# Patient Record
Sex: FEMALE | Race: WHITE | NOT HISPANIC OR LATINO | Employment: OTHER | URBAN - METROPOLITAN AREA
[De-identification: names, ages, dates, MRNs, and addresses within clinical notes are randomized per-mention and may not be internally consistent; named-entity substitution may affect disease eponyms.]

---

## 2017-02-02 ENCOUNTER — TELEPHONE (OUTPATIENT)
Dept: INTERNAL MEDICINE | Facility: CLINIC | Age: 67
End: 2017-02-02

## 2017-02-02 NOTE — TELEPHONE ENCOUNTER
----- Message from Kassi Serrano sent at 2017  2:50 PM CST -----  Contact: self  Karin Maguire  MRN: [id number]  : 1950  PCP: Gabby Finney  Home Phone      [phone]  Work Phone      Not on file.  Mobile          [phone]      MESSAGE:   Pt has cough, headache, chills, not sure if she has fever or not. Is requesting to get an appt tomorrow or to have something called in.    Phone: [phone]

## 2017-02-02 NOTE — TELEPHONE ENCOUNTER
Patient is having chest congestion, cough, chills, body aches, cant check temp. No appts available, can you send something in?

## 2017-02-02 NOTE — TELEPHONE ENCOUNTER
Patient is coughing up brown, she is a smoker. Patient also has the chills. She states she thinks she may have it.

## 2017-02-02 NOTE — TELEPHONE ENCOUNTER
i have no issue calling in something, but i'm afraid she may have the flu. Any colorful d/c or just clear. If chills with clear d/c, sounds like flu.

## 2017-02-03 ENCOUNTER — OFFICE VISIT (OUTPATIENT)
Dept: FAMILY MEDICINE | Facility: CLINIC | Age: 67
End: 2017-02-03
Payer: MEDICARE

## 2017-02-03 VITALS
SYSTOLIC BLOOD PRESSURE: 116 MMHG | HEART RATE: 68 BPM | DIASTOLIC BLOOD PRESSURE: 72 MMHG | HEIGHT: 68 IN | BODY MASS INDEX: 28.89 KG/M2 | WEIGHT: 190.63 LBS | TEMPERATURE: 100 F

## 2017-02-03 DIAGNOSIS — J11.1 INFLUENZA: Primary | ICD-10-CM

## 2017-02-03 PROCEDURE — 99212 OFFICE O/P EST SF 10 MIN: CPT | Mod: PBBFAC | Performed by: FAMILY MEDICINE

## 2017-02-03 PROCEDURE — 99999 PR PBB SHADOW E&M-EST. PATIENT-LVL II: CPT | Mod: PBBFAC,,, | Performed by: FAMILY MEDICINE

## 2017-02-03 PROCEDURE — 99213 OFFICE O/P EST LOW 20 MIN: CPT | Mod: S$PBB,,, | Performed by: FAMILY MEDICINE

## 2017-02-03 RX ORDER — OSELTAMIVIR PHOSPHATE 75 MG/1
75 CAPSULE ORAL 2 TIMES DAILY
Qty: 10 CAPSULE | Refills: 0 | Status: SHIPPED | OUTPATIENT
Start: 2017-02-03 | End: 2017-02-13

## 2017-02-03 NOTE — PATIENT INSTRUCTIONS
Influenza (Adult)    Influenza is also called the flu. It is a viral illness that affects the air passages of your lungs. It is different from the common cold. The flu can easily be passed from one to person to another. It may be spread through the air by coughing and sneezing. Or it can be spread by touching the sick person and then touching your own eyes, nose, or mouth.  The flu starts 1 to 3 days after you are exposed to the flu virus. It may last for 1 to 2 weeks. You usually dont need to take antibiotics unless you have a complication. This might be an ear or sinus infection or pneumonia.  Symptoms of the flu may be mild or severe. They can include extreme tiredness (wanting to stay in bed all day), chills, fevers, muscle aches, soreness with eye movement, headache, and a dry, hacking cough.  Home care  Follow these guidelines when caring for yourself at home:  · Avoid being around cigarette smoke, whether yours or other peoples.  · Acetaminophen or ibuprofen will help ease your fever, muscle aches, and headache. Dont give aspirin to anyone younger than 18 who has the flu. Aspirin can harm the liver.  · Nausea and loss of appetite are common with the flu. Eat light meals. Drink 6 to 8 glasses of liquids every day. Good choices are water, sport drinks, soft drinks without caffeine, juices, tea, and soup. Extra fluids will also help loosen secretions in your nose and lungs.  · Over-the-counter cold medicines will not make the flu go away faster. But the medicines may help with coughing, sore throat, and congestion in your nose and sinuses. Dont use a decongestant if you have high blood pressure.  · Stay home until your fever has been gone for at least 24 hours without using medicine to reduce fever.  Follow-up care  Follow up with your healthcare provider, or as advised, if you are not getting better over the next week.  If you are 65 or older, talk with your provider about getting a pneumococcal vaccine  every 5 years. You should also get this vaccine if you have chronic asthma or COPD. All adults should get a flu vaccine every fall. Ask your provider about this.  When to seek medical advice  Call your healthcare provider right away if any of these occur:  · Cough with lots of colored sputum (mucus) or blood in your sputum  · Chest pain, shortness of breath, wheezing, or difficulty breathing  · Severe headache, or face, neck, or ear pain  · New rash with fever  · Fever of 100.4°F (38°C) or higher, or as directed by your healthcare provider  · Confusion, behavior change, or seizure  · Severe weakness or dizziness  · You get a fever or cough after getting better for a few days  Date Last Reviewed: 12/23/2014  © 1194-6203 [employer]. [address] [place] [address], [place] [address]. All rights reserved. This information is not intended as a substitute for professional medical care. Always follow your healthcare professional's instructions.

## 2017-02-03 NOTE — MR AVS SNAPSHOT
[facility]  [address] [address]  Phone: [phone]  Fax: [phone]                  Karin Maguire   2/3/2017 8:45 AM   Office Visit    Description:  Female : 1950   Provider:  Khanh Mendez MD   Department:  [facility]           Reason for Visit     Cough     Nasal Congestion     Chills           Diagnoses this Visit        Comments    Influenza    -  Primary            To Do List           Future Appointments        Provider Department Dept Phone    2017 8:00 AM NURSEST. CARBALLO [facility] [phone]    2017 8:00 AM Gabby Finney NP [facility] [phone]      Goals (5 Years of Data)     None       These Medications        Disp Refills Start End    oseltamivir (TAMIFLU) 75 MG capsule 10 capsule 0 2/3/2017 2017    Take 1 capsule (75 mg total) by mouth 2 (two) times daily. - Oral    Pharmacy: [facility]/pharmacy #[id number] - [place], [place] - [address] Ph #: [phone]         [facility]     [facility] On Call Nurse Care Line -  Assistance  Registered nurses in the [facility] provide clinical advisement, health education, appointment booking, and other advisory services.  Call for this free service at [phone].             Medications           Message regarding Medications     Verify the changes and/or additions to your medication regime listed below are the same as discussed with your clinician today.  If any of these changes or additions are incorrect, please notify your healthcare provider.        START taking these NEW medications        Refills    oseltamivir (TAMIFLU) 75 MG capsule 0    Sig: Take 1 capsule (75 mg total) by mouth 2 (two) times daily.    Class: Normal    Route: Oral           Verify that the below list of medications is an accurate representation of the medications you are currently taking.  If none reported, the list may be blank. If  "incorrect, please contact your healthcare provider. Carry this list with you in case of emergency.           Current Medications     duloxetine (CYMBALTA) 60 MG capsule Take 60 mg by mouth once daily.     lamotrigine (LAMICTAL) 150 MG Tab 1.5 tablets every evening.     oxcarbazepine (TRILEPTAL) 300 MG Tab 1.5 tablets 2 (two) times daily.     SEROQUEL  mg Tb24 Take 1 tablet by mouth once daily.    oseltamivir (TAMIFLU) 75 MG capsule Take 1 capsule (75 mg total) by mouth 2 (two) times daily.           Clinical Reference Information           Your Vitals Were     BP Pulse Temp Height Weight BMI    116/72 (BP Location: Left arm, Patient Position: Sitting, BP Method: Manual) 68 100.1 °F (37.8 °C) 5' 8" (1.727 m) 86.5 kg (190 lb 9.6 oz) 28.98 kg/m2      Blood Pressure          Most Recent Value    BP  116/72      Allergies as of 2/3/2017     No Known Allergies      Immunizations Administered on Date of Encounter - 2/3/2017     None      Instructions      Influenza (Adult)    Influenza is also called the flu. It is a viral illness that affects the air passages of your lungs. It is different from the common cold. The flu can easily be passed from one to person to another. It may be spread through the air by coughing and sneezing. Or it can be spread by touching the sick person and then touching your own eyes, nose, or mouth.  The flu starts 1 to 3 days after you are exposed to the flu virus. It may last for 1 to 2 weeks. You usually dont need to take antibiotics unless you have a complication. This might be an ear or sinus infection or pneumonia.  Symptoms of the flu may be mild or severe. They can include extreme tiredness (wanting to stay in bed all day), chills, fevers, muscle aches, soreness with eye movement, headache, and a dry, hacking cough.  Home care  Follow these guidelines when caring for yourself at home:  · Avoid being around cigarette smoke, whether yours or other peoples.  · Acetaminophen or " ibuprofen will help ease your fever, muscle aches, and headache. Dont give aspirin to anyone younger than 18 who has the flu. Aspirin can harm the liver.  · Nausea and loss of appetite are common with the flu. Eat light meals. Drink 6 to 8 glasses of liquids every day. Good choices are water, sport drinks, soft drinks without caffeine, juices, tea, and soup. Extra fluids will also help loosen secretions in your nose and lungs.  · Over-the-counter cold medicines will not make the flu go away faster. But the medicines may help with coughing, sore throat, and congestion in your nose and sinuses. Dont use a decongestant if you have high blood pressure.  · Stay home until your fever has been gone for at least 24 hours without using medicine to reduce fever.  Follow-up care  Follow up with your healthcare provider, or as advised, if you are not getting better over the next week.  If you are 65 or older, talk with your provider about getting a pneumococcal vaccine every 5 years. You should also get this vaccine if you have chronic asthma or COPD. All adults should get a flu vaccine every fall. Ask your provider about this.  When to seek medical advice  Call your healthcare provider right away if any of these occur:  · Cough with lots of colored sputum (mucus) or blood in your sputum  · Chest pain, shortness of breath, wheezing, or difficulty breathing  · Severe headache, or face, neck, or ear pain  · New rash with fever  · Fever of 100.4°F (38°C) or higher, or as directed by your healthcare provider  · Confusion, behavior change, or seizure  · Severe weakness or dizziness  · You get a fever or cough after getting better for a few days  Date Last Reviewed: 12/23/2014  © 9325-0645 [employer]. [address] [place] [address], [place] [address]. All rights reserved. This information is not intended as a substitute for professional medical care. Always follow your healthcare professional's instructions.              Smoking Cessation     If you would like to quit smoking:   You may be eligible for free services if you are a [place] resident and started smoking cigarettes before September 1, 1988.  Call the Smoking Cessation Trust (SCT) toll free at (794) 340-6075 or (163) 284-5220.   Call 1-800-QUIT-NOW if you do not meet the above criteria.            Language Assistance Services     ATTENTION: Language assistance services are available, free of charge. Please call [phone].      ATENCIÓN: Si habla ronny, tiene a eng disposición servicios gratuitos de asistencia lingüística. Llame al [phone].     CHÚ Ý: N?u b?n nói Ti?ng Vi?t, có các d?ch v? h? tr? ngôn ng? mi?n phí dành cho b?n. G?i s? [phone].         [facility] complies with applicable Federal civil rights laws and does not discriminate on the basis of race, color, national origin, age, disability, or sex.

## 2017-02-15 ENCOUNTER — HOSPITAL ENCOUNTER (OUTPATIENT)
Dept: RADIOLOGY | Facility: HOSPITAL | Age: 67
Discharge: HOME | End: 2017-02-15
Attending: NURSE PRACTITIONER
Payer: MEDICARE

## 2017-02-15 ENCOUNTER — OFFICE VISIT (OUTPATIENT)
Dept: INTERNAL MEDICINE | Facility: CLINIC | Age: 67
End: 2017-02-15
Payer: MEDICARE

## 2017-02-15 VITALS
BODY MASS INDEX: 28.57 KG/M2 | DIASTOLIC BLOOD PRESSURE: 80 MMHG | HEART RATE: 84 BPM | SYSTOLIC BLOOD PRESSURE: 122 MMHG | WEIGHT: 188.5 LBS | RESPIRATION RATE: 18 BRPM | HEIGHT: 68 IN

## 2017-02-15 DIAGNOSIS — M70.61 TROCHANTERIC BURSITIS OF RIGHT HIP: ICD-10-CM

## 2017-02-15 DIAGNOSIS — M25.551 RIGHT HIP PAIN: Primary | ICD-10-CM

## 2017-02-15 DIAGNOSIS — M25.551 RIGHT HIP PAIN: ICD-10-CM

## 2017-02-15 PROCEDURE — 73502 X-RAY EXAM HIP UNI 2-3 VIEWS: CPT | Mod: 26,RT,, | Performed by: RADIOLOGY

## 2017-02-15 PROCEDURE — 99213 OFFICE O/P EST LOW 20 MIN: CPT | Mod: S$PBB,,, | Performed by: NURSE PRACTITIONER

## 2017-02-15 PROCEDURE — 73502 X-RAY EXAM HIP UNI 2-3 VIEWS: CPT | Mod: TC,RT

## 2017-02-15 PROCEDURE — 99999 PR PBB SHADOW E&M-EST. PATIENT-LVL III: CPT | Mod: PBBFAC,,, | Performed by: NURSE PRACTITIONER

## 2017-02-15 NOTE — MR AVS SNAPSHOT
[facility]  [address] [address]  Phone: [phone]  Fax: [phone]                  Karin Maguire   2/15/2017 12:45 PM   Office Visit    Description:  Female : 1950   Provider:  Gabby Finney NP   Department:  [facility]           Reason for Visit     Hip Pain           Diagnoses this Visit        Comments    Right hip pain    -  Primary     Trochanteric bursitis of right hip                To Do List           Future Appointments        Provider Department Dept Phone    2017 8:00 AM NURSE, [facility] [phone]    2017 8:00 AM Gabby Finney NP [facility] [phone]      Goals (5 Years of Data)     None      Follow-Up and Disposition     Return if symptoms worsen or fail to improve.      [facility] On Call     [facility] On Call Nurse [facility] Line -  Assistance  Registered nurses in the [facility] provide clinical advisement, health education, appointment booking, and other advisory services.  Call for this free service at [phone].             Medications           Message regarding Medications     Verify the changes and/or additions to your medication regime listed below are the same as discussed with your clinician today.  If any of these changes or additions are incorrect, please notify your healthcare provider.             Verify that the below list of medications is an accurate representation of the medications you are currently taking.  If none reported, the list may be blank. If incorrect, please contact your healthcare provider. Carry this list with you in case of emergency.           Current Medications     duloxetine (CYMBALTA) 60 MG capsule Take 60 mg by mouth once daily.     lamotrigine (LAMICTAL) 150 MG Tab 1.5 tablets every evening.     oxcarbazepine (TRILEPTAL) 300 MG Tab 2 (two) times daily.     SEROQUEL  mg Tb24 Take 200 mg by mouth  "once daily.            Clinical Reference Information           Your Vitals Were     BP Pulse Resp Height Weight BMI    122/80 84 18 5' 8" (1.727 m) 85.5 kg (188 lb 7.9 oz) 28.66 kg/m2      Blood Pressure          Most Recent Value    BP  122/80      Allergies as of 2/15/2017     No Known Allergies      Immunizations Administered on Date of Encounter - 2/15/2017     None      Orders Placed During Today's Visit      Normal Orders This Visit    Ambulatory referral to Orthopedics     Future Labs/Procedures Expected by Expires    X-Ray Hip 2 View Right  2/15/2017 2/15/2018      Smoking Cessation     If you would like to quit smoking:   You may be eligible for free services if you are a [place] resident and started smoking cigarettes before September 1, 1988.  Call the Smoking Cessation Trust (SCT) toll free at (613) 931-0731 or (452) 828-1255.   Call -015-QUIT-NOW if you do not meet the above criteria.            Language Assistance Services     ATTENTION: Language assistance services are available, free of charge. Please call [phone].      ATENCIÓN: Si habla español, tiene a eng disposición servicios gratuitos de asistencia lingüística. Llame al [phone].     WILLY Ý: N?u b?n nói Ti?ng Vi?t, có các d?ch v? h? tr? ngôn ng? mi?n phí dành cho b?n. G?i s? [phone].         [facility] complies with applicable Federal civil rights laws and does not discriminate on the basis of race, color, national origin, age, disability, or sex.        "

## 2017-02-15 NOTE — PROGRESS NOTES
Subjective:       Patient ID: Karin Maguire is a 66 y.o. female.    Chief Complaint: Hip Pain    HPI: pt known to me presents with c/o right hip pain. Reports she has been dealing with this pain for years, but it is getting worse lately. Reports she has taken mobic or naproxen in the past, but no real relief. Now cannot sleep on it or go up stairs.     Review of Systems   Constitutional: Negative for chills, diaphoresis, fatigue and fever.   Respiratory: Positive for cough. Negative for shortness of breath and wheezing.         Reports just recently getting over the flu, but doing well except for occ cough   Cardiovascular: Negative for chest pain.   Gastrointestinal: Negative for abdominal distention, abdominal pain, constipation, diarrhea, nausea and vomiting.   Musculoskeletal: Positive for arthralgias. Negative for back pain and myalgias.   Neurological: Negative for headaches.   Psychiatric/Behavioral: Negative for sleep disturbance.       Objective:      Physical Exam   Constitutional: She is oriented to person, place, and time. She appears well-developed and well-nourished.   HENT:   Head: Normocephalic and atraumatic.   Cardiovascular: Normal rate, regular rhythm and normal heart sounds.    Pulmonary/Chest: Effort normal and breath sounds normal.   A little upper airway rattle   Abdominal: Soft. Bowel sounds are normal. There is no tenderness.   Musculoskeletal: She exhibits no edema.   Neurological: She is alert and oriented to person, place, and time.   Skin: Skin is warm and dry.   Psychiatric: She has a normal mood and affect. Her behavior is normal. Judgment and thought content normal.   Nursing note and vitals reviewed.      Assessment:       1. Right hip pain    2. Trochanteric bursitis of right hip        Plan:   1. Right hip pain    - X-Ray Hip 2 View Right; Future  - Ambulatory referral to Orthopedics    2. Trochanteric bursitis of right hip    - X-Ray Hip 2 View Right; Future  - Ambulatory  referral to Orthopedics

## 2017-03-01 RX ORDER — MELOXICAM 15 MG/1
TABLET ORAL
Qty: 30 TABLET | Refills: 2 | Status: SHIPPED | OUTPATIENT
Start: 2017-03-01 | End: 2017-06-30

## 2017-03-02 ENCOUNTER — TELEPHONE (OUTPATIENT)
Dept: PAIN MEDICINE | Facility: CLINIC | Age: 67
End: 2017-03-02

## 2017-03-02 ENCOUNTER — OFFICE VISIT (OUTPATIENT)
Dept: INTERNAL MEDICINE | Facility: CLINIC | Age: 67
End: 2017-03-02
Payer: MEDICARE

## 2017-03-02 VITALS
DIASTOLIC BLOOD PRESSURE: 86 MMHG | RESPIRATION RATE: 18 BRPM | HEART RATE: 84 BPM | HEIGHT: 68 IN | WEIGHT: 188.5 LBS | SYSTOLIC BLOOD PRESSURE: 138 MMHG | BODY MASS INDEX: 28.57 KG/M2

## 2017-03-02 DIAGNOSIS — M54.41 LOW BACK PAIN WITH RADIATION, RIGHT: Primary | ICD-10-CM

## 2017-03-02 DIAGNOSIS — M47.27 LUMBOSACRAL SPONDYLOSIS WITH RADICULOPATHY: ICD-10-CM

## 2017-03-02 PROCEDURE — 99214 OFFICE O/P EST MOD 30 MIN: CPT | Mod: PBBFAC | Performed by: NURSE PRACTITIONER

## 2017-03-02 PROCEDURE — 99999 PR PBB SHADOW E&M-EST. PATIENT-LVL IV: CPT | Mod: PBBFAC,,, | Performed by: NURSE PRACTITIONER

## 2017-03-02 PROCEDURE — 99213 OFFICE O/P EST LOW 20 MIN: CPT | Mod: S$PBB | Performed by: NURSE PRACTITIONER

## 2017-03-02 PROCEDURE — 1160F RVW MEDS BY RX/DR IN RCRD: CPT | Performed by: NURSE PRACTITIONER

## 2017-03-02 RX ORDER — HYDROCODONE BITARTRATE AND ACETAMINOPHEN 5; 325 MG/1; MG/1
1 TABLET ORAL EVERY 6 HOURS PRN
Qty: 45 TABLET | Refills: 0 | Status: SHIPPED | OUTPATIENT
Start: 2017-03-02 | End: 2018-01-17

## 2017-03-02 NOTE — PROGRESS NOTES
Subjective:       Patient ID: Karin Maguire is a 66 y.o. female.    Chief Complaint: Hip Pain    HPI: pt known to me presents with c/o right hip pain. I saw this pt 2 weeks ago for this and sent her to ortho. She was given an injection in the hip. Reports relief x 2 days and then flared back up. She has been seeing PT and they feel that it is more her SI joint causing issues rather than her hip. They rec injection into the SI joint.     Reports h/o spondylosis noted on MRI years ago. Was told she needed back surgery, but had declined at the time.     Review of Systems   Constitutional: Negative for chills, diaphoresis, fatigue and fever.   Eyes: Negative for visual disturbance.   Respiratory: Negative for cough, shortness of breath and wheezing.    Cardiovascular: Negative for chest pain.   Gastrointestinal: Negative for abdominal distention, abdominal pain, constipation, diarrhea, nausea and vomiting.   Musculoskeletal: Positive for back pain and gait problem. Negative for arthralgias and myalgias.        Right low back pain with majority of pain to the right hip   Neurological: Negative for headaches.   Psychiatric/Behavioral: Negative for sleep disturbance.       Objective:      Physical Exam   Constitutional: She is oriented to person, place, and time. She appears well-developed and well-nourished.   HENT:   Head: Normocephalic and atraumatic.   Cardiovascular: Normal rate, regular rhythm and normal heart sounds.    Pulmonary/Chest: Effort normal and breath sounds normal.   Abdominal: Soft. Bowel sounds are normal. There is no tenderness.   Musculoskeletal: She exhibits tenderness. She exhibits no edema.        Arms:  Neurological: She is alert and oriented to person, place, and time.   Skin: Skin is warm and dry.   Psychiatric: She has a normal mood and affect. Her behavior is normal. Judgment and thought content normal.   Nursing note and vitals reviewed.      Assessment:       1. Low back pain with  radiation, right    2. Lumbosacral spondylosis with radiculopathy        Plan:     1. Low back pain with radiation, right  i think she will need further intervention. No relief with hip shot and not satisfied with [facility].   - Ambulatory Referral to Pain Clinic  - X-Ray Lumbar Spine Complete 5 View; Future  - MRI Lumbar Spine Without Contrast; Future  - hydrocodone-acetaminophen 5-325mg (NORCO) 5-325 mg per tablet; Take 1 tablet by mouth every 6 (six) hours as needed for Pain.  Dispense: 45 tablet; Refill: 0    2. Lumbosacral spondylosis with radiculopathy    - Ambulatory Referral to Pain Clinic  - X-Ray Lumbar Spine Complete 5 View; Future  - MRI Lumbar Spine Without Contrast; Future  - hydrocodone-acetaminophen 5-325mg (NORCO) 5-325 mg per tablet; Take 1 tablet by mouth every 6 (six) hours as needed for Pain.  Dispense: 45 tablet; Refill: 0

## 2017-03-02 NOTE — MR AVS SNAPSHOT
[facility]  [address]   [place] [address]  Phone: [phone]  Fax: [phone]                  Karin Maguire   3/2/2017 1:30 PM   Office Visit    Description:  Female : 1950   Provider:  Gabby Finney NP   Department:  [facility]           Reason for Visit     Hip Pain           Diagnoses this Visit        Comments    Low back pain with radiation, right    -  Primary     Lumbosacral spondylosis with radiculopathy                To Do List           Future Appointments        Provider Department Dept Phone    3/3/2017 10:45 AM AUBREE Nieves [place] - Pain Management [phone]    3/8/2017 10:00 AM [facility] [phone]    2017 8:00 AM NURSE, [facility] [phone]    2017 8:00 AM Gabby Finney NP [place] [phone]      Goals (5 Years of Data)     None      Follow-Up and Disposition     Return if symptoms worsen or fail to improve.    Follow-up and Disposition History       These Medications        Disp Refills Start End    hydrocodone-acetaminophen 5-325mg (NORCO) 5-325 mg per tablet 45 tablet 0 3/2/2017     Take 1 tablet by mouth every 6 (six) hours as needed for Pain. - Oral    Pharmacy: [facility]/pharmacy #[id number] - [place] - [address]  #: [phone]         [facility] Call     [facility] Nurse Care Line -  Assistance  Registered nurses in the [facility] provide clinical advisement, health education, appointment booking, and other advisory services.  Call for this free service at [phone].             Medications           Message regarding Medications     Verify the changes and/or additions to your medication regime listed below are the same as discussed with your clinician today.  If any of these changes or additions are incorrect, please notify your healthcare provider.        START taking these NEW  medications        Refills    hydrocodone-acetaminophen 5-325mg (NORCO) 5-325 mg per tablet 0    Sig: Take 1 tablet by mouth every 6 (six) hours as needed for Pain.    Class: Print    Route: Oral           Verify that the below list of medications is an accurate representation of the medications you are currently taking.  If none reported, the list may be blank. If incorrect, please contact your healthcare provider. Carry this list with you in case of emergency.           Current Medications     duloxetine (CYMBALTA) 60 MG capsule Take 60 mg by mouth once daily.     lamotrigine (LAMICTAL) 150 MG Tab 1.5 tablets every evening.     meloxicam (MOBIC) 15 MG tablet TAKE 1 TABLET (15 MG TOTAL) BY MOUTH ONCE DAILY.    oxcarbazepine (TRILEPTAL) 300 MG Tab 2 (two) times daily.     SEROQUEL  mg Tb24 Take 200 mg by mouth once daily.     hydrocodone-acetaminophen 5-325mg (NORCO) 5-325 mg per tablet Take 1 tablet by mouth every 6 (six) hours as needed for Pain.           Clinical Reference Information           Your Vitals Were     BP                   138/86           Blood Pressure          Most Recent Value    BP  138/86      Allergies as of 3/2/2017     No Known Allergies      Immunizations Administered on Date of Encounter - 3/2/2017     None      Orders Placed During Today's Visit      Normal Orders This Visit    Ambulatory Referral to Pain Clinic     Future Labs/Procedures Expected by Expires    MRI Lumbar Spine Without Contrast  3/2/2017 3/2/2018    X-Ray Lumbar Spine Complete 5 View  3/2/2017 3/2/2018      Smoking Cessation     If you would like to quit smoking:   You may be eligible for free services if you are a [place] resident and started smoking cigarettes before September 1, 1988.  Call the Smoking Cessation Trust (SCT) toll free at (804) 959-3370 or (698) 421-8240.   Call 9-757-QUIT-NOW if you do not meet the above criteria.            Language Assistance Services     ATTENTION: Language assistance  services are available, free of charge. Please call [phone].      ATENCIÓN: Si habla ronny, tiene a eng disposición servicios gratuitos de asistencia lingüística. Llame al [phone].     CHÚ Ý: N?u b?n nói Ti?ng Vi?t, có các d?ch v? h? tr? ngôn ng? mi?n phí dành cho b?n. G?i s? [phone].         [facility] complies with applicable Federal civil rights laws and does not discriminate on the basis of race, color, national origin, age, disability, or sex.

## 2017-03-02 NOTE — TELEPHONE ENCOUNTER
Spoke with pt to reschedule appt for 03/03/17. Pt was put on the schedule by Shelbie at [facility]. Pt doesn't have any imaging. Tommy does not see new pt  Pt is schedule with Dr. Barron on 03/24/17  At 2:30pm. Appt letter mailed.  Pt voice understanding.

## 2017-03-08 ENCOUNTER — HOSPITAL ENCOUNTER (OUTPATIENT)
Dept: RADIOLOGY | Facility: HOSPITAL | Age: 67
Discharge: HOME | End: 2017-03-08
Attending: NURSE PRACTITIONER
Payer: MEDICARE

## 2017-03-08 DIAGNOSIS — M54.41 LOW BACK PAIN WITH RADIATION, RIGHT: ICD-10-CM

## 2017-03-08 DIAGNOSIS — M47.27 LUMBOSACRAL SPONDYLOSIS WITH RADICULOPATHY: ICD-10-CM

## 2017-03-08 PROCEDURE — 72148 MRI LUMBAR SPINE W/O DYE: CPT | Mod: TC

## 2017-03-08 PROCEDURE — 72148 MRI LUMBAR SPINE W/O DYE: CPT | Mod: 26,,, | Performed by: RADIOLOGY

## 2017-03-21 ENCOUNTER — HOSPITAL ENCOUNTER (OUTPATIENT)
Dept: RADIOLOGY | Facility: HOSPITAL | Age: 67
Discharge: HOME | End: 2017-03-21
Attending: NURSE PRACTITIONER
Payer: MEDICARE

## 2017-03-21 DIAGNOSIS — M47.27 LUMBOSACRAL SPONDYLOSIS WITH RADICULOPATHY: ICD-10-CM

## 2017-03-21 DIAGNOSIS — M54.41 LOW BACK PAIN WITH RADIATION, RIGHT: ICD-10-CM

## 2017-03-21 PROCEDURE — 72110 X-RAY EXAM L-2 SPINE 4/>VWS: CPT | Mod: TC

## 2017-03-21 PROCEDURE — 72110 X-RAY EXAM L-2 SPINE 4/>VWS: CPT | Mod: 26,,, | Performed by: RADIOLOGY

## 2017-03-24 ENCOUNTER — OFFICE VISIT (OUTPATIENT)
Dept: PAIN MEDICINE | Facility: CLINIC | Age: 67
End: 2017-03-24
Payer: MEDICARE

## 2017-03-24 VITALS
RESPIRATION RATE: 17 BRPM | HEART RATE: 80 BPM | HEIGHT: 68 IN | WEIGHT: 195.13 LBS | SYSTOLIC BLOOD PRESSURE: 146 MMHG | BODY MASS INDEX: 29.57 KG/M2 | DIASTOLIC BLOOD PRESSURE: 84 MMHG

## 2017-03-24 DIAGNOSIS — M46.1 SACROILIITIS: Primary | ICD-10-CM

## 2017-03-24 DIAGNOSIS — M70.61 TROCHANTERIC BURSITIS OF RIGHT HIP: ICD-10-CM

## 2017-03-24 PROCEDURE — 99999 PR PBB SHADOW E&M-EST. PATIENT-LVL III: CPT | Mod: PBBFAC,,, | Performed by: ANESTHESIOLOGY

## 2017-03-24 PROCEDURE — 99204 OFFICE O/P NEW MOD 45 MIN: CPT | Mod: S$PBB | Performed by: ANESTHESIOLOGY

## 2017-03-24 PROCEDURE — 99213 OFFICE O/P EST LOW 20 MIN: CPT | Mod: PBBFAC | Performed by: ANESTHESIOLOGY

## 2017-03-24 PROCEDURE — 1160F RVW MEDS BY RX/DR IN RCRD: CPT | Performed by: ANESTHESIOLOGY

## 2017-03-24 RX ORDER — NAPROXEN SODIUM 220 MG
440 TABLET ORAL 2 TIMES DAILY WITH MEALS
COMMUNITY
End: 2018-12-01 | Stop reason: ALTCHOICE

## 2017-03-24 RX ORDER — ACETAMINOPHEN 500 MG
1000 TABLET ORAL
COMMUNITY
End: 2017-06-30

## 2017-03-24 RX ORDER — QUETIAPINE 200 MG/1
300 TABLET, FILM COATED, EXTENDED RELEASE ORAL DAILY
COMMUNITY
Start: 2017-03-02 | End: 2018-01-17 | Stop reason: DRUGHIGH

## 2017-03-24 NOTE — LETTER
March 24, 2017      Gabby Finney, NP  [address] [address]           [place] [facility]  [address] [facility] [address]  Phone: [phone]  Fax: [phone]          Patient: Karin Maguire   MR Number: [id number]   YOB: 1950   Date of Visit: 3/24/2017       Dear Gabby Finney:    Thank you for referring Karin Maguire to me for evaluation. Attached you will find relevant portions of my assessment and plan of care.    If you have questions, please do not hesitate to call me. I look forward to following Karin Maguire along with you.    Sincerely,    Lissette Barron MD    Enclosure  CC:  No Recipients    If you would like to receive this communication electronically, please contact [email or web address] or [phone] to request more information on [facility] [employer] access.    For providers and/or their staff who would like to refer a patient to [facility], please contact us through our one-stop-shop provider referral line, [facility], at [phone].    If you feel you have received this communication in error or would no longer like to receive these types of communications, please e-mail [email or web address]

## 2017-03-24 NOTE — PROGRESS NOTES
Chronic Pain - New Consult    Referring Physician: Gabby Finney NP    Chief Complaint:   Chief Complaint   Patient presents with    Hip Pain     right hip        SUBJECTIVE:    Karin Maguire presents to the clinic for the evaluation of right hip pain. The pain started 10-15 years ago following lifting and symptoms have been worsening.The pain is located in the right hip area and radiates to the right leg.  The pain is described as sharp and stabbing and is rated as 3/10. The pain is rated with a score of  1/10 on the BEST day and a score of 9/10 on the WORST day.  Symptoms interfere with daily activity. The pain is exacerbated by Sitting, Walking and Lifting.  The pain is mitigated by laying down and medications. She reports spending 8 hours per day reclining. The patient reports 4 hours of uninterrupted sleep per night.    Patient denies night fever/night sweats, urinary incontinence, bowel incontinence, significant weight loss, significant motor weakness and loss of sensations.    Physical Therapy/Home Exercise: yes      Pain Disability Index Review:  Last 3 PDI Scores 3/24/2017   Pain Disability Index (PDI) 17       Pain Medications:    - Opioids: Norco  - Adjuvant Medications: Cymbalta ( Duloxetine), Mobic (Meloxicam) and seroquel  - Anti-Coagulants: none  - Others: see medication card     report:  Reviewed and consistent with medication use as prescribed.    Pain Procedures: none    Imaging:  3/21/17 X-Ray Lumbar Spine Complete 5 View  Lumbar spine, 5 views: There is a levoscoliotic curvature of the lumbar spine.  Straightening of the normal lumbar lordosis.  Vertebral body heights are maintained.  There are multilevel degenerative changes of the lumbar spine consisting of disc space narrowing, endplate sclerosis, marginal osteophytosis and facet arthropathy.  No fracture or subluxation is identified.   Impression    As above.              3/8/17 MRI Lumbar Spine Without Contrast  Technique:  Sagittal T1, sagittal T2, sagittal STIR, axial T1 and axial T2 images of the lumbar spine were obtained without contrast.    Comparison: None.    Results: Vertebral body alignment and heights are maintained.  There is disc desiccation with disc space narrowing throughout the lumbar spine, most prominent at the L3-4 and L4-5 levels.  Endplate degenerative changes are noted at L4 and L5.  The marrow signal is otherwise normal without evidence for a marrow replacement process, infection or tumor.  The conus terminates at T12.    Incidentally visualized soft tissues structures of the abdomen are within normal limits.    At T12-L1, there is bilateral facet arthropathy without central canal stenosis or neural foraminal narrowing.    At L1-2, there is a broad-based posterior disc bulge with left-sided facet arthropathy without central canal stenosis or neural foraminal narrowing.    At L2-3, there is a broad-based posterior disc bulge and facet arthropathy without central canal stenosis or neural foraminal narrowing.    At L3-4, there is a broad-based posterior disc bulge, ligamentum flavum hypertrophy and facet arthropathy contributing to mild central canal stenosis with moderate bilateral neural foraminal narrowing.    At L4-5, there is a broad-based posterior disc bulge, ligamentum flavum hypertrophy and facet arthropathy contributing to severe central canal stenosis with severe bilateral neural foraminal narrowing.    At L5-S1, there is bilateral facet arthropathy without central canal stenosis or neural foraminal narrowing.   Impression       Multilevel degenerative changes of the lumbar spine as detailed above, most significant at the L4-5 level where there is severe central canal stenosis and severe bilateral neural foraminal narrowing.  There is also mild central canal stenosis with moderate bilateral neural foraminal narrowing at L3-4.           2/15/17 X-Ray Hip 2 View Right  Right hip, 2 views: There is  bilateral acetabular roof sclerosis.  Joint spaces are well maintained.  No fracture or dislocation is identified.   Impression    As above.           Past Medical History:   Diagnosis Date    Anxiety     Bipolar 1 disorder      Past Surgical History:   Procedure Laterality Date    ANKLE FRACTURE SURGERY      BLADDER SUSPENSION      CHOLECYSTECTOMY      HYSTERECTOMY  1986    vaginal prolapse    RECTAL PROLAPSE REPAIR      TONSILLECTOMY       Social History     Social History    Marital status:      Spouse name: N/A    Number of children: N/A    Years of education: N/A     Occupational History    Not on file.     Social History Main Topics    Smoking status: Current Every Day Smoker     Packs/day: 1.00     Years: 5.00    Smokeless tobacco: Never Used    Alcohol use Yes      Comment: Socially    Drug use: No    Sexual activity: Yes     Partners: Male     Birth control/ protection: Surgical      Comment:      Other Topics Concern    Not on file     Social History Narrative     Family History   Problem Relation Age of Onset    Colon cancer Maternal Uncle     Colon cancer Maternal Uncle     Colon cancer Maternal Uncle        Review of patient's allergies indicates:  No Known Allergies    Current Outpatient Prescriptions   Medication Sig    acetaminophen (TYLENOL) 500 MG tablet Take 500 mg by mouth as needed for Pain.    duloxetine (CYMBALTA) 60 MG capsule Take 60 mg by mouth once daily.     hydrocodone-acetaminophen 5-325mg (NORCO) 5-325 mg per tablet Take 1 tablet by mouth every 6 (six) hours as needed for Pain.    lamotrigine (LAMICTAL) 150 MG Tab 1.5 tablets every evening.     meloxicam (MOBIC) 15 MG tablet TAKE 1 TABLET (15 MG TOTAL) BY MOUTH ONCE DAILY.    naproxen sodium (ANAPROX) 220 MG tablet Take 220 mg by mouth as needed.    oxcarbazepine (TRILEPTAL) 300 MG Tab 2 (two) times daily.     quetiapine 200 mg oral tb24 (SEROQUEL XR) 200 MG Tb24      No current  "facility-administered medications for this visit.        REVIEW OF SYSTEMS:    GENERAL:  No weight loss, malaise or fevers.  HEENT:  + headaches.  NECK: + neck pain  RESPIRATORY:  +smoker Negative for cough, wheezing or shortness of breath.  CARDIOVASCULAR:  Negative for chest pain, leg swelling or palpitations.  GI:  Negative for abdominal discomfort, blood in stools or black stools or change in bowel habits.  MUSCULOSKELETAL:  See HPI.  SKIN:  Negative for lesions, rash, and itching.  PSYCH:  Negative for sleep disturbance,+ bipolar disorder   HEMATOLOGY/LYMPHOLOGY:  Negative for prolonged bleeding, bruising easily or swollen nodes.  NEURO:   No history of syncope, paralysis, seizures or tremors.  All other reviewed and negative other than HPI.    OBJECTIVE:    BP (!) 146/84 (BP Location: Left arm, Patient Position: Sitting, BP Method: Manual)  Pulse 80  Resp 17  Ht 5' 8" (1.727 m)  Wt 88.5 kg (195 lb 1.7 oz)  BMI 29.67 kg/m2    PHYSICAL EXAMINATION:    General appearance: Well appearing, in no acute distress, alert and oriented x3.  Psych:  Mood and affect appropriate.  Skin: Skin color, texture, turgor normal, no rashes or lesions, in both upper and lower body.  Head/face:  Normocephalic, atraumatic. No palpable lymph nodes.  Neck: No pain to palpation over the cervical paraspinous muscles. Spurling Negative. No pain with neck flexion, extension, or lateral flexion.   Cor: RRR  Pulm: CTA  Back: Straight leg raising in the sitting and supine positions is negative to radicular pain. + TTP over the right PSIS. Normal range of motion without pain reproduction.  Extremities:+ TTP over the right GTB  Peripheral joint ROM is full and pain free without obvious instability or laxity in all four extremities. No deformities, edema, or skin discoloration. Good capillary refill.  Musculoskeletal:. Bilateral upper and lower extremity strength is normal and symmetric.  No atrophy or tone abnormalities are noted.  Neuro: " Bilateral upper and lower extremity coordination and muscle stretch reflexes are physiologic and symmetric.  Plantar response are downgoing. No loss of sensation is noted.  Gait: normal.    ASSESSMENT: 66 y.o. year old female with right buttock and right hip  pain, consistent with sacroiliitis and GT bursitis, while lumbar radiculopathy less likely        1. Sacroiliitis    2. Trochanteric bursitis of right hip            PLAN:     - I have stressed the importance of physical activity and a home exercise plan to help with pain and improve health.  - Continue  Physical therapy   -Sf for right SIJ and right GT bursa steroid injection  - RTC 3 weeks after injection  - Counseled patient regarding the importance of activity modification, smoking cessation and physical therapy.    The above plan and management options were discussed at length with patient. Patient is in agreement with the above and verbalized understanding. It will be communicated with the referring physician via electronic record, fax, or mail.    Lissette Barron  03/24/2017

## 2017-03-27 ENCOUNTER — TELEPHONE (OUTPATIENT)
Dept: PAIN MEDICINE | Facility: CLINIC | Age: 67
End: 2017-03-27

## 2017-03-27 DIAGNOSIS — M46.1 SACROILIITIS: Primary | ICD-10-CM

## 2017-03-30 ENCOUNTER — TELEPHONE (OUTPATIENT)
Dept: PAIN MEDICINE | Facility: CLINIC | Age: 67
End: 2017-03-30

## 2017-03-30 NOTE — TELEPHONE ENCOUNTER
----- Message from Nighat Roman sent at 3/30/2017 12:14 PM CDT -----  Contact: PATIENT  Karin Maguire  MRN: [id number]  : 1950  PCP: Gabby Finney  Home Phone      [phone]  Work Phone      Not on file.  Mobile          [phone]      MESSAGE: Patient has questions about her injection tomorrow.      Phone: [phone]

## 2017-03-30 NOTE — TELEPHONE ENCOUNTER
Pt was calling to verified which injection she is schedule for tomorrow. Pt verbalize understanding.

## 2017-03-30 NOTE — TELEPHONE ENCOUNTER
----- Message from Nighat Roman sent at 3/30/2017 12:14 PM CDT -----  Contact: PATIENT  Karin Maguire  MRN: [id number]  : 1950  PCP: Gabby Finney  Home Phone      [phone]  Work Phone      Not on file.  Mobile          [phone]      MESSAGE: Patient has questions about her injection tomorrow.      Phone: [phone]

## 2017-03-31 ENCOUNTER — SURGERY (OUTPATIENT)
Age: 67
End: 2017-03-31

## 2017-03-31 ENCOUNTER — HOSPITAL ENCOUNTER (OUTPATIENT)
Facility: HOSPITAL | Age: 67
Discharge: HOME | End: 2017-03-31
Attending: ANESTHESIOLOGY | Admitting: ANESTHESIOLOGY
Payer: MEDICARE

## 2017-03-31 ENCOUNTER — HOSPITAL ENCOUNTER (OUTPATIENT)
Dept: RADIOLOGY | Facility: HOSPITAL | Age: 67
Discharge: HOME | End: 2017-03-31
Attending: ANESTHESIOLOGY | Admitting: ANESTHESIOLOGY
Payer: MEDICARE

## 2017-03-31 DIAGNOSIS — M46.1 SACROILIITIS: ICD-10-CM

## 2017-03-31 PROCEDURE — 20610 DRAIN/INJ JOINT/BURSA W/O US: CPT | Mod: RT | Performed by: ANESTHESIOLOGY

## 2017-03-31 PROCEDURE — 99152 MOD SED SAME PHYS/QHP 5/>YRS: CPT | Mod: ,,, | Performed by: ANESTHESIOLOGY

## 2017-03-31 PROCEDURE — 25500020 PHARM REV CODE 255: Performed by: ANESTHESIOLOGY

## 2017-03-31 PROCEDURE — 20610 DRAIN/INJ JOINT/BURSA W/O US: CPT | Mod: 59,RT,, | Performed by: ANESTHESIOLOGY

## 2017-03-31 PROCEDURE — 27200120 HC KIT IV START (RUSH ONLY): Performed by: ANESTHESIOLOGY

## 2017-03-31 PROCEDURE — 25000003 PHARM REV CODE 250: Performed by: ANESTHESIOLOGY

## 2017-03-31 PROCEDURE — 27096 INJECT SACROILIAC JOINT: CPT | Mod: RT,,, | Performed by: ANESTHESIOLOGY

## 2017-03-31 PROCEDURE — 27096 INJECT SACROILIAC JOINT: CPT | Mod: RT | Performed by: ANESTHESIOLOGY

## 2017-03-31 PROCEDURE — 63600175 PHARM REV CODE 636 W HCPCS: Performed by: ANESTHESIOLOGY

## 2017-03-31 RX ORDER — SODIUM CHLORIDE, SODIUM LACTATE, POTASSIUM CHLORIDE, CALCIUM CHLORIDE 600; 310; 30; 20 MG/100ML; MG/100ML; MG/100ML; MG/100ML
INJECTION, SOLUTION INTRAVENOUS CONTINUOUS
Status: DISCONTINUED | OUTPATIENT
Start: 2017-03-31 | End: 2017-03-31 | Stop reason: HOSPADM

## 2017-03-31 RX ORDER — FENTANYL CITRATE 50 UG/ML
INJECTION, SOLUTION INTRAMUSCULAR; INTRAVENOUS
Status: DISCONTINUED | OUTPATIENT
Start: 2017-03-31 | End: 2017-03-31 | Stop reason: HOSPADM

## 2017-03-31 RX ORDER — BUPIVACAINE HYDROCHLORIDE 2.5 MG/ML
INJECTION, SOLUTION EPIDURAL; INFILTRATION; INTRACAUDAL
Status: DISCONTINUED | OUTPATIENT
Start: 2017-03-31 | End: 2017-03-31 | Stop reason: HOSPADM

## 2017-03-31 RX ORDER — TRIAMCINOLONE ACETONIDE 40 MG/ML
INJECTION, SUSPENSION INTRA-ARTICULAR; INTRAMUSCULAR
Status: DISCONTINUED | OUTPATIENT
Start: 2017-03-31 | End: 2017-03-31 | Stop reason: HOSPADM

## 2017-03-31 RX ORDER — LIDOCAINE HYDROCHLORIDE 10 MG/ML
INJECTION, SOLUTION EPIDURAL; INFILTRATION; INTRACAUDAL; PERINEURAL
Status: DISCONTINUED | OUTPATIENT
Start: 2017-03-31 | End: 2017-03-31 | Stop reason: HOSPADM

## 2017-03-31 RX ORDER — MIDAZOLAM HYDROCHLORIDE 1 MG/ML
INJECTION INTRAMUSCULAR; INTRAVENOUS
Status: DISCONTINUED | OUTPATIENT
Start: 2017-03-31 | End: 2017-03-31 | Stop reason: HOSPADM

## 2017-03-31 RX ADMIN — TRIAMCINOLONE ACETONIDE 40 MG: 40 INJECTION, SUSPENSION INTRA-ARTICULAR; INTRAMUSCULAR at 11:03

## 2017-03-31 RX ADMIN — MIDAZOLAM HYDROCHLORIDE 2 MG: 1 INJECTION, SOLUTION INTRAMUSCULAR; INTRAVENOUS at 11:03

## 2017-03-31 RX ADMIN — SODIUM CHLORIDE, SODIUM LACTATE, POTASSIUM CHLORIDE, AND CALCIUM CHLORIDE: .6; .31; .03; .02 INJECTION, SOLUTION INTRAVENOUS at 10:03

## 2017-03-31 RX ADMIN — BUPIVACAINE HYDROCHLORIDE 5 ML: 2.5 INJECTION, SOLUTION EPIDURAL; INFILTRATION; INTRACAUDAL; PERINEURAL at 11:03

## 2017-03-31 RX ADMIN — FENTANYL CITRATE 100 MCG: 50 INJECTION, SOLUTION INTRAMUSCULAR; INTRAVENOUS at 11:03

## 2017-03-31 RX ADMIN — IOHEXOL 5 ML: 300 INJECTION, SOLUTION INTRAVENOUS at 11:03

## 2017-03-31 RX ADMIN — LIDOCAINE HYDROCHLORIDE 5 ML: 10 INJECTION, SOLUTION EPIDURAL; INFILTRATION; INTRACAUDAL; PERINEURAL at 11:03

## 2017-03-31 NOTE — IP AVS SNAPSHOT
[address] [address]  Phone: [phone]           Patient Discharge Instructions   Our goal is to set you up for success. This packet includes information on your condition, medications, and your home care.  It will help you care for yourself to prevent having to return to the hospital.     Please ask your nurse if you have any questions.      There are many details to remember when preparing to leave the hospital. Here is what you will need to do:    1. Take your medicine. If you are prescribed medications, review your Medication List on the following pages. You may have new medications to  at the pharmacy and others that you'll need to stop taking. Review the instructions for how and when to take your medications. Talk with your doctor or nurses if you are unsure of what to do.     2. Go to your follow-up appointments. Specific follow-up information is listed in the following pages. Your may be contacted by a nurse or clinical provider about future appointments. Be sure we have all of the phone numbers to reach you. Please contact your provider's office if you are unable to make an appointment.     3. Watch for warning signs. Your doctor or nurse will give you detailed warning signs to watch for and when to call for assistance. These instructions may also include educational information about your condition. If you experience any of warning signs to your health, call your doctor.           [facility]  Unless otherwise directed by your provider, please   contact [facility], our nurse care line   that is available for 24/7 assistance.     [phone] (toll-free)     Registered nurses in the [facility]   provide: appointment scheduling, clinical advisement, health education, and other advisory services.                  ** Verify the list of medication(s) below is accurate and up to date. Carry this with you in case of emergency. If  your medications have changed, please notify your healthcare provider.             Medication List      ASK your doctor about these medications        Additional Info                      acetaminophen 500 MG tablet   Commonly known as:  TYLENOL   Refills:  0   Dose:  1000 mg    Instructions:  Take 1,000 mg by mouth as needed for Pain.     Begin Date    AM    Noon    PM    Bedtime       duloxetine 60 MG capsule   Commonly known as:  CYMBALTA   Refills:  0   Dose:  60 mg    Instructions:  Take 60 mg by mouth once daily.     Begin Date    AM    Noon    PM    Bedtime       hydrocodone-acetaminophen 5-325mg 5-325 mg per tablet   Commonly known as:  NORCO   Quantity:  45 tablet   Refills:  0   Dose:  1 tablet    Instructions:  Take 1 tablet by mouth every 6 (six) hours as needed for Pain.     Begin Date    AM    Noon    PM    Bedtime       lamotrigine 150 MG Tab   Commonly known as:  LAMICTAL   Refills:  0   Dose:  1.5 tablet    Instructions:  Take 1.5 tablets by mouth every evening.     Begin Date    AM    Noon    PM    Bedtime       meloxicam 15 MG tablet   Commonly known as:  MOBIC   Quantity:  30 tablet   Refills:  2    Instructions:  TAKE 1 TABLET (15 MG TOTAL) BY MOUTH ONCE DAILY.     Begin Date    AM    Noon    PM    Bedtime       naproxen sodium 220 MG tablet   Commonly known as:  ANAPROX   Refills:  0   Dose:  440 mg    Instructions:  Take 440 mg by mouth 3 (three) times daily with meals.     Begin Date    AM    Noon    PM    Bedtime       oxcarbazepine 300 MG Tab   Commonly known as:  TRILEPTAL   Refills:  0   Indications:  1.5 tablets twice a day    Instructions:  2 (two) times daily.     Begin Date    AM    Noon    PM    Bedtime       quetiapine 200 mg oral tb24 200 MG Tb24   Commonly known as:  SEROQUEL XR   Refills:  0   Dose:  300 mg    Instructions:  Take 300 mg by mouth once daily.     Begin Date    AM    Noon    PM    Bedtime                  Please bring to all follow up appointments:    1. A copy of  your discharge instructions.  2. All medicines you are currently taking in their original bottles.  3. Identification and insurance card.    Please arrive 15 minutes ahead of scheduled appointment time.    Please call 24 hours in advance if you must reschedule your appointment and/or time.        Your Scheduled Appointments     Apr 28, 2017  9:30 AM CDT   Established Patient Visit with AUBREE Nieves   [facility] ([facility])    [address] [facility] [address]   [phone]            May 16, 2017  8:00 AM CDT   Nurse Visit with NURSE, [facility] ([facility])    [address] [facility] [id number] [phone]            May 19, 2017  8:00 AM CDT   Established Patient Visit with Gabby Finney NP   [place] [facility] ([facility])    [address] [facility] [id number] [phone]              Your Future Surgeries/Procedures     Mar 31, 2017   Surgery with Lissette Barron MD   [facility] ([facility])    [address] [facility] [id number] [phone]                  Discharge Instructions       DIET: You may resume your normal diet today.    BATHING: You may resume your normal bathing.          You may shower, no hot water directly on site for 24 hours.    DRESSING: You may remove your bandage today.    ACTIVITY LEVEL: You may resume your normal activities 24 hours after your  procedure.    If you have received sedation or an anesthetic, you may feel sleepy                                                                          for several hours. Rest until you are more awake. Gradually resume your normal activities tomorrow.    If you have received sedation or an anesthetic, do not drive or operate heavy machinery for at least 24 hours.    MEDICATION: You may resume your normal medications today.    You will receive instructions for any pain prescriptions. Pain  "medications should be taken only as directed.    SPECIAL INSTRUCTIONS: No heat to the injection site for 24 hours including: bath or shower, heating pad, moist heat, hot tubs.    Use ice pack to injection site for any pain or discomfort. Apply ice pack to 20 minutes then remove for 20 minutes before re-applying to site.    WHEN TO CALL DOCTOR: Redness or swelling around injection site    Fever of 101F    Drainage (pus) from the injection site    For any continuous bleeding (some dried blood over the incision is normal).    FOLLOW UP: Follow up phone call will be made by office.    FOR EMERGENCIES: If any unusual problems or difficulties occur during clinic hours, call [phone] or [phone].      Admission Information     Date & Time Provider Department CSN    3/31/2017  9:57 AM Lissette Barron MD [facility] [id number]      Care Providers     Provider Role Specialty Primary office phone    Lissette Barron MD Attending Provider Pain Medicine [phone]    Lissette Barron MD Surgeon  Pain Medicine [phone]      Your Vitals Were     BP Pulse Temp Resp Height Weight    133/71 70 98 °F (36.7 °C) 16 5' 8" (1.727 m) 83.9 kg (185 lb)    SpO2 BMI             95% 28.13 kg/m2         Recent Lab Values        9/8/2009                           9:06 AM           A1C 5.5                       Allergies as of 3/31/2017     No Known Allergies      Advance Directives     An advance directive is a document which, in the event you are no longer able to make decisions for yourself, tells your healthcare team what kind of treatment you do or do not want to receive, or who you would like to make those decisions for you.  If you do not currently have an advance directive, [facility] encourages you to create one.  For more information call:  [phone]), [phone] ([phone]),  or log on to [email or web address].        Smoking Cessation     If you would like to quit smoking:   You may be eligible " for free services if you are a [place] resident and started smoking cigarettes before September 1, 1988.  Call the Smoking Cessation Trust (SCT) toll free at (258) 994-5362 or (882) 957-5842.   Call 1-800-QUIT-NOW if you do not meet the above criteria.   Contact us via email: [email or web address]   View our website for more information: [email or web address]        Language Assistance Services     ATTENTION: Language assistance services are available, free of charge. Please call [phone].      ATENCIÓN: Si habla español, tiene a eng disposición servicios gratuitos de asistencia lingüística. Llame al [phone].     CHÚ Ý: N?u b?n nói Ti?ng Vi?t, có các d?ch v? h? tr? ngôn ng? mi?n phí dành cho b?n. G?i s? [phone].         [facility] complies with applicable Federal civil rights laws and does not discriminate on the basis of race, color, national origin, age, disability, or sex.

## 2017-03-31 NOTE — OP NOTE
Patient Name: Karin Maguire  MRN: [id number]    INFORMED CONSENT: The procedure, risks, benefits and options were discussed with patient. There are no contraindications to the procedure. The patient expressed understanding and agreed to proceed. The personnel performing the procedure was discussed. I verify that I personally obtained Karin's consent prior to the start of the procedure and the signed consent can be found on the patient's chart.      Procedure Date: 3/31/2017  Anesthesia:   systemic  Pre Procedure diagnosis:   Sacroiliitis, trochanteric bursitis   Post-Procedure diagnosis:   Same    Sedation: Yes - Fentanyl 100 mcg and Midazolam 2 mg    PROCEDURE: right SACROILIAC JOINT INJECTION  DESCRIPTION OF PROCEDURE: The patient was brought to the fluoroscopy suite.  IV access was obtained prior to the procedure. The patient was positioned prone on the fluoroscopy table. Continuous hemodynamic monitoring was initiated including blood pressure, EKG, and pulse oximetry.  The lumbosacral area was prepped with chlorhexidine three times and draped into a sterile field. The skin overlying the right  sacroiliac joint was anesthetized using 5 cc of lidocaine 1%. The inferior pole of the sacroiliac joint was identified by cephalo-oblique fluoroscopy. A 22 gauge, 3 1/2 inch spinal needle was slowly advanced through the sacroiliac joint capsule under fluoroscopic guidance. The needle position was confirmed using oblique, AP and lateral fluoroscopic imaging.  Negative aspiration was confirmed. 5 cc of Omnipaque 300 was injected confirming intra-articular contrast spread. A combination of 5 cc of Bupivacaine 0.25% and 20 mg kenalog was easily injected. The needle was removed and bleeding was nil.  A sterile dressing was applied. PATIENT was taken to the Post-block Recovery Area for further observation.        PROCEDURE: right GREATER TROCHANTERIC BURSA INJECTION        DESCRIPTION OF PROCEDURE: The patient was brought  "to the procedure room.  IV access was obtained prior to the procedure. The patient was positioned prone on the fluoroscopy table. Continuous hemodynamic monitoring was initiated including blood pressure, EKG, and pulse oximetry. The skin overlying the greater trochanter was prepped with chlrhexidine three times and draped in a sterile fashion. The skin and subcutaneous tissue was anesthetized using 5 cc of lidocaine 1%. A  22 gauge 3.5" spinal needle was slowly advanced through under fluoroscopic guidance into the greater trochanteric bursa. The needle position was confirmed using oblique, AP and lateral fluoroscopic imaging. Negative aspiration was confirmed. 5 cc of Omnipaque 300 was injected confirming appropriate contrast spread. A combination of 5 cc of Bupivacaine 0.25% and 20 mg kenalog was easily injected. The needle was removed and bleeding was nil. A sterile dressing was applied. Karin was taken back to the recovery room for further observation.       Blood Loss: Nill  Specimen: None    Pre Procedure Pain Level: 7/10  Post-Procedure Pain Level: 0/10        Discharge Diagnosis: Same as Pre and Post Procedure  Condition on Discharge: Stable.  Diet on Discharge: Same as before.  Activity: as per instruction sheet.  Discharge to: Home with a responsible adult.  Follow up: as per Discharge instructions            "

## 2017-03-31 NOTE — DISCHARGE INSTRUCTIONS
DIET: You may resume your normal diet today.    BATHING: You may resume your normal bathing.          You may shower, no hot water directly on site for 24 hours.    DRESSING: You may remove your bandage today.    ACTIVITY LEVEL: You may resume your normal activities 24 hours after your  procedure.    If you have received sedation or an anesthetic, you may feel sleepy                                                                          for several hours. Rest until you are more awake. Gradually resume your normal activities tomorrow.    If you have received sedation or an anesthetic, do not drive or operate heavy machinery for at least 24 hours.    MEDICATION: You may resume your normal medications today.    You will receive instructions for any pain prescriptions. Pain medications should be taken only as directed.    SPECIAL INSTRUCTIONS: No heat to the injection site for 24 hours including: bath or shower, heating pad, moist heat, hot tubs.    Use ice pack to injection site for any pain or discomfort. Apply ice pack to 20 minutes then remove for 20 minutes before re-applying to site.    WHEN TO CALL DOCTOR: Redness or swelling around injection site    Fever of 101F    Drainage (pus) from the injection site    For any continuous bleeding (some dried blood over the incision is normal).    FOLLOW UP: Follow up phone call will be made by office.    FOR EMERGENCIES: If any unusual problems or difficulties occur during clinic hours, call [phone] or [phone].

## 2017-04-05 VITALS
HEART RATE: 72 BPM | DIASTOLIC BLOOD PRESSURE: 72 MMHG | TEMPERATURE: 98 F | BODY MASS INDEX: 28.04 KG/M2 | HEIGHT: 68 IN | WEIGHT: 185 LBS | SYSTOLIC BLOOD PRESSURE: 135 MMHG | RESPIRATION RATE: 16 BRPM | OXYGEN SATURATION: 96 %

## 2017-04-28 ENCOUNTER — OFFICE VISIT (OUTPATIENT)
Dept: PAIN MEDICINE | Facility: CLINIC | Age: 67
End: 2017-04-28
Payer: MEDICARE

## 2017-04-28 VITALS
WEIGHT: 193.81 LBS | SYSTOLIC BLOOD PRESSURE: 132 MMHG | BODY MASS INDEX: 29.46 KG/M2 | HEART RATE: 77 BPM | DIASTOLIC BLOOD PRESSURE: 70 MMHG

## 2017-04-28 DIAGNOSIS — M46.1 SACROILIITIS: Primary | ICD-10-CM

## 2017-04-28 PROCEDURE — 99213 OFFICE O/P EST LOW 20 MIN: CPT | Mod: S$PBB,,, | Performed by: NURSE PRACTITIONER

## 2017-04-28 PROCEDURE — 99213 OFFICE O/P EST LOW 20 MIN: CPT | Mod: PBBFAC | Performed by: NURSE PRACTITIONER

## 2017-04-28 PROCEDURE — 99999 PR PBB SHADOW E&M-EST. PATIENT-LVL III: CPT | Mod: PBBFAC,,, | Performed by: NURSE PRACTITIONER

## 2017-04-28 NOTE — PROGRESS NOTES
Chronic patient Established Note (Follow up visit)      SUBJECTIVE:    Karin Maguire presents to the clinic for a 4 wk follow-up appointment for right hip pain. She is S/P right SACROILIAC JOINT INJECTION and right GREATER TROCHANTERIC BURSA INJECTION on 3/31/17 with 98% relief. Discussed that we will repeat PRN.  Since the last visit, Karin Maguire states the pain has been improving. Current pain intensity is 1/10.    Pain Disability Index Review:  Last 3 PDI Scores 4/28/2017 3/24/2017   Pain Disability Index (PDI) 4 17       Pain Medications:     - Opioids: Norco  - Adjuvant Medications: Cymbalta ( Duloxetine), Mobic (Meloxicam) and seroquel  - Anti-Coagulants: none  - Others: see medication card    Opioid Contract: no     report:  Reviewed and consistent with medication use as prescribed.    Pain Procedures: 3/31/17 right SACROILIAC JOINT INJECTION and right GREATER TROCHANTERIC BURSA INJECTION    Physical Therapy/Home Exercise: yes    Imaging: 3/21/17 X-Ray Lumbar Spine Complete 5 View  Lumbar spine, 5 views: There is a levoscoliotic curvature of the lumbar spine.  Straightening of the normal lumbar lordosis.  Vertebral body heights are maintained.  There are multilevel degenerative changes of the lumbar spine consisting of disc space narrowing, endplate sclerosis, marginal osteophytosis and facet arthropathy.  No fracture or subluxation is identified.   Impression    As above.                3/8/17 MRI Lumbar Spine Without Contrast  Technique: Sagittal T1, sagittal T2, sagittal STIR, axial T1 and axial T2 images of the lumbar spine were obtained without contrast.    Comparison: None.    Results: Vertebral body alignment and heights are maintained.  There is disc desiccation with disc space narrowing throughout the lumbar spine, most prominent at the L3-4 and L4-5 levels.  Endplate degenerative changes are noted at L4 and L5.  The marrow signal is otherwise normal without evidence for a marrow  replacement process, infection or tumor.  The conus terminates at T12.    Incidentally visualized soft tissues structures of the abdomen are within normal limits.    At T12-L1, there is bilateral facet arthropathy without central canal stenosis or neural foraminal narrowing.    At L1-2, there is a broad-based posterior disc bulge with left-sided facet arthropathy without central canal stenosis or neural foraminal narrowing.    At L2-3, there is a broad-based posterior disc bulge and facet arthropathy without central canal stenosis or neural foraminal narrowing.    At L3-4, there is a broad-based posterior disc bulge, ligamentum flavum hypertrophy and facet arthropathy contributing to mild central canal stenosis with moderate bilateral neural foraminal narrowing.    At L4-5, there is a broad-based posterior disc bulge, ligamentum flavum hypertrophy and facet arthropathy contributing to severe central canal stenosis with severe bilateral neural foraminal narrowing.    At L5-S1, there is bilateral facet arthropathy without central canal stenosis or neural foraminal narrowing.   Impression       Multilevel degenerative changes of the lumbar spine as detailed above, most significant at the L4-5 level where there is severe central canal stenosis and severe bilateral neural foraminal narrowing.  There is also mild central canal stenosis with moderate bilateral neural foraminal narrowing at L3-4.             2/15/17 X-Ray Hip 2 View Right  Right hip, 2 views: There is bilateral acetabular roof sclerosis.  Joint spaces are well maintained.  No fracture or dislocation is identified.   Impression    As above.           Allergies: Review of patient's allergies indicates:  No Known Allergies    Current Medications:   Current Outpatient Prescriptions   Medication Sig Dispense Refill    acetaminophen (TYLENOL) 500 MG tablet Take 1,000 mg by mouth as needed for Pain.       duloxetine (CYMBALTA) 60 MG capsule Take 60 mg by mouth  once daily.       hydrocodone-acetaminophen 5-325mg (NORCO) 5-325 mg per tablet Take 1 tablet by mouth every 6 (six) hours as needed for Pain. 45 tablet 0    lamotrigine (LAMICTAL) 150 MG Tab Take 1.5 tablets by mouth every evening.       naproxen sodium (ANAPROX) 220 MG tablet Take 440 mg by mouth 3 (three) times daily with meals.       oxcarbazepine (TRILEPTAL) 300 MG Tab 2 (two) times daily.       quetiapine 200 mg oral tb24 (SEROQUEL XR) 200 MG Tb24 Take 300 mg by mouth once daily.       meloxicam (MOBIC) 15 MG tablet TAKE 1 TABLET (15 MG TOTAL) BY MOUTH ONCE DAILY. 30 tablet 2     No current facility-administered medications for this visit.        REVIEW OF SYSTEMS:    GENERAL: No weight loss, malaise or fevers.  HEENT:  + headaches.  NECK: + neck pain  RESPIRATORY: +smoker Negative for cough, wheezing or shortness of breath.  CARDIOVASCULAR: Negative for chest pain, leg swelling or palpitations.  GI: Negative for abdominal discomfort, blood in stools or black stools or change in bowel habits.  MUSCULOSKELETAL:  See HPI.  SKIN: Negative for lesions, rash, and itching.  PSYCH: Negative for sleep disturbance,+ bipolar disorder   HEMATOLOGY/LYMPHOLOGY: Negative for prolonged bleeding, bruising easily or swollen nodes.  NEURO: No history of syncope, paralysis, seizures or tremors.  All other reviewed and negative other than HPI.      Past Medical History:  Past Medical History:   Diagnosis Date    Anxiety     Arthritis     Bipolar 1 disorder     Bursitis of right hip     Sacroiliitis     right side       Past Surgical History:  Past Surgical History:   Procedure Laterality Date    ANKLE FRACTURE SURGERY Left 2001    BLADDER SUSPENSION      CARPAL TUNNEL RELEASE Bilateral     CHOLECYSTECTOMY      HYSTERECTOMY  1986    vaginal prolapse    NASAL SEPTUM SURGERY      RECTAL PROLAPSE REPAIR      TONSILLECTOMY         Family History:  Family History   Problem Relation Age of Onset    Colon cancer  Maternal Uncle     Colon cancer Maternal Uncle     Colon cancer Maternal Uncle        Social History:  Social History     Social History    Marital status:      Spouse name: N/A    Number of children: N/A    Years of education: N/A     Social History Main Topics    Smoking status: Current Every Day Smoker     Packs/day: 0.50     Years: 35.00     Types: Cigarettes     Start date: 3/30/1982    Smokeless tobacco: Never Used      Comment: smoking cessation clinic pamphlet for clinic put on chart to give to pt.     Alcohol use Yes      Comment: Socially-2 or 3 times a year-6 or 7 drinks    Drug use: No    Sexual activity: Yes     Partners: Male     Birth control/ protection: Surgical      Comment:      Other Topics Concern    None     Social History Narrative       OBJECTIVE:    /70  Pulse 77  Wt 87.9 kg (193 lb 12.6 oz)  BMI 29.46 kg/m2    PHYSICAL EXAMINATION:    General appearance: Well appearing, in no acute distress, alert and oriented x3.  Psych: Mood and affect appropriate.  Skin: Skin color, texture, turgor normal, no rashes or lesions, in both upper and lower body.  Head/face: Normocephalic, atraumatic. No palpable lymph nodes.  Neck: No pain to palpation over the cervical paraspinous muscles. Spurling Negative. No pain with neck flexion, extension, or lateral flexion.   Cor: RRR  Pulm: CTA  Back: Straight leg raising in the sitting and supine positions is negative to radicular pain.  minimal to no pain over the right PSIS. Normal range of motion without pain reproduction.  Extremities: Peripheral joint ROM is full and pain free without obvious instability or laxity in all four extremities. No deformities, edema, or skin discoloration. Good capillary refill.  Musculoskeletal:. Bilateral upper and lower extremity strength is normal and symmetric. No atrophy or tone abnormalities are noted.  Neuro: Bilateral upper and lower extremity coordination and muscle stretch reflexes are  physiologic and symmetric. Plantar response are downgoing. No loss of sensation is noted.  Gait: normal.  ASSESSMENT: 66 y.o. year old female with low back  pain, consistent with     Diagnosis:    1. Sacroiliitis           PLAN:     - I have stressed the importance of physical activity and a home exercise plan to help with pain and improve health.  - Patient can continue with medications for now since they are providing benefits, using them appropriately, and without side effects.  - Counseled patient regarding the importance of activity modification, constant sleeping habits and physical therapy.  -RTC as needed for returning or new pain  -The above plan and management options were discussed at length with patient. Patient is in agreement with the above and verbalized understanding. The physician  was consulted on this patient  and agrees with this plan.      SAMIRA Marquez    04/28/2017

## 2017-06-30 ENCOUNTER — HOSPITAL ENCOUNTER (EMERGENCY)
Facility: HOSPITAL | Age: 67
Discharge: HOME | End: 2017-06-30
Attending: SURGERY
Payer: MEDICARE

## 2017-06-30 VITALS
OXYGEN SATURATION: 93 % | WEIGHT: 189 LBS | HEIGHT: 68 IN | SYSTOLIC BLOOD PRESSURE: 193 MMHG | BODY MASS INDEX: 28.64 KG/M2 | HEART RATE: 87 BPM | RESPIRATION RATE: 18 BRPM | DIASTOLIC BLOOD PRESSURE: 90 MMHG | TEMPERATURE: 98 F

## 2017-06-30 DIAGNOSIS — J40 BRONCHITIS: Primary | ICD-10-CM

## 2017-06-30 PROCEDURE — 99284 EMERGENCY DEPT VISIT MOD MDM: CPT | Mod: 25

## 2017-06-30 PROCEDURE — 96372 THER/PROPH/DIAG INJ SC/IM: CPT

## 2017-06-30 PROCEDURE — 63600175 PHARM REV CODE 636 W HCPCS: Performed by: SURGERY

## 2017-06-30 PROCEDURE — 25000242 PHARM REV CODE 250 ALT 637 W/ HCPCS: Performed by: SURGERY

## 2017-06-30 PROCEDURE — 94640 AIRWAY INHALATION TREATMENT: CPT

## 2017-06-30 RX ORDER — ALBUTEROL SULFATE 90 UG/1
1-2 AEROSOL, METERED RESPIRATORY (INHALATION) EVERY 6 HOURS PRN
Qty: 1 INHALER | Refills: 0 | Status: SHIPPED | OUTPATIENT
Start: 2017-06-30 | End: 2018-08-22

## 2017-06-30 RX ORDER — METHYLPREDNISOLONE SOD SUCC 125 MG
125 VIAL (EA) INJECTION
Status: COMPLETED | OUTPATIENT
Start: 2017-06-30 | End: 2017-06-30

## 2017-06-30 RX ORDER — DOXYCYCLINE 100 MG/1
100 CAPSULE ORAL 2 TIMES DAILY
Qty: 20 CAPSULE | Refills: 0 | Status: SHIPPED | OUTPATIENT
Start: 2017-06-30 | End: 2017-07-10

## 2017-06-30 RX ORDER — IPRATROPIUM BROMIDE AND ALBUTEROL SULFATE 2.5; .5 MG/3ML; MG/3ML
3 SOLUTION RESPIRATORY (INHALATION)
Status: COMPLETED | OUTPATIENT
Start: 2017-06-30 | End: 2017-06-30

## 2017-06-30 RX ORDER — BENZONATATE 100 MG/1
200 CAPSULE ORAL 3 TIMES DAILY PRN
Qty: 20 CAPSULE | Refills: 0 | Status: SHIPPED | OUTPATIENT
Start: 2017-06-30 | End: 2017-07-10

## 2017-06-30 RX ORDER — METHYLPREDNISOLONE 4 MG/1
TABLET ORAL
Qty: 1 PACKAGE | Refills: 0 | Status: SHIPPED | OUTPATIENT
Start: 2017-06-30 | End: 2018-01-17

## 2017-06-30 RX ADMIN — IPRATROPIUM BROMIDE AND ALBUTEROL SULFATE 3 ML: .5; 3 SOLUTION RESPIRATORY (INHALATION) at 09:06

## 2017-06-30 RX ADMIN — METHYLPREDNISOLONE SODIUM SUCCINATE 125 MG: 125 INJECTION, POWDER, FOR SOLUTION INTRAMUSCULAR; INTRAVENOUS at 10:06

## 2017-07-01 NOTE — ED TRIAGE NOTES
"Patient comes in tonight stating that she thinks she has bronchitis.  States she has had a cough for about a week that has gotten worse in the last 2 days.  States she has a "rattle" in her chest.  "

## 2017-07-01 NOTE — ED PROVIDER NOTES
[facility]                                        June 30, 2017                   Chief Complaint  67 y.o. female with Cough (cough and rattle in chest worse last 2 days)    History of Present Illness  Karin Maguire presents to the emergency room with cough and congestion today  Patient states she has annual cough and cold symptoms with cold/warm weather changes  Patient on exam has nonspecific rhonchi bilaterally no active wheezing on evaluation now   The patient is a pack-a-day smoker for over 40 years, denies any COPD or emphysema  Patient has no active wheezing and denies any shortness of breath, afebrile and VSS now    The history is provided by the patient  Medical HX: Anxiety, arthritis, bursitis, bipolar, sacroiliitis    Past Surgical History   -- ANKLE FRACTURE SURGERY     -- BLADDER SUSPENSION     -- CARPAL TUNNEL RELEASE     -- CHOLECYSTECTOMY     -- HYSTERECTOMY     -- NASAL SEPTUM SURGERY     -- RECTAL PROLAPSE REPAIR     -- TONSILLECTOMY        Social/Family/Allergy History  -- The patient is   -- The patient is retired  -- The patient has several children  -- The patient does not drink alcohol  -- The patient smokes a pack a day for several years  -- The patient does not use illegal drugs   -- Patient has NO KNOWN DRUG ALLERGIES  -- Patient has a family history of colon cancer    Review of Systems and Physical Exam     Review of Systems  -- Constitution - no fever, denies fatigue, no weakness, no chills  -- Eyes - no tearing or redness, no visual disturbance  -- Ear, Nose - no tinnitus or earache, no nasal congestion or discharge  -- Mouth,Throat - no sore throat, no toothache, normal voice, normal swallowing  -- Respiratory - cough and congestion, no shortness of breath, no HORAN  -- Cardiovascular - denies chest pain, no palpitations, denies claudication  -- Gastrointestinal - denies abdominal pain, nausea, vomiting, or diarrhea  -- Musculoskeletal - denies back pain,  negative for myalgias and arthralgias   -- Neurological - no headache, denies weakness or seizure; no LOC  -- Skin - denies pallor, rash, or changes in skin. no hives or welts noted    Vital Signs  -- Her tympanic temperature is 98.3 °F (36.8 °C).   -- Her blood pressure is 193/90 (abnormal) and her pulse is 87.   -- Her respiration is 18 and oxygen saturation is 93% (abnormal).      Physical Exam  -- Nursing note and vitals reviewed  -- Constitutional: Appears well-developed and well-nourished  -- Head: Atraumatic. Normocephalic. No obvious abnormality  -- Eyes: Pupils are equal and reactive to light. Normal conjunctiva and lids  -- Nose: Nose normal in appearance, nares grossly normal. No discharge  -- Throat: Mucous membranes moist, pharynx normal, normal tonsils. No lesions   -- Ears: External ears and TM normal bilaterally. Normal hearing and no drainage  -- Neck: Normal range of motion. Neck supple. No masses, trachea midline  -- Cardiac: Normal rate, regular rhythm and normal heart sounds  -- Pulmonary: faint rhonchi at the bilateral bases with no active wheezing   -- Abdominal: Soft, no tenderness. Normal bowel sounds. Normal liver edge  -- Musculoskeletal: Normal range of motion, no effusions. Joints stable   -- Neurological: No focal deficits. Showed good interaction with staff  -- Vascular: Posterior tibial, dorsalis pedis and radial pulses 2+ bilaterally      Emergency Room Course     Treatment and Evaluation  -- Chest x-ray showed no infiltrate and showed no acute pathology   --  mg Solumedrol given today in the ER  -- Duoneb breathing treatment given today in the ER    Diagnosis  -- The encounter diagnosis was Bronchitis.    Disposition and Plan  -- Disposition: home  -- Condition: stable  -- Follow-up: Patient to follow up with Gabby Finney NP in 1-2 days.  -- I advised the patient that we have found no life threatening condition today  -- At this time, I believe the patient is clinically  stable for discharge.   -- The patient acknowledges that close follow up with a MD is required   -- Patient agrees to comply with all instruction and direction given in the ER    This note is dictated on Dragon Natural Speaking word recognition program.  There are word recognition mistakes that are occasionally missed on review.           Aiden Clay MD  07/01/17 0022

## 2018-01-17 ENCOUNTER — OFFICE VISIT (OUTPATIENT)
Dept: INTERNAL MEDICINE | Facility: CLINIC | Age: 68
End: 2018-01-17
Payer: MEDICARE

## 2018-01-17 VITALS
WEIGHT: 182.13 LBS | SYSTOLIC BLOOD PRESSURE: 132 MMHG | BODY MASS INDEX: 27.6 KG/M2 | HEIGHT: 68 IN | RESPIRATION RATE: 18 BRPM | TEMPERATURE: 99 F | DIASTOLIC BLOOD PRESSURE: 64 MMHG | HEART RATE: 80 BPM

## 2018-01-17 DIAGNOSIS — J44.1 COPD EXACERBATION: Primary | ICD-10-CM

## 2018-01-17 PROCEDURE — 99999 PR STA SHADOW: CPT | Mod: PBBFAC,,, | Performed by: NURSE PRACTITIONER

## 2018-01-17 PROCEDURE — 99213 OFFICE O/P EST LOW 20 MIN: CPT | Mod: PBBFAC | Performed by: NURSE PRACTITIONER

## 2018-01-17 PROCEDURE — 99999 PR PBB SHADOW E&M-EST. PATIENT-LVL III: CPT | Mod: PBBFAC,,, | Performed by: NURSE PRACTITIONER

## 2018-01-17 PROCEDURE — 99213 OFFICE O/P EST LOW 20 MIN: CPT | Mod: S$PBB | Performed by: NURSE PRACTITIONER

## 2018-01-17 RX ORDER — DOXYCYCLINE 100 MG/1
100 CAPSULE ORAL EVERY 12 HOURS
Qty: 20 CAPSULE | Refills: 0 | Status: SHIPPED | OUTPATIENT
Start: 2018-01-17 | End: 2018-04-25

## 2018-01-17 RX ORDER — QUETIAPINE 300 MG/1
300 TABLET, FILM COATED, EXTENDED RELEASE ORAL NIGHTLY
COMMUNITY
Start: 2017-11-13 | End: 2018-04-25

## 2018-01-17 NOTE — PROGRESS NOTES
Subjective:       Patient ID: Karin Maguire is a 67 y.o. female.    Chief Complaint: Chest Congestion; Cough; and Sinusitis    HPI: Pt presents to clinic today new to me but known to Gabby. She reports that she has been having a cold x 1 week. She is coughing up light green. She fever. She has been taking naproxen for headache and mucinex for cough. + nasal congestion and sore throat. No pressure in ears. + nausea and vomited night before last.     She does smoke. She has not been using her albuterol    Dr Gomez for psychiatry  Review of Systems   Constitutional: Positive for fatigue. Negative for appetite change, chills and fever.   HENT: Negative for congestion, ear discharge, ear pain, rhinorrhea, sinus pressure and sore throat.    Respiratory: Positive for cough. Negative for choking, chest tightness, shortness of breath and wheezing.         With coughing and increased activity   Cardiovascular: Negative for chest pain and palpitations.   Gastrointestinal: Negative for constipation, diarrhea, nausea and vomiting.   Musculoskeletal: Negative for joint swelling and myalgias.   Skin: Negative for rash and wound.   Neurological: Negative for dizziness, syncope, weakness, light-headedness and numbness.       Objective:      Physical Exam   Constitutional: She is oriented to person, place, and time. She appears well-developed and well-nourished.   HENT:   Head: Normocephalic and atraumatic.   Right Ear: External ear normal.   Left Ear: External ear normal.   Nose: Nose normal.   Mouth/Throat: Oropharynx is clear and moist. No oropharyngeal exudate.   Eyes: Conjunctivae and EOM are normal. Pupils are equal, round, and reactive to light.   Neck: Normal range of motion. Neck supple. No thyromegaly present.   Cardiovascular: Normal rate, regular rhythm, normal heart sounds and intact distal pulses.    Pulmonary/Chest: Effort normal. She has no wheezes. She has no rales.   Loose rhonchi throughout, no crackles.     Abdominal: Soft. Bowel sounds are normal. She exhibits no distension and no mass. There is no tenderness. There is no guarding.   Musculoskeletal: Normal range of motion.   Neurological: She is alert and oriented to person, place, and time. She has normal reflexes.   Skin: Skin is warm and dry. Capillary refill takes less than 2 seconds.   Psychiatric: She has a normal mood and affect. Her behavior is normal. Judgment and thought content normal.   Nursing note and vitals reviewed.      Assessment:       1. COPD exacerbation        Plan:   Karin was seen today for chest congestion, cough and sinusitis.    Diagnoses and all orders for this visit:    COPD exacerbation  -     doxycycline (VIBRAMYCIN) 100 MG Cap; Take 1 capsule (100 mg total) by mouth every 12 (twelve) hours.    Cont mucinex

## 2018-02-09 ENCOUNTER — OFFICE VISIT (OUTPATIENT)
Dept: URGENT CARE | Facility: CLINIC | Age: 68
End: 2018-02-09
Payer: MEDICARE

## 2018-02-09 VITALS
OXYGEN SATURATION: 99 % | DIASTOLIC BLOOD PRESSURE: 77 MMHG | TEMPERATURE: 98 F | SYSTOLIC BLOOD PRESSURE: 118 MMHG | HEART RATE: 86 BPM | HEIGHT: 68 IN | BODY MASS INDEX: 27.58 KG/M2 | WEIGHT: 182 LBS | RESPIRATION RATE: 16 BRPM

## 2018-02-09 DIAGNOSIS — R52 PAIN: Primary | ICD-10-CM

## 2018-02-09 DIAGNOSIS — S60.012A CONTUSION OF LEFT THUMB WITHOUT DAMAGE TO NAIL, INITIAL ENCOUNTER: ICD-10-CM

## 2018-02-09 PROCEDURE — 1159F MED LIST DOCD IN RCRD: CPT | Mod: S$GLB,,, | Performed by: INTERNAL MEDICINE

## 2018-02-09 PROCEDURE — 99203 OFFICE O/P NEW LOW 30 MIN: CPT | Mod: S$GLB,,, | Performed by: INTERNAL MEDICINE

## 2018-02-09 RX ORDER — NAPROXEN 500 MG/1
500 TABLET ORAL 2 TIMES DAILY WITH MEALS
Qty: 25 TABLET | Refills: 0 | Status: SHIPPED | OUTPATIENT
Start: 2018-02-09 | End: 2018-02-15

## 2018-02-09 RX ORDER — PREDNISONE 5 MG/1
5 TABLET ORAL DAILY
Qty: 5 TABLET | Refills: 0 | Status: SHIPPED | OUTPATIENT
Start: 2018-02-09 | End: 2018-02-14

## 2018-02-10 NOTE — PATIENT INSTRUCTIONS
Bruises (Contusions)    A contusion is a bruise. A bruise happens when a blow to your body doesn't break the skin but does break blood vessels beneath the skin. Blood leaking from the broken vessels causes redness and swelling. As it heals, your bruise is likely to turn colors like purple, green, and yellow. This is normal. The bruise should fade in 2 or 3 weeks.  Factors that make you more likely to bruise  Almost everyone bruises now and then. Certain people do bruise more easily than others. You're more prone to bruising as you get older. That's because blood vessels become more fragile with age. You're also more likely to bruise if you have a clotting disorder such as hemophilia or take medications that reduce clotting, including aspirin, coumadin, newer agents.  When to go to the emergency room (ER)  Bruises almost always heal on their own without special treatment. But for some people, a bad bruise can be serious. Seek medical care if you:  · Have a clotting disorder such as hemophilia.  · Have cirrhosis or other serious liver disease.  · Take blood-thinning medications such as warfarin (Coumadin).  What to expect in the ER  A doctor will examine your bruise and ask about any health conditions you have. In some cases, you may have a test to check how well your blood clots. Other treatment will depend on your needs.  Follow-up care  Sometimes a bruise gets worse instead of better. It may become larger and more swollen. This can occur when your body walls off a small pool of blood under the skin (hematoma). In very rare cases, your doctor may need to drain excess blood from the area.  Tip:  Apply an ice pack or bag of frozen peas to a bruise (keep a thin cloth between the cold source and your skin). This can help reduce redness and swelling.   Date Last Reviewed: 12/1/2016  © [id number] [employer]. [address] [place] [address], [place] [address]. All rights reserved. This information is not intended as  a substitute for professional medical care. Always follow your healthcare professional's instructions.  Please return here or go to the Emergency Department for any concerns or worsening of condition.  If you were prescribed antibiotics, please take them to completion.  If you were prescribed a narcotic medication, do not drive or operate heavy equipment or machinery while taking these medications.  Please follow up with your primary care doctor or specialist as needed.    If you  smoke, please stop smoking.  Apply a compressive ACE bandage. Rest and elevate the affected painful area.  Apply cold compresses intermittently as needed.  As pain recedes, begin normal activities slowly as tolerated.  Call if symptoms persist.

## 2018-02-10 NOTE — PROGRESS NOTES
"Subjective:       Patient ID: Karin Maguire is a 67 y.o. female.    Vitals:  height is 5' 8" (1.727 m) and weight is 82.6 kg (182 lb). Her oral temperature is 98 °F (36.7 °C). Her blood pressure is 118/77 and her pulse is 86. Her respiration is 16 and oxygen saturation is 99%.     Chief Complaint: Finger Injury (left thumb) and Hand Pain    Hand Pain    The incident occurred 5 to 7 days ago. The incident occurred at home. The injury mechanism was a direct blow. The pain is present in the left fingers. The quality of the pain is described as aching. The pain does not radiate. The pain is at a severity of 7/10. The pain is mild. Pertinent negatives include no chest pain or numbness. Nothing aggravates the symptoms. She has tried nothing for the symptoms. The treatment provided no relief.     Review of Systems   Constitution: Negative for weakness and malaise/fatigue.   HENT: Negative for nosebleeds.    Cardiovascular: Negative for chest pain and syncope.   Respiratory: Negative for shortness of breath.    Musculoskeletal: Positive for joint pain, joint swelling and stiffness. Negative for back pain and neck pain.   Gastrointestinal: Negative for abdominal pain.   Genitourinary: Negative for hematuria.   Neurological: Negative for dizziness and numbness.       Objective:      Physical Exam   Constitutional: She is oriented to person, place, and time. She appears well-developed and well-nourished. She is cooperative.  Non-toxic appearance. She does not appear ill. No distress.   HENT:   Head: Normocephalic and atraumatic.   Right Ear: Hearing, tympanic membrane, external ear and ear canal normal.   Left Ear: Hearing, tympanic membrane, external ear and ear canal normal.   Nose: Nose normal. No mucosal edema, rhinorrhea or nasal deformity. No epistaxis. Right sinus exhibits no maxillary sinus tenderness and no frontal sinus tenderness. Left sinus exhibits no maxillary sinus tenderness and no frontal sinus " tenderness.   Mouth/Throat: Uvula is midline, oropharynx is clear and moist and mucous membranes are normal. No trismus in the jaw. Normal dentition. No uvula swelling. No posterior oropharyngeal erythema.   Eyes: Conjunctivae and lids are normal. Right eye exhibits no discharge. Left eye exhibits no discharge. No scleral icterus.   Sclera clear bilat   Neck: Trachea normal, normal range of motion, full passive range of motion without pain and phonation normal. Neck supple.   Cardiovascular: Normal rate, regular rhythm, normal heart sounds, intact distal pulses and normal pulses.    Pulmonary/Chest: Effort normal and breath sounds normal. No respiratory distress.   Abdominal: Soft. Normal appearance and bowel sounds are normal. She exhibits no distension, no pulsatile midline mass and no mass. There is no tenderness.   Musculoskeletal: She exhibits no edema or deformity.        Left hand: She exhibits decreased range of motion, tenderness and bony tenderness.        Hands:  Neurological: She is alert and oriented to person, place, and time. She exhibits normal muscle tone. Coordination normal.   Skin: Skin is warm, dry and intact. She is not diaphoretic. No pallor.   Psychiatric: She has a normal mood and affect. Her speech is normal and behavior is normal. Judgment and thought content normal. Cognition and memory are normal.   Nursing note and vitals reviewed.      Assessment:       1. Pain    2. Contusion of left thumb without damage to nail, initial encounter        Plan:         Pain  -     X-Ray Hand Complete Left; Future; Expected date: 02/09/2018  -     predniSONE (DELTASONE) 5 MG tablet; Take 1 tablet (5 mg total) by mouth once daily.  Dispense: 5 tablet; Refill: 0  -     naproxen (NAPROSYN) 500 MG tablet; Take 1 tablet (500 mg total) by mouth 2 (two) times daily with meals.  Dispense: 25 tablet; Refill: 0    Contusion of left thumb without damage to nail, initial encounter  -     predniSONE (DELTASONE) 5 MG  tablet; Take 1 tablet (5 mg total) by mouth once daily.  Dispense: 5 tablet; Refill: 0  -     naproxen (NAPROSYN) 500 MG tablet; Take 1 tablet (500 mg total) by mouth 2 (two) times daily with meals.  Dispense: 25 tablet; Refill: 0      Take meds

## 2018-02-12 ENCOUNTER — TELEPHONE (OUTPATIENT)
Dept: URGENT CARE | Facility: CLINIC | Age: 68
End: 2018-02-12

## 2018-04-25 ENCOUNTER — OFFICE VISIT (OUTPATIENT)
Dept: INTERNAL MEDICINE | Facility: CLINIC | Age: 68
End: 2018-04-25
Payer: MEDICARE

## 2018-04-25 VITALS
SYSTOLIC BLOOD PRESSURE: 138 MMHG | WEIGHT: 182.75 LBS | BODY MASS INDEX: 27.7 KG/M2 | DIASTOLIC BLOOD PRESSURE: 82 MMHG | HEART RATE: 86 BPM | HEIGHT: 68 IN

## 2018-04-25 DIAGNOSIS — E03.8 SUBCLINICAL HYPOTHYROIDISM: ICD-10-CM

## 2018-04-25 DIAGNOSIS — Z00.00 HEALTHCARE MAINTENANCE: ICD-10-CM

## 2018-04-25 DIAGNOSIS — Z00.00 WELLNESS EXAMINATION: Primary | ICD-10-CM

## 2018-04-25 DIAGNOSIS — F41.9 ANXIETY DISORDER: ICD-10-CM

## 2018-04-25 DIAGNOSIS — Z13.220 SCREENING FOR HYPERLIPIDEMIA: ICD-10-CM

## 2018-04-25 DIAGNOSIS — Z13.29 SCREENING FOR HYPOTHYROIDISM: ICD-10-CM

## 2018-04-25 DIAGNOSIS — E66.3 OVERWEIGHT: ICD-10-CM

## 2018-04-25 DIAGNOSIS — E53.8 DEFICIENCY OF OTHER SPECIFIED B GROUP VITAMINS: ICD-10-CM

## 2018-04-25 DIAGNOSIS — E55.9 VITAMIN D DEFICIENCY: ICD-10-CM

## 2018-04-25 DIAGNOSIS — Z12.31 ENCOUNTER FOR SCREENING MAMMOGRAM FOR BREAST CANCER: ICD-10-CM

## 2018-04-25 DIAGNOSIS — Z12.11 SCREENING FOR COLON CANCER: ICD-10-CM

## 2018-04-25 PROCEDURE — 99213 OFFICE O/P EST LOW 20 MIN: CPT | Mod: PBBFAC | Performed by: NURSE PRACTITIONER

## 2018-04-25 PROCEDURE — 99999 PNEUMOCOCCAL POLYSACCHARIDE VACCINE 23-VALENT =>2YO SQ IM: CPT | Mod: PBBFAC,,,

## 2018-04-25 PROCEDURE — 99397 PER PM REEVAL EST PAT 65+ YR: CPT | Mod: S$PBB | Performed by: NURSE PRACTITIONER

## 2018-04-25 PROCEDURE — 99999 PR PBB SHADOW E&M-EST. PATIENT-LVL III: CPT | Mod: PBBFAC,,, | Performed by: NURSE PRACTITIONER

## 2018-04-25 PROCEDURE — G0009 ADMIN PNEUMOCOCCAL VACCINE: HCPCS | Mod: PBBFAC

## 2018-04-25 PROCEDURE — 99999 PR STA SHADOW: CPT | Mod: PBBFAC,,, | Performed by: NURSE PRACTITIONER

## 2018-04-25 RX ORDER — TRAZODONE HYDROCHLORIDE 50 MG/1
TABLET ORAL
COMMUNITY
Start: 2018-02-10 | End: 2018-08-22

## 2018-04-25 RX ORDER — QUETIAPINE FUMARATE 300 MG/1
450 TABLET, FILM COATED ORAL NIGHTLY
COMMUNITY
End: 2018-08-08 | Stop reason: SDUPTHER

## 2018-04-25 NOTE — PROGRESS NOTES
Subjective:       Patient ID: Karin Maguire is a 67 y.o. female.    Chief Complaint: Annual Exam (Wellness)    HPI: Pt presents to clinic today here today for wellness. She reports that she is doing well. She does still smoke. She has been off and on for >30years. She reports that she did some labs in [place] unsure how long ago and it was for her psychiatrist. She reports that it was after a medication that was causing low NA. She is no longer on that medication.     She has her labs on rx pad for her psychiatrist that she would like to have drawn at same time.   Review of Systems   Constitutional: Negative for chills, fatigue, fever and unexpected weight change.   HENT: Negative for congestion, ear pain, sore throat and trouble swallowing.    Eyes: Negative for pain and visual disturbance.   Respiratory: Negative for cough, chest tightness and shortness of breath.    Cardiovascular: Negative for chest pain, palpitations and leg swelling.   Gastrointestinal: Negative for abdominal distention, abdominal pain, constipation, diarrhea and vomiting.   Genitourinary: Negative for difficulty urinating, dysuria, flank pain, frequency and hematuria.   Musculoskeletal: Negative for back pain, gait problem, joint swelling, neck pain and neck stiffness.   Skin: Negative for rash and wound.   Neurological: Negative for dizziness, seizures, speech difficulty, light-headedness and headaches.       Objective:      Physical Exam   Constitutional: She is oriented to person, place, and time. She appears well-developed and well-nourished.   HENT:   Head: Normocephalic and atraumatic.   Right Ear: External ear normal.   Left Ear: External ear normal.   Nose: Nose normal.   Mouth/Throat: Oropharynx is clear and moist. No oropharyngeal exudate.   Eyes: Conjunctivae and EOM are normal. Pupils are equal, round, and reactive to light.   Neck: Normal range of motion. Neck supple. No thyromegaly present.   Cardiovascular: Normal rate,  regular rhythm, normal heart sounds and intact distal pulses.    No murmur heard.  Pulmonary/Chest: Effort normal and breath sounds normal. No respiratory distress. She has no wheezes. She has no rales.   Abdominal: Soft. Bowel sounds are normal. She exhibits no distension and no mass. There is no tenderness. There is no guarding.   Musculoskeletal: Normal range of motion. She exhibits no edema.   Lymphadenopathy:     She has no cervical adenopathy.   Neurological: She is alert and oriented to person, place, and time.   Skin: Skin is warm and dry. Capillary refill takes less than 2 seconds. No erythema.   Psychiatric: She has a normal mood and affect. Her behavior is normal. Judgment and thought content normal.   Nursing note and vitals reviewed.      Assessment:       1. Wellness examination    2. Healthcare maintenance    3. Screening for hypothyroidism    4. Screening for hyperlipidemia    5. Body mass index (BMI) of 27.0-27.9 in adult     6. Overweight     7. Anxiety disorder     8. Vitamin D deficiency     9. Subclinical hypothyroidism    10. Deficiency of other specified B group vitamins     11. Encounter for screening mammogram for breast cancer    12. Screening for colon cancer        Plan:   Karin was seen today for annual exam.    Diagnoses and all orders for this visit:    Wellness examination    Healthcare maintenance  -     CBC auto differential; Future  -     Comprehensive metabolic panel; Future    Screening for hypothyroidism  -     TSH; Future    Screening for hyperlipidemia  -     Lipid panel; Future    Body mass index (BMI) of 27.0-27.9 in adult   -     CBC auto differential; Future    Overweight   -     Lipid panel; Future    Anxiety disorder   -     TSH; Future  -     Vitamin D; Future  -     Vitamin B12; Future  -     Folate; Future    Vitamin D deficiency   -     Vitamin D; Future    Subclinical hypothyroidism  -     T3, free; Future  -     T3, REVERSE; Future  -     T4, FREE;  Future    Deficiency of other specified B group vitamins   -     Vitamin B12; Future  -     Folate; Future    Encounter for screening mammogram for breast cancer  -     Mammo Digital Screening Bilateral With CAD; Future    Screening for colon cancer  -     Ambulatory Referral to Gastroenterology    Due for pneumococcal inj today.   UTD with flu and pcv 13 and TDAP  Denies having chicken pox as a child, no shingles inj rec  Discussed smoking cessation. - not interested at this time

## 2018-04-26 ENCOUNTER — HOSPITAL ENCOUNTER (OUTPATIENT)
Dept: RADIOLOGY | Facility: HOSPITAL | Age: 68
Discharge: HOME | End: 2018-04-26
Attending: NURSE PRACTITIONER
Payer: MEDICARE

## 2018-04-26 DIAGNOSIS — Z12.31 ENCOUNTER FOR SCREENING MAMMOGRAM FOR BREAST CANCER: ICD-10-CM

## 2018-04-26 PROCEDURE — 77067 SCR MAMMO BI INCL CAD: CPT | Mod: 26,,, | Performed by: RADIOLOGY

## 2018-04-26 PROCEDURE — 77067 SCR MAMMO BI INCL CAD: CPT | Mod: TC

## 2018-04-26 PROCEDURE — 77063 BREAST TOMOSYNTHESIS BI: CPT | Mod: 26,,, | Performed by: RADIOLOGY

## 2018-07-09 LAB — CRC RECOMMENDATION EXT: NORMAL

## 2018-08-07 DIAGNOSIS — F32.A DEPRESSION, UNSPECIFIED DEPRESSION TYPE: Primary | ICD-10-CM

## 2018-08-07 RX ORDER — QUETIAPINE FUMARATE 300 MG/1
450 TABLET, FILM COATED ORAL NIGHTLY
OUTPATIENT
Start: 2018-08-07

## 2018-08-07 NOTE — TELEPHONE ENCOUNTER
----- Message from Nehal Wood sent at 2018 11:54 AM CDT -----  Contact: Self  Karin Maguire  MRN: [id number]  : 1950  PCP: Lottie Hough  Home Phone      [phone]  Work Phone      Not on file.  [place]          [phone]    MESSAGE:   Needs RX  QUEtiapine (SEROQUEL) 300 MG Tab    Pharmacy: [facility]    Phone: [phone]

## 2018-08-08 RX ORDER — QUETIAPINE FUMARATE 300 MG/1
TABLET, FILM COATED ORAL
Qty: 45 TABLET | Refills: 1 | Status: SHIPPED | OUTPATIENT
Start: 2018-08-08 | End: 2018-08-22 | Stop reason: SDUPTHER

## 2018-08-08 NOTE — TELEPHONE ENCOUNTER
Called and spoke with pt. Informed pt that refill request for seroquel  was denied, and that pcp reported that she needs to see psychiatry for this medication. Pt verbalized understanding, and instructed staff to ask pcp if she can fill the medication until she see psychiatry here at [facility] for the first time in October. Pt reports the psychiatrist she was seeing before closed her practice. Informed pt that pcp will be notified and staff will call back with any new orders. Please advise. Thanks

## 2018-08-08 NOTE — TELEPHONE ENCOUNTER
Attempted to call pt to inform that pcp refilled seroquel, and gave 1 refill until appt in Oct. With psychiatry.  Unable to leave message. Will try calling again.

## 2018-08-10 DIAGNOSIS — Z11.59 NEED FOR HEPATITIS C SCREENING TEST: ICD-10-CM

## 2018-08-22 ENCOUNTER — OFFICE VISIT (OUTPATIENT)
Dept: INTERNAL MEDICINE | Facility: CLINIC | Age: 68
End: 2018-08-22
Payer: MEDICARE

## 2018-08-22 VITALS
HEART RATE: 80 BPM | BODY MASS INDEX: 28.46 KG/M2 | WEIGHT: 187.81 LBS | SYSTOLIC BLOOD PRESSURE: 136 MMHG | HEIGHT: 68 IN | RESPIRATION RATE: 16 BRPM | DIASTOLIC BLOOD PRESSURE: 76 MMHG

## 2018-08-22 DIAGNOSIS — G47.00 INSOMNIA, UNSPECIFIED TYPE: Primary | ICD-10-CM

## 2018-08-22 DIAGNOSIS — F31.9 BIPOLAR 1 DISORDER: ICD-10-CM

## 2018-08-22 DIAGNOSIS — F32.A DEPRESSION, UNSPECIFIED DEPRESSION TYPE: ICD-10-CM

## 2018-08-22 DIAGNOSIS — F41.9 ANXIETY: ICD-10-CM

## 2018-08-22 PROCEDURE — 99999 PR PBB SHADOW E&M-EST. PATIENT-LVL III: CPT | Mod: PBBFAC,,, | Performed by: NURSE PRACTITIONER

## 2018-08-22 PROCEDURE — 99999 PR STA SHADOW: CPT | Mod: PBBFAC,,, | Performed by: NURSE PRACTITIONER

## 2018-08-22 PROCEDURE — 99213 OFFICE O/P EST LOW 20 MIN: CPT | Mod: S$PBB | Performed by: NURSE PRACTITIONER

## 2018-08-22 PROCEDURE — 99213 OFFICE O/P EST LOW 20 MIN: CPT | Mod: PBBFAC | Performed by: NURSE PRACTITIONER

## 2018-08-22 RX ORDER — QUETIAPINE FUMARATE 300 MG/1
TABLET, FILM COATED ORAL
Qty: 45 TABLET | Refills: 1 | Status: SHIPPED | OUTPATIENT
Start: 2018-08-22 | End: 2019-07-05 | Stop reason: SDUPTHER

## 2018-08-22 RX ORDER — TRAZODONE HYDROCHLORIDE 50 MG/1
TABLET ORAL
Qty: 60 TABLET | Refills: 1 | Status: SHIPPED | OUTPATIENT
Start: 2018-08-22 | End: 2020-09-15

## 2018-08-22 NOTE — PROGRESS NOTES
Subjective:       Patient ID: Karin Maguire is a 68 y.o. female.    Chief Complaint: Insomnia and Medication Refill    HPI: Pt presents to clinic today known to me with c/o needing her meds refilled. She reports that she was seeing Trupti Escobedo MD. She has abruptly stopped her practice. She is on her last day of meds and her f/u with [facility] 9/26. She reotr stha she had recently had a visit and had her cymblata and lamictal filled. She was suppose to f/u for seroquel but was canceled before then. She also had issues with sleep. She was rx trazadone but never filled it because holland;atonin was working. She report shtt anow it is not. She has tried ambien in past without relief and with side effects,. She would like to try the trazadone.   Review of Systems   Constitutional: Negative for chills, fatigue, fever and unexpected weight change.   HENT: Negative for congestion, ear pain, sore throat and trouble swallowing.    Eyes: Negative for pain and visual disturbance.   Respiratory: Negative for cough, chest tightness and shortness of breath.    Cardiovascular: Negative for chest pain, palpitations and leg swelling.   Gastrointestinal: Negative for abdominal distention, abdominal pain, constipation, diarrhea and vomiting.   Genitourinary: Negative for difficulty urinating, dysuria, flank pain, frequency and hematuria.   Musculoskeletal: Negative for back pain, gait problem, joint swelling, neck pain and neck stiffness.   Skin: Negative for rash and wound.   Neurological: Negative for dizziness, seizures, speech difficulty, light-headedness and headaches.   Psychiatric/Behavioral: Positive for confusion, self-injury and sleep disturbance. Negative for agitation and suicidal ideas. The patient is nervous/anxious.        Objective:      Physical Exam   Constitutional: She is oriented to person, place, and time. She appears well-developed and well-nourished.   HENT:   Head: Normocephalic and atraumatic.   Eyes:  Conjunctivae and EOM are normal. Pupils are equal, round, and reactive to light.   Neck: Normal range of motion. Neck supple.   Cardiovascular: Normal rate, regular rhythm, normal heart sounds and intact distal pulses.   No murmur heard.  Pulmonary/Chest: Effort normal and breath sounds normal. No stridor. No respiratory distress. She has no wheezes.   Abdominal: Soft. Bowel sounds are normal. She exhibits no distension and no mass. There is no tenderness. There is no guarding.   Musculoskeletal: Normal range of motion. She exhibits no edema.   Neurological: She is alert and oriented to person, place, and time.   Skin: Skin is warm and dry.   Psychiatric: She has a normal mood and affect. Her behavior is normal. Judgment and thought content normal.   Nursing note and vitals reviewed.      Assessment:       1. Insomnia, unspecified type    2. Depression, unspecified depression type    3. Bipolar 1 disorder    4. Anxiety        Plan:     Problem List Items Addressed This Visit     Bipolar 1 disorder    Anxiety      Other Visit Diagnoses     Insomnia, unspecified type    -  Primary    Relevant Medications    traZODone (DESYREL) 50 MG tablet    Depression, unspecified depression type        Relevant Medications    QUEtiapine (SEROQUEL) 300 MG Tab        Cont cymbalta, cont lamictal. Refilled seroquel today due to no appt till Sept. I discussed with her I do not feel comfortable filling routinely or managing. She is trying to establish with another psychiatrist. I will rx her meds to bridge her till that appt. Also refilled her trazadone.     If rx expensive at [facility] will call in 10 day supply and send 3 months to long term pharm for cost issues.

## 2018-11-21 ENCOUNTER — OFFICE VISIT (OUTPATIENT)
Dept: INTERNAL MEDICINE | Facility: CLINIC | Age: 68
End: 2018-11-21
Payer: MEDICARE

## 2018-11-21 VITALS
HEART RATE: 78 BPM | SYSTOLIC BLOOD PRESSURE: 118 MMHG | RESPIRATION RATE: 18 BRPM | WEIGHT: 189.81 LBS | HEIGHT: 68 IN | DIASTOLIC BLOOD PRESSURE: 82 MMHG | BODY MASS INDEX: 28.77 KG/M2

## 2018-11-21 DIAGNOSIS — M25.512 ACUTE PAIN OF LEFT SHOULDER: Primary | ICD-10-CM

## 2018-11-21 PROCEDURE — 90662 IIV NO PRSV INCREASED AG IM: CPT | Mod: PBBFAC

## 2018-11-21 PROCEDURE — 96372 THER/PROPH/DIAG INJ SC/IM: CPT | Mod: PBBFAC,59

## 2018-11-21 PROCEDURE — 99213 OFFICE O/P EST LOW 20 MIN: CPT | Mod: PBBFAC | Performed by: NURSE PRACTITIONER

## 2018-11-21 PROCEDURE — 99999 PR STA SHADOW: CPT | Mod: PBBFAC,,,

## 2018-11-21 PROCEDURE — 99999 FLU VACCINE - HIGH DOSE (65+) PRESERVATIVE FREE IM: CPT | Mod: PBBFAC,,,

## 2018-11-21 PROCEDURE — 99999 PR PBB SHADOW E&M-EST. PATIENT-LVL III: CPT | Mod: PBBFAC,,, | Performed by: NURSE PRACTITIONER

## 2018-11-21 PROCEDURE — 99213 OFFICE O/P EST LOW 20 MIN: CPT | Mod: S$PBB | Performed by: NURSE PRACTITIONER

## 2018-11-21 PROCEDURE — 99999 PR STA SHADOW: CPT | Mod: PBBFAC,,, | Performed by: NURSE PRACTITIONER

## 2018-11-21 RX ORDER — METHYLPREDNISOLONE ACETATE 80 MG/ML
80 INJECTION, SUSPENSION INTRA-ARTICULAR; INTRALESIONAL; INTRAMUSCULAR; SOFT TISSUE ONCE
Status: COMPLETED | OUTPATIENT
Start: 2018-11-21 | End: 2018-11-21

## 2018-11-21 RX ORDER — KETOROLAC TROMETHAMINE 30 MG/ML
60 INJECTION, SOLUTION INTRAMUSCULAR; INTRAVENOUS
Status: COMPLETED | OUTPATIENT
Start: 2018-11-21 | End: 2018-11-21

## 2018-11-21 RX ADMIN — KETOROLAC TROMETHAMINE 60 MG: 60 INJECTION, SOLUTION INTRAMUSCULAR at 09:11

## 2018-11-21 RX ADMIN — METHYLPREDNISOLONE ACETATE 80 MG: 80 INJECTION, SUSPENSION INTRA-ARTICULAR; INTRALESIONAL; INTRAMUSCULAR; SOFT TISSUE at 09:11

## 2018-11-21 NOTE — PROGRESS NOTES
Subjective:       Patient ID: Karin Maguire is a 68 y.o. female.    Chief Complaint: Arm Pain    HPI: Pt presents to clinic today known to me with c/o shoulder pain. She reports that she has had it since before halloween but unsure if exact date. Can not specifically remeber when it started or if anything precipitated it. She reports that it has been getting worse. She c/o left bicep pain. She has restricted ROM left arm. She is unsure iof she had fall, reports that she has not fallen lately but has had falls in the past. She has been using tylenol and aleve without relief. Never had this pain before.   Review of Systems   Constitutional: Negative for chills, fatigue, fever and unexpected weight change.   HENT: Negative for congestion, ear pain, sore throat and trouble swallowing.    Eyes: Negative for pain and visual disturbance.   Respiratory: Negative for cough, chest tightness and shortness of breath.    Cardiovascular: Negative for chest pain, palpitations and leg swelling.   Gastrointestinal: Negative for abdominal distention, abdominal pain, constipation, diarrhea and vomiting.   Genitourinary: Negative for difficulty urinating, dysuria, flank pain, frequency and hematuria.   Musculoskeletal: Negative for back pain, gait problem, joint swelling, neck pain and neck stiffness.        Left arm pain   Skin: Negative for rash and wound.   Neurological: Negative for dizziness, seizures, speech difficulty, light-headedness and headaches.       Objective:      Physical Exam   Constitutional: She is oriented to person, place, and time. She appears well-developed and well-nourished.   HENT:   Head: Normocephalic and atraumatic.   Eyes: Conjunctivae and EOM are normal. Pupils are equal, round, and reactive to light.   Neck: Normal range of motion. Neck supple.   Cardiovascular: Normal rate, regular rhythm, normal heart sounds and intact distal pulses.   No murmur heard.  Pulmonary/Chest: Effort normal and breath  sounds normal. No respiratory distress. She has no wheezes. She has no rales.   Abdominal: Soft. Bowel sounds are normal.   Musculoskeletal: Normal range of motion. She exhibits tenderness. She exhibits no edema or deformity.   She has restricted ROM with left arm movement   Can not lift arm higher than 90 degrees forward or to side. She has pain with empty can test as well as bra strap test. She has no visual deformity or skin eruptions. She has minimal pain with palp. She has no neck pain or ROM issues   Neurological: She is alert and oriented to person, place, and time.   Skin: Skin is warm and dry.   Psychiatric: She has a normal mood and affect. Her behavior is normal. Judgment and thought content normal.   Nursing note and vitals reviewed.      Assessment:       1. Acute pain of left shoulder        Plan:     Problem List Items Addressed This Visit     None      Visit Diagnoses     Acute pain of left shoulder    -  Primary    Relevant Medications    methylPREDNISolone acetate injection 80 mg (Start on 11/21/2018 10:15 AM)    ketorolac injection 60 mg (Start on 11/21/2018  9:15 AM)        Cont aleve BID starting tonight and x 5 days with rest to shoulder (no lifting/pulling/etc). If no relief will image next week

## 2018-12-01 ENCOUNTER — OFFICE VISIT (OUTPATIENT)
Dept: URGENT CARE | Facility: CLINIC | Age: 68
End: 2018-12-01
Payer: MEDICARE

## 2018-12-01 VITALS
HEIGHT: 68 IN | WEIGHT: 189 LBS | DIASTOLIC BLOOD PRESSURE: 78 MMHG | SYSTOLIC BLOOD PRESSURE: 150 MMHG | BODY MASS INDEX: 28.64 KG/M2 | TEMPERATURE: 100 F | HEART RATE: 78 BPM | OXYGEN SATURATION: 98 %

## 2018-12-01 DIAGNOSIS — M25.519 SHOULDER PAIN, UNSPECIFIED CHRONICITY, UNSPECIFIED LATERALITY: Primary | ICD-10-CM

## 2018-12-01 PROCEDURE — 99214 OFFICE O/P EST MOD 30 MIN: CPT | Mod: S$GLB,,, | Performed by: NURSE PRACTITIONER

## 2018-12-01 PROCEDURE — 73030 X-RAY EXAM OF SHOULDER: CPT | Mod: FY,LT,S$GLB, | Performed by: RADIOLOGY

## 2018-12-01 RX ORDER — NAPROXEN 500 MG/1
500 TABLET ORAL 2 TIMES DAILY WITH MEALS
Qty: 20 TABLET | Refills: 0 | Status: SHIPPED | OUTPATIENT
Start: 2018-12-01 | End: 2018-12-11

## 2018-12-01 RX ORDER — CYCLOBENZAPRINE HCL 5 MG
5 TABLET ORAL NIGHTLY
Qty: 10 TABLET | Refills: 0 | Status: SHIPPED | OUTPATIENT
Start: 2018-12-01 | End: 2018-12-11

## 2018-12-01 NOTE — PROGRESS NOTES
"Subjective:       Patient ID: Karin Maguire is a 68 y.o. female.    Vitals:  height is 5' 8" (1.727 m) and weight is 85.7 kg (189 lb). Her tympanic temperature is 99.7 °F (37.6 °C). Her blood pressure is 150/78 (abnormal) and her pulse is 78. Her oxygen saturation is 98%.     Chief Complaint: Shoulder Pain (left shoulder)    C/o left shoulder pain      Shoulder Pain    The pain is present in the left shoulder. This is a chronic problem. The current episode started in the past 7 days. There has been no history of extremity trauma. The problem occurs constantly. The problem has been unchanged. The quality of the pain is described as aching. The pain is at a severity of 8/10. The pain is moderate. Associated symptoms include an inability to bear weight and a limited range of motion. Pertinent negatives include no fever, headaches, joint locking, joint swelling or tingling. The symptoms are aggravated by activity. Treatments tried: aleve. The treatment provided no relief. Family history does not include arthritis.       Constitution: Negative for chills, fatigue and fever.   HENT: Negative for congestion and sore throat.    Neck: Negative for painful lymph nodes.   Cardiovascular: Negative for chest pain and leg swelling.   Eyes: Negative for double vision and blurred vision.   Respiratory: Negative for cough and shortness of breath.    Gastrointestinal: Negative for nausea, vomiting and diarrhea.   Genitourinary: Negative for dysuria, frequency, urgency and history of kidney stones.   Musculoskeletal: Positive for abnormal ROM of joint. Negative for joint pain, joint swelling, muscle cramps and muscle ache.   Skin: Negative for color change, pale, rash and bruising.   Allergic/Immunologic: Negative for seasonal allergies.   Neurological: Negative for dizziness, history of vertigo, light-headedness, passing out and headaches.   Hematologic/Lymphatic: Negative for swollen lymph nodes.   Psychiatric/Behavioral: " Negative for nervous/anxious, sleep disturbance and depression. The patient is not nervous/anxious.        Objective:      Physical Exam   Constitutional: She is oriented to person, place, and time. She appears well-developed and well-nourished. She is cooperative.  Non-toxic appearance. She does not appear ill. No distress.   HENT:   Head: Normocephalic and atraumatic.   Right Ear: Hearing, tympanic membrane, external ear and ear canal normal.   Left Ear: Hearing, tympanic membrane, external ear and ear canal normal.   Nose: Nose normal. No mucosal edema, rhinorrhea or nasal deformity. No epistaxis. Right sinus exhibits no maxillary sinus tenderness and no frontal sinus tenderness. Left sinus exhibits no maxillary sinus tenderness and no frontal sinus tenderness.   Mouth/Throat: Uvula is midline, oropharynx is clear and moist and mucous membranes are normal. No trismus in the jaw. Normal dentition. No uvula swelling. No posterior oropharyngeal erythema.   Eyes: Conjunctivae and lids are normal. Right eye exhibits no discharge. Left eye exhibits no discharge. No scleral icterus.   Sclera clear bilat   Neck: Trachea normal, normal range of motion, full passive range of motion without pain and phonation normal. Neck supple.   Cardiovascular: Normal rate, regular rhythm, normal heart sounds, intact distal pulses and normal pulses.   Pulmonary/Chest: Effort normal and breath sounds normal. No respiratory distress.   Abdominal: Soft. Normal appearance and bowel sounds are normal. She exhibits no distension, no pulsatile midline mass and no mass. There is no tenderness.   Musculoskeletal: She exhibits no edema or deformity.        Left shoulder: She exhibits decreased range of motion, tenderness, pain and decreased strength. She exhibits no swelling, no deformity and normal pulse.        Arms:  Neurological: She is alert and oriented to person, place, and time. She exhibits normal muscle tone. Coordination normal.   Skin:  Skin is warm, dry and intact. She is not diaphoretic. No pallor.   Psychiatric: She has a normal mood and affect. Her speech is normal and behavior is normal. Judgment and thought content normal. Cognition and memory are normal.   Nursing note and vitals reviewed.      Assessment:       1. Shoulder pain, unspecified chronicity, unspecified laterality        Plan:         Shoulder pain, unspecified chronicity, unspecified laterality  -     X-Ray Shoulder 2 or More Views Left; Future; Expected date: 12/01/2018  -     naproxen (NAPROSYN) 500 MG tablet; Take 1 tablet (500 mg total) by mouth 2 (two) times daily with meals. for 20 doses  Dispense: 20 tablet; Refill: 0  -     cyclobenzaprine (FLEXERIL) 5 MG tablet; Take 1 tablet (5 mg total) by mouth nightly. for 10 days  Dispense: 10 tablet; Refill: 0  -     Ambulatory referral to Orthopedics

## 2018-12-01 NOTE — PATIENT INSTRUCTIONS
Understanding Rotator Cuff Tendonitis    Tendons are tough tissues that connect muscles to bone. A group of 4 muscles and their tendons form a cuff around the head of the upper arm bone. This is called the rotator cuff. It connects the upper arm to the shoulder blade. It gives the shoulder joint stability and strength.  If tendons are injured or strained, they may become irritated and swollen (inflamed). This is called tendonitis. Rotator cuff tendonitis may cause shoulder pain and loss of function.  What causes rotator cuff tendonitis?  Tendonitis results when the rotator cuff tendons are injured or overworked. The most common cause of injury is repetitive overhead activities. These can be work-related activities such as reaching, pushing, or lifting. Or they can be sports-related activities such as throwing, swimming, or lifting weights.  Symptoms of rotator cuff tendonitis  Pain on the side of the upper arm is the most common symptom. Pain may get worse with overhead movements or when you raise the arm above shoulder level. It may also hurt to lie on the shoulder at night.  Treatment for rotator cuff tendonitis  Treatment may include the following:  · Active rest. This lets the rotator cuff heal. Active rest means using your arm and shoulder, but avoiding activities that cause pain, such as reaching overhead or sleeping on the shoulder.  · Cold packs. Putting ice packs on the shoulder helps reduce swelling and relieve pain.  · Pain medicines. Prescription or over-the-counter pain medicines can help relieve pain and swelling.  · Arm and shoulder exercises. These help keep the shoulder joint mobile as it heals. They also help improve the strength of muscles around the joint.  Possible complications  It might be tempting to stop using your shoulder completely to avoid pain. But doing so may lead to a condition called frozen shoulder. To help prevent this, following instructions you are given for active rest  and for doing exercises to help your shoulder heal.  When to call your healthcare provider  Call your healthcare provider right away if you have any of these:  · Fever of 100.4°F (38°C) or higher, or as directed  · Symptoms that dont get better, or get worse  · New symptoms   Date Last Reviewed: 3/10/2016  © 3599-0645 [employer]. [address] [place] [address]. All rights reserved. This information is not intended as a substitute for professional medical care. Always follow your healthcare professional's instructions.        Shoulder Pain with Uncertain Cause  Shoulder pain can have many causes. Pain often comes from the structures that surround the shoulder joint. These are the joint capsule, ligaments, tendons, muscles, and bursa. Pain can also come from cartilage in the joint. Cartilage can become worn out or injured. Its important to know whats causing your pain so the healthcare provider can use the correct treatment. But sometimes its difficult to find the exact cause of shoulder pain. You may need to see a specialist (orthopedist). You may also need special tests such as a CT scan or MRI. The provider may need to use special tools to look inside the joint (arthroscopy).  Shoulder pain can be treated with a sling or a device that keeps your shoulder from moving. You can take an anti-inflammatory medicine such as ibuprofen to ease pain. You may need to do special shoulder exercises. Follow up with a specialist if the pain is severe or doesnt go away after a few weeks.  Home care  Follow these tips when caring for yourself at home:  · If a sling was given to you, leave it in place for the time advised by your healthcare provider. If you arent sure how long to wear it, ask for advice. If the sling becomes loose, adjust it so that your forearm is level with the ground. Your shoulder should feel well supported.  · Put an ice pack on the injured area for 20 minutes every 1 to 2 hours  the first day. You can make your own ice pack by putting ice cubes in a plastic bag. Wrap the bag in a thin towel. Continue with ice packs 3 to 4 times a day for the next 2 days. Then use the pack as needed to ease pain and swelling.  · You may use acetaminophen or ibuprofen to control pain, unless another pain medicine was prescribed. If you have chronic liver or kidney disease, talk with your healthcare provider before using these medicines. Also talk with your provider if youve ever had a stomach ulcer or GI bleeding.  · Shoulder pain may seem worse at night, when there is less to distract you from the pain. If you sleep on your side, try to keep weight off your painful shoulder. Propping pillows behind you may stop you from rolling over onto that shoulder during sleep.   · Shoulder and elbow joints can become stiff if left in a sling for too long. You should start range of motion exercises about 7 to 10 days after the injury. Talk with your provider to find out what type of exercises to do and how soon to start.  · You can take the sling off to shower or bathe.  Follow-up care  Follow up with your healthcare provider if you dont start to get better in the next 5 days.  When to seek medical advice  Call your healthcare provider right away if any of these occur:  · Pain or swelling gets worse or continues for more than a few days  · Your hand or fingers become cold, blue, numb, or tingly  · Large amount of bruising on your shoulder or upper arm  · Difficulty moving your hand or fingers  · Weakness in your hand or fingers  · Your shoulder becomes stiff  · It feels like your shoulder is popping out  · You are less able to do your daily activities  Date Last Reviewed: 10/1/2016  © 4287-3508 [employer]. [address] [place] [address], [place] [address]. All rights reserved. This information is not intended as a substitute for professional medical care. Always follow your healthcare professional's  instructions.    Please follow up with your primary care or Orthopedic.       If your condition worsens at any time, you should report immediately to your nearest Emergency Department for further evaluation. **You must understand that you have received Urgent Care treatment only and that you may be released before all of your medical problems are known or treated. You, the patient, are responsible to arrange for follow-up care as instructed.

## 2019-04-05 ENCOUNTER — OFFICE VISIT (OUTPATIENT)
Dept: INTERNAL MEDICINE | Facility: CLINIC | Age: 69
End: 2019-04-05
Payer: MEDICARE

## 2019-04-05 VITALS
HEIGHT: 68 IN | DIASTOLIC BLOOD PRESSURE: 80 MMHG | BODY MASS INDEX: 29.44 KG/M2 | WEIGHT: 194.25 LBS | RESPIRATION RATE: 14 BRPM | HEART RATE: 91 BPM | OXYGEN SATURATION: 96 % | SYSTOLIC BLOOD PRESSURE: 116 MMHG | TEMPERATURE: 98 F

## 2019-04-05 DIAGNOSIS — J20.9 ACUTE BRONCHITIS, UNSPECIFIED ORGANISM: Primary | ICD-10-CM

## 2019-04-05 DIAGNOSIS — J01.00 ACUTE NON-RECURRENT MAXILLARY SINUSITIS: ICD-10-CM

## 2019-04-05 DIAGNOSIS — J44.1 COPD EXACERBATION: ICD-10-CM

## 2019-04-05 DIAGNOSIS — F17.200 SMOKER: ICD-10-CM

## 2019-04-05 PROCEDURE — 99999 PR STA SHADOW: CPT | Mod: PBBFAC,,, | Performed by: NURSE PRACTITIONER

## 2019-04-05 PROCEDURE — 99999 PR PBB SHADOW E&M-EST. PATIENT-LVL III: CPT | Mod: PBBFAC,,, | Performed by: NURSE PRACTITIONER

## 2019-04-05 PROCEDURE — 99213 OFFICE O/P EST LOW 20 MIN: CPT | Mod: S$PBB | Performed by: NURSE PRACTITIONER

## 2019-04-05 PROCEDURE — 99213 OFFICE O/P EST LOW 20 MIN: CPT | Mod: PBBFAC,25 | Performed by: NURSE PRACTITIONER

## 2019-04-05 PROCEDURE — 99999 PR STA SHADOW: ICD-10-PCS | Mod: PBBFAC,,, | Performed by: NURSE PRACTITIONER

## 2019-04-05 PROCEDURE — 96372 THER/PROPH/DIAG INJ SC/IM: CPT | Mod: PBBFAC

## 2019-04-05 RX ORDER — DOXYCYCLINE 100 MG/1
100 CAPSULE ORAL 2 TIMES DAILY
Qty: 20 CAPSULE | Refills: 0 | Status: SHIPPED | OUTPATIENT
Start: 2019-04-05 | End: 2019-10-29 | Stop reason: SDUPTHER

## 2019-04-05 RX ORDER — METHYLPREDNISOLONE ACETATE 40 MG/ML
60 INJECTION, SUSPENSION INTRA-ARTICULAR; INTRALESIONAL; INTRAMUSCULAR; SOFT TISSUE
Status: COMPLETED | OUTPATIENT
Start: 2019-04-05 | End: 2019-04-05

## 2019-04-05 RX ORDER — ALBUTEROL SULFATE 90 UG/1
2 AEROSOL, METERED RESPIRATORY (INHALATION) EVERY 6 HOURS PRN
Qty: 18 G | Refills: 0 | Status: SHIPPED | OUTPATIENT
Start: 2019-04-05 | End: 2019-05-02 | Stop reason: SDUPTHER

## 2019-04-05 RX ADMIN — METHYLPREDNISOLONE ACETATE 60 MG: 40 INJECTION, SUSPENSION INTRA-ARTICULAR; INTRALESIONAL; INTRAMUSCULAR; SOFT TISSUE at 02:04

## 2019-04-05 NOTE — PROGRESS NOTES
Subjective:           Patient ID: Karin Maguire is a 68 y.o. female.    Chief Complaint: Nasal Congestion; Cough; and Headache    69 YO YO WF here with c/o congestion, allergies, and productive cough x 2 weeks.+ Post nasal drip. No fever.  + Smoker; wants to quit at times; states she smokes 1/2ppd most days but when having ^stress then 1ppd  Smells strongly if cigarette; discussed risks of smoking and benefits of quitting 5-8m; tips for quitting    Review of Systems   Constitutional: Negative for chills, fatigue and fever.   HENT: Positive for congestion, postnasal drip and sinus pressure. Negative for ear pain, sneezing and sore throat.    Eyes: Positive for itching. Negative for discharge.   Respiratory: Positive for cough and wheezing. Negative for chest tightness and shortness of breath.    Cardiovascular: Negative.    Gastrointestinal: Negative for abdominal pain, constipation, diarrhea, nausea and vomiting.   Genitourinary: Negative for difficulty urinating, flank pain and hematuria.   Musculoskeletal: Negative.  Negative for arthralgias and joint swelling.   Skin: Negative.    Neurological: Negative for dizziness and headaches.   Psychiatric/Behavioral: Negative for behavioral problems and confusion.       Objective:      Physical Exam   Constitutional: She is oriented to person, place, and time. She appears well-developed and well-nourished.   HENT:   Head: Normocephalic and atraumatic.   Right Ear: External ear normal.   Left Ear: External ear normal.   Nose: Mucosal edema present. Right sinus exhibits maxillary sinus tenderness. Left sinus exhibits maxillary sinus tenderness.   Mouth/Throat: Oropharynx is clear and moist. No oropharyngeal exudate.   Eyes: Pupils are equal, round, and reactive to light. Conjunctivae and EOM are normal.   Neck: Normal range of motion. Neck supple. No thyromegaly present.   Cardiovascular: Normal rate, regular rhythm, normal heart sounds and intact distal pulses.    Pulmonary/Chest: Effort normal. She has no wheezes. She has no rales.   Loose rhonchi R>L base , no crackles.    Abdominal: Soft. Bowel sounds are normal. She exhibits no distension and no mass. There is no tenderness. There is no guarding.   Musculoskeletal: Normal range of motion.   Neurological: She is alert and oriented to person, place, and time. She has normal reflexes.   Skin: Skin is warm and dry. Capillary refill takes less than 2 seconds.   Psychiatric: She has a normal mood and affect. Judgment and thought content normal.   Very fidgety, constant movement   Nursing note and vitals reviewed.      Assessment:       1. Acute bronchitis, unspecified organism    2. Acute non-recurrent maxillary sinusitis    3. Smoker    4. COPD exacerbation        Plan:   Karin was seen today for nasal congestion, cough and headache.    Diagnoses and all orders for this visit:    Acute bronchitis, unspecified organism  -     methylPREDNISolone acetate injection 60 mg  -     doxycycline (MONODOX) 100 MG capsule; Take 1 capsule (100 mg total) by mouth 2 (two) times daily.  -     albuterol (PROVENTIL/VENTOLIN HFA) 90 mcg/actuation inhaler; Inhale 2 puffs into the lungs every 6 (six) hours as needed for Wheezing (cough and wheezing). Rescue    Acute non-recurrent maxillary sinusitis  -     methylPREDNISolone acetate injection 60 mg  -     doxycycline (MONODOX) 100 MG capsule; Take 1 capsule (100 mg total) by mouth 2 (two) times daily.    Smoker  -     doxycycline (MONODOX) 100 MG capsule; Take 1 capsule (100 mg total) by mouth 2 (two) times daily.  -     albuterol (PROVENTIL/VENTOLIN HFA) 90 mcg/actuation inhaler; Inhale 2 puffs into the lungs every 6 (six) hours as needed for Wheezing (cough and wheezing). Rescue    COPD exacerbation  -     methylPREDNISolone acetate injection 60 mg  -     doxycycline (MONODOX) 100 MG capsule; Take 1 capsule (100 mg total) by mouth 2 (two) times daily.  -     albuterol (PROVENTIL/VENTOLIN  HFA) 90 mcg/actuation inhaler; Inhale 2 puffs into the lungs every 6 (six) hours as needed for Wheezing (cough and wheezing). Rescue      Problem List Items Addressed This Visit     None      Visit Diagnoses     Acute bronchitis, unspecified organism    -  Primary    Relevant Medications    methylPREDNISolone acetate injection 60 mg (Start on 4/5/2019  2:00 PM)    doxycycline (MONODOX) 100 MG capsule    albuterol (PROVENTIL/VENTOLIN HFA) 90 mcg/actuation inhaler    Acute non-recurrent maxillary sinusitis        Relevant Medications    methylPREDNISolone acetate injection 60 mg (Start on 4/5/2019  2:00 PM)    doxycycline (MONODOX) 100 MG capsule    Smoker        Relevant Medications    doxycycline (MONODOX) 100 MG capsule    albuterol (PROVENTIL/VENTOLIN HFA) 90 mcg/actuation inhaler    COPD exacerbation        Relevant Medications    methylPREDNISolone acetate injection 60 mg (Start on 4/5/2019  2:00 PM)    doxycycline (MONODOX) 100 MG capsule    albuterol (PROVENTIL/VENTOLIN HFA) 90 mcg/actuation inhaler

## 2019-05-02 DIAGNOSIS — J20.9 ACUTE BRONCHITIS, UNSPECIFIED ORGANISM: ICD-10-CM

## 2019-05-02 DIAGNOSIS — F17.200 SMOKER: ICD-10-CM

## 2019-05-02 DIAGNOSIS — J44.1 COPD EXACERBATION: ICD-10-CM

## 2019-05-02 RX ORDER — ALBUTEROL SULFATE 90 UG/1
AEROSOL, METERED RESPIRATORY (INHALATION)
Qty: 18 INHALER | Refills: 0 | Status: SHIPPED | OUTPATIENT
Start: 2019-05-02 | End: 2020-03-16 | Stop reason: SDUPTHER

## 2019-07-05 ENCOUNTER — OFFICE VISIT (OUTPATIENT)
Dept: INTERNAL MEDICINE | Facility: CLINIC | Age: 69
End: 2019-07-05
Payer: MEDICARE

## 2019-07-05 VITALS
HEART RATE: 78 BPM | OXYGEN SATURATION: 93 % | HEIGHT: 68 IN | RESPIRATION RATE: 16 BRPM | BODY MASS INDEX: 28.53 KG/M2 | WEIGHT: 188.25 LBS | SYSTOLIC BLOOD PRESSURE: 130 MMHG | DIASTOLIC BLOOD PRESSURE: 80 MMHG

## 2019-07-05 DIAGNOSIS — F31.9 BIPOLAR 1 DISORDER: Primary | ICD-10-CM

## 2019-07-05 PROCEDURE — 99999 PR PBB SHADOW E&M-EST. PATIENT-LVL III: ICD-10-PCS | Mod: PBBFAC,,, | Performed by: INTERNAL MEDICINE

## 2019-07-05 PROCEDURE — 99213 OFFICE O/P EST LOW 20 MIN: CPT | Mod: S$PBB | Performed by: INTERNAL MEDICINE

## 2019-07-05 PROCEDURE — 99999 PR PBB SHADOW E&M-EST. PATIENT-LVL III: CPT | Mod: PBBFAC,,, | Performed by: INTERNAL MEDICINE

## 2019-07-05 PROCEDURE — 99999 PR STA SHADOW: CPT | Mod: PBBFAC,,, | Performed by: INTERNAL MEDICINE

## 2019-07-05 PROCEDURE — 99213 OFFICE O/P EST LOW 20 MIN: CPT | Mod: PBBFAC | Performed by: INTERNAL MEDICINE

## 2019-07-05 RX ORDER — QUETIAPINE FUMARATE 300 MG/1
TABLET, FILM COATED ORAL
Qty: 45 TABLET | Refills: 1 | Status: SHIPPED | OUTPATIENT
Start: 2019-07-05 | End: 2019-07-27 | Stop reason: SDUPTHER

## 2019-07-05 NOTE — PROGRESS NOTES
Subjective:       Patient ID: Karin Maguire is a 69 y.o. female.    Chief Complaint: Medication Refill      HPI:  Patient is new to me but known to clinic and presents for med refills. She goes to [facility] but missed an appt. And ran out of Seroquel. She has follow up scheudled for August 1st but [facility] refuses to refill her medication until she was seen again in clinic. She took her last pill Monday. Has not slept well since. She has enough of her other medications to make it to her appt. She is otherwise feeling well and mood remains stable. Her  agrees. No bradley, etc.      Past Medical History:   Diagnosis Date    Anxiety     Arthritis     Bipolar 1 disorder     Bursitis of right hip     Sacroiliitis     right side       Family History   Problem Relation Age of Onset    Colon cancer Maternal Uncle     Colon cancer Maternal Uncle     Colon cancer Maternal Uncle        Social History     Socioeconomic History    Marital status:      Spouse name: Not on file    Number of children: Not on file    Years of education: Not on file    Highest education level: Not on file   Occupational History    Not on file   Social Needs    Financial resource strain: Not on file    Food insecurity:     Worry: Not on file     Inability: Not on file    Transportation needs:     Medical: Not on file     Non-medical: Not on file   Tobacco Use    Smoking status: Current Every Day Smoker     Packs/day: 0.50     Years: 35.00     Pack years: 17.50     Types: Cigarettes     Start date: 3/30/1982    Smokeless tobacco: Never Used    Tobacco comment: smoking cessation clinic pamphlet for clinic put on chart to give to pt.    Substance and Sexual Activity    Alcohol use: Yes     Comment: Socially-2 or 3 times a year-6 or 7 drinks    Drug use: No    Sexual activity: Yes     Partners: Male     Birth control/protection: Surgical     Comment:    Lifestyle    Physical activity:     Days per week: Not on file      Minutes per session: Not on file    Stress: Not on file   Relationships    Social connections:     Talks on phone: Not on file     Gets together: Not on file     Attends Worship service: Not on file     Active member of club or organization: Not on file     Attends meetings of clubs or organizations: Not on file     Relationship status: Not on file   Other Topics Concern    Not on file   Social History Narrative    Not on file       Review of Systems   Constitutional: Negative for activity change, fatigue, fever and unexpected weight change.   HENT: Negative for congestion, ear pain, hearing loss, rhinorrhea and sore throat.    Eyes: Negative for redness and visual disturbance.   Respiratory: Negative for cough, shortness of breath and wheezing.    Cardiovascular: Negative for chest pain, palpitations and leg swelling.   Gastrointestinal: Negative for abdominal pain, constipation, diarrhea, nausea and vomiting.   Genitourinary: Negative for dysuria, frequency and urgency.   Musculoskeletal: Negative for back pain, joint swelling and neck pain.   Skin: Negative for color change, rash and wound.   Neurological: Negative for dizziness, tremors, weakness, light-headedness and headaches.   Psychiatric/Behavioral: Positive for sleep disturbance.         Objective:      Physical Exam   Constitutional: She is oriented to person, place, and time. She appears well-developed and well-nourished. No distress.   HENT:   Head: Normocephalic and atraumatic.   Right Ear: External ear normal.   Left Ear: External ear normal.   Eyes: Pupils are equal, round, and reactive to light. Conjunctivae and EOM are normal. Right eye exhibits no discharge. Left eye exhibits no discharge.   Neck: Neck supple. No tracheal deviation present.   Cardiovascular: Normal rate and regular rhythm.   No murmur heard.  Pulmonary/Chest: Effort normal and breath sounds normal. No respiratory distress.   Abdominal: Soft. Bowel sounds are normal. She  exhibits no distension. There is no tenderness.   Neurological: She is alert and oriented to person, place, and time. No cranial nerve deficit.   Skin: Skin is warm and dry.   Psychiatric: She has a normal mood and affect. Her behavior is normal.   Vitals reviewed.      Assessment:       1. Bipolar 1 disorder        Plan:       Karin was seen today for medication refill.    Diagnoses and all orders for this visit:    Bipolar 1 disorder  -     QUEtiapine (SEROQUEL) 300 MG Tab; Take 1 1/2 tablets nightly    med refilled until see at [facility]  Has appt august 1  Otherwise doing well and collateral info from  confirms

## 2019-07-27 DIAGNOSIS — F31.9 BIPOLAR 1 DISORDER: ICD-10-CM

## 2019-07-29 RX ORDER — QUETIAPINE FUMARATE 300 MG/1
TABLET, FILM COATED ORAL
Qty: 45 TABLET | Refills: 1 | Status: SHIPPED | OUTPATIENT
Start: 2019-07-29 | End: 2019-08-25 | Stop reason: SDUPTHER

## 2019-08-25 DIAGNOSIS — F31.9 BIPOLAR 1 DISORDER: ICD-10-CM

## 2019-08-26 RX ORDER — QUETIAPINE FUMARATE 300 MG/1
TABLET, FILM COATED ORAL
Qty: 45 TABLET | Refills: 1 | Status: SHIPPED | OUTPATIENT
Start: 2019-08-26 | End: 2019-09-26 | Stop reason: SDUPTHER

## 2019-09-26 DIAGNOSIS — F31.9 BIPOLAR 1 DISORDER: ICD-10-CM

## 2019-09-27 RX ORDER — QUETIAPINE FUMARATE 300 MG/1
TABLET, FILM COATED ORAL
Qty: 45 TABLET | Refills: 1 | Status: SHIPPED | OUTPATIENT
Start: 2019-09-27 | End: 2020-09-15 | Stop reason: SDUPTHER

## 2019-10-29 ENCOUNTER — OFFICE VISIT (OUTPATIENT)
Dept: INTERNAL MEDICINE | Facility: CLINIC | Age: 69
End: 2019-10-29
Payer: MEDICARE

## 2019-10-29 ENCOUNTER — TELEPHONE (OUTPATIENT)
Dept: INTERNAL MEDICINE | Facility: CLINIC | Age: 69
End: 2019-10-29

## 2019-10-29 VITALS
HEART RATE: 86 BPM | TEMPERATURE: 100 F | BODY MASS INDEX: 24.52 KG/M2 | OXYGEN SATURATION: 95 % | DIASTOLIC BLOOD PRESSURE: 68 MMHG | WEIGHT: 161.81 LBS | SYSTOLIC BLOOD PRESSURE: 116 MMHG | HEIGHT: 68 IN | RESPIRATION RATE: 16 BRPM

## 2019-10-29 DIAGNOSIS — J01.00 ACUTE NON-RECURRENT MAXILLARY SINUSITIS: ICD-10-CM

## 2019-10-29 DIAGNOSIS — F17.200 SMOKER: ICD-10-CM

## 2019-10-29 DIAGNOSIS — Z12.31 ENCOUNTER FOR SCREENING MAMMOGRAM FOR MALIGNANT NEOPLASM OF BREAST: ICD-10-CM

## 2019-10-29 DIAGNOSIS — J20.9 ACUTE BRONCHITIS, UNSPECIFIED ORGANISM: Primary | ICD-10-CM

## 2019-10-29 DIAGNOSIS — Z12.39 BREAST CANCER SCREENING: ICD-10-CM

## 2019-10-29 DIAGNOSIS — Z78.0 POSTMENOPAUSAL: ICD-10-CM

## 2019-10-29 DIAGNOSIS — J44.1 COPD EXACERBATION: ICD-10-CM

## 2019-10-29 DIAGNOSIS — Z12.39 SCREENING FOR BREAST CANCER: ICD-10-CM

## 2019-10-29 DIAGNOSIS — R05.9 COUGH: ICD-10-CM

## 2019-10-29 DIAGNOSIS — R06.2 WHEEZING: ICD-10-CM

## 2019-10-29 PROCEDURE — 99214 OFFICE O/P EST MOD 30 MIN: CPT | Mod: S$PBB | Performed by: NURSE PRACTITIONER

## 2019-10-29 PROCEDURE — 99999 PR PBB SHADOW E&M-EST. PATIENT-LVL IV: CPT | Mod: PBBFAC,,, | Performed by: NURSE PRACTITIONER

## 2019-10-29 PROCEDURE — 99999 PR STA SHADOW: CPT | Mod: PBBFAC,,,

## 2019-10-29 PROCEDURE — 99214 OFFICE O/P EST MOD 30 MIN: CPT | Mod: PBBFAC,25 | Performed by: NURSE PRACTITIONER

## 2019-10-29 PROCEDURE — 99999 PR STA SHADOW: CPT | Mod: PBBFAC,,, | Performed by: NURSE PRACTITIONER

## 2019-10-29 PROCEDURE — 96372 THER/PROPH/DIAG INJ SC/IM: CPT | Mod: PBBFAC

## 2019-10-29 PROCEDURE — 99999 PR STA SHADOW: ICD-10-PCS | Mod: PBBFAC,,,

## 2019-10-29 PROCEDURE — 99999 PR STA SHADOW: ICD-10-PCS | Mod: PBBFAC,,, | Performed by: NURSE PRACTITIONER

## 2019-10-29 RX ORDER — QUETIAPINE 200 MG/1
TABLET, FILM COATED, EXTENDED RELEASE ORAL
Refills: 2 | COMMUNITY
Start: 2019-09-20 | End: 2020-09-15

## 2019-10-29 RX ORDER — DOXYCYCLINE 100 MG/1
100 CAPSULE ORAL 2 TIMES DAILY
Qty: 14 CAPSULE | Refills: 0 | Status: SHIPPED | OUTPATIENT
Start: 2019-10-29 | End: 2019-11-05

## 2019-10-29 RX ORDER — METHYLPREDNISOLONE ACETATE 80 MG/ML
80 INJECTION, SUSPENSION INTRA-ARTICULAR; INTRALESIONAL; INTRAMUSCULAR; SOFT TISSUE
Status: COMPLETED | OUTPATIENT
Start: 2019-10-29 | End: 2019-10-29

## 2019-10-29 RX ORDER — BENZONATATE 200 MG/1
200 CAPSULE ORAL 3 TIMES DAILY PRN
Qty: 30 CAPSULE | Refills: 0 | Status: SHIPPED | OUTPATIENT
Start: 2019-10-29 | End: 2019-11-08

## 2019-10-29 RX ADMIN — METHYLPREDNISOLONE ACETATE 80 MG: 80 INJECTION, SUSPENSION INTRA-ARTICULAR; INTRALESIONAL; INTRAMUSCULAR; SOFT TISSUE at 03:10

## 2019-10-29 NOTE — PROGRESS NOTES
Subjective:           Patient ID: Karin Maguire is a 69 y.o. female.    Chief Complaint: Cough; Chest Congestion; and Nasal Congestion (runny nose, post nasal drip)    HPI  Review of Systems   Constitutional: Negative for chills, fatigue and fever.   HENT: Positive for congestion, postnasal drip and sinus pressure. Negative for ear pain, sneezing and sore throat.    Eyes: Positive for itching. Negative for discharge.   Respiratory: Positive for cough and wheezing. Negative for chest tightness and shortness of breath.    Cardiovascular: Negative.    Gastrointestinal: Negative for abdominal pain, constipation, diarrhea, nausea and vomiting.   Genitourinary: Negative for difficulty urinating, flank pain and hematuria.   Musculoskeletal: Negative.  Negative for arthralgias and joint swelling.   Skin: Negative.    Neurological: Negative for dizziness and headaches.   Psychiatric/Behavioral: Negative for behavioral problems and confusion.       Objective:      Physical Exam   Constitutional: She is oriented to person, place, and time. She appears well-developed and well-nourished.   HENT:   Head: Normocephalic and atraumatic.   Right Ear: External ear normal.   Left Ear: External ear normal.   Nose: Mucosal edema present. Right sinus exhibits maxillary sinus tenderness. Left sinus exhibits maxillary sinus tenderness.   Mouth/Throat: Oropharynx is clear and moist. No oropharyngeal exudate.   TMs dull, mild fluid   Eyes: Pupils are equal, round, and reactive to light. Conjunctivae and EOM are normal.   Neck: Normal range of motion. Neck supple. No thyromegaly present.   Cardiovascular: Normal rate, regular rhythm, normal heart sounds and intact distal pulses.   Pulmonary/Chest: Effort normal. She has wheezes. She has no rales.   + rhonchi and exp wheezing to bases    Abdominal: Soft. Bowel sounds are normal. She exhibits no distension and no mass. There is no tenderness. There is no guarding.   Musculoskeletal: Normal  range of motion.   Neurological: She is alert and oriented to person, place, and time. She has normal reflexes.   Skin: Skin is warm and dry. Capillary refill takes less than 2 seconds.   Psychiatric: She has a normal mood and affect. Judgment and thought content normal.   Very fidgety, constant movement   Nursing note and vitals reviewed.      Assessment:       1. Acute bronchitis, unspecified organism    2. Acute non-recurrent maxillary sinusitis    3. Breast cancer screening    4. Postmenopausal    5. Smoker    6. COPD exacerbation    7. Wheezing    8. Cough    9. Screening for breast cancer    10. Encounter for screening mammogram for malignant neoplasm of breast         Plan:   Karin was seen today for cough, chest congestion and nasal congestion.    Diagnoses and all orders for this visit:    Acute bronchitis, unspecified organism  -     doxycycline (MONODOX) 100 MG capsule; Take 1 capsule (100 mg total) by mouth 2 (two) times daily. for 7 days    Acute non-recurrent maxillary sinusitis  -     doxycycline (MONODOX) 100 MG capsule; Take 1 capsule (100 mg total) by mouth 2 (two) times daily. for 7 days    Breast cancer screening  -     Mammo Digital Screening Bilat w/ Volodymyr; Future    Postmenopausal  -     DXA Bone Density Spine And Hip; Future    Smoker  -     doxycycline (MONODOX) 100 MG capsule; Take 1 capsule (100 mg total) by mouth 2 (two) times daily. for 7 days    COPD exacerbation  -     doxycycline (MONODOX) 100 MG capsule; Take 1 capsule (100 mg total) by mouth 2 (two) times daily. for 7 days  -     methylPREDNISolone acetate injection 80 mg    Wheezing  -     methylPREDNISolone acetate injection 80 mg    Cough  -     benzonatate (TESSALON) 200 MG capsule; Take 1 capsule (200 mg total) by mouth 3 (three) times daily as needed for Cough.    Screening for breast cancer  -     Mammo Digital Screening Bilat w/ Volodymyr; Future    Encounter for screening mammogram for malignant neoplasm of breast   -      Mammo Digital Screening Bilat w/ Volodymyr; Future      Problem List Items Addressed This Visit     None      Visit Diagnoses     Acute bronchitis, unspecified organism    -  Primary    Relevant Medications    doxycycline (MONODOX) 100 MG capsule    Acute non-recurrent maxillary sinusitis        Relevant Medications    doxycycline (MONODOX) 100 MG capsule    Breast cancer screening        Relevant Orders    Mammo Digital Screening Bilat w/ Volodymyr    Postmenopausal        Relevant Orders    DXA Bone Density Spine And Hip    Smoker        Relevant Medications    doxycycline (MONODOX) 100 MG capsule    COPD exacerbation        Relevant Medications    doxycycline (MONODOX) 100 MG capsule    methylPREDNISolone acetate injection 80 mg (Completed)    Wheezing        Relevant Medications    methylPREDNISolone acetate injection 80 mg (Completed)    Cough        Relevant Medications    benzonatate (TESSALON) 200 MG capsule    Screening for breast cancer        Relevant Orders    Mammo Digital Screening Bilat w/ Volodymyr    Encounter for screening mammogram for malignant neoplasm of breast         Relevant Orders    Mammo Digital Screening Bilat w/ Volodymyr

## 2019-10-29 NOTE — TELEPHONE ENCOUNTER
----- Message from Ifrah Sage sent at 10/29/2019  3:29 PM CDT -----  Contact: Self   Karin Maguire  MRN: [id number]  : 1950  PCP: Lottie Hough  Home Phone      [phone]  Work Phone      Not on file.  Mobile          [phone]    MESSAGE:   Patient was seen for an appointment today. The pharmacy instructed no Rx received.     Phone # [phone]    [id number]

## 2019-11-15 ENCOUNTER — HOSPITAL ENCOUNTER (OUTPATIENT)
Dept: RADIOLOGY | Facility: HOSPITAL | Age: 69
Discharge: HOME | End: 2019-11-15
Attending: NURSE PRACTITIONER
Payer: MEDICARE

## 2019-11-15 VITALS — BODY MASS INDEX: 24.4 KG/M2 | HEIGHT: 68 IN | WEIGHT: 161 LBS

## 2019-11-15 DIAGNOSIS — Z12.39 SCREENING FOR BREAST CANCER: ICD-10-CM

## 2019-11-15 DIAGNOSIS — Z12.31 ENCOUNTER FOR SCREENING MAMMOGRAM FOR MALIGNANT NEOPLASM OF BREAST: ICD-10-CM

## 2019-11-15 DIAGNOSIS — Z12.39 BREAST CANCER SCREENING: ICD-10-CM

## 2019-11-15 DIAGNOSIS — Z78.0 POSTMENOPAUSAL: ICD-10-CM

## 2019-11-15 PROCEDURE — 77080 DXA BONE DENSITY AXIAL: CPT | Mod: TC

## 2019-11-15 PROCEDURE — 77067 SCR MAMMO BI INCL CAD: CPT | Mod: TC

## 2020-03-16 DIAGNOSIS — J44.1 COPD EXACERBATION: ICD-10-CM

## 2020-03-16 DIAGNOSIS — J20.9 ACUTE BRONCHITIS, UNSPECIFIED ORGANISM: ICD-10-CM

## 2020-03-16 DIAGNOSIS — F17.200 SMOKER: ICD-10-CM

## 2020-03-16 RX ORDER — ALBUTEROL SULFATE 90 UG/1
AEROSOL, METERED RESPIRATORY (INHALATION)
Qty: 18 G | Refills: 0 | Status: SHIPPED | OUTPATIENT
Start: 2020-03-16 | End: 2021-08-25 | Stop reason: SDUPTHER

## 2020-03-16 NOTE — TELEPHONE ENCOUNTER
----- Message from Ifrah Sage sent at 3/16/2020  1:23 PM CDT -----  Contact: self   Karin Maguire  MRN: [id number]  : 1950  PCP: Lottie Hough  Home Phone      [phone]  Work Phone      Not on file.  Mobile          [phone]    MESSAGE:   Rx refill - VENTOLIN HFA 90 mcg/actuation inhaler.  Patient c/o allergies.     Phone # [phone]    Pharmacy - [id number] [address] [address] [facility] [address]

## 2020-03-23 ENCOUNTER — OFFICE VISIT (OUTPATIENT)
Dept: INTERNAL MEDICINE | Facility: CLINIC | Age: 70
End: 2020-03-23
Payer: MEDICARE

## 2020-03-23 VITALS
WEIGHT: 158.06 LBS | DIASTOLIC BLOOD PRESSURE: 74 MMHG | RESPIRATION RATE: 16 BRPM | HEIGHT: 68 IN | OXYGEN SATURATION: 97 % | HEART RATE: 76 BPM | SYSTOLIC BLOOD PRESSURE: 128 MMHG | BODY MASS INDEX: 23.95 KG/M2

## 2020-03-23 DIAGNOSIS — J01.80 ACUTE NON-RECURRENT SINUSITIS OF OTHER SINUS: Primary | ICD-10-CM

## 2020-03-23 PROCEDURE — 99999 PR STA SHADOW: ICD-10-PCS | Mod: PBBFAC,,,

## 2020-03-23 PROCEDURE — 99999 PR STA SHADOW: CPT | Mod: PBBFAC,,,

## 2020-03-23 PROCEDURE — 99213 OFFICE O/P EST LOW 20 MIN: CPT | Mod: PBBFAC | Performed by: NURSE PRACTITIONER

## 2020-03-23 PROCEDURE — 99999 PR PBB SHADOW E&M-EST. PATIENT-LVL III: CPT | Mod: PBBFAC,,, | Performed by: NURSE PRACTITIONER

## 2020-03-23 PROCEDURE — 99999 PR STA SHADOW: CPT | Mod: PBBFAC,,, | Performed by: NURSE PRACTITIONER

## 2020-03-23 PROCEDURE — 96372 THER/PROPH/DIAG INJ SC/IM: CPT | Mod: PBBFAC

## 2020-03-23 PROCEDURE — 99213 OFFICE O/P EST LOW 20 MIN: CPT | Mod: S$PBB | Performed by: NURSE PRACTITIONER

## 2020-03-23 PROCEDURE — 99999 PR STA SHADOW: ICD-10-PCS | Mod: PBBFAC,,, | Performed by: NURSE PRACTITIONER

## 2020-03-23 RX ORDER — METHYLPREDNISOLONE ACETATE 80 MG/ML
80 INJECTION, SUSPENSION INTRA-ARTICULAR; INTRALESIONAL; INTRAMUSCULAR; SOFT TISSUE
Status: COMPLETED | OUTPATIENT
Start: 2020-03-23 | End: 2020-03-23

## 2020-03-23 RX ORDER — AZITHROMYCIN 250 MG/1
TABLET, FILM COATED ORAL
Qty: 6 TABLET | Refills: 0 | Status: SHIPPED | OUTPATIENT
Start: 2020-03-23 | End: 2020-10-16

## 2020-03-23 RX ADMIN — METHYLPREDNISOLONE ACETATE 80 MG: 80 INJECTION, SUSPENSION INTRA-ARTICULAR; INTRALESIONAL; INTRAMUSCULAR; SOFT TISSUE at 03:03

## 2020-03-23 NOTE — PROGRESS NOTES
Subjective:       Patient ID: Karin Maguire is a 69 y.o. female.    Chief Complaint: Nasal Congestion (sinus pressure); Otalgia (right); Allergies (patient has been taking zyrtec but its not helping much.); and Cough (due to sinus drip)    HPI: Pt presents to clinic today known to me with c/o sinus. She reports that she started with congestion and ear ache last week. Now with cough and blowing out green mucus. No fever. Taking zyrtec but c/o sinus pain and pressure. No SOB or chest pain  Review of Systems   Constitutional: Positive for fatigue. Negative for appetite change, chills and fever.   HENT: Positive for congestion, postnasal drip, rhinorrhea, sinus pressure and sore throat. Negative for ear pain.    Eyes: Negative for pain, discharge, itching and visual disturbance.   Respiratory: Positive for cough. Negative for choking, chest tightness and shortness of breath.    Cardiovascular: Negative for chest pain, palpitations and leg swelling.   Gastrointestinal: Negative for abdominal pain, diarrhea, nausea and vomiting.   Musculoskeletal: Negative for arthralgias, myalgias and neck stiffness.   Skin: Negative for rash and wound.   Neurological: Negative for weakness, light-headedness and headaches.       Objective:      Physical Exam   Constitutional: She is oriented to person, place, and time. She appears well-developed and well-nourished.   HENT:   Head: Normocephalic and atraumatic.   Right Ear: External ear normal.   Left Ear: External ear normal.   Mouth/Throat: Posterior oropharyngeal erythema present. No oropharyngeal exudate.   Bilateral with fluid  Maxillary and frontal sinus pressure with palp   Eyes: Pupils are equal, round, and reactive to light.   Neck: Normal range of motion. Neck supple. No thyromegaly present.   Cardiovascular: Normal rate, regular rhythm, normal heart sounds and intact distal pulses.   No murmur heard.  Pulmonary/Chest: Effort normal and breath sounds normal. No stridor. No  respiratory distress. She has no wheezes.   Abdominal: Soft. Bowel sounds are normal. She exhibits no distension and no mass. There is no tenderness. There is no guarding.   Musculoskeletal: Normal range of motion.   Lymphadenopathy:     She has no cervical adenopathy.   Neurological: She is alert and oriented to person, place, and time. She has normal reflexes. No cranial nerve deficit.   Skin: Skin is warm and dry. Capillary refill takes less than 2 seconds. No erythema.   Psychiatric: She has a normal mood and affect.   Nursing note and vitals reviewed.      Assessment:       1. Acute non-recurrent sinusitis of other sinus        Plan:     Problem List Items Addressed This Visit     None      Visit Diagnoses     Acute non-recurrent sinusitis of other sinus    -  Primary    Relevant Medications    methylPREDNISolone acetate injection 80 mg (Start on 3/23/2020  3:45 PM)    azithromycin (ZITHROMAX Z-BOSTON) 250 MG tablet

## 2020-04-10 ENCOUNTER — OFFICE VISIT (OUTPATIENT)
Dept: URGENT CARE | Facility: CLINIC | Age: 70
End: 2020-04-10
Payer: MEDICARE

## 2020-04-10 VITALS
RESPIRATION RATE: 16 BRPM | WEIGHT: 158 LBS | HEART RATE: 82 BPM | DIASTOLIC BLOOD PRESSURE: 80 MMHG | OXYGEN SATURATION: 99 % | HEIGHT: 66 IN | TEMPERATURE: 98 F | BODY MASS INDEX: 25.39 KG/M2 | SYSTOLIC BLOOD PRESSURE: 132 MMHG

## 2020-04-10 DIAGNOSIS — H10.31 ACUTE BACTERIAL CONJUNCTIVITIS OF RIGHT EYE: Primary | ICD-10-CM

## 2020-04-10 PROCEDURE — 99203 OFFICE O/P NEW LOW 30 MIN: CPT | Mod: S$GLB,,, | Performed by: INTERNAL MEDICINE

## 2020-04-10 PROCEDURE — 99203 PR OFFICE/OUTPT VISIT, NEW, LEVL III, 30-44 MIN: ICD-10-PCS | Mod: S$GLB,,, | Performed by: INTERNAL MEDICINE

## 2020-04-10 RX ORDER — POLYMYXIN B SULFATE AND TRIMETHOPRIM 1; 10000 MG/ML; [USP'U]/ML
1 SOLUTION OPHTHALMIC 4 TIMES DAILY
Qty: 10 BOTTLE | Refills: 0 | Status: SHIPPED | OUTPATIENT
Start: 2020-04-10 | End: 2020-04-15

## 2020-04-10 RX ORDER — AMOXICILLIN AND CLAVULANATE POTASSIUM 875; 125 MG/1; MG/1
1 TABLET, FILM COATED ORAL 2 TIMES DAILY
Qty: 10 TABLET | Refills: 0 | Status: SHIPPED | OUTPATIENT
Start: 2020-04-10 | End: 2020-04-15

## 2020-04-11 NOTE — PATIENT INSTRUCTIONS
Conjunctivitis Caused by Infection     Wash hands often to help prevent spreading infection.     Infections are caused by viruses or germs (bacteria). Treatment includes keeping your eyes and hands clean. Your healthcare provider may prescribe eye drops, and tell you to stay home from work or school if youre contagious. Untreated infections can be serious. It's important to see your provider for a diagnosis.  Viral infections  A cold, flu, or other virus can spread to your eyes. This causes a watery discharge. Your eyes may burn or itch and get red. Your eyelids may also be puffy and sore.  Treatment  Most viral infections go away on their own. Artificial tears and warm compresses can relieve symptoms. Your provider may also prescribe eye drops. A viral infection can be very contagious and spreads quickly. To prevent this, wash your hands often. Use a separate tissue to wipe each eye. Dont touch your eyes or share bedding or towels.   Bacterial infections  Bacterial infections often occur in one eye. There may be a watery or a thick discharge from the eye. These infections can cause serious damage to your eye if not treated promptly.  Treatment  Your provider may prescribe eye drops or ointment to kill the bacteria. Warm compresses can help keep the eyelids clean. To keep the bacteria from spreading, wash your hands often. Use a separate tissue to wipe each eye. Dont touch your eyes or share bedding or towels.  Date Last Reviewed: 6/11/2015  © 2193-7190 [employer]. [address] [place] [address], [place] [id number]. All rights reserved. This information is not intended as a substitute for professional medical care. Always follow your healthcare professional's instructions.    Please return here or go to the Emergency Department for any concerns or worsening of condition.  If you were prescribed antibiotics, please take them to completion.  If you were prescribed a narcotic medication, do not drive or  operate heavy equipment or machinery while taking these medications.  Please follow up with your primary care doctor or specialist as needed.    If you  smoke, please stop smoking.

## 2020-04-11 NOTE — PROGRESS NOTES
"Subjective:       Patient ID: Karin Maguire is a 69 y.o. female.    Vitals:  height is 5' 6" (1.676 m) and weight is 71.7 kg (158 lb). Her tympanic temperature is 97.7 °F (36.5 °C). Her blood pressure is 132/80 and her pulse is 82. Her respiration is 16 and oxygen saturation is 99%.     Chief Complaint: Eye Problem (Right )    Eye Problem    The right eye is affected. This is a new (Left eye pain with redness. x2 wks. ) problem. The current episode started 1 to 4 weeks ago. The pain is at a severity of 2/10. The pain is mild. There is no known exposure to pink eye. Associated symptoms include an eye discharge, eye redness and itching. Pertinent negatives include no blurred vision, double vision, fever, nausea, photophobia or vomiting. The treatment provided no relief.       Constitution: Negative for chills and fever.   HENT: Negative for congestion and sinus pain.    Eyes: Positive for eye discharge, eye itching and eye redness. Negative for eye trauma, foreign body in eye, eye pain, photophobia, vision loss, double vision, blurred vision and eyelid swelling.   Gastrointestinal: Negative for nausea and vomiting.   Genitourinary: Negative for history of kidney stones.   Skin: Negative for rash.   Allergic/Immunologic: Positive for seasonal allergies. Negative for itching.   Neurological: Negative for headaches.       Objective:      Physical Exam   Constitutional: She is oriented to person, place, and time. She appears well-developed and well-nourished. She is cooperative.  Non-toxic appearance. She does not have a sickly appearance. She does not appear ill. No distress.   HENT:   Head: Normocephalic and atraumatic.   Right Ear: Hearing, tympanic membrane, external ear and ear canal normal.   Left Ear: Hearing, tympanic membrane, external ear and ear canal normal.   Nose: Nose normal. No mucosal edema, rhinorrhea or nasal deformity. No epistaxis. Right sinus exhibits no maxillary sinus tenderness and no " frontal sinus tenderness. Left sinus exhibits no maxillary sinus tenderness and no frontal sinus tenderness.   Mouth/Throat: Uvula is midline, oropharynx is clear and moist and mucous membranes are normal. No trismus in the jaw. Normal dentition. No uvula swelling. No oropharyngeal exudate, posterior oropharyngeal edema or posterior oropharyngeal erythema.   Eyes: EOM and lids are normal. Right eye exhibits discharge and exudate. Right eye exhibits no chemosis and no hordeolum. No foreign body present in the right eye. Left eye exhibits no chemosis, no discharge, no exudate and no hordeolum. No foreign body present in the left eye. Right conjunctiva is injected. Right conjunctiva has no hemorrhage. Left conjunctiva is not injected. Left conjunctiva has no hemorrhage. No scleral icterus.       Neck: Trachea normal, full passive range of motion without pain and phonation normal. Neck supple. No neck rigidity. No edema and no erythema present.   Cardiovascular: Normal rate, regular rhythm, S1 normal, S2 normal, normal heart sounds, intact distal pulses and normal pulses.   No murmur heard.  Pulmonary/Chest: Effort normal and breath sounds normal. No respiratory distress. She has no decreased breath sounds. She has no wheezes. She has no rhonchi. She has no rales.   Abdominal: Normal appearance.   Musculoskeletal: Normal range of motion. She exhibits no edema or deformity.   Neurological: She is alert and oriented to person, place, and time. She exhibits normal muscle tone. Coordination normal.   Skin: Skin is warm, dry, intact, not diaphoretic and not pale.   Psychiatric: She has a normal mood and affect. Her speech is normal and behavior is normal. Judgment and thought content normal. Cognition and memory are normal.   Nursing note and vitals reviewed.        Assessment:       1. Acute bacterial conjunctivitis of right eye        Plan:         Acute bacterial conjunctivitis of right eye  -     Ambulatory  referral/consult to Smoking Cessation Program  -     amoxicillin-clavulanate 875-125mg (AUGMENTIN) 875-125 mg per tablet; Take 1 tablet by mouth 2 (two) times daily. for 5 days  Dispense: 10 tablet; Refill: 0  -     polymyxin B sulf-trimethoprim (POLYTRIM) 10,000 unit- 1 mg/mL Drop; Place 1 drop into both eyes 4 (four) times daily. for 5 days  Dispense: 10 Bottle; Refill: 0        take meds

## 2020-05-31 ENCOUNTER — OFFICE VISIT (OUTPATIENT)
Dept: URGENT CARE | Facility: CLINIC | Age: 70
End: 2020-05-31
Payer: MEDICARE

## 2020-05-31 VITALS
SYSTOLIC BLOOD PRESSURE: 142 MMHG | DIASTOLIC BLOOD PRESSURE: 86 MMHG | WEIGHT: 158 LBS | HEART RATE: 72 BPM | HEIGHT: 66 IN | OXYGEN SATURATION: 100 % | BODY MASS INDEX: 25.39 KG/M2 | TEMPERATURE: 98 F

## 2020-05-31 DIAGNOSIS — M54.41 ACUTE RIGHT-SIDED LOW BACK PAIN WITH RIGHT-SIDED SCIATICA: Primary | ICD-10-CM

## 2020-05-31 PROCEDURE — 99214 PR OFFICE/OUTPT VISIT, EST, LEVL IV, 30-39 MIN: ICD-10-PCS | Mod: 25,S$GLB,, | Performed by: PHYSICIAN ASSISTANT

## 2020-05-31 PROCEDURE — 96372 THER/PROPH/DIAG INJ SC/IM: CPT | Mod: S$GLB,,, | Performed by: PHYSICIAN ASSISTANT

## 2020-05-31 PROCEDURE — 96372 PR INJECTION,THERAP/PROPH/DIAG2ST, IM OR SUBCUT: ICD-10-PCS | Mod: S$GLB,,, | Performed by: PHYSICIAN ASSISTANT

## 2020-05-31 PROCEDURE — 99214 OFFICE O/P EST MOD 30 MIN: CPT | Mod: 25,S$GLB,, | Performed by: PHYSICIAN ASSISTANT

## 2020-05-31 RX ORDER — METHOCARBAMOL 500 MG/1
1000 TABLET, FILM COATED ORAL 3 TIMES DAILY
Qty: 18 TABLET | Refills: 0 | Status: SHIPPED | OUTPATIENT
Start: 2020-05-31 | End: 2020-06-03

## 2020-05-31 RX ORDER — KETOROLAC TROMETHAMINE 30 MG/ML
30 INJECTION, SOLUTION INTRAMUSCULAR; INTRAVENOUS
Status: COMPLETED | OUTPATIENT
Start: 2020-05-31 | End: 2020-05-31

## 2020-05-31 RX ORDER — DEXAMETHASONE SODIUM PHOSPHATE 100 MG/10ML
10 INJECTION INTRAMUSCULAR; INTRAVENOUS
Status: COMPLETED | OUTPATIENT
Start: 2020-05-31 | End: 2020-05-31

## 2020-05-31 RX ORDER — DICLOFENAC SODIUM 50 MG/1
50 TABLET, DELAYED RELEASE ORAL 3 TIMES DAILY PRN
Qty: 30 TABLET | Refills: 0 | Status: SHIPPED | OUTPATIENT
Start: 2020-05-31 | End: 2021-01-25

## 2020-05-31 RX ADMIN — DEXAMETHASONE SODIUM PHOSPHATE 10 MG: 100 INJECTION INTRAMUSCULAR; INTRAVENOUS at 10:05

## 2020-05-31 RX ADMIN — KETOROLAC TROMETHAMINE 30 MG: 30 INJECTION, SOLUTION INTRAMUSCULAR; INTRAVENOUS at 10:05

## 2020-05-31 NOTE — PATIENT INSTRUCTIONS
Self-Care for Low Back Pain    Most people have low back pain now and then. In many cases, it isnt serious and self-care can help. Sometimes low back pain can be a sign of a bigger problem. Call your healthcare provider if your pain returns often or gets worse over time. For the long-term care of your back, get regular exercise, lose any excess weight and learn good posture.  Take a short rest  Lying down during the day may be beneficial for short periods of time if severe pain increases with sitting or standing. Long-term bed rest could be detrimental.  Reduce pain and swelling  Cold reduces swelling. Both cold and heat can reduce pain. Protect your skin by placing a towel between your body and the ice or heat source.  · For the first few days, apply an ice pack for 15 to 20 minutes .  · After the first few days, try heat for 15 minutes at a time to ease pain. Never sleep on a heating pad.  · Over-the-counter medicine can help control pain and swelling. Try aspirin or ibuprofen.  Exercise  Exercise can help your back heal. It also helps your back get stronger and more flexible, preventing any reinjury. Ask your healthcare provider about specific exercises for your back.  Use good posture to avoid reinjury  · When moving, bend at the hips and knees. Dont bend at the waist or twist around.  · When lifting, keep the object close to your body. Dont try to lift more than you can handle.  · When sitting, keep your lower back supported. Use a rolled-up towel as needed.  Seek immediate medical care if:  · Youre unable to stand or walk.  · You have a temperature over 100.4°F (38.0°C)  · You have frequent, painful, or bloody urination.  · You have severe abdominal pain.  · You have a sharp, stabbing pain.  · Your pain is constant.  · You have pain or numbness in your leg.  · You feel pain in a new area of your back.  · You notice that the pain isnt decreasing after more than a week.   Date Last Reviewed: 9/29/2015  ©  3077-0550 [employer]. [address] [place] [address]. All rights reserved. This information is not intended as a substitute for professional medical care. Always follow your healthcare professional's instructions.        Relieving Back Pain  Back pain is a common problem. You can strain back muscles by lifting too much weight or just by moving the wrong way. Back strain can be uncomfortable, even painful. And it can take weeks or months to improve. To help yourself feel better and prevent future back strains, try these tips.  Important Note: Do not give aspirin to children or teens without first discussing it with your healthcare provider.      ? Ice    Ice reduces muscle pain and swelling. It helps most during the first 24 to 48 hours after an injury.  · Wrap an ice pack or a bag of frozen peas in a thin towel. (Never place ice directly on your skin.)  · Place the ice where your back hurts the most.  · Dont ice for more than 20 minutes at a time.  · You can use ice several times a day.  ? Medicines  Over-the-counter pain relievers can include acetaminophen and anti-inflammatory medicines, which includes aspirin or ibuprofen. They can help ease discomfort. Some also reduce swelling.  · Tell your healthcare provider about any medicines you are already taking.  · Take medicines only as directed.  ? Heat  After the first 48 hours, heat can relax sore muscles and improve blood flow.  · Try a warm bath or shower. Or use a heating pad set on low. To prevent a burn, keep a cloth between you and the heating pad.  · Dont use a heating pad for more than 15 minutes at a time. Never sleep on a heating pad.  Date Last Reviewed: 9/1/2015  © 6973-1153 [employer]. [address] [place] [id number]. All rights reserved. This information is not intended as a substitute for professional medical care. Always follow your healthcare professional's  instructions.        Sciatica    Sciatica is a condition that causes pain in the lower back that spreads down into the buttock, hip, and leg. Sometimes the leg pain can happen without any back pain. Sciatica happens when a spinal nerve is irritated or has pressure put on it as comes out of the spinal canal in the lower back. This most often happens when a bulge or rupture of a nearby spinal disk presses on the nerve. Sciatica can also be caused by a narrowing of the spinal canal (spinal stenosis) or spasm of the muscle in the buttocks that the sciatic nerve passes through (pyriform muscle). Sciatica is also called lumbar radiculopathy.  Sciatica may begin after a sudden twisting or bending force, such as in a car accident. Or it can happen after a simple awkward movement. In either case, muscle spasm often also happens. Muscle spasm makes the pain worse.  A healthcare provider makes a diagnosis of sciatica from your symptoms and a physical exam. Unless you had an injury from a car accident or fall, you usually wont have X-rays taken at this time. This is because the nerves and disks in your back cant be seen on an X-ray. If the provider sees signs of a compressed nerve, you will need to schedule an MRI scan as an outpatient. Signs of a compressed nerve include loss of strength in a leg.  Most sciatica gets better with medicine, exercise, and physical therapy. If your symptoms continue after at least 3 months of medical treatment, you may need surgery or injections to your lower back.  Home care  Follow these tips when caring for yourself at home:  · You may need to stay in bed the first few days. But as soon as possible, begin sitting up or walking. This will help you avoid problems that come from staying in bed for long periods.  · When in bed, try to find a position that is comfortable. A firm mattress is best. Try lying flat on your back with pillows under your knees. You can also try lying on your side with  your knees bent up toward your chest and a pillow between your knees.  · Avoid sitting for long periods. This puts more stress on your lower back than standing or walking.  · Use heat from a hot shower, hot bath, or heating pad to help ease pain. Massage can also help. You can also try using an ice pack. You can make your own ice pack by putting ice cubes in a plastic bag. Wrap the bag in a thin towel. Try both heat and cold to see which works best. Use the method that feels best for 20 minutes several times a day.  · You may use acetaminophen or ibuprofen to ease pain, unless another pain medicine was prescribed. Note: If you have chronic liver or kidney disease, talk with your healthcare provider before taking these medicines. Also talk with your provider if youve had a stomach ulcer or gastrointestinal bleeding.  · Use safe lifting methods. Dont lift anything heavier than 15 pounds until all of the pain is gone.  Follow-up care  Follow up with your healthcare provider, or as advised. You may need physical therapy or additional tests.  If X-rays were taken, a radiologist will look at them. You will be told of any new findings that may affect your care.  When to seek medical advice  Call your healthcare provider right away if any of these occur:  · Pain gets worse even after taking prescribed medicine  · Weakness or numbness in 1 or both legs or hips  · Numbness in your groin or genital area  · You cant control your bowel or bladder  · Fever  · Redness or swelling over your back or spine   Date Last Reviewed: 8/1/2016  © 7375-0323 [employer]. [address] [place] [address], [place] [address]. All rights reserved. This information is not intended as a substitute for professional medical care. Always follow your healthcare professional's instructions.

## 2020-05-31 NOTE — PROGRESS NOTES
"Subjective:       Patient ID: Karin Maguire is a 70 y.o. female.    Vitals:  height is 5' 6" (1.676 m) and weight is 71.7 kg (158 lb). Her oral temperature is 98 °F (36.7 °C). Her blood pressure is 142/86 (abnormal) and her pulse is 72. Her oxygen saturation is 100%.     Chief Complaint: Back Pain    70-year-old female with documented history of trochanteric bursitis of the right hip as well as sacroiliitis presents to clinic with complaints of 3 weeks of pain to the right lower back and hip.  She reports that she is currently being treated for sciatica by chiropractor.  She reports manipulation, massage and treating with Tylenol and naproxen without relief.  She reports she has also been applying heat.  She admits that she has an appointment tomorrow for dry needling.  She reports that this is a recurrent problem and has had similar symptoms in the past.  She denies new trauma or injury, loss of bowel bladder function, saddle paresthesias, numbness or weakness.    Back Pain   This is a recurrent problem. Episode onset: 3 weeks ago. The problem occurs constantly. The problem has been gradually worsening since onset. The pain is present in the sacro-iliac and lumbar spine. The quality of the pain is described as stabbing, burning and shooting. Radiates to: right leg. The pain is at a severity of 10/10. The pain is severe. The symptoms are aggravated by sitting. Associated symptoms include leg pain (right leg). Pertinent negatives include no abdominal pain, bladder incontinence, bowel incontinence, dysuria, fever, headaches, numbness or tingling. Treatments tried: aleve, tylenol. The treatment provided no relief.       Constitution: Negative for fatigue and fever.   Gastrointestinal: Negative for abdominal pain and bowel incontinence.   Genitourinary: Negative for dysuria, urgency, bladder incontinence and hematuria.   Musculoskeletal: Positive for pain and back pain. Negative for trauma, muscle cramps and " history of spine disorder.   Skin: Negative for rash.   Neurological: Negative for coordination disturbances, headaches, numbness and tingling.       Objective:      Physical Exam   Constitutional: She is oriented to person, place, and time. Vital signs are normal. She appears well-developed and well-nourished. She is active and cooperative. No distress.   HENT:   Head: Normocephalic and atraumatic.   Nose: Nose normal.   Mouth/Throat: Oropharynx is clear and moist and mucous membranes are normal.   Eyes: Conjunctivae and lids are normal.   Neck: Trachea normal, normal range of motion, full passive range of motion without pain and phonation normal. Neck supple.   Cardiovascular: Normal rate, regular rhythm, normal heart sounds, intact distal pulses and normal pulses.   Pulmonary/Chest: Effort normal and breath sounds normal. No stridor. No respiratory distress. She has no wheezes. She has no rales.   Abdominal: Soft. Normal appearance and bowel sounds are normal. She exhibits no distension, no abdominal bruit, no pulsatile midline mass and no mass. There is no tenderness. There is no rebound and no guarding.   Musculoskeletal: She exhibits no edema or deformity.   No midline TTP or step offs to cervical, thoracic or lumbar spine. No paraspinal muscle TTP. FROM of spine without discomfort or pain. No signs of trauma or injury.   Full range of motion bilateral upper and lower extremities. Strength 5/5.  Intact distal pulses with no sensory deficits.  Capillary refill less than 3 sec.  No signs of trauma or injury. No ecchymosis, edema, erythema, abrasions or lacerations. No bony TTP.  +right sided SLR.     Neurological: She is alert and oriented to person, place, and time. She has normal strength and normal reflexes. No sensory deficit.   Skin: Skin is warm, dry, intact and not diaphoretic.   Psychiatric: She has a normal mood and affect. Her speech is normal and behavior is normal. Judgment and thought content  normal. Cognition and memory are normal.   Nursing note and vitals reviewed.        Assessment:       1. Acute right-sided low back pain with right-sided sciatica        Plan:         Acute right-sided low back pain with right-sided sciatica  -     ketorolac injection 30 mg  -     dexamethasone injection 10 mg  -     methocarbamoL (ROBAXIN) 500 MG Tab; Take 2 tablets (1,000 mg total) by mouth 3 (three) times daily. for 3 days  Dispense: 18 tablet; Refill: 0  -     diclofenac (VOLTAREN) 50 MG EC tablet; Take 1 tablet (50 mg total) by mouth 3 (three) times daily as needed (pain).  Dispense: 30 tablet; Refill: 0      Patient Instructions       Self-Care for Low Back Pain    Most people have low back pain now and then. In many cases, it isnt serious and self-care can help. Sometimes low back pain can be a sign of a bigger problem. Call your healthcare provider if your pain returns often or gets worse over time. For the long-term care of your back, get regular exercise, lose any excess weight and learn good posture.  Take a short rest  Lying down during the day may be beneficial for short periods of time if severe pain increases with sitting or standing. Long-term bed rest could be detrimental.  Reduce pain and swelling  Cold reduces swelling. Both cold and heat can reduce pain. Protect your skin by placing a towel between your body and the ice or heat source.  · For the first few days, apply an ice pack for 15 to 20 minutes .  · After the first few days, try heat for 15 minutes at a time to ease pain. Never sleep on a heating pad.  · Over-the-counter medicine can help control pain and swelling. Try aspirin or ibuprofen.  Exercise  Exercise can help your back heal. It also helps your back get stronger and more flexible, preventing any reinjury. Ask your healthcare provider about specific exercises for your back.  Use good posture to avoid reinjury  · When moving, bend at the hips and knees. Dont bend at the waist or  twist around.  · When lifting, keep the object close to your body. Dont try to lift more than you can handle.  · When sitting, keep your lower back supported. Use a rolled-up towel as needed.  Seek immediate medical care if:  · Youre unable to stand or walk.  · You have a temperature over 100.4°F (38.0°C)  · You have frequent, painful, or bloody urination.  · You have severe abdominal pain.  · You have a sharp, stabbing pain.  · Your pain is constant.  · You have pain or numbness in your leg.  · You feel pain in a new area of your back.  · You notice that the pain isnt decreasing after more than a week.   Date Last Reviewed: 9/29/2015 © 2000-2017 [employer]. [address] [place] [address], [place] [address]. All rights reserved. This information is not intended as a substitute for professional medical care. Always follow your healthcare professional's instructions.        Relieving Back Pain  Back pain is a common problem. You can strain back muscles by lifting too much weight or just by moving the wrong way. Back strain can be uncomfortable, even painful. And it can take weeks or months to improve. To help yourself feel better and prevent future back strains, try these tips.  Important Note: Do not give aspirin to children or teens without first discussing it with your healthcare provider.      ? Ice    Ice reduces muscle pain and swelling. It helps most during the first 24 to 48 hours after an injury.  · Wrap an ice pack or a bag of frozen peas in a thin towel. (Never place ice directly on your skin.)  · Place the ice where your back hurts the most.  · Dont ice for more than 20 minutes at a time.  · You can use ice several times a day.  ? Medicines  Over-the-counter pain relievers can include acetaminophen and anti-inflammatory medicines, which includes aspirin or ibuprofen. They can help ease discomfort. Some also reduce swelling.  · Tell your healthcare provider about any medicines you are already  taking.  · Take medicines only as directed.  ? Heat  After the first 48 hours, heat can relax sore muscles and improve blood flow.  · Try a warm bath or shower. Or use a heating pad set on low. To prevent a burn, keep a cloth between you and the heating pad.  · Dont use a heating pad for more than 15 minutes at a time. Never sleep on a heating pad.  Date Last Reviewed: 9/1/2015  © 0669-9158 [employer]. [address] [place] [address]. All rights reserved. This information is not intended as a substitute for professional medical care. Always follow your healthcare professional's instructions.        Sciatica    Sciatica is a condition that causes pain in the lower back that spreads down into the buttock, hip, and leg. Sometimes the leg pain can happen without any back pain. Sciatica happens when a spinal nerve is irritated or has pressure put on it as comes out of the spinal canal in the lower back. This most often happens when a bulge or rupture of a nearby spinal disk presses on the nerve. Sciatica can also be caused by a narrowing of the spinal canal (spinal stenosis) or spasm of the muscle in the buttocks that the sciatic nerve passes through (pyriform muscle). Sciatica is also called lumbar radiculopathy.  Sciatica may begin after a sudden twisting or bending force, such as in a car accident. Or it can happen after a simple awkward movement. In either case, muscle spasm often also happens. Muscle spasm makes the pain worse.  A healthcare provider makes a diagnosis of sciatica from your symptoms and a physical exam. Unless you had an injury from a car accident or fall, you usually wont have X-rays taken at this time. This is because the nerves and disks in your back cant be seen on an X-ray. If the provider sees signs of a compressed nerve, you will need to schedule an MRI scan as an outpatient. Signs of a compressed nerve include loss of strength in a leg.  Most sciatica gets better  with medicine, exercise, and physical therapy. If your symptoms continue after at least 3 months of medical treatment, you may need surgery or injections to your lower back.  Home care  Follow these tips when caring for yourself at home:  · You may need to stay in bed the first few days. But as soon as possible, begin sitting up or walking. This will help you avoid problems that come from staying in bed for long periods.  · When in bed, try to find a position that is comfortable. A firm mattress is best. Try lying flat on your back with pillows under your knees. You can also try lying on your side with your knees bent up toward your chest and a pillow between your knees.  · Avoid sitting for long periods. This puts more stress on your lower back than standing or walking.  · Use heat from a hot shower, hot bath, or heating pad to help ease pain. Massage can also help. You can also try using an ice pack. You can make your own ice pack by putting ice cubes in a plastic bag. Wrap the bag in a thin towel. Try both heat and cold to see which works best. Use the method that feels best for 20 minutes several times a day.  · You may use acetaminophen or ibuprofen to ease pain, unless another pain medicine was prescribed. Note: If you have chronic liver or kidney disease, talk with your healthcare provider before taking these medicines. Also talk with your provider if youve had a stomach ulcer or gastrointestinal bleeding.  · Use safe lifting methods. Dont lift anything heavier than 15 pounds until all of the pain is gone.  Follow-up care  Follow up with your healthcare provider, or as advised. You may need physical therapy or additional tests.  If X-rays were taken, a radiologist will look at them. You will be told of any new findings that may affect your care.  When to seek medical advice  Call your healthcare provider right away if any of these occur:  · Pain gets worse even after taking prescribed medicine  · Weakness  or numbness in 1 or both legs or hips  · Numbness in your groin or genital area  · You cant control your bowel or bladder  · Fever  · Redness or swelling over your back or spine   Date Last Reviewed: 8/1/2016 © 2000-2017 [employer]. [address] [place] [id number]. All rights reserved. This information is not intended as a substitute for professional medical care. Always follow your healthcare professional's instructions.

## 2020-09-15 ENCOUNTER — OFFICE VISIT (OUTPATIENT)
Dept: PSYCHIATRY | Facility: CLINIC | Age: 70
End: 2020-09-15
Payer: MEDICARE

## 2020-09-15 VITALS
SYSTOLIC BLOOD PRESSURE: 122 MMHG | WEIGHT: 167.44 LBS | TEMPERATURE: 97 F | RESPIRATION RATE: 17 BRPM | BODY MASS INDEX: 26.91 KG/M2 | HEART RATE: 71 BPM | HEIGHT: 66 IN | DIASTOLIC BLOOD PRESSURE: 69 MMHG

## 2020-09-15 DIAGNOSIS — F31.9 BIPOLAR 1 DISORDER: Primary | ICD-10-CM

## 2020-09-15 DIAGNOSIS — F41.1 GAD (GENERALIZED ANXIETY DISORDER): ICD-10-CM

## 2020-09-15 PROCEDURE — 99213 OFFICE O/P EST LOW 20 MIN: CPT | Mod: PBBFAC | Performed by: STUDENT IN AN ORGANIZED HEALTH CARE EDUCATION/TRAINING PROGRAM

## 2020-09-15 PROCEDURE — 99999 PR PBB SHADOW E&M-EST. PATIENT-LVL III: CPT | Mod: PBBFAC,,, | Performed by: STUDENT IN AN ORGANIZED HEALTH CARE EDUCATION/TRAINING PROGRAM

## 2020-09-15 PROCEDURE — 90792 PSYCH DIAG EVAL W/MED SRVCS: CPT | Performed by: STUDENT IN AN ORGANIZED HEALTH CARE EDUCATION/TRAINING PROGRAM

## 2020-09-15 PROCEDURE — 99999 PR STA SHADOW: CPT | Mod: PBBFAC,,, | Performed by: STUDENT IN AN ORGANIZED HEALTH CARE EDUCATION/TRAINING PROGRAM

## 2020-09-15 PROCEDURE — 99999 PR STA SHADOW: ICD-10-PCS | Mod: PBBFAC,,, | Performed by: STUDENT IN AN ORGANIZED HEALTH CARE EDUCATION/TRAINING PROGRAM

## 2020-09-15 RX ORDER — QUETIAPINE FUMARATE 300 MG/1
TABLET, FILM COATED ORAL
Qty: 45 TABLET | Refills: 2 | Status: SHIPPED | OUTPATIENT
Start: 2020-09-15 | End: 2020-10-14 | Stop reason: SDUPTHER

## 2020-09-15 RX ORDER — DULOXETIN HYDROCHLORIDE 60 MG/1
60 CAPSULE, DELAYED RELEASE ORAL DAILY
Qty: 30 CAPSULE | Refills: 2 | Status: SHIPPED | OUTPATIENT
Start: 2020-09-15 | End: 2020-10-14 | Stop reason: SDUPTHER

## 2020-09-15 RX ORDER — LAMOTRIGINE 150 MG/1
300 TABLET ORAL NIGHTLY
Qty: 30 TABLET | Refills: 2 | Status: SHIPPED | OUTPATIENT
Start: 2020-09-15 | End: 2020-10-14 | Stop reason: SDUPTHER
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
## 2020-10-14 ENCOUNTER — OFFICE VISIT (OUTPATIENT)
Dept: PSYCHIATRY | Facility: CLINIC | Age: 70
End: 2020-10-14
Payer: MEDICARE

## 2020-10-14 VITALS
BODY MASS INDEX: 27.58 KG/M2 | WEIGHT: 171.63 LBS | DIASTOLIC BLOOD PRESSURE: 78 MMHG | HEIGHT: 66 IN | RESPIRATION RATE: 19 BRPM | TEMPERATURE: 98 F | HEART RATE: 80 BPM | SYSTOLIC BLOOD PRESSURE: 125 MMHG

## 2020-10-14 DIAGNOSIS — Z72.6 GAMBLING PROBLEM: ICD-10-CM

## 2020-10-14 DIAGNOSIS — R25.9 ABNORMAL MOVEMENTS: ICD-10-CM

## 2020-10-14 DIAGNOSIS — F41.1 GAD (GENERALIZED ANXIETY DISORDER): ICD-10-CM

## 2020-10-14 DIAGNOSIS — F31.9 BIPOLAR 1 DISORDER: Primary | ICD-10-CM

## 2020-10-14 DIAGNOSIS — F41.9 ANXIETY: ICD-10-CM

## 2020-10-14 DIAGNOSIS — E66.3 OVERWEIGHT: ICD-10-CM

## 2020-10-14 DIAGNOSIS — F51.01 PRIMARY INSOMNIA: ICD-10-CM

## 2020-10-14 DIAGNOSIS — G47.00 INSOMNIA, UNSPECIFIED TYPE: ICD-10-CM

## 2020-10-14 PROCEDURE — 99999 PR STA SHADOW: CPT | Mod: PBBFAC,,, | Performed by: STUDENT IN AN ORGANIZED HEALTH CARE EDUCATION/TRAINING PROGRAM

## 2020-10-14 PROCEDURE — 99999 PR PBB SHADOW E&M-EST. PATIENT-LVL III: CPT | Mod: PBBFAC,,, | Performed by: STUDENT IN AN ORGANIZED HEALTH CARE EDUCATION/TRAINING PROGRAM

## 2020-10-14 PROCEDURE — 99214 OFFICE O/P EST MOD 30 MIN: CPT | Mod: S$PBB | Performed by: STUDENT IN AN ORGANIZED HEALTH CARE EDUCATION/TRAINING PROGRAM

## 2020-10-14 PROCEDURE — 99213 OFFICE O/P EST LOW 20 MIN: CPT | Mod: PBBFAC | Performed by: STUDENT IN AN ORGANIZED HEALTH CARE EDUCATION/TRAINING PROGRAM

## 2020-10-14 PROCEDURE — 99999 PR PBB SHADOW E&M-EST. PATIENT-LVL III: ICD-10-PCS | Mod: PBBFAC,,, | Performed by: STUDENT IN AN ORGANIZED HEALTH CARE EDUCATION/TRAINING PROGRAM

## 2020-10-14 RX ORDER — QUETIAPINE FUMARATE 300 MG/1
TABLET, FILM COATED ORAL
Qty: 45 TABLET | Refills: 2 | Status: SHIPPED | OUTPATIENT
Start: 2020-10-14 | End: 2020-12-10 | Stop reason: SDUPTHER

## 2020-10-14 RX ORDER — DULOXETIN HYDROCHLORIDE 60 MG/1
60 CAPSULE, DELAYED RELEASE ORAL DAILY
Qty: 30 CAPSULE | Refills: 2 | Status: SHIPPED | OUTPATIENT
Start: 2020-10-14 | End: 2020-12-10

## 2020-10-14 RX ORDER — LAMOTRIGINE 150 MG/1
300 TABLET ORAL NIGHTLY
Qty: 60 TABLET | Refills: 2 | Status: SHIPPED | OUTPATIENT
Start: 2020-10-14 | End: 2020-12-02

## 2020-10-14 NOTE — PROGRESS NOTES
"  10/14/2020  2:42 PM  Karin Maguire  [id number]    Outpatient Psychiatry Follow-Up Visit (MD/NP)    10/14/2020    Clinical Status of Patient:  Outpatient (Ambulatory)    Chief Complaint:  Karin Maguire is a 70 y.o. female who presents today for follow-up of depression.  Met with patient.      Interval History and Content of Current Session:  Interim Events/Subjective Report/Content of Current Session:     Reports trip with  went "really good, just sat around, did nothing," which she states was nice. She states depression is "not as bad, trying to stay busy... my hobbies, raising birds keeps me busy." No longer having SI, "I've been a more even keel since the last time I've seen you."    Reports "I don't want to change anything, I'm doing okay, it's very manageable."     Reports she has cut back on gambling, "by about half."    Guns have been removed from the home.    Psychiatric Review Of Systems - Is patient experiencing or having changes in:  sleep: no  appetite: no  weight: yes  energy/anergy: yes  interest/pleasure/anhedonia: no  anxiety/panic: yes  guilty/hopelessness: no  concentration: no  S.I.B.s/risky behavior: no  Irritability: no  Substance abuse: no  Racing thoughts: no  Impulsive behaviors: no  Paranoia: no  AVH: no          Review of Systems   Review of Systems   Constitutional: Negative for chills and fever.   HENT: Negative for hearing loss.    Eyes: Negative for blurred vision and double vision.   Respiratory: Negative for shortness of breath.    Cardiovascular: Negative for chest pain and palpitations.   Gastrointestinal: Negative for constipation, diarrhea, nausea and vomiting.   Genitourinary: Negative for dysuria.   Musculoskeletal: Negative for back pain and neck pain.   Skin: Negative for rash.   Neurological: Negative for dizziness and headaches.   Endo/Heme/Allergies: Negative for environmental allergies.       Past Medical, Family and Social History: The patient's past " medical, family and social history have been reviewed and updated as appropriate within the electronic medical record - see encounter notes.    Social History     Socioeconomic History    Marital status:      Spouse name: Not on file    Number of children: Not on file    Years of education: Not on file    Highest education level: Not on file   Occupational History    Not on file   Social Needs    Financial resource strain: Not on file    Food insecurity     Worry: Not on file     Inability: Not on file    Transportation needs     Medical: Not on file     Non-medical: Not on file   Tobacco Use    Smoking status: Current Every Day Smoker     Packs/day: 0.50     Years: 35.00     Pack years: 17.50     Types: Cigarettes     Start date: 3/30/1982    Smokeless tobacco: Never Used    Tobacco comment: smoking cessation clinic pamphlet for clinic put on chart to give to pt.    Substance and Sexual Activity    Alcohol use: Yes     Comment: Socially-2 or 3 times a year-6 or 7 drinks    Drug use: No    Sexual activity: Yes     Partners: Male     Birth control/protection: Surgical     Comment:    Lifestyle    Physical activity     Days per week: Not on file     Minutes per session: Not on file    Stress: Not on file   Relationships    Social connections     Talks on phone: Not on file     Gets together: Not on file     Attends Bahai service: Not on file     Active member of club or organization: Not on file     Attends meetings of clubs or organizations: Not on file     Relationship status: Not on file   Other Topics Concern    Not on file   Social History Narrative    Not on file         Compliance: yes    Side effects: None    Risk Parameters:  Patient reports no suicidal ideation  Patient reports no homicidal ideation  Patient reports no self-injurious behavior  Patient reports no violent behavior      Exam (detailed: at least 9 elements; comprehensive: all 15 elements)     Vitals:   "  Vitals:    10/14/20 1424   BP: 125/78   Pulse: 80   Resp: 19   Temp: 98.1 °F (36.7 °C)   Weight: 77.9 kg (171 lb 10.1 oz)   Height: 5' 6" (1.676 m)          Wt Readings from Last 4 Encounters:   10/14/20 77.9 kg (171 lb 10.1 oz)   09/15/20 76 kg (167 lb 7 oz)   05/31/20 71.7 kg (158 lb)   04/10/20 71.7 kg (158 lb)         CONSTITUTIONAL  General Appearance: unremarkable, age appropriate    MUSCULOSKELETAL  Muscle Strength and Tone:no tremor, no tic  Abnormal Involuntary Movements: Yes - non stereotyped movements noted, see AIMS exam below  Gait and Station: non-ataxic    PSYCHIATRIC   Level of Consciousness: awake and alert   Orientation: person, place and situation  Grooming: Casually dressed and Well groomed  Psychomotor Behavior: normal, cooperative  Speech: normal tone, normal rate, normal pitch, normal volume  Language: grossly intact  Mood: fine  Affect: Consistent with mood  Thought Process: linear, logical  Associations: intact   Thought Content: DENIES suicidal ideation and DENIES homicidal ideation  Perceptions: denies hallucinations  Memory: Able to recall past events, Remote intact and Recent intact  Attention:Attends to interview without distraction  Fund of Knowledge: Aware of current events and Vocabulary appropriate   Estimate if Intelligence:  Average based on work/education history, vocabulary and mental status exam  Insight: has awareness of illness  Judgment: behavior is adequate to circumstances        Assessment and Diagnosis   Status/Progress: Based on the examination today, the patient's problem(s) is/are adequately but not ideally controlled.  New problems have not been presented today.   Co-morbidities are complicating management of the primary condition.      MDD with mixed features vs. Unspecified bipolar disorder (pt poor historian)  LUANNE  Insomnia  Gambling disorder  Obesity  Psychosocial stressors     Abnormal movements      Plan:    MDD with mixed features vs. Unspecified bipolar " "disorder (pt poor historian)  - continue lamictal 300 mg PO qd  - continue cymbalta 60 mg PO qd   - continue Seroquel 450 mg PO qhs as prescribed by previous provider (legacy patient)  - guns removed from home     Insomnia  - will order sleep study, PSG     Gambling disorder  - pt counseled  - consider meetings/therapy; patient declines  - pt currently taking action and trying to "cut back"     LUANNE  - see MDD above  - consider psychotherapy, patient declines     Psychosocial stressors  - pt counseled  - strongly recommended psychotherapy, couples counseling to patient, patient declines     Obesity  - avoid psychotropics with potential for weight gain as possible; on seroquel from previous provider, will continue per patient's request, monitor     Abnormal movements  - frequent non-stereotyped movements of hands and limbs, neck, trunk; patient states she has always been "fidgety" since childhood, reports she has seen neurology about movements before in past, does not want a referral at this time; extensively discussed risk/concern for tardive dyskinesia with AP, "it doesn't bother me," patient wishes to continue seroquel despite risk of TD; see IC discussion below  - AIMS: AIMS Evaluation:  Abnormal Involuntary Movement Scale  0-4   Muscles of Facial Expression  0   Lips and Perioral Area  0   Jaw  0   Tongue  0   Upper (arms, wrists, hands, fingers)  2    Lower (legs, knees, ankles, toes)  2   Neck, shoulders, hips  2   Severity of abnormal movements (highest score from questions above)  2    Incapacitation due to abnormal movements  0    Patient's awareness of abnormal movements (rate only patient's report)  1   Current problems with teeth and/or dentures?  No    Does patient usually wear dentures?  No      Score 9/36    - Instructed patient to keep all scheduled appointments, take medications as prescribed and abstain from substance abuse. Instructed to call 911 or present to ER for emergency including SI or " HI.    - Discussed diagnosis, risks and benefits of proposed treatment above vs alternative treatments vs no treatment, and potential side effects of these treatments. The patient expresses understanding of the above and displays the capacity to agree with this treatment given said understanding. Patient also agrees that, currently, the benefits outweigh the risks and would like to pursue treatment at this time.         Estevan Zaman III, MD    Return to Clinic: 2 months

## 2020-10-16 ENCOUNTER — OFFICE VISIT (OUTPATIENT)
Dept: INTERNAL MEDICINE | Facility: CLINIC | Age: 70
End: 2020-10-16
Payer: MEDICARE

## 2020-10-16 VITALS
WEIGHT: 172.38 LBS | HEART RATE: 84 BPM | HEIGHT: 66 IN | TEMPERATURE: 99 F | DIASTOLIC BLOOD PRESSURE: 60 MMHG | RESPIRATION RATE: 20 BRPM | SYSTOLIC BLOOD PRESSURE: 114 MMHG | BODY MASS INDEX: 27.7 KG/M2 | OXYGEN SATURATION: 98 %

## 2020-10-16 DIAGNOSIS — Z12.39 ENCOUNTER FOR SCREENING FOR MALIGNANT NEOPLASM OF BREAST, UNSPECIFIED SCREENING MODALITY: ICD-10-CM

## 2020-10-16 DIAGNOSIS — G89.29 CHRONIC RIGHT-SIDED LOW BACK PAIN WITHOUT SCIATICA: Primary | ICD-10-CM

## 2020-10-16 DIAGNOSIS — M54.50 CHRONIC RIGHT-SIDED LOW BACK PAIN WITHOUT SCIATICA: Primary | ICD-10-CM

## 2020-10-16 DIAGNOSIS — Z23 NEED FOR VACCINATION: ICD-10-CM

## 2020-10-16 PROCEDURE — 99999 FLU VACCINE - QUADRIVALENT - ADJUVANTED: ICD-10-PCS | Mod: PBBFAC,,,

## 2020-10-16 PROCEDURE — 99999 FLU VACCINE - QUADRIVALENT - ADJUVANTED: CPT | Mod: PBBFAC,,,

## 2020-10-16 PROCEDURE — 99214 OFFICE O/P EST MOD 30 MIN: CPT | Mod: S$PBB | Performed by: INTERNAL MEDICINE

## 2020-10-16 PROCEDURE — 99999 PR STA SHADOW: ICD-10-PCS | Mod: PBBFAC,,, | Performed by: INTERNAL MEDICINE

## 2020-10-16 PROCEDURE — G0008 ADMIN INFLUENZA VIRUS VAC: HCPCS | Mod: PBBFAC

## 2020-10-16 PROCEDURE — 99999 PR STA SHADOW: CPT | Mod: PBBFAC,,, | Performed by: INTERNAL MEDICINE

## 2020-10-16 PROCEDURE — 99999 PR PBB SHADOW E&M-EST. PATIENT-LVL V: CPT | Mod: PBBFAC,,, | Performed by: INTERNAL MEDICINE

## 2020-10-16 PROCEDURE — 90694 VACC AIIV4 NO PRSRV 0.5ML IM: CPT | Mod: PBBFAC

## 2020-10-16 PROCEDURE — 99215 OFFICE O/P EST HI 40 MIN: CPT | Mod: PBBFAC,25 | Performed by: INTERNAL MEDICINE

## 2020-10-16 NOTE — PROGRESS NOTES
"Subjective:       Patient ID: Karin Maguire is a 70 y.o. female.    Chief Complaint: Back Pain      HPI:  Patient is known to me from an acute visit and present with back pain. She has chronic low back pain. Reports she has done NSAIDs, Physical therapy, chiropractic care (TENS, dry needling) without relief of sx. Failed voltargen gel, pills and lidocaine patches. Last MRI lumbar spine 3/2017 "Multilevel degenerative changes of the lumbar spine as detailed above, most significant at the L4-5 level where there is severe central canal stenosis and severe bilateral neural foraminal narrowing.  There is also mild central canal stenosis with moderate bilateral neural foraminal narrowing at L3-4.". Was referred to neurosurgery, patient wasn't interested in surgery at that time. Saw Dr. Anderson sanders - for right SIJ and right GT bursa steroid injection.    Past Medical History:   Diagnosis Date    Anxiety     Arthritis     Bipolar 1 disorder     Bursitis of right hip     Sacroiliitis     right side       Family History   Problem Relation Age of Onset    Colon cancer Maternal Uncle     Colon cancer Maternal Uncle     Colon cancer Maternal Uncle        Social History     Socioeconomic History    Marital status:      Spouse name: Not on file    Number of children: Not on file    Years of education: Not on file    Highest education level: Not on file   Occupational History    Not on file   Social Needs    Financial resource strain: Not on file    Food insecurity     Worry: Not on file     Inability: Not on file    Transportation needs     Medical: Not on file     Non-medical: Not on file   Tobacco Use    Smoking status: Current Every Day Smoker     Packs/day: 0.50     Years: 35.00     Pack years: 17.50     Types: Cigarettes     Start date: 3/30/1982    Smokeless tobacco: Never Used    Tobacco comment: smoking cessation clinic pamphlet for clinic put on chart to give to pt.    Substance and Sexual " Activity    Alcohol use: Yes     Comment: Socially-2 or 3 times a year-6 or 7 drinks    Drug use: No    Sexual activity: Yes     Partners: Male     Birth control/protection: Surgical     Comment:    Lifestyle    Physical activity     Days per week: Not on file     Minutes per session: Not on file    Stress: Not on file   Relationships    Social connections     Talks on phone: Not on file     Gets together: Not on file     Attends Catholic service: Not on file     Active member of club or organization: Not on file     Attends meetings of clubs or organizations: Not on file     Relationship status: Not on file   Other Topics Concern    Not on file   Social History Narrative    Not on file       Review of Systems   Constitutional: Negative for activity change, fatigue, fever and unexpected weight change.   HENT: Negative for congestion, ear pain, hearing loss, rhinorrhea and sore throat.    Eyes: Negative for pain, redness and visual disturbance.   Respiratory: Negative for cough, shortness of breath and wheezing.    Cardiovascular: Negative for chest pain, palpitations and leg swelling.   Gastrointestinal: Negative for abdominal pain, constipation, diarrhea, nausea and vomiting.   Genitourinary: Negative for dysuria, frequency and urgency.   Musculoskeletal: Positive for back pain. Negative for joint swelling and neck pain.   Skin: Negative for color change, rash and wound.   Neurological: Negative for dizziness, tremors, weakness, light-headedness and headaches.         Objective:      Physical Exam  Vitals signs reviewed.   Constitutional:       General: She is not in acute distress.     Appearance: She is well-developed.   HENT:      Head: Normocephalic and atraumatic.      Right Ear: External ear normal.      Left Ear: External ear normal.      Nose: Nose normal.      Mouth/Throat:      Mouth: Mucous membranes are moist.      Pharynx: Oropharynx is clear.   Eyes:      General:         Right eye: No  discharge.         Left eye: No discharge.      Extraocular Movements: Extraocular movements intact.      Conjunctiva/sclera: Conjunctivae normal.      Pupils: Pupils are equal, round, and reactive to light.   Neck:      Musculoskeletal: Neck supple.      Thyroid: No thyromegaly.   Cardiovascular:      Rate and Rhythm: Normal rate and regular rhythm.      Heart sounds: No murmur. No friction rub. No gallop.    Pulmonary:      Effort: Pulmonary effort is normal. No respiratory distress.      Breath sounds: Normal breath sounds. No wheezing or rales.   Abdominal:      General: Bowel sounds are normal. There is no distension.      Palpations: Abdomen is soft.      Tenderness: There is no abdominal tenderness.   Musculoskeletal:         General: Tenderness present. Injury: right lumbar.   Lymphadenopathy:      Cervical: No cervical adenopathy.   Skin:     General: Skin is warm and dry.   Neurological:      Mental Status: She is alert and oriented to person, place, and time.      Cranial Nerves: No cranial nerve deficit.   Psychiatric:         Behavior: Behavior normal.         Assessment:       1. Chronic right-sided low back pain without sciatica    2. Encounter for screening for malignant neoplasm of breast, unspecified screening modality    3. Need for vaccination        Plan:       Karin was seen today for back pain.    Diagnoses and all orders for this visit:    Chronic right-sided low back pain without sciatica  -     Ambulatory referral/consult to Pain Clinic; Future  MRI 2017 with canal stenosis; declines surgical intervention at that time  Last injection with Dr. collins offered relief; pain worsening again  Failed NSAIDs, PT, chiropractor, topicals  Considered gabapentin but on a few sedating medications so held off today until she sees Dr. Roman. May be better managed with injection?????    Encounter for screening for malignant neoplasm of breast, unspecified screening modality  -     [facility]  Screening Bilat w/ Volodymyr; Future    Need for vaccination  Flu vaccine today    RTC as scheudled with PCP and PRN

## 2020-10-19 DIAGNOSIS — R05.9 COUGH: ICD-10-CM

## 2020-10-19 DIAGNOSIS — J44.89 OBSTRUCTIVE CHRONIC BRONCHITIS WITHOUT EXACERBATION: Primary | ICD-10-CM

## 2020-10-21 ENCOUNTER — HOSPITAL ENCOUNTER (OUTPATIENT)
Dept: SLEEP MEDICINE | Facility: HOSPITAL | Age: 70
Discharge: HOME | End: 2020-10-21
Attending: INTERNAL MEDICINE
Payer: MEDICARE

## 2020-10-21 DIAGNOSIS — G47.33 OBSTRUCTIVE SLEEP APNEA (ADULT) (PEDIATRIC): ICD-10-CM

## 2020-10-21 PROCEDURE — 95806 SLEEP STUDY UNATT&RESP EFFT: CPT

## 2020-11-06 ENCOUNTER — OFFICE VISIT (OUTPATIENT)
Dept: PAIN MEDICINE | Facility: CLINIC | Age: 70
End: 2020-11-06
Payer: MEDICARE

## 2020-11-06 VITALS
OXYGEN SATURATION: 98 % | BODY MASS INDEX: 27.46 KG/M2 | HEIGHT: 66 IN | TEMPERATURE: 97 F | HEART RATE: 76 BPM | RESPIRATION RATE: 16 BRPM | WEIGHT: 170.88 LBS | DIASTOLIC BLOOD PRESSURE: 84 MMHG | SYSTOLIC BLOOD PRESSURE: 138 MMHG

## 2020-11-06 DIAGNOSIS — M48.061 SPINAL STENOSIS OF LUMBAR REGION, UNSPECIFIED WHETHER NEUROGENIC CLAUDICATION PRESENT: ICD-10-CM

## 2020-11-06 DIAGNOSIS — M46.1 SACROILIITIS: Primary | ICD-10-CM

## 2020-11-06 DIAGNOSIS — M47.816 LUMBAR SPONDYLOSIS: ICD-10-CM

## 2020-11-06 DIAGNOSIS — F41.1 GAD (GENERALIZED ANXIETY DISORDER): ICD-10-CM

## 2020-11-06 DIAGNOSIS — F31.9 BIPOLAR 1 DISORDER: ICD-10-CM

## 2020-11-06 DIAGNOSIS — G89.29 CHRONIC RIGHT-SIDED LOW BACK PAIN WITHOUT SCIATICA: ICD-10-CM

## 2020-11-06 DIAGNOSIS — G47.00 INSOMNIA, UNSPECIFIED TYPE: ICD-10-CM

## 2020-11-06 DIAGNOSIS — M54.50 CHRONIC RIGHT-SIDED LOW BACK PAIN WITHOUT SCIATICA: ICD-10-CM

## 2020-11-06 DIAGNOSIS — M51.36 DDD (DEGENERATIVE DISC DISEASE), LUMBAR: ICD-10-CM

## 2020-11-06 DIAGNOSIS — M54.16 LUMBAR RADICULOPATHY: ICD-10-CM

## 2020-11-06 DIAGNOSIS — M70.61 GREATER TROCHANTERIC BURSITIS OF RIGHT HIP: ICD-10-CM

## 2020-11-06 DIAGNOSIS — M53.3 SACROILIAC JOINT DYSFUNCTION OF RIGHT SIDE: ICD-10-CM

## 2020-11-06 PROCEDURE — 99214 OFFICE O/P EST MOD 30 MIN: CPT | Mod: PBBFAC | Performed by: PHYSICAL MEDICINE & REHABILITATION

## 2020-11-06 PROCEDURE — 99214 OFFICE O/P EST MOD 30 MIN: CPT | Mod: S$PBB | Performed by: PHYSICAL MEDICINE & REHABILITATION

## 2020-11-06 PROCEDURE — 99999 PR STA SHADOW: CPT | Mod: PBBFAC,,, | Performed by: PHYSICAL MEDICINE & REHABILITATION

## 2020-11-06 PROCEDURE — 99999 PR PBB SHADOW E&M-EST. PATIENT-LVL IV: CPT | Mod: PBBFAC,,, | Performed by: PHYSICAL MEDICINE & REHABILITATION

## 2020-11-06 PROCEDURE — 99999 PR PBB SHADOW E&M-EST. PATIENT-LVL IV: ICD-10-PCS | Mod: PBBFAC,,, | Performed by: PHYSICAL MEDICINE & REHABILITATION

## 2020-11-06 RX ORDER — IBUPROFEN 200 MG
400 TABLET ORAL EVERY 4 HOURS PRN
COMMUNITY
End: 2021-04-19

## 2020-11-06 RX ORDER — GABAPENTIN 300 MG/1
300 CAPSULE ORAL NIGHTLY
Qty: 30 CAPSULE | Refills: 2 | Status: SHIPPED | OUTPATIENT
Start: 2020-11-06 | End: 2021-02-18

## 2020-11-06 RX ORDER — ACETAMINOPHEN 500 MG
1000 TABLET ORAL EVERY 4 HOURS PRN
Status: ON HOLD | COMMUNITY
End: 2021-07-30 | Stop reason: HOSPADM

## 2020-11-06 NOTE — LETTER
November 6, 2020      Maryam Ramirez MD  [address] [address]           [facility]  [address] [facility] [address]  Phone: [phone]  Fax: [phone]          Patient: Karin Maguire   MR Number: [id number]   YOB: 1950   Date of Visit: 11/6/2020       Dear Dr. Maryam Ramirez:    Thank you for referring Karin Maguire to me for evaluation. Attached you will find relevant portions of my assessment and plan of care.    If you have questions, please do not hesitate to call me. I look forward to following Karin Maguire along with you.    Sincerely,    Mamie Roman MD    Enclosure  CC:  No Recipients    If you would like to receive this communication electronically, please contact [email or web address] or [phone] to request more information on [employer] Link access.    For providers and/or their staff who would like to refer a patient to [facility], please contact us through our one-stop-shop provider referral line, [facility], at [phone].    If you feel you have received this communication in error or would no longer like to receive these types of communications, please e-mail [email or web address]

## 2020-11-06 NOTE — PROGRESS NOTES
[facility]  New Patient H&P    Referring Provider: Maryam Ramirez Md  [address]  [place] [address]    Chief Complaint:   Chief Complaint   Patient presents with    Back Pain       History of Present Illness: Karin Maguire is a 70 y.o. female referred by Dr. Maryam Ramirez for LBP.      Onset: several years, but had an injection with Dr. Barron 2-3 years ago that provided great relief. Pain returned about a year ago.    Location: Lower right side of back  Radiation: travels to the front and down right leg, which is very similar to prior to the last injection.    Timing: constant  Quality: Dull, Burning, Tight, Tingling, Deep, Numb, Sharp and Cold  Exacerbating Factors: extension, flexion, lifting, lying down, sitting, standing for more than 30 minutes, walking for more than 1 minutes and daily activity   Alleviating Factors: medications  Associated Symptoms: She feels weakness in the right leg and numbness in that leg. in the right thigh. denies night fever/night sweats, urinary incontinence, bowel incontinence and significant weight loss    Severity: Currently: 6/10   Typical Range: 6-8/10     Exacerbation: 10/10     Pain is limiting her ability to do household chores, sleep.     Pain Disability Index  Family/Home Responsibilities:: 8  Recreation:: 6  Social Activity:: 6  Occupation:: 8  Sexual Behavior:: 6  Self Care:: 6  Life-Support Activities:: 7  Pain Disability Index (PDI): 47    Previous Interventions:  - 3/31/17: Right SIJ and GTB injection (Tolba) excellent relief for 2 years    Previous Therapies:  PT/OT: yes, has been doing HEP   CHiropractor: Tried it but it doesn't help  Relevant Surgery: no   Previous Medications:   - NSAIDS: Tylenol, Ibuprofen, Diclofenac  - Muscle Relaxants:    - TCAs:   - SNRIs:   - Topicals: Voltaren cream doesn't help  - Anticonvulsants:    - Opioids:     Current Pain Medications:  1. Cymbalta 60 mg daily  2. Ibuprofen   3. Tylenol    Blood Thinners:  None    Full Medication List:    Current Outpatient Medications:     acetaminophen (TYLENOL) 500 MG tablet, Take 1,000 mg by mouth every 4 (four) hours as needed for Pain., Disp: , Rfl:     albuterol (VENTOLIN HFA) 90 mcg/actuation inhaler, INHALE 2 PUFFS INTO THE LUNGS EVERY 6 (SIX) HOURS AS NEEDED FOR WHEEZING (COUGH AND WHEEZING).RESCUE, Disp: 18 g, Rfl: 0    DULoxetine (CYMBALTA) 60 MG capsule, Take 1 capsule (60 mg total) by mouth once daily., Disp: 30 capsule, Rfl: 2    ibuprofen (ADVIL,MOTRIN) 200 MG tablet, Take 400 mg by mouth every 4 (four) hours as needed for Pain., Disp: , Rfl:     lamoTRIgine (LAMICTAL) 150 MG Tab, Take 2 tablets (300 mg total) by mouth every evening., Disp: 60 tablet, Rfl: 2    QUEtiapine (SEROQUEL) 300 MG Tab, Take one and a half tablets by mouth nightly., Disp: 45 tablet, Rfl: 2    diclofenac (VOLTAREN) 50 MG EC tablet, Take 1 tablet (50 mg total) by mouth 3 (three) times daily as needed (pain). (Patient not taking: Reported on 11/6/2020), Disp: 30 tablet, Rfl: 0    gabapentin (NEURONTIN) 300 MG capsule, Take 1 capsule (300 mg total) by mouth every evening., Disp: 30 capsule, Rfl: 2     Review of Systems:  Review of Systems   Constitutional: Negative for fever and weight loss.   HENT: Negative for ear pain and tinnitus.    Eyes: Negative for pain and redness.   Respiratory: Negative for cough and shortness of breath.    Cardiovascular: Negative for chest pain and palpitations.   Gastrointestinal: Positive for heartburn. Negative for constipation.   Genitourinary: Positive for frequency.        Denies urinary incontinence. Denies urine retention.    Musculoskeletal: Positive for back pain, joint pain (hands and feet), myalgias and neck pain.   Skin: Negative for itching and rash.   Neurological: Negative for tingling, focal weakness and seizures.   Endo/Heme/Allergies: Negative for environmental allergies. Does not bruise/bleed easily.   Psychiatric/Behavioral: Positive for  "depression. The patient is nervous/anxious and has insomnia.        Allergies:  Patient has no known allergies.     Medical History:   has a past medical history of Anxiety, Arthritis, Bipolar 1 disorder, Bursitis of right hip, and Sacroiliitis.    Surgical History:   has a past surgical history that includes Hysterectomy (1986); Bladder suspension; Rectal prolapse repair; Tonsillectomy; Cholecystectomy; Ankle fracture surgery (Left, 2001); Carpal tunnel release (Bilateral); and Nasal septum surgery.    Family History:  family history includes Colon cancer in her maternal uncle, maternal uncle, and maternal uncle.    Social History:   reports that she has been smoking cigarettes. She started smoking about 38 years ago. She has a 17.50 pack-year smoking history. She has never used smokeless tobacco. She reports current alcohol use. She reports that she does not use drugs.    Physical Exam:  /84 (BP Location: Left arm, Patient Position: Sitting, BP Method: Medium (Manual))   Pulse 76   Temp 96.9 °F (36.1 °C)   Resp 16   Ht 5' 6" (1.676 m)   Wt 77.5 kg (170 lb 13.7 oz)   SpO2 98%   BMI 27.58 kg/m²   GEN: No acute distress. Calm, comfortable  HENT: Normocephalic, atraumatic, moist mucous membranes  EYE: Anicteric sclera, non-injected.   CV: Non-diaphoretic. Regular Rate. Radial Pulses 2+.  RESP: Breathing comfortably. Chest expansion symmetric.  EXT: No clubbing, cyanosis.   SKIN: Warm, & dry to palpation. No visible rashes or lesions of exposed skin.   PSYCH: Pleasant mood and appropriate affect. Recent and remote memory intact.   GAIT: Independent, antalgic ambulation.   Lumbar Spine Exam:       Inspection: No erythema, bruising. Sits leaning towards the left       Palpation: (+) TTP of SIJ on the right      ROM:  Limited in flexion, extension, lateral bending.       (+) Facet loading bilaterally, worse on the right      (+) Straight Leg Raise bilaterally, producing pain on the right leg      (+) JANETT on " the right  Hip Exam:      Inspection: No gross deformity or apparent leg length discrepancy      Palpation: (+) TTP to right greater trochanteric bursa.       ROM:  No limitation or pain in internal rotation, external rotation b/l  Neurologic Exam:     Alert. Speech is fluent and appropriate.     Strength: 4/5 in right hip flexion and knee extension, but pain noted on testing. Otherwise, 5/5 throughout bilateral lower extremities     Sensation:  Grossly intact to light touch in bilateral lower extremities     Reflexes: 2+ in left and 1+ in right patella, and difficult to elicit b/l achilles     Tone: No abnormality appreciated in bilateral lower extremities     No Clonus     Downgoing toes on plantar stimulation                Imaging:  - MRI Lumbar Spine 2017:  Results: Vertebral body alignment and heights are maintained.  There is disc desiccation with disc space narrowing throughout the lumbar spine, most prominent at the L3-4 and L4-5 levels.  Endplate degenerative changes are noted at L4 and L5.  The marrow signal is otherwise normal without evidence for a marrow replacement process, infection or tumor.  The conus terminates at T12.  Incidentally visualized soft tissues structures of the abdomen are within normal limits.  At T12-L1, there is bilateral facet arthropathy without central canal stenosis or neural foraminal narrowing.  At L1-2, there is a broad-based posterior disc bulge with left-sided facet arthropathy without central canal stenosis or neural foraminal narrowing.  At L2-3, there is a broad-based posterior disc bulge and facet arthropathy without central canal stenosis or neural foraminal narrowing.  At L3-4, there is a broad-based posterior disc bulge, ligamentum flavum hypertrophy and facet arthropathy contributing to mild central canal stenosis with moderate bilateral neural foraminal narrowing.  At L4-5, there is a broad-based posterior disc bulge, ligamentum flavum hypertrophy and facet  arthropathy contributing to severe central canal stenosis with severe bilateral neural foraminal narrowing.  At L5-S1, there is bilateral facet arthropathy without central canal stenosis or neural foraminal narrowing.    Labs:  BMP  Lab Results   Component Value Date     04/26/2018    K 4.0 04/26/2018     04/26/2018    CO2 25 04/26/2018    BUN 13 04/26/2018    CREATININE 0.8 04/26/2018    CALCIUM 9.2 04/26/2018    ANIONGAP 9 04/26/2018    ESTGFRAFRICA >60 04/26/2018    EGFRNONAA >60 04/26/2018     Lab Results   Component Value Date    ALT 11 04/26/2018    AST 14 04/26/2018    ALKPHOS 77 04/26/2018    BILITOT 0.4 04/26/2018     Lab Results   Component Value Date     04/26/2018       Assessment:  Karin Maguire is a 70 y.o. female with the following diagnoses based on history, exam, and imaging:    Problem List Items Addressed This Visit        Psychiatric    Bipolar 1 disorder    LUANNE (generalized anxiety disorder)       Other    Insomnia      Other Visit Diagnoses     Sacroiliac joint dysfunction of right side    -  Primary    Relevant Medications    gabapentin (NEURONTIN) 300 MG capsule    Chronic right-sided low back pain without sciatica        Relevant Medications    gabapentin (NEURONTIN) 300 MG capsule    Greater trochanteric bursitis of right hip        Relevant Medications    gabapentin (NEURONTIN) 300 MG capsule    Lumbar radiculopathy        Relevant Medications    gabapentin (NEURONTIN) 300 MG capsule    Spinal stenosis of lumbar region, unspecified whether neurogenic claudication present        Relevant Medications    gabapentin (NEURONTIN) 300 MG capsule    DDD (degenerative disc disease), lumbar        Lumbar spondylosis              This is a pleasant 70 y.o. lady presenting with: back pain     - Chronic right sided low back pain with right sided sciatica: She previously had a right SIJ and GTB injection that resolved her pain for more than a year, so we will plan on repeating  that initially. However, she does have radicular pain into the leg, decreased patellar reflex, weakness in right knee extension which may be due to her severe stenosis at L4-5 foramina on the right causing a right L4 radiculopathy. If she doesn't improve with the SIJ, plan for MRI and then addressing that radiculopathy with KYLEE.   - Comorbidities: Bipolar disorder, Anxiety, Insomnia    Treatment Plan:   - PT/OT/HEP: Cont HEP. Discussed benefits of exercise for pain.   - Procedures: Schedule right SIJ and GTB injection.   - If no relief, plan for MRI then KYLEE  - Medications: Start gabapentin 300 mg qHS for sleep and pain.  - Imaging: Reviewed. Plan to update lumbar MRI if no relief with SIJ and GTB injection  - Labs: Reviewed.  Medications are appropriately dosed for current hepatorenal function.    Follow Up: RTC after procedure    Mamie Roman M.D.  Interventional Pain Medicine / Physical Medicine & Rehabilitation    Disclaimer: This note was partly generated using dictation software which may occasionally result in transcription errors.

## 2020-11-06 NOTE — H&P (VIEW-ONLY)
[facility]  New Patient H&P    Referring Provider: Maryam Ramirez Md  [address]  [place] [address]    Chief Complaint:   Chief Complaint   Patient presents with    Back Pain       History of Present Illness: Karin Maguire is a 70 y.o. female referred by Dr. Maryam Ramirez for LBP.      Onset: several years, but had an injection with Dr. Barron 2-3 years ago that provided great relief. Pain returned about a year ago.    Location: Lower right side of back  Radiation: travels to the front and down right leg, which is very similar to prior to the last injection.    Timing: constant  Quality: Dull, Burning, Tight, Tingling, Deep, Numb, Sharp and Cold  Exacerbating Factors: extension, flexion, lifting, lying down, sitting, standing for more than 30 minutes, walking for more than 1 minutes and daily activity   Alleviating Factors: medications  Associated Symptoms: She feels weakness in the right leg and numbness in that leg. in the right thigh. denies night fever/night sweats, urinary incontinence, bowel incontinence and significant weight loss    Severity: Currently: 6/10   Typical Range: 6-8/10     Exacerbation: 10/10     Pain is limiting her ability to do household chores, sleep.     Pain Disability Index  Family/Home Responsibilities:: 8  Recreation:: 6  Social Activity:: 6  Occupation:: 8  Sexual Behavior:: 6  Self Care:: 6  Life-Support Activities:: 7  Pain Disability Index (PDI): 47    Previous Interventions:  - 3/31/17: Right SIJ and GTB injection (Tolba) excellent relief for 2 years    Previous Therapies:  PT/OT: yes, has been doing HEP   CHiropractor: Tried it but it doesn't help  Relevant Surgery: no   Previous Medications:   - NSAIDS: Tylenol, Ibuprofen, Diclofenac  - Muscle Relaxants:    - TCAs:   - SNRIs:   - Topicals: Voltaren cream doesn't help  - Anticonvulsants:    - Opioids:     Current Pain Medications:  1. Cymbalta 60 mg daily  2. Ibuprofen   3. Tylenol    Blood Thinners:  None    Full Medication List:    Current Outpatient Medications:     acetaminophen (TYLENOL) 500 MG tablet, Take 1,000 mg by mouth every 4 (four) hours as needed for Pain., Disp: , Rfl:     albuterol (VENTOLIN HFA) 90 mcg/actuation inhaler, INHALE 2 PUFFS INTO THE LUNGS EVERY 6 (SIX) HOURS AS NEEDED FOR WHEEZING (COUGH AND WHEEZING).RESCUE, Disp: 18 g, Rfl: 0    DULoxetine (CYMBALTA) 60 MG capsule, Take 1 capsule (60 mg total) by mouth once daily., Disp: 30 capsule, Rfl: 2    ibuprofen (ADVIL,MOTRIN) 200 MG tablet, Take 400 mg by mouth every 4 (four) hours as needed for Pain., Disp: , Rfl:     lamoTRIgine (LAMICTAL) 150 MG Tab, Take 2 tablets (300 mg total) by mouth every evening., Disp: 60 tablet, Rfl: 2    QUEtiapine (SEROQUEL) 300 MG Tab, Take one and a half tablets by mouth nightly., Disp: 45 tablet, Rfl: 2    diclofenac (VOLTAREN) 50 MG EC tablet, Take 1 tablet (50 mg total) by mouth 3 (three) times daily as needed (pain). (Patient not taking: Reported on 11/6/2020), Disp: 30 tablet, Rfl: 0    gabapentin (NEURONTIN) 300 MG capsule, Take 1 capsule (300 mg total) by mouth every evening., Disp: 30 capsule, Rfl: 2     Review of Systems:  Review of Systems   Constitutional: Negative for fever and weight loss.   HENT: Negative for ear pain and tinnitus.    Eyes: Negative for pain and redness.   Respiratory: Negative for cough and shortness of breath.    Cardiovascular: Negative for chest pain and palpitations.   Gastrointestinal: Positive for heartburn. Negative for constipation.   Genitourinary: Positive for frequency.        Denies urinary incontinence. Denies urine retention.    Musculoskeletal: Positive for back pain, joint pain (hands and feet), myalgias and neck pain.   Skin: Negative for itching and rash.   Neurological: Negative for tingling, focal weakness and seizures.   Endo/Heme/Allergies: Negative for environmental allergies. Does not bruise/bleed easily.   Psychiatric/Behavioral: Positive for  "depression. The patient is nervous/anxious and has insomnia.        Allergies:  Patient has no known allergies.     Medical History:   has a past medical history of Anxiety, Arthritis, Bipolar 1 disorder, Bursitis of right hip, and Sacroiliitis.    Surgical History:   has a past surgical history that includes Hysterectomy (1986); Bladder suspension; Rectal prolapse repair; Tonsillectomy; Cholecystectomy; Ankle fracture surgery (Left, 2001); Carpal tunnel release (Bilateral); and Nasal septum surgery.    Family History:  family history includes Colon cancer in her maternal uncle, maternal uncle, and maternal uncle.    Social History:   reports that she has been smoking cigarettes. She started smoking about 38 years ago. She has a 17.50 pack-year smoking history. She has never used smokeless tobacco. She reports current alcohol use. She reports that she does not use drugs.    Physical Exam:  /84 (BP Location: Left arm, Patient Position: Sitting, BP Method: Medium (Manual))   Pulse 76   Temp 96.9 °F (36.1 °C)   Resp 16   Ht 5' 6" (1.676 m)   Wt 77.5 kg (170 lb 13.7 oz)   SpO2 98%   BMI 27.58 kg/m²   GEN: No acute distress. Calm, comfortable  HENT: Normocephalic, atraumatic, moist mucous membranes  EYE: Anicteric sclera, non-injected.   CV: Non-diaphoretic. Regular Rate. Radial Pulses 2+.  RESP: Breathing comfortably. Chest expansion symmetric.  EXT: No clubbing, cyanosis.   SKIN: Warm, & dry to palpation. No visible rashes or lesions of exposed skin.   PSYCH: Pleasant mood and appropriate affect. Recent and remote memory intact.   GAIT: Independent, antalgic ambulation.   Lumbar Spine Exam:       Inspection: No erythema, bruising. Sits leaning towards the left       Palpation: (+) TTP of SIJ on the right      ROM:  Limited in flexion, extension, lateral bending.       (+) Facet loading bilaterally, worse on the right      (+) Straight Leg Raise bilaterally, producing pain on the right leg      (+) JANETT on " the right  Hip Exam:      Inspection: No gross deformity or apparent leg length discrepancy      Palpation: (+) TTP to right greater trochanteric bursa.       ROM:  No limitation or pain in internal rotation, external rotation b/l  Neurologic Exam:     Alert. Speech is fluent and appropriate.     Strength: 4/5 in right hip flexion and knee extension, but pain noted on testing. Otherwise, 5/5 throughout bilateral lower extremities     Sensation:  Grossly intact to light touch in bilateral lower extremities     Reflexes: 2+ in left and 1+ in right patella, and difficult to elicit b/l achilles     Tone: No abnormality appreciated in bilateral lower extremities     No Clonus     Downgoing toes on plantar stimulation                Imaging:  - MRI Lumbar Spine 2017:  Results: Vertebral body alignment and heights are maintained.  There is disc desiccation with disc space narrowing throughout the lumbar spine, most prominent at the L3-4 and L4-5 levels.  Endplate degenerative changes are noted at L4 and L5.  The marrow signal is otherwise normal without evidence for a marrow replacement process, infection or tumor.  The conus terminates at T12.  Incidentally visualized soft tissues structures of the abdomen are within normal limits.  At T12-L1, there is bilateral facet arthropathy without central canal stenosis or neural foraminal narrowing.  At L1-2, there is a broad-based posterior disc bulge with left-sided facet arthropathy without central canal stenosis or neural foraminal narrowing.  At L2-3, there is a broad-based posterior disc bulge and facet arthropathy without central canal stenosis or neural foraminal narrowing.  At L3-4, there is a broad-based posterior disc bulge, ligamentum flavum hypertrophy and facet arthropathy contributing to mild central canal stenosis with moderate bilateral neural foraminal narrowing.  At L4-5, there is a broad-based posterior disc bulge, ligamentum flavum hypertrophy and facet  arthropathy contributing to severe central canal stenosis with severe bilateral neural foraminal narrowing.  At L5-S1, there is bilateral facet arthropathy without central canal stenosis or neural foraminal narrowing.    Labs:  BMP  Lab Results   Component Value Date     04/26/2018    K 4.0 04/26/2018     04/26/2018    CO2 25 04/26/2018    BUN 13 04/26/2018    CREATININE 0.8 04/26/2018    CALCIUM 9.2 04/26/2018    ANIONGAP 9 04/26/2018    ESTGFRAFRICA >60 04/26/2018    EGFRNONAA >60 04/26/2018     Lab Results   Component Value Date    ALT 11 04/26/2018    AST 14 04/26/2018    ALKPHOS 77 04/26/2018    BILITOT 0.4 04/26/2018     Lab Results   Component Value Date     04/26/2018       Assessment:  Karin Maguire is a 70 y.o. female with the following diagnoses based on history, exam, and imaging:    Problem List Items Addressed This Visit        Psychiatric    Bipolar 1 disorder    LUANNE (generalized anxiety disorder)       Other    Insomnia      Other Visit Diagnoses     Sacroiliac joint dysfunction of right side    -  Primary    Relevant Medications    gabapentin (NEURONTIN) 300 MG capsule    Chronic right-sided low back pain without sciatica        Relevant Medications    gabapentin (NEURONTIN) 300 MG capsule    Greater trochanteric bursitis of right hip        Relevant Medications    gabapentin (NEURONTIN) 300 MG capsule    Lumbar radiculopathy        Relevant Medications    gabapentin (NEURONTIN) 300 MG capsule    Spinal stenosis of lumbar region, unspecified whether neurogenic claudication present        Relevant Medications    gabapentin (NEURONTIN) 300 MG capsule    DDD (degenerative disc disease), lumbar        Lumbar spondylosis              This is a pleasant 70 y.o. lady presenting with: back pain     - Chronic right sided low back pain with right sided sciatica: She previously had a right SIJ and GTB injection that resolved her pain for more than a year, so we will plan on repeating  that initially. However, she does have radicular pain into the leg, decreased patellar reflex, weakness in right knee extension which may be due to her severe stenosis at L4-5 foramina on the right causing a right L4 radiculopathy. If she doesn't improve with the SIJ, plan for MRI and then addressing that radiculopathy with KYLEE.   - Comorbidities: Bipolar disorder, Anxiety, Insomnia    Treatment Plan:   - PT/OT/HEP: Cont HEP. Discussed benefits of exercise for pain.   - Procedures: Schedule right SIJ and GTB injection.   - If no relief, plan for MRI then KYLEE  - Medications: Start gabapentin 300 mg qHS for sleep and pain.  - Imaging: Reviewed. Plan to update lumbar MRI if no relief with SIJ and GTB injection  - Labs: Reviewed.  Medications are appropriately dosed for current hepatorenal function.    Follow Up: RTC after procedure    Mamie Roman M.D.  Interventional Pain Medicine / Physical Medicine & Rehabilitation    Disclaimer: This note was partly generated using dictation software which may occasionally result in transcription errors.

## 2020-11-30 ENCOUNTER — TELEPHONE (OUTPATIENT)
Dept: ADMINISTRATIVE | Facility: HOSPITAL | Age: 70
End: 2020-11-30

## 2020-12-02 ENCOUNTER — HOSPITAL ENCOUNTER (OUTPATIENT)
Dept: RADIOLOGY | Facility: HOSPITAL | Age: 70
Discharge: HOME | End: 2020-12-02
Attending: NURSE PRACTITIONER
Payer: MEDICARE

## 2020-12-02 ENCOUNTER — HOSPITAL ENCOUNTER (OUTPATIENT)
Dept: PREADMISSION TESTING | Facility: HOSPITAL | Age: 70
Discharge: HOME | End: 2020-12-02
Attending: PHYSICAL MEDICINE & REHABILITATION
Payer: MEDICARE

## 2020-12-02 VITALS — HEIGHT: 66 IN | BODY MASS INDEX: 27.32 KG/M2 | WEIGHT: 170 LBS

## 2020-12-02 DIAGNOSIS — Z12.39 ENCOUNTER FOR SCREENING FOR MALIGNANT NEOPLASM OF BREAST, UNSPECIFIED SCREENING MODALITY: ICD-10-CM

## 2020-12-02 DIAGNOSIS — Z01.818 PRE-OP TESTING: ICD-10-CM

## 2020-12-02 PROCEDURE — U0003 INFECTIOUS AGENT DETECTION BY NUCLEIC ACID (DNA OR RNA); SEVERE ACUTE RESPIRATORY SYNDROME CORONAVIRUS 2 (SARS-COV-2) (CORONAVIRUS DISEASE [COVID-19]), AMPLIFIED PROBE TECHNIQUE, MAKING USE OF HIGH THROUGHPUT TECHNOLOGIES AS DESCRIBED BY CMS-2020-01-R: HCPCS

## 2020-12-02 PROCEDURE — 77063 BREAST TOMOSYNTHESIS BI: CPT | Mod: 26,,, | Performed by: RADIOLOGY

## 2020-12-02 PROCEDURE — 77063 MAMMO DIGITAL SCREENING BILAT WITH TOMO: ICD-10-PCS | Mod: 26,,, | Performed by: RADIOLOGY

## 2020-12-02 PROCEDURE — 77067 SCR MAMMO BI INCL CAD: CPT | Mod: TC

## 2020-12-02 PROCEDURE — 77067 MAMMO DIGITAL SCREENING BILAT WITH TOMO: ICD-10-PCS | Mod: 26,,, | Performed by: RADIOLOGY

## 2020-12-02 PROCEDURE — 77067 SCR MAMMO BI INCL CAD: CPT | Mod: 26,,, | Performed by: RADIOLOGY

## 2020-12-03 LAB — SARS-COV-2 RNA RESP QL NAA+PROBE: NOT DETECTED

## 2020-12-04 ENCOUNTER — HOSPITAL ENCOUNTER (OUTPATIENT)
Facility: HOSPITAL | Age: 70
Discharge: HOME | End: 2020-12-04
Attending: PHYSICAL MEDICINE & REHABILITATION | Admitting: PHYSICAL MEDICINE & REHABILITATION
Payer: MEDICARE

## 2020-12-04 ENCOUNTER — HOSPITAL ENCOUNTER (OUTPATIENT)
Dept: RADIOLOGY | Facility: HOSPITAL | Age: 70
Discharge: HOME | End: 2020-12-04
Attending: PHYSICAL MEDICINE & REHABILITATION
Payer: MEDICARE

## 2020-12-04 VITALS
HEART RATE: 64 BPM | DIASTOLIC BLOOD PRESSURE: 69 MMHG | TEMPERATURE: 97 F | OXYGEN SATURATION: 98 % | SYSTOLIC BLOOD PRESSURE: 113 MMHG | RESPIRATION RATE: 17 BRPM

## 2020-12-04 DIAGNOSIS — G89.29 CHRONIC PAIN: ICD-10-CM

## 2020-12-04 DIAGNOSIS — M53.3 SACROILIAC JOINT PAIN: ICD-10-CM

## 2020-12-04 DIAGNOSIS — M53.3 CHRONIC RIGHT SACROILIAC JOINT PAIN: Primary | ICD-10-CM

## 2020-12-04 DIAGNOSIS — G89.29 CHRONIC RIGHT SACROILIAC JOINT PAIN: Primary | ICD-10-CM

## 2020-12-04 DIAGNOSIS — M46.1 SACROILIITIS: ICD-10-CM

## 2020-12-04 DIAGNOSIS — M70.61 TROCHANTERIC BURSITIS OF RIGHT HIP: ICD-10-CM

## 2020-12-04 PROCEDURE — 63600175 PHARM REV CODE 636 W HCPCS: Performed by: PHYSICAL MEDICINE & REHABILITATION

## 2020-12-04 PROCEDURE — 25500020 PHARM REV CODE 255: Performed by: PHYSICAL MEDICINE & REHABILITATION

## 2020-12-04 PROCEDURE — 25000003 PHARM REV CODE 250: Performed by: PHYSICAL MEDICINE & REHABILITATION

## 2020-12-04 PROCEDURE — 20610 PR DRAIN/INJECT LARGE JOINT/BURSA: ICD-10-PCS | Mod: 59,RT,, | Performed by: PHYSICAL MEDICINE & REHABILITATION

## 2020-12-04 PROCEDURE — 27096 INJECT SACROILIAC JOINT: CPT | Mod: RT,,, | Performed by: PHYSICAL MEDICINE & REHABILITATION

## 2020-12-04 PROCEDURE — 27096 INJECT SACROILIAC JOINT: CPT | Mod: RT | Performed by: PHYSICAL MEDICINE & REHABILITATION

## 2020-12-04 PROCEDURE — 20610 DRAIN/INJ JOINT/BURSA W/O US: CPT | Mod: 59,RT,, | Performed by: PHYSICAL MEDICINE & REHABILITATION

## 2020-12-04 PROCEDURE — 27096 PR INJECTION,SACROILIAC JOINT: ICD-10-PCS | Mod: RT,,, | Performed by: PHYSICAL MEDICINE & REHABILITATION

## 2020-12-04 PROCEDURE — 20610 DRAIN/INJ JOINT/BURSA W/O US: CPT | Mod: RT | Performed by: PHYSICAL MEDICINE & REHABILITATION

## 2020-12-04 RX ORDER — BUPIVACAINE HYDROCHLORIDE 2.5 MG/ML
INJECTION, SOLUTION EPIDURAL; INFILTRATION; INTRACAUDAL
Status: DISCONTINUED
Start: 2020-12-04 | End: 2020-12-04 | Stop reason: HOSPADM

## 2020-12-04 RX ORDER — METHYLPREDNISOLONE ACETATE 40 MG/ML
INJECTION, SUSPENSION INTRA-ARTICULAR; INTRALESIONAL; INTRAMUSCULAR; SOFT TISSUE
Status: DISCONTINUED | OUTPATIENT
Start: 2020-12-04 | End: 2020-12-04 | Stop reason: HOSPADM

## 2020-12-04 RX ORDER — LIDOCAINE HYDROCHLORIDE 10 MG/ML
INJECTION INFILTRATION; PERINEURAL
Status: DISCONTINUED | OUTPATIENT
Start: 2020-12-04 | End: 2020-12-04 | Stop reason: HOSPADM

## 2020-12-04 RX ORDER — BUPIVACAINE HYDROCHLORIDE 2.5 MG/ML
INJECTION, SOLUTION EPIDURAL; INFILTRATION; INTRACAUDAL
Status: DISCONTINUED | OUTPATIENT
Start: 2020-12-04 | End: 2020-12-04 | Stop reason: HOSPADM

## 2020-12-04 RX ORDER — LIDOCAINE HYDROCHLORIDE 10 MG/ML
INJECTION INFILTRATION; PERINEURAL
Status: DISCONTINUED
Start: 2020-12-04 | End: 2020-12-04 | Stop reason: HOSPADM

## 2020-12-04 RX ORDER — FENTANYL CITRATE 50 UG/ML
INJECTION, SOLUTION INTRAMUSCULAR; INTRAVENOUS
Status: DISCONTINUED
Start: 2020-12-04 | End: 2020-12-04 | Stop reason: WASHOUT

## 2020-12-04 RX ORDER — MIDAZOLAM HYDROCHLORIDE 1 MG/ML
INJECTION INTRAMUSCULAR; INTRAVENOUS
Status: DISCONTINUED
Start: 2020-12-04 | End: 2020-12-04 | Stop reason: WASHOUT

## 2020-12-04 RX ORDER — METHYLPREDNISOLONE ACETATE 40 MG/ML
INJECTION, SUSPENSION INTRA-ARTICULAR; INTRALESIONAL; INTRAMUSCULAR; SOFT TISSUE
Status: DISCONTINUED
Start: 2020-12-04 | End: 2020-12-04 | Stop reason: HOSPADM

## 2020-12-04 NOTE — OP NOTE
Sacroiliac Joint and Greater trochanteric bursa Injection under Fluoroscopic Guidance:  I have reviewed the patient's medications, allergies and relevant histories prior to the procedure and no contraindications have been identified. The risks, benefits and alternatives to the procedure were discussed with the patient, and all questions regarding the procedure were answered to the patient's satisfaction. I personally obtained Karin's consent prior to the start of the procedure and the signed consent can be found in the patient's chart.                                                         Time-out was taken to identify patient, procedure, laterality, and allergies prior to starting the procedure.       Date of Service: 12/04/2020  Procedure:   1)  Right sacroiliac joint injection under fluoroscopic guidance   2)  Right greater trochanteric bursa injection under fluoroscopic guidance    Pre-Operative Diagnosis:   1) Right Sacroiliac joint pain   2) Right Greater Trochanteric Bursitis  Post-Operative Diagnosis:   1) Right Sacroiliac joint pain   2) Right Greater Trochanteric Bursitis    Physician: Mamie Roman M.D.  Assistants: None    Medications Injected: Depo-Medrol 80 mg/mL and 9 mL of Bupivacaine 0.25%. 2 mL injected at sacroiliac joint(s) and 4 mL injected into greater trochanteric bursa(s)  Local Anesthetic: Xylocaine 1% 10 mL   Sedation Medications: None    Procedural Technique for Sacroiliac Joint: Laying in a prone position, the patient was prepped and draped in the usual sterile fashion using ChloraPrep and fenestrated drape.  The area was determined under fluoroscopic guidance.  Local anesthetic was utilized to anesthetize the skin and subcutaneous tissues in the area using a 25-gauge needle.  The 3.5 inch 22-gauge spinal needle was introduce into the right sacroiliac joint(s).  Negative pressure applied to confirm no intravascular placement.  Omnipaque was injected to confirm placement and to  confirm that there was no vascular runoff.  The medication was then injected slowly.  The needle was removed and bandage applied to the area.    Procedural Technique for Trochanteric Bursa:  Laying in a prone position, the patient was prepped and draped in the usual sterile fashion using ChloraPrep and fenestrated drape.  The area was determined under fluoroscopic guidance.  Local anesthetic was utilized to anesthetize the skin and subcutaneous tissues in the area using a 25-gauge needle.  The 3.5 inch 22-gauge spinal needle was introduce into the right greater trochanteric bursa. Negative pressure applied to confirm no intravascular placement. Omnipaque was injected to confirm placement and to confirm that there was no vascular runoff. The medication was then injected slowly. The needle was removed and bandage applied to the area.     Estimated Blood Loss:  None.  Complications:  None.     Disposition: The patient tolerated the procedure well, and there were no apparent complications. Vital signs remained stable throughout the procedure. The patient was taken to the recovery area and monitored. The patient was supplied with written discharge instructions for the procedure. If helpful, we can repeat as needed. The patient was discharged in a stable condition.    Follow-Up: We will see the patient back in 1-2 weeks or the patient may call to inform of status.

## 2020-12-04 NOTE — DISCHARGE SUMMARY
[facility]  Discharge Note  Short Stay     Admit Date: 12/4/2020    Discharge Date: 12/4/2020     Attending Physician: Mamie Roman M.D.    Diagnoses:  Active Hospital Problems    Diagnosis  POA    Chronic right sacroiliac joint pain [M53.3, G89.29]  Unknown    Trochanteric bursitis of right hip [M70.61]  Yes      Resolved Hospital Problems   No resolved problems to display.     Discharged Condition: Good     Hospital Course: Patient was admitted for an outpatient interventional pain management procedure and tolerated the procedure well with no complications.     Final Diagnoses: Same as principal problem.     Disposition: Home or Self Care     Follow up/Patient Instructions:   Follow-up in 1-2 weeks unless otherwise instructed. May return sooner as needed.       Reconciled Medications:     Medication List      CONTINUE taking these medications    acetaminophen 500 MG tablet  Commonly known as: TYLENOL  Take 1,000 mg by mouth every 4 (four) hours as needed for Pain.     albuterol 90 mcg/actuation inhaler  Commonly known as: VENTOLIN HFA  INHALE 2 PUFFS INTO THE LUNGS EVERY 6 (SIX) HOURS AS NEEDED FOR WHEEZING (COUGH AND WHEEZING).RESCUE     diclofenac 50 MG EC tablet  Commonly known as: VOLTAREN  Take 1 tablet (50 mg total) by mouth 3 (three) times daily as needed (pain).     DULoxetine 60 MG capsule  Commonly known as: CYMBALTA  Take 1 capsule (60 mg total) by mouth once daily.     gabapentin 300 MG capsule  Commonly known as: NEURONTIN  Take 1 capsule (300 mg total) by mouth every evening.     ibuprofen 200 MG tablet  Commonly known as: ADVIL,MOTRIN  Take 400 mg by mouth every 4 (four) hours as needed for Pain.     lamoTRIgine 150 MG Tab  Commonly known as: LAMICTAL  TAKE 2 TABLETS (300 MG TOTAL) BY MOUTH EVERY EVENING.     QUEtiapine 300 MG Tab  Commonly known as: SEROQUEL  Take one and a half tablets by mouth nightly.           Discharge Procedure Orders (must include Diet, Follow-up, Activity)    Ice to affected area   Order Comments: 20 minutes of ice or until area numb to the touch if area is sore 2-3 times per day as needed     No driving until:   Order Comments: Until following day     Notify your health care provider if you experience any of the following:  temperature >100.4     Notify your health care provider if you experience any of the following:  persistent nausea and vomiting or diarrhea     Notify your health care provider if you experience any of the following:  severe uncontrolled pain     Notify your health care provider if you experience any of the following:  redness, tenderness, or signs of infection (pain, swelling, redness, odor or green/yellow discharge around incision site)     Notify your health care provider if you experience any of the following:  difficulty breathing or increased cough     Notify your health care provider if you experience any of the following:  severe persistent headache     Notify your health care provider if you experience any of the following:  worsening rash     Notify your health care provider if you experience any of the following:  persistent dizziness, light-headedness, or visual disturbances     Notify your health care provider if you experience any of the following:  increased confusion or weakness     No dressing needed     Shower on day dressing removed (No bath)       Mamie Roman M.D.  Interventional Pain Medicine / Physical Medicine & Rehabilitation

## 2020-12-10 ENCOUNTER — OFFICE VISIT (OUTPATIENT)
Dept: PSYCHIATRY | Facility: CLINIC | Age: 70
End: 2020-12-10
Payer: MEDICARE

## 2020-12-10 VITALS
WEIGHT: 172.19 LBS | HEIGHT: 66 IN | TEMPERATURE: 98 F | DIASTOLIC BLOOD PRESSURE: 72 MMHG | SYSTOLIC BLOOD PRESSURE: 126 MMHG | RESPIRATION RATE: 17 BRPM | HEART RATE: 72 BPM | BODY MASS INDEX: 27.67 KG/M2

## 2020-12-10 DIAGNOSIS — F41.1 GAD (GENERALIZED ANXIETY DISORDER): ICD-10-CM

## 2020-12-10 DIAGNOSIS — F31.9 BIPOLAR 1 DISORDER: Primary | ICD-10-CM

## 2020-12-10 DIAGNOSIS — Z65.8 PSYCHOSOCIAL STRESSORS: ICD-10-CM

## 2020-12-10 DIAGNOSIS — Z72.6 GAMBLING PROBLEM: ICD-10-CM

## 2020-12-10 DIAGNOSIS — G47.00 INSOMNIA, UNSPECIFIED TYPE: ICD-10-CM

## 2020-12-10 PROCEDURE — 99999 PR STA SHADOW: CPT | Mod: PBBFAC,,, | Performed by: STUDENT IN AN ORGANIZED HEALTH CARE EDUCATION/TRAINING PROGRAM

## 2020-12-10 PROCEDURE — 99213 OFFICE O/P EST LOW 20 MIN: CPT | Mod: PBBFAC | Performed by: STUDENT IN AN ORGANIZED HEALTH CARE EDUCATION/TRAINING PROGRAM

## 2020-12-10 PROCEDURE — 99999 PR PBB SHADOW E&M-EST. PATIENT-LVL III: ICD-10-PCS | Mod: PBBFAC,,, | Performed by: STUDENT IN AN ORGANIZED HEALTH CARE EDUCATION/TRAINING PROGRAM

## 2020-12-10 PROCEDURE — 99214 OFFICE O/P EST MOD 30 MIN: CPT | Mod: S$PBB | Performed by: STUDENT IN AN ORGANIZED HEALTH CARE EDUCATION/TRAINING PROGRAM

## 2020-12-10 PROCEDURE — 99999 PR PBB SHADOW E&M-EST. PATIENT-LVL III: CPT | Mod: PBBFAC,,, | Performed by: STUDENT IN AN ORGANIZED HEALTH CARE EDUCATION/TRAINING PROGRAM

## 2020-12-10 RX ORDER — DULOXETIN HYDROCHLORIDE 30 MG/1
90 CAPSULE, DELAYED RELEASE ORAL DAILY
Qty: 90 CAPSULE | Refills: 3 | Status: SHIPPED | OUTPATIENT
Start: 2020-12-10 | End: 2021-01-08 | Stop reason: SDUPTHER

## 2020-12-10 RX ORDER — QUETIAPINE FUMARATE 300 MG/1
TABLET, FILM COATED ORAL
Qty: 45 TABLET | Refills: 2 | Status: SHIPPED | OUTPATIENT
Start: 2020-12-10 | End: 2021-01-08 | Stop reason: SDUPTHER

## 2020-12-10 NOTE — PROGRESS NOTES
"  12/10/2020  12:49 PM  Karin Maguire  [id number]    Outpatient Psychiatry Follow-Up Visit (MD/NP)    12/10/2020    Clinical Status of Patient:  Outpatient (Ambulatory)    Chief Complaint:  Karin Maguire is a 70 y.o. female who presents today for follow-up of depression and anxiety.  Met with patient.      Interval History and Content of Current Session:  Interim Events/Subjective Report/Content of Current Session:     Reports mood "alright," felt depressed "a couple times, felt hopeless," lasted about 3-4 days, "but I usually come out of it." Reports depression is due to "different personalities my  and I have, I find he's becoming more rigid.. he looks down on people, shows ugliness towards me, for no reason, he feels superior." Reports  said "let's talk about what's bothering us, but I don't trust him to not cause a full blown fight.... he blames me for everything, if he has to move a dish, he's upset, throwing the dish around... he refuses to admit it, everyone else is wrong." Continues to have SI, "I sat in the bathroom and cried and it's hopeless, I feel like the only way out is if I would die," occurred x1 week ago, "it was just fleeting thought," denies plan or intent, "that's not the legacy I would want to leave for my kids."  not willing to go to marriage counseling.  Gambling "moderately... sometimes not for a week, sometimes 2 or 3 nights a week," denies financial consequences.        Psychiatric Review Of Systems - Is patient experiencing or having changes in:  sleep: "I sleep but I'm always tired"  appetite: no  weight: no  energy/anergy: yes  interest/pleasure/anhedonia: no  anxiety/panic: "not really bad"  guilty/hopelessness: yes  concentration: yes  S.I.B.s/risky behavior: yes  Irritability: no  Substance abuse: no  Racing thoughts: no  Impulsive behaviors: no  Paranoia: no  AVH: no      Psychotherapy:  · Target symptoms: depression  · Why chosen therapy is appropriate " versus another modality: relevant to diagnosis, evidence based practice  · Outcome monitoring methods: self-report, observation  · Therapeutic intervention type: supportive psychotherapy  · Topics discussed/themes: relationships difficulties  · The patient's response to the intervention is accepting. The patient's progress toward treatment goals is excellent.   · Duration of intervention: 19 minutes.          Review of Systems   Review of Systems   Constitutional: Negative for chills and fever.   HENT: Negative for hearing loss.    Eyes: Negative for blurred vision and double vision.   Respiratory: Negative for shortness of breath.    Cardiovascular: Negative for chest pain and palpitations.   Gastrointestinal: Negative for constipation, diarrhea, nausea and vomiting.   Genitourinary: Negative for dysuria.   Musculoskeletal: Positive for back pain and joint pain. Negative for neck pain.   Skin: Negative for rash.   Neurological: Negative for dizziness and headaches.   Endo/Heme/Allergies: Negative for environmental allergies.       Past Medical, Family and Social History: The patient's past medical, family and social history have been reviewed and updated as appropriate within the electronic medical record - see encounter notes.    Social History     Socioeconomic History    Marital status:      Spouse name: Not on file    Number of children: Not on file    Years of education: Not on file    Highest education level: Not on file   Occupational History    Not on file   Social Needs    Financial resource strain: Not on file    Food insecurity     Worry: Not on file     Inability: Not on file    Transportation needs     Medical: Not on file     Non-medical: Not on file   Tobacco Use    Smoking status: Current Every Day Smoker     Packs/day: 0.50     Years: 35.00     Pack years: 17.50     Types: Cigarettes     Start date: 3/30/1982    Smokeless tobacco: Never Used    Tobacco comment: smoking cessation  "clinic pamphlet for clinic put on chart to give to pt.    Substance and Sexual Activity    Alcohol use: Yes     Comment: Socially-2 or 3 times a year-6 or 7 drinks    Drug use: No    Sexual activity: Yes     Partners: Male     Birth control/protection: Surgical     Comment:    Lifestyle    Physical activity     Days per week: Not on file     Minutes per session: Not on file    Stress: Not on file   Relationships    Social connections     Talks on phone: Not on file     Gets together: Not on file     Attends Congregational service: Not on file     Active member of club or organization: Not on file     Attends meetings of clubs or organizations: Not on file     Relationship status: Not on file   Other Topics Concern    Not on file   Social History Narrative    Not on file         Compliance: yes    Side effects: None    Risk Parameters:  Patient reports no suicidal ideation  Patient reports no homicidal ideation  Patient reports no self-injurious behavior  Patient reports no violent behavior        Exam (detailed: at least 9 elements; comprehensive: all 15 elements)     Vitals:    Vitals:    12/10/20 1219   BP: 126/72   Pulse: 72   Resp: 17   Temp: 97.6 °F (36.4 °C)   Weight: 78.1 kg (172 lb 2.9 oz)   Height: 5' 6" (1.676 m)          Wt Readings from Last 4 Encounters:   12/10/20 78.1 kg (172 lb 2.9 oz)   12/02/20 77.1 kg (170 lb)   11/06/20 77.5 kg (170 lb 13.7 oz)   10/16/20 78.2 kg (172 lb 6.4 oz)         CONSTITUTIONAL  General Appearance: unremarkable, age appropriate    MUSCULOSKELETAL  Muscle Strength and Tone:no tremor, no tic  Abnormal Involuntary Movements: No  Gait and Station: non-ataxic    PSYCHIATRIC   Level of Consciousness: awake and alert   Orientation: person, place and situation  Grooming: Casually dressed and Well groomed  Psychomotor Behavior: normal, cooperative  Speech: normal tone, normal rate, normal pitch, normal volume  Language: grossly intact  Mood: anxious and depressed  Affect: " "Consistent with mood  Thought Process: linear, logical  Associations: intact   Thought Content: DENIES suicidal ideation and DENIES homicidal ideation  Perceptions: denies hallucinations  Memory: Able to recall past events, Remote intact and Recent intact  Attention:Attends to interview without distraction  Fund of Knowledge: Aware of current events and Vocabulary appropriate   Estimate if Intelligence:  Average based on work/education history, vocabulary and mental status exam  Insight: has awareness of illness  Judgment: behavior is adequate to circumstances        Assessment and Diagnosis   Status/Progress: Based on the examination today, the patient's problem(s) is/are worsening.  New problems have not been presented today.   Co-morbidities are complicating management of the primary condition.      General Impression:    MDD with mixed features vs. Unspecified bipolar disorder (pt poor historian)  LUANNE  Insomnia  Gambling disorder  Obesity  Psychosocial stressors     Abnormal movements    Suicide Risk Assessment:    Risk factors  Current ideations: no   Prior attempts: yes (aborted, grabbed knife, did not injury self)  Anxiety: yes  Hopelessness: yes  Impulsivity: no  Psychiatric hospitalizations: no  Substance abuse:no  Psychosis: no  Access to guns: no  Motivation for suicide: stressor of relationship with   Family history of suicide: no    Protective factors  Reason for living: "not the legacy I want to leave me kids"  Future oriented:yes  Help seeking:yes  Temple: no  Strong social support:yes, her children    Risk reduction:  - ED/911 precautions given  - [phone] number given for crisis counseling/suicide prevention  - medications as below  - provided psychotherapeutic support  - encouraged engagement of social supports  - patient given a safety plan        Plan:     MDD with mixed features vs. Unspecified bipolar disorder (pt poor historian)  - continue lamictal 300 mg PO qd  - increase " "cymbalta 60 mg PO qd to 90 mg PO qd  - continue Seroquel 450 mg PO qhs as prescribed by previous provider (legacy patient)  - guns removed from home  - strongly recommended psychotherapy, provided with resources     Insomnia  - sleep study completed, per report in chart consistent with [facility]ANDAR  - pt counseled     Gambling disorder  - pt counseled  - consider meetings/therapy; patient declines     LUANNE  - see MDD above  - consider psychotherapy, patient declines     Psychosocial stressors  - pt counseled  - strongly recommended psychotherapy, couples counseling to patient, patient refusing     Obesity  - avoid psychotropics with potential for weight gain as possible; on seroquel from previous provider, will continue per patient's request, monitor     Abnormal movements  - frequent non-stereotyped movements of hands and limbs, neck, trunk; patient states she has always been "fidgety" since childhood, reports she has seen neurology about movements before in past, does not want a referral at this time; extensively discussed risk/concern for tardive dyskinesia with AP, "it doesn't bother me," patient wishes to continue seroquel despite risk of TD; see IC discussion below    - Instructed patient to keep all scheduled appointments, take medications as prescribed and abstain from substance abuse. Instructed to call 911 or present to ER for emergency including SI or HI.    - Discussed diagnosis, risks and benefits of proposed treatment above vs alternative treatments vs no treatment, and potential side effects of these treatments. The patient expresses understanding of the above and displays the capacity to agree with this treatment given said understanding. Patient also agrees that, currently, the benefits outweigh the risks and would like to pursue treatment at this time.         Estevan Zaman III, MD    Return to Clinic: 1 month        "

## 2021-01-08 ENCOUNTER — OFFICE VISIT (OUTPATIENT)
Dept: PSYCHIATRY | Facility: CLINIC | Age: 71
End: 2021-01-08
Payer: MEDICARE

## 2021-01-08 VITALS
DIASTOLIC BLOOD PRESSURE: 68 MMHG | HEIGHT: 66 IN | SYSTOLIC BLOOD PRESSURE: 121 MMHG | BODY MASS INDEX: 28.06 KG/M2 | WEIGHT: 174.63 LBS | RESPIRATION RATE: 17 BRPM | TEMPERATURE: 98 F | HEART RATE: 70 BPM

## 2021-01-08 DIAGNOSIS — Z65.8 PSYCHOSOCIAL STRESSORS: ICD-10-CM

## 2021-01-08 DIAGNOSIS — G47.33 OBSTRUCTIVE SLEEP APNEA (ADULT) (PEDIATRIC): ICD-10-CM

## 2021-01-08 DIAGNOSIS — Z72.6 GAMBLING PROBLEM: ICD-10-CM

## 2021-01-08 DIAGNOSIS — R25.9 ABNORMAL MOVEMENTS: ICD-10-CM

## 2021-01-08 DIAGNOSIS — F31.9 BIPOLAR 1 DISORDER: Primary | ICD-10-CM

## 2021-01-08 DIAGNOSIS — F41.1 GAD (GENERALIZED ANXIETY DISORDER): ICD-10-CM

## 2021-01-08 PROCEDURE — 99999 PR STA SHADOW: ICD-10-PCS | Mod: PBBFAC,,, | Performed by: STUDENT IN AN ORGANIZED HEALTH CARE EDUCATION/TRAINING PROGRAM

## 2021-01-08 PROCEDURE — 99999 PR PBB SHADOW E&M-EST. PATIENT-LVL III: CPT | Mod: PBBFAC,,, | Performed by: STUDENT IN AN ORGANIZED HEALTH CARE EDUCATION/TRAINING PROGRAM

## 2021-01-08 PROCEDURE — 99214 OFFICE O/P EST MOD 30 MIN: CPT | Mod: S$PBB | Performed by: STUDENT IN AN ORGANIZED HEALTH CARE EDUCATION/TRAINING PROGRAM

## 2021-01-08 PROCEDURE — 99213 OFFICE O/P EST LOW 20 MIN: CPT | Mod: PBBFAC | Performed by: STUDENT IN AN ORGANIZED HEALTH CARE EDUCATION/TRAINING PROGRAM

## 2021-01-08 PROCEDURE — 99999 PR STA SHADOW: CPT | Mod: PBBFAC,,, | Performed by: STUDENT IN AN ORGANIZED HEALTH CARE EDUCATION/TRAINING PROGRAM

## 2021-01-08 RX ORDER — LAMOTRIGINE 150 MG/1
300 TABLET ORAL NIGHTLY
Qty: 180 TABLET | Refills: 3 | Status: SHIPPED | OUTPATIENT
Start: 2021-01-08 | End: 2021-11-11 | Stop reason: SDUPTHER

## 2021-01-08 RX ORDER — DULOXETIN HYDROCHLORIDE 60 MG/1
60 CAPSULE, DELAYED RELEASE ORAL DAILY
COMMUNITY
Start: 2021-01-03 | End: 2021-01-08 | Stop reason: DRUGHIGH

## 2021-01-08 RX ORDER — QUETIAPINE FUMARATE 300 MG/1
TABLET, FILM COATED ORAL
Qty: 45 TABLET | Refills: 3 | Status: SHIPPED | OUTPATIENT
Start: 2021-01-08 | End: 2021-04-15 | Stop reason: SDUPTHER

## 2021-01-08 RX ORDER — DULOXETIN HYDROCHLORIDE 30 MG/1
90 CAPSULE, DELAYED RELEASE ORAL DAILY
Qty: 90 CAPSULE | Refills: 3 | Status: SHIPPED | OUTPATIENT
Start: 2021-01-08 | End: 2021-04-15

## 2021-01-25 ENCOUNTER — OFFICE VISIT (OUTPATIENT)
Dept: PAIN MEDICINE | Facility: CLINIC | Age: 71
End: 2021-01-25
Payer: MEDICARE

## 2021-01-25 VITALS
SYSTOLIC BLOOD PRESSURE: 110 MMHG | TEMPERATURE: 97 F | BODY MASS INDEX: 27.74 KG/M2 | HEIGHT: 66 IN | HEART RATE: 80 BPM | RESPIRATION RATE: 14 BRPM | DIASTOLIC BLOOD PRESSURE: 68 MMHG | OXYGEN SATURATION: 98 % | WEIGHT: 172.63 LBS

## 2021-01-25 DIAGNOSIS — G89.29 CHRONIC RIGHT-SIDED LOW BACK PAIN WITHOUT SCIATICA: ICD-10-CM

## 2021-01-25 DIAGNOSIS — M70.61 GREATER TROCHANTERIC BURSITIS OF RIGHT HIP: ICD-10-CM

## 2021-01-25 DIAGNOSIS — M54.50 CHRONIC RIGHT-SIDED LOW BACK PAIN WITHOUT SCIATICA: ICD-10-CM

## 2021-01-25 DIAGNOSIS — M53.3 SACROILIAC JOINT DYSFUNCTION OF RIGHT SIDE: ICD-10-CM

## 2021-01-25 DIAGNOSIS — M47.816 LUMBAR SPONDYLOSIS: Primary | ICD-10-CM

## 2021-01-25 DIAGNOSIS — M54.9 DORSALGIA, UNSPECIFIED: ICD-10-CM

## 2021-01-25 DIAGNOSIS — M51.36 DDD (DEGENERATIVE DISC DISEASE), LUMBAR: ICD-10-CM

## 2021-01-25 DIAGNOSIS — M48.061 SPINAL STENOSIS OF LUMBAR REGION, UNSPECIFIED WHETHER NEUROGENIC CLAUDICATION PRESENT: ICD-10-CM

## 2021-01-25 PROCEDURE — 99214 OFFICE O/P EST MOD 30 MIN: CPT | Mod: PBBFAC | Performed by: PHYSICAL MEDICINE & REHABILITATION

## 2021-01-25 PROCEDURE — 99999 PR PBB SHADOW E&M-EST. PATIENT-LVL IV: CPT | Mod: PBBFAC,,, | Performed by: PHYSICAL MEDICINE & REHABILITATION

## 2021-01-25 PROCEDURE — 99999 PR STA SHADOW: CPT | Mod: PBBFAC,,, | Performed by: PHYSICAL MEDICINE & REHABILITATION

## 2021-01-25 PROCEDURE — 99214 OFFICE O/P EST MOD 30 MIN: CPT | Mod: S$PBB | Performed by: PHYSICAL MEDICINE & REHABILITATION

## 2021-01-25 PROCEDURE — 99999 PR STA SHADOW: ICD-10-PCS | Mod: PBBFAC,,, | Performed by: PHYSICAL MEDICINE & REHABILITATION

## 2021-01-26 ENCOUNTER — HOSPITAL ENCOUNTER (OUTPATIENT)
Dept: RADIOLOGY | Facility: HOSPITAL | Age: 71
Discharge: HOME | End: 2021-01-26
Attending: PHYSICAL MEDICINE & REHABILITATION
Payer: MEDICARE

## 2021-01-26 DIAGNOSIS — G89.29 CHRONIC RIGHT-SIDED LOW BACK PAIN WITHOUT SCIATICA: ICD-10-CM

## 2021-01-26 DIAGNOSIS — M47.816 LUMBAR SPONDYLOSIS: ICD-10-CM

## 2021-01-26 DIAGNOSIS — M54.50 CHRONIC RIGHT-SIDED LOW BACK PAIN WITHOUT SCIATICA: ICD-10-CM

## 2021-01-26 DIAGNOSIS — M54.9 DORSALGIA, UNSPECIFIED: ICD-10-CM

## 2021-01-26 PROCEDURE — 72148 MRI LUMBAR SPINE W/O DYE: CPT | Mod: TC

## 2021-01-26 PROCEDURE — 72148 MRI LUMBAR SPINE WITHOUT CONTRAST: ICD-10-PCS | Mod: 26,,, | Performed by: RADIOLOGY

## 2021-01-26 PROCEDURE — 72148 MRI LUMBAR SPINE W/O DYE: CPT | Mod: 26,,, | Performed by: RADIOLOGY

## 2021-02-03 ENCOUNTER — HOSPITAL ENCOUNTER (OUTPATIENT)
Dept: PREADMISSION TESTING | Facility: HOSPITAL | Age: 71
Discharge: HOME | End: 2021-02-03
Attending: PHYSICAL MEDICINE & REHABILITATION
Payer: MEDICARE

## 2021-02-03 DIAGNOSIS — Z01.818 PRE-OP TESTING: ICD-10-CM

## 2021-02-03 PROCEDURE — U0003 INFECTIOUS AGENT DETECTION BY NUCLEIC ACID (DNA OR RNA); SEVERE ACUTE RESPIRATORY SYNDROME CORONAVIRUS 2 (SARS-COV-2) (CORONAVIRUS DISEASE [COVID-19]), AMPLIFIED PROBE TECHNIQUE, MAKING USE OF HIGH THROUGHPUT TECHNOLOGIES AS DESCRIBED BY CMS-2020-01-R: HCPCS

## 2021-02-04 LAB — SARS-COV-2 RNA RESP QL NAA+PROBE: NOT DETECTED

## 2021-02-05 ENCOUNTER — HOSPITAL ENCOUNTER (OUTPATIENT)
Dept: RADIOLOGY | Facility: HOSPITAL | Age: 71
Discharge: HOME | End: 2021-02-05
Attending: PHYSICAL MEDICINE & REHABILITATION
Payer: MEDICARE

## 2021-02-05 ENCOUNTER — HOSPITAL ENCOUNTER (OUTPATIENT)
Facility: HOSPITAL | Age: 71
Discharge: HOME | End: 2021-02-05
Attending: PHYSICAL MEDICINE & REHABILITATION | Admitting: PHYSICAL MEDICINE & REHABILITATION
Payer: MEDICARE

## 2021-02-05 VITALS
SYSTOLIC BLOOD PRESSURE: 124 MMHG | OXYGEN SATURATION: 97 % | DIASTOLIC BLOOD PRESSURE: 74 MMHG | HEART RATE: 70 BPM | TEMPERATURE: 97 F | RESPIRATION RATE: 17 BRPM

## 2021-02-05 DIAGNOSIS — R52 PAIN: ICD-10-CM

## 2021-02-05 DIAGNOSIS — M47.816 LUMBAR SPONDYLOSIS: Primary | ICD-10-CM

## 2021-02-05 DIAGNOSIS — M47.816 LUMBAR SPONDYLOSIS: ICD-10-CM

## 2021-02-05 PROCEDURE — 64495 PR INJ DX/THER AGNT PARAVERT FACET JOINT,IMG GUIDE,LUMBAR/SAC, ADD LEVEL: ICD-10-PCS | Mod: RT,,, | Performed by: PHYSICAL MEDICINE & REHABILITATION

## 2021-02-05 PROCEDURE — 64493 PR INJ DX/THER AGNT PARAVERT FACET JOINT,IMG GUIDE,LUMBAR/SAC,1ST LVL: ICD-10-PCS | Mod: RT,,, | Performed by: PHYSICAL MEDICINE & REHABILITATION

## 2021-02-05 PROCEDURE — 64493 INJ PARAVERT F JNT L/S 1 LEV: CPT | Mod: RT | Performed by: PHYSICAL MEDICINE & REHABILITATION

## 2021-02-05 PROCEDURE — 64493 INJ PARAVERT F JNT L/S 1 LEV: CPT | Mod: RT,,, | Performed by: PHYSICAL MEDICINE & REHABILITATION

## 2021-02-05 PROCEDURE — 64495 INJ PARAVERT F JNT L/S 3 LEV: CPT | Mod: RT,,, | Performed by: PHYSICAL MEDICINE & REHABILITATION

## 2021-02-05 PROCEDURE — 25500020 PHARM REV CODE 255: Performed by: PHYSICAL MEDICINE & REHABILITATION

## 2021-02-05 PROCEDURE — 64494 PR INJ DX/THER AGNT PARAVERT FACET JOINT,IMG GUIDE,LUMBAR/SAC, 2ND LEVEL: ICD-10-PCS | Mod: RT,,, | Performed by: PHYSICAL MEDICINE & REHABILITATION

## 2021-02-05 PROCEDURE — 63600175 PHARM REV CODE 636 W HCPCS: Performed by: PHYSICAL MEDICINE & REHABILITATION

## 2021-02-05 PROCEDURE — 25000003 PHARM REV CODE 250: Performed by: PHYSICAL MEDICINE & REHABILITATION

## 2021-02-05 PROCEDURE — 64495 INJ PARAVERT F JNT L/S 3 LEV: CPT | Mod: RT | Performed by: PHYSICAL MEDICINE & REHABILITATION

## 2021-02-05 PROCEDURE — 64494 INJ PARAVERT F JNT L/S 2 LEV: CPT | Mod: RT | Performed by: PHYSICAL MEDICINE & REHABILITATION

## 2021-02-05 PROCEDURE — 64494 INJ PARAVERT F JNT L/S 2 LEV: CPT | Mod: RT,,, | Performed by: PHYSICAL MEDICINE & REHABILITATION

## 2021-02-05 RX ORDER — FENTANYL CITRATE 50 UG/ML
INJECTION, SOLUTION INTRAMUSCULAR; INTRAVENOUS
Status: DISCONTINUED
Start: 2021-02-05 | End: 2021-02-05 | Stop reason: HOSPADM

## 2021-02-05 RX ORDER — BUPIVACAINE HYDROCHLORIDE 2.5 MG/ML
INJECTION, SOLUTION EPIDURAL; INFILTRATION; INTRACAUDAL
Status: DISCONTINUED
Start: 2021-02-05 | End: 2021-02-05 | Stop reason: HOSPADM

## 2021-02-05 RX ORDER — MIDAZOLAM HYDROCHLORIDE 1 MG/ML
INJECTION, SOLUTION INTRAMUSCULAR; INTRAVENOUS
Status: DISCONTINUED | OUTPATIENT
Start: 2021-02-05 | End: 2021-02-05 | Stop reason: HOSPADM

## 2021-02-05 RX ORDER — MIDAZOLAM HYDROCHLORIDE 1 MG/ML
INJECTION INTRAMUSCULAR; INTRAVENOUS
Status: DISCONTINUED
Start: 2021-02-05 | End: 2021-02-05 | Stop reason: HOSPADM

## 2021-02-05 RX ORDER — SODIUM CHLORIDE 9 MG/ML
INJECTION, SOLUTION INTRAVENOUS CONTINUOUS
Status: DISCONTINUED | OUTPATIENT
Start: 2021-02-05 | End: 2021-02-05 | Stop reason: HOSPADM

## 2021-02-05 RX ORDER — BUPIVACAINE HYDROCHLORIDE 2.5 MG/ML
INJECTION, SOLUTION EPIDURAL; INFILTRATION; INTRACAUDAL
Status: DISCONTINUED | OUTPATIENT
Start: 2021-02-05 | End: 2021-02-05 | Stop reason: HOSPADM

## 2021-02-05 RX ORDER — LIDOCAINE HYDROCHLORIDE 10 MG/ML
INJECTION INFILTRATION; PERINEURAL
Status: DISCONTINUED
Start: 2021-02-05 | End: 2021-02-05 | Stop reason: HOSPADM

## 2021-02-05 RX ORDER — FENTANYL CITRATE 50 UG/ML
INJECTION, SOLUTION INTRAMUSCULAR; INTRAVENOUS
Status: DISCONTINUED | OUTPATIENT
Start: 2021-02-05 | End: 2021-02-05 | Stop reason: HOSPADM

## 2021-02-05 RX ORDER — LIDOCAINE HYDROCHLORIDE 10 MG/ML
INJECTION INFILTRATION; PERINEURAL
Status: DISCONTINUED | OUTPATIENT
Start: 2021-02-05 | End: 2021-02-05 | Stop reason: HOSPADM

## 2021-03-02 ENCOUNTER — IMMUNIZATION (OUTPATIENT)
Dept: PHARMACY | Facility: CLINIC | Age: 71
End: 2021-03-02
Payer: MEDICARE

## 2021-03-02 DIAGNOSIS — Z23 NEED FOR VACCINATION: Primary | ICD-10-CM

## 2021-03-25 ENCOUNTER — IMMUNIZATION (OUTPATIENT)
Dept: PHARMACY | Facility: CLINIC | Age: 71
End: 2021-03-25
Payer: MEDICARE

## 2021-03-25 DIAGNOSIS — Z23 NEED FOR VACCINATION: Primary | ICD-10-CM

## 2021-03-28 NOTE — PROGRESS NOTES
Patient was messing around with his Dad shoes- tripped and hit door. Small lac above right eyebrow. No LOC, or vomiting. SUBJECTIVE:   Karin Maguire is a 66 y.o. female who present complaining of flu-like symptoms: fevers, chills, myalgias, congestion, sore throat and cough for 2 days. Denies dyspnea or wheezing.    OBJECTIVE:  Appears moderately ill but not toxic; temperature as noted in vitals. Ears normal. Throat and pharynx normal.  Neck supple. No adenopathy in the neck. Sinuses non tender. The chest is clear.    ASSESSMENT:  Influenza    PLAN:  Symptomatic therapy suggested: rest, increase fluids, gargle prn for sore throat, use mist of vaporizer prn and call prn if symptoms persist or worsen. Call or return to clinic prn if these symptoms worsen or fail to improve as anticipated.  Influenza  -     oseltamivir (TAMIFLU) 75 MG capsule; Take 1 capsule (75 mg total) by mouth 2 (two) times daily.  Dispense: 10 capsule; Refill: 0

## 2021-04-15 ENCOUNTER — OFFICE VISIT (OUTPATIENT)
Dept: PSYCHIATRY | Facility: CLINIC | Age: 71
End: 2021-04-15
Payer: MEDICARE

## 2021-04-15 VITALS
WEIGHT: 174.69 LBS | RESPIRATION RATE: 18 BRPM | SYSTOLIC BLOOD PRESSURE: 121 MMHG | DIASTOLIC BLOOD PRESSURE: 75 MMHG | BODY MASS INDEX: 28.08 KG/M2 | HEART RATE: 70 BPM | HEIGHT: 66 IN

## 2021-04-15 DIAGNOSIS — F41.1 GAD (GENERALIZED ANXIETY DISORDER): Primary | ICD-10-CM

## 2021-04-15 DIAGNOSIS — R25.9 ABNORMAL MOVEMENTS: ICD-10-CM

## 2021-04-15 DIAGNOSIS — Z72.6 GAMBLING PROBLEM: ICD-10-CM

## 2021-04-15 DIAGNOSIS — Z65.8 PSYCHOSOCIAL STRESSORS: ICD-10-CM

## 2021-04-15 DIAGNOSIS — F31.9 BIPOLAR 1 DISORDER: ICD-10-CM

## 2021-04-15 DIAGNOSIS — G89.29 OTHER CHRONIC PAIN: ICD-10-CM

## 2021-04-15 PROCEDURE — 90833 PSYTX W PT W E/M 30 MIN: CPT | Performed by: STUDENT IN AN ORGANIZED HEALTH CARE EDUCATION/TRAINING PROGRAM

## 2021-04-15 PROCEDURE — 99999 PR PBB SHADOW E&M-EST. PATIENT-LVL III: CPT | Mod: PBBFAC,,, | Performed by: STUDENT IN AN ORGANIZED HEALTH CARE EDUCATION/TRAINING PROGRAM

## 2021-04-15 PROCEDURE — 99999 PR PBB SHADOW E&M-EST. PATIENT-LVL III: ICD-10-PCS | Mod: PBBFAC,,, | Performed by: STUDENT IN AN ORGANIZED HEALTH CARE EDUCATION/TRAINING PROGRAM

## 2021-04-15 PROCEDURE — 99213 OFFICE O/P EST LOW 20 MIN: CPT | Mod: PBBFAC | Performed by: STUDENT IN AN ORGANIZED HEALTH CARE EDUCATION/TRAINING PROGRAM

## 2021-04-15 PROCEDURE — 99999 PR STA SHADOW: CPT | Mod: PBBFAC,,, | Performed by: STUDENT IN AN ORGANIZED HEALTH CARE EDUCATION/TRAINING PROGRAM

## 2021-04-15 PROCEDURE — 99214 OFFICE O/P EST MOD 30 MIN: CPT | Mod: S$PBB | Performed by: STUDENT IN AN ORGANIZED HEALTH CARE EDUCATION/TRAINING PROGRAM

## 2021-04-15 RX ORDER — DULOXETIN HYDROCHLORIDE 60 MG/1
120 CAPSULE, DELAYED RELEASE ORAL DAILY
Qty: 60 CAPSULE | Refills: 5 | Status: SHIPPED | OUTPATIENT
Start: 2021-04-15 | End: 2021-05-18 | Stop reason: SDUPTHER

## 2021-04-15 RX ORDER — QUETIAPINE FUMARATE 300 MG/1
TABLET, FILM COATED ORAL
Qty: 45 TABLET | Refills: 5 | Status: SHIPPED | OUTPATIENT
Start: 2021-04-15 | End: 2021-11-11 | Stop reason: SDUPTHER

## 2021-04-19 ENCOUNTER — OFFICE VISIT (OUTPATIENT)
Dept: PAIN MEDICINE | Facility: CLINIC | Age: 71
End: 2021-04-19
Payer: MEDICARE

## 2021-04-19 ENCOUNTER — TELEPHONE (OUTPATIENT)
Dept: PAIN MEDICINE | Facility: CLINIC | Age: 71
End: 2021-04-19

## 2021-04-19 VITALS
DIASTOLIC BLOOD PRESSURE: 60 MMHG | HEIGHT: 66 IN | RESPIRATION RATE: 14 BRPM | OXYGEN SATURATION: 100 % | WEIGHT: 177.13 LBS | HEART RATE: 78 BPM | BODY MASS INDEX: 28.47 KG/M2 | SYSTOLIC BLOOD PRESSURE: 138 MMHG

## 2021-04-19 DIAGNOSIS — M70.61 GREATER TROCHANTERIC BURSITIS OF RIGHT HIP: ICD-10-CM

## 2021-04-19 DIAGNOSIS — M47.816 LUMBAR SPONDYLOSIS: ICD-10-CM

## 2021-04-19 DIAGNOSIS — M65.341 ACQUIRED TRIGGER FINGER OF RIGHT RING FINGER: Primary | ICD-10-CM

## 2021-04-19 DIAGNOSIS — G89.29 CHRONIC RIGHT-SIDED LOW BACK PAIN WITHOUT SCIATICA: ICD-10-CM

## 2021-04-19 DIAGNOSIS — M53.3 SACROILIAC JOINT DYSFUNCTION OF RIGHT SIDE: ICD-10-CM

## 2021-04-19 DIAGNOSIS — M54.50 CHRONIC RIGHT-SIDED LOW BACK PAIN WITHOUT SCIATICA: ICD-10-CM

## 2021-04-19 DIAGNOSIS — M47.816 LUMBAR SPONDYLOSIS: Primary | ICD-10-CM

## 2021-04-19 PROCEDURE — 99999 TENDON SHEATH: CPT | Mod: S$PBB,PBBFAC,, | Performed by: PHYSICAL MEDICINE & REHABILITATION

## 2021-04-19 PROCEDURE — 99999 PR PBB SHADOW E&M-EST. PATIENT-LVL III: CPT | Mod: PBBFAC,,, | Performed by: PHYSICAL MEDICINE & REHABILITATION

## 2021-04-19 PROCEDURE — 99213 OFFICE O/P EST LOW 20 MIN: CPT | Mod: PBBFAC,25 | Performed by: PHYSICAL MEDICINE & REHABILITATION

## 2021-04-19 PROCEDURE — 99999 LARGE JOINT ASPIRATION/INJECTION: R GREATER TROCHANTERIC BURSA: ICD-10-PCS | Mod: PBBFAC,,, | Performed by: PHYSICAL MEDICINE & REHABILITATION

## 2021-04-19 PROCEDURE — 20550 NJX 1 TENDON SHEATH/LIGAMENT: CPT | Mod: PBBFAC,RT,59 | Performed by: PHYSICAL MEDICINE & REHABILITATION

## 2021-04-19 PROCEDURE — 99214 OFFICE O/P EST MOD 30 MIN: CPT | Mod: S$PBB | Performed by: PHYSICAL MEDICINE & REHABILITATION

## 2021-04-19 PROCEDURE — 99999 PR STA SHADOW: CPT | Mod: PBBFAC,,, | Performed by: PHYSICAL MEDICINE & REHABILITATION

## 2021-04-19 PROCEDURE — 99999 LARGE JOINT ASPIRATION/INJECTION: R GREATER TROCHANTERIC BURSA: CPT | Mod: PBBFAC,,, | Performed by: PHYSICAL MEDICINE & REHABILITATION

## 2021-04-19 PROCEDURE — 20610 DRAIN/INJ JOINT/BURSA W/O US: CPT | Mod: S$PBB | Performed by: PHYSICAL MEDICINE & REHABILITATION

## 2021-04-19 RX ORDER — DICLOFENAC SODIUM 10 MG/G
2 GEL TOPICAL 4 TIMES DAILY PRN
Qty: 5 TUBE | Refills: 0 | Status: SHIPPED | OUTPATIENT
Start: 2021-04-19 | End: 2021-06-14

## 2021-04-19 RX ORDER — METHYLPREDNISOLONE ACETATE 40 MG/ML
40 INJECTION, SUSPENSION INTRA-ARTICULAR; INTRALESIONAL; INTRAMUSCULAR; SOFT TISSUE
Status: DISCONTINUED | OUTPATIENT
Start: 2021-04-19 | End: 2021-04-19 | Stop reason: HOSPADM

## 2021-04-19 RX ADMIN — METHYLPREDNISOLONE ACETATE 40 MG: 40 INJECTION, SUSPENSION INTRA-ARTICULAR; INTRALESIONAL; INTRAMUSCULAR; SOFT TISSUE at 01:04

## 2021-04-23 DIAGNOSIS — Z01.818 PRE-OP TESTING: ICD-10-CM

## 2021-04-28 ENCOUNTER — HOSPITAL ENCOUNTER (OUTPATIENT)
Dept: PREADMISSION TESTING | Facility: HOSPITAL | Age: 71
Discharge: HOME | End: 2021-04-28
Attending: PHYSICAL MEDICINE & REHABILITATION
Payer: MEDICARE

## 2021-04-28 DIAGNOSIS — Z01.818 PRE-OP TESTING: ICD-10-CM

## 2021-04-28 PROCEDURE — U0005 INFEC AGEN DETEC AMPLI PROBE: HCPCS | Performed by: PHYSICAL MEDICINE & REHABILITATION

## 2021-04-28 PROCEDURE — U0003 INFECTIOUS AGENT DETECTION BY NUCLEIC ACID (DNA OR RNA); SEVERE ACUTE RESPIRATORY SYNDROME CORONAVIRUS 2 (SARS-COV-2) (CORONAVIRUS DISEASE [COVID-19]), AMPLIFIED PROBE TECHNIQUE, MAKING USE OF HIGH THROUGHPUT TECHNOLOGIES AS DESCRIBED BY CMS-2020-01-R: HCPCS | Performed by: PHYSICAL MEDICINE & REHABILITATION

## 2021-04-29 LAB — SARS-COV-2 RNA RESP QL NAA+PROBE: NOT DETECTED

## 2021-04-30 ENCOUNTER — HOSPITAL ENCOUNTER (OUTPATIENT)
Dept: RADIOLOGY | Facility: HOSPITAL | Age: 71
Discharge: HOME | End: 2021-04-30
Attending: PHYSICAL MEDICINE & REHABILITATION
Payer: MEDICARE

## 2021-04-30 ENCOUNTER — HOSPITAL ENCOUNTER (OUTPATIENT)
Facility: HOSPITAL | Age: 71
Discharge: HOME | End: 2021-04-30
Attending: PHYSICAL MEDICINE & REHABILITATION | Admitting: PHYSICAL MEDICINE & REHABILITATION
Payer: MEDICARE

## 2021-04-30 VITALS
RESPIRATION RATE: 16 BRPM | SYSTOLIC BLOOD PRESSURE: 134 MMHG | HEART RATE: 65 BPM | OXYGEN SATURATION: 98 % | TEMPERATURE: 97 F | DIASTOLIC BLOOD PRESSURE: 73 MMHG

## 2021-04-30 DIAGNOSIS — M47.816 LUMBAR SPONDYLOSIS: ICD-10-CM

## 2021-04-30 DIAGNOSIS — R52 PAIN: ICD-10-CM

## 2021-04-30 DIAGNOSIS — M47.816 LUMBAR SPONDYLOSIS: Primary | ICD-10-CM

## 2021-04-30 PROCEDURE — 25500020 PHARM REV CODE 255: Performed by: PHYSICAL MEDICINE & REHABILITATION

## 2021-04-30 PROCEDURE — 64494 PR INJ DX/THER AGNT PARAVERT FACET JOINT,IMG GUIDE,LUMBAR/SAC, 2ND LEVEL: ICD-10-PCS | Mod: RT,,, | Performed by: PHYSICAL MEDICINE & REHABILITATION

## 2021-04-30 PROCEDURE — 63600175 PHARM REV CODE 636 W HCPCS: Performed by: PHYSICAL MEDICINE & REHABILITATION

## 2021-04-30 PROCEDURE — 64495 INJ PARAVERT F JNT L/S 3 LEV: CPT | Mod: RT | Performed by: PHYSICAL MEDICINE & REHABILITATION

## 2021-04-30 PROCEDURE — 64494 INJ PARAVERT F JNT L/S 2 LEV: CPT | Mod: RT,,, | Performed by: PHYSICAL MEDICINE & REHABILITATION

## 2021-04-30 PROCEDURE — 25000003 PHARM REV CODE 250: Performed by: PHYSICAL MEDICINE & REHABILITATION

## 2021-04-30 PROCEDURE — 64495 PR INJ DX/THER AGNT PARAVERT FACET JOINT,IMG GUIDE,LUMBAR/SAC, ADD LEVEL: ICD-10-PCS | Mod: RT,,, | Performed by: PHYSICAL MEDICINE & REHABILITATION

## 2021-04-30 PROCEDURE — 64493 INJ PARAVERT F JNT L/S 1 LEV: CPT | Mod: RT,,, | Performed by: PHYSICAL MEDICINE & REHABILITATION

## 2021-04-30 PROCEDURE — 64493 INJ PARAVERT F JNT L/S 1 LEV: CPT | Mod: RT | Performed by: PHYSICAL MEDICINE & REHABILITATION

## 2021-04-30 PROCEDURE — 64494 INJ PARAVERT F JNT L/S 2 LEV: CPT | Mod: RT | Performed by: PHYSICAL MEDICINE & REHABILITATION

## 2021-04-30 PROCEDURE — 64493 PR INJ DX/THER AGNT PARAVERT FACET JOINT,IMG GUIDE,LUMBAR/SAC,1ST LVL: ICD-10-PCS | Mod: RT,,, | Performed by: PHYSICAL MEDICINE & REHABILITATION

## 2021-04-30 PROCEDURE — 64495 INJ PARAVERT F JNT L/S 3 LEV: CPT | Mod: RT,,, | Performed by: PHYSICAL MEDICINE & REHABILITATION

## 2021-04-30 RX ORDER — LIDOCAINE HYDROCHLORIDE 10 MG/ML
INJECTION INFILTRATION; PERINEURAL
Status: DISCONTINUED | OUTPATIENT
Start: 2021-04-30 | End: 2021-04-30 | Stop reason: HOSPADM

## 2021-04-30 RX ORDER — FENTANYL CITRATE 50 UG/ML
INJECTION, SOLUTION INTRAMUSCULAR; INTRAVENOUS
Status: DISCONTINUED
Start: 2021-04-30 | End: 2021-04-30 | Stop reason: HOSPADM

## 2021-04-30 RX ORDER — FENTANYL CITRATE 50 UG/ML
INJECTION, SOLUTION INTRAMUSCULAR; INTRAVENOUS
Status: DISCONTINUED | OUTPATIENT
Start: 2021-04-30 | End: 2021-04-30 | Stop reason: HOSPADM

## 2021-04-30 RX ORDER — SODIUM CHLORIDE 9 MG/ML
INJECTION, SOLUTION INTRAVENOUS CONTINUOUS
Status: DISCONTINUED | OUTPATIENT
Start: 2021-04-30 | End: 2021-04-30 | Stop reason: HOSPADM

## 2021-04-30 RX ORDER — BUPIVACAINE HYDROCHLORIDE 2.5 MG/ML
INJECTION, SOLUTION EPIDURAL; INFILTRATION; INTRACAUDAL
Status: DISCONTINUED | OUTPATIENT
Start: 2021-04-30 | End: 2021-04-30 | Stop reason: HOSPADM

## 2021-04-30 RX ORDER — MIDAZOLAM HYDROCHLORIDE 1 MG/ML
INJECTION INTRAMUSCULAR; INTRAVENOUS
Status: DISCONTINUED
Start: 2021-04-30 | End: 2021-04-30 | Stop reason: HOSPADM

## 2021-04-30 RX ORDER — MIDAZOLAM HYDROCHLORIDE 1 MG/ML
INJECTION, SOLUTION INTRAMUSCULAR; INTRAVENOUS
Status: DISCONTINUED | OUTPATIENT
Start: 2021-04-30 | End: 2021-04-30 | Stop reason: HOSPADM

## 2021-04-30 RX ORDER — LIDOCAINE HYDROCHLORIDE 10 MG/ML
INJECTION INFILTRATION; PERINEURAL
Status: DISCONTINUED
Start: 2021-04-30 | End: 2021-04-30 | Stop reason: HOSPADM

## 2021-04-30 RX ORDER — BUPIVACAINE HYDROCHLORIDE 2.5 MG/ML
INJECTION, SOLUTION EPIDURAL; INFILTRATION; INTRACAUDAL
Status: DISCONTINUED
Start: 2021-04-30 | End: 2021-04-30 | Stop reason: HOSPADM

## 2021-05-18 RX ORDER — DULOXETIN HYDROCHLORIDE 60 MG/1
120 CAPSULE, DELAYED RELEASE ORAL DAILY
Qty: 60 CAPSULE | Refills: 5 | Status: SHIPPED | OUTPATIENT
Start: 2021-05-18 | End: 2021-11-11 | Stop reason: SDUPTHER

## 2021-07-15 ENCOUNTER — HOSPITAL ENCOUNTER (EMERGENCY)
Facility: HOSPITAL | Age: 71
Discharge: HOME | End: 2021-07-15
Attending: FAMILY MEDICINE
Payer: MEDICARE

## 2021-07-15 ENCOUNTER — PATIENT OUTREACH (OUTPATIENT)
Dept: ADMINISTRATIVE | Facility: OTHER | Age: 71
End: 2021-07-15

## 2021-07-15 VITALS
HEART RATE: 70 BPM | WEIGHT: 179.25 LBS | OXYGEN SATURATION: 98 % | DIASTOLIC BLOOD PRESSURE: 80 MMHG | RESPIRATION RATE: 16 BRPM | TEMPERATURE: 99 F | SYSTOLIC BLOOD PRESSURE: 150 MMHG | BODY MASS INDEX: 28.93 KG/M2

## 2021-07-15 DIAGNOSIS — M79.603 ARM PAIN: ICD-10-CM

## 2021-07-15 DIAGNOSIS — S60.511A ABRASION OF RIGHT HAND, INITIAL ENCOUNTER: ICD-10-CM

## 2021-07-15 DIAGNOSIS — M79.601 RIGHT ARM PAIN: ICD-10-CM

## 2021-07-15 DIAGNOSIS — S60.221A CONTUSION OF RIGHT HAND, INITIAL ENCOUNTER: Primary | ICD-10-CM

## 2021-07-15 DIAGNOSIS — M79.643 HAND PAIN: ICD-10-CM

## 2021-07-15 PROCEDURE — 63600175 PHARM REV CODE 636 W HCPCS: Performed by: NURSE PRACTITIONER

## 2021-07-15 PROCEDURE — 96372 THER/PROPH/DIAG INJ SC/IM: CPT

## 2021-07-15 PROCEDURE — 25000003 PHARM REV CODE 250: Performed by: FAMILY MEDICINE

## 2021-07-15 PROCEDURE — 63600175 PHARM REV CODE 636 W HCPCS: Performed by: FAMILY MEDICINE

## 2021-07-15 PROCEDURE — 99284 EMERGENCY DEPT VISIT MOD MDM: CPT | Mod: 25

## 2021-07-15 PROCEDURE — 90715 TDAP VACCINE 7 YRS/> IM: CPT | Performed by: NURSE PRACTITIONER

## 2021-07-15 PROCEDURE — 90471 IMMUNIZATION ADMIN: CPT | Performed by: NURSE PRACTITIONER

## 2021-07-15 PROCEDURE — 25000003 PHARM REV CODE 250: Performed by: NURSE PRACTITIONER

## 2021-07-15 RX ORDER — MUPIROCIN 20 MG/G
1 OINTMENT TOPICAL
Status: COMPLETED | OUTPATIENT
Start: 2021-07-15 | End: 2021-07-15

## 2021-07-15 RX ORDER — MORPHINE SULFATE 4 MG/ML
4 INJECTION, SOLUTION INTRAMUSCULAR; INTRAVENOUS
Status: COMPLETED | OUTPATIENT
Start: 2021-07-15 | End: 2021-07-15

## 2021-07-15 RX ORDER — SULFAMETHOXAZOLE AND TRIMETHOPRIM 800; 160 MG/1; MG/1
1 TABLET ORAL
Status: COMPLETED | OUTPATIENT
Start: 2021-07-15 | End: 2021-07-15

## 2021-07-15 RX ORDER — HYDROCODONE BITARTRATE AND ACETAMINOPHEN 5; 325 MG/1; MG/1
1 TABLET ORAL EVERY 4 HOURS PRN
Qty: 18 TABLET | Refills: 0 | Status: SHIPPED | OUTPATIENT
Start: 2021-07-15 | End: 2021-07-19

## 2021-07-15 RX ORDER — ONDANSETRON 4 MG/1
4 TABLET, ORALLY DISINTEGRATING ORAL
Status: COMPLETED | OUTPATIENT
Start: 2021-07-15 | End: 2021-07-15

## 2021-07-15 RX ORDER — SULFAMETHOXAZOLE AND TRIMETHOPRIM 800; 160 MG/1; MG/1
1 TABLET ORAL 2 TIMES DAILY
Qty: 14 TABLET | Refills: 0 | Status: SHIPPED | OUTPATIENT
Start: 2021-07-15 | End: 2021-07-22

## 2021-07-15 RX ADMIN — MORPHINE SULFATE 4 MG: 4 INJECTION INTRAVENOUS at 10:07

## 2021-07-15 RX ADMIN — SULFAMETHOXAZOLE AND TRIMETHOPRIM 1 TABLET: 800; 160 TABLET ORAL at 10:07

## 2021-07-15 RX ADMIN — CLOSTRIDIUM TETANI TOXOID ANTIGEN (FORMALDEHYDE INACTIVATED), CORYNEBACTERIUM DIPHTHERIAE TOXOID ANTIGEN (FORMALDEHYDE INACTIVATED), BORDETELLA PERTUSSIS TOXOID ANTIGEN (GLUTARALDEHYDE INACTIVATED), BORDETELLA PERTUSSIS FILAMENTOUS HEMAGGLUTININ ANTIGEN (FORMALDEHYDE INACTIVATED), BORDETELLA PERTUSSIS PERTACTIN ANTIGEN, AND BORDETELLA PERTUSSIS FIMBRIAE 2/3 ANTIGEN 0.5 ML: 5; 2; 2.5; 5; 3; 5 INJECTION, SUSPENSION INTRAMUSCULAR at 10:07

## 2021-07-15 RX ADMIN — MUPIROCIN 22 G: 20 OINTMENT TOPICAL at 10:07

## 2021-07-15 RX ADMIN — ONDANSETRON 4 MG: 4 TABLET, ORALLY DISINTEGRATING ORAL at 10:07

## 2021-07-19 ENCOUNTER — HOSPITAL ENCOUNTER (OUTPATIENT)
Dept: RADIOLOGY | Facility: HOSPITAL | Age: 71
Discharge: HOME | End: 2021-07-19
Attending: PHYSICAL MEDICINE & REHABILITATION
Payer: MEDICARE

## 2021-07-19 ENCOUNTER — OFFICE VISIT (OUTPATIENT)
Dept: PAIN MEDICINE | Facility: CLINIC | Age: 71
End: 2021-07-19
Payer: MEDICARE

## 2021-07-19 VITALS
WEIGHT: 178.81 LBS | SYSTOLIC BLOOD PRESSURE: 102 MMHG | OXYGEN SATURATION: 96 % | DIASTOLIC BLOOD PRESSURE: 60 MMHG | HEIGHT: 66 IN | RESPIRATION RATE: 16 BRPM | HEART RATE: 82 BPM | BODY MASS INDEX: 28.74 KG/M2

## 2021-07-19 DIAGNOSIS — M54.2 NECK PAIN: Primary | ICD-10-CM

## 2021-07-19 DIAGNOSIS — M54.2 NECK PAIN: ICD-10-CM

## 2021-07-19 DIAGNOSIS — G89.29 CHRONIC BILATERAL LOW BACK PAIN WITHOUT SCIATICA: ICD-10-CM

## 2021-07-19 DIAGNOSIS — M47.816 LUMBAR SPONDYLOSIS: ICD-10-CM

## 2021-07-19 DIAGNOSIS — M65.341 ACQUIRED TRIGGER FINGER OF RIGHT RING FINGER: ICD-10-CM

## 2021-07-19 DIAGNOSIS — M54.50 CHRONIC BILATERAL LOW BACK PAIN WITHOUT SCIATICA: ICD-10-CM

## 2021-07-19 PROCEDURE — 99999 PR STA SHADOW: ICD-10-PCS | Mod: PBBFAC,,, | Performed by: PHYSICAL MEDICINE & REHABILITATION

## 2021-07-19 PROCEDURE — 99214 OFFICE O/P EST MOD 30 MIN: CPT | Mod: PBBFAC | Performed by: PHYSICAL MEDICINE & REHABILITATION

## 2021-07-19 PROCEDURE — 72052 XR CERVICAL SPINE 5 VIEW WITH FLEX AND EXT: ICD-10-PCS | Mod: 26,,, | Performed by: RADIOLOGY

## 2021-07-19 PROCEDURE — 99999 PR PBB SHADOW E&M-EST. PATIENT-LVL IV: CPT | Mod: PBBFAC,,, | Performed by: PHYSICAL MEDICINE & REHABILITATION

## 2021-07-19 PROCEDURE — 99214 OFFICE O/P EST MOD 30 MIN: CPT | Mod: S$PBB | Performed by: PHYSICAL MEDICINE & REHABILITATION

## 2021-07-19 PROCEDURE — 72052 X-RAY EXAM NECK SPINE 6/>VWS: CPT | Mod: TC

## 2021-07-19 PROCEDURE — 72052 X-RAY EXAM NECK SPINE 6/>VWS: CPT | Mod: 26,,, | Performed by: RADIOLOGY

## 2021-07-19 PROCEDURE — 99999 PR STA SHADOW: CPT | Mod: PBBFAC,,, | Performed by: PHYSICAL MEDICINE & REHABILITATION

## 2021-07-19 RX ORDER — GABAPENTIN 300 MG/1
300 CAPSULE ORAL NIGHTLY
Qty: 30 CAPSULE | Refills: 11 | Status: ON HOLD | OUTPATIENT
Start: 2021-07-19 | End: 2021-07-30 | Stop reason: SDUPTHER

## 2021-07-19 RX ORDER — IBUPROFEN 600 MG/1
600 TABLET ORAL EVERY 8 HOURS PRN
Qty: 42 TABLET | Refills: 0 | Status: ON HOLD | OUTPATIENT
Start: 2021-07-19 | End: 2021-07-30

## 2021-07-27 RX ORDER — DULOXETIN HYDROCHLORIDE 60 MG/1
120 CAPSULE, DELAYED RELEASE ORAL DAILY
Qty: 60 CAPSULE | Refills: 5 | OUTPATIENT
Start: 2021-07-27

## 2021-07-28 ENCOUNTER — HOSPITAL ENCOUNTER (EMERGENCY)
Facility: HOSPITAL | Age: 71
Discharge: ANOTHER HOSPITAL | End: 2021-07-28
Attending: STUDENT IN AN ORGANIZED HEALTH CARE EDUCATION/TRAINING PROGRAM
Payer: MEDICARE

## 2021-07-28 ENCOUNTER — HOSPITAL ENCOUNTER (INPATIENT)
Facility: OTHER | Age: 71
LOS: 2 days | Discharge: HOME | DRG: 368 | End: 2021-07-30
Attending: INTERNAL MEDICINE | Admitting: INTERNAL MEDICINE
Payer: MEDICARE

## 2021-07-28 VITALS
SYSTOLIC BLOOD PRESSURE: 127 MMHG | BODY MASS INDEX: 29.62 KG/M2 | OXYGEN SATURATION: 100 % | DIASTOLIC BLOOD PRESSURE: 62 MMHG | TEMPERATURE: 98 F | RESPIRATION RATE: 20 BRPM | HEIGHT: 66 IN | WEIGHT: 184.31 LBS | HEART RATE: 90 BPM

## 2021-07-28 DIAGNOSIS — D62 ACUTE BLOOD LOSS ANEMIA: Primary | ICD-10-CM

## 2021-07-28 DIAGNOSIS — K92.2 GI BLEED: ICD-10-CM

## 2021-07-28 DIAGNOSIS — M54.50 CHRONIC BILATERAL LOW BACK PAIN WITHOUT SCIATICA: ICD-10-CM

## 2021-07-28 DIAGNOSIS — R07.9 CHEST PAIN: ICD-10-CM

## 2021-07-28 DIAGNOSIS — M54.2 NECK PAIN: ICD-10-CM

## 2021-07-28 DIAGNOSIS — G89.29 CHRONIC BILATERAL LOW BACK PAIN WITHOUT SCIATICA: ICD-10-CM

## 2021-07-28 DIAGNOSIS — R56.9 SEIZURE: ICD-10-CM

## 2021-07-28 DIAGNOSIS — M47.816 LUMBAR SPONDYLOSIS: ICD-10-CM

## 2021-07-28 DIAGNOSIS — K92.2 GASTROINTESTINAL HEMORRHAGE, UNSPECIFIED GASTROINTESTINAL HEMORRHAGE TYPE: Primary | ICD-10-CM

## 2021-07-28 LAB
ABO + RH BLD: NORMAL
ABO + RH BLD: NORMAL
ALBUMIN SERPL BCP-MCNC: 3 G/DL (ref 3.5–5.2)
ALP SERPL-CCNC: 59 U/L (ref 55–135)
ALT SERPL W/O P-5'-P-CCNC: 15 U/L (ref 10–44)
ANION GAP SERPL CALC-SCNC: 13 MMOL/L (ref 8–16)
APTT BLDCRRT: <21 SEC (ref 21–32)
AST SERPL-CCNC: 13 U/L (ref 10–40)
BASOPHILS # BLD AUTO: 0.03 K/UL (ref 0–0.2)
BASOPHILS # BLD AUTO: 0.04 K/UL (ref 0–0.2)
BASOPHILS NFR BLD: 0.3 % (ref 0–1.9)
BASOPHILS NFR BLD: 0.5 % (ref 0–1.9)
BILIRUB SERPL-MCNC: 0.2 MG/DL (ref 0.1–1)
BLD GP AB SCN CELLS X3 SERPL QL: NORMAL
BLD GP AB SCN CELLS X3 SERPL QL: NORMAL
BNP SERPL-MCNC: <10 PG/ML (ref 0–99)
BUN SERPL-MCNC: 64 MG/DL (ref 8–23)
CALCIUM SERPL-MCNC: 9 MG/DL (ref 8.7–10.5)
CHLORIDE SERPL-SCNC: 107 MMOL/L (ref 95–110)
CO2 SERPL-SCNC: 20 MMOL/L (ref 23–29)
CREAT SERPL-MCNC: 0.8 MG/DL (ref 0.5–1.4)
DIFFERENTIAL METHOD: ABNORMAL
DIFFERENTIAL METHOD: ABNORMAL
EOSINOPHIL # BLD AUTO: 0 K/UL (ref 0–0.5)
EOSINOPHIL # BLD AUTO: 0 K/UL (ref 0–0.5)
EOSINOPHIL NFR BLD: 0.3 % (ref 0–8)
EOSINOPHIL NFR BLD: 0.4 % (ref 0–8)
ERYTHROCYTE [DISTWIDTH] IN BLOOD BY AUTOMATED COUNT: 13.8 % (ref 11.5–14.5)
ERYTHROCYTE [DISTWIDTH] IN BLOOD BY AUTOMATED COUNT: 13.9 % (ref 11.5–14.5)
ERYTHROCYTE [DISTWIDTH] IN BLOOD BY AUTOMATED COUNT: 14 % (ref 11.5–14.5)
EST. GFR  (AFRICAN AMERICAN): >60 ML/MIN/1.73 M^2
EST. GFR  (NON AFRICAN AMERICAN): >60 ML/MIN/1.73 M^2
GLUCOSE SERPL-MCNC: 189 MG/DL (ref 70–110)
HCT VFR BLD AUTO: 24.5 % (ref 37–48.5)
HCT VFR BLD AUTO: 27.5 % (ref 37–48.5)
HCT VFR BLD AUTO: 28 % (ref 37–48.5)
HGB BLD-MCNC: 8.2 G/DL (ref 12–16)
HGB BLD-MCNC: 8.7 G/DL (ref 12–16)
HGB BLD-MCNC: 9.2 G/DL (ref 12–16)
IMM GRANULOCYTES # BLD AUTO: 0.05 K/UL (ref 0–0.04)
IMM GRANULOCYTES # BLD AUTO: 0.07 K/UL (ref 0–0.04)
IMM GRANULOCYTES NFR BLD AUTO: 0.5 % (ref 0–0.5)
IMM GRANULOCYTES NFR BLD AUTO: 0.9 % (ref 0–0.5)
INR PPP: 1 (ref 0.8–1.2)
LIPASE SERPL-CCNC: 18 U/L (ref 4–60)
LYMPHOCYTES # BLD AUTO: 1.9 K/UL (ref 1–4.8)
LYMPHOCYTES # BLD AUTO: 2.2 K/UL (ref 1–4.8)
LYMPHOCYTES NFR BLD: 20.5 % (ref 18–48)
LYMPHOCYTES NFR BLD: 23.5 % (ref 18–48)
MCH RBC QN AUTO: 29.4 PG (ref 27–31)
MCH RBC QN AUTO: 29.4 PG (ref 27–31)
MCH RBC QN AUTO: 30 PG (ref 27–31)
MCHC RBC AUTO-ENTMCNC: 31.6 G/DL (ref 32–36)
MCHC RBC AUTO-ENTMCNC: 32.9 G/DL (ref 32–36)
MCHC RBC AUTO-ENTMCNC: 33.5 G/DL (ref 32–36)
MCV RBC AUTO: 90 FL (ref 82–98)
MCV RBC AUTO: 90 FL (ref 82–98)
MCV RBC AUTO: 93 FL (ref 82–98)
MONOCYTES # BLD AUTO: 0.5 K/UL (ref 0.3–1)
MONOCYTES # BLD AUTO: 0.7 K/UL (ref 0.3–1)
MONOCYTES NFR BLD: 5.8 % (ref 4–15)
MONOCYTES NFR BLD: 6.5 % (ref 4–15)
NEUTROPHILS # BLD AUTO: 5.5 K/UL (ref 1.8–7.7)
NEUTROPHILS # BLD AUTO: 7.7 K/UL (ref 1.8–7.7)
NEUTROPHILS NFR BLD: 68.9 % (ref 38–73)
NEUTROPHILS NFR BLD: 71.9 % (ref 38–73)
NRBC BLD-RTO: 0 /100 WBC
NRBC BLD-RTO: 0 /100 WBC
OB PNL STL: POSITIVE
PLATELET # BLD AUTO: 214 K/UL (ref 150–450)
PLATELET # BLD AUTO: 252 K/UL (ref 150–450)
PLATELET # BLD AUTO: 319 K/UL (ref 150–450)
PMV BLD AUTO: 10.2 FL (ref 9.2–12.9)
PMV BLD AUTO: 10.3 FL (ref 9.2–12.9)
PMV BLD AUTO: 10.4 FL (ref 9.2–12.9)
POTASSIUM SERPL-SCNC: 4 MMOL/L (ref 3.5–5.1)
PROT SERPL-MCNC: 5.4 G/DL (ref 6–8.4)
PROTHROMBIN TIME: 11 SEC (ref 9–12.5)
RBC # BLD AUTO: 2.73 M/UL (ref 4–5.4)
RBC # BLD AUTO: 2.96 M/UL (ref 4–5.4)
RBC # BLD AUTO: 3.13 M/UL (ref 4–5.4)
SARS-COV-2 RDRP RESP QL NAA+PROBE: NEGATIVE
SODIUM SERPL-SCNC: 140 MMOL/L (ref 136–145)
TROPONIN I SERPL DL<=0.01 NG/ML-MCNC: 0.08 NG/ML (ref 0–0.03)
WBC # BLD AUTO: 10.69 K/UL (ref 3.9–12.7)
WBC # BLD AUTO: 18.55 K/UL (ref 3.9–12.7)
WBC # BLD AUTO: 7.93 K/UL (ref 3.9–12.7)

## 2021-07-28 PROCEDURE — 85730 THROMBOPLASTIN TIME PARTIAL: CPT | Performed by: STUDENT IN AN ORGANIZED HEALTH CARE EDUCATION/TRAINING PROGRAM

## 2021-07-28 PROCEDURE — P9021 RED BLOOD CELLS UNIT: HCPCS | Performed by: STUDENT IN AN ORGANIZED HEALTH CARE EDUCATION/TRAINING PROGRAM

## 2021-07-28 PROCEDURE — 36415 COLL VENOUS BLD VENIPUNCTURE: CPT | Performed by: INTERNAL MEDICINE

## 2021-07-28 PROCEDURE — 80053 COMPREHEN METABOLIC PANEL: CPT | Performed by: STUDENT IN AN ORGANIZED HEALTH CARE EDUCATION/TRAINING PROGRAM

## 2021-07-28 PROCEDURE — 99223 PR INITIAL HOSPITAL CARE,LEVL III: ICD-10-PCS | Mod: ,,, | Performed by: NURSE PRACTITIONER

## 2021-07-28 PROCEDURE — 63600175 PHARM REV CODE 636 W HCPCS: Performed by: STUDENT IN AN ORGANIZED HEALTH CARE EDUCATION/TRAINING PROGRAM

## 2021-07-28 PROCEDURE — 25000003 PHARM REV CODE 250: Performed by: STUDENT IN AN ORGANIZED HEALTH CARE EDUCATION/TRAINING PROGRAM

## 2021-07-28 PROCEDURE — 25500020 PHARM REV CODE 255: Performed by: STUDENT IN AN ORGANIZED HEALTH CARE EDUCATION/TRAINING PROGRAM

## 2021-07-28 PROCEDURE — 36430 TRANSFUSION BLD/BLD COMPNT: CPT

## 2021-07-28 PROCEDURE — 86900 BLOOD TYPING SEROLOGIC ABO: CPT | Performed by: STUDENT IN AN ORGANIZED HEALTH CARE EDUCATION/TRAINING PROGRAM

## 2021-07-28 PROCEDURE — 85025 COMPLETE CBC W/AUTO DIFF WBC: CPT | Mod: 91 | Performed by: NURSE PRACTITIONER

## 2021-07-28 PROCEDURE — 93010 ELECTROCARDIOGRAM REPORT: CPT | Mod: ,,, | Performed by: INTERNAL MEDICINE

## 2021-07-28 PROCEDURE — 36415 COLL VENOUS BLD VENIPUNCTURE: CPT | Performed by: NURSE PRACTITIONER

## 2021-07-28 PROCEDURE — 85027 COMPLETE CBC AUTOMATED: CPT | Performed by: INTERNAL MEDICINE

## 2021-07-28 PROCEDURE — 93005 ELECTROCARDIOGRAM TRACING: CPT

## 2021-07-28 PROCEDURE — 20000000 HC ICU ROOM

## 2021-07-28 PROCEDURE — 85025 COMPLETE CBC W/AUTO DIFF WBC: CPT | Performed by: STUDENT IN AN ORGANIZED HEALTH CARE EDUCATION/TRAINING PROGRAM

## 2021-07-28 PROCEDURE — 63600175 PHARM REV CODE 636 W HCPCS: Performed by: NURSE PRACTITIONER

## 2021-07-28 PROCEDURE — C9113 INJ PANTOPRAZOLE SODIUM, VIA: HCPCS | Performed by: STUDENT IN AN ORGANIZED HEALTH CARE EDUCATION/TRAINING PROGRAM

## 2021-07-28 PROCEDURE — 83880 ASSAY OF NATRIURETIC PEPTIDE: CPT | Performed by: STUDENT IN AN ORGANIZED HEALTH CARE EDUCATION/TRAINING PROGRAM

## 2021-07-28 PROCEDURE — 85610 PROTHROMBIN TIME: CPT | Performed by: STUDENT IN AN ORGANIZED HEALTH CARE EDUCATION/TRAINING PROGRAM

## 2021-07-28 PROCEDURE — 96366 THER/PROPH/DIAG IV INF ADDON: CPT

## 2021-07-28 PROCEDURE — 86900 BLOOD TYPING SEROLOGIC ABO: CPT | Mod: 91 | Performed by: NURSE PRACTITIONER

## 2021-07-28 PROCEDURE — 82272 OCCULT BLD FECES 1-3 TESTS: CPT | Performed by: STUDENT IN AN ORGANIZED HEALTH CARE EDUCATION/TRAINING PROGRAM

## 2021-07-28 PROCEDURE — 96368 THER/DIAG CONCURRENT INF: CPT | Mod: 59

## 2021-07-28 PROCEDURE — 86920 COMPATIBILITY TEST SPIN: CPT | Performed by: STUDENT IN AN ORGANIZED HEALTH CARE EDUCATION/TRAINING PROGRAM

## 2021-07-28 PROCEDURE — 86920 COMPATIBILITY TEST SPIN: CPT | Performed by: NURSE PRACTITIONER

## 2021-07-28 PROCEDURE — 25000003 PHARM REV CODE 250: Performed by: NURSE PRACTITIONER

## 2021-07-28 PROCEDURE — 96360 HYDRATION IV INFUSION INIT: CPT

## 2021-07-28 PROCEDURE — 84484 ASSAY OF TROPONIN QUANT: CPT | Performed by: STUDENT IN AN ORGANIZED HEALTH CARE EDUCATION/TRAINING PROGRAM

## 2021-07-28 PROCEDURE — 93010 EKG 12-LEAD: ICD-10-PCS | Mod: ,,, | Performed by: INTERNAL MEDICINE

## 2021-07-28 PROCEDURE — 36415 COLL VENOUS BLD VENIPUNCTURE: CPT | Performed by: STUDENT IN AN ORGANIZED HEALTH CARE EDUCATION/TRAINING PROGRAM

## 2021-07-28 PROCEDURE — 99285 EMERGENCY DEPT VISIT HI MDM: CPT | Mod: 25

## 2021-07-28 PROCEDURE — 83690 ASSAY OF LIPASE: CPT | Performed by: STUDENT IN AN ORGANIZED HEALTH CARE EDUCATION/TRAINING PROGRAM

## 2021-07-28 PROCEDURE — C9113 INJ PANTOPRAZOLE SODIUM, VIA: HCPCS | Performed by: NURSE PRACTITIONER

## 2021-07-28 PROCEDURE — U0002 COVID-19 LAB TEST NON-CDC: HCPCS | Performed by: STUDENT IN AN ORGANIZED HEALTH CARE EDUCATION/TRAINING PROGRAM

## 2021-07-28 PROCEDURE — 99223 1ST HOSP IP/OBS HIGH 75: CPT | Mod: ,,, | Performed by: NURSE PRACTITIONER

## 2021-07-28 PROCEDURE — 96365 THER/PROPH/DIAG IV INF INIT: CPT | Mod: 59

## 2021-07-28 RX ORDER — PANTOPRAZOLE SODIUM 40 MG/10ML
80 INJECTION, POWDER, LYOPHILIZED, FOR SOLUTION INTRAVENOUS
Status: COMPLETED | OUTPATIENT
Start: 2021-07-28 | End: 2021-07-28

## 2021-07-28 RX ORDER — OCTREOTIDE ACETATE 100 UG/ML
50 INJECTION, SOLUTION INTRAVENOUS; SUBCUTANEOUS
Status: COMPLETED | OUTPATIENT
Start: 2021-07-28 | End: 2021-07-28

## 2021-07-28 RX ORDER — GABAPENTIN 300 MG/1
300 CAPSULE ORAL NIGHTLY
Status: DISCONTINUED | OUTPATIENT
Start: 2021-07-28 | End: 2021-07-30 | Stop reason: HOSPADM

## 2021-07-28 RX ORDER — SODIUM CHLORIDE 0.9 % (FLUSH) 0.9 %
10 SYRINGE (ML) INJECTION
Status: DISCONTINUED | OUTPATIENT
Start: 2021-07-28 | End: 2021-07-28 | Stop reason: HOSPADM

## 2021-07-28 RX ORDER — HYDROCODONE BITARTRATE AND ACETAMINOPHEN 500; 5 MG/1; MG/1
TABLET ORAL
Status: DISCONTINUED | OUTPATIENT
Start: 2021-07-28 | End: 2021-07-28 | Stop reason: HOSPADM

## 2021-07-28 RX ORDER — LAMOTRIGINE 100 MG/1
300 TABLET ORAL NIGHTLY
Status: DISCONTINUED | OUTPATIENT
Start: 2021-07-28 | End: 2021-07-30 | Stop reason: HOSPADM

## 2021-07-28 RX ORDER — DULOXETIN HYDROCHLORIDE 30 MG/1
120 CAPSULE, DELAYED RELEASE ORAL DAILY
Status: DISCONTINUED | OUTPATIENT
Start: 2021-07-29 | End: 2021-07-30 | Stop reason: HOSPADM

## 2021-07-28 RX ORDER — SODIUM CHLORIDE 9 MG/ML
INJECTION, SOLUTION INTRAVENOUS CONTINUOUS
Status: DISCONTINUED | OUTPATIENT
Start: 2021-07-28 | End: 2021-07-29

## 2021-07-28 RX ADMIN — SODIUM CHLORIDE 1000 ML: 0.9 SOLUTION INTRAVENOUS at 10:07

## 2021-07-28 RX ADMIN — PANTOPRAZOLE SODIUM 8 MG/HR: 40 INJECTION, POWDER, FOR SOLUTION INTRAVENOUS at 05:07

## 2021-07-28 RX ADMIN — QUETIAPINE FUMARATE 450 MG: 200 TABLET ORAL at 08:07

## 2021-07-28 RX ADMIN — PANTOPRAZOLE SODIUM 8 MG/HR: 40 INJECTION, POWDER, FOR SOLUTION INTRAVENOUS at 10:07

## 2021-07-28 RX ADMIN — OCTREOTIDE ACETATE 50 MCG/HR: 500 INJECTION, SOLUTION INTRAVENOUS; SUBCUTANEOUS at 12:07

## 2021-07-28 RX ADMIN — PANTOPRAZOLE SODIUM 8 MG/HR: 40 INJECTION, POWDER, FOR SOLUTION INTRAVENOUS at 12:07

## 2021-07-28 RX ADMIN — IOHEXOL 75 ML: 350 INJECTION, SOLUTION INTRAVENOUS at 10:07

## 2021-07-28 RX ADMIN — LAMOTRIGINE 300 MG: 100 TABLET ORAL at 08:07

## 2021-07-28 RX ADMIN — SODIUM CHLORIDE: 0.9 INJECTION, SOLUTION INTRAVENOUS at 05:07

## 2021-07-28 RX ADMIN — OCTREOTIDE ACETATE 50 MCG: 100 INJECTION, SOLUTION INTRAVENOUS; SUBCUTANEOUS at 12:07

## 2021-07-28 RX ADMIN — GABAPENTIN 300 MG: 300 CAPSULE ORAL at 08:07

## 2021-07-28 RX ADMIN — PANTOPRAZOLE SODIUM 80 MG: 40 INJECTION, POWDER, FOR SOLUTION INTRAVENOUS at 11:07

## 2021-07-29 ENCOUNTER — ANESTHESIA (OUTPATIENT)
Dept: ENDOSCOPY | Facility: OTHER | Age: 71
DRG: 368 | End: 2021-07-29
Payer: MEDICARE

## 2021-07-29 ENCOUNTER — ANESTHESIA EVENT (OUTPATIENT)
Dept: ENDOSCOPY | Facility: OTHER | Age: 71
DRG: 368 | End: 2021-07-29
Payer: MEDICARE

## 2021-07-29 PROBLEM — D62 ACUTE BLOOD LOSS ANEMIA: Status: ACTIVE | Noted: 2021-07-29

## 2021-07-29 LAB
ALBUMIN SERPL BCP-MCNC: 2.7 G/DL (ref 3.5–5.2)
ALP SERPL-CCNC: 46 U/L (ref 55–135)
ALT SERPL W/O P-5'-P-CCNC: 11 U/L (ref 10–44)
ANION GAP SERPL CALC-SCNC: 4 MMOL/L (ref 8–16)
AST SERPL-CCNC: 13 U/L (ref 10–40)
BASOPHILS # BLD AUTO: 0.04 K/UL (ref 0–0.2)
BASOPHILS NFR BLD: 0.6 % (ref 0–1.9)
BILIRUB SERPL-MCNC: 0.3 MG/DL (ref 0.1–1)
BLD PROD TYP BPU: NORMAL
BLOOD UNIT EXPIRATION DATE: NORMAL
BLOOD UNIT TYPE CODE: 5100
BLOOD UNIT TYPE: NORMAL
BUN SERPL-MCNC: 29 MG/DL (ref 8–23)
CALCIUM SERPL-MCNC: 8 MG/DL (ref 8.7–10.5)
CHLORIDE SERPL-SCNC: 112 MMOL/L (ref 95–110)
CO2 SERPL-SCNC: 24 MMOL/L (ref 23–29)
CODING SYSTEM: NORMAL
CREAT SERPL-MCNC: 0.7 MG/DL (ref 0.5–1.4)
DIFFERENTIAL METHOD: ABNORMAL
DISPENSE STATUS: NORMAL
EOSINOPHIL # BLD AUTO: 0.1 K/UL (ref 0–0.5)
EOSINOPHIL NFR BLD: 1.3 % (ref 0–8)
ERYTHROCYTE [DISTWIDTH] IN BLOOD BY AUTOMATED COUNT: 14.1 % (ref 11.5–14.5)
EST. GFR  (AFRICAN AMERICAN): >60 ML/MIN/1.73 M^2
EST. GFR  (NON AFRICAN AMERICAN): >60 ML/MIN/1.73 M^2
GLUCOSE SERPL-MCNC: 112 MG/DL (ref 70–110)
HCT VFR BLD AUTO: 22.6 % (ref 37–48.5)
HGB BLD-MCNC: 7.4 G/DL (ref 12–16)
IMM GRANULOCYTES # BLD AUTO: 0.04 K/UL (ref 0–0.04)
IMM GRANULOCYTES NFR BLD AUTO: 0.6 % (ref 0–0.5)
LYMPHOCYTES # BLD AUTO: 2.3 K/UL (ref 1–4.8)
LYMPHOCYTES NFR BLD: 32.6 % (ref 18–48)
MAGNESIUM SERPL-MCNC: 2.1 MG/DL (ref 1.6–2.6)
MCH RBC QN AUTO: 29.5 PG (ref 27–31)
MCHC RBC AUTO-ENTMCNC: 32.7 G/DL (ref 32–36)
MCV RBC AUTO: 90 FL (ref 82–98)
MONOCYTES # BLD AUTO: 0.6 K/UL (ref 0.3–1)
MONOCYTES NFR BLD: 8 % (ref 4–15)
NEUTROPHILS # BLD AUTO: 4 K/UL (ref 1.8–7.7)
NEUTROPHILS NFR BLD: 56.9 % (ref 38–73)
NRBC BLD-RTO: 0 /100 WBC
NUM UNITS TRANS PACKED RBC: NORMAL
PHOSPHATE SERPL-MCNC: 2.6 MG/DL (ref 2.7–4.5)
PLATELET # BLD AUTO: 202 K/UL (ref 150–450)
PMV BLD AUTO: 10.1 FL (ref 9.2–12.9)
POTASSIUM SERPL-SCNC: 3.9 MMOL/L (ref 3.5–5.1)
PROT SERPL-MCNC: 4.8 G/DL (ref 6–8.4)
RBC # BLD AUTO: 2.51 M/UL (ref 4–5.4)
SODIUM SERPL-SCNC: 140 MMOL/L (ref 136–145)
TRANS ERYTHROCYTES VOL PATIENT: NORMAL ML
WBC # BLD AUTO: 7 K/UL (ref 3.9–12.7)

## 2021-07-29 PROCEDURE — 37000008 HC ANESTHESIA 1ST 15 MINUTES: Performed by: INTERNAL MEDICINE

## 2021-07-29 PROCEDURE — 63600175 PHARM REV CODE 636 W HCPCS: Performed by: NURSE ANESTHETIST, CERTIFIED REGISTERED

## 2021-07-29 PROCEDURE — 80053 COMPREHEN METABOLIC PANEL: CPT | Performed by: NURSE PRACTITIONER

## 2021-07-29 PROCEDURE — 25000003 PHARM REV CODE 250: Performed by: HOSPITALIST

## 2021-07-29 PROCEDURE — 20000000 HC ICU ROOM

## 2021-07-29 PROCEDURE — 43235 EGD DIAGNOSTIC BRUSH WASH: CPT | Performed by: INTERNAL MEDICINE

## 2021-07-29 PROCEDURE — 84100 ASSAY OF PHOSPHORUS: CPT | Performed by: NURSE PRACTITIONER

## 2021-07-29 PROCEDURE — 25000003 PHARM REV CODE 250: Performed by: NURSE PRACTITIONER

## 2021-07-29 PROCEDURE — 99233 SBSQ HOSP IP/OBS HIGH 50: CPT | Mod: ,,, | Performed by: HOSPITALIST

## 2021-07-29 PROCEDURE — 36415 COLL VENOUS BLD VENIPUNCTURE: CPT | Performed by: NURSE PRACTITIONER

## 2021-07-29 PROCEDURE — 88305 TISSUE EXAM BY PATHOLOGIST: CPT | Mod: 26,,, | Performed by: PATHOLOGY

## 2021-07-29 PROCEDURE — 83735 ASSAY OF MAGNESIUM: CPT | Performed by: NURSE PRACTITIONER

## 2021-07-29 PROCEDURE — 63600175 PHARM REV CODE 636 W HCPCS: Performed by: NURSE PRACTITIONER

## 2021-07-29 PROCEDURE — 85025 COMPLETE CBC W/AUTO DIFF WBC: CPT | Performed by: NURSE PRACTITIONER

## 2021-07-29 PROCEDURE — C9113 INJ PANTOPRAZOLE SODIUM, VIA: HCPCS | Performed by: NURSE PRACTITIONER

## 2021-07-29 PROCEDURE — 37000009 HC ANESTHESIA EA ADD 15 MINS: Performed by: INTERNAL MEDICINE

## 2021-07-29 PROCEDURE — 43239 EGD BIOPSY SINGLE/MULTIPLE: CPT | Performed by: INTERNAL MEDICINE

## 2021-07-29 PROCEDURE — 88305 TISSUE EXAM BY PATHOLOGIST: CPT | Performed by: PATHOLOGY

## 2021-07-29 PROCEDURE — 88305 TISSUE EXAM BY PATHOLOGIST: ICD-10-PCS | Mod: 26,,, | Performed by: PATHOLOGY

## 2021-07-29 PROCEDURE — 99233 PR SUBSEQUENT HOSPITAL CARE,LEVL III: ICD-10-PCS | Mod: ,,, | Performed by: HOSPITALIST

## 2021-07-29 RX ORDER — PANTOPRAZOLE SODIUM 40 MG/1
40 TABLET, DELAYED RELEASE ORAL 2 TIMES DAILY
Status: DISCONTINUED | OUTPATIENT
Start: 2021-07-29 | End: 2021-07-30 | Stop reason: HOSPADM

## 2021-07-29 RX ORDER — ONDANSETRON 2 MG/ML
4 INJECTION INTRAMUSCULAR; INTRAVENOUS EVERY 6 HOURS PRN
Status: DISCONTINUED | OUTPATIENT
Start: 2021-07-29 | End: 2021-07-30 | Stop reason: HOSPADM

## 2021-07-29 RX ORDER — OXYCODONE HYDROCHLORIDE 5 MG/1
5 TABLET ORAL ONCE
Status: COMPLETED | OUTPATIENT
Start: 2021-07-29 | End: 2021-07-29

## 2021-07-29 RX ORDER — ACETAMINOPHEN 500 MG
1000 TABLET ORAL EVERY 8 HOURS PRN
Status: DISCONTINUED | OUTPATIENT
Start: 2021-07-29 | End: 2021-07-30 | Stop reason: HOSPADM

## 2021-07-29 RX ORDER — PROPOFOL 10 MG/ML
VIAL (ML) INTRAVENOUS
Status: DISCONTINUED | OUTPATIENT
Start: 2021-07-29 | End: 2021-07-29

## 2021-07-29 RX ORDER — OXYCODONE HYDROCHLORIDE 5 MG/1
5 TABLET ORAL EVERY 6 HOURS PRN
Status: DISCONTINUED | OUTPATIENT
Start: 2021-07-29 | End: 2021-07-30 | Stop reason: HOSPADM

## 2021-07-29 RX ORDER — MORPHINE SULFATE 2 MG/ML
2 INJECTION, SOLUTION INTRAMUSCULAR; INTRAVENOUS ONCE
Status: DISCONTINUED | OUTPATIENT
Start: 2021-07-29 | End: 2021-07-29

## 2021-07-29 RX ADMIN — OXYCODONE 5 MG: 5 TABLET ORAL at 09:07

## 2021-07-29 RX ADMIN — PROPOFOL 40 MG: 10 INJECTION, EMULSION INTRAVENOUS at 08:07

## 2021-07-29 RX ADMIN — PANTOPRAZOLE SODIUM 40 MG: 40 TABLET, DELAYED RELEASE ORAL at 11:07

## 2021-07-29 RX ADMIN — GABAPENTIN 300 MG: 300 CAPSULE ORAL at 09:07

## 2021-07-29 RX ADMIN — ACETAMINOPHEN 1000 MG: 500 TABLET, FILM COATED ORAL at 07:07

## 2021-07-29 RX ADMIN — SODIUM CHLORIDE: 0.9 INJECTION, SOLUTION INTRAVENOUS at 02:07

## 2021-07-29 RX ADMIN — DULOXETINE 120 MG: 30 CAPSULE, DELAYED RELEASE ORAL at 08:07

## 2021-07-29 RX ADMIN — PANTOPRAZOLE SODIUM 8 MG/HR: 40 INJECTION, POWDER, FOR SOLUTION INTRAVENOUS at 08:07

## 2021-07-29 RX ADMIN — PANTOPRAZOLE SODIUM 8 MG/HR: 40 INJECTION, POWDER, FOR SOLUTION INTRAVENOUS at 02:07

## 2021-07-29 RX ADMIN — PANTOPRAZOLE SODIUM 40 MG: 40 TABLET, DELAYED RELEASE ORAL at 09:07

## 2021-07-29 RX ADMIN — PROPOFOL 50 MG: 10 INJECTION, EMULSION INTRAVENOUS at 07:07

## 2021-07-29 RX ADMIN — QUETIAPINE FUMARATE 450 MG: 200 TABLET ORAL at 09:07

## 2021-07-29 RX ADMIN — OXYCODONE 5 MG: 5 TABLET ORAL at 11:07

## 2021-07-29 RX ADMIN — LAMOTRIGINE 300 MG: 100 TABLET ORAL at 09:07

## 2021-07-30 ENCOUNTER — TELEPHONE (OUTPATIENT)
Dept: INTERNAL MEDICINE | Facility: CLINIC | Age: 71
End: 2021-07-30

## 2021-07-30 VITALS
HEIGHT: 66 IN | OXYGEN SATURATION: 99 % | RESPIRATION RATE: 18 BRPM | BODY MASS INDEX: 28.56 KG/M2 | HEART RATE: 74 BPM | SYSTOLIC BLOOD PRESSURE: 116 MMHG | DIASTOLIC BLOOD PRESSURE: 63 MMHG | TEMPERATURE: 98 F | WEIGHT: 177.69 LBS

## 2021-07-30 LAB
ALBUMIN SERPL BCP-MCNC: 2.6 G/DL (ref 3.5–5.2)
ALP SERPL-CCNC: 48 U/L (ref 55–135)
ALT SERPL W/O P-5'-P-CCNC: 11 U/L (ref 10–44)
ANION GAP SERPL CALC-SCNC: 4 MMOL/L (ref 8–16)
AST SERPL-CCNC: 14 U/L (ref 10–40)
BASOPHILS # BLD AUTO: 0.03 K/UL (ref 0–0.2)
BASOPHILS # BLD AUTO: 0.04 K/UL (ref 0–0.2)
BASOPHILS NFR BLD: 0.4 % (ref 0–1.9)
BASOPHILS NFR BLD: 0.5 % (ref 0–1.9)
BILIRUB SERPL-MCNC: 0.1 MG/DL (ref 0.1–1)
BLD PROD TYP BPU: NORMAL
BLOOD UNIT EXPIRATION DATE: NORMAL
BLOOD UNIT TYPE CODE: 5100
BLOOD UNIT TYPE: NORMAL
BUN SERPL-MCNC: 14 MG/DL (ref 8–23)
CALCIUM SERPL-MCNC: 8.1 MG/DL (ref 8.7–10.5)
CHLORIDE SERPL-SCNC: 110 MMOL/L (ref 95–110)
CO2 SERPL-SCNC: 26 MMOL/L (ref 23–29)
CODING SYSTEM: NORMAL
CREAT SERPL-MCNC: 0.7 MG/DL (ref 0.5–1.4)
DIFFERENTIAL METHOD: ABNORMAL
DIFFERENTIAL METHOD: ABNORMAL
DISPENSE STATUS: NORMAL
EOSINOPHIL # BLD AUTO: 0.1 K/UL (ref 0–0.5)
EOSINOPHIL # BLD AUTO: 0.2 K/UL (ref 0–0.5)
EOSINOPHIL NFR BLD: 1 % (ref 0–8)
EOSINOPHIL NFR BLD: 2.6 % (ref 0–8)
ERYTHROCYTE [DISTWIDTH] IN BLOOD BY AUTOMATED COUNT: 13.7 % (ref 11.5–14.5)
ERYTHROCYTE [DISTWIDTH] IN BLOOD BY AUTOMATED COUNT: 14.2 % (ref 11.5–14.5)
EST. GFR  (AFRICAN AMERICAN): >60 ML/MIN/1.73 M^2
EST. GFR  (NON AFRICAN AMERICAN): >60 ML/MIN/1.73 M^2
GLUCOSE SERPL-MCNC: 97 MG/DL (ref 70–110)
HCT VFR BLD AUTO: 20 % (ref 37–48.5)
HCT VFR BLD AUTO: 24.7 % (ref 37–48.5)
HGB BLD-MCNC: 6.4 G/DL (ref 12–16)
HGB BLD-MCNC: 8.1 G/DL (ref 12–16)
IMM GRANULOCYTES # BLD AUTO: 0.05 K/UL (ref 0–0.04)
IMM GRANULOCYTES # BLD AUTO: 0.12 K/UL (ref 0–0.04)
IMM GRANULOCYTES NFR BLD AUTO: 0.9 % (ref 0–0.5)
IMM GRANULOCYTES NFR BLD AUTO: 1.1 % (ref 0–0.5)
LYMPHOCYTES # BLD AUTO: 1.9 K/UL (ref 1–4.8)
LYMPHOCYTES # BLD AUTO: 2.7 K/UL (ref 1–4.8)
LYMPHOCYTES NFR BLD: 17.9 % (ref 18–48)
LYMPHOCYTES NFR BLD: 47.6 % (ref 18–48)
MAGNESIUM SERPL-MCNC: 2 MG/DL (ref 1.6–2.6)
MCH RBC QN AUTO: 29.6 PG (ref 27–31)
MCH RBC QN AUTO: 29.8 PG (ref 27–31)
MCHC RBC AUTO-ENTMCNC: 32 G/DL (ref 32–36)
MCHC RBC AUTO-ENTMCNC: 32.8 G/DL (ref 32–36)
MCV RBC AUTO: 91 FL (ref 82–98)
MCV RBC AUTO: 93 FL (ref 82–98)
MONOCYTES # BLD AUTO: 0.4 K/UL (ref 0.3–1)
MONOCYTES # BLD AUTO: 0.7 K/UL (ref 0.3–1)
MONOCYTES NFR BLD: 6.4 % (ref 4–15)
MONOCYTES NFR BLD: 7.7 % (ref 4–15)
NEUTROPHILS # BLD AUTO: 2.3 K/UL (ref 1.8–7.7)
NEUTROPHILS # BLD AUTO: 7.8 K/UL (ref 1.8–7.7)
NEUTROPHILS NFR BLD: 40.7 % (ref 38–73)
NEUTROPHILS NFR BLD: 73.2 % (ref 38–73)
NRBC BLD-RTO: 0 /100 WBC
NRBC BLD-RTO: 0 /100 WBC
NUM UNITS TRANS PACKED RBC: NORMAL
PHOSPHATE SERPL-MCNC: 3.3 MG/DL (ref 2.7–4.5)
PLATELET # BLD AUTO: 174 K/UL (ref 150–450)
PLATELET # BLD AUTO: 185 K/UL (ref 150–450)
PMV BLD AUTO: 10.2 FL (ref 9.2–12.9)
PMV BLD AUTO: 10.3 FL (ref 9.2–12.9)
POTASSIUM SERPL-SCNC: 3.8 MMOL/L (ref 3.5–5.1)
PROT SERPL-MCNC: 4.7 G/DL (ref 6–8.4)
RBC # BLD AUTO: 2.16 M/UL (ref 4–5.4)
RBC # BLD AUTO: 2.72 M/UL (ref 4–5.4)
SODIUM SERPL-SCNC: 140 MMOL/L (ref 136–145)
WBC # BLD AUTO: 10.69 K/UL (ref 3.9–12.7)
WBC # BLD AUTO: 5.73 K/UL (ref 3.9–12.7)

## 2021-07-30 PROCEDURE — 99239 PR HOSPITAL DISCHARGE DAY,>30 MIN: ICD-10-PCS | Mod: ,,, | Performed by: HOSPITALIST

## 2021-07-30 PROCEDURE — 25000003 PHARM REV CODE 250: Performed by: HOSPITALIST

## 2021-07-30 PROCEDURE — 36430 TRANSFUSION BLD/BLD COMPNT: CPT

## 2021-07-30 PROCEDURE — 85025 COMPLETE CBC W/AUTO DIFF WBC: CPT | Performed by: NURSE PRACTITIONER

## 2021-07-30 PROCEDURE — 83735 ASSAY OF MAGNESIUM: CPT | Performed by: NURSE PRACTITIONER

## 2021-07-30 PROCEDURE — 99239 HOSP IP/OBS DSCHRG MGMT >30: CPT | Mod: ,,, | Performed by: HOSPITALIST

## 2021-07-30 PROCEDURE — 80053 COMPREHEN METABOLIC PANEL: CPT | Performed by: NURSE PRACTITIONER

## 2021-07-30 PROCEDURE — 84100 ASSAY OF PHOSPHORUS: CPT | Performed by: NURSE PRACTITIONER

## 2021-07-30 PROCEDURE — 36415 COLL VENOUS BLD VENIPUNCTURE: CPT | Performed by: NURSE PRACTITIONER

## 2021-07-30 PROCEDURE — P9038 RBC IRRADIATED: HCPCS | Performed by: NURSE PRACTITIONER

## 2021-07-30 PROCEDURE — 25000003 PHARM REV CODE 250: Performed by: NURSE PRACTITIONER

## 2021-07-30 RX ORDER — GABAPENTIN 300 MG/1
300 CAPSULE ORAL 3 TIMES DAILY
Qty: 90 CAPSULE | Refills: 0 | Status: SHIPPED | OUTPATIENT
Start: 2021-07-30 | End: 2021-08-25 | Stop reason: SDUPTHER

## 2021-07-30 RX ORDER — ACETAMINOPHEN 500 MG
1000 TABLET ORAL EVERY 8 HOURS PRN
Qty: 30 TABLET | Refills: 0 | Status: ON HOLD | COMMUNITY
Start: 2021-07-30 | End: 2023-08-11 | Stop reason: HOSPADM

## 2021-07-30 RX ORDER — OXYCODONE HYDROCHLORIDE 5 MG/1
5 TABLET ORAL EVERY 8 HOURS PRN
Qty: 21 TABLET | Refills: 0 | Status: SHIPPED | OUTPATIENT
Start: 2021-07-30 | End: 2021-08-06

## 2021-07-30 RX ORDER — HYDROCODONE BITARTRATE AND ACETAMINOPHEN 500; 5 MG/1; MG/1
TABLET ORAL
Status: DISCONTINUED | OUTPATIENT
Start: 2021-07-30 | End: 2021-07-30 | Stop reason: HOSPADM

## 2021-07-30 RX ORDER — PANTOPRAZOLE SODIUM 40 MG/1
40 TABLET, DELAYED RELEASE ORAL 2 TIMES DAILY
Qty: 60 TABLET | Refills: 0 | Status: SHIPPED | OUTPATIENT
Start: 2021-07-30 | End: 2021-08-25 | Stop reason: SDUPTHER

## 2021-07-30 RX ORDER — MUPIROCIN 20 MG/G
OINTMENT TOPICAL 2 TIMES DAILY
Status: DISCONTINUED | OUTPATIENT
Start: 2021-07-30 | End: 2021-07-30 | Stop reason: HOSPADM

## 2021-07-30 RX ADMIN — DULOXETINE 120 MG: 30 CAPSULE, DELAYED RELEASE ORAL at 08:07

## 2021-07-30 RX ADMIN — MUPIROCIN: 20 OINTMENT TOPICAL at 08:07

## 2021-07-30 RX ADMIN — PANTOPRAZOLE SODIUM 40 MG: 40 TABLET, DELAYED RELEASE ORAL at 08:07

## 2021-08-02 ENCOUNTER — TELEPHONE (OUTPATIENT)
Dept: INTERNAL MEDICINE | Facility: CLINIC | Age: 71
End: 2021-08-02

## 2021-08-02 ENCOUNTER — LAB VISIT (OUTPATIENT)
Dept: LAB | Facility: HOSPITAL | Age: 71
End: 2021-08-02
Attending: NURSE PRACTITIONER
Payer: MEDICARE

## 2021-08-02 ENCOUNTER — PATIENT OUTREACH (OUTPATIENT)
Dept: ADMINISTRATIVE | Facility: CLINIC | Age: 71
End: 2021-08-02

## 2021-08-02 DIAGNOSIS — D62 ACUTE BLOOD LOSS ANEMIA: Primary | ICD-10-CM

## 2021-08-02 DIAGNOSIS — D62 ACUTE BLOOD LOSS ANEMIA: ICD-10-CM

## 2021-08-02 LAB
BASOPHILS # BLD AUTO: 0.04 K/UL (ref 0–0.2)
BASOPHILS NFR BLD: 0.7 % (ref 0–1.9)
DIFFERENTIAL METHOD: ABNORMAL
EOSINOPHIL # BLD AUTO: 0.1 K/UL (ref 0–0.5)
EOSINOPHIL NFR BLD: 2 % (ref 0–8)
ERYTHROCYTE [DISTWIDTH] IN BLOOD BY AUTOMATED COUNT: 15 % (ref 11.5–14.5)
FINAL PATHOLOGIC DIAGNOSIS: NORMAL
GROSS: NORMAL
HCT VFR BLD AUTO: 27.1 % (ref 37–48.5)
HGB BLD-MCNC: 8.7 G/DL (ref 12–16)
IMM GRANULOCYTES # BLD AUTO: 0.09 K/UL (ref 0–0.04)
IMM GRANULOCYTES NFR BLD AUTO: 1.6 % (ref 0–0.5)
LYMPHOCYTES # BLD AUTO: 1.5 K/UL (ref 1–4.8)
LYMPHOCYTES NFR BLD: 27.7 % (ref 18–48)
Lab: NORMAL
MCH RBC QN AUTO: 30.2 PG (ref 27–31)
MCHC RBC AUTO-ENTMCNC: 32.1 G/DL (ref 32–36)
MCV RBC AUTO: 94 FL (ref 82–98)
MONOCYTES # BLD AUTO: 0.5 K/UL (ref 0.3–1)
MONOCYTES NFR BLD: 8.9 % (ref 4–15)
NEUTROPHILS # BLD AUTO: 3.2 K/UL (ref 1.8–7.7)
NEUTROPHILS NFR BLD: 59.1 % (ref 38–73)
NRBC BLD-RTO: 0 /100 WBC
PLATELET # BLD AUTO: 281 K/UL (ref 150–450)
PMV BLD AUTO: 10.4 FL (ref 9.2–12.9)
RBC # BLD AUTO: 2.88 M/UL (ref 4–5.4)
WBC # BLD AUTO: 5.48 K/UL (ref 3.9–12.7)

## 2021-08-02 PROCEDURE — 85025 COMPLETE CBC W/AUTO DIFF WBC: CPT | Performed by: NURSE PRACTITIONER

## 2021-08-02 PROCEDURE — 36415 COLL VENOUS BLD VENIPUNCTURE: CPT | Performed by: NURSE PRACTITIONER

## 2021-08-03 ENCOUNTER — PATIENT MESSAGE (OUTPATIENT)
Dept: INTERNAL MEDICINE | Facility: CLINIC | Age: 71
End: 2021-08-03

## 2021-08-04 ENCOUNTER — OFFICE VISIT (OUTPATIENT)
Dept: INTERNAL MEDICINE | Facility: CLINIC | Age: 71
End: 2021-08-04
Payer: MEDICARE

## 2021-08-04 VITALS
HEART RATE: 76 BPM | HEIGHT: 66 IN | RESPIRATION RATE: 16 BRPM | WEIGHT: 184.75 LBS | SYSTOLIC BLOOD PRESSURE: 110 MMHG | DIASTOLIC BLOOD PRESSURE: 56 MMHG | BODY MASS INDEX: 29.69 KG/M2

## 2021-08-04 DIAGNOSIS — D62 ACUTE BLOOD LOSS ANEMIA: Primary | ICD-10-CM

## 2021-08-04 DIAGNOSIS — K92.2 ACUTE GI BLEEDING: ICD-10-CM

## 2021-08-04 DIAGNOSIS — F17.200 SMOKER: ICD-10-CM

## 2021-08-04 DIAGNOSIS — F41.1 GAD (GENERALIZED ANXIETY DISORDER): ICD-10-CM

## 2021-08-04 PROCEDURE — 99999 PR PBB SHADOW E&M-EST. PATIENT-LVL IV: CPT | Mod: PBBFAC,,, | Performed by: NURSE PRACTITIONER

## 2021-08-04 PROCEDURE — 99999 PR PBB SHADOW E&M-EST. PATIENT-LVL IV: ICD-10-PCS | Mod: PBBFAC,,, | Performed by: NURSE PRACTITIONER

## 2021-08-04 PROCEDURE — 99999 PR STA SHADOW: CPT | Mod: PBBFAC,,, | Performed by: NURSE PRACTITIONER

## 2021-08-04 PROCEDURE — 99214 OFFICE O/P EST MOD 30 MIN: CPT | Mod: PBBFAC | Performed by: NURSE PRACTITIONER

## 2021-08-04 PROCEDURE — 99214 OFFICE O/P EST MOD 30 MIN: CPT | Mod: S$PBB | Performed by: NURSE PRACTITIONER

## 2021-08-04 RX ORDER — IRON POLYSACCHARIDE COMPLEX 150 MG
150 CAPSULE ORAL DAILY
Qty: 30 CAPSULE | Refills: 0 | Status: SHIPPED | OUTPATIENT
Start: 2021-08-04 | End: 2021-08-25

## 2021-08-18 ENCOUNTER — LAB VISIT (OUTPATIENT)
Dept: LAB | Facility: HOSPITAL | Age: 71
End: 2021-08-18
Attending: NURSE PRACTITIONER
Payer: MEDICARE

## 2021-08-18 DIAGNOSIS — D62 ACUTE BLOOD LOSS ANEMIA: ICD-10-CM

## 2021-08-18 LAB
BASOPHILS # BLD AUTO: 0.04 K/UL (ref 0–0.2)
BASOPHILS NFR BLD: 1.1 % (ref 0–1.9)
DIFFERENTIAL METHOD: ABNORMAL
EOSINOPHIL # BLD AUTO: 0.2 K/UL (ref 0–0.5)
EOSINOPHIL NFR BLD: 4.3 % (ref 0–8)
ERYTHROCYTE [DISTWIDTH] IN BLOOD BY AUTOMATED COUNT: 15.4 % (ref 11.5–14.5)
HCT VFR BLD AUTO: 35.5 % (ref 37–48.5)
HGB BLD-MCNC: 11.1 G/DL (ref 12–16)
IMM GRANULOCYTES # BLD AUTO: 0.01 K/UL (ref 0–0.04)
IMM GRANULOCYTES NFR BLD AUTO: 0.3 % (ref 0–0.5)
LYMPHOCYTES # BLD AUTO: 1.3 K/UL (ref 1–4.8)
LYMPHOCYTES NFR BLD: 34.1 % (ref 18–48)
MCH RBC QN AUTO: 30.2 PG (ref 27–31)
MCHC RBC AUTO-ENTMCNC: 31.3 G/DL (ref 32–36)
MCV RBC AUTO: 97 FL (ref 82–98)
MONOCYTES # BLD AUTO: 0.4 K/UL (ref 0.3–1)
MONOCYTES NFR BLD: 11.4 % (ref 4–15)
NEUTROPHILS # BLD AUTO: 1.8 K/UL (ref 1.8–7.7)
NEUTROPHILS NFR BLD: 48.8 % (ref 38–73)
NRBC BLD-RTO: 0 /100 WBC
PLATELET # BLD AUTO: 281 K/UL (ref 150–450)
PMV BLD AUTO: 9.9 FL (ref 9.2–12.9)
RBC # BLD AUTO: 3.68 M/UL (ref 4–5.4)
WBC # BLD AUTO: 3.7 K/UL (ref 3.9–12.7)

## 2021-08-18 PROCEDURE — 36415 COLL VENOUS BLD VENIPUNCTURE: CPT | Performed by: NURSE PRACTITIONER

## 2021-08-18 PROCEDURE — 85025 COMPLETE CBC W/AUTO DIFF WBC: CPT | Performed by: NURSE PRACTITIONER

## 2021-08-19 ENCOUNTER — PATIENT MESSAGE (OUTPATIENT)
Dept: INTERNAL MEDICINE | Facility: CLINIC | Age: 71
End: 2021-08-19

## 2021-08-25 ENCOUNTER — OFFICE VISIT (OUTPATIENT)
Dept: INTERNAL MEDICINE | Facility: CLINIC | Age: 71
End: 2021-08-25
Payer: MEDICARE

## 2021-08-25 VITALS
HEIGHT: 66 IN | BODY MASS INDEX: 28.16 KG/M2 | RESPIRATION RATE: 18 BRPM | SYSTOLIC BLOOD PRESSURE: 128 MMHG | OXYGEN SATURATION: 96 % | WEIGHT: 175.25 LBS | HEART RATE: 74 BPM | DIASTOLIC BLOOD PRESSURE: 54 MMHG

## 2021-08-25 DIAGNOSIS — D62 ACUTE BLOOD LOSS ANEMIA: ICD-10-CM

## 2021-08-25 DIAGNOSIS — F17.200 SMOKER: ICD-10-CM

## 2021-08-25 DIAGNOSIS — M54.50 CHRONIC BILATERAL LOW BACK PAIN WITHOUT SCIATICA: ICD-10-CM

## 2021-08-25 DIAGNOSIS — J20.9 ACUTE BRONCHITIS, UNSPECIFIED ORGANISM: ICD-10-CM

## 2021-08-25 DIAGNOSIS — F41.1 GENERALIZED ANXIETY DISORDER: ICD-10-CM

## 2021-08-25 DIAGNOSIS — J44.1 COPD EXACERBATION: ICD-10-CM

## 2021-08-25 DIAGNOSIS — R79.9 ABNORMAL FINDING OF BLOOD CHEMISTRY, UNSPECIFIED: ICD-10-CM

## 2021-08-25 DIAGNOSIS — M47.816 LUMBAR SPONDYLOSIS: ICD-10-CM

## 2021-08-25 DIAGNOSIS — G89.29 CHRONIC BILATERAL LOW BACK PAIN WITHOUT SCIATICA: ICD-10-CM

## 2021-08-25 DIAGNOSIS — M54.2 NECK PAIN: ICD-10-CM

## 2021-08-25 DIAGNOSIS — K92.2 ACUTE GI BLEEDING: Primary | ICD-10-CM

## 2021-08-25 PROCEDURE — 99999 PR PBB SHADOW E&M-EST. PATIENT-LVL III: ICD-10-PCS | Mod: PBBFAC,,, | Performed by: NURSE PRACTITIONER

## 2021-08-25 PROCEDURE — 99999 PR STA SHADOW: CPT | Mod: PBBFAC,,, | Performed by: NURSE PRACTITIONER

## 2021-08-25 PROCEDURE — 99214 OFFICE O/P EST MOD 30 MIN: CPT | Mod: S$PBB | Performed by: NURSE PRACTITIONER

## 2021-08-25 PROCEDURE — 99213 OFFICE O/P EST LOW 20 MIN: CPT | Mod: PBBFAC | Performed by: NURSE PRACTITIONER

## 2021-08-25 PROCEDURE — 99999 PR PBB SHADOW E&M-EST. PATIENT-LVL III: CPT | Mod: PBBFAC,,, | Performed by: NURSE PRACTITIONER

## 2021-08-25 RX ORDER — IRON POLYSACCHARIDE COMPLEX 150 MG
150 CAPSULE ORAL EVERY OTHER DAY
Qty: 30 CAPSULE | Refills: 0 | Status: SHIPPED | OUTPATIENT
Start: 2021-08-25 | End: 2021-09-20

## 2021-08-25 RX ORDER — ALBUTEROL SULFATE 90 UG/1
AEROSOL, METERED RESPIRATORY (INHALATION)
Qty: 18 G | Refills: 0 | Status: SHIPPED | OUTPATIENT
Start: 2021-08-25 | End: 2021-12-20

## 2021-08-25 RX ORDER — GABAPENTIN 300 MG/1
300 CAPSULE ORAL 3 TIMES DAILY
Qty: 90 CAPSULE | Refills: 0 | Status: SHIPPED | OUTPATIENT
Start: 2021-08-25 | End: 2021-11-02 | Stop reason: SDUPTHER

## 2021-08-25 RX ORDER — PANTOPRAZOLE SODIUM 40 MG/1
40 TABLET, DELAYED RELEASE ORAL DAILY
Qty: 30 TABLET | Refills: 5 | Status: SHIPPED | OUTPATIENT
Start: 2021-08-25 | End: 2021-12-20

## 2021-08-26 ENCOUNTER — TELEPHONE (OUTPATIENT)
Dept: INTERNAL MEDICINE | Facility: CLINIC | Age: 71
End: 2021-08-26

## 2021-10-25 ENCOUNTER — OFFICE VISIT (OUTPATIENT)
Dept: PAIN MEDICINE | Facility: CLINIC | Age: 71
End: 2021-10-25
Payer: MEDICARE

## 2021-10-25 VITALS
SYSTOLIC BLOOD PRESSURE: 116 MMHG | WEIGHT: 181.13 LBS | HEIGHT: 66 IN | BODY MASS INDEX: 29.11 KG/M2 | RESPIRATION RATE: 19 BRPM | DIASTOLIC BLOOD PRESSURE: 64 MMHG

## 2021-10-25 DIAGNOSIS — M65.4 RADIAL STYLOID TENOSYNOVITIS (DE QUERVAIN): Primary | ICD-10-CM

## 2021-10-25 DIAGNOSIS — M54.2 NECK PAIN: ICD-10-CM

## 2021-10-25 DIAGNOSIS — M54.12 CERVICAL RADICULOPATHY: ICD-10-CM

## 2021-10-25 DIAGNOSIS — M47.812 CERVICAL SPONDYLOSIS: ICD-10-CM

## 2021-10-25 DIAGNOSIS — M25.532 LEFT WRIST PAIN: ICD-10-CM

## 2021-10-25 PROCEDURE — 99999 TENDON SHEATH: CPT | Mod: S$PBB,PBBFAC,, | Performed by: PHYSICAL MEDICINE & REHABILITATION

## 2021-10-25 PROCEDURE — 99999 PR STA SHADOW: CPT | Mod: PBBFAC,,, | Performed by: PHYSICAL MEDICINE & REHABILITATION

## 2021-10-25 PROCEDURE — 99214 OFFICE O/P EST MOD 30 MIN: CPT | Mod: PBBFAC | Performed by: PHYSICAL MEDICINE & REHABILITATION

## 2021-10-25 PROCEDURE — 20550 NJX 1 TENDON SHEATH/LIGAMENT: CPT | Mod: S$PBB,LT | Performed by: PHYSICAL MEDICINE & REHABILITATION

## 2021-10-25 PROCEDURE — 99214 OFFICE O/P EST MOD 30 MIN: CPT | Mod: S$PBB | Performed by: PHYSICAL MEDICINE & REHABILITATION

## 2021-10-25 PROCEDURE — 99999 PR PBB SHADOW E&M-EST. PATIENT-LVL IV: CPT | Mod: PBBFAC,,, | Performed by: PHYSICAL MEDICINE & REHABILITATION

## 2021-10-25 PROCEDURE — 99999 PR STA SHADOW: ICD-10-PCS | Mod: PBBFAC,,, | Performed by: PHYSICAL MEDICINE & REHABILITATION

## 2021-10-25 RX ORDER — METHYLPREDNISOLONE ACETATE 40 MG/ML
40 INJECTION, SUSPENSION INTRA-ARTICULAR; INTRALESIONAL; INTRAMUSCULAR; SOFT TISSUE
Status: DISCONTINUED | OUTPATIENT
Start: 2021-10-25 | End: 2021-10-25 | Stop reason: HOSPADM

## 2021-10-25 RX ADMIN — METHYLPREDNISOLONE ACETATE 40 MG: 40 INJECTION, SUSPENSION INTRA-ARTICULAR; INTRALESIONAL; INTRAMUSCULAR; SOFT TISSUE at 02:10

## 2021-10-26 ENCOUNTER — HOSPITAL ENCOUNTER (OUTPATIENT)
Facility: HOSPITAL | Age: 71
Discharge: HOME | End: 2021-10-27
Attending: STUDENT IN AN ORGANIZED HEALTH CARE EDUCATION/TRAINING PROGRAM | Admitting: INTERNAL MEDICINE
Payer: MEDICARE

## 2021-10-26 DIAGNOSIS — G93.40 ENCEPHALOPATHY: ICD-10-CM

## 2021-10-26 DIAGNOSIS — I63.9 STROKE: ICD-10-CM

## 2021-10-26 DIAGNOSIS — G45.9 TIA (TRANSIENT ISCHEMIC ATTACK): Primary | ICD-10-CM

## 2021-10-26 LAB
BASOPHILS # BLD AUTO: 0.06 K/UL (ref 0–0.2)
BASOPHILS NFR BLD: 0.8 % (ref 0–1.9)
DIFFERENTIAL METHOD: NORMAL
EOSINOPHIL # BLD AUTO: 0.2 K/UL (ref 0–0.5)
EOSINOPHIL NFR BLD: 2.4 % (ref 0–8)
ERYTHROCYTE [DISTWIDTH] IN BLOOD BY AUTOMATED COUNT: 13.6 % (ref 11.5–14.5)
HCT VFR BLD AUTO: 40.1 % (ref 37–48.5)
HGB BLD-MCNC: 13 G/DL (ref 12–16)
IMM GRANULOCYTES # BLD AUTO: 0.03 K/UL (ref 0–0.04)
IMM GRANULOCYTES NFR BLD AUTO: 0.4 % (ref 0–0.5)
LYMPHOCYTES # BLD AUTO: 2.2 K/UL (ref 1–4.8)
LYMPHOCYTES NFR BLD: 27.2 % (ref 18–48)
MCH RBC QN AUTO: 29.3 PG (ref 27–31)
MCHC RBC AUTO-ENTMCNC: 32.4 G/DL (ref 32–36)
MCV RBC AUTO: 91 FL (ref 82–98)
MONOCYTES # BLD AUTO: 0.7 K/UL (ref 0.3–1)
MONOCYTES NFR BLD: 9.1 % (ref 4–15)
NEUTROPHILS # BLD AUTO: 4.8 K/UL (ref 1.8–7.7)
NEUTROPHILS NFR BLD: 60.1 % (ref 38–73)
NRBC BLD-RTO: 0 /100 WBC
PLATELET # BLD AUTO: 236 K/UL (ref 150–450)
PMV BLD AUTO: 10.1 FL (ref 9.2–12.9)
POCT GLUCOSE: 153 MG/DL (ref 70–110)
RBC # BLD AUTO: 4.43 M/UL (ref 4–5.4)
WBC # BLD AUTO: 7.98 K/UL (ref 3.9–12.7)

## 2021-10-26 PROCEDURE — 93010 EKG 12-LEAD: ICD-10-PCS | Mod: ,,, | Performed by: INTERNAL MEDICINE

## 2021-10-26 PROCEDURE — 80175 DRUG SCREEN QUAN LAMOTRIGINE: CPT | Performed by: NURSE PRACTITIONER

## 2021-10-26 PROCEDURE — G0508 PR CRITICAL CARE TELEHLTH INITIAL CONSULT 60MIN: ICD-10-PCS | Mod: 95,,, | Performed by: PSYCHIATRY & NEUROLOGY

## 2021-10-26 PROCEDURE — 99285 EMERGENCY DEPT VISIT HI MDM: CPT | Mod: 25

## 2021-10-26 PROCEDURE — 85610 PROTHROMBIN TIME: CPT | Performed by: STUDENT IN AN ORGANIZED HEALTH CARE EDUCATION/TRAINING PROGRAM

## 2021-10-26 PROCEDURE — 84484 ASSAY OF TROPONIN QUANT: CPT | Performed by: STUDENT IN AN ORGANIZED HEALTH CARE EDUCATION/TRAINING PROGRAM

## 2021-10-26 PROCEDURE — 84443 ASSAY THYROID STIM HORMONE: CPT | Performed by: STUDENT IN AN ORGANIZED HEALTH CARE EDUCATION/TRAINING PROGRAM

## 2021-10-26 PROCEDURE — G0508 CRIT CARE TELEHEA CONSULT 60: HCPCS | Mod: 95,,, | Performed by: PSYCHIATRY & NEUROLOGY

## 2021-10-26 PROCEDURE — 82962 GLUCOSE BLOOD TEST: CPT

## 2021-10-26 PROCEDURE — 93010 ELECTROCARDIOGRAM REPORT: CPT | Mod: ,,, | Performed by: INTERNAL MEDICINE

## 2021-10-26 PROCEDURE — 80061 LIPID PANEL: CPT | Performed by: STUDENT IN AN ORGANIZED HEALTH CARE EDUCATION/TRAINING PROGRAM

## 2021-10-26 PROCEDURE — 80053 COMPREHEN METABOLIC PANEL: CPT | Performed by: STUDENT IN AN ORGANIZED HEALTH CARE EDUCATION/TRAINING PROGRAM

## 2021-10-26 PROCEDURE — 36415 COLL VENOUS BLD VENIPUNCTURE: CPT | Performed by: STUDENT IN AN ORGANIZED HEALTH CARE EDUCATION/TRAINING PROGRAM

## 2021-10-26 PROCEDURE — 93005 ELECTROCARDIOGRAM TRACING: CPT

## 2021-10-26 PROCEDURE — 85025 COMPLETE CBC W/AUTO DIFF WBC: CPT | Performed by: STUDENT IN AN ORGANIZED HEALTH CARE EDUCATION/TRAINING PROGRAM

## 2021-10-26 PROCEDURE — 85730 THROMBOPLASTIN TIME PARTIAL: CPT | Performed by: STUDENT IN AN ORGANIZED HEALTH CARE EDUCATION/TRAINING PROGRAM

## 2021-10-27 VITALS
BODY MASS INDEX: 28.42 KG/M2 | HEART RATE: 73 BPM | HEIGHT: 66 IN | SYSTOLIC BLOOD PRESSURE: 118 MMHG | OXYGEN SATURATION: 95 % | RESPIRATION RATE: 17 BRPM | WEIGHT: 176.81 LBS | DIASTOLIC BLOOD PRESSURE: 68 MMHG | TEMPERATURE: 99 F

## 2021-10-27 LAB
ALBUMIN SERPL BCP-MCNC: 3.7 G/DL (ref 3.5–5.2)
ALP SERPL-CCNC: 96 U/L (ref 55–135)
ALT SERPL W/O P-5'-P-CCNC: 12 U/L (ref 10–44)
AMMONIA PLAS-SCNC: 19 UMOL/L (ref 10–50)
AMPHET+METHAMPHET UR QL: NEGATIVE
ANION GAP SERPL CALC-SCNC: 11 MMOL/L (ref 8–16)
APTT BLDCRRT: 25.9 SEC (ref 21–32)
AST SERPL-CCNC: 12 U/L (ref 10–40)
BACTERIA #/AREA URNS HPF: 3 /HPF
BARBITURATES UR QL SCN>200 NG/ML: NEGATIVE
BENZODIAZ UR QL SCN>200 NG/ML: NEGATIVE
BILIRUB SERPL-MCNC: 0.2 MG/DL (ref 0.1–1)
BILIRUB UR QL STRIP: NEGATIVE
BUN SERPL-MCNC: 9 MG/DL (ref 8–23)
BZE UR QL SCN: NEGATIVE
CALCIUM SERPL-MCNC: 10 MG/DL (ref 8.7–10.5)
CANNABINOIDS UR QL SCN: NEGATIVE
CHLORIDE SERPL-SCNC: 101 MMOL/L (ref 95–110)
CHOLEST SERPL-MCNC: 281 MG/DL (ref 120–199)
CHOLEST/HDLC SERPL: 3.5 {RATIO} (ref 2–5)
CLARITY UR: CLEAR
CO2 SERPL-SCNC: 26 MMOL/L (ref 23–29)
COLOR UR: YELLOW
CREAT SERPL-MCNC: 0.9 MG/DL (ref 0.5–1.4)
CREAT UR-MCNC: 10.6 MG/DL (ref 15–325)
EST. GFR  (AFRICAN AMERICAN): >60 ML/MIN/1.73 M^2
EST. GFR  (NON AFRICAN AMERICAN): >60 ML/MIN/1.73 M^2
GLUCOSE SERPL-MCNC: 159 MG/DL (ref 70–110)
GLUCOSE UR QL STRIP: NEGATIVE
HDLC SERPL-MCNC: 80 MG/DL (ref 40–75)
HDLC SERPL: 28.5 % (ref 20–50)
HGB UR QL STRIP: NEGATIVE
INR PPP: 0.9 (ref 0.8–1.2)
KETONES UR QL STRIP: NEGATIVE
LDLC SERPL CALC-MCNC: 186.8 MG/DL (ref 63–159)
LEUKOCYTE ESTERASE UR QL STRIP: ABNORMAL
METHADONE UR QL SCN>300 NG/ML: NEGATIVE
MICROSCOPIC COMMENT: NORMAL
NITRITE UR QL STRIP: NEGATIVE
NONHDLC SERPL-MCNC: 201 MG/DL
OPIATES UR QL SCN: NEGATIVE
PCP UR QL SCN>25 NG/ML: NEGATIVE
PH UR STRIP: 6 [PH] (ref 5–8)
POTASSIUM SERPL-SCNC: 3.7 MMOL/L (ref 3.5–5.1)
PROT SERPL-MCNC: 7 G/DL (ref 6–8.4)
PROT UR QL STRIP: NEGATIVE
PROTHROMBIN TIME: 9.9 SEC (ref 9–12.5)
RBC #/AREA URNS HPF: 2 /HPF (ref 0–4)
SARS-COV-2 RDRP RESP QL NAA+PROBE: NEGATIVE
SODIUM SERPL-SCNC: 138 MMOL/L (ref 136–145)
SP GR UR STRIP: <=1.005 (ref 1–1.03)
TOXICOLOGY INFORMATION: ABNORMAL
TRIGL SERPL-MCNC: 71 MG/DL (ref 30–150)
TROPONIN I SERPL DL<=0.01 NG/ML-MCNC: <0.006 NG/ML (ref 0–0.03)
TSH SERPL DL<=0.005 MIU/L-ACNC: 2.85 UIU/ML (ref 0.4–4)
URN SPEC COLLECT METH UR: ABNORMAL
UROBILINOGEN UR STRIP-ACNC: NEGATIVE EU/DL
WBC #/AREA URNS HPF: 5 /HPF (ref 0–5)

## 2021-10-27 PROCEDURE — G0378 HOSPITAL OBSERVATION PER HR: HCPCS

## 2021-10-27 PROCEDURE — 80307 DRUG TEST PRSMV CHEM ANLYZR: CPT | Performed by: STUDENT IN AN ORGANIZED HEALTH CARE EDUCATION/TRAINING PROGRAM

## 2021-10-27 PROCEDURE — A9585 GADOBUTROL INJECTION: HCPCS | Performed by: INTERNAL MEDICINE

## 2021-10-27 PROCEDURE — 99219 PR INITIAL OBSERVATION CARE,LEVL II: CPT | Mod: ,,, | Performed by: PSYCHIATRY & NEUROLOGY

## 2021-10-27 PROCEDURE — 36415 COLL VENOUS BLD VENIPUNCTURE: CPT | Performed by: NURSE PRACTITIONER

## 2021-10-27 PROCEDURE — 90833 PR PSYCHOTHERAPY W/PATIENT W/E&M, 30 MIN (ADD ON): ICD-10-PCS | Mod: ,,, | Performed by: PSYCHIATRY & NEUROLOGY

## 2021-10-27 PROCEDURE — 81000 URINALYSIS NONAUTO W/SCOPE: CPT | Mod: 59 | Performed by: STUDENT IN AN ORGANIZED HEALTH CARE EDUCATION/TRAINING PROGRAM

## 2021-10-27 PROCEDURE — 99220 PR INITIAL OBSERVATION CARE,LEVL III: ICD-10-PCS | Mod: ,,, | Performed by: FAMILY MEDICINE

## 2021-10-27 PROCEDURE — 82140 ASSAY OF AMMONIA: CPT | Performed by: STUDENT IN AN ORGANIZED HEALTH CARE EDUCATION/TRAINING PROGRAM

## 2021-10-27 PROCEDURE — 99219 PR INITIAL OBSERVATION CARE,LEVL II: ICD-10-PCS | Mod: ,,, | Performed by: PSYCHIATRY & NEUROLOGY

## 2021-10-27 PROCEDURE — 97161 PT EVAL LOW COMPLEX 20 MIN: CPT

## 2021-10-27 PROCEDURE — 25500020 PHARM REV CODE 255: Performed by: STUDENT IN AN ORGANIZED HEALTH CARE EDUCATION/TRAINING PROGRAM

## 2021-10-27 PROCEDURE — 25500020 PHARM REV CODE 255: Performed by: INTERNAL MEDICINE

## 2021-10-27 PROCEDURE — 99220 PR INITIAL OBSERVATION CARE,LEVL III: CPT | Mod: ,,, | Performed by: FAMILY MEDICINE

## 2021-10-27 PROCEDURE — 90833 PSYTX W PT W E/M 30 MIN: CPT | Mod: ,,, | Performed by: PSYCHIATRY & NEUROLOGY

## 2021-10-27 PROCEDURE — U0002 COVID-19 LAB TEST NON-CDC: HCPCS | Performed by: STUDENT IN AN ORGANIZED HEALTH CARE EDUCATION/TRAINING PROGRAM

## 2021-10-27 PROCEDURE — 36415 COLL VENOUS BLD VENIPUNCTURE: CPT | Performed by: STUDENT IN AN ORGANIZED HEALTH CARE EDUCATION/TRAINING PROGRAM

## 2021-10-27 RX ORDER — GADOBUTROL 604.72 MG/ML
8 INJECTION INTRAVENOUS
Status: COMPLETED | OUTPATIENT
Start: 2021-10-27 | End: 2021-10-27

## 2021-10-27 RX ORDER — TALC
6 POWDER (GRAM) TOPICAL NIGHTLY PRN
Status: DISCONTINUED | OUTPATIENT
Start: 2021-10-27 | End: 2021-10-27 | Stop reason: HOSPADM

## 2021-10-27 RX ORDER — SODIUM CHLORIDE 0.9 % (FLUSH) 0.9 %
10 SYRINGE (ML) INJECTION
Status: DISCONTINUED | OUTPATIENT
Start: 2021-10-27 | End: 2021-10-27 | Stop reason: HOSPADM

## 2021-10-27 RX ORDER — NAPROXEN SODIUM 220 MG/1
81 TABLET, FILM COATED ORAL DAILY
Qty: 30 TABLET | Refills: 5 | Status: SHIPPED | OUTPATIENT
Start: 2021-10-27 | End: 2024-01-29

## 2021-10-27 RX ADMIN — GADOBUTROL 10 ML: 604.72 INJECTION INTRAVENOUS at 12:10

## 2021-10-27 RX ADMIN — IOHEXOL 100 ML: 350 INJECTION, SOLUTION INTRAVENOUS at 12:10

## 2021-10-30 LAB — LAMOTRIGINE SERPL-MCNC: 10.5 UG/ML (ref 2–15)

## 2021-11-01 ENCOUNTER — HOSPITAL ENCOUNTER (OUTPATIENT)
Dept: RADIOLOGY | Facility: HOSPITAL | Age: 71
Discharge: HOME | End: 2021-11-01
Attending: PHYSICAL MEDICINE & REHABILITATION
Payer: MEDICARE

## 2021-11-01 DIAGNOSIS — M54.12 CERVICAL RADICULOPATHY: ICD-10-CM

## 2021-11-01 PROCEDURE — 72141 MRI CERVICAL SPINE WITHOUT CONTRAST: ICD-10-PCS | Mod: 26,,, | Performed by: RADIOLOGY

## 2021-11-01 PROCEDURE — 72141 MRI NECK SPINE W/O DYE: CPT | Mod: TC

## 2021-11-01 PROCEDURE — 72141 MRI NECK SPINE W/O DYE: CPT | Mod: 26,,, | Performed by: RADIOLOGY

## 2021-11-02 ENCOUNTER — OFFICE VISIT (OUTPATIENT)
Dept: INTERNAL MEDICINE | Facility: CLINIC | Age: 71
End: 2021-11-02
Payer: MEDICARE

## 2021-11-02 ENCOUNTER — PATIENT OUTREACH (OUTPATIENT)
Dept: ADMINISTRATIVE | Facility: OTHER | Age: 71
End: 2021-11-02
Payer: MEDICARE

## 2021-11-02 VITALS
SYSTOLIC BLOOD PRESSURE: 116 MMHG | HEIGHT: 66 IN | HEART RATE: 84 BPM | OXYGEN SATURATION: 97 % | DIASTOLIC BLOOD PRESSURE: 68 MMHG | BODY MASS INDEX: 29.51 KG/M2 | RESPIRATION RATE: 16 BRPM | WEIGHT: 183.63 LBS

## 2021-11-02 DIAGNOSIS — G89.29 CHRONIC BILATERAL LOW BACK PAIN WITHOUT SCIATICA: ICD-10-CM

## 2021-11-02 DIAGNOSIS — M54.50 CHRONIC BILATERAL LOW BACK PAIN WITHOUT SCIATICA: ICD-10-CM

## 2021-11-02 DIAGNOSIS — R73.9 ELEVATED BLOOD SUGAR: ICD-10-CM

## 2021-11-02 DIAGNOSIS — E78.00 ELEVATED LDL CHOLESTEROL LEVEL: ICD-10-CM

## 2021-11-02 DIAGNOSIS — M47.816 LUMBAR SPONDYLOSIS: ICD-10-CM

## 2021-11-02 DIAGNOSIS — E04.1 THYROID NODULE: Primary | ICD-10-CM

## 2021-11-02 DIAGNOSIS — M54.2 NECK PAIN: ICD-10-CM

## 2021-11-02 PROCEDURE — 99214 OFFICE O/P EST MOD 30 MIN: CPT | Mod: S$PBB | Performed by: NURSE PRACTITIONER

## 2021-11-02 PROCEDURE — 99999 PR STA SHADOW: ICD-10-PCS | Mod: PBBFAC,,, | Performed by: NURSE PRACTITIONER

## 2021-11-02 PROCEDURE — 99999 PR STA SHADOW: CPT | Mod: PBBFAC,,, | Performed by: NURSE PRACTITIONER

## 2021-11-02 PROCEDURE — 99999 PR PBB SHADOW E&M-EST. PATIENT-LVL III: CPT | Mod: PBBFAC,,, | Performed by: NURSE PRACTITIONER

## 2021-11-02 PROCEDURE — 99213 OFFICE O/P EST LOW 20 MIN: CPT | Mod: PBBFAC | Performed by: NURSE PRACTITIONER

## 2021-11-02 RX ORDER — GABAPENTIN 300 MG/1
300 CAPSULE ORAL 3 TIMES DAILY
Qty: 90 CAPSULE | Refills: 1 | Status: SHIPPED | OUTPATIENT
Start: 2021-11-02 | End: 2021-11-04

## 2021-11-04 ENCOUNTER — HOSPITAL ENCOUNTER (OUTPATIENT)
Dept: RADIOLOGY | Facility: HOSPITAL | Age: 71
Discharge: HOME | End: 2021-11-04
Attending: NURSE PRACTITIONER
Payer: MEDICARE

## 2021-11-04 ENCOUNTER — OFFICE VISIT (OUTPATIENT)
Dept: PAIN MEDICINE | Facility: CLINIC | Age: 71
End: 2021-11-04
Payer: MEDICARE

## 2021-11-04 ENCOUNTER — OFFICE VISIT (OUTPATIENT)
Dept: NEUROLOGY | Facility: CLINIC | Age: 71
End: 2021-11-04
Payer: MEDICARE

## 2021-11-04 VITALS
DIASTOLIC BLOOD PRESSURE: 76 MMHG | SYSTOLIC BLOOD PRESSURE: 134 MMHG | HEIGHT: 66 IN | RESPIRATION RATE: 16 BRPM | BODY MASS INDEX: 28.86 KG/M2 | HEART RATE: 72 BPM | WEIGHT: 179.56 LBS

## 2021-11-04 DIAGNOSIS — Z87.19 HISTORY OF GI BLEED: ICD-10-CM

## 2021-11-04 DIAGNOSIS — R25.9 ABNORMAL MOVEMENTS: ICD-10-CM

## 2021-11-04 DIAGNOSIS — M65.4 RADIAL STYLOID TENOSYNOVITIS (DE QUERVAIN): ICD-10-CM

## 2021-11-04 DIAGNOSIS — M54.2 NECK PAIN: ICD-10-CM

## 2021-11-04 DIAGNOSIS — M47.816 LUMBAR SPONDYLOSIS: ICD-10-CM

## 2021-11-04 DIAGNOSIS — E78.5 HYPERLIPIDEMIA, UNSPECIFIED HYPERLIPIDEMIA TYPE: Primary | ICD-10-CM

## 2021-11-04 DIAGNOSIS — G47.33 OBSTRUCTIVE SLEEP APNEA (ADULT) (PEDIATRIC): ICD-10-CM

## 2021-11-04 DIAGNOSIS — Z86.59 HISTORY OF GAMBLING: ICD-10-CM

## 2021-11-04 DIAGNOSIS — G89.29 CHRONIC RIGHT SACROILIAC JOINT PAIN: ICD-10-CM

## 2021-11-04 DIAGNOSIS — F31.9 BIPOLAR 1 DISORDER: ICD-10-CM

## 2021-11-04 DIAGNOSIS — M47.812 CERVICAL SPONDYLOSIS: ICD-10-CM

## 2021-11-04 DIAGNOSIS — M54.50 CHRONIC BILATERAL LOW BACK PAIN WITHOUT SCIATICA: ICD-10-CM

## 2021-11-04 DIAGNOSIS — M54.12 CERVICAL RADICULOPATHY: Primary | ICD-10-CM

## 2021-11-04 DIAGNOSIS — F41.1 GAD (GENERALIZED ANXIETY DISORDER): ICD-10-CM

## 2021-11-04 DIAGNOSIS — G89.29 CHRONIC BILATERAL LOW BACK PAIN WITHOUT SCIATICA: ICD-10-CM

## 2021-11-04 DIAGNOSIS — E04.1 THYROID NODULE: ICD-10-CM

## 2021-11-04 DIAGNOSIS — G45.9 TIA (TRANSIENT ISCHEMIC ATTACK): Primary | ICD-10-CM

## 2021-11-04 DIAGNOSIS — M53.3 CHRONIC RIGHT SACROILIAC JOINT PAIN: ICD-10-CM

## 2021-11-04 PROBLEM — R52 PAIN: Status: RESOLVED | Noted: 2021-02-05 | Resolved: 2021-11-04

## 2021-11-04 PROBLEM — K92.2 ACUTE GI BLEEDING: Status: RESOLVED | Noted: 2021-07-28 | Resolved: 2021-11-04

## 2021-11-04 PROBLEM — D62 ACUTE BLOOD LOSS ANEMIA: Status: RESOLVED | Noted: 2021-07-29 | Resolved: 2021-11-04

## 2021-11-04 PROCEDURE — 99214 OFFICE O/P EST MOD 30 MIN: CPT | Mod: 95,,, | Performed by: PHYSICAL MEDICINE & REHABILITATION

## 2021-11-04 PROCEDURE — 99214 OFFICE O/P EST MOD 30 MIN: CPT | Mod: PBBFAC | Performed by: NURSE PRACTITIONER

## 2021-11-04 PROCEDURE — 99204 OFFICE O/P NEW MOD 45 MIN: CPT | Mod: S$PBB | Performed by: NURSE PRACTITIONER

## 2021-11-04 PROCEDURE — 99999 PR PBB SHADOW E&M-EST. PATIENT-LVL IV: CPT | Mod: PBBFAC,,, | Performed by: NURSE PRACTITIONER

## 2021-11-04 PROCEDURE — 99999 PR STA SHADOW: CPT | Mod: PBBFAC,,, | Performed by: NURSE PRACTITIONER

## 2021-11-04 PROCEDURE — 99214 PR OFFICE/OUTPT VISIT, EST, LEVL IV, 30-39 MIN: ICD-10-PCS | Mod: 95,,, | Performed by: PHYSICAL MEDICINE & REHABILITATION

## 2021-11-04 PROCEDURE — 76536 US EXAM OF HEAD AND NECK: CPT | Mod: 26,,, | Performed by: RADIOLOGY

## 2021-11-04 PROCEDURE — 76536 US EXAM OF HEAD AND NECK: CPT | Mod: TC

## 2021-11-04 PROCEDURE — 76536 US SOFT TISSUE HEAD NECK THYROID: ICD-10-PCS | Mod: 26,,, | Performed by: RADIOLOGY

## 2021-11-04 PROCEDURE — 99999 PR PBB SHADOW E&M-EST. PATIENT-LVL IV: ICD-10-PCS | Mod: PBBFAC,,, | Performed by: NURSE PRACTITIONER

## 2021-11-04 RX ORDER — GABAPENTIN 300 MG/1
600 CAPSULE ORAL 3 TIMES DAILY
Qty: 180 CAPSULE | Refills: 11 | Status: SHIPPED | OUTPATIENT
Start: 2021-11-04 | End: 2021-12-20

## 2021-11-04 RX ORDER — ROSUVASTATIN CALCIUM 10 MG/1
10 TABLET, COATED ORAL DAILY
Qty: 90 TABLET | Refills: 3 | Status: SHIPPED | OUTPATIENT
Start: 2021-11-04 | End: 2022-08-08

## 2021-11-05 ENCOUNTER — HOSPITAL ENCOUNTER (OUTPATIENT)
Dept: PULMONOLOGY | Facility: HOSPITAL | Age: 71
Discharge: HOME | End: 2021-11-05
Attending: NURSE PRACTITIONER
Payer: MEDICARE

## 2021-11-05 VITALS — BODY MASS INDEX: 28.77 KG/M2 | WEIGHT: 179 LBS | HEIGHT: 66 IN

## 2021-11-05 DIAGNOSIS — G45.9 TIA (TRANSIENT ISCHEMIC ATTACK): ICD-10-CM

## 2021-11-05 LAB
AORTIC ROOT ANNULUS: 2.99 CM
AV INDEX (PROSTH): 0.9
AV MEAN GRADIENT: 4 MMHG
AV PEAK GRADIENT: 7 MMHG
AV VALVE AREA: 2.89 CM2
AV VELOCITY RATIO: 0.9
BSA FOR ECHO PROCEDURE: 1.94 M2
CV ECHO LV RWT: 0.38 CM
DOP CALC AO PEAK VEL: 1.32 M/S
DOP CALC AO VTI: 27 CM
DOP CALC LVOT AREA: 3.2 CM2
DOP CALC LVOT DIAMETER: 2.02 CM
DOP CALC LVOT PEAK VEL: 1.19 M/S
DOP CALC LVOT STROKE VOLUME: 77.93 CM3
DOP CALCLVOT PEAK VEL VTI: 24.33 CM
E WAVE DECELERATION TIME: 170.86 MSEC
E/A RATIO: 0.86
ECHO LV POSTERIOR WALL: 0.86 CM (ref 0.6–1.1)
EJECTION FRACTION: 65 %
FRACTIONAL SHORTENING: 30 % (ref 28–44)
INTERVENTRICULAR SEPTUM: 0.86 CM (ref 0.6–1.1)
IVRT: 73.82 MSEC
LA MAJOR: 4.12 CM
LA WIDTH: 3.01 CM
LEFT ATRIUM SIZE: 2.73 CM
LEFT ATRIUM VOLUME INDEX MOD: 8.6 ML/M2
LEFT ATRIUM VOLUME MOD: 16.45 CM3
LEFT INTERNAL DIMENSION IN SYSTOLE: 3.21 CM (ref 2.1–4)
LEFT VENTRICLE DIASTOLIC VOLUME INDEX: 49.95 ML/M2
LEFT VENTRICLE DIASTOLIC VOLUME: 95.4 ML
LEFT VENTRICLE MASS INDEX: 67 G/M2
LEFT VENTRICLE SYSTOLIC VOLUME INDEX: 21.6 ML/M2
LEFT VENTRICLE SYSTOLIC VOLUME: 41.3 ML
LEFT VENTRICULAR INTERNAL DIMENSION IN DIASTOLE: 4.56 CM (ref 3.5–6)
LEFT VENTRICULAR MASS: 127.77 G
MV PEAK A VEL: 1.11 M/S
MV PEAK E VEL: 0.95 M/S
MV STENOSIS PRESSURE HALF TIME: 49.55 MS
MV VALVE AREA P 1/2 METHOD: 4.44 CM2
PISA MRMAX VEL: 0.05 M/S
PISA TR MAX VEL: 1.93 M/S
PULM VEIN S/D RATIO: 1.93
PV PEAK D VEL: 0.4 M/S
PV PEAK S VEL: 0.77 M/S
PV PEAK VELOCITY: 0.89 CM/S
RA MAJOR: 4.15 CM
RA PRESSURE: 3 MMHG
RIGHT VENTRICULAR END-DIASTOLIC DIMENSION: 3.65 CM
STJ: 2.77 CM
TR MAX PG: 15 MMHG
TRICUSPID ANNULAR PLANE SYSTOLIC EXCURSION: 2.4 CM
TV REST PULMONARY ARTERY PRESSURE: 18 MMHG

## 2021-11-05 PROCEDURE — 93226 XTRNL ECG REC<48 HR SCAN A/R: CPT

## 2021-11-05 PROCEDURE — C8929 TTE W OR WO FOL WCON,DOPPLER: HCPCS

## 2021-11-05 PROCEDURE — 93306 ECHO (CUPID ONLY): ICD-10-PCS | Mod: 26,,, | Performed by: INTERNAL MEDICINE

## 2021-11-05 PROCEDURE — 93227 XTRNL ECG REC<48 HR R&I: CPT | Mod: ,,, | Performed by: INTERNAL MEDICINE

## 2021-11-05 PROCEDURE — 93227 HOLTER MONITOR - 48 HOUR (CUPID ONLY): ICD-10-PCS | Mod: ,,, | Performed by: INTERNAL MEDICINE

## 2021-11-05 PROCEDURE — 93306 TTE W/DOPPLER COMPLETE: CPT | Mod: 26,,, | Performed by: INTERNAL MEDICINE

## 2021-11-08 DIAGNOSIS — E04.1 THYROID NODULE: Primary | ICD-10-CM

## 2021-11-09 LAB
OHS CV EVENT MONITOR DAY: 0
OHS CV HOLTER LENGTH DECIMAL HOURS: 48
OHS CV HOLTER LENGTH HOURS: 48
OHS CV HOLTER LENGTH MINUTES: 0
OHS CV HOLTER SINUS AVERAGE HR: 85
OHS CV HOLTER SINUS MAX HR: 124
OHS CV HOLTER SINUS MIN HR: 61

## 2021-11-11 ENCOUNTER — OFFICE VISIT (OUTPATIENT)
Dept: PSYCHIATRY | Facility: CLINIC | Age: 71
End: 2021-11-11
Payer: MEDICARE

## 2021-11-11 VITALS
HEART RATE: 92 BPM | BODY MASS INDEX: 29.55 KG/M2 | OXYGEN SATURATION: 96 % | SYSTOLIC BLOOD PRESSURE: 119 MMHG | WEIGHT: 183.88 LBS | DIASTOLIC BLOOD PRESSURE: 69 MMHG | RESPIRATION RATE: 17 BRPM | HEIGHT: 66 IN

## 2021-11-11 DIAGNOSIS — Z65.8 PSYCHOSOCIAL STRESSORS: ICD-10-CM

## 2021-11-11 DIAGNOSIS — F41.1 GAD (GENERALIZED ANXIETY DISORDER): ICD-10-CM

## 2021-11-11 DIAGNOSIS — F31.9 BIPOLAR 1 DISORDER: Primary | ICD-10-CM

## 2021-11-11 DIAGNOSIS — G45.9 TIA (TRANSIENT ISCHEMIC ATTACK): Primary | ICD-10-CM

## 2021-11-11 DIAGNOSIS — Z86.59 HISTORY OF GAMBLING: ICD-10-CM

## 2021-11-11 PROCEDURE — 99999 PR STA SHADOW: ICD-10-PCS | Mod: PBBFAC,,, | Performed by: STUDENT IN AN ORGANIZED HEALTH CARE EDUCATION/TRAINING PROGRAM

## 2021-11-11 PROCEDURE — 99214 OFFICE O/P EST MOD 30 MIN: CPT | Mod: S$PBB | Performed by: STUDENT IN AN ORGANIZED HEALTH CARE EDUCATION/TRAINING PROGRAM

## 2021-11-11 PROCEDURE — 99999 PR PBB SHADOW E&M-EST. PATIENT-LVL III: CPT | Mod: PBBFAC,,, | Performed by: STUDENT IN AN ORGANIZED HEALTH CARE EDUCATION/TRAINING PROGRAM

## 2021-11-11 PROCEDURE — 99999 PR STA SHADOW: CPT | Mod: PBBFAC,,, | Performed by: STUDENT IN AN ORGANIZED HEALTH CARE EDUCATION/TRAINING PROGRAM

## 2021-11-11 PROCEDURE — 99213 OFFICE O/P EST LOW 20 MIN: CPT | Mod: PBBFAC | Performed by: STUDENT IN AN ORGANIZED HEALTH CARE EDUCATION/TRAINING PROGRAM

## 2021-11-11 RX ORDER — QUETIAPINE FUMARATE 300 MG/1
TABLET, FILM COATED ORAL
Qty: 45 TABLET | Refills: 5 | Status: SHIPPED | OUTPATIENT
Start: 2021-11-11 | End: 2022-04-04 | Stop reason: SDUPTHER

## 2021-11-11 RX ORDER — LAMOTRIGINE 150 MG/1
300 TABLET ORAL NIGHTLY
Qty: 180 TABLET | Refills: 5 | Status: SHIPPED | OUTPATIENT
Start: 2021-11-11 | End: 2022-04-22 | Stop reason: SDUPTHER

## 2021-11-11 RX ORDER — DULOXETIN HYDROCHLORIDE 60 MG/1
120 CAPSULE, DELAYED RELEASE ORAL DAILY
Qty: 60 CAPSULE | Refills: 5 | Status: SHIPPED | OUTPATIENT
Start: 2021-11-11 | End: 2022-04-22 | Stop reason: SDUPTHER

## 2021-11-12 ENCOUNTER — TELEPHONE (OUTPATIENT)
Dept: PAIN MEDICINE | Facility: CLINIC | Age: 71
End: 2021-11-12
Payer: MEDICARE

## 2021-11-15 ENCOUNTER — OFFICE VISIT (OUTPATIENT)
Dept: ENDOCRINOLOGY | Facility: CLINIC | Age: 71
End: 2021-11-15
Payer: MEDICARE

## 2021-11-15 VITALS
DIASTOLIC BLOOD PRESSURE: 74 MMHG | SYSTOLIC BLOOD PRESSURE: 120 MMHG | HEART RATE: 83 BPM | WEIGHT: 183 LBS | BODY MASS INDEX: 29.41 KG/M2 | HEIGHT: 66 IN | OXYGEN SATURATION: 98 %

## 2021-11-15 DIAGNOSIS — F41.1 GAD (GENERALIZED ANXIETY DISORDER): ICD-10-CM

## 2021-11-15 DIAGNOSIS — E04.2 MULTINODULAR GOITER: Primary | ICD-10-CM

## 2021-11-15 DIAGNOSIS — E78.00 PURE HYPERCHOLESTEROLEMIA: ICD-10-CM

## 2021-11-15 PROBLEM — E78.5 HYPERLIPIDEMIA: Status: ACTIVE | Noted: 2021-11-15

## 2021-11-15 PROCEDURE — 99213 OFFICE O/P EST LOW 20 MIN: CPT | Mod: PBBFAC | Performed by: STUDENT IN AN ORGANIZED HEALTH CARE EDUCATION/TRAINING PROGRAM

## 2021-11-15 PROCEDURE — 99204 OFFICE O/P NEW MOD 45 MIN: CPT | Mod: S$PBB | Performed by: STUDENT IN AN ORGANIZED HEALTH CARE EDUCATION/TRAINING PROGRAM

## 2021-11-15 PROCEDURE — 99999 PR PBB SHADOW E&M-EST. PATIENT-LVL III: CPT | Mod: PBBFAC,,, | Performed by: STUDENT IN AN ORGANIZED HEALTH CARE EDUCATION/TRAINING PROGRAM

## 2021-11-15 PROCEDURE — 99999 PR STA SHADOW: CPT | Mod: PBBFAC,,, | Performed by: STUDENT IN AN ORGANIZED HEALTH CARE EDUCATION/TRAINING PROGRAM

## 2021-11-15 PROCEDURE — 99999 PR STA SHADOW: ICD-10-PCS | Mod: PBBFAC,,, | Performed by: STUDENT IN AN ORGANIZED HEALTH CARE EDUCATION/TRAINING PROGRAM

## 2021-11-16 ENCOUNTER — TELEPHONE (OUTPATIENT)
Dept: NEUROLOGY | Facility: CLINIC | Age: 71
End: 2021-11-16
Payer: MEDICARE

## 2021-11-17 ENCOUNTER — TELEPHONE (OUTPATIENT)
Dept: ENDOCRINOLOGY | Facility: CLINIC | Age: 71
End: 2021-11-17
Payer: MEDICARE

## 2021-11-17 ENCOUNTER — CLINICAL SUPPORT (OUTPATIENT)
Dept: CARDIOLOGY | Facility: HOSPITAL | Age: 71
End: 2021-11-17
Attending: NURSE PRACTITIONER
Payer: MEDICARE

## 2021-11-17 DIAGNOSIS — G45.9 TIA (TRANSIENT ISCHEMIC ATTACK): ICD-10-CM

## 2021-11-17 PROCEDURE — 93272 CARDIAC EVENT MONITOR (CUPID ONLY): ICD-10-PCS | Mod: ,,, | Performed by: INTERNAL MEDICINE

## 2021-11-17 PROCEDURE — 93270 REMOTE 30 DAY ECG REV/REPORT: CPT

## 2021-11-17 PROCEDURE — 93272 ECG/REVIEW INTERPRET ONLY: CPT | Mod: ,,, | Performed by: INTERNAL MEDICINE

## 2021-12-29 ENCOUNTER — IMMUNIZATION (OUTPATIENT)
Dept: FAMILY MEDICINE | Facility: CLINIC | Age: 71
End: 2021-12-29
Payer: MEDICARE

## 2021-12-29 DIAGNOSIS — Z23 NEED FOR VACCINATION: Primary | ICD-10-CM

## 2021-12-29 PROCEDURE — 99999 COVID-19, MRNA, LNP-S, PF, 100 MCG/0.25 ML DOSE VACCINE (MODERNA BOOSTER): CPT | Mod: PBBFAC,,,

## 2021-12-29 PROCEDURE — 0064A COVID-19, MRNA, LNP-S, PF, 100 MCG/0.25 ML DOSE VACCINE (MODERNA BOOSTER): CPT | Mod: PBBFAC

## 2021-12-29 PROCEDURE — 99999 COVID-19, MRNA, LNP-S, PF, 100 MCG/0.25 ML DOSE VACCINE (MODERNA BOOSTER): ICD-10-PCS | Mod: PBBFAC,,,

## 2022-01-05 RX ORDER — DULOXETIN HYDROCHLORIDE 60 MG/1
120 CAPSULE, DELAYED RELEASE ORAL DAILY
Qty: 60 CAPSULE | Refills: 5 | Status: CANCELLED | OUTPATIENT
Start: 2022-01-05

## 2022-01-05 NOTE — TELEPHONE ENCOUNTER
Cymbalta was requested.   Called [facility] in [place] and they still have 3 refills on file.   Patient will get refills with Pharmacy.

## 2022-03-18 ENCOUNTER — PATIENT OUTREACH (OUTPATIENT)
Dept: ADMINISTRATIVE | Facility: OTHER | Age: 72
End: 2022-03-18
Payer: MEDICARE

## 2022-03-18 ENCOUNTER — OFFICE VISIT (OUTPATIENT)
Dept: PAIN MEDICINE | Facility: CLINIC | Age: 72
End: 2022-03-18
Payer: MEDICARE

## 2022-03-18 ENCOUNTER — HOSPITAL ENCOUNTER (OUTPATIENT)
Dept: RADIOLOGY | Facility: HOSPITAL | Age: 72
Discharge: HOME | End: 2022-03-18
Attending: PHYSICAL MEDICINE & REHABILITATION
Payer: MEDICARE

## 2022-03-18 VITALS
HEIGHT: 66 IN | HEART RATE: 100 BPM | SYSTOLIC BLOOD PRESSURE: 132 MMHG | DIASTOLIC BLOOD PRESSURE: 68 MMHG | BODY MASS INDEX: 30.36 KG/M2 | WEIGHT: 188.94 LBS | RESPIRATION RATE: 14 BRPM

## 2022-03-18 DIAGNOSIS — M25.532 LEFT WRIST PAIN: ICD-10-CM

## 2022-03-18 DIAGNOSIS — M54.2 NECK PAIN: ICD-10-CM

## 2022-03-18 DIAGNOSIS — M65.4 RADIAL STYLOID TENOSYNOVITIS (DE QUERVAIN): ICD-10-CM

## 2022-03-18 DIAGNOSIS — M54.16 LUMBAR RADICULOPATHY: ICD-10-CM

## 2022-03-18 DIAGNOSIS — Z12.31 ENCOUNTER FOR SCREENING MAMMOGRAM FOR MALIGNANT NEOPLASM OF BREAST: Primary | ICD-10-CM

## 2022-03-18 DIAGNOSIS — M54.12 CERVICAL RADICULOPATHY: ICD-10-CM

## 2022-03-18 DIAGNOSIS — M65.341 ACQUIRED TRIGGER FINGER OF RIGHT RING FINGER: Primary | ICD-10-CM

## 2022-03-18 PROCEDURE — 20550 NJX 1 TENDON SHEATH/LIGAMENT: CPT | Mod: PBBFAC,RT | Performed by: PHYSICAL MEDICINE & REHABILITATION

## 2022-03-18 PROCEDURE — 73110 XR WRIST COMPLETE 3 VIEWS LEFT: ICD-10-PCS | Mod: 26,LT,, | Performed by: RADIOLOGY

## 2022-03-18 PROCEDURE — 99999 PR STA SHADOW: ICD-10-PCS | Mod: PBBFAC,,, | Performed by: PHYSICAL MEDICINE & REHABILITATION

## 2022-03-18 PROCEDURE — 73110 X-RAY EXAM OF WRIST: CPT | Mod: 26,LT,, | Performed by: RADIOLOGY

## 2022-03-18 PROCEDURE — 99999 PR PBB SHADOW E&M-EST. PATIENT-LVL III: CPT | Mod: PBBFAC,,, | Performed by: PHYSICAL MEDICINE & REHABILITATION

## 2022-03-18 PROCEDURE — 99214 OFFICE O/P EST MOD 30 MIN: CPT | Mod: S$PBB | Performed by: PHYSICAL MEDICINE & REHABILITATION

## 2022-03-18 PROCEDURE — 73110 X-RAY EXAM OF WRIST: CPT | Mod: TC,LT

## 2022-03-18 PROCEDURE — 99999 PR STA SHADOW: CPT | Mod: PBBFAC,,, | Performed by: PHYSICAL MEDICINE & REHABILITATION

## 2022-03-18 PROCEDURE — 99213 OFFICE O/P EST LOW 20 MIN: CPT | Mod: PBBFAC | Performed by: PHYSICAL MEDICINE & REHABILITATION

## 2022-03-18 PROCEDURE — 99999 TENDON SHEATH: CPT | Mod: S$PBB,PBBFAC,, | Performed by: PHYSICAL MEDICINE & REHABILITATION

## 2022-03-18 RX ORDER — PREGABALIN 150 MG/1
150 CAPSULE ORAL 2 TIMES DAILY
Qty: 60 CAPSULE | Refills: 5 | Status: SHIPPED | OUTPATIENT
Start: 2022-03-18 | End: 2022-09-15

## 2022-03-18 RX ORDER — METHYLPREDNISOLONE ACETATE 40 MG/ML
40 INJECTION, SUSPENSION INTRA-ARTICULAR; INTRALESIONAL; INTRAMUSCULAR; SOFT TISSUE
Status: DISCONTINUED | OUTPATIENT
Start: 2022-03-18 | End: 2022-03-18 | Stop reason: HOSPADM

## 2022-03-18 RX ADMIN — METHYLPREDNISOLONE ACETATE 40 MG: 40 INJECTION, SUSPENSION INTRA-ARTICULAR; INTRALESIONAL; INTRAMUSCULAR; INTRASYNOVIAL; SOFT TISSUE at 02:03

## 2022-03-18 NOTE — PROCEDURES
Tendon Sheath    Date/Time: 3/18/2022 2:00 PM  Performed by: Mamie Roman MD  Authorized by: Mamie Roman MD     Consent Done?:  Yes (Written)  Indications:  Pain  Site marked: the procedure site was marked    Timeout: prior to procedure the correct patient, procedure, and site was verified    Prep: patient was prepped and draped in usual sterile fashion      Local anesthesia used?: Yes    Anesthesia:  Local infiltration  Local anesthetic:  Lidocaine 1% without epinephrine  Anesthetic total (ml):  1    Location:  Ring finger  Site:  R ring flexor tendon sheath  Ultrasonic guidance for needle placement?: No    Needle gauge: 30 g.  Approach:  Volar  Medications:  40 mg methylPREDNISolone acetate 40 mg/mL  Patient tolerance:  Patient tolerated the procedure well with no immediate complications

## 2022-03-18 NOTE — PATIENT INSTRUCTIONS
We are going to taper your gabapentin due to ineffectiveness.     Day 1: Take 1 pill in the morning, 2 pills at mid-day and 2 pills at night  Day 2: Take 1 pill in the morning, 1 pill at mid-day and 2 pills at night  Day 3: Take 1 pill in the morning, 1 pill at mid-day and 1 pill at night  Day 4: Take 1 pill in the morning and 1 pill at night  Day 5: Take 1 pill at night  Day 6: Discontinue completely.    If the pain increases as we decrease the dose of gabapentin, please return to the previous days dose and continue at that dose.  If you have any issues, please contact our clinic.

## 2022-03-18 NOTE — PROGRESS NOTES
Pain Medicine        Chief Complaint:   Chief Complaint   Patient presents with    Pain     Tendinitis in right ring finger.  Pain in left thumb up wrist.  Recurring numbness in left arm.       History of Present Illness: Karin Maguire is a 71 y.o. female referred by Dr. Ramirez for LBP.      Onset: several years, but had an injection with Dr. Barron 2-3 years ago that provided great relief. Pain returned about a year ago.    Location: Lower right side of back  Radiation: travels to the front and down right leg, which is very similar to prior to the last injection.    Timing: constant  Quality: Dull, Burning, Tight, Tingling, Deep, Numb, Sharp and Cold  Exacerbating Factors: extension, flexion, lifting, lying down, sitting, standing for more than 30 minutes, walking for more than 1 minutes and daily activity   Alleviating Factors: medications  Associated Symptoms: She feels weakness in the right leg and numbness in that leg. in the right thigh. denies night fever/night sweats, urinary incontinence, bowel incontinence and significant weight loss    Severity: Currently: 6/10   Typical Range: 6-8/10     Exacerbation: 10/10     Pain is limiting her ability to do household chores, sleep.       Neck pain (07/19/2021)has been present off and on for a few years, but worse for about a week. She previously saw chiropractor for 6 weeks which didn't help and was told to see a medical doctor. No specific injury, just woke with the pain. Pain is localized to the bilateral cervical paraspinals (mostly on the right) with radiation into the back of head. Pain is described as sharp. The pain is aggravated by activity, turning her head. The pain is alleviated by nothing. She feels weakness and numbness in the left hand (for the past year, prior to neck pain, since RTC injury). Denies changes in bowel or bladder function. Denies saddle anesthesia. Denies recent fevers or infections. Denies significant weight loss    Current Pain  Scales:  Current: 0/10              Typical Range: 0-3/10    Left wrist pain:  Left wrist pain has been present for several years.  She localizes the pain over the radial aspect of the wrist around the APL and EPB tendons and this radiates up to the radial forearm at times.  Pain is worse with any activities with the hands.  She had good but short-term relief from previous injection in the area.    Right ring trigger finger (4/19/21): She is also noting right ring finger triggering that has been going on for a month. She deneis numbness or tingling. She is noting pain in the ring finger, but also some around the small finger. She feels weaker  in the right hand. She has had trigger finger in the left thumb in the past which improved with a steroid injection.       Interval History (03/18/2022):  Karin Maguire returns today for follow up.  At the last clinic visit, we increased her gabapentin to 600 mg 3 times daily.    She denies any significant relief from the gabapentin at this point.    She states her neck and back pain are doing well currently.    Currently, the right ring trigger finger pain is her worst pain.  She is also having pain along the left radial aspect of the wrist the radiates up to the forearm.  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  She is trying to avoid NSAIDs considering her prior GI bleed.    She requests repeat right ring finger trigger finger injection as of provided significant relief in the past.    Current Pain Scales:  Current: 8/10              Typical Range: 4-8/10                 Pain Disability Index  Family/Home Responsibilities:: 3  Recreation:: 3  Social Activity:: 0  Occupation:: 4  Sexual Behavior:: 1  Self Care:: 2  Life-Support Activities:: 0  Pain Disability Index (PDI): 13    Previous Interventions:  - 10/25/21: Left de quervains CSI w/ relief for 3-4 days  - 4/30/21: Right L2-5 MBBs w/ 99% relief    -  4/19/21: Right GTB injection w/ 100% relief  - 4/19/21: Right trigger finger of ring finger w/ 100% relief  - 2/5/21: Right L2-5 MBBs w/ 100% relief for 6-8 hours  - 3/31/17: Right SIJ and GTB injection ([place]) excellent relief for 2 years    Previous Therapies:  PT/OT: yes, has been doing HEP   CHiropractor: Tried it but it doesn't help  Relevant Surgery: yes   - B/L CT release  Previous Medications:   - NSAIDS: Tylenol, Ibuprofen, Diclofenac  - Muscle Relaxants:    - TCAs:   - SNRIs: Cymbalta  - Topicals: Voltaren cream doesn't help  - Anticonvulsants:  Gabapentin  - Opioids:     Current Pain Medications:  1. Cymbalta 90 mg daily  2. Gabapentin 300 mg TID  3. Tylenol  4. Voltaren Gel    Blood Thinners: None    Full Medication List:    Current Outpatient Medications:     acetaminophen (TYLENOL) 500 MG tablet, Take 2 tablets (1,000 mg total) by mouth every 8 (eight) hours as needed for Pain., Disp: 30 tablet, Rfl: 0    albuterol (PROVENTIL/VENTOLIN HFA) 90 mcg/actuation inhaler, INHALE 2 PUFFS INTO THE LUNGS EVERY 6 (SIX) HOURS AS NEEDED FOR WHEEZING (COUGH AND WHEEZING).RESCUE, Disp: 18 g, Rfl: 1    aspirin 81 MG Chew, Take 1 tablet (81 mg total) by mouth once daily., Disp: 30 tablet, Rfl: 5    DULoxetine (CYMBALTA) 60 MG capsule, Take 2 capsules (120 mg total) by mouth once daily., Disp: 60 capsule, Rfl: 5    lamoTRIgine (LAMICTAL) 150 MG Tab, Take 2 tablets (300 mg total) by mouth every evening., Disp: 180 tablet, Rfl: 5    pantoprazole (PROTONIX) 40 MG tablet, TAKE 1 TABLET BY MOUTH EVERY DAY, Disp: 90 tablet, Rfl: 1    QUEtiapine (SEROQUEL) 300 MG Tab, Take one and a half tablets by mouth nightly., Disp: 45 tablet, Rfl: 5    rosuvastatin (CRESTOR) 10 MG tablet, Take 1 tablet (10 mg total) by mouth once daily., Disp: 90 tablet, Rfl: 3    pregabalin (LYRICA) 150 MG capsule, Take 1 capsule (150 mg total) by mouth 2 (two) times daily., Disp: 60 capsule, Rfl: 5     Review of Systems:  Review of Systems    Constitutional: Negative for fever and weight loss.   HENT: Negative for ear pain and tinnitus.    Eyes: Negative for pain and redness.   Respiratory: Negative for cough and shortness of breath.    Cardiovascular: Negative for chest pain and palpitations.   Gastrointestinal: Positive for heartburn. Negative for constipation.   Genitourinary: Positive for frequency.        Denies urinary incontinence. Denies urine retention.    Musculoskeletal: Positive for back pain, joint pain (hands and feet), myalgias and neck pain.   Skin: Negative for itching and rash.   Neurological: Negative for tingling, focal weakness and seizures.   Endo/Heme/Allergies: Negative for environmental allergies. Does not bruise/bleed easily.   Psychiatric/Behavioral: Positive for depression. The patient is nervous/anxious and has insomnia.        Allergies:  Nsaids (non-steroidal anti-inflammatory drug)     Medical History:   has a past medical history of Anxiety, Arthritis, Bipolar 1 disorder, Bursitis of right hip, GI bleed due to NSAIDs, Multinodular goiter (11/15/2021), Sacroiliitis, and Sleep apnea.    Surgical History:   has a past surgical history that includes Hysterectomy (1986); Bladder suspension; Rectal prolapse repair; Tonsillectomy; Cholecystectomy; Ankle fracture surgery (Left, 2001); Carpal tunnel release (Bilateral); Nasal septum surgery; Injection of anesthetic agent into sacroiliac joint (Right, 12/4/2020); Injection of joint (Right, 12/4/2020); Injection of anesthetic agent around medial branch nerves innervating lumbar facet joint (Right, 2/5/2021); Injection of anesthetic agent around medial branch nerves innervating lumbar facet joint (Right, 4/30/2021); and Esophagogastroduodenoscopy (N/A, 7/29/2021).    Family History:  family history includes Colon cancer in her maternal uncle, maternal uncle, and maternal uncle.    Social History:   reports that she has been smoking cigarettes. She started smoking about 39 years  "ago. She has a 17.50 pack-year smoking history. She has never used smokeless tobacco. She reports current alcohol use. She reports that she does not use drugs.    Physical Exam:  /68 (BP Location: Right arm, Patient Position: Sitting, BP Method: Medium (Manual))   Pulse 100   Resp 14   Ht 5' 6" (1.676 m)   Wt 85.7 kg (188 lb 15 oz)   BMI 30.49 kg/m²   GEN: No acute distress. Calm, comfortable  HENT: Normocephalic, atraumatic, moist mucous membranes  EYE: Anicteric sclera, non-injected.   CV: Non-diaphoretic.   RESP: Breathing comfortably. Chest expansion symmetric.  EXT: No clubbing, cyanosis.   SKIN: No visible rashes or lesions of exposed skin.   PSYCH: Pleasant mood and appropriate affect. Recent and remote memory intact.   Hand/Wrist Exam:       Inspection: No erythema, bruising.       Palpation: (+) TTP over APL and EPB tendon sheath on the left, radial aspect of the wrist on the left, TTP of left anatomic snuff box. (+) TTP over right ring finger flexor tendon just proximal to A1 pulley with palpable nodule in the area.       (+) Finkelstein on the left         Imaging:  - MRI Cervical spine 11/1/21:  The incidentally visualized soft tissue structures of the neck show a left-sided thyroid nodule.  Craniocervical junction is intact there is no evidence for a Chiari malformation.  Spinal cord is normal in signal without cord edema or myelomalacia.  Mild reversal of the normal cervical lordosis.  Vertebral body heights are maintained.  There is disc desiccation with disc space narrowing throughout the cervical spine.  The marrow signal is normal without evidence for a marrow replacement process, infection or tumor.  Chronic sclerosis of the dens, unchanged from 07/28/2021.  At C2-3, no disc herniation, central canal stenosis or neural foraminal narrowing.  At C3-4, bilateral uncovertebral spurring with facet arthropathy contributing to moderate right neural foraminal narrowing.  At C4-5, posterior disc " osteophyte complex with uncovertebral spurring and facet arthropathy contributing to mild central canal stenosis with mild left and moderate right neural foraminal narrowing.  At C5-6, posterior disc osteophyte complex with uncovertebral spurring and facet arthropathy contributing to mild moderate central canal stenosis with moderate severe bilateral neural foraminal narrowing.  At C6-7, posterior disc osteophyte complex with uncovertebral spurring and facet arthropathy contributing to mild central canal stenosis with mild right and moderate left neural foraminal narrowing.  At C7-T1, posterior disc osteophyte complex with facet arthropathy contributing to mild bilateral neural foraminal narrowing    - CT Cervical spine 7/28/21:  Again noted is evidence of multilevel cervical spondylosis greatest at the C4 through C6 levels.  There is no evidence of an acute fracture.  Alignment is maintained with no evidence of prevertebral soft tissue swelling.  No congenital spinal stenosis.  Multilevel disc-osteophytes greatest at C4-C5, C5-C6 and C6-C7.  Minimal retrolisthesis of C4 on C5.  Mild to moderate spinal canal and mild right greater than left foraminal encroachment.  Right uncinate process spur and mild foraminal encroachment at C3-C4.  Incidental note of hypodense nodules of the thyroid lobes.    - X-ray cervical spine 7/19/21:  The odontoid appears intact and there is not abnormal prevertebral soft tissue swelling.  There are degenerative changes severe particularly C4-C5.  Diffuse disc space narrowing moderately severe C4-C5, C5-C6, C6-C7.  Mild reversal of the usual cervical lordosis C2-C6 on the neutral view and to a slightly lesser degree also on the extension view.  Mild flexion on the flexion view there is no abnormal translational motion.  Vertebral body heights appear maintained.  Small bilateral neural foraminal narrowing    - MRI Lumbar Spine 01/25/21:  Extensive multilevel degenerative changes of the  lumbar spine.  Scoliosis.  Multilevel desiccation of the disc.  Signal alteration at the L2 and L3 level suggestive of marrow edema, however, no evidence of abnormal increased signal intensity at the disc space level.  No congenital spinal stenosis.  The spinal cord terminates at approximately the T12-L1 level.  At the T12-L1 level there is a left paracentral disc bulge with bilateral facet degenerative changes.  No significant spinal canal or foraminal encroachment.  At the L1-L2 level there is a minimal posterior disc bulge with facet degenerative changes.  No significant spinal canal or foraminal encroachment.  At the L2-L3 level there is a posterior disc bulge greater on the right than the left with moderate facet degenerative changes and prominence of the ligamentum flavum.  There is moderate spinal canal and right sided foraminal encroachment which was not present on the previous study.  At the L3-L4 level there is a posterior disc bulge with moderate facet degenerative changes resulting in mild to moderate spinal canal encroachment and moderate bilateral foraminal encroachment.  At the L4-L5 level there is a posterior disc bulge with moderate to advanced facet degenerative changes.  Prominence of the ligamentum flavum.  Again noted is severe acquired spinal stenosis and bilateral foraminal encroachment.  At the L5-S1 level there is no significant focal disc abnormality.  There are mild bilateral facet degenerative changes.  No significant spinal canal or foraminal encroachment.    - X-ray hand right 7/15/21:  No acute fracture is identified.  The area of pain is indicated at the level of the 5th metacarpal.  Minimal probable chronic subluxation of the proximal and distal interphalangeal joints of the 5th digit which may related to remote trauma.  Recommend clinical correlation.  Alignment is satisfactory elsewhere.  No mass or foreign body.  Mild degenerative changes of the interphalangeal joint and 1st  carpometacarpal joint    - X-ray hand left 2/9/18:  Mild degenerative changes are noted of the DIP joints.  There is degenerative change of the first carpal metacarpal joint.  There is irregularity about the distal aspect of the second metacarpal.  Degenerative changes are noted in the region.  No radiopaque foreign body.    Labs:  BMP  Lab Results   Component Value Date     11/04/2021    K 4.2 11/04/2021     11/04/2021    CO2 24 11/04/2021    BUN 11 11/04/2021    CREATININE 0.8 11/04/2021    CALCIUM 9.9 11/04/2021    ANIONGAP 12 11/04/2021    ESTGFRAFRICA >60 11/04/2021    EGFRNONAA >60 11/04/2021     Lab Results   Component Value Date    ALT 12 11/04/2021    AST 14 11/04/2021    ALKPHOS 77 11/04/2021    BILITOT 0.2 11/04/2021     Lab Results   Component Value Date     10/26/2021       Assessment:  Karin Maguire is a 71 y.o. female with the following diagnoses based on history, exam, and imaging:    Problem List Items Addressed This Visit    None     Visit Diagnoses     Acquired trigger finger of right ring finger    -  Primary    Relevant Orders    Ambulatory referral/consult to Physical/Occupational Therapy    Tendon Sheath    Cervical radiculopathy        Relevant Medications    pregabalin (LYRICA) 150 MG capsule    Lumbar radiculopathy        Relevant Medications    pregabalin (LYRICA) 150 MG capsule    Radial styloid tenosynovitis (de quervain)        Relevant Orders    X-Ray Wrist 2 View Left    Ambulatory referral/consult to Physical/Occupational Therapy    Neck pain        Relevant Medications    pregabalin (LYRICA) 150 MG capsule    Left wrist pain        Relevant Orders    X-Ray Wrist 2 View Left    Ambulatory referral/consult to Physical/Occupational Therapy          This is a pleasant 71 y.o. lady presenting with: back pain     - Left wrist pain with suspected de quervain's tenosynovitis: Only temporary improvement with CSI and bracing.   - Right ring finger flexor stenosing  tenosynovitis: Improved after CSI  - Neck pain: Pain appears myofascial and facetogenic. Failed 6 weeks of chiropractic care and HEP. Pain radiates into left arm.   - Chronic right sided low back pain with right sided sciatica at times, but not currently: Right SIJ provided 50% relief and right GTB injection provided 100% relief. Has reported radicular pain into the right leg, has decreased patellar reflex, weakness in right knee extension which may be due to her severe stenosis at L4-5 foramina on the right causing a right L4 radiculopathy. Back pain currently improved with HEP and had (+) relief with MBBs x 2.  - Right trochanteric bursitis   - Comorbidities: Bipolar disorder, Anxiety, Insomnia. TIA. Prior GI bleed 2/2 NSAIDs    Treatment Plan:   - PT/OT/HEP: Cont HEP. Refer to OT for her hands  - Procedures: Performed right ring finger flexor tendon sheath injection. Advised no heavy lifting with right hand for at least 2 weeks after this injection.     - Plan for C7-T1 IL KYLEE if neck/radicular pains worsen   - Can repeat right L3-4, L4-5, L5-S1 MB RFA if back pain returns   - Consider bilateral C3-4, C4-5 MBBs for neck pain if no relief with MESERET  - Medications:    - DC gabapentin 600 mg TID. Taper provided.    - Start lyrica 150 mg BID.  reviewed  - Imaging: Reviewed. Order x-ray of the left wrist.   - Labs: Reviewed.      Follow Up: RTC 1-2 months, may consider repeat injection to the left APL/EPB    Mamie Roman M.D.  Interventional Pain Medicine / Physical Medicine & Rehabilitation    Disclaimer: This note was partly generated using dictation software which may occasionally result in transcription errors.

## 2022-03-18 NOTE — PROGRESS NOTES
Health Maintenance Due   Topic Date Due    Hepatitis C Screening  Never done    Shingles Vaccine (1 of 2) Never done    Influenza Vaccine (1) 09/01/2021    DEXA Scan  11/15/2021    Mammogram  12/02/2021     Updates were requested from care everywhere.  Chart was reviewed for overdue Proactive [facility] Encounters ([facility]) topics (CRS, Breast Cancer Screening, Eye exam)  Health Maintenance has been updated.  [place] immunization registry triggered.  Immunizations were reconciled.

## 2022-04-04 DIAGNOSIS — F31.9 BIPOLAR 1 DISORDER: ICD-10-CM

## 2022-04-04 RX ORDER — QUETIAPINE FUMARATE 300 MG/1
TABLET, FILM COATED ORAL
Qty: 45 TABLET | Refills: 5 | Status: SHIPPED | OUTPATIENT
Start: 2022-04-04 | End: 2022-04-22 | Stop reason: SDUPTHER

## 2022-04-05 ENCOUNTER — CLINICAL SUPPORT (OUTPATIENT)
Dept: REHABILITATION | Facility: HOSPITAL | Age: 72
End: 2022-04-05
Payer: MEDICARE

## 2022-04-05 DIAGNOSIS — S66.912A OVERUSE SYNDROME OF LEFT HAND, INITIAL ENCOUNTER: ICD-10-CM

## 2022-04-05 DIAGNOSIS — M65.4 RADIAL STYLOID TENOSYNOVITIS (DE QUERVAIN): ICD-10-CM

## 2022-04-05 DIAGNOSIS — R29.898 DECREASED STRENGTH OF UPPER EXTREMITY: ICD-10-CM

## 2022-04-05 DIAGNOSIS — M25.532 LEFT WRIST PAIN: ICD-10-CM

## 2022-04-05 DIAGNOSIS — M65.341 ACQUIRED TRIGGER FINGER OF RIGHT RING FINGER: ICD-10-CM

## 2022-04-05 PROCEDURE — 97110 THERAPEUTIC EXERCISES: CPT | Mod: PN

## 2022-04-05 PROCEDURE — 97161 PT EVAL LOW COMPLEX 20 MIN: CPT | Mod: PN

## 2022-04-05 NOTE — PLAN OF CARE
"[facility]   Physical Therapy Initial Evaluation     Date: 4/5/2022   Name: Karin Maguire  Clinic Number: [id number]    Therapy Diagnosis:   Encounter Diagnoses   Name Primary?    Decreased strength of upper extremity     Overuse syndrome of left hand, initial encounter      Physician: Mamie Roman MD    Physician Orders: PT Eval and Treat  Medical Diagnosis from Referral:  M65.4 (ICD-10-CM) - Radial styloid tenosynovitis (de quervain)   M65.341 (ICD-10-CM) - Acquired trigger finger of right ring finger   M25.532 (ICD-10-CM) - Left wrist pain   Evaluation Date: 4/5/2022  Authorization Period Expiration: TBA  Plan of Care Expiration: 6/10/2022  Progress Note Due: 5/5/2022  Visit # / Visits authorized: 1/1   FOTO: 0/1    Precautions: Standard     Time In: 1:00 PM  Time Out: 1:40 PM  Total Appointment Time (timed & untimed codes): 40 minutes      SUBJECTIVE     Date of onset: chronic hx of L lower UE/wrist pain, unable to report specific time frame.      History of current condition - Karin reports: she has a hx of chronic pain in the lower LUE, with intermittent numbness and burning present on the lateral side, along with her L thumb. She reports recently she has had trouble with holding a book for too long and performing ADLs that require overhead ROM. She states she recently built a chicken coop with her  and is currently raising chickens.     Falls: last fall occurred last week; patient reports tripping on object. Karin states she experiences 6-7 falls per year.    Imaging: X-Ray Wrist Complete 3 Views Left. Completed on 3/18/2022. Findings listed below:     "No fracture or subluxation are identified.  There is advanced osteoarthritis of the 1st carpometacarpal joint with joint space narrowing, periarticular sclerosis, and periarticular osteophyte formation.  No erosive changes are present.  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  Visualized soft tissues are unremarkable."    Prior Therapy: reports previous hx of PT for shoulder, 3-4 years ago. Reports a negative experience due to no return to prior level of function.   Social History: currently lives in a single story home with , no steps to enter.  Occupation: Retired  Prior Level of Function: Independent with ADLs  Current Level of Function: Independent with ADLs, however requires increased time of completion.    Pain:  Current 0/10, worst 9/10, best 0/10   Location: Lateral and distal LUE  Description: Burning, numbness  Aggravating Factors: Holding things for too long, overhead activies, gripping  Easing Factors: Changing positions, rest    Patients goals: Return to prior level of function and to be able to complete ADLs independently with no rest breaks.      Medical History:   Past Medical History:   Diagnosis Date    Anxiety     Arthritis     Bipolar 1 disorder     Bursitis of right hip     GI bleed due to NSAIDs     Multinodular goiter 11/15/2021    Sacroiliitis     right side    Sleep apnea        Surgical History:   Karin Maguire  has a past surgical history that includes Hysterectomy (1986); Bladder suspension; Rectal prolapse repair; Tonsillectomy; Cholecystectomy; Ankle fracture surgery (Left, 2001); Carpal tunnel release (Bilateral); Nasal septum surgery; Injection of anesthetic agent into sacroiliac joint (Right, 12/4/2020); Injection of joint (Right, 12/4/2020); Injection of anesthetic agent around medial branch nerves innervating lumbar facet joint (Right, 2/5/2021); Injection of anesthetic agent around medial branch nerves innervating lumbar facet joint (Right, 4/30/2021); and Esophagogastroduodenoscopy (N/A, 7/29/2021).    Medications:   Karin has a current medication list which includes the following prescription(s): acetaminophen, albuterol, aspirin, duloxetine, " "lamotrigine, pantoprazole, pregabalin, quetiapine, and rosuvastatin.    Allergies:   Review of patient's allergies indicates:   Allergen Reactions    Nsaids (non-steroidal anti-inflammatory drug) Other (See Comments)     Gastrointestinal bleeding requiring blood transfusion          OBJECTIVE     Observation: mild kyphosis of thoracic spine in standing with rounded shoulder posture.     Range of Motion (extension/neutral/flexion):      Right (AROM) Left  (AROM) Comment   Wrist Extension: 50 70    Wrist Flexion: 80 80 *Increased pain reported while performing on L   Radial Deviation 15 15 *Increased pain reported while performing on L   Ulnar Deviation 30 20      Strength:    Right Left Comment   Wrist Extension: 3+/5 3+/5    Wrist Flexion: 3+/5 3+/5     3+/5 3+/5       Right Left Comment   Elbow Extension: 4-/5 4-/5    Elbow Flexion: 4-/5 4-/5    Forearm supination 3/5 3/5    Forearm pronation  3/5 3/5      Special Tests:   - Finkelstein's test: (+) on L  - Froment's sign: (-)     Palpation: increased L extensor tendon guarding, increased L abductor pollicis longus and opponens pollicis guarding    Limitation/Restriction for FOTO Survey    Therapist reviewed FOTO scores for Karin Maguire on 4/5/2022.   FOTO documents entered into [employer] - see Media section.    Limitation Score: TBA%         TREATMENT     Total Treatment time (time-based codes) separate from Evaluation: 8 minutes      Karin received the treatments listed below:      therapeutic exercises to develop strength, endurance, ROM and flexibility for 8 minutes including:    - Wrist flexion stretch, 3x30"  - Wrist extension stretch, 3x30"  - Tendon glides, 2x10  - Thumb opposition towards each finger, 2x10    manual therapy techniques: Joint mobilizations, Myofacial release and Soft tissue Mobilization were applied to the: B) hands for 0 minutes, including:    - N/A    therapeutic activities to improve functional performance for 0  minutes, " including:    - N/A    PATIENT EDUCATION AND HOME EXERCISES     Education provided:   - HEP and importance of compliance, Role of PT, goals of therapeutic intervention and POC, current status, general biomechanics of hands and wrist.     Written Home Exercises Provided: yes. Exercises were reviewed and Karin was able to demonstrate them prior to the end of the session.  Karin demonstrated good  understanding of the education provided. See EMR under Patient Instructions for exercises provided during therapy sessions.    ASSESSMENT     Karin is a 71 y.o. female referred to outpatient Physical Therapy with a medical diagnosis of radial styloid tenosynovitis (de quervain), acquired trigger finger of right ring finger, and left wrist pain. Patient presents with inability to complete ADLs independently with no rest breaks, participate with leisure activities such as reading, and complete yard work due to difficulty performing activities that require overhead ROM, gripping, and maintaining static positioning of the BUEs secondary to decreased ROM, strength, flexibility, endurance, posture, and increased pain levels in the presence of signs and symptoms consistent with overuse syndrome of the L hand and overall decreased strength of LUE.     Patient prognosis is Good.   Patient will benefit from skilled outpatient Physical Therapy to address the deficits stated above and in the chart below, provide patient /family education, and to maximize patientt's level of independence.     Plan of care discussed with patient: Yes  Patient's spiritual, cultural and educational needs considered and patient is agreeable to the plan of care and goals as stated below:     Anticipated Barriers for therapy: chronicity of symptoms, negative experience with previous PT intervention    Medical Necessity is demonstrated by the following  History  Co-morbidities and personal factors that may impact the plan of care Co-morbidities:    See medical hx    Personal Factors:   age     low   Examination  Body Structures and Functions, activity limitations and participation restrictions that may impact the plan of care Body Regions:   upper extremities    Body Systems:    gross symmetry  ROM  strength  gross coordinated movement  transfers  transitions    Participation Restrictions:   Recreational activities and yard work    Activity limitations:   Learning and applying knowledge  no deficits    General Tasks and Commands  no deficits    Communication  no deficits    Mobility  lifting and carrying objects  fine hand use (grasping/picking up)    Self care  no deficits    Domestic Life  cooking  doing house work (cleaning house, washing dishes, laundry)  assisting others    Interactions/Relationships  no deficits    Life Areas  no deficits    Community and Social Life  recreation and leisure         high   Clinical Presentation stable and uncomplicated low   Decision Making/ Complexity Score: low       GOALS:  Short Term Goals: 4 weeks  1. Increase L wrist ROM by 5-10 degrees in order to perform ADLs with decreased difficulty.  2. Increase strength in B wrists by 1/3 MMT grade to increase tolerance for ADL and work activities.  3. Pt to tolerate HEP to improve ROM and independence with ADL's    Long Term Goals: 8 weeks  1. Patient will complete lifting 10 lbs off of ground with proper lifting and gripping technique to demonstrate increased ability to participate in yard work and demonstrate increased BUE strength for ADLs.   2.Increased strength in B wrists to >/= 4/5 MMT grade to increase tolerance for ADL and work activities.  3.  Pt will report at < / = level TBA% on FOTO to demonstrate overall  Improvement and increased ability to perform ADLs independently with BUE and minimal rest breaks.      PLAN   Plan of care Certification: 4/5/2022 to 6/10/2022.    Outpatient Physical Therapy 1-2 times weekly for 8 weeks to include the following  interventions: Aquatic Therapy, Cervical/Lumbar Traction, Electrical Stimulation  , Fluidotherapy, Gait Training, Manual Therapy, Moist Heat/ Ice, Neuromuscular Re-ed, Paraffin, Patient Education, Self Care, Therapeutic Activities, Therapeutic Exercise, Ultrasound and Therapeutic Modalities. Patient may be seen by PTA as part of her plan of care.    Lisbeth Tripp, PT      I CERTIFY THE NEED FOR THESE SERVICES FURNISHED UNDER THIS PLAN OF TREATMENT AND WHILE UNDER MY CARE   Physician's comments:     Physician's Signature: ___________________________________________________

## 2022-04-06 PROBLEM — R29.898 DECREASED STRENGTH OF UPPER EXTREMITY: Status: ACTIVE | Noted: 2022-04-06

## 2022-04-06 PROBLEM — S66.912A: Status: ACTIVE | Noted: 2022-04-06

## 2022-04-11 ENCOUNTER — CLINICAL SUPPORT (OUTPATIENT)
Dept: REHABILITATION | Facility: HOSPITAL | Age: 72
End: 2022-04-11
Payer: MEDICARE

## 2022-04-11 DIAGNOSIS — R53.1 WEAKNESS: ICD-10-CM

## 2022-04-11 DIAGNOSIS — M79.642 PAIN IN LEFT HAND: ICD-10-CM

## 2022-04-11 PROCEDURE — 97165 OT EVAL LOW COMPLEX 30 MIN: CPT | Mod: PN

## 2022-04-11 NOTE — PLAN OF CARE
Occupational Therapy  Initial Evaluation     Date: 4/11/2022  Name: Karin Maguire  Clinic Number: [id number]    Therapy Diagnosis:   Encounter Diagnoses   Name Primary?    Pain in left hand     Weakness      Physician: Mamie Roman MD    Physician Orders: evaluate and treat  Medical Diagnosis:   M65.4 (ICD-10-CM) - Radial styloid tenosynovitis (de quervain)   M65.341 (ICD-10-CM) - Acquired trigger finger of right ring finger   M25.532 (ICD-10-CM) - Left wrist pain     Date of Injury/Onset: specific date unknown, patient reporting history of left hand pain, however more recently began with trouble holding a book for too long and using hand without pain.   Evaluation Date: 04/11/2022  Insurance Authorization Period Expiration: 4/11/2022 to 4/11/2023  Plan of Care Certification Period: 4/11/2022 to 5/23/2022  Date of Return to MD: unknown    Visit # / Visits authorized: 1 / 20    FOTO: initial eval: to be completed       Time In:10:15  Time Out: 11:00  Total treatment time: 45 minutes  Total Timed minutes: 15 minutes    Precautions:  Standard    Subjective     Involved Side: Left  Date of Onset: see above  Imaging: Per chart: No fracture or subluxation are identified. There is advanced osteoarthritis of the 1st carpometacarpal joint with joint space narrowing, periarticular sclerosis, and periarticular osteophyte formation. No erosive changes are present. 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 Visualized soft tissues are unremarkable.     Past Medical History/Physical Systems Review:   Karin Maguire  has a past medical history of Anxiety, Arthritis, Bipolar 1 disorder, Bursitis of right hip, GI bleed due to NSAIDs, Multinodular goiter, Sacroiliitis, and Sleep apnea.    Karin Maguire  has a past surgical history that includes Hysterectomy (1986); Bladder suspension; Rectal prolapse  "repair; Tonsillectomy; Cholecystectomy; Ankle fracture surgery (Left, 2001); Carpal tunnel release (Bilateral); Nasal septum surgery; Injection of anesthetic agent into sacroiliac joint (Right, 12/4/2020); Injection of joint (Right, 12/4/2020); Injection of anesthetic agent around medial branch nerves innervating lumbar facet joint (Right, 2/5/2021); Injection of anesthetic agent around medial branch nerves innervating lumbar facet joint (Right, 4/30/2021); and Esophagogastroduodenoscopy (N/A, 7/29/2021).    Karin has a current medication list which includes the following prescription(s): acetaminophen, albuterol, aspirin, duloxetine, lamotrigine, pantoprazole, pregabalin, quetiapine, and rosuvastatin.    Review of patient's allergies indicates:   Allergen Reactions    Nsaids (non-steroidal anti-inflammatory drug) Other (See Comments)     Gastrointestinal bleeding requiring blood transfusion        Patient's Goals for Therapy: Return to prior level of functiona and to be able to complete activities of daily living independently with no pain or rest breaks needed.    Pain:  Functional Pain Scale Rating 0-10:   Patient reported "no pain at rest today" reported "only a soreness" during exercise that subsided quickly. Unable to numerically rate this pain.   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     Current FunctionalStatus   Home/Living environment : lives with their spouse      Limitation of Functional Status as follows:   - Patient reports independent with activities of daily living, however requires increased time for completion with pain for most activities " "      Objective     Observation/Appearance: Mild rounded shoulder posture    Edema. Measured in centimeters.  - None noted     Elbow and Wrist ROM. Measured in degrees.   4/11/2022 4/11/2022    Left Right   Elbow Ext/Flex Within functional limits  Within functional limits    Supination/Pronation Within functional limits  Within functional limits    Wrist Ext/Flex Within functional limits Within functional limits    Wrist RD/UD Within functional limits  Within functional limits      Hand ROM. Measured in degrees.  Patient with within functional limits of gross grasp today. Patient reporting no pain with movement of ring finger today, reporting "only pain with movement of left thumb today." per patient      Strength (Dynamometer) and Pinch Strength (Pinch Gauge)  -  and pinch strengthening to be completed possibly next session if pain continues to decrease      Special Tests  Finkelstein's Test positive     Treatment     Treatment Time In: 10:30  Treatment Time Out: 10:45  Total Treatment time separate from Evaluation time:15    Karin received therapeutic exercises for 15 minutes including:   - Left ulnar deviation passive stretch with end range hold to tolerance only   - Left thumb flexion passive stretch with end range hold to tolerance only   - Left wrist extension isometric contraction strengthening x 10 reps with good tolerance   - Left wrist flexion isometric contraction strengthening x 10 reps with good toelrance    Education:  Discussed with patient joint protection techniques during activities of daily living to continue decreased pain for the next week as well as home modalities such as warm and cool water soaks.     Patient/Family Education: role of occupational therapy , goals for occupational therapy, scheduling/cancellations - patient verbalized understanding.     Assessment     Karin Maguire is a 71 y.o. female referred to outpatient occupational therapy and presents with a medical " diagnosis of   M65.4 (ICD-10-CM) - Radial styloid tenosynovitis (de quervain)   M65.341 (ICD-10-CM) - Acquired trigger finger of right ring finger   M25.532 (ICD-10-CM) - Left wrist pain   resulting in pain and weakness left hand during most home daily activities and demonstrates limitations as described in the chart below. Following medical record review it is determined that pt will benefit from occupational therapy services in order to maximize pain free and/ functional use of left hand. The following goals were discussed with the patient and patient is in agreement with them as to be addressed in the treatment plan. The patient's rehab potential is Good.     Anticipated barriers to occupational therapy: none at this time  Patient has no cultural, educational or language barriers to learning provided.    Profile and History Assessment of Occupational Performance Level of Clinical Decision Making Complexity Score   Occupational Profile:   Karin Maguire is a 71 y.o. female who lives with their spouse and is currently retired. Karin Maguire has difficulty with  feeding, bathing, grooming and dressing  housework/household chores  affecting her daily functional abilities. Her main goal for therapy isReturn to prior level of functiona and to be able to complete activities of daily living independently with no pain or rest breaks needed.       Comorbidities:   Anxiety, Arthritis, Bipolar 1 disorder, Bursitis of right hip, GI bleed due to NSAIDs, Multinodular goiter, Sacroiliitis, and Sleep apnea.    Medical and Therapy History Review:   Brief               Performance Deficits    Physical:  Joint Mobility  Joint Stability  Muscle Power/Strength  Muscle Endurance   Strength  Pinch Strength  Fine Motor Coordination  Postural Control  Pain    Cognitive:  No Deficits    Psychosocial:    No Deficits     Clinical Decision Making:  low    Assessment Process:  Problem-Focused Assessments    Modification/Need for  Assistance:  Minimal-Moderate Modifications/Assistance    Intervention Selection:  Several Treatment Options       low  Based on PMHX, co morbidities , data from assessments and functional level of assistance required with task and clinical presentation directly impacting function.         Goals:   The following goals were discussed with the patient and patient is in agreement with them as to be addressed in the treatment plan.     Short Term Goals to be completed within ~ 3 to 4 weeks by 5/9/2022:  1. Patient to report a decrease of left hand and wrist pain to 0 to 1/10 during all basic activities of daily living.   2. Patient to be independent with home exercise program.   3. Assess  and strength as appropriate with consistent decreased pain to only intermittent 1 to 2 / 10 during basic activities.     Long Term Goals to be completed by discharge:  1. Patient to report a decrease in pain to only an intermittent 1/10 during all daily home and leisure activities.   2. Patient to demonstrate left hand and pinch strength to within functional limits in order to complete all daily home and leisure activities with little to no difficulty nor pain as reported by patient.     Plan   Certification Period/Plan of care expiration: 4/11/2022 to 5/23/2022.    Outpatient Occupational Therapy 1 to 2 times weekly for 6 weeks to include the following interventions: Modalities as needed for pain, inflammation, and stiffness possibly including hot and cold packs, paraffin, fluidotherapy, ultra sound, and electrical stim as well as manual therapy, therapeutic exercise, therapeutic activity, orthotic fitting and training as needed, and self-care tasks as appropriate.        YEIMY Adams, BLAST

## 2022-04-22 ENCOUNTER — OFFICE VISIT (OUTPATIENT)
Dept: PSYCHIATRY | Facility: CLINIC | Age: 72
End: 2022-04-22
Payer: MEDICARE

## 2022-04-22 ENCOUNTER — CLINICAL SUPPORT (OUTPATIENT)
Dept: REHABILITATION | Facility: HOSPITAL | Age: 72
End: 2022-04-22
Payer: MEDICARE

## 2022-04-22 DIAGNOSIS — M79.642 PAIN IN LEFT HAND: Primary | ICD-10-CM

## 2022-04-22 DIAGNOSIS — F31.9 BIPOLAR 1 DISORDER: ICD-10-CM

## 2022-04-22 DIAGNOSIS — F41.1 GAD (GENERALIZED ANXIETY DISORDER): ICD-10-CM

## 2022-04-22 DIAGNOSIS — Z65.8 PSYCHOSOCIAL STRESSORS: Primary | ICD-10-CM

## 2022-04-22 DIAGNOSIS — R53.1 WEAKNESS: ICD-10-CM

## 2022-04-22 PROCEDURE — 99999 PR STA SHADOW: ICD-10-PCS | Mod: PBBFAC,95,, | Performed by: STUDENT IN AN ORGANIZED HEALTH CARE EDUCATION/TRAINING PROGRAM

## 2022-04-22 PROCEDURE — 99999 PR STA SHADOW: CPT | Mod: PBBFAC,95,, | Performed by: STUDENT IN AN ORGANIZED HEALTH CARE EDUCATION/TRAINING PROGRAM

## 2022-04-22 PROCEDURE — 97110 THERAPEUTIC EXERCISES: CPT | Mod: PN

## 2022-04-22 PROCEDURE — 97035 APP MDLTY 1+ULTRASOUND EA 15: CPT | Mod: PN

## 2022-04-22 PROCEDURE — 99214 OFFICE O/P EST MOD 30 MIN: CPT | Performed by: STUDENT IN AN ORGANIZED HEALTH CARE EDUCATION/TRAINING PROGRAM

## 2022-04-22 RX ORDER — DULOXETIN HYDROCHLORIDE 60 MG/1
120 CAPSULE, DELAYED RELEASE ORAL DAILY
Qty: 60 CAPSULE | Refills: 5 | Status: SHIPPED | OUTPATIENT
Start: 2022-04-22 | End: 2022-09-07 | Stop reason: SDUPTHER

## 2022-04-22 RX ORDER — LAMOTRIGINE 150 MG/1
300 TABLET ORAL NIGHTLY
Qty: 180 TABLET | Refills: 5 | Status: SHIPPED | OUTPATIENT
Start: 2022-04-22 | End: 2022-09-07 | Stop reason: SDUPTHER

## 2022-04-22 RX ORDER — QUETIAPINE FUMARATE 300 MG/1
TABLET, FILM COATED ORAL
Qty: 45 TABLET | Refills: 5 | Status: SHIPPED | OUTPATIENT
Start: 2022-04-22 | End: 2022-09-07 | Stop reason: ALTCHOICE

## 2022-04-22 NOTE — PROGRESS NOTES
Occupational Therapy Daily Treatment Note     Date: 4/22/2022  Name: Karin Maguire  Clinic Number: [id number]    Therapy Diagnosis:   Encounter Diagnoses   Name Primary?    Pain in left hand Yes    Weakness      Physician: Mamie Roman MD    Physician Orders: evaluate and treat  Medical Diagnosis:   M65.4 (ICD-10-CM) - Radial styloid tenosynovitis (de quervain)   M65.341 (ICD-10-CM) - Acquired trigger finger of right ring finger   M25.532 (ICD-10-CM) - Left wrist pain     Evaluation Date: 4/11/2022  Insurance Authorization Period Expiration: 4/11/2022 to 4/11/2023  Plan of Care Certification Period:4/11/2022 to 5/23/2022    Date of Return to MD: 5/2/2022    Visit # / Visits authorized: 2 / 20  Time In:8:45  Time Out: 9:30  Total Billable Time: 45 minutes    Precautions:  Standard      Subjective     Pt reports: No pain at rest, now minimal pain with minimal use of left hand  she was compliant with home exercise program given last session.   Response to previous treatment: good with no adverse reactions  Functional change: decreasing pain with functional use    Pain: 2/10 during minimal left hand use only  Location: left thumb      Objective   Patient being seen by Occupational Therapy for modalities for pain and stiffness as well as manual therapy and therapeutic exercises/activities for improved range of motion, coordination, pain, and strength for overall improved functional use of left hand during activities of daily living and daily home activities.       Karin received the following direct contact modalities after being cleared for contraindications for 8 minutes to improve comfort of motion of left thumb for functional use of left hand during activities of daily living such as griping her purse or holding a spoon:   - Patient received ultrasound to 1st and 2nd dorsal compartment left thumb area to increase blood flow, circulation, tissue elasticity, and pain management for 8 minutes @ 3.3 Mhz,  Intensity 0.5 w/cm2 at 20% duty cycle. Good tolerance noted with intact skin and good color prior to and after treatment.     Karin received therapeutic exercises for 37 minutes to improve postural correction and alignment, stretching and soft tissue mobility, range of motion, and strengthening in order to improve left hand function during activities of daily living including grasping activities such as holding a spoon and picking up her purse including the following skilled interventions:   - Left ulnar deviation passive stretch with end range hold to tolerance only              - Left thumb flexion passive stretch with end range hold to tolerance only              - Left wrist extension isometric contraction strengthening x 10 reps with good tolerance              - Left wrist flexion isometric contraction strengthening x 10 reps with good tolerance    - Left wrist active range of motion into ulnar deviation slowly for a full count of 4 for eccentric contraction of thumb 1st dorsal compartment x 10 reps.    - Left gross grasp isometric strengthening with grasp of small / medium splint material dowel while flattening a ball of soft resistance (yellow) putty. Completed one full set with one rest break in the middle of set required due to pain and muscle fatigue. Decreased pain by end of rest break.        Home Exercises and Education Provided     Education provided:   - Progress towards goals     Written Home Exercises Provided: yes.  Exercises were reviewed and Karin was able to demonstrate them prior to the end of the session.  Karin demonstrated good  understanding home instructions provided.      See EMR under Patient Instructions for exercises provided 4/22/2022.        Assessment   Patient would continue to benefit from skilled occupational therapy services in order to continue to progress skilled therapeutic exercise and other modalities to improve range of motion, strength, coordination, and pain  for improved ability for patient to complete basic and home daily activities with less difficulty and pain for improved quality of life.      Karin is progressing well towards her goals and there are no updates to goals at this time. Patient prognosis is Good.     Patient will continue to benefit from skilled outpatient occupational therapy to address the deficits listed in the problem list on initial evaluation provide patient/family education and to maximize patient's level of independence in the home and community environment.     Anticipated barriers to occupational therapy: none at this time    Patient's spiritual, cultural and educational needs considered and pt agreeable to plan of care and goals.    Goals:  Short Term Goals to be completed within ~ 3 to 4 weeks by 5/9/2022:  1. Patient to report a decrease of left hand and wrist pain to 0 to 1/10 during all basic activities of daily living.   2. Patient to be independent with home exercise program.   3. Assess  and strength as appropriate with consistent decreased pain to only intermittent 1 to 2 / 10 during basic activities.      Long Term Goals to be completed by discharge:  1. Patient to report a decrease in pain to only an intermittent 1/10 during all daily home and leisure activities.   2. Patient to demonstrate left hand and pinch strength to within functional limits in order to complete all daily home and leisure activities with little to no difficulty nor pain as reported by patient.      Plan   Continue occupational therapy plan of care 1 to 2 x week for 6 weeks during the certification period of 4/11/2022 to 5/23/2022 in pursuit of occupational therapy goals.   Updates/Grading for next session: continue functional graded strengthening with reports of continued decreased pain      YEIMY Adams, BLAST

## 2022-04-22 NOTE — PROGRESS NOTES
"    04/22/2022  1:21 PM  Karin Maguire  [id number]    Outpatient Psychiatry Follow-Up Visit (MD/NP)    4/22/2022    Clinical Status of Patient:  Outpatient (Ambulatory)    Chief Complaint:  Karin Maguire is a 71 y.o. female who presents today for follow-up of depression and anxiety.  Met with patient.        Interval History and Content of Current Session:  Interim Events/Subjective Report/Content of Current Session:     She states her mood has been "good... I have no complaints."    No recent depression.     She states her anxiety levels are "good... I have a new hobby... doing a lot with the grand kids... keeping me busy."    She has very much been enjoying her hobbies.    Patient has been raising chickens.    Going to [employer] baseball games and soccer games.     She is continuing to joyner, typically gambles 100 dollars per occasion, no debt from gambling "I have an allowance of 1000 dollars a month... I don't even come close to the allowance."        Stressors: relationship with  -built her chicken coops         Psychiatric Review Of Systems - Is patient experiencing or having changes in:  sleep: "good"  appetite: "good"  weight: no  energy/anergy: "good"  Interest/pleasure/anhedonia: denies  Anxiety: less  panic: no  Guilty/hopelessness/worthlessness: no  concentration: denies  S.I.B.s/risky behavior: no  Irritability: less  Substance abuse: no  Racing thoughts: no  Impulsive behaviors: no  Paranoia: no  AVH: no  Gambling: yes, see above  Michelle/hypomania: no        Review of Systems   Review of Systems   Constitutional: Negative for chills and fever.   HENT: Negative for hearing loss.    Eyes: Negative for blurred vision and double vision.   Respiratory: Negative for shortness of breath.    Cardiovascular: Negative for chest pain.   Gastrointestinal: Negative for constipation, diarrhea, nausea and vomiting.   Genitourinary: Negative for dysuria.   Musculoskeletal: Positive for back pain and " joint pain.   Skin: Negative for rash.   Neurological: Negative for dizziness and headaches.   Endo/Heme/Allergies: Negative for environmental allergies.       Past Medical, Family and Social History: The patient's past medical, family and social history have been reviewed and updated as appropriate within the electronic medical record - see encounter notes.    Social History     Socioeconomic History    Marital status:    Tobacco Use    Smoking status: Current Every Day Smoker     Packs/day: 0.50     Years: 35.00     Pack years: 17.50     Types: Cigarettes     Start date: 3/30/1982    Smokeless tobacco: Never Used    Tobacco comment: smoking cessation clinic pamphlet for clinic put on chart to give to pt.    Substance and Sexual Activity    Alcohol use: Yes     Comment: Socially-2 or 3 times a year-6 or 7 drinks    Drug use: No    Sexual activity: Yes     Partners: Male     Birth control/protection: Surgical     Comment:          Compliance: yes    Side effects: None    Risk Parameters:  Patient reports no suicidal ideation  Patient reports no homicidal ideation  Patient reports no self-injurious behavior  Patient reports no violent behavior        Exam (detailed: at least 9 elements; comprehensive: all 15 elements)     **vitals could not be obtained 2/2 virtual visit**        Wt Readings from Last 5 Encounters:   03/18/22 85.7 kg (188 lb 15 oz)   11/15/21 83 kg (182 lb 15.7 oz)   11/11/21 83.4 kg (183 lb 13.8 oz)   11/05/21 81.2 kg (179 lb)   11/04/21 81.4 kg (179 lb 9 oz)         CONSTITUTIONAL  General Appearance: unremarkable, age appropriate    MUSCULOSKELETAL  Muscle Strength and Tone:no tremor, no tic  Abnormal Involuntary Movements: yes  Gait and Station: non-ataxic    PSYCHIATRIC   Level of Consciousness: awake and alert   Orientation: person, place and situation  Grooming: Casually dressed and Well groomed  Psychomotor Behavior: normal, cooperative  Speech: normal tone, normal rate,  "normal pitch, normal volume  Language: grossly intact  Mood: "good"  Affect: Consistent with mood  Thought Process: linear, logical  Associations: intact   Thought Content: DENIES suicidal ideation and DENIES homicidal ideation  Perceptions: denies hallucinations  Memory: Able to recall past events, Remote intact and Recent intact  Attention:Attends to interview without distraction  Fund of Knowledge: Aware of current events and Vocabulary appropriate   Estimate if Intelligence:  Average based on work/education history, vocabulary and mental status exam  Insight: has awareness of illness  Judgment: behavior is adequate to circumstances        Assessment and Diagnosis   Status/Progress: Based on the examination today, the patient's problem(s) is/are well controlled.  New problems have not been presented today.   Co-morbidities are complicating management of the primary condition.        Impresssion/Assessment:  MDD with mixed features vs. Unspecified bipolar disorder (pt poor historian)  LUANNE  Insomnia  Gambling disorder  Obesity  Psychosocial stressors     Abnormal movements           Plan:    Patient declines changes to her medication regime at this time 04/22/2022    MDD with mixed features vs. Unspecified bipolar disorder (pt poor historian)  - continue lamictal 300 mg PO qd  - continue Cymbalta 120 mg PO qd  - continue Seroquel 450 mg PO qhs as prescribed by previous provider (legacy patient)  - strongly recommended psychotherapy, provided with resources     Insomnia  - using CPAP  - pt counseled     Gambling disorder  - pt counseled  - consider meetings/therapy; patient declines     LUANNE  - see MDD above  - consider psychotherapy, patient declines     Psychosocial stressors  - pt counseled  - strongly recommended psychotherapy, couples counseling to patient, patient refusing     Obesity  - avoid psychotropics with potential for weight gain as possible; on seroquel from previous provider, will continue per " "patient's request, monitor     Abnormal movements  - frequent non-stereotyped movements of hands and limbs, neck, trunk; patient states she has always been "fidgety" since childhood, reports she has seen neurology about movements before in past, does not want a referral at this time; extensively discussed risk/concern for tardive dyskinesia with AP, patient wishes to continue seroquel despite risk of TD; see IC discussion below          - Instructed patient to keep all scheduled appointments, take medications as prescribed and abstain from substance abuse. Instructed to call 911 or present to ER for emergency including SI or HI.    - Discussed diagnosis, risks and benefits of proposed treatment above vs alternative treatments vs no treatment, and potential side effects of these treatments. The patient expresses understanding of the above and displays the capacity to agree with this treatment given said understanding. Patient also agrees that, currently, the benefits outweigh the risks and would like to pursue treatment at this time.           Estevan Zaman III, MD    Return to Clinic: 6 months          "

## 2022-04-25 ENCOUNTER — OFFICE VISIT (OUTPATIENT)
Dept: OPHTHALMOLOGY | Facility: CLINIC | Age: 72
End: 2022-04-25
Payer: MEDICARE

## 2022-04-25 DIAGNOSIS — H02.003 ENTROPION AND TRICHIASIS OF EYELID, RIGHT: Primary | ICD-10-CM

## 2022-04-25 DIAGNOSIS — H02.831 DERMATOCHALASIS OF BOTH UPPER EYELIDS: ICD-10-CM

## 2022-04-25 DIAGNOSIS — H04.123 DRY EYES, BILATERAL: ICD-10-CM

## 2022-04-25 DIAGNOSIS — H02.834 DERMATOCHALASIS OF BOTH UPPER EYELIDS: ICD-10-CM

## 2022-04-25 PROCEDURE — 99999 PR PBB SHADOW E&M-EST. PATIENT-LVL II: CPT | Mod: PBBFAC,,, | Performed by: STUDENT IN AN ORGANIZED HEALTH CARE EDUCATION/TRAINING PROGRAM

## 2022-04-25 PROCEDURE — 99999 PR PBB SHADOW E&M-EST. PATIENT-LVL II: ICD-10-PCS | Mod: PBBFAC,,, | Performed by: STUDENT IN AN ORGANIZED HEALTH CARE EDUCATION/TRAINING PROGRAM

## 2022-04-25 PROCEDURE — 99212 OFFICE O/P EST SF 10 MIN: CPT | Mod: PBBFAC,PO | Performed by: STUDENT IN AN ORGANIZED HEALTH CARE EDUCATION/TRAINING PROGRAM

## 2022-04-25 PROCEDURE — 99204 PR OFFICE/OUTPT VISIT, NEW, LEVL IV, 45-59 MIN: ICD-10-PCS | Mod: S$PBB,,, | Performed by: STUDENT IN AN ORGANIZED HEALTH CARE EDUCATION/TRAINING PROGRAM

## 2022-04-25 PROCEDURE — 99204 OFFICE O/P NEW MOD 45 MIN: CPT | Mod: S$PBB,,, | Performed by: STUDENT IN AN ORGANIZED HEALTH CARE EDUCATION/TRAINING PROGRAM

## 2022-04-25 RX ORDER — ERYTHROMYCIN 5 MG/G
OINTMENT OPHTHALMIC 2 TIMES DAILY PRN
Qty: 1 EACH | Refills: 0 | Status: SHIPPED | OUTPATIENT
Start: 2022-04-25 | End: 2022-07-18

## 2022-04-25 NOTE — PROGRESS NOTES
HPI     Pt in today with complaints of having something in her right eye since   Friday. Pt states she is having a stabbing pain in her eye. Pt states the   pain does move around. Pt states her right eye was crusted when she woke   up. Pt states she did use a warm towel this morning to clean her eye.      Last edited by Queta Hernandez on 4/25/2022  8:49 AM. (History)            Assessment /Plan     For exam results, see Encounter Report.    Entropion and trichiasis of eyelid, right- Will refer patient to lid specialist for eval, use ATs QID for comfort  Start Emycin Oint. PRN for comfort    Dermatochalasis of both upper eyelids- see above    Dry eyes, bilateral- - ATs QID and lid hygiene w/ baby shampoo  - Omega 3 Fish Oils        Return to clinic 3M DFE

## 2022-04-29 ENCOUNTER — PATIENT OUTREACH (OUTPATIENT)
Dept: ADMINISTRATIVE | Facility: OTHER | Age: 72
End: 2022-04-29
Payer: MEDICARE

## 2022-04-29 ENCOUNTER — CLINICAL SUPPORT (OUTPATIENT)
Dept: REHABILITATION | Facility: HOSPITAL | Age: 72
End: 2022-04-29
Payer: MEDICARE

## 2022-04-29 DIAGNOSIS — M79.642 PAIN IN LEFT HAND: Primary | ICD-10-CM

## 2022-04-29 DIAGNOSIS — R53.1 WEAKNESS: ICD-10-CM

## 2022-04-29 PROCEDURE — 97110 THERAPEUTIC EXERCISES: CPT | Mod: PN

## 2022-04-29 PROCEDURE — 97018 PARAFFIN BATH THERAPY: CPT | Mod: PN

## 2022-04-29 NOTE — PROGRESS NOTES
Health Maintenance Due   Topic Date Due    Hepatitis C Screening  Never done    Shingles Vaccine (1 of 2) Never done    DEXA Scan  11/15/2021    COVID-19 Vaccine (4 - Booster) 04/29/2022     Updates were requested from care everywhere.  Chart was reviewed for overdue Proactive [facility] Encounters ([facility]) topics (CRS, Breast Cancer Screening, Eye exam)  Health Maintenance has been updated.  LINKS immunization registry triggered.  Immunizations were reconciled.

## 2022-04-29 NOTE — PROGRESS NOTES
Occupational Therapy Daily Treatment Note     Date: 4/29/2022  Name: Karin Maguire  Clinic Number: [id number]    Therapy Diagnosis:   Encounter Diagnoses   Name Primary?    Pain in left hand Yes    Weakness      Physician: Mamie Roman MD    Physician Orders: evaluate and treat  Medical Diagnosis:   M65.4 (ICD-10-CM) - Radial styloid tenosynovitis (de quervain)   M65.341 (ICD-10-CM) - Acquired trigger finger of right ring finger   M25.532 (ICD-10-CM) - Left wrist pain     Evaluation Date: 4/11/2022  Insurance Authorization Period Expiration: 4/11/2022 to 4/11/2023  Plan of Care Certification Period:4/11/2022 to 5/23/2022    Date of Return to MD: 5/2/2022    Visit # / Visits authorized: 3 / 20  Time In:8:45  Time Out: 9:30  Total Billable Time: 45 minutes    Precautions:  Standard      Subjective     Pt reports: No pain at rest, now with occasional pain during functional use but not always with functional use  she was compliant with home exercise program given last session, however non compliant with cool and warm water soaks   Response to previous treatment: good with no adverse reactions  Functional change: continuing decreasing pain with functional use    Pain: 2/10 during occasional functional use of left hand about 50% of the time   Location: left thumb      Objective   Patient being seen by Occupational Therapy for modalities for pain and stiffness as well as manual therapy and therapeutic exercises/activities for improved range of motion, coordination, pain, and strength for overall improved functional use of left hand during activities of daily living and daily home activities.     Karin received the following supervised modalities after being cleared for contradictions for 5 minutes:    - Paraffin to left hand for improved circulation, decreased pain, and increased tissue flexibility prior to therapeutic exercises.    Karin received therapeutic exercises for 40 minutes to improve postural  correction and alignment, stretching and soft tissue mobility, range of motion, and strengthening in order to improve left hand function during activities of daily living including grasping activities such as holding a spoon and picking up her purse including the following skilled interventions:   - Left ulnar deviation passive stretch with end range hold to tolerance only              - Left thumb flexion passive stretch with end range hold to tolerance only              - Left wrist extension isometric contraction strengthening x 10 reps with good tolerance              - Left wrist flexion isometric contraction strengthening x 10 reps with good tolerance    - Left wrist active range of motion into ulnar deviation slowly for a full count of 4 for eccentric contraction of thumb 1st dorsal compartment x 10 reps.    - Left gross grasp isometric strengthening with grasp of small / medium splint material dowel while flattening a ball of soft resistance (yellow) putty. Completed one full set with one rest break in the middle of set required due to pain and muscle fatigue. Decreased pain by end of rest break.        Home Exercises and Education Provided     Education provided:   - Progress towards goals     Written Home Exercises Provided: yes.  Exercises were reviewed and Karin was able to demonstrate them prior to the end of the session.  Karin demonstrated good  understanding home instructions provided.      See EMR under Patient Instructions for exercises provided 4/22/2022.        Assessment   Pain continuing to decrease with functional use. Patient reporting last session decreased pain with rest but continued with pain during functional use, today patient reporting only occasional pain during functional use at home ~ 50% of the time. Patient would continue to benefit from skilled occupational therapy services in order to continue to progress skilled therapeutic exercise and other modalities to improve range  of motion, strength, coordination, and pain for improved ability for patient to complete basic and home daily activities with less difficulty and pain for improved quality of life.      Karin is progressing well towards her goals and there are no updates to goals at this time. Patient prognosis is Good.     Patient will continue to benefit from skilled outpatient occupational therapy to address the deficits listed in the problem list on initial evaluation provide patient/family education and to maximize patient's level of independence in the home and community environment.     Anticipated barriers to occupational therapy: none at this time    Patient's spiritual, cultural and educational needs considered and pt agreeable to plan of care and goals.    Goals:  Short Term Goals to be completed within ~ 3 to 4 weeks by 5/9/2022:  1. Patient to report a decrease of left hand and wrist pain to 0 to 1/10 during all basic activities of daily living.   2. Patient to be independent with home exercise program.   3. Assess  and strength as appropriate with consistent decreased pain to only intermittent 1 to 2 / 10 during basic activities.      Long Term Goals to be completed by discharge:  1. Patient to report a decrease in pain to only an intermittent 1/10 during all daily home and leisure activities.   2. Patient to demonstrate left hand and pinch strength to within functional limits in order to complete all daily home and leisure activities with little to no difficulty nor pain as reported by patient.      Plan   Continue occupational therapy plan of care 1 to 2 x week for 6 weeks during the certification period of 4/11/2022 to 5/23/2022 in pursuit of occupational therapy goals.   Updates/Grading for next session: continue functional graded strengthening with reports of continued decreased pain      YEIMY Adams, BLAST

## 2022-05-02 ENCOUNTER — PATIENT MESSAGE (OUTPATIENT)
Dept: SMOKING CESSATION | Facility: CLINIC | Age: 72
End: 2022-05-02
Payer: MEDICARE

## 2022-05-02 ENCOUNTER — OFFICE VISIT (OUTPATIENT)
Dept: PAIN MEDICINE | Facility: CLINIC | Age: 72
End: 2022-05-02
Payer: MEDICARE

## 2022-05-02 ENCOUNTER — TELEPHONE (OUTPATIENT)
Dept: PAIN MEDICINE | Facility: CLINIC | Age: 72
End: 2022-05-02
Payer: MEDICARE

## 2022-05-02 VITALS
HEART RATE: 76 BPM | BODY MASS INDEX: 29.85 KG/M2 | DIASTOLIC BLOOD PRESSURE: 68 MMHG | WEIGHT: 185.75 LBS | RESPIRATION RATE: 18 BRPM | HEIGHT: 66 IN | SYSTOLIC BLOOD PRESSURE: 110 MMHG

## 2022-05-02 DIAGNOSIS — G44.86 CERVICOGENIC HEADACHE: ICD-10-CM

## 2022-05-02 DIAGNOSIS — M47.812 CERVICAL SPONDYLOSIS: ICD-10-CM

## 2022-05-02 DIAGNOSIS — M54.2 NECK PAIN: Primary | ICD-10-CM

## 2022-05-02 DIAGNOSIS — M47.812 CERVICAL SPONDYLOSIS: Primary | ICD-10-CM

## 2022-05-02 PROCEDURE — 99999 PR STA SHADOW: CPT | Mod: PBBFAC,,, | Performed by: PHYSICAL MEDICINE & REHABILITATION

## 2022-05-02 PROCEDURE — 99214 OFFICE O/P EST MOD 30 MIN: CPT | Mod: PBBFAC | Performed by: PHYSICAL MEDICINE & REHABILITATION

## 2022-05-02 PROCEDURE — 99999 PR STA SHADOW: ICD-10-PCS | Mod: PBBFAC,,, | Performed by: PHYSICAL MEDICINE & REHABILITATION

## 2022-05-02 PROCEDURE — 99999 PR PBB SHADOW E&M-EST. PATIENT-LVL IV: CPT | Mod: PBBFAC,,, | Performed by: PHYSICAL MEDICINE & REHABILITATION

## 2022-05-02 PROCEDURE — 99214 OFFICE O/P EST MOD 30 MIN: CPT | Mod: S$PBB | Performed by: PHYSICAL MEDICINE & REHABILITATION

## 2022-05-02 NOTE — TELEPHONE ENCOUNTER
C3-4, C4-5 bilateral MBB scheduled 05/27/2022.  Patient has had Covid vaccinations. Advised that pre admit would contact patient with time of procedure. Advised patient to contact office if she starts any type of antibiotics or new blood thinners. Voiced understanding.

## 2022-05-02 NOTE — H&P (VIEW-ONLY)
Pain Medicine        Chief Complaint:   Chief Complaint   Patient presents with    Neck Pain    Hand Pain       History of Present Illness: Karin Maguire is a 71 y.o. female referred by Dr. Ramirez for LBP.      Onset: several years, but had an injection with Dr. Barron 2-3 years ago that provided great relief. Pain returned about a year ago.    Location: Lower right side of back  Radiation: travels to the front and down right leg, which is very similar to prior to the last injection.    Timing: constant  Quality: Dull, Burning, Tight, Tingling, Deep, Numb, Sharp and Cold  Exacerbating Factors: extension, flexion, lifting, lying down, sitting, standing for more than 30 minutes, walking for more than 1 minutes and daily activity   Alleviating Factors: medications  Associated Symptoms: She feels weakness in the right leg and numbness in that leg. in the right thigh. denies night fever/night sweats, urinary incontinence, bowel incontinence and significant weight loss    Severity: Currently: 6/10   Typical Range: 6-8/10     Exacerbation: 10/10     Pain is limiting her ability to do household chores, sleep.       Neck pain (07/19/2021)has been present off and on for a few years, but worse for about a week. She previously saw chiropractor for 6 weeks which didn't help and was told to see a medical doctor. No specific injury, just woke with the pain. Pain is localized to the bilateral cervical paraspinals (mostly on the right) with radiation into the back of head. Pain is described as sharp. The pain is aggravated by activity, turning her head. The pain is alleviated by nothing. She feels weakness and numbness in the left hand (for the past year, prior to neck pain, since RTC injury). Denies changes in bowel or bladder function. Denies saddle anesthesia. Denies recent fevers or infections. Denies significant weight loss    Current Pain Scales:  Current: 0/10              Typical Range: 0-3/10    Left wrist pain:   Left wrist pain has been present for several years.  She localizes the pain over the radial aspect of the wrist around the APL and EPB tendons and this radiates up to the radial forearm at times.  Pain is worse with any activities with the hands.  She had good but short-term relief from previous injection in the area.    Right ring trigger finger (4/19/21): She is also noting right ring finger triggering that has been going on for a month. She deneis numbness or tingling. She is noting pain in the ring finger, but also some around the small finger. She feels weaker  in the right hand. She has had trigger finger in the left thumb in the past which improved with a steroid injection.       Interval History (05/02/2022):  Karin Maguire returns today for follow up.  At the last clinic visit, performed right ring finger flexor tendon sheath injection, referred to OT, DC-ed gabapentin an dstarted lyrica and ordered x-ray of the left wrist.     Right ring finger flexor tendon sheath injection provided 100% relief. Lyrica 150 mg BID seems to be helping with the hands, but not so much the neck pain. OT is helping her hands as well.     Currently, the hand pain is improved.  However, neck pain is stable. She localizes the pain in the upper cervical paraspinals bilaterally and feels like she is getting more headaches. She denies change in bowel or bladder function, & no new weakness or numbness is reported.       Current Pain Scales:  Current: 1/10              Typical Range: 0-8/10                   Pain Disability Index  Family/Home Responsibilities:: 0  Recreation:: 4  Social Activity:: 0  Occupation:: 1  Sexual Behavior:: 0  Self Care:: 0  Life-Support Activities:: 0  Pain Disability Index (PDI): 5    Previous Interventions:  - 3/18/22: Right ring finger flexor tendon sheath injection provided 100% relief  - 10/25/21: Left de quervains CSI w/ relief for 3-4 days  - 4/30/21: Right L2-5 MBBs w/ 99% relief    -  4/19/21: Right GTB injection w/ 100% relief  - 4/19/21: Right trigger finger of ring finger w/ 100% relief  - 2/5/21: Right L2-5 MBBs w/ 100% relief for 6-8 hours  - 3/31/17: Right SIJ and GTB injection ([place]) excellent relief for 2 years    Previous Therapies:  PT/OT: yes, has been doing HEP   CHiropractor: Tried it but it doesn't help  Relevant Surgery: yes   - B/L CT release  Previous Medications:   - NSAIDS: Tylenol, Ibuprofen, Diclofenac  - Muscle Relaxants:    - TCAs:   - SNRIs: Cymbalta  - Topicals: Voltaren cream doesn't help  - Anticonvulsants:  Gabapentin  - Opioids:     Current Pain Medications:  1. Cymbalta 90 mg daily  2. Lyrica 150 mg BID  3. Tylenol  4. Voltaren Gel    Blood Thinners: ASA 81 mg     Full Medication List:    Current Outpatient Medications:     acetaminophen (TYLENOL) 500 MG tablet, Take 2 tablets (1,000 mg total) by mouth every 8 (eight) hours as needed for Pain., Disp: 30 tablet, Rfl: 0    albuterol (PROVENTIL/VENTOLIN HFA) 90 mcg/actuation inhaler, INHALE 2 PUFFS INTO THE LUNGS EVERY 6 (SIX) HOURS AS NEEDED FOR WHEEZING (COUGH AND WHEEZING).RESCUE, Disp: 18 g, Rfl: 1    DULoxetine (CYMBALTA) 60 MG capsule, Take 2 capsules (120 mg total) by mouth once daily., Disp: 60 capsule, Rfl: 5    erythromycin (ROMYCIN) ophthalmic ointment, Place into both eyes 2 (two) times daily as needed (pain). Apply to lid margins, Disp: 1 each, Rfl: 0    lamoTRIgine (LAMICTAL) 150 MG Tab, Take 2 tablets (300 mg total) by mouth every evening., Disp: 180 tablet, Rfl: 5    pantoprazole (PROTONIX) 40 MG tablet, TAKE 1 TABLET BY MOUTH EVERY DAY, Disp: 90 tablet, Rfl: 1    pregabalin (LYRICA) 150 MG capsule, Take 1 capsule (150 mg total) by mouth 2 (two) times daily., Disp: 60 capsule, Rfl: 5    QUEtiapine (SEROQUEL) 300 MG Tab, Take one and a half tablets by mouth nightly., Disp: 45 tablet, Rfl: 5    rosuvastatin (CRESTOR) 10 MG tablet, Take 1 tablet (10 mg total) by mouth once daily., Disp: 90 tablet,  Rfl: 3    aspirin 81 MG Chew, Take 1 tablet (81 mg total) by mouth once daily. (Patient not taking: Reported on 5/2/2022), Disp: 30 tablet, Rfl: 5     Review of Systems:  Review of Systems   Constitutional: Negative for fever and weight loss.   HENT: Negative for ear pain and tinnitus.    Eyes: Negative for pain and redness.   Respiratory: Negative for cough and shortness of breath.    Cardiovascular: Negative for chest pain and palpitations.   Gastrointestinal: Positive for heartburn. Negative for constipation.   Genitourinary: Positive for frequency.        Denies urinary incontinence. Denies urine retention.    Musculoskeletal: Positive for back pain, joint pain (hands and feet), myalgias and neck pain.   Skin: Negative for itching and rash.   Neurological: Negative for tingling, focal weakness and seizures.   Endo/Heme/Allergies: Negative for environmental allergies. Does not bruise/bleed easily.   Psychiatric/Behavioral: Positive for depression. The patient is nervous/anxious and has insomnia.        Allergies:  Nsaids (non-steroidal anti-inflammatory drug)     Medical History:   has a past medical history of Anxiety, Arthritis, Bipolar 1 disorder, Bursitis of right hip, GI bleed due to NSAIDs, Multinodular goiter (11/15/2021), Sacroiliitis, and Sleep apnea.    Surgical History:   has a past surgical history that includes Hysterectomy (1986); Bladder suspension; Rectal prolapse repair; Tonsillectomy; Cholecystectomy; Ankle fracture surgery (Left, 2001); Carpal tunnel release (Bilateral); Nasal septum surgery; Injection of anesthetic agent into sacroiliac joint (Right, 12/4/2020); Injection of joint (Right, 12/4/2020); Injection of anesthetic agent around medial branch nerves innervating lumbar facet joint (Right, 2/5/2021); Injection of anesthetic agent around medial branch nerves innervating lumbar facet joint (Right, 4/30/2021); and Esophagogastroduodenoscopy (N/A, 7/29/2021).    Family History:  family  "history includes Colon cancer in her maternal uncle, maternal uncle, and maternal uncle.    Social History:   reports that she has been smoking cigarettes. She started smoking about 40 years ago. She has a 35.00 pack-year smoking history. She has never used smokeless tobacco. She reports current alcohol use. She reports that she does not use drugs.    Physical Exam:  /68 (BP Location: Left arm, Patient Position: Sitting, BP Method: Medium (Manual))   Pulse 76   Resp 18   Ht 5' 6" (1.676 m)   Wt 84.2 kg (185 lb 11.8 oz)   BMI 29.98 kg/m²   GEN: No acute distress. Calm, comfortable  HENT: Normocephalic, atraumatic, moist mucous membranes  EYE: Anicteric sclera, non-injected.   CV: Non-diaphoretic.   RESP: Breathing comfortably. Chest expansion symmetric.  EXT: No clubbing, cyanosis.   SKIN: No visible rashes or lesions of exposed skin.   PSYCH: Pleasant mood and appropriate affect. Recent and remote memory intact.   Neck Exam:       Inspection: No erythema, bruising.       Palpation: (+) TTP of bilateral upper paraspinals and right trapezius      ROM: Slight Limitation in lateral bending & rotation to the right       Provocative Maneuvers:  (-) Spurling's bilaterally  Neurologic Exam:     Alert. Speech is fluent and appropriate.     Strength:  4/5 diffusely throughout bilateral upper extremities     Sensation:  Grossly intact to light touch in bilateral upper extremities     Reflexes: Difficult to elicit in b/l biceps, brachioradialis, triceps, but trouble relaxing during testing     Tone: No abnormality appreciated in bilateral upper or lower extremities     (-) Hernandez bilaterally    Imaging:  - X-ray wrist left 3/18/22:  No fracture or subluxation are identified.  There is advanced osteoarthritis of the 1st carpometacarpal joint with joint space narrowing, periarticular sclerosis, and periarticular osteophyte formation.  No erosive changes are present.  There is also advanced degenerative arthrosis of " the triscaphe joint with severe joint space narrowing, bone-on-bone contact, and periarticular sclerosis.  Visualized soft tissues are unremarkable    - MRI Cervical spine 11/1/21:  The incidentally visualized soft tissue structures of the neck show a left-sided thyroid nodule.  Craniocervical junction is intact there is no evidence for a Chiari malformation.  Spinal cord is normal in signal without cord edema or myelomalacia.  Mild reversal of the normal cervical lordosis.  Vertebral body heights are maintained.  There is disc desiccation with disc space narrowing throughout the cervical spine.  The marrow signal is normal without evidence for a marrow replacement process, infection or tumor.  Chronic sclerosis of the dens, unchanged from 07/28/2021.  At C2-3, no disc herniation, central canal stenosis or neural foraminal narrowing.  At C3-4, bilateral uncovertebral spurring with facet arthropathy contributing to moderate right neural foraminal narrowing.  At C4-5, posterior disc osteophyte complex with uncovertebral spurring and facet arthropathy contributing to mild central canal stenosis with mild left and moderate right neural foraminal narrowing.  At C5-6, posterior disc osteophyte complex with uncovertebral spurring and facet arthropathy contributing to mild moderate central canal stenosis with moderate severe bilateral neural foraminal narrowing.  At C6-7, posterior disc osteophyte complex with uncovertebral spurring and facet arthropathy contributing to mild central canal stenosis with mild right and moderate left neural foraminal narrowing.  At C7-T1, posterior disc osteophyte complex with facet arthropathy contributing to mild bilateral neural foraminal narrowing    - CT Cervical spine 7/28/21:  Again noted is evidence of multilevel cervical spondylosis greatest at the C4 through C6 levels.  There is no evidence of an acute fracture.  Alignment is maintained with no evidence of prevertebral soft tissue  swelling.  No congenital spinal stenosis.  Multilevel disc-osteophytes greatest at C4-C5, C5-C6 and C6-C7.  Minimal retrolisthesis of C4 on C5.  Mild to moderate spinal canal and mild right greater than left foraminal encroachment.  Right uncinate process spur and mild foraminal encroachment at C3-C4.  Incidental note of hypodense nodules of the thyroid lobes.    - X-ray cervical spine 7/19/21:  The odontoid appears intact and there is not abnormal prevertebral soft tissue swelling.  There are degenerative changes severe particularly C4-C5.  Diffuse disc space narrowing moderately severe C4-C5, C5-C6, C6-C7.  Mild reversal of the usual cervical lordosis C2-C6 on the neutral view and to a slightly lesser degree also on the extension view.  Mild flexion on the flexion view there is no abnormal translational motion.  Vertebral body heights appear maintained.  Small bilateral neural foraminal narrowing    - MRI Lumbar Spine 01/25/21:  Extensive multilevel degenerative changes of the lumbar spine.  Scoliosis.  Multilevel desiccation of the disc.  Signal alteration at the L2 and L3 level suggestive of marrow edema, however, no evidence of abnormal increased signal intensity at the disc space level.  No congenital spinal stenosis.  The spinal cord terminates at approximately the T12-L1 level.  At the T12-L1 level there is a left paracentral disc bulge with bilateral facet degenerative changes.  No significant spinal canal or foraminal encroachment.  At the L1-L2 level there is a minimal posterior disc bulge with facet degenerative changes.  No significant spinal canal or foraminal encroachment.  At the L2-L3 level there is a posterior disc bulge greater on the right than the left with moderate facet degenerative changes and prominence of the ligamentum flavum.  There is moderate spinal canal and right sided foraminal encroachment which was not present on the previous study.  At the L3-L4 level there is a posterior disc  bulge with moderate facet degenerative changes resulting in mild to moderate spinal canal encroachment and moderate bilateral foraminal encroachment.  At the L4-L5 level there is a posterior disc bulge with moderate to advanced facet degenerative changes.  Prominence of the ligamentum flavum.  Again noted is severe acquired spinal stenosis and bilateral foraminal encroachment.  At the L5-S1 level there is no significant focal disc abnormality.  There are mild bilateral facet degenerative changes.  No significant spinal canal or foraminal encroachment.    - X-ray hand right 7/15/21:  No acute fracture is identified.  The area of pain is indicated at the level of the 5th metacarpal.  Minimal probable chronic subluxation of the proximal and distal interphalangeal joints of the 5th digit which may related to remote trauma.  Recommend clinical correlation.  Alignment is satisfactory elsewhere.  No mass or foreign body.  Mild degenerative changes of the interphalangeal joint and 1st carpometacarpal joint    - X-ray hand left 2/9/18:  Mild degenerative changes are noted of the DIP joints.  There is degenerative change of the first carpal metacarpal joint.  There is irregularity about the distal aspect of the second metacarpal.  Degenerative changes are noted in the region.  No radiopaque foreign body.    Labs:  BMP  Lab Results   Component Value Date     11/04/2021    K 4.2 11/04/2021     11/04/2021    CO2 24 11/04/2021    BUN 11 11/04/2021    CREATININE 0.8 11/04/2021    CALCIUM 9.9 11/04/2021    ANIONGAP 12 11/04/2021    ESTGFRAFRICA >60 11/04/2021    EGFRNONAA >60 11/04/2021     Lab Results   Component Value Date    ALT 12 11/04/2021    AST 14 11/04/2021    ALKPHOS 77 11/04/2021    BILITOT 0.2 11/04/2021     Lab Results   Component Value Date     10/26/2021       Assessment:  Karin Maguire is a 71 y.o. female with the following diagnoses based on history, exam, and imaging:    Problem List Items  Addressed This Visit    None     Visit Diagnoses     Neck pain    -  Primary    Cervical spondylosis        Cervicogenic headache              This is a pleasant 71 y.o. lady presenting with: back pain     - Neck pain: Pain appears myofascial and facetogenic. Failed 6 weeks of chiropractic care and HEP.   - Left wrist pain with advanced OA of the 1st CMC joint and triscaphe joint.   - Right ring finger flexor stenosing tenosynovitis: Improved after CSI  - Chronic right sided low back pain with right sided sciatica at times, but not currently: Right SIJ provided 50% relief and right GTB injection provided 100% relief. Has reported radicular pain into the right leg, has decreased patellar reflex, weakness in right knee extension which may be due to her severe stenosis at L4-5 foramina on the right causing a right L4 radiculopathy. Back pain currently improved with HEP and had (+) relief with MBBs x 2.  - Right trochanteric bursitis   - Comorbidities: Bipolar disorder, Anxiety, Insomnia. TIA. Prior GI bleed 2/2 NSAIDs    Treatment Plan:   - PT/OT/HEP: Cont HEP.   - Procedures: Schedule bilateral C3-4, C4-5 diagnostic MBBs    - If limited relief with MBBs, plan for C7-T1 IL KYLEE  - Medications:    - Cont lyrica 150 mg BID.  reviewed  - Imaging: Reviewed.   - Labs: Reviewed.      Follow Up: RTC - call with results of stanford Roman M.D.  Interventional Pain Medicine / Physical Medicine & Rehabilitation    Disclaimer: This note was partly generated using dictation software which may occasionally result in transcription errors.

## 2022-05-06 ENCOUNTER — CLINICAL SUPPORT (OUTPATIENT)
Dept: REHABILITATION | Facility: HOSPITAL | Age: 72
End: 2022-05-06
Payer: MEDICARE

## 2022-05-06 DIAGNOSIS — R53.1 WEAKNESS: ICD-10-CM

## 2022-05-06 DIAGNOSIS — M79.642 PAIN IN LEFT HAND: Primary | ICD-10-CM

## 2022-05-06 PROCEDURE — 97110 THERAPEUTIC EXERCISES: CPT | Mod: PN

## 2022-05-06 PROCEDURE — 97018 PARAFFIN BATH THERAPY: CPT | Mod: PN

## 2022-05-06 NOTE — PROGRESS NOTES
"  Occupational Therapy Daily Treatment Note     Date: 5/6/2022  Name: Karin Maguire  Clinic Number: [id number]    Therapy Diagnosis:   Encounter Diagnoses   Name Primary?    Pain in left hand Yes    Weakness      Physician: Mamie Roman MD    Physician Orders: evaluate and treat  Medical Diagnosis:   M65.4 (ICD-10-CM) - Radial styloid tenosynovitis (de quervain)   M65.341 (ICD-10-CM) - Acquired trigger finger of right ring finger   M25.532 (ICD-10-CM) - Left wrist pain     Evaluation Date: 4/11/2022  Insurance Authorization Period Expiration: 4/11/2022 to 4/11/2023  Plan of Care Certification Period:4/11/2022 to 5/23/2022    Date of Return to MD: 5/2/2022    Visit # / Visits authorized: 4 / 20  Time In:8:45  Time Out: 9:30  Total Billable Time: 45 minutes    Precautions:  Standard      Subjective     Patient reports: "I transplanted some plants last Monday and didn't have any pain until after Wednesday when I shoveled out one entire chicken pen."  Patient reported the pain from shoveling out the chicken pen lasted about a day and a half and is still having a little more pain today at rest about a 2 versus last session 0 at rest. Patient also reported she didn't think to widen the handle on the shovel before shoveling out the chicken pen  she was compliant with home exercise program given last session, however non compliant with cool and warm water soaks   Response to previous treatment: good with no adverse reactions  Functional change: continuing decreasing pain with functional use    Pain: 2/10    Location: left thumb      Objective   Patient being seen by Occupational Therapy for modalities for pain and stiffness as well as manual therapy and therapeutic exercises/activities for improved range of motion, coordination, pain, and strength for overall improved functional use of left hand during activities of daily living and daily home activities.     Karin received the following supervised modalities " after being cleared for contradictions for 5 minutes:    - Paraffin to left hand for improved circulation, decreased pain, and increased tissue flexibility prior to therapeutic exercises.    Karin received therapeutic exercises for 40 minutes to improve postural correction and alignment, stretching and soft tissue mobility, range of motion, and strengthening in order to improve left hand function during activities of daily living including grasping activities such as holding a spoon and picking up her purse including the following skilled interventions:   - Left ulnar deviation passive stretch with end range hold to tolerance only              - Left thumb flexion passive stretch with end range hold to tolerance only              - Left wrist extension isometric contraction strengthening x 10 reps with good tolerance              - Left wrist flexion isometric contraction strengthening x 10 reps with good tolerance    - Left wrist active range of motion into ulnar deviation slowly for a full count of 4 for eccentric contraction of thumb 1st dorsal compartment x 10 reps.    - Left gross grasp isometric strengthening with grasp of small / medium splint material dowel while flattening a ball of soft resistance (yellow) putty. Completed one full set with one rest break in the middle of set required due to pain and muscle fatigue. Decreased pain by end of rest break.  (not completed today 5/6/2022 due to increased pain today - see subjective section for details)       Home Exercises and Education Provided     Education provided:   - Progress towards goals     Written Home Exercises Provided: yes.  Exercises were reviewed and Karin was able to demonstrate them prior to the end of the session.  Karin demonstrated good  understanding home instructions provided.      See EMR under Patient Instructions for exercises provided 4/22/2022.        Assessment   Patient with increased functional use of hand with  completing yard work, however also with increased pain with yard work but not until much yard work was completed. Recommended to patient to continue functional use of hand at home however give herself more rest breaks and widen handles on shovels.   Patient would continue to benefit from skilled occupational therapy services in order to continue to progress skilled therapeutic exercise and other modalities to improve range of motion, strength, coordination, and pain for improved ability for patient to complete basic and home daily activities with less difficulty and pain for improved quality of life.      Karin is progressing well towards her goals and there are no updates to goals at this time. Patient prognosis is Good.     Patient will continue to benefit from skilled outpatient occupational therapy to address the deficits listed in the problem list on initial evaluation provide patient/family education and to maximize patient's level of independence in the home and community environment.     Anticipated barriers to occupational therapy: none at this time    Patient's spiritual, cultural and educational needs considered and pt agreeable to plan of care and goals.    Goals:  Short Term Goals to be completed within ~ 3 to 4 weeks by 5/9/2022:  1. Patient to report a decrease of left hand and wrist pain to 0 to 1/10 during all basic activities of daily living.   2. Patient to be independent with home exercise program.   3. Assess  and strength as appropriate with consistent decreased pain to only intermittent 1 to 2 / 10 during basic activities.      Long Term Goals to be completed by discharge:  1. Patient to report a decrease in pain to only an intermittent 1/10 during all daily home and leisure activities.   2. Patient to demonstrate left hand and pinch strength to within functional limits in order to complete all daily home and leisure activities with little to no difficulty nor pain as reported by  patient.      Plan   Continue occupational therapy plan of care 1 to 2 x week for 6 weeks during the certification period of 4/11/2022 to 5/23/2022 in pursuit of occupational therapy goals.   Updates/Grading for next session: continue functional graded strengthening with reports of continued decreased pain      YEIMY Adams, BLAST

## 2022-05-16 ENCOUNTER — HOSPITAL ENCOUNTER (OUTPATIENT)
Dept: RADIOLOGY | Facility: HOSPITAL | Age: 72
Discharge: HOME | End: 2022-05-16
Attending: STUDENT IN AN ORGANIZED HEALTH CARE EDUCATION/TRAINING PROGRAM
Payer: MEDICARE

## 2022-05-16 DIAGNOSIS — E04.2 MULTINODULAR GOITER: ICD-10-CM

## 2022-05-16 PROCEDURE — 76536 US EXAM OF HEAD AND NECK: CPT | Mod: 26,,, | Performed by: RADIOLOGY

## 2022-05-16 PROCEDURE — 76536 US THYROID: ICD-10-PCS | Mod: 26,,, | Performed by: RADIOLOGY

## 2022-05-16 PROCEDURE — 76536 US EXAM OF HEAD AND NECK: CPT | Mod: TC

## 2022-05-17 ENCOUNTER — PATIENT MESSAGE (OUTPATIENT)
Dept: ENDOCRINOLOGY | Facility: CLINIC | Age: 72
End: 2022-05-17
Payer: MEDICARE

## 2022-05-17 DIAGNOSIS — E04.2 MULTINODULAR GOITER: Primary | ICD-10-CM

## 2022-05-20 ENCOUNTER — CLINICAL SUPPORT (OUTPATIENT)
Dept: REHABILITATION | Facility: HOSPITAL | Age: 72
End: 2022-05-20
Payer: MEDICARE

## 2022-05-20 DIAGNOSIS — R53.1 WEAKNESS: ICD-10-CM

## 2022-05-20 DIAGNOSIS — M79.642 PAIN IN LEFT HAND: Primary | ICD-10-CM

## 2022-05-20 PROCEDURE — 97018 PARAFFIN BATH THERAPY: CPT | Mod: PN

## 2022-05-20 PROCEDURE — 97110 THERAPEUTIC EXERCISES: CPT | Mod: PN

## 2022-05-20 NOTE — PLAN OF CARE
"[facility]  Occupational Therapy Plan of Care Note    Name: Karin Maguire  Clinic Number: [id number]    Therapy Diagnosis:   Encounter Diagnoses   Name Primary?    Pain in left hand Yes    Weakness      Physician: Mamie Roman MD    Visit Date: 5/20/2022    Physician Orders: evaluate and treat   Medical Diagnosis from Referral:   M65.4 (ICD-10-CM) - Radial styloid tenosynovitis (de quervain)   M65.341 (ICD-10-CM) - Acquired trigger finger of right ring finger   M25.532 (ICD-10-CM) - Left wrist pain     Evaluation Date: 5/20/2022  Authorization Period Expiration: 4/11/2022 to 4/11/2023    Plan of Care Expiration: 4/11/2022 to 5/23/2022    Visit # / Visits authorized: 5/ 20    Precautions: Standard    SUBJECTIVE   Patient reports: "I had an easy week last week because I had my eye surgery and I couldn't do much so I had no pain, this week I started doing more planting in my yard so I am having a little pain." per patient   she was compliant with home exercise program given last session  Response to previous treatment: good with no adverse reactions  Functional change: continuing decreasing pain with functional use     Pain: 2/10    Location: left thumb      ASSESSMENT   Patient with increased functional use of hand with completing yard work, however also with increased pain with yard work but not until much yard work was completed. Recommended to patient to continue functional use of hand at home however give herself more rest breaks and widen handles on shovels. Also recommended patient purchase a steering wheel cover to widen steering wheel as patient has pain with driving. Recommended patient complete stretches after yard work as well as cool and warm water soaks for decreased inflammation and pain after increased functional use.   Patient would continue to benefit from skilled occupational therapy services in order to continue to progress skilled therapeutic exercise and other " modalities to improve range of motion, strength, coordination, and pain for improved ability for patient to complete basic and home daily activities with less difficulty and pain for improved quality of life.       Karin is progressing well towards her goals and there are no updates to goals at this time. Patient prognosis is Good.      Patient will continue to benefit from skilled outpatient occupational therapy to address the deficits listed in the problem list on initial evaluation provide patient/family education and to maximize patient's level of independence in the home and community environment.      Anticipated barriers to occupational therapy: none at this time     Patient's spiritual, cultural and educational needs considered and pt agreeable to plan of care and goals.     Goals:  Short Term Goals to be completed within ~ 3 to 4 weeks by 5/9/2022:  1. Patient to report a decrease of left hand and wrist pain to 0 to 1/10 during all basic activities of daily living. (goal met)   2. Patient to be independent with home exercise program. (goal met)   3. Assess  and strength as appropriate with consistent decreased pain to only intermittent 1 to 2 / 10 during basic activities. (goal met)     Long Term Goals to be completed by discharge:  1. Patient to report a decrease in pain to only an intermittent 1/10 during all daily home and leisure activities. (ongoing - progressing)  2. Patient to demonstrate left hand and pinch strength to within functional limits in order to complete all daily home and leisure activities with little to no difficulty nor pain as reported by patient. (ongoing - progressing)     PLAN     Updated Certification Period: 5/20/2022 to 7/1/2022   Recommended Treatment Plan: 1 to 2 times per week for 4 to 6 weeks with sooner discharge if meet goals: to continue current occupational therapy plan of care.     YEIMY Adams, BLAST

## 2022-05-20 NOTE — PROGRESS NOTES
"  Occupational Therapy Daily Treatment Note     Date: 5/20/2022  Name: Karin Maguire  Clinic Number: [id number]    Therapy Diagnosis:   Encounter Diagnoses   Name Primary?    Pain in left hand Yes    Weakness      Physician: Mamie Roman MD    Physician Orders: evaluate and treat  Medical Diagnosis:   M65.4 (ICD-10-CM) - Radial styloid tenosynovitis (de quervain)   M65.341 (ICD-10-CM) - Acquired trigger finger of right ring finger   M25.532 (ICD-10-CM) - Left wrist pain     Evaluation Date: 4/11/2022  Insurance Authorization Period Expiration: 4/11/2022 to 4/11/2023  Plan of Care Certification Period:4/11/2022 to 5/23/2022    Date of Return to MD: 5/2/2022    Visit # / Visits authorized: 5 / 20  Time In:8:45  Time Out: 9:30  Total Billable Time: 45 minutes    Precautions:  Standard      Subjective     Patient reports: "I had an easy week last week because I had my eye surgery and I couldn't do much so I had no pain, this week I started doing more planting in my yard so I am having a little pain." per patient   she was compliant with home exercise program given last session  Response to previous treatment: good with no adverse reactions  Functional change: continuing decreasing pain with functional use    Pain: 2/10    Location: left thumb      Objective   Patient being seen by Occupational Therapy for modalities for pain and stiffness as well as manual therapy and therapeutic exercises/activities for improved range of motion, coordination, pain, and strength for overall improved functional use of left hand during activities of daily living and daily home activities.     Karin received the following supervised modalities after being cleared for contradictions for 5 minutes:    - Paraffin to left hand for improved circulation, decreased pain, and increased tissue flexibility prior to therapeutic exercises.    Karni received therapeutic exercises for 40 minutes to improve postural correction and " alignment, stretching and soft tissue mobility, range of motion, and strengthening in order to improve left hand function during activities of daily living including grasping activities such as holding a spoon and picking up her purse including the following skilled interventions:   - Left ulnar deviation passive stretch with end range hold to tolerance only              - Left thumb flexion passive stretch with end range hold to tolerance only              - Left wrist extension isometric contraction strengthening x 10 reps with good tolerance              - Left wrist flexion isometric contraction strengthening x 10 reps with good tolerance    - Left wrist active range of motion into ulnar deviation slowly for a full count of 4 for eccentric contraction of thumb 1st dorsal compartment x 10 reps.    - Left gross grasp isometric strengthening with grasp of small / medium splint material dowel while flattening a ball of soft resistance (yellow) putty. Completed one full set with one rest break in the middle of set required due to pain and muscle fatigue. Decreased pain by end of rest break.         Home Exercises and Education Provided     Education provided:   - Progress towards goals     Written Home Exercises Provided: yes.  Exercises were reviewed and Karin was able to demonstrate them prior to the end of the session.  Karin demonstrated good  understanding home instructions provided.      See EMR under Patient Instructions for exercises provided previous sessions.        Assessment   Patient with increased functional use of hand with completing yard work, however also with increased pain with yard work but not until much yard work was completed. Recommended to patient to continue functional use of hand at home however give herself more rest breaks and widen handles on shovels. Also recommended patient purchase a steering wheel cover to widen steering wheel as patient has pain with driving. Recommended  patient complete stretches after yard work as well as cool and warm water soaks for decreased inflammation and pain after increased functional use.   Patient would continue to benefit from skilled occupational therapy services in order to continue to progress skilled therapeutic exercise and other modalities to improve range of motion, strength, coordination, and pain for improved ability for patient to complete basic and home daily activities with less difficulty and pain for improved quality of life.      Karin is progressing well towards her goals and there are no updates to goals at this time. Patient prognosis is Good.     Patient will continue to benefit from skilled outpatient occupational therapy to address the deficits listed in the problem list on initial evaluation provide patient/family education and to maximize patient's level of independence in the home and community environment.     Anticipated barriers to occupational therapy: none at this time    Patient's spiritual, cultural and educational needs considered and pt agreeable to plan of care and goals.    Goals:  Short Term Goals to be completed within ~ 3 to 4 weeks by 5/9/2022:  1. Patient to report a decrease of left hand and wrist pain to 0 to 1/10 during all basic activities of daily living. (goal met)   2. Patient to be independent with home exercise program. (goal met)   3. Assess  and strength as appropriate with consistent decreased pain to only intermittent 1 to 2 / 10 during basic activities. (goal met)     Long Term Goals to be completed by discharge:  1. Patient to report a decrease in pain to only an intermittent 1/10 during all daily home and leisure activities. (ongoing - progressing)  2. Patient to demonstrate left hand and pinch strength to within functional limits in order to complete all daily home and leisure activities with little to no difficulty nor pain as reported by patient. (ongoing - progressing)      Plan    Continue occupational therapy plan of care 1 to 2 x week for 6 weeks during the certification period of 4/11/2022 to 5/23/2022 in pursuit of occupational therapy goals.   Updates/Grading for next session: continue functional graded strengthening with reports of continued decreased pain      YEIMY Adams, BLAST

## 2022-05-26 NOTE — PRE-PROCEDURE INSTRUCTIONS
Patient scheduled for pain procedure tomorrow, 5/27/22 with Dr. JAK Roman MD.  Patient denies any known infection or antibiotic use in past 2 weeks.  Instructed to be here at 9:00.  Light meal OK, instructed to take all morning medications.  Must have transportation home.  Verbalizes understanding.

## 2022-05-27 ENCOUNTER — HOSPITAL ENCOUNTER (OUTPATIENT)
Dept: RADIOLOGY | Facility: HOSPITAL | Age: 72
Discharge: HOME | End: 2022-05-27
Attending: NURSE PRACTITIONER
Payer: MEDICARE

## 2022-05-27 ENCOUNTER — HOSPITAL ENCOUNTER (OUTPATIENT)
Dept: RADIOLOGY | Facility: HOSPITAL | Age: 72
Discharge: HOME | End: 2022-05-27
Attending: PHYSICAL MEDICINE & REHABILITATION
Payer: MEDICARE

## 2022-05-27 ENCOUNTER — HOSPITAL ENCOUNTER (OUTPATIENT)
Facility: HOSPITAL | Age: 72
Discharge: HOME | End: 2022-05-27
Attending: PHYSICAL MEDICINE & REHABILITATION | Admitting: PHYSICAL MEDICINE & REHABILITATION
Payer: MEDICARE

## 2022-05-27 VITALS — WEIGHT: 185 LBS | HEIGHT: 66 IN | BODY MASS INDEX: 29.73 KG/M2

## 2022-05-27 VITALS
HEART RATE: 63 BPM | SYSTOLIC BLOOD PRESSURE: 140 MMHG | OXYGEN SATURATION: 96 % | DIASTOLIC BLOOD PRESSURE: 72 MMHG | RESPIRATION RATE: 16 BRPM

## 2022-05-27 DIAGNOSIS — M47.812 CERVICAL SPONDYLOSIS: Primary | ICD-10-CM

## 2022-05-27 DIAGNOSIS — M47.812 CERVICAL SPONDYLOSIS: ICD-10-CM

## 2022-05-27 DIAGNOSIS — Z12.31 ENCOUNTER FOR SCREENING MAMMOGRAM FOR MALIGNANT NEOPLASM OF BREAST: ICD-10-CM

## 2022-05-27 DIAGNOSIS — G89.29 CHRONIC PAIN: ICD-10-CM

## 2022-05-27 PROCEDURE — 64490 INJ PARAVERT F JNT C/T 1 LEV: CPT | Mod: 50,KX,, | Performed by: PHYSICAL MEDICINE & REHABILITATION

## 2022-05-27 PROCEDURE — 25500020 PHARM REV CODE 255: Performed by: PHYSICAL MEDICINE & REHABILITATION

## 2022-05-27 PROCEDURE — 77067 SCR MAMMO BI INCL CAD: CPT | Mod: TC

## 2022-05-27 PROCEDURE — 77067 SCR MAMMO BI INCL CAD: CPT | Mod: 26,,, | Performed by: RADIOLOGY

## 2022-05-27 PROCEDURE — 64491 INJ PARAVERT F JNT C/T 2 LEV: CPT | Mod: 50,KX,, | Performed by: PHYSICAL MEDICINE & REHABILITATION

## 2022-05-27 PROCEDURE — 25000003 PHARM REV CODE 250: Performed by: PHYSICAL MEDICINE & REHABILITATION

## 2022-05-27 PROCEDURE — 77063 MAMMO DIGITAL SCREENING BILAT WITH TOMO: ICD-10-PCS | Mod: 26,,, | Performed by: RADIOLOGY

## 2022-05-27 PROCEDURE — 77067 MAMMO DIGITAL SCREENING BILAT WITH TOMO: ICD-10-PCS | Mod: 26,,, | Performed by: RADIOLOGY

## 2022-05-27 PROCEDURE — 64490 PR INJ DX/THER AGNT PARAVERT FACET JOINT,IMG GUIDE,CERV/THORAC, 1ST LEVEL: ICD-10-PCS | Mod: 50,KX,, | Performed by: PHYSICAL MEDICINE & REHABILITATION

## 2022-05-27 PROCEDURE — 64490 INJ PARAVERT F JNT C/T 1 LEV: CPT | Mod: 50 | Performed by: PHYSICAL MEDICINE & REHABILITATION

## 2022-05-27 PROCEDURE — 64491 INJ PARAVERT F JNT C/T 2 LEV: CPT | Mod: 50 | Performed by: PHYSICAL MEDICINE & REHABILITATION

## 2022-05-27 PROCEDURE — 77063 BREAST TOMOSYNTHESIS BI: CPT | Mod: 26,,, | Performed by: RADIOLOGY

## 2022-05-27 PROCEDURE — 64491 PR INJ DX/THER AGNT PARAVERT FACET JOINT,IMG GUIDE,CERV/THORAC, 2ND LEVEL: ICD-10-PCS | Mod: 50,KX,, | Performed by: PHYSICAL MEDICINE & REHABILITATION

## 2022-05-27 RX ORDER — LIDOCAINE HYDROCHLORIDE 20 MG/ML
INJECTION, SOLUTION EPIDURAL; INFILTRATION; INTRACAUDAL; PERINEURAL
Status: DISCONTINUED
Start: 2022-05-27 | End: 2022-05-27 | Stop reason: HOSPADM

## 2022-05-27 RX ORDER — LIDOCAINE HYDROCHLORIDE 20 MG/ML
INJECTION, SOLUTION EPIDURAL; INFILTRATION; INTRACAUDAL; PERINEURAL
Status: DISCONTINUED | OUTPATIENT
Start: 2022-05-27 | End: 2022-05-27 | Stop reason: HOSPADM

## 2022-05-27 RX ORDER — LIDOCAINE HYDROCHLORIDE 10 MG/ML
INJECTION INFILTRATION; PERINEURAL
Status: DISCONTINUED | OUTPATIENT
Start: 2022-05-27 | End: 2022-05-27 | Stop reason: HOSPADM

## 2022-05-27 NOTE — DISCHARGE SUMMARY
[facility]  Discharge Note  Short Stay     Admit Date: 5/27/2022    Discharge Date: 5/27/2022     Attending Physician: Mamie Roman M.D.    Diagnoses:  Active Hospital Problems    Diagnosis  POA    Cervical spondylosis [M47.812]  Yes      Resolved Hospital Problems   No resolved problems to display.     Discharged Condition: Good     Hospital Course: Patient was admitted for an outpatient interventional pain management procedure and tolerated the procedure well with no complications.     Final Diagnoses: Same as principal problem.     Disposition: Home or Self Care     Follow up/Patient Instructions:   Follow-up in 1-2 weeks unless otherwise instructed. May return sooner as needed.       Reconciled Medications:     Medication List      CONTINUE taking these medications    acetaminophen 500 MG tablet  Commonly known as: TYLENOL  Take 2 tablets (1,000 mg total) by mouth every 8 (eight) hours as needed for Pain.     albuterol 90 mcg/actuation inhaler  Commonly known as: PROVENTIL/VENTOLIN HFA  INHALE 2 PUFFS INTO THE LUNGS EVERY 6 (SIX) HOURS AS NEEDED FOR WHEEZING (COUGH AND WHEEZING).RESCUE     aspirin 81 MG Chew  Take 1 tablet (81 mg total) by mouth once daily.     DULoxetine 60 MG capsule  Commonly known as: CYMBALTA  Take 2 capsules (120 mg total) by mouth once daily.     erythromycin ophthalmic ointment  Commonly known as: ROMYCIN  Place into both eyes 2 (two) times daily as needed (pain). Apply to lid margins     lamoTRIgine 150 MG Tab  Commonly known as: LAMICTAL  Take 2 tablets (300 mg total) by mouth every evening.     pantoprazole 40 MG tablet  Commonly known as: PROTONIX  TAKE 1 TABLET BY MOUTH EVERY DAY     pregabalin 150 MG capsule  Commonly known as: LYRICA  Take 1 capsule (150 mg total) by mouth 2 (two) times daily.     QUEtiapine 300 MG Tab  Commonly known as: SEROQUEL  Take one and a half tablets by mouth nightly.     rosuvastatin 10 MG tablet  Commonly known as: CRESTOR  Take 1 tablet (10  mg total) by mouth once daily.           Discharge Procedure Orders (must include Diet, Follow-up, Activity)   Ice to affected area   Order Comments: 20 minutes of ice or until area numb to the touch if area is sore 2-3 times per day as needed     No driving until:   Order Comments: Until following day     No dressing needed     Notify your health care provider if you experience any of the following:  temperature >100.4     Notify your health care provider if you experience any of the following:  persistent nausea and vomiting or diarrhea     Notify your health care provider if you experience any of the following:  severe uncontrolled pain     Notify your health care provider if you experience any of the following:  redness, tenderness, or signs of infection (pain, swelling, redness, odor or green/yellow discharge around incision site)     Notify your health care provider if you experience any of the following:  difficulty breathing or increased cough     Notify your health care provider if you experience any of the following:  severe persistent headache     Notify your health care provider if you experience any of the following:  worsening rash     Notify your health care provider if you experience any of the following:  persistent dizziness, light-headedness, or visual disturbances     Notify your health care provider if you experience any of the following:  increased confusion or weakness     Shower on day dressing removed (No bath)       Mamie Roman M.D.  Interventional Pain Medicine / Physical Medicine & Rehabilitation

## 2022-05-27 NOTE — DISCHARGE INSTRUCTIONS
DIET: You may resume your normal diet today.    BATHING: You may resume your normal bathing.          You may shower, no hot water directly on site for 24 hours.    DRESSING: You may remove your bandage today.    ACTIVITY LEVEL: You may resume your normal activities 24 hours after your  procedure.    If you have received sedation or an anesthetic, you may feel sleepy for several hours. Rest until you are more awake. Gradually resume your normal activities tomorrow.    If you have received sedation or an anesthetic, do not drive or operate heavy machinery for at least 24 hours.    MEDICATION: You may resume your normal medications today.    You will receive instructions for any pain prescriptions. Pain medications should be taken only as directed.    SPECIAL INSTRUCTIONS: No heat to the injection site for 24 hours including: bath or shower, heating pad, moist heat, hot tubs.    Use ice pack to injection site for any pain or discomfort. Apply ice pack to 20 minutes then remove for 20 minutes before re-applying to site.    WHEN TO CALL DOCTOR: Redness or swelling around injection site    Fever of 101F    Drainage (pus) from the injection site    For any continuous bleeding (some dried blood over the incision is normal).    FOLLOW UP: Follow up phone call will be made by office.    FOR EMERGENCIES: If any unusual problems or difficulties occur during clinic hours, call [phone] or 274.

## 2022-05-27 NOTE — OP NOTE
Cervical Medial Branch Block Under Fluoroscopic Guidance:  I have reviewed the patient's medications, allergies and relevant histories prior to the procedure and no contraindications have been identified. The risks, benefits and alternatives to the procedure were discussed with the patient, and all questions regarding the procedure were answered to the patient's satisfaction. I personally obtained Karin's consent prior to the start of the procedure and the signed consent can be found in the patient's chart.                                                         Time-out was taken to identify patient, procedure, laterality, and allergies prior to starting the procedure.       Date of Service: 05/27/2022  Procedure: Bilateral C3-4 and C4-5 medial branch diagnostic blocks under fluoroscopic guidance  Pre-Operative Diagnosis: Cervical Spondylosis  Post-Operative Diagnosis: Cervical Spondylosis    Physician: Mamie Roman M.D.  Assistants: None    Medications Injected: Preservative free Lidocaine 2% 1 mL at each level.  Local Anesthetic: Xylocaine 1% 10 mL.   Sedation Medications: None.    Procedural Technique:   Laying in a prone position, the patient was prepped and draped in the usual sterile fashion using ChloraPrep and fenestrated drape.  The area was determined under fluoroscopic guidance.  Local anesthetic was utilized to anesthetize the skin and subcutaneous tissues in the area using a 25-gauge 1.5 inch needle. A 22-gauge 3.5 inch needle was introduced to the anatomic local of the Bilateral C3, C4 and C5 medial branches over the above stated cervical facet joints at the lateral mass utilizing live fluoroscopy. After negative aspiration for blood, 0.2 ml of Omnipaque 300 contrast agent was slowly injected to confirm no vascular runoff at each location.  Medication was then injected slowly. The needle was removed and bandage applied to the area.    Estimated Blood Loss:  None.  Complications:  None.      Disposition: The patient was taken to the recovery area and monitored. The patient was supplied with written discharge instructions for the procedure. If durable relief provided, we can repeat as needed. If good relief, but short term, we may schedule patient for radiofrequency ablation. The patient was discharged in a stable condition.    Follow-Up: We will see the patient back in 1-2 weeks or the patient may follow-up with referring provider if direct referral.

## 2022-05-31 ENCOUNTER — TELEPHONE (OUTPATIENT)
Dept: SURGERY | Facility: HOSPITAL | Age: 72
End: 2022-05-31
Payer: MEDICARE

## 2022-06-01 NOTE — TELEPHONE ENCOUNTER
Spoke with patient. Notified of recommendations. Voiced understanding. States she will hold off on scheduling at this time. States she will contact office when ready to schedule.

## 2022-06-03 ENCOUNTER — CLINICAL SUPPORT (OUTPATIENT)
Dept: REHABILITATION | Facility: HOSPITAL | Age: 72
End: 2022-06-03
Payer: MEDICARE

## 2022-06-03 DIAGNOSIS — R53.1 WEAKNESS: ICD-10-CM

## 2022-06-03 DIAGNOSIS — M79.642 PAIN IN LEFT HAND: Primary | ICD-10-CM

## 2022-06-03 NOTE — PROGRESS NOTES
"  Occupational Therapy Discharge Summary     Date: 6/3/2022  Name: Karin Maguire  Clinic Number: [id number]    Therapy Diagnosis:   Encounter Diagnoses   Name Primary?    Pain in left hand Yes    Weakness      Physician: Mamie Roman MD    Physician Orders: evaluate and treat  Medical Diagnosis:   M65.4 (ICD-10-CM) - Radial styloid tenosynovitis (de quervain)   M65.341 (ICD-10-CM) - Acquired trigger finger of right ring finger   M25.532 (ICD-10-CM) - Left wrist pain     Evaluation Date: 4/11/2022  Insurance Authorization Period Expiration: 4/11/2022 to 4/11/2023  Plan of Care Certification Period:4/11/2022 to 4/11/2023      Visit #: 6   Time In:8:45  Time Out: 9:00  Total Time: 15 minutes for discharge    Precautions:  Standard      Subjective     Patient reports: "A couple times over last 2 weeks I had some pain, but I can pinpoint that it was what I was doing. I was doing a lot of work in my yard and my chicken coup. However, last week I also did a lot of manual work in my yard and with the chickens and I had no pain all week. I think it's ready that I can stop therapy." per patient   :she was compliant with home exercise program given previous sessions  Response to previous treatment: good with no adverse reactions  Functional change: Complete functional use with no pain    Pain: 0/10    Location: left thumb      Objective   Patient being seen by Occupational Therapy for modalities for pain and stiffness as well as manual therapy and therapeutic exercises/activities for improved range of motion, coordination, pain, and strength for overall improved functional use of left hand during activities of daily living and daily home activities.     Completed formal testing of hand  strength and discussing functional status:   Strength:  Bilateral hands: 35#    Home Exercises and Education Provided     Education provided:        Written Home Exercises Provided: yes.  Exercises were reviewed and Karin" was able to demonstrate them prior to the end of the session.  Karin demonstrated good  understanding home instructions provided and to call clinic with any concerns or questions that arise after discharge.     See EMR under Patient Instructions for exercises provided previous sessions.        Assessment   Patient now with full range of motion and functional strength with 0/10 pain during all of her daily functional activities including her leisure activity of working in her yard and taking care of their chickens. Patient reported she was able to complete all of these activities all last week without any pain. Patient has met all goals and is discharged from occupational therapy.     Goals:  Short Term Goals to be completed within ~ 3 to 4 weeks by 5/9/2022:  1. Patient to report a decrease of left hand and wrist pain to 0 to 1/10 during all basic activities of daily living. (goal met)   2. Patient to be independent with home exercise program. (goal met)   3. Assess  and strength as appropriate with consistent decreased pain to only intermittent 1 to 2 / 10 during basic activities. (goal met)     Long Term Goals to be completed by discharge:  1. Patient to report a decrease in pain to only an intermittent 1/10 during all daily home and leisure activities. (goal met 6/3/2022)  2. Patient to demonstrate left hand and pinch strength to within functional limits in order to complete all daily home and leisure activities with little to no difficulty nor pain as reported by patient. (goal met 6/3/2022)      Plan   Discharge occupational therapy     YEIMY Adams, BLAST

## 2022-06-08 ENCOUNTER — OFFICE VISIT (OUTPATIENT)
Dept: UROLOGY | Facility: CLINIC | Age: 72
End: 2022-06-08
Payer: MEDICARE

## 2022-06-08 VITALS — WEIGHT: 186.19 LBS | HEIGHT: 66 IN | BODY MASS INDEX: 29.92 KG/M2

## 2022-06-08 DIAGNOSIS — R35.1 NOCTURIA: ICD-10-CM

## 2022-06-08 DIAGNOSIS — N39.41 URINARY INCONTINENCE, URGE: Primary | ICD-10-CM

## 2022-06-08 LAB
BILIRUB SERPL-MCNC: NEGATIVE MG/DL
BLOOD URINE, POC: NEGATIVE
COLOR, POC UA: YELLOW
GLUCOSE UR QL STRIP: NORMAL
KETONES UR QL STRIP: NEGATIVE
LEUKOCYTE ESTERASE URINE, POC: NEGATIVE
NITRITE, POC UA: NEGATIVE
PH, POC UA: 7
POC RESIDUAL URINE VOLUME: 22 ML (ref 0–100)
PROTEIN, POC: NORMAL
SPECIFIC GRAVITY, POC UA: 1005
UROBILINOGEN, POC UA: NORMAL

## 2022-06-08 PROCEDURE — 87086 URINE CULTURE/COLONY COUNT: CPT | Performed by: NURSE PRACTITIONER

## 2022-06-08 PROCEDURE — 99214 PR OFFICE/OUTPT VISIT, EST, LEVL IV, 30-39 MIN: ICD-10-PCS | Mod: S$PBB,ICN,, | Performed by: NURSE PRACTITIONER

## 2022-06-08 PROCEDURE — 99213 OFFICE O/P EST LOW 20 MIN: CPT | Mod: PBBFAC | Performed by: NURSE PRACTITIONER

## 2022-06-08 PROCEDURE — 99999 PR PBB SHADOW E&M-EST. PATIENT-LVL III: CPT | Mod: PBBFAC,,, | Performed by: NURSE PRACTITIONER

## 2022-06-08 PROCEDURE — 51798 US URINE CAPACITY MEASURE: CPT | Mod: PBBFAC | Performed by: NURSE PRACTITIONER

## 2022-06-08 PROCEDURE — 99999 PR PBB SHADOW E&M-EST. PATIENT-LVL III: ICD-10-PCS | Mod: PBBFAC,,, | Performed by: NURSE PRACTITIONER

## 2022-06-08 PROCEDURE — 81001 URINALYSIS AUTO W/SCOPE: CPT | Mod: PBBFAC | Performed by: NURSE PRACTITIONER

## 2022-06-08 PROCEDURE — 99214 OFFICE O/P EST MOD 30 MIN: CPT | Mod: S$PBB,ICN,, | Performed by: NURSE PRACTITIONER

## 2022-06-08 NOTE — PROGRESS NOTES
"Subjective:       Patient ID: Karin Maguire is a 72 y.o. female who is a new patient was referred by Self, Aaareferral    Chief Complaint:   Chief Complaint   Patient presents with    Urinary Frequency     Pt has trouble controlling bladder       Urinary Incontinence  Patient complains of urinary incontinence. Reports symptoms are worse at night. +BANDAR. She tells me that she wears her CPAP "when she can remember". Symptoms have been present for 2 months. She leaks urine with standing, walking, with urge, during the night. Patient describes the symptoms as frequent urination (a fewx per day), nocturia 3 times per night, sensation of incomplete emptying of bladder, urge to urinate with little or no warning and urine leaking unpredictably. Factors associated with symptoms include none known. Evaluation to date includes none. Treatment to date includes none.    She does not wear pads. She reports having to change clothes due to urinary incontinence on most days. Reports history of partial hysterectomy for benign disease in 1986. Also had "bladder lift" done at that time. She denies vaginal bulge currently. + constipation. She does not have a bowel regimen. Reports daily intake of soda (3-4 cups on some days) and coffee (3-4 cups daily). She endorses drinking "some" water. Denies dysuria, gross hematuria or flank pain    PVR (bladder scan) today - 22 ml      ACTIVE MEDICAL ISSUES:  Patient Active Problem List   Diagnosis    Bipolar 1 disorder    Hyponatremia    History of GI bleed    History of encephalitis    Sacroiliitis    Trochanteric bursitis of right hip    History of gambling    LUANNE (generalized anxiety disorder)    Insomnia    Abnormal movements    Obstructive sleep apnea (adult) (pediatric)    Chronic right sacroiliac joint pain    Psychosocial stressors    Lumbar spondylosis    Multinodular goiter    Pure hypercholesterolemia    Decreased strength of upper extremity    Overuse syndrome " of left hand    Pain in left hand    Weakness    Cervical spondylosis       PAST MEDICAL HISTORY  Past Medical History:   Diagnosis Date    Anxiety     Arthritis     Bipolar 1 disorder     Bursitis of right hip     GI bleed due to NSAIDs     Multinodular goiter 11/15/2021    Sacroiliitis     right side    Sleep apnea        PAST SURGICAL HISTORY:  Past Surgical History:   Procedure Laterality Date    ANKLE FRACTURE SURGERY Left 2001    BLADDER SUSPENSION      CARPAL TUNNEL RELEASE Bilateral     CHOLECYSTECTOMY      ESOPHAGOGASTRODUODENOSCOPY N/A 7/29/2021    Procedure: EGD (ESOPHAGOGASTRODUODENOSCOPY);  Surgeon: Susan Mcclain MD;  Location: [facility];  Service: Endoscopy;  Laterality: N/A;    HYSTERECTOMY  1986    vaginal prolapse    INJECTION OF ANESTHETIC AGENT AROUND MEDIAL BRANCH NERVES INNERVATING CERVICAL FACET JOINT Bilateral 5/27/2022    Procedure: CERVICAL MEDIAL BRANCH NERVE BLOCK (C3-4,C4-5);  Surgeon: Mamie Roman MD;  Location: [facility];  Service: Pain Management;  Laterality: Bilateral;    INJECTION OF ANESTHETIC AGENT AROUND MEDIAL BRANCH NERVES INNERVATING LUMBAR FACET JOINT Right 2/5/2021    Procedure: LUMBAR FACET JOINT BLOCK (L3-4,L4-5,L5-S1);  Surgeon: Mamie Roman MD;  Location: [facility];  Service: Pain Management;  Laterality: Right;    INJECTION OF ANESTHETIC AGENT AROUND MEDIAL BRANCH NERVES INNERVATING LUMBAR FACET JOINT Right 4/30/2021    Procedure: LUMBAR FACET JOINT BLOCK (L3-4,L4-5,L5-S1);  Surgeon: Mamie Roman MD;  Location: [facility];  Service: Pain Management;  Laterality: Right;    INJECTION OF ANESTHETIC AGENT INTO SACROILIAC JOINT Right 12/4/2020    Procedure: SACROILIAC JOINT INJECTION;  Surgeon: Mamie Roman MD;  Location: [facility];  Service: Pain Management;  Laterality: Right;    INJECTION OF JOINT Right 12/4/2020    Procedure: GREATER TROCHANTERIC BURSA INJECTION;  Surgeon: Mamie Roman MD;  Location: [facility];  Service: Pain Management;   Laterality: Right;    NASAL SEPTUM SURGERY      RECTAL PROLAPSE REPAIR      TONSILLECTOMY         SOCIAL HISTORY:  Social History     Tobacco Use    Smoking status: Current Every Day Smoker     Packs/day: 1.00     Years: 35.00     Pack years: 35.00     Types: Cigarettes     Start date: 3/30/1982    Smokeless tobacco: Never Used    Tobacco comment: smoking cessation clinic pamphlet for clinic put on chart to give to pt.    Substance Use Topics    Alcohol use: Yes     Comment: Socially-2 or 3 times a year-6 or 7 drinks    Drug use: No       FAMILY HISTORY:  Family History   Problem Relation Age of Onset    Colon cancer Maternal Uncle     Colon cancer Maternal Uncle     Colon cancer Maternal Uncle        ALLERGIES AND MEDICATIONS: updated and reviewed.  Review of patient's allergies indicates:   Allergen Reactions    Nsaids (non-steroidal anti-inflammatory drug) Other (See Comments)     Gastrointestinal bleeding requiring blood transfusion     Current Outpatient Medications   Medication Sig    acetaminophen (TYLENOL) 500 MG tablet Take 2 tablets (1,000 mg total) by mouth every 8 (eight) hours as needed for Pain.    albuterol (PROVENTIL/VENTOLIN HFA) 90 mcg/actuation inhaler INHALE 2 PUFFS INTO THE LUNGS EVERY 6 (SIX) HOURS AS NEEDED FOR WHEEZING (COUGH AND WHEEZING).RESCUE    aspirin 81 MG Chew Take 1 tablet (81 mg total) by mouth once daily. (Patient not taking: Reported on 5/2/2022)    DULoxetine (CYMBALTA) 60 MG capsule Take 2 capsules (120 mg total) by mouth once daily.    erythromycin (ROMYCIN) ophthalmic ointment Place into both eyes 2 (two) times daily as needed (pain). Apply to lid margins    lamoTRIgine (LAMICTAL) 150 MG Tab Take 2 tablets (300 mg total) by mouth every evening.    mirabegron (MYRBETRIQ) 50 mg Tb24 Take 1 tablet (50 mg total) by mouth once daily at 6am.    pantoprazole (PROTONIX) 40 MG tablet TAKE 1 TABLET BY MOUTH EVERY DAY    pregabalin (LYRICA) 150 MG capsule Take 1  "capsule (150 mg total) by mouth 2 (two) times daily.    QUEtiapine (SEROQUEL) 300 MG Tab Take one and a half tablets by mouth nightly.    rosuvastatin (CRESTOR) 10 MG tablet Take 1 tablet (10 mg total) by mouth once daily.     No current facility-administered medications for this visit.       Review of Systems   Constitutional: Negative for activity change, chills, fatigue, fever and unexpected weight change.   Eyes: Negative for discharge, redness and visual disturbance.   Respiratory: Negative for cough, shortness of breath and wheezing.    Cardiovascular: Negative for chest pain and leg swelling.   Gastrointestinal: Negative for abdominal distention, abdominal pain, constipation, diarrhea, nausea and vomiting.   Genitourinary: Positive for frequency and urgency. Negative for decreased urine volume, difficulty urinating, dysuria, flank pain, hematuria, pelvic pain, vaginal bleeding, vaginal discharge and vaginal pain.   Musculoskeletal: Negative for arthralgias, joint swelling and myalgias.   Skin: Negative for color change and rash.   Neurological: Negative for dizziness and light-headedness.   Psychiatric/Behavioral: Negative for behavioral problems and confusion. The patient is not nervous/anxious.        Objective:      Vitals:    06/08/22 0855   Weight: 84.5 kg (186 lb 2.9 oz)   Height: 5' 6" (1.676 m)     Physical Exam  Constitutional:       Appearance: She is well-developed.   HENT:      Head: Normocephalic and atraumatic.      Nose: Nose normal.   Eyes:      General:         Right eye: No discharge.         Left eye: No discharge.      Conjunctiva/sclera: Conjunctivae normal.   Neck:      Thyroid: No thyromegaly.      Trachea: No tracheal deviation.   Cardiovascular:      Rate and Rhythm: Normal rate and regular rhythm.   Pulmonary:      Effort: Pulmonary effort is normal. No respiratory distress.      Breath sounds: No wheezing.   Abdominal:      General: There is no distension.      Palpations: Abdomen " is soft.      Tenderness: There is no abdominal tenderness.      Hernia: No hernia is present.   Genitourinary:     Comments:  exam deferred  Musculoskeletal:         General: Normal range of motion.      Cervical back: Normal range of motion and neck supple.   Skin:     General: Skin is warm and dry.      Findings: No erythema or rash.   Neurological:      Mental Status: She is alert and oriented to person, place, and time.   Psychiatric:         Behavior: Behavior normal.         Judgment: Judgment normal.         Urine dipstick shows trace protein.     Assessment:       1. Urinary incontinence, urge    2. Nocturia          Plan:       1. Urinary incontinence, urge   - Discussed difference of UUI and LEE components. Reviewed etiology and workup of each.   - UUI: Behavioral changes, PFPT, anticholinergics, mirabegron. Botox/InterStim for refractory UUI.   -Avoid/treat constipation. Discussed bowel regimen  -Discussed avoiding/limiting bladder irritants (caffeine)  -Trial of Myrbetriq. Discussed MOA and SE of medication  - POCT Bladder Scan  - mirabegron (MYRBETRIQ) 50 mg Tb24; Take 1 tablet (50 mg total) by mouth once daily at 6am.  Dispense: 30 tablet; Refill: 11  - Urine culture    2. Nocturia  -Discussed correlation of BANDAR and urinary frequency/nocturia. Encouraged daily use of CPAP  -Limit PM fluids  - POCT urinalysis, dipstick or tablet reag  - mirabegron (MYRBETRIQ) 50 mg Tb24; Take 1 tablet (50 mg total) by mouth once daily at 6am.  Dispense: 30 tablet; Refill: 11            Follow up in about 6 weeks (around 7/20/2022) for Follow up PVR.

## 2022-06-10 LAB — BACTERIA UR CULT: NORMAL

## 2022-07-11 ENCOUNTER — OFFICE VISIT (OUTPATIENT)
Dept: OPHTHALMOLOGY | Facility: CLINIC | Age: 72
End: 2022-07-11
Payer: MEDICARE

## 2022-07-11 DIAGNOSIS — H25.11 NUCLEAR SCLEROSIS OF RIGHT EYE: ICD-10-CM

## 2022-07-11 DIAGNOSIS — H02.831 DERMATOCHALASIS OF BOTH UPPER EYELIDS: ICD-10-CM

## 2022-07-11 DIAGNOSIS — H02.834 DERMATOCHALASIS OF BOTH UPPER EYELIDS: ICD-10-CM

## 2022-07-11 DIAGNOSIS — H25.12 NUCLEAR SCLEROSIS OF LEFT EYE: ICD-10-CM

## 2022-07-11 DIAGNOSIS — H02.036: Primary | ICD-10-CM

## 2022-07-11 PROCEDURE — 99999 PR PBB SHADOW E&M-EST. PATIENT-LVL II: ICD-10-PCS | Mod: PBBFAC,,, | Performed by: STUDENT IN AN ORGANIZED HEALTH CARE EDUCATION/TRAINING PROGRAM

## 2022-07-11 PROCEDURE — 99214 PR OFFICE/OUTPT VISIT, EST, LEVL IV, 30-39 MIN: ICD-10-PCS | Mod: S$PBB,,, | Performed by: STUDENT IN AN ORGANIZED HEALTH CARE EDUCATION/TRAINING PROGRAM

## 2022-07-11 PROCEDURE — 99999 PR PBB SHADOW E&M-EST. PATIENT-LVL II: CPT | Mod: PBBFAC,,, | Performed by: STUDENT IN AN ORGANIZED HEALTH CARE EDUCATION/TRAINING PROGRAM

## 2022-07-11 PROCEDURE — 99214 OFFICE O/P EST MOD 30 MIN: CPT | Mod: S$PBB,,, | Performed by: STUDENT IN AN ORGANIZED HEALTH CARE EDUCATION/TRAINING PROGRAM

## 2022-07-11 PROCEDURE — 99212 OFFICE O/P EST SF 10 MIN: CPT | Mod: PBBFAC,PO | Performed by: STUDENT IN AN ORGANIZED HEALTH CARE EDUCATION/TRAINING PROGRAM

## 2022-07-11 NOTE — PROGRESS NOTES
HPI     Pt in today for a 3 month follow-up. Pt states her vision has gotten   better since her last visit. Pt denies any ocular pain or discomfort. Pt   states she uses AT's when she feels her eyes are dry.    Last edited by Queta Hernandez on 7/11/2022 12:49 PM. (History)            Assessment /Plan     For exam results, see Encounter Report.    Entropion of right eyelid- Sx done by     Entropion due to laxity of eyelid, left- Follow  - ATs QID     Dermatochalasis of both upper eyelids- Follow    Nuclear sclerosis of right eye- - NVS, monitor    Nuclear sclerosis of left eye- see above    *Disc at risk, discussed smoking     Return to clinic in 1 year or PRN

## 2022-07-18 ENCOUNTER — OFFICE VISIT (OUTPATIENT)
Dept: INTERNAL MEDICINE | Facility: CLINIC | Age: 72
End: 2022-07-18
Payer: MEDICARE

## 2022-07-18 VITALS
HEIGHT: 66 IN | WEIGHT: 186.5 LBS | HEART RATE: 78 BPM | SYSTOLIC BLOOD PRESSURE: 120 MMHG | DIASTOLIC BLOOD PRESSURE: 66 MMHG | OXYGEN SATURATION: 97 % | RESPIRATION RATE: 16 BRPM | BODY MASS INDEX: 29.97 KG/M2

## 2022-07-18 DIAGNOSIS — K21.9 GASTROESOPHAGEAL REFLUX DISEASE, UNSPECIFIED WHETHER ESOPHAGITIS PRESENT: ICD-10-CM

## 2022-07-18 DIAGNOSIS — F31.9 BIPOLAR 1 DISORDER: ICD-10-CM

## 2022-07-18 DIAGNOSIS — K92.2 ACUTE GI BLEEDING: ICD-10-CM

## 2022-07-18 DIAGNOSIS — M46.1 SACROILIITIS, NOT ELSEWHERE CLASSIFIED: ICD-10-CM

## 2022-07-18 DIAGNOSIS — F41.1 GAD (GENERALIZED ANXIETY DISORDER): ICD-10-CM

## 2022-07-18 DIAGNOSIS — G47.33 OBSTRUCTIVE SLEEP APNEA (ADULT) (PEDIATRIC): ICD-10-CM

## 2022-07-18 DIAGNOSIS — E78.00 PURE HYPERCHOLESTEROLEMIA: Primary | ICD-10-CM

## 2022-07-18 DIAGNOSIS — Z23 NEED FOR SHINGLES VACCINE: ICD-10-CM

## 2022-07-18 DIAGNOSIS — R25.9 ABNORMAL MOVEMENTS: ICD-10-CM

## 2022-07-18 PROCEDURE — 99214 OFFICE O/P EST MOD 30 MIN: CPT | Mod: S$PBB | Performed by: NURSE PRACTITIONER

## 2022-07-18 PROCEDURE — 99999 PR STA SHADOW: CPT | Mod: PBBFAC,,, | Performed by: NURSE PRACTITIONER

## 2022-07-18 PROCEDURE — 99999 PR STA SHADOW: ICD-10-PCS | Mod: PBBFAC,,, | Performed by: NURSE PRACTITIONER

## 2022-07-18 PROCEDURE — 99999 PR PBB SHADOW E&M-EST. PATIENT-LVL III: CPT | Mod: PBBFAC,,, | Performed by: NURSE PRACTITIONER

## 2022-07-18 PROCEDURE — 99213 OFFICE O/P EST LOW 20 MIN: CPT | Mod: PBBFAC | Performed by: NURSE PRACTITIONER

## 2022-07-18 RX ORDER — PANTOPRAZOLE SODIUM 40 MG/1
40 TABLET, DELAYED RELEASE ORAL DAILY
Qty: 90 TABLET | Refills: 3 | Status: SHIPPED | OUTPATIENT
Start: 2022-07-18 | End: 2023-08-31

## 2022-07-18 NOTE — PROGRESS NOTES
Subjective:       Patient ID: Karin Maguire is a 72 y.o. female.    Chief Complaint: Follow-up    HPI: Pt presents to clinic today known to me with c/o needing routine visit. She reports that she gets a right upper abd pain and feels like it goes all the way up to her ear. She has heart burn so bad sometimes she has to go vomit. She has been having regurgitation of food reflux into throat. She was put on protonix last year after scope for GI bleed. Still taking that and trying to sit with head elevated. Also trying to avoid certain foods. .    Still sees psych. On cymbalta, seroquel and lamictal. Dr Zaman   Review of Systems   Constitutional: Negative for chills, fatigue, fever and unexpected weight change.   HENT: Negative for congestion, ear pain, sore throat and trouble swallowing.    Eyes: Negative for pain and visual disturbance.   Respiratory: Negative for cough, chest tightness and shortness of breath.    Cardiovascular: Negative for chest pain, palpitations and leg swelling.   Gastrointestinal: Positive for nausea. Negative for abdominal distention, abdominal pain, constipation, diarrhea and vomiting.        Reflux   Genitourinary: Negative for difficulty urinating, dysuria, flank pain, frequency and hematuria.   Musculoskeletal: Positive for arthralgias and myalgias. Negative for back pain, gait problem, joint swelling, neck pain and neck stiffness.   Skin: Negative for rash and wound.   Neurological: Negative for dizziness, seizures, speech difficulty, light-headedness and headaches.       Objective:      Physical Exam  Vitals and nursing note reviewed.   Constitutional:       General: She is not in acute distress.     Appearance: Normal appearance. She is well-developed.   HENT:      Head: Normocephalic and atraumatic.      Right Ear: External ear normal.      Left Ear: External ear normal.      Nose: Nose normal.   Eyes:      Conjunctiva/sclera: Conjunctivae normal.      Pupils: Pupils are equal,  "round, and reactive to light.   Cardiovascular:      Rate and Rhythm: Normal rate and regular rhythm.      Heart sounds: Normal heart sounds.   Pulmonary:      Effort: Pulmonary effort is normal. No respiratory distress.      Breath sounds: Normal breath sounds. No wheezing or rales.   Abdominal:      General: Bowel sounds are normal.      Palpations: Abdomen is soft.   Musculoskeletal:         General: Normal range of motion.      Cervical back: Normal range of motion and neck supple.   Skin:     General: Skin is warm and dry.   Neurological:      Mental Status: She is alert and oriented to person, place, and time.   Psychiatric:         Behavior: Behavior normal.         Thought Content: Thought content normal.         Judgment: Judgment normal.      Comments: Constantly moving in office chair during visit         Assessment:       1. Pure hypercholesterolemia    2. Sacroiliitis, not elsewhere classified    3. Bipolar 1 disorder    4. LUANNE (generalized anxiety disorder)    5. Abnormal movements    6. Obstructive sleep apnea (adult) (pediatric)    7. Gastroesophageal reflux disease, unspecified whether esophagitis present        Plan:     Problem List Items Addressed This Visit     Bipolar 1 disorder- cont f/u with psych- we;; controlled on current meds     LUANNE (generalized anxiety disorder)    Relevant Orders    TSH    Abnormal movements    Obstructive sleep apnea (adult) (pediatric)- uses maching "sometimes"    Pure hypercholesterolemia - Primary    Relevant Orders    Lipid Panel    Comprehensive Metabolic Panel    CBC Auto Differential  Consider increasing xrestor. ON 10 but cholesterol still pretty high. NO side effects noted from current med      Other Visit Diagnoses     Sacroiliitis, not elsewhere classified       Sees Dr guy- last visit was inj to cervical spine     Cont protonix (has been on nexium in past when she had the GI bleed)  Also sees Dr Ty for multi nodule goiter. Has upcoming u/s has " opted not for bx and surveillance u/s only  Still smokes and is not interested in quitting   rec shingrix will check varicella titer because she can not recall having chicken pox though she was exposed.

## 2022-07-28 ENCOUNTER — LAB VISIT (OUTPATIENT)
Dept: LAB | Facility: HOSPITAL | Age: 72
End: 2022-07-28
Attending: NURSE PRACTITIONER
Payer: MEDICARE

## 2022-07-28 DIAGNOSIS — E78.00 PURE HYPERCHOLESTEROLEMIA: ICD-10-CM

## 2022-07-28 DIAGNOSIS — Z23 NEED FOR SHINGLES VACCINE: ICD-10-CM

## 2022-07-28 DIAGNOSIS — F41.1 GAD (GENERALIZED ANXIETY DISORDER): ICD-10-CM

## 2022-07-28 LAB
ALBUMIN SERPL BCP-MCNC: 3.5 G/DL (ref 3.5–5.2)
ALP SERPL-CCNC: 81 U/L (ref 55–135)
ALT SERPL W/O P-5'-P-CCNC: 13 U/L (ref 10–44)
ANION GAP SERPL CALC-SCNC: 9 MMOL/L (ref 8–16)
AST SERPL-CCNC: 15 U/L (ref 10–40)
BASOPHILS # BLD AUTO: 0.06 K/UL (ref 0–0.2)
BASOPHILS NFR BLD: 1.1 % (ref 0–1.9)
BILIRUB SERPL-MCNC: 0.3 MG/DL (ref 0.1–1)
BUN SERPL-MCNC: 13 MG/DL (ref 8–23)
CALCIUM SERPL-MCNC: 9.7 MG/DL (ref 8.7–10.5)
CHLORIDE SERPL-SCNC: 105 MMOL/L (ref 95–110)
CHOLEST SERPL-MCNC: 186 MG/DL (ref 120–199)
CHOLEST/HDLC SERPL: 2.3 {RATIO} (ref 2–5)
CO2 SERPL-SCNC: 26 MMOL/L (ref 23–29)
CREAT SERPL-MCNC: 0.8 MG/DL (ref 0.5–1.4)
DIFFERENTIAL METHOD: NORMAL
EOSINOPHIL # BLD AUTO: 0.2 K/UL (ref 0–0.5)
EOSINOPHIL NFR BLD: 3 % (ref 0–8)
ERYTHROCYTE [DISTWIDTH] IN BLOOD BY AUTOMATED COUNT: 14.3 % (ref 11.5–14.5)
EST. GFR  (AFRICAN AMERICAN): >60 ML/MIN/1.73 M^2
EST. GFR  (NON AFRICAN AMERICAN): >60 ML/MIN/1.73 M^2
GLUCOSE SERPL-MCNC: 97 MG/DL (ref 70–110)
HCT VFR BLD AUTO: 39.4 % (ref 37–48.5)
HDLC SERPL-MCNC: 81 MG/DL (ref 40–75)
HDLC SERPL: 43.5 % (ref 20–50)
HGB BLD-MCNC: 13 G/DL (ref 12–16)
IMM GRANULOCYTES # BLD AUTO: 0.01 K/UL (ref 0–0.04)
IMM GRANULOCYTES NFR BLD AUTO: 0.2 % (ref 0–0.5)
LDLC SERPL CALC-MCNC: 88.4 MG/DL (ref 63–159)
LYMPHOCYTES # BLD AUTO: 1.7 K/UL (ref 1–4.8)
LYMPHOCYTES NFR BLD: 32.8 % (ref 18–48)
MCH RBC QN AUTO: 29.9 PG (ref 27–31)
MCHC RBC AUTO-ENTMCNC: 33 G/DL (ref 32–36)
MCV RBC AUTO: 91 FL (ref 82–98)
MONOCYTES # BLD AUTO: 0.6 K/UL (ref 0.3–1)
MONOCYTES NFR BLD: 10.5 % (ref 4–15)
NEUTROPHILS # BLD AUTO: 2.8 K/UL (ref 1.8–7.7)
NEUTROPHILS NFR BLD: 52.4 % (ref 38–73)
NONHDLC SERPL-MCNC: 105 MG/DL
NRBC BLD-RTO: 0 /100 WBC
PLATELET # BLD AUTO: 243 K/UL (ref 150–450)
PMV BLD AUTO: 10.1 FL (ref 9.2–12.9)
POTASSIUM SERPL-SCNC: 4.4 MMOL/L (ref 3.5–5.1)
PROT SERPL-MCNC: 6.7 G/DL (ref 6–8.4)
RBC # BLD AUTO: 4.35 M/UL (ref 4–5.4)
SODIUM SERPL-SCNC: 140 MMOL/L (ref 136–145)
TRIGL SERPL-MCNC: 83 MG/DL (ref 30–150)
TSH SERPL DL<=0.005 MIU/L-ACNC: 1.18 UIU/ML (ref 0.4–4)
WBC # BLD AUTO: 5.25 K/UL (ref 3.9–12.7)

## 2022-07-28 PROCEDURE — 80053 COMPREHEN METABOLIC PANEL: CPT | Performed by: NURSE PRACTITIONER

## 2022-07-28 PROCEDURE — 80061 LIPID PANEL: CPT | Performed by: NURSE PRACTITIONER

## 2022-07-28 PROCEDURE — 36415 COLL VENOUS BLD VENIPUNCTURE: CPT | Performed by: NURSE PRACTITIONER

## 2022-07-28 PROCEDURE — 86787 VARICELLA-ZOSTER ANTIBODY: CPT | Performed by: NURSE PRACTITIONER

## 2022-07-28 PROCEDURE — 84443 ASSAY THYROID STIM HORMONE: CPT | Performed by: NURSE PRACTITIONER

## 2022-07-28 PROCEDURE — 85025 COMPLETE CBC W/AUTO DIFF WBC: CPT | Performed by: NURSE PRACTITIONER

## 2022-07-30 LAB
VARICELLA INTERPRETATION: POSITIVE
VARICELLA ZOSTER IGG: 1.64 ISR (ref 0–0.9)

## 2022-08-15 ENCOUNTER — OFFICE VISIT (OUTPATIENT)
Dept: UROLOGY | Facility: CLINIC | Age: 72
End: 2022-08-15
Payer: MEDICARE

## 2022-08-15 VITALS — BODY MASS INDEX: 30.92 KG/M2 | WEIGHT: 191.56 LBS

## 2022-08-15 DIAGNOSIS — R35.1 NOCTURIA: ICD-10-CM

## 2022-08-15 DIAGNOSIS — N39.41 URGE INCONTINENCE: Primary | ICD-10-CM

## 2022-08-15 PROCEDURE — 99999 PR PBB SHADOW E&M-EST. PATIENT-LVL III: ICD-10-PCS | Mod: PBBFAC,,, | Performed by: UROLOGY

## 2022-08-15 PROCEDURE — 99214 OFFICE O/P EST MOD 30 MIN: CPT | Mod: S$PBB,,, | Performed by: UROLOGY

## 2022-08-15 PROCEDURE — 99213 OFFICE O/P EST LOW 20 MIN: CPT | Mod: PBBFAC | Performed by: UROLOGY

## 2022-08-15 PROCEDURE — 99214 PR OFFICE/OUTPT VISIT, EST, LEVL IV, 30-39 MIN: ICD-10-PCS | Mod: S$PBB,,, | Performed by: UROLOGY

## 2022-08-15 PROCEDURE — 99999 PR PBB SHADOW E&M-EST. PATIENT-LVL III: CPT | Mod: PBBFAC,,, | Performed by: UROLOGY

## 2022-08-15 RX ORDER — SOLIFENACIN SUCCINATE 5 MG/1
5 TABLET, FILM COATED ORAL DAILY
Qty: 30 TABLET | Refills: 11 | Status: SHIPPED | OUTPATIENT
Start: 2022-08-15 | End: 2023-03-06

## 2022-08-15 NOTE — PROGRESS NOTES
"  Subjective:       Karin Maguire is a 72 y.o. female who is an established patient with Giuliano LARRY, though new to me was seen  for evaluation of UI.      Last saw Giuliano LARRY in 6/22. Reported UI, worse at night. +BANDAR, wears CPAP inconsistently. UI with urge and with stress. Nocturia x 3. Does not wear pads though changes clothes due to UI most days. S/p partial hysterectomy with "bladder lift" in 1980s. +constipation. Drinks sodas, coffee mainly, "some" water. Denies dysuria, gross hematuria or flank pain.     PVR (bladder scan) initial visit - 22 ml    Given trial of Myrbetriq - notes some improvement but is expensive. Notes just two UUI at night since starting medication. She notes some positional voiding at end of stream as well likely some vaginal voiding.  PVR (bladder scan) today - 17cc       The following portions of the patient's history were reviewed and updated as appropriate: allergies, current medications, past family history, past medical history, past social history, past surgical history and problem list.    Review of Systems  12 point review of systems completed. Pertinent positive and negatives listed in HPI        Objective:    Vitals: Wt 86.9 kg (191 lb 9.3 oz)   BMI 30.92 kg/m²     Physical Exam   General: well developed, well nourished in no acute distress  Head: normocephalic, atraumatic  Neck: supple, trachea midline, no obvious enlargement of thyroid  HEENT: EOMI, mucus membranes moist, sclera anicteric, no hearing impairment  Lungs: symmetric expansion, non-labored breathing  Skin: no rashes or lesions  Neuro: alert and oriented x 3, no gross deficits  Psych: normal judgment and insight, normal mood/affect and non-anxious  Genitourinary:   deferred      Lab Review   Urine analysis today in clinic shows negative for all components     Lab Results   Component Value Date    WBC 5.25 07/28/2022    HGB 13.0 07/28/2022    HCT 39.4 07/28/2022    MCV 91 07/28/2022     07/28/2022 "     Lab Results   Component Value Date    CREATININE 0.8 07/28/2022    BUN 13 07/28/2022       Imaging  NA         Assessment/Plan:      1. Urge incontinence    - UUI: Behavioral changes, PFPT, anticholinergics, mirabegron. Botox/InterStim for refractory UUI.   - Myrbetriq mildly helpful, expensive    - Trial Vesicare 5mg. May need goodrx coupon.   - Discussed possible SE.    - Call if higher dose needed    - Discussed vaginal voiding   - Avoid bladder irritants     2. Nocturia    - Wear CPAP   - Reduce PM fluids   - Vesicare         Follow up in 5 months

## 2022-08-24 DIAGNOSIS — Z78.0 MENOPAUSE: ICD-10-CM

## 2022-09-07 ENCOUNTER — OFFICE VISIT (OUTPATIENT)
Dept: PSYCHIATRY | Facility: CLINIC | Age: 72
End: 2022-09-07
Payer: MEDICARE

## 2022-09-07 VITALS
BODY MASS INDEX: 30.44 KG/M2 | OXYGEN SATURATION: 95 % | HEIGHT: 66 IN | DIASTOLIC BLOOD PRESSURE: 60 MMHG | WEIGHT: 189.38 LBS | SYSTOLIC BLOOD PRESSURE: 115 MMHG | HEART RATE: 75 BPM | RESPIRATION RATE: 18 BRPM

## 2022-09-07 DIAGNOSIS — F41.1 GAD (GENERALIZED ANXIETY DISORDER): ICD-10-CM

## 2022-09-07 DIAGNOSIS — Z86.59 HISTORY OF GAMBLING: ICD-10-CM

## 2022-09-07 DIAGNOSIS — F31.9 BIPOLAR 1 DISORDER: ICD-10-CM

## 2022-09-07 DIAGNOSIS — Z65.8 PSYCHOSOCIAL STRESSORS: Primary | ICD-10-CM

## 2022-09-07 PROCEDURE — 99999 PR STA SHADOW: ICD-10-PCS | Mod: PBBFAC,,, | Performed by: STUDENT IN AN ORGANIZED HEALTH CARE EDUCATION/TRAINING PROGRAM

## 2022-09-07 PROCEDURE — 99213 OFFICE O/P EST LOW 20 MIN: CPT | Mod: PBBFAC | Performed by: STUDENT IN AN ORGANIZED HEALTH CARE EDUCATION/TRAINING PROGRAM

## 2022-09-07 PROCEDURE — 99214 OFFICE O/P EST MOD 30 MIN: CPT | Mod: S$PBB | Performed by: STUDENT IN AN ORGANIZED HEALTH CARE EDUCATION/TRAINING PROGRAM

## 2022-09-07 PROCEDURE — 99999 PR PBB SHADOW E&M-EST. PATIENT-LVL III: CPT | Mod: PBBFAC,,, | Performed by: STUDENT IN AN ORGANIZED HEALTH CARE EDUCATION/TRAINING PROGRAM

## 2022-09-07 PROCEDURE — 99999 PR STA SHADOW: CPT | Mod: PBBFAC,,, | Performed by: STUDENT IN AN ORGANIZED HEALTH CARE EDUCATION/TRAINING PROGRAM

## 2022-09-07 RX ORDER — QUETIAPINE FUMARATE 200 MG/1
TABLET, FILM COATED ORAL
Qty: 7 TABLET | Refills: 0 | Status: SHIPPED | OUTPATIENT
Start: 2022-09-07 | End: 2022-10-04

## 2022-09-07 RX ORDER — LAMOTRIGINE 150 MG/1
300 TABLET ORAL NIGHTLY
Qty: 180 TABLET | Refills: 5 | Status: SHIPPED | OUTPATIENT
Start: 2022-09-07 | End: 2022-12-13 | Stop reason: SDUPTHER

## 2022-09-07 RX ORDER — QUETIAPINE FUMARATE 100 MG/1
TABLET, FILM COATED ORAL
Qty: 30 TABLET | Refills: 3 | Status: SHIPPED | OUTPATIENT
Start: 2022-09-07 | End: 2022-10-14 | Stop reason: ALTCHOICE

## 2022-09-07 RX ORDER — TRAZODONE HYDROCHLORIDE 50 MG/1
TABLET ORAL
Qty: 60 TABLET | Refills: 2 | Status: SHIPPED | OUTPATIENT
Start: 2022-09-07 | End: 2022-11-21 | Stop reason: SDUPTHER

## 2022-09-07 RX ORDER — DULOXETIN HYDROCHLORIDE 60 MG/1
120 CAPSULE, DELAYED RELEASE ORAL DAILY
Qty: 60 CAPSULE | Refills: 5 | Status: SHIPPED | OUTPATIENT
Start: 2022-09-07 | End: 2022-12-13 | Stop reason: SDUPTHER

## 2022-09-09 ENCOUNTER — NURSE TRIAGE (OUTPATIENT)
Dept: ADMINISTRATIVE | Facility: CLINIC | Age: 72
End: 2022-09-09
Payer: MEDICARE

## 2022-09-10 NOTE — TELEPHONE ENCOUNTER
Spoke with spouse he states patient accidentally took her night time medication this morning ant 7:30 am.  Medication includes Lamictal 300 mg, Cymbalta 120 mg, lyrica 150 mg, Seroquel 450 mg, and Crestor 10 mg.  Spouse states patient was feeling dizzy throughout the day.  Spoke with patient directly she states the dizziness passed around 5 pm and she is not feeling that way at this time.   Patient took her morning medication at 2:00 pm today which included aspirin 81 mg, Protonix 40 mg, lyrica 150 mg, and vesicare 5 mg.   and she took her morning medication at 2:00 pm today.    states Dr. Zaman ordered trazodone 50 mg for patient to start tomorrow.  She is to be on a reduction dose of Seroquel when medication is started.      Spoke with on call provider Dr. Zaman who states patient should not take another dose of Lamictal, Cymbalta, or Seroquel tonight.  He states patient should resume regular dose of Lamictal and Cymbalta tomorrow night.  Dr. Zaman also states patient should start trazodone 50 mg and Seroquel 200 mg tomorrow night.  Dr. Zaman cannot given advise on the other medications.   Ray was given information and he verbalized understanding.      Spoke with Dr. Mendez in regards to Crestor 10 mg, aspirin 81 mg, and Protonix 40 mg.  Dr. Mendez states patient should skip tomorrow's dose for all three medications and resume normal schedule on Sunday.     Spoke with on call provider Dr. Flower in regards to vesicare.  Dr. Flower states patient can resume normal dose in the morning.     There is no call provider for pain management.  Called [facility] spoke with Dr. Heller he states that provider Mamie Roman is mainly at [facility].  Called [facility] spoke with Farhad () she states there is not on call provider listed for pain management.  Paged On call provider for [facility]. Spoke with Dr. Clem Elias who initially stated he did not take call for Dr. Roman.  Dr. Elias  "called back and stated he would take the call.  Spoke with him in regards to Lyrica 150 mg.  Dr. Elias states patient can resume her normal dose tomorrow.    Called to speak with Jairo () all information was given and reviewed.  Jairo verbalized understanding.   Encounter was lengthy due to calling multiple providers to review medications.       Reason for Disposition   Taking a medicine that could cause dizziness (e.g., blood pressure medications, diuretics)    Additional Information   Negative: Severe difficulty breathing (e.g., struggling for each breath, speaks in single words)   Negative: [1] Difficulty breathing or swallowing AND [2] started suddenly after medicine, an allergic food or bee sting   Negative: Shock suspected (e.g., cold/pale/clammy skin, too weak to stand)   Negative: Difficult to awaken or acting confused  (e.g., disoriented, slurred speech)   Negative: [1] Weakness (i.e., paralysis, loss of muscle strength) of the face, arm or leg on one side of the body AND [2] sudden onset AND [3] present now   Negative: [1] Numbness (i.e., loss of sensation) of the face, arm or leg on one side of the body AND [2] sudden onset AND [3] present now   Negative: [1] Loss of speech or garbled speech AND [2] sudden onset AND [3] present now   Negative: Overdose (accidental or intentional) of medications   Negative: [1] Fainted > 15 minutes ago AND [2] still feels too weak or dizzy to stand   Negative: Heart beating < 50 beats per minute OR > 140 beats per minute   Negative: Sounds like a life-threatening emergency to the triager   Negative: Difficulty breathing   Negative: SEVERE dizziness (e.g., unable to stand, requires support to walk, feels like passing out now)   Negative: Extra heart beats OR irregular heart beating  (i.e., "palpitations")   Negative: [1] Drinking very little AND [2] dehydration suspected (e.g., no urine > 12 hours, very dry mouth, very lightheaded)   Negative: Patient sounds very sick " or weak to the triager   Negative: [1] Dizziness caused by heat exposure, sudden standing, or poor fluid intake AND [2] no improvement after 2 hours of rest and fluids   Negative: [1] Fever > 103 F (39.4 C) AND [2] not able to get the fever down using Fever Care Advice   Negative: [1] Fever > 101 F (38.3 C) AND [2] age > 60   Negative: [1] Fever > 101 F (38.3 C) AND [2] bedridden (e.g.,  nursing home patient, CVA, chronic illness, recovering from surgery)   Negative: [1] Fever > 100.5 F (38.1 C) AND [2] diabetes mellitus or immunocompromised (e.g., HIV positive, cancer chemo, splenectomy, organ transplant, chronic steroids)   Negative: [1] MODERATE dizziness (e.g., interferes with normal activities) AND [2] has NOT been evaluated by physician for this  (EXCEPTION: dizziness caused by heat exposure, sudden standing, or poor fluid intake)   Negative: Fever present > 3 days (72 hours)    Protocols used: Dizziness-A-AH

## 2022-09-23 ENCOUNTER — TELEPHONE (OUTPATIENT)
Dept: PSYCHIATRY | Facility: CLINIC | Age: 72
End: 2022-09-23
Payer: MEDICARE

## 2022-09-23 NOTE — TELEPHONE ENCOUNTER
Contacted Mr Gill, patient's , and informed him that Dr Zaman's recommendations are to continue Seroquel 100 mg at bedtime until patient's next follow up appt on 10/14. They both voiced understanding.

## 2022-09-23 NOTE — TELEPHONE ENCOUNTER
Tomorrow will make the end of the 2 week decreasing process of Seroquel. Patient's  is just wanting to make sure they discontinue Seroquel completely and only take the Trazodone at night?   Please advise.

## 2022-09-23 NOTE — TELEPHONE ENCOUNTER
----- Message from [facility] sent at 9/23/2022 10:45 AM CDT -----  .Type:  Needs Medical Advice    Who Called: Pt    Would the patient rather a call back or a response via [facility]? Callback   Best Call Back Number: [phone]  Additional Information: pt  requesting callback from office to discuss issues with medications

## 2022-09-30 ENCOUNTER — HOSPITAL ENCOUNTER (EMERGENCY)
Facility: HOSPITAL | Age: 72
Discharge: HOME | End: 2022-09-30
Attending: STUDENT IN AN ORGANIZED HEALTH CARE EDUCATION/TRAINING PROGRAM
Payer: MEDICARE

## 2022-09-30 VITALS
HEART RATE: 72 BPM | WEIGHT: 187.38 LBS | BODY MASS INDEX: 30.25 KG/M2 | RESPIRATION RATE: 18 BRPM | SYSTOLIC BLOOD PRESSURE: 158 MMHG | DIASTOLIC BLOOD PRESSURE: 74 MMHG | TEMPERATURE: 99 F | OXYGEN SATURATION: 96 %

## 2022-09-30 DIAGNOSIS — M79.605 LEFT LEG PAIN: ICD-10-CM

## 2022-09-30 PROCEDURE — 25000003 PHARM REV CODE 250: Performed by: STUDENT IN AN ORGANIZED HEALTH CARE EDUCATION/TRAINING PROGRAM

## 2022-09-30 PROCEDURE — 99283 EMERGENCY DEPT VISIT LOW MDM: CPT

## 2022-09-30 RX ORDER — ACETAMINOPHEN 325 MG/1
650 TABLET ORAL
Status: COMPLETED | OUTPATIENT
Start: 2022-09-30 | End: 2022-09-30

## 2022-09-30 RX ADMIN — ACETAMINOPHEN 650 MG: 325 TABLET, COATED ORAL at 09:09

## 2022-10-01 NOTE — ED PROVIDER NOTES
Encounter Date: 9/30/2022       History     Chief Complaint   Patient presents with    Leg Pain     Patient to ER CC of pain to her left lower leg from a fall yesterday states she tripped from standing height      72-year-old female with history of bipolar, GI bleeds, presenting with left lower leg pain.  Patient reports that she had a mechanical fall yesterday, and fell onto her left knee.  Denies any other trauma.  Patient has been ambulatory, but reports tenderness distal to her knee.  No other complaints.    Review of patient's allergies indicates:   Allergen Reactions    Nsaids (non-steroidal anti-inflammatory drug) Other (See Comments)     Gastrointestinal bleeding requiring blood transfusion     Past Medical History:   Diagnosis Date    Anxiety     Arthritis     Bipolar 1 disorder     Bursitis of right hip     GI bleed due to NSAIDs     Multinodular goiter 11/15/2021    Sacroiliitis     right side    Sleep apnea      Past Surgical History:   Procedure Laterality Date    ANKLE FRACTURE SURGERY Left 2001    BLADDER SUSPENSION      CARPAL TUNNEL RELEASE Bilateral     CHOLECYSTECTOMY      ESOPHAGOGASTRODUODENOSCOPY N/A 7/29/2021    Procedure: EGD (ESOPHAGOGASTRODUODENOSCOPY);  Surgeon: Susan Mcclain MD;  Location: [facility];  Service: Endoscopy;  Laterality: N/A;    EYE SURGERY      HYSTERECTOMY  1986    vaginal prolapse    INJECTION OF ANESTHETIC AGENT AROUND MEDIAL BRANCH NERVES INNERVATING CERVICAL FACET JOINT Bilateral 5/27/2022    Procedure: CERVICAL MEDIAL BRANCH NERVE BLOCK (C3-4,C4-5);  Surgeon: Mamie Roman MD;  Location: [facility];  Service: Pain Management;  Laterality: Bilateral;    INJECTION OF ANESTHETIC AGENT AROUND MEDIAL BRANCH NERVES INNERVATING LUMBAR FACET JOINT Right 2/5/2021    Procedure: LUMBAR FACET JOINT BLOCK (L3-4,L4-5,L5-S1);  Surgeon: Mamie Roman MD;  Location: [facility];  Service: Pain Management;  Laterality: Right;    INJECTION OF ANESTHETIC AGENT AROUND MEDIAL BRANCH NERVES  INNERVATING LUMBAR FACET JOINT Right 4/30/2021    Procedure: LUMBAR FACET JOINT BLOCK (L3-4,L4-5,L5-S1);  Surgeon: Mamie Roman MD;  Location: [facility];  Service: Pain Management;  Laterality: Right;    INJECTION OF ANESTHETIC AGENT INTO SACROILIAC JOINT Right 12/4/2020    Procedure: SACROILIAC JOINT INJECTION;  Surgeon: Mamie Roman MD;  Location: [facility];  Service: Pain Management;  Laterality: Right;    INJECTION OF JOINT Right 12/4/2020    Procedure: GREATER TROCHANTERIC BURSA INJECTION;  Surgeon: Mamie Roman MD;  Location: [facility];  Service: Pain Management;  Laterality: Right;    NASAL SEPTUM SURGERY      RECTAL PROLAPSE REPAIR      TONSILLECTOMY       Family History   Problem Relation Age of Onset    Colon cancer Maternal Uncle     Colon cancer Maternal Uncle     Colon cancer Maternal Uncle      Social History     Tobacco Use    Smoking status: Every Day     Packs/day: 1.00     Years: 35.00     Pack years: 35.00     Types: Cigarettes     Start date: 3/30/1982    Smokeless tobacco: Never    Tobacco comments:     smoking cessation clinic pamphlet for clinic put on chart to give to pt.    Substance Use Topics    Alcohol use: Yes     Comment: Socially-2 or 3 times a year-6 or 7 drinks    Drug use: No     Review of Systems   Constitutional:  Negative for fever.   HENT:  Negative for sore throat.    Respiratory:  Negative for shortness of breath.    Cardiovascular:  Negative for chest pain.   Gastrointestinal:  Negative for nausea.   Genitourinary:  Negative for dysuria.   Musculoskeletal:  Negative for back pain.        Left lower leg pain   Skin:  Negative for rash.   Neurological:  Negative for weakness.   Hematological:  Does not bruise/bleed easily.     Physical Exam     Initial Vitals [09/30/22 2051]   BP Pulse Resp Temp SpO2   (!) 158/74 72 18 99.1 °F (37.3 °C) 96 %      MAP       --         Physical Exam    Nursing note and vitals reviewed.  Constitutional: She appears well-developed.   HENT:    Head: Normocephalic.   Eyes: Pupils are equal, round, and reactive to light.   Neck:   Normal range of motion.  Cardiovascular:            No murmur heard.  Pulmonary/Chest: No respiratory distress.   Abdominal: Abdomen is soft.   Musculoskeletal:         General: No edema.      Cervical back: Normal range of motion.      Comments: Patient has full range of motion in left knee.  No swelling or erythema.  No tenderness to patella or patellar tendon.  Patient has no tenderness to the proximal tib-fib.  She has mild tenderness to the anterior portion of the tibia about 1 in inferior to the knee.  No skin changes.  Range of motion in left ankle.  DP plus 2+ with good cap refill.     Neurological: She is alert.   Skin: Skin is warm.   Psychiatric: She has a normal mood and affect.       ED Course   Procedures  Labs Reviewed - No data to display       Imaging Results              X-Ray Tibia Fibula 2 View Left (Final result)  Result time 09/30/22 21:37:19      Final result by Rubén Stinson MD (09/30/22 21:37:19)                   Impression:      No acute findings evident within the left tibia or fibula.    Orthopedic hardware screw fracture.      Electronically signed by: Rubén Stinson  Date:    09/30/2022  Time:    21:37               Narrative:    EXAMINATION:  XR TIBIA FIBULA 2 VIEW LEFT    CLINICAL HISTORY:  Pain in left leg    TECHNIQUE:  AP and lateral views of the left tibia and fibula were performed.    COMPARISON:  None.    FINDINGS:  Orthopedic hardware in the fibula and tibia are noted with fracture of the lower tib fib screw.  Bones appear intact with no acute fracture or malalignment.  Mild degenerative changes are noted within talar navicular joint.                                       Medications   acetaminophen tablet 650 mg (650 mg Oral Given 9/30/22 2107)     Medical Decision Making:   Differential Diagnosis:   DDX: R/o fracture vs. sprain/contusion.  DX: XR.  TX: Analgesia PRN.  Treatment/consult as indicated by studies.  Dispo: Pending studies. If studies WNL or pathology stabilized for discharge, discharge to f/u with PMD vs. orthopedics with precautions for RTED and supportive care recommendations.                 ED Course as of 10/01/22 0127   Fri Sep 30, 2022   2112 Fractured distal screw is old. [NB]      ED Course User Index  [NB] Jose Bishop MD                 Clinical Impression:   Final diagnoses:  [M79.605] Left leg pain        ED Disposition Condition    Discharge Stable          ED Prescriptions    None       Follow-up Information       Follow up With Specialties Details Why Contact Info    Lottie Hough NP Family Medicine Schedule an appointment as soon as possible for a visit in 2 days  [address] [facility] [address]  [phone]      [facility] - Emergency Dept Emergency Medicine  If symptoms worsen [address] [place] [id number]  [phone]             Jose Bishop MD  09/30/22 2058       Jose Bishop MD  10/01/22 0127

## 2022-10-01 NOTE — DISCHARGE INSTRUCTIONS
Please follow up with your primary care physician within 2 days. Ensure that you review all lab work results and/or imaging results. If you have any questions about your discharge paperwork please call the Emergency Department.     Return to the ED for any numbness, tingling, weakness in the extremity, color changes, increasing pain, uncontrolled pain, (all signs of possible compartment syndrome) or any new or worsening symptoms.     Thank you for visiting [facility], Department of Emergency Medicine. Please see the entirety of the educational materials provided. Please note that a visit to the emergency department does not substitute ongoing care from a primary medical provider or specialist. Please ensure to follow up as recommended. However, please return to the emergency department immediately if symptoms do not improve as discussed, symptoms worsen, new symptoms develop, difficulty in following up or for any of your concerns or issues. Please note on discharge you are acknowledging understanding and agreement on medical evaluation, management recommendations and follow up recommendations.

## 2022-10-04 ENCOUNTER — OFFICE VISIT (OUTPATIENT)
Dept: INTERNAL MEDICINE | Facility: CLINIC | Age: 72
End: 2022-10-04
Payer: MEDICARE

## 2022-10-04 VITALS
HEIGHT: 66 IN | RESPIRATION RATE: 19 BRPM | DIASTOLIC BLOOD PRESSURE: 60 MMHG | BODY MASS INDEX: 30.4 KG/M2 | WEIGHT: 189.13 LBS | SYSTOLIC BLOOD PRESSURE: 116 MMHG | OXYGEN SATURATION: 96 % | HEART RATE: 85 BPM

## 2022-10-04 DIAGNOSIS — M79.605 LEFT LEG PAIN: Primary | ICD-10-CM

## 2022-10-04 PROCEDURE — 99999 PR PBB SHADOW E&M-EST. PATIENT-LVL IV: CPT | Mod: PBBFAC,,, | Performed by: INTERNAL MEDICINE

## 2022-10-04 PROCEDURE — 99999 PR STA SHADOW: ICD-10-PCS | Mod: PBBFAC,,, | Performed by: INTERNAL MEDICINE

## 2022-10-04 PROCEDURE — 99214 OFFICE O/P EST MOD 30 MIN: CPT | Mod: PBBFAC | Performed by: INTERNAL MEDICINE

## 2022-10-04 PROCEDURE — 99999 PR STA SHADOW: CPT | Mod: PBBFAC,,, | Performed by: INTERNAL MEDICINE

## 2022-10-04 PROCEDURE — 99213 OFFICE O/P EST LOW 20 MIN: CPT | Mod: S$PBB | Performed by: INTERNAL MEDICINE

## 2022-10-04 NOTE — PROGRESS NOTES
"Subjective:       Patient ID: Karin Maguire is a 72 y.o. female.    Chief Complaint: ER f/u, Fall, Leg Pain (Left leg), and Leg Swelling (Left Leg)      HPI:  Patient is known to me from acute visits and presents for ER follow up. She was seen 9/30/22 for left leg pain. She had a trip and fall at home prior to presentation.     Xray showed: "Orthopedic hardware in the fibula and tibia are noted with fracture of the lower tib fib screw.  Bones appear intact with no acute fracture or malalignment.  Mild degenerative changes are noted within talar navicular joint."    Today she reports she has continued left leg swelling. She reports it is about the same as when she went to ER. She is still having pain on the tibia, just below the knee. She is taking Tylenol OTC at least oncea day taking anti-inflammatory.   Past Medical History:   Diagnosis Date    Anxiety     Arthritis     Bipolar 1 disorder     Bursitis of right hip     GI bleed due to NSAIDs     Multinodular goiter 11/15/2021    Sacroiliitis     right side    Sleep apnea        Family History   Problem Relation Age of Onset    Colon cancer Maternal Uncle     Colon cancer Maternal Uncle     Colon cancer Maternal Uncle        Social History     Socioeconomic History    Marital status:    Tobacco Use    Smoking status: Every Day     Packs/day: 1.00     Years: 35.00     Pack years: 35.00     Types: Cigarettes     Start date: 3/30/1982    Smokeless tobacco: Never    Tobacco comments:     smoking cessation clinic pamphlet for clinic put on chart to give to pt.    Substance and Sexual Activity    Alcohol use: Yes     Comment: Socially-2 or 3 times a year-6 or 7 drinks    Drug use: No    Sexual activity: Yes     Partners: Male     Birth control/protection: Surgical     Comment:        Review of Systems   Constitutional:  Negative for activity change, fatigue, fever and unexpected weight change.   HENT:  Negative for congestion, ear pain, hearing loss, " rhinorrhea and sore throat.    Eyes:  Negative for pain, redness and visual disturbance.   Respiratory:  Negative for cough, shortness of breath and wheezing.    Cardiovascular:  Positive for leg swelling (left). Negative for chest pain and palpitations.   Gastrointestinal:  Negative for abdominal pain, constipation, diarrhea, nausea and vomiting.   Genitourinary:  Negative for dysuria, frequency, pelvic pain and urgency.   Musculoskeletal:  Negative for back pain, joint swelling and neck pain.   Skin:  Negative for color change, rash and wound.   Neurological:  Negative for dizziness, tremors, weakness, light-headedness and headaches.       Objective:      Physical Exam  Vitals reviewed.   Constitutional:       General: She is not in acute distress.     Appearance: She is well-developed.   HENT:      Head: Normocephalic and atraumatic.      Right Ear: External ear normal.      Left Ear: External ear normal.      Nose: Nose normal.   Eyes:      General:         Right eye: No discharge.         Left eye: No discharge.      Extraocular Movements: Extraocular movements intact.      Conjunctiva/sclera: Conjunctivae normal.      Pupils: Pupils are equal, round, and reactive to light.   Neck:      Thyroid: No thyromegaly.   Cardiovascular:      Rate and Rhythm: Normal rate and regular rhythm.   Pulmonary:      Effort: Pulmonary effort is normal. No respiratory distress.      Breath sounds: Normal breath sounds. No wheezing.   Abdominal:      General: Bowel sounds are normal. There is no distension.      Palpations: Abdomen is soft.      Tenderness: There is no abdominal tenderness.   Musculoskeletal:         General: Swelling and tenderness present.        Legs:    Skin:     General: Skin is warm and dry.   Neurological:      Mental Status: She is alert and oriented to person, place, and time.      Cranial Nerves: No cranial nerve deficit.   Psychiatric:         Behavior: Behavior normal.         Thought Content: Thought  content normal.       Assessment:       1. Left leg pain        Plan:       Karin was seen today for er f/u, fall, leg pain and leg swelling.    Diagnoses and all orders for this visit:    Left leg pain  She does have continued tenderness over tibia and swelling  Xray imaging and report personally reviewed and discussed with patient from 9/30/22. No fracture  Suspect bruising and can take a few weeks to heal completely  No NSAIDs due to h/o GI bleed  Tyelnol BID to TID--up to 3g in 24 hour period is ok for inflammation/swelling/pain control  Will monitor, if pain and swelling persists can repeat imaging to ensure did not miss fx but I think less likely  She is ambulatory

## 2022-10-14 ENCOUNTER — OFFICE VISIT (OUTPATIENT)
Dept: PSYCHIATRY | Facility: CLINIC | Age: 72
End: 2022-10-14
Payer: MEDICARE

## 2022-10-14 VITALS
SYSTOLIC BLOOD PRESSURE: 131 MMHG | WEIGHT: 189.5 LBS | RESPIRATION RATE: 17 BRPM | HEART RATE: 65 BPM | BODY MASS INDEX: 30.46 KG/M2 | HEIGHT: 66 IN | DIASTOLIC BLOOD PRESSURE: 70 MMHG | OXYGEN SATURATION: 98 %

## 2022-10-14 DIAGNOSIS — F41.1 GAD (GENERALIZED ANXIETY DISORDER): ICD-10-CM

## 2022-10-14 DIAGNOSIS — Z65.8 PSYCHOSOCIAL STRESSORS: ICD-10-CM

## 2022-10-14 DIAGNOSIS — G47.00 INSOMNIA, UNSPECIFIED TYPE: ICD-10-CM

## 2022-10-14 DIAGNOSIS — Z86.59 HISTORY OF GAMBLING: ICD-10-CM

## 2022-10-14 DIAGNOSIS — F31.9 BIPOLAR 1 DISORDER: Primary | ICD-10-CM

## 2022-10-14 PROCEDURE — 99214 OFFICE O/P EST MOD 30 MIN: CPT | Mod: S$PBB | Performed by: STUDENT IN AN ORGANIZED HEALTH CARE EDUCATION/TRAINING PROGRAM

## 2022-10-14 PROCEDURE — 99999 PR STA SHADOW: ICD-10-PCS | Mod: PBBFAC,,, | Performed by: STUDENT IN AN ORGANIZED HEALTH CARE EDUCATION/TRAINING PROGRAM

## 2022-10-14 PROCEDURE — 90833 PSYTX W PT W E/M 30 MIN: CPT | Performed by: STUDENT IN AN ORGANIZED HEALTH CARE EDUCATION/TRAINING PROGRAM

## 2022-10-14 PROCEDURE — 99999 PR STA SHADOW: CPT | Mod: PBBFAC,,, | Performed by: STUDENT IN AN ORGANIZED HEALTH CARE EDUCATION/TRAINING PROGRAM

## 2022-10-14 PROCEDURE — 99999 PR PBB SHADOW E&M-EST. PATIENT-LVL III: CPT | Mod: PBBFAC,,, | Performed by: STUDENT IN AN ORGANIZED HEALTH CARE EDUCATION/TRAINING PROGRAM

## 2022-10-14 PROCEDURE — 99213 OFFICE O/P EST LOW 20 MIN: CPT | Mod: PBBFAC | Performed by: STUDENT IN AN ORGANIZED HEALTH CARE EDUCATION/TRAINING PROGRAM

## 2022-10-14 NOTE — PROGRESS NOTES
"        10/14/2022  1:21 PM  Karin Maguire  [id number]    Outpatient Psychiatry Follow-Up Visit (MD/NP)    10/14/2022    Clinical Status of Patient:  Outpatient (Ambulatory)    Chief Complaint:  Karin Maguire is a 72 y.o. female who presents today for follow-up of depression and anxiety.  Met with patient.        Interval History and Content of Current Session:  Interim Events/Subjective Report/Content of Current Session:   MDD with mixed features vs. Unspecified bipolar disorder (pt poor historian)  LUANNE  Insomnia  Gambling disorder  Obesity  Psychosocial stressors     Abnormal movements            Today she reports she is having more stress at home with her  now that he's retired. She states he is controlling, "he said he's my , it's my money, I don't understand why he has to know... sometimes I argue with him, but it's easier to give in." Dicussed marriage counseling, however, "he'll lie, he lies."     She states she does not feel depressed. She continues to joyner but sticks to her allowance, "we used to go every night, we cut back, my  said we were losing too much money, when he has money he keeps gambling." She feels she is not gambling too much but feels her  is gambling too much.         Stressors: relationship with  -increased stress 2/2 gambling         Psychiatric Review Of Systems - Is patient experiencing or having changes in:  sleep: "good", falls asleep quickly, sleeps 8 hours  appetite: "good"  weight: no  energy/anergy: "some days high, some days none"  Interest/pleasure/anhedonia: less, enjoys coloring, caring for her chickens, gardening, does word searches   Anxiety: "some"  panic: no  Guilty/hopelessness/worthlessness: no  concentration: yes  S.I.B.s/risky behavior: no  Irritability: denies  Substance abuse: no  Racing thoughts: no  Impulsive behaviors: no  Paranoia: no  AVH: no  Gambling: yes, see above  Michelle/hypomania: no      Psychotherapy:  Target " symptoms: mood disorder  Why chosen therapy is appropriate versus another modality: relevant to diagnosis  Outcome monitoring methods: self-report  Therapeutic intervention type: supportive psychotherapy  Topics discussed/themes:  gambling, building skills sets for symptom management, symptom recognition  The patient's response to the intervention is accepting. The patient's progress toward treatment goals is fair.   Duration of intervention: 16 minutes.        Review of Systems   Review of Systems   Constitutional:  Negative for chills and fever.   HENT:  Negative for hearing loss.    Eyes:  Negative for blurred vision and double vision.   Respiratory:  Negative for shortness of breath.    Cardiovascular:  Negative for chest pain.   Gastrointestinal:  Negative for constipation, diarrhea, nausea and vomiting.   Genitourinary:  Negative for dysuria.   Musculoskeletal:  Negative for back pain and joint pain.   Skin:  Negative for rash.   Neurological:  Negative for dizziness and headaches.   Endo/Heme/Allergies:  Negative for environmental allergies.     Past Medical, Family and Social History: The patient's past medical, family and social history have been reviewed and updated as appropriate within the electronic medical record - see encounter notes.    Social History     Socioeconomic History    Marital status:    Tobacco Use    Smoking status: Every Day     Packs/day: 1.00     Years: 35.00     Pack years: 35.00     Types: Cigarettes     Start date: 3/30/1982    Smokeless tobacco: Never    Tobacco comments:     smoking cessation clinic pamphlet for clinic put on chart to give to pt.    Substance and Sexual Activity    Alcohol use: Yes     Comment: Socially-2 or 3 times a year-6 or 7 drinks    Drug use: No    Sexual activity: Yes     Partners: Male     Birth control/protection: Surgical     Comment:          Compliance: yes    Side effects: None    Risk Parameters:  Patient reports no suicidal  "ideation  Patient reports no homicidal ideation  Patient reports no self-injurious behavior  Patient reports no violent behavior        Exam (detailed: at least 9 elements; comprehensive: all 15 elements)     Vitals:    10/14/22 1201   BP: 131/70   Pulse: 65   Resp: 17           Wt Readings from Last 5 Encounters:   10/14/22 86 kg (189 lb 7.8 oz)   10/04/22 85.8 kg (189 lb 2.5 oz)   09/30/22 85 kg (187 lb 6.3 oz)   09/07/22 85.9 kg (189 lb 6 oz)   08/15/22 86.9 kg (191 lb 9.3 oz)           CONSTITUTIONAL  General Appearance: unremarkable, age appropriate    MUSCULOSKELETAL  Muscle Strength and Tone:no tremor, no tic  Abnormal Involuntary Movements: yes  Gait and Station: non-ataxic    PSYCHIATRIC   Level of Consciousness: awake and alert   Orientation: person, place and situation  Grooming: Casually dressed and Well groomed  Psychomotor Behavior: normal, cooperative  Speech: normal tone, normal rate, normal pitch, normal volume  Language: grossly intact  Mood: "okay"  Affect: Consistent with mood  Thought Process: linear, logical  Associations: intact   Thought Content: DENIES suicidal ideation and DENIES homicidal ideation  Perceptions: denies hallucinations  Memory: Able to recall past events, Remote intact and Recent intact  Attention:Attends to interview without distraction  Fund of Knowledge: Aware of current events and Vocabulary appropriate   Estimate if Intelligence:  Average based on work/education history, vocabulary and mental status exam  Insight: has awareness of illness  Judgment: behavior is adequate to circumstances          Assessment and Diagnosis   Status/Progress: Based on the examination today, the patient's problem(s) is/are well controlled.  New problems have not been presented today.   Co-morbidities are complicating management of the primary condition.        Impresssion/Assessment:  MDD with mixed features vs. Unspecified bipolar disorder (pt poor historian)  LUANNE  Insomnia  Gambling " "disorder  Obesity  Psychosocial stressors     Abnormal movements           Plan:          MDD with mixed features vs. Unspecified bipolar disorder (pt poor historian)  - continue lamictal 300 mg PO qd  - continue Cymbalta 120 mg PO qd  - decrease seroquel to 50 mg PO qd x 2 weeks then stop  - continue trazodone 50 mg PO (1-2 tablets qhs PRN )  - strongly recommended psychotherapy, provided with resources       Insomnia  - using CPAP  - pt counseled       Gambling disorder  - pt counseled  - consider meetings/therapy; patient declines       LUANNE  - see MDD above  - consider psychotherapy, patient declines       Psychosocial stressors  - pt counseled  - strongly recommended psychotherapy, couples counseling to patient, patient refusing       Obesity  -  tapering off seroquel    Abnormal movements  - frequent non-stereotyped movements of hands and limbs, neck, trunk; patient states she has always been "fidgety" since childhood, reports she has seen neurology about movements before in past, does not want a referral at this time; extensively discussed risk/concern for tardive dyskinesia with AP          - Instructed patient to keep all scheduled appointments, take medications as prescribed and abstain from substance abuse. Instructed to call 911 or present to ER for emergency including SI or HI.    - Discussed diagnosis, risks and benefits of proposed treatment above vs alternative treatments vs no treatment, and potential side effects of these treatments. The patient expresses understanding of the above and displays the capacity to agree with this treatment given said understanding. Patient also agrees that, currently, the benefits outweigh the risks and would like to pursue treatment at this time.           Estevan Zaman III, MD    Return to Clinic: 6 months          "

## 2022-11-09 ENCOUNTER — OFFICE VISIT (OUTPATIENT)
Dept: INTERNAL MEDICINE | Facility: CLINIC | Age: 72
End: 2022-11-09
Payer: MEDICARE

## 2022-11-09 VITALS
HEIGHT: 66 IN | WEIGHT: 184.06 LBS | RESPIRATION RATE: 16 BRPM | SYSTOLIC BLOOD PRESSURE: 136 MMHG | BODY MASS INDEX: 29.58 KG/M2 | OXYGEN SATURATION: 95 % | HEART RATE: 70 BPM | DIASTOLIC BLOOD PRESSURE: 62 MMHG

## 2022-11-09 DIAGNOSIS — J20.9 ACUTE BRONCHITIS, UNSPECIFIED ORGANISM: ICD-10-CM

## 2022-11-09 DIAGNOSIS — R50.9 FEVER, UNSPECIFIED FEVER CAUSE: ICD-10-CM

## 2022-11-09 DIAGNOSIS — R05.9 COUGH, UNSPECIFIED TYPE: Primary | ICD-10-CM

## 2022-11-09 LAB
CTP QC/QA: YES
POC MOLECULAR INFLUENZA A AGN: NEGATIVE
POC MOLECULAR INFLUENZA B AGN: NEGATIVE

## 2022-11-09 PROCEDURE — 99999 POCT INFLUENZA A/B MOLECULAR: CPT | Mod: PBBFAC,,, | Performed by: NURSE PRACTITIONER

## 2022-11-09 PROCEDURE — 99213 OFFICE O/P EST LOW 20 MIN: CPT | Mod: S$PBB | Performed by: NURSE PRACTITIONER

## 2022-11-09 PROCEDURE — 99999 PR STA SHADOW: CPT | Mod: PBBFAC,,, | Performed by: NURSE PRACTITIONER

## 2022-11-09 PROCEDURE — 99213 OFFICE O/P EST LOW 20 MIN: CPT | Mod: PBBFAC | Performed by: NURSE PRACTITIONER

## 2022-11-09 PROCEDURE — 87502 INFLUENZA DNA AMP PROBE: CPT | Mod: PBBFAC | Performed by: NURSE PRACTITIONER

## 2022-11-09 PROCEDURE — 96372 THER/PROPH/DIAG INJ SC/IM: CPT | Mod: PBBFAC

## 2022-11-09 PROCEDURE — 99999 PR STA SHADOW: CPT | Mod: PBBFAC,,,

## 2022-11-09 PROCEDURE — 99999 PR STA SHADOW: ICD-10-PCS | Mod: PBBFAC,,, | Performed by: NURSE PRACTITIONER

## 2022-11-09 PROCEDURE — 99999 PR STA SHADOW: ICD-10-PCS | Mod: PBBFAC,,,

## 2022-11-09 PROCEDURE — 99999 PR PBB SHADOW E&M-EST. PATIENT-LVL III: CPT | Mod: PBBFAC,,, | Performed by: NURSE PRACTITIONER

## 2022-11-09 RX ORDER — METHYLPREDNISOLONE ACETATE 80 MG/ML
80 INJECTION, SUSPENSION INTRA-ARTICULAR; INTRALESIONAL; INTRAMUSCULAR; SOFT TISSUE
Status: COMPLETED | OUTPATIENT
Start: 2022-11-09 | End: 2022-11-09

## 2022-11-09 RX ORDER — AZITHROMYCIN 250 MG/1
TABLET, FILM COATED ORAL
Qty: 6 TABLET | Refills: 0 | Status: SHIPPED | OUTPATIENT
Start: 2022-11-09 | End: 2022-11-18

## 2022-11-09 RX ADMIN — METHYLPREDNISOLONE ACETATE 80 MG: 80 INJECTION, SUSPENSION INTRALESIONAL; INTRAMUSCULAR; INTRASYNOVIAL; SOFT TISSUE at 02:11

## 2022-11-09 NOTE — PROGRESS NOTES
Subjective:       Patient ID: Karin Maguire is a 72 y.o. female.    Chief Complaint: Cough, Fever ( x 2 days), and Dizziness    HPI: Pt presents to clinic today known to me with c/o needing eval for cough. She reports that she started with congestion and cough last Thursday. Fever over weekend 100-101. Today 99. Grand daughter had flu this week. She is a smoker. She has inhaler used it a couple times since Wee\kend. No SOB. NO chest pain. Still with nasal congestion and junky cough   Review of Systems   Constitutional:  Positive for fatigue and fever. Negative for appetite change and chills.   HENT:  Positive for congestion, postnasal drip, rhinorrhea, sinus pressure and sore throat. Negative for ear pain.    Eyes:  Negative for pain, discharge, itching and visual disturbance.   Respiratory:  Positive for cough and wheezing. Negative for choking, chest tightness and shortness of breath.    Cardiovascular:  Negative for chest pain, palpitations and leg swelling.   Gastrointestinal:  Negative for abdominal pain, diarrhea, nausea and vomiting.   Musculoskeletal:  Negative for arthralgias, myalgias and neck stiffness.   Skin:  Negative for rash and wound.   Neurological:  Negative for weakness, light-headedness and headaches.     Objective:      Physical Exam  Vitals and nursing note reviewed.   Constitutional:       Appearance: Normal appearance. She is well-developed.   HENT:      Head: Normocephalic and atraumatic.      Right Ear: External ear normal.      Left Ear: External ear normal.      Mouth/Throat:      Pharynx: Posterior oropharyngeal erythema present.   Eyes:      Pupils: Pupils are equal, round, and reactive to light.   Cardiovascular:      Rate and Rhythm: Normal rate and regular rhythm.      Heart sounds: Normal heart sounds.   Pulmonary:      Effort: Pulmonary effort is normal. No respiratory distress.      Breath sounds: Wheezing present.      Comments: loose rhonchi   Abdominal:       General: Bowel sounds are normal.      Palpations: Abdomen is soft.   Musculoskeletal:         General: Normal range of motion.      Cervical back: Normal range of motion and neck supple.   Skin:     General: Skin is warm and dry.   Neurological:      Mental Status: She is alert and oriented to person, place, and time.      Deep Tendon Reflexes: Reflexes are normal and symmetric.       Assessment:       1. Cough, unspecified type    2. Fever, unspecified fever cause    3. Acute bronchitis, unspecified organism          Plan:     Problem List Items Addressed This Visit    None  Visit Diagnoses       Cough, unspecified type    -  Primary    Fever, unspecified fever cause        Acute bronchitis, unspecified organism        Relevant Medications    methylPREDNISolone acetate injection 80 mg (Start on 11/9/2022  2:15 PM)    azithromycin (ZITHROMAX Z-BOSTON) 250 MG tablet            Albuterol as needed  Mucinex OTC  Flu negative

## 2022-11-16 ENCOUNTER — HOSPITAL ENCOUNTER (OUTPATIENT)
Dept: RADIOLOGY | Facility: HOSPITAL | Age: 72
Discharge: HOME | End: 2022-11-16
Attending: STUDENT IN AN ORGANIZED HEALTH CARE EDUCATION/TRAINING PROGRAM
Payer: MEDICARE

## 2022-11-16 ENCOUNTER — HOSPITAL ENCOUNTER (OUTPATIENT)
Dept: RADIOLOGY | Facility: HOSPITAL | Age: 72
Discharge: HOME | End: 2022-11-16
Attending: NURSE PRACTITIONER
Payer: MEDICARE

## 2022-11-16 DIAGNOSIS — E04.2 MULTINODULAR GOITER: ICD-10-CM

## 2022-11-16 DIAGNOSIS — Z78.0 MENOPAUSE: ICD-10-CM

## 2022-11-16 PROCEDURE — 76536 US EXAM OF HEAD AND NECK: CPT | Mod: 26,,, | Performed by: RADIOLOGY

## 2022-11-16 PROCEDURE — 76536 US EXAM OF HEAD AND NECK: CPT | Mod: TC

## 2022-11-16 PROCEDURE — 76536 US THYROID: ICD-10-PCS | Mod: 26,,, | Performed by: RADIOLOGY

## 2022-11-16 PROCEDURE — 77080 DXA BONE DENSITY AXIAL: CPT | Mod: 26,,, | Performed by: RADIOLOGY

## 2022-11-16 PROCEDURE — 77080 DXA BONE DENSITY AXIAL: CPT | Mod: TC

## 2022-11-16 PROCEDURE — 77080 DEXA BONE DENSITY SPINE HIP: ICD-10-PCS | Mod: 26,,, | Performed by: RADIOLOGY

## 2022-11-17 ENCOUNTER — OFFICE VISIT (OUTPATIENT)
Dept: PODIATRY | Facility: CLINIC | Age: 72
End: 2022-11-17
Payer: MEDICARE

## 2022-11-17 ENCOUNTER — HOSPITAL ENCOUNTER (OUTPATIENT)
Dept: RADIOLOGY | Facility: HOSPITAL | Age: 72
Discharge: HOME | End: 2022-11-17
Attending: STUDENT IN AN ORGANIZED HEALTH CARE EDUCATION/TRAINING PROGRAM
Payer: MEDICARE

## 2022-11-17 VITALS — HEART RATE: 73 BPM | DIASTOLIC BLOOD PRESSURE: 68 MMHG | SYSTOLIC BLOOD PRESSURE: 125 MMHG

## 2022-11-17 DIAGNOSIS — M19.079 ARTHRITIS OF MIDFOOT: ICD-10-CM

## 2022-11-17 DIAGNOSIS — M19.079 ARTHRITIS OF MIDFOOT: Primary | ICD-10-CM

## 2022-11-17 PROCEDURE — 99999 PR PBB SHADOW E&M-EST. PATIENT-LVL III: ICD-10-PCS | Mod: PBBFAC,,, | Performed by: STUDENT IN AN ORGANIZED HEALTH CARE EDUCATION/TRAINING PROGRAM

## 2022-11-17 PROCEDURE — 73630 X-RAY EXAM OF FOOT: CPT | Mod: 26,50,, | Performed by: RADIOLOGY

## 2022-11-17 PROCEDURE — 99203 PR OFFICE/OUTPT VISIT, NEW, LEVL III, 30-44 MIN: ICD-10-PCS | Mod: S$PBB,,, | Performed by: STUDENT IN AN ORGANIZED HEALTH CARE EDUCATION/TRAINING PROGRAM

## 2022-11-17 PROCEDURE — 73630 X-RAY EXAM OF FOOT: CPT | Mod: TC,50,FY

## 2022-11-17 PROCEDURE — 99999 PR PBB SHADOW E&M-EST. PATIENT-LVL III: CPT | Mod: PBBFAC,,, | Performed by: STUDENT IN AN ORGANIZED HEALTH CARE EDUCATION/TRAINING PROGRAM

## 2022-11-17 PROCEDURE — 99213 OFFICE O/P EST LOW 20 MIN: CPT | Mod: PBBFAC,PN | Performed by: STUDENT IN AN ORGANIZED HEALTH CARE EDUCATION/TRAINING PROGRAM

## 2022-11-17 PROCEDURE — 73630 XR FOOT COMPLETE 3 VIEW BILATERAL: ICD-10-PCS | Mod: 26,50,, | Performed by: RADIOLOGY

## 2022-11-17 PROCEDURE — 99203 OFFICE O/P NEW LOW 30 MIN: CPT | Mod: S$PBB,,, | Performed by: STUDENT IN AN ORGANIZED HEALTH CARE EDUCATION/TRAINING PROGRAM

## 2022-11-17 NOTE — PROGRESS NOTES
Subjective:      Patient ID: Karin Maguire is a 72 y.o. female.    Chief Complaint: Foot Problem (Bilateral, right ft. Is more worse on the top of her ft., did xray at an outside facility and its saying she has arthritis, was a long time ago) and Foot Pain    Karin is a 72 y.o. female who presents to the podiatry clinic  with complaint of  right foot pain. Onset of the symptoms was several years ago. Precipitating event: none known. Current symptoms include: worsening symptoms after a period of activity. Aggravating factors: any weight bearing. Symptoms have gradually worsened. Patient has had prior foot problems. Evaluation to date: plain films: several years ago . Treatment to date: none. Patients rates pain  moderate  on pain scale.    Review of Systems   Constitutional: Negative for chills, decreased appetite, diaphoresis and fever.   HENT:  Negative for congestion and hearing loss.    Cardiovascular:  Negative for chest pain, claudication, leg swelling and syncope.   Respiratory:  Negative for cough and shortness of breath.    Skin:  Negative for color change, dry skin, flushing, itching, nail changes, poor wound healing and rash.   Musculoskeletal:  Positive for arthritis and joint pain.   Gastrointestinal:  Negative for nausea and vomiting.   Neurological:  Negative for focal weakness, numbness, paresthesias and weakness.   Psychiatric/Behavioral:  Negative for altered mental status. The patient is not nervous/anxious.          Objective:      Physical Exam  Constitutional:       General: She is not in acute distress.     Appearance: She is well-developed. She is not diaphoretic.   Cardiovascular:      Comments: Dorsalis pedis and posterior tibial pulses are within normal limits. Skin temperature is within normal limits. Toes are cool to touch and feet are warm proximally. Hair growth is within normal limits. Skin is normotrophic and without hyperpigmentation. No edema noted. No spider veins or  varicosities noted, bilaterally.   Musculoskeletal:      Comments: Adequate joint range of motion without pain, limitation, nor crepitation to bilateral feet and ankle joints. Muscle strength is 5/5 in all groups bilaterally.    Dorsal bony exostosis over medial TMTJ of right foot with mild tenderness on palpation and with ROM       Lymphadenopathy:      Comments: Negative lymphangitic streaking    Skin:     General: Skin is warm and dry.      Findings: No lesion.      Comments: Skin is warm and dry, no acute signs of infection noted. No open wounds, macerations or hyperkeratotic lesions, bilaterally.     Toenails are well trimmed and of normal morphology, bilaterally.    Neurological:      Mental Status: She is alert and oriented to person, place, and time.      Sensory: No sensory deficit.      Motor: No abnormal muscle tone.      Comments: Light touch within normal limits.    Psychiatric:         Behavior: Behavior normal.         Thought Content: Thought content normal.         Judgment: Judgment normal.             Assessment:       Encounter Diagnosis   Name Primary?    Arthritis of midfoot Yes         Plan:       Karin was seen today for foot problem and foot pain.    Diagnoses and all orders for this visit:    Arthritis of midfoot  -     X-Ray Foot Complete 3 view Bilateral; Future    I counseled the patient on her conditions, their implications and medical management.  Previous xray with arthritic changes. Discussed surgical vs conservative treatment for osteoarthritis. Patient relates she is interested in surgery, referral to Dr. Alejandra for further surgical discussion.   Baseline Xray ordered, consider MRI if needed  RICE, OTC pain medication PRN pain  Recommend supportive tennis shoes at all times while ambulating, may consider custom arch support if no improvement  Declines PT referral   Return to clinic PRN or sooner if symptoms persist or fail to improve

## 2022-11-18 ENCOUNTER — OFFICE VISIT (OUTPATIENT)
Dept: ENDOCRINOLOGY | Facility: CLINIC | Age: 72
End: 2022-11-18
Payer: MEDICARE

## 2022-11-18 ENCOUNTER — TELEPHONE (OUTPATIENT)
Dept: ENDOCRINOLOGY | Facility: CLINIC | Age: 72
End: 2022-11-18

## 2022-11-18 VITALS
DIASTOLIC BLOOD PRESSURE: 58 MMHG | WEIGHT: 185.19 LBS | SYSTOLIC BLOOD PRESSURE: 131 MMHG | BODY MASS INDEX: 29.76 KG/M2 | HEART RATE: 71 BPM | HEIGHT: 66 IN

## 2022-11-18 DIAGNOSIS — E04.2 MULTINODULAR GOITER: ICD-10-CM

## 2022-11-18 DIAGNOSIS — F41.1 GAD (GENERALIZED ANXIETY DISORDER): ICD-10-CM

## 2022-11-18 DIAGNOSIS — E78.00 PURE HYPERCHOLESTEROLEMIA: ICD-10-CM

## 2022-11-18 PROCEDURE — 99999 PR STA SHADOW: CPT | Mod: PBBFAC,,, | Performed by: STUDENT IN AN ORGANIZED HEALTH CARE EDUCATION/TRAINING PROGRAM

## 2022-11-18 PROCEDURE — 99214 OFFICE O/P EST MOD 30 MIN: CPT | Mod: S$PBB | Performed by: STUDENT IN AN ORGANIZED HEALTH CARE EDUCATION/TRAINING PROGRAM

## 2022-11-18 PROCEDURE — 99999 PR STA SHADOW: ICD-10-PCS | Mod: PBBFAC,,, | Performed by: STUDENT IN AN ORGANIZED HEALTH CARE EDUCATION/TRAINING PROGRAM

## 2022-11-18 PROCEDURE — 99999 PR PBB SHADOW E&M-EST. PATIENT-LVL III: CPT | Mod: PBBFAC,,, | Performed by: STUDENT IN AN ORGANIZED HEALTH CARE EDUCATION/TRAINING PROGRAM

## 2022-11-18 PROCEDURE — 99213 OFFICE O/P EST LOW 20 MIN: CPT | Mod: PBBFAC | Performed by: STUDENT IN AN ORGANIZED HEALTH CARE EDUCATION/TRAINING PROGRAM

## 2022-11-18 NOTE — ASSESSMENT & PLAN NOTE
I have reviewed management options including observation, FNA or surgery.  In Nov 2021 we opted to do US surveillance  Since then, nodules have grown slightly and given the ones on L side are solid/hypoechoic and > 1 cm, I strongly suggested undergoing FNA - she is agreeable    Will refer to Endocrine Surgery

## 2022-11-18 NOTE — PROGRESS NOTES
Subjective:      Patient ID: Karin Maguire is a 72 y.o. female.    Chief Complaint:  thyroid      History of Present Illness  This is a 72 y.o. female. with a past medical history of anxiety, MDD, HLD here for thyroid nodules.    Thyroid nodules  Found incidentally on CTA neck in Oct 2021  Confirmed with US in Nov 2021    Lab Results   Component Value Date    TSH 1.182 07/28/2022         She reports she was told she had thyroid nodules more than 10 years ago by outside provider and was following at that time with no significant change.    Father had thyroidectomy - benign per patient  Mother had thyroid issues but unsure of condition    No radiation therapy in childhood    No neck swelling, dysphagia, dysphonia.        Thyroid US (Nov 16, 2022)  The thyroid is normal in size.  Right lobe of the thyroid measures 4.7 x 1.1 x 2.5 cm.  Left lobe of the thyroid measures 4.2 x 1.2 x 1.7 cm.  Normal thyroid parenchyma.  Solid nodule with cystic component in the mid right thyroid lobe measuring 1.1 x 0.7 x 1.2 cm, stable.  Solid hypoechoic nodule in the superior aspect of the left thyroid lobe measuring 1.4 x 1.1 x 1.0 cm.  This has slightly increased in size.  Solid isoechoic nodule in the mid aspect of the left thyroid lobe measuring 1.6 x 0.9 x 1.1 cm.  This is slightly increased in size.  Cystic nodule in the mid aspect of the left thyroid lobe measures 0.8 cm.  Solid nodule at the lower aspect of the left thyroid lobe measures 0.4 cm.  Cervical lymph nodes demonstrate normal morphology and size.  Impression:  Imaging findings in keeping with a multinodular thyroid goiter.  The 2 larger nodules in the upper and mid aspect of the left thyroid lobe having increased in size as compared to the previous examination.  The solid hypoechoic nodule measuring 1.4 cm in the superior aspect of the left thyroid lobe is characterized as a TR 5 nodule and further evaluation with FNA is recommended.  The solid isoechoic nodule in  the mid aspect of the left thyroid lobe is characterized as a TR 3 nodule and follow-up at 1 3 and 5 years is recommended based upon size.      Thyroid US (May 26, 2022)  The thyroid is normal in size.  Right lobe of the thyroid measures 4.6 x 1.5 x 1.9 cm.  Left lobe of the thyroid measures 4.5 x 1.4 x 1.8 cm.  Normal thyroid parenchyma.  Complex solid and cystic nodule in the mid aspect of the right thyroid lobe measures 1.3 x 1.2 x 0.8 cm, containing peripheral vascularity.  Solid hypoechoic nodule in the upper pole of the left thyroid lobe measuring 1.0 x 1.1 x 1.1 cm, with ill-defined margins.  Solid isoechoic nodule in the midpole of the left thyroid lobe measuring 1.3 x 1.2 x 1.5 cm with ill-defined margins.  Adjacent cystic nodule in the midpole of the left thyroid lobe measuring 0.7 cm.  Isoechoic solid nodule in the inferior aspect of the left thyroid lobe measuring 0.5 cm.  Cervical lymph nodes demonstrate normal morphology and size.  Impression:  Imaging findings compatible with a multinodular thyroid goiter.  The solid 1.1 cm hypoechoic nodule in the upper pole of the left thyroid lobe is characterized as a TR 5 nodule and further evaluation with FNA is recommended.  The solid isoechoic nodule measuring 1.5 cm is characterized as a TR 3 nodule and follow-up at 1, 3 and 5 years is recommended.       Thyroid US Nov 2021  The right hemithyroid measures 4.4 x 1.4 x 2.0 cm and the left 4.6 x 1.3 x 1.7 cm.  The thyroid isthmus measures 3 mm.  The thyroid parenchyma is homogeneous without hypervascularity.  Multiple nodules are present bilaterally.  On the right a mixed cystic and solid nodule is present measuring 1.2 cm.  On the left, there is a solid hypoechoic nodule within the upper pole measuring 1.2 cm.  There is a solid isoechoic nodule within the midpole measuring 1.6 cm.  No evidence for cervical lymphadenopathy.  Impression:  Multinodular thyroid gland.  The 2 left lita thyroid nodules meet criteria  "for follow-up.           Review of Systems  As above    Social and family history reviewed  Current medications and allergies reviewed    Objective:   BP (!) 131/58 (BP Location: Right arm, Patient Position: Sitting, BP Method: Medium (Manual))   Pulse 71   Ht 5' 6" (1.676 m)   Wt 84 kg (185 lb 3 oz)   BMI 29.89 kg/m²   Physical Exam   Thyroid normal in size and consistency    BP Readings from Last 1 Encounters:   11/18/22 (!) 131/58      Wt Readings from Last 1 Encounters:   11/18/22 1304 84 kg (185 lb 3 oz)     Body mass index is 29.89 kg/m².    Lab Review:   Lab Results   Component Value Date    HGBA1C 5.7 (H) 11/04/2021     Lab Results   Component Value Date    CHOL 186 07/28/2022    HDL 81 (H) 07/28/2022    LDLCALC 88.4 07/28/2022    TRIG 83 07/28/2022    CHOLHDL 43.5 07/28/2022     Lab Results   Component Value Date     07/28/2022    K 4.4 07/28/2022     07/28/2022    CO2 26 07/28/2022    GLU 97 07/28/2022    BUN 13 07/28/2022    CREATININE 0.8 07/28/2022    CALCIUM 9.7 07/28/2022    PROT 6.7 07/28/2022    ALBUMIN 3.5 07/28/2022    BILITOT 0.3 07/28/2022    ALKPHOS 81 07/28/2022    AST 15 07/28/2022    ALT 13 07/28/2022    ANIONGAP 9 07/28/2022    ESTGFRAFRICA >60 07/28/2022    EGFRNONAA >60 07/28/2022    TSH 1.182 07/28/2022       All pertinent labs reviewed    Assessment and Plan     Multinodular goiter  I have reviewed management options including observation, FNA or surgery.  In Nov 2021 we opted to do US surveillance  Since then, nodules have grown slightly and given the ones on L side are solid/hypoechoic and > 1 cm, I strongly suggested undergoing FNA - she is agreeable    Will refer to Endocrine Surgery    LUANNE (generalized anxiety disorder)  Normal TSH    Pure hypercholesterolemia  Continue statin    F/u 1 year     Andrade Ty MD  Endocrinology          "

## 2022-11-18 NOTE — ASSESSMENT & PLAN NOTE
Normal TSH   Cheek To Nose Interpolation Flap Division And Inset Text: Division and inset of the cheek to nose interpolation flap was performed to achieve optimal aesthetic result, restore normal anatomic appearance and avoid distortion of normal anatomy, expedite and facilitate wound healing, achieve optimal functional result and because linear closure either not possible or would produce suboptimal result. The patient was prepped and draped in the usual manner. The pedicle was infiltrated with local anesthesia. The pedicle was sectioned with a #15 blade. The pedicle was de-bulked and trimmed to match the shape of the defect. Hemostasis was achieved. The flap donor site and free margin of the flap were secured with deep buried sutures and the wound edges were re-approximated.

## 2022-11-19 ENCOUNTER — NURSE TRIAGE (OUTPATIENT)
Dept: ADMINISTRATIVE | Facility: CLINIC | Age: 72
End: 2022-11-19
Payer: MEDICARE

## 2022-11-19 NOTE — TELEPHONE ENCOUNTER
Reason for Disposition   Caller requesting a CONTROLLED substance prescription refill (e.g., narcotics, ADHD medicines)    Protocols used: Medication Refill and Renewal Call-[facility] Frazier's , Jairo, called about her medication.  He said her Lyrica often runs out a week ahead of time, and did so again.  This medication is prescribed by Pain Management, Dr Clem Elias.  He said [facility] will not refill as it is being requested a week early.  Explained that controlled substances prescribed by Pain Management must be requested and refilled by Pain Management, during clinic hours.  He states he did not call and request this last week when clinic was open. Assured them that I will message Dr Elias, with request to call Ray / fill request, but again explained this can come only from Dr Elias's office in Pain Management, and it might not be seen or filled until message received Monday by provider when clinic opens. He states understanding. Encouraged ED for any urgent need. Message to Dr Elias.  Please contact caller directly with any additional care advice.

## 2022-11-21 ENCOUNTER — TELEPHONE (OUTPATIENT)
Dept: PAIN MEDICINE | Facility: CLINIC | Age: 72
End: 2022-11-21
Payer: MEDICARE

## 2022-11-21 ENCOUNTER — TELEPHONE (OUTPATIENT)
Dept: INTERNAL MEDICINE | Facility: CLINIC | Age: 72
End: 2022-11-21
Payer: MEDICARE

## 2022-11-21 RX ORDER — TRAZODONE HYDROCHLORIDE 50 MG/1
TABLET ORAL
Qty: 180 TABLET | OUTPATIENT
Start: 2022-11-21

## 2022-11-21 RX ORDER — QUETIAPINE FUMARATE 100 MG/1
TABLET, FILM COATED ORAL
Qty: 90 TABLET | Refills: 1 | OUTPATIENT
Start: 2022-11-21

## 2022-11-21 RX ORDER — TRAZODONE HYDROCHLORIDE 50 MG/1
TABLET ORAL
Qty: 60 TABLET | Refills: 2 | Status: SHIPPED | OUTPATIENT
Start: 2022-11-21 | End: 2022-12-13 | Stop reason: SDUPTHER

## 2022-11-21 NOTE — TELEPHONE ENCOUNTER
Spoke with patient spouse, Jairo. States that patient has been out of Lyrica since Friday. States that pharmacy stated that it could not be filled again until 11/25/2022. States that this has happened a few times. States patient is taking as prescribed, 1 capsule twice daily. Advised that patient should not be running out early. Advised that they should have rx recounted in front of them to confirm quantity prior to leaving pharmacy. Voiced understanding.     Contacted pharmacy, states that rx for Lyrica has been filled since 11/19/2022. Patient notified. Voiced understanding.

## 2022-11-21 NOTE — TELEPHONE ENCOUNTER
Spoke with pt's . States that she ran out of her medication Friday, but the pharmacy is unable to fill until 11/25. Informed him that if she is taking as prescribed that she will not run out a week early. He mentioned how some months have 31 days and that this has happened before where she runs out a week before it's due. Again, said that if she is taking as prescribed, 1 tab BID, she will not be out a week early. They do not know what to do because Dr. Roman is no longer going to be her doctor. Informed him they can call specialty clinic, there will be new pain ciarra. Drs coming, but that they probably will not be able to do anything about this currently, again, because it is a controlled substance.

## 2022-11-21 NOTE — TELEPHONE ENCOUNTER
----- Message from Ifrah Sage sent at 2022  8:35 AM CST -----  Regarding: Rx Refill  Contact: Ray (spouse)  Karin Maguire  MRN: [id number]  : 1950  PCP: Lottie Hough  Home Phone      [phone]  Work Phone      Not on file.  Mobile          [phone]      MESSAGE:   Rx Refill - pregabalin (LYRICA) 150 MG capsule  Patient has been out of medication for 2 days.   The pharmacy instructed patient prescription due date is 22     Phone # [phone]    Pharmacy - [facility]/pharmacy #[id number] - [facility]- [address]

## 2022-11-21 NOTE — TELEPHONE ENCOUNTER
----- Message from Emelyn Alcantara sent at 2022  9:44 AM CST -----  Contact: Ray/spouse  Karin Maguire  MRN: [id number]  : 1950  PCP: Lottie Hough  Home Phone      [phone]  Work Phone      Not on file.  Mobile          [phone]      MESSAGE: Refill on (LYRICA) stating it can not be filled until  Have not taken it in two days.asking if  can call the pharmacy for a early refill    Pharmacy [facility]        Phone [phone]

## 2022-12-13 ENCOUNTER — OFFICE VISIT (OUTPATIENT)
Dept: PSYCHIATRY | Facility: CLINIC | Age: 72
End: 2022-12-13
Payer: MEDICARE

## 2022-12-13 VITALS
RESPIRATION RATE: 17 BRPM | BODY MASS INDEX: 29.07 KG/M2 | DIASTOLIC BLOOD PRESSURE: 58 MMHG | SYSTOLIC BLOOD PRESSURE: 121 MMHG | WEIGHT: 180.88 LBS | HEART RATE: 85 BPM | OXYGEN SATURATION: 96 % | HEIGHT: 66 IN

## 2022-12-13 DIAGNOSIS — M54.16 LUMBAR RADICULOPATHY: ICD-10-CM

## 2022-12-13 DIAGNOSIS — M54.2 NECK PAIN: ICD-10-CM

## 2022-12-13 DIAGNOSIS — Z65.8 PSYCHOSOCIAL STRESSORS: ICD-10-CM

## 2022-12-13 DIAGNOSIS — F41.1 GAD (GENERALIZED ANXIETY DISORDER): Primary | ICD-10-CM

## 2022-12-13 DIAGNOSIS — F31.9 BIPOLAR 1 DISORDER: ICD-10-CM

## 2022-12-13 DIAGNOSIS — Z86.59 HISTORY OF GAMBLING: ICD-10-CM

## 2022-12-13 DIAGNOSIS — M54.12 CERVICAL RADICULOPATHY: ICD-10-CM

## 2022-12-13 PROCEDURE — 99214 OFFICE O/P EST MOD 30 MIN: CPT | Mod: S$PBB | Performed by: STUDENT IN AN ORGANIZED HEALTH CARE EDUCATION/TRAINING PROGRAM

## 2022-12-13 PROCEDURE — 99999 PR STA SHADOW: CPT | Mod: PBBFAC,,, | Performed by: STUDENT IN AN ORGANIZED HEALTH CARE EDUCATION/TRAINING PROGRAM

## 2022-12-13 PROCEDURE — 99999 PR PBB SHADOW E&M-EST. PATIENT-LVL III: ICD-10-PCS | Mod: PBBFAC,,, | Performed by: STUDENT IN AN ORGANIZED HEALTH CARE EDUCATION/TRAINING PROGRAM

## 2022-12-13 PROCEDURE — 99213 OFFICE O/P EST LOW 20 MIN: CPT | Mod: PBBFAC | Performed by: STUDENT IN AN ORGANIZED HEALTH CARE EDUCATION/TRAINING PROGRAM

## 2022-12-13 PROCEDURE — 99999 PR PBB SHADOW E&M-EST. PATIENT-LVL III: CPT | Mod: PBBFAC,,, | Performed by: STUDENT IN AN ORGANIZED HEALTH CARE EDUCATION/TRAINING PROGRAM

## 2022-12-13 RX ORDER — PREGABALIN 75 MG/1
75 CAPSULE ORAL DAILY
Qty: 14 CAPSULE | Refills: 0 | Status: SHIPPED | OUTPATIENT
Start: 2022-12-13 | End: 2023-02-06

## 2022-12-13 RX ORDER — DULOXETIN HYDROCHLORIDE 60 MG/1
120 CAPSULE, DELAYED RELEASE ORAL DAILY
Qty: 60 CAPSULE | Refills: 5 | Status: SHIPPED | OUTPATIENT
Start: 2022-12-13 | End: 2023-07-03 | Stop reason: ALTCHOICE

## 2022-12-13 RX ORDER — LAMOTRIGINE 150 MG/1
300 TABLET ORAL NIGHTLY
Qty: 180 TABLET | Refills: 5 | Status: SHIPPED | OUTPATIENT
Start: 2022-12-13 | End: 2023-07-03 | Stop reason: SDUPTHER

## 2022-12-13 RX ORDER — TRAZODONE HYDROCHLORIDE 50 MG/1
TABLET ORAL
Qty: 60 TABLET | Refills: 2 | Status: SHIPPED | OUTPATIENT
Start: 2022-12-13 | End: 2023-05-22

## 2022-12-13 NOTE — PROGRESS NOTES
"        12/13/2022  1:21 PM  Karin Maguire  [id number]    Outpatient Psychiatry Follow-Up Visit (MD/NP)    12/13/2022    Clinical Status of Patient:  Outpatient (Ambulatory)    Chief Complaint:  Karin Maguire is a 72 y.o. female who presents today for follow-up of depression and anxiety.  Met with patient.        Interval History and Content of Current Session:  Interim Events/Subjective Report/Content of Current Session:   MDD with mixed features vs. Unspecified bipolar disorder (pt poor historian)  LUANNE  Insomnia  Gambling disorder  Obesity  Psychosocial stressors     Abnormal movements          Reports "I have a couple things going on... I have a nodule on my thyroid, I have to have a biopsy." She has been under stress from her medical issues, "my  has been arguing with me, I told him he's not coming with me to the appointment, I told him I would embarrass him and tell the nurse I don't want him in, he just contradicts everything." Also having foot issues, considering surgery. She is mostly worried about her 's responses to her medical condition.    She states her mood is "a little bit depressed," for the last week. LUANNE symptoms are "not that bad... I don't worry about a lot besides the stress of my ."    She continues to joyner, "more when we are on vacation."    She continues to enjoy working in garden and chicken coups.       Stressors: relationship with         Psychiatric Review Of Systems - Is patient experiencing or having changes in:  sleep: "good", falls asleep quickly, sleeps 7 hours  appetite: "good"  weight: no  energy/anergy: variable  Interest/pleasure/anhedonia: less  Anxiety: improving  panic: no  Guilty/hopelessness/worthlessness: no  concentration: yes  S.I.B.s/risky behavior: no  Irritability: variable   Substance abuse: no  Racing thoughts: no  Impulsive behaviors: no  Paranoia: no  AVH: no  Gambling: yes, see above  Michelle/hypomania: " no        Psychotherapy:  Target symptoms: mood disorder  Why chosen therapy is appropriate versus another modality: relevant to diagnosis  Outcome monitoring methods: self-report  Therapeutic intervention type: supportive psychotherapy  Topics discussed/themes:  gambling, building skills sets for symptom management, symptom recognition  The patient's response to the intervention is accepting. The patient's progress toward treatment goals is fair.   Duration of intervention: 16 minutes.        Review of Systems   Review of Systems   Constitutional:  Negative for chills and fever.   HENT:  Negative for hearing loss.    Eyes:  Negative for blurred vision and double vision.   Respiratory:  Negative for shortness of breath.    Cardiovascular:  Negative for chest pain.   Gastrointestinal:  Negative for constipation, diarrhea, nausea and vomiting.   Genitourinary:  Negative for dysuria.   Musculoskeletal:  Negative for back pain and joint pain.   Skin:  Negative for rash.   Neurological:  Negative for dizziness and headaches.   Endo/Heme/Allergies:  Negative for environmental allergies.     Past Medical, Family and Social History: The patient's past medical, family and social history have been reviewed and updated as appropriate within the electronic medical record - see encounter notes.    Social History     Socioeconomic History    Marital status:    Tobacco Use    Smoking status: Every Day     Packs/day: 1.00     Years: 35.00     Pack years: 35.00     Types: Cigarettes     Start date: 3/30/1982    Smokeless tobacco: Never    Tobacco comments:     smoking cessation clinic pamphlet for clinic put on chart to give to pt.    Substance and Sexual Activity    Alcohol use: Yes     Comment: Socially-2 or 3 times a year-6 or 7 drinks    Drug use: No    Sexual activity: Yes     Partners: Male     Birth control/protection: Surgical     Comment:          Compliance: yes    Side effects: None    Risk  "Parameters:  Patient reports no suicidal ideation  Patient reports no homicidal ideation  Patient reports no self-injurious behavior  Patient reports no violent behavior        Exam (detailed: at least 9 elements; comprehensive: all 15 elements)     Vitals:    12/13/22 1237   BP: (!) 121/58   Pulse: 85   Resp: 17           Wt Readings from Last 5 Encounters:   12/13/22 82.1 kg (180 lb 14.2 oz)   11/18/22 84 kg (185 lb 3 oz)   11/09/22 83.5 kg (184 lb 1.4 oz)   10/14/22 86 kg (189 lb 7.8 oz)   10/04/22 85.8 kg (189 lb 2.5 oz)           CONSTITUTIONAL  General Appearance: unremarkable, age appropriate    MUSCULOSKELETAL  Muscle Strength and Tone:no tremor, no tic  Abnormal Involuntary Movements: yes  Gait and Station: non-ataxic    PSYCHIATRIC   Level of Consciousness: awake and alert   Orientation: person, place and situation  Grooming: Casually dressed and Well groomed  Psychomotor Behavior: normal, cooperative  Speech: normal tone, normal rate, normal pitch, normal volume  Language: grossly intact  Mood: "okay"  Affect: Consistent with mood  Thought Process: linear, logical  Associations: intact   Thought Content: DENIES suicidal ideation and DENIES homicidal ideation  Perceptions: denies hallucinations  Memory: Able to recall past events, Remote intact and Recent intact  Attention:Attends to interview without distraction  Fund of Knowledge: Aware of current events and Vocabulary appropriate   Estimate if Intelligence:  Average based on work/education history, vocabulary and mental status exam  Insight: has awareness of illness  Judgment: behavior is adequate to circumstances          Assessment and Diagnosis   Status/Progress: Based on the examination today, the patient's problem(s) is/are well controlled.  New problems have not been presented today.   Co-morbidities are complicating management of the primary condition.        Impresssion/Assessment:  MDD with mixed features vs. Unspecified bipolar disorder (pt " "poor historian)  LUANNE  Insomnia  Gambling disorder  Obesity  Psychosocial stressors     Abnormal movements    Chronic pain       Plan:          MDD with mixed features vs. Unspecified bipolar disorder (pt poor historian)  - continue lamictal 300 mg PO qd  - continue Cymbalta 120 mg PO qd  - continue trazodone 50 mg PO (1-2 tablets qhs PRN )  - strongly recommended psychotherapy, provided with resources       Insomnia  - using CPAP  - pt counseled       Gambling disorder  - pt counseled  - consider meetings/therapy; patient declines       LUANNE  - see MDD above  - consider psychotherapy, patient declines       Psychosocial stressors  - pt counseled  - strongly recommended psychotherapy, couples counseling to patient, patient refusing       Obesity  -  tapered off seroquel      Abnormal movements  - frequent non-stereotyped movements of hands and limbs, neck, trunk; patient states she has always been "fidgety" since childhood, reports she has seen neurology about movements before in past, does not want a referral at this time; extensively discussed risk/concern for tardive dyskinesia with AP        Chronic pain  - taking lyrica for 150 mg PO BID per Pain Management MD, however, that MD is no longer practicing in this area. Patient reports she does not feel this medication helps and would like to come off of it, will assist pt with this goal, can reduce to 150 mg PO qd x 2 weeks then decrease to 75 mg PO qd x 2 weeks then stop if tolerating.  - pt counseled          - Instructed patient to keep all scheduled appointments, take medications as prescribed and abstain from substance abuse. Instructed to call 911 or present to ER for emergency including SI or HI.    - Discussed diagnosis, risks and benefits of proposed treatment above vs alternative treatments vs no treatment, and potential side effects of these treatments. The patient expresses understanding of the above and displays the capacity to agree with this treatment " given said understanding. Patient also agrees that, currently, the benefits outweigh the risks and would like to pursue treatment at this time.           Estevan Zaman III, MD    Return to Clinic:  2-3 months

## 2023-01-03 ENCOUNTER — TELEPHONE (OUTPATIENT)
Dept: PODIATRY | Facility: CLINIC | Age: 73
End: 2023-01-03

## 2023-01-03 NOTE — TELEPHONE ENCOUNTER
Called pt in regards to message in appointment request. Pt didn't answer the phone. I left a VM stating to give our office a call back to schedule a consultation with him. I provided the office number on  for the pt.

## 2023-01-10 ENCOUNTER — TELEPHONE (OUTPATIENT)
Dept: INTERNAL MEDICINE | Facility: CLINIC | Age: 73
End: 2023-01-10

## 2023-01-10 NOTE — TELEPHONE ENCOUNTER
----- Message from Jenny Pang MA sent at 1/10/2023  1:45 PM CST -----  Karin Maguire  MRN: [id number]  : 1950  PCP: Lottie Hough  Home Phone      [phone]  Work Phone      Not on file.  Mobile          [phone]      MESSAGE:     Patient thinks she may have broke her toe last week.  She wants appt for tomorrow    Please Advise:  [phone]

## 2023-01-11 ENCOUNTER — OFFICE VISIT (OUTPATIENT)
Dept: INTERNAL MEDICINE | Facility: CLINIC | Age: 73
End: 2023-01-11
Payer: MEDICARE

## 2023-01-11 ENCOUNTER — HOSPITAL ENCOUNTER (OUTPATIENT)
Dept: RADIOLOGY | Facility: HOSPITAL | Age: 73
Discharge: HOME | End: 2023-01-11
Attending: NURSE PRACTITIONER
Payer: MEDICARE

## 2023-01-11 VITALS
BODY MASS INDEX: 28.03 KG/M2 | SYSTOLIC BLOOD PRESSURE: 136 MMHG | DIASTOLIC BLOOD PRESSURE: 70 MMHG | RESPIRATION RATE: 20 BRPM | HEIGHT: 66 IN | HEART RATE: 76 BPM | WEIGHT: 174.38 LBS

## 2023-01-11 DIAGNOSIS — M79.671 RIGHT FOOT PAIN: ICD-10-CM

## 2023-01-11 DIAGNOSIS — M79.671 RIGHT FOOT PAIN: Primary | ICD-10-CM

## 2023-01-11 DIAGNOSIS — S99.921A INJURY OF RIGHT FOOT, INITIAL ENCOUNTER: ICD-10-CM

## 2023-01-11 PROCEDURE — 1125F AMNT PAIN NOTED PAIN PRSNT: CPT | Mod: CPTII,S$GLB,, | Performed by: NURSE PRACTITIONER

## 2023-01-11 PROCEDURE — 1101F PR PT FALLS ASSESS DOC 0-1 FALLS W/OUT INJ PAST YR: ICD-10-PCS | Mod: CPTII,S$GLB,, | Performed by: NURSE PRACTITIONER

## 2023-01-11 PROCEDURE — G0008 FLU VACCINE - QUADRIVALENT - ADJUVANTED: ICD-10-PCS | Mod: S$GLB,,, | Performed by: NURSE PRACTITIONER

## 2023-01-11 PROCEDURE — G0008 ADMIN INFLUENZA VIRUS VAC: HCPCS | Mod: S$GLB,,, | Performed by: NURSE PRACTITIONER

## 2023-01-11 PROCEDURE — 73630 X-RAY EXAM OF FOOT: CPT | Mod: TC,RT

## 2023-01-11 PROCEDURE — 90694 VACC AIIV4 NO PRSRV 0.5ML IM: CPT | Mod: S$GLB,,, | Performed by: NURSE PRACTITIONER

## 2023-01-11 PROCEDURE — 90694 FLU VACCINE - QUADRIVALENT - ADJUVANTED: ICD-10-PCS | Mod: S$GLB,,, | Performed by: NURSE PRACTITIONER

## 2023-01-11 PROCEDURE — 1159F MED LIST DOCD IN RCRD: CPT | Mod: CPTII,S$GLB,, | Performed by: NURSE PRACTITIONER

## 2023-01-11 PROCEDURE — 1159F PR MEDICATION LIST DOCUMENTED IN MEDICAL RECORD: ICD-10-PCS | Mod: CPTII,S$GLB,, | Performed by: NURSE PRACTITIONER

## 2023-01-11 PROCEDURE — 3008F BODY MASS INDEX DOCD: CPT | Mod: CPTII,S$GLB,, | Performed by: NURSE PRACTITIONER

## 2023-01-11 PROCEDURE — 3288F FALL RISK ASSESSMENT DOCD: CPT | Mod: CPTII,S$GLB,, | Performed by: NURSE PRACTITIONER

## 2023-01-11 PROCEDURE — 3078F PR MOST RECENT DIASTOLIC BLOOD PRESSURE < 80 MM HG: ICD-10-PCS | Mod: CPTII,S$GLB,, | Performed by: NURSE PRACTITIONER

## 2023-01-11 PROCEDURE — 73630 X-RAY EXAM OF FOOT: CPT | Mod: 26,RT,, | Performed by: RADIOLOGY

## 2023-01-11 PROCEDURE — 3078F DIAST BP <80 MM HG: CPT | Mod: CPTII,S$GLB,, | Performed by: NURSE PRACTITIONER

## 2023-01-11 PROCEDURE — 3075F PR MOST RECENT SYSTOLIC BLOOD PRESS GE 130-139MM HG: ICD-10-PCS | Mod: CPTII,S$GLB,, | Performed by: NURSE PRACTITIONER

## 2023-01-11 PROCEDURE — 99213 PR OFFICE/OUTPT VISIT, EST, LEVL III, 20-29 MIN: ICD-10-PCS | Mod: 25,S$GLB,, | Performed by: NURSE PRACTITIONER

## 2023-01-11 PROCEDURE — 99999 PR PBB SHADOW E&M-EST. PATIENT-LVL III: CPT | Mod: PBBFAC,,, | Performed by: NURSE PRACTITIONER

## 2023-01-11 PROCEDURE — 73630 XR FOOT COMPLETE 3 VIEW RIGHT: ICD-10-PCS | Mod: 26,RT,, | Performed by: RADIOLOGY

## 2023-01-11 PROCEDURE — 3075F SYST BP GE 130 - 139MM HG: CPT | Mod: CPTII,S$GLB,, | Performed by: NURSE PRACTITIONER

## 2023-01-11 PROCEDURE — 3008F PR BODY MASS INDEX (BMI) DOCUMENTED: ICD-10-PCS | Mod: CPTII,S$GLB,, | Performed by: NURSE PRACTITIONER

## 2023-01-11 PROCEDURE — 1101F PT FALLS ASSESS-DOCD LE1/YR: CPT | Mod: CPTII,S$GLB,, | Performed by: NURSE PRACTITIONER

## 2023-01-11 PROCEDURE — 1125F PR PAIN SEVERITY QUANTIFIED, PAIN PRESENT: ICD-10-PCS | Mod: CPTII,S$GLB,, | Performed by: NURSE PRACTITIONER

## 2023-01-11 PROCEDURE — 99213 OFFICE O/P EST LOW 20 MIN: CPT | Mod: 25,S$GLB,, | Performed by: NURSE PRACTITIONER

## 2023-01-11 PROCEDURE — 3288F PR FALLS RISK ASSESSMENT DOCUMENTED: ICD-10-PCS | Mod: CPTII,S$GLB,, | Performed by: NURSE PRACTITIONER

## 2023-01-11 PROCEDURE — 99999 PR PBB SHADOW E&M-EST. PATIENT-LVL III: ICD-10-PCS | Mod: PBBFAC,,, | Performed by: NURSE PRACTITIONER

## 2023-01-11 NOTE — PROGRESS NOTES
Subjective:       Patient ID: Karin Maguire is a 72 y.o. female.    Chief Complaint: Toe Injury    HPI: Pt presents to clinic today known to me with c/o needing right foot eval./ She hit her foot on door frame 1 week ago. She had bruising and swelling. Here today because still can not pt pressure on that foot and still swollen.       Has surgery for arthritis scheduled 2/6 for consult with podiatrist PEGGY   Review of Systems   Constitutional:  Negative for chills, fatigue, fever and unexpected weight change.   HENT:  Negative for congestion, ear pain, sore throat and trouble swallowing.    Eyes:  Negative for pain and visual disturbance.   Respiratory:  Negative for cough, chest tightness and shortness of breath.    Cardiovascular:  Negative for chest pain, palpitations and leg swelling.   Gastrointestinal:  Negative for abdominal distention, abdominal pain, constipation, diarrhea and vomiting.   Genitourinary:  Negative for difficulty urinating, dysuria, flank pain, frequency and hematuria.   Musculoskeletal:  Positive for arthralgias and gait problem. Negative for back pain, joint swelling, neck pain and neck stiffness.        Right foot pain   Skin:  Negative for rash and wound.   Neurological:  Negative for dizziness, seizures, speech difficulty, light-headedness and headaches.     Objective:      Physical Exam  Vitals and nursing note reviewed.   Constitutional:       Appearance: She is well-developed.   HENT:      Head: Normocephalic and atraumatic.      Right Ear: External ear normal.      Left Ear: External ear normal.      Nose: Nose normal.   Eyes:      Conjunctiva/sclera: Conjunctivae normal.      Pupils: Pupils are equal, round, and reactive to light.   Cardiovascular:      Rate and Rhythm: Normal rate and regular rhythm.      Heart sounds: Normal heart sounds. No murmur heard.  Pulmonary:      Effort: Pulmonary effort is normal. No respiratory distress.      Breath sounds: Normal breath sounds.  No wheezing.   Abdominal:      General: Bowel sounds are normal.      Palpations: Abdomen is soft.   Musculoskeletal:         General: Swelling and tenderness present. Normal range of motion.      Cervical back: Normal range of motion and neck supple.      Comments: Right foot    Skin:     General: Skin is warm and dry.   Neurological:      Mental Status: She is alert and oriented to person, place, and time.   Psychiatric:         Behavior: Behavior normal.         Thought Content: Thought content normal.         Judgment: Judgment normal.       Assessment:       1. Right foot pain    2. Injury of right foot, initial encounter          Plan:     Problem List Items Addressed This Visit    None  Visit Diagnoses       Right foot pain    -  Primary    Relevant Orders    X-Ray Foot Complete Right    Injury of right foot, initial encounter        Relevant Orders    X-Ray Foot Complete Right

## 2023-02-06 ENCOUNTER — OFFICE VISIT (OUTPATIENT)
Dept: PODIATRY | Facility: CLINIC | Age: 73
End: 2023-02-06
Payer: MEDICARE

## 2023-02-06 ENCOUNTER — LAB VISIT (OUTPATIENT)
Dept: LAB | Facility: HOSPITAL | Age: 73
End: 2023-02-06
Attending: PODIATRIST
Payer: MEDICARE

## 2023-02-06 VITALS
DIASTOLIC BLOOD PRESSURE: 82 MMHG | BODY MASS INDEX: 27.97 KG/M2 | HEART RATE: 77 BPM | SYSTOLIC BLOOD PRESSURE: 160 MMHG | HEIGHT: 66 IN | WEIGHT: 174 LBS

## 2023-02-06 DIAGNOSIS — M79.671 RIGHT FOOT PAIN: Primary | ICD-10-CM

## 2023-02-06 DIAGNOSIS — M19.071 OSTEOARTHRITIS OF RIGHT FOOT, UNSPECIFIED OSTEOARTHRITIS TYPE: ICD-10-CM

## 2023-02-06 DIAGNOSIS — S99.921S RIGHT FOOT INJURY, SEQUELA: ICD-10-CM

## 2023-02-06 DIAGNOSIS — M79.671 RIGHT FOOT PAIN: ICD-10-CM

## 2023-02-06 LAB
ALBUMIN SERPL BCP-MCNC: 3.7 G/DL (ref 3.5–5.2)
ALP SERPL-CCNC: 72 U/L (ref 55–135)
ALT SERPL W/O P-5'-P-CCNC: 18 U/L (ref 10–44)
ANION GAP SERPL CALC-SCNC: 8 MMOL/L (ref 8–16)
AST SERPL-CCNC: 23 U/L (ref 10–40)
BASOPHILS # BLD AUTO: 0.05 K/UL (ref 0–0.2)
BASOPHILS NFR BLD: 0.8 % (ref 0–1.9)
BILIRUB SERPL-MCNC: 0.3 MG/DL (ref 0.1–1)
BUN SERPL-MCNC: 10 MG/DL (ref 8–23)
CALCIUM SERPL-MCNC: 9.3 MG/DL (ref 8.7–10.5)
CHLORIDE SERPL-SCNC: 104 MMOL/L (ref 95–110)
CO2 SERPL-SCNC: 26 MMOL/L (ref 23–29)
CREAT SERPL-MCNC: 0.8 MG/DL (ref 0.5–1.4)
CRP SERPL-MCNC: 0.4 MG/L (ref 0–8.2)
DIFFERENTIAL METHOD: NORMAL
EOSINOPHIL # BLD AUTO: 0.1 K/UL (ref 0–0.5)
EOSINOPHIL NFR BLD: 1.9 % (ref 0–8)
ERYTHROCYTE [DISTWIDTH] IN BLOOD BY AUTOMATED COUNT: 14.5 % (ref 11.5–14.5)
ERYTHROCYTE [SEDIMENTATION RATE] IN BLOOD BY PHOTOMETRIC METHOD: <2 MM/HR (ref 0–36)
EST. GFR  (NO RACE VARIABLE): >60 ML/MIN/1.73 M^2
GLUCOSE SERPL-MCNC: 96 MG/DL (ref 70–110)
HCT VFR BLD AUTO: 38.1 % (ref 37–48.5)
HGB BLD-MCNC: 12.6 G/DL (ref 12–16)
IMM GRANULOCYTES # BLD AUTO: 0.02 K/UL (ref 0–0.04)
IMM GRANULOCYTES NFR BLD AUTO: 0.3 % (ref 0–0.5)
LYMPHOCYTES # BLD AUTO: 2.3 K/UL (ref 1–4.8)
LYMPHOCYTES NFR BLD: 36.3 % (ref 18–48)
MCH RBC QN AUTO: 29.4 PG (ref 27–31)
MCHC RBC AUTO-ENTMCNC: 33.1 G/DL (ref 32–36)
MCV RBC AUTO: 89 FL (ref 82–98)
MONOCYTES # BLD AUTO: 0.6 K/UL (ref 0.3–1)
MONOCYTES NFR BLD: 9 % (ref 4–15)
NEUTROPHILS # BLD AUTO: 3.3 K/UL (ref 1.8–7.7)
NEUTROPHILS NFR BLD: 51.7 % (ref 38–73)
NRBC BLD-RTO: 0 /100 WBC
PLATELET # BLD AUTO: 260 K/UL (ref 150–450)
PMV BLD AUTO: 10.4 FL (ref 9.2–12.9)
POTASSIUM SERPL-SCNC: 4.4 MMOL/L (ref 3.5–5.1)
PROT SERPL-MCNC: 6.5 G/DL (ref 6–8.4)
RBC # BLD AUTO: 4.28 M/UL (ref 4–5.4)
SODIUM SERPL-SCNC: 138 MMOL/L (ref 136–145)
URATE SERPL-MCNC: 3.3 MG/DL (ref 2.4–5.7)
WBC # BLD AUTO: 6.33 K/UL (ref 3.9–12.7)

## 2023-02-06 PROCEDURE — 3077F SYST BP >= 140 MM HG: CPT | Mod: CPTII,S$GLB,, | Performed by: PODIATRIST

## 2023-02-06 PROCEDURE — 1101F PR PT FALLS ASSESS DOC 0-1 FALLS W/OUT INJ PAST YR: ICD-10-PCS | Mod: CPTII,S$GLB,, | Performed by: PODIATRIST

## 2023-02-06 PROCEDURE — 3008F PR BODY MASS INDEX (BMI) DOCUMENTED: ICD-10-PCS | Mod: CPTII,S$GLB,, | Performed by: PODIATRIST

## 2023-02-06 PROCEDURE — 99999 PR PBB SHADOW E&M-EST. PATIENT-LVL IV: CPT | Mod: PBBFAC,,, | Performed by: PODIATRIST

## 2023-02-06 PROCEDURE — 1125F PR PAIN SEVERITY QUANTIFIED, PAIN PRESENT: ICD-10-PCS | Mod: CPTII,S$GLB,, | Performed by: PODIATRIST

## 2023-02-06 PROCEDURE — 36415 COLL VENOUS BLD VENIPUNCTURE: CPT | Performed by: PODIATRIST

## 2023-02-06 PROCEDURE — 1159F PR MEDICATION LIST DOCUMENTED IN MEDICAL RECORD: ICD-10-PCS | Mod: CPTII,S$GLB,, | Performed by: PODIATRIST

## 2023-02-06 PROCEDURE — 84550 ASSAY OF BLOOD/URIC ACID: CPT | Performed by: PODIATRIST

## 2023-02-06 PROCEDURE — 1159F MED LIST DOCD IN RCRD: CPT | Mod: CPTII,S$GLB,, | Performed by: PODIATRIST

## 2023-02-06 PROCEDURE — 3079F DIAST BP 80-89 MM HG: CPT | Mod: CPTII,S$GLB,, | Performed by: PODIATRIST

## 2023-02-06 PROCEDURE — 86140 C-REACTIVE PROTEIN: CPT | Performed by: PODIATRIST

## 2023-02-06 PROCEDURE — 99999 PR PBB SHADOW E&M-EST. PATIENT-LVL IV: ICD-10-PCS | Mod: PBBFAC,,, | Performed by: PODIATRIST

## 2023-02-06 PROCEDURE — 99214 OFFICE O/P EST MOD 30 MIN: CPT | Mod: S$GLB,,, | Performed by: PODIATRIST

## 2023-02-06 PROCEDURE — 1125F AMNT PAIN NOTED PAIN PRSNT: CPT | Mod: CPTII,S$GLB,, | Performed by: PODIATRIST

## 2023-02-06 PROCEDURE — 1160F RVW MEDS BY RX/DR IN RCRD: CPT | Mod: CPTII,S$GLB,, | Performed by: PODIATRIST

## 2023-02-06 PROCEDURE — 3288F PR FALLS RISK ASSESSMENT DOCUMENTED: ICD-10-PCS | Mod: CPTII,S$GLB,, | Performed by: PODIATRIST

## 2023-02-06 PROCEDURE — 1101F PT FALLS ASSESS-DOCD LE1/YR: CPT | Mod: CPTII,S$GLB,, | Performed by: PODIATRIST

## 2023-02-06 PROCEDURE — 85025 COMPLETE CBC W/AUTO DIFF WBC: CPT | Performed by: PODIATRIST

## 2023-02-06 PROCEDURE — 3077F PR MOST RECENT SYSTOLIC BLOOD PRESSURE >= 140 MM HG: ICD-10-PCS | Mod: CPTII,S$GLB,, | Performed by: PODIATRIST

## 2023-02-06 PROCEDURE — 3288F FALL RISK ASSESSMENT DOCD: CPT | Mod: CPTII,S$GLB,, | Performed by: PODIATRIST

## 2023-02-06 PROCEDURE — 80053 COMPREHEN METABOLIC PANEL: CPT | Performed by: PODIATRIST

## 2023-02-06 PROCEDURE — 99214 PR OFFICE/OUTPT VISIT, EST, LEVL IV, 30-39 MIN: ICD-10-PCS | Mod: S$GLB,,, | Performed by: PODIATRIST

## 2023-02-06 PROCEDURE — 85652 RBC SED RATE AUTOMATED: CPT | Performed by: PODIATRIST

## 2023-02-06 PROCEDURE — 1160F PR REVIEW ALL MEDS BY PRESCRIBER/CLIN PHARMACIST DOCUMENTED: ICD-10-PCS | Mod: CPTII,S$GLB,, | Performed by: PODIATRIST

## 2023-02-06 PROCEDURE — 3079F PR MOST RECENT DIASTOLIC BLOOD PRESSURE 80-89 MM HG: ICD-10-PCS | Mod: CPTII,S$GLB,, | Performed by: PODIATRIST

## 2023-02-06 PROCEDURE — 3008F BODY MASS INDEX DOCD: CPT | Mod: CPTII,S$GLB,, | Performed by: PODIATRIST

## 2023-02-06 NOTE — PROGRESS NOTES
"Subjective:      Patient ID: aKrin Maguire is a 72 y.o. female.    Chief Complaint: Foot Pain (Surgical consult ref: Dr Sy with right foot pain)    Referral from  for evaluation of chronic right foot pain secondary to osteoarthritis.  She also reports stubbing her right 4th toe 5 weeks ago with persistent pain and swelling to the area.  She is unable to take NSAID therapy secondary to previous GI bleed.  She is been taking Tylenol which helps somewhat.  States that she gets moderate pain to the foot with extended periods of standing and walking at the mall which forces her to sit down.  Ambulating with slip-on tennis shoes.    Vitals:    02/06/23 1459   BP: (!) 160/82   Pulse: 77   Weight: 78.9 kg (174 lb)   Height: 5' 6" (1.676 m)   PainSc:   8   PainLoc: Foot      Past Medical History:   Diagnosis Date    Anxiety     Arthritis     Bipolar 1 disorder     Bursitis of right hip     GI bleed due to NSAIDs     Multinodular goiter 11/15/2021    Sacroiliitis     right side    Sleep apnea        Past Surgical History:   Procedure Laterality Date    ANKLE FRACTURE SURGERY Left 2001    BLADDER SUSPENSION      CARPAL TUNNEL RELEASE Bilateral     CHOLECYSTECTOMY      ESOPHAGOGASTRODUODENOSCOPY N/A 7/29/2021    Procedure: EGD (ESOPHAGOGASTRODUODENOSCOPY);  Surgeon: Susan Mcclain MD;  Location: [facility];  Service: Endoscopy;  Laterality: N/A;    EYE SURGERY      HYSTERECTOMY  1986    vaginal prolapse    INJECTION OF ANESTHETIC AGENT AROUND MEDIAL BRANCH NERVES INNERVATING CERVICAL FACET JOINT Bilateral 5/27/2022    Procedure: CERVICAL MEDIAL BRANCH NERVE BLOCK (C3-4,C4-5);  Surgeon: Mamie Roman MD;  Location: [facility];  Service: Pain Management;  Laterality: Bilateral;    INJECTION OF ANESTHETIC AGENT AROUND MEDIAL BRANCH NERVES INNERVATING LUMBAR FACET JOINT Right 2/5/2021    Procedure: LUMBAR FACET JOINT BLOCK (L3-4,L4-5,L5-S1);  Surgeon: Mamie Roman MD;  Location: [facility];  Service: Pain " Management;  Laterality: Right;    INJECTION OF ANESTHETIC AGENT AROUND MEDIAL BRANCH NERVES INNERVATING LUMBAR FACET JOINT Right 4/30/2021    Procedure: LUMBAR FACET JOINT BLOCK (L3-4,L4-5,L5-S1);  Surgeon: Mamie Roman MD;  Location: [facility];  Service: Pain Management;  Laterality: Right;    INJECTION OF ANESTHETIC AGENT INTO SACROILIAC JOINT Right 12/4/2020    Procedure: SACROILIAC JOINT INJECTION;  Surgeon: Mamie Roman MD;  Location: [facility];  Service: Pain Management;  Laterality: Right;    INJECTION OF JOINT Right 12/4/2020    Procedure: GREATER TROCHANTERIC BURSA INJECTION;  Surgeon: Mamie Roman MD;  Location: [facility];  Service: Pain Management;  Laterality: Right;    NASAL SEPTUM SURGERY      RECTAL PROLAPSE REPAIR      TONSILLECTOMY         Family History   Problem Relation Age of Onset    Colon cancer Maternal Uncle     Colon cancer Maternal Uncle     Colon cancer Maternal Uncle        Social History     Socioeconomic History    Marital status:    Tobacco Use    Smoking status: Every Day     Packs/day: 1.00     Years: 35.00     Pack years: 35.00     Types: Cigarettes     Start date: 3/30/1982    Smokeless tobacco: Never    Tobacco comments:     smoking cessation clinic pamphlet for clinic put on chart to give to pt.    Substance and Sexual Activity    Alcohol use: Yes     Comment: Socially-2 or 3 times a year-6 or 7 drinks    Drug use: No    Sexual activity: Yes     Partners: Male     Birth control/protection: Surgical     Comment:        Current Outpatient Medications   Medication Sig Dispense Refill    acetaminophen (TYLENOL) 500 MG tablet Take 2 tablets (1,000 mg total) by mouth every 8 (eight) hours as needed for Pain. 30 tablet 0    albuterol (PROVENTIL/VENTOLIN HFA) 90 mcg/actuation inhaler INHALE 2 PUFFS INTO THE LUNGS EVERY 6 (SIX) HOURS AS NEEDED FOR WHEEZING (COUGH AND WHEEZING).RESCUE 18 g 1    aspirin 81 MG Chew Take 1 tablet (81 mg total) by mouth once daily. 30 tablet  5    DULoxetine (CYMBALTA) 60 MG capsule Take 2 capsules (120 mg total) by mouth once daily. 60 capsule 5    lamoTRIgine (LAMICTAL) 150 MG Tab Take 2 tablets (300 mg total) by mouth every evening. 180 tablet 5    pantoprazole (PROTONIX) 40 MG tablet Take 1 tablet (40 mg total) by mouth once daily. 90 tablet 3    rosuvastatin (CRESTOR) 10 MG tablet TAKE 1 TABLET BY MOUTH EVERY DAY 90 tablet 3    solifenacin (VESICARE) 5 MG tablet Take 1 tablet (5 mg total) by mouth once daily. 30 tablet 11    traZODone (DESYREL) 50 MG tablet Take 1 tablet at bedtime for 1 week then increase to 1-2 tablets nightly as needed 60 tablet 2     No current facility-administered medications for this visit.       Review of patient's allergies indicates:   Allergen Reactions    Nsaids (non-steroidal anti-inflammatory drug) Other (See Comments)     Gastrointestinal bleeding requiring blood transfusion    Demerol [meperidine] Rash         Review of Systems   Constitutional: Negative for chills and fever.   HENT:  Negative for congestion and hearing loss.    Cardiovascular:  Negative for chest pain and claudication.   Respiratory:  Negative for cough and shortness of breath.    Skin:  Negative for color change and itching.   Musculoskeletal:  Positive for arthritis.   Gastrointestinal:  Negative for nausea and vomiting.   Neurological:  Positive for numbness. Negative for paresthesias.   Psychiatric/Behavioral:  Negative for altered mental status.          Objective:      Physical Exam  Constitutional:       General: She is not in acute distress.     Appearance: Normal appearance. She is not ill-appearing.   Cardiovascular:      Pulses:           Dorsalis pedis pulses are 2+ on the right side and 2+ on the left side.        Posterior tibial pulses are 2+ on the right side and 2+ on the left side.      Comments: Mild nonpitting edema localized to the right foot.  Skin temp is warm to foot bilateral.  No rubor on dependency bilateral foot.  No hair  growth follow extremity.  Musculoskeletal:      Comments: Semi-rigid cavus foot structure bilateral.    Palpable bony prominence dorsal lateral right midfoot with mild localized pain on palpation extending along the tarsometatarsal joints 2, 3.  No pain with midtarsal joint range of motion right foot.    Moderate pain on palpation with localized edema overlying the 4th metatarsophalangeal joint extending along the dorsal base of the proximal phalanx 4th toe and the 4th metatarsal neck region.  Moderate pain with pain on palpation at the plantar 4th MTP right foot.  There is mild increase in warmth that is localized to the 4th MTP with slight erythema.  No palpable fluctuance or crepitance.  Negative Lachman test 4th toe right foot.    Reducible flexion adductovarus contractures of toes 4 and 5 right foot.   Skin:     General: Skin is warm.      Capillary Refill: Capillary refill takes less than 2 seconds.      Findings: Erythema present. No ecchymosis.      Nails: There is no clubbing.      Comments: No open wound or maceration to the foot bilateral.   Neurological:      Mental Status: She is alert and oriented to person, place, and time.      Sensory: Sensation is intact.      Motor: Motor function is intact.             Assessment:       Encounter Diagnoses   Name Primary?    Right foot pain Yes    Osteoarthritis of right foot, unspecified osteoarthritis type     Right foot injury, sequela          Plan:       Karin was seen today for foot pain.    Diagnoses and all orders for this visit:    Right foot pain  -     Uric acid; Future  -     Sedimentation rate; Future  -     C-reactive protein; Future  -     Comprehensive Metabolic Panel; Future  -     CBC auto differential; Future  -     X-Ray Foot Complete Right; Future    Osteoarthritis of right foot, unspecified osteoarthritis type    Right foot injury, sequela      I counseled the patient on her conditions, their implications and medical  management.    Previous right foot x-ray reviewed noting no fracture dislocation.  There is irregular moderate joint space narrowing and osteophytic lipping of the 2nd and 3rd metatarsal cuneiform joints right midfoot.     Obtain follow-up right foot x-ray to assess for possible stress fracture of the 4th metatarsal.      From a clinical standpoint patient may have acute onset gout arthropathy caused by the right 4th toe injury approximately 5 weeks ago.  Follow-up labs ordered to assess for possible kidney dysfunction, elevated uric acid and elevated inflammatory markers.    We will address midfoot pain once the right 4th MTP pain is address which is most likely gout arthropathy per above.  Will notify patient of x-ray and lab results and come up with a definitive plan once information is available.      RTC p.r.n. as discussed.     Assisted per Ron Nicholson DPM PGY 2    A portion of this note was generated by voice recognition software and may contain spelling and grammar errors.      .

## 2023-02-07 ENCOUNTER — HOSPITAL ENCOUNTER (OUTPATIENT)
Dept: RADIOLOGY | Facility: HOSPITAL | Age: 73
Discharge: HOME | End: 2023-02-07
Attending: PODIATRIST
Payer: MEDICARE

## 2023-02-07 ENCOUNTER — PATIENT MESSAGE (OUTPATIENT)
Dept: PODIATRY | Facility: CLINIC | Age: 73
End: 2023-02-07
Payer: MEDICARE

## 2023-02-07 ENCOUNTER — TELEPHONE (OUTPATIENT)
Dept: PODIATRY | Facility: CLINIC | Age: 73
End: 2023-02-07
Payer: MEDICARE

## 2023-02-07 DIAGNOSIS — M79.671 RIGHT FOOT PAIN: ICD-10-CM

## 2023-02-07 PROCEDURE — 73630 X-RAY EXAM OF FOOT: CPT | Mod: 26,RT,, | Performed by: RADIOLOGY

## 2023-02-07 PROCEDURE — 73630 XR FOOT COMPLETE 3 VIEW RIGHT: ICD-10-PCS | Mod: 26,RT,, | Performed by: RADIOLOGY

## 2023-02-07 PROCEDURE — 73630 X-RAY EXAM OF FOOT: CPT | Mod: TC,RT

## 2023-02-07 NOTE — TELEPHONE ENCOUNTER
Called patient and made her aware of the message from Dr. Alejandra regarding her fractured 4th toe.  He stated that he would like her to come to the office to get a short cam walker.  She will come by the office this week to  the short cam walker.  She will go and  the Medrol Dose Nacho today from the pharmacy.     Enedelia

## 2023-02-07 NOTE — TELEPHONE ENCOUNTER
Please notify patient that her x-ray confirms a non-displaced break in the proximal phalanx of the right fourth toe which is causing her persistent pain to the area. Her lab work is normal and I do not suspect gout but it is not entirely ruled out. I have sent a prescription for a medrol dose lani to help with the pain and inflammation. I recommend a stiff soled shoe and avoiding pushing off directly on the ball of the foot. If her pain does not improve then please dispense a short CAM boot to aggressively offload the area.

## 2023-02-08 ENCOUNTER — PES CALL (OUTPATIENT)
Dept: ADMINISTRATIVE | Facility: CLINIC | Age: 73
End: 2023-02-08
Payer: MEDICARE

## 2023-02-08 ENCOUNTER — TELEPHONE (OUTPATIENT)
Dept: PODIATRY | Facility: HOSPITAL | Age: 73
End: 2023-02-08
Payer: MEDICARE

## 2023-02-08 DIAGNOSIS — G89.29 CHRONIC PAIN IN RIGHT FOOT: ICD-10-CM

## 2023-02-08 DIAGNOSIS — M79.671 CHRONIC PAIN IN RIGHT FOOT: ICD-10-CM

## 2023-02-08 DIAGNOSIS — S92.514A CLOSED NONDISPLACED FRACTURE OF PROXIMAL PHALANX OF LESSER TOE OF RIGHT FOOT, INITIAL ENCOUNTER: Primary | ICD-10-CM

## 2023-02-08 RX ORDER — METHYLPREDNISOLONE 4 MG/1
TABLET ORAL
Qty: 21 EACH | Refills: 0 | Status: SHIPPED | OUTPATIENT
Start: 2023-02-08 | End: 2023-03-01

## 2023-02-08 NOTE — TELEPHONE ENCOUNTER
----- Message from Domo Martines MA sent at 2/7/2023  3:53 PM CST -----  Regarding: FW: Pt Advice  Contact: [phone]  Can you send the  medrol dose lani  in to the pharmacy       Thank You   ----- Message -----  From: Rossana Andrade  Sent: 2/7/2023   3:26 PM CST  To: Justyn Carreon Staff  Subject: Pt Advice                                        Pt is calling stating a rx was called into today for her foot.  The pharmacy is stating they never received it.  Pt doesn't recall the name of the rx.  Please call.

## 2023-02-10 ENCOUNTER — OFFICE VISIT (OUTPATIENT)
Dept: PSYCHIATRY | Facility: CLINIC | Age: 73
End: 2023-02-10
Payer: MEDICARE

## 2023-02-10 VITALS
BODY MASS INDEX: 27.35 KG/M2 | OXYGEN SATURATION: 98 % | RESPIRATION RATE: 17 BRPM | SYSTOLIC BLOOD PRESSURE: 146 MMHG | DIASTOLIC BLOOD PRESSURE: 81 MMHG | HEART RATE: 80 BPM | HEIGHT: 66 IN | WEIGHT: 170.19 LBS

## 2023-02-10 DIAGNOSIS — Z86.59 HISTORY OF GAMBLING: ICD-10-CM

## 2023-02-10 DIAGNOSIS — F31.9 BIPOLAR 1 DISORDER: Primary | ICD-10-CM

## 2023-02-10 DIAGNOSIS — F41.1 GAD (GENERALIZED ANXIETY DISORDER): ICD-10-CM

## 2023-02-10 DIAGNOSIS — Z65.8 PSYCHOSOCIAL STRESSORS: ICD-10-CM

## 2023-02-10 PROCEDURE — 99214 PR OFFICE/OUTPT VISIT, EST, LEVL IV, 30-39 MIN: ICD-10-PCS | Mod: S$GLB,,, | Performed by: STUDENT IN AN ORGANIZED HEALTH CARE EDUCATION/TRAINING PROGRAM

## 2023-02-10 PROCEDURE — 3079F DIAST BP 80-89 MM HG: CPT | Mod: CPTII,S$GLB,, | Performed by: STUDENT IN AN ORGANIZED HEALTH CARE EDUCATION/TRAINING PROGRAM

## 2023-02-10 PROCEDURE — 99999 PR PBB SHADOW E&M-EST. PATIENT-LVL III: ICD-10-PCS | Mod: PBBFAC,,, | Performed by: STUDENT IN AN ORGANIZED HEALTH CARE EDUCATION/TRAINING PROGRAM

## 2023-02-10 PROCEDURE — 99214 OFFICE O/P EST MOD 30 MIN: CPT | Mod: S$GLB,,, | Performed by: STUDENT IN AN ORGANIZED HEALTH CARE EDUCATION/TRAINING PROGRAM

## 2023-02-10 PROCEDURE — 3008F PR BODY MASS INDEX (BMI) DOCUMENTED: ICD-10-PCS | Mod: CPTII,S$GLB,, | Performed by: STUDENT IN AN ORGANIZED HEALTH CARE EDUCATION/TRAINING PROGRAM

## 2023-02-10 PROCEDURE — 90833 PR PSYCHOTHERAPY W/PATIENT W/E&M, 30 MIN (ADD ON): ICD-10-PCS | Mod: S$GLB,,, | Performed by: STUDENT IN AN ORGANIZED HEALTH CARE EDUCATION/TRAINING PROGRAM

## 2023-02-10 PROCEDURE — 99999 PR PBB SHADOW E&M-EST. PATIENT-LVL III: CPT | Mod: PBBFAC,,, | Performed by: STUDENT IN AN ORGANIZED HEALTH CARE EDUCATION/TRAINING PROGRAM

## 2023-02-10 PROCEDURE — 1160F PR REVIEW ALL MEDS BY PRESCRIBER/CLIN PHARMACIST DOCUMENTED: ICD-10-PCS | Mod: CPTII,S$GLB,, | Performed by: STUDENT IN AN ORGANIZED HEALTH CARE EDUCATION/TRAINING PROGRAM

## 2023-02-10 PROCEDURE — 3077F SYST BP >= 140 MM HG: CPT | Mod: CPTII,S$GLB,, | Performed by: STUDENT IN AN ORGANIZED HEALTH CARE EDUCATION/TRAINING PROGRAM

## 2023-02-10 PROCEDURE — 1160F RVW MEDS BY RX/DR IN RCRD: CPT | Mod: CPTII,S$GLB,, | Performed by: STUDENT IN AN ORGANIZED HEALTH CARE EDUCATION/TRAINING PROGRAM

## 2023-02-10 PROCEDURE — 1125F PR PAIN SEVERITY QUANTIFIED, PAIN PRESENT: ICD-10-PCS | Mod: CPTII,S$GLB,, | Performed by: STUDENT IN AN ORGANIZED HEALTH CARE EDUCATION/TRAINING PROGRAM

## 2023-02-10 PROCEDURE — 1125F AMNT PAIN NOTED PAIN PRSNT: CPT | Mod: CPTII,S$GLB,, | Performed by: STUDENT IN AN ORGANIZED HEALTH CARE EDUCATION/TRAINING PROGRAM

## 2023-02-10 PROCEDURE — 3008F BODY MASS INDEX DOCD: CPT | Mod: CPTII,S$GLB,, | Performed by: STUDENT IN AN ORGANIZED HEALTH CARE EDUCATION/TRAINING PROGRAM

## 2023-02-10 PROCEDURE — 1159F MED LIST DOCD IN RCRD: CPT | Mod: CPTII,S$GLB,, | Performed by: STUDENT IN AN ORGANIZED HEALTH CARE EDUCATION/TRAINING PROGRAM

## 2023-02-10 PROCEDURE — 90833 PSYTX W PT W E/M 30 MIN: CPT | Mod: S$GLB,,, | Performed by: STUDENT IN AN ORGANIZED HEALTH CARE EDUCATION/TRAINING PROGRAM

## 2023-02-10 PROCEDURE — 3079F PR MOST RECENT DIASTOLIC BLOOD PRESSURE 80-89 MM HG: ICD-10-PCS | Mod: CPTII,S$GLB,, | Performed by: STUDENT IN AN ORGANIZED HEALTH CARE EDUCATION/TRAINING PROGRAM

## 2023-02-10 PROCEDURE — 1159F PR MEDICATION LIST DOCUMENTED IN MEDICAL RECORD: ICD-10-PCS | Mod: CPTII,S$GLB,, | Performed by: STUDENT IN AN ORGANIZED HEALTH CARE EDUCATION/TRAINING PROGRAM

## 2023-02-10 PROCEDURE — 3077F PR MOST RECENT SYSTOLIC BLOOD PRESSURE >= 140 MM HG: ICD-10-PCS | Mod: CPTII,S$GLB,, | Performed by: STUDENT IN AN ORGANIZED HEALTH CARE EDUCATION/TRAINING PROGRAM

## 2023-02-10 RX ORDER — HYDROXYZINE PAMOATE 25 MG/1
CAPSULE ORAL
Qty: 120 CAPSULE | Refills: 2 | Status: ON HOLD | OUTPATIENT
Start: 2023-02-10 | End: 2023-08-11 | Stop reason: HOSPADM

## 2023-02-10 NOTE — PROGRESS NOTES
"        02/10/2023  1:21 PM  Karin Maguire  [id number]    Outpatient Psychiatry Follow-Up Visit (MD/NP)    2/10/2023    Clinical Status of Patient:  Outpatient (Ambulatory)    Chief Complaint:  Karin Maguire is a 72 y.o. female who presents today for follow-up of depression and anxiety.  Met with patient.        Interval History and Content of Current Session:  Interim Events/Subjective Report/Content of Current Session:   MDD with mixed features vs. Unspecified bipolar disorder (pt poor historian)  LUANNE  Insomnia  Gambling disorder  Obesity  Psychosocial stressors     Abnormal movements            She states increased anxiety, daily 2/2 recent foot injury and upcoming foot surgery, her mobility is limited now, "I don't know what to do with myself." Reports no recent depression. She states gambling much less, "my  goes and I just pass."          Stressors: relationship with         Psychiatric Review Of Systems - Is patient experiencing or having changes in:  sleep: "trouble staying asleep" up from 2-4 am  appetite: "good"  weight: no  energy/anergy: low  Interest/pleasure/anhedonia: less  Anxiety: increased  panic: no  Guilty/hopelessness/worthlessness: hopelessness, "I just don't my marriage getting any better, so I'm not happy with that, I'm thinking about getting a , the way he talks to me, I can't make suggestions, he yells at me, seems like I can never say the right thing... he's very possessive."  concentration: yes  S.I.B.s/risky behavior: no  Irritability: variable   Substance abuse: no  Racing thoughts: no  Impulsive behaviors: no  Paranoia: no  AVH: no  Gambling: yes, see above  Michelle/hypomania: no        Psychotherapy:  Target symptoms: mood disorder  Why chosen therapy is appropriate versus another modality: relevant to diagnosis  Outcome monitoring methods: self-report  Therapeutic intervention type: supportive psychotherapy  Topics discussed/themes:  gambling, building " skills sets for symptom management, symptom recognition  The patient's response to the intervention is accepting. The patient's progress toward treatment goals is fair.   Duration of intervention: 16 minutes.        Review of Systems   Review of Systems   Constitutional:  Negative for chills and fever.   HENT:  Negative for hearing loss.    Eyes:  Negative for blurred vision and double vision.   Respiratory:  Negative for shortness of breath.    Cardiovascular:  Negative for chest pain.   Gastrointestinal:  Negative for constipation, diarrhea, nausea and vomiting.   Genitourinary:  Negative for dysuria.   Musculoskeletal:  Negative for back pain and joint pain.   Skin:  Negative for rash.   Neurological:  Negative for dizziness and headaches.   Endo/Heme/Allergies:  Negative for environmental allergies.     Past Medical, Family and Social History: The patient's past medical, family and social history have been reviewed and updated as appropriate within the electronic medical record - see encounter notes.    Social History     Socioeconomic History    Marital status:    Tobacco Use    Smoking status: Every Day     Packs/day: 1.00     Years: 35.00     Pack years: 35.00     Types: Cigarettes     Start date: 3/30/1982    Smokeless tobacco: Never    Tobacco comments:     smoking cessation clinic pamphlet for clinic put on chart to give to pt.    Substance and Sexual Activity    Alcohol use: Yes     Comment: Socially-2 or 3 times a year-6 or 7 drinks    Drug use: No    Sexual activity: Yes     Partners: Male     Birth control/protection: Surgical     Comment:          Compliance: yes    Side effects: None    Risk Parameters:  Patient reports no suicidal ideation  Patient reports no homicidal ideation  Patient reports no self-injurious behavior  Patient reports no violent behavior        Exam (detailed: at least 9 elements; comprehensive: all 15 elements)     Vitals:    02/10/23 1332   BP: (!) 146/81   Pulse:  80   Resp: 17           Wt Readings from Last 5 Encounters:   02/10/23 77.2 kg (170 lb 3.1 oz)   02/06/23 78.9 kg (174 lb)   01/11/23 79.1 kg (174 lb 6.1 oz)   12/13/22 82.1 kg (180 lb 14.2 oz)   11/18/22 84 kg (185 lb 3 oz)           CONSTITUTIONAL  General Appearance: unremarkable, age appropriate    MUSCULOSKELETAL  Muscle Strength and Tone:no tremor, no tic  Abnormal Involuntary Movements: yes  Gait and Station: non-ataxic    PSYCHIATRIC   Level of Consciousness: awake and alert   Orientation: person, place and situation  Grooming: Casually dressed and Well groomed  Psychomotor Behavior: normal, cooperative  Speech: normal tone, normal rate, normal pitch, normal volume  Language: grossly intact  Mood: anxious  Affect: Consistent with mood  Thought Process: linear, logical  Associations: intact   Thought Content: DENIES suicidal ideation and DENIES homicidal ideation  Perceptions: denies hallucinations  Memory: Able to recall past events, Remote intact and Recent intact  Attention:Attends to interview without distraction  Fund of Knowledge: Aware of current events and Vocabulary appropriate   Estimate if Intelligence:  Average based on work/education history, vocabulary and mental status exam  Insight: has awareness of illness  Judgment: behavior is adequate to circumstances          Assessment and Diagnosis   Status/Progress: Based on the examination today, the patient's problem(s) is/are well controlled.  New problems have not been presented today.   Co-morbidities are complicating management of the primary condition.        Impresssion/Assessment:  MDD with mixed features vs. Unspecified bipolar disorder (pt poor historian)  LUANNE  Insomnia  Gambling disorder  Obesity  Psychosocial stressors     Abnormal movements    Chronic pain       Plan:          MDD with mixed features vs. Unspecified bipolar disorder (pt poor historian)  - continue lamictal 300 mg PO qd  - continue Cymbalta 120 mg PO qd  - continue  "trazodone 50 mg PO (1-2 tablets qhs PRN )  - strongly recommended psychotherapy, provided with resources       Insomnia  - using CPAP  - pt counseled       Gambling disorder  - pt counseled  - consider meetings/therapy; patient declines       LUANNE  - start hydroxyzine  mg PO q 6 hours PRN  - see MDD above  - consider psychotherapy, patient declines       Psychosocial stressors  - pt counseled  - strongly recommended psychotherapy, couples counseling to patient, patient refusing       Obesity  -  tapered off seroquel      Abnormal movements  - frequent non-stereotyped movements of hands and limbs, neck, trunk; patient states she has always been "fidgety" since childhood, reports she has seen neurology about movements before in past, does not want a referral at this time; extensively discussed risk/concern for tardive dyskinesia with AP        Chronic pain  - taking lyrica for 150 mg PO BID per Pain Management MD, however, that MD is no longer practicing in this area. Patient reports she does not feel this medication helps and would like to come off of it, will assist pt with this goal, can reduce to 150 mg PO qd x 2 weeks then decrease to 75 mg PO qd x 2 weeks then stop if tolerating.  - pt counseled          - Instructed patient to keep all scheduled appointments, take medications as prescribed and abstain from substance abuse. Instructed to call 911 or present to ER for emergency including SI or HI.    - Discussed diagnosis, risks and benefits of proposed treatment above vs alternative treatments vs no treatment, and potential side effects of these treatments. The patient expresses understanding of the above and displays the capacity to agree with this treatment given said understanding. Patient also agrees that, currently, the benefits outweigh the risks and would like to pursue treatment at this time.           Estevan Zaman III, MD    Return to Clinic:  2-3 months              "

## 2023-03-05 ENCOUNTER — NURSE TRIAGE (OUTPATIENT)
Dept: ADMINISTRATIVE | Facility: CLINIC | Age: 73
End: 2023-03-05
Payer: MEDICARE

## 2023-03-06 ENCOUNTER — LAB VISIT (OUTPATIENT)
Dept: LAB | Facility: HOSPITAL | Age: 73
End: 2023-03-06
Attending: NURSE PRACTITIONER
Payer: MEDICARE

## 2023-03-06 ENCOUNTER — OFFICE VISIT (OUTPATIENT)
Dept: INTERNAL MEDICINE | Facility: CLINIC | Age: 73
End: 2023-03-06
Payer: MEDICARE

## 2023-03-06 VITALS
SYSTOLIC BLOOD PRESSURE: 124 MMHG | DIASTOLIC BLOOD PRESSURE: 60 MMHG | HEART RATE: 66 BPM | HEIGHT: 66 IN | WEIGHT: 171.5 LBS | RESPIRATION RATE: 18 BRPM | BODY MASS INDEX: 27.56 KG/M2 | OXYGEN SATURATION: 98 %

## 2023-03-06 DIAGNOSIS — I99.8 FLUCTUATING BLOOD PRESSURE: ICD-10-CM

## 2023-03-06 DIAGNOSIS — I99.8 FLUCTUATING BLOOD PRESSURE: Primary | ICD-10-CM

## 2023-03-06 LAB
ALBUMIN SERPL BCP-MCNC: 3.6 G/DL (ref 3.5–5.2)
ALP SERPL-CCNC: 77 U/L (ref 55–135)
ALT SERPL W/O P-5'-P-CCNC: 11 U/L (ref 10–44)
ANION GAP SERPL CALC-SCNC: 7 MMOL/L (ref 8–16)
AST SERPL-CCNC: 13 U/L (ref 10–40)
BASOPHILS # BLD AUTO: 0.05 K/UL (ref 0–0.2)
BASOPHILS NFR BLD: 0.9 % (ref 0–1.9)
BILIRUB SERPL-MCNC: 0.2 MG/DL (ref 0.1–1)
BILIRUB UR QL STRIP: NEGATIVE
BUN SERPL-MCNC: 12 MG/DL (ref 8–23)
CALCIUM SERPL-MCNC: 9.2 MG/DL (ref 8.7–10.5)
CHLORIDE SERPL-SCNC: 105 MMOL/L (ref 95–110)
CLARITY UR: CLEAR
CO2 SERPL-SCNC: 26 MMOL/L (ref 23–29)
COLOR UR: YELLOW
CREAT SERPL-MCNC: 0.9 MG/DL (ref 0.5–1.4)
DIFFERENTIAL METHOD: NORMAL
EOSINOPHIL # BLD AUTO: 0.1 K/UL (ref 0–0.5)
EOSINOPHIL NFR BLD: 1.8 % (ref 0–8)
ERYTHROCYTE [DISTWIDTH] IN BLOOD BY AUTOMATED COUNT: 14 % (ref 11.5–14.5)
EST. GFR  (NO RACE VARIABLE): >60 ML/MIN/1.73 M^2
GLUCOSE SERPL-MCNC: 97 MG/DL (ref 70–110)
GLUCOSE UR QL STRIP: NEGATIVE
HCT VFR BLD AUTO: 37.2 % (ref 37–48.5)
HGB BLD-MCNC: 12 G/DL (ref 12–16)
HGB UR QL STRIP: NEGATIVE
IMM GRANULOCYTES # BLD AUTO: 0.02 K/UL (ref 0–0.04)
IMM GRANULOCYTES NFR BLD AUTO: 0.4 % (ref 0–0.5)
KETONES UR QL STRIP: NEGATIVE
LEUKOCYTE ESTERASE UR QL STRIP: NEGATIVE
LYMPHOCYTES # BLD AUTO: 2.6 K/UL (ref 1–4.8)
LYMPHOCYTES NFR BLD: 47.3 % (ref 18–48)
MCH RBC QN AUTO: 29.1 PG (ref 27–31)
MCHC RBC AUTO-ENTMCNC: 32.3 G/DL (ref 32–36)
MCV RBC AUTO: 90 FL (ref 82–98)
MONOCYTES # BLD AUTO: 0.5 K/UL (ref 0.3–1)
MONOCYTES NFR BLD: 9 % (ref 4–15)
NEUTROPHILS # BLD AUTO: 2.2 K/UL (ref 1.8–7.7)
NEUTROPHILS NFR BLD: 40.6 % (ref 38–73)
NITRITE UR QL STRIP: NEGATIVE
NRBC BLD-RTO: 0 /100 WBC
PH UR STRIP: 7 [PH] (ref 5–8)
PLATELET # BLD AUTO: 270 K/UL (ref 150–450)
PMV BLD AUTO: 9.9 FL (ref 9.2–12.9)
POTASSIUM SERPL-SCNC: 4.1 MMOL/L (ref 3.5–5.1)
PROT SERPL-MCNC: 6.5 G/DL (ref 6–8.4)
PROT UR QL STRIP: NEGATIVE
RBC # BLD AUTO: 4.12 M/UL (ref 4–5.4)
SODIUM SERPL-SCNC: 138 MMOL/L (ref 136–145)
SP GR UR STRIP: 1.01 (ref 1–1.03)
TSH SERPL DL<=0.005 MIU/L-ACNC: 1.63 UIU/ML (ref 0.4–4)
URN SPEC COLLECT METH UR: NORMAL
UROBILINOGEN UR STRIP-ACNC: NEGATIVE EU/DL
WBC # BLD AUTO: 5.46 K/UL (ref 3.9–12.7)

## 2023-03-06 PROCEDURE — 1160F PR REVIEW ALL MEDS BY PRESCRIBER/CLIN PHARMACIST DOCUMENTED: ICD-10-PCS | Mod: CPTII,S$GLB,, | Performed by: NURSE PRACTITIONER

## 2023-03-06 PROCEDURE — 3008F PR BODY MASS INDEX (BMI) DOCUMENTED: ICD-10-PCS | Mod: CPTII,S$GLB,, | Performed by: NURSE PRACTITIONER

## 2023-03-06 PROCEDURE — 1101F PR PT FALLS ASSESS DOC 0-1 FALLS W/OUT INJ PAST YR: ICD-10-PCS | Mod: CPTII,S$GLB,, | Performed by: NURSE PRACTITIONER

## 2023-03-06 PROCEDURE — 3078F PR MOST RECENT DIASTOLIC BLOOD PRESSURE < 80 MM HG: ICD-10-PCS | Mod: CPTII,S$GLB,, | Performed by: NURSE PRACTITIONER

## 2023-03-06 PROCEDURE — 84443 ASSAY THYROID STIM HORMONE: CPT | Performed by: NURSE PRACTITIONER

## 2023-03-06 PROCEDURE — 1101F PT FALLS ASSESS-DOCD LE1/YR: CPT | Mod: CPTII,S$GLB,, | Performed by: NURSE PRACTITIONER

## 2023-03-06 PROCEDURE — 36415 COLL VENOUS BLD VENIPUNCTURE: CPT | Performed by: NURSE PRACTITIONER

## 2023-03-06 PROCEDURE — 3074F SYST BP LT 130 MM HG: CPT | Mod: CPTII,S$GLB,, | Performed by: NURSE PRACTITIONER

## 2023-03-06 PROCEDURE — 3074F PR MOST RECENT SYSTOLIC BLOOD PRESSURE < 130 MM HG: ICD-10-PCS | Mod: CPTII,S$GLB,, | Performed by: NURSE PRACTITIONER

## 2023-03-06 PROCEDURE — 99214 OFFICE O/P EST MOD 30 MIN: CPT | Mod: S$GLB,,, | Performed by: NURSE PRACTITIONER

## 2023-03-06 PROCEDURE — 3288F PR FALLS RISK ASSESSMENT DOCUMENTED: ICD-10-PCS | Mod: CPTII,S$GLB,, | Performed by: NURSE PRACTITIONER

## 2023-03-06 PROCEDURE — 1125F PR PAIN SEVERITY QUANTIFIED, PAIN PRESENT: ICD-10-PCS | Mod: CPTII,S$GLB,, | Performed by: NURSE PRACTITIONER

## 2023-03-06 PROCEDURE — 3288F FALL RISK ASSESSMENT DOCD: CPT | Mod: CPTII,S$GLB,, | Performed by: NURSE PRACTITIONER

## 2023-03-06 PROCEDURE — 1125F AMNT PAIN NOTED PAIN PRSNT: CPT | Mod: CPTII,S$GLB,, | Performed by: NURSE PRACTITIONER

## 2023-03-06 PROCEDURE — 3078F DIAST BP <80 MM HG: CPT | Mod: CPTII,S$GLB,, | Performed by: NURSE PRACTITIONER

## 2023-03-06 PROCEDURE — 80175 DRUG SCREEN QUAN LAMOTRIGINE: CPT | Performed by: NURSE PRACTITIONER

## 2023-03-06 PROCEDURE — 3008F BODY MASS INDEX DOCD: CPT | Mod: CPTII,S$GLB,, | Performed by: NURSE PRACTITIONER

## 2023-03-06 PROCEDURE — 81003 URINALYSIS AUTO W/O SCOPE: CPT | Performed by: NURSE PRACTITIONER

## 2023-03-06 PROCEDURE — 99999 PR PBB SHADOW E&M-EST. PATIENT-LVL III: ICD-10-PCS | Mod: PBBFAC,,, | Performed by: NURSE PRACTITIONER

## 2023-03-06 PROCEDURE — 85025 COMPLETE CBC W/AUTO DIFF WBC: CPT | Performed by: NURSE PRACTITIONER

## 2023-03-06 PROCEDURE — 1159F PR MEDICATION LIST DOCUMENTED IN MEDICAL RECORD: ICD-10-PCS | Mod: CPTII,S$GLB,, | Performed by: NURSE PRACTITIONER

## 2023-03-06 PROCEDURE — 1159F MED LIST DOCD IN RCRD: CPT | Mod: CPTII,S$GLB,, | Performed by: NURSE PRACTITIONER

## 2023-03-06 PROCEDURE — 1160F RVW MEDS BY RX/DR IN RCRD: CPT | Mod: CPTII,S$GLB,, | Performed by: NURSE PRACTITIONER

## 2023-03-06 PROCEDURE — 99214 PR OFFICE/OUTPT VISIT, EST, LEVL IV, 30-39 MIN: ICD-10-PCS | Mod: S$GLB,,, | Performed by: NURSE PRACTITIONER

## 2023-03-06 PROCEDURE — 80053 COMPREHEN METABOLIC PANEL: CPT | Performed by: NURSE PRACTITIONER

## 2023-03-06 PROCEDURE — 99999 PR PBB SHADOW E&M-EST. PATIENT-LVL III: CPT | Mod: PBBFAC,,, | Performed by: NURSE PRACTITIONER

## 2023-03-06 NOTE — LETTER
AUTHORIZATION FOR RELEASE OF   CONFIDENTIAL INFORMATION    Dear Dr. Estevez,    We are seeing Karin Maguire, date of birth 1950, in the clinic at [facility]. Lottie Hough NP is the patient's PCP. Karin Maguire has an outstanding lab/procedure at the time we reviewed her chart. In order to help keep her health information updated, she has authorized us to request the following medical record(s):        ( X )  Pathology report from biopsy         Please fax records to Lottie Hough NP, [phone]     If you have any questions, please contact Niyah at [phone].           Patient Name: Karin Maguire  : 1950  Patient Phone #: [phone]

## 2023-03-06 NOTE — PROGRESS NOTES
Subjective:       Patient ID: Karin Maguire is a 72 y.o. female.    Chief Complaint: Hypertension    HPI: Pt presents to clinic today known to me with c.o needing to eval for HTN/Hypotension. Has 3 elevated Bp at last 3 MD visits so  bought a bp cuff. Now running low. -130 and the diastolic has been 30-40. Last night had a low bp 101/30. Caused dizziness     She reports that she has started vistaril 4 every 6 hours as needed. Jay snot take daily. Follows with Dr carranza  Also sees endo for multiple nodules on thyroid. Has bx 12/2022 negative fr malignancy- last tsh 7/2022  Review of Systems   Constitutional:  Negative for chills, fatigue, fever and unexpected weight change.   HENT:  Negative for congestion, ear pain, sore throat and trouble swallowing.    Eyes:  Negative for pain and visual disturbance.   Respiratory:  Negative for cough, chest tightness and shortness of breath.    Cardiovascular:  Negative for chest pain, palpitations and leg swelling.   Gastrointestinal:  Negative for abdominal distention, abdominal pain, constipation, diarrhea and vomiting.   Genitourinary:  Negative for difficulty urinating, dysuria, flank pain, frequency and hematuria.   Musculoskeletal:  Negative for back pain, gait problem, joint swelling, neck pain and neck stiffness.   Skin:  Negative for rash and wound.   Neurological:  Negative for dizziness, seizures, speech difficulty, light-headedness and headaches.     Objective:      Physical Exam  Vitals and nursing note reviewed.   Constitutional:       Appearance: She is well-developed. She is obese.   HENT:      Head: Normocephalic and atraumatic.      Right Ear: External ear normal.      Left Ear: External ear normal.      Nose: Nose normal.   Eyes:      Conjunctiva/sclera: Conjunctivae normal.      Pupils: Pupils are equal, round, and reactive to light.   Cardiovascular:      Rate and Rhythm: Normal rate and regular rhythm.      Heart sounds: Normal heart sounds. No  murmur heard.  Pulmonary:      Effort: Pulmonary effort is normal. No respiratory distress.      Breath sounds: Normal breath sounds. No wheezing.   Abdominal:      General: Bowel sounds are normal.      Palpations: Abdomen is soft.   Musculoskeletal:         General: No swelling. Normal range of motion.      Cervical back: Normal range of motion and neck supple.   Skin:     General: Skin is warm and dry.      Capillary Refill: Capillary refill takes less than 2 seconds.   Neurological:      Mental Status: She is alert and oriented to person, place, and time.   Psychiatric:         Behavior: Behavior normal.         Thought Content: Thought content normal.         Judgment: Judgment normal.       Assessment:       1. Fluctuating blood pressure        Plan:     Problem List Items Addressed This Visit    None  Visit Diagnoses       Fluctuating blood pressure    -  Primary    Relevant Orders    TSH    Comprehensive Metabolic Panel    CBC Auto Differential    Urinalysis, Reflex to Urine Culture Urine, Clean Catch    LAMOTRIGINE LEVEL          Reviewed labs, has hx of hyponatremia with psych meds

## 2023-03-06 NOTE — TELEPHONE ENCOUNTER
"Patient's  states patient's current blood pressure reading of 130/55. Patient's  states patient's prior blood pressure reading of 130/47. Patient states she is asymptomatic at this time but experienced "chest pounding and right side headache on previous occassions.    Care advice given per Low Blood Pressure (Adult) Guideline. Patient advised to See PCP when office is Open (Within 3 Days). Patient states understanding of care advice.     Reason for Disposition   Diastolic BP < 50 mm Hg    Additional Information   Negative: Started suddenly after an allergic medicine, an allergic food, or bee sting   Negative: Shock suspected (e.g., cold/pale/clammy skin, too weak to stand, low BP, rapid pulse)   Negative: Difficult to awaken or acting confused (e.g., disoriented, slurred speech)   Negative: Fainted   Negative: [1] Systolic BP < 90 AND [2] dizzy, lightheaded, or weak   Negative: Chest pain   Negative: Bleeding (e.g., vomiting blood, rectal bleeding or tarry stools, severe vaginal bleeding)(Exception: fainted from sight of small amount of blood; small cut or abrasion)   Negative: Extra heart beats or heart is beating fast (i.e., "palpitations")   Negative: Sounds like a life-threatening emergency to the triager   Negative: [1] Systolic BP < 80 AND [2] NOT dizzy, lightheaded or weak   Negative: Abdominal pain   Negative: Fever > 100.4 F (38.0 C)   Negative: Major surgery in the past month   Negative: [1] Drinking very little AND [2] dehydration suspected (e.g., no urine > 12 hours, very dry mouth, very lightheaded)   Negative: [1] Fall in systolic BP > 20 mm Hg from normal AND [2] dizzy, lightheaded, or weak   Negative: Patient sounds very sick or weak to the triager   Negative: [1] Systolic BP < 90 AND [2] NOT dizzy, lightheaded or weak   Negative: [1] Systolic BP  AND [2] taking blood pressure medications AND [3] dizzy, lightheaded or weak   Negative: [1] Systolic BP  AND [2] taking blood " pressure medications AND [3] NOT dizzy, lightheaded or weak   Negative: [1] Fall in systolic BP > 20 mm Hg from normal AND [2] NOT dizzy, lightheaded, or weak   Negative: Fall in systolic BP > 20 mmHg after standing up   Negative: Fall in systolic BP > 20 mmHg after eating a meal    Protocols used: Blood Pressure - Low-A-

## 2023-03-09 LAB — LAMOTRIGINE SERPL-MCNC: 2.8 UG/ML (ref 2–15)

## 2023-03-10 ENCOUNTER — HOSPITAL ENCOUNTER (OUTPATIENT)
Dept: RADIOLOGY | Facility: HOSPITAL | Age: 73
Discharge: HOME | End: 2023-03-10
Attending: PODIATRIST
Payer: MEDICARE

## 2023-03-10 ENCOUNTER — TELEPHONE (OUTPATIENT)
Dept: PODIATRY | Facility: CLINIC | Age: 73
End: 2023-03-10

## 2023-03-10 ENCOUNTER — OFFICE VISIT (OUTPATIENT)
Dept: PODIATRY | Facility: CLINIC | Age: 73
End: 2023-03-10
Payer: MEDICARE

## 2023-03-10 VITALS
BODY MASS INDEX: 27.68 KG/M2 | HEIGHT: 66 IN | HEART RATE: 67 BPM | DIASTOLIC BLOOD PRESSURE: 72 MMHG | SYSTOLIC BLOOD PRESSURE: 131 MMHG

## 2023-03-10 DIAGNOSIS — S92.514D CLOSED NONDISPLACED FRACTURE OF PROXIMAL PHALANX OF LESSER TOE OF RIGHT FOOT WITH ROUTINE HEALING, SUBSEQUENT ENCOUNTER: ICD-10-CM

## 2023-03-10 DIAGNOSIS — G89.29 CHRONIC FOOT PAIN, RIGHT: ICD-10-CM

## 2023-03-10 DIAGNOSIS — S92.514D CLOSED NONDISPLACED FRACTURE OF PROXIMAL PHALANX OF LESSER TOE OF RIGHT FOOT WITH ROUTINE HEALING, SUBSEQUENT ENCOUNTER: Primary | ICD-10-CM

## 2023-03-10 DIAGNOSIS — M79.671 CHRONIC FOOT PAIN, RIGHT: ICD-10-CM

## 2023-03-10 DIAGNOSIS — Z01.818 PREOP TESTING: ICD-10-CM

## 2023-03-10 DIAGNOSIS — M19.071 ARTHROSIS OF RIGHT FOOT: ICD-10-CM

## 2023-03-10 PROCEDURE — 3008F BODY MASS INDEX DOCD: CPT | Mod: CPTII,S$GLB,, | Performed by: PODIATRIST

## 2023-03-10 PROCEDURE — 3075F SYST BP GE 130 - 139MM HG: CPT | Mod: CPTII,S$GLB,, | Performed by: PODIATRIST

## 2023-03-10 PROCEDURE — 99999 PR PBB SHADOW E&M-EST. PATIENT-LVL V: CPT | Mod: PBBFAC,,, | Performed by: PODIATRIST

## 2023-03-10 PROCEDURE — 99999 PR PBB SHADOW E&M-EST. PATIENT-LVL V: ICD-10-PCS | Mod: PBBFAC,,, | Performed by: PODIATRIST

## 2023-03-10 PROCEDURE — 1159F MED LIST DOCD IN RCRD: CPT | Mod: CPTII,S$GLB,, | Performed by: PODIATRIST

## 2023-03-10 PROCEDURE — 3008F PR BODY MASS INDEX (BMI) DOCUMENTED: ICD-10-PCS | Mod: CPTII,S$GLB,, | Performed by: PODIATRIST

## 2023-03-10 PROCEDURE — 99215 OFFICE O/P EST HI 40 MIN: CPT | Mod: 57,S$GLB,, | Performed by: PODIATRIST

## 2023-03-10 PROCEDURE — 1160F RVW MEDS BY RX/DR IN RCRD: CPT | Mod: CPTII,S$GLB,, | Performed by: PODIATRIST

## 2023-03-10 PROCEDURE — 73630 X-RAY EXAM OF FOOT: CPT | Mod: 26,RT,, | Performed by: RADIOLOGY

## 2023-03-10 PROCEDURE — 1125F PR PAIN SEVERITY QUANTIFIED, PAIN PRESENT: ICD-10-PCS | Mod: CPTII,S$GLB,, | Performed by: PODIATRIST

## 2023-03-10 PROCEDURE — 99215 PR OFFICE/OUTPT VISIT, EST, LEVL V, 40-54 MIN: ICD-10-PCS | Mod: 57,S$GLB,, | Performed by: PODIATRIST

## 2023-03-10 PROCEDURE — 73630 XR FOOT COMPLETE 3 VIEW RIGHT: ICD-10-PCS | Mod: 26,RT,, | Performed by: RADIOLOGY

## 2023-03-10 PROCEDURE — 1125F AMNT PAIN NOTED PAIN PRSNT: CPT | Mod: CPTII,S$GLB,, | Performed by: PODIATRIST

## 2023-03-10 PROCEDURE — 1159F PR MEDICATION LIST DOCUMENTED IN MEDICAL RECORD: ICD-10-PCS | Mod: CPTII,S$GLB,, | Performed by: PODIATRIST

## 2023-03-10 PROCEDURE — 3078F PR MOST RECENT DIASTOLIC BLOOD PRESSURE < 80 MM HG: ICD-10-PCS | Mod: CPTII,S$GLB,, | Performed by: PODIATRIST

## 2023-03-10 PROCEDURE — 3075F PR MOST RECENT SYSTOLIC BLOOD PRESS GE 130-139MM HG: ICD-10-PCS | Mod: CPTII,S$GLB,, | Performed by: PODIATRIST

## 2023-03-10 PROCEDURE — 3078F DIAST BP <80 MM HG: CPT | Mod: CPTII,S$GLB,, | Performed by: PODIATRIST

## 2023-03-10 PROCEDURE — 1160F PR REVIEW ALL MEDS BY PRESCRIBER/CLIN PHARMACIST DOCUMENTED: ICD-10-PCS | Mod: CPTII,S$GLB,, | Performed by: PODIATRIST

## 2023-03-10 PROCEDURE — 73630 X-RAY EXAM OF FOOT: CPT | Mod: TC,PN,RT

## 2023-03-10 RX ORDER — SODIUM CHLORIDE 0.9 % (FLUSH) 0.9 %
10 SYRINGE (ML) INJECTION
Status: DISCONTINUED | OUTPATIENT
Start: 2023-03-10 | End: 2023-08-11 | Stop reason: HOSPADM

## 2023-03-10 RX ORDER — CEFAZOLIN SODIUM 2 G/50ML
2 SOLUTION INTRAVENOUS
Status: CANCELLED | OUTPATIENT
Start: 2023-03-10

## 2023-03-10 NOTE — PROGRESS NOTES
"Subjective:      Patient ID: Karin Maguire is a 72 y.o. female.    Chief Complaint: Foot Problem (Right ft., ) and Foot Pain (Mostly on top of her foot)    Referral from  for evaluation of chronic right foot pain secondary to osteoarthritis.  She also reports stubbing her right 4th toe 5 weeks ago with persistent pain and swelling to the area.  She is unable to take NSAID therapy secondary to previous GI bleed.  She is been taking Tylenol which helps somewhat.  States that she gets moderate pain to the foot with extended periods of standing and walking at the mall which forces her to sit down.  Ambulating with slip-on tennis shoes.    03/10/2023:  Relates right forefoot pain has resolved and she sought wearing the boot last week.  She previously was diagnosed with a right 4th toe proximal phalanx nondisplaced fracture.  Relates now the pain is mostly in the midfoot with some mild localized swelling.  States the pain has been present for years however the swelling started before the pain.    Vitals:    03/10/23 0811   BP: 131/72   Pulse: 67   Height: 5' 6" (1.676 m)   PainSc:   6   PainLoc: Foot      Past Medical History:   Diagnosis Date    Anxiety     Arthritis     Bipolar 1 disorder     Bursitis of right hip     GI bleed due to NSAIDs     Multinodular goiter 11/15/2021    Sacroiliitis     right side    Sleep apnea        Past Surgical History:   Procedure Laterality Date    ANKLE FRACTURE SURGERY Left 2001    BLADDER SUSPENSION      CARPAL TUNNEL RELEASE Bilateral     CHOLECYSTECTOMY      ESOPHAGOGASTRODUODENOSCOPY N/A 7/29/2021    Procedure: EGD (ESOPHAGOGASTRODUODENOSCOPY);  Surgeon: Susan Mcclain MD;  Location: [facility];  Service: Endoscopy;  Laterality: N/A;    EYE SURGERY      HYSTERECTOMY  1986    vaginal prolapse    INJECTION OF ANESTHETIC AGENT AROUND MEDIAL BRANCH NERVES INNERVATING CERVICAL FACET JOINT Bilateral 5/27/2022    Procedure: CERVICAL MEDIAL BRANCH NERVE BLOCK (C3-4,C4-5);  " Surgeon: Mamie Roman MD;  Location: [facility];  Service: Pain Management;  Laterality: Bilateral;    INJECTION OF ANESTHETIC AGENT AROUND MEDIAL BRANCH NERVES INNERVATING LUMBAR FACET JOINT Right 2021    Procedure: LUMBAR FACET JOINT BLOCK (L3-4,L4-5,L5-S1);  Surgeon: Mamie Roman MD;  Location: [facility];  Service: Pain Management;  Laterality: Right;    INJECTION OF ANESTHETIC AGENT AROUND MEDIAL BRANCH NERVES INNERVATING LUMBAR FACET JOINT Right 2021    Procedure: LUMBAR FACET JOINT BLOCK (L3-4,L4-5,L5-S1);  Surgeon: Mamie Roman MD;  Location: [facility];  Service: Pain Management;  Laterality: Right;    INJECTION OF ANESTHETIC AGENT INTO SACROILIAC JOINT Right 2020    Procedure: SACROILIAC JOINT INJECTION;  Surgeon: Mamie Roman MD;  Location: [facility];  Service: Pain Management;  Laterality: Right;    INJECTION OF JOINT Right 2020    Procedure: GREATER TROCHANTERIC BURSA INJECTION;  Surgeon: Mamie Roman MD;  Location: [facility];  Service: Pain Management;  Laterality: Right;    NASAL SEPTUM SURGERY      RECTAL PROLAPSE REPAIR      TONSILLECTOMY         Family History   Problem Relation Age of Onset    Colon cancer Maternal Uncle     Colon cancer Maternal Uncle     Colon cancer Maternal Uncle        Social History     Socioeconomic History    Marital status:    Tobacco Use    Smoking status: Former     Packs/day: 1.00     Years: 35.00     Pack years: 35.00     Types: Cigarettes     Start date: 3/30/1982     Quit date: 2023     Years since quittin.0    Smokeless tobacco: Never    Tobacco comments:     smoking cessation clinic pamphlet for clinic put on chart to give to pt.    Substance and Sexual Activity    Alcohol use: Yes     Comment: Socially-2 or 3 times a year-6 or 7 drinks    Drug use: No    Sexual activity: Yes     Partners: Male     Birth control/protection: Surgical     Comment:        Current Outpatient Medications   Medication Sig Dispense Refill     acetaminophen (TYLENOL) 500 MG tablet Take 2 tablets (1,000 mg total) by mouth every 8 (eight) hours as needed for Pain. 30 tablet 0    albuterol (PROVENTIL/VENTOLIN HFA) 90 mcg/actuation inhaler INHALE 2 PUFFS INTO THE LUNGS EVERY 6 (SIX) HOURS AS NEEDED FOR WHEEZING (COUGH AND WHEEZING).RESCUE 18 g 1    aspirin 81 MG Chew Take 1 tablet (81 mg total) by mouth once daily. 30 tablet 5    DULoxetine (CYMBALTA) 60 MG capsule Take 2 capsules (120 mg total) by mouth once daily. 60 capsule 5    hydrOXYzine pamoate (VISTARIL) 25 MG Cap Take 1-4 capsules every 6 hours as needed for anxiety 120 capsule 2    lamoTRIgine (LAMICTAL) 150 MG Tab Take 2 tablets (300 mg total) by mouth every evening. 180 tablet 5    pantoprazole (PROTONIX) 40 MG tablet Take 1 tablet (40 mg total) by mouth once daily. 90 tablet 3    rosuvastatin (CRESTOR) 10 MG tablet TAKE 1 TABLET BY MOUTH EVERY DAY 90 tablet 3    traZODone (DESYREL) 50 MG tablet Take 1 tablet at bedtime for 1 week then increase to 1-2 tablets nightly as needed 60 tablet 2     Current Facility-Administered Medications   Medication Dose Route Frequency Provider Last Rate Last Admin    sodium chloride 0.9% flush 10 mL  10 mL Intravenous PRN Lauri Alejandra DPM           Review of patient's allergies indicates:   Allergen Reactions    Nsaids (non-steroidal anti-inflammatory drug) Other (See Comments)     Gastrointestinal bleeding requiring blood transfusion    Demerol [meperidine] Rash         Review of Systems   Constitutional: Negative for chills and fever.   HENT:  Negative for congestion and hearing loss.    Cardiovascular:  Negative for chest pain and claudication.   Respiratory:  Negative for cough and shortness of breath.    Skin:  Negative for color change and itching.   Musculoskeletal:  Positive for arthritis.   Gastrointestinal:  Negative for nausea and vomiting.   Neurological:  Positive for numbness. Negative for paresthesias.   Psychiatric/Behavioral:  Negative for  altered mental status.          Objective:      Physical Exam  Constitutional:       General: She is not in acute distress.     Appearance: Normal appearance. She is not ill-appearing.   Cardiovascular:      Pulses:           Dorsalis pedis pulses are 2+ on the right side and 2+ on the left side.        Posterior tibial pulses are 2+ on the right side and 2+ on the left side.      Comments: Mild nonpitting edema localized to the right foot.  Skin temp is warm to foot bilateral.  No rubor on dependency bilateral foot.  No hair growth follow extremity.  Musculoskeletal:      Comments: Semi-rigid cavus foot structure bilateral.    Palpable bony prominence dorsal lateral right midfoot with localized pain on palpation extending along the tarsometatarsal joints 2, 3.  No pain with midtarsal joint range of motion right foot.    No significant pain on palpation to the right 4th toe proximal phalanx or overlying the metatarsophalangeal joint.  No pain with range motion of the right 4th toe.    Reducible flexion adductovarus contractures of toes 4 and 5 right foot.   Skin:     General: Skin is warm.      Capillary Refill: Capillary refill takes less than 2 seconds.      Findings: Erythema present. No ecchymosis.      Nails: There is no clubbing.      Comments: No open wound or maceration to the foot bilateral.   Neurological:      Mental Status: She is alert and oriented to person, place, and time.      Sensory: Sensation is intact.      Motor: Motor function is intact.             Assessment:       Encounter Diagnoses   Name Primary?    Closed nondisplaced fracture of proximal phalanx of lesser toe of right foot with routine healing, subsequent encounter Yes    Arthrosis of right foot     Chronic foot pain, right     Preop testing          Plan:       Karin was seen today for foot problem and foot pain.    Diagnoses and all orders for this visit:    Closed nondisplaced fracture of proximal phalanx of lesser toe of right  foot with routine healing, subsequent encounter  -     X-Ray Foot Complete Right; Future    Arthrosis of right foot  -     MRI Foot (Midfoot) Right W W/O Contrast; Future  -     Ambulatory referral/consult to Family Practice; Future  -     WALKER FOR HOME USE  -     Ambulatory referral/consult to Physical/Occupational Therapy; Future  -     Case Request Operating Room: FUSION, FOOT  -     Full code; Standing  -     Place in Outpatient; Standing  -     Insert peripheral IV; Standing  -     Verify surgical site; Standing  -     Verify informed consent; Standing  -     Full code  -     Insert peripheral IV    Chronic foot pain, right  -     MRI Foot (Midfoot) Right W W/O Contrast; Future  -     Ambulatory referral/consult to Family Practice; Future  -     WALKER FOR HOME USE  -     Ambulatory referral/consult to Physical/Occupational Therapy; Future    Preop testing  -     Ambulatory referral/consult to Family Practice; Future  -     WALKER FOR HOME USE  -     Ambulatory referral/consult to Physical/Occupational Therapy; Future    Other orders  -     sodium chloride 0.9% flush 10 mL  -     cefazolin (ANCEF) 2 gram in dextrose 5% 50 mL IVPB (premix)    I counseled the patient on her conditions, their implications and medical management.    Reviewed previous right foot x-ray with a nondisplaced oblique proximal phalanx fracture that was non intra-articular.  There was moderate irregular joint space widening of the 2nd metatarsal cuneiform joint with osteophytic lipping and subchondral bone cysts involving the intermediate cuneiform.  MRI ordered to further confirm the joint involvement and for preoperative planning.    We discussed continued conservative care versus surgical intervention consisting of 2nd metatarsocuneiform joint arthrodesis with use of autologous bone graft from the calcaneus.  Risks, benefits anticipate postop course discussed in detail.  No guarantees were given or implied.  We did discuss that the  3rd metatarsocuneiform joint may have to be fused however this will be an intraop decision and also involve MRI findings.  The patient elected to proceed surgical intervention outlined above.  No guarantees were given or implied.    This procedure has been fully reviewed with the patient and written informed consent has been obtained.    Referred to PCP for medical optimization and risk stratification    Patient referred to physical therapy for conditioning in order to remain nonweightbearing to the right lower extremity.  Most likely this will only be required for 3 weeks and then she will be partial weight-bearing.    RTC 1 week postop or p.r.n. as discussed.    A portion of this note was generated by voice recognition software and may contain spelling and grammar errors.      .

## 2023-03-14 ENCOUNTER — CLINICAL SUPPORT (OUTPATIENT)
Dept: REHABILITATION | Facility: HOSPITAL | Age: 73
End: 2023-03-14
Attending: PODIATRIST
Payer: MEDICARE

## 2023-03-14 DIAGNOSIS — M79.671 CHRONIC FOOT PAIN, RIGHT: ICD-10-CM

## 2023-03-14 DIAGNOSIS — Z01.818 PREOP TESTING: ICD-10-CM

## 2023-03-14 DIAGNOSIS — M19.071 ARTHROSIS OF RIGHT FOOT: ICD-10-CM

## 2023-03-14 DIAGNOSIS — G89.29 CHRONIC FOOT PAIN, RIGHT: ICD-10-CM

## 2023-03-14 PROCEDURE — 97110 THERAPEUTIC EXERCISES: CPT | Mod: PN

## 2023-03-14 PROCEDURE — 97530 THERAPEUTIC ACTIVITIES: CPT | Mod: PN

## 2023-03-14 PROCEDURE — 97161 PT EVAL LOW COMPLEX 20 MIN: CPT | Mod: PN

## 2023-03-14 NOTE — PLAN OF CARE
[facility]   Physical Therapy Initial Evaluation     Date: 3/14/2023   Name: Karin Maguire  Clinic Number: [id number]    Therapy Diagnosis:   Encounter Diagnoses   Name Primary?    Arthrosis of right foot     Chronic foot pain, right     Preop testing      Physician: Lauri Alejandra DPM    Physician Orders: PT Eval and Treat - Needs conditioning prior to being non-weightbearing right foot for surgery 05/03/23   Medical Diagnosis from Referral: M19.071 (ICD-10-CM) - Arthrosis of right foot M79.671,G89.29 (ICD-10-CM) - Chronic foot pain, right Z01.818 (ICD-10-CM) - Preop testing   Evaluation Date: 3/14/2023  Authorization Period Expiration: 12/31/2023  Plan of Care Expiration: 4/28/2023  Progress Note Due: 4/4/2023  Visit # / Visits authorized: 1/ 6   FOTO: 1/3    Precautions: Standard     Time In: 0900  Time Out: 0945  Total Appointment Time (timed & untimed codes): 45 minutes      SUBJECTIVE     Date of onset: 8 - 9 years    History of current condition - Karin reports: over the last year, she has had significant pain in the front of her foot that has been progressively getting worse. She has burning throughout the foot. She is planning to have surgery involving a bone graft and plating where she will be non-weight bearing for a significant amount of time. Surgery is scheduled on May 3rd. With more activity, she has significant pain at night.     Falls: none    Imaging, bone scan films: FINDINGS:  Subacute healing nondisplaced fracture involving the proximal diametaphyseal region of the proximal phalanx of the 4th toe.  No acute fractures.  Stable pes planus and scattered midfoot degenerative arthropathic changes.  Elsewhere, preserved joint spaces.  Normal bone density.  Calcaneal spurring at Achilles tendon and plantar aponeurosis attachments.  Soft tissue swelling dorsally at the level of the midfoot, metatarsal, less so MTP articulations.     Impression:     As  above    Prior Therapy: not for her foot, for her wrist   Social History:  lives with their spouse  Occupation: retired   Prior Level of Function: independent   Current Level of Function: severe antalgic gait, unable to perform more than 10 minutes of activity on standing    Pain:  Current 2/10, worst 10/10, best 1/10   Location: right lower legs right foot/feet   Description: Aching, Burning, and Throbbing  Aggravating Factors: Night Time  Easing Factors: rest    Patients goals: learn how to walk without putting much pressure on her foot.      Medical History:   Past Medical History:   Diagnosis Date    Anxiety     Arthritis     Bipolar 1 disorder     Bursitis of right hip     GI bleed due to NSAIDs     Multinodular goiter 11/15/2021    Sacroiliitis     right side    Sleep apnea        Surgical History:   Karin Maguire  has a past surgical history that includes Hysterectomy (1986); Bladder suspension; Rectal prolapse repair; Tonsillectomy; Cholecystectomy; Ankle fracture surgery (Left, 2001); Carpal tunnel release (Bilateral); Nasal septum surgery; Injection of anesthetic agent into sacroiliac joint (Right, 12/4/2020); Injection of joint (Right, 12/4/2020); Injection of anesthetic agent around medial branch nerves innervating lumbar facet joint (Right, 2/5/2021); Injection of anesthetic agent around medial branch nerves innervating lumbar facet joint (Right, 4/30/2021); Esophagogastroduodenoscopy (N/A, 7/29/2021); Injection of anesthetic agent around medial branch nerves innervating cervical facet joint (Bilateral, 5/27/2022); and Eye surgery.    Medications:   Karin has a current medication list which includes the following prescription(s): acetaminophen, albuterol, aspirin, duloxetine, hydroxyzine pamoate, lamotrigine, pantoprazole, rosuvastatin, and trazodone, and the following Facility-Administered Medications: sodium chloride 0.9%.    Allergies:   Review of patient's allergies indicates:   Allergen  Reactions    Nsaids (non-steroidal anti-inflammatory drug) Other (See Comments)     Gastrointestinal bleeding requiring blood transfusion    Demerol [meperidine] Rash          OBJECTIVE     Posture: weight shifted away from Right Lower Extremity   Palpation: electricity with palpation of of midfoot  Sensation: decrease sensation along great toe and forefoot     Range of Motion/Strength:     Hip Right Left Pain/Dysfunction with Movement   AROM/PROM      flexion  Within Normal Limits throughtou Within Normal Limits throughout     extension  -/-  -/-    abduction  -/-  -/-    adduction  -/-  -/-    Internal rotation  -/-  -/-    External rotation  -/-  -/-      Knee Right Left Pain/Dysfunction with Movement   AROM/PROM      flexion  Within Normal Limits throughout Within Normal Limits throughout    extension  -/- -      Ankle Right Left Pain/Dysfunction with Movement   AROM/PROM      dorsiflexion  10/10  Within Normal Limits throughotu    plantarflexion  50/50 - -   inversion  20/20 -    eversion  30/30 -      L/E MMT Right Left Pain/Dysfunction with Movement   Hip Flexion 4+/5 4+/5    Hip Extension 5/5 5/5    Hip Abduction 5/5 5/5    Hip Adduction 5/5 5/5    Knee Flexion 4/5 5/5    Knee Extension 5/5 5/5    Ankle DF 4+/5 5/5    Ankle PF 4-/5 5/5    Ankle Inversion 4/5 4/5    Ankle Eversion 4/5 5/5        Flexibility: hard end feel at all ranges of right ankle    Gait Analysis:Without AD  Deviations: antalgic gait with wide base of support     TUG:  10.44 seconds no walker, 45 sec with rolling walker and non-weight bearing on Left lower extremity     30 second sit-to-stand test (without U/E support): 9 (no w/ UE support)    Balance: single leg : Left lower extremity 2 sec; Right Lower Extremity 1 sec             Limitation/Restriction for FOTO Ankle  Survey    Therapist reviewed FOTO scores for Karin CHRISTOPHER Maguire on 3/14/2023.   FOTO documents entered into [employer] - see Media section.    Limitation Score: 57%    "      TREATMENT     Total Treatment time (time-based codes) separate from Evaluation: 20 minutes      Karin received the treatments listed below:      therapeutic exercises to develop strength and endurance for 10 minutes including:  Ankle circles with red theraband  x 10   Plantar flexion with red theraband x 10   Heel cord stretch 30" hold x 3       therapeutic activities to improve functional performance for 10  minutes, including:  Sit to stand with single leg 2 x 5   Single leg heel raises to assist with hopping with RW x 10       PATIENT EDUCATION AND HOME EXERCISES     Education provided:   - using knee scooter in house and setting up home with ample room  - practicing non-weight bearing activities   - home exercise program and physical therapy plan of care    Written Home Exercises Provided: yes. Exercises were reviewed and Karin was able to demonstrate them prior to the end of the session.  Karin demonstrated good  understanding of the education provided. See EMR under Patient Instructions for exercises provided during therapy sessions.    ASSESSMENT     Karin is a 72 y.o. female referred to outpatient Physical Therapy with a medical diagnosis of M19.071 (ICD-10-CM) - Arthrosis of right foot M79.671,G89.29 (ICD-10-CM) - Chronic foot pain, right Z01.818 (ICD-10-CM) - Preop testing . Patient presents with decrease lower extremity strength, decrease functional  endurance, and difficulty with single leg activities.     Patient prognosis is Good.   Patient will benefit from skilled outpatient Physical Therapy to address the deficits stated above and in the chart below, provide patient /family education, and to maximize patientt's level of independence.     Plan of care discussed with patient: Yes  Patient's spiritual, cultural and educational needs considered and patient is agreeable to the plan of care and goals as stated below:     Anticipated Barriers for therapy: age, coping skills "     Medical Necessity is demonstrated by the following  History  Co-morbidities and personal factors that may impact the plan of care Co-morbidities:   advanced age and coping style/mechanism    Personal Factors:   age  lifestyle     low   Examination  Body Structures and Functions, activity limitations and participation restrictions that may impact the plan of care Body Regions:   lower extremities    Body Systems:    ROM  strength  gross coordinated movement  balance  gait  transitions    Participation Restrictions:   - ability to care for her chickens     Activity limitations:   Learning and applying knowledge  no deficits    General Tasks and Commands  no deficits    Communication  no deficits    Mobility  lifting and carrying objects  walking    Self care  looking after one's health    Domestic Life  cooking  doing house work (cleaning house, washing dishes, laundry)  assisting others    Interactions/Relationships  no deficits    Life Areas  no deficits    Community and Social Life  community life  recreation and leisure         low   Clinical Presentation stable and uncomplicated low   Decision Making/ Complexity Score: low     Goals:  Short Term Goals (3 Weeks):  1. Patient will be compliant with home exercise program to supplement therapy in restoring pain free function.  2. Patient will improve TUG score with rolling walker and  non-weight bearing by 5 seconds     Long Term Goals (6 Weeks):  1. Patient will improve FOTO score to </= 64% limited to decrease perceived limitation with mobility.   2. Patient will improve 30 second sit to stand to <10  3. Patient will improve TUG score with rolling walker and non-weight bearing to < 30 sec.   4. Patient will demonstrate understanding of home needs to accommodate wheelchair and knee scooter use.     PLAN   Plan of care Certification: 3/14/2023 to 4/28/2023.    Outpatient Physical Therapy 1 times weekly for 6 weeks to include the following interventions: Gait  Training, Moist Heat/ Ice, Neuromuscular Re-ed, Orthotic Management and Training, Patient Education, Self Care, Therapeutic Activities, and Therapeutic Exercise.     Mary Pena, PT      I CERTIFY THE NEED FOR THESE SERVICES FURNISHED UNDER THIS PLAN OF TREATMENT AND WHILE UNDER MY CARE   Physician's comments:     Physician's Signature: ___________________________________________________

## 2023-03-16 PROBLEM — Z01.818 PREOP TESTING: Status: ACTIVE | Noted: 2023-03-16

## 2023-03-16 PROBLEM — M19.071: Status: ACTIVE | Noted: 2023-03-16

## 2023-03-17 ENCOUNTER — CLINICAL SUPPORT (OUTPATIENT)
Dept: REHABILITATION | Facility: HOSPITAL | Age: 73
End: 2023-03-17
Attending: NURSE PRACTITIONER
Payer: MEDICARE

## 2023-03-17 DIAGNOSIS — G89.29 CHRONIC FOOT PAIN, RIGHT: Primary | ICD-10-CM

## 2023-03-17 DIAGNOSIS — M79.671 CHRONIC FOOT PAIN, RIGHT: Primary | ICD-10-CM

## 2023-03-17 DIAGNOSIS — M19.071 ARTHROSIS OF RIGHT FOOT: ICD-10-CM

## 2023-03-17 PROCEDURE — 97110 THERAPEUTIC EXERCISES: CPT | Mod: PN,CQ

## 2023-03-17 NOTE — PROGRESS NOTES
"[facility]  Physical Therapy Treatment Note     Name: Karin Maguire  Clinic Number: [id number]    Therapy Diagnosis:   Encounter Diagnoses   Name Primary?    Arthrosis of right foot     Chronic foot pain, right Yes     Physician: Lauri Alejandra DPM    Visit Date: 3/17/2023    Physician Orders: PT Eval and Treat - Needs conditioning prior to being non-weightbearing right foot for surgery 05/03/23   Medical Diagnosis from Referral: M19.071 (ICD-10-CM) - Arthrosis of right foot M79.671,G89.29 (ICD-10-CM) - Chronic foot pain, right Z01.818 (ICD-10-CM) - Preop testing   Evaluation Date: 3/14/2023  Authorization Period Expiration: 12/31/2023  Plan of Care Expiration: 4/28/2023  Progress Note Due: 4/4/2023  Visit # / Visits authorized: 2/ 6   FOTO: 1/3     Precautions: Standard     Visit #: 2 of 6  PTA Visit #: 1  Time In: 1345  Time Out: 1415  Total Billable Time: 30 minutes    Subjective     Pt reports: she did her exercises that was prescribed from the previous therapist with no problem. Her right foot still hurts,     She was compliant with home exercise program.  Response to previous treatment: first treatment day  Functional change: right foot pain that limits her ability to perform activities of daily living     Pain: 4/10  Location: right foot     Objective     Karin received therapeutic exercises to develop strength, endurance, ROM, and flexibility for 30 minutes including:    Ankle pumps, 3x10  Long sitting calf stretch using a strap, 3x30" holds  SLR, 2x10  Ankle 4 ways Red thera-band (DF/PF/EV/IV), 2x10   Fitter first board in DF/PF/EV/IV, x10 each way  Seated toe raises, x10  Standing toe raises, x10  Standing marches, x10       Karin received the following manual therapy techniques: Joint mobilizations, Myofacial release, and Soft tissue Mobilization were applied to the: right foot for 0 minutes, including:    None today     Karin participated in neuromuscular " re-education activities to improve: Balance, Coordination, and Proprioception for 0 minutes. The following activities were included:    None today     Karin participated in dynamic functional therapeutic activities to improve functional performance for 0  minutes, including:    None today     Karin participated in gait training to improve functional mobility and safety for 0  minutes, including:    None today     Home Exercises Provided and Patient Education Provided     Written Home Exercises Provided: yes.  Exercises were reviewed and Karin was able to demonstrate them prior to the end of the session.  Karin demonstrated good  understanding of the education provided.     Education provided:   - home exercise program     Assessment   Patient with right foot pain who will be getting surgery in May. Focused on foot/ankle range of motion and strengthening. Patient tolerated most exercises well except for toes towel scrunches due to patient reports of slight discomforts and pain with this exercise. Patient states she has arthritis to right foot so the towel scrunches makes it hurt a bit. Will modified exercises as appropriate on next visit. Printed and review home exercise program with patient. Patient voiced understanding of her exercises.     Karin Is progressing slowly towards her goals.   Pt prognosis is Fair.     Pt will continue to benefit from skilled outpatient physical therapy to address the deficits listed in the problem list box on initial evaluation, provide pt/family education and to maximize pt's level of independence in the home and community environment.     Pt's spiritual, cultural and educational needs considered and pt agreeable to plan of care and goals.     Anticipated barriers to physical therapy: age, coping skills      Goals:   Short Term Goals (3 Weeks):  1. Patient will be compliant with home exercise program to supplement therapy in restoring pain free function.  2.  Patient will improve TUG score with rolling walker and non-weight bearing by 5 seconds      Long Term Goals (6 Weeks):  1. Patient will improve FOTO score to </= 64% limited to decrease perceived limitation with mobility.   2. Patient will improve 30 second sit to stand to <10  3. Patient will improve TUG score with rolling walker and non-weight bearing to < 30 sec.   4. Patient will demonstrate understanding of home needs to accommodate wheelchair and knee scooter use.   Plan   Plan of care Certification: 3/14/2023 to 4/28/2023.     Outpatient Physical Therapy 1 times weekly for 6 weeks to include the following interventions: Gait Training, Moist Heat/ Ice, Neuromuscular Re-ed, Orthotic Management and Training, Patient Education, Self Care, Therapeutic Activities, and Therapeutic Exercise. Pt may be seen by PTA as part of the rehabilitation team.     Continue with plan of care     Deyanira Bertrand PTA   3/17/2023

## 2023-03-24 ENCOUNTER — HOSPITAL ENCOUNTER (OUTPATIENT)
Dept: RADIOLOGY | Facility: HOSPITAL | Age: 73
Discharge: HOME | End: 2023-03-24
Attending: PODIATRIST
Payer: MEDICARE

## 2023-03-24 DIAGNOSIS — G89.29 CHRONIC FOOT PAIN, RIGHT: ICD-10-CM

## 2023-03-24 DIAGNOSIS — M19.071 ARTHROSIS OF RIGHT FOOT: ICD-10-CM

## 2023-03-24 DIAGNOSIS — M79.671 CHRONIC FOOT PAIN, RIGHT: ICD-10-CM

## 2023-03-24 PROCEDURE — 73720 MRI LWR EXTREMITY W/O&W/DYE: CPT | Mod: TC,RT

## 2023-03-24 PROCEDURE — 73720 MRI FOOT (MIDFOOT) RIGHT W W/O CONTRAST: ICD-10-PCS | Mod: 26,RT,, | Performed by: RADIOLOGY

## 2023-03-24 PROCEDURE — 25500020 PHARM REV CODE 255: Performed by: PODIATRIST

## 2023-03-24 PROCEDURE — 73720 MRI LWR EXTREMITY W/O&W/DYE: CPT | Mod: 26,RT,, | Performed by: RADIOLOGY

## 2023-03-24 PROCEDURE — A9585 GADOBUTROL INJECTION: HCPCS | Performed by: PODIATRIST

## 2023-03-24 RX ORDER — GADOBUTROL 604.72 MG/ML
7 INJECTION INTRAVENOUS
Status: COMPLETED | OUTPATIENT
Start: 2023-03-24 | End: 2023-03-24

## 2023-03-24 RX ADMIN — GADOBUTROL 10 ML: 604.72 INJECTION INTRAVENOUS at 08:03

## 2023-03-29 ENCOUNTER — DOCUMENTATION ONLY (OUTPATIENT)
Dept: REHABILITATION | Facility: HOSPITAL | Age: 73
End: 2023-03-29

## 2023-04-04 ENCOUNTER — CLINICAL SUPPORT (OUTPATIENT)
Dept: REHABILITATION | Facility: HOSPITAL | Age: 73
End: 2023-04-04
Attending: PODIATRIST
Payer: MEDICARE

## 2023-04-04 DIAGNOSIS — Z01.818 PREOP TESTING: ICD-10-CM

## 2023-04-04 DIAGNOSIS — M79.671 CHRONIC FOOT PAIN, RIGHT: ICD-10-CM

## 2023-04-04 DIAGNOSIS — M19.071 ARTHROSIS OF RIGHT FOOT: Primary | ICD-10-CM

## 2023-04-04 DIAGNOSIS — G89.29 CHRONIC FOOT PAIN, RIGHT: ICD-10-CM

## 2023-04-04 PROCEDURE — 97530 THERAPEUTIC ACTIVITIES: CPT | Mod: PN,CQ

## 2023-04-04 PROCEDURE — 97110 THERAPEUTIC EXERCISES: CPT | Mod: PN,CQ

## 2023-04-04 PROCEDURE — 97112 NEUROMUSCULAR REEDUCATION: CPT | Mod: PN,CQ

## 2023-04-04 NOTE — PROGRESS NOTES
"[facility]  Physical Therapy Treatment Note     Name: Karin Maguire  Clinic Number: [id number]    Therapy Diagnosis:   Encounter Diagnoses   Name Primary?    Arthrosis of right foot Yes    Chronic foot pain, right     Preop testing      Physician: Lauri Alejandra DPM    Visit Date: 4/4/2023    Physician Orders: PT Eval and Treat - Needs conditioning prior to being non-weightbearing right foot for surgery 05/03/23   Medical Diagnosis from Referral: M19.071 (ICD-10-CM) - Arthrosis of right foot M79.671,G89.29 (ICD-10-CM) - Chronic foot pain, right Z01.818 (ICD-10-CM) - Preop testing   Evaluation Date: 3/14/2023  Authorization Period Expiration: 12/31/2023  Plan of Care Expiration: 4/28/2023  Progress Note Due: 4/14/2023   Visit # / Visits authorized: 3/ 6   FOTO: 1/3     Precautions: Standard     Visit #: 3 of 6  PTA Visit #: 2  Time In: 11:20am  Time Out: 12:00pm  Total Billable Time: 40 minutes    Subjective     Pt reports: no pain right now to right foot. She did had a fall at home and hurt her Right hip and right shoulder.     She was compliant with home exercise program.  Response to previous treatment: no concerns   Functional change: right foot pain that limits her ability to perform activities of daily living     Pain: 0/10  Location: right foot     Objective     Karin received therapeutic exercises to develop strength, endurance, ROM, and flexibility for 20 minutes including:    Ankle pumps, 3x10  Long sitting calf stretch using a strap, 3x30" holds  SLR, 3x10  Sidelying hip abduction, 3x10   Ankle 4 ways Red thera-band (DF/PF/EV/IV), 3x10   Standing Fitter first board in DF/PF, x20  Standing Fitter first board in circles, x10 (slow and control movements)     Karin received the following manual therapy techniques: Joint mobilizations, Myofacial release, and Soft tissue Mobilization were applied to the: right foot for 0 minutes, including:    None today     Karin " participated in neuromuscular re-education activities to improve: Balance, Coordination, and Proprioception for 10 minutes. The following activities were included:    Sit to stand from chair holding on one 4lb, 3x10  Alternate step taps on 4 inch with no UE supports, x20  Cone taps while balance on Right leg, x20     Karin participated in dynamic functional therapeutic activities to improve functional performance for 10 minutes, including:    Sit to stand from chair holding onto 4lb, 3x10  Recumbent bike for 5 minutes at level 3    Karin participated in gait training to improve functional mobility and safety for 0  minutes, including:    None today     Home Exercises Provided and Patient Education Provided     Written Home Exercises Provided: yes.  Exercises were reviewed and Karin was able to demonstrate them prior to the end of the session.  Karin demonstrated good  understanding of the education provided.     Education provided:   - home exercise program     Assessment   Introduced balance and ankle stability exercises in standing today. Patient had difficulty with standing fitter board exercise specifically in circles movements. Cues for slow control in rotating the board for ankle range of motions. After multiple attempts, patient was able to complete a few improve ankle control movements. Patient also requires intermittent hands support during cone tapping exercises for support. Would like to continue to work on ankle strengthening and balance next visit.     Karin Is progressing well towards her goals.   Pt prognosis is Fair.     Pt will continue to benefit from skilled outpatient physical therapy to address the deficits listed in the problem list box on initial evaluation, provide pt/family education and to maximize pt's level of independence in the home and community environment.     Pt's spiritual, cultural and educational needs considered and pt agreeable to plan of care and goals.      Anticipated barriers to physical therapy: age, coping skills      Goals:   Short Term Goals (3 Weeks):  1. Patient will be compliant with home exercise program to supplement therapy in restoring pain free function.  2. Patient will improve TUG score with rolling walker and non-weight bearing by 5 seconds      Long Term Goals (6 Weeks):  1. Patient will improve FOTO score to </= 64% limited to decrease perceived limitation with mobility.   2. Patient will improve 30 second sit to stand to <10  3. Patient will improve TUG score with rolling walker and non-weight bearing to < 30 sec.   4. Patient will demonstrate understanding of home needs to accommodate wheelchair and knee scooter use.   Plan   Plan of care Certification: 3/14/2023 to 4/28/2023.     Outpatient Physical Therapy 1 times weekly for 6 weeks to include the following interventions: Gait Training, Moist Heat/ Ice, Neuromuscular Re-ed, Orthotic Management and Training, Patient Education, Self Care, Therapeutic Activities, and Therapeutic Exercise. Pt may be seen by PTA as part of the rehabilitation team.     Continue with plan of care     Deyanira Bertrand PTA   4/4/2023

## 2023-04-13 ENCOUNTER — OFFICE VISIT (OUTPATIENT)
Dept: INTERNAL MEDICINE | Facility: CLINIC | Age: 73
End: 2023-04-13
Payer: MEDICARE

## 2023-04-13 VITALS
RESPIRATION RATE: 18 BRPM | SYSTOLIC BLOOD PRESSURE: 122 MMHG | HEART RATE: 70 BPM | BODY MASS INDEX: 27.35 KG/M2 | WEIGHT: 170.19 LBS | OXYGEN SATURATION: 98 % | HEIGHT: 66 IN | DIASTOLIC BLOOD PRESSURE: 64 MMHG

## 2023-04-13 DIAGNOSIS — F41.1 GAD (GENERALIZED ANXIETY DISORDER): ICD-10-CM

## 2023-04-13 DIAGNOSIS — M19.071 ARTHROSIS OF RIGHT FOOT: ICD-10-CM

## 2023-04-13 DIAGNOSIS — F31.9 BIPOLAR 1 DISORDER: ICD-10-CM

## 2023-04-13 DIAGNOSIS — I70.0 AORTIC ATHEROSCLEROSIS: Primary | ICD-10-CM

## 2023-04-13 DIAGNOSIS — Z87.19 HISTORY OF GI BLEED: ICD-10-CM

## 2023-04-13 DIAGNOSIS — M46.1 SACROILIITIS, NOT ELSEWHERE CLASSIFIED: ICD-10-CM

## 2023-04-13 DIAGNOSIS — Z01.818 PREOP TESTING: ICD-10-CM

## 2023-04-13 PROCEDURE — 3078F DIAST BP <80 MM HG: CPT | Mod: CPTII,S$GLB,, | Performed by: NURSE PRACTITIONER

## 2023-04-13 PROCEDURE — 1100F PR PT FALLS ASSESS DOC 2+ FALLS/FALL W/INJURY/YR: ICD-10-PCS | Mod: CPTII,S$GLB,, | Performed by: NURSE PRACTITIONER

## 2023-04-13 PROCEDURE — 3288F FALL RISK ASSESSMENT DOCD: CPT | Mod: CPTII,S$GLB,, | Performed by: NURSE PRACTITIONER

## 2023-04-13 PROCEDURE — 3008F PR BODY MASS INDEX (BMI) DOCUMENTED: ICD-10-PCS | Mod: CPTII,S$GLB,, | Performed by: NURSE PRACTITIONER

## 2023-04-13 PROCEDURE — 99214 OFFICE O/P EST MOD 30 MIN: CPT | Mod: S$GLB,,, | Performed by: NURSE PRACTITIONER

## 2023-04-13 PROCEDURE — 1125F PR PAIN SEVERITY QUANTIFIED, PAIN PRESENT: ICD-10-PCS | Mod: CPTII,S$GLB,, | Performed by: NURSE PRACTITIONER

## 2023-04-13 PROCEDURE — 3074F PR MOST RECENT SYSTOLIC BLOOD PRESSURE < 130 MM HG: ICD-10-PCS | Mod: CPTII,S$GLB,, | Performed by: NURSE PRACTITIONER

## 2023-04-13 PROCEDURE — 3074F SYST BP LT 130 MM HG: CPT | Mod: CPTII,S$GLB,, | Performed by: NURSE PRACTITIONER

## 2023-04-13 PROCEDURE — 1160F PR REVIEW ALL MEDS BY PRESCRIBER/CLIN PHARMACIST DOCUMENTED: ICD-10-PCS | Mod: CPTII,S$GLB,, | Performed by: NURSE PRACTITIONER

## 2023-04-13 PROCEDURE — 3008F BODY MASS INDEX DOCD: CPT | Mod: CPTII,S$GLB,, | Performed by: NURSE PRACTITIONER

## 2023-04-13 PROCEDURE — 1160F RVW MEDS BY RX/DR IN RCRD: CPT | Mod: CPTII,S$GLB,, | Performed by: NURSE PRACTITIONER

## 2023-04-13 PROCEDURE — 3288F PR FALLS RISK ASSESSMENT DOCUMENTED: ICD-10-PCS | Mod: CPTII,S$GLB,, | Performed by: NURSE PRACTITIONER

## 2023-04-13 PROCEDURE — 99999 PR PBB SHADOW E&M-EST. PATIENT-LVL III: ICD-10-PCS | Mod: PBBFAC,,, | Performed by: NURSE PRACTITIONER

## 2023-04-13 PROCEDURE — 99214 PR OFFICE/OUTPT VISIT, EST, LEVL IV, 30-39 MIN: ICD-10-PCS | Mod: S$GLB,,, | Performed by: NURSE PRACTITIONER

## 2023-04-13 PROCEDURE — 3078F PR MOST RECENT DIASTOLIC BLOOD PRESSURE < 80 MM HG: ICD-10-PCS | Mod: CPTII,S$GLB,, | Performed by: NURSE PRACTITIONER

## 2023-04-13 PROCEDURE — 99999 PR PBB SHADOW E&M-EST. PATIENT-LVL III: CPT | Mod: PBBFAC,,, | Performed by: NURSE PRACTITIONER

## 2023-04-13 PROCEDURE — 1159F MED LIST DOCD IN RCRD: CPT | Mod: CPTII,S$GLB,, | Performed by: NURSE PRACTITIONER

## 2023-04-13 PROCEDURE — 1159F PR MEDICATION LIST DOCUMENTED IN MEDICAL RECORD: ICD-10-PCS | Mod: CPTII,S$GLB,, | Performed by: NURSE PRACTITIONER

## 2023-04-13 PROCEDURE — 1100F PTFALLS ASSESS-DOCD GE2>/YR: CPT | Mod: CPTII,S$GLB,, | Performed by: NURSE PRACTITIONER

## 2023-04-13 PROCEDURE — 1125F AMNT PAIN NOTED PAIN PRSNT: CPT | Mod: CPTII,S$GLB,, | Performed by: NURSE PRACTITIONER

## 2023-04-13 RX ORDER — TRAMADOL HYDROCHLORIDE 50 MG/1
50 TABLET ORAL EVERY 12 HOURS PRN
Qty: 14 EACH | Refills: 0 | Status: ON HOLD | OUTPATIENT
Start: 2023-04-13 | End: 2023-05-03 | Stop reason: HOSPADM

## 2023-04-13 NOTE — PROGRESS NOTES
Subjective:       Patient ID: Karin Maguire is a 72 y.o. female.    Chief Complaint: Pre-op Exam    HPI: Pt presents to clinic today known to me with c.o needing surgical clearance. She report srthat she is now in PT to strengthen her arms and left leg because she will be off right foot x 3 months. Needs clearance. She is planned to have a block. She is planed for may 3   Review of Systems   Constitutional:  Negative for chills, fatigue, fever and unexpected weight change.   HENT:  Negative for congestion, ear pain, sore throat and trouble swallowing.    Eyes:  Negative for pain and visual disturbance.   Respiratory:  Negative for cough, chest tightness and shortness of breath.    Cardiovascular:  Negative for chest pain, palpitations and leg swelling.   Gastrointestinal:  Negative for abdominal distention, abdominal pain, constipation, diarrhea and vomiting.   Genitourinary:  Negative for difficulty urinating, dysuria, flank pain, frequency and hematuria.   Musculoskeletal:  Negative for back pain, gait problem, joint swelling, neck pain and neck stiffness.   Skin:  Negative for rash and wound.   Neurological:  Positive for headaches. Negative for dizziness, seizures, speech difficulty and light-headedness.   Psychiatric/Behavioral:  Positive for sleep disturbance (trouble staying asleep).      Objective:      Physical Exam  Constitutional:       Appearance: She is well-developed.   HENT:      Head: Normocephalic and atraumatic.      Right Ear: External ear normal.      Left Ear: External ear normal.      Nose: Nose normal.   Eyes:      Conjunctiva/sclera: Conjunctivae normal.      Pupils: Pupils are equal, round, and reactive to light.   Cardiovascular:      Rate and Rhythm: Normal rate and regular rhythm.      Heart sounds: Normal heart sounds.   Pulmonary:      Effort: Pulmonary effort is normal.      Breath sounds: Normal breath sounds.   Abdominal:      General: Bowel sounds are normal.      Palpations:  Abdomen is soft.   Musculoskeletal:         General: Normal range of motion.      Cervical back: Normal range of motion and neck supple.   Skin:     General: Skin is warm and dry.   Neurological:      Mental Status: She is alert and oriented to person, place, and time.   Psychiatric:         Behavior: Behavior normal.         Thought Content: Thought content normal.         Judgment: Judgment normal.       Assessment:       1. Aortic atherosclerosis    2. Preop testing    3. Arthrosis of right foot    4. Sacroiliitis, not elsewhere classified    5. Bipolar 1 disorder    6. History of GI bleed    7. LUANNE (generalized anxiety disorder)        Plan:     Problem List Items Addressed This Visit       Bipolar 1 disorder> cont with psych- seems well controlled just stressed and anxious about upcoming surgery and recover off left foot x 3 months     History of GI bleed    LUANNE (generalized anxiety disorder)cont with psych- seems well controlled just stressed and anxious about upcoming surgery and recover off left foot x 3 months     Arthrosis of right foot> surgery planned     Preop testing cxr and ekg Monday     Aortic atherosclerosis - Primary- stop asa 5 days prior to surgery      Other Visit Diagnoses       Sacroiliitis, not elsewhere classified    can not take NSAIDS due to GI bleed. She reort that st times she is really tempted to take ibuprofen.               BMP  Lab Results   Component Value Date     03/06/2023    K 4.1 03/06/2023     03/06/2023    CO2 26 03/06/2023    BUN 12 03/06/2023    CREATININE 0.9 03/06/2023    CALCIUM 9.2 03/06/2023    ANIONGAP 7 (L) 03/06/2023    EGFRNORACEVR >60 03/06/2023     Lab Results   Component Value Date    ALT 11 03/06/2023    AST 13 03/06/2023    ALKPHOS 77 03/06/2023    BILITOT 0.2 03/06/2023   Glucose 97  Lab Results   Component Value Date    WBC 5.46 03/06/2023    HGB 12.0 03/06/2023    HCT 37.2 03/06/2023    MCV 90 03/06/2023     03/06/2023       CXR >The  lungs are clear, with normal appearance of pulmonary vasculature.No pleural effusion.No pneumothorax.     The cardiac silhouette is normal in size. The hilar and mediastinal contours are unremarkable.     Bones are intact.    EKG >  Normal sinus rhythm   Normal ECG     Pt has been medically optimized for surgery. Please send clearance to Dr Alejandra

## 2023-04-14 ENCOUNTER — CLINICAL SUPPORT (OUTPATIENT)
Dept: REHABILITATION | Facility: HOSPITAL | Age: 73
End: 2023-04-14
Attending: PODIATRIST
Payer: MEDICARE

## 2023-04-14 DIAGNOSIS — Z01.818 PREOP TESTING: ICD-10-CM

## 2023-04-14 DIAGNOSIS — G89.29 CHRONIC FOOT PAIN, RIGHT: Primary | ICD-10-CM

## 2023-04-14 DIAGNOSIS — M19.071 ARTHROSIS OF RIGHT FOOT: ICD-10-CM

## 2023-04-14 DIAGNOSIS — M79.671 CHRONIC FOOT PAIN, RIGHT: Primary | ICD-10-CM

## 2023-04-14 PROCEDURE — 97110 THERAPEUTIC EXERCISES: CPT | Mod: PN,CQ

## 2023-04-14 PROCEDURE — 97530 THERAPEUTIC ACTIVITIES: CPT | Mod: PN,CQ

## 2023-04-14 NOTE — PROGRESS NOTES
"[facility]  Physical Therapy Treatment Note     Name: Karin Maguire  Clinic Number: [id number]    Therapy Diagnosis:   Encounter Diagnoses   Name Primary?    Arthrosis of right foot     Preop testing     Chronic foot pain, right Yes     Physician: Lauri Alejandra DPM    Visit Date: 4/14/2023    Physician Orders: PT Eval and Treat - Needs conditioning prior to being non-weightbearing right foot for surgery 05/03/23   Medical Diagnosis from Referral: M19.071 (ICD-10-CM) - Arthrosis of right foot M79.671,G89.29 (ICD-10-CM) - Chronic foot pain, right Z01.818 (ICD-10-CM) - Preop testing   Evaluation Date: 3/14/2023  Authorization Period Expiration: 12/31/2023  Plan of Care Expiration: 4/28/2023  Progress Note Due: 4/14/2023   Visit # / Visits authorized: 4/ 20  FOTO: 1/3     Precautions: Standard     Visit #: 4 of 20  PTA Visit #: 3  Time In: 1515  Time Out: 1550  Total Billable Time: 35 minutes    Subjective     Pt reports: she feels stress lately for the upcoming right foot surgery. No complaints of pain today.     She was compliant with home exercise program.  Response to previous treatment: no concerns   Functional change: right foot pain that limits her ability to perform activities of daily living     Pain: 0/10  Location: right foot     Objective     Karin received therapeutic exercises to develop strength, endurance, ROM, and flexibility for 20 minutes including:    Ankle pumps, 3x10  Long sitting calf stretch using a strap, 3x30" holds  SLR, 3x10  Sidelying hip abduction, 3x10   Ankle 4 ways Red thera-band (DF/PF/EV/IV), 3x10 on each   Standing Fitter first board in DF/PF, x20    Karin received the following manual therapy techniques: Joint mobilizations, Myofacial release, and Soft tissue Mobilization were applied to the: right foot for 0 minutes, including:    None today     Karin participated in neuromuscular re-education activities to improve: Balance, " Coordination, and Proprioception for 5 minutes. The following activities were included:    Alternate step taps on 4 inch with no UE supports, x20  Cone taps while balance on Right leg, x20     Karin participated in dynamic functional therapeutic activities to improve functional performance for 10 minutes, including:    Sit to stand from chair holding onto 4lb, 3x10  Recumbent bike for 5 minutes at level 3    Karin participated in gait training to improve functional mobility and safety for 0  minutes, including:    None today     Home Exercises Provided and Patient Education Provided     Written Home Exercises Provided: yes.  Exercises were reviewed and Karin was able to demonstrate them prior to the end of the session.  Karin demonstrated good  understanding of the education provided.     Education provided:   - home exercise program     Assessment   Patient with improved right foot range of motion and strength. No pain since the care of therapy as of late. Review all exercises with patient today to keep with at home. Provided patient a red theraband for her exercises. At this time, patient may be appropriate for discharged next week prior to her foot surgery on May 3rd.     Karin Is progressing well towards her goals.   Pt prognosis is Fair.     Pt will continue to benefit from skilled outpatient physical therapy to address the deficits listed in the problem list box on initial evaluation, provide pt/family education and to maximize pt's level of independence in the home and community environment.     Pt's spiritual, cultural and educational needs considered and pt agreeable to plan of care and goals.     Anticipated barriers to physical therapy: age, coping skills      Goals:   Short Term Goals (3 Weeks):  1. Patient will be compliant with home exercise program to supplement therapy in restoring pain free function.  2. Patient will improve TUG score with rolling walker and non-weight bearing by  5 seconds      Long Term Goals (6 Weeks):  1. Patient will improve FOTO score to </= 64% limited to decrease perceived limitation with mobility.   2. Patient will improve 30 second sit to stand to <10  3. Patient will improve TUG score with rolling walker and non-weight bearing to < 30 sec.   4. Patient will demonstrate understanding of home needs to accommodate wheelchair and knee scooter use.   Plan   Plan of care Certification: 3/14/2023 to 4/28/2023.     Outpatient Physical Therapy 1 times weekly for 6 weeks to include the following interventions: Gait Training, Moist Heat/ Ice, Neuromuscular Re-ed, Orthotic Management and Training, Patient Education, Self Care, Therapeutic Activities, and Therapeutic Exercise. Pt may be seen by PTA as part of the rehabilitation team.     D/C next visit so that patient can focus on her preparing for her surgery on May 3rd.     Deyanira Bertrand, PTA   4/14/2023

## 2023-04-17 ENCOUNTER — HOSPITAL ENCOUNTER (OUTPATIENT)
Dept: PULMONOLOGY | Facility: HOSPITAL | Age: 73
Discharge: HOME | End: 2023-04-17
Attending: NURSE PRACTITIONER
Payer: MEDICARE

## 2023-04-17 ENCOUNTER — HOSPITAL ENCOUNTER (OUTPATIENT)
Dept: RADIOLOGY | Facility: HOSPITAL | Age: 73
Discharge: HOME | End: 2023-04-17
Attending: NURSE PRACTITIONER
Payer: MEDICARE

## 2023-04-17 DIAGNOSIS — Z01.818 PREOP TESTING: ICD-10-CM

## 2023-04-17 PROCEDURE — 93010 ELECTROCARDIOGRAM REPORT: CPT | Mod: ,,, | Performed by: INTERNAL MEDICINE

## 2023-04-17 PROCEDURE — 71046 X-RAY EXAM CHEST 2 VIEWS: CPT | Mod: TC

## 2023-04-17 PROCEDURE — 71046 X-RAY EXAM CHEST 2 VIEWS: CPT | Mod: 26,,, | Performed by: RADIOLOGY

## 2023-04-17 PROCEDURE — 93010 EKG 12-LEAD: ICD-10-PCS | Mod: ,,, | Performed by: INTERNAL MEDICINE

## 2023-04-17 PROCEDURE — 71046 XR CHEST PA AND LATERAL: ICD-10-PCS | Mod: 26,,, | Performed by: RADIOLOGY

## 2023-04-17 PROCEDURE — 93005 ELECTROCARDIOGRAM TRACING: CPT

## 2023-04-18 ENCOUNTER — TELEPHONE (OUTPATIENT)
Dept: PODIATRY | Facility: CLINIC | Age: 73
End: 2023-04-18
Payer: MEDICARE

## 2023-04-18 DIAGNOSIS — M19.071 ARTHROSIS OF RIGHT FOOT: Primary | ICD-10-CM

## 2023-04-18 DIAGNOSIS — M89.9 DISORDER OF BONE: ICD-10-CM

## 2023-04-18 RX ORDER — CALCIUM CARBONATE 500(1250)
1 TABLET ORAL DAILY
Qty: 90 TABLET | Refills: 3 | Status: ON HOLD | OUTPATIENT
Start: 2023-04-18 | End: 2023-08-11 | Stop reason: HOSPADM

## 2023-04-18 RX ORDER — ASPIRIN 325 MG
50000 TABLET, DELAYED RELEASE (ENTERIC COATED) ORAL WEEKLY
Qty: 12 CAPSULE | Refills: 3 | Status: SHIPPED | OUTPATIENT
Start: 2023-04-18 | End: 2023-07-07

## 2023-04-18 NOTE — TELEPHONE ENCOUNTER
Reviewed chart noting no prior testing for vitamin D deficiency. Please assist patient with scheduling lab. Also notify patient that prescription for once weekly dose of vitamin D and daily calcium sent to pharmacy.

## 2023-04-18 NOTE — TELEPHONE ENCOUNTER
Called pt in regards to Dr. Alejandra message. Pt didn't answer. I called pt . He answered and stated that Ms. Maguire is grocery shopping and he will have her contact our office back. I stated my understanding and call ended.-----Reviewed chart noting no prior testing for vitamin D deficiency. Please assist patient with scheduling lab. Also notify patient that prescription for once weekly dose of vitamin D and daily calcium sent to pharmacy.

## 2023-04-19 ENCOUNTER — LAB VISIT (OUTPATIENT)
Dept: LAB | Facility: HOSPITAL | Age: 73
End: 2023-04-19
Attending: PODIATRIST
Payer: MEDICARE

## 2023-04-19 DIAGNOSIS — M89.9 DISORDER OF BONE: ICD-10-CM

## 2023-04-19 DIAGNOSIS — M19.071 ARTHROSIS OF RIGHT FOOT: ICD-10-CM

## 2023-04-19 LAB — 25(OH)D3+25(OH)D2 SERPL-MCNC: 49 NG/ML (ref 30–96)

## 2023-04-19 PROCEDURE — 36415 COLL VENOUS BLD VENIPUNCTURE: CPT | Performed by: PODIATRIST

## 2023-04-19 PROCEDURE — 82306 VITAMIN D 25 HYDROXY: CPT | Performed by: PODIATRIST

## 2023-04-20 ENCOUNTER — PATIENT MESSAGE (OUTPATIENT)
Dept: PODIATRY | Facility: HOSPITAL | Age: 73
End: 2023-04-20
Payer: MEDICARE

## 2023-04-21 ENCOUNTER — CLINICAL SUPPORT (OUTPATIENT)
Dept: REHABILITATION | Facility: HOSPITAL | Age: 73
End: 2023-04-21
Attending: PODIATRIST
Payer: MEDICARE

## 2023-04-21 DIAGNOSIS — Z01.818 PREOP TESTING: ICD-10-CM

## 2023-04-21 DIAGNOSIS — M19.071 ARTHROSIS OF RIGHT FOOT: Primary | ICD-10-CM

## 2023-04-21 PROCEDURE — 97110 THERAPEUTIC EXERCISES: CPT | Mod: PN

## 2023-04-21 NOTE — PROGRESS NOTES
[facility]  Physical Therapy Discharge Note    Name: Karin Maguire  Clinic Number: [id number]    Therapy Diagnosis:   Encounter Diagnoses   Name Primary?    Arthrosis of right foot Yes    Preop testing      Physician: Lauri Alejandra DPM    Physician Orders: PT Eval and Treat - Needs conditioning prior to being non-weightbearing right foot for surgery 05/03/23   Medical Diagnosis from Referral: M19.071 (ICD-10-CM) - Arthrosis of right foot M79.671,G89.29 (ICD-10-CM) - Chronic foot pain, right Z01.818 (ICD-10-CM) - Preop testing   Evaluation Date: 3/14/2023      Date of Last visit: 4/21/2023  Total Visits Received: 5    ASSESSMENT      Posture: weight shifted away from Right Lower Extremity   Palpation: electricity with palpation of of midfoot  Sensation: decrease sensation along great toe and forefoot      Range of Motion/Strength:      Hip Right Left Pain/Dysfunction with Movement   AROM/PROM         flexion  Within Normal Limits throughtou Within Normal Limits throughout      extension  -/-  -/-     abduction  -/-  -/-     adduction  -/-  -/-     Internal rotation  -/-  -/-     External rotation  -/-  -/-        Knee Right Left Pain/Dysfunction with Movement   AROM/PROM         flexion  Within Normal Limits throughout Within Normal Limits throughout     extension  -/- -        Ankle Right Left Pain/Dysfunction with Movement   AROM/PROM         dorsiflexion  15/15  Within Normal Limits throughotu     plantarflexion  50/50 - -   inversion  20/20 -     eversion  30/30 -        L/E MMT Right Left Pain/Dysfunction with Movement   Hip Flexion 5/5 5/5     Hip Extension 5/5 5/5     Hip Abduction 5/5 5/5     Hip Adduction 5/5 5/5     Knee Flexion 4/5 5/5     Knee Extension 5/5 5/5     Ankle DF 4+/5 5/5     Ankle PF 4+/5 5/5     Ankle Inversion 4+/5 4+/5     Ankle Eversion 5/5 5/5           Flexibility: hard end feel at all ranges of right ankle     Gait Analysis:Without AD  Deviations:  antalgic gait with wide base of support      TUG:  10.44 seconds no walker, 45 sec with rolling walker and non-weight bearing on Left lower extremity - D/C 8.1sec with rolling walker and NWBing 27 sec      30 second sit-to-stand test (without U/E support): 11(no w/ UE support)     Balance: single leg : Left lower extremity 2 sec; Right Lower Extremity 1 sec  - NO CHANGE                  Limitation/Restriction for FOTO Ankle  Survey     Therapist reviewed FOTO scores for Karin Maguire on 3/14/2023.   FOTO documents entered into [employer] - see Media section.     Limitation Score: 57%           Discharge reason: Patient has met all of his/her goals and Patient has reached the maximum rehab potential for the present time. Although FOTO goal not met, she is still awaiting surgery which is why she continues to be limited in ability to perform tasks. She has been practicing NWBing activities at home and believes she is set up for success.     Discharge FOTO Score: 45    Goals: Short Term Goals (3 Weeks):  1. Patient will be compliant with home exercise program to supplement therapy in restoring pain free function. GOAL MET   2. Patient will improve TUG score with rolling walker and  non-weight bearing by 5 seconds GOAL MET     Long Term Goals (6 Weeks):  1. Patient will improve FOTO score to </= 64% limited to decrease perceived limitation with mobility. NOT MET - Pending surgery still   2. Patient will improve 30 second sit to stand to <10 GOAL MET   3. Patient will improve TUG score with rolling walker and non-weight bearing to < 30 sec. GOAL MET   4. Patient will demonstrate understanding of home needs to accommodate wheelchair and knee scooter use.  GOAL MET     PLAN   This patient is discharged from Physical Therapy      Mary Pena, PT, NCS

## 2023-04-26 ENCOUNTER — ANESTHESIA EVENT (OUTPATIENT)
Dept: SURGERY | Facility: HOSPITAL | Age: 73
End: 2023-04-26
Payer: MEDICARE

## 2023-04-26 ENCOUNTER — HOSPITAL ENCOUNTER (OUTPATIENT)
Dept: PREADMISSION TESTING | Facility: HOSPITAL | Age: 73
Discharge: HOME | End: 2023-04-26
Attending: PODIATRIST
Payer: MEDICARE

## 2023-04-26 VITALS
BODY MASS INDEX: 27.25 KG/M2 | TEMPERATURE: 98 F | HEART RATE: 79 BPM | HEIGHT: 66 IN | OXYGEN SATURATION: 97 % | WEIGHT: 169.56 LBS | DIASTOLIC BLOOD PRESSURE: 64 MMHG | SYSTOLIC BLOOD PRESSURE: 139 MMHG

## 2023-04-26 RX ORDER — SCOLOPAMINE TRANSDERMAL SYSTEM 1 MG/1
1 PATCH, EXTENDED RELEASE TRANSDERMAL
Status: CANCELLED | OUTPATIENT
Start: 2023-04-26

## 2023-04-26 RX ORDER — LIDOCAINE HYDROCHLORIDE 10 MG/ML
1 INJECTION, SOLUTION EPIDURAL; INFILTRATION; INTRACAUDAL; PERINEURAL ONCE
Status: CANCELLED | OUTPATIENT
Start: 2023-04-26 | End: 2023-04-26

## 2023-04-26 RX ORDER — SODIUM CHLORIDE, SODIUM LACTATE, POTASSIUM CHLORIDE, CALCIUM CHLORIDE 600; 310; 30; 20 MG/100ML; MG/100ML; MG/100ML; MG/100ML
INJECTION, SOLUTION INTRAVENOUS CONTINUOUS
Status: CANCELLED | OUTPATIENT
Start: 2023-04-26

## 2023-04-26 NOTE — ANESTHESIA PREPROCEDURE EVALUATION
"                                                                                                             04/26/2023  Karin Maguire is a 72 y.o., female scheduled for right foot fusion on 5/3/23.    Per internal medicine Dr. Hough, "Pt has been medically optimized for surgery".    Past Medical History:   Diagnosis Date    Anxiety     Arthritis     Bipolar 1 disorder     Bursitis of right hip     GI bleed due to NSAIDs     Multinodular goiter 11/15/2021    Sacroiliitis     right side    Sleep apnea      Past Surgical History:   Procedure Laterality Date    ANKLE FRACTURE SURGERY Left 2001    BLADDER SUSPENSION      CARPAL TUNNEL RELEASE Bilateral     CHOLECYSTECTOMY      ESOPHAGOGASTRODUODENOSCOPY N/A 7/29/2021    Procedure: EGD (ESOPHAGOGASTRODUODENOSCOPY);  Surgeon: Susan Mcclain MD;  Location: [facility];  Service: Endoscopy;  Laterality: N/A;    EYE SURGERY      HYSTERECTOMY  1986    vaginal prolapse    INJECTION OF ANESTHETIC AGENT AROUND MEDIAL BRANCH NERVES INNERVATING CERVICAL FACET JOINT Bilateral 5/27/2022    Procedure: CERVICAL MEDIAL BRANCH NERVE BLOCK (C3-4,C4-5);  Surgeon: Mamie Roman MD;  Location: [facility];  Service: Pain Management;  Laterality: Bilateral;    INJECTION OF ANESTHETIC AGENT AROUND MEDIAL BRANCH NERVES INNERVATING LUMBAR FACET JOINT Right 2/5/2021    Procedure: LUMBAR FACET JOINT BLOCK (L3-4,L4-5,L5-S1);  Surgeon: Mamie Roman MD;  Location: [facility];  Service: Pain Management;  Laterality: Right;    INJECTION OF ANESTHETIC AGENT AROUND MEDIAL BRANCH NERVES INNERVATING LUMBAR FACET JOINT Right 4/30/2021    Procedure: LUMBAR FACET JOINT BLOCK (L3-4,L4-5,L5-S1);  Surgeon: Mamie Roman MD;  Location: [facility];  Service: Pain Management;  Laterality: Right;    INJECTION OF ANESTHETIC AGENT INTO SACROILIAC JOINT Right 12/4/2020    Procedure: SACROILIAC JOINT INJECTION;  Surgeon: Mamie Roman MD;  Location: [facility];  Service: Pain Management;  Laterality: " Right;    INJECTION OF JOINT Right 12/4/2020    Procedure: GREATER TROCHANTERIC BURSA INJECTION;  Surgeon: Mamie Roman MD;  Location: [facility];  Service: Pain Management;  Laterality: Right;    NASAL SEPTUM SURGERY      RECTAL PROLAPSE REPAIR      TONSILLECTOMY       Current Outpatient Medications   Medication Instructions    acetaminophen (TYLENOL) 1,000 mg, Oral, Every 8 hours PRN    albuterol (PROVENTIL/VENTOLIN HFA) 90 mcg/actuation inhaler INHALE 2 PUFFS INTO THE LUNGS EVERY 6 (SIX) HOURS AS NEEDED FOR WHEEZING (COUGH AND WHEEZING).RESCUE    aspirin 81 mg, Oral, Daily    cholecalciferol (vitamin D3) 50,000 Units, Oral, Weekly    DULoxetine (CYMBALTA) 120 mg, Oral, Daily    hydrOXYzine pamoate (VISTARIL) 25 MG Cap Take 1-4 capsules every 6 hours as needed for anxiety    lamoTRIgine (LAMICTAL) 300 mg, Oral, Nightly    OYSTER SHELL CALCIUM 500 500 mg, Oral, Daily    pantoprazole (PROTONIX) 40 mg, Oral, Daily    rosuvastatin (CRESTOR) 10 MG tablet TAKE 1 TABLET BY MOUTH EVERY DAY    traMADoL (ULTRAM) 50 mg, Oral, Every 12 hours PRN    traZODone (DESYREL) 50 MG tablet Take 1 tablet at bedtime for 1 week then increase to 1-2 tablets nightly as needed       Pre-op Assessment    I have reviewed the Patient Summary Reports.     I have reviewed the Nursing Notes.    I have reviewed the Medications.     Review of Systems  Anesthesia Hx:  No problems with previous Anesthesia  Personal Hx of Anesthesia complications, Post-Operative Nausea/Vomiting, in the past, but not with recent anesthetics / prophylaxis   Social:  Smoker, Social Alcohol Use    Cardiovascular:   Exercise tolerance: good Denies Pacemaker.   Denies Angina. hyperlipidemia    Pulmonary:   Sleep Apnea    Education provided regarding risk of obstructive sleep apnea     Renal/:  Renal/ Normal     Hepatic/GI:   GERD, well controlled    Musculoskeletal:   Arthritis   Spine Disorders: cervical and lumbar    Neurological:  Neurology Normal  Denies TIA.  Denies CVA. Denies Seizures.    Endocrine:  Endocrine Normal    Psych:   Psychiatric History anxiety          Physical Exam  General: Oriented    Airway:  Mallampati: IV / II  Mouth Opening: Normal  TM Distance: Normal  Tongue: Normal  Neck ROM: Normal ROM    Dental:  Intact    Chest/Lungs:  Clear to auscultation, Normal Respiratory Rate    Heart:  Rate: Normal  Rhythm: Regular Rhythm  Sounds: Normal      Lab Results   Component Value Date    WBC 5.46 03/06/2023    HGB 12.0 03/06/2023    HCT 37.2 03/06/2023     03/06/2023    CHOL 186 07/28/2022    TRIG 83 07/28/2022    HDL 81 (H) 07/28/2022    ALT 11 03/06/2023    AST 13 03/06/2023     03/06/2023    K 4.1 03/06/2023     03/06/2023    CREATININE 0.9 03/06/2023    BUN 12 03/06/2023    CO2 26 03/06/2023    TSH 1.627 03/06/2023    INR 0.9 10/26/2021    HGBA1C 5.7 (H) 11/04/2021     Results for orders placed or performed during the hospital encounter of 04/17/23   SCHEDULED EKG 12-LEAD (to Muse)    Collection Time: 04/17/23  8:10 AM    Narrative    Test Reason : Z01.818    Vent. Rate : 071 BPM     Atrial Rate : 071 BPM     P-R Int : 154 ms          QRS Dur : 090 ms      QT Int : 400 ms       P-R-T Axes : 040 015 048 degrees     QTc Int : 434 ms    Normal sinus rhythm  Normal ECG  When compared with ECG of 26-OCT-2021 23:21,  Sinus rhythm has replaced Ectopic atrial rhythm  Nonspecific T wave abnormality no longer evident in Inferior leads  Nonspecific T wave abnormality no longer evident in Lateral leads  Confirmed by Babak Moore MD ([id number]) on 4/17/2023 12:11:51 PM    Referred By: MARTHA WAY           Confirmed By:Babak Moore MD     CXR 4/17/23  The lungs are clear, with normal appearance of pulmonary vasculature.No pleural effusion.No pneumothorax.  The cardiac silhouette is normal in size. The hilar and mediastinal contours are unremarkable.  Bones are intact.     Impression:  No acute abnormality.      Anesthesia Plan  Type of  Anesthesia, risks & benefits discussed:    Anesthesia Type: Regional  Intra-op Monitoring Plan: Standard ASA Monitors  Post Op Pain Control Plan: multimodal analgesia  Induction:  IV  ASA Score: 3  Day of Surgery Review of History & Physical: H&P completed by Anesthesiologist.  Anesthesia Plan Notes:       Ready For Surgery From Anesthesia Perspective.     .

## 2023-04-26 NOTE — DISCHARGE INSTRUCTIONS
Your surgery is scheduled for 5/3/23.    Please report to [facility] on the 1st Floor at 0930 a.m.    THIS TIME IS SUBJECT TO CHANGE.  YOU WILL RECEIVE A PHONE CALL THE DAY BEFORE SURGERY BY 3:30 PM TO CONFIRM YOUR TIME OF ARRIVAL.  IF YOU HAVE NOT RECEIVED A PHONE CALL BY 3:30 PM THE DAY BEFORE YOUR SURGERY PLEASE CALL [phone]     INSTRUCTIONS IMPORTANT!!!  ¨ Do not eat or drink after 12 midnight-including water, candy, gum, & mints. OK to brush teeth.      ____  Proceed to [facility] on *** for additional testing.        ____  Do not wear makeup, including mascara.  ____  No powder, lotions or creams to surgical area.  ____  Please remove all jewelry, including piercings and leave at home.  ____  No money or valuables needed. Please leave at home.  ____  Please bring any documents given by your doctor.  ____  If going home the same day, arrange for a ride home. You will not be able to             drive if Anesthesia was used.  ____  Children under 18 years require a parent / guardian present the entire time             they are in surgery / recovery.  ____  Wear loose fitting clothing. Allow for dressings, bandages.  ____  Stop Aspirin, Ibuprofen, Motrin, Aleve, Goody's/BC powders, Excedrine and Naproxen (NSAIDS) at least 3-5 days before surgery, unless otherwise instructed by your doctor, or the nurse.   You MAY use Tylenol/acetaminophen until day of surgery.  ____  Wash the surgical area with Hibiclens the night before surgery, and again the             morning of surgery.  Be sure to rinse hibiclens off completely (if instructed by   nurse).  ____  If you take diabetic medication, do not take am of surgery unless instructed by Doctor.  ____  Call MD for temperature above 101 degrees or any other signs of infection such as Urinary (bladder) infection, Upper respiratory infection, skin boils, etc.  ____ Stop taking any Fish Oil supplement or any Vitamins that contain Vitamin E at  least 5 days prior to surgery.  ____ Do Not wear your contact lenses the day of your procedure.  You may wear your glasses.      ____Do not shave surgical site for 3 days prior to surgery.  ____ Practice Good hand washing before, during, and after procedure.      I have read or had read and explained to me, and understand the above information.  Additional comments or instructions:  For additional questions call [phone]      ANESTHESIA SIDE EFFECTS  -For the first 24 hours after surgery:  Do not drive, use heavy equipment, make important decisions, or drink alcohol  -It is normal to feel sleepy for several hours.  Rest until you are more awake.  -Have someone stay with you, if needed.  They can watch for problems and help keep you safe.  -Some possible post anesthesia side effects include: nausea and vomiting, sore throat and hoarseness, sleepiness, and dizziness.        Pre-Op Bathing Instructions    Before surgery, you can play an important role in your own health.    Because skin is not sterile, we need to be sure that your skin is as free of germs as possible. By following the instructions below, you can reduce the number of germs on your skin before surgery.    IMPORTANT: You will need to shower with a special soap called Hibiclens*, available at any pharmacy.  If you are allergic to Chlorhexidine (the antiseptic in Hibiclens), use an antibacterial soap such as Dial Soap for your preoperative shower.  You will shower with Hibiclens both the night before your surgery and the morning of your surgery.  Do not use Hibiclens on the head, face or genitals to avoid injury to those areas.    STEP #1: THE NIGHT BEFORE YOUR SURGERY     Do not shave the area of your body where your surgery will be performed.  Shower and wash your hair and body as usual with your normal soap and shampoo.  Rinse your hair and body thoroughly after you shower to remove all soap residue.  With your hand, apply one packet of Hibiclens soap to  the surgical site.   Wash the site gently for five (5) minutes. Do not scrub your skin too hard.   Do not wash with your regular soap after Hibiclens is used.  Rinse your body thoroughly.  Pat yourself dry with a clean, soft towel.  Do not use lotion, cream, or powder.  Wear clean clothes.    STEP #2: THE MORNING OF YOUR SURGERY     Repeat Step #1.    * Not to be used by people allergic to Chlorhexidine.

## 2023-05-03 ENCOUNTER — ANESTHESIA (OUTPATIENT)
Dept: SURGERY | Facility: HOSPITAL | Age: 73
End: 2023-05-03
Payer: MEDICARE

## 2023-05-03 ENCOUNTER — HOSPITAL ENCOUNTER (OUTPATIENT)
Facility: HOSPITAL | Age: 73
Discharge: HOME | End: 2023-05-03
Attending: PODIATRIST | Admitting: PODIATRIST
Payer: MEDICARE

## 2023-05-03 VITALS
DIASTOLIC BLOOD PRESSURE: 61 MMHG | TEMPERATURE: 97 F | WEIGHT: 169.75 LBS | SYSTOLIC BLOOD PRESSURE: 117 MMHG | RESPIRATION RATE: 16 BRPM | BODY MASS INDEX: 27.28 KG/M2 | OXYGEN SATURATION: 98 % | HEART RATE: 61 BPM | HEIGHT: 66 IN

## 2023-05-03 DIAGNOSIS — G57.91 ENTRAPMENT NEUROPATHY OF PERIPHERAL NERVE OF RIGHT LOWER EXTREMITY: ICD-10-CM

## 2023-05-03 DIAGNOSIS — M19.071 ARTHROSIS OF RIGHT FOOT: ICD-10-CM

## 2023-05-03 DIAGNOSIS — S92.514D CLOSED NONDISPLACED FRACTURE OF PROXIMAL PHALANX OF LESSER TOE OF RIGHT FOOT WITH ROUTINE HEALING, SUBSEQUENT ENCOUNTER: ICD-10-CM

## 2023-05-03 DIAGNOSIS — Z98.890 POSTOPERATIVE STATE: Primary | ICD-10-CM

## 2023-05-03 PROCEDURE — 27201423 OPTIME MED/SURG SUP & DEVICES STERILE SUPPLY: Performed by: PODIATRIST

## 2023-05-03 PROCEDURE — 25000003 PHARM REV CODE 250: Performed by: PODIATRIST

## 2023-05-03 PROCEDURE — 28740 PR FUSION FOOT BONE,MIDTARSAL,1 JT: ICD-10-PCS | Mod: RT,,, | Performed by: PODIATRIST

## 2023-05-03 PROCEDURE — 25000003 PHARM REV CODE 250: Performed by: NURSE PRACTITIONER

## 2023-05-03 PROCEDURE — D9220A PRA ANESTHESIA: ICD-10-PCS | Mod: ANES,,, | Performed by: ANESTHESIOLOGY

## 2023-05-03 PROCEDURE — D9220A PRA ANESTHESIA: Mod: CRNA,,, | Performed by: NURSE ANESTHETIST, CERTIFIED REGISTERED

## 2023-05-03 PROCEDURE — 36000708 HC OR TIME LEV III 1ST 15 MIN: Performed by: PODIATRIST

## 2023-05-03 PROCEDURE — 37000009 HC ANESTHESIA EA ADD 15 MINS: Performed by: PODIATRIST

## 2023-05-03 PROCEDURE — 64447 PERIPHERAL BLOCK: ICD-10-PCS | Mod: 59,RT,, | Performed by: ANESTHESIOLOGY

## 2023-05-03 PROCEDURE — C1769 GUIDE WIRE: HCPCS | Performed by: PODIATRIST

## 2023-05-03 PROCEDURE — 63600175 PHARM REV CODE 636 W HCPCS: Performed by: NURSE ANESTHETIST, CERTIFIED REGISTERED

## 2023-05-03 PROCEDURE — 27800903 OPTIME MED/SURG SUP & DEVICES OTHER IMPLANTS: Performed by: PODIATRIST

## 2023-05-03 PROCEDURE — 64450 NJX AA&/STRD OTHER PN/BRANCH: CPT | Mod: 59,RT,, | Performed by: ANESTHESIOLOGY

## 2023-05-03 PROCEDURE — C1713 ANCHOR/SCREW BN/BN,TIS/BN: HCPCS | Performed by: PODIATRIST

## 2023-05-03 PROCEDURE — D9220A PRA ANESTHESIA: Mod: ANES,,, | Performed by: ANESTHESIOLOGY

## 2023-05-03 PROCEDURE — 64450 PERIPHERAL BLOCK: ICD-10-PCS | Mod: 59,RT,, | Performed by: ANESTHESIOLOGY

## 2023-05-03 PROCEDURE — 36000709 HC OR TIME LEV III EA ADD 15 MIN: Performed by: PODIATRIST

## 2023-05-03 PROCEDURE — 37000008 HC ANESTHESIA 1ST 15 MINUTES: Performed by: PODIATRIST

## 2023-05-03 PROCEDURE — 71000015 HC POSTOP RECOV 1ST HR: Performed by: PODIATRIST

## 2023-05-03 PROCEDURE — 25000003 PHARM REV CODE 250: Performed by: ANESTHESIOLOGY

## 2023-05-03 PROCEDURE — 28740 FUSION OF FOOT BONES: CPT | Mod: RT,,, | Performed by: PODIATRIST

## 2023-05-03 PROCEDURE — 64447 NJX AA&/STRD FEMORAL NRV IMG: CPT | Mod: 59,RT,, | Performed by: ANESTHESIOLOGY

## 2023-05-03 PROCEDURE — 25000003 PHARM REV CODE 250: Performed by: NURSE ANESTHETIST, CERTIFIED REGISTERED

## 2023-05-03 PROCEDURE — 64445 NJX AA&/STRD SCIATIC NRV IMG: CPT | Performed by: ANESTHESIOLOGY

## 2023-05-03 PROCEDURE — D9220A PRA ANESTHESIA: ICD-10-PCS | Mod: CRNA,,, | Performed by: NURSE ANESTHETIST, CERTIFIED REGISTERED

## 2023-05-03 PROCEDURE — 63600175 PHARM REV CODE 636 W HCPCS: Performed by: PODIATRIST

## 2023-05-03 DEVICE — MATRIX BONE STIMUBLAST 1ML GEL: Type: IMPLANTABLE DEVICE | Site: FOOT | Status: FUNCTIONAL

## 2023-05-03 DEVICE — IMPLANTABLE DEVICE: Type: IMPLANTABLE DEVICE | Site: FOOT | Status: FUNCTIONAL

## 2023-05-03 RX ORDER — CEPHALEXIN 500 MG/1
500 CAPSULE ORAL 4 TIMES DAILY
Qty: 28 CAPSULE | Refills: 0 | Status: SHIPPED | OUTPATIENT
Start: 2023-05-03 | End: 2023-06-23

## 2023-05-03 RX ORDER — ONDANSETRON 2 MG/ML
4 INJECTION INTRAMUSCULAR; INTRAVENOUS DAILY PRN
Status: DISCONTINUED | OUTPATIENT
Start: 2023-05-03 | End: 2023-05-03 | Stop reason: HOSPADM

## 2023-05-03 RX ORDER — LIDOCAINE HYDROCHLORIDE 10 MG/ML
INJECTION INFILTRATION; PERINEURAL
Status: DISCONTINUED | OUTPATIENT
Start: 2023-05-03 | End: 2023-05-03 | Stop reason: HOSPADM

## 2023-05-03 RX ORDER — BUPIVACAINE HYDROCHLORIDE 2.5 MG/ML
INJECTION, SOLUTION EPIDURAL; INFILTRATION; INTRACAUDAL
Status: COMPLETED | OUTPATIENT
Start: 2023-05-03 | End: 2023-05-03

## 2023-05-03 RX ORDER — CEFAZOLIN SODIUM 2 G/50ML
2 SOLUTION INTRAVENOUS
Status: COMPLETED | OUTPATIENT
Start: 2023-05-03 | End: 2023-05-03

## 2023-05-03 RX ORDER — HYDROCODONE BITARTRATE AND ACETAMINOPHEN 5; 325 MG/1; MG/1
1 TABLET ORAL EVERY 4 HOURS PRN
Status: DISCONTINUED | OUTPATIENT
Start: 2023-05-03 | End: 2023-05-03 | Stop reason: HOSPADM

## 2023-05-03 RX ORDER — LIDOCAINE HYDROCHLORIDE 20 MG/ML
INJECTION INTRAVENOUS
Status: DISCONTINUED | OUTPATIENT
Start: 2023-05-03 | End: 2023-05-03

## 2023-05-03 RX ORDER — ONDANSETRON 2 MG/ML
INJECTION INTRAMUSCULAR; INTRAVENOUS
Status: DISCONTINUED | OUTPATIENT
Start: 2023-05-03 | End: 2023-05-03

## 2023-05-03 RX ORDER — BUPIVACAINE HYDROCHLORIDE 2.5 MG/ML
INJECTION, SOLUTION EPIDURAL; INFILTRATION; INTRACAUDAL
Status: DISCONTINUED | OUTPATIENT
Start: 2023-05-03 | End: 2023-05-03 | Stop reason: HOSPADM

## 2023-05-03 RX ORDER — BUPIVACAINE HYDROCHLORIDE 5 MG/ML
INJECTION, SOLUTION EPIDURAL; INTRACAUDAL
Status: COMPLETED | OUTPATIENT
Start: 2023-05-03 | End: 2023-05-03

## 2023-05-03 RX ORDER — SODIUM CHLORIDE 0.9 % (FLUSH) 0.9 %
10 SYRINGE (ML) INJECTION
Status: DISCONTINUED | OUTPATIENT
Start: 2023-05-03 | End: 2023-05-03 | Stop reason: HOSPADM

## 2023-05-03 RX ORDER — PROPOFOL 10 MG/ML
VIAL (ML) INTRAVENOUS CONTINUOUS PRN
Status: DISCONTINUED | OUTPATIENT
Start: 2023-05-03 | End: 2023-05-03

## 2023-05-03 RX ORDER — MIDAZOLAM HYDROCHLORIDE 1 MG/ML
INJECTION, SOLUTION INTRAMUSCULAR; INTRAVENOUS
Status: DISCONTINUED | OUTPATIENT
Start: 2023-05-03 | End: 2023-05-03

## 2023-05-03 RX ORDER — SODIUM CHLORIDE, SODIUM LACTATE, POTASSIUM CHLORIDE, CALCIUM CHLORIDE 600; 310; 30; 20 MG/100ML; MG/100ML; MG/100ML; MG/100ML
INJECTION, SOLUTION INTRAVENOUS CONTINUOUS
Status: DISCONTINUED | OUTPATIENT
Start: 2023-05-03 | End: 2023-05-03 | Stop reason: HOSPADM

## 2023-05-03 RX ORDER — LIDOCAINE HYDROCHLORIDE 10 MG/ML
1 INJECTION, SOLUTION EPIDURAL; INFILTRATION; INTRACAUDAL; PERINEURAL ONCE
Status: DISCONTINUED | OUTPATIENT
Start: 2023-05-03 | End: 2023-05-03 | Stop reason: HOSPADM

## 2023-05-03 RX ORDER — HYDROCODONE BITARTRATE AND ACETAMINOPHEN 10; 325 MG/1; MG/1
1 TABLET ORAL EVERY 4 HOURS PRN
Qty: 30 TABLET | Refills: 0 | Status: SHIPPED | OUTPATIENT
Start: 2023-05-03 | End: 2023-06-23

## 2023-05-03 RX ORDER — SCOLOPAMINE TRANSDERMAL SYSTEM 1 MG/1
1 PATCH, EXTENDED RELEASE TRANSDERMAL
Status: DISCONTINUED | OUTPATIENT
Start: 2023-05-03 | End: 2023-05-03 | Stop reason: HOSPADM

## 2023-05-03 RX ORDER — FENTANYL CITRATE 50 UG/ML
INJECTION, SOLUTION INTRAMUSCULAR; INTRAVENOUS
Status: DISCONTINUED | OUTPATIENT
Start: 2023-05-03 | End: 2023-05-03

## 2023-05-03 RX ORDER — PROPOFOL 10 MG/ML
VIAL (ML) INTRAVENOUS
Status: DISCONTINUED | OUTPATIENT
Start: 2023-05-03 | End: 2023-05-03

## 2023-05-03 RX ADMIN — PROPOFOL 70 MG: 10 INJECTION, EMULSION INTRAVENOUS at 12:05

## 2023-05-03 RX ADMIN — SODIUM CHLORIDE, SODIUM LACTATE, POTASSIUM CHLORIDE, AND CALCIUM CHLORIDE: .6; .31; .03; .02 INJECTION, SOLUTION INTRAVENOUS at 12:05

## 2023-05-03 RX ADMIN — CEFAZOLIN SODIUM 2 G: 2 SOLUTION INTRAVENOUS at 12:05

## 2023-05-03 RX ADMIN — FENTANYL CITRATE 25 MCG: 50 INJECTION, SOLUTION INTRAMUSCULAR; INTRAVENOUS at 12:05

## 2023-05-03 RX ADMIN — BUPIVACAINE HYDROCHLORIDE 20 ML: 2.5 INJECTION, SOLUTION EPIDURAL; INFILTRATION; INTRACAUDAL; PERINEURAL at 11:05

## 2023-05-03 RX ADMIN — LIDOCAINE HYDROCHLORIDE 50 MG: 20 INJECTION, SOLUTION INTRAVENOUS at 12:05

## 2023-05-03 RX ADMIN — PROPOFOL 75 MCG/KG/MIN: 10 INJECTION, EMULSION INTRAVENOUS at 12:05

## 2023-05-03 RX ADMIN — BUPIVACAINE HYDROCHLORIDE 20 ML: 5 INJECTION, SOLUTION EPIDURAL; INTRACAUDAL; PERINEURAL at 11:05

## 2023-05-03 RX ADMIN — ONDANSETRON 4 MG: 2 INJECTION, SOLUTION INTRAMUSCULAR; INTRAVENOUS at 01:05

## 2023-05-03 RX ADMIN — MIDAZOLAM 1 MG: 1 INJECTION INTRAMUSCULAR; INTRAVENOUS at 12:05

## 2023-05-03 RX ADMIN — SCOPALAMINE 1 PATCH: 1 PATCH, EXTENDED RELEASE TRANSDERMAL at 10:05

## 2023-05-03 NOTE — ANESTHESIA POSTPROCEDURE EVALUATION
Anesthesia Post Evaluation    Patient: Karin Maguire    Procedure(s) Performed: Procedure(s) (LRB):  FUSION, FOOT (Right)    Final Anesthesia Type: MAC      Patient location during evaluation: PACU  Patient participation: Yes- Able to Participate  Level of consciousness: awake and alert  Post-procedure vital signs: reviewed and stable  Pain management: adequate  Airway patency: patent    PONV status at discharge: No PONV  Anesthetic complications: no      Cardiovascular status: blood pressure returned to baseline  Respiratory status: unassisted  Hydration status: euvolemic  Follow-up not needed.          Vitals Value Taken Time   /61 05/03/23 1426   Temp 36.9 05/03/23 1536   Pulse 61 05/03/23 1426   Resp 16 05/03/23 1426   SpO2 98 % 05/03/23 1426         No case tracking events are documented in the log.      Pain/Mercedes Score: No data recorded

## 2023-05-03 NOTE — ANESTHESIA PROCEDURE NOTES
Peripheral Block    Patient location during procedure: pre-op   Block not for primary anesthetic.  Reason for block: at surgeon's request and post-op pain management   Post-op Pain Location: Right LE   Start time: 5/3/2023 11:14 AM  Timeout: 5/3/2023 11:13 AM   End time: 5/3/2023 11:19 AM    Staffing  Authorizing Provider: Paco Doll II, MD  Performing Provider: Paco Doll II, MD    Preanesthetic Checklist  Completed: patient identified, IV checked, site marked, risks and benefits discussed, surgical consent, monitors and equipment checked, pre-op evaluation and timeout performed  Peripheral Block  Patient position: supine  Prep: ChloraPrep  Patient monitoring: heart rate, cardiac monitor, continuous pulse ox and frequent blood pressure checks  Block type: popliteal  Laterality: right  Injection technique: single shot  Needle  Needle type: Stimuplex   Needle gauge: 21 G  Needle length: 4 in  Needle localization: anatomical landmarks and ultrasound guidance   -ultrasound image captured on disc.  Assessment  Injection assessment: negative aspiration, negative parasthesia and local visualized surrounding nerve  Paresthesia pain: none  Heart rate change: no    Medications:    Medications: bupivacaine (pf) (MARCAINE) injection 0.5% - Perineural   20 mL - 5/3/2023 11:14:00 AM

## 2023-05-03 NOTE — H&P
"Subjective:      Patient ID: Karin Maguire is a 72 y.o. female.    Chief Complaint: Foot Problem (Right ft., ) and Foot Pain (Mostly on top of her foot)    Referral from  for evaluation of chronic right foot pain secondary to osteoarthritis.  She also reports stubbing her right 4th toe 5 weeks ago with persistent pain and swelling to the area.  She is unable to take NSAID therapy secondary to previous GI bleed.  She is been taking Tylenol which helps somewhat.  States that she gets moderate pain to the foot with extended periods of standing and walking at the mall which forces her to sit down.  Ambulating with slip-on tennis shoes.    03/10/2023:  Relates right forefoot pain has resolved and she sought wearing the boot last week.  She previously was diagnosed with a right 4th toe proximal phalanx nondisplaced fracture.  Relates now the pain is mostly in the midfoot with some mild localized swelling.  States the pain has been present for years however the swelling started before the pain.    05/03/23: Presents for surgical intervention right foot. No new complaints.     Vitals:    03/10/23 0811   BP: 131/72   Pulse: 67   Height: 5' 6" (1.676 m)   PainSc:   6   PainLoc: Foot      Past Medical History:   Diagnosis Date    Anxiety     Arthritis     Bipolar 1 disorder     Bursitis of right hip     GI bleed due to NSAIDs     Multinodular goiter 11/15/2021    Sacroiliitis     right side    Sleep apnea        Past Surgical History:   Procedure Laterality Date    ANKLE FRACTURE SURGERY Left 2001    BLADDER SUSPENSION      CARPAL TUNNEL RELEASE Bilateral     CHOLECYSTECTOMY      ESOPHAGOGASTRODUODENOSCOPY N/A 7/29/2021    Procedure: EGD (ESOPHAGOGASTRODUODENOSCOPY);  Surgeon: Susan Mcclain MD;  Location: [facility];  Service: Endoscopy;  Laterality: N/A;    EYE SURGERY      HYSTERECTOMY  1986    vaginal prolapse    INJECTION OF ANESTHETIC AGENT AROUND MEDIAL BRANCH NERVES INNERVATING CERVICAL FACET JOINT " Bilateral 2022    Procedure: CERVICAL MEDIAL BRANCH NERVE BLOCK (C3-4,C4-5);  Surgeon: Mamie Roman MD;  Location: [facility];  Service: Pain Management;  Laterality: Bilateral;    INJECTION OF ANESTHETIC AGENT AROUND MEDIAL BRANCH NERVES INNERVATING LUMBAR FACET JOINT Right 2021    Procedure: LUMBAR FACET JOINT BLOCK (L3-4,L4-5,L5-S1);  Surgeon: Mamie Romna MD;  Location: [facility];  Service: Pain Management;  Laterality: Right;    INJECTION OF ANESTHETIC AGENT AROUND MEDIAL BRANCH NERVES INNERVATING LUMBAR FACET JOINT Right 2021    Procedure: LUMBAR FACET JOINT BLOCK (L3-4,L4-5,L5-S1);  Surgeon: Mamie Roman MD;  Location: [facility];  Service: Pain Management;  Laterality: Right;    INJECTION OF ANESTHETIC AGENT INTO SACROILIAC JOINT Right 2020    Procedure: SACROILIAC JOINT INJECTION;  Surgeon: Mamie Roman MD;  Location: [facility];  Service: Pain Management;  Laterality: Right;    INJECTION OF JOINT Right 2020    Procedure: GREATER TROCHANTERIC BURSA INJECTION;  Surgeon: Mamie Roman MD;  Location: [facility];  Service: Pain Management;  Laterality: Right;    NASAL SEPTUM SURGERY      RECTAL PROLAPSE REPAIR      TONSILLECTOMY         Family History   Problem Relation Age of Onset    Colon cancer Maternal Uncle     Colon cancer Maternal Uncle     Colon cancer Maternal Uncle        Social History     Socioeconomic History    Marital status:    Tobacco Use    Smoking status: Former     Packs/day: 1.00     Years: 35.00     Pack years: 35.00     Types: Cigarettes     Start date: 3/30/1982     Quit date: 2023     Years since quittin.0    Smokeless tobacco: Never    Tobacco comments:     smoking cessation clinic pamphlet for clinic put on chart to give to pt.    Substance and Sexual Activity    Alcohol use: Yes     Comment: Socially-2 or 3 times a year-6 or 7 drinks    Drug use: No    Sexual activity: Yes     Partners: Male     Birth control/protection: Surgical     Comment:         Current Outpatient Medications   Medication Sig Dispense Refill    acetaminophen (TYLENOL) 500 MG tablet Take 2 tablets (1,000 mg total) by mouth every 8 (eight) hours as needed for Pain. 30 tablet 0    albuterol (PROVENTIL/VENTOLIN HFA) 90 mcg/actuation inhaler INHALE 2 PUFFS INTO THE LUNGS EVERY 6 (SIX) HOURS AS NEEDED FOR WHEEZING (COUGH AND WHEEZING).RESCUE 18 g 1    aspirin 81 MG Chew Take 1 tablet (81 mg total) by mouth once daily. 30 tablet 5    DULoxetine (CYMBALTA) 60 MG capsule Take 2 capsules (120 mg total) by mouth once daily. 60 capsule 5    hydrOXYzine pamoate (VISTARIL) 25 MG Cap Take 1-4 capsules every 6 hours as needed for anxiety 120 capsule 2    lamoTRIgine (LAMICTAL) 150 MG Tab Take 2 tablets (300 mg total) by mouth every evening. 180 tablet 5    pantoprazole (PROTONIX) 40 MG tablet Take 1 tablet (40 mg total) by mouth once daily. 90 tablet 3    rosuvastatin (CRESTOR) 10 MG tablet TAKE 1 TABLET BY MOUTH EVERY DAY 90 tablet 3    traZODone (DESYREL) 50 MG tablet Take 1 tablet at bedtime for 1 week then increase to 1-2 tablets nightly as needed 60 tablet 2     Current Facility-Administered Medications   Medication Dose Route Frequency Provider Last Rate Last Admin    sodium chloride 0.9% flush 10 mL  10 mL Intravenous PRN Lauri Alejandra DPM           Review of patient's allergies indicates:   Allergen Reactions    Nsaids (non-steroidal anti-inflammatory drug) Other (See Comments)     Gastrointestinal bleeding requiring blood transfusion    Demerol [meperidine] Rash         Review of Systems   Constitutional: Negative for chills and fever.   HENT:  Negative for congestion and hearing loss.    Cardiovascular:  Negative for chest pain and claudication.   Respiratory:  Negative for cough and shortness of breath.    Skin:  Negative for color change and itching.   Musculoskeletal:  Positive for arthritis.   Gastrointestinal:  Negative for nausea and vomiting.   Neurological:  Positive  for numbness. Negative for paresthesias.   Psychiatric/Behavioral:  Negative for altered mental status.          Objective:      Physical Exam  Constitutional:       General: She is not in acute distress.     Appearance: Normal appearance. She is not ill-appearing.   Cardiovascular:      Pulses:           Dorsalis pedis pulses are 2+ on the right side and 2+ on the left side.        Posterior tibial pulses are 2+ on the right side and 2+ on the left side.      Comments: Mild nonpitting edema localized to the right foot.  Skin temp is warm to foot bilateral.  No rubor on dependency bilateral foot.  No hair growth follow extremity.  Musculoskeletal:      Comments: Semi-rigid cavus foot structure bilateral.    Palpable bony prominence dorsal lateral right midfoot with localized pain on palpation extending along the tarsometatarsal joints 2, 3.  No pain with midtarsal joint range of motion right foot.    No significant pain on palpation to the right 4th toe proximal phalanx or overlying the metatarsophalangeal joint.  No pain with range motion of the right 4th toe.    Reducible flexion adductovarus contractures of toes 4 and 5 right foot.   Skin:     General: Skin is warm.      Capillary Refill: Capillary refill takes less than 2 seconds.      Findings: Erythema present. No ecchymosis.      Nails: There is no clubbing.      Comments: No open wound or maceration to the foot bilateral.   Neurological:      Mental Status: She is alert and oriented to person, place, and time.      Sensory: Sensation is intact.      Motor: Motor function is intact.             Assessment:       Encounter Diagnoses   Name Primary?    Closed nondisplaced fracture of proximal phalanx of lesser toe of right foot with routine healing, subsequent encounter Yes    Arthrosis of right foot     Chronic foot pain, right     Preop testing          Plan:       Karin was seen today for foot problem and foot pain.    Diagnoses and all orders for this  visit:    Closed nondisplaced fracture of proximal phalanx of lesser toe of right foot with routine healing, subsequent encounter  -     X-Ray Foot Complete Right; Future    Arthrosis of right foot  -     MRI Foot (Midfoot) Right W W/O Contrast; Future  -     Ambulatory referral/consult to Family Practice; Future  -     WALKER FOR HOME USE  -     Ambulatory referral/consult to Physical/Occupational Therapy; Future  -     Case Request Operating Room: FUSION, FOOT  -     Full code; Standing  -     Place in Outpatient; Standing  -     Insert peripheral IV; Standing  -     Verify surgical site; Standing  -     Verify informed consent; Standing  -     Full code  -     Insert peripheral IV    Chronic foot pain, right  -     MRI Foot (Midfoot) Right W W/O Contrast; Future  -     Ambulatory referral/consult to Family Practice; Future  -     WALKER FOR HOME USE  -     Ambulatory referral/consult to Physical/Occupational Therapy; Future    Preop testing  -     Ambulatory referral/consult to Family Practice; Future  -     WALKER FOR HOME USE  -     Ambulatory referral/consult to Physical/Occupational Therapy; Future    Other orders  -     sodium chloride 0.9% flush 10 mL  -     cefazolin (ANCEF) 2 gram in dextrose 5% 50 mL IVPB (premix)    I counseled the patient on her conditions, their implications and medical management.    Reviewed previous right foot x-ray with a nondisplaced oblique proximal phalanx fracture that was non intra-articular.  There was moderate irregular joint space widening of the 2nd metatarsal cuneiform joint with osteophytic lipping and subchondral bone cysts involving the intermediate cuneiform.  MRI ordered to further confirm the joint involvement and for preoperative planning.    We discussed continued conservative care versus surgical intervention consisting of 2nd metatarsocuneiform joint arthrodesis with use of autologous bone graft from the calcaneus.  Risks, benefits anticipate postop course  discussed in detail.  No guarantees were given or implied.  We did discuss that the 3rd metatarsocuneiform joint may have to be fused however this will be an intraop decision and also involve MRI findings.  The patient elected to proceed surgical intervention outlined above.  No guarantees were given or implied.    This procedure has been fully reviewed with the patient and written informed consent has been obtained.    Referred to PCP for medical optimization and risk stratification    Patient referred to physical therapy for conditioning in order to remain nonweightbearing to the right lower extremity.  Most likely this will only be required for 3 weeks and then she will be partial weight-bearing.    RTC 1 week postop or p.r.n. as discussed.    A portion of this note was generated by voice recognition software and may contain spelling and grammar errors.    H&P completed on 03/10/23 has been reviewed, the patient has been examined and:  I concur with the findings and no changes have occurred since H&P was written.    There are no hospital problems to display for this patient.      .

## 2023-05-03 NOTE — OP NOTE
[facility] - [facility] (Hospital)  Operative Note      Date of Procedure: 5/3/2023     Procedure: Procedure(s) (LRB):  FUSION second metatarsal cuneiform joint FOOT (Right)   External neurolysis of deep peroneal nerve right foot  Extensor hallucis brevis tenotomy right foot    Surgeon(s) and Role:     * Lauri Alejandra, DPM - Primary       Thomas Mehta DPM - Assist    Pre-Operative Diagnosis: Arthrosis of right foot [M19.071]    Post-Operative Diagnosis: Post-Op Diagnosis Codes:     * Arthrosis of right foot [M19.071]    Anesthesia: Regional    Operative Findings (including complications, if any): Deep peroneal nerve entrapped by tight overlying extensor retinaculum and EHB tendon. Noted nerve pinked up once the tendon was released. Moderate fibrosis and cartilage loss of the second metatarsal cuneiform joint.     Description of Technical Procedures: The patient was brought to the operating room on a stretcher and was transferred to the OR table in supine position. Anesthesia was induced.  Regional block performed by anesthesiology. A tourniquet was applied to the right calf. The right lower limb was prepped and draped in a sterile manner. Tourniquet(s) inflated to 250 mmHg.     Under fluoroscopic guidance the 2nd TMTJ was localized. Using a 15 blade a longitudinal incision was made dorsal to the 2nd TMTJ. Sharp and blunt dissection carried down to deep fascia, medial dorsal cutaneous nerve was retracted and protected. Deep fascia and extensor retinaculum were released, 1.5 cm of EHB tendon was resected where it crossed over the deep peroneal nerve, after fascia/retinaculum release and tendon release the deep peroneal nerve pinked up. Residual fascia surrounding the deep peroneal nerve was released. Sharp and blunt dissection continued to level of periosteum. Periosteum was incised and elevated medially and laterally. Extensive osteophytic formation was noted at dorsal 2nd TMTJ, osteophytes were excised using a  rongeur. The 2nd TMTJ was released with scalpel, freer elevator, and osteotome. Moderate fibrosis and cartilage loss was noted within the joint, in addition cysts were noted within the 2nd metatarsal base and 2nd cuneiform on fluoroscopy. Using rongeur, sagittal saw, and curettes the joint was prepped. Site irrigated with saline. Joint fenestrated using a 2.0 drill bit. 1 mL of demineralized bone matrix was injected into the TMTJ to fill underlying cysts and residual space within the fusion site. 2nd TMTJ was manually compressed and then temporarily fixated using a K wire. A 4 hole plate was placed dorsally over the fusion site and then fixated using 3 locking screws and 1 dynamic compression screw. K wire removed. Hardware positioning checked using fluoroscopy.     Surgical site irrigated with saline via bulb syringe. Layered primary closure performed using 3-0 vicryl, 4-0 vicryl, 4-0 nylon suture. 10 mL of 0.25% bupivacaine plain was locally infiltrated. Dressed with xeroform, 4x4 gauze, cast padding, ACE wrap. Tourniquet(s) deflated.       The patient was transferred to the recovery room with vital signs stable. Following a period of post op monitoring, the patient will be discharged home with the following postop instructions:   1. Keep dressing clean, dry, and intact until next seen by podiatry.   2. Weightbearing status: nonweightbearing.   3. All necessary prescriptions were ordered and medical management will continue.   4. Contact podiatry with any postop questions or concerns.       Estimated Blood Loss (EBL): 1 mL           Implants:   Implant Name Type Inv. Item Serial No.  Lot No. LRB No. Used Action   [id number]  MATRIX BONE STIMUBLAST 1ML GEL [id number] [employer]  Right 1 Implanted   4 Hole Plate     [employer]  Right 1 Implanted   Threaded BB-Tale     [employer]  Right 2 Implanted and Explanted   3.0 x 22 Kreulock Screw     [employer]  Right 1 Implanted   3.0 x 20 Kreulock Screw     [employer]  Right 2  Implanted   3.0 x 22 Cortical Screw     [employer]  Right 1 Implanted   1.6 K-Wire     [employer]  Right 2 Implanted and Explanted       Specimens:   Specimen (24h ago, onward)      None                    Condition: Good    Disposition: PACU - hemodynamically stable.    Attestation: I was present and scrubbed for the entire procedure.    Discharge Note    OUTCOME: Patient tolerated treatment/procedure well without complication and is now ready for discharge.    DISPOSITION: Home or Self Care    FINAL DIAGNOSIS:  Arthrosis of right foot    FOLLOWUP: In clinic    DISCHARGE INSTRUCTIONS:    Discharge Procedure Orders   Diet general     Keep surgical extremity elevated   Order Comments: Above heart level     Call MD for:  temperature >100.4     Call MD for:  persistent nausea and vomiting     Call MD for:  severe uncontrolled pain     Call MD for:  difficulty breathing, headache or visual disturbances     Leave dressing on - Keep it clean, dry, and intact until clinic visit     Weight bearing restrictions (specify)   Order Comments: Non-weightbearing right foot     I directly supervised the above resident and was scrubbed for the entire surgical case.  Lauri Alejandra DPM, FACFAS

## 2023-05-03 NOTE — ANESTHESIA PROCEDURE NOTES
Peripheral Block    Patient location during procedure: pre-op   Block not for primary anesthetic.  Reason for block: at surgeon's request and post-op pain management   Post-op Pain Location: Right LE   Start time: 5/3/2023 11:13 AM  Timeout: 5/3/2023 11:13 AM   End time: 5/3/2023 11:19 AM    Staffing  Authorizing Provider: Paco Doll II, MD  Performing Provider: Paco Doll II, MD    Preanesthetic Checklist  Completed: patient identified, IV checked, site marked, risks and benefits discussed, surgical consent, monitors and equipment checked, pre-op evaluation and timeout performed  Peripheral Block  Patient position: supine  Prep: ChloraPrep  Patient monitoring: heart rate, cardiac monitor, continuous pulse ox and frequent blood pressure checks  Block type: adductor canal  Laterality: right  Injection technique: single shot  Needle  Needle type: Stimuplex   Needle gauge: 21 G  Needle length: 4 in  Needle localization: anatomical landmarks and ultrasound guidance   -ultrasound image captured on disc.  Assessment  Injection assessment: negative aspiration, negative parasthesia and local visualized surrounding nerve  Paresthesia pain: none  Heart rate change: no  Slow fractionated injection: yes    Medications:    Medications: bupivacaine (pf) (MARCAINE) injection 0.25% - Perineural   20 mL - 5/3/2023 11:19:00 AM

## 2023-05-03 NOTE — BRIEF OP NOTE
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 Noted nerve pinked up once the tendon was released. Moderate fibrosis and cartilage loss of the second metatarsal cuneiform joint.     Estimated Blood Loss: 1 mL         Specimens:   Specimen (24h ago, onward)      None              Discharge Note    OUTCOME: Patient tolerated treatment/procedure well without complication and is now ready for discharge.    DISPOSITION: Home or Self Care    FINAL DIAGNOSIS:  Arthrosis of right foot    FOLLOWUP: In clinic    DISCHARGE INSTRUCTIONS:    Discharge Procedure Orders   Diet general     Keep surgical extremity elevated   Order Comments: Above heart level     Call MD for:  temperature >100.4     Call MD for:  persistent nausea and vomiting     Call MD for:  severe uncontrolled pain     Call MD for:  difficulty breathing, headache or visual disturbances     Leave dressing on - Keep it clean, dry, and intact until clinic visit     Weight bearing restrictions (specify)   Order Comments: Non-weightbearing right foot

## 2023-05-03 NOTE — TRANSFER OF CARE
"Anesthesia Transfer of Care Note    Patient: Karin Maguire    Procedure(s) Performed: Procedure(s) (LRB):  FUSION, FOOT (Right)    Patient location: OPS    Anesthesia Type: general    Transport from OR: Transported from OR on room air with adequate spontaneous ventilation    Post pain: adequate analgesia    Post assessment: no apparent anesthetic complications    Post vital signs: stable    Level of consciousness: awake, alert and oriented    Nausea/Vomiting: no nausea/vomiting    Complications: none    Transfer of care protocol was followedComments: BP  117/61  HR 60  O2SAT 96%RA  RR 20      Last vitals:   Visit Vitals  /75 (BP Location: Left arm, Patient Position: Lying)   Pulse 67   Temp 36.3 °C (97.4 °F) (Temporal)   Resp 18   Ht 5' 6" (1.676 m)   Wt 77 kg (169 lb 12.1 oz)   SpO2 96%   Breastfeeding No   BMI 27.40 kg/m²     "

## 2023-05-04 ENCOUNTER — TELEPHONE (OUTPATIENT)
Dept: PODIATRY | Facility: CLINIC | Age: 73
End: 2023-05-04
Payer: MEDICARE

## 2023-05-04 NOTE — TELEPHONE ENCOUNTER
Spoke with pt and pt's , Jairo,  who reports pt is having issues with her pain being uncontrolled. Reinforced elevation and taking meds q4 hrs. Per Jairo she had gone overnight without taking meds for a while. Let him know that I did notify Dr Alejandra of her pain and informed him that per Dr Alejandra it was advised to take a one time dose of the hydrocodone now and then resume taking pain medication as directed every 4 hours. Pt had taken med at 2 pm will take additional pain med now.Verbalized understanding of one time dose of pain meds per Dr Alejandra and to resume meds as directed. No other needs voiced. Encouraged call back as needed.

## 2023-05-04 NOTE — TELEPHONE ENCOUNTER
----- Message from Sabra Dunham sent at 5/4/2023  1:39 PM CDT -----  Type:  Needs Medical Advice    Who Called: pt    Symptoms (please be specific): pt has a question about a medication that she is taking for pain        Would the patient rather a call back or a response via [facility]? call  Best Call Back Number: [phone]  Additional Information:

## 2023-05-12 ENCOUNTER — OFFICE VISIT (OUTPATIENT)
Dept: PODIATRY | Facility: CLINIC | Age: 73
End: 2023-05-12
Payer: MEDICARE

## 2023-05-12 VITALS
DIASTOLIC BLOOD PRESSURE: 78 MMHG | HEART RATE: 73 BPM | BODY MASS INDEX: 27.16 KG/M2 | HEIGHT: 66 IN | WEIGHT: 169 LBS | SYSTOLIC BLOOD PRESSURE: 121 MMHG

## 2023-05-12 DIAGNOSIS — Z98.890 POSTOPERATIVE STATE: Primary | ICD-10-CM

## 2023-05-12 PROCEDURE — 99999 PR PBB SHADOW E&M-EST. PATIENT-LVL IV: ICD-10-PCS | Mod: PBBFAC,,, | Performed by: PODIATRIST

## 2023-05-12 PROCEDURE — 99999 PR PBB SHADOW E&M-EST. PATIENT-LVL IV: CPT | Mod: PBBFAC,,, | Performed by: PODIATRIST

## 2023-05-12 PROCEDURE — 3078F PR MOST RECENT DIASTOLIC BLOOD PRESSURE < 80 MM HG: ICD-10-PCS | Mod: CPTII,S$GLB,, | Performed by: PODIATRIST

## 2023-05-12 PROCEDURE — 3008F PR BODY MASS INDEX (BMI) DOCUMENTED: ICD-10-PCS | Mod: CPTII,S$GLB,, | Performed by: PODIATRIST

## 2023-05-12 PROCEDURE — 1125F PR PAIN SEVERITY QUANTIFIED, PAIN PRESENT: ICD-10-PCS | Mod: CPTII,S$GLB,, | Performed by: PODIATRIST

## 2023-05-12 PROCEDURE — 3288F PR FALLS RISK ASSESSMENT DOCUMENTED: ICD-10-PCS | Mod: CPTII,S$GLB,, | Performed by: PODIATRIST

## 2023-05-12 PROCEDURE — 99024 PR POST-OP FOLLOW-UP VISIT: ICD-10-PCS | Mod: S$GLB,,, | Performed by: PODIATRIST

## 2023-05-12 PROCEDURE — 1160F PR REVIEW ALL MEDS BY PRESCRIBER/CLIN PHARMACIST DOCUMENTED: ICD-10-PCS | Mod: CPTII,S$GLB,, | Performed by: PODIATRIST

## 2023-05-12 PROCEDURE — 3074F PR MOST RECENT SYSTOLIC BLOOD PRESSURE < 130 MM HG: ICD-10-PCS | Mod: CPTII,S$GLB,, | Performed by: PODIATRIST

## 2023-05-12 PROCEDURE — 1125F AMNT PAIN NOTED PAIN PRSNT: CPT | Mod: CPTII,S$GLB,, | Performed by: PODIATRIST

## 2023-05-12 PROCEDURE — 99024 POSTOP FOLLOW-UP VISIT: CPT | Mod: S$GLB,,, | Performed by: PODIATRIST

## 2023-05-12 PROCEDURE — 3008F BODY MASS INDEX DOCD: CPT | Mod: CPTII,S$GLB,, | Performed by: PODIATRIST

## 2023-05-12 PROCEDURE — 1101F PT FALLS ASSESS-DOCD LE1/YR: CPT | Mod: CPTII,S$GLB,, | Performed by: PODIATRIST

## 2023-05-12 PROCEDURE — 1159F MED LIST DOCD IN RCRD: CPT | Mod: CPTII,S$GLB,, | Performed by: PODIATRIST

## 2023-05-12 PROCEDURE — 1101F PR PT FALLS ASSESS DOC 0-1 FALLS W/OUT INJ PAST YR: ICD-10-PCS | Mod: CPTII,S$GLB,, | Performed by: PODIATRIST

## 2023-05-12 PROCEDURE — 3288F FALL RISK ASSESSMENT DOCD: CPT | Mod: CPTII,S$GLB,, | Performed by: PODIATRIST

## 2023-05-12 PROCEDURE — 3074F SYST BP LT 130 MM HG: CPT | Mod: CPTII,S$GLB,, | Performed by: PODIATRIST

## 2023-05-12 PROCEDURE — 1159F PR MEDICATION LIST DOCUMENTED IN MEDICAL RECORD: ICD-10-PCS | Mod: CPTII,S$GLB,, | Performed by: PODIATRIST

## 2023-05-12 PROCEDURE — 3078F DIAST BP <80 MM HG: CPT | Mod: CPTII,S$GLB,, | Performed by: PODIATRIST

## 2023-05-12 PROCEDURE — 1160F RVW MEDS BY RX/DR IN RCRD: CPT | Mod: CPTII,S$GLB,, | Performed by: PODIATRIST

## 2023-05-12 NOTE — PROGRESS NOTES
"Subjective:      Patient ID: Karin Maguire is a 72 y.o. female.    Chief Complaint: Post-op Evaluation (1 week and 2 days post op)    Referral from  for evaluation of chronic right foot pain secondary to osteoarthritis.  She also reports stubbing her right 4th toe 5 weeks ago with persistent pain and swelling to the area.  She is unable to take NSAID therapy secondary to previous GI bleed.  She is been taking Tylenol which helps somewhat.  States that she gets moderate pain to the foot with extended periods of standing and walking [place] the mall which forces her to sit down.  Ambulating with slip-on tennis shoes.    03/10/2023:  Relates right forefoot pain has resolved and she sought wearing the boot last week.  She previously was diagnosed with a right 4th toe proximal phalanx nondisplaced fracture.  Relates now the pain is mostly in the midfoot with some mild localized swelling.  States the pain has been present for years however the swelling started before the pain.    05/12/2023:  Post 2nd metatarsal cuneiform joint arthrodesis with external neurolysis of the deep peroneal nerve and extensor hallucis brevis tenotomy right foot on 05/03/2023.  Mild intermittent pain reported to the right foot.  She has remain nonweightbearing.  Dressing has remained intact.  Denies any slips, trips or falls.    Vitals:    05/12/23 0839   BP: 121/78   Pulse: 73   Weight: 76.7 kg (169 lb)   Height: 5' 6" (1.676 m)   PainSc:   1      Past Medical History:   Diagnosis Date    Anxiety     Arthritis     Bipolar 1 disorder     Bursitis of right hip     GI bleed due to NSAIDs     Multinodular goiter 11/15/2021    Sacroiliitis     right side    Sleep apnea        Past Surgical History:   Procedure Laterality Date    ANKLE FRACTURE SURGERY Left 2001    BLADDER SUSPENSION      CARPAL TUNNEL RELEASE Bilateral     CHOLECYSTECTOMY      ESOPHAGOGASTRODUODENOSCOPY N/A 7/29/2021    Procedure: EGD (ESOPHAGOGASTRODUODENOSCOPY);  " Surgeon: Susan Mcclain MD;  Location: [facility];  Service: Endoscopy;  Laterality: N/A;    EYE SURGERY      FOOT ARTHRODESIS Right 5/3/2023    Procedure: FUSION, FOOT;  Surgeon: Lauri Alejandra DPM;  Location: [facility] OR;  Service: Podiatry;  Laterality: Right;  mini c-arm, Arthrex dowel bone graft harvester, locking plate and screws festus notified cc    HYSTERECTOMY  1986    vaginal prolapse    INJECTION OF ANESTHETIC AGENT AROUND MEDIAL BRANCH NERVES INNERVATING CERVICAL FACET JOINT Bilateral 5/27/2022    Procedure: CERVICAL MEDIAL BRANCH NERVE BLOCK (C3-4,C4-5);  Surgeon: Mamie Roman MD;  Location: [facility];  Service: Pain Management;  Laterality: Bilateral;    INJECTION OF ANESTHETIC AGENT AROUND MEDIAL BRANCH NERVES INNERVATING LUMBAR FACET JOINT Right 2/5/2021    Procedure: LUMBAR FACET JOINT BLOCK (L3-4,L4-5,L5-S1);  Surgeon: Mamie Roman MD;  Location: [facility];  Service: Pain Management;  Laterality: Right;    INJECTION OF ANESTHETIC AGENT AROUND MEDIAL BRANCH NERVES INNERVATING LUMBAR FACET JOINT Right 4/30/2021    Procedure: LUMBAR FACET JOINT BLOCK (L3-4,L4-5,L5-S1);  Surgeon: Mamie Roman MD;  Location: [facility];  Service: Pain Management;  Laterality: Right;    INJECTION OF ANESTHETIC AGENT INTO SACROILIAC JOINT Right 12/4/2020    Procedure: SACROILIAC JOINT INJECTION;  Surgeon: Mamie Roman MD;  Location: [facility];  Service: Pain Management;  Laterality: Right;    INJECTION OF JOINT Right 12/4/2020    Procedure: GREATER TROCHANTERIC BURSA INJECTION;  Surgeon: Mamie Roman MD;  Location: [facility];  Service: Pain Management;  Laterality: Right;    NASAL SEPTUM SURGERY      RECTAL PROLAPSE REPAIR      TONSILLECTOMY         Family History   Problem Relation Age of Onset    Colon cancer Maternal Uncle     Colon cancer Maternal Uncle     Colon cancer Maternal Uncle        Social History     Socioeconomic History    Marital status:    Tobacco Use    Smoking status: Former     Packs/day:  1.00     Years: 35.00     Pack years: 35.00     Types: Cigarettes     Start date: 3/30/1982     Quit date: 2023     Years since quittin.2    Smokeless tobacco: Never    Tobacco comments:     smoking cessation clinic pamphlet for clinic put on chart to give to pt.    Substance and Sexual Activity    Alcohol use: Yes     Comment: Socially-2 or 3 times a year-6 or 7 drinks    Drug use: No    Sexual activity: Yes     Partners: Male     Birth control/protection: Surgical     Comment:        Current Outpatient Medications   Medication Sig Dispense Refill    acetaminophen (TYLENOL) 500 MG tablet Take 2 tablets (1,000 mg total) by mouth every 8 (eight) hours as needed for Pain. 30 tablet 0    albuterol (PROVENTIL/VENTOLIN HFA) 90 mcg/actuation inhaler INHALE 2 PUFFS INTO THE LUNGS EVERY 6 (SIX) HOURS AS NEEDED FOR WHEEZING (COUGH AND WHEEZING).RESCUE 18 g 1    aspirin 81 MG Chew Take 1 tablet (81 mg total) by mouth once daily. 30 tablet 5    calcium carbonate (OS-ELADIA) 500 mg calcium (1,250 mg) tablet Take 1 tablet (500 mg total) by mouth once daily. 90 tablet 3    cholecalciferol, vitamin D3, 1,250 mcg (50,000 unit) capsule Take 1 capsule (50,000 Units total) by mouth once a week. 12 capsule 3    DULoxetine (CYMBALTA) 60 MG capsule Take 2 capsules (120 mg total) by mouth once daily. 60 capsule 5    HYDROcodone-acetaminophen (NORCO)  mg per tablet Take 1 tablet by mouth every 4 (four) hours as needed for Pain. 30 tablet 0    hydrOXYzine pamoate (VISTARIL) 25 MG Cap Take 1-4 capsules every 6 hours as needed for anxiety 120 capsule 2    lamoTRIgine (LAMICTAL) 150 MG Tab Take 2 tablets (300 mg total) by mouth every evening. 180 tablet 5    pantoprazole (PROTONIX) 40 MG tablet Take 1 tablet (40 mg total) by mouth once daily. 90 tablet 3    rosuvastatin (CRESTOR) 10 MG tablet TAKE 1 TABLET BY MOUTH EVERY DAY 90 tablet 3    traZODone (DESYREL) 50 MG tablet Take 1 tablet at bedtime for 1 week then increase to  1-2 tablets nightly as needed 60 tablet 2    cephALEXin (KEFLEX) 500 MG capsule Take 1 capsule (500 mg total) by mouth 4 (four) times daily. (Patient not taking: Reported on 5/12/2023) 28 capsule 0     Current Facility-Administered Medications   Medication Dose Route Frequency Provider Last Rate Last Admin    sodium chloride 0.9% flush 10 mL  10 mL Intravenous PRN Lauri Alejandra DPM           Review of patient's allergies indicates:   Allergen Reactions    Nsaids (non-steroidal anti-inflammatory drug) Other (See Comments)     Gastrointestinal bleeding requiring blood transfusion    Demerol [meperidine] Rash         Review of Systems   Constitutional: Negative for chills and fever.   HENT:  Negative for congestion and hearing loss.    Cardiovascular:  Negative for chest pain and claudication.   Respiratory:  Negative for cough and shortness of breath.    Skin:  Negative for color change and itching.   Musculoskeletal:  Positive for arthritis.   Gastrointestinal:  Negative for nausea and vomiting.   Neurological:  Positive for numbness. Negative for paresthesias.   Psychiatric/Behavioral:  Negative for altered mental status.          Objective:      Physical Exam  Constitutional:       General: She is not in acute distress.     Appearance: Normal appearance. She is not ill-appearing.   Cardiovascular:      Pulses:           Dorsalis pedis pulses are 2+ on the right side and 2+ on the left side.        Posterior tibial pulses are 2+ on the right side and 2+ on the left side.      Comments: Mild nonpitting edema localized to the right foot.  Skin temp is warm to foot bilateral.  No rubor on dependency bilateral foot.  No hair growth follow extremity.  Musculoskeletal:      Comments: Semi-rigid cavus foot structure bilateral.    Mild localized pain on palpation to surgical site dorsal right midfoot.  No palpable fluctuance or crepitance.  No pain with active range motion right foot.   Skin:     General: Skin is  warm.      Capillary Refill: Capillary refill takes less than 2 seconds.      Findings: No ecchymosis or erythema.      Nails: There is no clubbing.      Comments: Incision site dorsal right midfoot well-approximated sutures.  No gapping or dehiscence noted.  Minimal periwound erythema noted.   Neurological:      Mental Status: She is alert and oriented to person, place, and time.      Sensory: Sensation is intact.      Motor: Motor function is intact.             Assessment:       Encounter Diagnosis   Name Primary?    Postoperative state Yes         Plan:       Karin was seen today for post-op evaluation.    Diagnoses and all orders for this visit:    Postoperative state  -     X-Ray Foot Complete Right; Future      I counseled the patient on her conditions, their implications and medical management.    Dressing removed and right foot cleansed with Hibiclens scrub per nursing staff.  Applied Aquacel border Tubigrip F.  Home care instructions reviewed in detail.  Patient is permitted to shower however avoid submerging the foot directly into water.      Permitted heel weight-bearing for transfers only with orthopedic boot.  Continue elevation at rest.  May remove the orthopedic boot at rest.    RTC within 2 weeks for suture removal or p.r.n. as discussed    A portion of this note was generated by voice recognition software and may contain spelling and grammar errors.      .

## 2023-05-22 RX ORDER — TRAZODONE HYDROCHLORIDE 50 MG/1
TABLET ORAL
Qty: 180 TABLET | Refills: 0 | Status: SHIPPED | OUTPATIENT
Start: 2023-05-22 | End: 2023-07-03 | Stop reason: SDUPTHER

## 2023-05-23 ENCOUNTER — PATIENT MESSAGE (OUTPATIENT)
Dept: RESEARCH | Facility: HOSPITAL | Age: 73
End: 2023-05-23
Payer: MEDICARE

## 2023-05-25 ENCOUNTER — OFFICE VISIT (OUTPATIENT)
Dept: PODIATRY | Facility: CLINIC | Age: 73
End: 2023-05-25
Payer: MEDICARE

## 2023-05-25 VITALS
WEIGHT: 169 LBS | BODY MASS INDEX: 27.16 KG/M2 | DIASTOLIC BLOOD PRESSURE: 65 MMHG | SYSTOLIC BLOOD PRESSURE: 129 MMHG | HEART RATE: 67 BPM | HEIGHT: 66 IN

## 2023-05-25 DIAGNOSIS — Z98.890 POSTOPERATIVE STATE: Primary | ICD-10-CM

## 2023-05-25 PROCEDURE — 3078F PR MOST RECENT DIASTOLIC BLOOD PRESSURE < 80 MM HG: ICD-10-PCS | Mod: CPTII,S$GLB,, | Performed by: PODIATRIST

## 2023-05-25 PROCEDURE — 1160F RVW MEDS BY RX/DR IN RCRD: CPT | Mod: CPTII,S$GLB,, | Performed by: PODIATRIST

## 2023-05-25 PROCEDURE — 1126F PR PAIN SEVERITY QUANTIFIED, NO PAIN PRESENT: ICD-10-PCS | Mod: CPTII,S$GLB,, | Performed by: PODIATRIST

## 2023-05-25 PROCEDURE — 3008F BODY MASS INDEX DOCD: CPT | Mod: CPTII,S$GLB,, | Performed by: PODIATRIST

## 2023-05-25 PROCEDURE — 3074F PR MOST RECENT SYSTOLIC BLOOD PRESSURE < 130 MM HG: ICD-10-PCS | Mod: CPTII,S$GLB,, | Performed by: PODIATRIST

## 2023-05-25 PROCEDURE — 1101F PR PT FALLS ASSESS DOC 0-1 FALLS W/OUT INJ PAST YR: ICD-10-PCS | Mod: CPTII,S$GLB,, | Performed by: PODIATRIST

## 2023-05-25 PROCEDURE — 3288F PR FALLS RISK ASSESSMENT DOCUMENTED: ICD-10-PCS | Mod: CPTII,S$GLB,, | Performed by: PODIATRIST

## 2023-05-25 PROCEDURE — 3078F DIAST BP <80 MM HG: CPT | Mod: CPTII,S$GLB,, | Performed by: PODIATRIST

## 2023-05-25 PROCEDURE — 99024 POSTOP FOLLOW-UP VISIT: CPT | Mod: S$GLB,,, | Performed by: PODIATRIST

## 2023-05-25 PROCEDURE — 1159F PR MEDICATION LIST DOCUMENTED IN MEDICAL RECORD: ICD-10-PCS | Mod: CPTII,S$GLB,, | Performed by: PODIATRIST

## 2023-05-25 PROCEDURE — 1126F AMNT PAIN NOTED NONE PRSNT: CPT | Mod: CPTII,S$GLB,, | Performed by: PODIATRIST

## 2023-05-25 PROCEDURE — 99024 PR POST-OP FOLLOW-UP VISIT: ICD-10-PCS | Mod: S$GLB,,, | Performed by: PODIATRIST

## 2023-05-25 PROCEDURE — 3288F FALL RISK ASSESSMENT DOCD: CPT | Mod: CPTII,S$GLB,, | Performed by: PODIATRIST

## 2023-05-25 PROCEDURE — 99999 PR PBB SHADOW E&M-EST. PATIENT-LVL III: ICD-10-PCS | Mod: PBBFAC,,, | Performed by: PODIATRIST

## 2023-05-25 PROCEDURE — 1159F MED LIST DOCD IN RCRD: CPT | Mod: CPTII,S$GLB,, | Performed by: PODIATRIST

## 2023-05-25 PROCEDURE — 3074F SYST BP LT 130 MM HG: CPT | Mod: CPTII,S$GLB,, | Performed by: PODIATRIST

## 2023-05-25 PROCEDURE — 3008F PR BODY MASS INDEX (BMI) DOCUMENTED: ICD-10-PCS | Mod: CPTII,S$GLB,, | Performed by: PODIATRIST

## 2023-05-25 PROCEDURE — 1101F PT FALLS ASSESS-DOCD LE1/YR: CPT | Mod: CPTII,S$GLB,, | Performed by: PODIATRIST

## 2023-05-25 PROCEDURE — 1160F PR REVIEW ALL MEDS BY PRESCRIBER/CLIN PHARMACIST DOCUMENTED: ICD-10-PCS | Mod: CPTII,S$GLB,, | Performed by: PODIATRIST

## 2023-05-25 PROCEDURE — 99999 PR PBB SHADOW E&M-EST. PATIENT-LVL III: CPT | Mod: PBBFAC,,, | Performed by: PODIATRIST

## 2023-05-25 NOTE — PROGRESS NOTES
"Subjective:      Patient ID: Karin Maguire is a 73 y.o. female.    Chief Complaint: Post-op Evaluation (3 weeks post op)    Referral from  for evaluation of chronic right foot pain secondary to osteoarthritis.  She also reports stubbing her right 4th toe 5 weeks ago with persistent pain and swelling to the area.  She is unable to take NSAID therapy secondary to previous GI bleed.  She is been taking Tylenol which helps somewhat.  States that she gets moderate pain to the foot with extended periods of standing and walking [place] the mall which forces her to sit down.  Ambulating with slip-on tennis shoes.    03/10/2023:  Relates right forefoot pain has resolved and she sought wearing the boot last week.  She previously was diagnosed with a right 4th toe proximal phalanx nondisplaced fracture.  Relates now the pain is mostly in the midfoot with some mild localized swelling.  States the pain has been present for years however the swelling started before the pain.    05/12/2023:  Post 2nd metatarsal cuneiform joint arthrodesis with external neurolysis of the deep peroneal nerve and extensor hallucis brevis tenotomy right foot on 05/03/2023.  Mild intermittent pain reported to the right foot.  She has remain nonweightbearing.  Dressing has remained intact.  Denies any slips, trips or falls.    05/25/2023:  3 weeks postop.  Relates no significant pain to the right foot.  She has remain nonweightbearing as advised to the right foot.    Vitals:    05/25/23 0831   BP: 129/65   Pulse: 67   Weight: 76.7 kg (169 lb)   Height: 5' 6" (1.676 m)   PainSc: 0-No pain      Past Medical History:   Diagnosis Date    Anxiety     Arthritis     Bipolar 1 disorder     Bursitis of right hip     GI bleed due to NSAIDs     Multinodular goiter 11/15/2021    Sacroiliitis     right side    Sleep apnea        Past Surgical History:   Procedure Laterality Date    ANKLE FRACTURE SURGERY Left 2001    BLADDER SUSPENSION      CARPAL TUNNEL " RELEASE Bilateral     CHOLECYSTECTOMY      ESOPHAGOGASTRODUODENOSCOPY N/A 7/29/2021    Procedure: EGD (ESOPHAGOGASTRODUODENOSCOPY);  Surgeon: Susan Mcclain MD;  Location: [facility];  Service: Endoscopy;  Laterality: N/A;    EYE SURGERY      FOOT ARTHRODESIS Right 5/3/2023    Procedure: FUSION, FOOT;  Surgeon: ODALIS Campuzano;  Location: [facility] OR;  Service: Podiatry;  Laterality: Right;  mini c-arm, Arthrex dowel bone graft harvester, locking plate and screws festus notified cc    HYSTERECTOMY  1986    vaginal prolapse    INJECTION OF ANESTHETIC AGENT AROUND MEDIAL BRANCH NERVES INNERVATING CERVICAL FACET JOINT Bilateral 5/27/2022    Procedure: CERVICAL MEDIAL BRANCH NERVE BLOCK (C3-4,C4-5);  Surgeon: Mamie Roman MD;  Location: [facility];  Service: Pain Management;  Laterality: Bilateral;    INJECTION OF ANESTHETIC AGENT AROUND MEDIAL BRANCH NERVES INNERVATING LUMBAR FACET JOINT Right 2/5/2021    Procedure: LUMBAR FACET JOINT BLOCK (L3-4,L4-5,L5-S1);  Surgeon: Mamie Roman MD;  Location: [facility];  Service: Pain Management;  Laterality: Right;    INJECTION OF ANESTHETIC AGENT AROUND MEDIAL BRANCH NERVES INNERVATING LUMBAR FACET JOINT Right 4/30/2021    Procedure: LUMBAR FACET JOINT BLOCK (L3-4,L4-5,L5-S1);  Surgeon: Mamie Roman MD;  Location: [facility];  Service: Pain Management;  Laterality: Right;    INJECTION OF ANESTHETIC AGENT INTO SACROILIAC JOINT Right 12/4/2020    Procedure: SACROILIAC JOINT INJECTION;  Surgeon: Mamie Roman MD;  Location: [facility];  Service: Pain Management;  Laterality: Right;    INJECTION OF JOINT Right 12/4/2020    Procedure: GREATER TROCHANTERIC BURSA INJECTION;  Surgeon: Mamie Roman MD;  Location: [facility];  Service: Pain Management;  Laterality: Right;    NASAL SEPTUM SURGERY      RECTAL PROLAPSE REPAIR      TONSILLECTOMY         Family History   Problem Relation Age of Onset    Colon cancer Maternal Uncle     Colon cancer Maternal Uncle     Colon cancer Maternal  Uncle        Social History     Socioeconomic History    Marital status:    Tobacco Use    Smoking status: Former     Packs/day: 1.00     Years: 35.00     Pack years: 35.00     Types: Cigarettes     Start date: 3/30/1982     Quit date: 2023     Years since quittin.2    Smokeless tobacco: Never    Tobacco comments:     smoking cessation clinic pamphlet for clinic put on chart to give to pt.    Substance and Sexual Activity    Alcohol use: Yes     Comment: Socially-2 or 3 times a year-6 or 7 drinks    Drug use: No    Sexual activity: Yes     Partners: Male     Birth control/protection: Surgical     Comment:        Current Outpatient Medications   Medication Sig Dispense Refill    acetaminophen (TYLENOL) 500 MG tablet Take 2 tablets (1,000 mg total) by mouth every 8 (eight) hours as needed for Pain. 30 tablet 0    albuterol (PROVENTIL/VENTOLIN HFA) 90 mcg/actuation inhaler INHALE 2 PUFFS INTO THE LUNGS EVERY 6 (SIX) HOURS AS NEEDED FOR WHEEZING (COUGH AND WHEEZING).RESCUE 18 g 1    aspirin 81 MG Chew Take 1 tablet (81 mg total) by mouth once daily. 30 tablet 5    calcium carbonate (OS-ELADIA) 500 mg calcium (1,250 mg) tablet Take 1 tablet (500 mg total) by mouth once daily. 90 tablet 3    cephALEXin (KEFLEX) 500 MG capsule Take 1 capsule (500 mg total) by mouth 4 (four) times daily. 28 capsule 0    cholecalciferol, vitamin D3, 1,250 mcg (50,000 unit) capsule Take 1 capsule (50,000 Units total) by mouth once a week. 12 capsule 3    DULoxetine (CYMBALTA) 60 MG capsule Take 2 capsules (120 mg total) by mouth once daily. 60 capsule 5    HYDROcodone-acetaminophen (NORCO)  mg per tablet Take 1 tablet by mouth every 4 (four) hours as needed for Pain. 30 tablet 0    hydrOXYzine pamoate (VISTARIL) 25 MG Cap Take 1-4 capsules every 6 hours as needed for anxiety 120 capsule 2    lamoTRIgine (LAMICTAL) 150 MG Tab Take 2 tablets (300 mg total) by mouth every evening. 180 tablet 5    pantoprazole (PROTONIX)  40 MG tablet Take 1 tablet (40 mg total) by mouth once daily. 90 tablet 3    rosuvastatin (CRESTOR) 10 MG tablet TAKE 1 TABLET BY MOUTH EVERY DAY 90 tablet 3    traZODone (DESYREL) 50 MG tablet TAKE 1 TABLET AT BEDTIME FOR 1 WEEK THEN INCREASE TO 1-2 TABLETS NIGHTLY AS NEEDED 180 tablet 0     Current Facility-Administered Medications   Medication Dose Route Frequency Provider Last Rate Last Admin    sodium chloride 0.9% flush 10 mL  10 mL Intravenous PRN Lauri Alejandra DPM           Review of patient's allergies indicates:   Allergen Reactions    Nsaids (non-steroidal anti-inflammatory drug) Other (See Comments)     Gastrointestinal bleeding requiring blood transfusion    Demerol [meperidine] Rash         Review of Systems   Constitutional: Negative for chills and fever.   HENT:  Negative for congestion and hearing loss.    Cardiovascular:  Negative for chest pain and claudication.   Respiratory:  Negative for cough and shortness of breath.    Skin:  Negative for color change and itching.   Musculoskeletal:  Positive for arthritis.   Gastrointestinal:  Negative for nausea and vomiting.   Neurological:  Positive for numbness. Negative for paresthesias.   Psychiatric/Behavioral:  Negative for altered mental status.          Objective:      Physical Exam  Constitutional:       General: She is not in acute distress.     Appearance: Normal appearance. She is not ill-appearing.   Cardiovascular:      Pulses:           Dorsalis pedis pulses are 2+ on the right side and 2+ on the left side.        Posterior tibial pulses are 2+ on the right side and 2+ on the left side.      Comments: Mild nonpitting edema localized to the right foot.  Skin temp is warm to foot bilateral.  No rubor on dependency bilateral foot.  No hair growth follow extremity.  Musculoskeletal:      Comments: Semi-rigid cavus foot structure bilateral.    Mild localized pain on palpation to surgical site dorsal right midfoot.  No palpable fluctuance or  crepitance.  No pain with active range motion right foot.   Skin:     General: Skin is warm.      Capillary Refill: Capillary refill takes less than 2 seconds.      Findings: No ecchymosis or erythema.      Nails: There is no clubbing.      Comments: Incision site dorsal right midfoot well-approximated sutures.  No gapping or dehiscence noted.     Neurological:      Mental Status: She is alert and oriented to person, place, and time.      Sensory: Sensation is intact.      Motor: Motor function is intact.             Assessment:       Encounter Diagnosis   Name Primary?    Postoperative state Yes         Plan:       Karin was seen today for post-op evaluation.    Diagnoses and all orders for this visit:    Postoperative state      I counseled the patient on her conditions, their implications and medical management.    Sutures removed.  No dehiscence noted.  Home care instructions reviewed in detail.      Patient may transition to partial weight-bearing right foot with Cam boot and walker as tolerated for short distances x1 week and then gradually transition to full weight-bearing as tolerated with Cam boot.      Continue elevation at rest.      RTC within 1 month for follow-up weight-bearing right foot x-ray or p.r.n. as discussed.  At that time we will require referral to physical therapy for gradual strengthening and gait training.    A portion of this note was generated by voice recognition software and may contain spelling and grammar errors.      .

## 2023-06-23 ENCOUNTER — OFFICE VISIT (OUTPATIENT)
Dept: PODIATRY | Facility: CLINIC | Age: 73
End: 2023-06-23
Payer: MEDICARE

## 2023-06-23 ENCOUNTER — HOSPITAL ENCOUNTER (OUTPATIENT)
Dept: RADIOLOGY | Facility: HOSPITAL | Age: 73
Discharge: HOME | End: 2023-06-23
Attending: PODIATRIST
Payer: MEDICARE

## 2023-06-23 VITALS
SYSTOLIC BLOOD PRESSURE: 150 MMHG | BODY MASS INDEX: 27.16 KG/M2 | DIASTOLIC BLOOD PRESSURE: 74 MMHG | WEIGHT: 169 LBS | HEART RATE: 90 BPM | HEIGHT: 66 IN

## 2023-06-23 DIAGNOSIS — Z98.890 POSTOPERATIVE STATE: Primary | ICD-10-CM

## 2023-06-23 DIAGNOSIS — Z98.890 POSTOPERATIVE STATE: ICD-10-CM

## 2023-06-23 PROCEDURE — 1126F AMNT PAIN NOTED NONE PRSNT: CPT | Mod: CPTII,S$GLB,, | Performed by: PODIATRIST

## 2023-06-23 PROCEDURE — 1160F PR REVIEW ALL MEDS BY PRESCRIBER/CLIN PHARMACIST DOCUMENTED: ICD-10-PCS | Mod: CPTII,S$GLB,, | Performed by: PODIATRIST

## 2023-06-23 PROCEDURE — 73630 X-RAY EXAM OF FOOT: CPT | Mod: 26,RT,, | Performed by: RADIOLOGY

## 2023-06-23 PROCEDURE — 3288F PR FALLS RISK ASSESSMENT DOCUMENTED: ICD-10-PCS | Mod: CPTII,S$GLB,, | Performed by: PODIATRIST

## 2023-06-23 PROCEDURE — 3077F SYST BP >= 140 MM HG: CPT | Mod: CPTII,S$GLB,, | Performed by: PODIATRIST

## 2023-06-23 PROCEDURE — 3077F PR MOST RECENT SYSTOLIC BLOOD PRESSURE >= 140 MM HG: ICD-10-PCS | Mod: CPTII,S$GLB,, | Performed by: PODIATRIST

## 2023-06-23 PROCEDURE — 3008F PR BODY MASS INDEX (BMI) DOCUMENTED: ICD-10-PCS | Mod: CPTII,S$GLB,, | Performed by: PODIATRIST

## 2023-06-23 PROCEDURE — 1101F PR PT FALLS ASSESS DOC 0-1 FALLS W/OUT INJ PAST YR: ICD-10-PCS | Mod: CPTII,S$GLB,, | Performed by: PODIATRIST

## 2023-06-23 PROCEDURE — 3078F PR MOST RECENT DIASTOLIC BLOOD PRESSURE < 80 MM HG: ICD-10-PCS | Mod: CPTII,S$GLB,, | Performed by: PODIATRIST

## 2023-06-23 PROCEDURE — 1101F PT FALLS ASSESS-DOCD LE1/YR: CPT | Mod: CPTII,S$GLB,, | Performed by: PODIATRIST

## 2023-06-23 PROCEDURE — 3288F FALL RISK ASSESSMENT DOCD: CPT | Mod: CPTII,S$GLB,, | Performed by: PODIATRIST

## 2023-06-23 PROCEDURE — 99024 PR POST-OP FOLLOW-UP VISIT: ICD-10-PCS | Mod: S$GLB,,, | Performed by: PODIATRIST

## 2023-06-23 PROCEDURE — 99999 PR PBB SHADOW E&M-EST. PATIENT-LVL IV: CPT | Mod: PBBFAC,,, | Performed by: PODIATRIST

## 2023-06-23 PROCEDURE — 3078F DIAST BP <80 MM HG: CPT | Mod: CPTII,S$GLB,, | Performed by: PODIATRIST

## 2023-06-23 PROCEDURE — 99999 PR PBB SHADOW E&M-EST. PATIENT-LVL IV: ICD-10-PCS | Mod: PBBFAC,,, | Performed by: PODIATRIST

## 2023-06-23 PROCEDURE — 1159F PR MEDICATION LIST DOCUMENTED IN MEDICAL RECORD: ICD-10-PCS | Mod: CPTII,S$GLB,, | Performed by: PODIATRIST

## 2023-06-23 PROCEDURE — 73630 XR FOOT COMPLETE 3 VIEW RIGHT: ICD-10-PCS | Mod: 26,RT,, | Performed by: RADIOLOGY

## 2023-06-23 PROCEDURE — 1160F RVW MEDS BY RX/DR IN RCRD: CPT | Mod: CPTII,S$GLB,, | Performed by: PODIATRIST

## 2023-06-23 PROCEDURE — 73630 X-RAY EXAM OF FOOT: CPT | Mod: TC,PN,RT

## 2023-06-23 PROCEDURE — 1126F PR PAIN SEVERITY QUANTIFIED, NO PAIN PRESENT: ICD-10-PCS | Mod: CPTII,S$GLB,, | Performed by: PODIATRIST

## 2023-06-23 PROCEDURE — 99024 POSTOP FOLLOW-UP VISIT: CPT | Mod: S$GLB,,, | Performed by: PODIATRIST

## 2023-06-23 PROCEDURE — 1159F MED LIST DOCD IN RCRD: CPT | Mod: CPTII,S$GLB,, | Performed by: PODIATRIST

## 2023-06-23 PROCEDURE — 3008F BODY MASS INDEX DOCD: CPT | Mod: CPTII,S$GLB,, | Performed by: PODIATRIST

## 2023-06-23 NOTE — PATIENT INSTRUCTIONS
May begin gradual 30 minute daily increments to transition to a tennis shoe.    May participate in low impact exercise such as stationary bike, elliptical and swimming.     Avoid high impact activities such as running, jumping and squatting.

## 2023-06-23 NOTE — PROGRESS NOTES
"Subjective:      Patient ID: Karin Maguire is a 73 y.o. female.    Chief Complaint: Post-op Evaluation (8 week )    Referral from  for evaluation of chronic right foot pain secondary to osteoarthritis.  She also reports stubbing her right 4th toe 5 weeks ago with persistent pain and swelling to the area.  She is unable to take NSAID therapy secondary to previous GI bleed.  She is been taking Tylenol which helps somewhat.  States that she gets moderate pain to the foot with extended periods of standing and walking at the mall which forces her to sit down.  Ambulating with slip-on tennis shoes.    03/10/2023:  Relates right forefoot pain has resolved and she sought wearing the boot last week.  She previously was diagnosed with a right 4th toe proximal phalanx nondisplaced fracture.  Relates now the pain is mostly in the midfoot with some mild localized swelling.  States the pain has been present for years however the swelling started before the pain.    05/12/2023:  Post 2nd metatarsal cuneiform joint arthrodesis with external neurolysis of the deep peroneal nerve and extensor hallucis brevis tenotomy right foot on 05/03/2023.  Mild intermittent pain reported to the right foot.  She has remain nonweightbearing.  Dressing has remained intact.  Denies any slips, trips or falls.    05/25/2023:  3 weeks postop.  Relates no significant pain to the right foot.  She has remain nonweightbearing as advised to the right foot.    06/23/2023: Approximately 7 weeks postop.  Reports no pain.  She is been applying weight closer towards the right heel and ambulating with a Cam boot.  She is been performing active range motion exercises and light resistance while at home and states the discomfort and stiffness to the right foot and ankle has resolved.  No longer has any pain.    Vitals:    06/23/23 1327   BP: (!) 150/74   Pulse: 90   Weight: 76.7 kg (169 lb)   Height: 5' 6" (1.676 m)   PainSc: 0-No pain      Past " Medical History:   Diagnosis Date    Anxiety     Arthritis     Bipolar 1 disorder     Bursitis of right hip     GI bleed due to NSAIDs     Multinodular goiter 11/15/2021    Sacroiliitis     right side    Sleep apnea        Past Surgical History:   Procedure Laterality Date    ANKLE FRACTURE SURGERY Left 2001    BLADDER SUSPENSION      CARPAL TUNNEL RELEASE Bilateral     CHOLECYSTECTOMY      ESOPHAGOGASTRODUODENOSCOPY N/A 7/29/2021    Procedure: EGD (ESOPHAGOGASTRODUODENOSCOPY);  Surgeon: Susan Mcclain MD;  Location: [facility];  Service: Endoscopy;  Laterality: N/A;    EYE SURGERY      FOOT ARTHRODESIS Right 5/3/2023    Procedure: FUSION, FOOT;  Surgeon: ODALIS Campuzano;  Location: [facility];  Service: Podiatry;  Laterality: Right;  mini c-arm, Arthrex dowel bone graft harvester, locking plate and screws festus notified cc    HYSTERECTOMY  1986    vaginal prolapse    INJECTION OF ANESTHETIC AGENT AROUND MEDIAL BRANCH NERVES INNERVATING CERVICAL FACET JOINT Bilateral 5/27/2022    Procedure: CERVICAL MEDIAL BRANCH NERVE BLOCK (C3-4,C4-5);  Surgeon: Mamie Roman MD;  Location: [facility];  Service: Pain Management;  Laterality: Bilateral;    INJECTION OF ANESTHETIC AGENT AROUND MEDIAL BRANCH NERVES INNERVATING LUMBAR FACET JOINT Right 2/5/2021    Procedure: LUMBAR FACET JOINT BLOCK (L3-4,L4-5,L5-S1);  Surgeon: Mamie Roman MD;  Location: [facility];  Service: Pain Management;  Laterality: Right;    INJECTION OF ANESTHETIC AGENT AROUND MEDIAL BRANCH NERVES INNERVATING LUMBAR FACET JOINT Right 4/30/2021    Procedure: LUMBAR FACET JOINT BLOCK (L3-4,L4-5,L5-S1);  Surgeon: Mamie Roman MD;  Location: [facility];  Service: Pain Management;  Laterality: Right;    INJECTION OF ANESTHETIC AGENT INTO SACROILIAC JOINT Right 12/4/2020    Procedure: SACROILIAC JOINT INJECTION;  Surgeon: Mamie Roman MD;  Location: [facility];  Service: Pain Management;  Laterality: Right;    INJECTION OF JOINT Right 12/4/2020    Procedure:  GREATER TROCHANTERIC BURSA INJECTION;  Surgeon: Mamie Roman MD;  Location: [facility];  Service: Pain Management;  Laterality: Right;    NASAL SEPTUM SURGERY      RECTAL PROLAPSE REPAIR      TONSILLECTOMY         Family History   Problem Relation Age of Onset    Colon cancer Maternal Uncle     Colon cancer Maternal Uncle     Colon cancer Maternal Uncle        Social History     Socioeconomic History    Marital status:    Tobacco Use    Smoking status: Former     Packs/day: 1.00     Years: 35.00     Pack years: 35.00     Types: Cigarettes     Start date: 3/30/1982     Quit date: 2023     Years since quittin.3    Smokeless tobacco: Never    Tobacco comments:     smoking cessation clinic pamphlet for clinic put on chart to give to pt.    Substance and Sexual Activity    Alcohol use: Yes     Comment: Socially-2 or 3 times a year-6 or 7 drinks    Drug use: No    Sexual activity: Yes     Partners: Male     Birth control/protection: Surgical     Comment:        Current Outpatient Medications   Medication Sig Dispense Refill    acetaminophen (TYLENOL) 500 MG tablet Take 2 tablets (1,000 mg total) by mouth every 8 (eight) hours as needed for Pain. 30 tablet 0    albuterol (PROVENTIL/VENTOLIN HFA) 90 mcg/actuation inhaler INHALE 2 PUFFS INTO THE LUNGS EVERY 6 (SIX) HOURS AS NEEDED FOR WHEEZING (COUGH AND WHEEZING).RESCUE 18 g 1    aspirin 81 MG Chew Take 1 tablet (81 mg total) by mouth once daily. 30 tablet 5    calcium carbonate (OS-ELADIA) 500 mg calcium (1,250 mg) tablet Take 1 tablet (500 mg total) by mouth once daily. 90 tablet 3    cholecalciferol, vitamin D3, 1,250 mcg (50,000 unit) capsule Take 1 capsule (50,000 Units total) by mouth once a week. 12 capsule 3    DULoxetine (CYMBALTA) 60 MG capsule Take 2 capsules (120 mg total) by mouth once daily. 60 capsule 5    hydrOXYzine pamoate (VISTARIL) 25 MG Cap Take 1-4 capsules every 6 hours as needed for anxiety 120 capsule 2    lamoTRIgine (LAMICTAL)  150 MG Tab Take 2 tablets (300 mg total) by mouth every evening. 180 tablet 5    pantoprazole (PROTONIX) 40 MG tablet Take 1 tablet (40 mg total) by mouth once daily. 90 tablet 3    rosuvastatin (CRESTOR) 10 MG tablet TAKE 1 TABLET BY MOUTH EVERY DAY 90 tablet 3    traZODone (DESYREL) 50 MG tablet TAKE 1 TABLET AT BEDTIME FOR 1 WEEK THEN INCREASE TO 1-2 TABLETS NIGHTLY AS NEEDED 180 tablet 0    cephALEXin (KEFLEX) 500 MG capsule Take 1 capsule (500 mg total) by mouth 4 (four) times daily. 28 capsule 0    HYDROcodone-acetaminophen (NORCO)  mg per tablet Take 1 tablet by mouth every 4 (four) hours as needed for Pain. 30 tablet 0     Current Facility-Administered Medications   Medication Dose Route Frequency Provider Last Rate Last Admin    sodium chloride 0.9% flush 10 mL  10 mL Intravenous PRN Lauri Alejandra, DPM           Review of patient's allergies indicates:   Allergen Reactions    Nsaids (non-steroidal anti-inflammatory drug) Other (See Comments)     Gastrointestinal bleeding requiring blood transfusion    Demerol [meperidine] Rash         Review of Systems   Constitutional: Negative for chills and fever.   HENT:  Negative for congestion and hearing loss.    Cardiovascular:  Negative for chest pain and claudication.   Respiratory:  Negative for cough and shortness of breath.    Skin:  Negative for color change and itching.   Musculoskeletal:  Positive for arthritis.   Gastrointestinal:  Negative for nausea and vomiting.   Neurological:  Positive for numbness. Negative for paresthesias.   Psychiatric/Behavioral:  Negative for altered mental status.          Objective:      Physical Exam  Constitutional:       General: She is not in acute distress.     Appearance: Normal appearance. She is not ill-appearing.   Cardiovascular:      Pulses:           Dorsalis pedis pulses are 2+ on the right side and 2+ on the left side.        Posterior tibial pulses are 2+ on the right side and 2+ on the left side.       Comments: Mild nonpitting edema localized to the right foot.  Skin temp is warm to foot bilateral.  No rubor on dependency bilateral foot.  No hair growth follow extremity.  Musculoskeletal:      Comments: Semi-rigid cavus foot structure bilateral.    No pain on palpation, range motion or manual muscle strength testing right foot and ankle.   Skin:     General: Skin is warm.      Capillary Refill: Capillary refill takes less than 2 seconds.      Findings: No ecchymosis or erythema.      Nails: There is no clubbing.      Comments: Normal appearing scar dorsal right midfoot.   Neurological:      Mental Status: She is alert and oriented to person, place, and time.      Sensory: Sensation is intact.      Motor: Motor function is intact.             Assessment:       Encounter Diagnosis   Name Primary?    Postoperative state Yes         Plan:       Karin was seen today for post-op evaluation.    Diagnoses and all orders for this visit:    Postoperative state  -     X-Ray Foot Complete Right; Future      I counseled the patient on her conditions, their implications and medical management.    Reviewed right foot x-ray noting that the 2nd metatarsocuneiform joint has no significant gapping with moderate bony trabeculation noted.  There is no loosening or shifting or change in alignment of the hardware.    Patient is cleared to begin transitioning out of the orthopedic boot into a tennis shoe with 30 minute daily increments.  She is to avoid any high impact activity.  Activity restrictions reviewed in detail.      We did discuss attending physical therapy however patient declined stating that she would like to continue her home exercises for now.      RTC within 6 weeks for follow-up weight-bearing x-ray or p.r.n. as discussed.    A portion of this note was generated by voice recognition software and may contain spelling and grammar errors.      .

## 2023-07-03 ENCOUNTER — OFFICE VISIT (OUTPATIENT)
Dept: PSYCHIATRY | Facility: CLINIC | Age: 73
End: 2023-07-03
Payer: MEDICARE

## 2023-07-03 VITALS
WEIGHT: 161.38 LBS | SYSTOLIC BLOOD PRESSURE: 128 MMHG | OXYGEN SATURATION: 98 % | DIASTOLIC BLOOD PRESSURE: 82 MMHG | BODY MASS INDEX: 25.94 KG/M2 | HEIGHT: 66 IN | HEART RATE: 72 BPM | RESPIRATION RATE: 17 BRPM

## 2023-07-03 DIAGNOSIS — Z86.59 HISTORY OF GAMBLING: ICD-10-CM

## 2023-07-03 DIAGNOSIS — Z65.8 PSYCHOSOCIAL STRESSORS: ICD-10-CM

## 2023-07-03 DIAGNOSIS — F31.9 BIPOLAR 1 DISORDER: ICD-10-CM

## 2023-07-03 DIAGNOSIS — F41.1 GAD (GENERALIZED ANXIETY DISORDER): Primary | ICD-10-CM

## 2023-07-03 PROCEDURE — 3079F DIAST BP 80-89 MM HG: CPT | Mod: CPTII,S$GLB,, | Performed by: STUDENT IN AN ORGANIZED HEALTH CARE EDUCATION/TRAINING PROGRAM

## 2023-07-03 PROCEDURE — 99214 OFFICE O/P EST MOD 30 MIN: CPT | Mod: S$GLB,,, | Performed by: STUDENT IN AN ORGANIZED HEALTH CARE EDUCATION/TRAINING PROGRAM

## 2023-07-03 PROCEDURE — 1159F PR MEDICATION LIST DOCUMENTED IN MEDICAL RECORD: ICD-10-PCS | Mod: CPTII,S$GLB,, | Performed by: STUDENT IN AN ORGANIZED HEALTH CARE EDUCATION/TRAINING PROGRAM

## 2023-07-03 PROCEDURE — 99999 PR PBB SHADOW E&M-EST. PATIENT-LVL III: ICD-10-PCS | Mod: PBBFAC,,, | Performed by: STUDENT IN AN ORGANIZED HEALTH CARE EDUCATION/TRAINING PROGRAM

## 2023-07-03 PROCEDURE — 3008F BODY MASS INDEX DOCD: CPT | Mod: CPTII,S$GLB,, | Performed by: STUDENT IN AN ORGANIZED HEALTH CARE EDUCATION/TRAINING PROGRAM

## 2023-07-03 PROCEDURE — 99999 PR PBB SHADOW E&M-EST. PATIENT-LVL III: CPT | Mod: PBBFAC,,, | Performed by: STUDENT IN AN ORGANIZED HEALTH CARE EDUCATION/TRAINING PROGRAM

## 2023-07-03 PROCEDURE — 99214 PR OFFICE/OUTPT VISIT, EST, LEVL IV, 30-39 MIN: ICD-10-PCS | Mod: S$GLB,,, | Performed by: STUDENT IN AN ORGANIZED HEALTH CARE EDUCATION/TRAINING PROGRAM

## 2023-07-03 PROCEDURE — 3074F PR MOST RECENT SYSTOLIC BLOOD PRESSURE < 130 MM HG: ICD-10-PCS | Mod: CPTII,S$GLB,, | Performed by: STUDENT IN AN ORGANIZED HEALTH CARE EDUCATION/TRAINING PROGRAM

## 2023-07-03 PROCEDURE — 90833 PSYTX W PT W E/M 30 MIN: CPT | Mod: S$GLB,,, | Performed by: STUDENT IN AN ORGANIZED HEALTH CARE EDUCATION/TRAINING PROGRAM

## 2023-07-03 PROCEDURE — 90833 PR PSYCHOTHERAPY W/PATIENT W/E&M, 30 MIN (ADD ON): ICD-10-PCS | Mod: S$GLB,,, | Performed by: STUDENT IN AN ORGANIZED HEALTH CARE EDUCATION/TRAINING PROGRAM

## 2023-07-03 PROCEDURE — 1160F RVW MEDS BY RX/DR IN RCRD: CPT | Mod: CPTII,S$GLB,, | Performed by: STUDENT IN AN ORGANIZED HEALTH CARE EDUCATION/TRAINING PROGRAM

## 2023-07-03 PROCEDURE — 1160F PR REVIEW ALL MEDS BY PRESCRIBER/CLIN PHARMACIST DOCUMENTED: ICD-10-PCS | Mod: CPTII,S$GLB,, | Performed by: STUDENT IN AN ORGANIZED HEALTH CARE EDUCATION/TRAINING PROGRAM

## 2023-07-03 PROCEDURE — 1159F MED LIST DOCD IN RCRD: CPT | Mod: CPTII,S$GLB,, | Performed by: STUDENT IN AN ORGANIZED HEALTH CARE EDUCATION/TRAINING PROGRAM

## 2023-07-03 PROCEDURE — 3074F SYST BP LT 130 MM HG: CPT | Mod: CPTII,S$GLB,, | Performed by: STUDENT IN AN ORGANIZED HEALTH CARE EDUCATION/TRAINING PROGRAM

## 2023-07-03 PROCEDURE — 3008F PR BODY MASS INDEX (BMI) DOCUMENTED: ICD-10-PCS | Mod: CPTII,S$GLB,, | Performed by: STUDENT IN AN ORGANIZED HEALTH CARE EDUCATION/TRAINING PROGRAM

## 2023-07-03 PROCEDURE — 3079F PR MOST RECENT DIASTOLIC BLOOD PRESSURE 80-89 MM HG: ICD-10-PCS | Mod: CPTII,S$GLB,, | Performed by: STUDENT IN AN ORGANIZED HEALTH CARE EDUCATION/TRAINING PROGRAM

## 2023-07-03 RX ORDER — LAMOTRIGINE 150 MG/1
300 TABLET ORAL NIGHTLY
Qty: 180 TABLET | Refills: 5 | Status: SHIPPED | OUTPATIENT
Start: 2023-07-03 | End: 2023-08-29 | Stop reason: SDUPTHER

## 2023-07-03 RX ORDER — ESCITALOPRAM OXALATE 10 MG/1
10 TABLET ORAL DAILY
Qty: 30 TABLET | Refills: 2 | Status: ON HOLD | OUTPATIENT
Start: 2023-07-03 | End: 2023-08-11 | Stop reason: HOSPADM

## 2023-07-03 RX ORDER — TRAZODONE HYDROCHLORIDE 50 MG/1
TABLET ORAL
Qty: 180 TABLET | Refills: 1 | Status: ON HOLD | OUTPATIENT
Start: 2023-07-03 | End: 2023-08-11 | Stop reason: HOSPADM

## 2023-07-03 NOTE — PROGRESS NOTES
"          07/03/2023  1:21 PM  Karin Maguire  [id number]    Outpatient Psychiatry Follow-Up Visit (MD/NP)    7/3/2023    Clinical Status of Patient:  Outpatient (Ambulatory)    Chief Complaint:  Karin Maguire is a 73 y.o. female who presents today for follow-up of depression and anxiety.  Met with patient.        Interval History and Content of Current Session:  Interim Events/Subjective Report/Content of Current Session:   MDD with mixed features vs. Unspecified bipolar disorder (pt poor historian)  LUANNE  Insomnia  Gambling disorder  Obesity  Psychosocial stressors     Abnormal movements          "I had surgery on my foot in May... it showed me where my family prioritizes me in their life, I had to sit with my foot up for 3 weeks, they were next door, they never came to see me or call me, that opened my eyes to where I stand with them."    Reports her mood has been "really down for the last week, I cry and just go to bed."    She states due to surgery, "I feel guilty that my  has to do everything, I have some many limitations for the next 5 weeks."    She states her anxiety levels are high daily for the last week, "I feel like I can pull my hair out... shakiness in my stomach."    She states she is gambling "a lot, often," gambling multiple times per week.          Stressors: health        Psychiatric Review Of Systems - Is patient experiencing or having changes in:  sleep: "pretty well"  appetite: "good"  weight: no  energy/anergy: "not much, I wish I could do more but I can't do a lot with my foot"  Interest/pleasure/anhedonia: denies  Anxiety: increased  panic: no  Guilty/hopelessness/worthlessness: yes  concentration: yes  S.I.B.s/risky behavior: no  Irritability: denies   Substance abuse: no  Racing thoughts: no  Impulsive behaviors: no  Paranoia: no  AVH: no  Gambling: yes, see above  Michelle/hypomania: no        Psychotherapy:  Target symptoms: mood disorder  Why chosen therapy is appropriate " versus another modality: relevant to diagnosis  Outcome monitoring methods: self-report  Therapeutic intervention type: supportive psychotherapy  Topics discussed/themes: relationships difficulties, stress related to medical comorbidities, building skills sets for symptom management, symptom recognition  The patient's response to the intervention is accepting. The patient's progress toward treatment goals is fair.   Duration of intervention: 16 minutes.        Review of Systems   Review of Systems   Constitutional:  Negative for chills and fever.   HENT:  Negative for hearing loss.    Eyes:  Negative for blurred vision and double vision.   Respiratory:  Negative for shortness of breath.    Cardiovascular:  Negative for chest pain.   Gastrointestinal:  Negative for constipation, diarrhea, nausea and vomiting.   Genitourinary:  Negative for dysuria.   Musculoskeletal:  Negative for back pain and joint pain.   Skin:  Negative for rash.   Neurological:  Negative for dizziness and headaches.   Endo/Heme/Allergies:  Negative for environmental allergies.     Past Medical, Family and Social History: The patient's past medical, family and social history have been reviewed and updated as appropriate within the electronic medical record - see encounter notes.    Social History     Socioeconomic History    Marital status:    Tobacco Use    Smoking status: Former     Packs/day: 1.00     Years: 35.00     Pack years: 35.00     Types: Cigarettes     Start date: 3/30/1982     Quit date: 2023     Years since quittin.3    Smokeless tobacco: Never    Tobacco comments:     smoking cessation clinic pamphlet for clinic put on chart to give to pt.    Substance and Sexual Activity    Alcohol use: Yes     Comment: Socially-2 or 3 times a year-6 or 7 drinks    Drug use: No    Sexual activity: Yes     Partners: Male     Birth control/protection: Surgical     Comment:          Compliance: yes    Side effects: None    Risk  Parameters:  Patient reports no suicidal ideation  Patient reports no homicidal ideation  Patient reports no self-injurious behavior  Patient reports no violent behavior        Exam (detailed: at least 9 elements; comprehensive: all 15 elements)     Vitals:    07/03/23 0925   BP: 128/82   Pulse: 72   Resp: 17           Wt Readings from Last 5 Encounters:   07/03/23 73.2 kg (161 lb 6.4 oz)   06/23/23 76.7 kg (169 lb)   05/25/23 76.7 kg (169 lb)   05/12/23 76.7 kg (169 lb)   05/03/23 77 kg (169 lb 12.1 oz)           CONSTITUTIONAL  General Appearance: unremarkable, age appropriate    MUSCULOSKELETAL  Muscle Strength and Tone:no tremor, no tic  Abnormal Involuntary Movements: yes  Gait and Station: non-ataxic    PSYCHIATRIC   Level of Consciousness: awake and alert   Orientation: person, place and situation  Grooming: Casually dressed and Well groomed  Psychomotor Behavior: normal, cooperative  Speech: normal tone, normal rate, normal pitch, normal volume  Language: grossly intact  Mood: depressed  Affect: Consistent with mood  Thought Process: linear, logical  Associations: intact   Thought Content: DENIES suicidal ideation and DENIES homicidal ideation  Perceptions: denies hallucinations  Memory: Able to recall past events, Remote intact and Recent intact  Attention:Attends to interview without distraction  Fund of Knowledge: Aware of current events and Vocabulary appropriate   Estimate if Intelligence:  Average based on work/education history, vocabulary and mental status exam  Insight: has awareness of illness  Judgment: behavior is adequate to circumstances          Assessment and Diagnosis   Status/Progress: Based on the examination today, the patient's problem(s) is/are inadequately controlled.  New problems have not been presented today.   Co-morbidities are complicating management of the primary condition.        Impresssion/Assessment:  MDD with mixed features vs. Unspecified bipolar disorder (pt poor  "historian)  LUANNE  Insomnia  Gambling disorder  Obesity  Psychosocial stressors     Abnormal movements    Chronic pain       Plan:          MDD with mixed features vs. Unspecified bipolar disorder (pt poor historian)  - continue lamictal 300 mg PO qd  - stop cymbalta  - start lexapro 10 mg PO qd  - continue trazodone 50 mg PO (1-2 tablets qhs PRN )  - strongly recommended psychotherapy, provided with resources       Insomnia  - using CPAP  - pt counseled       Gambling disorder  - pt counseled  - consider meetings/therapy; patient declines       LUANNE  - continue hydroxyzine  mg PO q 6 hours PRN  - see MDD above  - consider psychotherapy, patient declines       Psychosocial stressors  - pt counseled  - strongly recommended psychotherapy, couples counseling to patient, patient refusing       Obesity  -  tapered off seroquel      Abnormal movements  - frequent non-stereotyped movements of hands and limbs, neck, trunk; patient states she has always been "fidgety" since childhood, reports she has seen neurology about movements before in past, pt continues to refuse referral, pt states "I've been this way all my life"        Chronic pain  - pt counseled          - Instructed patient to keep all scheduled appointments, take medications as prescribed and abstain from substance abuse. Instructed to call 911 or present to ER for emergency including SI or HI.    - Discussed diagnosis, risks and benefits of proposed treatment above vs alternative treatments vs no treatment, and potential side effects of these treatments. The patient expresses understanding of the above and displays the capacity to agree with this treatment given said understanding. Patient also agrees that, currently, the benefits outweigh the risks and would like to pursue treatment at this time.           Estevan Zaman III, MD    Return to Clinic: 1 m               "

## 2023-07-07 ENCOUNTER — OFFICE VISIT (OUTPATIENT)
Dept: HOME HEALTH SERVICES | Facility: CLINIC | Age: 73
End: 2023-07-07
Payer: MEDICARE

## 2023-07-07 VITALS
WEIGHT: 158 LBS | OXYGEN SATURATION: 97 % | SYSTOLIC BLOOD PRESSURE: 132 MMHG | RESPIRATION RATE: 18 BRPM | TEMPERATURE: 98 F | HEART RATE: 75 BPM | BODY MASS INDEX: 28 KG/M2 | DIASTOLIC BLOOD PRESSURE: 58 MMHG | HEIGHT: 63 IN

## 2023-07-07 DIAGNOSIS — E78.5 HYPERLIPIDEMIA, UNSPECIFIED HYPERLIPIDEMIA TYPE: ICD-10-CM

## 2023-07-07 DIAGNOSIS — F17.200 TOBACCO USE DISORDER: ICD-10-CM

## 2023-07-07 DIAGNOSIS — I70.0 AORTIC ATHEROSCLEROSIS: ICD-10-CM

## 2023-07-07 DIAGNOSIS — G47.33 OBSTRUCTIVE SLEEP APNEA (ADULT) (PEDIATRIC): ICD-10-CM

## 2023-07-07 DIAGNOSIS — F31.9 BIPOLAR 1 DISORDER: ICD-10-CM

## 2023-07-07 DIAGNOSIS — Z00.00 ENCOUNTER FOR PREVENTIVE HEALTH EXAMINATION: Primary | ICD-10-CM

## 2023-07-07 PROCEDURE — 3008F PR BODY MASS INDEX (BMI) DOCUMENTED: ICD-10-PCS | Mod: CPTII,S$GLB,, | Performed by: NURSE PRACTITIONER

## 2023-07-07 PROCEDURE — 3008F BODY MASS INDEX DOCD: CPT | Mod: CPTII,S$GLB,, | Performed by: NURSE PRACTITIONER

## 2023-07-07 PROCEDURE — 3075F SYST BP GE 130 - 139MM HG: CPT | Mod: CPTII,S$GLB,, | Performed by: NURSE PRACTITIONER

## 2023-07-07 PROCEDURE — G0439 PPPS, SUBSEQ VISIT: HCPCS | Mod: S$GLB,,, | Performed by: NURSE PRACTITIONER

## 2023-07-07 PROCEDURE — 3288F PR FALLS RISK ASSESSMENT DOCUMENTED: ICD-10-PCS | Mod: CPTII,S$GLB,, | Performed by: NURSE PRACTITIONER

## 2023-07-07 PROCEDURE — 1101F PT FALLS ASSESS-DOCD LE1/YR: CPT | Mod: CPTII,S$GLB,, | Performed by: NURSE PRACTITIONER

## 2023-07-07 PROCEDURE — 3288F FALL RISK ASSESSMENT DOCD: CPT | Mod: CPTII,S$GLB,, | Performed by: NURSE PRACTITIONER

## 2023-07-07 PROCEDURE — 1101F PR PT FALLS ASSESS DOC 0-1 FALLS W/OUT INJ PAST YR: ICD-10-PCS | Mod: CPTII,S$GLB,, | Performed by: NURSE PRACTITIONER

## 2023-07-07 PROCEDURE — 3078F DIAST BP <80 MM HG: CPT | Mod: CPTII,S$GLB,, | Performed by: NURSE PRACTITIONER

## 2023-07-07 PROCEDURE — 3078F PR MOST RECENT DIASTOLIC BLOOD PRESSURE < 80 MM HG: ICD-10-PCS | Mod: CPTII,S$GLB,, | Performed by: NURSE PRACTITIONER

## 2023-07-07 PROCEDURE — G0439 PR MEDICARE ANNUAL WELLNESS SUBSEQUENT VISIT: ICD-10-PCS | Mod: S$GLB,,, | Performed by: NURSE PRACTITIONER

## 2023-07-07 PROCEDURE — 3075F PR MOST RECENT SYSTOLIC BLOOD PRESS GE 130-139MM HG: ICD-10-PCS | Mod: CPTII,S$GLB,, | Performed by: NURSE PRACTITIONER

## 2023-07-07 NOTE — PROGRESS NOTES
"  Karin Maguire presented for a  Medicare AWV and comprehensive Health Risk Assessment today. The following components were reviewed and updated:    Medical history  Family History  Social history  Allergies and Current Medications  Health Risk Assessment  Health Maintenance  Care Team         ** See Completed Assessments for Annual Wellness Visit within the encounter summary.**         The following assessments were completed:  Living Situation  CAGE  Depression Screening  Timed Get Up and Go  Whisper Test  Cognitive Function Screening      Nutrition Screening  ADL Screening  PAQ Screening        Vitals:    07/07/23 0936   BP: (!) 132/58   Pulse: 75   Resp: 18   Temp: 98 °F (36.7 °C)   SpO2: 97%   Weight: 71.7 kg (158 lb)   Height: 5' 3" (1.6 m)     Body mass index is 27.99 kg/m².  Physical Exam  Vitals and nursing note reviewed.   Constitutional:       General: She is not in acute distress.     Appearance: She is not ill-appearing.   HENT:      Head: Normocephalic and atraumatic.      Nose: Nose normal.      Mouth/Throat:      Mouth: Mucous membranes are moist.   Eyes:      Pupils: Pupils are equal, round, and reactive to light.   Cardiovascular:      Rate and Rhythm: Normal rate and regular rhythm.      Pulses: Normal pulses.      Heart sounds: Normal heart sounds.   Pulmonary:      Effort: Pulmonary effort is normal.      Breath sounds: Normal breath sounds.   Abdominal:      General: Bowel sounds are normal.      Palpations: Abdomen is soft.   Musculoskeletal:         General: No swelling. Normal range of motion.      Cervical back: Normal range of motion.   Skin:     General: Skin is warm and dry.   Neurological:      Mental Status: She is alert and oriented to person, place, and time.      Comments: Excessive movements, uncontrolled   Psychiatric:         Mood and Affect: Mood normal.         Behavior: Behavior normal.         Thought Content: Thought content normal.               Diagnoses and health " risks identified today and associated recommendations/orders:    1. Encounter for preventive health examination  Assessments completed.  HM recommendations reviewed.  F/u with PCP as instructed.     Patient not on chronic opioids.   Risk factors reviewed for any potential opioid use disorder   Pain evaluated during visit.  Current treatment plan documented.  Will refer to specialist, as appropriate.        2. Aortic atherosclerosis  Chronic, stable on current regimen. Followed by PCP    3. Bipolar 1 disorder  Chronic, stable on current regimen. Followed by psychiatry    4. Tobacco use disorder  Chronic, stable on current regimen. Followed by PCP    5. Hyperlipidemia, unspecified hyperlipidemia type  Chronic, stable on current regimen. Followed by PCP    6. Obstructive sleep apnea (adult) (pediatric)  Chronic, stable on current regimen. Followed by PCP      Provided Karin with a 5-10 year written screening schedule and personal prevention plan. Recommendations were developed using the USPSTF age appropriate recommendations. Education, counseling, and referrals were provided as needed. After Visit Summary printed and given to patient which includes a list of additional screenings\tests needed.    Follow up in about 1 year (around 7/7/2024) for Medicare AWV.    Stephy Maurer NP  I offered to discuss advanced care planning, including how to pick a person who would make decisions for you if you were unable to make them for yourself, called a health care power of , and what kind of decisions you might make such as use of life sustaining treatments such as ventilators and tube feeding when faced with a life limiting illness recorded on a living will that they will need to know. (How you want to be cared for as you near the end of your natural life)     X  Patient is unwilling to engage in a discussion regarding advance directives at this time.

## 2023-07-10 ENCOUNTER — HOSPITAL ENCOUNTER (OUTPATIENT)
Dept: RADIOLOGY | Facility: HOSPITAL | Age: 73
Discharge: HOME | End: 2023-07-10
Attending: PODIATRIST
Payer: MEDICARE

## 2023-07-10 ENCOUNTER — OFFICE VISIT (OUTPATIENT)
Dept: PODIATRY | Facility: CLINIC | Age: 73
End: 2023-07-10
Payer: MEDICARE

## 2023-07-10 ENCOUNTER — PATIENT MESSAGE (OUTPATIENT)
Dept: PODIATRY | Facility: CLINIC | Age: 73
End: 2023-07-10

## 2023-07-10 ENCOUNTER — TELEPHONE (OUTPATIENT)
Dept: PODIATRY | Facility: CLINIC | Age: 73
End: 2023-07-10
Payer: MEDICARE

## 2023-07-10 ENCOUNTER — PATIENT MESSAGE (OUTPATIENT)
Dept: ADMINISTRATIVE | Facility: HOSPITAL | Age: 73
End: 2023-07-10
Payer: MEDICARE

## 2023-07-10 VITALS
WEIGHT: 158 LBS | HEIGHT: 63 IN | HEART RATE: 68 BPM | SYSTOLIC BLOOD PRESSURE: 131 MMHG | DIASTOLIC BLOOD PRESSURE: 77 MMHG | BODY MASS INDEX: 28 KG/M2

## 2023-07-10 DIAGNOSIS — Z98.890 POSTOPERATIVE STATE: Primary | ICD-10-CM

## 2023-07-10 DIAGNOSIS — Z98.890 POSTOPERATIVE STATE: ICD-10-CM

## 2023-07-10 PROCEDURE — 3008F BODY MASS INDEX DOCD: CPT | Mod: CPTII,S$GLB,, | Performed by: PODIATRIST

## 2023-07-10 PROCEDURE — 73630 XR FOOT COMPLETE 3 VIEW RIGHT: ICD-10-PCS | Mod: 26,RT,, | Performed by: RADIOLOGY

## 2023-07-10 PROCEDURE — 99999 PR PBB SHADOW E&M-EST. PATIENT-LVL III: CPT | Mod: PBBFAC,,, | Performed by: PODIATRIST

## 2023-07-10 PROCEDURE — 1126F AMNT PAIN NOTED NONE PRSNT: CPT | Mod: CPTII,S$GLB,, | Performed by: PODIATRIST

## 2023-07-10 PROCEDURE — 1101F PT FALLS ASSESS-DOCD LE1/YR: CPT | Mod: CPTII,S$GLB,, | Performed by: PODIATRIST

## 2023-07-10 PROCEDURE — 73630 X-RAY EXAM OF FOOT: CPT | Mod: 26,RT,, | Performed by: RADIOLOGY

## 2023-07-10 PROCEDURE — 1101F PR PT FALLS ASSESS DOC 0-1 FALLS W/OUT INJ PAST YR: ICD-10-PCS | Mod: CPTII,S$GLB,, | Performed by: PODIATRIST

## 2023-07-10 PROCEDURE — 3288F PR FALLS RISK ASSESSMENT DOCUMENTED: ICD-10-PCS | Mod: CPTII,S$GLB,, | Performed by: PODIATRIST

## 2023-07-10 PROCEDURE — 3075F PR MOST RECENT SYSTOLIC BLOOD PRESS GE 130-139MM HG: ICD-10-PCS | Mod: CPTII,S$GLB,, | Performed by: PODIATRIST

## 2023-07-10 PROCEDURE — 99999 PR PBB SHADOW E&M-EST. PATIENT-LVL III: ICD-10-PCS | Mod: PBBFAC,,, | Performed by: PODIATRIST

## 2023-07-10 PROCEDURE — 1159F PR MEDICATION LIST DOCUMENTED IN MEDICAL RECORD: ICD-10-PCS | Mod: CPTII,S$GLB,, | Performed by: PODIATRIST

## 2023-07-10 PROCEDURE — 3288F FALL RISK ASSESSMENT DOCD: CPT | Mod: CPTII,S$GLB,, | Performed by: PODIATRIST

## 2023-07-10 PROCEDURE — 73630 X-RAY EXAM OF FOOT: CPT | Mod: TC,PN,RT

## 2023-07-10 PROCEDURE — 99024 POSTOP FOLLOW-UP VISIT: CPT | Mod: S$GLB,,, | Performed by: PODIATRIST

## 2023-07-10 PROCEDURE — 3078F PR MOST RECENT DIASTOLIC BLOOD PRESSURE < 80 MM HG: ICD-10-PCS | Mod: CPTII,S$GLB,, | Performed by: PODIATRIST

## 2023-07-10 PROCEDURE — 3078F DIAST BP <80 MM HG: CPT | Mod: CPTII,S$GLB,, | Performed by: PODIATRIST

## 2023-07-10 PROCEDURE — 3075F SYST BP GE 130 - 139MM HG: CPT | Mod: CPTII,S$GLB,, | Performed by: PODIATRIST

## 2023-07-10 PROCEDURE — 3008F PR BODY MASS INDEX (BMI) DOCUMENTED: ICD-10-PCS | Mod: CPTII,S$GLB,, | Performed by: PODIATRIST

## 2023-07-10 PROCEDURE — 1160F RVW MEDS BY RX/DR IN RCRD: CPT | Mod: CPTII,S$GLB,, | Performed by: PODIATRIST

## 2023-07-10 PROCEDURE — 1159F MED LIST DOCD IN RCRD: CPT | Mod: CPTII,S$GLB,, | Performed by: PODIATRIST

## 2023-07-10 PROCEDURE — 1160F PR REVIEW ALL MEDS BY PRESCRIBER/CLIN PHARMACIST DOCUMENTED: ICD-10-PCS | Mod: CPTII,S$GLB,, | Performed by: PODIATRIST

## 2023-07-10 PROCEDURE — 1126F PR PAIN SEVERITY QUANTIFIED, NO PAIN PRESENT: ICD-10-PCS | Mod: CPTII,S$GLB,, | Performed by: PODIATRIST

## 2023-07-10 PROCEDURE — 99024 PR POST-OP FOLLOW-UP VISIT: ICD-10-PCS | Mod: S$GLB,,, | Performed by: PODIATRIST

## 2023-07-10 NOTE — PROGRESS NOTES
Subjective:      Patient ID: Karin Maguire is a 73 y.o. female.    Chief Complaint: Foot Problem (She dropped a med size Pyrex plate on her right foot this morning around 9:00.   She states no pain. )    Referral from  for evaluation of chronic right foot pain secondary to osteoarthritis.  She also reports stubbing her right 4th toe 5 weeks ago with persistent pain and swelling to the area.  She is unable to take NSAID therapy secondary to previous GI bleed.  She is been taking Tylenol which helps somewhat.  States that she gets moderate pain to the foot with extended periods of standing and walking at the mall which forces her to sit down.  Ambulating with slip-on tennis shoes.    03/10/2023:  Relates right forefoot pain has resolved and she sought wearing the boot last week.  She previously was diagnosed with a right 4th toe proximal phalanx nondisplaced fracture.  Relates now the pain is mostly in the midfoot with some mild localized swelling.  States the pain has been present for years however the swelling started before the pain.    05/12/2023:  Post 2nd metatarsal cuneiform joint arthrodesis with external neurolysis of the deep peroneal nerve and extensor hallucis brevis tenotomy right foot on 05/03/2023.  Mild intermittent pain reported to the right foot.  She has remain nonweightbearing.  Dressing has remained intact.  Denies any slips, trips or falls.    05/25/2023:  3 weeks postop.  Relates no significant pain to the right foot.  She has remain nonweightbearing as advised to the right foot.    06/23/2023: Approximately 7 weeks postop.  Reports no pain.  She is been applying weight closer towards the right heel and ambulating with a Cam boot.  She is been performing active range motion exercises and light resistance while at home and states the discomfort and stiffness to the right foot and ankle has resolved.  No longer has any pain.    07/10/2023:  Greater than 2 months postop.  She  "presents today out of concern that she dropped some quick wear on her right foot.  She is not experiencing any pain however is worried because she potentially injured the foot.  Ambulating with tennis shoes.    Vitals:    07/10/23 1128   BP: 131/77   Pulse: 68   Weight: 71.7 kg (158 lb)   Height: 5' 3" (1.6 m)   PainSc: 0-No pain      Past Medical History:   Diagnosis Date    Anxiety     Arthritis     Bipolar 1 disorder     Bursitis of right hip     GI bleed due to NSAIDs     Multinodular goiter 11/15/2021    Sacroiliitis     right side    Sleep apnea        Past Surgical History:   Procedure Laterality Date    ANKLE FRACTURE SURGERY Left 2001    BLADDER SUSPENSION      CARPAL TUNNEL RELEASE Bilateral     CHOLECYSTECTOMY      ESOPHAGOGASTRODUODENOSCOPY N/A 7/29/2021    Procedure: EGD (ESOPHAGOGASTRODUODENOSCOPY);  Surgeon: Susna Mcclain MD;  Location: [facility];  Service: Endoscopy;  Laterality: N/A;    EYE SURGERY      FOOT ARTHRODESIS Right 5/3/2023    Procedure: FUSION, FOOT;  Surgeon: ODALIS Campuzano;  Location: [facility];  Service: Podiatry;  Laterality: Right;  mini c-arm, Arthrex dowel bone graft harvester, locking plate and screws festus notified cc    HYSTERECTOMY  1986    vaginal prolapse    INJECTION OF ANESTHETIC AGENT AROUND MEDIAL BRANCH NERVES INNERVATING CERVICAL FACET JOINT Bilateral 5/27/2022    Procedure: CERVICAL MEDIAL BRANCH NERVE BLOCK (C3-4,C4-5);  Surgeon: Mamie Roman MD;  Location: [facility];  Service: Pain Management;  Laterality: Bilateral;    INJECTION OF ANESTHETIC AGENT AROUND MEDIAL BRANCH NERVES INNERVATING LUMBAR FACET JOINT Right 2/5/2021    Procedure: LUMBAR FACET JOINT BLOCK (L3-4,L4-5,L5-S1);  Surgeon: Mamie Roman MD;  Location: [facility];  Service: Pain Management;  Laterality: Right;    INJECTION OF ANESTHETIC AGENT AROUND MEDIAL BRANCH NERVES INNERVATING LUMBAR FACET JOINT Right 4/30/2021    Procedure: LUMBAR FACET JOINT BLOCK (L3-4,L4-5,L5-S1);  Surgeon: Mamie CLEVELAND" MD Jessie;  Location: [facility];  Service: Pain Management;  Laterality: Right;    INJECTION OF ANESTHETIC AGENT INTO SACROILIAC JOINT Right 12/4/2020    Procedure: SACROILIAC JOINT INJECTION;  Surgeon: Mamie Roman MD;  Location: [facility];  Service: Pain Management;  Laterality: Right;    INJECTION OF JOINT Right 12/4/2020    Procedure: GREATER TROCHANTERIC BURSA INJECTION;  Surgeon: Mamie Roman MD;  Location: [facility];  Service: Pain Management;  Laterality: Right;    NASAL SEPTUM SURGERY      RECTAL PROLAPSE REPAIR      TONSILLECTOMY         Family History   Problem Relation Age of Onset    Colon cancer Maternal Uncle     Colon cancer Maternal Uncle     Colon cancer Maternal Uncle        Social History     Socioeconomic History    Marital status:    Tobacco Use    Smoking status: Every Day     Packs/day: 1.00     Years: 30.00     Pack years: 30.00     Types: Cigarettes     Start date: 3/30/1982    Smokeless tobacco: Never   Substance and Sexual Activity    Alcohol use: Yes     Comment: Socially-2 or 3 times a year-6 or 7 drinks    Drug use: No    Sexual activity: Yes     Partners: Male     Birth control/protection: Surgical     Comment:      Social Determinants of Health     Financial Resource Strain: Low Risk     Difficulty of Paying Living Expenses: Not hard at all   Food Insecurity: No Food Insecurity    Worried About Running Out of Food in the Last Year: Never true    Ran Out of Food in the Last Year: Never true   Transportation Needs: No Transportation Needs    Lack of Transportation (Medical): No    Lack of Transportation (Non-Medical): No   Physical Activity: Inactive    Days of Exercise per Week: 0 days    Minutes of Exercise per Session: 0 min   Stress: Stress Concern Present    Feeling of Stress : Rather much   Social Connections: Moderately Isolated    Frequency of Communication with Friends and Family: More than three times a week    Frequency of Social Gatherings with Friends and  Family: Once a week    Attends Tenriism Services: Never    Active Member of Clubs or Organizations: No    Attends Club or Organization Meetings: Never    Marital Status:    Housing Stability: Low Risk     Unable to Pay for Housing in the Last Year: No    Number of Places Lived in the Last Year: 1    Unstable Housing in the Last Year: No       Current Outpatient Medications   Medication Sig Dispense Refill    acetaminophen (TYLENOL) 500 MG tablet Take 2 tablets (1,000 mg total) by mouth every 8 (eight) hours as needed for Pain. 30 tablet 0    albuterol (PROVENTIL/VENTOLIN HFA) 90 mcg/actuation inhaler INHALE 2 PUFFS INTO THE LUNGS EVERY 6 (SIX) HOURS AS NEEDED FOR WHEEZING (COUGH AND WHEEZING).RESCUE 18 g 1    aspirin 81 MG Chew Take 1 tablet (81 mg total) by mouth once daily. 30 tablet 5    calcium carbonate (OS-ELADIA) 500 mg calcium (1,250 mg) tablet Take 1 tablet (500 mg total) by mouth once daily. 90 tablet 3    EScitalopram oxalate (LEXAPRO) 10 MG tablet Take 1 tablet (10 mg total) by mouth once daily. 30 tablet 2    hydrOXYzine pamoate (VISTARIL) 25 MG Cap Take 1-4 capsules every 6 hours as needed for anxiety 120 capsule 2    lamoTRIgine (LAMICTAL) 150 MG Tab Take 2 tablets (300 mg total) by mouth every evening. 180 tablet 5    pantoprazole (PROTONIX) 40 MG tablet Take 1 tablet (40 mg total) by mouth once daily. 90 tablet 3    rosuvastatin (CRESTOR) 10 MG tablet TAKE 1 TABLET BY MOUTH EVERY DAY 90 tablet 3    traZODone (DESYREL) 50 MG tablet Take 1-2 tablets at bedtime as needed for sleep 180 tablet 1     Current Facility-Administered Medications   Medication Dose Route Frequency Provider Last Rate Last Admin    sodium chloride 0.9% flush 10 mL  10 mL Intravenous PRN Lauri Alejandra DPM           Review of patient's allergies indicates:   Allergen Reactions    Nsaids (non-steroidal anti-inflammatory drug) Other (See Comments)     Gastrointestinal bleeding requiring blood transfusion    Demerol  [meperidine] Rash         Review of Systems   Constitutional: Negative for chills and fever.   HENT:  Negative for congestion and hearing loss.    Cardiovascular:  Negative for chest pain and claudication.   Respiratory:  Negative for cough and shortness of breath.    Skin:  Negative for color change and itching.   Musculoskeletal:  Positive for arthritis.   Gastrointestinal:  Negative for nausea and vomiting.   Neurological:  Positive for numbness. Negative for paresthesias.   Psychiatric/Behavioral:  Negative for altered mental status.          Objective:      Physical Exam  Constitutional:       General: She is not in acute distress.     Appearance: Normal appearance. She is not ill-appearing.   Cardiovascular:      Pulses:           Dorsalis pedis pulses are 2+ on the right side and 2+ on the left side.        Posterior tibial pulses are 2+ on the right side and 2+ on the left side.      Comments: Mild nonpitting edema localized to the right foot.  Skin temp is warm to foot bilateral.  No rubor on dependency bilateral foot.  No hair growth follow extremity.  Musculoskeletal:      Comments: Semi-rigid cavus foot structure bilateral.    No pain on palpation, range motion or manual muscle strength testing right foot and ankle.  No palpable fluctuance or crepitance.  No significant localized edema, warmth for discoloration to the right foot.   Skin:     General: Skin is warm.      Capillary Refill: Capillary refill takes less than 2 seconds.      Findings: No ecchymosis or erythema.      Nails: There is no clubbing.      Comments: Normal appearing scar dorsal right midfoot.   Neurological:      Mental Status: She is alert and oriented to person, place, and time.      Sensory: Sensation is intact.      Motor: Motor function is intact.             Assessment:       Encounter Diagnosis   Name Primary?    Postoperative state Yes         Plan:       Karin was seen today for foot problem.    Diagnoses and all orders  for this visit:    Postoperative state  -     X-Ray Foot Complete Right; Future      I counseled the patient on her conditions, their implications and medical management.    We will order right foot follow-up weight-bearing x-ray to be completed today out of precaution to ensure that there was no signs of broken hardware however she is asymptomatic on today's exam.    She may continue activity previously discussed as tolerated.    RTC as scheduled for follow-up weight-bearing x-ray next month.    A portion of this note was generated by voice recognition software and may contain spelling and grammar errors.      .

## 2023-07-10 NOTE — TELEPHONE ENCOUNTER
Spoke with pt in regards to message in there portal. Pt stated she dropped a Porex bowl on her foot she had surgery on. Pt wants to see Justyn to make sure her foot is fine. I scheduled ptto see Dr. Alejandra. No further concerns was expressed. Call ended. ----- Message from Sveta Stewart sent at 7/10/2023  9:24 AM CDT -----  Type:  Needs Medical Advice    Who Called: pt  Symptoms (please be specific): pt dropped Porex bowl on her right foot new the spot she had surgery in May needs a call back to make sure everything is all right   Would the patient rather a call back or a response via [facility]? call  Best Call Back Number: [phone]  Additional Information:

## 2023-07-11 ENCOUNTER — PATIENT MESSAGE (OUTPATIENT)
Dept: OPHTHALMOLOGY | Facility: CLINIC | Age: 73
End: 2023-07-11

## 2023-07-11 ENCOUNTER — OFFICE VISIT (OUTPATIENT)
Dept: OPHTHALMOLOGY | Facility: CLINIC | Age: 73
End: 2023-07-11
Payer: MEDICARE

## 2023-07-11 DIAGNOSIS — H25.12 NUCLEAR SCLEROSIS OF LEFT EYE: ICD-10-CM

## 2023-07-11 DIAGNOSIS — H02.036: Primary | ICD-10-CM

## 2023-07-11 DIAGNOSIS — H02.831 DERMATOCHALASIS OF BOTH UPPER EYELIDS: ICD-10-CM

## 2023-07-11 DIAGNOSIS — H52.7 REFRACTIVE DISORDER: ICD-10-CM

## 2023-07-11 DIAGNOSIS — H02.834 DERMATOCHALASIS OF BOTH UPPER EYELIDS: ICD-10-CM

## 2023-07-11 DIAGNOSIS — H04.123 DRY EYES, BILATERAL: ICD-10-CM

## 2023-07-11 DIAGNOSIS — H25.11 NUCLEAR SCLEROSIS OF RIGHT EYE: ICD-10-CM

## 2023-07-11 PROCEDURE — 99214 OFFICE O/P EST MOD 30 MIN: CPT | Mod: S$GLB,,, | Performed by: STUDENT IN AN ORGANIZED HEALTH CARE EDUCATION/TRAINING PROGRAM

## 2023-07-11 PROCEDURE — 1160F PR REVIEW ALL MEDS BY PRESCRIBER/CLIN PHARMACIST DOCUMENTED: ICD-10-PCS | Mod: CPTII,S$GLB,, | Performed by: STUDENT IN AN ORGANIZED HEALTH CARE EDUCATION/TRAINING PROGRAM

## 2023-07-11 PROCEDURE — 1160F RVW MEDS BY RX/DR IN RCRD: CPT | Mod: CPTII,S$GLB,, | Performed by: STUDENT IN AN ORGANIZED HEALTH CARE EDUCATION/TRAINING PROGRAM

## 2023-07-11 PROCEDURE — 92015 PR REFRACTION: ICD-10-PCS | Mod: S$GLB,,, | Performed by: STUDENT IN AN ORGANIZED HEALTH CARE EDUCATION/TRAINING PROGRAM

## 2023-07-11 PROCEDURE — 99999 PR PBB SHADOW E&M-EST. PATIENT-LVL II: CPT | Mod: PBBFAC,,, | Performed by: STUDENT IN AN ORGANIZED HEALTH CARE EDUCATION/TRAINING PROGRAM

## 2023-07-11 PROCEDURE — 92015 DETERMINE REFRACTIVE STATE: CPT | Mod: S$GLB,,, | Performed by: STUDENT IN AN ORGANIZED HEALTH CARE EDUCATION/TRAINING PROGRAM

## 2023-07-11 PROCEDURE — 1159F MED LIST DOCD IN RCRD: CPT | Mod: CPTII,S$GLB,, | Performed by: STUDENT IN AN ORGANIZED HEALTH CARE EDUCATION/TRAINING PROGRAM

## 2023-07-11 PROCEDURE — 1159F PR MEDICATION LIST DOCUMENTED IN MEDICAL RECORD: ICD-10-PCS | Mod: CPTII,S$GLB,, | Performed by: STUDENT IN AN ORGANIZED HEALTH CARE EDUCATION/TRAINING PROGRAM

## 2023-07-11 PROCEDURE — 99214 PR OFFICE/OUTPT VISIT, EST, LEVL IV, 30-39 MIN: ICD-10-PCS | Mod: S$GLB,,, | Performed by: STUDENT IN AN ORGANIZED HEALTH CARE EDUCATION/TRAINING PROGRAM

## 2023-07-11 PROCEDURE — 99999 PR PBB SHADOW E&M-EST. PATIENT-LVL II: ICD-10-PCS | Mod: PBBFAC,,, | Performed by: STUDENT IN AN ORGANIZED HEALTH CARE EDUCATION/TRAINING PROGRAM

## 2023-07-11 NOTE — PROGRESS NOTES
HPI     Annual Exam     Additional comments: Pt states she has noticed that her va is becoming   more blurry. Pt states she has difficulty focusing when trying to read up   close. Pt denies any pain or irritation.           Comments    1. Dry eyes OU          Last edited by Jenny Ramires on 7/11/2023  9:50 AM.            Assessment /Plan     For exam results, see Encounter Report.    Entropion due to laxity of eyelid, left- Follow    Dermatochalasis of both upper eyelids- Follow  ATs QID    Nuclear sclerosis of right eye  Nuclear sclerosis of left eye- - NVS, monitor    Dry eyes, bilateral- - ATs QID and lid hygiene w/ baby shampoo    Refractive Error- MRx dispensed    *Disc at risk, discussed smoking     Return to clinic in 1 year or PRN

## 2023-07-26 DIAGNOSIS — E78.5 HYPERLIPIDEMIA, UNSPECIFIED HYPERLIPIDEMIA TYPE: ICD-10-CM

## 2023-07-26 NOTE — TELEPHONE ENCOUNTER
90 day supply called in during your absence.    LOV 4/13/2023    Requested Prescriptions     Pending Prescriptions Disp Refills    rosuvastatin (CRESTOR) 10 MG tablet 90 tablet 3     Sig: Take 1 tablet (10 mg total) by mouth once daily.

## 2023-07-30 RX ORDER — ROSUVASTATIN CALCIUM 10 MG/1
10 TABLET, COATED ORAL DAILY
Qty: 90 TABLET | Refills: 3 | Status: SHIPPED | OUTPATIENT
Start: 2023-07-30

## 2023-08-04 ENCOUNTER — OFFICE VISIT (OUTPATIENT)
Dept: PODIATRY | Facility: CLINIC | Age: 73
End: 2023-08-04
Payer: MEDICARE

## 2023-08-04 ENCOUNTER — HOSPITAL ENCOUNTER (OUTPATIENT)
Dept: RADIOLOGY | Facility: HOSPITAL | Age: 73
Discharge: HOME | End: 2023-08-04
Attending: PODIATRIST
Payer: MEDICARE

## 2023-08-04 VITALS
SYSTOLIC BLOOD PRESSURE: 131 MMHG | HEIGHT: 66 IN | HEART RATE: 68 BPM | DIASTOLIC BLOOD PRESSURE: 71 MMHG | BODY MASS INDEX: 25.5 KG/M2

## 2023-08-04 DIAGNOSIS — M19.071 ARTHROPATHY OF RIGHT FOOT: ICD-10-CM

## 2023-08-04 DIAGNOSIS — M96.89 DELAYED UNION AFTER OSTEOTOMY: Primary | ICD-10-CM

## 2023-08-04 DIAGNOSIS — Z98.890 POSTOPERATIVE STATE: ICD-10-CM

## 2023-08-04 PROCEDURE — 1159F MED LIST DOCD IN RCRD: CPT | Mod: CPTII,S$GLB,, | Performed by: PODIATRIST

## 2023-08-04 PROCEDURE — 99213 OFFICE O/P EST LOW 20 MIN: CPT | Mod: S$GLB,,, | Performed by: PODIATRIST

## 2023-08-04 PROCEDURE — 3008F BODY MASS INDEX DOCD: CPT | Mod: CPTII,S$GLB,, | Performed by: PODIATRIST

## 2023-08-04 PROCEDURE — 3075F SYST BP GE 130 - 139MM HG: CPT | Mod: CPTII,S$GLB,, | Performed by: PODIATRIST

## 2023-08-04 PROCEDURE — 99999 PR PBB SHADOW E&M-EST. PATIENT-LVL III: CPT | Mod: PBBFAC,,, | Performed by: PODIATRIST

## 2023-08-04 PROCEDURE — 3078F PR MOST RECENT DIASTOLIC BLOOD PRESSURE < 80 MM HG: ICD-10-PCS | Mod: CPTII,S$GLB,, | Performed by: PODIATRIST

## 2023-08-04 PROCEDURE — 99213 PR OFFICE/OUTPT VISIT, EST, LEVL III, 20-29 MIN: ICD-10-PCS | Mod: S$GLB,,, | Performed by: PODIATRIST

## 2023-08-04 PROCEDURE — 3008F PR BODY MASS INDEX (BMI) DOCUMENTED: ICD-10-PCS | Mod: CPTII,S$GLB,, | Performed by: PODIATRIST

## 2023-08-04 PROCEDURE — 73630 XR FOOT COMPLETE 3 VIEW RIGHT: ICD-10-PCS | Mod: 26,RT,, | Performed by: RADIOLOGY

## 2023-08-04 PROCEDURE — 3078F DIAST BP <80 MM HG: CPT | Mod: CPTII,S$GLB,, | Performed by: PODIATRIST

## 2023-08-04 PROCEDURE — 73630 X-RAY EXAM OF FOOT: CPT | Mod: 26,RT,, | Performed by: RADIOLOGY

## 2023-08-04 PROCEDURE — 1125F PR PAIN SEVERITY QUANTIFIED, PAIN PRESENT: ICD-10-PCS | Mod: CPTII,S$GLB,, | Performed by: PODIATRIST

## 2023-08-04 PROCEDURE — 3075F PR MOST RECENT SYSTOLIC BLOOD PRESS GE 130-139MM HG: ICD-10-PCS | Mod: CPTII,S$GLB,, | Performed by: PODIATRIST

## 2023-08-04 PROCEDURE — 99999 PR PBB SHADOW E&M-EST. PATIENT-LVL III: ICD-10-PCS | Mod: PBBFAC,,, | Performed by: PODIATRIST

## 2023-08-04 PROCEDURE — 1159F PR MEDICATION LIST DOCUMENTED IN MEDICAL RECORD: ICD-10-PCS | Mod: CPTII,S$GLB,, | Performed by: PODIATRIST

## 2023-08-04 PROCEDURE — 1160F RVW MEDS BY RX/DR IN RCRD: CPT | Mod: CPTII,S$GLB,, | Performed by: PODIATRIST

## 2023-08-04 PROCEDURE — 1125F AMNT PAIN NOTED PAIN PRSNT: CPT | Mod: CPTII,S$GLB,, | Performed by: PODIATRIST

## 2023-08-04 PROCEDURE — 1160F PR REVIEW ALL MEDS BY PRESCRIBER/CLIN PHARMACIST DOCUMENTED: ICD-10-PCS | Mod: CPTII,S$GLB,, | Performed by: PODIATRIST

## 2023-08-04 PROCEDURE — 73630 X-RAY EXAM OF FOOT: CPT | Mod: TC,PN,RT

## 2023-08-04 NOTE — PROGRESS NOTES
Subjective:      Patient ID: Karin Maguire is a 73 y.o. female.    Chief Complaint: No chief complaint on file.    Referral from  for evaluation of chronic right foot pain secondary to osteoarthritis.  She also reports stubbing her right 4th toe 5 weeks ago with persistent pain and swelling to the area.  She is unable to take NSAID therapy secondary to previous GI bleed.  She is been taking Tylenol which helps somewhat.  States that she gets moderate pain to the foot with extended periods of standing and walking at the mall which forces her to sit down.  Ambulating with slip-on tennis shoes.    03/10/2023:  Relates right forefoot pain has resolved and she sought wearing the boot last week.  She previously was diagnosed with a right 4th toe proximal phalanx nondisplaced fracture.  Relates now the pain is mostly in the midfoot with some mild localized swelling.  States the pain has been present for years however the swelling started before the pain.    05/12/2023:  Post 2nd metatarsal cuneiform joint arthrodesis with external neurolysis of the deep peroneal nerve and extensor hallucis brevis tenotomy right foot on 05/03/2023.  Mild intermittent pain reported to the right foot.  She has remain nonweightbearing.  Dressing has remained intact.  Denies any slips, trips or falls.    05/25/2023:  3 weeks postop.  Relates no significant pain to the right foot.  She has remain nonweightbearing as advised to the right foot.    06/23/2023: Approximately 7 weeks postop.  Reports no pain.  She is been applying weight closer towards the right heel and ambulating with a Cam boot.  She is been performing active range motion exercises and light resistance while at home and states the discomfort and stiffness to the right foot and ankle has resolved.  No longer has any pain.    07/10/2023:  Greater than 2 months postop.  She presents today out of concern that she dropped some quick wear on her right foot.  She is not  "experiencing any pain however is worried because she potentially injured the foot.  Ambulating with tennis shoes.    08/04/2023:  3 months postop.  She reports mild discomfort to the right foot but points to the medial right hindfoot overlying the navicular tuberosity.  States that she has pain described as burning when she standing and walking on the foot and point tenderness to the area.  She has minimal discomfort to the right midfoot.  She is ambulating with tennis shoes.    Vitals:    08/04/23 1511   BP: 131/71   Pulse: 68   Height: 5' 6" (1.676 m)   PainSc:   3   PainLoc: Foot      Past Medical History:   Diagnosis Date    Anxiety     Arthritis     Bipolar 1 disorder     Bursitis of right hip     GI bleed due to NSAIDs     Multinodular goiter 11/15/2021    Sacroiliitis     right side    Sleep apnea        Past Surgical History:   Procedure Laterality Date    ANKLE FRACTURE SURGERY Left 2001    BLADDER SUSPENSION      CARPAL TUNNEL RELEASE Bilateral     CHOLECYSTECTOMY      ESOPHAGOGASTRODUODENOSCOPY N/A 7/29/2021    Procedure: EGD (ESOPHAGOGASTRODUODENOSCOPY);  Surgeon: Susan Mcclain MD;  Location: [facility];  Service: Endoscopy;  Laterality: N/A;    EYE SURGERY      FOOT ARTHRODESIS Right 5/3/2023    Procedure: FUSION, FOOT;  Surgeon: ODALIS Campuzano;  Location: [facility];  Service: Podiatry;  Laterality: Right;  mini c-arm, Arthrex dowel bone graft harvester, locking plate and screws festus notified cc    HYSTERECTOMY  1986    vaginal prolapse    INJECTION OF ANESTHETIC AGENT AROUND MEDIAL BRANCH NERVES INNERVATING CERVICAL FACET JOINT Bilateral 5/27/2022    Procedure: CERVICAL MEDIAL BRANCH NERVE BLOCK (C3-4,C4-5);  Surgeon: Mamie Roman MD;  Location: [facility];  Service: Pain Management;  Laterality: Bilateral;    INJECTION OF ANESTHETIC AGENT AROUND MEDIAL BRANCH NERVES INNERVATING LUMBAR FACET JOINT Right 2/5/2021    Procedure: LUMBAR FACET JOINT BLOCK (L3-4,L4-5,L5-S1);  Surgeon: Mamie CLEVELAND" MD Jessie;  Location: [facility];  Service: Pain Management;  Laterality: Right;    INJECTION OF ANESTHETIC AGENT AROUND MEDIAL BRANCH NERVES INNERVATING LUMBAR FACET JOINT Right 4/30/2021    Procedure: LUMBAR FACET JOINT BLOCK (L3-4,L4-5,L5-S1);  Surgeon: Mamie Roman MD;  Location: [facility];  Service: Pain Management;  Laterality: Right;    INJECTION OF ANESTHETIC AGENT INTO SACROILIAC JOINT Right 12/4/2020    Procedure: SACROILIAC JOINT INJECTION;  Surgeon: Mamie Roman MD;  Location: [facility];  Service: Pain Management;  Laterality: Right;    INJECTION OF JOINT Right 12/4/2020    Procedure: GREATER TROCHANTERIC BURSA INJECTION;  Surgeon: Mamie Roman MD;  Location: [facility];  Service: Pain Management;  Laterality: Right;    NASAL SEPTUM SURGERY      RECTAL PROLAPSE REPAIR      TONSILLECTOMY         Family History   Problem Relation Age of Onset    Colon cancer Maternal Uncle     Colon cancer Maternal Uncle     Colon cancer Maternal Uncle        Social History     Socioeconomic History    Marital status:    Tobacco Use    Smoking status: Every Day     Current packs/day: 1.00     Average packs/day: 1 pack/day for 41.3 years (41.3 ttl pk-yrs)     Types: Cigarettes     Start date: 3/30/1982    Smokeless tobacco: Never   Substance and Sexual Activity    Alcohol use: Yes     Comment: Socially-2 or 3 times a year-6 or 7 drinks    Drug use: No    Sexual activity: Yes     Partners: Male     Birth control/protection: Surgical     Comment:      Social Determinants of Health     Financial Resource Strain: Low Risk  (7/7/2023)    Overall Financial Resource Strain (CARDIA)     Difficulty of Paying Living Expenses: Not hard at all   Food Insecurity: No Food Insecurity (7/7/2023)    Hunger Vital Sign     Worried About Running Out of Food in the Last Year: Never true     Ran Out of Food in the Last Year: Never true   Transportation Needs: No Transportation Needs (7/7/2023)    PRAPARE - Transportation     Lack of  Transportation (Medical): No     Lack of Transportation (Non-Medical): No   Physical Activity: Inactive (7/7/2023)    Exercise Vital Sign     Days of Exercise per Week: 0 days     Minutes of Exercise per Session: 0 min   Stress: Stress Concern Present (7/7/2023)    [employer] [place] [employer] - Occupational Stress Questionnaire     Feeling of Stress : Rather much   Social Connections: Moderately Isolated (7/7/2023)    Social Connection and Isolation Panel [NHANES]     Frequency of Communication with Friends and Family: More than three times a week     Frequency of Social Gatherings with Friends and Family: Once a week     Attends Voodoo Services: Never     Active Member of Clubs or Organizations: No     Attends Club or Organization Meetings: Never     Marital Status:    Housing Stability: Low Risk  (7/7/2023)    Housing Stability Vital Sign     Unable to Pay for Housing in the Last Year: No     Number of Places Lived in the Last Year: 1     Unstable Housing in the Last Year: No       Current Outpatient Medications   Medication Sig Dispense Refill    acetaminophen (TYLENOL) 500 MG tablet Take 2 tablets (1,000 mg total) by mouth every 8 (eight) hours as needed for Pain. 30 tablet 0    albuterol (PROVENTIL/VENTOLIN HFA) 90 mcg/actuation inhaler INHALE 2 PUFFS INTO THE LUNGS EVERY 6 (SIX) HOURS AS NEEDED FOR WHEEZING (COUGH AND WHEEZING).RESCUE 18 g 1    aspirin 81 MG Chew Take 1 tablet (81 mg total) by mouth once daily. 30 tablet 5    calcium carbonate (OS-ELADIA) 500 mg calcium (1,250 mg) tablet Take 1 tablet (500 mg total) by mouth once daily. 90 tablet 3    EScitalopram oxalate (LEXAPRO) 10 MG tablet Take 1 tablet (10 mg total) by mouth once daily. 30 tablet 2    hydrOXYzine pamoate (VISTARIL) 25 MG Cap Take 1-4 capsules every 6 hours as needed for anxiety 120 capsule 2    lamoTRIgine (LAMICTAL) 150 MG Tab Take 2 tablets (300 mg total) by mouth every evening. 180 tablet 5    pantoprazole  (PROTONIX) 40 MG tablet Take 1 tablet (40 mg total) by mouth once daily. 90 tablet 3    rosuvastatin (CRESTOR) 10 MG tablet Take 1 tablet (10 mg total) by mouth once daily. 90 tablet 3    rosuvastatin (CRESTOR) 10 MG tablet Take 1 tablet (10 mg total) by mouth once daily. 90 tablet 0    traZODone (DESYREL) 50 MG tablet Take 1-2 tablets at bedtime as needed for sleep 180 tablet 1     Current Facility-Administered Medications   Medication Dose Route Frequency Provider Last Rate Last Admin    sodium chloride 0.9% flush 10 mL  10 mL Intravenous PRN Lauri Alejandra DPM           Review of patient's allergies indicates:   Allergen Reactions    Nsaids (non-steroidal anti-inflammatory drug) Other (See Comments)     Gastrointestinal bleeding requiring blood transfusion    Demerol [meperidine] Rash         Review of Systems   Constitutional: Negative for chills and fever.   HENT:  Negative for congestion and hearing loss.    Cardiovascular:  Negative for chest pain and claudication.   Respiratory:  Negative for cough and shortness of breath.    Skin:  Negative for color change and itching.   Musculoskeletal:  Positive for arthritis.   Gastrointestinal:  Negative for nausea and vomiting.   Neurological:  Positive for numbness. Negative for paresthesias.   Psychiatric/Behavioral:  Negative for altered mental status.            Objective:      Physical Exam  Constitutional:       General: She is not in acute distress.     Appearance: Normal appearance. She is not ill-appearing.   Cardiovascular:      Pulses:           Dorsalis pedis pulses are 2+ on the right side and 2+ on the left side.        Posterior tibial pulses are 2+ on the right side and 2+ on the left side.      Comments: Mild nonpitting edema localized to the right foot.  Skin temp is warm to foot bilateral.  No rubor on dependency bilateral foot.  No hair growth follow extremity.  Musculoskeletal:      Comments: Semi-rigid cavus foot structure  bilateral.  Slight to mild localized pain on palpation dorsal right midfoot overlying the previous surgical scar and no pain with midtarsal joint range motion right foot.  No significant edema to the right midfoot.      Localized pain on palpation directly over the navicular tuberosity.  No pain with active resisted inversion of the right foot however there is slight pain with active resisted plantar flexion of the foot overlying the navicular tuberosity.    Rectus appearing foot type bilateral.   Skin:     General: Skin is warm.      Capillary Refill: Capillary refill takes less than 2 seconds.      Findings: No ecchymosis or erythema.      Nails: There is no clubbing.      Comments: Normal appearing scar dorsal right midfoot.   Neurological:      Mental Status: She is alert and oriented to person, place, and time.      Sensory: Sensation is intact.      Motor: Motor function is intact.               Assessment:       Encounter Diagnoses   Name Primary?    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    Reviewed right foot x-ray noting maintained alignment of the 2nd metatarsocuneiform joint arthrodesis site with intact fixation no signs of hardware failure.  There is gapping at the arthrodesis site along the medial half of the fusion site and new bone growth along the lateral side with no gapping in that region.  The navicular tuberosity appears to have partial erosion to it consistent with an arthropathy.    We discussed continued activity restrictions however she is permitted to stand and walk as tolerated.  She is to avoid any significant heavy lifting,  stooping or squatting as discussed.  I do recommend that she wear her over-the-counter ankle brace to help support the midfoot and reduce the pressure to the posterior tibial tendon attachment right foot.  In addition to wear a slightly thicker sock to prevent any direct irritation and rubbing along the navicular tuberosity right foot.  Rest, ice and elevation p.r.n..      RTC within 2 months for follow-up weight-bearing x-ray or p.r.n. as discussed.  If no sufficient healing at that time we will attempt to get a bone stimulator however per her insurance a may not cover a device until 9 months.    A portion of this note was generated by voice recognition software and may contain spelling and grammar errors.      .

## 2023-08-08 ENCOUNTER — OFFICE VISIT (OUTPATIENT)
Dept: PSYCHIATRY | Facility: CLINIC | Age: 73
End: 2023-08-08
Payer: MEDICARE

## 2023-08-08 ENCOUNTER — HOSPITAL ENCOUNTER (EMERGENCY)
Facility: HOSPITAL | Age: 73
Discharge: PSYCHIATRIC OR SUBSTANCE USE FACILITY | End: 2023-08-08
Attending: STUDENT IN AN ORGANIZED HEALTH CARE EDUCATION/TRAINING PROGRAM
Payer: MEDICARE

## 2023-08-08 ENCOUNTER — HOSPITAL ENCOUNTER (INPATIENT)
Facility: HOSPITAL | Age: 73
LOS: 4 days | Discharge: HOME | DRG: 885 | End: 2023-08-12
Attending: STUDENT IN AN ORGANIZED HEALTH CARE EDUCATION/TRAINING PROGRAM | Admitting: STUDENT IN AN ORGANIZED HEALTH CARE EDUCATION/TRAINING PROGRAM
Payer: MEDICARE

## 2023-08-08 VITALS
SYSTOLIC BLOOD PRESSURE: 126 MMHG | BODY MASS INDEX: 25.58 KG/M2 | RESPIRATION RATE: 17 BRPM | OXYGEN SATURATION: 99 % | DIASTOLIC BLOOD PRESSURE: 72 MMHG | HEART RATE: 78 BPM | WEIGHT: 159.19 LBS | HEIGHT: 66 IN

## 2023-08-08 VITALS
OXYGEN SATURATION: 98 % | SYSTOLIC BLOOD PRESSURE: 111 MMHG | WEIGHT: 158.31 LBS | HEART RATE: 79 BPM | DIASTOLIC BLOOD PRESSURE: 52 MMHG | BODY MASS INDEX: 25.55 KG/M2 | RESPIRATION RATE: 20 BRPM | TEMPERATURE: 97 F

## 2023-08-08 DIAGNOSIS — Z86.59 HISTORY OF GAMBLING: Primary | ICD-10-CM

## 2023-08-08 DIAGNOSIS — F31.9 BIPOLAR 1 DISORDER: ICD-10-CM

## 2023-08-08 DIAGNOSIS — R45.851 SUICIDAL IDEATIONS: ICD-10-CM

## 2023-08-08 DIAGNOSIS — Z00.8 MEDICAL CLEARANCE FOR PSYCHIATRIC ADMISSION: Primary | ICD-10-CM

## 2023-08-08 DIAGNOSIS — Z65.8 PSYCHOSOCIAL STRESSORS: ICD-10-CM

## 2023-08-08 DIAGNOSIS — F31.9 BIPOLAR DEPRESSION: Primary | ICD-10-CM

## 2023-08-08 DIAGNOSIS — F17.200 TOBACCO USE DISORDER: ICD-10-CM

## 2023-08-08 DIAGNOSIS — F41.1 GAD (GENERALIZED ANXIETY DISORDER): ICD-10-CM

## 2023-08-08 DIAGNOSIS — R45.851 SUICIDAL IDEATION: ICD-10-CM

## 2023-08-08 LAB
ALBUMIN SERPL BCP-MCNC: 4.2 G/DL (ref 3.5–5.2)
ALP SERPL-CCNC: 79 U/L (ref 55–135)
ALT SERPL W/O P-5'-P-CCNC: 9 U/L (ref 10–44)
AMPHET+METHAMPHET UR QL: NEGATIVE
ANION GAP SERPL CALC-SCNC: 11 MMOL/L (ref 8–16)
APAP SERPL-MCNC: <3 UG/ML (ref 10–20)
AST SERPL-CCNC: 15 U/L (ref 10–40)
BARBITURATES UR QL SCN>200 NG/ML: NEGATIVE
BASOPHILS # BLD AUTO: 0.05 K/UL (ref 0–0.2)
BASOPHILS NFR BLD: 0.8 % (ref 0–1.9)
BENZODIAZ UR QL SCN>200 NG/ML: NEGATIVE
BILIRUB SERPL-MCNC: 0.2 MG/DL (ref 0.1–1)
BILIRUB UR QL STRIP: NEGATIVE
BUN SERPL-MCNC: 10 MG/DL (ref 8–23)
BZE UR QL SCN: NEGATIVE
CALCIUM SERPL-MCNC: 10.3 MG/DL (ref 8.7–10.5)
CANNABINOIDS UR QL SCN: NEGATIVE
CHLORIDE SERPL-SCNC: 105 MMOL/L (ref 95–110)
CLARITY UR: CLEAR
CO2 SERPL-SCNC: 24 MMOL/L (ref 23–29)
COLOR UR: YELLOW
CREAT SERPL-MCNC: 0.8 MG/DL (ref 0.5–1.4)
CREAT UR-MCNC: 10.2 MG/DL (ref 15–325)
DIFFERENTIAL METHOD: NORMAL
EOSINOPHIL # BLD AUTO: 0.1 K/UL (ref 0–0.5)
EOSINOPHIL NFR BLD: 1.7 % (ref 0–8)
ERYTHROCYTE [DISTWIDTH] IN BLOOD BY AUTOMATED COUNT: 14 % (ref 11.5–14.5)
EST. GFR  (NO RACE VARIABLE): >60 ML/MIN/1.73 M^2
ETHANOL SERPL-MCNC: <10 MG/DL
GLUCOSE SERPL-MCNC: 100 MG/DL (ref 70–110)
GLUCOSE UR QL STRIP: NEGATIVE
HCT VFR BLD AUTO: 39.8 % (ref 37–48.5)
HGB BLD-MCNC: 13.1 G/DL (ref 12–16)
HGB UR QL STRIP: NEGATIVE
IMM GRANULOCYTES # BLD AUTO: 0.02 K/UL (ref 0–0.04)
IMM GRANULOCYTES NFR BLD AUTO: 0.3 % (ref 0–0.5)
KETONES UR QL STRIP: NEGATIVE
LEUKOCYTE ESTERASE UR QL STRIP: NEGATIVE
LYMPHOCYTES # BLD AUTO: 2.1 K/UL (ref 1–4.8)
LYMPHOCYTES NFR BLD: 32.9 % (ref 18–48)
MCH RBC QN AUTO: 29.3 PG (ref 27–31)
MCHC RBC AUTO-ENTMCNC: 32.9 G/DL (ref 32–36)
MCV RBC AUTO: 89 FL (ref 82–98)
METHADONE UR QL SCN>300 NG/ML: NEGATIVE
MONOCYTES # BLD AUTO: 0.5 K/UL (ref 0.3–1)
MONOCYTES NFR BLD: 8.1 % (ref 4–15)
NEUTROPHILS # BLD AUTO: 3.6 K/UL (ref 1.8–7.7)
NEUTROPHILS NFR BLD: 56.2 % (ref 38–73)
NITRITE UR QL STRIP: NEGATIVE
NRBC BLD-RTO: 0 /100 WBC
OPIATES UR QL SCN: NEGATIVE
PCP UR QL SCN>25 NG/ML: NEGATIVE
PH UR STRIP: 6 [PH] (ref 5–8)
PLATELET # BLD AUTO: 283 K/UL (ref 150–450)
PMV BLD AUTO: 9.9 FL (ref 9.2–12.9)
POTASSIUM SERPL-SCNC: 4.4 MMOL/L (ref 3.5–5.1)
PROT SERPL-MCNC: 7.5 G/DL (ref 6–8.4)
PROT UR QL STRIP: NEGATIVE
RBC # BLD AUTO: 4.47 M/UL (ref 4–5.4)
SALICYLATES SERPL-MCNC: <5 MG/DL (ref 15–30)
SODIUM SERPL-SCNC: 140 MMOL/L (ref 136–145)
SP GR UR STRIP: <=1.005 (ref 1–1.03)
TOXICOLOGY INFORMATION: ABNORMAL
TSH SERPL DL<=0.005 MIU/L-ACNC: 0.76 UIU/ML (ref 0.4–4)
URN SPEC COLLECT METH UR: ABNORMAL
UROBILINOGEN UR STRIP-ACNC: NEGATIVE EU/DL
WBC # BLD AUTO: 6.45 K/UL (ref 3.9–12.7)

## 2023-08-08 PROCEDURE — 99285 EMERGENCY DEPT VISIT HI MDM: CPT

## 2023-08-08 PROCEDURE — 90833 PR PSYCHOTHERAPY W/PATIENT W/E&M, 30 MIN (ADD ON): ICD-10-PCS | Mod: S$GLB,,, | Performed by: STUDENT IN AN ORGANIZED HEALTH CARE EDUCATION/TRAINING PROGRAM

## 2023-08-08 PROCEDURE — 3078F DIAST BP <80 MM HG: CPT | Mod: CPTII,S$GLB,, | Performed by: STUDENT IN AN ORGANIZED HEALTH CARE EDUCATION/TRAINING PROGRAM

## 2023-08-08 PROCEDURE — 3008F PR BODY MASS INDEX (BMI) DOCUMENTED: ICD-10-PCS | Mod: CPTII,S$GLB,, | Performed by: STUDENT IN AN ORGANIZED HEALTH CARE EDUCATION/TRAINING PROGRAM

## 2023-08-08 PROCEDURE — 1159F MED LIST DOCD IN RCRD: CPT | Mod: CPTII,S$GLB,, | Performed by: STUDENT IN AN ORGANIZED HEALTH CARE EDUCATION/TRAINING PROGRAM

## 2023-08-08 PROCEDURE — 3008F BODY MASS INDEX DOCD: CPT | Mod: CPTII,S$GLB,, | Performed by: STUDENT IN AN ORGANIZED HEALTH CARE EDUCATION/TRAINING PROGRAM

## 2023-08-08 PROCEDURE — 1160F PR REVIEW ALL MEDS BY PRESCRIBER/CLIN PHARMACIST DOCUMENTED: ICD-10-PCS | Mod: CPTII,S$GLB,, | Performed by: STUDENT IN AN ORGANIZED HEALTH CARE EDUCATION/TRAINING PROGRAM

## 2023-08-08 PROCEDURE — 36415 COLL VENOUS BLD VENIPUNCTURE: CPT | Performed by: STUDENT IN AN ORGANIZED HEALTH CARE EDUCATION/TRAINING PROGRAM

## 2023-08-08 PROCEDURE — 99214 PR OFFICE/OUTPT VISIT, EST, LEVL IV, 30-39 MIN: ICD-10-PCS | Mod: S$GLB,,, | Performed by: STUDENT IN AN ORGANIZED HEALTH CARE EDUCATION/TRAINING PROGRAM

## 2023-08-08 PROCEDURE — 25000003 PHARM REV CODE 250: Performed by: STUDENT IN AN ORGANIZED HEALTH CARE EDUCATION/TRAINING PROGRAM

## 2023-08-08 PROCEDURE — 80053 COMPREHEN METABOLIC PANEL: CPT | Performed by: STUDENT IN AN ORGANIZED HEALTH CARE EDUCATION/TRAINING PROGRAM

## 2023-08-08 PROCEDURE — 80143 DRUG ASSAY ACETAMINOPHEN: CPT | Performed by: STUDENT IN AN ORGANIZED HEALTH CARE EDUCATION/TRAINING PROGRAM

## 2023-08-08 PROCEDURE — 84443 ASSAY THYROID STIM HORMONE: CPT | Performed by: STUDENT IN AN ORGANIZED HEALTH CARE EDUCATION/TRAINING PROGRAM

## 2023-08-08 PROCEDURE — 1159F PR MEDICATION LIST DOCUMENTED IN MEDICAL RECORD: ICD-10-PCS | Mod: CPTII,S$GLB,, | Performed by: STUDENT IN AN ORGANIZED HEALTH CARE EDUCATION/TRAINING PROGRAM

## 2023-08-08 PROCEDURE — 3074F SYST BP LT 130 MM HG: CPT | Mod: CPTII,S$GLB,, | Performed by: STUDENT IN AN ORGANIZED HEALTH CARE EDUCATION/TRAINING PROGRAM

## 2023-08-08 PROCEDURE — 82077 ASSAY SPEC XCP UR&BREATH IA: CPT | Performed by: STUDENT IN AN ORGANIZED HEALTH CARE EDUCATION/TRAINING PROGRAM

## 2023-08-08 PROCEDURE — 85025 COMPLETE CBC W/AUTO DIFF WBC: CPT | Performed by: STUDENT IN AN ORGANIZED HEALTH CARE EDUCATION/TRAINING PROGRAM

## 2023-08-08 PROCEDURE — 80307 DRUG TEST PRSMV CHEM ANLYZR: CPT | Performed by: STUDENT IN AN ORGANIZED HEALTH CARE EDUCATION/TRAINING PROGRAM

## 2023-08-08 PROCEDURE — 99999 PR PBB SHADOW E&M-EST. PATIENT-LVL III: ICD-10-PCS | Mod: PBBFAC,,, | Performed by: STUDENT IN AN ORGANIZED HEALTH CARE EDUCATION/TRAINING PROGRAM

## 2023-08-08 PROCEDURE — 90833 PSYTX W PT W E/M 30 MIN: CPT | Mod: S$GLB,,, | Performed by: STUDENT IN AN ORGANIZED HEALTH CARE EDUCATION/TRAINING PROGRAM

## 2023-08-08 PROCEDURE — 11400000 HC PSYCH PRIVATE ROOM

## 2023-08-08 PROCEDURE — 99214 OFFICE O/P EST MOD 30 MIN: CPT | Mod: S$GLB,,, | Performed by: STUDENT IN AN ORGANIZED HEALTH CARE EDUCATION/TRAINING PROGRAM

## 2023-08-08 PROCEDURE — 99999 PR PBB SHADOW E&M-EST. PATIENT-LVL III: CPT | Mod: PBBFAC,,, | Performed by: STUDENT IN AN ORGANIZED HEALTH CARE EDUCATION/TRAINING PROGRAM

## 2023-08-08 PROCEDURE — 1160F RVW MEDS BY RX/DR IN RCRD: CPT | Mod: CPTII,S$GLB,, | Performed by: STUDENT IN AN ORGANIZED HEALTH CARE EDUCATION/TRAINING PROGRAM

## 2023-08-08 PROCEDURE — 81003 URINALYSIS AUTO W/O SCOPE: CPT | Mod: 59 | Performed by: STUDENT IN AN ORGANIZED HEALTH CARE EDUCATION/TRAINING PROGRAM

## 2023-08-08 PROCEDURE — 80179 DRUG ASSAY SALICYLATE: CPT | Performed by: STUDENT IN AN ORGANIZED HEALTH CARE EDUCATION/TRAINING PROGRAM

## 2023-08-08 PROCEDURE — 3074F PR MOST RECENT SYSTOLIC BLOOD PRESSURE < 130 MM HG: ICD-10-PCS | Mod: CPTII,S$GLB,, | Performed by: STUDENT IN AN ORGANIZED HEALTH CARE EDUCATION/TRAINING PROGRAM

## 2023-08-08 PROCEDURE — 3078F PR MOST RECENT DIASTOLIC BLOOD PRESSURE < 80 MM HG: ICD-10-PCS | Mod: CPTII,S$GLB,, | Performed by: STUDENT IN AN ORGANIZED HEALTH CARE EDUCATION/TRAINING PROGRAM

## 2023-08-08 RX ORDER — ONDANSETRON 4 MG/1
4 TABLET, ORALLY DISINTEGRATING ORAL EVERY 8 HOURS PRN
Status: DISCONTINUED | OUTPATIENT
Start: 2023-08-08 | End: 2023-08-12 | Stop reason: HOSPADM

## 2023-08-08 RX ORDER — IBUPROFEN 200 MG
1 TABLET ORAL DAILY PRN
Status: DISCONTINUED | OUTPATIENT
Start: 2023-08-08 | End: 2023-08-12 | Stop reason: HOSPADM

## 2023-08-08 RX ORDER — CALCIUM CARBONATE 200(500)MG
1000 TABLET,CHEWABLE ORAL EVERY 8 HOURS PRN
Status: DISCONTINUED | OUTPATIENT
Start: 2023-08-08 | End: 2023-08-12 | Stop reason: HOSPADM

## 2023-08-08 RX ORDER — HYDROXYZINE HYDROCHLORIDE 25 MG/1
50 TABLET, FILM COATED ORAL 3 TIMES DAILY PRN
Status: DISCONTINUED | OUTPATIENT
Start: 2023-08-08 | End: 2023-08-12 | Stop reason: HOSPADM

## 2023-08-08 RX ORDER — ALPRAZOLAM 0.5 MG/1
0.5 TABLET ORAL 3 TIMES DAILY PRN
Status: DISCONTINUED | OUTPATIENT
Start: 2023-08-08 | End: 2023-08-09

## 2023-08-08 RX ORDER — ACETAMINOPHEN 325 MG/1
650 TABLET ORAL EVERY 6 HOURS PRN
Status: DISCONTINUED | OUTPATIENT
Start: 2023-08-08 | End: 2023-08-12 | Stop reason: HOSPADM

## 2023-08-08 RX ORDER — TALC
6 POWDER (GRAM) TOPICAL NIGHTLY PRN
Status: DISCONTINUED | OUTPATIENT
Start: 2023-08-08 | End: 2023-08-12 | Stop reason: HOSPADM

## 2023-08-08 RX ORDER — TRAZODONE HYDROCHLORIDE 50 MG/1
50 TABLET ORAL NIGHTLY PRN
Status: DISCONTINUED | OUTPATIENT
Start: 2023-08-08 | End: 2023-08-11

## 2023-08-08 RX ORDER — ALPRAZOLAM 0.5 MG/1
1 TABLET ORAL
Status: COMPLETED | OUTPATIENT
Start: 2023-08-08 | End: 2023-08-08

## 2023-08-08 RX ADMIN — ALPRAZOLAM 1 MG: 0.5 TABLET ORAL at 10:08

## 2023-08-08 NOTE — PROGRESS NOTES
"  08/08/2023  1:21 PM  Karin Maguire  [id number]    Outpatient Psychiatry Follow-Up Visit (MD/NP)    8/8/2023    Clinical Status of Patient:  Outpatient (Ambulatory)    Chief Complaint:  Karin Maguire is a 73 y.o. female who presents today for follow-up of depression and anxiety.  Met with patient.        Interval History and Content of Current Session:  Interim Events/Subjective Report/Content of Current Session:   MDD with mixed features vs. Unspecified bipolar disorder (pt poor historian)  LUANNE  Insomnia  Gambling disorder  Obesity  Psychosocial stressors     Abnormal movements          "I am gambling a lot and I am crying a lot, I am very anxious and depressed, I don't know what to do anymore." She is gambling "every other day at least, sometimes twice a day," spent 800 dollars this month gambling, "I am disappointed in myself... I don't know... I feel like nobody hears what I say... I can't think straight... I'm always there when someone needs me, but no is there for me."    She states mood depressed daily for the last 2 weeks. LUANNE symptoms "every day, I am anxious about money, I can't shop, I can't figure out what I can do that doesn't involve money."    Reports passive SI, "I don't want to hurt myself I just want something to end it, I'm tired of my life."      Stressors: gambling, family        Psychiatric Review Of Systems - Is patient experiencing or having changes in:  sleep: no  appetite: no  weight: no  energy/anergy: variable, "comes in spurts"  Interest/pleasure/anhedonia: variable, "I want to do things but I don't have the motivation, I never finish, I go from one thing to the other and not accomplish anything"  Anxiety: increased  panic: no  Guilty/hopelessness/worthlessness: yes  concentration: yes  S.I.B.s/risky behavior: no  Irritability: moderate  Substance abuse: no  Racing thoughts: no  Impulsive behaviors: no  Paranoia: no  AVH: no  Gambling: yes, see above  Michelle/hypomania: " no        Psychotherapy:  Target symptoms: mood disorder  Why chosen therapy is appropriate versus another modality: relevant to diagnosis  Outcome monitoring methods: self-report  Therapeutic intervention type: supportive psychotherapy  Topics discussed/themes: relationships difficulties, stress related to medical comorbidities, building skills sets for symptom management, symptom recognition  The patient's response to the intervention is accepting. The patient's progress toward treatment goals is fair.   Duration of intervention: 16 minutes.        Review of Systems   Review of Systems   Constitutional:  Negative for chills and fever.   HENT:  Negative for hearing loss.    Eyes:  Negative for blurred vision and double vision.   Respiratory:  Negative for shortness of breath.    Cardiovascular:  Negative for chest pain.   Gastrointestinal:  Negative for constipation, diarrhea, nausea and vomiting.   Genitourinary:  Negative for dysuria.   Musculoskeletal:  Negative for back pain and joint pain.   Skin:  Negative for rash.   Neurological:  Negative for dizziness and headaches.   Endo/Heme/Allergies:  Negative for environmental allergies.       Past Medical, Family and Social History: The patient's past medical, family and social history have been reviewed and updated as appropriate within the electronic medical record - see encounter notes.    Social History     Socioeconomic History    Marital status:    Tobacco Use    Smoking status: Every Day     Current packs/day: 1.00     Average packs/day: 1 pack/day for 41.4 years (41.4 ttl pk-yrs)     Types: Cigarettes     Start date: 3/30/1982    Smokeless tobacco: Never   Substance and Sexual Activity    Alcohol use: Yes     Comment: Socially-2 or 3 times a year-6 or 7 drinks    Drug use: No    Sexual activity: Yes     Partners: Male     Birth control/protection: Surgical     Comment:      Social Determinants of Health     Financial Resource Strain: Low Risk   (7/7/2023)    Overall Financial Resource Strain (CARDIA)     Difficulty of Paying Living Expenses: Not hard at all   Food Insecurity: No Food Insecurity (7/7/2023)    Hunger Vital Sign     Worried About Running Out of Food in the Last Year: Never true     Ran Out of Food in the Last Year: Never true   Transportation Needs: No Transportation Needs (7/7/2023)    PRAPARE - Transportation     Lack of Transportation (Medical): No     Lack of Transportation (Non-Medical): No   Physical Activity: Inactive (7/7/2023)    Exercise Vital Sign     Days of Exercise per Week: 0 days     Minutes of Exercise per Session: 0 min   Stress: Stress Concern Present (7/7/2023)    [employer] [place] [employer] - Occupational Stress Questionnaire     Feeling of Stress : Rather much   Social Connections: Moderately Isolated (7/7/2023)    Social Connection and Isolation Panel [NHANES]     Frequency of Communication with Friends and Family: More than three times a week     Frequency of Social Gatherings with Friends and Family: Once a week     Attends Uatsdin Services: Never     Active Member of Clubs or Organizations: No     Attends Club or Organization Meetings: Never     Marital Status:    Housing Stability: Low Risk  (7/7/2023)    Housing Stability Vital Sign     Unable to Pay for Housing in the Last Year: No     Number of Places Lived in the Last Year: 1     Unstable Housing in the Last Year: No         Compliance: yes    Side effects: None    Risk Parameters:  Patient reports suicidal ideation: passive, no plan or intent  Patient reports no homicidal ideation  Patient reports no self-injurious behavior  Patient reports no violent behavior        Exam (detailed: at least 9 elements; comprehensive: all 15 elements)     Vitals:    08/08/23 0928   BP: 126/72   Pulse: 78   Resp: 17       Wt Readings from Last 5 Encounters:   08/08/23 72.2 kg (159 lb 3.2 oz)   07/10/23 71.7 kg (158 lb)   07/07/23 71.7 kg (158 lb)   07/03/23  73.2 kg (161 lb 6.4 oz)   06/23/23 76.7 kg (169 lb)         CONSTITUTIONAL  General Appearance: unremarkable, age appropriate    MUSCULOSKELETAL  Muscle Strength and Tone:no tremor, no tic  Abnormal Involuntary Movements: yes  Gait and Station: non-ataxic    PSYCHIATRIC   Level of Consciousness: awake and alert   Orientation: person, place and situation  Grooming: Casually dressed and Well groomed  Psychomotor Behavior: normal, cooperative  Speech: normal tone, normal rate, normal pitch, normal volume  Language: grossly intact  Mood: depressed  Affect: Consistent with mood  Thought Process: linear, logical  Associations: intact   Thought Content: suicidal ideation and +SI  Perceptions: denies hallucinations  Memory: Able to recall past events, Remote intact and Recent intact  Attention:Attends to interview without distraction  Fund of Knowledge: Aware of current events and Vocabulary appropriate   Estimate if Intelligence:  Average based on work/education history, vocabulary and mental status exam  Insight: has awareness of illness  Judgment: behavior is adequate to circumstances          Assessment and Diagnosis   Status/Progress: Based on the examination today, the patient's problem(s) is/are inadequately controlled.  New problems have not been presented today.   Co-morbidities are complicating management of the primary condition.          Impresssion/Assessment:  MDD with mixed features vs. Unspecified bipolar disorder (pt poor historian)  LUANNE  Insomnia  Gambling disorder  Obesity  Psychosocial stressors     Abnormal movements    Chronic pain         Plan:        MDD with mixed features vs. Unspecified bipolar disorder (pt poor historian)  - admit to inpatient, pt to ED for medical clearance now  - continue lamictal 300 mg PO qd  - increase lexapro 10 mg PO qd to 20 mg PO qd  - continue trazodone 50 mg PO (1-2 tablets qhs PRN )  - strongly recommended psychotherapy, provided with resources       Insomnia  - using  "CPAP  - pt counseled       Gambling disorder  - pt counseled  - consider meetings/therapy; patient declines       LUANNE  - continue hydroxyzine  mg PO q 6 hours PRN  - see MDD above  - consider psychotherapy, patient declines       Psychosocial stressors  - pt counseled  - strongly recommended psychotherapy, couples counseling to patient, patient refusing       Obesity  -  tapered off seroquel      Abnormal movements  - frequent non-stereotyped movements of hands and limbs, neck, trunk; patient states she has always been "fidgety" since childhood, reports she has seen neurology about movements before in past, pt continues to refuse referral, pt states "I've been this way all my life"        Chronic pain  - pt counseled          - Instructed patient to keep all scheduled appointments, take medications as prescribed and abstain from substance abuse. Instructed to call 911 or present to ER for emergency including SI or HI.    - Discussed diagnosis, risks and benefits of proposed treatment above vs alternative treatments vs no treatment, and potential side effects of these treatments. The patient expresses understanding of the above and displays the capacity to agree with this treatment given said understanding. Patient also agrees that, currently, the benefits outweigh the risks and would like to pursue treatment at this time.           Estevan Zaman III, MD    Return to Clinic: 1 m             "

## 2023-08-08 NOTE — ED PROVIDER NOTES
Encounter Date: 8/8/2023       History     Chief Complaint   Patient presents with    Psychiatric Evaluation     Patient to ER CC of SI, denies HI, patient is coming from out patient behavior health      73-year-old female with a past medical history of anxiety, bipolar presents to the emergency department after being sent by psychiatry for psych admission.  Please see note from earlier today.      Patient states for the past month, she has been very stressed, anxious, depressed.  She states that she has a gambling problem and gambles nearly daily, sometimes twice a day.  She states that she cannot think straight, that no one needs here, that she is better off dead.  She is having thoughts of wanting to die but does not want to actually hurt herself.  She states that things will be better if she was dead.  She denies any hallucinations or homicidal ideations.    She states her  will yell at her for typical stuff but states she isn't being abused physically or mentally.        Review of patient's allergies indicates:   Allergen Reactions    Nsaids (non-steroidal anti-inflammatory drug) Other (See Comments)     Gastrointestinal bleeding requiring blood transfusion    Demerol [meperidine] Rash     Past Medical History:   Diagnosis Date    Anxiety     Arthritis     Bipolar 1 disorder     Bursitis of right hip     GI bleed due to NSAIDs     Multinodular goiter 11/15/2021    Sacroiliitis     right side    Sleep apnea      Past Surgical History:   Procedure Laterality Date    ANKLE FRACTURE SURGERY Left 2001    BLADDER SUSPENSION      CARPAL TUNNEL RELEASE Bilateral     CHOLECYSTECTOMY      ESOPHAGOGASTRODUODENOSCOPY N/A 7/29/2021    Procedure: EGD (ESOPHAGOGASTRODUODENOSCOPY);  Surgeon: Susan Mcclain MD;  Location: [facility];  Service: Endoscopy;  Laterality: N/A;    EYE SURGERY      FOOT ARTHRODESIS Right 5/3/2023    Procedure: FUSION, FOOT;  Surgeon: Lauri Alejandra DPM;  Location: [facility] OR;  Service:  Podiatry;  Laterality: Right;  mini c-arm, [employer] dowel bone graft harvester, locking plate and screws festus notified cc    HYSTERECTOMY  1986    vaginal prolapse    INJECTION OF ANESTHETIC AGENT AROUND MEDIAL BRANCH NERVES INNERVATING CERVICAL FACET JOINT Bilateral 5/27/2022    Procedure: CERVICAL MEDIAL BRANCH NERVE BLOCK (C3-4,C4-5);  Surgeon: Mamie Roman MD;  Location: [facility] OR;  Service: Pain Management;  Laterality: Bilateral;    INJECTION OF ANESTHETIC AGENT AROUND MEDIAL BRANCH NERVES INNERVATING LUMBAR FACET JOINT Right 2/5/2021    Procedure: LUMBAR FACET JOINT BLOCK (L3-4,L4-5,L5-S1);  Surgeon: Mamie Roman MD;  Location: [facility] OR;  Service: Pain Management;  Laterality: Right;    INJECTION OF ANESTHETIC AGENT AROUND MEDIAL BRANCH NERVES INNERVATING LUMBAR FACET JOINT Right 4/30/2021    Procedure: LUMBAR FACET JOINT BLOCK (L3-4,L4-5,L5-S1);  Surgeon: Mamie Roman MD;  Location: [facility] OR;  Service: Pain Management;  Laterality: Right;    INJECTION OF ANESTHETIC AGENT INTO SACROILIAC JOINT Right 12/4/2020    Procedure: SACROILIAC JOINT INJECTION;  Surgeon: Mamie Roman MD;  Location: [facility] OR;  Service: Pain Management;  Laterality: Right;    INJECTION OF JOINT Right 12/4/2020    Procedure: GREATER TROCHANTERIC BURSA INJECTION;  Surgeon: Mamie Roman MD;  Location: [facility] OR;  Service: Pain Management;  Laterality: Right;    NASAL SEPTUM SURGERY      RECTAL PROLAPSE REPAIR      TONSILLECTOMY       Family History   Problem Relation Age of Onset    Colon cancer Maternal Uncle     Colon cancer Maternal Uncle     Colon cancer Maternal Uncle      Social History     Tobacco Use    Smoking status: Every Day     Current packs/day: 1.00     Average packs/day: 1 pack/day for 41.4 years (41.4 ttl pk-yrs)     Types: Cigarettes     Start date: 3/30/1982    Smokeless tobacco: Never   Substance Use Topics    Alcohol use: Yes     Comment: Socially-2 or 3 times a year-6 or 7 drinks    Drug use: No     Review of  Systems   Constitutional:  Negative for chills and fever.   HENT:  Negative for congestion, rhinorrhea and sneezing.    Eyes:  Negative for discharge and redness.   Respiratory:  Negative for cough and shortness of breath.    Cardiovascular:  Negative for chest pain and palpitations.   Gastrointestinal:  Negative for abdominal pain, diarrhea, nausea and vomiting.   Genitourinary:  Negative for dysuria, frequency, vaginal bleeding and vaginal discharge.   Musculoskeletal:  Negative for back pain and neck pain.   Skin:  Negative for rash and wound.   Neurological:  Negative for weakness, numbness and headaches.   Psychiatric/Behavioral:  Positive for suicidal ideas. Negative for hallucinations. The patient is nervous/anxious and is hyperactive.        Physical Exam     Initial Vitals [08/08/23 1020]   BP Pulse Resp Temp SpO2   (!) 201/101 88 18 97.3 °F (36.3 °C) 99 %      MAP       --         Physical Exam    Nursing note and vitals reviewed.  Constitutional: She appears well-developed. She is not diaphoretic. No distress.   HENT:   Head: Normocephalic and atraumatic.   Right Ear: External ear normal.   Left Ear: External ear normal.   Eyes: Right eye exhibits no discharge. Left eye exhibits no discharge. No scleral icterus.   Neck: Neck supple.   Cardiovascular:  Normal rate and regular rhythm.           Pulmonary/Chest: Breath sounds normal. No stridor. No respiratory distress. She has no wheezes. She has no rhonchi. She has no rales.   Abdominal: Abdomen is soft. There is no abdominal tenderness. There is no guarding.   Musculoskeletal:         General: No edema.      Cervical back: Neck supple.     Neurological: She is alert and oriented to person, place, and time.   Skin: Skin is warm and dry. Capillary refill takes less than 2 seconds.   Psychiatric: Her mood appears anxious. Her speech is rapid and/or pressured and tangential. She is hyperactive. She expresses suicidal ideation. She expresses no homicidal  ideation. She expresses no suicidal plans and no homicidal plans.         ED Course   Procedures  Labs Reviewed   URINALYSIS, REFLEX TO URINE CULTURE - Abnormal; Notable for the following components:       Result Value    Specific Gravity, UA <=1.005 (*)     All other components within normal limits    Narrative:     Specimen Source->Urine   ACETAMINOPHEN LEVEL - Abnormal; Notable for the following components:    Acetaminophen (Tylenol), Serum <3.0 (*)     All other components within normal limits   COMPREHENSIVE METABOLIC PANEL - Abnormal; Notable for the following components:    ALT 9 (*)     All other components within normal limits   DRUG SCREEN PANEL, URINE EMERGENCY - Abnormal; Notable for the following components:    Creatinine, Urine 10.2 (*)     All other components within normal limits    Narrative:     Specimen Source->Urine   SALICYLATE LEVEL - Abnormal; Notable for the following components:    Salicylate Lvl <5.0 (*)     All other components within normal limits   ALCOHOL,MEDICAL (ETHANOL)   CBC W/ AUTO DIFFERENTIAL   TSH          Imaging Results    None          Medications   ALPRAZolam tablet 1 mg (1 mg Oral Given 8/8/23 1041)     Medical Decision Making:   Differential Diagnosis:   Ddx: SI, HI, grave disability  ED Management:  Based on the patient's evaluation and after reviewing psychiatry's note from earlier today, patient will be [facility]'ed for SI. The  and the patient both understand. She is cooperative. Given PO xanax for anxiety which may contribute to her elevated BP. May need ED antihypertensives if still elevated.    Medically cleared for psych.                Medically cleared for psychiatry placement: 8/8/2023 11:18 AM         Clinical Impression:   Final diagnoses:  [Z00.8] Medical clearance for psychiatric admission (Primary)  [R45.851] Suicidal ideation        ED Disposition Condition    Transfer to Psych Facility Stable          ED Prescriptions    None       Follow-up Information     None          Chris Alegre DO  08/08/23 112

## 2023-08-08 NOTE — ED NOTES
Pt's spouse here to bring additional clothes and supplies.  Inventoried and locked up by security per policy.  Spouse states that since pt changed her antidepressant about 6 weeks ago from Cymbalta to Lexapro she has seemed for agitated and anxious.  States he believes the medication was changed because she was having unwanted side effects.

## 2023-08-08 NOTE — ED NOTES
Pt now appears more calm.  Resting quietly, lying in left reese.  Denies needs.  Will cont to monitor.

## 2023-08-08 NOTE — PLAN OF CARE
"Admit Note:  Pt received from [facility], pec'd.  Sent over from Dr Boggs's clinic.  Escorted to [facility] [place] by security and tech via wheelchair.  Pt received PO xanax in ER.  Sedated upon arrival.  Pt allowed to use wheelchair until sedation has improved.  Pt sedated but able to complete admission assessment.  Pt denies any S/I or H/I at this time.  States her anxiety has been unmanageable lately and she is "unable to go on like this any longer".  Pt denies wanting to harm self at this time, however.  Reports recent med change.  Pt cooperative and signed all paperwork.  Oriented to unit and escorted to her room so she could lie down d/t sedation.  Will continue to monitor.  "

## 2023-08-09 PROBLEM — R45.851 SUICIDAL IDEATION: Status: ACTIVE | Noted: 2023-08-09

## 2023-08-09 PROBLEM — J42 CHRONIC BRONCHITIS: Status: ACTIVE | Noted: 2023-08-09

## 2023-08-09 PROBLEM — E78.5 HYPERLIPIDEMIA: Status: ACTIVE | Noted: 2023-08-09

## 2023-08-09 PROBLEM — F31.9 BIPOLAR DEPRESSION: Status: ACTIVE | Noted: 2023-08-09

## 2023-08-09 PROBLEM — F17.200 TOBACCO USE DISORDER: Status: ACTIVE | Noted: 2023-08-09

## 2023-08-09 PROCEDURE — 11400000 HC PSYCH PRIVATE ROOM

## 2023-08-09 PROCEDURE — 99223 PR INITIAL HOSPITAL CARE,LEVL III: ICD-10-PCS | Mod: ,,, | Performed by: STUDENT IN AN ORGANIZED HEALTH CARE EDUCATION/TRAINING PROGRAM

## 2023-08-09 PROCEDURE — 90833 PSYTX W PT W E/M 30 MIN: CPT | Mod: ,,, | Performed by: STUDENT IN AN ORGANIZED HEALTH CARE EDUCATION/TRAINING PROGRAM

## 2023-08-09 PROCEDURE — 99231 PR SUBSEQUENT HOSPITAL CARE,LEVL I: ICD-10-PCS | Mod: ,,, | Performed by: PHYSICIAN ASSISTANT

## 2023-08-09 PROCEDURE — 25000003 PHARM REV CODE 250: Performed by: STUDENT IN AN ORGANIZED HEALTH CARE EDUCATION/TRAINING PROGRAM

## 2023-08-09 PROCEDURE — 99231 SBSQ HOSP IP/OBS SF/LOW 25: CPT | Mod: ,,, | Performed by: PHYSICIAN ASSISTANT

## 2023-08-09 PROCEDURE — 90833 PR PSYCHOTHERAPY W/PATIENT W/E&M, 30 MIN (ADD ON): ICD-10-PCS | Mod: ,,, | Performed by: STUDENT IN AN ORGANIZED HEALTH CARE EDUCATION/TRAINING PROGRAM

## 2023-08-09 PROCEDURE — 99223 1ST HOSP IP/OBS HIGH 75: CPT | Mod: ,,, | Performed by: STUDENT IN AN ORGANIZED HEALTH CARE EDUCATION/TRAINING PROGRAM

## 2023-08-09 RX ORDER — QUETIAPINE FUMARATE 25 MG/1
50 TABLET, FILM COATED ORAL NIGHTLY
Status: DISCONTINUED | OUTPATIENT
Start: 2023-08-09 | End: 2023-08-11

## 2023-08-09 RX ORDER — ATORVASTATIN CALCIUM 20 MG/1
40 TABLET, FILM COATED ORAL DAILY
Status: DISCONTINUED | OUTPATIENT
Start: 2023-08-10 | End: 2023-08-12 | Stop reason: HOSPADM

## 2023-08-09 RX ORDER — PANTOPRAZOLE SODIUM 40 MG/1
40 TABLET, DELAYED RELEASE ORAL DAILY
Status: DISCONTINUED | OUTPATIENT
Start: 2023-08-10 | End: 2023-08-12 | Stop reason: HOSPADM

## 2023-08-09 RX ORDER — ESCITALOPRAM OXALATE 20 MG/1
20 TABLET ORAL DAILY
Status: DISCONTINUED | OUTPATIENT
Start: 2023-08-09 | End: 2023-08-12 | Stop reason: HOSPADM

## 2023-08-09 RX ORDER — TRAZODONE HYDROCHLORIDE 50 MG/1
50 TABLET ORAL NIGHTLY
Status: DISCONTINUED | OUTPATIENT
Start: 2023-08-09 | End: 2023-08-09

## 2023-08-09 RX ORDER — ALPRAZOLAM 0.25 MG/1
0.25 TABLET ORAL 3 TIMES DAILY PRN
Status: DISCONTINUED | OUTPATIENT
Start: 2023-08-09 | End: 2023-08-12 | Stop reason: HOSPADM

## 2023-08-09 RX ORDER — ALBUTEROL SULFATE 90 UG/1
1 AEROSOL, METERED RESPIRATORY (INHALATION) 2 TIMES DAILY PRN
Status: DISCONTINUED | OUTPATIENT
Start: 2023-08-09 | End: 2023-08-12 | Stop reason: HOSPADM

## 2023-08-09 RX ADMIN — ESCITALOPRAM OXALATE 20 MG: 20 TABLET ORAL at 01:08

## 2023-08-09 RX ADMIN — ALPRAZOLAM 0.5 MG: 0.5 TABLET ORAL at 08:08

## 2023-08-09 RX ADMIN — LAMOTRIGINE 150 MG: 100 TABLET ORAL at 08:08

## 2023-08-09 RX ADMIN — QUETIAPINE FUMARATE 50 MG: 25 TABLET ORAL at 08:08

## 2023-08-09 RX ADMIN — TRAZODONE HYDROCHLORIDE 50 MG: 50 TABLET ORAL at 08:08

## 2023-08-09 RX ADMIN — LAMOTRIGINE 150 MG: 100 TABLET ORAL at 01:08

## 2023-08-09 NOTE — HPI
Patient is a 73 year old female with medical history of BANDAR, tobacco use disorder, HLD, COPD, anxiety, bipolar disorder and gambling who was admitted to [facility] for [facility].  Hospital medicine consulted for medical management.

## 2023-08-09 NOTE — MEDICAL/APP STUDENT
"PSYCHIATRY INPATIENT ADMISSION NOTE - H & P      8/9/2023 8:33 AM   Karin Maguire   1950   [id number]         DATE OF ADMISSION: 8/8/2023  1:37 PM    SITE: [facility]    CURRENT LEGAL STATUS: PEC and/or CEC      HISTORY    CHIEF COMPLAINT   Karin Maguire is a 73 y.o. female with a past psychiatric history of Bipolar, Depressed and Generalized Anxiety Disorder, gambling disorder who has been seeing Dr. Zaman since September 2020 currently admitted to the inpatient unit with the following chief complaint: increased symptoms of general anxiety disorder and gambling disorder.    HPI   The patient was seen and examined. The chart was reviewed.    The patient presented to the ER on 8/8/2023 .    Spoke with the patient today who says she has not noticed any improvement from Cymbalta to Lexapro. States her gambling has worsened since changing medications. Reports that she feels like a burden to her partner after recovering from foot surgery. States they have been  for 40+ years but cannot rely on him for anything.     Reports increase in anxiety and decreased sleep.  "There are so many questions running through my head." Reluctant to talk about them.    States she "wants to bash her head against the wall" and that she cannot "stand being here." Elicits passive SI. Denies HI.  Denies hallucinations.    Interview was stopped by patient request. Attempted to de-escalate the situation. Patient asked to be left alone. Nurse advised.      Symptoms of Depression: diminished mood - Yes, loss of interest/anhedonia - No;  recurrent - No, >14 days - Yes, diminished energy - Yes, change in sleep - Yes, change in appetite - No, diminished concentration or cognition or indecisiveness - Yes, PMA/R -  Yes, excessive guilt or hopelessness or worthlessness - Yes, suicidal ideations - No    Changes in Sleep: trouble with initiation- No, maintenance, - Yes early morning awakening with inability to return to sleep - " Yes, hypersomnolence - No    Suicidal- active/passive ideations - Passive, organized plans, future intentions - No.    Homicidal ideations: active/passive ideations - Yes, organized plans, future intentions - No    Symptoms of psychosis: hallucinations - No, delusions - No, disorganized speech - No, disorganized behavior or abnormal motor behavior - Yes, or negative symptoms (diminshed emotional expression, avolition, anhedonia, alogia, asociality) - No, since onset of illness decreased level of functioning present - Yes    Symptoms of bradley or hypomania: elevated, expansive, or irritable mood with increased energy or activity - No; > 4 days - No,  >7 days - No; with inflated self-esteem or grandiosity - No, decreased need for sleep - No, increased rate of speech - No, FOI or racing thoughts - Yes, distractibility - No, increased goal directed activity or PMA - No, risky/disinhibited behavior - No    Symptoms of LUANNE: excessive anxiety/worry/fear, more days than not, about numerous issues - Yes, ongoing for >6 months - N/A, difficult to control - Yes, with restlessness - Yes, fatigue - No, poor concentration - Yes, irritability - Yes, muscle tension - N/A, sleep disturbance - Yes; causes functionally impairing distress - Yes    Symptoms of Panic Disorder: deferred      Symptoms of PTSD: deferred    Symptoms of OCD: deferred     Symptoms of Anorexia: restriction of caloric intake leading to significantly low body weight - No, intense fear of gaining weight or persistent behavior that interferes with weight gain even thought at a significantly low weight - No, disturbance in the way in which one's body weight or shape is experienced, undue influence of body weight or shape on self evaluation, or persistent lack of recognition of the seriousness of the current low body weight - No      Substance/s: deferred      PAST PSYCHIATRIC HISTORY  Previous Psychiatric Hospitalizations: No  Previous SI/HI: No,  Previous Suicide  Attempts: No,   Previous Medication Trials: Yes,  Psychiatric Care (current & past): Patient of Dr. Zaman since 9/20. Seen by Caitlin Velazquez NP prior.  History of Psychotherapy: N/A  History of Violence: No,  History of sexual/physical abuse: No,    PAST MEDICAL & SURGICAL HISTORY   Past Medical History:   Diagnosis Date    Anxiety     Arthritis     Bipolar 1 disorder     Bursitis of right hip     GI bleed due to NSAIDs     Multinodular goiter 11/15/2021    Sacroiliitis     right side    Sleep apnea      Past Surgical History:   Procedure Laterality Date    ANKLE FRACTURE SURGERY Left 2001    BLADDER SUSPENSION      CARPAL TUNNEL RELEASE Bilateral     CHOLECYSTECTOMY      ESOPHAGOGASTRODUODENOSCOPY N/A 7/29/2021    Procedure: EGD (ESOPHAGOGASTRODUODENOSCOPY);  Surgeon: Susan Mcclain MD;  Location: [facility];  Service: Endoscopy;  Laterality: N/A;    EYE SURGERY      FOOT ARTHRODESIS Right 5/3/2023    Procedure: FUSION, FOOT;  Surgeon: ODALIS Campuzano;  Location: [facility];  Service: Podiatry;  Laterality: Right;  mini c-arm, Arthrex dowel bone graft harvester, locking plate and screws festus notified cc    HYSTERECTOMY  1986    vaginal prolapse    INJECTION OF ANESTHETIC AGENT AROUND MEDIAL BRANCH NERVES INNERVATING CERVICAL FACET JOINT Bilateral 5/27/2022    Procedure: CERVICAL MEDIAL BRANCH NERVE BLOCK (C3-4,C4-5);  Surgeon: Mamie Roman MD;  Location: [facility];  Service: Pain Management;  Laterality: Bilateral;    INJECTION OF ANESTHETIC AGENT AROUND MEDIAL BRANCH NERVES INNERVATING LUMBAR FACET JOINT Right 2/5/2021    Procedure: LUMBAR FACET JOINT BLOCK (L3-4,L4-5,L5-S1);  Surgeon: Mamie Roman MD;  Location: [facility];  Service: Pain Management;  Laterality: Right;    INJECTION OF ANESTHETIC AGENT AROUND MEDIAL BRANCH NERVES INNERVATING LUMBAR FACET JOINT Right 4/30/2021    Procedure: LUMBAR FACET JOINT BLOCK (L3-4,L4-5,L5-S1);  Surgeon: Mamie Roman MD;  Location: [facility];  Service: Pain  Management;  Laterality: Right;    INJECTION OF ANESTHETIC AGENT INTO SACROILIAC JOINT Right 12/4/2020    Procedure: SACROILIAC JOINT INJECTION;  Surgeon: Mamie Roman MD;  Location: [facility];  Service: Pain Management;  Laterality: Right;    INJECTION OF JOINT Right 12/4/2020    Procedure: GREATER TROCHANTERIC BURSA INJECTION;  Surgeon: Mamie Roman MD;  Location: [facility];  Service: Pain Management;  Laterality: Right;    NASAL SEPTUM SURGERY      RECTAL PROLAPSE REPAIR      TONSILLECTOMY           CURRENT PSYCH MEDICATION REGIMEN   -Lamictal 300 mg PO qd  -Lexapro 10mg PO qd  -Trazodone 50 mg PO    Current Medication side effects:  deferred  Current Medication compliance:  Yes    Previous psych meds trials  -Cymbalta 120 mg PO qd    Home Meds:   Prior to Admission medications    Medication Sig Start Date End Date Taking? Authorizing Provider   acetaminophen (TYLENOL) 500 MG tablet Take 2 tablets (1,000 mg total) by mouth every 8 (eight) hours as needed for Pain. 7/30/21   Shahid Garza MD   albuterol (PROVENTIL/VENTOLIN HFA) 90 mcg/actuation inhaler INHALE 2 PUFFS INTO THE LUNGS EVERY 6 (SIX) HOURS AS NEEDED FOR WHEEZING (COUGH AND WHEEZING).RESCUE  Patient not taking: Reported on 8/8/2023 1/9/23   Lottie Hough NP   aspirin 81 MG Chew Take 1 tablet (81 mg total) by mouth once daily. 10/27/21 11/9/23  Khanh Mendez MD   calcium carbonate (OS-ELADIA) 500 mg calcium (1,250 mg) tablet Take 1 tablet (500 mg total) by mouth once daily. 4/18/23 4/17/24  Lauri Alejandra DPM   EScitalopram oxalate (LEXAPRO) 10 MG tablet Take 1 tablet (10 mg total) by mouth once daily. 7/3/23 7/2/24  Estevan Zaman III, MD   hydrOXYzine pamoate (VISTARIL) 25 MG Cap Take 1-4 capsules every 6 hours as needed for anxiety  Patient not taking: Reported on 8/8/2023 2/10/23   Estevan Zaman III, MD   lamoTRIgine (LAMICTAL) 150 MG Tab Take 2 tablets (300 mg total) by mouth every evening. 7/3/23   Estevan Zaman III, MD    pantoprazole (PROTONIX) 40 MG tablet Take 1 tablet (40 mg total) by mouth once daily. 7/18/22   Lottie Hough, NP   rosuvastatin (CRESTOR) 10 MG tablet Take 1 tablet (10 mg total) by mouth once daily. 7/30/23   Lottie Hough NP   rosuvastatin (CRESTOR) 10 MG tablet Take 1 tablet (10 mg total) by mouth once daily.  Patient not taking: Reported on 8/8/2023 7/26/23   Lottie Hough NP   traZODone (DESYREL) 50 MG tablet Take 1-2 tablets at bedtime as needed for sleep  Patient not taking: Reported on 8/8/2023 7/3/23   Estevan Zaman III, MD       OTC Meds:   Tylenol  Acetaminophen  Melatonin    Scheduled Meds:    PRN Meds: acetaminophen, ALPRAZolam, calcium carbonate, hydrOXYzine HCL, melatonin, nicotine, ondansetron, traZODone   Psychotherapeutics (From admission, onward)      Start     Stop Route Frequency Ordered    08/08/23 1412  traZODone tablet 50 mg         -- Oral Nightly PRN 08/08/23 1356    08/08/23 1411  ALPRAZolam tablet 0.5 mg         -- Oral 3 times daily PRN 08/08/23 1356            ALLERGIES   Review of patient's allergies indicates:   Allergen Reactions    Nsaids (non-steroidal anti-inflammatory drug) Other (See Comments)     Gastrointestinal bleeding requiring blood transfusion    Demerol [meperidine] Rash       NEUROLOGIC HISTORY  Seizures: No  Head trauma: No    SOCIAL HISTORY:  Developmental/Childhood: Deferred  Education: college  Employment Status/Finances:Retired   Relationship Status/Sexual Orientation:   Children: 3  Housing Status: Home    history:  NO   Access to Firearms: YES:     ;  Locked up? NO  Hinduism:Non-spiritual  Recreational activities:Time with family    SUBSTANCE ABUSE HISTORY   Recreational Drugs:  denies    Use of Alcohol:  n/a  Rehab History:no   Tobacco Use:yes    LEGAL HISTORY:   Past charges/incarcerations: NO  Pending charges:NO    FAMILY PSYCHIATRIC HISTORY   Family History   Problem Relation Age of Onset    Colon cancer Maternal Uncle      Colon cancer Maternal Uncle     Colon cancer Maternal Uncle           ROS  Review of Systems   Constitutional: Negative.    HENT: Negative.     Eyes: Negative.    Respiratory: Negative.     Musculoskeletal:         Pain from preivous foot sugery   Neurological:         Repetitive wringing of hands   Endo/Heme/Allergies: Negative.    Psychiatric/Behavioral:  Positive for depression. Negative for hallucinations and substance abuse. The patient is nervous/anxious and has insomnia.         Unremitting movement of hands         EXAMINATION    PHYSICAL EXAM  Reviewed note/exam by Dr. Zaman from 7/3/2023.    VITALS   Vitals:    08/09/23 0800   BP: 139/89   Pulse: (!) 58   Resp: 16   Temp: 97.2 °F (36.2 °C)        Body mass index is 24.98 kg/m².        PAIN  0/10  Subjective report of pain matches objective signs and symptoms: Yes    LABORATORY DATA   Recent Results (from the past 72 hour(s))   Urinalysis, Reflex to Urine Culture Urine, Clean Catch    Collection Time: 08/08/23 10:30 AM    Specimen: Urine   Result Value Ref Range    Specimen UA Urine, Clean Catch     Color, UA Yellow Yellow, Straw, Cristal    Appearance, UA Clear Clear    pH, UA 6.0 5.0 - 8.0    Specific Gravity, UA <=1.005 (A) 1.005 - 1.030    Protein, UA Negative Negative    Glucose, UA Negative Negative    Ketones, UA Negative Negative    Bilirubin (UA) Negative Negative    Occult Blood UA Negative Negative    Nitrite, UA Negative Negative    Urobilinogen, UA Negative <2.0 EU/dL    Leukocytes, UA Negative Negative   Drug screen panel, emergency    Collection Time: 08/08/23 10:30 AM   Result Value Ref Range    Benzodiazepines Negative Negative    Methadone metabolites Negative Negative    Cocaine (Metab.) Negative Negative    Opiate Scrn, Ur Negative Negative    Barbiturate Screen, Ur Negative Negative    Amphetamine Screen, Ur Negative Negative    THC Negative Negative    Phencyclidine Negative Negative    Creatinine, Urine 10.2 (L) 15.0 - 325.0 mg/dL     Toxicology Information SEE COMMENT    Ethanol    Collection Time: 08/08/23 10:32 AM   Result Value Ref Range    Alcohol, Serum <10 <10 mg/dL   Acetaminophen level    Collection Time: 08/08/23 10:32 AM   Result Value Ref Range    Acetaminophen (Tylenol), Serum <3.0 (L) 10.0 - 20.0 ug/mL   Comprehensive metabolic panel    Collection Time: 08/08/23 10:32 AM   Result Value Ref Range    Sodium 140 136 - 145 mmol/L    Potassium 4.4 3.5 - 5.1 mmol/L    Chloride 105 95 - 110 mmol/L    CO2 24 23 - 29 mmol/L    Glucose 100 70 - 110 mg/dL    BUN 10 8 - 23 mg/dL    Creatinine 0.8 0.5 - 1.4 mg/dL    Calcium 10.3 8.7 - 10.5 mg/dL    Total Protein 7.5 6.0 - 8.4 g/dL    Albumin 4.2 3.5 - 5.2 g/dL    Total Bilirubin 0.2 0.1 - 1.0 mg/dL    Alkaline Phosphatase 79 55 - 135 U/L    AST 15 10 - 40 U/L    ALT 9 (L) 10 - 44 U/L    eGFR >60 >60 mL/min/1.73 m^2    Anion Gap 11 8 - 16 mmol/L   TSH    Collection Time: 08/08/23 10:32 AM   Result Value Ref Range    TSH 0.758 0.400 - 4.000 uIU/mL   CBC auto differential    Collection Time: 08/08/23 10:32 AM   Result Value Ref Range    WBC 6.45 3.90 - 12.70 K/uL    RBC 4.47 4.00 - 5.40 M/uL    Hemoglobin 13.1 12.0 - 16.0 g/dL    Hematocrit 39.8 37.0 - 48.5 %    MCV 89 82 - 98 fL    MCH 29.3 27.0 - 31.0 pg    MCHC 32.9 32.0 - 36.0 g/dL    RDW 14.0 11.5 - 14.5 %    Platelets 283 150 - 450 K/uL    MPV 9.9 9.2 - 12.9 fL    Immature Granulocytes 0.3 0.0 - 0.5 %    Gran # (ANC) 3.6 1.8 - 7.7 K/uL    Immature Grans (Abs) 0.02 0.00 - 0.04 K/uL    Lymph # 2.1 1.0 - 4.8 K/uL    Mono # 0.5 0.3 - 1.0 K/uL    Eos # 0.1 0.0 - 0.5 K/uL    Baso # 0.05 0.00 - 0.20 K/uL    nRBC 0 0 /100 WBC    Gran % 56.2 38.0 - 73.0 %    Lymph % 32.9 18.0 - 48.0 %    Mono % 8.1 4.0 - 15.0 %    Eosinophil % 1.7 0.0 - 8.0 %    Basophil % 0.8 0.0 - 1.9 %    Differential Method Automated    Salicylate level    Collection Time: 08/08/23 10:32 AM   Result Value Ref Range    Salicylate Lvl <5.0 (L) 15.0 - 30.0 mg/dL      No results found  "for: "PHENYTOIN", "PHENOBARB", "VALPROATE", "CBMZ"        CONSTITUTIONAL  General Appearance: unremarkable, age appropriate, disheveled    MUSCULOSKELETAL  Muscle Strength and Tone:tremor noted  , choreoathetosis noted    Abnormal Involuntary Movements: No  Gait and Station: non-ataxic    PSYCHIATRIC   Level of Consciousness: awake and alert   Orientation: person, place, and situation  Grooming: Disheveled and Hospital garb  Psychomotor Behavior: normal, reluctant to participate, restless and fidgety   Speech: normal tone, normal rate, normal pitch, normal volume  Language: grossly intact  Mood: anxious, depressed, sad, and upset  Affect: Appropriate and Consistent with mood  Thought Process: linear, logical  Associations: intact   Thought Content: less SI, denies HI, and no delusions  Perceptions: denies AH, denies  VH, no TH, and not RIS  Memory: Able to recall past events, Remote intact, and Recent intact  Attention:Attends to interview without distraction  Fund of Knowledge: Aware of current events and Vocabulary appropriate   Estimate if Intelligence:  Average based on work/education history, vocabulary and mental status exam  Insight: has awareness of illness  Judgment: impaired due to thoughts of 's perception; increase in gambling      PSYCHOSOCIAL    PSYCHOSOCIAL STRESSORS   marital and gambling    FUNCTIONING RELATIONSHIPS   strained with spouse or significant others    STRENGTHS AND LIABILITIES   Liability: Patient is unstable., Liability: Patient lacks coping skills.    Is the patient aware of the biomedical complications associated with substance abuse and mental illness? yes    Does the patient have an Advance Directive for Mental Health treatment? no  (If yes, inform patient to bring copy.)        MEDICAL DECISION MAKING        ASSESSMENT   MDD with mixed features vs. Unspecified bipolar disorder  LUANNE  Insomnia  Gambling disorder  Psychosocial Stressors    Abnormal movement      PROBLEM LIST " AND MANAGEMENT PLANS  MDD with mixed features vs. Unspecified bipolar disorder  -Continue nurse check q15 minutes  -Reassurance of reason for admission  -Encourage medication adherence  -continue Lamictal 300 mg PO qd  -continue lexapro 20 mg PO qd  -continue trazodone 50 mg PO  -alprazolam PRN    Insomnia  -f/u CPAP  -melatonin PRN  -pt counseled    Gambling disorder  -consider meetings/therapy    LUANNE  -continue hydroxyzine  mg PO q 6 hours PRN  -see MDD above    Psychosocial stressors  -consider couples counseling  -continue therapy        PRESCRIPTION DRUG MANAGEMENT  Compliance: yes  Side Effects: unknown  Regimen Adjustments: see above    Discussed diagnosis, risks and benefits of proposed treatment vs alternative treatments vs no treatment, potential side effects of these treatments and the inherent unpredictability of treatment. The patient expresses understanding of the above and displays the capacity to agree with this treatment given said understanding. Patient also agrees that, currently, the benefits outweigh the risks and would like to pursue/continue treatment at this time.    Any medications being used off-label were discussed with the patient inclusive of the evidence base for the use of the medications and consent was obtained for the off-label use of the medication.         DIAGNOSTIC TESTING  Labs reviewed with patient; follow up pending labs    Disposition:  -Will attempt to obtain outside psychiatric records if available  -SW to assist with aftercare planning and collateral  -Once stable discharge home with outpatient follow up care and/or rehab  -Continue inpatient treatment under a PEC and/or CEC for danger to self/ danger to others/grave disability as evident by danger to self        Rachel Ng, [facility] MS-3  Psychiatry

## 2023-08-09 NOTE — CONSULTS
[facility] Medicine  Consult Note    Patient Name: Karin Maguire  MRN: [id number]  Admission Date: 8/8/2023  Hospital Length of Stay: 1 days  Attending Physician: Estevan Zaman III, MD   Primary Care Provider: Lottie Hough NP           Patient information was obtained from patient and ER records.     Inpatient consult to [facility] for History and Physical  Consult performed by: Lupe Casarez PA-C  Consult ordered by: Estevan Zaman III, MD        Subjective:     Principal Problem: <principal problem not specified>    Chief Complaint: No chief complaint on file.       HPI: Patient is a 73 year old female with medical history of BANDAR, tobacco use disorder, HLD, COPD, anxiety, bipolar disorder and gambling who was admitted to [facility] for depression.  Hospital medicine consulted for medical management.                  Past Medical History:   Diagnosis Date    Anxiety     Arthritis     Bipolar 1 disorder     Bursitis of right hip     GI bleed due to NSAIDs     Multinodular goiter 11/15/2021    Sacroiliitis     right side    Sleep apnea        Past Surgical History:   Procedure Laterality Date    ANKLE FRACTURE SURGERY Left 2001    BLADDER SUSPENSION      CARPAL TUNNEL RELEASE Bilateral     CHOLECYSTECTOMY      ESOPHAGOGASTRODUODENOSCOPY N/A 7/29/2021    Procedure: EGD (ESOPHAGOGASTRODUODENOSCOPY);  Surgeon: Susan Mcclain MD;  Location: [facility];  Service: Endoscopy;  Laterality: N/A;    EYE SURGERY      FOOT ARTHRODESIS Right 5/3/2023    Procedure: FUSION, FOOT;  Surgeon: Lauri Alejandra DPM;  Location: [facility];  Service: Podiatry;  Laterality: Right;  mini c-arm, Arthrex dowel bone graft harvester, locking plate and screws festus notified cc    HYSTERECTOMY  1986    vaginal prolapse    INJECTION OF ANESTHETIC AGENT AROUND MEDIAL BRANCH NERVES INNERVATING CERVICAL FACET JOINT Bilateral 5/27/2022    Procedure: CERVICAL MEDIAL BRANCH NERVE BLOCK  (C3-4,C4-5);  Surgeon: Mamie Roman MD;  Location: [facility];  Service: Pain Management;  Laterality: Bilateral;    INJECTION OF ANESTHETIC AGENT AROUND MEDIAL BRANCH NERVES INNERVATING LUMBAR FACET JOINT Right 2/5/2021    Procedure: LUMBAR FACET JOINT BLOCK (L3-4,L4-5,L5-S1);  Surgeon: Mamie Roman MD;  Location: [facility];  Service: Pain Management;  Laterality: Right;    INJECTION OF ANESTHETIC AGENT AROUND MEDIAL BRANCH NERVES INNERVATING LUMBAR FACET JOINT Right 4/30/2021    Procedure: LUMBAR FACET JOINT BLOCK (L3-4,L4-5,L5-S1);  Surgeon: Mamie Roman MD;  Location: [facility];  Service: Pain Management;  Laterality: Right;    INJECTION OF ANESTHETIC AGENT INTO SACROILIAC JOINT Right 12/4/2020    Procedure: SACROILIAC JOINT INJECTION;  Surgeon: Mamie Roman MD;  Location: [facility];  Service: Pain Management;  Laterality: Right;    INJECTION OF JOINT Right 12/4/2020    Procedure: GREATER TROCHANTERIC BURSA INJECTION;  Surgeon: Mamie Roman MD;  Location: [facility];  Service: Pain Management;  Laterality: Right;    NASAL SEPTUM SURGERY      RECTAL PROLAPSE REPAIR      TONSILLECTOMY         Review of patient's allergies indicates:   Allergen Reactions    Nsaids (non-steroidal anti-inflammatory drug) Other (See Comments)     Gastrointestinal bleeding requiring blood transfusion    Demerol [meperidine] Rash       No current facility-administered medications on file prior to encounter.     Current Outpatient Medications on File Prior to Encounter   Medication Sig    acetaminophen (TYLENOL) 500 MG tablet Take 2 tablets (1,000 mg total) by mouth every 8 (eight) hours as needed for Pain.    albuterol (PROVENTIL/VENTOLIN HFA) 90 mcg/actuation inhaler INHALE 2 PUFFS INTO THE LUNGS EVERY 6 (SIX) HOURS AS NEEDED FOR WHEEZING (COUGH AND WHEEZING).RESCUE (Patient not taking: Reported on 8/8/2023)    aspirin 81 MG Chew Take 1 tablet (81 mg total) by mouth once daily.    calcium carbonate (OS-ELADIA) 500 mg  calcium (1,250 mg) tablet Take 1 tablet (500 mg total) by mouth once daily.    EScitalopram oxalate (LEXAPRO) 10 MG tablet Take 1 tablet (10 mg total) by mouth once daily.    hydrOXYzine pamoate (VISTARIL) 25 MG Cap Take 1-4 capsules every 6 hours as needed for anxiety (Patient not taking: Reported on 8/8/2023)    lamoTRIgine (LAMICTAL) 150 MG Tab Take 2 tablets (300 mg total) by mouth every evening.    pantoprazole (PROTONIX) 40 MG tablet Take 1 tablet (40 mg total) by mouth once daily.    rosuvastatin (CRESTOR) 10 MG tablet Take 1 tablet (10 mg total) by mouth once daily.    rosuvastatin (CRESTOR) 10 MG tablet Take 1 tablet (10 mg total) by mouth once daily. (Patient not taking: Reported on 8/8/2023)    traZODone (DESYREL) 50 MG tablet Take 1-2 tablets at bedtime as needed for sleep (Patient not taking: Reported on 8/8/2023)     Family History       Problem Relation (Age of Onset)    Colon cancer Maternal Uncle, Maternal Uncle, Maternal Uncle          Tobacco Use    Smoking status: Every Day     Current packs/day: 1.00     Average packs/day: 1 pack/day for 41.4 years (41.4 ttl pk-yrs)     Types: Cigarettes     Start date: 3/30/1982    Smokeless tobacco: Never   Substance and Sexual Activity    Alcohol use: Yes     Comment: Socially-2 or 3 times a year-6 or 7 drinks    Drug use: No    Sexual activity: Yes     Partners: Male     Birth control/protection: Surgical     Comment:      Review of Systems   Constitutional:  Negative for chills and fever.   HENT:  Negative for congestion and drooling.    Respiratory:  Negative for cough and shortness of breath.    Cardiovascular:  Negative for chest pain and leg swelling.   Gastrointestinal:  Negative for constipation, diarrhea, nausea and vomiting.   Genitourinary:  Negative for difficulty urinating and dysuria.   Musculoskeletal:  Negative for arthralgias and gait problem.     Objective:     Vital Signs (Most Recent):  Temp: 97.2 °F (36.2 °C) (08/09/23  0800)  Pulse: (!) 58 (08/09/23 0800)  Resp: 16 (08/09/23 0800)  BP: 139/89 (08/09/23 0800) Vital Signs (24h Range):  Temp:  [97.2 °F (36.2 °C)] 97.2 °F (36.2 °C)  Pulse:  [58-59] 58  Resp:  [16-18] 16  BP: ()/(53-89) 139/89     Weight: 70.2 kg (154 lb 12.2 oz)  Body mass index is 24.98 kg/m².     Physical Exam  Constitutional:       Appearance: Normal appearance.   HENT:      Head: Normocephalic and atraumatic.   Cardiovascular:      Rate and Rhythm: Normal rate and regular rhythm.   Pulmonary:      Effort: Pulmonary effort is normal. No respiratory distress.      Breath sounds: Normal breath sounds.   Abdominal:      General: Abdomen is flat. There is no distension.      Palpations: Abdomen is soft.   Musculoskeletal:      Right lower leg: No edema.      Left lower leg: No edema.   Skin:     General: Skin is warm and dry.   Neurological:      Mental Status: She is alert and oriented to person, place, and time. Mental status is at baseline.      Comments: CN 2: intact  CN 3: intact  CN 4: intact  CN 5:intact   CN 6: intact  CN 7:intact   CN 8: intact  CN 10, 11 12: intact           Significant Labs: UPT  No results found for this or any previous visit.  U/A  Negative for UTI    UDS  Results for orders placed or performed during the hospital encounter of 08/08/23   Drug screen panel, emergency   Result Value Ref Range    Benzodiazepines Negative Negative    Methadone metabolites Negative Negative    Cocaine (Metab.) Negative Negative    Opiate Scrn, Ur Negative Negative    Barbiturate Screen, Ur Negative Negative    Amphetamine Screen, Ur Negative Negative    THC Negative Negative    Phencyclidine Negative Negative    Creatinine, Urine 10.2 (L) 15.0 - 325.0 mg/dL    Toxicology Information SEE COMMENT      CBC  Results for orders placed or performed during the hospital encounter of 08/08/23   CBC auto differential   Result Value Ref Range    WBC 6.45 3.90 - 12.70 K/uL    RBC 4.47 4.00 - 5.40 M/uL    Hemoglobin  13.1 12.0 - 16.0 g/dL    Hematocrit 39.8 37.0 - 48.5 %    MCV 89 82 - 98 fL    MCH 29.3 27.0 - 31.0 pg    MCHC 32.9 32.0 - 36.0 g/dL    RDW 14.0 11.5 - 14.5 %    Platelets 283 150 - 450 K/uL    MPV 9.9 9.2 - 12.9 fL    Immature Granulocytes 0.3 0.0 - 0.5 %    Gran # (ANC) 3.6 1.8 - 7.7 K/uL    Immature Grans (Abs) 0.02 0.00 - 0.04 K/uL    Lymph # 2.1 1.0 - 4.8 K/uL    Mono # 0.5 0.3 - 1.0 K/uL    Eos # 0.1 0.0 - 0.5 K/uL    Baso # 0.05 0.00 - 0.20 K/uL    nRBC 0 0 /100 WBC    Gran % 56.2 38.0 - 73.0 %    Lymph % 32.9 18.0 - 48.0 %    Mono % 8.1 4.0 - 15.0 %    Eosinophil % 1.7 0.0 - 8.0 %    Basophil % 0.8 0.0 - 1.9 %    Differential Method Automated      CMP  Results for orders placed or performed during the hospital encounter of 08/08/23   Comprehensive metabolic panel   Result Value Ref Range    Sodium 140 136 - 145 mmol/L    Potassium 4.4 3.5 - 5.1 mmol/L    Chloride 105 95 - 110 mmol/L    CO2 24 23 - 29 mmol/L    Glucose 100 70 - 110 mg/dL    BUN 10 8 - 23 mg/dL    Creatinine 0.8 0.5 - 1.4 mg/dL    Calcium 10.3 8.7 - 10.5 mg/dL    Total Protein 7.5 6.0 - 8.4 g/dL    Albumin 4.2 3.5 - 5.2 g/dL    Total Bilirubin 0.2 0.1 - 1.0 mg/dL    Alkaline Phosphatase 79 55 - 135 U/L    AST 15 10 - 40 U/L    ALT 9 (L) 10 - 44 U/L    eGFR >60 >60 mL/min/1.73 m^2    Anion Gap 11 8 - 16 mmol/L     TSH  Results for orders placed or performed during the hospital encounter of 08/08/23   TSH   Result Value Ref Range    TSH 0.758 0.400 - 4.000 uIU/mL     ETOH  Results for orders placed or performed during the hospital encounter of 08/08/23   Ethanol   Result Value Ref Range    Alcohol, Serum <10 <10 mg/dL     Salicylate  Results for orders placed or performed during the hospital encounter of 08/08/23   Salicylate level   Result Value Ref Range    Salicylate Lvl <5.0 (L) 15.0 - 30.0 mg/dL     Acetaminophen  Results for orders placed or performed during the hospital encounter of 08/08/23   Acetaminophen level   Result Value Ref Range     Acetaminophen (Tylenol), Serum <3.0 (L) 10.0 - 20.0 ug/mL             Significant Imaging: I have reviewed all pertinent imaging results/findings within the past 24 hours.    Assessment/Plan:     Tobacco use disorder  Nicotine patch       Hyperlipidemia  Continue statin       Chronic bronchitis  Albuterol prn       Bipolar depression  [facility] for admission to [facility]  Vitals/labs stable       VTE Risk Mitigation (From admission, onward)    None              Thank you for your consult. I will sign off. Please contact us if you have any additional questions.    Lupe Casarez PA-C  Department of Hospital Medicine   [facility]

## 2023-08-09 NOTE — PLAN OF CARE
"  Problem: Adult Behavioral Health Plan of Care  Goal: Optimized Coping Skills in Response to Life Stressors  Outcome: Ongoing, Progressing  Intervention: Promote Effective Coping Strategies  Flowsheets (Taken 8/9/2023 1615)  Supportive Measures:   active listening utilized   counseling provided   self-care encouraged   self-responsibility promoted   verbalization of feelings encouraged   Group Note:  Behavior:  PLPC met with patient for an individual psychotherapy group. Pt affect flat with depressed mood.    Intervention: PLPC discussed with patient on discharge planning. PLPC encouraged patient to identify ways to cope with and reflect on what are the plans when they are discharged and what coping skills did they learn while in the hospital?    Response:  Pt stated  I am not sure as to what I will do upon discharge. I know that I have to work on me and gain coping skills to help me with my situation."    Plan: Continue to encourage group attendance in future group sessions.  "
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
## 2023-08-09 NOTE — PLAN OF CARE
"Patient tearful, depressed mood and affect. Patient reports " I am gambling and my family is mad at me and I just don't know how to get everything back to how it was". Educated and encouraged this patient to seek Gambling anonymous and continue counseling. Encouraged this patient to attend groups and activities. Patient reports on a scale of 1-10 with ten being the highest level of depression. Patient rating depression today a 8/10. On the same scale patient rating anxiety a 8/10. Encouraged patient to attend groups and activities.  Patient denies SI/HI no self harming behavior displayed, no aggressive behavior displayed towards others. Patient contracted safety with staff and unit.  Discussed healthy coping skills and techniques.  Educated, reviewed  and discussed plan of care and medication regiment with this patient. Patient voiced understanding of all teachings.    "

## 2023-08-09 NOTE — SUBJECTIVE & OBJECTIVE
Past Medical History:   Diagnosis Date    Anxiety     Arthritis     Bipolar 1 disorder     Bursitis of right hip     GI bleed due to NSAIDs     Multinodular goiter 11/15/2021    Sacroiliitis     right side    Sleep apnea        Past Surgical History:   Procedure Laterality Date    ANKLE FRACTURE SURGERY Left 2001    BLADDER SUSPENSION      CARPAL TUNNEL RELEASE Bilateral     CHOLECYSTECTOMY      ESOPHAGOGASTRODUODENOSCOPY N/A 7/29/2021    Procedure: EGD (ESOPHAGOGASTRODUODENOSCOPY);  Surgeon: Susan Mcclain MD;  Location: [facility];  Service: Endoscopy;  Laterality: N/A;    EYE SURGERY      FOOT ARTHRODESIS Right 5/3/2023    Procedure: FUSION, FOOT;  Surgeon: Lauri Alejandra DPM;  Location: [facility] OR;  Service: Podiatry;  Laterality: Right;  mini c-arm, Arthrex dowel bone graft harvester, locking plate and screws festus notified cc    HYSTERECTOMY  1986    vaginal prolapse    INJECTION OF ANESTHETIC AGENT AROUND MEDIAL BRANCH NERVES INNERVATING CERVICAL FACET JOINT Bilateral 5/27/2022    Procedure: CERVICAL MEDIAL BRANCH NERVE BLOCK (C3-4,C4-5);  Surgeon: Mamie Roman MD;  Location: [facility];  Service: Pain Management;  Laterality: Bilateral;    INJECTION OF ANESTHETIC AGENT AROUND MEDIAL BRANCH NERVES INNERVATING LUMBAR FACET JOINT Right 2/5/2021    Procedure: LUMBAR FACET JOINT BLOCK (L3-4,L4-5,L5-S1);  Surgeon: Mamie Roman MD;  Location: [facility];  Service: Pain Management;  Laterality: Right;    INJECTION OF ANESTHETIC AGENT AROUND MEDIAL BRANCH NERVES INNERVATING LUMBAR FACET JOINT Right 4/30/2021    Procedure: LUMBAR FACET JOINT BLOCK (L3-4,L4-5,L5-S1);  Surgeon: Mamie Roman MD;  Location: [facility];  Service: Pain Management;  Laterality: Right;    INJECTION OF ANESTHETIC AGENT INTO SACROILIAC JOINT Right 12/4/2020    Procedure: SACROILIAC JOINT INJECTION;  Surgeon: Mamie Roman MD;  Location: [facility];  Service: Pain Management;  Laterality: Right;    INJECTION OF JOINT Right 12/4/2020     Procedure: GREATER TROCHANTERIC BURSA INJECTION;  Surgeon: Mamie Roman MD;  Location: [facility] OR;  Service: Pain Management;  Laterality: Right;    NASAL SEPTUM SURGERY      RECTAL PROLAPSE REPAIR      TONSILLECTOMY         Review of patient's allergies indicates:   Allergen Reactions    Nsaids (non-steroidal anti-inflammatory drug) Other (See Comments)     Gastrointestinal bleeding requiring blood transfusion    Demerol [meperidine] Rash       No current facility-administered medications on file prior to encounter.     Current Outpatient Medications on File Prior to Encounter   Medication Sig    acetaminophen (TYLENOL) 500 MG tablet Take 2 tablets (1,000 mg total) by mouth every 8 (eight) hours as needed for Pain.    albuterol (PROVENTIL/VENTOLIN HFA) 90 mcg/actuation inhaler INHALE 2 PUFFS INTO THE LUNGS EVERY 6 (SIX) HOURS AS NEEDED FOR WHEEZING (COUGH AND WHEEZING).RESCUE (Patient not taking: Reported on 8/8/2023)    aspirin 81 MG Chew Take 1 tablet (81 mg total) by mouth once daily.    calcium carbonate (OS-ELADIA) 500 mg calcium (1,250 mg) tablet Take 1 tablet (500 mg total) by mouth once daily.    EScitalopram oxalate (LEXAPRO) 10 MG tablet Take 1 tablet (10 mg total) by mouth once daily.    hydrOXYzine pamoate (VISTARIL) 25 MG Cap Take 1-4 capsules every 6 hours as needed for anxiety (Patient not taking: Reported on 8/8/2023)    lamoTRIgine (LAMICTAL) 150 MG Tab Take 2 tablets (300 mg total) by mouth every evening.    pantoprazole (PROTONIX) 40 MG tablet Take 1 tablet (40 mg total) by mouth once daily.    rosuvastatin (CRESTOR) 10 MG tablet Take 1 tablet (10 mg total) by mouth once daily.    rosuvastatin (CRESTOR) 10 MG tablet Take 1 tablet (10 mg total) by mouth once daily. (Patient not taking: Reported on 8/8/2023)    traZODone (DESYREL) 50 MG tablet Take 1-2 tablets at bedtime as needed for sleep (Patient not taking: Reported on 8/8/2023)     Family History       Problem Relation (Age of Onset)    Colon  cancer Maternal Uncle, Maternal Uncle, Maternal Uncle          Tobacco Use    Smoking status: Every Day     Current packs/day: 1.00     Average packs/day: 1 pack/day for 41.4 years (41.4 ttl pk-yrs)     Types: Cigarettes     Start date: 3/30/1982    Smokeless tobacco: Never   Substance and Sexual Activity    Alcohol use: Yes     Comment: Socially-2 or 3 times a year-6 or 7 drinks    Drug use: No    Sexual activity: Yes     Partners: Male     Birth control/protection: Surgical     Comment:      Review of Systems   Constitutional:  Negative for chills and fever.   HENT:  Negative for congestion and drooling.    Respiratory:  Negative for cough and shortness of breath.    Cardiovascular:  Negative for chest pain and leg swelling.   Gastrointestinal:  Negative for constipation, diarrhea, nausea and vomiting.   Genitourinary:  Negative for difficulty urinating and dysuria.   Musculoskeletal:  Negative for arthralgias and gait problem.     Objective:     Vital Signs (Most Recent):  Temp: 97.2 °F (36.2 °C) (08/09/23 0800)  Pulse: (!) 58 (08/09/23 0800)  Resp: 16 (08/09/23 0800)  BP: 139/89 (08/09/23 0800) Vital Signs (24h Range):  Temp:  [97.2 °F (36.2 °C)] 97.2 °F (36.2 °C)  Pulse:  [58-59] 58  Resp:  [16-18] 16  BP: ()/(53-89) 139/89     Weight: 70.2 kg (154 lb 12.2 oz)  Body mass index is 24.98 kg/m².     Physical Exam  Constitutional:       Appearance: Normal appearance.   HENT:      Head: Normocephalic and atraumatic.   Cardiovascular:      Rate and Rhythm: Normal rate and regular rhythm.   Pulmonary:      Effort: Pulmonary effort is normal. No respiratory distress.      Breath sounds: Normal breath sounds.   Abdominal:      General: Abdomen is flat. There is no distension.      Palpations: Abdomen is soft.   Musculoskeletal:      Right lower leg: No edema.      Left lower leg: No edema.   Skin:     General: Skin is warm and dry.   Neurological:      Mental Status: She is alert and oriented to person,  place, and time. Mental status is at baseline.      Comments: CN 2: intact  CN 3: intact  CN 4: intact  CN 5:intact   CN 6: intact  CN 7:intact   CN 8: intact  CN 10, 11 12: intact           Significant Labs: UPT  No results found for this or any previous visit.  U/A  Negative for UTI    UDS  Results for orders placed or performed during the hospital encounter of 08/08/23   Drug screen panel, emergency   Result Value Ref Range    Benzodiazepines Negative Negative    Methadone metabolites Negative Negative    Cocaine (Metab.) Negative Negative    Opiate Scrn, Ur Negative Negative    Barbiturate Screen, Ur Negative Negative    Amphetamine Screen, Ur Negative Negative    THC Negative Negative    Phencyclidine Negative Negative    Creatinine, Urine 10.2 (L) 15.0 - 325.0 mg/dL    Toxicology Information SEE COMMENT      CBC  Results for orders placed or performed during the hospital encounter of 08/08/23   CBC auto differential   Result Value Ref Range    WBC 6.45 3.90 - 12.70 K/uL    RBC 4.47 4.00 - 5.40 M/uL    Hemoglobin 13.1 12.0 - 16.0 g/dL    Hematocrit 39.8 37.0 - 48.5 %    MCV 89 82 - 98 fL    MCH 29.3 27.0 - 31.0 pg    MCHC 32.9 32.0 - 36.0 g/dL    RDW 14.0 11.5 - 14.5 %    Platelets 283 150 - 450 K/uL    MPV 9.9 9.2 - 12.9 fL    Immature Granulocytes 0.3 0.0 - 0.5 %    Gran # (ANC) 3.6 1.8 - 7.7 K/uL    Immature Grans (Abs) 0.02 0.00 - 0.04 K/uL    Lymph # 2.1 1.0 - 4.8 K/uL    Mono # 0.5 0.3 - 1.0 K/uL    Eos # 0.1 0.0 - 0.5 K/uL    Baso # 0.05 0.00 - 0.20 K/uL    nRBC 0 0 /100 WBC    Gran % 56.2 38.0 - 73.0 %    Lymph % 32.9 18.0 - 48.0 %    Mono % 8.1 4.0 - 15.0 %    Eosinophil % 1.7 0.0 - 8.0 %    Basophil % 0.8 0.0 - 1.9 %    Differential Method Automated      CMP  Results for orders placed or performed during the hospital encounter of 08/08/23   Comprehensive metabolic panel   Result Value Ref Range    Sodium 140 136 - 145 mmol/L    Potassium 4.4 3.5 - 5.1 mmol/L    Chloride 105 95 - 110 mmol/L    CO2 24  23 - 29 mmol/L    Glucose 100 70 - 110 mg/dL    BUN 10 8 - 23 mg/dL    Creatinine 0.8 0.5 - 1.4 mg/dL    Calcium 10.3 8.7 - 10.5 mg/dL    Total Protein 7.5 6.0 - 8.4 g/dL    Albumin 4.2 3.5 - 5.2 g/dL    Total Bilirubin 0.2 0.1 - 1.0 mg/dL    Alkaline Phosphatase 79 55 - 135 U/L    AST 15 10 - 40 U/L    ALT 9 (L) 10 - 44 U/L    eGFR >60 >60 mL/min/1.73 m^2    Anion Gap 11 8 - 16 mmol/L     TSH  Results for orders placed or performed during the hospital encounter of 08/08/23   TSH   Result Value Ref Range    TSH 0.758 0.400 - 4.000 uIU/mL     ETOH  Results for orders placed or performed during the hospital encounter of 08/08/23   Ethanol   Result Value Ref Range    Alcohol, Serum <10 <10 mg/dL     Salicylate  Results for orders placed or performed during the hospital encounter of 08/08/23   Salicylate level   Result Value Ref Range    Salicylate Lvl <5.0 (L) 15.0 - 30.0 mg/dL     Acetaminophen  Results for orders placed or performed during the hospital encounter of 08/08/23   Acetaminophen level   Result Value Ref Range    Acetaminophen (Tylenol), Serum <3.0 (L) 10.0 - 20.0 ug/mL             Significant Imaging: I have reviewed all pertinent imaging results/findings within the past 24 hours.

## 2023-08-09 NOTE — NURSING
"Patient crying reporting " I am all mixed up. I don't know how to turn all my problems around". We discussed counseling and to continue to be compliant with medications. Discussed and educated patient on medication regiment. Administered  Alprazolam 0.5 MG PO Patient tolerated well.   "

## 2023-08-09 NOTE — PLAN OF CARE
Pt calm and cooperative. Pt alert with a steady gate. Pt reports she does feel a little better. Denies A/V hallucinations, no interacting with internal stimuli noted.  Patient denies SI/HI no self harming behavior displayed, no aggressive behavior displayed towards others. Patient contracted safety with staff and unit.  Educated, reviewed  and discussed plan of care and medication regiment with this patient. Patient voiced understanding of all teachings

## 2023-08-10 PROCEDURE — 25000003 PHARM REV CODE 250: Performed by: STUDENT IN AN ORGANIZED HEALTH CARE EDUCATION/TRAINING PROGRAM

## 2023-08-10 PROCEDURE — 11400000 HC PSYCH PRIVATE ROOM

## 2023-08-10 PROCEDURE — 99233 SBSQ HOSP IP/OBS HIGH 50: CPT | Mod: ,,, | Performed by: STUDENT IN AN ORGANIZED HEALTH CARE EDUCATION/TRAINING PROGRAM

## 2023-08-10 PROCEDURE — 90833 PSYTX W PT W E/M 30 MIN: CPT | Mod: ,,, | Performed by: STUDENT IN AN ORGANIZED HEALTH CARE EDUCATION/TRAINING PROGRAM

## 2023-08-10 PROCEDURE — 25000003 PHARM REV CODE 250: Performed by: PHYSICIAN ASSISTANT

## 2023-08-10 PROCEDURE — 99233 PR SUBSEQUENT HOSPITAL CARE,LEVL III: ICD-10-PCS | Mod: ,,, | Performed by: STUDENT IN AN ORGANIZED HEALTH CARE EDUCATION/TRAINING PROGRAM

## 2023-08-10 PROCEDURE — 90833 PR PSYCHOTHERAPY W/PATIENT W/E&M, 30 MIN (ADD ON): ICD-10-PCS | Mod: ,,, | Performed by: STUDENT IN AN ORGANIZED HEALTH CARE EDUCATION/TRAINING PROGRAM

## 2023-08-10 RX ADMIN — ATORVASTATIN CALCIUM 40 MG: 20 TABLET, FILM COATED ORAL at 08:08

## 2023-08-10 RX ADMIN — LAMOTRIGINE 150 MG: 100 TABLET ORAL at 08:08

## 2023-08-10 RX ADMIN — PANTOPRAZOLE SODIUM 40 MG: 40 TABLET, DELAYED RELEASE ORAL at 08:08

## 2023-08-10 RX ADMIN — Medication 6 MG: at 08:08

## 2023-08-10 RX ADMIN — ACETAMINOPHEN 650 MG: 325 TABLET ORAL at 12:08

## 2023-08-10 RX ADMIN — QUETIAPINE FUMARATE 50 MG: 25 TABLET ORAL at 08:08

## 2023-08-10 RX ADMIN — ESCITALOPRAM OXALATE 20 MG: 20 TABLET ORAL at 08:08

## 2023-08-10 RX ADMIN — TRAZODONE HYDROCHLORIDE 50 MG: 50 TABLET ORAL at 08:08

## 2023-08-10 NOTE — PLAN OF CARE
Patient flat affect with depressed mood. Patient tearful. Interacting with peers and staff. Patient calm and cooperative. Patient minimizes gambling abuse. Patient has poor insight and judgment into gambling addiction. Educated and encouraged this patient to seek Gambling anonymous and continue counseling. Encouraged this patient to attend groups and activities. Patient denies SI/HI no self harming behavior displayed, no aggressive behavior displayed towards others. Patient contracted safety with staff and unit.  Patient reports on a scale of 1-10 with ten being the highest level of depression. Patient rating depression today a 7/10. On the same scale patient rating anxiety a 6/10. Encouraged patient to attend groups and activities. Educated, reviewed  and discussed plan of care and medication regiment with this patient. Patient voiced understanding of all teachings.

## 2023-08-10 NOTE — PLAN OF CARE
Pt is awake, resting in bed at this time and has slept 5 hours intermittently.  Up to bathroom often.  NAD.  Resp even & unlabored.  Pathways clear.  Q 15 minute safety checks ongoing.  All precautions maintained
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
## 2023-08-10 NOTE — PLAN OF CARE
Pt was out of her room sitting by self on sofa down A redd.  Blunted, quiet.  Is pleasant when talking with staff.  No crying noted this shift.  Remains depressed, quiet, blunted affect.  Watched tv, ate snacks, took meds as ordered along with prn trazadone.  No distress noted.  .No behavior problem.  Will continue to monitor for safety.

## 2023-08-10 NOTE — PLAN OF CARE
"  Problem: Adult Behavioral Health Plan of Care  Goal: Optimized Coping Skills in Response to Life Stressors  Outcome: Ongoing, Progressing  Intervention: Promote Effective Coping Strategies  Flowsheets (Taken 8/10/2023 1874)  Supportive Measures:   active listening utilized   counseling provided   decision-making supported   self-reflection promoted   verbalization of feelings encouraged   Behavior  Patient attended group psychotherapy today. Pt affect consistent with mood/anxious mood     Intervention   CBT -based group psychotherapy today focused on identifying the process of making changes. [facility] presented worksheet The Process of Making Changes. Pt were encouraged to identify what feelings they are experiencing, identify questions they may have concerning on how they felt during their crisis, and how to make changes within their life with positive thoughts.       Response   Patient stated" I have to learn how to stop the gambling and to focus on different things vs going to [facility]. I know that I can do this on my own and try to get marriage counseling to work on my marriage. "    Plan  Patient will be encouraged to continue to attend group psychotherapy.    "

## 2023-08-10 NOTE — PLAN OF CARE
Problem: Adult Behavioral Health Plan of Care  Goal: Rounds/Family Conference  Outcome: Ongoing, Progressing  Flowsheets (Taken 8/10/2023 1042)  Participants:   psychiatrist   nursing   other ([facility] and med students)  TREATMENT TEAM      Chief Complaint:    Pt is a 73 year old  female with chief complaints of  depression, anxiety, and behavior problems.     Pt states she came in because she has having a hard time controlling her life and was upset about gambling more and was upset and her  arguing with her.     Pt Goal(s):    Pt states she is looking forward upon discharge to going to a marriage counselor.     Current Progress:   Pt attended treatment team dressed in green hospital gown.    Pt affect consistent with mood/ anxious mood    Pt states she started to gambling more due to she needed to get out of her home and to just be able to do something and noticed that she is spending more. She used to spend 2-3 hundred dollars a month and now she spends 800 a month.   Pt states she needs to get control over her gambling situation because if she does not she could lose everything and states she will try harder to get control of her gambling.  Pt states her and her  are arguing alot and contributes to her gambling.  Pt states she is feeling much better and is not experiencing side effects from her medications.  Pt states she is not SI or HI.      Program/groups:    Encourage pt to continue to attend group. Pt attends all groups.       Revisions to Plan:    Encourage pt to attend treatment team and to attend all groups. Recommendations are for pt to attend psychotherapy and to attend an IOP([facility]). Pt signed collateral for [facility].

## 2023-08-10 NOTE — PROGRESS NOTES
08/10/23 1131   [facility] Group Therapy   Group Name Therapeutic Recreation   Participation Level None  (meditation group, meeting with the counselor)

## 2023-08-10 NOTE — PROGRESS NOTES
"   08/10/23 0744   Assessment   Patient's Identification of the Problem Pt stated, "I'm upset and need my meds adjustment. "  Pt was appropriate and willing to speak to RT.   Leisure Interest Watching TV;Arts and Crafts  (color)   Leisure Barriers Attitude;Cognitive Skill Level;Self Confidence;Energy Level   Treatment Focus To Improve Reality Orientation;To Improve Mood;To Increase Motivation;Increase Self Confidence;To Improve Leisure Awareness/Lifestyle/Interest;Decrease Atypical Behavior;To Improve Coping Skills;Increase Problems Solving and Decision Making Skills;To Increase Decision Making/Problem Solving Skills;To Educate and Increase Awareness of Sober Free Activities     Pt goals is to get meds adjusted.  Treatment Recommendation:    (Refer to Master Treatment Plan)  Reality Orientation Skilled Activity  Cognitive Stimulation Skilled Activity  Self Expression Skilled Activity  Mild Exercises Skilled Activity  Stress Management Skilled Activity  Coping Skilled Activity  Leisure Education and Awareness Skilled Activity  Guided Imagery / Relaxation    Treatment Goal(s):  Long Term Goals Refer To Master Treatment Plan    Short Term Treatment Goal(s)  Patient Will:  Comply with Treatment  Demonstrate Improvement in Thought Processes / Awareness  Integrate with Therapeutic Milieu  Demonstrate Constructive Expression of Feelings and Behavior  Identify at Least 2 Coping Skills or Leisure Skills to Reduce Depression and Hopelessness Upon Request from Therapist    Discharge Recommendations:  Encourage Patient to Actively Utilize Available Community Resources to Increase Leisure Involvement to Decrease Signs and Symptoms of Illness  Encourage Patient to Utilize Coping Skills on a Regular Basis to Reduce the Risk of Decompensating and Re-Hospitalizations  AA/NA Meetings  Support Group  Follow Up with After Care Appointments  Continue with Current Leisure Activities  Other  INCREASE LEISURE LIFESTYLE, INTEREST, AND " AWARENESS

## 2023-08-10 NOTE — PSYCH
[facility] discussed with pt on collateral consent. Pt signed collateral consent for [facility]/ [phone] . [facility] attemtped to contact collateral consent to discuss pt admission.[facility] was unable to contact or leave voice mail to return call. [facility] will attempt at a later time and date.

## 2023-08-10 NOTE — PROGRESS NOTES
"PSYCHIATRY DAILY INPATIENT PROGRESS NOTE  SUBSEQUENT HOSPITAL VISIT    ENCOUNTER DATE: 8/10/2023  SITE: [facility]    DATE OF ADMISSION: 8/8/2023  1:37 PM  LENGTH OF STAY: 2 days      CHIEF COMPLAINT   Karin Maguire is a 73 y.o. female, seen during daily moura rounds on the inpatient unit.  Karin Maguire presented with the chief complaint of depression, anxiety, and behavior problems      The patient was seen and examined. The chart was reviewed.     Reviewed notes from Rns, PA, and LPC and labs from the last 24 hours.    The patient's case was discussed with the treatment team/care providers today including Rns and LPC    Staff reports no behavioral or management issues.     The patient has been compliant with treatment.      Subjective 08/10/2023       Explored patient's motivation to stop gambling at length. She states that wants to quit, 10/10 however does not want to go to rehab or [place]. She states she is willing to do an IOP.    She states she feels her mood and anxiety have improved significantly.    The patient denies any side effects to medications.        Interim/overnight events per report/notes:    Per RN:  Patient tearful, depressed mood and affect. Patient reports " I am gambling and my family is mad at me and I just don't know how to get everything back to how it was". Educated and encouraged this patient to seek Gambling anonymous and continue counseling. Encouraged this patient to attend groups and activities. Patient reports on a scale of 1-10 with ten being the highest level of depression. Patient rating depression today a 8/10. On the same scale patient rating anxiety a 8/10.          Psychiatric ROS (observed, reported, or endorsed/denied):  Depressed mood - less  Interest/pleasure/anhedonia: less  Guilt/hopelessness/worthlessness - less  Changes in Sleep - less  Changes in Appetite - less  Changes in Concentration - less  Changes in Energy - less  PMA/R- less  Suicidal- " active/passive ideations - less  Homicidal ideations: active/passive ideations - No    Hallucinations - No  Delusions - No  Disorganized behavior - No  Disorganized speech - No  Negative symptoms - No    Elevated mood - No  Decreased need for sleep - No  Grandiosity - No  Racing thoughts - No  Impulsivity - No  Irritability- No  Increased energy - No  Distractibility - No  Increase in goal-directed activity or PMA- No    Symptoms of LUANNE - less  Symptoms of Panic Disorder- less  Symptoms of PTSD - No        Overall progress: Patient is showing moderate improvement        Psychotherapy:  Target symptoms: depression, anxiety , adjustment  Why chosen therapy is appropriate versus another modality: relevant to diagnosis  Outcome monitoring methods: self-report  Therapeutic intervention type: supportive psychotherapy  Topics discussed/themes: relationships difficulties, building skills sets for symptom management, symptom recognition, financial stressors  The patient's response to the intervention is accepting. The patient's progress toward treatment goals is fair.   Duration of intervention: 16 minutes.        Medical ROS  Review of Systems   Constitutional:  Negative for chills and fever.   HENT:  Negative for hearing loss and tinnitus.    Eyes:  Negative for blurred vision and double vision.   Cardiovascular:  Negative for chest pain and palpitations.   Gastrointestinal:  Negative for constipation, diarrhea, nausea and vomiting.   Genitourinary:  Negative for dysuria.   Musculoskeletal:  Negative for back pain and neck pain.   Skin:  Negative for rash.   Neurological:  Negative for dizziness and headaches.   Endo/Heme/Allergies:  Negative for environmental allergies.         PAST MEDICAL HISTORY   Past Medical History:   Diagnosis Date    Anxiety     Arthritis     Bipolar 1 disorder     Bursitis of right hip     GI bleed due to NSAIDs     Multinodular goiter 11/15/2021    Sacroiliitis     right side    Sleep apnea             PSYCHOTROPIC MEDICATIONS   Scheduled Meds:   atorvastatin  40 mg Oral Daily    EScitalopram oxalate  20 mg Oral Daily    lamoTRIgine  150 mg Oral BID    pantoprazole  40 mg Oral Daily    QUEtiapine  50 mg Oral QHS     Continuous Infusions:  PRN Meds:.acetaminophen, albuterol, ALPRAZolam, calcium carbonate, hydrOXYzine HCL, melatonin, nicotine, ondansetron, traZODone        EXAMINATION    VITALS   Vitals:    08/08/23 1947 08/09/23 0800 08/09/23 1929 08/10/23 0744   BP: (!) 99/53 139/89 (!) 117/56 115/61   BP Location:  Right arm  Right arm   Patient Position:  Sitting  Sitting   Pulse: (!) 59 (!) 58 (!) 57 66   Resp: 18 16 18 16   Temp: 97.2 °F (36.2 °C) 97.2 °F (36.2 °C) 96.9 °F (36.1 °C) 97 °F (36.1 °C)   TempSrc:  Temporal  Temporal   Weight:  70.2 kg (154 lb 12.2 oz)     Height:           Body mass index is 24.98 kg/m².        CONSTITUTIONAL  General Appearance: unremarkable, age appropriate    MUSCULOSKELETAL  Muscle Strength and Tone:no tremor, no tic  Abnormal Involuntary Movements: No  Gait and Station: non-ataxic    PSYCHIATRIC   Level of Consciousness: awake and alert   Orientation: person, place, and situation  Grooming: Casually dressed and Well groomed  Psychomotor Behavior: normal, cooperative  Speech: normal tone, normal rate, normal pitch, normal volume  Language: grossly intact  Mood: fine  Affect: Consistent with mood  Thought Process: linear, logical  Associations: intact   Thought Content: less SI, denies HI, and no delusions  Perceptions: denies AH and denies  VH  Memory: Able to recall past events, Remote intact, and Recent intact  Attention:Attends to interview without distraction  Fund of Knowledge: Aware of current events and Vocabulary appropriate   Estimate if Intelligence:  Average based on work/education history, vocabulary and mental status exam  Insight: has awareness of illness  Judgment: behavior is adequate to circumstances        DIAGNOSTIC TESTING   Laboratory Results  No  "results found for this or any previous visit (from the past 24 hour(s)).          MEDICAL DECISION MAKING      ASSESSMENT:     MDD, recurrent, severe  SI  Insomnia  Gambling disorder  LUANNE  Psychosocial stressor        PROBLEM LIST AND MANAGEMENT PLANS      MDD with mixed features vs. Unspecified bipolar disorder (pt poor historian)  - continue lamictal 300 mg PO qd  - increased/continue lexapro 10 mg PO qd to 20 mg PO qd  - continue trazodone 50 mg PO (1-2 tablets qhs PRN )  - resumed/continue seroquel 50 mg PO qhs  - strongly recommended psychotherapy, provided with resources        Suicidal ideations  - continue psychiatric hospitalization  - provide psychotherapeutic interventions and medication management      Insomnia  - reports has not been using CPAP  - pt counseled        Gambling disorder  - pt counseled  - consider meetings/therapy; patient declines        LUANNE  - xanax 0.5 mg PO TID PRN while inpatient for acute anxiety attacks  - continue hydroxyzine  mg PO q 6 hours PRN  - see MDD above  - consider psychotherapy, patient declines        Psychosocial stressors  - pt counseled  - strongly recommended psychotherapy, couples counseling to patient, patient refusing        Obesity  -  tapered off seroquel        Abnormal movements  - frequent non-stereotyped movements of hands and limbs, neck, trunk; patient states she has always been "fidgety" since childhood, reports she has seen neurology about movements before in past, pt continues to refuse referral, pt states "I've been this way all my life"           Chronic pain  - pt counseled            Discussed diagnosis, risks and benefits of proposed treatment vs alternative treatments vs no treatment, potential side effects of these treatments and the inherent unpredictability of treatment. The patient expresses understanding of the above and displays the capacity to agree with this treatment given said understanding. Patient also agrees that, currently, " the benefits outweigh the risks and would like to pursue/continue treatment at this time.    Any medications being used off-label were discussed with the patient inclusive of the evidence base for the use of the medications and consent was obtained for the off-label use of the medication.       DISCHARGE PLANNING  Expected Disposition Plan: Home or Self Care      NEED FOR CONTINUED HOSPITALIZATION  Psychiatric illness continues to pose a potential threat to life or bodily function, of self or others, thereby requiring the need for continued inpatient psychiatric hospitalization: Yes, due to: danger to self, as evidenced by:  Concerns with SI--Fading.    Protective inpatient pyschiatric hospitalization required while a safe disposition plan is enacted: Yes    Patient stabilized and ready for discharge from inpatient psychiatric unit: No        STAFF:   Estevan Zaman III, MD  Psychiatry

## 2023-08-11 PROCEDURE — 25000003 PHARM REV CODE 250: Performed by: STUDENT IN AN ORGANIZED HEALTH CARE EDUCATION/TRAINING PROGRAM

## 2023-08-11 PROCEDURE — 99233 SBSQ HOSP IP/OBS HIGH 50: CPT | Mod: ,,, | Performed by: STUDENT IN AN ORGANIZED HEALTH CARE EDUCATION/TRAINING PROGRAM

## 2023-08-11 PROCEDURE — 11400000 HC PSYCH PRIVATE ROOM

## 2023-08-11 PROCEDURE — 25000003 PHARM REV CODE 250: Performed by: PHYSICIAN ASSISTANT

## 2023-08-11 PROCEDURE — 99233 PR SUBSEQUENT HOSPITAL CARE,LEVL III: ICD-10-PCS | Mod: ,,, | Performed by: STUDENT IN AN ORGANIZED HEALTH CARE EDUCATION/TRAINING PROGRAM

## 2023-08-11 RX ORDER — ESCITALOPRAM OXALATE 20 MG/1
20 TABLET ORAL DAILY
Qty: 30 TABLET | Refills: 0 | Status: SHIPPED | OUTPATIENT
Start: 2023-08-12 | End: 2023-08-29 | Stop reason: SDUPTHER

## 2023-08-11 RX ORDER — QUETIAPINE FUMARATE 100 MG/1
100 TABLET, FILM COATED ORAL NIGHTLY
Qty: 30 TABLET | Refills: 0 | Status: SHIPPED | OUTPATIENT
Start: 2023-08-11 | End: 2023-08-29 | Stop reason: SDUPTHER

## 2023-08-11 RX ORDER — QUETIAPINE FUMARATE 100 MG/1
100 TABLET, FILM COATED ORAL NIGHTLY
Status: DISCONTINUED | OUTPATIENT
Start: 2023-08-11 | End: 2023-08-12 | Stop reason: HOSPADM

## 2023-08-11 RX ADMIN — LAMOTRIGINE 150 MG: 100 TABLET ORAL at 08:08

## 2023-08-11 RX ADMIN — PANTOPRAZOLE SODIUM 40 MG: 40 TABLET, DELAYED RELEASE ORAL at 08:08

## 2023-08-11 RX ADMIN — ESCITALOPRAM OXALATE 20 MG: 20 TABLET ORAL at 08:08

## 2023-08-11 RX ADMIN — ACETAMINOPHEN 650 MG: 325 TABLET ORAL at 05:08

## 2023-08-11 RX ADMIN — QUETIAPINE FUMARATE 100 MG: 100 TABLET ORAL at 08:08

## 2023-08-11 RX ADMIN — ATORVASTATIN CALCIUM 40 MG: 20 TABLET, FILM COATED ORAL at 08:08

## 2023-08-11 RX ADMIN — ACETAMINOPHEN 650 MG: 325 TABLET ORAL at 07:08

## 2023-08-11 RX ADMIN — Medication 6 MG: at 08:08

## 2023-08-11 NOTE — PROGRESS NOTES
PSYCHIATRY DAILY INPATIENT PROGRESS NOTE  SUBSEQUENT HOSPITAL VISIT    ENCOUNTER DATE: 8/11/2023  SITE: [facility]    DATE OF ADMISSION: 8/8/2023  1:37 PM  LENGTH OF STAY: 3 days      CHIEF COMPLAINT   Karin Maguire is a 73 y.o. female, seen during daily moura rounds on the inpatient unit.  Karin Maguire presented with the chief complaint of depression, anxiety, and behavior problems      The patient was seen and examined. The chart was reviewed.     Reviewed notes from Rns, CTRS, and LPC and labs from the last 24 hours.    The patient's case was discussed with the treatment team/care providers today including Rns and LPC    Staff reports no behavioral or management issues.     The patient has been compliant with treatment.      Subjective 08/11/2023    Patient states she is optimistic about marriage counseling after discharge. She feels medications are helping. She is looking forward to discharge tomorrow.    The patient denies any side effects to medications.        Interim/overnight events per report/notes:    Per RN:  Patient minimizes gambling abuse. Patient has poor insight and judgment into gambling addiction. Educated and encouraged this patient to seek Gambling anonymous and continue counseling. Encouraged this patient to attend groups and activities.         Psychiatric ROS (observed, reported, or endorsed/denied):  Depressed mood - less  Interest/pleasure/anhedonia: less  Guilt/hopelessness/worthlessness - less  Changes in Sleep - less  Changes in Appetite - less  Changes in Concentration - less  Changes in Energy - less  PMA/R- less  Suicidal- active/passive ideations - less  Homicidal ideations: active/passive ideations - No    Hallucinations - No  Delusions - No  Disorganized behavior - No  Disorganized speech - No  Negative symptoms - No    Elevated mood - No  Decreased need for sleep - No  Grandiosity - No  Racing thoughts - No  Impulsivity - No  Irritability- No  Increased energy -  No  Distractibility - No  Increase in goal-directed activity or PMA- No    Symptoms of LUANNE - less  Symptoms of Panic Disorder- less  Symptoms of PTSD - No        Overall progress: Patient is showing moderate improvement          Medical ROS  Review of Systems   Constitutional:  Negative for chills and fever.   HENT:  Negative for hearing loss and tinnitus.    Eyes:  Negative for blurred vision and double vision.   Cardiovascular:  Negative for chest pain and palpitations.   Gastrointestinal:  Negative for constipation, diarrhea, nausea and vomiting.   Genitourinary:  Negative for dysuria.   Musculoskeletal:  Negative for back pain and neck pain.   Skin:  Negative for rash.   Neurological:  Negative for dizziness and headaches.   Endo/Heme/Allergies:  Negative for environmental allergies.         PAST MEDICAL HISTORY   Past Medical History:   Diagnosis Date    Anxiety     Arthritis     Bipolar 1 disorder     Bursitis of right hip     GI bleed due to NSAIDs     Multinodular goiter 11/15/2021    Sacroiliitis     right side    Sleep apnea            PSYCHOTROPIC MEDICATIONS   Scheduled Meds:   atorvastatin  40 mg Oral Daily    EScitalopram oxalate  20 mg Oral Daily    lamoTRIgine  150 mg Oral BID    pantoprazole  40 mg Oral Daily    QUEtiapine  50 mg Oral QHS     Continuous Infusions:  PRN Meds:.acetaminophen, albuterol, ALPRAZolam, calcium carbonate, hydrOXYzine HCL, melatonin, nicotine, ondansetron, traZODone        EXAMINATION    VITALS   Vitals:    08/09/23 1929 08/10/23 0744 08/10/23 1939 08/11/23 0755   BP: (!) 117/56 115/61 (!) 125/58 136/61   BP Location:  Right arm  Left arm   Patient Position:  Sitting  Lying   Pulse: (!) 57 66 65 (!) 54   Resp: 18 16 20 16   Temp: 96.9 °F (36.1 °C) 97 °F (36.1 °C) 97.6 °F (36.4 °C) 97.6 °F (36.4 °C)   TempSrc:  Temporal  Temporal   Weight:       Height:           Body mass index is 24.98 kg/m².        CONSTITUTIONAL  General Appearance: unremarkable, age  appropriate    MUSCULOSKELETAL  Muscle Strength and Tone:no tremor, no tic  Abnormal Involuntary Movements: No  Gait and Station: non-ataxic    PSYCHIATRIC   Level of Consciousness: awake and alert   Orientation: person, place, and situation  Grooming: Casually dressed and Well groomed  Psychomotor Behavior: normal, cooperative  Speech: normal tone, normal rate, normal pitch, normal volume  Language: grossly intact  Mood: good  Affect: Consistent with mood  Thought Process: linear, logical  Associations: intact   Thought Content: less SI, denies HI, and no delusions  Perceptions: denies AH and denies  VH  Memory: Able to recall past events, Remote intact, and Recent intact  Attention:Attends to interview without distraction  Fund of Knowledge: Aware of current events and Vocabulary appropriate   Estimate if Intelligence:  Average based on work/education history, vocabulary and mental status exam  Insight: has awareness of illness  Judgment: behavior is adequate to circumstances        DIAGNOSTIC TESTING   Laboratory Results  No results found for this or any previous visit (from the past 24 hour(s)).          MEDICAL DECISION MAKING      ASSESSMENT:     MDD, recurrent, severe  SI  Insomnia  Gambling disorder  LUANNE  Psychosocial stressor        PROBLEM LIST AND MANAGEMENT PLANS        MDD with mixed features vs. Unspecified bipolar disorder (pt poor historian)  - continue lamictal 300 mg PO qd  - increased/continue lexapro 10 mg PO qd to 20 mg PO qd  - stopped trazodone  - resumed/continue seroquel 50 mg PO qhs  -increase to 100 mg PO qhs tonight  - strongly recommended psychotherapy, provided with resources        Suicidal ideations  - continue psychiatric hospitalization  - provide psychotherapeutic interventions and medication management      Insomnia  - reports has not been using CPAP  - pt counseled        Gambling disorder  - pt counseled  - consider meetings/therapy; patient declines        LUANNE  - xanax 0.5 mg  "PO TID PRN while inpatient for acute anxiety attacks  - continue hydroxyzine  mg PO q 6 hours PRN  - see MDD above  - consider psychotherapy, patient declines        Psychosocial stressors  - pt counseled  - strongly recommended psychotherapy, couples counseling to patient, patient refusing        Obesity  -  tapered off seroquel        Abnormal movements  - frequent non-stereotyped movements of hands and limbs, neck, trunk; patient states she has always been "fidgety" since childhood, reports she has seen neurology about movements before in past, pt continues to refuse referral, pt states "I've been this way all my life"           Chronic pain  - pt counseled            Discussed diagnosis, risks and benefits of proposed treatment vs alternative treatments vs no treatment, potential side effects of these treatments and the inherent unpredictability of treatment. The patient expresses understanding of the above and displays the capacity to agree with this treatment given said understanding. Patient also agrees that, currently, the benefits outweigh the risks and would like to pursue/continue treatment at this time.    Any medications being used off-label were discussed with the patient inclusive of the evidence base for the use of the medications and consent was obtained for the off-label use of the medication.       DISCHARGE PLANNING  Expected Disposition Plan: Home or Self Care, anticipate stability for discharge tomorrow      NEED FOR CONTINUED HOSPITALIZATION  Psychiatric illness continues to pose a potential threat to life or bodily function, of self or others, thereby requiring the need for continued inpatient psychiatric hospitalization: Yes, due to: danger to self, as evidenced by:  Concerns with SI--Fading.    Protective inpatient pyschiatric hospitalization required while a safe disposition plan is enacted: Yes    Patient stabilized and ready for discharge from inpatient psychiatric unit: " No        STAFF:   Estevan Zaman III, MD  Psychiatry

## 2023-08-11 NOTE — PSYCH
Patient Demographics    Patient Name   Karin Maguire Legal Sex   Female          Age   1950 (73 y.o.) [facility]    Address   [address]   [facility] [address] Phone   [phone] (Home)   [phone] (Mobile) *Preferred*     Patient Demographics    Address   [address] [facility] [address] Phone   [phone] (Home)   [phone] (Mobile) *Preferred* E-mail Address   [email or web address]     PCP and Center    Primary Care Provider   Lottie Hough, KENDY Phone   [phone] [facility]     Patient Contacts    Name Relation Home Work Mobile   Jairo Maguire Spouse   [phone]     Documents on File     Status Date Received Description   Documents for the Patient   Notice of Privacy Pract Ackn Received () 08/12/15    Insurance Documents Received () 11/15/19 SELF  (skilled nursing)   Patient ID Received () 08/12/15     Clinic Authorization Received () 08/12/15    Provider Based Acknowledgement Signed () 16    Advance Directive Acknowledgement Not Received ()     Plain Language Summary English No, Patient Declined Print () 16    Plain Language Summary English No, Patient Declined Print () 16    Insurance Documents Received () 16 pt ins card medicare 16   Plain Language Summary English No, Patient Declined Print () 16    Plain Language Summary English No, Patient Declined Print () 16    Clinic Authorization Received () 16    Outside [facility] Received 16    Outside [facility] Received 10/07/16    Plain Language Summary English No, Patient Declined Print () 16    Plain Language Summary English No, Patient Declined Print () 16    Plain Language Summary English No, Patient Declined Print () 16    Plain Language Summary English No, Patient Declined  Print () 16    Plain Language Summary English No, Patient Declined Print () 16    Plain Language Summary English No, Patient Declined Print () 16    Plain Language Summary English No, Patient Declined Print () 17    Plain Language Summary English No, Patient Declined Print () 02/15/17    Plain Language Summary English No, Patient Declined Print () 17    Plain Language Summary English No, Patient Declined Print () 17 Fin Assist Info   Plain Language Summary English No, Patient Declined Print () 17    Plain Language Summary English No, Patient Declined Print () 17    Plain Language Summary English No, Patient Declined Print () 17    Plain Language Summary English No, Patient Declined Print () 17    Outside [facility] Received 17    Plain Language Summary English No, Patient Declined Print () 17    [facility] Provider Based Facility Disclosure Signed () 18    Clinic Authorization Signed () 18    Plain Language Summary English No, Patient Declined Print () 18    [facility] Provider Based Facility Disclosure Signed () 18    Plain Language Summary English No, Patient Declined Print () 18    Plain Language Summary English No, Patient Declined Print () 18    [facility] Provider Based Facility Disclosure Signed () 18    [facility] Provider Based Facility Disclosure Signed () 18    Plain Language Summary English No, Patient Declined Print () 18    Insurance Documents Received () 18 (NEW)   Plain Language Summary English Acknowledged () 18    [facility] Contracted Facility Disclosure Signed () 18    Consents      Clinic Authorization Received () 19 CLINIC AUTH    Plain Language Summary English Acknowledged ()  19    Plain Language Summary English Acknowledged () 19    [facility] Contracted Facility Disclosure Signed 19    Plain Language Summary English Acknowledged () 10/29/19    Plain Language Summary English Acknowledged () 11/15/19    Patient ID Received 11/15/19 [place] DL 2025   HIM DELORES Authorization  () 20 Continued Care   Outside Progress or Provider Notes Received 20    Outside Ophthalmology Reports Received 20    Outside Progress or Provider Notes Received 20    Plain Language Summary English Acknowledged () 20 FIN ASSIST    Insurance Documents Received () 20 MEDICARE A&B (NEW)   Clinic Authorization Signed () 04/10/20    Insurance Documents Received () 20 BCBS/Medicare   Plain Language Summary English Acknowledged () 09/15/20    Plain Language Summary English Acknowledged () 10/14/20    Plain Language Summary English Acknowledged () 10/16/20    Plain Language Summary English Acknowledged () 10/21/20 fin asst info   Notice of Privacy Pract Ackn Signed 10/26/20    Release of Information Received 10/26/20    Plain Language Summary English Acknowledged () 20    Plain Language Summary English Acknowledged () 20    Advance Directive Acknowledgement Received () 20 psda/self   Plain Language Summary English Acknowledged () 20    Plain Language Summary English Acknowledged () 12/10/20    Plain Language Summary English Acknowledged () 21    Plain Language Summary English Acknowledged () 21    Plain Language Summary English Acknowledged () 21    Plain Language Summary English Acknowledged () 21    Advance Directive Acknowledgement Received () 21 PSDA RECEIPT OF INFO/SELF   Plain Language Summary English Acknowledged () 21    COVID Vaccine Acknowledgement  Signed () 21    COVID Vaccine Acknowledgement Received () 21 COVID VACCINE ACKNOWLEDGEMENT   COVID Vaccine Acknowledgement Signed () 21    Clinic Authorization Signed () 04/15/21    Plain Language Summary English Acknowledged () 04/15/21    Plain Language Summary English Acknowledged () 21    Plain Language Summary English Acknowledged () 21    Advance Directive Acknowledgement Received () 21 psda/self   Plain Language Summary English Acknowledged () 21    Plain Language Summary English Acknowledged () 07/15/21    Plain Language Summary English Acknowledged () 21    Plain Language Summary English Acknowledged () 21    Plain Language Summary English Acknowledged () 21    Plain Language Summary English Acknowledged () 21    Outside Discharge Summary Received 21    Plain Language Summary English Acknowledged () 21    Plain Language Summary English Acknowledged () 21    Plain Language Summary English Acknowledged () 10/25/21    Plain Language Summary English Acknowledged () 10/26/21    Insurance Verification Fax Received () 10/27/21    Insurance Verification Fax Received () 10/27/21 medicare verification fax   Insurance Verification Fax Received () 10/27/21 bcbs verification fax   Advance Directive Acknowledgement Received () 10/27/21 psda   Plain Language Summary English Acknowledged () 21    Plain Language Summary English Acknowledged () 21    Plain Language Summary English Acknowledged () 21    Plain Language Summary English Acknowledged () 11/15/21    COVID Vaccine Acknowledgement Signed () 21    Plain Language Summary English Acknowledged () 22    Plain Language Summary English Acknowledged () 22     Plain Language Summary English Acknowledged () 22    Plain Language Summary English Acknowledged () 22    Clinic Authorization Signed () 22    Plain Language Summary English Acknowledged () 22    Advance Directive Acknowledgement Received () 22    Plain Language Summary English Acknowledged () 22    Plain Language Summary English Acknowledged () 22    Plain Language Summary English Acknowledged () 22    Plain Language Summary English Acknowledged () 22    Provider Based Acknowledgement Received 08/15/22 VERBAL   Plain Language Summary English Acknowledged () 22    Plain Language Summary English Acknowledged () 08/15/22    Plain Language Summary English Acknowledged () 22    Plain Language Summary English Acknowledged () 22    Plain Language Summary English Acknowledged () 10/04/22    Plain Language Summary English Acknowledged () 22    Plain Language Summary English Acknowledged () 22    Plain Language Summary English Acknowledged () 23    Insurance Documents Received 23    Plain Language Summary English Acknowledged () 23    Outside Progress or Provider Notes Received 23    Outside Progress or Provider Notes Received 23    Plain Language Summary English Acknowledged () 23    Plain Language Summary English Acknowledged () 23    Plain Language Summary English Acknowledged () 23    Miscellaneous Documents clinic Received 23 Air cast walker boot/ Dr. Alejandra 23   Miscellaneous Documents clinic Received 23 Air Cast Boot/ Dr. Alejandra 23   Plain Language Summary English Acknowledged () 23    Plain Language Summary English Acknowledged () 03/10/23    Consents Received 03/10/23 Surgery Consent/ Dr. Alejandra/  03/10/23   Plain Language Summary English Acknowledged () 23    Consents Received 23    Plain Language Summary English Acknowledged () 23    Plain Language Summary English Acknowledged () 23    Plain Language Summary English Acknowledged () 23    Plain Language Summary English Acknowledged () 23    Plain Language Summary English Acknowledged () 23    Plain Language Summary English Acknowledged () 23    Clinic Authorization Signed 23    Plain Language Summary English Acknowledged () 23    Plain Language Summary English Acknowledged () 23    Patient Rights and Responsibilities Acknowledged 23    Plain Language Summary English Acknowledged () 23    Plain Language Summary English Acknowledged () 23    Plain Language Summary English Acknowledged () 23    Plain Language Summary English Acknowledged () 23    Plain Language Summary English Acknowledged () 23    Plain Language Summary English Acknowledged () 23    Insurance Documents   GET NEW MEDICARE CARD   Plain Language Summary English Acknowledged () 23    Plain Language Summary English Acknowledged () 23    Plain Language Summary English Acknowledged () 23    Plain Language Summary English Acknowledged () 07/10/23    Plain Language Summary English Acknowledged () 23    Plain Language Summary English Acknowledged () 23    [facility] Not Contracted Facility Disclosure      Advance Directive Acknowledgement Received 23    Plain Language Summary English Acknowledged () 23    Provider Based Acknowledgement      Plain Language Summary English      Patient Photo   Photo of Patient   Patient ID  (Deleted)  GET CURRENT ID OR DL   Insurance Documents  (Deleted)  GET NEW BCBS CARD   HIM  Release of Information Output  (Deleted) 01/07/20 Requested records   Insurance Documents Not Received (Deleted) 03/23/20 2020 PATIENT DOES NOT HAVE A UPDATED BCBS CARD   Insurance Documents Not Received (Deleted) 10/16/20 2020 PATIENT DOES NOT HAVE A RX CARD   Provider Based Acknowledgement  (Deleted)     Patient Photo   Photo of Patient   [facility] Not Contracted Facility Disclosure  (Deleted)     [facility] Not Contracted Facility Disclosure  (Deleted)     HIM Release of Information Output  (Deleted) 11/09/22    Insurance Documents  (Deleted)  2022 PATIENT DOES NOT HAVE A UPDATED BCBS FEDERAL CARD   Insurance Documents  (Deleted)  2022 PATIENT DOES NOT HAVE A RX CARD   [facility] Not Contracted Facility Disclosure  (Deleted)     HIM Release of Information Output  (Deleted) 02/06/23    HIM Release of Information Output  (Deleted) 02/07/23    [facility] Not Contracted Facility Disclosure  (Deleted)     Advance Directive Acknowledgement  (Deleted)     Notice of Privacy Pract Ackn  (Deleted)     [facility] Contracted Facility Disclosure  (Deleted)     Documents for the Encounter   Medicare Lifetime Reserve Form      Important Medicare Message Printed at Discharge      Advance Beneficiary Notice      Hospital Authorization      Hospital Authorization Received 08/11/23    Flowsheets Nursing Received 08/09/23    PDF Report   Most Recent Review with H&P   PDF Report   Psych Certification Report   Important Medicare Message STA Received 08/11/23    Important Medicare Message [facility] Received 08/11/23    HIM Release of Information Output  08/09/23 Document (8/9/2023 12:22 PM CDT)     Admission Information    Current Information    Attending Provider Admitting Provider Admission Type Admission Status   Estevan Zaman III, MD Orgeron, Aubrey III, MD Urgent Confirmed Admission          Admission Date/Time Discharge Date Hospital Service Auth/Cert Status   08/08/23  1337  Psychiatry Incomplete          Hospital Area Unit Room/Bed    [facility]  BEHAVIORAL HEALTH UNIT 210/210 D            Discharge Disposition Discharge Destination   Home or Self Care      Admission    Complaint        Hospital Account    Name Acct ID Class Status Primary Coverage   Karin Maguire [id number] IP- Psych Open [facility] MEDICARE ADVANTAGE - [employer] O MCARE ADV            Guarantor Account (for Hospital Account #[id number])    Name Relation to Pt Service Area Active? Acct Type   Karin Maguire Self OHSSA Yes Personal/Family   Address Phone     [address], [place] [address] [phone](H)              Coverage Information (for Hospital Account #[id number])    F/O Payor/Plan Precert #   [facility] MEDICARE ADVANTAGE/[employer] HMO MCARE ADV Pending Ref# [id number]   Subscriber Subscriber #   Karin Maguire [id number]   Address Phone   [address]   [place] [address] [phone]

## 2023-08-11 NOTE — PLAN OF CARE
Patient interacting with peers and staff. Blunted affect and tearful at times. Patient has poor insight and judgment into gambling problems. Encouraged this patient to seek gambling anonymous and continue counseling. Encouraged this patient to attend groups and activities. Patient reports on a scale of 1-10 with ten being the highest level of depression. Patient rating depression today a 6/10. On the same scale patient rating anxiety a 5/10. Encouraged patient to attend groups and activities.  Patient denies SI/HI no self harming behavior displayed, no aggressive behavior displayed towards others. Patient contracted safety with staff and unit.  Discussed healthy coping skills and techniques.  Educated, reviewed  and discussed plan of care and medication regiment with this patient. Patient voiced understanding of all teachings.

## 2023-08-11 NOTE — MEDICAL/APP STUDENT
PSYCHIATRY DAILY INPATIENT PROGRESS NOTE  SUBSEQUENT HOSPITAL VISIT    ENCOUNTER DATE: 8/11/2023  SITE: [facility]    DATE OF ADMISSION: 8/8/2023  1:37 PM  LENGTH OF STAY: 3 days      CHIEF COMPLAINT   Karin Maguire is a 73 y.o. female, seen during daily moura rounds on the inpatient unit.  Karin Maguire presented with the chief complaint of depression, anxiety, and behavior problems      The patient was seen and examined. The chart was reviewed.     Reviewed notes from Rns and MD and labs from the last 24 hours.    The patient's case was discussed with the treatment team/care providers today including Rns and MD    Staff reports no behavioral or management issues.     The patient has been compliant with treatment.      Subjective 08/11/2023       Today the patient reports improved depression (1/10) and improved anxiety (0/10).    States she is considering marriage counseling and is excited to get back to her hobbies of gardening and chicken-tending once her foot mends.      The patient denies any side effects to medications.    Denies SI/HI.      Interim/overnight events per report/notes:  Per RN:  Patient flat affect with depressed mood. Patient tearful. Interacting with peers and staff. Patient calm and cooperative. Patient minimizes gambling abuse. Patient has poor insight and judgment into gambling addiction. Educated and encouraged this patient to seek Gambling anonymous and continue counseling. Encouraged this patient to attend groups and activities        Psychiatric ROS (observed, reported, or endorsed/denied):  Depressed mood - less  Interest/pleasure/anhedonia: less  Guilt/hopelessness/worthlessness - less  Changes in Sleep - stable  Changes in Appetite - less  Changes in Concentration - stable  Changes in Energy - denies  PMA/R- denies  Suicidal- active/passive ideations - denies  Homicidal ideations: active/passive ideations - denies    Hallucinations - No  Delusions - No  Disorganized  behavior - No  Disorganized speech - No  Negative symptoms - No    Elevated mood - No  Decreased need for sleep - No  Grandiosity - No  Racing thoughts - No  Impulsivity - No  Irritability- No  Increased energy - No  Distractibility - No  Increase in goal-directed activity or PMA- No    Symptoms of LUANNE - improving minimally  Symptoms of Panic Disorder- less  Symptoms of PTSD - No        Overall progress: Patient is showing moderate improvement        Medical ROS  Review of Systems   Constitutional:  Negative for chills, diaphoresis and fever.   HENT: Negative.     Eyes: Negative.    Respiratory: Negative.     Gastrointestinal: Negative.    Genitourinary: Negative.    Neurological:  Negative for tingling, loss of consciousness and headaches.         PAST MEDICAL HISTORY   Past Medical History:   Diagnosis Date    Anxiety     Arthritis     Bipolar 1 disorder     Bursitis of right hip     GI bleed due to NSAIDs     Multinodular goiter 11/15/2021    Sacroiliitis     right side    Sleep apnea            PSYCHOTROPIC MEDICATIONS   Scheduled Meds:   atorvastatin  40 mg Oral Daily    EScitalopram oxalate  20 mg Oral Daily    lamoTRIgine  150 mg Oral BID    pantoprazole  40 mg Oral Daily    QUEtiapine  50 mg Oral QHS     Continuous Infusions:  PRN Meds:.acetaminophen, albuterol, ALPRAZolam, calcium carbonate, hydrOXYzine HCL, melatonin, nicotine, ondansetron, traZODone        EXAMINATION    VITALS   Vitals:    08/09/23 1929 08/10/23 0744 08/10/23 1939 08/11/23 0755   BP: (!) 117/56 115/61 (!) 125/58 136/61   BP Location:  Right arm  Left arm   Patient Position:  Sitting  Lying   Pulse: (!) 57 66 65 (!) 54   Resp: 18 16 20 16   Temp: 96.9 °F (36.1 °C) 97 °F (36.1 °C) 97.6 °F (36.4 °C) 97.6 °F (36.4 °C)   TempSrc:  Temporal  Temporal   Weight:       Height:           Body mass index is 24.98 kg/m².        CONSTITUTIONAL  General Appearance: unremarkable, age appropriate    MUSCULOSKELETAL  Muscle Strength and  Tone:choreoathetosis noted    Abnormal Involuntary Movements: Yes, fidgeting  Gait and Station: non-ataxic    PSYCHIATRIC   Level of Consciousness: awake and alert   Orientation: person, place, and situation  Grooming: Casually dressed and Well groomed  Psychomotor Behavior: normal, cooperative  Speech: normal tone, normal rate, normal pitch, normal volume  Language: grossly intact  Mood: fine, neutral, and sad  Affect: Consistent with mood  Thought Process: linear, logical  Associations: intact   Thought Content: less SI, denies HI, and no delusions  Perceptions: denies AH and denies  VH  Memory: Able to recall past events, Remote intact, and Recent intact  Attention:Attends to interview without distraction  Fund of Knowledge: Aware of current events and Vocabulary appropriate   Estimate if Intelligence:  Average based on work/education history, vocabulary and mental status exam  Insight: has awareness of illness  Judgment: behavior is adequate to circumstances        DIAGNOSTIC TESTING   Laboratory Results  No results found for this or any previous visit (from the past 24 hour(s)).          MEDICAL DECISION MAKING      ASSESSMENT:      MDD, recurrent, severe  SI  Insomnia  Gambling disorder  LUANNE  Psychosocial stressor        PROBLEM LIST AND MANAGEMENT PLANS    MDD with mixed features vs. Unspecified bipolar disorder (pt poor historian)  - continue lamictal 300 mg PO qd  - continue lexapro 10 mg PO qd to 20 mg PO qd  - continue trazodone 50 mg PO (1-2 tablets qhs PRN )  - resumed/continue seroquel 50 mg PO qhs  - strongly recommended psychotherapy, provided with resources        Suicidal ideations  - continue psychiatric hospitalization  - provide psychotherapeutic interventions and medication management        Insomnia  - reports has not been using CPAP  - pt counseled        Gambling disorder  - pt counseled  - consider meetings/therapy; patient declines        LUANNE  - xanax 0.5 mg PO TID PRN while inpatient for  "acute anxiety attacks  - continue hydroxyzine  mg PO q 6 hours PRN  - see MDD above  - consider psychotherapy, patient declines        Psychosocial stressors  - pt counseled  - strongly recommended psychotherapy, couples counseling to patient           Abnormal movements  - frequent non-stereotyped movements of hands and limbs, neck, trunk; patient states she has always been "fidgety" since childhood, reports she has seen neurology about movements before in past, pt continues to refuse referral, pt states "I've been this way all my life"            Discussed diagnosis, risks and benefits of proposed treatment vs alternative treatments vs no treatment, potential side effects of these treatments and the inherent unpredictability of treatment. The patient expresses understanding of the above and displays the capacity to agree with this treatment given said understanding. Patient also agrees that, currently, the benefits outweigh the risks and would like to pursue/continue treatment at this time.    Any medications being used off-label were discussed with the patient inclusive of the evidence base for the use of the medications and consent was obtained for the off-label use of the medication.       DISCHARGE PLANNING  Expected Disposition Plan: Home or Self Care      NEED FOR CONTINUED HOSPITALIZATION  Psychiatric illness continues to pose a potential threat to life or bodily function, of self or others, thereby requiring the need for continued inpatient psychiatric hospitalization: Yes, due to: danger to self, as evidenced by:  Concerns with SI--Fading.    Protective inpatient pyschiatric hospitalization required while a safe disposition plan is enacted: Yes    Patient stabilized and ready for discharge from inpatient psychiatric unit: No      Rachel Ng [facility], MS3  Psychiatry    "

## 2023-08-11 NOTE — PLAN OF CARE
Problem: Adult Behavioral Health Plan of Care  Goal: Optimized Coping Skills in Response to Life Stressors  Outcome: Ongoing, Progressing  Intervention: Promote Effective Coping Strategies  Flowsheets (Taken 8/11/2023 6695)  Supportive Measures:   active listening utilized   counseling provided   decision-making supported   self-reflection promoted   verbalization of feelings encouraged    Group Note      Behavior    Patient attended group psychotherapy today. Pt affect flat with depressed mood. Pt walked out of group and was very tearful and returned after 10 minutes.       Intervention      CBT-based group psychotherapy focused on How Do I feel. Patients practiced how positive feelings about one-self gives courage and strength. The worksheet provide direction and gave meaning to one's life. Pts explored and discussed positive thinking of themself.        Response: Pt stated  It just to depends on how I wake up in he morning.I I try to get up and do what I have to do and then at the end of the day I just want it to end because sometimes it is not so great so I can just start a new day.       Plan Continue to encourage group attendance in future group sessions.

## 2023-08-11 NOTE — PLAN OF CARE
Pt has been out of room some this shift.  Calm, cooperative, pleasant.  No behavior problem.  Blunted affect, quiet, minimal interaction.   Watched tv, took meds as ordered along with prn melatonin and trazodone.  No distress noted.   Will continue to monitor for safety.

## 2023-08-11 NOTE — PLAN OF CARE
Pt is sleeping at this time and has slept 6.5 hours with 2 awakenings.  NAD.  Resp even & unlabored.  Pathways clear.  Q 15 minute safety checks ongoing.  All precautions maintained

## 2023-08-12 VITALS
BODY MASS INDEX: 24.87 KG/M2 | HEIGHT: 66 IN | SYSTOLIC BLOOD PRESSURE: 124 MMHG | HEART RATE: 65 BPM | DIASTOLIC BLOOD PRESSURE: 65 MMHG | WEIGHT: 154.75 LBS | TEMPERATURE: 98 F | RESPIRATION RATE: 18 BRPM | OXYGEN SATURATION: 98 %

## 2023-08-12 PROCEDURE — 99406 BEHAV CHNG SMOKING 3-10 MIN: CPT | Mod: ,,, | Performed by: PSYCHIATRY & NEUROLOGY

## 2023-08-12 PROCEDURE — 99239 PR HOSPITAL DISCHARGE DAY,>30 MIN: ICD-10-PCS | Mod: 25,,, | Performed by: PSYCHIATRY & NEUROLOGY

## 2023-08-12 PROCEDURE — 99406 PR TOBACCO USE CESSATION INTERMEDIATE 3-10 MINUTES: ICD-10-PCS | Mod: ,,, | Performed by: PSYCHIATRY & NEUROLOGY

## 2023-08-12 PROCEDURE — 25000003 PHARM REV CODE 250: Performed by: PHYSICIAN ASSISTANT

## 2023-08-12 PROCEDURE — 99239 HOSP IP/OBS DSCHRG MGMT >30: CPT | Mod: 25,,, | Performed by: PSYCHIATRY & NEUROLOGY

## 2023-08-12 PROCEDURE — 25000003 PHARM REV CODE 250: Performed by: STUDENT IN AN ORGANIZED HEALTH CARE EDUCATION/TRAINING PROGRAM

## 2023-08-12 RX ORDER — IBUPROFEN 200 MG
1 TABLET ORAL DAILY
COMMUNITY
Start: 2023-08-12 | End: 2023-08-29 | Stop reason: ALTCHOICE

## 2023-08-12 RX ADMIN — ESCITALOPRAM OXALATE 20 MG: 20 TABLET ORAL at 08:08

## 2023-08-12 RX ADMIN — ATORVASTATIN CALCIUM 40 MG: 20 TABLET, FILM COATED ORAL at 08:08

## 2023-08-12 RX ADMIN — ACETAMINOPHEN 650 MG: 325 TABLET ORAL at 04:08

## 2023-08-12 RX ADMIN — LAMOTRIGINE 150 MG: 100 TABLET ORAL at 08:08

## 2023-08-12 RX ADMIN — PANTOPRAZOLE SODIUM 40 MG: 40 TABLET, DELAYED RELEASE ORAL at 08:08

## 2023-08-12 NOTE — DISCHARGE SUMMARY
"Discharge Summary  Psychiatry    Admit Date: 8/8/2023    Discharge Date and Time:  08/12/2023 10:35 AM    Attending Physician: Estevan Zaman III, MD     Discharge Provider: Manuel Hunter MD    Reason for Admission:  thoughts of death/suicide    History of Present Illness:   The patient presented to the ER on 8/8/2023 .     The patient was medically cleared and admitted to the U.           Per ED DO:        Psychiatric Evaluation       Patient to ER CC of SI, denies HI, patient is coming from out patient behavior health       73-year-old female with a past medical history of anxiety, bipolar presents to the emergency department after being sent by psychiatry for psych admission.  Please see note from earlier today.       Patient states for the past month, she has been very stressed, anxious, depressed.  She states that she has a gambling problem and gambles nearly daily, sometimes twice a day.  She states that she cannot think straight, that no one needs here, that she is better off dead.  She is having thoughts of wanting to die but does not want to actually hurt herself.  She states that things will be better if she was dead.  She denies any hallucinations or homicidal ideations.     She states her  will yell at her for typical stuff but states she isn't being abused physically or mentally.     Per RN:  States her anxiety has been unmanageable lately and she is "unable to go on like this any longer".  Pt denies wanting to harm self at this time, however.  Reports recent med change.      Patient crying reporting " I am all mixed up. I don't know how to turn all my problems around". We discussed counseling and to continue to be compliant with medications. Discussed and educated patient on medication regiment. Administered  Alprazolam 0.5 MG PO Patient tolerated well.         Psychiatric interview:  "I can't think straight, I'm super unhappy." Reports stressor with  "my relationship with my " ", he like to control and doesn't let me be who I want to be, I can't have my own thoughts, make my own decisions.... then he's so nice and then I feel guilty... I feel like a terrible person for having gotten upset... he changes what he says to suit his purpose, I told him I wanted to go to marriage counseling but he said he's going to tell his side of it and that I'll tell mine and it won't fix anything, he said I'm too fragile and he doesn't know how to deal with me, and now he's being so nice to me... he lies.. I wonder if he wants me to feel like I'm losing my mind on purpose, will say one thing then say he never said it when I repeat it back to him, then he can be so nice sometimes."           Psychiatric Review Of Systems - Is patient experiencing or having changes in:  sleep: no  appetite: no  weight: no  energy/anergy: yes  Interest/pleasure/anhedonia: yes  Anxiety: increased  panic: no  Guilty/hopelessness/worthlessness: yes  concentration: yes  S.I.B.s/risky behavior: no  Irritability: yes  Substance abuse: no  Racing thoughts: no  Impulsive behaviors: no  Paranoia: no  AVH: no  Gambling: yes, see above  Michelle/hypomania: no    Procedures Performed: * No surgery found *    Hospital Course:    Patient was admitted to the inpatient psychiatry unit after being medically cleared in the ED. Chart and labs were reviewed. The patient was stabilized as follows:      MDD with mixed features vs. Unspecified bipolar disorder (pt poor historian)  - continue lamictal 300 mg PO qd  - increased/continue lexapro 10 mg PO qd to 20 mg PO qd  - stopped trazodone  - resumed/continue seroquel 50 mg PO qhs  -increase to 100 mg PO qhs tonight  - strongly recommended psychotherapy, provided with resources        Suicidal ideations  - continue psychiatric hospitalization  - provide psychotherapeutic interventions and medication management        Insomnia  - reports has not been using CPAP  - pt counseled        Gambling " "disorder  - pt counseled  - consider meetings/therapy; patient declines        LUANNE  - xanax 0.5 mg PO TID PRN while inpatient for acute anxiety attacks  - continue hydroxyzine  mg PO q 6 hours PRN  - see MDD above  - consider psychotherapy, patient declines        Psychosocial stressors  - pt counseled  - strongly recommended psychotherapy, couples counseling to patient, patient refusing        Obesity  -  tapered off seroquel        Abnormal movements  - frequent non-stereotyped movements of hands and limbs, neck, trunk; patient states she has always been "fidgety" since childhood, reports she has seen neurology about movements before in past, pt continues to refuse referral, pt states "I've been this way all my life"           Chronic pain  - pt counseled        During hospitalization, the patient was encouraged to go to both groups and individual counseling. Patient was monitored for any side effects. A meeting was held with multidisciplinary team prior to discharge and pt's diagnosis, current medications, and follow up were discussed. The patient has been compliant with treatment and can adequately attend to activities of daily living in an independent manner. The patient denies any side effects. The patient denies SI, HI, plan or intent for self harm or harm to others. The patient is no longer a danger to self or others nor gravely disabled disabled. Patient discharged  in stable condition with scheduled outpatient follow up.    AIMS score was 0    The patient reports improved symptoms as documented below. The patient is requesting discharge. The patient is currently stable for discharge home and is able/willing to attend outpatient care. The patient is hopeful, future oriented and goal directed. The patient readily discusses both short and long term goals. The patient can identify positive coping skills and social support.        Psychiatric ROS (observed, reported, or endorsed/denied):  Depressed mood - " improved  Interest/pleasure/anhedonia: improved  Guilt/hopelessness/worthlessness - improved  Changes in Sleep - improved  Changes in Appetite - improved  Changes in Concentration - improved  Changes in Energy - improved  PMA/R- improved  Suicidal- active/passive ideations - resolved  Homicidal ideations: active/passive ideations - No     Hallucinations - No  Delusions - No  Disorganized behavior - No  Disorganized speech - No  Negative symptoms - No     Elevated mood - No  Decreased need for sleep - No  Grandiosity - No  Racing thoughts - No  Impulsivity - No  Irritability- No  Increased energy - No  Distractibility - No  Increase in goal-directed activity or PMA- No     Symptoms of LUANNE - improved  Symptoms of Panic Disorder- improved  Symptoms of PTSD - No      Discussed diagnosis, risks and benefits of proposed treatment vs alternative treatments vs no treatment, and potential side effects of these treatments.  The patient expresses understanding of the above and displays the capacity to agree with this treatment given said understanding.  Patient also agrees that, currently, the benefits outweigh the risks and would like to pursue treatment at this time.      Discharge MSE: stated age, casually dressed, well groomed.  No psychomotor agitation or retardation.  No abnormal involuntary movements.  Gait normal.  Speech normal, conversational.  Language fluent English. Mood fine.  Affect normal range, pleasant, euthymic.  Thought process linear.  Associations intact.  Denies suicidal or homicidal ideation.  Denies auditory hallucinations, paranoid ideation, ideas of reference.  Memory intact.  Attention intact.  Fund of knowledge intact.  Insight intact.  Judgment intact.  Alert and oriented to person, place, time.      Tobacco Usage:  Is patient a smoker? Yes  Does patient want prescription for Tobacco Cessation? No  Does patient want counseling for Tobacco Cessation? No    If patient would like to quit, then over  the counter nicotine patch could be used. The patient could also follow up with his PCP or psychiatric provider for other alternatives.     Tobacco Use Treatment Practical Counseling (approximately 5 minutes)    Were the following discussed with the patient:    Recognizing danger situations:    A. Alcohol use during the first month after quitting - Yes   B. Being around smoke and/or smokers or time/situations when the patient routinely smoked - Yes   C. Triggers and/or roadblocks are the same as danger situations - Yes    2.   Developing coping skills   A. Learning new ways to manage stress - Yes   B. Exercising and/or relaxation breathing - Yes   C. Changing routines - Yes   D. Distraction techniques to prevent tobacco use - Yes    3.   Basic information about quitting:   A. Benefits of quitting tobacco - Yes   B. How to quit techniques - Yes   C. Available resources to support quitting - Yes        Final Diagnoses:    Principal Problem: MDD, recurrent, severe, without psychotic features   Secondary Diagnoses:   Insomnia  Gambling disorder  LUANNE  Psychosocial stressor    Labs:  Admission on 08/08/2023, Discharged on 08/08/2023   Component Date Value Ref Range Status    Specimen UA 08/08/2023 Urine, Clean Catch   Final    Color, UA 08/08/2023 Yellow  Yellow, Straw, Cristal Final    Appearance, UA 08/08/2023 Clear  Clear Final    pH, UA 08/08/2023 6.0  5.0 - 8.0 Final    Specific Gravity, UA 08/08/2023 <=1.005 (A)  1.005 - 1.030 Final    Protein, UA 08/08/2023 Negative  Negative Final    Glucose, UA 08/08/2023 Negative  Negative Final    Ketones, UA 08/08/2023 Negative  Negative Final    Bilirubin (UA) 08/08/2023 Negative  Negative Final    Occult Blood UA 08/08/2023 Negative  Negative Final    Nitrite, UA 08/08/2023 Negative  Negative Final    Urobilinogen, UA 08/08/2023 Negative  <2.0 EU/dL Final    Leukocytes, UA 08/08/2023 Negative  Negative Final    Alcohol, Serum 08/08/2023 <10  <10 mg/dL Final    Acetaminophen  (Tylenol), Serum 08/08/2023 <3.0 (L)  10.0 - 20.0 ug/mL Final    Sodium 08/08/2023 140  136 - 145 mmol/L Final    Potassium 08/08/2023 4.4  3.5 - 5.1 mmol/L Final    Chloride 08/08/2023 105  95 - 110 mmol/L Final    CO2 08/08/2023 24  23 - 29 mmol/L Final    Glucose 08/08/2023 100  70 - 110 mg/dL Final    BUN 08/08/2023 10  8 - 23 mg/dL Final    Creatinine 08/08/2023 0.8  0.5 - 1.4 mg/dL Final    Calcium 08/08/2023 10.3  8.7 - 10.5 mg/dL Final    Total Protein 08/08/2023 7.5  6.0 - 8.4 g/dL Final    Albumin 08/08/2023 4.2  3.5 - 5.2 g/dL Final    Total Bilirubin 08/08/2023 0.2  0.1 - 1.0 mg/dL Final    Alkaline Phosphatase 08/08/2023 79  55 - 135 U/L Final    AST 08/08/2023 15  10 - 40 U/L Final    ALT 08/08/2023 9 (L)  10 - 44 U/L Final    eGFR 08/08/2023 >60  >60 mL/min/1.73 m^2 Final    Anion Gap 08/08/2023 11  8 - 16 mmol/L Final    TSH 08/08/2023 0.758  0.400 - 4.000 uIU/mL Final    WBC 08/08/2023 6.45  3.90 - 12.70 K/uL Final    RBC 08/08/2023 4.47  4.00 - 5.40 M/uL Final    Hemoglobin 08/08/2023 13.1  12.0 - 16.0 g/dL Final    Hematocrit 08/08/2023 39.8  37.0 - 48.5 % Final    MCV 08/08/2023 89  82 - 98 fL Final    MCH 08/08/2023 29.3  27.0 - 31.0 pg Final    MCHC 08/08/2023 32.9  32.0 - 36.0 g/dL Final    RDW 08/08/2023 14.0  11.5 - 14.5 % Final    Platelets 08/08/2023 283  150 - 450 K/uL Final    MPV 08/08/2023 9.9  9.2 - 12.9 fL Final    Immature Granulocytes 08/08/2023 0.3  0.0 - 0.5 % Final    Gran # (ANC) 08/08/2023 3.6  1.8 - 7.7 K/uL Final    Immature Grans (Abs) 08/08/2023 0.02  0.00 - 0.04 K/uL Final    Lymph # 08/08/2023 2.1  1.0 - 4.8 K/uL Final    Mono # 08/08/2023 0.5  0.3 - 1.0 K/uL Final    Eos # 08/08/2023 0.1  0.0 - 0.5 K/uL Final    Baso # 08/08/2023 0.05  0.00 - 0.20 K/uL Final    nRBC 08/08/2023 0  0 /100 WBC Final    Gran % 08/08/2023 56.2  38.0 - 73.0 % Final    Lymph % 08/08/2023 32.9  18.0 - 48.0 % Final    Mono % 08/08/2023 8.1  4.0 - 15.0 % Final    Eosinophil % 08/08/2023 1.7  0.0  - 8.0 % Final    Basophil % 08/08/2023 0.8  0.0 - 1.9 % Final    Differential Method 08/08/2023 Automated   Final    Benzodiazepines 08/08/2023 Negative  Negative Final    Methadone metabolites 08/08/2023 Negative  Negative Final    Cocaine (Metab.) 08/08/2023 Negative  Negative Final    Opiate Scrn, Ur 08/08/2023 Negative  Negative Final    Barbiturate Screen, Ur 08/08/2023 Negative  Negative Final    Amphetamine Screen, Ur 08/08/2023 Negative  Negative Final    THC 08/08/2023 Negative  Negative Final    Phencyclidine 08/08/2023 Negative  Negative Final    Creatinine, Urine 08/08/2023 10.2 (L)  15.0 - 325.0 mg/dL Final    Toxicology Information 08/08/2023 SEE COMMENT   Final    Salicylate Lvl 08/08/2023 <5.0 (L)  15.0 - 30.0 mg/dL Final         Discharged Condition: stable and improved; not currently a danger to self/others or gravely disabled    Disposition: Home or Self Care    Is patient being discharged on multiple neuroleptics? No    Follow Up/Patient Instructions:     Take all medications as prescribed.  Attend all psychiatric and medical follow up appointments.   Abstain from all drugs and alcohol.  Call the crisis line at: [phone] for help in a crisis and emergent situations or call 911 and Return to ED for any acute worsening of your condition including suicidal or homicidal ideations      Discharge Procedure Orders   Diet Adult Regular     Notify your health care provider if you experience any of the following:  temperature >100.4     Notify your health care provider if you experience any of the following:  persistent nausea and vomiting or diarrhea     Notify your health care provider if you experience any of the following:   Order Comments: Suicidal thoughts, homicidal thoughts, or any other changes in mental status  If you would like immediate help/crisis counseling, please call [phone] (TALK). Through this toll-free phone number for a network of crisis centers across the country. These  centers staff their lines with people who are trained to listen and offer support to people in emotional crisis. If you are in an emergency, please call 911.     Notify your health care provider if you experience any of the following:  increased confusion or weakness     Notify your health care provider if you experience any of the following:  persistent dizziness, light-headedness, or visual disturbances     Reason for not Prescribing Nicotine Replacement     Order Specific Question Answer Comments   Reason for not Prescribing: Patient is not a tobacco user      Activity as tolerated      Follow-up Information       Estevan Zaman III, MD. Go on 9/15/2023.    Specialty: Psychiatry  Why: as scheduled on 9/15/2023 at 9am for, medication management  Contact information:  [address] [facility] [id number] [phone]               Specialties, [facility]. Go to.    Specialties: Psychiatric Facility, Behavioral Health, Psychiatry, Psychology  Why: will call you on Monday with appointment  Contact information:  [address] [facility] [place] [address]  [phone]                               Follow up apt: as above      Medications:  Reconciled Home Medications:      Medication List        START taking these medications      nicotine 14 mg/24 hr  Commonly known as: NICODERM CQ  Place 1 patch onto the skin once daily.     QUEtiapine 100 MG Tab  Commonly known as: SEROQUEL  Take 1 tablet (100 mg total) by mouth every evening.            CHANGE how you take these medications      EScitalopram oxalate 20 MG tablet  Commonly known as: LEXAPRO  Take 1 tablet (20 mg total) by mouth once daily.  What changed:   medication strength  how much to take     rosuvastatin 10 MG tablet  Commonly known as: CRESTOR  Take 1 tablet (10 mg total) by mouth once daily.  What changed: Another medication with the same name was removed. Continue taking this medication, and follow the directions you see here.            CONTINUE taking  these medications      albuterol 90 mcg/actuation inhaler  Commonly known as: PROVENTIL/VENTOLIN HFA  INHALE 2 PUFFS INTO THE LUNGS EVERY 6 (SIX) HOURS AS NEEDED FOR WHEEZING (COUGH AND WHEEZING).RESCUE     aspirin 81 MG Chew  Take 1 tablet (81 mg total) by mouth once daily.     lamoTRIgine 150 MG Tab  Commonly known as: LAMICTAL  Take 2 tablets (300 mg total) by mouth every evening.     pantoprazole 40 MG tablet  Commonly known as: PROTONIX  Take 1 tablet (40 mg total) by mouth once daily.            STOP taking these medications      acetaminophen 500 MG tablet  Commonly known as: TYLENOL     hydrOXYzine pamoate 25 MG Cap  Commonly known as: VISTARIL     OYSTER SHELL CALCIUM 500 500 mg calcium (1,250 mg) tablet  Generic drug: calcium carbonate     traZODone 50 MG tablet  Commonly known as: DESYREL                Diet: regular     Activity as tolerated    Total time spent discharging patient: 35 minutes    Manuel Hunter MD  Psychiatry

## 2023-08-12 NOTE — PLAN OF CARE
States she is feeling much better and looking forward to going home tomorrow.  Pleasant and smiling.  Quietly reading in room most of evening.  Ate snack.  Voicing needs.  Accepted hs medications.  Stressed compliance with medications upon discharge.

## 2023-08-12 NOTE — NURSING
Rested quietly with closed eyes and even resp for 4.75 hours.  Modified VC and all precautions maintained.  Pathways clear and bed in low position.

## 2023-08-12 NOTE — NURSING
Pt discharge arranged for today.  All belongings accounted for and will be returned upon discharge.  AVS and meds reviewed with pt, understanding verbalized.  Denies any S/I or H/I at this time, mood stable.   coming to  pt.

## 2023-08-13 NOTE — PATIENT INSTRUCTIONS
Counseling and Referral of Other Preventative  (Italic type indicates deductible and co-insurance are waived)    Patient Name: Karin Maguire  Today's Date: 8/12/2023    Health Maintenance       Date Due Completion Date    Hepatitis C Screening Never done ---    LDCT Lung Screen Never done ---    Shingles Vaccine (1 of 2) Never done ---    COVID-19 Vaccine (5 - Moderna series) 07/07/2022 5/12/2022    Mammogram 05/27/2023 5/27/2022    Lipid Panel 07/28/2023 7/28/2022    Influenza Vaccine (1) 09/01/2023 1/11/2023    Override on 3/18/2022: Declined    High Dose Statin 08/08/2024 8/8/2023    DEXA Scan 11/16/2024 11/16/2022    Colorectal Cancer Screening 07/09/2028 7/9/2018    Override on 9/21/2015: Done (unsure of date, does f/u with Dr. Garland as directed.)    TETANUS VACCINE 07/15/2031 7/15/2021        No orders of the defined types were placed in this encounter.    The following information is provided to all patients.  This information is to help you find resources for any of the problems found today that may be affecting your health:                Living healthy guide: www.[facility].[place].gov      Understanding Diabetes: www.diabetes.org      Eating healthy: www.cdc.gov/healthyweight      CDC home safety checklist: www.cdc.gov/steadi/patient.html      Agency on Aging: www.goea.[place].[facility]      Alcoholics anonymous (AA): www.aa.org      Physical Activity: www.senia.nih.gov/jp3gywf      Tobacco use: www.quitwithusla.org

## 2023-08-17 ENCOUNTER — PATIENT OUTREACH (OUTPATIENT)
Dept: ADMINISTRATIVE | Facility: HOSPITAL | Age: 73
End: 2023-08-17
Payer: MEDICARE

## 2023-08-17 NOTE — PROGRESS NOTES
Chart reviewed, immunization record updated.  No new results noted on [facility] or [facility] web site.  Care Everywhere updated.   Patient care coordination note updated.   LOV with PCP 04/13/2023.  Attempted to contact patient to discuss Breast cancer screening, scheduling lab appointment and Rescheduled appointment for [facility] hospital follow up.   Patient previously scheduled for 09/15/2023, appointment rescheduled for 08/29/2023.  No answer from patient, left voicemail for patient to return call to clinic.

## 2023-08-17 NOTE — PROGRESS NOTES
Placed second call to patient for appointment discussion.  Patient states she has received the earlier voicemail message and has marked the appointment date and time down on her calender.   Appointment scheduled with Dr. Estevan Zaman for 08/29/2023 at 8:30am.

## 2023-08-29 ENCOUNTER — OFFICE VISIT (OUTPATIENT)
Dept: PSYCHIATRY | Facility: CLINIC | Age: 73
End: 2023-08-29
Payer: MEDICARE

## 2023-08-29 VITALS
HEIGHT: 66 IN | DIASTOLIC BLOOD PRESSURE: 62 MMHG | RESPIRATION RATE: 17 BRPM | WEIGHT: 160.5 LBS | SYSTOLIC BLOOD PRESSURE: 131 MMHG | BODY MASS INDEX: 25.79 KG/M2 | OXYGEN SATURATION: 98 % | HEART RATE: 74 BPM

## 2023-08-29 DIAGNOSIS — F31.9 BIPOLAR 1 DISORDER: ICD-10-CM

## 2023-08-29 DIAGNOSIS — Z65.8 PSYCHOSOCIAL STRESSORS: ICD-10-CM

## 2023-08-29 DIAGNOSIS — F41.1 GAD (GENERALIZED ANXIETY DISORDER): ICD-10-CM

## 2023-08-29 DIAGNOSIS — F31.9 BIPOLAR DEPRESSION: Primary | ICD-10-CM

## 2023-08-29 DIAGNOSIS — Z86.59 HISTORY OF GAMBLING: ICD-10-CM

## 2023-08-29 PROCEDURE — 1159F MED LIST DOCD IN RCRD: CPT | Mod: CPTII,S$GLB,, | Performed by: STUDENT IN AN ORGANIZED HEALTH CARE EDUCATION/TRAINING PROGRAM

## 2023-08-29 PROCEDURE — 3075F SYST BP GE 130 - 139MM HG: CPT | Mod: CPTII,S$GLB,, | Performed by: STUDENT IN AN ORGANIZED HEALTH CARE EDUCATION/TRAINING PROGRAM

## 2023-08-29 PROCEDURE — 99214 OFFICE O/P EST MOD 30 MIN: CPT | Mod: S$GLB,,, | Performed by: STUDENT IN AN ORGANIZED HEALTH CARE EDUCATION/TRAINING PROGRAM

## 2023-08-29 PROCEDURE — 1111F PR DISCHARGE MEDS RECONCILED W/ CURRENT OUTPATIENT MED LIST: ICD-10-PCS | Mod: CPTII,S$GLB,, | Performed by: STUDENT IN AN ORGANIZED HEALTH CARE EDUCATION/TRAINING PROGRAM

## 2023-08-29 PROCEDURE — 3078F PR MOST RECENT DIASTOLIC BLOOD PRESSURE < 80 MM HG: ICD-10-PCS | Mod: CPTII,S$GLB,, | Performed by: STUDENT IN AN ORGANIZED HEALTH CARE EDUCATION/TRAINING PROGRAM

## 2023-08-29 PROCEDURE — 99214 PR OFFICE/OUTPT VISIT, EST, LEVL IV, 30-39 MIN: ICD-10-PCS | Mod: S$GLB,,, | Performed by: STUDENT IN AN ORGANIZED HEALTH CARE EDUCATION/TRAINING PROGRAM

## 2023-08-29 PROCEDURE — 99999 PR PBB SHADOW E&M-EST. PATIENT-LVL II: ICD-10-PCS | Mod: PBBFAC,,, | Performed by: STUDENT IN AN ORGANIZED HEALTH CARE EDUCATION/TRAINING PROGRAM

## 2023-08-29 PROCEDURE — 3008F BODY MASS INDEX DOCD: CPT | Mod: CPTII,S$GLB,, | Performed by: STUDENT IN AN ORGANIZED HEALTH CARE EDUCATION/TRAINING PROGRAM

## 2023-08-29 PROCEDURE — 90833 PR PSYCHOTHERAPY W/PATIENT W/E&M, 30 MIN (ADD ON): ICD-10-PCS | Mod: S$GLB,,, | Performed by: STUDENT IN AN ORGANIZED HEALTH CARE EDUCATION/TRAINING PROGRAM

## 2023-08-29 PROCEDURE — 1111F DSCHRG MED/CURRENT MED MERGE: CPT | Mod: CPTII,S$GLB,, | Performed by: STUDENT IN AN ORGANIZED HEALTH CARE EDUCATION/TRAINING PROGRAM

## 2023-08-29 PROCEDURE — 3078F DIAST BP <80 MM HG: CPT | Mod: CPTII,S$GLB,, | Performed by: STUDENT IN AN ORGANIZED HEALTH CARE EDUCATION/TRAINING PROGRAM

## 2023-08-29 PROCEDURE — 3075F PR MOST RECENT SYSTOLIC BLOOD PRESS GE 130-139MM HG: ICD-10-PCS | Mod: CPTII,S$GLB,, | Performed by: STUDENT IN AN ORGANIZED HEALTH CARE EDUCATION/TRAINING PROGRAM

## 2023-08-29 PROCEDURE — 99999 PR PBB SHADOW E&M-EST. PATIENT-LVL II: CPT | Mod: PBBFAC,,, | Performed by: STUDENT IN AN ORGANIZED HEALTH CARE EDUCATION/TRAINING PROGRAM

## 2023-08-29 PROCEDURE — 3008F PR BODY MASS INDEX (BMI) DOCUMENTED: ICD-10-PCS | Mod: CPTII,S$GLB,, | Performed by: STUDENT IN AN ORGANIZED HEALTH CARE EDUCATION/TRAINING PROGRAM

## 2023-08-29 PROCEDURE — 1159F PR MEDICATION LIST DOCUMENTED IN MEDICAL RECORD: ICD-10-PCS | Mod: CPTII,S$GLB,, | Performed by: STUDENT IN AN ORGANIZED HEALTH CARE EDUCATION/TRAINING PROGRAM

## 2023-08-29 PROCEDURE — 90833 PSYTX W PT W E/M 30 MIN: CPT | Mod: S$GLB,,, | Performed by: STUDENT IN AN ORGANIZED HEALTH CARE EDUCATION/TRAINING PROGRAM

## 2023-08-29 RX ORDER — QUETIAPINE FUMARATE 100 MG/1
100 TABLET, FILM COATED ORAL NIGHTLY
Qty: 30 TABLET | Refills: 3 | Status: SHIPPED | OUTPATIENT
Start: 2023-08-29 | End: 2023-12-18 | Stop reason: SDUPTHER

## 2023-08-29 RX ORDER — ESCITALOPRAM OXALATE 20 MG/1
20 TABLET ORAL DAILY
Qty: 30 TABLET | Refills: 3 | Status: SHIPPED | OUTPATIENT
Start: 2023-08-29 | End: 2023-12-18 | Stop reason: SDUPTHER

## 2023-08-29 RX ORDER — LAMOTRIGINE 150 MG/1
300 TABLET ORAL NIGHTLY
Qty: 180 TABLET | Refills: 3 | Status: SHIPPED | OUTPATIENT
Start: 2023-08-29 | End: 2024-03-26 | Stop reason: SDUPTHER

## 2023-08-29 NOTE — PROGRESS NOTES
"  08/29/2023  1:21 PM  Karin Maguire  [id number]    Outpatient Psychiatry Follow-Up Visit (MD/NP)    8/29/2023    Clinical Status of Patient:  Outpatient (Ambulatory)    Chief Complaint:  Karin Maguire is a 73 y.o. female who presents today for follow-up of depression and anxiety.  Met with patient.        Interval History and Content of Current Session:  Interim Events/Subjective Report/Content of Current Session:   MDD with mixed features vs. Unspecified bipolar disorder (pt poor historian)  LUANNE  Insomnia  Gambling disorder  Obesity  Psychosocial stressors     Abnormal movements        "I am doing really good, me and my  talked about everything, he admitted he has anger problems, and he is going to go to counseling." She states she is cutting back on gambling, "only lost 100 dollars," and was able to save some money, opened a savings account, went in casino to use the bathroom and did not joyner, got rid of some debit cards as well.    She did not go to [facility] due to not feeling she had time for it, "I can't commit to something like that."    Reports her mood has been "great, the medicine has balanced me out, I can read now, before I couldn't concentrate, I am getting back control of some things," no recent depression    She states LUANNE symptoms are minimal as finances improved from less gambling, "I had gambled the money away," that she needed to pay bills which lead to increased stress and subsequent hospitalization previously.     She is reading, gardening, checking on chickens, coloring, "getting back to my normal life."          Stressors: gambling, family        Psychiatric Review Of Systems - Is patient experiencing or having changes in:  sleep: no  appetite: no  weight: no  energy/anergy: no  Interest/pleasure/anhedonia: no  Anxiety: improving  panic: no  Guilty/hopelessness/worthlessness: no  concentration: no  S.I.B.s/risky behavior: no  Irritability: moderate  Substance abuse: no  Racing " thoughts: no  Impulsive behaviors: no  Paranoia: no  AVH: no  Gambling: yes, see above  Michelle/hypomania: no        Psychotherapy:  Target symptoms: mood disorder  Why chosen therapy is appropriate versus another modality: relevant to diagnosis  Outcome monitoring methods: self-report  Therapeutic intervention type: supportive psychotherapy  Topics discussed/themes: relationships difficulties, building skills sets for symptom management, symptom recognition, financial stressors  The patient's response to the intervention is accepting. The patient's progress toward treatment goals is good.   Duration of intervention: 17 minutes.        Review of Systems   Review of Systems   Constitutional:  Negative for chills and fever.   HENT:  Negative for hearing loss.    Eyes:  Negative for blurred vision and double vision.   Respiratory:  Negative for shortness of breath.    Cardiovascular:  Negative for chest pain.   Gastrointestinal:  Negative for constipation, diarrhea, nausea and vomiting.   Genitourinary:  Negative for dysuria.   Musculoskeletal:  Negative for back pain and joint pain.   Skin:  Negative for rash.   Neurological:  Negative for dizziness and headaches.   Endo/Heme/Allergies:  Negative for environmental allergies.       Past Medical, Family and Social History: The patient's past medical, family and social history have been reviewed and updated as appropriate within the electronic medical record - see encounter notes.    Social History     Socioeconomic History    Marital status:    Tobacco Use    Smoking status: Every Day     Current packs/day: 1.00     Average packs/day: 1 pack/day for 41.4 years (41.4 ttl pk-yrs)     Types: Cigarettes     Start date: 3/30/1982    Smokeless tobacco: Never   Substance and Sexual Activity    Alcohol use: Yes     Comment: Socially-2 or 3 times a year-6 or 7 drinks    Drug use: No    Sexual activity: Yes     Partners: Male     Birth control/protection: Surgical      Comment:      Social Determinants of Health     Financial Resource Strain: Low Risk  (7/7/2023)    Overall Financial Resource Strain (CARDIA)     Difficulty of Paying Living Expenses: Not hard at all   Food Insecurity: No Food Insecurity (7/7/2023)    Hunger Vital Sign     Worried About Running Out of Food in the Last Year: Never true     Ran Out of Food in the Last Year: Never true   Transportation Needs: No Transportation Needs (7/7/2023)    PRAPARE - Transportation     Lack of Transportation (Medical): No     Lack of Transportation (Non-Medical): No   Physical Activity: Inactive (7/7/2023)    Exercise Vital Sign     Days of Exercise per Week: 0 days     Minutes of Exercise per Session: 0 min   Stress: Stress Concern Present (7/7/2023)    [employer] - Occupational Stress Questionnaire     Feeling of Stress : Rather much   Social Connections: Moderately Isolated (7/7/2023)    Social Connection and Isolation Panel [NHANES]     Frequency of Communication with Friends and Family: More than three times a week     Frequency of Social Gatherings with Friends and Family: Once a week     Attends Jainism Services: Never     Active Member of Clubs or Organizations: No     Attends Club or Organization Meetings: Never     Marital Status:    Housing Stability: Low Risk  (7/7/2023)    Housing Stability Vital Sign     Unable to Pay for Housing in the Last Year: No     Number of Places Lived in the Last Year: 1     Unstable Housing in the Last Year: No         Compliance: yes    Side effects: None    Risk Parameters:  Patient reports no suicidal ideation  Patient reports no homicidal ideation  Patient reports no self-injurious behavior  Patient reports no violent behavior        Exam (detailed: at least 9 elements; comprehensive: all 15 elements)     Vitals:    08/29/23 0830   BP: 131/62   Pulse: 74   Resp: 17       Wt Readings from Last 5 Encounters:   08/29/23 72.8 kg (160 lb 8 oz)    08/09/23 70.2 kg (154 lb 12.2 oz)   08/08/23 71.8 kg (158 lb 4.6 oz)   08/08/23 72.2 kg (159 lb 3.2 oz)   07/10/23 71.7 kg (158 lb)         CONSTITUTIONAL  General Appearance: unremarkable, age appropriate    MUSCULOSKELETAL  Muscle Strength and Tone:no tremor, no tic  Abnormal Involuntary Movements: yes  Gait and Station: non-ataxic    PSYCHIATRIC   Level of Consciousness: awake and alert   Orientation: person, place and situation  Grooming: Casually dressed and Well groomed  Psychomotor Behavior: normal, cooperative  Speech: normal tone, normal rate, normal pitch, normal volume  Language: grossly intact  Mood: better  Affect: Consistent with mood  Thought Process: linear, logical  Associations: intact   Thought Content: DENIES suicidal ideation, DENIES homicidal ideation, and no delusion  Perceptions: denies hallucinations  Memory: Able to recall past events, Remote intact and Recent intact  Attention:Attends to interview without distraction  Fund of Knowledge: Aware of current events and Vocabulary appropriate   Estimate if Intelligence:  Average based on work/education history, vocabulary and mental status exam  Insight: has awareness of illness  Judgment: behavior is adequate to circumstances          Assessment and Diagnosis   Status/Progress: Based on the examination today, the patient's problem(s) is/are improved.  New problems have not been presented today.   Co-morbidities are complicating management of the primary condition.          Impresssion/Assessment:  MDD with mixed features vs. Unspecified bipolar disorder (pt poor historian)  LUANNE  Insomnia  Gambling disorder  Obesity  Psychosocial stressors     Abnormal movements    Chronic pain         Plan:       MDD with mixed features vs. Unspecified bipolar disorder (pt poor historian)  - continue lamictal 300 mg PO qd  - continue lexapro 20 mg PO qd  - continue seroquel 100 mg PO qhs   - strongly recommended psychotherapy, provided with resources       "  Suicidal ideations  - continue psychiatric hospitalization  - provide psychotherapeutic interventions and medication management        Insomnia  - reports has not been using CPAP  - pt counseled        Gambling disorder  - pt counseled  - consider meetings/therapy; patient declines        LUANNE  - continue hydroxyzine  mg PO q 6 hours PRN  - lexapro as above  - consider psychotherapy, patient declines        Psychosocial stressors  - pt counseled  - strongly recommended psychotherapy, couples counseling to patient, patient refusing        Obesity  -  tapered off seroquel        Abnormal movements  - frequent non-stereotyped movements of hands and limbs, neck, trunk; patient states she has always been "fidgety" since childhood, reports she has seen neurology about movements before in past, pt continues to refuse referral, pt states "I've been this way all my life"  - declines changing seroquel despite risk of TD/AIM           Chronic pain  - pt counseled        - Instructed patient to keep all scheduled appointments, take medications as prescribed and abstain from substance abuse. Instructed to call 911 or present to ER for emergency including SI or HI.    - Discussed diagnosis, risks and benefits of proposed treatment above vs alternative treatments vs no treatment, and potential side effects of these treatments. The patient expresses understanding of the above and displays the capacity to agree with this treatment given said understanding. Patient also agrees that, currently, the benefits outweigh the risks and would like to pursue treatment at this time.           Estevan Zaman III, MD    Return to Clinic: 2-3 m            "

## 2023-08-31 DIAGNOSIS — K92.2 ACUTE GI BLEEDING: ICD-10-CM

## 2023-08-31 RX ORDER — PANTOPRAZOLE SODIUM 40 MG/1
40 TABLET, DELAYED RELEASE ORAL
Qty: 90 TABLET | Refills: 3 | Status: SHIPPED | OUTPATIENT
Start: 2023-08-31 | End: 2023-12-07 | Stop reason: SDUPTHER

## 2023-09-06 ENCOUNTER — TELEPHONE (OUTPATIENT)
Dept: INTERNAL MEDICINE | Facility: CLINIC | Age: 73
End: 2023-09-06
Payer: MEDICARE

## 2023-09-06 DIAGNOSIS — K92.2 ACUTE GI BLEEDING: ICD-10-CM

## 2023-09-06 DIAGNOSIS — J44.1 COPD EXACERBATION: ICD-10-CM

## 2023-09-06 DIAGNOSIS — J20.9 ACUTE BRONCHITIS, UNSPECIFIED ORGANISM: ICD-10-CM

## 2023-09-06 DIAGNOSIS — F17.200 SMOKER: ICD-10-CM

## 2023-09-06 RX ORDER — PANTOPRAZOLE SODIUM 40 MG/1
40 TABLET, DELAYED RELEASE ORAL DAILY
Qty: 90 TABLET | Refills: 3 | OUTPATIENT
Start: 2023-09-06

## 2023-09-06 RX ORDER — ALBUTEROL SULFATE 90 UG/1
AEROSOL, METERED RESPIRATORY (INHALATION)
Qty: 18 G | Refills: 1 | Status: SHIPPED | OUTPATIENT
Start: 2023-09-06

## 2023-09-06 NOTE — TELEPHONE ENCOUNTER
Please see the attached refill request. Pantoprazole was ordered last week, but it will not allow me to refuse one medication.

## 2023-09-06 NOTE — TELEPHONE ENCOUNTER
----- Message from Candelaria William sent at 2023 12:24 PM CDT -----  Contact: [facility]  Karin Maguire  MRN: [id number]  : 1950  PCP: Lottie Hough  Home Phone      [phone]  Work Phone      Not on file.  Mobile          [phone]      MESSAGE:     [facility] pharmacy needs clarification on directions for albuterol (PROVENTIL/VENTOLIN HFA) 90 mcg/actuation inhaler.      Please advise  [phone]

## 2023-09-12 ENCOUNTER — TELEPHONE (OUTPATIENT)
Dept: INTERNAL MEDICINE | Facility: CLINIC | Age: 73
End: 2023-09-12
Payer: MEDICARE

## 2023-09-12 NOTE — TELEPHONE ENCOUNTER
----- Message from Candelaria William sent at 2023  3:05 PM CDT -----  Contact: pt  Karin Maguire  MRN: [id number]  : 1950  PCP: Lottie Hough  Home Phone      [phone]  Work Phone      Not on file.  Mobile          [phone]      MESSAGE:     Pt needs a refill of pantoprazole (PROTONIX) 40 MG tablet sent to [facility].      Karin   [phone]

## 2023-09-12 NOTE — TELEPHONE ENCOUNTER
Pt called back and stated that she does not need this refilled. That she has refills at [facility].

## 2023-10-05 ENCOUNTER — HOSPITAL ENCOUNTER (OUTPATIENT)
Dept: RADIOLOGY | Facility: HOSPITAL | Age: 73
Discharge: HOME | End: 2023-10-05
Attending: PODIATRIST
Payer: MEDICARE

## 2023-10-05 ENCOUNTER — OFFICE VISIT (OUTPATIENT)
Dept: PODIATRY | Facility: CLINIC | Age: 73
End: 2023-10-05
Payer: MEDICARE

## 2023-10-05 VITALS
HEIGHT: 66 IN | SYSTOLIC BLOOD PRESSURE: 136 MMHG | BODY MASS INDEX: 25.71 KG/M2 | HEART RATE: 65 BPM | DIASTOLIC BLOOD PRESSURE: 83 MMHG | WEIGHT: 160 LBS

## 2023-10-05 DIAGNOSIS — M96.89 NONUNION OF BONE AFTER OSTEOTOMY: Primary | ICD-10-CM

## 2023-10-05 DIAGNOSIS — M96.89 DELAYED UNION AFTER OSTEOTOMY: ICD-10-CM

## 2023-10-05 DIAGNOSIS — Z98.890 POSTOPERATIVE STATE: ICD-10-CM

## 2023-10-05 PROCEDURE — 99213 PR OFFICE/OUTPT VISIT, EST, LEVL III, 20-29 MIN: ICD-10-PCS | Mod: S$GLB,,, | Performed by: PODIATRIST

## 2023-10-05 PROCEDURE — 3075F PR MOST RECENT SYSTOLIC BLOOD PRESS GE 130-139MM HG: ICD-10-PCS | Mod: CPTII,S$GLB,, | Performed by: PODIATRIST

## 2023-10-05 PROCEDURE — 1159F MED LIST DOCD IN RCRD: CPT | Mod: CPTII,S$GLB,, | Performed by: PODIATRIST

## 2023-10-05 PROCEDURE — 3075F SYST BP GE 130 - 139MM HG: CPT | Mod: CPTII,S$GLB,, | Performed by: PODIATRIST

## 2023-10-05 PROCEDURE — 3008F BODY MASS INDEX DOCD: CPT | Mod: CPTII,S$GLB,, | Performed by: PODIATRIST

## 2023-10-05 PROCEDURE — 3079F DIAST BP 80-89 MM HG: CPT | Mod: CPTII,S$GLB,, | Performed by: PODIATRIST

## 2023-10-05 PROCEDURE — 1126F AMNT PAIN NOTED NONE PRSNT: CPT | Mod: CPTII,S$GLB,, | Performed by: PODIATRIST

## 2023-10-05 PROCEDURE — 3288F PR FALLS RISK ASSESSMENT DOCUMENTED: ICD-10-PCS | Mod: CPTII,S$GLB,, | Performed by: PODIATRIST

## 2023-10-05 PROCEDURE — 3288F FALL RISK ASSESSMENT DOCD: CPT | Mod: CPTII,S$GLB,, | Performed by: PODIATRIST

## 2023-10-05 PROCEDURE — 3008F PR BODY MASS INDEX (BMI) DOCUMENTED: ICD-10-PCS | Mod: CPTII,S$GLB,, | Performed by: PODIATRIST

## 2023-10-05 PROCEDURE — 1101F PT FALLS ASSESS-DOCD LE1/YR: CPT | Mod: CPTII,S$GLB,, | Performed by: PODIATRIST

## 2023-10-05 PROCEDURE — 1160F RVW MEDS BY RX/DR IN RCRD: CPT | Mod: CPTII,S$GLB,, | Performed by: PODIATRIST

## 2023-10-05 PROCEDURE — 1101F PR PT FALLS ASSESS DOC 0-1 FALLS W/OUT INJ PAST YR: ICD-10-PCS | Mod: CPTII,S$GLB,, | Performed by: PODIATRIST

## 2023-10-05 PROCEDURE — 99999 PR PBB SHADOW E&M-EST. PATIENT-LVL III: ICD-10-PCS | Mod: PBBFAC,,, | Performed by: PODIATRIST

## 2023-10-05 PROCEDURE — 1159F PR MEDICATION LIST DOCUMENTED IN MEDICAL RECORD: ICD-10-PCS | Mod: CPTII,S$GLB,, | Performed by: PODIATRIST

## 2023-10-05 PROCEDURE — 99999 PR PBB SHADOW E&M-EST. PATIENT-LVL III: CPT | Mod: PBBFAC,,, | Performed by: PODIATRIST

## 2023-10-05 PROCEDURE — 3079F PR MOST RECENT DIASTOLIC BLOOD PRESSURE 80-89 MM HG: ICD-10-PCS | Mod: CPTII,S$GLB,, | Performed by: PODIATRIST

## 2023-10-05 PROCEDURE — 99213 OFFICE O/P EST LOW 20 MIN: CPT | Mod: S$GLB,,, | Performed by: PODIATRIST

## 2023-10-05 PROCEDURE — 73630 XR FOOT COMPLETE 3 VIEW RIGHT: ICD-10-PCS | Mod: 26,RT,, | Performed by: RADIOLOGY

## 2023-10-05 PROCEDURE — 1126F PR PAIN SEVERITY QUANTIFIED, NO PAIN PRESENT: ICD-10-PCS | Mod: CPTII,S$GLB,, | Performed by: PODIATRIST

## 2023-10-05 PROCEDURE — 73630 X-RAY EXAM OF FOOT: CPT | Mod: TC,PN,RT

## 2023-10-05 PROCEDURE — 1160F PR REVIEW ALL MEDS BY PRESCRIBER/CLIN PHARMACIST DOCUMENTED: ICD-10-PCS | Mod: CPTII,S$GLB,, | Performed by: PODIATRIST

## 2023-10-05 PROCEDURE — 73630 X-RAY EXAM OF FOOT: CPT | Mod: 26,RT,, | Performed by: RADIOLOGY

## 2023-10-05 NOTE — PROGRESS NOTES
Subjective:      Patient ID: Karin Maguire is a 73 y.o. female.    Chief Complaint: Follow-up    Referral from  for evaluation of chronic right foot pain secondary to osteoarthritis.  She also reports stubbing her right 4th toe 5 weeks ago with persistent pain and swelling to the area.  She is unable to take NSAID therapy secondary to previous GI bleed.  She is been taking Tylenol which helps somewhat.  States that she gets moderate pain to the foot with extended periods of standing and walking at the mall which forces her to sit down.  Ambulating with slip-on tennis shoes.    03/10/2023:  Relates right forefoot pain has resolved and she sought wearing the boot last week.  She previously was diagnosed with a right 4th toe proximal phalanx nondisplaced fracture.  Relates now the pain is mostly in the midfoot with some mild localized swelling.  States the pain has been present for years however the swelling started before the pain.    05/12/2023:  Post 2nd metatarsal cuneiform joint arthrodesis with external neurolysis of the deep peroneal nerve and extensor hallucis brevis tenotomy right foot on 05/03/2023.  Mild intermittent pain reported to the right foot.  She has remain nonweightbearing.  Dressing has remained intact.  Denies any slips, trips or falls.    05/25/2023:  3 weeks postop.  Relates no significant pain to the right foot.  She has remain nonweightbearing as advised to the right foot.    06/23/2023: Approximately 7 weeks postop.  Reports no pain.  She is been applying weight closer towards the right heel and ambulating with a Cam boot.  She is been performing active range motion exercises and light resistance while at home and states the discomfort and stiffness to the right foot and ankle has resolved.  No longer has any pain.    07/10/2023:  Greater than 2 months postop.  She presents today out of concern that she dropped some quick wear on her right foot.  She is not experiencing any  "pain however is worried because she potentially injured the foot.  Ambulating with tennis shoes.    08/04/2023:  3 months postop.  She reports mild discomfort to the right foot but points to the medial right hindfoot overlying the navicular tuberosity.  States that she has pain described as burning when she standing and walking on the foot and point tenderness to the area.  She has minimal discomfort to the right midfoot.  She is ambulating with tennis shoes.    10/05/2023:  Approximately 6 months postop.  Reports no significant discomfort to the surgical site however she gets intermittent mild pain along the lateral border of the right foot when she standing and walking for extended periods of time.  No new concerns.    Vitals:    10/05/23 0835   BP: 136/83   Pulse: 65   Weight: 72.6 kg (160 lb)   Height: 5' 6" (1.676 m)   PainSc: 0-No pain      Past Medical History:   Diagnosis Date    Anxiety     Arthritis     Bipolar 1 disorder     Bursitis of right hip     GI bleed due to NSAIDs     Multinodular goiter 11/15/2021    Sacroiliitis     right side    Sleep apnea        Past Surgical History:   Procedure Laterality Date    ANKLE FRACTURE SURGERY Left 2001    BLADDER SUSPENSION      CARPAL TUNNEL RELEASE Bilateral     CHOLECYSTECTOMY      ESOPHAGOGASTRODUODENOSCOPY N/A 7/29/2021    Procedure: EGD (ESOPHAGOGASTRODUODENOSCOPY);  Surgeon: Susan Mcclain MD;  Location: [facility];  Service: Endoscopy;  Laterality: N/A;    EYE SURGERY      FOOT ARTHRODESIS Right 5/3/2023    Procedure: FUSION, FOOT;  Surgeon: Lauri Alejandra DPM;  Location: [facility];  Service: Podiatry;  Laterality: Right;  mini c-arm, Arthrex dowel bone graft harvester, locking plate and screws festus notified cc    HYSTERECTOMY  1986    vaginal prolapse    INJECTION OF ANESTHETIC AGENT AROUND MEDIAL BRANCH NERVES INNERVATING CERVICAL FACET JOINT Bilateral 5/27/2022    Procedure: CERVICAL MEDIAL BRANCH NERVE BLOCK (C3-4,C4-5);  Surgeon: Mamie Roman, " MD;  Location: [facility];  Service: Pain Management;  Laterality: Bilateral;    INJECTION OF ANESTHETIC AGENT AROUND MEDIAL BRANCH NERVES INNERVATING LUMBAR FACET JOINT Right 2/5/2021    Procedure: LUMBAR FACET JOINT BLOCK (L3-4,L4-5,L5-S1);  Surgeon: Mamie Roman MD;  Location: [facility];  Service: Pain Management;  Laterality: Right;    INJECTION OF ANESTHETIC AGENT AROUND MEDIAL BRANCH NERVES INNERVATING LUMBAR FACET JOINT Right 4/30/2021    Procedure: LUMBAR FACET JOINT BLOCK (L3-4,L4-5,L5-S1);  Surgeon: Mamie Roman MD;  Location: [facility];  Service: Pain Management;  Laterality: Right;    INJECTION OF ANESTHETIC AGENT INTO SACROILIAC JOINT Right 12/4/2020    Procedure: SACROILIAC JOINT INJECTION;  Surgeon: Mamie Roman MD;  Location: [facility];  Service: Pain Management;  Laterality: Right;    INJECTION OF JOINT Right 12/4/2020    Procedure: GREATER TROCHANTERIC BURSA INJECTION;  Surgeon: Mamie Roman MD;  Location: [facility];  Service: Pain Management;  Laterality: Right;    NASAL SEPTUM SURGERY      RECTAL PROLAPSE REPAIR      TONSILLECTOMY         Family History   Problem Relation Age of Onset    No Known Problems Maternal Aunt     Colon cancer Maternal Uncle     Colon cancer Maternal Uncle     Colon cancer Maternal Uncle     No Known Problems Paternal Aunt     Esophageal cancer Paternal Uncle     Heart attack Maternal Grandmother     Leukemia Maternal Grandfather     No Known Problems Paternal Grandmother     Stroke Paternal Grandfather        Social History     Socioeconomic History    Marital status:    Tobacco Use    Smoking status: Every Day     Current packs/day: 1.00     Average packs/day: 1 pack/day for 41.5 years (41.5 ttl pk-yrs)     Types: Cigarettes     Start date: 3/30/1982    Smokeless tobacco: Never   Substance and Sexual Activity    Alcohol use: Yes     Comment: Socially-2 or 3 times a year-6 or 7 drinks    Drug use: No    Sexual activity: Yes     Partners: Male     Birth  control/protection: Surgical     Comment:      Social Determinants of Health     Financial Resource Strain: Low Risk  (7/7/2023)    Overall Financial Resource Strain (CARDIA)     Difficulty of Paying Living Expenses: Not hard at all   Food Insecurity: No Food Insecurity (7/7/2023)    Hunger Vital Sign     Worried About Running Out of Food in the Last Year: Never true     Ran Out of Food in the Last Year: Never true   Transportation Needs: No Transportation Needs (7/7/2023)    PRAPARE - Transportation     Lack of Transportation (Medical): No     Lack of Transportation (Non-Medical): No   Physical Activity: Inactive (7/7/2023)    Exercise Vital Sign     Days of Exercise per Week: 0 days     Minutes of Exercise per Session: 0 min   Stress: Stress Concern Present (7/7/2023)    [employer] - Occupational Stress Questionnaire     Feeling of Stress : Rather much   Social Connections: Moderately Isolated (7/7/2023)    Social Connection and Isolation Panel [NHANES]     Frequency of Communication with Friends and Family: More than three times a week     Frequency of Social Gatherings with Friends and Family: Once a week     Attends Adventist Services: Never     Active Member of Clubs or Organizations: No     Attends Club or Organization Meetings: Never     Marital Status:    Housing Stability: Low Risk  (7/7/2023)    Housing Stability Vital Sign     Unable to Pay for Housing in the Last Year: No     Number of Places Lived in the Last Year: 1     Unstable Housing in the Last Year: No       Current Outpatient Medications   Medication Sig Dispense Refill    albuterol (PROVENTIL/VENTOLIN HFA) 90 mcg/actuation inhaler inhale 2 puffs into the lungs every 6 hours as needed for wheezing. 18 g 1    aspirin 81 MG Chew Take 1 tablet (81 mg total) by mouth once daily. 30 tablet 5    EScitalopram oxalate (LEXAPRO) 20 MG tablet Take 1 tablet (20 mg total) by mouth once daily. 30 tablet 3     lamoTRIgine (LAMICTAL) 150 MG Tab Take 2 tablets (300 mg total) by mouth every evening. 180 tablet 3    pantoprazole (PROTONIX) 40 MG tablet TAKE 1 TABLET BY MOUTH EVERY DAY 90 tablet 3    QUEtiapine (SEROQUEL) 100 MG Tab Take 1 tablet (100 mg total) by mouth every evening. 30 tablet 3    rosuvastatin (CRESTOR) 10 MG tablet Take 1 tablet (10 mg total) by mouth once daily. 90 tablet 3     No current facility-administered medications for this visit.       Review of patient's allergies indicates:   Allergen Reactions    Nsaids (non-steroidal anti-inflammatory drug) Other (See Comments)     Gastrointestinal bleeding requiring blood transfusion    Demerol [meperidine] Rash         Review of Systems   Constitutional: Negative for chills and fever.   HENT:  Negative for congestion and hearing loss.    Cardiovascular:  Negative for chest pain and claudication.   Respiratory:  Negative for cough and shortness of breath.    Skin:  Negative for color change and itching.   Musculoskeletal:  Positive for arthritis.   Gastrointestinal:  Negative for nausea and vomiting.   Neurological:  Positive for numbness. Negative for paresthesias.   Psychiatric/Behavioral:  Negative for altered mental status.            Objective:      Physical Exam  Constitutional:       General: She is not in acute distress.     Appearance: Normal appearance. She is not ill-appearing.   Cardiovascular:      Pulses:           Dorsalis pedis pulses are 2+ on the right side and 2+ on the left side.        Posterior tibial pulses are 2+ on the right side and 2+ on the left side.      Comments: Mild nonpitting edema localized to the right foot.  Skin temp is warm to foot bilateral.  No rubor on dependency bilateral foot.  No hair growth follow extremity.  Musculoskeletal:      Comments: Semi-rigid cavus foot structure bilateral.  Slight localized pain on palpation dorsal right midfoot overlying the previous surgical scar and no pain with midtarsal joint range  motion right foot.  No significant edema to the right midfoot.      Mild pain on palpation distal intermetatarsal spaces 2, 3 right foot.    No pain on palpation overlying the lateral border of the right foot.    Rectus appearing foot type bilateral.   Skin:     General: Skin is warm.      Capillary Refill: Capillary refill takes less than 2 seconds.      Findings: No ecchymosis or erythema.      Nails: There is no clubbing.      Comments: Normal appearing scar dorsal right midfoot.   Neurological:      Mental Status: She is alert and oriented to person, place, and time.      Sensory: Sensation is intact.      Motor: Motor function is intact.               Assessment:       Encounter Diagnosis   Name Primary?    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    Reviewed right foot x-ray noting maintained alignment of the 2nd metatarsocuneiform joint arthrodesis site with intact fixation no signs of hardware failure.  There is gapping less than 1 cm at the arthrodesis site along the medial half of the fusion site and significant signs of healing since the last x-ray was completed.  The distal aspect of the 2nd metatarsal appears to have deviated lateral with slight overlap on the AP projection between the 2nd 3rd met head.  The navicular tuberosity appears to have partial erosion to it consistent with an arthropathy.    We will attempt to obtain bone stimulator for the patient to assist with bone growth formation at the pseudoarthrosis 2nd metatarsocuneiform joint arthrodesis site.    Continue activity restrictions and modifications.      RTC within 3-6 months or p.r.n. as discussed    A portion of this note was generated by  voice recognition software and may contain spelling and grammar errors.      .

## 2023-10-09 ENCOUNTER — TELEPHONE (OUTPATIENT)
Dept: PODIATRY | Facility: CLINIC | Age: 73
End: 2023-10-09
Payer: MEDICARE

## 2023-10-10 NOTE — TELEPHONE ENCOUNTER
Supporting documentation for bone stimulator for home use sent to Silviano Villagran with [facility] [employer] as directed per Dr Alejandra

## 2023-10-18 ENCOUNTER — OFFICE VISIT (OUTPATIENT)
Dept: INTERNAL MEDICINE | Facility: CLINIC | Age: 73
End: 2023-10-18
Payer: MEDICARE

## 2023-10-18 ENCOUNTER — PATIENT OUTREACH (OUTPATIENT)
Dept: ADMINISTRATIVE | Facility: HOSPITAL | Age: 73
End: 2023-10-18
Payer: MEDICARE

## 2023-10-18 VITALS
WEIGHT: 159.81 LBS | DIASTOLIC BLOOD PRESSURE: 68 MMHG | SYSTOLIC BLOOD PRESSURE: 122 MMHG | BODY MASS INDEX: 25.68 KG/M2 | HEART RATE: 70 BPM | OXYGEN SATURATION: 98 % | RESPIRATION RATE: 20 BRPM | HEIGHT: 66 IN

## 2023-10-18 DIAGNOSIS — F31.9 BIPOLAR 1 DISORDER: ICD-10-CM

## 2023-10-18 DIAGNOSIS — Z12.31 ENCOUNTER FOR SCREENING MAMMOGRAM FOR BREAST CANCER: Primary | ICD-10-CM

## 2023-10-18 DIAGNOSIS — R63.4 ABNORMAL WEIGHT LOSS: ICD-10-CM

## 2023-10-18 DIAGNOSIS — Z12.11 SCREEN FOR COLON CANCER: ICD-10-CM

## 2023-10-18 DIAGNOSIS — E78.5 HYPERLIPIDEMIA, UNSPECIFIED HYPERLIPIDEMIA TYPE: ICD-10-CM

## 2023-10-18 PROCEDURE — 99214 PR OFFICE/OUTPT VISIT, EST, LEVL IV, 30-39 MIN: ICD-10-PCS | Mod: S$GLB,,, | Performed by: NURSE PRACTITIONER

## 2023-10-18 PROCEDURE — 3078F PR MOST RECENT DIASTOLIC BLOOD PRESSURE < 80 MM HG: ICD-10-PCS | Mod: CPTII,S$GLB,, | Performed by: NURSE PRACTITIONER

## 2023-10-18 PROCEDURE — 1101F PT FALLS ASSESS-DOCD LE1/YR: CPT | Mod: CPTII,S$GLB,, | Performed by: NURSE PRACTITIONER

## 2023-10-18 PROCEDURE — 1159F PR MEDICATION LIST DOCUMENTED IN MEDICAL RECORD: ICD-10-PCS | Mod: CPTII,S$GLB,, | Performed by: NURSE PRACTITIONER

## 2023-10-18 PROCEDURE — 3288F FALL RISK ASSESSMENT DOCD: CPT | Mod: CPTII,S$GLB,, | Performed by: NURSE PRACTITIONER

## 2023-10-18 PROCEDURE — 1101F PR PT FALLS ASSESS DOC 0-1 FALLS W/OUT INJ PAST YR: ICD-10-PCS | Mod: CPTII,S$GLB,, | Performed by: NURSE PRACTITIONER

## 2023-10-18 PROCEDURE — 99999 PR PBB SHADOW E&M-EST. PATIENT-LVL III: ICD-10-PCS | Mod: PBBFAC,,, | Performed by: NURSE PRACTITIONER

## 2023-10-18 PROCEDURE — 3078F DIAST BP <80 MM HG: CPT | Mod: CPTII,S$GLB,, | Performed by: NURSE PRACTITIONER

## 2023-10-18 PROCEDURE — 3288F PR FALLS RISK ASSESSMENT DOCUMENTED: ICD-10-PCS | Mod: CPTII,S$GLB,, | Performed by: NURSE PRACTITIONER

## 2023-10-18 PROCEDURE — 3008F PR BODY MASS INDEX (BMI) DOCUMENTED: ICD-10-PCS | Mod: CPTII,S$GLB,, | Performed by: NURSE PRACTITIONER

## 2023-10-18 PROCEDURE — 99999 PR PBB SHADOW E&M-EST. PATIENT-LVL III: CPT | Mod: PBBFAC,,, | Performed by: NURSE PRACTITIONER

## 2023-10-18 PROCEDURE — 3074F SYST BP LT 130 MM HG: CPT | Mod: CPTII,S$GLB,, | Performed by: NURSE PRACTITIONER

## 2023-10-18 PROCEDURE — 1159F MED LIST DOCD IN RCRD: CPT | Mod: CPTII,S$GLB,, | Performed by: NURSE PRACTITIONER

## 2023-10-18 PROCEDURE — 1126F PR PAIN SEVERITY QUANTIFIED, NO PAIN PRESENT: ICD-10-PCS | Mod: CPTII,S$GLB,, | Performed by: NURSE PRACTITIONER

## 2023-10-18 PROCEDURE — 99214 OFFICE O/P EST MOD 30 MIN: CPT | Mod: S$GLB,,, | Performed by: NURSE PRACTITIONER

## 2023-10-18 PROCEDURE — 3074F PR MOST RECENT SYSTOLIC BLOOD PRESSURE < 130 MM HG: ICD-10-PCS | Mod: CPTII,S$GLB,, | Performed by: NURSE PRACTITIONER

## 2023-10-18 PROCEDURE — 3008F BODY MASS INDEX DOCD: CPT | Mod: CPTII,S$GLB,, | Performed by: NURSE PRACTITIONER

## 2023-10-18 PROCEDURE — 1126F AMNT PAIN NOTED NONE PRSNT: CPT | Mod: CPTII,S$GLB,, | Performed by: NURSE PRACTITIONER

## 2023-10-18 NOTE — LETTER
AUTHORIZATION FOR RELEASE OF   CONFIDENTIAL INFORMATION    Dear Donte Chavira,    We are seeing Karin Maguire, date of birth 1950, in the clinic at [facility]. Lottie Hough NP is the patient's PCP. Karin Maguire has an outstanding lab/procedure at the time we reviewed her chart. In order to help keep her health information updated, she has authorized us to request the following medical record(s):                                             ( X )  COLONOSCOPY             Please fax records to [facility], Daigle, Linsey L., NP, [phone].    If you have any questions, please contact...  Lupe Maurer LPN  Clinical Care Coordinator  [facility]  Phone: [phone]  Fax: [phone]           Patient Name: Karin Maguire  : 1950  Patient Phone #: [phone]

## 2023-10-18 NOTE — PROGRESS NOTES
Chart reviewed, immunization record updated.  No new results noted on [facility] or [facility] web site.  Care Everywhere updated.   Patient care coordination note updated.   Upcoming PCP visit updated.  Next PCP visit 04/17/2024.  LOV with PCP 10/18/2023.  Efax sent to [facility] for last Colonoscopy completed.

## 2023-10-18 NOTE — PROGRESS NOTES
Subjective:       Patient ID: Karin Maguire is a 73 y.o. female.    Chief Complaint: Follow-up    HPI: Pt presents to clinic today known to me with c.o needing routine f./u. She is seeing Justyn for her right foot- he did a clean out and put a plate. She report that it is not taking like he would like it to, ordered a bone stimulator but waiting on insurance to approve. Also still seeing Emmettn for bipolar after 3 day psych admission due to depression.   - continue lamictal 300 mg PO qd  - continue lexapro 20 mg PO qd  - continue seroquel 100 mg PO qhs   Has vistaril but has not needed so has not bene using   She is not doing therapy     She has labs for pre admission 8/2023   170 last visit and she is now 159# 11# due to not being able to walk to pantty for snacks!!   Review of Systems   Constitutional:  Negative for chills, fatigue, fever and unexpected weight change.   HENT:  Negative for congestion, ear pain, sore throat and trouble swallowing.    Eyes:  Negative for pain and visual disturbance.   Respiratory:  Negative for cough, chest tightness and shortness of breath.    Cardiovascular:  Negative for chest pain, palpitations and leg swelling.   Gastrointestinal:  Negative for abdominal distention, abdominal pain, constipation, diarrhea and vomiting.   Genitourinary:  Negative for difficulty urinating, dysuria, flank pain, frequency and hematuria.   Musculoskeletal:  Negative for back pain, gait problem, joint swelling, neck pain and neck stiffness.   Skin:  Negative for rash and wound.   Neurological:  Negative for dizziness, seizures, speech difficulty, light-headedness and headaches.       Objective:      Physical Exam  Vitals and nursing note reviewed.   Constitutional:       Appearance: She is well-developed.   HENT:      Head: Normocephalic and atraumatic.      Right Ear: External ear normal.      Left Ear: External ear normal.      Nose: Nose normal.   Eyes:      Conjunctiva/sclera: Conjunctivae  "normal.      Pupils: Pupils are equal, round, and reactive to light.   Cardiovascular:      Rate and Rhythm: Normal rate and regular rhythm.      Heart sounds: Normal heart sounds.   Pulmonary:      Effort: Pulmonary effort is normal.      Breath sounds: Normal breath sounds.   Abdominal:      General: Bowel sounds are normal.      Palpations: Abdomen is soft.   Musculoskeletal:         General: Normal range of motion.      Cervical back: Normal range of motion and neck supple.   Skin:     General: Skin is warm and dry.   Neurological:      Mental Status: She is alert and oriented to person, place, and time.   Psychiatric:         Mood and Affect: Mood normal.         Behavior: Behavior normal.         Thought Content: Thought content normal.         Judgment: Judgment normal.      Comments: Feels great- more motivated now than she has "ever" been          Assessment:       1. Encounter for screening mammogram for breast cancer    2. Screen for colon cancer    3. Abnormal weight loss    4. Hyperlipidemia, unspecified hyperlipidemia type    5. Bipolar 1 disorder        Plan:     Problem List Items Addressed This Visit       Bipolar 1 disorder    Relevant Orders    LAMOTRIGINE LEVEL    Hyperlipidemia    Relevant Orders    Lipid Panel    Comprehensive Metabolic Panel     Other Visit Diagnoses       Encounter for screening mammogram for breast cancer    -  Primary    Relevant Orders    Mammo Digital Screening Bilat w/ Volodmyyr    Screen for colon cancer        Relevant Orders    Case Request Endoscopy: COLONOSCOPY (Completed)    Abnormal weight loss        Relevant Orders    Case Request Endoscopy: COLONOSCOPY (Completed)            Multinodular goiter. Last u.s 5/2022- had biopsy due for repeat   Schedule with Dr Ty for f/u     "

## 2023-10-19 NOTE — PROGRESS NOTES
Received external Colonoscopy collected/ completed on 07/09/2018, updated to .   Patient scheduled with Dr. Alberto Albright in 12/2023 for repeat Colonoscopy.

## 2023-10-30 ENCOUNTER — TELEPHONE (OUTPATIENT)
Dept: PODIATRY | Facility: CLINIC | Age: 73
End: 2023-10-30
Payer: MEDICARE

## 2023-10-30 NOTE — TELEPHONE ENCOUNTER
Spoke with Ms Maguire to assist her with making an appt with Dr Alejandra. Accepted appt date and time of 11/6/23 at 4:15 pm. No other needs voiced at this time. Encouraged call back if further assistance is needed.

## 2023-11-06 ENCOUNTER — OFFICE VISIT (OUTPATIENT)
Dept: PODIATRY | Facility: CLINIC | Age: 73
End: 2023-11-06
Payer: MEDICARE

## 2023-11-06 ENCOUNTER — HOSPITAL ENCOUNTER (OUTPATIENT)
Dept: RADIOLOGY | Facility: HOSPITAL | Age: 73
Discharge: HOME | End: 2023-11-06
Attending: PODIATRIST
Payer: MEDICARE

## 2023-11-06 VITALS
BODY MASS INDEX: 25.55 KG/M2 | HEIGHT: 66 IN | HEART RATE: 69 BPM | WEIGHT: 159 LBS | DIASTOLIC BLOOD PRESSURE: 80 MMHG | SYSTOLIC BLOOD PRESSURE: 134 MMHG

## 2023-11-06 DIAGNOSIS — M79.671 RIGHT FOOT PAIN: ICD-10-CM

## 2023-11-06 DIAGNOSIS — M79.2 NEUROPATHIC PAIN OF RIGHT FOOT: ICD-10-CM

## 2023-11-06 DIAGNOSIS — G57.91 ENTRAPMENT NEUROPATHY OF PERIPHERAL NERVE OF RIGHT LOWER EXTREMITY: ICD-10-CM

## 2023-11-06 DIAGNOSIS — M79.671 RIGHT FOOT PAIN: Primary | ICD-10-CM

## 2023-11-06 PROCEDURE — 1159F MED LIST DOCD IN RCRD: CPT | Mod: CPTII,S$GLB,, | Performed by: PODIATRIST

## 2023-11-06 PROCEDURE — 73630 X-RAY EXAM OF FOOT: CPT | Mod: 26,RT,, | Performed by: INTERNAL MEDICINE

## 2023-11-06 PROCEDURE — 73630 X-RAY EXAM OF FOOT: CPT | Mod: TC,PN,RT

## 2023-11-06 PROCEDURE — 3008F BODY MASS INDEX DOCD: CPT | Mod: CPTII,S$GLB,, | Performed by: PODIATRIST

## 2023-11-06 PROCEDURE — 3288F FALL RISK ASSESSMENT DOCD: CPT | Mod: CPTII,S$GLB,, | Performed by: PODIATRIST

## 2023-11-06 PROCEDURE — 3288F PR FALLS RISK ASSESSMENT DOCUMENTED: ICD-10-PCS | Mod: CPTII,S$GLB,, | Performed by: PODIATRIST

## 2023-11-06 PROCEDURE — 1125F PR PAIN SEVERITY QUANTIFIED, PAIN PRESENT: ICD-10-PCS | Mod: CPTII,S$GLB,, | Performed by: PODIATRIST

## 2023-11-06 PROCEDURE — 1101F PT FALLS ASSESS-DOCD LE1/YR: CPT | Mod: CPTII,S$GLB,, | Performed by: PODIATRIST

## 2023-11-06 PROCEDURE — 3008F PR BODY MASS INDEX (BMI) DOCUMENTED: ICD-10-PCS | Mod: CPTII,S$GLB,, | Performed by: PODIATRIST

## 2023-11-06 PROCEDURE — 3079F PR MOST RECENT DIASTOLIC BLOOD PRESSURE 80-89 MM HG: ICD-10-PCS | Mod: CPTII,S$GLB,, | Performed by: PODIATRIST

## 2023-11-06 PROCEDURE — 1160F RVW MEDS BY RX/DR IN RCRD: CPT | Mod: CPTII,S$GLB,, | Performed by: PODIATRIST

## 2023-11-06 PROCEDURE — 99999 PR PBB SHADOW E&M-EST. PATIENT-LVL IV: ICD-10-PCS | Mod: PBBFAC,,, | Performed by: PODIATRIST

## 2023-11-06 PROCEDURE — 99214 PR OFFICE/OUTPT VISIT, EST, LEVL IV, 30-39 MIN: ICD-10-PCS | Mod: S$GLB,,, | Performed by: PODIATRIST

## 2023-11-06 PROCEDURE — 99214 OFFICE O/P EST MOD 30 MIN: CPT | Mod: S$GLB,,, | Performed by: PODIATRIST

## 2023-11-06 PROCEDURE — 1125F AMNT PAIN NOTED PAIN PRSNT: CPT | Mod: CPTII,S$GLB,, | Performed by: PODIATRIST

## 2023-11-06 PROCEDURE — 1101F PR PT FALLS ASSESS DOC 0-1 FALLS W/OUT INJ PAST YR: ICD-10-PCS | Mod: CPTII,S$GLB,, | Performed by: PODIATRIST

## 2023-11-06 PROCEDURE — 1160F PR REVIEW ALL MEDS BY PRESCRIBER/CLIN PHARMACIST DOCUMENTED: ICD-10-PCS | Mod: CPTII,S$GLB,, | Performed by: PODIATRIST

## 2023-11-06 PROCEDURE — 1159F PR MEDICATION LIST DOCUMENTED IN MEDICAL RECORD: ICD-10-PCS | Mod: CPTII,S$GLB,, | Performed by: PODIATRIST

## 2023-11-06 PROCEDURE — 73630 XR FOOT COMPLETE 3 VIEW RIGHT: ICD-10-PCS | Mod: 26,RT,, | Performed by: INTERNAL MEDICINE

## 2023-11-06 PROCEDURE — 99999 PR PBB SHADOW E&M-EST. PATIENT-LVL IV: CPT | Mod: PBBFAC,,, | Performed by: PODIATRIST

## 2023-11-06 PROCEDURE — 3079F DIAST BP 80-89 MM HG: CPT | Mod: CPTII,S$GLB,, | Performed by: PODIATRIST

## 2023-11-06 PROCEDURE — 3075F SYST BP GE 130 - 139MM HG: CPT | Mod: CPTII,S$GLB,, | Performed by: PODIATRIST

## 2023-11-06 PROCEDURE — 3075F PR MOST RECENT SYSTOLIC BLOOD PRESS GE 130-139MM HG: ICD-10-PCS | Mod: CPTII,S$GLB,, | Performed by: PODIATRIST

## 2023-11-06 NOTE — PROGRESS NOTES
Subjective:      Patient ID: Karin Maguire is a 73 y.o. female.    Chief Complaint: Foot Pain (Right foot pain with PMhx of surgery 5/3/23)    Referral from  for evaluation of chronic right foot pain secondary to osteoarthritis.  She also reports stubbing her right 4th toe 5 weeks ago with persistent pain and swelling to the area.  She is unable to take NSAID therapy secondary to previous GI bleed.  She is been taking Tylenol which helps somewhat.  States that she gets moderate pain to the foot with extended periods of standing and walking at the mall which forces her to sit down.  Ambulating with slip-on tennis shoes.    03/10/2023:  Relates right forefoot pain has resolved and she sought wearing the boot last week.  She previously was diagnosed with a right 4th toe proximal phalanx nondisplaced fracture.  Relates now the pain is mostly in the midfoot with some mild localized swelling.  States the pain has been present for years however the swelling started before the pain.    05/12/2023:  Post 2nd metatarsal cuneiform joint arthrodesis with external neurolysis of the deep peroneal nerve and extensor hallucis brevis tenotomy right foot on 05/03/2023.  Mild intermittent pain reported to the right foot.  She has remain nonweightbearing.  Dressing has remained intact.  Denies any slips, trips or falls.    05/25/2023:  3 weeks postop.  Relates no significant pain to the right foot.  She has remain nonweightbearing as advised to the right foot.    06/23/2023: Approximately 7 weeks postop.  Reports no pain.  She is been applying weight closer towards the right heel and ambulating with a Cam boot.  She is been performing active range motion exercises and light resistance while at home and states the discomfort and stiffness to the right foot and ankle has resolved.  No longer has any pain.    07/10/2023:  Greater than 2 months postop.  She presents today out of concern that she dropped some quick wear on  "her right foot.  She is not experiencing any pain however is worried because she potentially injured the foot.  Ambulating with tennis shoes.    08/04/2023:  3 months postop.  She reports mild discomfort to the right foot but points to the medial right hindfoot overlying the navicular tuberosity.  States that she has pain described as burning when she standing and walking on the foot and point tenderness to the area.  She has minimal discomfort to the right midfoot.  She is ambulating with tennis shoes.    10/05/2023:  Approximately 6 months postop.  Reports no significant discomfort to the surgical site however she gets intermittent mild pain along the lateral border of the right foot when she standing and walking for extended periods of time.  No new concerns.    11/06/2023:  Presents complaining of acute onset pain to the right foot x2 weeks which she describes a sharp and stabbing in nature that is present at rest but aggravated by standing and walking.  Ambulating with a tennis shoe.  Notes increased swelling to the right foot.    Vitals:    11/06/23 1558   BP: 134/80   Pulse: 69   Weight: 72.1 kg (159 lb)   Height: 5' 6" (1.676 m)   PainSc:   5   PainLoc: Foot      Past Medical History:   Diagnosis Date    Anxiety     Arthritis     Bipolar 1 disorder     Bursitis of right hip     GI bleed due to NSAIDs     Multinodular goiter 11/15/2021    Sacroiliitis     right side    Sleep apnea        Past Surgical History:   Procedure Laterality Date    ANKLE FRACTURE SURGERY Left 2001    BLADDER SUSPENSION      CARPAL TUNNEL RELEASE Bilateral     CHOLECYSTECTOMY      ESOPHAGOGASTRODUODENOSCOPY N/A 7/29/2021    Procedure: EGD (ESOPHAGOGASTRODUODENOSCOPY);  Surgeon: Susan Mcclain MD;  Location: [facility];  Service: Endoscopy;  Laterality: N/A;    EYE SURGERY      FOOT ARTHRODESIS Right 5/3/2023    Procedure: FUSION, FOOT;  Surgeon: Lauri Alejandra DPM;  Location: [facility];  Service: Podiatry;  Laterality: Right;  mini " c-arm, Arthrex dowel bone graft harvester, locking plate and screws festus notified cc    HYSTERECTOMY  1986    vaginal prolapse    INJECTION OF ANESTHETIC AGENT AROUND MEDIAL BRANCH NERVES INNERVATING CERVICAL FACET JOINT Bilateral 5/27/2022    Procedure: CERVICAL MEDIAL BRANCH NERVE BLOCK (C3-4,C4-5);  Surgeon: Mamie Roman MD;  Location: [facility];  Service: Pain Management;  Laterality: Bilateral;    INJECTION OF ANESTHETIC AGENT AROUND MEDIAL BRANCH NERVES INNERVATING LUMBAR FACET JOINT Right 2/5/2021    Procedure: LUMBAR FACET JOINT BLOCK (L3-4,L4-5,L5-S1);  Surgeon: Mamie Roman MD;  Location: [facility];  Service: Pain Management;  Laterality: Right;    INJECTION OF ANESTHETIC AGENT AROUND MEDIAL BRANCH NERVES INNERVATING LUMBAR FACET JOINT Right 4/30/2021    Procedure: LUMBAR FACET JOINT BLOCK (L3-4,L4-5,L5-S1);  Surgeon: Mamie Roman MD;  Location: [facility];  Service: Pain Management;  Laterality: Right;    INJECTION OF ANESTHETIC AGENT INTO SACROILIAC JOINT Right 12/4/2020    Procedure: SACROILIAC JOINT INJECTION;  Surgeon: Mamie Roman MD;  Location: [facility];  Service: Pain Management;  Laterality: Right;    INJECTION OF JOINT Right 12/4/2020    Procedure: GREATER TROCHANTERIC BURSA INJECTION;  Surgeon: Mamie Roman MD;  Location: [facility];  Service: Pain Management;  Laterality: Right;    NASAL SEPTUM SURGERY      RECTAL PROLAPSE REPAIR      TONSILLECTOMY         Family History   Problem Relation Age of Onset    No Known Problems Maternal Aunt     Colon cancer Maternal Uncle     Colon cancer Maternal Uncle     Colon cancer Maternal Uncle     No Known Problems Paternal Aunt     Esophageal cancer Paternal Uncle     Heart attack Maternal Grandmother     Leukemia Maternal Grandfather     No Known Problems Paternal Grandmother     Stroke Paternal Grandfather        Social History     Socioeconomic History    Marital status:    Tobacco Use    Smoking status: Every Day     Current packs/day:  1.00     Average packs/day: 1 pack/day for 41.6 years (41.6 ttl pk-yrs)     Types: Cigarettes     Start date: 3/30/1982    Smokeless tobacco: Never   Substance and Sexual Activity    Alcohol use: Yes     Comment: Socially-2 or 3 times a year-6 or 7 drinks    Drug use: No    Sexual activity: Yes     Partners: Male     Birth control/protection: Surgical     Comment:      Social Determinants of Health     Financial Resource Strain: Low Risk  (7/7/2023)    Overall Financial Resource Strain (CARDIA)     Difficulty of Paying Living Expenses: Not hard at all   Food Insecurity: No Food Insecurity (7/7/2023)    Hunger Vital Sign     Worried About Running Out of Food in the Last Year: Never true     Ran Out of Food in the Last Year: Never true   Transportation Needs: No Transportation Needs (7/7/2023)    PRAPARE - Transportation     Lack of Transportation (Medical): No     Lack of Transportation (Non-Medical): No   Physical Activity: Inactive (7/7/2023)    Exercise Vital Sign     Days of Exercise per Week: 0 days     Minutes of Exercise per Session: 0 min   Stress: Stress Concern Present (7/7/2023)    [employer] - Occupational Stress Questionnaire     Feeling of Stress : Rather much   Social Connections: Moderately Isolated (7/7/2023)    Social Connection and Isolation Panel [NHANES]     Frequency of Communication with Friends and Family: More than three times a week     Frequency of Social Gatherings with Friends and Family: Once a week     Attends [employer] Services: Never     Active Member of Clubs or Organizations: No     Attends Club or Organization Meetings: Never     Marital Status:    Housing Stability: Low Risk  (7/7/2023)    Housing Stability Vital Sign     Unable to Pay for Housing in the Last Year: No     Number of Places Lived in the Last Year: 1     Unstable Housing in the Last Year: No       Current Outpatient Medications   Medication Sig Dispense Refill    albuterol  (PROVENTIL/VENTOLIN HFA) 90 mcg/actuation inhaler inhale 2 puffs into the lungs every 6 hours as needed for wheezing. 18 g 1    aspirin 81 MG Chew Take 1 tablet (81 mg total) by mouth once daily. 30 tablet 5    EScitalopram oxalate (LEXAPRO) 20 MG tablet Take 1 tablet (20 mg total) by mouth once daily. 30 tablet 3    lamoTRIgine (LAMICTAL) 150 MG Tab Take 2 tablets (300 mg total) by mouth every evening. 180 tablet 3    pantoprazole (PROTONIX) 40 MG tablet TAKE 1 TABLET BY MOUTH EVERY DAY 90 tablet 3    QUEtiapine (SEROQUEL) 100 MG Tab Take 1 tablet (100 mg total) by mouth every evening. 30 tablet 3    rosuvastatin (CRESTOR) 10 MG tablet Take 1 tablet (10 mg total) by mouth once daily. 90 tablet 3     No current facility-administered medications for this visit.       Review of patient's allergies indicates:   Allergen Reactions    Nsaids (non-steroidal anti-inflammatory drug) Other (See Comments)     Gastrointestinal bleeding requiring blood transfusion    Demerol [meperidine] Rash         Review of Systems   Constitutional: Negative for chills and fever.   HENT:  Negative for congestion and hearing loss.    Cardiovascular:  Negative for chest pain and claudication.   Respiratory:  Negative for cough and shortness of breath.    Skin:  Negative for color change and itching.   Musculoskeletal:  Positive for arthritis.   Gastrointestinal:  Negative for nausea and vomiting.   Neurological:  Positive for numbness. Negative for paresthesias.   Psychiatric/Behavioral:  Negative for altered mental status.            Objective:      Physical Exam  Constitutional:       General: She is not in acute distress.     Appearance: Normal appearance. She is not ill-appearing.   Cardiovascular:      Pulses:           Dorsalis pedis pulses are 2+ on the right side and 2+ on the left side.        Posterior tibial pulses are 2+ on the right side and 2+ on the left side.      Comments: Mild nonpitting edema localized to the right foot.   Skin temp is warm to foot bilateral.  No rubor on dependency bilateral foot.  No hair growth follow extremity.  Musculoskeletal:      Comments: Semi-rigid cavus foot structure bilateral.    Diffuse pain on palpation overlying the previous surgical site dorsal central right midfoot with localized edema.  There is pain on palpation commencing from this area and extending proximally along the distribution of the deep peroneal nerve to the anterior mid aspect of the right leg lateral to the anterior tibial crest overlying the anterior tibialis muscle.  With direct palpation of that area the pain radiates distally to the dorsal right foot.  The area was marked out with an X in the attached media picture.    Pain on palpation distal intermetatarsal spaces 2, 3 right foot.    No pain on palpation overlying the lateral border of the right foot.    Rectus appearing foot type bilateral.   Skin:     General: Skin is warm.      Capillary Refill: Capillary refill takes less than 2 seconds.      Findings: No ecchymosis or erythema.      Nails: There is no clubbing.      Comments: Normal appearing scar dorsal right midfoot.   Neurological:      Mental Status: She is alert and oriented to person, place, and time.      Sensory: Sensation is intact.      Motor: Motor function is intact.               Assessment:       Encounter Diagnoses   Name Primary?    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    Diagnoses and all orders for this visit:    Right foot pain  -     X-Ray Foot Complete Right; Future  -     EMG W/ ULTRASOUND AND NERVE CONDUCTION TEST 1 Extremity; Future    Neuropathic pain of right foot  -     EMG W/ ULTRASOUND AND NERVE CONDUCTION TEST 1 Extremity; Future  -     MRI Tibia Fibula W WO Contrast Right; Future    Entrapment neuropathy of peripheral nerve of right lower  extremity  -     MRI Tibia Fibula W WO Contrast Right; Future      I counseled the patient on her conditions, their implications and medical management.    Right foot x-ray completed today showed no interval change compared to the previous x-ray.  There still residual gapping and pseudoarthrosis of the 2nd metatarsocuneiform joint with intact fixation however the 2nd metatarsal appears to have deviated laterally with the 2nd intermetatarsal has been close together.    We discussed obtaining an MRI of the right leg to assess for space-occupying lesion which could be pushing against the deep peroneal nerve versus a nerve lesion of the deep peroneal nerve.  If the MRI is negative consider diagnostic/therapeutic injection along the deep peroneal nerve.  In addition we discussed that she may have chronic regional pain syndrome initiated from the surgical site which is becoming prevalent.  Her previous exam a month ago showed mild discomfort to this area that was localized and did not radiate.  There was no significant changes in the patient's activity or shoe gear since that time and denies any trauma.  We may have to consider revising the area and excising the affected deep peroneal nerve.  In addition ordered EMG/NCV to assess for significant nerve damage to the right lower extremity to help with the diagnosis.    We will hold off on altering her current medications however we did briefly discuss taking gabapentin 300 mg p.o. t.i.d. but will wait until the MRI results.    RTC within 4-6 weeks or p.r.n. as discussed     Assisted per Ron Allen DPLAURA PGY 3    A portion of this note was generated by voice recognition software and may contain spelling and grammar errors.      .

## 2023-11-07 ENCOUNTER — TELEPHONE (OUTPATIENT)
Dept: PODIATRY | Facility: CLINIC | Age: 73
End: 2023-11-07
Payer: MEDICARE

## 2023-11-07 ENCOUNTER — TELEPHONE (OUTPATIENT)
Dept: NEUROLOGY | Facility: CLINIC | Age: 73
End: 2023-11-07
Payer: MEDICARE

## 2023-11-07 NOTE — TELEPHONE ENCOUNTER
Inbarupa message sent to [facility] staff to offer assistance to Ms PickeringZazueta with scheduling an EMG

## 2023-11-07 NOTE — TELEPHONE ENCOUNTER
----- Message from Ivory Blood RN sent at 11/7/2023  9:06 AM CST -----  Regarding: Appt for EMG  Good morning:    Dr Alejandra has sent a referral for Ms Maguire to get an EMG study done. If someone could please assist her with making an appt that would be great. I don't think I have access to your scheduled.    Thank you so much,    Ivory

## 2023-11-09 ENCOUNTER — HOSPITAL ENCOUNTER (OUTPATIENT)
Dept: RADIOLOGY | Facility: HOSPITAL | Age: 73
Discharge: HOME | End: 2023-11-09
Attending: NURSE PRACTITIONER
Payer: MEDICARE

## 2023-11-09 VITALS — BODY MASS INDEX: 25.55 KG/M2 | HEIGHT: 66 IN | WEIGHT: 159 LBS

## 2023-11-09 DIAGNOSIS — Z12.31 ENCOUNTER FOR SCREENING MAMMOGRAM FOR BREAST CANCER: ICD-10-CM

## 2023-11-09 PROCEDURE — 77063 MAMMO DIGITAL SCREENING BILAT WITH TOMO: ICD-10-PCS | Mod: 26,,, | Performed by: RADIOLOGY

## 2023-11-09 PROCEDURE — 77063 BREAST TOMOSYNTHESIS BI: CPT | Mod: 26,,, | Performed by: RADIOLOGY

## 2023-11-09 PROCEDURE — 77067 SCR MAMMO BI INCL CAD: CPT | Mod: TC

## 2023-11-09 PROCEDURE — 77067 MAMMO DIGITAL SCREENING BILAT WITH TOMO: ICD-10-PCS | Mod: 26,,, | Performed by: RADIOLOGY

## 2023-11-09 PROCEDURE — 77067 SCR MAMMO BI INCL CAD: CPT | Mod: 26,,, | Performed by: RADIOLOGY

## 2023-11-22 ENCOUNTER — HOSPITAL ENCOUNTER (OUTPATIENT)
Dept: RADIOLOGY | Facility: HOSPITAL | Age: 73
Discharge: HOME | End: 2023-11-22
Attending: PODIATRIST
Payer: MEDICARE

## 2023-11-22 DIAGNOSIS — M79.2 NEUROPATHIC PAIN OF RIGHT FOOT: ICD-10-CM

## 2023-11-22 DIAGNOSIS — G57.91 ENTRAPMENT NEUROPATHY OF PERIPHERAL NERVE OF RIGHT LOWER EXTREMITY: ICD-10-CM

## 2023-11-22 PROCEDURE — 25500020 PHARM REV CODE 255: Performed by: PODIATRIST

## 2023-11-22 PROCEDURE — 73720 MRI LWR EXTREMITY W/O&W/DYE: CPT | Mod: TC,RT

## 2023-11-22 PROCEDURE — 73720 MRI TIBIA FIBULA W WO CONTRAST RIGHT: ICD-10-PCS | Mod: 26,RT,, | Performed by: RADIOLOGY

## 2023-11-22 PROCEDURE — 73720 MRI LWR EXTREMITY W/O&W/DYE: CPT | Mod: 26,RT,, | Performed by: RADIOLOGY

## 2023-11-22 PROCEDURE — A9585 GADOBUTROL INJECTION: HCPCS | Performed by: PODIATRIST

## 2023-11-22 RX ORDER — GADOBUTROL 604.72 MG/ML
7 INJECTION INTRAVENOUS
Status: COMPLETED | OUTPATIENT
Start: 2023-11-22 | End: 2023-11-22

## 2023-11-22 RX ADMIN — GADOBUTROL 7 ML: 604.72 INJECTION INTRAVENOUS at 02:11

## 2023-11-27 ENCOUNTER — PATIENT MESSAGE (OUTPATIENT)
Dept: PODIATRY | Facility: CLINIC | Age: 73
End: 2023-11-27
Payer: MEDICARE

## 2023-12-07 ENCOUNTER — PROCEDURE VISIT (OUTPATIENT)
Dept: NEUROLOGY | Facility: CLINIC | Age: 73
End: 2023-12-07
Payer: MEDICARE

## 2023-12-07 DIAGNOSIS — K92.2 ACUTE GI BLEEDING: ICD-10-CM

## 2023-12-07 DIAGNOSIS — G60.9 IDIOPATHIC PERIPHERAL NEUROPATHY: ICD-10-CM

## 2023-12-07 DIAGNOSIS — M79.671 RIGHT FOOT PAIN: ICD-10-CM

## 2023-12-07 DIAGNOSIS — M79.2 NEUROPATHIC PAIN OF RIGHT FOOT: ICD-10-CM

## 2023-12-07 DIAGNOSIS — M54.16 LUMBAR RADICULOPATHY: Primary | ICD-10-CM

## 2023-12-07 PROCEDURE — 95886 MUSC TEST DONE W/N TEST COMP: CPT | Mod: S$GLB,,, | Performed by: PSYCHIATRY & NEUROLOGY

## 2023-12-07 PROCEDURE — 95911 NRV CNDJ TEST 9-10 STUDIES: CPT | Mod: S$GLB,,, | Performed by: PSYCHIATRY & NEUROLOGY

## 2023-12-08 ENCOUNTER — TELEPHONE (OUTPATIENT)
Dept: PODIATRY | Facility: CLINIC | Age: 73
End: 2023-12-08
Payer: MEDICARE

## 2023-12-08 RX ORDER — GABAPENTIN 300 MG/1
300 CAPSULE ORAL 3 TIMES DAILY
Qty: 90 CAPSULE | Refills: 11 | Status: SHIPPED | OUTPATIENT
Start: 2023-12-08 | End: 2024-12-07

## 2023-12-08 NOTE — TELEPHONE ENCOUNTER
----- Message from Ivory Blood RN sent at 12/8/2023  8:52 AM CST -----  Dr Alejandra:    Did you want to send her a prescription for gabapentin?    See below message    Thank you    Ivory  ----- Message -----  From: Chalo Conley  Sent: 12/8/2023   8:47 AM CST  To: Justyn Carreon Staff    Type:  RX Refill Request    Who Called: pt  Refill or New Rx: new   RX Name and Strength: gabapentin (pt said; dosage unknown)    Preferred Pharmacy: [facility]    Ordering Provider:  Reach pt via:  [employer] Call Back Number:  [phone]  Additional Information: pt said Dr. Alejandra advised her to start taking Gabapentin but did not put in a rx for her

## 2023-12-11 RX ORDER — PANTOPRAZOLE SODIUM 40 MG/1
40 TABLET, DELAYED RELEASE ORAL DAILY
Qty: 90 TABLET | Refills: 3 | Status: SHIPPED | OUTPATIENT
Start: 2023-12-11 | End: 2024-03-05 | Stop reason: SDUPTHER

## 2023-12-13 ENCOUNTER — HOSPITAL ENCOUNTER (OUTPATIENT)
Dept: PREADMISSION TESTING | Facility: HOSPITAL | Age: 73
Discharge: HOME | End: 2023-12-13
Attending: STUDENT IN AN ORGANIZED HEALTH CARE EDUCATION/TRAINING PROGRAM
Payer: MEDICARE

## 2023-12-13 NOTE — DISCHARGE INSTRUCTIONS
Colonoscopy Outpatient Procedure        Colonoscopy Prep Instructions     Date:Thursday     Arrival time:           IMPORTANT: PLEASE READ YOUR INSTRUCTIONS CAREFULLY. FAILURE TO FOLLOW THESE INSTRUCTIONS MAY RESULT IN YOUR PROCEURE BEING CANCELED, RESCHEDULED, OR REPEATED.          Clear Liquid Diet     The Wednesday before your procedure you will follow a clear liquid diet ALL day.     You may have any of the following:     Water, tea, coffee (decaffeinated or regular)     Soft drinks (regular or sugar free)     Gelatin dessert (plain or flavored)     Juice, Gatorade, Powerade, Crystal Lite, lemonade, limeade, Ravindra-Aid     Bouillon, clear consommé, 100% fat free beef, chicken, or vegetable broths     Snowballs, popsicles     100% cranberry juice is the only red liquid allowed       Please Avoid the following:     Anything with RED dye     Liquids not specifically listed     Dairy (liquid and powder)     Creamers (liquid and powder)     Alcohol                   Split PEG Prep Instructions     The day before your procedure: Wednesday     Upon awakening begin the clear liquid diet. DO NOT EAT SOLID FOODS     Drink at least 6-8 glasses of clear liquids until you begin your prep     Wednesday morning mix the entire container of prep with lukewarm water and refrigerate       At 12:00 pm (noon): Take four (4) Dulcolax tablets (Bisacodyl) with 8 ounces of clear liquids.     At 4:00 pm: Drink half of the prep within 2 hours                         Refrigerate the remaining half of the prep                It is common to experience abdominal cramping, nausea, and vomiting when taking the prep. If you have nausea and/or vomiting while taking the prep, stop drinking for 20-30 minutes then resume.      You may continue with the clear liquids after this step.     The day of your procedure: Thursday  (five hours before arrival time)----at ______________am           Drink the second half of the prep within 2 hours         You  must finish your morning prep 4 hours before you are to arrive at the hospital.     Your stools should be clear to clear yellow by this time.     Once you have completed the morning portion of your prep you may take your medicines as directed with a small amount of water.      NOTHING ELSE TO DRINK AFTER YOUR THURSDAY MORNING PREP IS COMPLETE

## 2023-12-14 ENCOUNTER — OFFICE VISIT (OUTPATIENT)
Dept: PODIATRY | Facility: CLINIC | Age: 73
End: 2023-12-14
Payer: MEDICARE

## 2023-12-14 VITALS
HEIGHT: 66 IN | BODY MASS INDEX: 25.55 KG/M2 | DIASTOLIC BLOOD PRESSURE: 68 MMHG | HEART RATE: 77 BPM | SYSTOLIC BLOOD PRESSURE: 129 MMHG | WEIGHT: 159 LBS

## 2023-12-14 DIAGNOSIS — M79.2 NEUROPATHIC PAIN OF RIGHT FOOT: ICD-10-CM

## 2023-12-14 DIAGNOSIS — G57.91 ENTRAPMENT NEUROPATHY OF PERIPHERAL NERVE OF RIGHT LOWER EXTREMITY: Primary | ICD-10-CM

## 2023-12-14 DIAGNOSIS — M96.89 NONUNION OF BONE AFTER OSTEOTOMY: ICD-10-CM

## 2023-12-14 PROCEDURE — 1160F PR REVIEW ALL MEDS BY PRESCRIBER/CLIN PHARMACIST DOCUMENTED: ICD-10-PCS | Mod: CPTII,S$GLB,, | Performed by: PODIATRIST

## 2023-12-14 PROCEDURE — 99999 PR PBB SHADOW E&M-EST. PATIENT-LVL III: CPT | Mod: PBBFAC,,, | Performed by: PODIATRIST

## 2023-12-14 PROCEDURE — 99212 PR OFFICE/OUTPT VISIT, EST, LEVL II, 10-19 MIN: ICD-10-PCS | Mod: 25,S$GLB,, | Performed by: PODIATRIST

## 2023-12-14 PROCEDURE — 64450 PR NERVE BLOCK INJ, ANES/STEROID, OTHER PERIPHERAL: ICD-10-PCS | Mod: RT,S$GLB,, | Performed by: PODIATRIST

## 2023-12-14 PROCEDURE — 1159F PR MEDICATION LIST DOCUMENTED IN MEDICAL RECORD: ICD-10-PCS | Mod: CPTII,S$GLB,, | Performed by: PODIATRIST

## 2023-12-14 PROCEDURE — 3074F SYST BP LT 130 MM HG: CPT | Mod: CPTII,S$GLB,, | Performed by: PODIATRIST

## 2023-12-14 PROCEDURE — 64450 NJX AA&/STRD OTHER PN/BRANCH: CPT | Mod: RT,S$GLB,, | Performed by: PODIATRIST

## 2023-12-14 PROCEDURE — 3288F FALL RISK ASSESSMENT DOCD: CPT | Mod: CPTII,S$GLB,, | Performed by: PODIATRIST

## 2023-12-14 PROCEDURE — 3074F PR MOST RECENT SYSTOLIC BLOOD PRESSURE < 130 MM HG: ICD-10-PCS | Mod: CPTII,S$GLB,, | Performed by: PODIATRIST

## 2023-12-14 PROCEDURE — 1125F PR PAIN SEVERITY QUANTIFIED, PAIN PRESENT: ICD-10-PCS | Mod: CPTII,S$GLB,, | Performed by: PODIATRIST

## 2023-12-14 PROCEDURE — 3078F PR MOST RECENT DIASTOLIC BLOOD PRESSURE < 80 MM HG: ICD-10-PCS | Mod: CPTII,S$GLB,, | Performed by: PODIATRIST

## 2023-12-14 PROCEDURE — 1101F PR PT FALLS ASSESS DOC 0-1 FALLS W/OUT INJ PAST YR: ICD-10-PCS | Mod: CPTII,S$GLB,, | Performed by: PODIATRIST

## 2023-12-14 PROCEDURE — 1125F AMNT PAIN NOTED PAIN PRSNT: CPT | Mod: CPTII,S$GLB,, | Performed by: PODIATRIST

## 2023-12-14 PROCEDURE — 1159F MED LIST DOCD IN RCRD: CPT | Mod: CPTII,S$GLB,, | Performed by: PODIATRIST

## 2023-12-14 PROCEDURE — 3078F DIAST BP <80 MM HG: CPT | Mod: CPTII,S$GLB,, | Performed by: PODIATRIST

## 2023-12-14 PROCEDURE — 3008F PR BODY MASS INDEX (BMI) DOCUMENTED: ICD-10-PCS | Mod: CPTII,S$GLB,, | Performed by: PODIATRIST

## 2023-12-14 PROCEDURE — 1101F PT FALLS ASSESS-DOCD LE1/YR: CPT | Mod: CPTII,S$GLB,, | Performed by: PODIATRIST

## 2023-12-14 PROCEDURE — 3288F PR FALLS RISK ASSESSMENT DOCUMENTED: ICD-10-PCS | Mod: CPTII,S$GLB,, | Performed by: PODIATRIST

## 2023-12-14 PROCEDURE — 1160F RVW MEDS BY RX/DR IN RCRD: CPT | Mod: CPTII,S$GLB,, | Performed by: PODIATRIST

## 2023-12-14 PROCEDURE — 99212 OFFICE O/P EST SF 10 MIN: CPT | Mod: 25,S$GLB,, | Performed by: PODIATRIST

## 2023-12-14 PROCEDURE — 3008F BODY MASS INDEX DOCD: CPT | Mod: CPTII,S$GLB,, | Performed by: PODIATRIST

## 2023-12-14 PROCEDURE — 99999 PR PBB SHADOW E&M-EST. PATIENT-LVL III: ICD-10-PCS | Mod: PBBFAC,,, | Performed by: PODIATRIST

## 2023-12-14 RX ORDER — TRIAMCINOLONE ACETONIDE 40 MG/ML
40 INJECTION, SUSPENSION INTRA-ARTICULAR; INTRAMUSCULAR
Status: COMPLETED | OUTPATIENT
Start: 2023-12-14 | End: 2023-12-14

## 2023-12-14 RX ADMIN — TRIAMCINOLONE ACETONIDE 40 MG: 40 INJECTION, SUSPENSION INTRA-ARTICULAR; INTRAMUSCULAR at 02:12

## 2023-12-14 NOTE — PROGRESS NOTES
Subjective:      Patient ID: Karin Maguire is a 73 y.o. female.    Chief Complaint: Foot Pain (Right foot )    Referral from  for evaluation of chronic right foot pain secondary to osteoarthritis.  She also reports stubbing her right 4th toe 5 weeks ago with persistent pain and swelling to the area.  She is unable to take NSAID therapy secondary to previous GI bleed.  She is been taking Tylenol which helps somewhat.  States that she gets moderate pain to the foot with extended periods of standing and walking at the mall which forces her to sit down.  Ambulating with slip-on tennis shoes.    03/10/2023:  Relates right forefoot pain has resolved and she sought wearing the boot last week.  She previously was diagnosed with a right 4th toe proximal phalanx nondisplaced fracture.  Relates now the pain is mostly in the midfoot with some mild localized swelling.  States the pain has been present for years however the swelling started before the pain.    05/12/2023:  Post 2nd metatarsal cuneiform joint arthrodesis with external neurolysis of the deep peroneal nerve and extensor hallucis brevis tenotomy right foot on 05/03/2023.  Mild intermittent pain reported to the right foot.  She has remain nonweightbearing.  Dressing has remained intact.  Denies any slips, trips or falls.    05/25/2023:  3 weeks postop.  Relates no significant pain to the right foot.  She has remain nonweightbearing as advised to the right foot.    06/23/2023: Approximately 7 weeks postop.  Reports no pain.  She is been applying weight closer towards the right heel and ambulating with a Cam boot.  She is been performing active range motion exercises and light resistance while at home and states the discomfort and stiffness to the right foot and ankle has resolved.  No longer has any pain.    07/10/2023:  Greater than 2 months postop.  She presents today out of concern that she dropped some quick wear on her right foot.  She is not  "experiencing any pain however is worried because she potentially injured the foot.  Ambulating with tennis shoes.    08/04/2023:  3 months postop.  She reports mild discomfort to the right foot but points to the medial right hindfoot overlying the navicular tuberosity.  States that she has pain described as burning when she standing and walking on the foot and point tenderness to the area.  She has minimal discomfort to the right midfoot.  She is ambulating with tennis shoes.    10/05/2023:  Approximately 6 months postop.  Reports no significant discomfort to the surgical site however she gets intermittent mild pain along the lateral border of the right foot when she standing and walking for extended periods of time.  No new concerns.    11/06/2023:  Presents complaining of acute onset pain to the right foot x2 weeks which she describes a sharp and stabbing in nature that is present at rest but aggravated by standing and walking.  Ambulating with a tennis shoe.  Notes increased swelling to the right foot.    12/14/2023:  Follow-up from EMG/NCV.  Relates possibly some mild improvement with gabapentin however unsure.  Relates pain with standing and walking describing numbness with pins and needles.  She does have a prior history of bulging disc in the lumbar spine area and back pain that radiates.  She was treated by Dr. Roman in the past from pain management.    Vitals:    12/14/23 1414   BP: 129/68   Pulse: 77   Weight: 72.1 kg (159 lb)   Height: 5' 6" (1.676 m)   PainSc:   3      Past Medical History:   Diagnosis Date    Anxiety     Arthritis     Bipolar 1 disorder     Bursitis of right hip     GI bleed due to NSAIDs     Multinodular goiter 11/15/2021    Sacroiliitis     right side    Sleep apnea        Past Surgical History:   Procedure Laterality Date    ANKLE FRACTURE SURGERY Left 2001    BLADDER SUSPENSION      CARPAL TUNNEL RELEASE Bilateral     CHOLECYSTECTOMY      COLONOSCOPY      ESOPHAGOGASTRODUODENOSCOPY " N/A 07/29/2021    Procedure: EGD (ESOPHAGOGASTRODUODENOSCOPY);  Surgeon: Susan Mcclain MD;  Location: [facility];  Service: Endoscopy;  Laterality: N/A;    EYE SURGERY      FOOT ARTHRODESIS Right 05/03/2023    Procedure: FUSION, FOOT;  Surgeon: Lauri Alejandra DPM;  Location: [facility];  Service: Podiatry;  Laterality: Right;  mini c-arm, Arthrex dowel bone graft harvester, locking plate and screws festus notified cc    HYSTERECTOMY  1986    vaginal prolapse    INJECTION OF ANESTHETIC AGENT AROUND MEDIAL BRANCH NERVES INNERVATING CERVICAL FACET JOINT Bilateral 05/27/2022    Procedure: CERVICAL MEDIAL BRANCH NERVE BLOCK (C3-4,C4-5);  Surgeon: Mamie Roman MD;  Location: [facility];  Service: Pain Management;  Laterality: Bilateral;    INJECTION OF ANESTHETIC AGENT AROUND MEDIAL BRANCH NERVES INNERVATING LUMBAR FACET JOINT Right 02/05/2021    Procedure: LUMBAR FACET JOINT BLOCK (L3-4,L4-5,L5-S1);  Surgeon: Mamie Roman MD;  Location: [facility];  Service: Pain Management;  Laterality: Right;    INJECTION OF ANESTHETIC AGENT AROUND MEDIAL BRANCH NERVES INNERVATING LUMBAR FACET JOINT Right 04/30/2021    Procedure: LUMBAR FACET JOINT BLOCK (L3-4,L4-5,L5-S1);  Surgeon: Mamie Roman MD;  Location: [facility];  Service: Pain Management;  Laterality: Right;    INJECTION OF ANESTHETIC AGENT INTO SACROILIAC JOINT Right 12/04/2020    Procedure: SACROILIAC JOINT INJECTION;  Surgeon: Mamie Roman MD;  Location: [facility];  Service: Pain Management;  Laterality: Right;    INJECTION OF JOINT Right 12/04/2020    Procedure: GREATER TROCHANTERIC BURSA INJECTION;  Surgeon: Mamie Roman MD;  Location: [facility];  Service: Pain Management;  Laterality: Right;    NASAL SEPTUM SURGERY      RECTAL PROLAPSE REPAIR      TONSILLECTOMY         Family History   Problem Relation Age of Onset    No Known Problems Maternal Aunt     Colon cancer Maternal Uncle     Colon cancer Maternal Uncle     Colon cancer Maternal Uncle     No Known Problems  Paternal Aunt     Esophageal cancer Paternal Uncle     Heart attack Maternal Grandmother     Leukemia Maternal Grandfather     No Known Problems Paternal Grandmother     Stroke Paternal Grandfather        Social History     Socioeconomic History    Marital status:    Tobacco Use    Smoking status: Every Day     Current packs/day: 1.00     Average packs/day: 1 pack/day for 41.7 years (41.7 ttl pk-yrs)     Types: Cigarettes     Start date: 3/30/1982    Smokeless tobacco: Never   Substance and Sexual Activity    Alcohol use: Yes     Comment: Socially-2 or 3 times a year-6 or 7 drinks    Drug use: No    Sexual activity: Yes     Partners: Male     Birth control/protection: Surgical     Comment:      Social Determinants of Health     Financial Resource Strain: Low Risk  (7/7/2023)    Overall Financial Resource Strain (CARDIA)     Difficulty of Paying Living Expenses: Not hard at all   Food Insecurity: No Food Insecurity (7/7/2023)    Hunger Vital Sign     Worried About Running Out of Food in the Last Year: Never true     Ran Out of Food in the Last Year: Never true   Transportation Needs: No Transportation Needs (7/7/2023)    PRAPARE - Transportation     Lack of Transportation (Medical): No     Lack of Transportation (Non-Medical): No   Physical Activity: Inactive (7/7/2023)    Exercise Vital Sign     Days of Exercise per Week: 0 days     Minutes of Exercise per Session: 0 min   Stress: Stress Concern Present (7/7/2023)    [place] [place] [address] [employer] - Occupational Stress Questionnaire     Feeling of Stress : Rather much   Social Connections: Moderately Isolated (7/7/2023)    Social Connection and Isolation Panel [NHANES]     Frequency of Communication with Friends and Family: More than three times a week     Frequency of Social Gatherings with Friends and Family: Once a week     Attends Congregation Services: Never     Active Member of Clubs or Organizations: No     Attends Club or Organization  Meetings: Never     Marital Status:    Housing Stability: Low Risk  (7/7/2023)    Housing Stability Vital Sign     Unable to Pay for Housing in the Last Year: No     Number of Places Lived in the Last Year: 1     Unstable Housing in the Last Year: No       Current Outpatient Medications   Medication Sig Dispense Refill    albuterol (PROVENTIL/VENTOLIN HFA) 90 mcg/actuation inhaler inhale 2 puffs into the lungs every 6 hours as needed for wheezing. 18 g 1    [START ON 12/20/2023] bisacodyL (DULCOLAX) 5 mg EC tablet Take 4 tablets (20 mg total) by mouth once. for 1 dose 4 tablet 0    EScitalopram oxalate (LEXAPRO) 20 MG tablet Take 1 tablet (20 mg total) by mouth once daily. 30 tablet 3    gabapentin (NEURONTIN) 300 MG capsule Begin by taking one at bedtime x 2 days then twice daily x 2 days followed by three times daily for if tolerating well. 90 capsule 11    lamoTRIgine (LAMICTAL) 150 MG Tab Take 2 tablets (300 mg total) by mouth every evening. 180 tablet 3    pantoprazole (PROTONIX) 40 MG tablet Take 1 tablet (40 mg total) by mouth once daily. 90 tablet 3    [START ON 12/20/2023] polyethylene glycol (GOLYTELY) 236-22.74-6.74 -5.86 gram suspension Take by mouth once. for 1 dose 4000 mL 0    QUEtiapine (SEROQUEL) 100 MG Tab Take 1 tablet (100 mg total) by mouth every evening. 30 tablet 3    rosuvastatin (CRESTOR) 10 MG tablet Take 1 tablet (10 mg total) by mouth once daily. 90 tablet 3    aspirin 81 MG Chew Take 1 tablet (81 mg total) by mouth once daily. 30 tablet 5     No current facility-administered medications for this visit.       Review of patient's allergies indicates:   Allergen Reactions    Nsaids (non-steroidal anti-inflammatory drug) Other (See Comments)     Gastrointestinal bleeding requiring blood transfusion    Demerol [meperidine] Rash         Review of Systems   Constitutional: Negative for chills and fever.   HENT:  Negative for congestion and hearing loss.    Cardiovascular:  Negative for  chest pain and claudication.   Respiratory:  Negative for cough and shortness of breath.    Skin:  Negative for color change and itching.   Musculoskeletal:  Positive for arthritis.   Gastrointestinal:  Negative for nausea and vomiting.   Neurological:  Positive for numbness. Negative for paresthesias.   Psychiatric/Behavioral:  Negative for altered mental status.            Objective:      Physical Exam  Constitutional:       General: She is not in acute distress.     Appearance: Normal appearance. She is not ill-appearing.   Cardiovascular:      Pulses:           Dorsalis pedis pulses are 2+ on the right side and 2+ on the left side.        Posterior tibial pulses are 2+ on the right side and 2+ on the left side.      Comments: Mild nonpitting edema localized to the right foot.  Skin temp is warm to foot bilateral.  No rubor on dependency bilateral foot.  No hair growth follow extremity.  Musculoskeletal:      Comments: Semi-rigid cavus foot structure bilateral.    Pain on palpation overlying the superficial peroneal nerve at the anterior lateral mid aspect of the right leg that radiates to the right hallux.  This area was marked out.    Pain on palpation distal intermetatarsal spaces 2, 3 right foot.    No pain on palpation overlying the lateral border of the right foot.    Rectus appearing foot type bilateral.   Skin:     General: Skin is warm.      Capillary Refill: Capillary refill takes less than 2 seconds.      Findings: No ecchymosis or erythema.      Nails: There is no clubbing.      Comments: Normal appearing scar dorsal right midfoot.   Neurological:      Mental Status: She is alert and oriented to person, place, and time.      Sensory: Sensation is intact.      Motor: Motor function is intact.               Assessment:       Encounter Diagnoses   Name Primary?    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    Right foot x-ray completed today showed no interval change compared to the previous x-ray.  There still residual gapping and pseudoarthrosis of the 2nd metatarsocuneiform joint with intact fixation however the 2nd metatarsal appears to have deviated laterally with the 2nd intermetatarsal has been close together.    MRI did not show any space-occupying lesion or lesion involving the superficial or deep peroneal nerves right lower extremity.      Staff message sent to Dr. Isaac who performed the EMG/NCV inquiring about results.      We discussed performing a therapeutic injection along the superficial peroneal and deep peroneal nerves with the area provocation marked out to the right leg and dorsal right midfoot.  Risks and benefits were discussed in detail.      The patient's verbal consent the skin was prepped with alcohol followed by skin anesthesia with ethyl chloride to each injection site.  Injected a mixture containing 3 mL of 2 mL 0.25% plain Marcaine and 1 mL/40 mg of triamcinolone with 2 mL injected at the superficial peroneal nerve and 1 mL at the deep peroneal nerve site right foot.  The patient tolerated procedure well without apparent complication.  Instructed to rest, ice and elevate p.r.n..    RTC p.r.n. as discussed.  We will determine if patient require a 2nd opinion from neurosurgery once EMG/NCV results available.     Assisted per Ron Allen DPM PGY 3    A portion of this note was generated by voice recognition software and may contain spelling and grammar errors.      .

## 2023-12-18 ENCOUNTER — OFFICE VISIT (OUTPATIENT)
Dept: PSYCHIATRY | Facility: CLINIC | Age: 73
End: 2023-12-18
Payer: MEDICARE

## 2023-12-18 VITALS
HEIGHT: 66 IN | RESPIRATION RATE: 17 BRPM | HEART RATE: 78 BPM | OXYGEN SATURATION: 97 % | DIASTOLIC BLOOD PRESSURE: 62 MMHG | WEIGHT: 166.13 LBS | BODY MASS INDEX: 26.7 KG/M2 | SYSTOLIC BLOOD PRESSURE: 121 MMHG

## 2023-12-18 DIAGNOSIS — Z65.8 PSYCHOSOCIAL STRESSORS: ICD-10-CM

## 2023-12-18 DIAGNOSIS — Z86.59 HISTORY OF GAMBLING: ICD-10-CM

## 2023-12-18 DIAGNOSIS — F31.9 BIPOLAR DEPRESSION: ICD-10-CM

## 2023-12-18 DIAGNOSIS — F41.1 GAD (GENERALIZED ANXIETY DISORDER): Primary | ICD-10-CM

## 2023-12-18 PROCEDURE — 99999 PR PBB SHADOW E&M-EST. PATIENT-LVL III: ICD-10-PCS | Mod: PBBFAC,,, | Performed by: STUDENT IN AN ORGANIZED HEALTH CARE EDUCATION/TRAINING PROGRAM

## 2023-12-18 PROCEDURE — 99214 OFFICE O/P EST MOD 30 MIN: CPT | Mod: S$GLB,,, | Performed by: STUDENT IN AN ORGANIZED HEALTH CARE EDUCATION/TRAINING PROGRAM

## 2023-12-18 PROCEDURE — 3008F BODY MASS INDEX DOCD: CPT | Mod: CPTII,S$GLB,, | Performed by: STUDENT IN AN ORGANIZED HEALTH CARE EDUCATION/TRAINING PROGRAM

## 2023-12-18 PROCEDURE — 3074F SYST BP LT 130 MM HG: CPT | Mod: CPTII,S$GLB,, | Performed by: STUDENT IN AN ORGANIZED HEALTH CARE EDUCATION/TRAINING PROGRAM

## 2023-12-18 PROCEDURE — 3078F DIAST BP <80 MM HG: CPT | Mod: CPTII,S$GLB,, | Performed by: STUDENT IN AN ORGANIZED HEALTH CARE EDUCATION/TRAINING PROGRAM

## 2023-12-18 PROCEDURE — 3078F PR MOST RECENT DIASTOLIC BLOOD PRESSURE < 80 MM HG: ICD-10-PCS | Mod: CPTII,S$GLB,, | Performed by: STUDENT IN AN ORGANIZED HEALTH CARE EDUCATION/TRAINING PROGRAM

## 2023-12-18 PROCEDURE — 3074F PR MOST RECENT SYSTOLIC BLOOD PRESSURE < 130 MM HG: ICD-10-PCS | Mod: CPTII,S$GLB,, | Performed by: STUDENT IN AN ORGANIZED HEALTH CARE EDUCATION/TRAINING PROGRAM

## 2023-12-18 PROCEDURE — 1159F PR MEDICATION LIST DOCUMENTED IN MEDICAL RECORD: ICD-10-PCS | Mod: CPTII,S$GLB,, | Performed by: STUDENT IN AN ORGANIZED HEALTH CARE EDUCATION/TRAINING PROGRAM

## 2023-12-18 PROCEDURE — 99999 PR PBB SHADOW E&M-EST. PATIENT-LVL III: CPT | Mod: PBBFAC,,, | Performed by: STUDENT IN AN ORGANIZED HEALTH CARE EDUCATION/TRAINING PROGRAM

## 2023-12-18 PROCEDURE — 1159F MED LIST DOCD IN RCRD: CPT | Mod: CPTII,S$GLB,, | Performed by: STUDENT IN AN ORGANIZED HEALTH CARE EDUCATION/TRAINING PROGRAM

## 2023-12-18 PROCEDURE — 99214 PR OFFICE/OUTPT VISIT, EST, LEVL IV, 30-39 MIN: ICD-10-PCS | Mod: S$GLB,,, | Performed by: STUDENT IN AN ORGANIZED HEALTH CARE EDUCATION/TRAINING PROGRAM

## 2023-12-18 PROCEDURE — 3008F PR BODY MASS INDEX (BMI) DOCUMENTED: ICD-10-PCS | Mod: CPTII,S$GLB,, | Performed by: STUDENT IN AN ORGANIZED HEALTH CARE EDUCATION/TRAINING PROGRAM

## 2023-12-18 RX ORDER — QUETIAPINE FUMARATE 100 MG/1
100 TABLET, FILM COATED ORAL NIGHTLY
Qty: 30 TABLET | Refills: 4 | Status: SHIPPED | OUTPATIENT
Start: 2023-12-18 | End: 2024-03-26 | Stop reason: SDUPTHER

## 2023-12-18 RX ORDER — ESCITALOPRAM OXALATE 20 MG/1
20 TABLET ORAL DAILY
Qty: 30 TABLET | Refills: 4 | Status: SHIPPED | OUTPATIENT
Start: 2023-12-18 | End: 2024-03-26 | Stop reason: SDUPTHER

## 2023-12-18 NOTE — PROGRESS NOTES
"  12/18/2023  1:21 PM  Karin Maguire  [id number]    Outpatient Psychiatry Follow-Up Visit (MD/NP)    12/18/2023    Clinical Status of Patient:  Outpatient (Ambulatory)    Chief Complaint:  Karin Maguire is a 73 y.o. female who presents today for follow-up of depression and anxiety.  Met with patient.        Interval History and Content of Current Session:  Interim Events/Subjective Report/Content of Current Session:   MDD with mixed features vs. Unspecified bipolar disorder (pt poor historian)  LUANNE  Insomnia  Gambling disorder  Obesity  Psychosocial stressors     Abnormal movements          "I am doing great, I am looking forward to Social DJ, putting up decorations, we have games planned with prizes, I'm getting along with my  better, we rarely argue, he got on medicine for anxiety, we are going fishing or crabbing once a week, it made a big positive difference, I'm really living life now, I don't remember the last time I looked forward to Social DJ."    LUANNE symptoms are variable, "not bad, maybe every now and then, a bit."    Less gambling, "not much, my  will go and I'll tell him no, I'm not coming."          Stressors: gambling, family        Psychiatric Review Of Systems - Is patient experiencing or having changes in:  sleep: no  appetite: no  weight: no  energy/anergy: no  Interest/pleasure/anhedonia: no  Anxiety: improving  panic: no  Guilty/hopelessness/worthlessness: no  concentration: no  S.I.B.s/risky behavior: no  Irritability: no  Substance abuse: no  Racing thoughts: no  Impulsive behaviors: no  Paranoia: no  AVH: no  Gambling: less, see above  Michelle/hypomania: no          Review of Systems   Review of Systems   Constitutional:  Negative for chills and fever.   HENT:  Negative for hearing loss.    Eyes:  Negative for blurred vision and double vision.   Respiratory:  Negative for shortness of breath.    Cardiovascular:  Negative for chest pain.   Gastrointestinal:  Negative for " constipation, diarrhea, nausea and vomiting.   Genitourinary:  Negative for dysuria.   Musculoskeletal:  Negative for back pain and joint pain.   Skin:  Negative for rash.   Neurological:  Negative for dizziness and headaches.   Endo/Heme/Allergies:  Negative for environmental allergies.       Past Medical, Family and Social History: The patient's past medical, family and social history have been reviewed and updated as appropriate within the electronic medical record - see encounter notes.    Social History     Socioeconomic History    Marital status:    Tobacco Use    Smoking status: Every Day     Current packs/day: 1.00     Average packs/day: 1 pack/day for 41.7 years (41.7 ttl pk-yrs)     Types: Cigarettes     Start date: 3/30/1982    Smokeless tobacco: Never   Substance and Sexual Activity    Alcohol use: Yes     Comment: Socially-2 or 3 times a year-6 or 7 drinks    Drug use: No    Sexual activity: Yes     Partners: Male     Birth control/protection: Surgical     Comment:      Social Determinants of Health     Financial Resource Strain: Low Risk  (7/7/2023)    Overall Financial Resource Strain (CARDIA)     Difficulty of Paying Living Expenses: Not hard at all   Food Insecurity: No Food Insecurity (7/7/2023)    Hunger Vital Sign     Worried About Running Out of Food in the Last Year: Never true     Ran Out of Food in the Last Year: Never true   Transportation Needs: No Transportation Needs (7/7/2023)    PRAPARE - Transportation     Lack of Transportation (Medical): No     Lack of Transportation (Non-Medical): No   Physical Activity: Inactive (7/7/2023)    Exercise Vital Sign     Days of Exercise per Week: 0 days     Minutes of Exercise per Session: 0 min   Stress: Stress Concern Present (7/7/2023)    [employer] [place] [employer] - Occupational Stress Questionnaire     Feeling of Stress : Rather much   Social Connections: Moderately Isolated (7/7/2023)    Social Connection and Isolation  Panel [NHANES]     Frequency of Communication with Friends and Family: More than three times a week     Frequency of Social Gatherings with Friends and Family: Once a week     Attends [employer] Services: Never     Active Member of Clubs or Organizations: No     Attends Club or Organization Meetings: Never     Marital Status:    Housing Stability: Low Risk  (7/7/2023)    Housing Stability Vital Sign     Unable to Pay for Housing in the Last Year: No     Number of Places Lived in the Last Year: 1     Unstable Housing in the Last Year: No         Compliance: yes    Side effects: None    Risk Parameters:  Patient reports no suicidal ideation  Patient reports no homicidal ideation  Patient reports no self-injurious behavior  Patient reports no violent behavior        Exam (detailed: at least 9 elements; comprehensive: all 15 elements)     Vitals:    12/18/23 1316   BP: 121/62   Pulse: 78   Resp: 17       Wt Readings from Last 5 Encounters:   12/18/23 75.3 kg (166 lb 1.6 oz)   12/14/23 72.1 kg (159 lb)   11/09/23 72.1 kg (159 lb)   11/06/23 72.1 kg (159 lb)   10/18/23 72.5 kg (159 lb 13.3 oz)         CONSTITUTIONAL  General Appearance: unremarkable, age appropriate    MUSCULOSKELETAL  Muscle Strength and Tone:no tremor, no tic,   Abnormal Involuntary Movements: yes, repetitive movements noted (head, neck, legs, trunk)  Gait and Station: non-ataxic    PSYCHIATRIC   Level of Consciousness: awake and alert   Orientation: person, place and situation  Grooming: Casually dressed and Well groomed  Psychomotor Behavior: normal, cooperative  Speech: normal tone, normal rate, normal pitch, normal volume  Language: grossly intact  Mood: better  Affect: Consistent with mood  Thought Process: linear, logical  Associations: intact   Thought Content: DENIES suicidal ideation, DENIES homicidal ideation, and no delusion  Perceptions: denies hallucinations  Memory: Able to recall past events, Remote intact and Recent  "intact  Attention:Attends to interview without distraction  Fund of Knowledge: Aware of current events and Vocabulary appropriate   Estimate if Intelligence:  Average based on work/education history, vocabulary and mental status exam  Insight: has awareness of illness  Judgment: behavior is adequate to circumstances          Assessment and Diagnosis   Status/Progress: Based on the examination today, the patient's problem(s) is/are improved.  New problems have not been presented today.   Co-morbidities are complicating management of the primary condition.          Impresssion/Assessment:  MDD with mixed features vs. Unspecified bipolar disorder (pt poor historian)  LUANNE  Insomnia  Gambling disorder  Obesity  Psychosocial stressors     Abnormal movements    Chronic pain         Plan:    Pt much improved. Appears very stable. She declines changes to medications.       MDD with mixed features vs. Unspecified bipolar disorder (pt poor historian)  - continue lamictal 300 mg PO qd  - continue lexapro 20 mg PO qd  - continue seroquel 100 mg PO qhs   - strongly recommended psychotherapy, provided with resources       Insomnia  - reports has not been using CPAP  - pt counseled        Gambling disorder  - pt counseled  - consider meetings/therapy; patient declines        LUANNE  - continue hydroxyzine  mg PO q 6 hours PRN  - lexapro as above  - consider psychotherapy, patient declines        Psychosocial stressors  - pt counseled  - strongly recommended psychotherapy, couples counseling to patient, patient refusing        Obesity  -  tapered off seroquel        Abnormal movements (?TD)  - frequent non-stereotyped movements of hands and limbs, neck, trunk; patient states she has always been "fidgety" like this since childhood, reports she has seen neurology about these movements before in past, pt continues to refuse referral, pt states "I've been this way all my life"  - declines changing seroquel despite risk of TD/AIM, pt " has been counseled extensively on risks  - AIMS: 0, when testing movements stop           Chronic pain  - pt counseled        - Instructed patient to keep all scheduled appointments, take medications as prescribed and abstain from substance abuse. Instructed to call 911 or present to ER for emergency including SI or HI.    - Discussed diagnosis, risks and benefits of proposed treatment above vs alternative treatments vs no treatment, and potential side effects of these treatments. The patient expresses understanding of the above and displays the capacity to agree with this treatment given said understanding. Patient also agrees that, currently, the benefits outweigh the risks and would like to pursue treatment at this time.           Estevan Zaman III, MD    Return to Clinic: 3-4 m

## 2023-12-21 ENCOUNTER — ANESTHESIA EVENT (OUTPATIENT)
Dept: ENDOSCOPY | Facility: HOSPITAL | Age: 73
End: 2023-12-21
Payer: MEDICARE

## 2023-12-21 ENCOUNTER — TELEPHONE (OUTPATIENT)
Dept: SURGERY | Facility: CLINIC | Age: 73
End: 2023-12-21
Payer: MEDICARE

## 2023-12-21 ENCOUNTER — ANESTHESIA (OUTPATIENT)
Dept: ENDOSCOPY | Facility: HOSPITAL | Age: 73
End: 2023-12-21
Payer: MEDICARE

## 2023-12-21 ENCOUNTER — HOSPITAL ENCOUNTER (OUTPATIENT)
Facility: HOSPITAL | Age: 73
Discharge: HOME | End: 2023-12-21
Attending: STUDENT IN AN ORGANIZED HEALTH CARE EDUCATION/TRAINING PROGRAM | Admitting: STUDENT IN AN ORGANIZED HEALTH CARE EDUCATION/TRAINING PROGRAM
Payer: MEDICARE

## 2023-12-21 VITALS
TEMPERATURE: 98 F | RESPIRATION RATE: 18 BRPM | DIASTOLIC BLOOD PRESSURE: 85 MMHG | HEART RATE: 62 BPM | OXYGEN SATURATION: 99 % | SYSTOLIC BLOOD PRESSURE: 137 MMHG

## 2023-12-21 DIAGNOSIS — Z12.11 SCREENING FOR MALIGNANT NEOPLASM OF COLON: Primary | ICD-10-CM

## 2023-12-21 DIAGNOSIS — K63.89 COLONIC MASS: Primary | ICD-10-CM

## 2023-12-21 DIAGNOSIS — C18.2 MALIGNANT NEOPLASM OF ASCENDING COLON: ICD-10-CM

## 2023-12-21 LAB
ALBUMIN SERPL BCP-MCNC: 3.7 G/DL (ref 3.5–5.2)
ALP SERPL-CCNC: 71 U/L (ref 55–135)
ALT SERPL W/O P-5'-P-CCNC: 10 U/L (ref 10–44)
ANION GAP SERPL CALC-SCNC: 10 MMOL/L (ref 8–16)
AST SERPL-CCNC: 16 U/L (ref 10–40)
BASOPHILS # BLD AUTO: 0.04 K/UL (ref 0–0.2)
BASOPHILS NFR BLD: 0.6 % (ref 0–1.9)
BILIRUB SERPL-MCNC: 0.3 MG/DL (ref 0.1–1)
BUN SERPL-MCNC: 7 MG/DL (ref 8–23)
CALCIUM SERPL-MCNC: 9.5 MG/DL (ref 8.7–10.5)
CEA SERPL-MCNC: 3.3 NG/ML (ref 0–5)
CHLORIDE SERPL-SCNC: 104 MMOL/L (ref 95–110)
CO2 SERPL-SCNC: 26 MMOL/L (ref 23–29)
CREAT SERPL-MCNC: 0.8 MG/DL (ref 0.5–1.4)
DIFFERENTIAL METHOD BLD: NORMAL
EOSINOPHIL # BLD AUTO: 0.1 K/UL (ref 0–0.5)
EOSINOPHIL NFR BLD: 1.8 % (ref 0–8)
ERYTHROCYTE [DISTWIDTH] IN BLOOD BY AUTOMATED COUNT: 14 % (ref 11.5–14.5)
EST. GFR  (NO RACE VARIABLE): >60 ML/MIN/1.73 M^2
GLUCOSE SERPL-MCNC: 88 MG/DL (ref 70–110)
HCT VFR BLD AUTO: 37.7 % (ref 37–48.5)
HGB BLD-MCNC: 12.1 G/DL (ref 12–16)
IMM GRANULOCYTES # BLD AUTO: 0.01 K/UL (ref 0–0.04)
IMM GRANULOCYTES NFR BLD AUTO: 0.2 % (ref 0–0.5)
LYMPHOCYTES # BLD AUTO: 2 K/UL (ref 1–4.8)
LYMPHOCYTES NFR BLD: 31.8 % (ref 18–48)
MCH RBC QN AUTO: 29 PG (ref 27–31)
MCHC RBC AUTO-ENTMCNC: 32.1 G/DL (ref 32–36)
MCV RBC AUTO: 90 FL (ref 82–98)
MONOCYTES # BLD AUTO: 0.6 K/UL (ref 0.3–1)
MONOCYTES NFR BLD: 9 % (ref 4–15)
NEUTROPHILS # BLD AUTO: 3.5 K/UL (ref 1.8–7.7)
NEUTROPHILS NFR BLD: 56.6 % (ref 38–73)
NRBC BLD-RTO: 0 /100 WBC
PLATELET # BLD AUTO: 301 K/UL (ref 150–450)
PMV BLD AUTO: 10 FL (ref 9.2–12.9)
POTASSIUM SERPL-SCNC: 3.6 MMOL/L (ref 3.5–5.1)
PROT SERPL-MCNC: 6.6 G/DL (ref 6–8.4)
RBC # BLD AUTO: 4.17 M/UL (ref 4–5.4)
SODIUM SERPL-SCNC: 140 MMOL/L (ref 136–145)
WBC # BLD AUTO: 6.19 K/UL (ref 3.9–12.7)

## 2023-12-21 PROCEDURE — 85025 COMPLETE CBC W/AUTO DIFF WBC: CPT | Performed by: STUDENT IN AN ORGANIZED HEALTH CARE EDUCATION/TRAINING PROGRAM

## 2023-12-21 PROCEDURE — 27201089 HC SNARE, DISP (ANY): Performed by: STUDENT IN AN ORGANIZED HEALTH CARE EDUCATION/TRAINING PROGRAM

## 2023-12-21 PROCEDURE — 82378 CARCINOEMBRYONIC ANTIGEN: CPT | Performed by: STUDENT IN AN ORGANIZED HEALTH CARE EDUCATION/TRAINING PROGRAM

## 2023-12-21 PROCEDURE — 00811 ANES LWR INTST NDSC NOS: CPT | Mod: QZ,P3,PT | Performed by: NURSE ANESTHETIST, CERTIFIED REGISTERED

## 2023-12-21 PROCEDURE — 88342 IMHCHEM/IMCYTCHM 1ST ANTB: CPT | Performed by: PATHOLOGY

## 2023-12-21 PROCEDURE — 27201012 HC FORCEPS, HOT/COLD, DISP: Performed by: STUDENT IN AN ORGANIZED HEALTH CARE EDUCATION/TRAINING PROGRAM

## 2023-12-21 PROCEDURE — 80053 COMPREHEN METABOLIC PANEL: CPT | Performed by: STUDENT IN AN ORGANIZED HEALTH CARE EDUCATION/TRAINING PROGRAM

## 2023-12-21 PROCEDURE — 45385 COLONOSCOPY W/LESION REMOVAL: CPT | Mod: ,,, | Performed by: STUDENT IN AN ORGANIZED HEALTH CARE EDUCATION/TRAINING PROGRAM

## 2023-12-21 PROCEDURE — 88341 IMHCHEM/IMCYTCHM EA ADD ANTB: CPT | Mod: 26,,, | Performed by: PATHOLOGY

## 2023-12-21 PROCEDURE — 45385 COLONOSCOPY W/LESION REMOVAL: CPT | Performed by: STUDENT IN AN ORGANIZED HEALTH CARE EDUCATION/TRAINING PROGRAM

## 2023-12-21 PROCEDURE — 88305 TISSUE EXAM BY PATHOLOGIST: CPT | Mod: 26,,, | Performed by: PATHOLOGY

## 2023-12-21 PROCEDURE — 88305 TISSUE EXAM BY PATHOLOGIST: CPT | Performed by: PATHOLOGY

## 2023-12-21 PROCEDURE — 88341 IMHCHEM/IMCYTCHM EA ADD ANTB: CPT | Performed by: PATHOLOGY

## 2023-12-21 PROCEDURE — 45381 COLONOSCOPY SUBMUCOUS NJX: CPT | Performed by: STUDENT IN AN ORGANIZED HEALTH CARE EDUCATION/TRAINING PROGRAM

## 2023-12-21 PROCEDURE — D9220AH HC ANESTHESIA PROFESSIONAL FEE: Mod: QZ,P3,PT | Performed by: NURSE ANESTHETIST, CERTIFIED REGISTERED

## 2023-12-21 PROCEDURE — 88342 IMHCHEM/IMCYTCHM 1ST ANTB: CPT | Mod: 26,,, | Performed by: PATHOLOGY

## 2023-12-21 PROCEDURE — 36415 COLL VENOUS BLD VENIPUNCTURE: CPT | Performed by: STUDENT IN AN ORGANIZED HEALTH CARE EDUCATION/TRAINING PROGRAM

## 2023-12-21 PROCEDURE — 63600175 PHARM REV CODE 636 W HCPCS: Performed by: NURSE ANESTHETIST, CERTIFIED REGISTERED

## 2023-12-21 PROCEDURE — 45381 COLONOSCOPY SUBMUCOUS NJX: CPT | Mod: 51,,, | Performed by: STUDENT IN AN ORGANIZED HEALTH CARE EDUCATION/TRAINING PROGRAM

## 2023-12-21 PROCEDURE — 25000003 PHARM REV CODE 250: Performed by: NURSE ANESTHETIST, CERTIFIED REGISTERED

## 2023-12-21 PROCEDURE — 37000008 HC ANESTHESIA 1ST 15 MINUTES: Performed by: STUDENT IN AN ORGANIZED HEALTH CARE EDUCATION/TRAINING PROGRAM

## 2023-12-21 PROCEDURE — 37000009 HC ANESTHESIA EA ADD 15 MINS: Performed by: STUDENT IN AN ORGANIZED HEALTH CARE EDUCATION/TRAINING PROGRAM

## 2023-12-21 RX ORDER — LIDOCAINE HYDROCHLORIDE 20 MG/ML
INJECTION, SOLUTION EPIDURAL; INFILTRATION; INTRACAUDAL; PERINEURAL
Status: DISCONTINUED | OUTPATIENT
Start: 2023-12-21 | End: 2023-12-21

## 2023-12-21 RX ORDER — PROPOFOL 10 MG/ML
VIAL (ML) INTRAVENOUS
Status: DISCONTINUED | OUTPATIENT
Start: 2023-12-21 | End: 2023-12-21

## 2023-12-21 RX ADMIN — PROPOFOL 20 MG: 10 INJECTION, EMULSION INTRAVENOUS at 09:12

## 2023-12-21 RX ADMIN — PROPOFOL 20 MG: 10 INJECTION, EMULSION INTRAVENOUS at 10:12

## 2023-12-21 RX ADMIN — LIDOCAINE HYDROCHLORIDE 75 MG: 20 INJECTION, SOLUTION EPIDURAL; INFILTRATION; INTRACAUDAL; PERINEURAL at 09:12

## 2023-12-21 RX ADMIN — SODIUM CHLORIDE, SODIUM LACTATE, POTASSIUM CHLORIDE, AND CALCIUM CHLORIDE: .6; .31; .03; .02 INJECTION, SOLUTION INTRAVENOUS at 09:12

## 2023-12-21 RX ADMIN — PROPOFOL 100 MG: 10 INJECTION, EMULSION INTRAVENOUS at 09:12

## 2023-12-21 NOTE — ANESTHESIA POSTPROCEDURE EVALUATION
Anesthesia Post Evaluation    Patient: Karin Maguire    Procedure(s) Performed: Procedure(s) (LRB):  COLONOSCOPY (N/A)    Final Anesthesia Type: general      Patient location during evaluation: PACU  Patient participation: Yes- Able to Participate  Level of consciousness: awake and alert and oriented  Post-procedure vital signs: reviewed and stable  Pain management: adequate  Airway patency: patent    PONV status at discharge: No PONV  Anesthetic complications: no      Cardiovascular status: blood pressure returned to baseline and hemodynamically stable  Respiratory status: unassisted, spontaneous ventilation and room air  Hydration status: euvolemic  Follow-up not needed.              Vitals Value Taken Time   /85 12/21/23 1038   Temp 36.7 °C (98.1 °F) 12/21/23 1009   Pulse 62 12/21/23 1038   Resp 18 12/21/23 1038   SpO2 99 % 12/21/23 1038         Event Time   Out of Recovery 10:39:59         Pain/Mercedes Score: Mercedes Score: 10 (12/21/2023 10:38 AM)

## 2023-12-21 NOTE — TELEPHONE ENCOUNTER
Called pt to get her set up with a CT scan at [facility] per Dr. Albright's request.  Labs completed today.  NPO instructions given for tomorrow.  Pt verbalized understanding to all.   Ivermectin Counseling:  Patient instructed to take medication on an empty stomach with a full glass of water.  Patient informed of potential adverse effects including but not limited to nausea, diarrhea, dizziness, itching, and swelling of the extremities or lymph nodes.  The patient verbalized understanding of the proper use and possible adverse effects of ivermectin.  All of the patient's questions and concerns were addressed.

## 2023-12-21 NOTE — H&P
[facility]  Colon and Rectal Surgery    [address]  [place], [place]  Tel: [phone]  Fax: [phone]  [email or web address]   MD Rubén Moore MD Brian Kann, MD W. Forrest Johnston, MD Matthew Giglia, MD Jennifer Paruch, MD William Kethman, MD Danielle Kay, MD     Patient name: Karin Maguire   YOB: 1950   MRN: [id number]  Date of procedure: 12/21/2023    Procedure: Colonoscopy  Indications: Previous adenomatous polyp, no family history of CRC    Last colonoscopy: 2018 (Complete)  Sigmoid diverticular disease    The patient was informed of the availability of a certified  without charge. A certified  was not necessary for this visit.    Sedation plan: MAC  ASA: ASA 2 - Patient with mild systemic disease with no functional limitations    Review of Systems  See above    Past Medical History:   Diagnosis Date    Anxiety     Arthritis     Bipolar 1 disorder     Bursitis of right hip     GI bleed due to NSAIDs     Multinodular goiter 11/15/2021    Sacroiliitis     right side    Sleep apnea      Past Surgical History:   Procedure Laterality Date    ANKLE FRACTURE SURGERY Left 2001    BLADDER SUSPENSION      CARPAL TUNNEL RELEASE Bilateral     CHOLECYSTECTOMY      COLONOSCOPY      ESOPHAGOGASTRODUODENOSCOPY N/A 07/29/2021    Procedure: EGD (ESOPHAGOGASTRODUODENOSCOPY);  Surgeon: Susan Mcclain MD;  Location: [facility];  Service: Endoscopy;  Laterality: N/A;    EYE SURGERY      FOOT ARTHRODESIS Right 05/03/2023    Procedure: FUSION, FOOT;  Surgeon: Lauri Alejandra DPM;  Location: [facility];  Service: Podiatry;  Laterality: Right;  mini c-arm, Arthrex dowel bone graft harvester, locking plate and screws festus notified cc    HYSTERECTOMY  1986    vaginal prolapse    INJECTION OF ANESTHETIC AGENT AROUND MEDIAL BRANCH NERVES INNERVATING CERVICAL FACET JOINT Bilateral 05/27/2022    Procedure: CERVICAL MEDIAL  BRANCH NERVE BLOCK (C3-4,C4-5);  Surgeon: Mamie Roman MD;  Location: [facility];  Service: Pain Management;  Laterality: Bilateral;    INJECTION OF ANESTHETIC AGENT AROUND MEDIAL BRANCH NERVES INNERVATING LUMBAR FACET JOINT Right 02/05/2021    Procedure: LUMBAR FACET JOINT BLOCK (L3-4,L4-5,L5-S1);  Surgeon: Mamie Roman MD;  Location: [facility];  Service: Pain Management;  Laterality: Right;    INJECTION OF ANESTHETIC AGENT AROUND MEDIAL BRANCH NERVES INNERVATING LUMBAR FACET JOINT Right 04/30/2021    Procedure: LUMBAR FACET JOINT BLOCK (L3-4,L4-5,L5-S1);  Surgeon: Mamie Roman MD;  Location: [facility];  Service: Pain Management;  Laterality: Right;    INJECTION OF ANESTHETIC AGENT INTO SACROILIAC JOINT Right 12/04/2020    Procedure: SACROILIAC JOINT INJECTION;  Surgeon: Mamie Roman MD;  Location: [facility];  Service: Pain Management;  Laterality: Right;    INJECTION OF JOINT Right 12/04/2020    Procedure: GREATER TROCHANTERIC BURSA INJECTION;  Surgeon: Mmaie Roman MD;  Location: [facility];  Service: Pain Management;  Laterality: Right;    NASAL SEPTUM SURGERY      RECTAL PROLAPSE REPAIR      TONSILLECTOMY       Family History   Problem Relation Age of Onset    No Known Problems Maternal Aunt     Colon cancer Maternal Uncle     Colon cancer Maternal Uncle     Colon cancer Maternal Uncle     No Known Problems Paternal Aunt     Esophageal cancer Paternal Uncle     Heart attack Maternal Grandmother     Leukemia Maternal Grandfather     No Known Problems Paternal Grandmother     Stroke Paternal Grandfather      Social History     Tobacco Use    Smoking status: Every Day     Current packs/day: 1.00     Average packs/day: 1 pack/day for 41.7 years (41.7 ttl pk-yrs)     Types: Cigarettes     Start date: 3/30/1982    Smokeless tobacco: Never   Substance Use Topics    Alcohol use: Yes     Comment: Socially-2 or 3 times a year-6 or 7 drinks    Drug use: No     Review of patient's allergies indicates:   Allergen  Reactions    Nsaids (non-steroidal anti-inflammatory drug) Other (See Comments)     Gastrointestinal bleeding requiring blood transfusion    Demerol [meperidine] Rash       Prior to Admission medications    Medication Sig Start Date End Date Taking? Authorizing Provider   albuterol (PROVENTIL/VENTOLIN HFA) 90 mcg/actuation inhaler inhale 2 puffs into the lungs every 6 hours as needed for wheezing. 9/6/23  Yes Lottie Hough NP   EScitalopram oxalate (LEXAPRO) 20 MG tablet Take 1 tablet (20 mg total) by mouth once daily. 12/18/23 12/17/24 Yes Estevan Zaman III, MD   gabapentin (NEURONTIN) 300 MG capsule Begin by taking one at bedtime x 2 days then twice daily x 2 days followed by three times daily for if tolerating well. 12/8/23 12/7/24 Yes Lauri Alejandra DPM   lamoTRIgine (LAMICTAL) 150 MG Tab Take 2 tablets (300 mg total) by mouth every evening. 8/29/23  Yes Estevan Zaman III, MD   pantoprazole (PROTONIX) 40 MG tablet Take 1 tablet (40 mg total) by mouth once daily. 12/11/23  Yes Lottie Hough NP   QUEtiapine (SEROQUEL) 100 MG Tab Take 1 tablet (100 mg total) by mouth every evening. 12/18/23 12/17/24 Yes Estevan Zaman III, MD   rosuvastatin (CRESTOR) 10 MG tablet Take 1 tablet (10 mg total) by mouth once daily. 7/30/23  Yes Lottie Hough NP   aspirin 81 MG Chew Take 1 tablet (81 mg total) by mouth once daily. 10/27/21 12/18/23  Khanh Mendez MD   bisacodyL (DULCOLAX) 5 mg EC tablet Take 4 tablets (20 mg total) by mouth once. for 1 dose 12/20/23 12/20/23  Alberto Albright MD   polyethylene glycol (GOLYTELY) 236-22.74-6.74 -5.86 gram suspension Take by mouth once. for 1 dose 12/20/23 12/20/23  Alberto Albright MD       Physical Examination  /67   Pulse 64   Temp 97 °F (36.1 °C) (Skin)   Resp 18   SpO2 98%   Breastfeeding No      Constitutional: well developed, no cough, no dyspnea, alert, and no acute distress    Head: Normocephalic, no lesions, without obvious  abnormality  Eye: Normal external eye, conjunctiva, and lids, PERRL  Cardiovascular: regular rate and regular rhythm  Respiratory: normal air entry  Gastrointestinal: soft, non-tender, without masses or organomegaly  Neurologic: alert, oriented, normal speech, no focal findings or movement disorder noted  Psychiatric: appropriate, normal mood    Plan of Care    It was a pleasure meeting Ms. Maguire today - we will plan to perform a colonoscopy with monitored anesthesia care. The details of the procedure, the possible need for biopsy or polypectomy and the potential risks including bleeding, perforation, missed polyps were discussed in detail and they consented to undergo the procedure.      Alberto Albright MD, FACS, FASCRS  Department of Colon & Rectal Surgery  [facility]

## 2023-12-21 NOTE — TELEPHONE ENCOUNTER
CBC, CEA, CMP, CT CAP ordered      Alberto Albright MD, FACS, FASCRS  Department of Colon & Rectal Surgery  [facility]

## 2023-12-21 NOTE — TRANSFER OF CARE
Anesthesia Transfer of Care Note    Patient: Karin Maguire    Procedure(s) Performed: Procedure(s) (LRB):  COLONOSCOPY (N/A)    Patient location: PACU    Anesthesia Type: general    Transport from OR: Transported from OR on room air with adequate spontaneous ventilation    Post pain: adequate analgesia    Post assessment: no apparent anesthetic complications and tolerated procedure well    Post vital signs: stable    Level of consciousness: sedated    Nausea/Vomiting: no nausea/vomiting    Complications: none    Transfer of care protocol was followed      Last vitals: Visit Vitals  /88   Pulse 64   Temp 36 °C (96.8 °F) (Skin)   Resp 18   SpO2 100%   Breastfeeding No

## 2023-12-21 NOTE — DISCHARGE SUMMARY
Brief Operative Note    Date of surgery: 12/21/2023    Pre-operative Diagnosis:  Screening [Z13.9]     Post-operative Diagnosis:   Same as above    Surgeon: Alberto Albright M.D.    Procedure:  Colonoscopy     Anesthesia: Monitored anesthesia care     Findings:  See operative note    Estimated blood loss: Minimal         Specimens:   See operative note    Discharge Note    Outcome:   Patient tolerated treatment/procedure well without complication and is now ready for discharge.    Disposition:   Home or Self Care    Final diagnosis:    Screening [Z13.9]    Follow-up:   With primary care provider    Discharge Procedure Orders   Diet Adult Regular     No dressing needed     Notify your health care provider if you experience any of the following:  temperature >100.4     Notify your health care provider if you experience any of the following:  persistent nausea and vomiting or diarrhea     Notify your health care provider if you experience any of the following:  severe uncontrolled pain     Notify your health care provider if you experience any of the following:  redness, tenderness, or signs of infection (pain, swelling, redness, odor or green/yellow discharge around incision site)     Notify your health care provider if you experience any of the following:  difficulty breathing or increased cough     Notify your health care provider if you experience any of the following:  severe persistent headache     Notify your health care provider if you experience any of the following:  worsening rash     Notify your health care provider if you experience any of the following:  persistent dizziness, light-headedness, or visual disturbances     Notify your health care provider if you experience any of the following:  increased confusion or weakness     Activity as tolerated       [id number]     Alberto Albright MD

## 2023-12-21 NOTE — ANESTHESIA PREPROCEDURE EVALUATION
12/21/2023  Karin Maguire is a 73 y.o., female     Past Medical History:   Diagnosis Date    Anxiety     Arthritis     Bipolar 1 disorder     Bursitis of right hip     GI bleed due to NSAIDs     Multinodular goiter 11/15/2021    Sacroiliitis     right side    Sleep apnea      Past Surgical History:   Procedure Laterality Date    ANKLE FRACTURE SURGERY Left 2001    BLADDER SUSPENSION      CARPAL TUNNEL RELEASE Bilateral     CHOLECYSTECTOMY      COLONOSCOPY      ESOPHAGOGASTRODUODENOSCOPY N/A 07/29/2021    Procedure: EGD (ESOPHAGOGASTRODUODENOSCOPY);  Surgeon: Susan Mcclain MD;  Location: [facility];  Service: Endoscopy;  Laterality: N/A;    EYE SURGERY      FOOT ARTHRODESIS Right 05/03/2023    Procedure: FUSION, FOOT;  Surgeon: Lauri Alejandra DPM;  Location: [facility];  Service: Podiatry;  Laterality: Right;  mini c-arm, Arthrex dowel bone graft harvester, locking plate and screws festus notified cc    HYSTERECTOMY  1986    vaginal prolapse    INJECTION OF ANESTHETIC AGENT AROUND MEDIAL BRANCH NERVES INNERVATING CERVICAL FACET JOINT Bilateral 05/27/2022    Procedure: CERVICAL MEDIAL BRANCH NERVE BLOCK (C3-4,C4-5);  Surgeon: Mamie Roman MD;  Location: [facility];  Service: Pain Management;  Laterality: Bilateral;    INJECTION OF ANESTHETIC AGENT AROUND MEDIAL BRANCH NERVES INNERVATING LUMBAR FACET JOINT Right 02/05/2021    Procedure: LUMBAR FACET JOINT BLOCK (L3-4,L4-5,L5-S1);  Surgeon: Mamie Roman MD;  Location: [facility];  Service: Pain Management;  Laterality: Right;    INJECTION OF ANESTHETIC AGENT AROUND MEDIAL BRANCH NERVES INNERVATING LUMBAR FACET JOINT Right 04/30/2021    Procedure: LUMBAR FACET JOINT BLOCK (L3-4,L4-5,L5-S1);  Surgeon: Mamie Roman MD;  Location: [facility];  Service: Pain Management;  Laterality: Right;    INJECTION OF ANESTHETIC AGENT INTO SACROILIAC  JOINT Right 12/04/2020    Procedure: SACROILIAC JOINT INJECTION;  Surgeon: Mamie Roman MD;  Location: [facility];  Service: Pain Management;  Laterality: Right;    INJECTION OF JOINT Right 12/04/2020    Procedure: GREATER TROCHANTERIC BURSA INJECTION;  Surgeon: Mamie Roman MD;  Location: [facility];  Service: Pain Management;  Laterality: Right;    NASAL SEPTUM SURGERY      RECTAL PROLAPSE REPAIR      TONSILLECTOMY       Current Outpatient Medications   Medication Instructions    albuterol (PROVENTIL/VENTOLIN HFA) 90 mcg/actuation inhaler inhale 2 puffs into the lungs every 6 hours as needed for wheezing.    aspirin 81 mg, Oral, Daily    EScitalopram oxalate (LEXAPRO) 20 mg, Oral, Daily    gabapentin (NEURONTIN) 300 MG capsule Begin by taking one at bedtime x 2 days then twice daily x 2 days followed by three times daily for if tolerating well.    lamoTRIgine (LAMICTAL) 300 mg, Oral, Nightly    pantoprazole (PROTONIX) 40 mg, Oral, Daily    QUEtiapine (SEROQUEL) 100 mg, Oral, Nightly    rosuvastatin (CRESTOR) 10 mg, Oral, Daily       Pre-op Assessment    I have reviewed the Patient Summary Reports.     I have reviewed the Nursing Notes. I have reviewed the NPO Status.   I have reviewed the Medications.     Review of Systems  Anesthesia Hx:  No problems with previous Anesthesia   History of prior surgery of interest to airway management or planning:          Denies Family Hx of Anesthesia complications.   Personal Hx of Anesthesia complications, Post-Operative Nausea/Vomiting, in the past, but not with recent anesthetics / prophylaxis                    Social:  Smoker, Social Alcohol Use       Cardiovascular:  Exercise tolerance: good   Denies Pacemaker.        Denies Angina.     hyperlipidemia                             Pulmonary:        Sleep Apnea           Education provided regarding risk of obstructive sleep apnea            Renal/:  Renal/ Normal                 Hepatic/GI:     GERD, well  controlled             Musculoskeletal:  Arthritis          Spine Disorders: cervical and lumbar            Neurological:  Neurology Normal Denies TIA.  Denies CVA.    Denies Seizures.                                Endocrine:  Endocrine Normal            Psych:  Psychiatric History anxiety               Physical Exam  General: Oriented    Airway:  Mallampati: IV / II  Mouth Opening: Normal  TM Distance: Normal  Tongue: Normal  Neck ROM: Normal ROM    Dental:  Intact    Chest/Lungs:  Clear to auscultation, Normal Respiratory Rate    Heart:  Rate: Normal  Rhythm: Regular Rhythm  Sounds: Normal    Lab Results   Component Value Date    WBC 6.45 08/08/2023    HGB 13.1 08/08/2023    HCT 39.8 08/08/2023     08/08/2023    CHOL 191 11/09/2023    TRIG 65 11/09/2023    HDL 95 (H) 11/09/2023    ALT 10 11/09/2023    AST 16 11/09/2023     11/09/2023    K 4.1 11/09/2023     11/09/2023    CREATININE 0.8 11/09/2023    BUN 12 11/09/2023    CO2 24 11/09/2023    TSH 0.758 08/08/2023    INR 0.9 10/26/2021    HGBA1C 5.7 (H) 11/04/2021     Results for orders placed or performed during the hospital encounter of 04/17/23   SCHEDULED EKG 12-LEAD (to Muse)    Collection Time: 04/17/23  8:10 AM    Narrative    Test Reason : Z01.818    Vent. Rate : 071 BPM     Atrial Rate : 071 BPM     P-R Int : 154 ms          QRS Dur : 090 ms      QT Int : 400 ms       P-R-T Axes : 040 015 048 degrees     QTc Int : 434 ms    Normal sinus rhythm  Normal ECG  When compared with ECG of 26-OCT-2021 23:21,  Sinus rhythm has replaced Ectopic atrial rhythm  Nonspecific T wave abnormality no longer evident in Inferior leads  Nonspecific T wave abnormality no longer evident in Lateral leads  Confirmed by Oscar MARTINEZ, Babak RAMSEY ([id number]) on 4/17/2023 12:11:51 PM    Referred By: MARTHA WAY           Confirmed By:Babak Moore MD     CXR 4/17/23  The lungs are clear, with normal appearance of pulmonary vasculature.No pleural effusion.No  pneumothorax.  The cardiac silhouette is normal in size. The hilar and mediastinal contours are unremarkable.  Bones are intact.     Impression:  No acute abnormality.      Anesthesia Plan  Type of Anesthesia, risks & benefits discussed:    Anesthesia Type: Gen Natural Airway  Intra-op Monitoring Plan: Standard ASA Monitors  Post Op Pain Control Plan: multimodal analgesia  Induction:  IV  ASA Score: 3  Day of Surgery Review of History & Physical: H&P Update referred to the surgeon/provider.I have interviewed and examined the patient. I have reviewed the patient's H&P dated: 12/21/23. There are no significant changes.   Anesthesia Plan Notes:       Ready For Surgery From Anesthesia Perspective.     .

## 2023-12-21 NOTE — OP NOTE
[facility]  Colon and Rectal Surgery    [address]  [place], [place]  Tel: [phone]  Fax: [phone]  [email or web address]   MD Rubén Moore MD Brian Kann, MD W. Forrest Johnston, MD Matthew Giglia, MD Jennifer Paruch, MD William Kethman, MD Danielle Kay, MD     Patient name: Karin Maguire   YOB: 1950   MRN: [id number]  Date of procedure: 12/21/2023  Referring Physician: Lottie Hough NP    Colonoscopy Report    Indication: Previous adenomatous polyp and No family history of colorectal cancer    Procedure: Colonoscopy with removal of polyps or other lesions by snare technique    Findings:  Cecal polyp removed with jumbo forceps, 3 mm  Multiple ascending colon polyps ranging in size from 5 to 12 mm - semi-pedunculated removed with hot snare  Hepatic flexure mass identified - central ulceration, concerning for malignancy, 3 cm, not obstructing, this was biopsied - tattoo placed distal to mass  Sigmoid diverticular disease  Approximate withdrawal time: 35 min    Surgeon: Alberto Albright MD    Procedure:  After pre-operative assessment and review of informed consent, the patient was taken to the procedure room and received monitored anesthesia care. The patient was placed in left lateral decubitus position. A timeout was performed according to [facility] guidelines.      The endoscope scope was passed under direct vision from the anus to the Cecum, identified by the ileocecal valve and appendiceal orifice. Throughout the procedure, the patient's blood pressure, pulse, and oxygen saturations were monitored continuously. The colonoscopy was performed without difficulty. The patient tolerated the procedure well. The quality of the bowel preparation was good . Please see findings as discussed above.     Complications: None  Estimated blood loss: Minimal  Disposition: PACU and then Home      Recommendations:  Repeat  exam to be determined based on final pathology - likely 1 year  CEA, CBC, CMP, CT CAP ordered  Discussed that surgery will likely be necessary, irrespective of pathology - will call her once pathology and CT complete  High fiber diet      Alberto Albright MD, FACS, FASCRS  Department of Colon & Rectal Surgery  [facility]

## 2023-12-22 ENCOUNTER — HOSPITAL ENCOUNTER (OUTPATIENT)
Dept: RADIOLOGY | Facility: HOSPITAL | Age: 73
Discharge: HOME | End: 2023-12-22
Attending: STUDENT IN AN ORGANIZED HEALTH CARE EDUCATION/TRAINING PROGRAM
Payer: MEDICARE

## 2023-12-22 DIAGNOSIS — K63.89 COLONIC MASS: ICD-10-CM

## 2023-12-22 PROCEDURE — 74177 CT ABD & PELVIS W/CONTRAST: CPT | Mod: 26,,, | Performed by: RADIOLOGY

## 2023-12-22 PROCEDURE — 74177 CT CHEST ABDOMEN PELVIS WITH IV CONTRAST (XPD): ICD-10-PCS | Mod: 26,,, | Performed by: RADIOLOGY

## 2023-12-22 PROCEDURE — 74177 CT ABD & PELVIS W/CONTRAST: CPT | Mod: TC

## 2023-12-22 PROCEDURE — 71260 CT THORAX DX C+: CPT | Mod: TC

## 2023-12-22 PROCEDURE — 71260 CT CHEST ABDOMEN PELVIS WITH IV CONTRAST (XPD): ICD-10-PCS | Mod: 26,,, | Performed by: RADIOLOGY

## 2023-12-22 PROCEDURE — 71260 CT THORAX DX C+: CPT | Mod: 26,,, | Performed by: RADIOLOGY

## 2023-12-22 PROCEDURE — 25500020 PHARM REV CODE 255: Performed by: STUDENT IN AN ORGANIZED HEALTH CARE EDUCATION/TRAINING PROGRAM

## 2023-12-22 RX ADMIN — IOHEXOL 75 ML: 350 INJECTION, SOLUTION INTRAVENOUS at 11:12

## 2023-12-22 RX ADMIN — IOHEXOL 30 ML: 350 INJECTION, SOLUTION INTRAVENOUS at 10:12

## 2023-12-29 ENCOUNTER — TELEPHONE (OUTPATIENT)
Dept: SURGERY | Facility: CLINIC | Age: 73
End: 2023-12-29
Payer: MEDICARE

## 2023-12-29 DIAGNOSIS — C18.2 MALIGNANT NEOPLASM OF ASCENDING COLON: Primary | ICD-10-CM

## 2023-12-29 PROBLEM — C18.9 COLON ADENOCARCINOMA: Status: ACTIVE | Noted: 2023-12-29

## 2023-12-29 LAB
FINAL PATHOLOGIC DIAGNOSIS: NORMAL
GROSS: NORMAL
Lab: NORMAL
SUPPLEMENTAL DIAGNOSIS: NORMAL

## 2024-01-02 ENCOUNTER — TELEPHONE (OUTPATIENT)
Dept: INTERNAL MEDICINE | Facility: CLINIC | Age: 74
End: 2024-01-02
Payer: MEDICARE

## 2024-01-02 NOTE — TELEPHONE ENCOUNTER
Pre-op scheduled with Lottie Hough NP on Monday 01/08/2024 at 9:20 am.  Left message on voicemail to contact office.

## 2024-01-02 NOTE — TELEPHONE ENCOUNTER
----- Message from Lottie Hough NP sent at 1/2/2024  7:11 AM CST -----  Can we get her in for a pre op  ----- Message -----  From: Albreto Albright MD  Sent: 12/29/2023   2:41 PM CST  To: Lottie Hough NP; Avery Mariee RN    Ms. Hough, I scoped Ms. Zazueta on 12/21 - she has a colon cancer unfortunately    She did quit smoking for me, I am scheduling her for late January - can you get her into the office and make sure she is tuned up from your perspective?    Alberto CHAIREZ

## 2024-01-08 ENCOUNTER — OFFICE VISIT (OUTPATIENT)
Dept: INTERNAL MEDICINE | Facility: CLINIC | Age: 74
End: 2024-01-08
Payer: MEDICARE

## 2024-01-08 ENCOUNTER — HOSPITAL ENCOUNTER (OUTPATIENT)
Dept: PULMONOLOGY | Facility: HOSPITAL | Age: 74
Discharge: HOME | End: 2024-01-08
Attending: NURSE PRACTITIONER
Payer: MEDICARE

## 2024-01-08 ENCOUNTER — HOSPITAL ENCOUNTER (OUTPATIENT)
Dept: RADIOLOGY | Facility: HOSPITAL | Age: 74
Discharge: HOME | End: 2024-01-08
Attending: NURSE PRACTITIONER
Payer: MEDICARE

## 2024-01-08 VITALS
RESPIRATION RATE: 20 BRPM | BODY MASS INDEX: 26.61 KG/M2 | OXYGEN SATURATION: 98 % | HEART RATE: 69 BPM | WEIGHT: 165.56 LBS | DIASTOLIC BLOOD PRESSURE: 66 MMHG | SYSTOLIC BLOOD PRESSURE: 124 MMHG | HEIGHT: 66 IN

## 2024-01-08 DIAGNOSIS — Z01.818 PRE-OP EXAM: Primary | ICD-10-CM

## 2024-01-08 DIAGNOSIS — Z01.818 PRE-OP EXAM: ICD-10-CM

## 2024-01-08 DIAGNOSIS — I70.0 AORTIC ATHEROSCLEROSIS: ICD-10-CM

## 2024-01-08 DIAGNOSIS — Z23 NEED FOR VACCINATION: ICD-10-CM

## 2024-01-08 DIAGNOSIS — F29 UNSPECIFIED PSYCHOSIS NOT DUE TO A SUBSTANCE OR KNOWN PHYSIOLOGICAL CONDITION: ICD-10-CM

## 2024-01-08 DIAGNOSIS — F31.9 BIPOLAR DEPRESSION: ICD-10-CM

## 2024-01-08 DIAGNOSIS — C18.3 MALIGNANT NEOPLASM OF HEPATIC FLEXURE: ICD-10-CM

## 2024-01-08 DIAGNOSIS — J42 CHRONIC BRONCHITIS, UNSPECIFIED CHRONIC BRONCHITIS TYPE: ICD-10-CM

## 2024-01-08 PROCEDURE — 71046 X-RAY EXAM CHEST 2 VIEWS: CPT | Mod: TC

## 2024-01-08 PROCEDURE — 3074F SYST BP LT 130 MM HG: CPT | Mod: CPTII,S$GLB,, | Performed by: NURSE PRACTITIONER

## 2024-01-08 PROCEDURE — 71046 X-RAY EXAM CHEST 2 VIEWS: CPT | Mod: 26,,, | Performed by: RADIOLOGY

## 2024-01-08 PROCEDURE — 1101F PT FALLS ASSESS-DOCD LE1/YR: CPT | Mod: CPTII,S$GLB,, | Performed by: NURSE PRACTITIONER

## 2024-01-08 PROCEDURE — 93005 ELECTROCARDIOGRAM TRACING: CPT

## 2024-01-08 PROCEDURE — 3008F BODY MASS INDEX DOCD: CPT | Mod: CPTII,S$GLB,, | Performed by: NURSE PRACTITIONER

## 2024-01-08 PROCEDURE — 90694 VACC AIIV4 NO PRSRV 0.5ML IM: CPT | Mod: S$GLB,,, | Performed by: NURSE PRACTITIONER

## 2024-01-08 PROCEDURE — 1159F MED LIST DOCD IN RCRD: CPT | Mod: CPTII,S$GLB,, | Performed by: NURSE PRACTITIONER

## 2024-01-08 PROCEDURE — 93010 ELECTROCARDIOGRAM REPORT: CPT | Mod: ,,, | Performed by: INTERNAL MEDICINE

## 2024-01-08 PROCEDURE — 99999 PR PBB SHADOW E&M-EST. PATIENT-LVL III: CPT | Mod: PBBFAC,,, | Performed by: NURSE PRACTITIONER

## 2024-01-08 PROCEDURE — 3078F DIAST BP <80 MM HG: CPT | Mod: CPTII,S$GLB,, | Performed by: NURSE PRACTITIONER

## 2024-01-08 PROCEDURE — 3288F FALL RISK ASSESSMENT DOCD: CPT | Mod: CPTII,S$GLB,, | Performed by: NURSE PRACTITIONER

## 2024-01-08 PROCEDURE — 99214 OFFICE O/P EST MOD 30 MIN: CPT | Mod: S$GLB,,, | Performed by: NURSE PRACTITIONER

## 2024-01-08 PROCEDURE — G0008 ADMIN INFLUENZA VIRUS VAC: HCPCS | Mod: S$GLB,,, | Performed by: NURSE PRACTITIONER

## 2024-01-08 PROCEDURE — 1126F AMNT PAIN NOTED NONE PRSNT: CPT | Mod: CPTII,S$GLB,, | Performed by: NURSE PRACTITIONER

## 2024-01-08 NOTE — PROCEDURES
[facility]  [address] [facility] [address]  [place] [address]  [phone]      Full Name: Karin Zazueta Gender: Female  Patient ID: [id number] YOB: 1950      Visit Date: 12/7/2023 1:16 PM  Age: 73 Years  Examining Physician: Rhoda Lozano D.O., ABPN, AOBNP, ABEM   Referring Physician: Lauri Alejandra D.P.M.  Height: 5 feet 6 inch  History: Patient had surgery for arthritis in her right foot.  After which she developed a lot of pain and burning on the top lateral foot as well as behind the medial ankle.  This has been present for about 4 months.  She has been referred for an EMG of the right lower extremity.      Sensory NCS      Nerve / Sites Rec. Site Onset Lat Peak Lat NP Amp Segments Distance Velocity Temp.     ms ms µV  cm m/s °C   R Sural - Ankle (Calf)      Calf Ankle NR NR NR Calf - Ankle 14 NR 36.1      Ref.   ?4.60 ?3.0 Ref.  ?40    L Sural - Ankle (Calf)      Calf Ankle NR NR NR Calf - Ankle 14 NR 36.1      Ref.   ?4.60 ?3.0 Ref.  ?40    R Superficial peroneal - Ankle      Lat leg Ankle NR NR NR Lat leg - Ankle 12 NR 36.1      Ref.   ?4.60 ?3.0 Ref.      L Superficial peroneal - Ankle      Lat leg Ankle NR NR NR Lat leg - Ankle 12 NR 36.1      Ref.   ?4.60 ?3.0 Ref.          Motor NCS      Nerve / Sites Muscle Latency Ref. Amplitude Ref. Amp % Duration Segments Distance Lat Diff Velocity Ref. Temp.     ms ms mV mV % ms  cm ms m/s m/s °C   R Peroneal - EDB      Ankle EDB NR ?6.00 NR ?2.5 NR NR Ankle - EDB     36.1   L Peroneal - EDB      Ankle EDB NR ?6.00 NR ?2.5 NR NR Ankle - EDB     36.1   R Peroneal - Tib Ant      Fib Head Tib Ant 2.65 ?4.50 2.1 ?3.0 100 13.54 Fib Head - Tib Ant     36.1      Pop fossa Tib Ant 5.46  1.8  86.5 9.02 Pop fossa - Fib Head 11 2.81 39 ?40 36.1   L Peroneal - Tib Ant      Fib Head Tib Ant 2.56 ?4.50 2.1 ?3.0 100 11.17 Fib Head - Tib Ant     36.1   R Tibial - AH      Ankle AH NR ?6.00 NR ?4.0 NR NR Ankle - AH      36.1   L Tibial - AH      Ankle AH NR ?6.00 NR ?4.0 NR NR Ankle - AH     36.1       EMG Summary Table     Spontaneous Recruitment Activation Duration Amplitude Polyphasia Comment   Muscle Ins Act Fib Fasc Pattern - - - - -   R. Tibialis anterior Normal 0 0 Normal Normal Normal Normal Normal Normal   R. Gastrocnemius (Medial head) Normal 0 0 Normal Normal Normal Normal Normal Normal   R. Extensor hallucis longus Normal 0 0 Sl Dec Normal N/+1 N/+1 Normal Normal   R. Flexor digitorum longus Normal 0 0 Sl Dec Normal N/+1 N/+1 Normal Normal   R. Vastus medialis Normal 0 0 Normal Normal Normal Normal Normal Normal   R. Tensor fasciae latae Normal 0 0 Sl Dec Normal N/+1 N/+1 Normal Normal   R. Biceps femoris (short head) Normal 0 0 Normal Normal Normal Normal Normal Normal   R. Lumbar paraspinals Normal 0 0      Normal         Karin PickeringBlanc [id number] 12/7/2023 1:16 PM     3 of 3    Summary:   Nerve conduction studies were performed on the right lower extremity with additional studies of the left lower extremity.  Enteral sural and superficial peroneal sensory responses were absent.  Bilateral peroneal motor responses recording the extensor digitorum brevis muscle were absent.  Right peroneal motor response recording the tibialis anterior muscle was reduced in amplitude with normal latency and borderline velocity.  Left peroneal motor response recording the tibialis anterior was reduced in amplitude with normal latencies.  Right tibial motor study was absent.  Left tibial motor study was absent.  Needle EMG was performed in the right lower extremity and lumbar paraspinal muscles.  No active denervation was present in any muscle tested.  Motor units were enlarged and reduced in recruitment in the right extensor hallucis longus, flexor digitorum longus and tensor fascia sharon muscles.  All other motor unit morphology and recruitment patterns were normal.    Impression:  This is an abnormal EMG of the right lower extremity  with additional studies of the left lower extremity.  The findings are as follows:  Chronic, axonal, length-dependent, peripheral polyneuropathy without active denervation.  In addition, there is evidence of a right L5 radiculopathy without active denervation.  There is no evidence of any other focal neuropathy, radiculopathy, plexopathy or myopathy on the study.        Thank you for referring to the [facility] in [place]. Please feel free to contact the clinic if you have any further questions regarding this study or report.       _____________________________  Rhoda Lozano D.O., ABPN, AOBNP, SON Zazueta [id number] 12/7/2023 1:16 PM     3 of 3

## 2024-01-08 NOTE — PROGRESS NOTES
Subjective:       Patient ID: Karin Maguire is a 73 y.o. female.    Chief Complaint: Pre-op Exam    HPI: Pt presents to clinic today known to me with her . She was dx with colon ca with colonoscopy with Dr Lamb. She has f/u 1/11 and surgery 1/22 we reviewed her colonoscopy as well as her labs and CT- waiting on surgery to see f she has positive nodes and will go from there. Has not decided where she would want treatment if needed.   Review of Systems   Constitutional:  Negative for chills, fatigue, fever and unexpected weight change.   HENT:  Negative for congestion, ear pain, sore throat and trouble swallowing.    Eyes:  Negative for pain and visual disturbance.   Respiratory:  Negative for cough, chest tightness and shortness of breath.    Cardiovascular:  Negative for chest pain, palpitations and leg swelling.   Gastrointestinal:  Negative for abdominal distention, abdominal pain, constipation, diarrhea and vomiting.   Genitourinary:  Negative for difficulty urinating, dysuria, flank pain, frequency and hematuria.   Musculoskeletal:  Negative for back pain, gait problem, joint swelling, neck pain and neck stiffness.   Skin:  Negative for rash and wound.   Neurological:  Negative for dizziness, seizures, speech difficulty, light-headedness and headaches.       Objective:      Physical Exam  Vitals and nursing note reviewed.   Constitutional:       Appearance: Normal appearance. She is well-developed.   HENT:      Head: Normocephalic and atraumatic.      Right Ear: External ear normal.      Left Ear: External ear normal.      Nose: Nose normal.   Eyes:      Conjunctiva/sclera: Conjunctivae normal.      Pupils: Pupils are equal, round, and reactive to light.   Cardiovascular:      Rate and Rhythm: Normal rate and regular rhythm.      Heart sounds: Normal heart sounds. No murmur heard.  Pulmonary:      Effort: Pulmonary effort is normal. No respiratory distress.      Breath sounds: Normal breath  sounds. No wheezing.   Abdominal:      General: Bowel sounds are normal.      Palpations: Abdomen is soft.   Musculoskeletal:         General: No swelling. Normal range of motion.      Cervical back: Normal range of motion and neck supple.   Skin:     General: Skin is warm and dry.      Capillary Refill: Capillary refill takes less than 2 seconds.   Neurological:      General: No focal deficit present.      Mental Status: She is alert and oriented to person, place, and time.   Psychiatric:         Behavior: Behavior normal.         Thought Content: Thought content normal.         Judgment: Judgment normal.         Assessment:       1. Pre-op exam    2. Unspecified psychosis not due to a substance or known physiological condition    3. Chronic bronchitis, unspecified chronic bronchitis type    4. Malignant neoplasm of hepatic flexure    5. Bipolar depression    6. Aortic atherosclerosis        Plan:     Problem List Items Addressed This Visit       Aortic atherosclerosis> cont crestor and asa- will need to stop asa 5 days prior to surgery     Bipolar depression> cont seroquel/lamictal/gabapentin and lexapro-follows withpsych     Chronic bronchitis> albuterol not used even weekly- just as neede- check CXR today prior to surgery     Unspecified psychosis not due to a substance or known physiological condition     Other Visit Diagnoses       Pre-op exam    -  Primary    Relevant Orders    X-Ray Chest PA And Lateral    SCHEDULED EKG 12-LEAD (to Muse)    CBC Auto Differential    Comprehensive Metabolic Panel    Malignant neoplasm of hepatic flexure    > new finding f/u colorectal f/u made 1/11 and surgery 1/22          Has not smoked in 2 weeks   Flu shot today   Rsv vaccine as well     Lab Results   Component Value Date    WBC 6.63 01/08/2024    HGB 11.7 (L) 01/08/2024    HCT 35.7 (L) 01/08/2024    MCV 90 01/08/2024     01/08/2024       BMP  Lab Results   Component Value Date     01/08/2024    K 4.1  01/08/2024     01/08/2024    CO2 25 01/08/2024    BUN 13 01/08/2024    CREATININE 0.9 01/08/2024    CALCIUM 9.8 01/08/2024    ANIONGAP 9 01/08/2024    EGFRNORACEVR >60 01/08/2024   Glucose 94  Lab Results   Component Value Date    ALT 10 01/08/2024    AST 15 01/08/2024    ALKPHOS 76 01/08/2024    BILITOT 0.2 01/08/2024     CXR The cardiomediastinal silhouette is within normal limits.  The lungs are well expanded without consolidation or pleural effusion.  Moderate thoracic dextrocurvature is present.  Surgical clips are present in the right upper quadrant.     EKG Normal sinus rhythm   Normal ECG   When compared with ECG of 17-APR-2023 08:10,   No significant change was found   Confirmed by Babak Moore MD ([id number]) on 1/9/2024 7:46:47 AM     Pt is medically optimized for surgery

## 2024-01-10 NOTE — H&P (VIEW-ONLY)
[facility]  Colon and Rectal Surgery    [address]  [place], [place]  Tel: [phone]  Fax: [phone]  [email or web address]   MD Rubén Moore MD Brian Kann, MD W. Forrest Johnston, MD Matthew Giglia, MD Jennifer Paruch, MD William Kethman, MD Danielle Kay, MD     Patient name: Karin Maguire   YOB: 1950   MRN: [id number]    It was a pleasure seeing Ms. Maguire in the Colon and Rectal Surgery clinic here at [facility].     As you know, Ms. Maguire is a 73 year old woman with a history of anxiety, arthritis, Bipolar disorder, and BANDAR  who presents for evaluation of colon adenocarcinoma . She underwent appropriate staging after colonoscopy on 12/21/2023 and is scheduled for surgery on 1/22/2024. She was seen by Ms. Hough for pre-operative evaluation. Labs and CXR reviewed. She denies any chest pain, shortness of breath, nausea, vomiting, or blood in her stool - she is asymptomatic. She has not been losing weight. She does have a family history of second degree relatives with colon cancer (2). She has quit smoking.     Oncology History   Colon adenocarcinoma   12/21/2023 Tumor Markers    Patient's tumor was tested for the following markers: CEA.                                              Results of the tumor marker test revealed 3.3     12/21/2023 Procedure    Colonoscopy  Cecal polyp removed with jumbo forceps, 3 mm  Multiple ascending colon polyps ranging in size from 5 to 12 mm - semi-pedunculated removed with hot snare  Hepatic flexure mass identified - central ulceration, concerning for malignancy, 3 cm, not obstructing, this was biopsied - tattoo placed distal to mass  Sigmoid diverticular disease     12/22/2023 Imaging Significant Findings    CT CAP  Evaluation of the colon is somewhat limited as there is significant colonic stool and oral contrast material has not opacified the large bowel.  There does appear to be some  abnormal thickening of the walls of the proximal right colon and a patient with a known history of colonic malignancy.  There is some soft tissue density external to the walls of the colon on the right pericolic gutter which could represent peritoneal nodularity versus subserosal extent of tumor.  Slightly more distal to this area there is a 2nd area of irregularity of the walls of the colon, difficult to fully evaluate if this is related to the colonic walls versus stool with central fat.     Two hypodensities in the liver, the larger measuring 0.8 cm, too small to accurately characterize on the basis of this examination.  Attention on follow-up.     No pulmonary nodules are seen.     Cholecystectomy.     12/29/2023 Initial Diagnosis    Hepatic flexure colon adenocarcinoma  MMR intact       The patient was informed of the availability of a certified  without charge. A certified  was not necessary for this visit.    Review of Systems  See pertinent review of systems above    Past Medical History:   Diagnosis Date    Anxiety     Arthritis     Bipolar 1 disorder     Bursitis of right hip     GI bleed due to NSAIDs     Multinodular goiter 11/15/2021    Sacroiliitis     right side    Sleep apnea      Past Surgical History:   Procedure Laterality Date    ANKLE FRACTURE SURGERY Left 2001    BLADDER SUSPENSION      CARPAL TUNNEL RELEASE Bilateral     CHOLECYSTECTOMY      Laparoscopic    COLONOSCOPY      COLONOSCOPY N/A 12/21/2023    Procedure: COLONOSCOPY;  Surgeon: Alberto Albright MD;  Location: [facility];  Service: Endoscopy;  Laterality: N/A;    ESOPHAGOGASTRODUODENOSCOPY N/A 07/29/2021    Procedure: EGD (ESOPHAGOGASTRODUODENOSCOPY);  Surgeon: Susan Mcclain MD;  Location: [facility];  Service: Endoscopy;  Laterality: N/A;    EYE SURGERY      FOOT ARTHRODESIS Right 05/03/2023    Procedure: FUSION, FOOT;  Surgeon: Lauri Alejandra DPM;  Location: [facility] OR;  Service: Podiatry;   Laterality: Right;  mini c-arm, Arthrex dowel bone graft harvester, locking plate and screws festus notified cc    HYSTERECTOMY  1986    vaginal prolapse    INJECTION OF ANESTHETIC AGENT AROUND MEDIAL BRANCH NERVES INNERVATING CERVICAL FACET JOINT Bilateral 05/27/2022    Procedure: CERVICAL MEDIAL BRANCH NERVE BLOCK (C3-4,C4-5);  Surgeon: Mamie Roman MD;  Location: [facility] OR;  Service: Pain Management;  Laterality: Bilateral;    INJECTION OF ANESTHETIC AGENT AROUND MEDIAL BRANCH NERVES INNERVATING LUMBAR FACET JOINT Right 02/05/2021    Procedure: LUMBAR FACET JOINT BLOCK (L3-4,L4-5,L5-S1);  Surgeon: Mamie Roman MD;  Location: [facility] OR;  Service: Pain Management;  Laterality: Right;    INJECTION OF ANESTHETIC AGENT AROUND MEDIAL BRANCH NERVES INNERVATING LUMBAR FACET JOINT Right 04/30/2021    Procedure: LUMBAR FACET JOINT BLOCK (L3-4,L4-5,L5-S1);  Surgeon: Mamie Roman MD;  Location: [facility] OR;  Service: Pain Management;  Laterality: Right;    INJECTION OF ANESTHETIC AGENT INTO SACROILIAC JOINT Right 12/04/2020    Procedure: SACROILIAC JOINT INJECTION;  Surgeon: Mamie Roman MD;  Location: [facility] OR;  Service: Pain Management;  Laterality: Right;    INJECTION OF JOINT Right 12/04/2020    Procedure: GREATER TROCHANTERIC BURSA INJECTION;  Surgeon: Mamie Roman MD;  Location: [facility];  Service: Pain Management;  Laterality: Right;    NASAL SEPTUM SURGERY      RECTAL PROLAPSE REPAIR      TONSILLECTOMY       Family History   Problem Relation Age of Onset    No Known Problems Maternal Aunt     Colon cancer Maternal Uncle     Colon cancer Maternal Uncle     Colon cancer Maternal Uncle     No Known Problems Paternal Aunt     Esophageal cancer Paternal Uncle     Heart attack Maternal Grandmother     Leukemia Maternal Grandfather     No Known Problems Paternal Grandmother     Stroke Paternal Grandfather      Social History     Tobacco Use    Smoking status: Former     Current packs/day: 0.00     Average packs/day: 1  "pack/day for 41.7 years (41.7 ttl pk-yrs)     Types: Cigarettes     Start date: 3/30/1982     Quit date: 2023     Years since quittin.0    Smokeless tobacco: Never   Substance Use Topics    Alcohol use: Yes     Comment: Socially-2 or 3 times a year-6 or 7 drinks    Drug use: No     Review of patient's allergies indicates:   Allergen Reactions    Nsaids (non-steroidal anti-inflammatory drug) Other (See Comments)     Gastrointestinal bleeding requiring blood transfusion    Demerol [meperidine] Rash       Current Outpatient Medications on File Prior to Visit   Medication Sig Dispense Refill    albuterol (PROVENTIL/VENTOLIN HFA) 90 mcg/actuation inhaler inhale 2 puffs into the lungs every 6 hours as needed for wheezing. 18 g 1    EScitalopram oxalate (LEXAPRO) 20 MG tablet Take 1 tablet (20 mg total) by mouth once daily. 30 tablet 4    gabapentin (NEURONTIN) 300 MG capsule Begin by taking one at bedtime x 2 days then twice daily x 2 days followed by three times daily for if tolerating well. 90 capsule 11    lamoTRIgine (LAMICTAL) 150 MG Tab Take 2 tablets (300 mg total) by mouth every evening. 180 tablet 3    pantoprazole (PROTONIX) 40 MG tablet Take 1 tablet (40 mg total) by mouth once daily. 90 tablet 3    QUEtiapine (SEROQUEL) 100 MG Tab Take 1 tablet (100 mg total) by mouth every evening. 30 tablet 4    rosuvastatin (CRESTOR) 10 MG tablet Take 1 tablet (10 mg total) by mouth once daily. 90 tablet 3    aspirin 81 MG Chew Take 1 tablet (81 mg total) by mouth once daily. 30 tablet 5     No current facility-administered medications on file prior to visit.     Physical Examination  /68   Pulse 64   Resp 16   Ht 5' 6" (1.676 m)   Wt 76.6 kg (168 lb 14 oz)   BMI 27.26 kg/m²      A chaperone was present for the physical examination.    Constitutional: well developed, no cough, no dyspnea, alert, and no acute distress    Head: Normocephalic, no lesions, without obvious abnormality  Eye: Normal external " eye, conjunctiva, and lids  Cardiovascular: regular rate and regular rhythm  Respiratory: normal air entry  Gastrointestinal: soft, non-tender  Musculoskeletal: full range of motion without pain  Neurologic: alert, oriented, normal speech, no focal findings or movement disorder noted  Psychiatric: appropriate, normal mood    Assessment and Plan of Care    Thank you again for referring Ms. Maguire to my care. In summary, Ms. Maguire is a 73 year old woman presenting with hepatic flexure colon adenocarcinoma. We discussed treatment options and have provided the following recommendations:    The patient and I have discussed the indications for their surgery, including the development and multimodal treatment of colon cancer. We discussed the role for segmental resection to remove the primary tumor and lymph nodes - this allows complete staging of their disease. She understands that based on final pathology, additional treatment, or chemotherapy, may be beneficial. The expected hospital course and recovery has been discussed, as well as the potential risks, including: Bleeding, risk of blood transfusion, infection, hernia formation, need for additional procedure, ileus, anastomotic leak, need for reoperation, injury to nearby structures, including bladder or ureters, permanent or temporary stoma creation - rarely the risk of death. They had the opportunity to ask questions - consent was signed.    Please do not hesitate to contact me if you have any questions.      Alberto Albright MD, FACS, FASCRS  Department of Colon & Rectal Surgery  [facility]

## 2024-01-11 ENCOUNTER — OFFICE VISIT (OUTPATIENT)
Dept: SURGERY | Facility: CLINIC | Age: 74
End: 2024-01-11
Payer: MEDICARE

## 2024-01-11 VITALS
BODY MASS INDEX: 27.14 KG/M2 | RESPIRATION RATE: 16 BRPM | SYSTOLIC BLOOD PRESSURE: 132 MMHG | HEART RATE: 64 BPM | HEIGHT: 66 IN | DIASTOLIC BLOOD PRESSURE: 68 MMHG | WEIGHT: 168.88 LBS

## 2024-01-11 DIAGNOSIS — C18.2 MALIGNANT NEOPLASM OF ASCENDING COLON: Primary | ICD-10-CM

## 2024-01-11 PROCEDURE — 3008F BODY MASS INDEX DOCD: CPT | Mod: CPTII,S$GLB,, | Performed by: STUDENT IN AN ORGANIZED HEALTH CARE EDUCATION/TRAINING PROGRAM

## 2024-01-11 PROCEDURE — 1126F AMNT PAIN NOTED NONE PRSNT: CPT | Mod: CPTII,S$GLB,, | Performed by: STUDENT IN AN ORGANIZED HEALTH CARE EDUCATION/TRAINING PROGRAM

## 2024-01-11 PROCEDURE — 99999 PR PBB SHADOW E&M-EST. PATIENT-LVL III: CPT | Mod: PBBFAC,,, | Performed by: STUDENT IN AN ORGANIZED HEALTH CARE EDUCATION/TRAINING PROGRAM

## 2024-01-11 PROCEDURE — 99215 OFFICE O/P EST HI 40 MIN: CPT | Mod: S$GLB,,, | Performed by: STUDENT IN AN ORGANIZED HEALTH CARE EDUCATION/TRAINING PROGRAM

## 2024-01-11 PROCEDURE — 3078F DIAST BP <80 MM HG: CPT | Mod: CPTII,S$GLB,, | Performed by: STUDENT IN AN ORGANIZED HEALTH CARE EDUCATION/TRAINING PROGRAM

## 2024-01-11 PROCEDURE — 1159F MED LIST DOCD IN RCRD: CPT | Mod: CPTII,S$GLB,, | Performed by: STUDENT IN AN ORGANIZED HEALTH CARE EDUCATION/TRAINING PROGRAM

## 2024-01-11 PROCEDURE — 3075F SYST BP GE 130 - 139MM HG: CPT | Mod: CPTII,S$GLB,, | Performed by: STUDENT IN AN ORGANIZED HEALTH CARE EDUCATION/TRAINING PROGRAM

## 2024-01-12 DIAGNOSIS — K63.89 COLONIC MASS: Primary | ICD-10-CM

## 2024-01-12 RX ORDER — CIPROFLOXACIN 500 MG/1
500 TABLET ORAL
Qty: 2 TABLET | Refills: 0 | Status: ON HOLD | OUTPATIENT
Start: 2024-01-12 | End: 2024-01-25 | Stop reason: HOSPADM

## 2024-01-12 RX ORDER — METRONIDAZOLE 500 MG/1
500 TABLET ORAL
Qty: 2 TABLET | Refills: 0 | Status: ON HOLD | OUTPATIENT
Start: 2024-01-12 | End: 2024-01-25 | Stop reason: HOSPADM

## 2024-01-16 RX ORDER — BISACODYL 5 MG
5 TABLET, DELAYED RELEASE (ENTERIC COATED) ORAL ONCE
Qty: 4 TABLET | Refills: 0 | Status: SHIPPED | OUTPATIENT
Start: 2024-01-16 | End: 2024-01-20

## 2024-01-18 ENCOUNTER — TELEPHONE (OUTPATIENT)
Dept: SURGERY | Facility: CLINIC | Age: 74
End: 2024-01-18
Payer: MEDICARE

## 2024-01-18 NOTE — PRE-PROCEDURE INSTRUCTIONS
PreOp Instructions given:   - Verbal medication information (what to hold and what to take)   - NPO guidelines given/CRS Dept  - Arrival place directions given; time to be given the day before procedure by the   Surgeon's Office DOSC  - Bathing with antibacterial soap   - Don't wear any jewelry or bring any valuables AM of surgery   - No makeup or moisturizer to face   - No perfume/cologne, powder, lotions or aftershave   Pt. verbalized understanding.   Pt denies any h/o Anesthesia/Sedation complications or side effects.  Patient does not know arrival time.  Explained that this information comes from the surgeon's office and if they haven't heard from them by 2 or 3 pm to call the office.  Patient stated an understanding.

## 2024-01-18 NOTE — TELEPHONE ENCOUNTER
Called pt and confirmed her arrival time of 0500am for sx on Monday, 1/22.  Pt has her abx and prep. All questions and concerns addressed.  Pt verbalized understanding to all.

## 2024-01-21 ENCOUNTER — NURSE TRIAGE (OUTPATIENT)
Dept: ADMINISTRATIVE | Facility: CLINIC | Age: 74
End: 2024-01-21
Payer: MEDICARE

## 2024-01-21 ENCOUNTER — ANESTHESIA EVENT (OUTPATIENT)
Dept: SURGERY | Facility: HOSPITAL | Age: 74
DRG: 330 | End: 2024-01-21
Payer: MEDICARE

## 2024-01-21 NOTE — ANESTHESIA PREPROCEDURE EVALUATION
[facility]  Anesthesia Pre-Operative Evaluation         Patient Name: Karin Maguire  YOB: 1950  MRN: [id number]    SUBJECTIVE:     Pre-operative evaluation for Procedure(s) (LRB):  COLECTOMY, RIGHT, LAPAROSCOPIC (eras low, lithotomy) (N/A)     01/21/2024    Karin Maguire is a 73 y.o. female w/ a significant PMHx of anxiety, bipolar 1 disorder,  cervical spondylosis, HLD, BANDAR, tobacco use disorder, colon adenocarcinoma s/p staging with colonoscopy on 12/21/23. Hepatic flexure mass identified on colonoscopy, central ulceration, concerning for malignancy, 3 cm, not obstructing.     Patient now presents for the above procedure(s).    TTE 11/2021:   Negative bubble study. There is no evidence of intracardiac shunting.  The left ventricle is normal in size with normal systolic function.  The estimated ejection fraction is 65%.  Normal left ventricular diastolic function.  Mild aortic regurgitation.  Normal right ventricular size with normal right ventricular systolic function.  The estimated PA systolic pressure is 18 mmHg.  Normal central venous pressure (3 mmHg).      LDA: None documented.     Prev airway: None documented.    Drips: None documented.      Patient Active Problem List   Diagnosis    Bipolar 1 disorder    Hyponatremia    History of GI bleed    History of encephalitis    Sacroiliitis    Trochanteric bursitis of right hip    History of gambling    LUANNE (generalized anxiety disorder)    Insomnia    Abnormal movements    Obstructive sleep apnea (adult) (pediatric)    Chronic right sacroiliac joint pain    Psychosocial stressors    Lumbar spondylosis    Multinodular goiter    Pure hypercholesterolemia    Decreased strength of upper extremity    Overuse syndrome of left hand    Pain in left hand    Weakness    Cervical spondylosis    Arthrosis of right foot    Preop testing    Aortic atherosclerosis    Bipolar depression    Chronic bronchitis    Hyperlipidemia    Tobacco  use disorder    Colon adenocarcinoma    Unspecified psychosis not due to a substance or known physiological condition       Review of patient's allergies indicates:   Allergen Reactions    Nsaids (non-steroidal anti-inflammatory drug) Other (See Comments)     Gastrointestinal bleeding requiring blood transfusion    Demerol [meperidine] Rash       Current Inpatient Medications:      No current facility-administered medications on file prior to encounter.     Current Outpatient Medications on File Prior to Encounter   Medication Sig Dispense Refill    aspirin 81 MG Chew Take 1 tablet (81 mg total) by mouth once daily. 30 tablet 5    gabapentin (NEURONTIN) 300 MG capsule Begin by taking one at bedtime x 2 days then twice daily x 2 days followed by three times daily for if tolerating well. 90 capsule 11    lamoTRIgine (LAMICTAL) 150 MG Tab Take 2 tablets (300 mg total) by mouth every evening. 180 tablet 3    pantoprazole (PROTONIX) 40 MG tablet Take 1 tablet (40 mg total) by mouth once daily. (Patient taking differently: Take 40 mg by mouth nightly.) 90 tablet 3    QUEtiapine (SEROQUEL) 100 MG Tab Take 1 tablet (100 mg total) by mouth every evening. 30 tablet 4    rosuvastatin (CRESTOR) 10 MG tablet Take 1 tablet (10 mg total) by mouth once daily. (Patient taking differently: Take 10 mg by mouth every evening.) 90 tablet 3    albuterol (PROVENTIL/VENTOLIN HFA) 90 mcg/actuation inhaler inhale 2 puffs into the lungs every 6 hours as needed for wheezing. 18 g 1    EScitalopram oxalate (LEXAPRO) 20 MG tablet Take 1 tablet (20 mg total) by mouth once daily. (Patient taking differently: Take 20 mg by mouth every evening.) 30 tablet 4       Past Surgical History:   Procedure Laterality Date    ANKLE FRACTURE SURGERY Left 2001    BLADDER SUSPENSION      CARPAL TUNNEL RELEASE Bilateral     CHOLECYSTECTOMY      Laparoscopic    COLONOSCOPY      COLONOSCOPY N/A 12/21/2023    Procedure: COLONOSCOPY;  Surgeon: Alberto Albright,  MD;  Location: [facility];  Service: Endoscopy;  Laterality: N/A;    ESOPHAGOGASTRODUODENOSCOPY N/A 07/29/2021    Procedure: EGD (ESOPHAGOGASTRODUODENOSCOPY);  Surgeon: Susan Mcclain MD;  Location: [facility];  Service: Endoscopy;  Laterality: N/A;    EYE SURGERY      FOOT ARTHRODESIS Right 05/03/2023    Procedure: FUSION, FOOT;  Surgeon: Lauri Alejandra DPM;  Location: [facility];  Service: Podiatry;  Laterality: Right;  mini c-arm, Arthrex dowel bone graft harvester, locking plate and screws festus notified cc    HYSTERECTOMY  1986    vaginal prolapse    INJECTION OF ANESTHETIC AGENT AROUND MEDIAL BRANCH NERVES INNERVATING CERVICAL FACET JOINT Bilateral 05/27/2022    Procedure: CERVICAL MEDIAL BRANCH NERVE BLOCK (C3-4,C4-5);  Surgeon: Mamie Roman MD;  Location: [facility];  Service: Pain Management;  Laterality: Bilateral;    INJECTION OF ANESTHETIC AGENT AROUND MEDIAL BRANCH NERVES INNERVATING LUMBAR FACET JOINT Right 02/05/2021    Procedure: LUMBAR FACET JOINT BLOCK (L3-4,L4-5,L5-S1);  Surgeon: Mamie Roman MD;  Location: [facility];  Service: Pain Management;  Laterality: Right;    INJECTION OF ANESTHETIC AGENT AROUND MEDIAL BRANCH NERVES INNERVATING LUMBAR FACET JOINT Right 04/30/2021    Procedure: LUMBAR FACET JOINT BLOCK (L3-4,L4-5,L5-S1);  Surgeon: Mamie Roman MD;  Location: [facility];  Service: Pain Management;  Laterality: Right;    INJECTION OF ANESTHETIC AGENT INTO SACROILIAC JOINT Right 12/04/2020    Procedure: SACROILIAC JOINT INJECTION;  Surgeon: Mamie Roman MD;  Location: [facility];  Service: Pain Management;  Laterality: Right;    INJECTION OF JOINT Right 12/04/2020    Procedure: GREATER TROCHANTERIC BURSA INJECTION;  Surgeon: Mamie Roman MD;  Location: [facility];  Service: Pain Management;  Laterality: Right;    NASAL SEPTUM SURGERY      RECTAL PROLAPSE REPAIR      TONSILLECTOMY         Social History     Socioeconomic History    Marital status:    Tobacco Use    Smoking status:  Former     Current packs/day: 0.00     Average packs/day: 1 pack/day for 41.7 years (41.7 ttl pk-yrs)     Types: Cigarettes     Start date: 3/30/1982     Quit date: 2023     Years since quittin.0    Smokeless tobacco: Never   Substance and Sexual Activity    Alcohol use: Yes     Comment: Socially-2 or 3 times a year-6 or 7 drinks    Drug use: No    Sexual activity: Yes     Partners: Male     Birth control/protection: Surgical     Comment:      Social Determinants of Health     Financial Resource Strain: Low Risk  (2023)    Overall Financial Resource Strain (CARDIA)     Difficulty of Paying Living Expenses: Not hard at all   Food Insecurity: No Food Insecurity (2023)    Hunger Vital Sign     Worried About Running Out of Food in the Last Year: Never true     Ran Out of Food in the Last Year: Never true   Transportation Needs: No Transportation Needs (2023)    PRAPARE - Transportation     Lack of Transportation (Medical): No     Lack of Transportation (Non-Medical): No   Physical Activity: Inactive (2023)    Exercise Vital Sign     Days of Exercise per Week: 0 days     Minutes of Exercise per Session: 0 min   Stress: Stress Concern Present (2023)    [employer] - Occupational Stress Questionnaire     Feeling of Stress : Rather much   Social Connections: Moderately Isolated (2023)    Social Connection and Isolation Panel [NHANES]     Frequency of Communication with Friends and Family: More than three times a week     Frequency of Social Gatherings with Friends and Family: Once a week     Attends Rastafarian Services: Never     Active Member of Clubs or Organizations: No     Attends Club or Organization Meetings: Never     Marital Status:    Housing Stability: Low Risk  (2023)    Housing Stability Vital Sign     Unable to Pay for Housing in the Last Year: No     Number of Places Lived in the Last Year: 1     Unstable Housing in the Last Year:  No       OBJECTIVE:     Vital Signs Range (Last 24H):         Significant Labs:  Lab Results   Component Value Date    WBC 6.63 01/08/2024    HGB 11.7 (L) 01/08/2024    HCT 35.7 (L) 01/08/2024     01/08/2024    CHOL 191 11/09/2023    TRIG 65 11/09/2023    HDL 95 (H) 11/09/2023    ALT 10 01/08/2024    AST 15 01/08/2024     01/08/2024    K 4.1 01/08/2024     01/08/2024    CREATININE 0.9 01/08/2024    BUN 13 01/08/2024    CO2 25 01/08/2024    TSH 0.758 08/08/2023    INR 0.9 10/26/2021    HGBA1C 5.7 (H) 11/04/2021       Diagnostic Studies: No relevant studies.    EKG:   Results for orders placed or performed during the hospital encounter of 01/08/24   SCHEDULED EKG 12-LEAD (to Muse)    Collection Time: 01/08/24 10:42 AM    Narrative    Test Reason : Z01.818    Vent. Rate : 061 BPM     Atrial Rate : 061 BPM     P-R Int : 160 ms          QRS Dur : 090 ms      QT Int : 416 ms       P-R-T Axes : 060 039 073 degrees     QTc Int : 418 ms    Normal sinus rhythm  Normal ECG  When compared with ECG of 17-APR-2023 08:10,  No significant change was found  Confirmed by Oscar MARTINEZ, Babak RAMSEY ([id number]) on 1/9/2024 7:46:47 AM    Referred By: MARTHA WAY           Confirmed By:Babak Moore MD       2D ECHO:  TTE:  Results for orders placed or performed during the hospital encounter of 11/05/21   Echo Saline Bubble? Yes   Result Value Ref Range    STJ 2.77 cm    AV mean gradient 4 mmHg    Ao peak rafael 1.32 m/s    Ao VTI 27.00 cm    IVRT 73.82 msec    IVS 0.86 0.6 - 1.1 cm    LA size 2.73 cm    Left Atrium Major Axis 4.12 cm    LVIDd 4.56 3.5 - 6.0 cm    LVIDs 3.21 2.1 - 4.0 cm    LVOT diameter 2.02 cm    LVOT peak VTI 24.33 cm    Posterior Wall 0.86 0.6 - 1.1 cm    MV Peak A Rafael 1.11 m/s    E wave deceleration time 170.86 msec    MV Peak E Rafael 0.95 m/s    PV Peak D Rafael 0.40 m/s    PV Peak S Rafeal 0.77 m/s    RA Major Axis 4.15 cm    RVDD 3.65 cm    TAPSE 2.40 cm    TR Max Rafael 1.93 m/s    LA WIDTH 3.01 cm    Ao root  annulus 2.99 cm    PV PEAK VELOCITY 0.89 cm/s    MV stenosis pressure 1/2 time 49.55 ms    LV Diastolic Volume 95.40 mL    LV Systolic Volume 41.30 mL    LVOT peak tara 1.19 m/s    Mr max tara 0.05 m/s    LA volume (mod) 16.45 cm3    FS 30 %    LV mass 127.77 g    Left Ventricle Relative Wall Thickness 0.38 cm    AV valve area 2.89 cm2    AV Velocity Ratio 0.90     AV index (prosthetic) 0.90     MV valve area p 1/2 method 4.44 cm2    E/A ratio 0.86     Pulm vein S/D ratio 1.93     LVOT area 3.2 cm2    LVOT stroke volume 77.93 cm3    AV peak gradient 7 mmHg    Triscuspid Valve Regurgitation Peak Gradient 15 mmHg    BSA 1.94 m2    LV Systolic Volume Index 21.6 mL/m2    LV Diastolic Volume Index 49.95 mL/m2    LV Mass Index 67 g/m2    LA Volume Index (Mod) 8.6 mL/m2    Right Atrial Pressure (from IVC) 3 mmHg    EF 65 %    TV resting pulmonary artery pressure 18 mmHg    Narrative    · Negative bubble study. There is no evidence of intracardiac shunting.  · The left ventricle is normal in size with normal systolic function.  · The estimated ejection fraction is 65%.  · Normal left ventricular diastolic function.  · Mild aortic regurgitation.  · Normal right ventricular size with normal right ventricular systolic   function.  · The estimated PA systolic pressure is 18 mmHg.  · Normal central venous pressure (3 mmHg).          SOILA:  No results found for this or any previous visit.    ASSESSMENT/PLAN:         Pre-op Assessment    I have reviewed the Patient Summary Reports.     I have reviewed the Nursing Notes. I have reviewed the NPO Status.   I have reviewed the Medications.     Review of Systems  Anesthesia Hx:  No problems with previous Anesthesia   History of prior surgery of interest to airway management or planning:          Denies Family Hx of Anesthesia complications.    Denies Personal Hx of Anesthesia complications.                    Social:  No Alcohol Use, Former Smoker       Hematology/Oncology:                       Denies Current/Recent Cancer                EENT/Dental:  denies chronic allergic rhinitis           Cardiovascular:      Denies Hypertension.    Denies CAD.           hyperlipidemia                             Pulmonary:    Denies COPD.  Denies Asthma.    Sleep Apnea                Renal/:   Denies Chronic Renal Disease.                Hepatic/GI:      Denies GERD.             Musculoskeletal:  Arthritis               Neurological:    Denies CVA.    Denies Seizures.                                Psych:  Psychiatric History anxiety                 Physical Exam  General: Well nourished, Cooperative, Alert and Oriented    Airway:  Mallampati: II   Mouth Opening: Normal  TM Distance: Normal  Tongue: Normal  Neck ROM: Normal ROM    Dental:  Intact    Chest/Lungs:  Clear to auscultation, Normal Respiratory Rate    Heart:  Rate: Normal  Rhythm: Regular Rhythm  Sounds: Normal        Anesthesia Plan  Type of Anesthesia, risks & benefits discussed:    Anesthesia Type: Gen ETT  Intra-op Monitoring Plan: Standard ASA Monitors  Post Op Pain Control Plan: multimodal analgesia  Induction:  IV  Airway Plan: Direct, Post-Induction  Informed Consent: Informed consent signed with the Patient and all parties understand the risks and agree with anesthesia plan.  All questions answered. Patient consented to blood products? Yes  ASA Score: 3  Day of Surgery Review of History & Physical: H&P Update referred to the surgeon/provider.  Anesthesia Plan Notes: Discussed plan for General endotracheal anesthesia    Ready For Surgery From Anesthesia Perspective.     .

## 2024-01-22 ENCOUNTER — HOSPITAL ENCOUNTER (INPATIENT)
Facility: HOSPITAL | Age: 74
LOS: 3 days | Discharge: HOME | DRG: 330 | End: 2024-01-25
Attending: STUDENT IN AN ORGANIZED HEALTH CARE EDUCATION/TRAINING PROGRAM | Admitting: STUDENT IN AN ORGANIZED HEALTH CARE EDUCATION/TRAINING PROGRAM
Payer: MEDICARE

## 2024-01-22 ENCOUNTER — ANESTHESIA (OUTPATIENT)
Dept: SURGERY | Facility: HOSPITAL | Age: 74
DRG: 330 | End: 2024-01-22
Payer: MEDICARE

## 2024-01-22 DIAGNOSIS — C18.9 COLON ADENOCARCINOMA: ICD-10-CM

## 2024-01-22 LAB
ABO + RH BLD: NORMAL
BLD GP AB SCN CELLS X3 SERPL QL: NORMAL
SPECIMEN OUTDATE: NORMAL

## 2024-01-22 PROCEDURE — 27201423 OPTIME MED/SURG SUP & DEVICES STERILE SUPPLY: Performed by: STUDENT IN AN ORGANIZED HEALTH CARE EDUCATION/TRAINING PROGRAM

## 2024-01-22 PROCEDURE — 25000003 PHARM REV CODE 250: Performed by: SURGERY

## 2024-01-22 PROCEDURE — 63600175 PHARM REV CODE 636 W HCPCS: Performed by: SURGERY

## 2024-01-22 PROCEDURE — 88342 IMHCHEM/IMCYTCHM 1ST ANTB: CPT | Mod: 26,,, | Performed by: PATHOLOGY

## 2024-01-22 PROCEDURE — 88341 IMHCHEM/IMCYTCHM EA ADD ANTB: CPT | Mod: 26,,, | Performed by: PATHOLOGY

## 2024-01-22 PROCEDURE — 37000009 HC ANESTHESIA EA ADD 15 MINS: Performed by: STUDENT IN AN ORGANIZED HEALTH CARE EDUCATION/TRAINING PROGRAM

## 2024-01-22 PROCEDURE — 37000008 HC ANESTHESIA 1ST 15 MINUTES: Performed by: STUDENT IN AN ORGANIZED HEALTH CARE EDUCATION/TRAINING PROGRAM

## 2024-01-22 PROCEDURE — 88309 TISSUE EXAM BY PATHOLOGIST: CPT | Mod: 26,,, | Performed by: PATHOLOGY

## 2024-01-22 PROCEDURE — 25000003 PHARM REV CODE 250: Performed by: NURSE PRACTITIONER

## 2024-01-22 PROCEDURE — 36000710: Performed by: STUDENT IN AN ORGANIZED HEALTH CARE EDUCATION/TRAINING PROGRAM

## 2024-01-22 PROCEDURE — 88341 IMHCHEM/IMCYTCHM EA ADD ANTB: CPT | Mod: 59 | Performed by: PATHOLOGY

## 2024-01-22 PROCEDURE — 0DTF4ZZ RESECTION OF RIGHT LARGE INTESTINE, PERCUTANEOUS ENDOSCOPIC APPROACH: ICD-10-PCS | Performed by: STUDENT IN AN ORGANIZED HEALTH CARE EDUCATION/TRAINING PROGRAM

## 2024-01-22 PROCEDURE — 88309 TISSUE EXAM BY PATHOLOGIST: CPT | Performed by: PATHOLOGY

## 2024-01-22 PROCEDURE — 63600175 PHARM REV CODE 636 W HCPCS

## 2024-01-22 PROCEDURE — 44205 LAP COLECTOMY PART W/ILEUM: CPT | Mod: ,,, | Performed by: STUDENT IN AN ORGANIZED HEALTH CARE EDUCATION/TRAINING PROGRAM

## 2024-01-22 PROCEDURE — 63600175 PHARM REV CODE 636 W HCPCS: Performed by: NURSE PRACTITIONER

## 2024-01-22 PROCEDURE — 71000015 HC POSTOP RECOV 1ST HR: Performed by: STUDENT IN AN ORGANIZED HEALTH CARE EDUCATION/TRAINING PROGRAM

## 2024-01-22 PROCEDURE — 36000711: Performed by: STUDENT IN AN ORGANIZED HEALTH CARE EDUCATION/TRAINING PROGRAM

## 2024-01-22 PROCEDURE — 88307 TISSUE EXAM BY PATHOLOGIST: CPT | Performed by: PATHOLOGY

## 2024-01-22 PROCEDURE — 0WUF47Z SUPPLEMENT ABDOMINAL WALL WITH AUTOLOGOUS TISSUE SUBSTITUTE, PERCUTANEOUS ENDOSCOPIC APPROACH: ICD-10-PCS | Performed by: STUDENT IN AN ORGANIZED HEALTH CARE EDUCATION/TRAINING PROGRAM

## 2024-01-22 PROCEDURE — 25000003 PHARM REV CODE 250

## 2024-01-22 PROCEDURE — 99900035 HC TECH TIME PER 15 MIN (STAT)

## 2024-01-22 PROCEDURE — 88305 TISSUE EXAM BY PATHOLOGIST: CPT | Mod: 26,,, | Performed by: PATHOLOGY

## 2024-01-22 PROCEDURE — 88342 IMHCHEM/IMCYTCHM 1ST ANTB: CPT | Performed by: PATHOLOGY

## 2024-01-22 PROCEDURE — 71000033 HC RECOVERY, INTIAL HOUR: Performed by: STUDENT IN AN ORGANIZED HEALTH CARE EDUCATION/TRAINING PROGRAM

## 2024-01-22 PROCEDURE — 94761 N-INVAS EAR/PLS OXIMETRY MLT: CPT

## 2024-01-22 PROCEDURE — 86901 BLOOD TYPING SEROLOGIC RH(D): CPT | Performed by: STUDENT IN AN ORGANIZED HEALTH CARE EDUCATION/TRAINING PROGRAM

## 2024-01-22 PROCEDURE — 20600001 HC STEP DOWN PRIVATE ROOM

## 2024-01-22 PROCEDURE — 63600175 PHARM REV CODE 636 W HCPCS: Performed by: ANESTHESIOLOGY

## 2024-01-22 PROCEDURE — 36415 COLL VENOUS BLD VENIPUNCTURE: CPT | Performed by: STUDENT IN AN ORGANIZED HEALTH CARE EDUCATION/TRAINING PROGRAM

## 2024-01-22 PROCEDURE — D9220A PRA ANESTHESIA: Mod: ,,, | Performed by: ANESTHESIOLOGY

## 2024-01-22 PROCEDURE — 71000016 HC POSTOP RECOV ADDL HR: Performed by: STUDENT IN AN ORGANIZED HEALTH CARE EDUCATION/TRAINING PROGRAM

## 2024-01-22 RX ORDER — ALVIMOPAN 12 MG/1
12 CAPSULE ORAL 2 TIMES DAILY
Status: DISCONTINUED | OUTPATIENT
Start: 2024-01-22 | End: 2024-01-25 | Stop reason: HOSPADM

## 2024-01-22 RX ORDER — ROCURONIUM BROMIDE 10 MG/ML
INJECTION, SOLUTION INTRAVENOUS
Status: DISCONTINUED | OUTPATIENT
Start: 2024-01-22 | End: 2024-01-22

## 2024-01-22 RX ORDER — MIDAZOLAM HYDROCHLORIDE 1 MG/ML
INJECTION INTRAMUSCULAR; INTRAVENOUS
Status: DISCONTINUED | OUTPATIENT
Start: 2024-01-22 | End: 2024-01-22

## 2024-01-22 RX ORDER — LIDOCAINE HYDROCHLORIDE 20 MG/ML
INJECTION, SOLUTION EPIDURAL; INFILTRATION; INTRACAUDAL; PERINEURAL
Status: DISCONTINUED | OUTPATIENT
Start: 2024-01-22 | End: 2024-01-22

## 2024-01-22 RX ORDER — FENTANYL CITRATE 50 UG/ML
INJECTION, SOLUTION INTRAMUSCULAR; INTRAVENOUS
Status: DISCONTINUED | OUTPATIENT
Start: 2024-01-22 | End: 2024-01-22

## 2024-01-22 RX ORDER — GABAPENTIN 300 MG/1
300 CAPSULE ORAL 3 TIMES DAILY
Status: DISCONTINUED | OUTPATIENT
Start: 2024-01-22 | End: 2024-01-25 | Stop reason: HOSPADM

## 2024-01-22 RX ORDER — ACETAMINOPHEN 650 MG/20.3ML
975 LIQUID ORAL
Status: DISCONTINUED | OUTPATIENT
Start: 2024-01-22 | End: 2024-01-22

## 2024-01-22 RX ORDER — PHENYLEPHRINE HYDROCHLORIDE 10 MG/ML
INJECTION INTRAVENOUS
Status: DISCONTINUED | OUTPATIENT
Start: 2024-01-22 | End: 2024-01-22

## 2024-01-22 RX ORDER — KETAMINE HCL IN 0.9 % NACL 50 MG/5 ML
SYRINGE (ML) INTRAVENOUS
Status: DISCONTINUED | OUTPATIENT
Start: 2024-01-22 | End: 2024-01-22

## 2024-01-22 RX ORDER — PANTOPRAZOLE SODIUM 40 MG/1
40 TABLET, DELAYED RELEASE ORAL NIGHTLY
Status: DISCONTINUED | OUTPATIENT
Start: 2024-01-22 | End: 2024-01-25 | Stop reason: HOSPADM

## 2024-01-22 RX ORDER — ACETAMINOPHEN 500 MG
1000 TABLET ORAL EVERY 8 HOURS
Status: DISCONTINUED | OUTPATIENT
Start: 2024-01-23 | End: 2024-01-25 | Stop reason: HOSPADM

## 2024-01-22 RX ORDER — LIDOCAINE HYDROCHLORIDE ANHYDROUS AND DEXTROSE MONOHYDRATE .8; 5 G/100ML; G/100ML
INJECTION, SOLUTION INTRAVENOUS CONTINUOUS PRN
Status: DISCONTINUED | OUTPATIENT
Start: 2024-01-22 | End: 2024-01-22

## 2024-01-22 RX ORDER — ACETAMINOPHEN 10 MG/ML
1000 INJECTION, SOLUTION INTRAVENOUS EVERY 8 HOURS
Status: DISPENSED | OUTPATIENT
Start: 2024-01-22 | End: 2024-01-23

## 2024-01-22 RX ORDER — LAMOTRIGINE 150 MG/1
300 TABLET ORAL NIGHTLY
Status: DISCONTINUED | OUTPATIENT
Start: 2024-01-22 | End: 2024-01-25 | Stop reason: HOSPADM

## 2024-01-22 RX ORDER — ONDANSETRON HYDROCHLORIDE 2 MG/ML
4 INJECTION, SOLUTION INTRAVENOUS EVERY 12 HOURS PRN
Status: DISCONTINUED | OUTPATIENT
Start: 2024-01-22 | End: 2024-01-25 | Stop reason: HOSPADM

## 2024-01-22 RX ORDER — ATORVASTATIN CALCIUM 40 MG/1
40 TABLET, FILM COATED ORAL NIGHTLY
Status: DISCONTINUED | OUTPATIENT
Start: 2024-01-23 | End: 2024-01-25 | Stop reason: HOSPADM

## 2024-01-22 RX ORDER — ONDANSETRON HYDROCHLORIDE 2 MG/ML
INJECTION, SOLUTION INTRAVENOUS
Status: DISCONTINUED | OUTPATIENT
Start: 2024-01-22 | End: 2024-01-22

## 2024-01-22 RX ORDER — MUPIROCIN 20 MG/G
1 OINTMENT TOPICAL
Status: DISCONTINUED | OUTPATIENT
Start: 2024-01-22 | End: 2024-01-22

## 2024-01-22 RX ORDER — DEXAMETHASONE SODIUM PHOSPHATE 4 MG/ML
INJECTION, SOLUTION INTRA-ARTICULAR; INTRALESIONAL; INTRAMUSCULAR; INTRAVENOUS; SOFT TISSUE
Status: DISCONTINUED | OUTPATIENT
Start: 2024-01-22 | End: 2024-01-22

## 2024-01-22 RX ORDER — LIDOCAINE HYDROCHLORIDE 10 MG/ML
INJECTION, SOLUTION EPIDURAL; INFILTRATION; INTRACAUDAL; PERINEURAL
Status: DISPENSED
Start: 2024-01-22 | End: 2024-01-22

## 2024-01-22 RX ORDER — ALVIMOPAN 12 MG/1
12 CAPSULE ORAL ONCE
Status: DISCONTINUED | OUTPATIENT
Start: 2024-01-22 | End: 2024-01-22

## 2024-01-22 RX ORDER — PROPOFOL 10 MG/ML
VIAL (ML) INTRAVENOUS
Status: DISCONTINUED | OUTPATIENT
Start: 2024-01-22 | End: 2024-01-22

## 2024-01-22 RX ORDER — GABAPENTIN 300 MG/1
300 CAPSULE ORAL
Status: DISCONTINUED | OUTPATIENT
Start: 2024-01-22 | End: 2024-01-22

## 2024-01-22 RX ORDER — SODIUM CHLORIDE 9 MG/ML
INJECTION, SOLUTION INTRAVENOUS CONTINUOUS
Status: ACTIVE | OUTPATIENT
Start: 2024-01-22 | End: 2024-01-23

## 2024-01-22 RX ORDER — METRONIDAZOLE 500 MG/100ML
500 INJECTION, SOLUTION INTRAVENOUS
Status: COMPLETED | OUTPATIENT
Start: 2024-01-22 | End: 2024-01-22

## 2024-01-22 RX ORDER — LIDOCAINE HYDROCHLORIDE 10 MG/ML
1 INJECTION, SOLUTION EPIDURAL; INFILTRATION; INTRACAUDAL; PERINEURAL
Status: DISCONTINUED | OUTPATIENT
Start: 2024-01-22 | End: 2024-01-22

## 2024-01-22 RX ORDER — MUPIROCIN 20 MG/G
OINTMENT TOPICAL
Status: DISPENSED
Start: 2024-01-22 | End: 2024-01-22

## 2024-01-22 RX ORDER — OXYCODONE HYDROCHLORIDE 5 MG/1
5 TABLET ORAL EVERY 4 HOURS PRN
Status: DISCONTINUED | OUTPATIENT
Start: 2024-01-22 | End: 2024-01-25 | Stop reason: HOSPADM

## 2024-01-22 RX ORDER — ATORVASTATIN CALCIUM 40 MG/1
40 TABLET, FILM COATED ORAL DAILY
Status: DISCONTINUED | OUTPATIENT
Start: 2024-01-22 | End: 2024-01-22

## 2024-01-22 RX ORDER — ALBUTEROL SULFATE 90 UG/1
2 AEROSOL, METERED RESPIRATORY (INHALATION) EVERY 6 HOURS PRN
Status: DISCONTINUED | OUTPATIENT
Start: 2024-01-22 | End: 2024-01-25 | Stop reason: HOSPADM

## 2024-01-22 RX ORDER — MUPIROCIN 20 MG/G
OINTMENT TOPICAL 2 TIMES DAILY
Status: DISCONTINUED | OUTPATIENT
Start: 2024-01-22 | End: 2024-01-25 | Stop reason: HOSPADM

## 2024-01-22 RX ORDER — HEPARIN SODIUM 5000 [USP'U]/ML
INJECTION, SOLUTION INTRAVENOUS; SUBCUTANEOUS
Status: DISPENSED
Start: 2024-01-22 | End: 2024-01-22

## 2024-01-22 RX ORDER — SODIUM CHLORIDE 9 MG/ML
INJECTION, SOLUTION INTRAVENOUS CONTINUOUS
Status: DISCONTINUED | OUTPATIENT
Start: 2024-01-22 | End: 2024-01-22

## 2024-01-22 RX ORDER — SODIUM CHLORIDE 0.9 % (FLUSH) 0.9 %
10 SYRINGE (ML) INJECTION
Status: DISCONTINUED | OUTPATIENT
Start: 2024-01-22 | End: 2024-01-25 | Stop reason: HOSPADM

## 2024-01-22 RX ORDER — SODIUM CHLORIDE 9 MG/ML
INJECTION, SOLUTION INTRAVENOUS
Status: DISCONTINUED | OUTPATIENT
Start: 2024-01-22 | End: 2024-01-22

## 2024-01-22 RX ORDER — OXYCODONE HYDROCHLORIDE 10 MG/1
10 TABLET ORAL EVERY 4 HOURS PRN
Status: DISCONTINUED | OUTPATIENT
Start: 2024-01-22 | End: 2024-01-25 | Stop reason: HOSPADM

## 2024-01-22 RX ORDER — HEPARIN SODIUM 5000 [USP'U]/ML
5000 INJECTION, SOLUTION INTRAVENOUS; SUBCUTANEOUS EVERY 8 HOURS
Status: ACTIVE | OUTPATIENT
Start: 2024-01-22 | End: 2024-01-22

## 2024-01-22 RX ORDER — FENTANYL CITRATE 50 UG/ML
25 INJECTION, SOLUTION INTRAMUSCULAR; INTRAVENOUS EVERY 5 MIN PRN
Status: COMPLETED | OUTPATIENT
Start: 2024-01-22 | End: 2024-01-22

## 2024-01-22 RX ORDER — ESCITALOPRAM OXALATE 20 MG/1
20 TABLET ORAL NIGHTLY
Status: DISCONTINUED | OUTPATIENT
Start: 2024-01-22 | End: 2024-01-25 | Stop reason: HOSPADM

## 2024-01-22 RX ORDER — QUETIAPINE FUMARATE 25 MG/1
100 TABLET, FILM COATED ORAL NIGHTLY
Status: DISCONTINUED | OUTPATIENT
Start: 2024-01-22 | End: 2024-01-25 | Stop reason: HOSPADM

## 2024-01-22 RX ORDER — ONDANSETRON HYDROCHLORIDE 2 MG/ML
4 INJECTION, SOLUTION INTRAVENOUS DAILY PRN
Status: DISCONTINUED | OUTPATIENT
Start: 2024-01-22 | End: 2024-01-22

## 2024-01-22 RX ORDER — GABAPENTIN 300 MG/1
300 CAPSULE ORAL 3 TIMES DAILY
Status: DISCONTINUED | OUTPATIENT
Start: 2024-01-22 | End: 2024-01-22

## 2024-01-22 RX ADMIN — LAMOTRIGINE 300 MG: 150 TABLET ORAL at 09:01

## 2024-01-22 RX ADMIN — OXYCODONE HYDROCHLORIDE 10 MG: 10 TABLET ORAL at 02:01

## 2024-01-22 RX ADMIN — DEXAMETHASONE SODIUM PHOSPHATE 4 MG: 4 INJECTION, SOLUTION INTRAMUSCULAR; INTRAVENOUS at 07:01

## 2024-01-22 RX ADMIN — SODIUM CHLORIDE, SODIUM GLUCONATE, SODIUM ACETATE, POTASSIUM CHLORIDE, MAGNESIUM CHLORIDE, SODIUM PHOSPHATE, DIBASIC, AND POTASSIUM PHOSPHATE: .53; .5; .37; .037; .03; .012; .00082 INJECTION, SOLUTION INTRAVENOUS at 08:01

## 2024-01-22 RX ADMIN — LIDOCAINE HYDROCHLORIDE 60 MG: 20 INJECTION, SOLUTION EPIDURAL; INFILTRATION; INTRACAUDAL; PERINEURAL at 07:01

## 2024-01-22 RX ADMIN — ROCURONIUM BROMIDE 10 MG: 10 INJECTION, SOLUTION INTRAVENOUS at 08:01

## 2024-01-22 RX ADMIN — PROPOFOL 160 MG: 10 INJECTION, EMULSION INTRAVENOUS at 07:01

## 2024-01-22 RX ADMIN — MUPIROCIN: 20 OINTMENT TOPICAL at 10:01

## 2024-01-22 RX ADMIN — OXYCODONE HYDROCHLORIDE 10 MG: 10 TABLET ORAL at 11:01

## 2024-01-22 RX ADMIN — CEFTRIAXONE SODIUM 2 G: 2 INJECTION, POWDER, FOR SOLUTION INTRAMUSCULAR; INTRAVENOUS at 07:01

## 2024-01-22 RX ADMIN — SODIUM CHLORIDE: 9 INJECTION, SOLUTION INTRAVENOUS at 10:01

## 2024-01-22 RX ADMIN — FENTANYL CITRATE 25 MCG: 0.05 INJECTION, SOLUTION INTRAMUSCULAR; INTRAVENOUS at 10:01

## 2024-01-22 RX ADMIN — SODIUM CHLORIDE: 0.9 INJECTION, SOLUTION INTRAVENOUS at 07:01

## 2024-01-22 RX ADMIN — Medication 10 MG: at 09:01

## 2024-01-22 RX ADMIN — OXYCODONE HYDROCHLORIDE 10 MG: 10 TABLET ORAL at 08:01

## 2024-01-22 RX ADMIN — SUGAMMADEX 400 MG: 100 INJECTION, SOLUTION INTRAVENOUS at 10:01

## 2024-01-22 RX ADMIN — GABAPENTIN 300 MG: 300 CAPSULE ORAL at 09:01

## 2024-01-22 RX ADMIN — LIDOCAINE HYDROCHLORIDE ANHYDROUS AND DEXTROSE MONOHYDRATE 0.02 MG/KG/MIN: .8; 5 INJECTION, SOLUTION INTRAVENOUS at 07:01

## 2024-01-22 RX ADMIN — FENTANYL CITRATE 25 MCG: 0.05 INJECTION, SOLUTION INTRAMUSCULAR; INTRAVENOUS at 11:01

## 2024-01-22 RX ADMIN — METRONIDAZOLE 500 MG: 500 INJECTION, SOLUTION INTRAVENOUS at 07:01

## 2024-01-22 RX ADMIN — PANTOPRAZOLE SODIUM 40 MG: 40 TABLET, DELAYED RELEASE ORAL at 09:01

## 2024-01-22 RX ADMIN — Medication 10 MG: at 07:01

## 2024-01-22 RX ADMIN — FENTANYL CITRATE 100 MCG: 50 INJECTION, SOLUTION INTRAMUSCULAR; INTRAVENOUS at 07:01

## 2024-01-22 RX ADMIN — ALVIMOPAN 12 MG: 12 CAPSULE ORAL at 02:01

## 2024-01-22 RX ADMIN — GABAPENTIN 300 MG: 300 CAPSULE ORAL at 02:01

## 2024-01-22 RX ADMIN — GLYCOPYRROLATE 0.2 MG: 0.2 INJECTION, SOLUTION INTRAMUSCULAR; INTRAVENOUS at 07:01

## 2024-01-22 RX ADMIN — PHENYLEPHRINE HYDROCHLORIDE 50 MCG: 10 INJECTION INTRAVENOUS at 07:01

## 2024-01-22 RX ADMIN — ROCURONIUM BROMIDE 20 MG: 10 INJECTION, SOLUTION INTRAVENOUS at 09:01

## 2024-01-22 RX ADMIN — ATORVASTATIN CALCIUM 40 MG: 10 TABLET, FILM COATED ORAL at 12:01

## 2024-01-22 RX ADMIN — SODIUM CHLORIDE, SODIUM GLUCONATE, SODIUM ACETATE, POTASSIUM CHLORIDE, MAGNESIUM CHLORIDE, SODIUM PHOSPHATE, DIBASIC, AND POTASSIUM PHOSPHATE: .53; .5; .37; .037; .03; .012; .00082 INJECTION, SOLUTION INTRAVENOUS at 07:01

## 2024-01-22 RX ADMIN — MIDAZOLAM HYDROCHLORIDE 2 MG: 1 INJECTION INTRAMUSCULAR; INTRAVENOUS at 07:01

## 2024-01-22 RX ADMIN — ACETAMINOPHEN 1000 MG: 10 INJECTION, SOLUTION INTRAVENOUS at 03:01

## 2024-01-22 RX ADMIN — ESCITALOPRAM OXALATE 20 MG: 20 TABLET ORAL at 09:01

## 2024-01-22 RX ADMIN — MUPIROCIN: 20 OINTMENT TOPICAL at 12:01

## 2024-01-22 RX ADMIN — ONDANSETRON 4 MG: 2 INJECTION INTRAMUSCULAR; INTRAVENOUS at 09:01

## 2024-01-22 RX ADMIN — ROCURONIUM BROMIDE 40 MG: 10 INJECTION, SOLUTION INTRAVENOUS at 07:01

## 2024-01-22 RX ADMIN — QUETIAPINE FUMARATE 100 MG: 25 TABLET ORAL at 09:01

## 2024-01-22 RX ADMIN — Medication 10 MG: at 08:01

## 2024-01-22 NOTE — PLAN OF CARE
[facility] Plan of Care Note  Dx Collectomy secondary to colon adenocarcinoma    Shift Events patient was trasferred via post surgery    Goals of Care: pain will be controlled, patient and family will verbalize understanding of plan of care    Neuro: WDL    Vital Signs: WDL, bp elevated    Respiratory: WDL    Diet: clear liquids    Is patient tolerating current diet? yes    GTTS: IV fuids @40/hr40    Urine Output/Bowel Movement: patient has a reed and verbalized that she is continent of bowel and bladder    Drains/Tubes/Tube Feeds (include total output/shift): reed cath    Lines: PIV      Accuchecks:N/A    Skin: see flowchart    Fall Risk Score: 7    Activity level? OOB w/ one person assist    Any scheduled procedures? no    Any safety concerns? No. Family presently at bedside. Patient verbalizes understanding of use of call light for bathrrom or ambulation assist    Other: N/A

## 2024-01-22 NOTE — TELEPHONE ENCOUNTER
Pt scheduled for colon surgery tomorrow. Did 12 doses of miralax, pt still has some color to her stool but watery. Pt was instructed to call if her stool was not mountain dew colored after taking all 12 doses of miralax for further instruction in case surgeon would like pt to do anything else.     Dispo- call back by provider within one hour. Successfully reached on call provider, dr. Arango. Advised md pt concerned that her stool still has some color. Informed pt states it is watery, just not mountain dew color she was told it should be. Md advised it should be fine and just wanted to ensure pt is/has taken her prescribed antibiotics. Double checked with pt and pt has taken antibiotics as prescribed and has two more doses she will complete at 9p and 11p. Reassured pt based on guidance from dr. Arango that there is nothing else she needs to do at this time. Pt verbalizes understanding, no further assistance needed at this time.  Reason for Disposition   Nursing judgment    Protocols used: No Protocol [id number]

## 2024-01-22 NOTE — OP NOTE
[facility]  Colon and Rectal Surgery    [address]  [place], [place]  Tel: [phone]  Fax: [phone]  [email or web address]   MD Rubén Moore MD Brian Kann, MD W. Forrest Johnston, MD Matthew Giglia, MD Jennifer Paruch, MD William Kethman, MD Danielle Kay, MD     Patient name: Karin Maguire   YOB: 1950   MRN: [id number]  Date of surgery: 01/22/2024    Operative Report    Pre-operative diagnosis: Right colon adenocarcinoma  Post-operative diagnosis: Same as above    Procedure:  Laparoscopic right hemicolectomy with en-bloc abdominal wall resection  Omental pedicle flap to     Findings:  Mid-ascending colon adenocarcinoma, adherent to anterolateral abdominal wall - resected en-bloc    No evidence of metastatic disease    Surgeon: Alberto Albright MD  Assistant: Manuel Haro MD    Indication: Ms. Maguire is a 73 year old woman with a history of anxiety, arthritis, Bipolar disorder, and BANDAR  who presents for laparoscopic, possible open, right hemicolectomy of colon adenocarcinoma. The benefits, risks, and alternatives were discussed with the patient, they were given the opportunity to ask questions and they elected to proceed with operative intervention after signing written consent.    Procedure:  After pre-operative assessment and review of informed consent, the patient was taken to the operating room and received general anesthesia. A reed catheter was then placed under strict sterile precautions. Pre-operative antibiotics were administered if indicated and the patient was placed in lithotomy position. The abdomen was prepped and draped in the usual sterile fashion and a timeout was performed according to [facility] Quality and Safety guidelines.      An infraumbilical 1.5 cm incision was made and the fascia was incised sharply - the balloon port 12 mm port was inserted. The abdomen was explored and the underlying bowel and  vasculature were inspected and found to be free from any iatrogenic injury. The abdomen was explored and the findings are discussed above. Two 5 mm trocars were inserted under direct visualization in the left abdomen.     A medial to lateral dissection was performed by elevating the ileocolic pedicle and incising just inferior to the pedicle. The right colonic mesentery was bluntly dissected free from the retroperitoneum - the right ureter and duodenum were identified and protected. The ileocolic pedicle was then divided with a Ligasure (two burns proximal and distal prior to ligation). The omentum was dissected free from the hepatic flexure taking care to preserve the colon mesentery - there were moderate adhesions in this area related to prior cholecystectomy. The right branch of the middle colic was then ligated (two burns proximal and distal prior to ligation). The terminal ileal mesentery was mobilized similarly off the retroperitoneum. At this point, there were only attachments of the colon to anterolateral abdominal wall (in the area of known tumor) - we dissected into the abdominal wall anteriorly and more laterally we went back into our prior planes. This allowed us to medialize the entire right colon and transverse colon, and terminal ileum.     The supraumbilical incision was enlarged to approximate 5 cm and a wound protector was placed. The specimen was brought through this incision. The distal margin - in the proximal transverse colon was divided with a VALERIE blue load and the terminal ileum was divided similarly. The intervening mesentery was divided with Ligasure. The specimen was sent for review. We mobilized the right omentum off the colon to create an omental pedicle based on left gastroepiploic arcade. The most distal aspect of this omentum was divided and sent for review because it was adjacent to but not adherent to the site of tumor.    We then performed a side-to-side functional end-to-end  ileocolic anastomosis after ensuring proper mesenteric orientation with a single fire of a 100 mm VALERIE blue load - the staple line was inspected and was hemostatic. The common channel was closed with a TA 60 mm stapler. These staple lines were oversewn with a running 3-0 Vicryl suture. The previously mobilized omental pedicle flap was utilized to wrap our anastomosis - secured with 3-0 Vicryl. The anastomosis was placed in the abdomen and cap was placed. The abdomen was re-insufflated and hemostasis was verified - the two mesenteric vessels were hemostatic.    All members of the team then changed gowns and gloves for clean closure. HThe midline incision was approximated with 0-PDS interrupted figure-of-eight sutures. The subcutaneous tissue was thoroughly irrigated and the skin was approximated with 3-0 Vicryl interrupted suture. The two 5 mm port sites were approximated with 4-0 Monocryl. A dry dressing was applied.      Prior to closure and after final closure instrument, needle, and sponge counts were correct.    The procedure was completed without complication and was well-tolerated. The patient was then brought to the post-anesthesia care unit in stable condition. I was present for the entire operation.    Colon Resection  Operation performed with curative intent Yes   Tumor Location Ascending colon   Extent of colon and vascular resection Right hemicolectomy - ileocolic, right colic (if present)      Complications: None  Estimated blood loss: Minimal  Disposition: PACU      Alberto Albright MD, FACS, FASCRS  Department of Colon & Rectal Surgery  [facility]

## 2024-01-22 NOTE — ANESTHESIA POSTPROCEDURE EVALUATION
Anesthesia Post Evaluation    Patient: Karin Maguire    Procedure(s) Performed: Procedure(s) (LRB):  COLECTOMY, RIGHT, LAPAROSCOPIC (eras low, lithotomy) (N/A)    Final Anesthesia Type: general      Patient location during evaluation: PACU  Patient participation: Yes- Able to Participate  Level of consciousness: awake and alert  Post-procedure vital signs: reviewed and stable  Pain management: adequate  Airway patency: patent    PONV status at discharge: No PONV  Anesthetic complications: no      Cardiovascular status: blood pressure returned to baseline  Respiratory status: unassisted  Hydration status: euvolemic  Follow-up not needed.              Vitals Value Taken Time   /72 01/22/24 1422   Temp 36.8 °C (98.2 °F) 01/22/24 1422   Pulse 64 01/22/24 1422   Resp 18 01/22/24 1455   SpO2 95 % 01/22/24 1422         Event Time   Out of Recovery 10:35:00         Pain/Mercedes Score: Pain Rating Prior to Med Admin: 9 (1/22/2024  3:01 PM)  Pain Rating Post Med Admin: 0 (1/22/2024 10:55 AM)  Mercedes Score: 9 (1/22/2024  2:23 PM)

## 2024-01-22 NOTE — BRIEF OP NOTE
[facility] ([facility])  Brief Operative Note    SUMMARY     Surgery Date: 1/22/2024     Surgeon(s) and Role:     * Alberto Chapman MD - Primary     * Manuel Haro MD - Resident - Assisting        Pre-op Diagnosis:  Malignant neoplasm of ascending colon [C18.2]    Post-op Diagnosis:  Post-Op Diagnosis Codes:     * Malignant neoplasm of ascending colon [C18.2]    Procedure(s) (LRB):  COLECTOMY, RIGHT, LAPAROSCOPIC (eras low, lithotomy) (N/A)    Anesthesia: General    Implants:  * No implants in log *    Operative Findings: see op note for full details. Right hemicolectomy with en bloc peritoneal resection. Desmoplastic reaction vs local invasion. Side to side antiperistaltic ileocolic anastomosis created with stapler.     Estimated Blood Loss: * No values recorded between 1/22/2024  7:46 AM and 1/22/2024 10:07 AM *    Estimated Blood Loss has been documented.         Specimens:   Specimen (24h ago, onward)       Start     Ordered    01/22/24 0931  Specimen to Pathology, Surgery General Surgery  Once        Comments: Pre-op Diagnosis: Malignant neoplasm of ascending colon [C18.2]Procedure(s):COLECTOMY, RIGHT, LAPAROSCOPIC (eras low, lithotomy) Number of specimens: 2Name of specimens: 1)right colon-perm2)omentum-perm     References:    Click here for ordering Quick Tip   Question Answer Comment   Procedure Type: General Surgery    Specimen Class: Known or suspected malignancy    Which provider would you like to cc? ALBERTO CHAPMAN    Release to patient Immediate        01/22/24 0952                    [id number]

## 2024-01-22 NOTE — ANESTHESIA PROCEDURE NOTES
Intubation    Date/Time: 1/22/2024 7:31 AM    Performed by: Yoko Vee MD  Authorized by: Mauricio Sprague MD    Intubation:     Induction:  Intravenous    Intubated:  Postinduction    Mask Ventilation:  Easy with oral airway    Attempts:  1    Attempted By:  Resident anesthesiologist    Method of Intubation:  Direct    Blade:  Liz 3    Laryngeal View Grade: Grade IIA - cords partially seen      Difficult Airway Encountered?: No      Complications:  None    Airway Device:  Oral endotracheal tube    Airway Device Size:  7.0    Style/Cuff Inflation:  Cuffed (inflated to minimal occlusive pressure)    Tube secured:  22    Secured at:  The teeth    Placement Verified By:  Capnometry    Complicating Factors:  None    Findings Post-Intubation:  BS equal bilateral and atraumatic/condition of teeth unchanged

## 2024-01-22 NOTE — TRANSFER OF CARE
"Anesthesia Transfer of Care Note    Patient: Karin Maguire    Procedure(s) Performed: Procedure(s) (LRB):  COLECTOMY, RIGHT, LAPAROSCOPIC (eras low, lithotomy) (N/A)    Patient location: PACU    Transport from OR: Transported from OR on 6-10 L/min O2 by face mask with adequate spontaneous ventilation    Post pain: adequate analgesia    Post assessment: no apparent anesthetic complications    Post vital signs: stable    Level of consciousness: awake, alert and oriented    Complications: none    Transfer of care protocol was followed      Last vitals: Visit Vitals  BP (!) 143/65 (BP Location: Right arm, Patient Position: Lying)   Pulse (!) 58   Temp 37 °C (98.6 °F) (Oral)   Resp 16   Ht 5' 6" (1.676 m)   Wt 72.5 kg (159 lb 13.3 oz)   SpO2 98%   BMI 25.80 kg/m²     "

## 2024-01-22 NOTE — NURSING TRANSFER
..Nursing Transfer Note      Reason patient is being transferred: procedure complete    Transfer To: 1008    Transfer via bed    Transfer with oxygen and fluid     Transported by pct      Medicines sent: n/a    Any special needs or follow-up needed: routine     Chart send with patient: Yes    Notified: daughter    Patient reassessed at: 1/22/24 4077 (date, time)

## 2024-01-22 NOTE — INTERVAL H&P NOTE
A paper format of this was completed prior to surgery due to Epic Downtime.    The patient has been examined and the H&P has been reviewed:    I concur with the findings and no changes have occurred since H&P was written.    Surgical risks, benefits and alternative options discussed and understood by patient/family.    Active Hospital Problems    Diagnosis  POA    *Colon adenocarcinoma [C18.9]  Yes      Resolved Hospital Problems   No resolved problems to display.

## 2024-01-23 LAB
ANION GAP SERPL CALC-SCNC: 6 MMOL/L (ref 8–16)
BASOPHILS # BLD AUTO: 0.03 K/UL (ref 0–0.2)
BASOPHILS NFR BLD: 0.5 % (ref 0–1.9)
BUN SERPL-MCNC: 6 MG/DL (ref 8–23)
CALCIUM SERPL-MCNC: 8.6 MG/DL (ref 8.7–10.5)
CHLORIDE SERPL-SCNC: 106 MMOL/L (ref 95–110)
CO2 SERPL-SCNC: 23 MMOL/L (ref 23–29)
CREAT SERPL-MCNC: 0.6 MG/DL (ref 0.5–1.4)
DIFFERENTIAL METHOD BLD: ABNORMAL
EOSINOPHIL # BLD AUTO: 0 K/UL (ref 0–0.5)
EOSINOPHIL NFR BLD: 0.3 % (ref 0–8)
ERYTHROCYTE [DISTWIDTH] IN BLOOD BY AUTOMATED COUNT: 13.9 % (ref 11.5–14.5)
EST. GFR  (NO RACE VARIABLE): >60 ML/MIN/1.73 M^2
GLUCOSE SERPL-MCNC: 99 MG/DL (ref 70–110)
HCT VFR BLD AUTO: 33.8 % (ref 37–48.5)
HGB BLD-MCNC: 10.8 G/DL (ref 12–16)
IMM GRANULOCYTES # BLD AUTO: 0.01 K/UL (ref 0–0.04)
IMM GRANULOCYTES NFR BLD AUTO: 0.2 % (ref 0–0.5)
LYMPHOCYTES # BLD AUTO: 1.3 K/UL (ref 1–4.8)
LYMPHOCYTES NFR BLD: 20.2 % (ref 18–48)
MAGNESIUM SERPL-MCNC: 2.2 MG/DL (ref 1.6–2.6)
MCH RBC QN AUTO: 29.1 PG (ref 27–31)
MCHC RBC AUTO-ENTMCNC: 32 G/DL (ref 32–36)
MCV RBC AUTO: 91 FL (ref 82–98)
MONOCYTES # BLD AUTO: 0.8 K/UL (ref 0.3–1)
MONOCYTES NFR BLD: 12.9 % (ref 4–15)
NEUTROPHILS # BLD AUTO: 4.3 K/UL (ref 1.8–7.7)
NEUTROPHILS NFR BLD: 65.9 % (ref 38–73)
NRBC BLD-RTO: 0 /100 WBC
PHOSPHATE SERPL-MCNC: 2.2 MG/DL (ref 2.7–4.5)
PLATELET # BLD AUTO: 235 K/UL (ref 150–450)
PMV BLD AUTO: 10.7 FL (ref 9.2–12.9)
POTASSIUM SERPL-SCNC: 3.7 MMOL/L (ref 3.5–5.1)
RBC # BLD AUTO: 3.71 M/UL (ref 4–5.4)
SODIUM SERPL-SCNC: 135 MMOL/L (ref 136–145)
WBC # BLD AUTO: 6.5 K/UL (ref 3.9–12.7)

## 2024-01-23 PROCEDURE — 80048 BASIC METABOLIC PNL TOTAL CA: CPT | Performed by: SURGERY

## 2024-01-23 PROCEDURE — 85025 COMPLETE CBC W/AUTO DIFF WBC: CPT | Performed by: SURGERY

## 2024-01-23 PROCEDURE — 63600175 PHARM REV CODE 636 W HCPCS: Performed by: STUDENT IN AN ORGANIZED HEALTH CARE EDUCATION/TRAINING PROGRAM

## 2024-01-23 PROCEDURE — 63600175 PHARM REV CODE 636 W HCPCS: Performed by: SURGERY

## 2024-01-23 PROCEDURE — 20600001 HC STEP DOWN PRIVATE ROOM

## 2024-01-23 PROCEDURE — 84100 ASSAY OF PHOSPHORUS: CPT | Performed by: SURGERY

## 2024-01-23 PROCEDURE — 25000003 PHARM REV CODE 250: Performed by: SURGERY

## 2024-01-23 PROCEDURE — 36415 COLL VENOUS BLD VENIPUNCTURE: CPT | Performed by: SURGERY

## 2024-01-23 PROCEDURE — 83735 ASSAY OF MAGNESIUM: CPT | Performed by: SURGERY

## 2024-01-23 PROCEDURE — 99900035 HC TECH TIME PER 15 MIN (STAT)

## 2024-01-23 PROCEDURE — 94761 N-INVAS EAR/PLS OXIMETRY MLT: CPT

## 2024-01-23 RX ORDER — METHOCARBAMOL 500 MG/1
500 TABLET, FILM COATED ORAL 4 TIMES DAILY
Status: DISCONTINUED | OUTPATIENT
Start: 2024-01-23 | End: 2024-01-25 | Stop reason: HOSPADM

## 2024-01-23 RX ORDER — HYDROMORPHONE HYDROCHLORIDE 1 MG/ML
0.5 INJECTION, SOLUTION INTRAMUSCULAR; INTRAVENOUS; SUBCUTANEOUS EVERY 6 HOURS PRN
Status: DISCONTINUED | OUTPATIENT
Start: 2024-01-23 | End: 2024-01-25 | Stop reason: HOSPADM

## 2024-01-23 RX ADMIN — GABAPENTIN 300 MG: 300 CAPSULE ORAL at 02:01

## 2024-01-23 RX ADMIN — GABAPENTIN 300 MG: 300 CAPSULE ORAL at 09:01

## 2024-01-23 RX ADMIN — MUPIROCIN: 20 OINTMENT TOPICAL at 09:01

## 2024-01-23 RX ADMIN — OXYCODONE HYDROCHLORIDE 10 MG: 10 TABLET ORAL at 02:01

## 2024-01-23 RX ADMIN — ESCITALOPRAM OXALATE 20 MG: 20 TABLET ORAL at 09:01

## 2024-01-23 RX ADMIN — ACETAMINOPHEN 1000 MG: 10 INJECTION, SOLUTION INTRAVENOUS at 05:01

## 2024-01-23 RX ADMIN — DIBASIC SODIUM PHOSPHATE, MONOBASIC POTASSIUM PHOSPHATE AND MONOBASIC SODIUM PHOSPHATE 2 TABLET: 852; 155; 130 TABLET ORAL at 09:01

## 2024-01-23 RX ADMIN — OXYCODONE HYDROCHLORIDE 10 MG: 10 TABLET ORAL at 10:01

## 2024-01-23 RX ADMIN — ALVIMOPAN 12 MG: 12 CAPSULE ORAL at 09:01

## 2024-01-23 RX ADMIN — ATORVASTATIN CALCIUM 40 MG: 40 TABLET, FILM COATED ORAL at 09:01

## 2024-01-23 RX ADMIN — PANTOPRAZOLE SODIUM 40 MG: 40 TABLET, DELAYED RELEASE ORAL at 09:01

## 2024-01-23 RX ADMIN — LAMOTRIGINE 300 MG: 150 TABLET ORAL at 09:01

## 2024-01-23 RX ADMIN — METHOCARBAMOL 500 MG: 500 TABLET ORAL at 05:01

## 2024-01-23 RX ADMIN — OXYCODONE HYDROCHLORIDE 10 MG: 10 TABLET ORAL at 05:01

## 2024-01-23 RX ADMIN — QUETIAPINE FUMARATE 100 MG: 25 TABLET ORAL at 09:01

## 2024-01-23 RX ADMIN — ACETAMINOPHEN 1000 MG: 500 TABLET ORAL at 09:01

## 2024-01-23 RX ADMIN — METHOCARBAMOL 500 MG: 500 TABLET ORAL at 09:01

## 2024-01-23 RX ADMIN — ACETAMINOPHEN 1000 MG: 500 TABLET ORAL at 02:01

## 2024-01-23 RX ADMIN — HYDROMORPHONE HYDROCHLORIDE 0.5 MG: 1 INJECTION, SOLUTION INTRAMUSCULAR; INTRAVENOUS; SUBCUTANEOUS at 05:01

## 2024-01-23 NOTE — PLAN OF CARE
Problem: Adult Inpatient Plan of Care  Goal: Plan of Care Review  Outcome: Ongoing, Progressing     Problem: Adult Inpatient Plan of Care  Goal: Patient-Specific Goal (Individualized)  Outcome: Ongoing, Progressing     Problem: Infection  Goal: Absence of Infection Signs and Symptoms  Outcome: Ongoing, Progressing     Problem: Fall Injury Risk  Goal: Absence of Fall and Fall-Related Injury  Outcome: Ongoing, Progressing     Pt AAOx4. 2L to RA with O2 sats > 95%. VSS. Pain controlled with PRN meds. No complaints of N/V. Tolerated Clear liquid diet. Incision with scant drainage. Velazco CDI, output charted. Velazco removed this AM. Safety maintained. Call bell in reach. Bed alarm in use.

## 2024-01-23 NOTE — PLAN OF CARE
[facility] Plan of Care Note  Dx status post collectomy    Shift Events patient ambulates often and is compliant with plan of care    Goals of Care: patient will progress functionally and pain will be controlled    Neuro: WDL    Vital Signs: WDL    Respiratory: WDL    Diet: low fiber/residue    Is patient tolerating current diet? yes    GTTS: no    Urine Output/Bowel Movement: patient is voiding, incontinent of bowel this am    Drains/Tubes/Tube Feeds (include total output/shift): N/A    Lines: PIV      Accuchecks:no    Skin: see flowchart    Fall Risk Score: 12    Activity level? OOB w/ assist    Any scheduled procedures? no    Any safety concerns? no    Other: N/A

## 2024-01-23 NOTE — ASSESSMENT & PLAN NOTE
OR 1/22 RHC    -await return of bowel function  -continue multi modality for pain control  -encourage ambulation and use of IS  -beronica hose and SCD  -prophalactic lovenox and PPI   -dc reed, await void

## 2024-01-23 NOTE — SUBJECTIVE & OBJECTIVE
Subjective:     Interval History: no acute events overnite, adequate pain control, some flatus, no n/v    Post-Op Info:  Procedure(s) (LRB):  COLECTOMY, RIGHT, LAPAROSCOPIC (eras low, lithotomy) (N/A)   1 Day Post-Op      Medications:  Continuous Infusions:  Scheduled Meds:   acetaminophen  1,000 mg Intravenous Q8H    Followed by    acetaminophen  1,000 mg Oral Q8H    alvimopan  12 mg Oral BID    atorvastatin  40 mg Oral QHS    EScitalopram oxalate  20 mg Oral QHS    gabapentin  300 mg Oral TID    lamoTRIgine  300 mg Oral QHS    mupirocin   Nasal BID    pantoprazole  40 mg Oral Nightly    QUEtiapine  100 mg Oral QHS     PRN Meds:   albuterol    ondansetron    oxyCODONE    oxyCODONE    sodium chloride 0.9%        Objective:     Vital Signs (Most Recent):  Temp: 98.7 °F (37.1 °C) (01/23/24 1036)  Pulse: 64 (01/23/24 1036)  Resp: 18 (01/23/24 1036)  BP: (!) 118/59 (01/23/24 1036)  SpO2: (!) 94 % (01/23/24 1036) Vital Signs (24h Range):  Temp:  [97.5 °F (36.4 °C)-98.7 °F (37.1 °C)] 98.7 °F (37.1 °C)  Pulse:  [52-75] 64  Resp:  [15-20] 18  SpO2:  [94 %-100 %] 94 %  BP: (100-166)/(59-76) 118/59     Intake/Output - Last 3 Shifts         01/21 0700  01/22 0659 01/22 0700  01/23 0659 01/23 0700  01/24 0659    P.O.  1550 240    I.V. (mL/kg)  988.1 (13.6)     IV Piggyback  1891     Total Intake(mL/kg)  4429.1 (61.1) 240 (3.3)    Urine (mL/kg/hr)  2400 (1.4) 300 (0.8)    Stool  0     Total Output  2400 300    Net  +2029.1 -60           Stool Occurrence  0 x              Physical Exam  Vitals and nursing note reviewed.   Constitutional:       Appearance: She is well-developed.   HENT:      Head: Normocephalic.   Eyes:      Pupils: Pupils are equal, round, and reactive to light.   Cardiovascular:      Rate and Rhythm: Normal rate and regular rhythm.      Heart sounds: Normal heart sounds.   Pulmonary:      Effort: Pulmonary effort is normal. No respiratory distress.      Breath sounds: Normal breath sounds. No wheezing or  rales.   Abdominal:      Palpations: Abdomen is soft. There is no mass.      Tenderness: There is no guarding or rebound.      Comments: Abd inc line healing well   Skin:     General: Skin is warm and dry.   Neurological:      Mental Status: She is alert and oriented to person, place, and time.            Significant Labs:  BMP (Last 3 Results):   Recent Labs   Lab 01/23/24  0526   GLU 99   *   K 3.7      CO2 23   BUN 6*   CREATININE 0.6   CALCIUM 8.6*   MG 2.2     CBC (Last 3 Results):   Recent Labs   Lab 01/23/24  0526   WBC 6.50   RBC 3.71*   HGB 10.8*   HCT 33.8*      MCV 91   MCH 29.1   MCHC 32.0       Significant Diagnostics:  None

## 2024-01-23 NOTE — PROGRESS NOTES
[facility]  Colorectal Surgery  Progress Note    Patient Name: Karin Maguire  MRN: [id number]  Admission Date: 1/22/2024  Hospital Length of Stay: 1 days  Attending Physician: Alberto Albright MD    Subjective:     Interval History: no acute events overnite, adequate pain control, some flatus, no n/v    Post-Op Info:  Procedure(s) (LRB):  COLECTOMY, RIGHT, LAPAROSCOPIC (eras low, lithotomy) (N/A)   1 Day Post-Op      Medications:  Continuous Infusions:  Scheduled Meds:   acetaminophen  1,000 mg Intravenous Q8H    Followed by    acetaminophen  1,000 mg Oral Q8H    alvimopan  12 mg Oral BID    atorvastatin  40 mg Oral QHS    EScitalopram oxalate  20 mg Oral QHS    gabapentin  300 mg Oral TID    lamoTRIgine  300 mg Oral QHS    mupirocin   Nasal BID    pantoprazole  40 mg Oral Nightly    QUEtiapine  100 mg Oral QHS     PRN Meds:   albuterol    ondansetron    oxyCODONE    oxyCODONE    sodium chloride 0.9%        Objective:     Vital Signs (Most Recent):  Temp: 98.7 °F (37.1 °C) (01/23/24 1036)  Pulse: 64 (01/23/24 1036)  Resp: 18 (01/23/24 1036)  BP: (!) 118/59 (01/23/24 1036)  SpO2: (!) 94 % (01/23/24 1036) Vital Signs (24h Range):  Temp:  [97.5 °F (36.4 °C)-98.7 °F (37.1 °C)] 98.7 °F (37.1 °C)  Pulse:  [52-75] 64  Resp:  [15-20] 18  SpO2:  [94 %-100 %] 94 %  BP: (100-166)/(59-76) 118/59     Intake/Output - Last 3 Shifts         01/21 0700 01/22 0659 01/22 0700 01/23 0659 01/23 0700  01/24 0659    P.O.  1550 240    I.V. (mL/kg)  988.1 (13.6)     IV Piggyback  1891     Total Intake(mL/kg)  4429.1 (61.1) 240 (3.3)    Urine (mL/kg/hr)  2400 (1.4) 300 (0.8)    Stool  0     Total Output  2400 300    Net  +2029.1 -60           Stool Occurrence  0 x              Physical Exam  Vitals and nursing note reviewed.   Constitutional:       Appearance: She is well-developed.   HENT:      Head: Normocephalic.   Eyes:      Pupils: Pupils are equal, round, and reactive to light.   Cardiovascular:      Rate and Rhythm:  Normal rate and regular rhythm.      Heart sounds: Normal heart sounds.   Pulmonary:      Effort: Pulmonary effort is normal. No respiratory distress.      Breath sounds: Normal breath sounds. No wheezing or rales.   Abdominal:      Palpations: Abdomen is soft. There is no mass.      Tenderness: There is no guarding or rebound.      Comments: Abd inc line healing well   Skin:     General: Skin is warm and dry.   Neurological:      Mental Status: She is alert and oriented to person, place, and time.            Significant Labs:  BMP (Last 3 Results):   Recent Labs   Lab 01/23/24  0526   GLU 99   *   K 3.7      CO2 23   BUN 6*   CREATININE 0.6   CALCIUM 8.6*   MG 2.2     CBC (Last 3 Results):   Recent Labs   Lab 01/23/24  0526   WBC 6.50   RBC 3.71*   HGB 10.8*   HCT 33.8*      MCV 91   MCH 29.1   MCHC 32.0       Significant Diagnostics:  None  Assessment/Plan:     * Colon adenocarcinoma  OR 1/22 RHC    -await return of bowel function  -continue multi modality for pain control  -encourage ambulation and use of IS  -beronica hose and SCD  -prophalactic lovenox and PPI   -dc derek, await void    Bipolar 1 disorder  Resume h ome meds          Sandra Guzman NP  Colorectal Surgery  [facility]

## 2024-01-24 LAB
BASOPHILS # BLD AUTO: 0.04 K/UL (ref 0–0.2)
BASOPHILS NFR BLD: 0.7 % (ref 0–1.9)
DIFFERENTIAL METHOD BLD: ABNORMAL
EOSINOPHIL # BLD AUTO: 0.1 K/UL (ref 0–0.5)
EOSINOPHIL NFR BLD: 1.6 % (ref 0–8)
ERYTHROCYTE [DISTWIDTH] IN BLOOD BY AUTOMATED COUNT: 14.5 % (ref 11.5–14.5)
HCT VFR BLD AUTO: 31.7 % (ref 37–48.5)
HGB BLD-MCNC: 10.1 G/DL (ref 12–16)
IMM GRANULOCYTES # BLD AUTO: 0.02 K/UL (ref 0–0.04)
IMM GRANULOCYTES NFR BLD AUTO: 0.3 % (ref 0–0.5)
LYMPHOCYTES # BLD AUTO: 1.7 K/UL (ref 1–4.8)
LYMPHOCYTES NFR BLD: 27.4 % (ref 18–48)
MCH RBC QN AUTO: 28.9 PG (ref 27–31)
MCHC RBC AUTO-ENTMCNC: 31.9 G/DL (ref 32–36)
MCV RBC AUTO: 91 FL (ref 82–98)
MONOCYTES # BLD AUTO: 0.7 K/UL (ref 0.3–1)
MONOCYTES NFR BLD: 10.8 % (ref 4–15)
NEUTROPHILS # BLD AUTO: 3.6 K/UL (ref 1.8–7.7)
NEUTROPHILS NFR BLD: 59.2 % (ref 38–73)
NRBC BLD-RTO: 0 /100 WBC
PLATELET # BLD AUTO: 207 K/UL (ref 150–450)
PMV BLD AUTO: 10.8 FL (ref 9.2–12.9)
RBC # BLD AUTO: 3.49 M/UL (ref 4–5.4)
WBC # BLD AUTO: 6.1 K/UL (ref 3.9–12.7)

## 2024-01-24 PROCEDURE — 63600175 PHARM REV CODE 636 W HCPCS: Performed by: SURGERY

## 2024-01-24 PROCEDURE — 25000003 PHARM REV CODE 250: Performed by: SURGERY

## 2024-01-24 PROCEDURE — 20600001 HC STEP DOWN PRIVATE ROOM

## 2024-01-24 PROCEDURE — 36415 COLL VENOUS BLD VENIPUNCTURE: CPT | Performed by: SURGERY

## 2024-01-24 PROCEDURE — 85025 COMPLETE CBC W/AUTO DIFF WBC: CPT | Performed by: SURGERY

## 2024-01-24 RX ORDER — ENOXAPARIN SODIUM 100 MG/ML
40 INJECTION SUBCUTANEOUS EVERY 24 HOURS
Status: DISCONTINUED | OUTPATIENT
Start: 2024-01-24 | End: 2024-01-25 | Stop reason: HOSPADM

## 2024-01-24 RX ADMIN — MUPIROCIN: 20 OINTMENT TOPICAL at 09:01

## 2024-01-24 RX ADMIN — ALVIMOPAN 12 MG: 12 CAPSULE ORAL at 09:01

## 2024-01-24 RX ADMIN — METHOCARBAMOL 500 MG: 500 TABLET ORAL at 05:01

## 2024-01-24 RX ADMIN — METHOCARBAMOL 500 MG: 500 TABLET ORAL at 01:01

## 2024-01-24 RX ADMIN — GABAPENTIN 300 MG: 300 CAPSULE ORAL at 09:01

## 2024-01-24 RX ADMIN — LAMOTRIGINE 300 MG: 150 TABLET ORAL at 09:01

## 2024-01-24 RX ADMIN — ESCITALOPRAM OXALATE 20 MG: 20 TABLET ORAL at 09:01

## 2024-01-24 RX ADMIN — METHOCARBAMOL 500 MG: 500 TABLET ORAL at 09:01

## 2024-01-24 RX ADMIN — ENOXAPARIN SODIUM 40 MG: 40 INJECTION SUBCUTANEOUS at 05:01

## 2024-01-24 RX ADMIN — OXYCODONE 5 MG: 5 TABLET ORAL at 06:01

## 2024-01-24 RX ADMIN — OXYCODONE HYDROCHLORIDE 10 MG: 10 TABLET ORAL at 09:01

## 2024-01-24 RX ADMIN — ATORVASTATIN CALCIUM 40 MG: 40 TABLET, FILM COATED ORAL at 09:01

## 2024-01-24 RX ADMIN — ACETAMINOPHEN 1000 MG: 500 TABLET ORAL at 09:01

## 2024-01-24 RX ADMIN — GABAPENTIN 300 MG: 300 CAPSULE ORAL at 03:01

## 2024-01-24 RX ADMIN — ACETAMINOPHEN 1000 MG: 500 TABLET ORAL at 05:01

## 2024-01-24 RX ADMIN — OXYCODONE HYDROCHLORIDE 10 MG: 10 TABLET ORAL at 01:01

## 2024-01-24 RX ADMIN — PANTOPRAZOLE SODIUM 40 MG: 40 TABLET, DELAYED RELEASE ORAL at 09:01

## 2024-01-24 RX ADMIN — QUETIAPINE FUMARATE 100 MG: 25 TABLET ORAL at 09:01

## 2024-01-24 NOTE — PLAN OF CARE
[facility]  Initial Discharge Assessment       Primary Care Provider: Lottie Hough NP    Admission Diagnosis: Malignant neoplasm of ascending colon [C18.2]  Colon adenocarcinoma [C18.9]    Admission Date: 1/22/2024  Expected Discharge Date: 1/30/2024    Transition of Care Barriers: None    Payor: [employer] MEDICARE ADVANTAGE / Plan: [employer] MCARE ADV / Product Type: Medicare Advantage /     Extended Emergency Contact Information  Primary Emergency Contact: Jairo Maguire  Address: [address] [place] [address] [place] [address] [place]  Mobile Phone: [phone]  Relation: Spouse    Discharge Plan A: Home with family  Discharge Plan B: Home Health      [facility] [address] [place] [address]  Phone: [phone] Fax: [phone]    [id number] [place] - [address]  [address]  [place] [address]  Phone: [phone] Fax: [phone]      Initial Assessment (most recent)       Adult Discharge Assessment - 01/24/24 1534          Discharge Assessment    Assessment Type Discharge Planning Assessment     Confirmed/corrected address, phone number and insurance Yes     Confirmed Demographics Correct on Facesheet     Source of Information patient     When was your last doctors appointment? 12/11/23     Communicated ALCIRA with patient/caregiver Yes     People in Home spouse     Do you expect to return to your current living situation? Yes     Do you have help at home or someone to help you manage your care at home? Yes     Who are your caregiver(s) and their phone number(s)?      Prior to hospitilization cognitive status: Alert/Oriented     Current cognitive status: Alert/Oriented     Home Layout Able to live on 1st floor     Do you take prescription medications? No     Do you have prescription coverage? No     Do you have any problems affording any of your prescribed medications? No     Is the patient taking medications as prescribed? yes      Who is going to help you get home at discharge?      How do you get to doctors appointments? car, drives self     Are you on dialysis? No     Do you take coumadin? No     Discharge Plan A Home with family     Discharge Plan B Home Health     Discharge Plan discussed with: Patient     Transition of Care Barriers None     SDOH --   no       Physical Activity    On average, how many days per week do you engage in moderate to strenuous exercise (like a brisk walk)? 0 days     On average, how many minutes do you engage in exercise at this level? 0 min        Financial Resource Strain    How hard is it for you to pay for the very basics like food, housing, medical care, and heating? Not hard at all        Housing Stability    In the last 12 months, was there a time when you were not able to pay the mortgage or rent on time? No     In the last 12 months, was there a time when you did not have a steady place to sleep or slept in a shelter (including now)? No        Transportation Needs    In the past 12 months, has lack of transportation kept you from medical appointments or from getting medications? No     In the past 12 months, has lack of transportation kept you from meetings, work, or from getting things needed for daily living? No        Food Insecurity    Within the past 12 months, you worried that your food would run out before you got the money to buy more. Never true     Within the past 12 months, the food you bought just didn't last and you didn't have money to get more. Never true        Stress    Do you feel stress - tense, restless, nervous, or anxious, or unable to sleep at night because your mind is troubled all the time - these days? Not at all        Social Connections    In a typical week, how many times do you talk on the phone with family, friends, or neighbors? Three times a week     How often do you get together with friends or relatives? Three times a week     How often do you attend Yarsani  or Methodist services? More than 4 times per year     Do you belong to any clubs or organizations such as Baptism groups, unions, fraternal or athletic groups, or school groups? No     How often do you attend meetings of the clubs or organizations you belong to? More than 4 times per year     Are you , , , , never , or living with a partner?         Alcohol Use    Q1: How often do you have a drink containing alcohol? Never     Q2: How many drinks containing alcohol do you have on a typical day when you are drinking? Patient does not drink     Q3: How often do you have six or more drinks on one occasion? Never                      The CM met with the patient at bedside to complete the DPA.  The CM placed name and contact information on the blackboard in the patient's room.  Use preferred pharmacy / bedside delivery for any necessary  medications at the time of discharge.The patient is independent with all ADLs. The patient is not on Dialysis or Coumadin. The patient's  will provide assistance to the patient upon discharge. The patient's  will provide transportation upon discharge .  The CM will continue to follow for course of hospitalization.

## 2024-01-24 NOTE — PROGRESS NOTES
[facility]  Colorectal Surgery  Progress Note    Patient Name: Karin Maguire  MRN: [id number]  Admission Date: 1/22/2024  Hospital Length of Stay: 2 days  Attending Physician: Alberto Albright MD    Subjective:     Interval History: no acute eveents overnite, amb in redd, adequate pain control, some flatus, no stool    Post-Op Info:  Procedure(s) (LRB):  COLECTOMY, RIGHT, LAPAROSCOPIC (eras low, lithotomy) (N/A)   2 Days Post-Op      Medications:  Continuous Infusions:  Scheduled Meds:   acetaminophen  1,000 mg Oral Q8H    alvimopan  12 mg Oral BID    atorvastatin  40 mg Oral QHS    enoxparin  40 mg Subcutaneous Q24H (prophylaxis, 1700)    EScitalopram oxalate  20 mg Oral QHS    gabapentin  300 mg Oral TID    lamoTRIgine  300 mg Oral QHS    methocarbamoL  500 mg Oral QID    mupirocin   Nasal BID    pantoprazole  40 mg Oral Nightly    QUEtiapine  100 mg Oral QHS     PRN Meds:   albuterol    HYDROmorphone    ondansetron    oxyCODONE    oxyCODONE    sodium chloride 0.9%        Objective:     Vital Signs (Most Recent):  Temp: 97.9 °F (36.6 °C) (01/24/24 0731)  Pulse: 62 (01/24/24 0731)  Resp: 18 (01/24/24 0731)  BP: 133/62 (01/24/24 0731)  SpO2: 97 % (01/24/24 0731) Vital Signs (24h Range):  Temp:  [97.5 °F (36.4 °C)-99.4 °F (37.4 °C)] 97.9 °F (36.6 °C)  Pulse:  [62-68] 62  Resp:  [16-20] 18  SpO2:  [92 %-97 %] 97 %  BP: (111-143)/(60-63) 133/62     Intake/Output - Last 3 Shifts         01/22 0700 01/23 0659 01/23 0700 01/24 0659 01/24 0700 01/25 0659    P.O. 1550 1880     I.V. (mL/kg) 988.1 (13.6) 80 (1.1)     IV Piggyback 1891      Total Intake(mL/kg) 4429.1 (61.1) 1960 (27)     Urine (mL/kg/hr) 2400 (1.4) 800 (0.5)     Stool 0 0     Total Output 2400 800     Net +2029.1 +1160            Urine Occurrence  6 x     Stool Occurrence 0 x 2 x              Physical Exam  Vitals and nursing note reviewed.   Constitutional:       Appearance: She is well-developed.   HENT:      Head: Normocephalic.   Eyes:       Pupils: Pupils are equal, round, and reactive to light.   Cardiovascular:      Rate and Rhythm: Normal rate and regular rhythm.      Heart sounds: Normal heart sounds.   Pulmonary:      Effort: Pulmonary effort is normal. No respiratory distress.      Breath sounds: Normal breath sounds. No wheezing or rales.   Abdominal:      Palpations: Abdomen is soft. There is no mass.      Tenderness: There is no guarding or rebound.      Comments: Abd inc line healing well   Skin:     General: Skin is warm and dry.   Neurological:      Mental Status: She is alert and oriented to person, place, and time.                Significant Labs:  BMP (Last 3 Results):   Recent Labs   Lab 01/23/24  0526   GLU 99   *   K 3.7      CO2 23   BUN 6*   CREATININE 0.6   CALCIUM 8.6*   MG 2.2     CBC (Last 3 Results):   Recent Labs   Lab 01/23/24 0526 01/24/24  0404   WBC 6.50 6.10   RBC 3.71* 3.49*   HGB 10.8* 10.1*   HCT 33.8* 31.7*    207   MCV 91 91   MCH 29.1 28.9   MCHC 32.0 31.9*       Significant Diagnostics:  None  Assessment/Plan:     * Colon adenocarcinoma  OR 1/22 RHC    -await return of bowel function  -continue multi modality for pain control  -encourage ambulation and use of IS  -beronica hose and SCD  -prophalactic lovenox and PPI   -dc reed, voiding    Bipolar 1 disorder  Resume h ome meds          Sandra Guzman NP  Colorectal Surgery  [facility]

## 2024-01-24 NOTE — ASSESSMENT & PLAN NOTE
OR 1/22 RHC    -await return of bowel function  -continue multi modality for pain control  -encourage ambulation and use of IS  -beronica hose and SCD  -prophalactic lovenox and PPI   -dc reed, voiding

## 2024-01-24 NOTE — SUBJECTIVE & OBJECTIVE
Subjective:     Interval History: no acute eveents overnite, amb in redd, adequate pain control, some flatus, no stool    Post-Op Info:  Procedure(s) (LRB):  COLECTOMY, RIGHT, LAPAROSCOPIC (eras low, lithotomy) (N/A)   2 Days Post-Op      Medications:  Continuous Infusions:  Scheduled Meds:   acetaminophen  1,000 mg Oral Q8H    alvimopan  12 mg Oral BID    atorvastatin  40 mg Oral QHS    enoxparin  40 mg Subcutaneous Q24H (prophylaxis, 1700)    EScitalopram oxalate  20 mg Oral QHS    gabapentin  300 mg Oral TID    lamoTRIgine  300 mg Oral QHS    methocarbamoL  500 mg Oral QID    mupirocin   Nasal BID    pantoprazole  40 mg Oral Nightly    QUEtiapine  100 mg Oral QHS     PRN Meds:   albuterol    HYDROmorphone    ondansetron    oxyCODONE    oxyCODONE    sodium chloride 0.9%        Objective:     Vital Signs (Most Recent):  Temp: 97.9 °F (36.6 °C) (01/24/24 0731)  Pulse: 62 (01/24/24 0731)  Resp: 18 (01/24/24 0731)  BP: 133/62 (01/24/24 0731)  SpO2: 97 % (01/24/24 0731) Vital Signs (24h Range):  Temp:  [97.5 °F (36.4 °C)-99.4 °F (37.4 °C)] 97.9 °F (36.6 °C)  Pulse:  [62-68] 62  Resp:  [16-20] 18  SpO2:  [92 %-97 %] 97 %  BP: (111-143)/(60-63) 133/62     Intake/Output - Last 3 Shifts         01/22 0700 01/23 0659 01/23 0700 01/24 0659 01/24 0700 01/25 0659    P.O. 1550 1880     I.V. (mL/kg) 988.1 (13.6) 80 (1.1)     IV Piggyback 1891      Total Intake(mL/kg) 4429.1 (61.1) 1960 (27)     Urine (mL/kg/hr) 2400 (1.4) 800 (0.5)     Stool 0 0     Total Output 2400 800     Net +2029.1 +1160            Urine Occurrence  6 x     Stool Occurrence 0 x 2 x              Physical Exam  Vitals and nursing note reviewed.   Constitutional:       Appearance: She is well-developed.   HENT:      Head: Normocephalic.   Eyes:      Pupils: Pupils are equal, round, and reactive to light.   Cardiovascular:      Rate and Rhythm: Normal rate and regular rhythm.      Heart sounds: Normal heart sounds.   Pulmonary:      Effort: Pulmonary  effort is normal. No respiratory distress.      Breath sounds: Normal breath sounds. No wheezing or rales.   Abdominal:      Palpations: Abdomen is soft. There is no mass.      Tenderness: There is no guarding or rebound.      Comments: Abd inc line healing well   Skin:     General: Skin is warm and dry.   Neurological:      Mental Status: She is alert and oriented to person, place, and time.                Significant Labs:  BMP (Last 3 Results):   Recent Labs   Lab 01/23/24  0526   GLU 99   *   K 3.7      CO2 23   BUN 6*   CREATININE 0.6   CALCIUM 8.6*   MG 2.2     CBC (Last 3 Results):   Recent Labs   Lab 01/23/24  0526 01/24/24  0404   WBC 6.50 6.10   RBC 3.71* 3.49*   HGB 10.8* 10.1*   HCT 33.8* 31.7*    207   MCV 91 91   MCH 29.1 28.9   MCHC 32.0 31.9*       Significant Diagnostics:  None

## 2024-01-25 VITALS
HEIGHT: 66 IN | DIASTOLIC BLOOD PRESSURE: 61 MMHG | HEART RATE: 67 BPM | RESPIRATION RATE: 20 BRPM | SYSTOLIC BLOOD PRESSURE: 121 MMHG | WEIGHT: 159.81 LBS | TEMPERATURE: 98 F | OXYGEN SATURATION: 97 % | BODY MASS INDEX: 25.68 KG/M2

## 2024-01-25 LAB — CRP SERPL-MCNC: 39.8 MG/L (ref 0–8.2)

## 2024-01-25 PROCEDURE — 25000003 PHARM REV CODE 250: Performed by: SURGERY

## 2024-01-25 PROCEDURE — 86140 C-REACTIVE PROTEIN: CPT | Performed by: SURGERY

## 2024-01-25 PROCEDURE — 36415 COLL VENOUS BLD VENIPUNCTURE: CPT | Performed by: SURGERY

## 2024-01-25 RX ORDER — OXYCODONE HYDROCHLORIDE 5 MG/1
5 TABLET ORAL EVERY 4 HOURS PRN
Qty: 20 TABLET | Refills: 0 | Status: SHIPPED | OUTPATIENT
Start: 2024-01-25 | End: 2024-02-01

## 2024-01-25 RX ORDER — ACETAMINOPHEN 500 MG
1000 TABLET ORAL 3 TIMES DAILY
Qty: 90 TABLET | Refills: 0 | Status: SHIPPED | OUTPATIENT
Start: 2024-01-25 | End: 2024-02-24

## 2024-01-25 RX ORDER — IBUPROFEN 600 MG/1
600 TABLET ORAL 3 TIMES DAILY
Qty: 90 TABLET | Refills: 0 | Status: SHIPPED | OUTPATIENT
Start: 2024-01-25 | End: 2024-01-25 | Stop reason: HOSPADM

## 2024-01-25 RX ORDER — GABAPENTIN 300 MG/1
300 CAPSULE ORAL 3 TIMES DAILY
Qty: 90 CAPSULE | Refills: 0 | Status: SHIPPED | OUTPATIENT
Start: 2024-01-25 | End: 2024-03-09

## 2024-01-25 RX ADMIN — METHOCARBAMOL 500 MG: 500 TABLET ORAL at 08:01

## 2024-01-25 RX ADMIN — GABAPENTIN 300 MG: 300 CAPSULE ORAL at 08:01

## 2024-01-25 RX ADMIN — ALVIMOPAN 12 MG: 12 CAPSULE ORAL at 08:01

## 2024-01-25 RX ADMIN — ACETAMINOPHEN 1000 MG: 500 TABLET ORAL at 05:01

## 2024-01-25 NOTE — PLAN OF CARE
[facility]  Discharge Final Note    Primary Care Provider: Lottie Hough NP  Expected Discharge Date: 1/25/2024    Patient medically ready for discharge to home with spouse when medically ready.     Transportation by family.     Is family/patient aware of discharge: yes     Hospital follow up scheduled: yes       Final Discharge Note (most recent)       Final Note - 01/25/24 1245          Final Note    Assessment Type Final Discharge Note     Anticipated Discharge Disposition Home or Self Care                     Important Message from Medicare  Important Message from Medicare regarding Discharge Appeal Rights: Given to patient/caregiver, Explained to patient/caregiver, Signed/date by patient/caregiver   Date IMM was signed: 01/25/24  Time IMM was signed: 1012  Referral Info (most recent)       Referral Info    No documentation.                   Future Appointments   Date Time Provider Department Center   3/4/2024  8:30 AM Estevan Zaamn III, MD [facility]   4/17/2024 10:40 AM Lottie Hough, NP [facility]   7/2/2024  8:00 AM Andrade Ty MD [facility]     Nahun Potts RN  Case Management  Ext: [phone]  01/25/2024

## 2024-01-25 NOTE — DISCHARGE INSTRUCTIONS
"Discharge Instructions After Abdominal Operation     Pain Control: It is expected to have pain after surgery, but this should improve over the next several weeks. You may be prescribed a narcotic pain medication on discharge. You can take this medication as prescribed if the pain is severe but try to decrease the frequency at which you use the narcotic over the initial two (2) weeks.  You may find you need less narcotic pain medication if you combine or stagger it with Tylenol® (acetaminophen) and/or Advil® (ibuprofen). For example, you may take Tylenol 1000mg, then take Advil 600mg 3 hours later, then repeat Tylenol 3 hours after the Advil, and so on.  You should not take more than 4000 mg of Tylenol® or 3200 mg of Advil® in a 24-hour period.    Wound Care: The incisions can be left open to air unless there is drainage, in which case you should cover the wound with a dry, clean bandage or piece of gauze. Change the dressing 1-2 times each day as needed to maintain a relatively dry dressing. You may have "skin glue" covering your incisions which will peel/crumble off on its own over the next week or two. If you have staples in place, staples are removed in 2-3 weeks by a nurse or physician.  You should shower every day without a dressing on the incisions and let the water run over the incisions. Do not soak in a bathtub, hot tub or pool until the incisions are completely healed, which usually takes ~4-6 weeks.    Bowel Function: Diarrhea and loose stool are normal and expected after intestinal surgery. Bowel function initially tends to be erratic (increased frequency, gas, liquid/loose consistency, seepage, urgency), but it will improve over the next several months as your body adjusts to the surgical changes.    Diet: Unless directed otherwise by your surgeon, after surgery you should do the following:  Eat several (4-6) small meals each day.  If you experience difficulty eating, add in supplemental drinks (Boost®, " Ensure®, Glucerna®).  If you are having loose stools, your diet should include foods to add bulk to the stool such as applesauce, bananas, cheese, peanut butter, pasta, and potatoes.  Follow a Low Fiber Diet for six (6) weeks. Avoid particular foods including the following:  Raw vegetables, beans, corn, mushrooms, legumes, peas, potato skins, sauerkraut, stewed tomatoes, brussels sprouts  Fresh fruit, dried fruit (such as raisins or prunes), coconut, juices with pulp. (It is okay to eat fruits such as bananas, melons, canned fruits, and avocado.)  Meat with casings (such as hot dogs, kielbasa, sausage), shellfish  Nuts, popcorn, seeds, chunky peanut butter  Coarse whole grains including breads/rolls with nuts, cereals with nuts, coarse whole grains, poppy, sesame seeds    Activity: Walking is encouraged after surgery. Light aerobic activity such as climbing stairs or leisurely bike riding is acceptable but listen to your body and let pain be your guide when reintroducing activity. If it hurts, don't do it. Avoid the following activities for six (6) weeks after surgery:  Lifting objects over 10 pounds  Strenuous activity such as press-ups, push-ups, crunches, sit-ups, vigorous pulling/pushing, and repetitive twisting or bending    Driving: You should not drive a vehicle for the two (2) weeks after surgery or while taking narcotic pain medications. When you return to driving, sit in your vehicle without starting the ignition and step on the brake firmly and rapidly a few times to assure you are confident with this task. Do not go alone the first time you drive.    Potential Problems:   Bowel obstruction or ileus is characterized by persistent abdominal cramps, bloating, constipation, nausea, or vomiting. If the symptoms are mild, you should restrict your dietary intake to only liquids, and avoid solid food for 2-3 days. If the symptoms are more severe or persist beyond 24 hours, please call your surgeon's office for  advice.    Frequent stools and loose stools are best managed by using bulking agents or anti-diarrheal medication. Please call your surgeon if diarrhea is not improving after a few weeks to discuss starting one of the medications below.  Benefiber®, Citrucel®, Fibercon®, Konsyl®, and Metamucil® are bulking agents that are available at most grocery stores and pharmacies. The medication should be mixed using one (1) teaspoon of the powder in the minimum amount of fluid required to dissolve the agent and taken 1-2 times each day. Benefiber® can be alternatively sprinkled over food 1-2 times each day.  Imodium® (loperamide) is also available without a prescription. It is most effective if taken before meals. You should not take more than eight (8) tablets (32 mg) of Imodium in a 24-hour period. Please call your surgeon's office if you start taking Imodium®.     Dehydration can commonly occur, and its symptoms or signs include dark urine, dizziness when standing, dry mouth, increased heart rate, leg cramps, and low volumes (less than 800 ml) of urine. If these occur, you should immediately call your surgeon's office. To avoid dehydration, you should do the following:  Drink a variety of fluids. Use an oral rehydration salt solution as listed in the attachment below and sip the solution between meals.  Eat salty foods or add salt to your food.  Use an anti-diarrheal medication or bulking agent as previously instructed by your surgeon.     Wound infection can occur after any surgery and is characterized by increased drainage of cloudy fluid, odor, pain around the wound, redness of the skin around the wound extending 2-3 fingerbreadths outward, or temperature greater than 101 degrees. Please immediately call your surgeon's office if you begin experiencing these symptoms or signs.

## 2024-01-25 NOTE — DISCHARGE SUMMARY
"[facility] - [facility]  General Surgery  Discharge Summary      Patient Name: Karin Maguire  MRN: [id number]  Admission Date: 1/22/2024  Hospital Length of Stay: 3 days  Discharge Date and Time:  01/25/2024 8:10 AM  Attending Physician: Alberto Albright MD   Discharging Provider: Manuel Haro MD  Primary Care Provider: Lottie Hough NP     HPI: As you know, Ms. Maguire is a 73 year old woman with a history of anxiety, arthritis, Bipolar disorder, and BANDAR  who presents for evaluation of colon adenocarcinoma . She underwent appropriate staging after colonoscopy on 12/21/2023 and is scheduled for surgery on 1/22/2024. She was seen by Ms. Hough for pre-operative evaluation. Labs and CXR reviewed. She denies any chest pain, shortness of breath, nausea, vomiting, or blood in her stool - she is asymptomatic. She has not been losing weight. She does have a family history of second degree relatives with colon cancer (2). She has quit smoking.      Procedure(s) (LRB):  COLECTOMY, RIGHT, LAPAROSCOPIC (eras low, lithotomy) (N/A)     Hospital Course: Patient underwent surgery as above without intraoperative complications. She progressed well after surgery with early return of bowel function and her diet was advanced as tolerated. She tolerated a diet without nausea or emesis. She ambulated well, pain was controlled, and she was able to void without issues after reed catheter removal. She was stable for discharge on POD 3. Discharge instructions discussed in detail and all questions answered.    Consults:     Significant Diagnostic Studies: Labs: BMP: No results for input(s): "GLU", "NA", "K", "CL", "CO2", "BUN", "CREATININE", "CALCIUM", "MG" in the last 48 hours., CBC   Recent Labs   Lab 01/24/24  0404   WBC 6.10   HGB 10.1*   HCT 31.7*      , and CRP 40    Pending Diagnostic Studies:       Procedure Component Value Units Date/Time    Specimen to Pathology, Surgery General Surgery [[id number]] Collected: " 01/22/24 0959    Order Status: Sent Lab Status: In process Updated: 01/22/24 1116    Specimen: Tissue           Final Active Diagnoses:    Diagnosis Date Noted POA    PRINCIPAL PROBLEM:  Colon adenocarcinoma [C18.9] 12/29/2023 Yes    Bipolar 1 disorder [F31.9] 09/12/2016 Yes      Problems Resolved During this Admission:      Discharged Condition: good    Disposition: Home or Self Care    Follow Up:    Patient Instructions:      Notify your health care provider if you experience any of the following:  temperature >100.4     Notify your health care provider if you experience any of the following:  difficulty breathing or increased cough     Notify your health care provider if you experience any of the following:  redness, tenderness, or signs of infection (pain, swelling, redness, odor or green/yellow discharge around incision site)     Notify your health care provider if you experience any of the following:  severe uncontrolled pain     Notify your health care provider if you experience any of the following:  persistent nausea and vomiting or diarrhea     No dressing needed   Order Comments: Apply gauze as needed for drainage     Activity as tolerated   Order Comments: No heavy lifting over 10lb for 6 weeks     Medications:  Reconciled Home Medications:      Medication List        START taking these medications      acetaminophen 500 MG tablet  Commonly known as: TYLENOL  Take 2 tablets (1,000 mg total) by mouth 3 (three) times daily.     ibuprofen 600 MG tablet  Commonly known as: ADVIL,MOTRIN  Take 1 tablet (600 mg total) by mouth 3 (three) times daily.     oxyCODONE 5 MG immediate release tablet  Commonly known as: ROXICODONE  Take 1 tablet (5 mg total) by mouth every 4 (four) hours as needed for Pain.            CHANGE how you take these medications      EScitalopram oxalate 20 MG tablet  Commonly known as: LEXAPRO  Take 1 tablet (20 mg total) by mouth once daily.  What changed: when to take this     * gabapentin  300 MG capsule  Commonly known as: NEURONTIN  Begin by taking one at bedtime x 2 days then twice daily x 2 days followed by three times daily for if tolerating well.  What changed: Another medication with the same name was added. Make sure you understand how and when to take each.     * gabapentin 300 MG capsule  Commonly known as: NEURONTIN  Take 1 capsule (300 mg total) by mouth 3 (three) times daily.  What changed: You were already taking a medication with the same name, and this prescription was added. Make sure you understand how and when to take each.     pantoprazole 40 MG tablet  Commonly known as: PROTONIX  Take 1 tablet (40 mg total) by mouth once daily.  What changed: when to take this     rosuvastatin 10 MG tablet  Commonly known as: CRESTOR  Take 1 tablet (10 mg total) by mouth once daily.  What changed: when to take this           * This list has 2 medication(s) that are the same as other medications prescribed for you. Read the directions carefully, and ask your doctor or other care provider to review them with you.                CONTINUE taking these medications      albuterol 90 mcg/actuation inhaler  Commonly known as: PROVENTIL/VENTOLIN HFA  inhale 2 puffs into the lungs every 6 hours as needed for wheezing.     aspirin 81 MG Chew  Take 1 tablet (81 mg total) by mouth once daily.     lamoTRIgine 150 MG Tab  Commonly known as: LAMICTAL  Take 2 tablets (300 mg total) by mouth every evening.     QUEtiapine 100 MG Tab  Commonly known as: SEROQUEL  Take 1 tablet (100 mg total) by mouth every evening.            STOP taking these medications      ciprofloxacin HCl 500 MG tablet  Commonly known as: CIPRO     metroNIDAZOLE 500 MG tablet  Commonly known as: ROMEO Haro MD  General Surgery  [facility]

## 2024-01-25 NOTE — PLAN OF CARE
CHW met with patient/family at bedside. Patient experience rounding completed and reviewed the following.     Do you know your discharge plan? Yes or No,    If yes, what is the plan? (Home, Home Health, Rehab, SNF, LTAC, or Other)    Home    Have you discussed your needs and preferences with your SW/CM? Yes or No Yes    If you are discharging home, do you have help at home? Yes or No   Yes    Do you think you will need help additional at home at discharge? Yes or No   No    Do you currently have difficulty keeping up with bills, affording medicine or buying food? Yes or No        No    Assigned SW/CM notified of any patient/family needs or concerns. Appropriate resources provided to address patient's needs.  Nahun Humphrey CHW  Case Management   [phone]

## 2024-01-26 ENCOUNTER — PATIENT OUTREACH (OUTPATIENT)
Dept: ADMINISTRATIVE | Facility: CLINIC | Age: 74
End: 2024-01-26
Payer: MEDICARE

## 2024-01-26 NOTE — PROGRESS NOTES
C3 nurse spoke with Karin Maguire for a TCC post hospital discharge follow up call. The patient has a scheduled HOSFU appointment with Lottie Hough NP on 01/29/2024 @ 0482.

## 2024-01-29 ENCOUNTER — OFFICE VISIT (OUTPATIENT)
Dept: INTERNAL MEDICINE | Facility: CLINIC | Age: 74
End: 2024-01-29
Payer: MEDICARE

## 2024-01-29 VITALS
WEIGHT: 160.69 LBS | BODY MASS INDEX: 25.83 KG/M2 | SYSTOLIC BLOOD PRESSURE: 118 MMHG | HEIGHT: 66 IN | HEART RATE: 59 BPM | RESPIRATION RATE: 18 BRPM | DIASTOLIC BLOOD PRESSURE: 56 MMHG

## 2024-01-29 DIAGNOSIS — K63.89 COLONIC MASS: Primary | ICD-10-CM

## 2024-01-29 DIAGNOSIS — M46.1 SACROILIITIS, NOT ELSEWHERE CLASSIFIED: ICD-10-CM

## 2024-01-29 PROCEDURE — 3074F SYST BP LT 130 MM HG: CPT | Mod: CPTII,S$GLB,, | Performed by: NURSE PRACTITIONER

## 2024-01-29 PROCEDURE — 1126F AMNT PAIN NOTED NONE PRSNT: CPT | Mod: CPTII,S$GLB,, | Performed by: NURSE PRACTITIONER

## 2024-01-29 PROCEDURE — 99999 PR PBB SHADOW E&M-EST. PATIENT-LVL III: CPT | Mod: PBBFAC,,, | Performed by: NURSE PRACTITIONER

## 2024-01-29 PROCEDURE — 1101F PT FALLS ASSESS-DOCD LE1/YR: CPT | Mod: CPTII,S$GLB,, | Performed by: NURSE PRACTITIONER

## 2024-01-29 PROCEDURE — 1111F DSCHRG MED/CURRENT MED MERGE: CPT | Mod: CPTII,S$GLB,, | Performed by: NURSE PRACTITIONER

## 2024-01-29 PROCEDURE — 3288F FALL RISK ASSESSMENT DOCD: CPT | Mod: CPTII,S$GLB,, | Performed by: NURSE PRACTITIONER

## 2024-01-29 PROCEDURE — 1159F MED LIST DOCD IN RCRD: CPT | Mod: CPTII,S$GLB,, | Performed by: NURSE PRACTITIONER

## 2024-01-29 PROCEDURE — 1160F RVW MEDS BY RX/DR IN RCRD: CPT | Mod: CPTII,S$GLB,, | Performed by: NURSE PRACTITIONER

## 2024-01-29 PROCEDURE — 3008F BODY MASS INDEX DOCD: CPT | Mod: CPTII,S$GLB,, | Performed by: NURSE PRACTITIONER

## 2024-01-29 PROCEDURE — 99214 OFFICE O/P EST MOD 30 MIN: CPT | Mod: S$GLB,,, | Performed by: NURSE PRACTITIONER

## 2024-01-29 PROCEDURE — 3078F DIAST BP <80 MM HG: CPT | Mod: CPTII,S$GLB,, | Performed by: NURSE PRACTITIONER

## 2024-01-29 NOTE — PROGRESS NOTES
Subjective:       Patient ID: Karin Maguire is a 73 y.o. female.    Chief Complaint: Hospital Follow Up    HPI: Pt presents to clinic today known to me with c/o needing hospital f/u. She has 7-8 in removed. She did well. She was told 2-4 days- started 2 days after surgery and was walking. Tolerating diet. Having soft BM last one was yesterday. No abd pain. Using tylenol for pain. Took 12 nodes- awaiting path     No changes marcelino meds and she has resumed her asa. Has remained without smoking   Review of Systems   Constitutional:  Negative for chills, fatigue, fever and unexpected weight change.   HENT:  Negative for congestion, ear pain, sore throat and trouble swallowing.    Eyes:  Negative for pain and visual disturbance.   Respiratory:  Negative for cough, chest tightness and shortness of breath.    Cardiovascular:  Negative for chest pain, palpitations and leg swelling.   Gastrointestinal:  Negative for abdominal distention, abdominal pain, constipation, diarrhea, nausea and vomiting.   Genitourinary:  Negative for difficulty urinating, dysuria, flank pain, frequency and hematuria.   Musculoskeletal:  Negative for back pain, gait problem, joint swelling, neck pain and neck stiffness.   Skin:  Negative for rash and wound.   Neurological:  Negative for dizziness, seizures, speech difficulty, light-headedness and headaches.       Objective:      Physical Exam  Vitals and nursing note reviewed.   Constitutional:       Appearance: Normal appearance. She is well-developed.   HENT:      Head: Normocephalic and atraumatic.      Right Ear: External ear normal.      Left Ear: External ear normal.      Nose: Nose normal.   Eyes:      Conjunctiva/sclera: Conjunctivae normal.      Pupils: Pupils are equal, round, and reactive to light.   Cardiovascular:      Rate and Rhythm: Normal rate and regular rhythm.      Heart sounds: Normal heart sounds.   Pulmonary:      Effort: Pulmonary effort is normal. No respiratory  distress.      Breath sounds: Normal breath sounds. No wheezing or rales.   Abdominal:      General: Bowel sounds are normal. There is no distension.      Palpations: Abdomen is soft.      Tenderness: There is no abdominal tenderness. There is no guarding.   Musculoskeletal:         General: Normal range of motion.      Cervical back: Normal range of motion and neck supple.   Skin:     General: Skin is warm and dry.      Capillary Refill: Capillary refill takes less than 2 seconds.      Comments: Small incisions c/d/I.    Neurological:      Mental Status: She is alert and oriented to person, place, and time.   Psychiatric:         Behavior: Behavior normal.         Thought Content: Thought content normal.         Judgment: Judgment normal.         Assessment:       1. Colonic mass    2. Sacroiliitis, not elsewhere classified        Plan:     Problem List Items Addressed This Visit    None  Visit Diagnoses       Colonic mass    -  Primary    Sacroiliitis, not elsewhere classified              Call if no bm x > 1 day will need softener. Also pain meds only as needed/ walk and water    Cont to quit smoking     Await path

## 2024-02-01 ENCOUNTER — TELEPHONE (OUTPATIENT)
Dept: HEMATOLOGY/ONCOLOGY | Facility: CLINIC | Age: 74
End: 2024-02-01
Payer: MEDICARE

## 2024-02-01 DIAGNOSIS — C18.2 MALIGNANT NEOPLASM OF ASCENDING COLON: Primary | ICD-10-CM

## 2024-02-01 LAB
FINAL PATHOLOGIC DIAGNOSIS: NORMAL
GROSS: NORMAL
Lab: NORMAL
MICROSCOPIC EXAM: NORMAL

## 2024-02-01 NOTE — PHYSICIAN QUERY
PT Name: Karin Maguire  MR #: [id number]    DOCUMENTATION CLARIFICATION     CDS: Vandana CEBALLOS RN  Contact information: [email or web address]  This form is a permanent document in the medical record.     Query Date: February 1, 2024    By submitting this query, we are merely seeking further clarification of documentation.  Please utilize your independent clinical judgment when addressing the question(s) below.    The medical record contains the following:  Pathology Findings Location in Medical Record   1. Colon, right and terminal ileum (right hemicolectomy with EN bloc abdominal wall resection):  Invasive adenocarcinoma.  See cancer synoptic report     2. Omentum (resection):    Positive for carcinoma    CORRECT SYNOPTIC AND M STATUS  Cancer synoptic report  - Procedure: Right hemicolectomy with EN bloc abdominal wall resection  - Tumor site:  Ascending  - Histologic type:  Adenocarcinoma  - Histologic grade:  G2, moderately differentiated.  See comment.  COMMENT:  While the majority of the tumor consists of well formed glands, a significant proportion includes abortive glands, single file infiltration, and malignant cells with signet ring cell morphology. The tumor has an insidious pattern of infiltration with tumor present far from the advancing front of the tumor.  - Tumor size:  2.0 x 2.0 x 1.0 cm  - Tumor extent:  Invades visceral peritoneum, multifocal  - Macroscopic perforation: Not identified  - Lymphovascular invasion:  Present, extensive.  Small vessel, large vessel, intra and extramural.  - Perineural invasion:  Not identified  - Tumor budding score:  High (>10), multifocal single cell infiltration  - Treatment effect: No known pre-surgical therapy    pTNM stage classification (AJCC 8th edition)  pT Category  pT4a:  Tumor invades through the visceral peritoneum    pN Category  pN2b:  7 or more regional lymph nodes are positive  Number of positive lymph nodes:  19  Number of lymph nodes examined:  26  Number of tumor deposits:  4    pM Category  pM1c:  Metastasis to the peritoneal surface of the omentum.   Surgical Pathology 1/22/24        Surgical Pathology 1/22/24      Surgical Pathology 1/22/24                                      Surgical Pathology 1/22/24        Surgical Pathology 1/22/24            Surgical Pathology 1/22/24       Please clarify the pathology findings.  [ X ] Pathology findings noted above are ruled in/confirmed as diagnoses   [  ] Pathology findings noted above are not confirmed as diagnoses   [  ] Pathology findings noted above are incidental   [  ] Other diagnosis (please specify): ___________   [  ] Clinically Undetermined     Please document in your progress notes daily for the duration of treatment until resolved and include in your discharge summary.    Form No. 11039

## 2024-02-01 NOTE — TELEPHONE ENCOUNTER
Attempted to call & schedule, no answer, left voicemail. Tentatively scheduled with Dr. Zaragoza on 2/16/24 pending pt confirmation.

## 2024-02-01 NOTE — TELEPHONE ENCOUNTER
----- Message from Kemar Zaragoza MD sent at 2/1/2024  3:42 PM CST -----  Thanks.  Teresa, can you get her into see one of us por favor?  ----- Message -----  From: Alberto Albright MD  Sent: 2/1/2024   2:42 PM CST  To: Jocelyn Rocha MD; Kemar Zaragoza MD; #    Reviewed pathology with Dr. Conroy - while omentum and even abdominal wall margins appeared grossly uninvolved, there was microscopic invasive tumor seen in these areas as well as in other areas outside of the primary tumor. There were other significant high risk features seen pathologically as well as numerous positive lymph nodes - this was all discussed with Ms. Maguire and her .    Tempus ordered, added-on for tumor board next week and referral has been placed for Medical Oncology, will not order PET CT at this time as this would likely demonstrate falsely positive post-operative changes although this will likely be warranted prior to systemic therapy.

## 2024-02-02 ENCOUNTER — TELEPHONE (OUTPATIENT)
Dept: HEMATOLOGY/ONCOLOGY | Facility: CLINIC | Age: 74
End: 2024-02-02
Payer: MEDICARE

## 2024-02-02 NOTE — TELEPHONE ENCOUNTER
----- Message from Alberto Albright MD sent at 2/1/2024  2:42 PM CST -----  Ms. Farfan, can you please schedule Ms. Maguire with Carri Zaragoza or Aj, referral placed    Team, Ms. Maguire is a woman with an ascending colon cancer - we did a right hemicolectomy with enbloc abdominal wall resection, while grossly these areas were free of tumor, she had islands of poorly differentiated disease microscopically (and in the omentum) - high risk tumor    She is recovering well from surgery, and we have sent Tempus - I have asked that she see one of your here in [place] because she has such high risk pathology    Please reach out if you have questions - added her on for next week    WK

## 2024-02-02 NOTE — NURSING
Oncology Navigation   Intake  Date of Diagnosis: 01/22/24  Cancer Type: GI  Internal / External Referral: Internal  Date of Referral: 02/01/24  Initial Nurse Navigator Contact: 02/01/24  Referral to Initial Contact Timeline (days): 0  Date Worked: 02/02/24  First Appointment Available: 02/16/24  Appointment Date: 02/16/24  First Available Date vs. Scheduled Date (days): 0  Multiple appointments: No     Treatment  Current Status: Active    Surgical Oncologist: Dr. Alberto Albright  Type of Surgery: Right hemicolectomy  Surgery Schedule Date: 01/22/24    Medical Oncologist: Dr. Kemar Zaragoza  Consult Date: 02/16/24       Procedures: Biopsy; CT  Biopsy Schedule Date: 12/21/23  CT Schedule Date: 12/22/23                 Acuity      Follow Up  No follow-ups on file.

## 2024-02-05 DIAGNOSIS — C18.9 ADENOCARCINOMA OF COLON: Primary | ICD-10-CM

## 2024-02-07 ENCOUNTER — HOSPITAL ENCOUNTER (OUTPATIENT)
Dept: RADIOLOGY | Facility: HOSPITAL | Age: 74
Discharge: HOME | End: 2024-02-07
Attending: STUDENT IN AN ORGANIZED HEALTH CARE EDUCATION/TRAINING PROGRAM
Payer: MEDICARE

## 2024-02-07 ENCOUNTER — TELEPHONE (OUTPATIENT)
Dept: SURGERY | Facility: CLINIC | Age: 74
End: 2024-02-07
Payer: MEDICARE

## 2024-02-07 ENCOUNTER — TELEPHONE (OUTPATIENT)
Dept: HEMATOLOGY/ONCOLOGY | Facility: CLINIC | Age: 74
End: 2024-02-07
Payer: MEDICARE

## 2024-02-07 DIAGNOSIS — C18.2 MALIGNANT NEOPLASM OF ASCENDING COLON: ICD-10-CM

## 2024-02-07 DIAGNOSIS — C18.2 MALIGNANT NEOPLASM OF ASCENDING COLON: Primary | ICD-10-CM

## 2024-02-07 NOTE — TELEPHONE ENCOUNTER
----- Message from Alberto Albright MD sent at 2/7/2024  8:24 AM CST -----  Can you please get her scheduled for MR KATHRYN (ordered)    Thank you team    WK

## 2024-02-07 NOTE — LETTER
February 7, 2024        Kemar Zaragoza MD  [address]  [facility] [address]             [facility]y chato  [facility] [address]  [address]  [facility] [address]  Phone: [phone]   Patient: Karin Maguire   MR Number: [id number]   YOB: 1950   Date of Visit: 2/7/2024       Dear Dr. Zaragoza:    Please see my discussion with Dr. Contreras.       If you have questions, please do not hesitate to call me. I look forward to following Karin along with you.    Sincerely,      Alberto Albright MD           CC  No Recipients

## 2024-02-07 NOTE — PROGRESS NOTES
Reviewed care at [facility] today - discussed with Dr. Contreras (Surg Oncology) regarding possibility or consideration of HIPEC after systemic therapy. He recommended MR AP with and without contrast as well as restaging after 6 cycles (repeat MR AP) with plan for 12 total cycles. At that point we can rediscuss therapeutic options.    MR ordered, discussed with Ms. Ting Albright MD, FACS, FASCRS  Department of Colon & Rectal Surgery  [facility]

## 2024-02-07 NOTE — TELEPHONE ENCOUNTER
Called pt back re: her MRI.  Left pt a vmail to give us a call back.  RN will contact pre-service for assistance.

## 2024-02-09 ENCOUNTER — TELEPHONE (OUTPATIENT)
Dept: SURGERY | Facility: CLINIC | Age: 74
End: 2024-02-09
Payer: MEDICARE

## 2024-02-09 ENCOUNTER — OFFICE VISIT (OUTPATIENT)
Dept: SURGERY | Facility: CLINIC | Age: 74
End: 2024-02-09
Payer: MEDICARE

## 2024-02-09 VITALS
HEART RATE: 62 BPM | BODY MASS INDEX: 25.51 KG/M2 | WEIGHT: 158.06 LBS | SYSTOLIC BLOOD PRESSURE: 142 MMHG | DIASTOLIC BLOOD PRESSURE: 83 MMHG

## 2024-02-09 DIAGNOSIS — C18.2 MALIGNANT NEOPLASM OF ASCENDING COLON: Primary | ICD-10-CM

## 2024-02-09 PROCEDURE — 99024 POSTOP FOLLOW-UP VISIT: CPT | Mod: S$GLB,,, | Performed by: STUDENT IN AN ORGANIZED HEALTH CARE EDUCATION/TRAINING PROGRAM

## 2024-02-09 PROCEDURE — 3077F SYST BP >= 140 MM HG: CPT | Mod: CPTII,S$GLB,, | Performed by: STUDENT IN AN ORGANIZED HEALTH CARE EDUCATION/TRAINING PROGRAM

## 2024-02-09 PROCEDURE — 1101F PT FALLS ASSESS-DOCD LE1/YR: CPT | Mod: CPTII,S$GLB,, | Performed by: STUDENT IN AN ORGANIZED HEALTH CARE EDUCATION/TRAINING PROGRAM

## 2024-02-09 PROCEDURE — 3079F DIAST BP 80-89 MM HG: CPT | Mod: CPTII,S$GLB,, | Performed by: STUDENT IN AN ORGANIZED HEALTH CARE EDUCATION/TRAINING PROGRAM

## 2024-02-09 PROCEDURE — 99999 PR PBB SHADOW E&M-EST. PATIENT-LVL I: CPT | Mod: PBBFAC,,, | Performed by: STUDENT IN AN ORGANIZED HEALTH CARE EDUCATION/TRAINING PROGRAM

## 2024-02-09 PROCEDURE — 3288F FALL RISK ASSESSMENT DOCD: CPT | Mod: CPTII,S$GLB,, | Performed by: STUDENT IN AN ORGANIZED HEALTH CARE EDUCATION/TRAINING PROGRAM

## 2024-02-09 PROCEDURE — 1126F AMNT PAIN NOTED NONE PRSNT: CPT | Mod: CPTII,S$GLB,, | Performed by: STUDENT IN AN ORGANIZED HEALTH CARE EDUCATION/TRAINING PROGRAM

## 2024-02-09 NOTE — TELEPHONE ENCOUNTER
Called pt back re: her incisions.  Pt is experiencing redness and a foul smelling odor when she lifts up her shirt.  Pt denies fever, pain, or swelling.  No other issues reported.  RN asked pt to send us a picture through the portal.  Pt verbalized understanding to all.

## 2024-02-09 NOTE — PROGRESS NOTES
Mild erythema appears to be more related to stitch irritation, no evidence of underlying abscess or overt infection  No systemic signs of infection  Plan for conservative management, she knows to call if erythema worsens      Alberto Albright MD, FACS, FASCRS  Department of Colon & Rectal Surgery  [facility]

## 2024-02-12 ENCOUNTER — OFFICE VISIT (OUTPATIENT)
Dept: HEMATOLOGY/ONCOLOGY | Facility: CLINIC | Age: 74
End: 2024-02-12
Payer: MEDICARE

## 2024-02-12 ENCOUNTER — LAB VISIT (OUTPATIENT)
Dept: LAB | Facility: HOSPITAL | Age: 74
End: 2024-02-12
Payer: MEDICARE

## 2024-02-12 VITALS
OXYGEN SATURATION: 99 % | HEIGHT: 66 IN | SYSTOLIC BLOOD PRESSURE: 121 MMHG | WEIGHT: 161.81 LBS | DIASTOLIC BLOOD PRESSURE: 56 MMHG | RESPIRATION RATE: 20 BRPM | HEART RATE: 68 BPM | BODY MASS INDEX: 26.01 KG/M2

## 2024-02-12 DIAGNOSIS — D49.9 IMMUNODEFICIENCY SECONDARY TO NEOPLASM: ICD-10-CM

## 2024-02-12 DIAGNOSIS — C18.9 ADENOCARCINOMA OF COLON: ICD-10-CM

## 2024-02-12 DIAGNOSIS — C18.2 MALIGNANT NEOPLASM OF ASCENDING COLON: ICD-10-CM

## 2024-02-12 DIAGNOSIS — R97.8 OTHER ABNORMAL TUMOR MARKERS: ICD-10-CM

## 2024-02-12 DIAGNOSIS — D84.81 IMMUNODEFICIENCY SECONDARY TO NEOPLASM: ICD-10-CM

## 2024-02-12 DIAGNOSIS — C78.6 MALIGNANT NEOPLASM METASTATIC TO OMENTUM: ICD-10-CM

## 2024-02-12 DIAGNOSIS — C18.2 MALIGNANT NEOPLASM OF ASCENDING COLON: Primary | ICD-10-CM

## 2024-02-12 LAB
ALBUMIN SERPL BCP-MCNC: 3.6 G/DL (ref 3.5–5.2)
ALP SERPL-CCNC: 83 U/L (ref 55–135)
ALT SERPL W/O P-5'-P-CCNC: 10 U/L (ref 10–44)
ANION GAP SERPL CALC-SCNC: 6 MMOL/L (ref 8–16)
AST SERPL-CCNC: 14 U/L (ref 10–40)
BILIRUB SERPL-MCNC: 0.2 MG/DL (ref 0.1–1)
BUN SERPL-MCNC: 11 MG/DL (ref 8–23)
CALCIUM SERPL-MCNC: 9.9 MG/DL (ref 8.7–10.5)
CANCER AG125 SERPL-ACNC: 10 U/ML (ref 0–30)
CEA SERPL-MCNC: 2.1 NG/ML (ref 0–5)
CHLORIDE SERPL-SCNC: 105 MMOL/L (ref 95–110)
CO2 SERPL-SCNC: 27 MMOL/L (ref 23–29)
CREAT SERPL-MCNC: 0.8 MG/DL (ref 0.5–1.4)
ERYTHROCYTE [DISTWIDTH] IN BLOOD BY AUTOMATED COUNT: 13.9 % (ref 11.5–14.5)
EST. GFR  (NO RACE VARIABLE): >60 ML/MIN/1.73 M^2
GLUCOSE SERPL-MCNC: 82 MG/DL (ref 70–110)
HCT VFR BLD AUTO: 35.5 % (ref 37–48.5)
HGB BLD-MCNC: 11.3 G/DL (ref 12–16)
IMM GRANULOCYTES # BLD AUTO: 0.02 K/UL (ref 0–0.04)
MCH RBC QN AUTO: 29 PG (ref 27–31)
MCHC RBC AUTO-ENTMCNC: 31.8 G/DL (ref 32–36)
MCV RBC AUTO: 91 FL (ref 82–98)
NEUTROPHILS # BLD AUTO: 3.1 K/UL (ref 1.8–7.7)
PLATELET # BLD AUTO: 335 K/UL (ref 150–450)
PMV BLD AUTO: 10.7 FL (ref 9.2–12.9)
POTASSIUM SERPL-SCNC: 4.3 MMOL/L (ref 3.5–5.1)
PROT SERPL-MCNC: 6.7 G/DL (ref 6–8.4)
RBC # BLD AUTO: 3.9 M/UL (ref 4–5.4)
SODIUM SERPL-SCNC: 138 MMOL/L (ref 136–145)
WBC # BLD AUTO: 6.52 K/UL (ref 3.9–12.7)

## 2024-02-12 PROCEDURE — 1111F DSCHRG MED/CURRENT MED MERGE: CPT | Mod: CPTII,S$GLB,, | Performed by: INTERNAL MEDICINE

## 2024-02-12 PROCEDURE — 3078F DIAST BP <80 MM HG: CPT | Mod: CPTII,S$GLB,, | Performed by: INTERNAL MEDICINE

## 2024-02-12 PROCEDURE — 1159F MED LIST DOCD IN RCRD: CPT | Mod: CPTII,S$GLB,, | Performed by: INTERNAL MEDICINE

## 2024-02-12 PROCEDURE — 36415 COLL VENOUS BLD VENIPUNCTURE: CPT | Performed by: REGISTERED NURSE

## 2024-02-12 PROCEDURE — 1101F PT FALLS ASSESS-DOCD LE1/YR: CPT | Mod: CPTII,S$GLB,, | Performed by: INTERNAL MEDICINE

## 2024-02-12 PROCEDURE — 99999 PR PBB SHADOW E&M-EST. PATIENT-LVL IV: CPT | Mod: PBBFAC,,, | Performed by: INTERNAL MEDICINE

## 2024-02-12 PROCEDURE — 3288F FALL RISK ASSESSMENT DOCD: CPT | Mod: CPTII,S$GLB,, | Performed by: INTERNAL MEDICINE

## 2024-02-12 PROCEDURE — 86304 IMMUNOASSAY TUMOR CA 125: CPT | Performed by: REGISTERED NURSE

## 2024-02-12 PROCEDURE — 99205 OFFICE O/P NEW HI 60 MIN: CPT | Mod: S$GLB,,, | Performed by: INTERNAL MEDICINE

## 2024-02-12 PROCEDURE — 1126F AMNT PAIN NOTED NONE PRSNT: CPT | Mod: CPTII,S$GLB,, | Performed by: INTERNAL MEDICINE

## 2024-02-12 PROCEDURE — 85027 COMPLETE CBC AUTOMATED: CPT | Performed by: REGISTERED NURSE

## 2024-02-12 PROCEDURE — 3074F SYST BP LT 130 MM HG: CPT | Mod: CPTII,S$GLB,, | Performed by: INTERNAL MEDICINE

## 2024-02-12 PROCEDURE — 82378 CARCINOEMBRYONIC ANTIGEN: CPT | Performed by: REGISTERED NURSE

## 2024-02-12 PROCEDURE — 3008F BODY MASS INDEX DOCD: CPT | Mod: CPTII,S$GLB,, | Performed by: INTERNAL MEDICINE

## 2024-02-12 PROCEDURE — 80053 COMPREHEN METABOLIC PANEL: CPT | Performed by: REGISTERED NURSE

## 2024-02-12 PROCEDURE — 1160F RVW MEDS BY RX/DR IN RCRD: CPT | Mod: CPTII,S$GLB,, | Performed by: INTERNAL MEDICINE

## 2024-02-12 RX ORDER — PROCHLORPERAZINE MALEATE 10 MG
10 TABLET ORAL EVERY 6 HOURS PRN
Qty: 30 TABLET | Refills: 2 | Status: SHIPPED | OUTPATIENT
Start: 2024-02-12 | End: 2025-02-11

## 2024-02-12 RX ORDER — ONDANSETRON 8 MG/1
8 TABLET, ORALLY DISINTEGRATING ORAL EVERY 6 HOURS PRN
Qty: 30 TABLET | Refills: 5 | Status: SHIPPED | OUTPATIENT
Start: 2024-02-12 | End: 2025-02-11

## 2024-02-12 RX ORDER — DEXAMETHASONE 4 MG/1
TABLET ORAL
Qty: 40 TABLET | Refills: 0 | Status: SHIPPED | OUTPATIENT
Start: 2024-02-12 | End: 2024-05-20 | Stop reason: SDUPTHER

## 2024-02-12 RX ORDER — LIDOCAINE AND PRILOCAINE 25; 25 MG/G; MG/G
CREAM TOPICAL
Qty: 30 G | Refills: 5 | Status: SHIPPED | OUTPATIENT
Start: 2024-02-12

## 2024-02-12 NOTE — PROGRESS NOTES
MEDICAL ONCOLOGY - NEW PATIENT VISIT    Reason for visit: colon adenocarcinoma    Best Contact Phone Number(s): [phone] (home)      Cancer/Stage/TNM:    Cancer Staging   Colon adenocarcinoma  Staging form: Colon and Rectum, AJCC 8th Edition  - Pathologic stage from 1/22/2024: Stage IVC (pT4a, pN2b, cM1c) - Signed by Minna Menendez CNS on 2/13/2024       Oncology History   Colon adenocarcinoma   12/21/2023 Tumor Markers    Patient's tumor was tested for the following markers: CEA.                                              Results of the tumor marker test revealed 3.3     12/21/2023 Procedure    Colonoscopy  Cecal polyp removed with jumbo forceps, 3 mm  Multiple ascending colon polyps ranging in size from 5 to 12 mm - semi-pedunculated removed with hot snare  Hepatic flexure mass identified - central ulceration, concerning for malignancy, 3 cm, not obstructing, this was biopsied - tattoo placed distal to mass  Sigmoid diverticular disease     12/21/2023 Tumor Markers    Patient's tumor was tested for the following markers: CEA.                                              Results of the tumor marker test revealed 3.3     12/22/2023 Imaging Significant Findings    CT CAP  Evaluation of the colon is somewhat limited as there is significant colonic stool and oral contrast material has not opacified the large bowel.  There does appear to be some abnormal thickening of the walls of the proximal right colon and a patient with a known history of colonic malignancy.  There is some soft tissue density external to the walls of the colon on the right pericolic gutter which could represent peritoneal nodularity versus subserosal extent of tumor.  Slightly more distal to this area there is a 2nd area of irregularity of the walls of the colon, difficult to fully evaluate if this is related to the colonic walls versus stool with central fat.     Two hypodensities in the liver, the larger measuring 0.8 cm, too small to  accurately characterize on the basis of this examination.  Attention on follow-up.     No pulmonary nodules are seen.     Cholecystectomy.     12/29/2023 Initial Diagnosis    Hepatic flexure colon adenocarcinoma  MMR intact     Malignant neoplasm of ascending colon   2/12/2024 Initial Diagnosis    Malignant neoplasm of ascending colon     2/26/2024 -  Chemotherapy    Treatment Summary   Plan Name: OP GI [id number] (oxaliplatin leucovorin fluorouracil) with bevacizumab Q2W  Treatment Goal: Palliative  Status: Active  Start Date: 2/26/2024 (Planned)  End Date: 1/15/2025 (Planned)  Provider: Kemar Zaragoza MD  Chemotherapy: FLUOROURACIL 5 G/100 ML IV SOLN, 400 mg/m2, Intravenous, Clinic/HOD 1 time, 0 of 24 cycles  OXALIPLATIN CHEMO INFUSION, 85 mg/m2, Intravenous, Clinic/HOD 1 time, 0 of 24 cycles  BEVACIZUMAB-AWWB INFUSION, 5 mg/kg, Intravenous, Clinic/HOD 1 time, 0 of 24 cycles          HPI:   73 y.o. female with LUANNE, bipolar, HLD who presents for initial oncologic evaluation. Reports she had a GI bleed that occurred around the start of Covid pandemic; did not require invasive treatments. Colon cancer discovered on routine colonoscopy. She is recovering well from surgery x 3 weeks ago. Feeling great overall. Denies fever/chills, SOB, CP, palpitations, N/V, C/D, pain, blood in urine/stool, paresthesias.     History has been obtained by chart review and discussion with the patient. Presents with her spouse.     ROS:   Review of Systems   Constitutional:  Negative for fever and malaise/fatigue.   Respiratory:  Negative for shortness of breath.    Cardiovascular:  Negative for chest pain, palpitations and leg swelling.   Gastrointestinal:  Negative for blood in stool, constipation, diarrhea and nausea.   Genitourinary:  Negative for hematuria.   Musculoskeletal:  Negative for back pain, falls and myalgias.   Skin:  Negative for rash.   Neurological:  Negative for dizziness, tingling, weakness and headaches.        Past Medical History:   Past Medical History:   Diagnosis Date    Anxiety     Arthritis     Bipolar 1 disorder     Bursitis of right hip     GI bleed due to NSAIDs     Multinodular goiter 11/15/2021    Sacroiliitis     right side    Sleep apnea         Past Surgical History:   Past Surgical History:   Procedure Laterality Date    ANKLE FRACTURE SURGERY Left 2001    BLADDER SUSPENSION      CARPAL TUNNEL RELEASE Bilateral     CHOLECYSTECTOMY      Laparoscopic    COLONOSCOPY      COLONOSCOPY N/A 12/21/2023    Procedure: COLONOSCOPY;  Surgeon: Alberto Albright MD;  Location: [facility];  Service: Endoscopy;  Laterality: N/A;    ESOPHAGOGASTRODUODENOSCOPY N/A 07/29/2021    Procedure: EGD (ESOPHAGOGASTRODUODENOSCOPY);  Surgeon: Susan Mcclain MD;  Location: [facility];  Service: Endoscopy;  Laterality: N/A;    EYE SURGERY      FOOT ARTHRODESIS Right 05/03/2023    Procedure: FUSION, FOOT;  Surgeon: Lauri Alejandra DPM;  Location: [facility];  Service: Podiatry;  Laterality: Right;  mini c-arm, Arthrex dowel bone graft harvester, locking plate and screws festus notified cc    HYSTERECTOMY  1986    vaginal prolapse    INJECTION OF ANESTHETIC AGENT AROUND MEDIAL BRANCH NERVES INNERVATING CERVICAL FACET JOINT Bilateral 05/27/2022    Procedure: CERVICAL MEDIAL BRANCH NERVE BLOCK (C3-4,C4-5);  Surgeon: Mamie Roman MD;  Location: [facility];  Service: Pain Management;  Laterality: Bilateral;    INJECTION OF ANESTHETIC AGENT AROUND MEDIAL BRANCH NERVES INNERVATING LUMBAR FACET JOINT Right 02/05/2021    Procedure: LUMBAR FACET JOINT BLOCK (L3-4,L4-5,L5-S1);  Surgeon: Mamie Roman MD;  Location: [facility];  Service: Pain Management;  Laterality: Right;    INJECTION OF ANESTHETIC AGENT AROUND MEDIAL BRANCH NERVES INNERVATING LUMBAR FACET JOINT Right 04/30/2021    Procedure: LUMBAR FACET JOINT BLOCK (L3-4,L4-5,L5-S1);  Surgeon: Mamie Roman MD;  Location: [facility];  Service: Pain Management;  Laterality: Right;     INJECTION OF ANESTHETIC AGENT INTO SACROILIAC JOINT Right 2020    Procedure: SACROILIAC JOINT INJECTION;  Surgeon: Mamie Roman MD;  Location: [facility];  Service: Pain Management;  Laterality: Right;    INJECTION OF JOINT Right 2020    Procedure: GREATER TROCHANTERIC BURSA INJECTION;  Surgeon: Mamie Roman MD;  Location: [facility];  Service: Pain Management;  Laterality: Right;    LAPAROSCOPIC RIGHT COLON RESECTION N/A 2024    Procedure: COLECTOMY, RIGHT, LAPAROSCOPIC (eras low, lithotomy);  Surgeon: Alberto Albright MD;  Location: [facility] OR 2ND FLR;  Service: Colon and Rectal;  Laterality: N/A;    NASAL SEPTUM SURGERY      RECTAL PROLAPSE REPAIR      TONSILLECTOMY          Family History:   Family History   Problem Relation Age of Onset    No Known Problems Maternal Aunt     Colon cancer Maternal Uncle     Colon cancer Maternal Uncle     Colon cancer Maternal Uncle     No Known Problems Paternal Aunt     Esophageal cancer Paternal Uncle     Heart attack Maternal Grandmother     Leukemia Maternal Grandfather     No Known Problems Paternal Grandmother     Stroke Paternal Grandfather         Social History:   Social History     Tobacco Use    Smoking status: Former     Current packs/day: 0.00     Average packs/day: 1 pack/day for 41.7 years (41.7 ttl pk-yrs)     Types: Cigarettes     Start date: 3/30/1982     Quit date: 2023     Years since quittin.1    Smokeless tobacco: Never   Substance Use Topics    Alcohol use: Yes     Comment: Socially-2 or 3 times a year-6 or 7 drinks        I have reviewed and updated the patient's past medical, surgical, family and social histories.    Allergies:   Review of patient's allergies indicates:   Allergen Reactions    Nsaids (non-steroidal anti-inflammatory drug) Other (See Comments)     Gastrointestinal bleeding requiring blood transfusion    Demerol [meperidine] Rash        Medications:   Current Outpatient Medications   Medication Sig Dispense  Refill    acetaminophen (TYLENOL) 500 MG tablet Take 2 tablets (1,000 mg total) by mouth 3 (three) times daily. 90 tablet 0    albuterol (PROVENTIL/VENTOLIN HFA) 90 mcg/actuation inhaler inhale 2 puffs into the lungs every 6 hours as needed for wheezing. 18 g 1    aspirin 81 MG Chew Take 1 tablet (81 mg total) by mouth once daily. 30 tablet 5    dexAMETHasone (DECADRON) 4 MG Tab Take 2 tablets (8 mg total) by mouth once daily only on days 2, 3 and 4 of each chemotherapy cycle. 40 tablet 0    EScitalopram oxalate (LEXAPRO) 20 MG tablet Take 1 tablet (20 mg total) by mouth once daily. (Patient taking differently: Take 20 mg by mouth every evening.) 30 tablet 4    gabapentin (NEURONTIN) 300 MG capsule Begin by taking one at bedtime x 2 days then twice daily x 2 days followed by three times daily for if tolerating well. 90 capsule 11    gabapentin (NEURONTIN) 300 MG capsule Take 1 capsule (300 mg total) by mouth 3 (three) times daily. 90 capsule 0    lamoTRIgine (LAMICTAL) 150 MG Tab Take 2 tablets (300 mg total) by mouth every evening. 180 tablet 3    LIDOcaine-prilocaine (EMLA) cream Apply topically as needed (place to port site 45-60 minutes prior to chemotherapy). 30 g 5    ondansetron (ZOFRAN-ODT) 8 MG TbDL dissolve 1 tablet (8 mg total) under the tongue every 6 (six) hours as needed (nausea). 30 tablet 5    pantoprazole (PROTONIX) 40 MG tablet Take 1 tablet (40 mg total) by mouth once daily. (Patient taking differently: Take 40 mg by mouth nightly.) 90 tablet 3    prochlorperazine (COMPAZINE) 10 MG tablet Take 1 tablet (10 mg total) by mouth every 6 (six) hours as needed (nausea). 30 tablet 2    QUEtiapine (SEROQUEL) 100 MG Tab Take 1 tablet (100 mg total) by mouth every evening. 30 tablet 4    rosuvastatin (CRESTOR) 10 MG tablet Take 1 tablet (10 mg total) by mouth once daily. (Patient taking differently: Take 10 mg by mouth every evening.) 90 tablet 3     No current facility-administered medications for this  "visit.        Physical Exam:   BP (!) 121/56   Pulse 68   Resp 20   Ht 5' 6" (1.676 m)   Wt 73.4 kg (161 lb 13.1 oz)   SpO2 99%   BMI 26.12 kg/m²      ECOG Performance status: 0          Physical Exam  Vitals reviewed.   Constitutional:       General: She is not in acute distress.     Appearance: Normal appearance. She is normal weight. She is not ill-appearing, toxic-appearing or diaphoretic.   HENT:      Head: Normocephalic and atraumatic.      Right Ear: External ear normal.      Left Ear: External ear normal.      Nose: Nose normal.      Mouth/Throat:      Pharynx: Oropharynx is clear.   Eyes:      General: No scleral icterus.     Conjunctiva/sclera: Conjunctivae normal.   Cardiovascular:      Rate and Rhythm: Normal rate.   Pulmonary:      Effort: Pulmonary effort is normal. No respiratory distress.   Abdominal:      General: There is no distension.   Skin:     General: Skin is warm and dry.      Coloration: Skin is not jaundiced or pale.      Findings: No bruising, erythema or rash.   Neurological:      Mental Status: She is alert and oriented to person, place, and time. Mental status is at baseline.      Motor: No weakness.      Gait: Gait normal.   Psychiatric:         Mood and Affect: Mood normal.         Labs:   Recent Results (from the past 48 hour(s))   CEA    Collection Time: 02/12/24  3:37 PM   Result Value Ref Range    CEA 2.1 0.0 - 5.0 ng/mL   Comprehensive Metabolic Panel    Collection Time: 02/12/24  3:37 PM   Result Value Ref Range    Sodium 138 136 - 145 mmol/L    Potassium 4.3 3.5 - 5.1 mmol/L    Chloride 105 95 - 110 mmol/L    CO2 27 23 - 29 mmol/L    Glucose 82 70 - 110 mg/dL    BUN 11 8 - 23 mg/dL    Creatinine 0.8 0.5 - 1.4 mg/dL    Calcium 9.9 8.7 - 10.5 mg/dL    Total Protein 6.7 6.0 - 8.4 g/dL    Albumin 3.6 3.5 - 5.2 g/dL    Total Bilirubin 0.2 0.1 - 1.0 mg/dL    Alkaline Phosphatase 83 55 - 135 U/L    AST 14 10 - 40 U/L    ALT 10 10 - 44 U/L    eGFR >60.0 >60 mL/min/1.73 m^2    " Anion Gap 6 (L) 8 - 16 mmol/L   CBC Oncology    Collection Time: 24  3:37 PM   Result Value Ref Range    WBC 6.52 3.90 - 12.70 K/uL    RBC 3.90 (L) 4.00 - 5.40 M/uL    Hemoglobin 11.3 (L) 12.0 - 16.0 g/dL    Hematocrit 35.5 (L) 37.0 - 48.5 %    MCV 91 82 - 98 fL    MCH 29.0 27.0 - 31.0 pg    MCHC 31.8 (L) 32.0 - 36.0 g/dL    RDW 13.9 11.5 - 14.5 %    Platelets 335 150 - 450 K/uL    MPV 10.7 9.2 - 12.9 fL    Gran # (ANC) 3.1 1.8 - 7.7 K/uL    Immature Grans (Abs) 0.02 0.00 - 0.04 K/uL   CANCER ANTIGEN 125    Collection Time: 24  3:37 PM   Result Value Ref Range     10 0 - 30 U/mL        Imagin/22/23 - CT CAP   Impression:     Evaluation of the colon is somewhat limited as there is significant colonic stool and oral contrast material has not opacified the large bowel.  There does appear to be some abnormal thickening of the walls of the proximal right colon and a patient with a known history of colonic malignancy.  There is some soft tissue density external to the walls of the colon on the right pericolic gutter which could represent peritoneal nodularity versus subserosal extent of tumor.  Slightly more distal to this area there is a 2nd area of irregularity of the walls of the colon, difficult to fully evaluate if this is related to the colonic walls versus stool with central fat.     Two hypodensities in the liver, the larger measuring 0.8 cm, too small to accurately characterize on the basis of this examination.  Attention on follow-up.     No pulmonary nodules are seen.     Cholecystectomy.    Path:   24 - R Hemicolectomy   Final Pathologic Diagnosis     THE DIAGNOSES REMAIN THE SAME.  THIS CORRECTED REPORT IS ISSUED TO CHANGE M STATUS to reflect tumor in the omentum.  This change is discussed with Dr. RANJANA Albright via phone, 2024.    1. Colon, right and terminal ileum (right hemicolectomy with EN bloc abdominal wall resection):  Invasive adenocarcinoma.  See cancer synoptic  report     2. Omentum (resection):    Positive for carcinoma    CORRECT SYNOPTIC AND M STATUS  Cancer synoptic report  - Procedure: Right hemicolectomy with EN bloc abdominal wall resection  - Tumor site:  Ascending  - Histologic type:  Adenocarcinoma  - Histologic grade:  G2, moderately differentiated.  See comment.  COMMENT:  While the majority of the tumor consists of well formed glands, a significant proportion includes abortive glands, single file infiltration, and malignant cells with signet ring cell morphology. The tumor has an insidious pattern of  infiltration with tumor present far from the advancing front of the tumor.  - Tumor size:  2.0 x 2.0 x 1.0 cm  - Tumor extent:  Invades visceral peritoneum, multifocal  - Macroscopic perforation: Not identified  - Lymphovascular invasion:  Present, extensive.  Small vessel, large vessel, intra and extramural.  - Perineural invasion:  Not identified  - Tumor budding score:  High (>10), multifocal single cell infiltration  - Treatment effect: No known pre-surgical therapy    Margins  Margin involved by invasive carcinoma, radial (slide 1C)  All margins negative for dysplasia.    pTNM stage classification (AJCC 8th edition)  pT Category  pT4a:  Tumor invades through the visceral peritoneum    pN Category  pN2b:  7 or more regional lymph nodes are positive  Number of positive lymph nodes:  19  Number of lymph nodes examined: 26  Number of tumor deposits:  4    pM Category  pM1c:  Metastasis to the peritoneal surface of the omentum.    Additional findings:  - Sessile serrated polyp, no cytologic dysplasia, 1.1 cm at ileocecal valve  - Tubulovillous adenoma, 1.0 cm, proximal ascending  - Tubular adenoma, 0.4 cm, mid ascending  - Tubular adenoma, 0.4, distal ascending  - Tubular adenoma, 0.6 cm, distal ascending  - Submucosal lipoma, 2.0 cm  - Appendix with no specific histopathologic changes    Ancillary studies  Immunohistochemistry for mismatch repair proteins  performed on prior biopsy material (SAS-, 12/21/23): pMMR.         Assessment:       1. Malignant neoplasm of ascending colon    2. Adenocarcinoma of colon    3. Other abnormal tumor markers    4. 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 Immunodeficiency secondary to neoplasm        Plan:        # Colon cancer   Mrs. Maguire is a 73 y.o. female who presents for oncologic evaluation for metastatic colon cancer. She underwent a R hemicolectomy with en-bloc abdominal wal resection on 1/22/24 with Dr. Albright. Pathology revealed pT4a N2b disease with 19/26 positive LNs and omentum positive for carcinoma.    We had an in-depth conversation during our initial consult re: her diagnosis and treatment options. Reviewed recommendation for IV systemic therapy for at least 6 months. Plan for FOLFOX + bevacizumab to be given every 2 weeks. Briefly reviewed side effects of treatment. Handouts provided.     She will need a port for treatment. IR referral placed.   Will check PGX.  Dexamethasone, zofran, compazine and lidocaine sent to pharmacy. Reviewed admin instructions for when chemo starts.     Pending response, she may be a candidate for CRS/HIPEC with surgical oncology team. Continue to evaluate.     Will repeat imaging after ~3 months.     RTC in 2-3 weeks with labs for cycle 1.     Follow up: 2-3 weeks to start treatment.      Patient is in agreement with the proposed treatment plan. All questions were answered to the patient's satisfaction. Pt knows to call clinic if anything is needed before the next clinic visit.    Patient discussed with and also seen by collaborating physician, Dr. Zaragoza.    At least 60 minutes were spent today on this encounter including face to face time with the patient, data gathering/interpretation and documentation.       Minna Menendez, MSN, APRN, ACCNS-AG  Hematology and Medical Oncology  Clinical Nurse Specialist to Dr. Zaragoza, Dr. Jin & Dr. Goss Onc Chart  Routing      Follow up with physician . 2-3 weeks with labs to see Dr. Zaragoza for cycle 1   Follow up with KRISTEN . Rtc 2 weeks later with labs for cycle 2.   Infusion scheduling note New or changed treatment   new treatment plan placed for folfox/kevin - please assist with authorization   Injection scheduling note    Labs CBC, CMP, CEA and urinalysis   Scheduling:  Preferred lab:  Lab interval: every 2 weeks  at [place] day prior to visits/infusions   Imaging    Pharmacy appointment    Other referrals

## 2024-02-13 LAB
DNA RANGE(S) EXAMINED NAR: NORMAL
GENE DIS ANL INTERP-IMP: NORMAL
GENE DIS ASSESSED: NORMAL
GENE MUT TESTED BLD/T: 4.7 M/MB
MSI CA SPEC-IMP: NORMAL
REASON FOR STUDY: NORMAL
TEMPUS FUSIONADDENDUM: NORMAL
TEMPUS LCA: NORMAL
TEMPUS PERTINENTNEGATIVES: NORMAL
TEMPUS PORTAL: NORMAL
TEMPUS TRIAL1: NORMAL
TEMPUS TRIAL2: NORMAL
TEMPUS TRIAL3: NORMAL
TEMPUS TRIALCOUNT: 3

## 2024-02-16 ENCOUNTER — PATIENT MESSAGE (OUTPATIENT)
Dept: INTERVENTIONAL RADIOLOGY/VASCULAR | Facility: HOSPITAL | Age: 74
End: 2024-02-16
Payer: MEDICARE

## 2024-02-17 ENCOUNTER — HOSPITAL ENCOUNTER (OUTPATIENT)
Dept: RADIOLOGY | Facility: HOSPITAL | Age: 74
Discharge: HOME | End: 2024-02-17
Attending: STUDENT IN AN ORGANIZED HEALTH CARE EDUCATION/TRAINING PROGRAM
Payer: MEDICARE

## 2024-02-17 PROCEDURE — 72197 MRI PELVIS W/O & W/DYE: CPT | Mod: TC

## 2024-02-17 PROCEDURE — 74183 MRI ABD W/O CNTR FLWD CNTR: CPT | Mod: 26,,, | Performed by: RADIOLOGY

## 2024-02-17 PROCEDURE — 25500020 PHARM REV CODE 255: Performed by: STUDENT IN AN ORGANIZED HEALTH CARE EDUCATION/TRAINING PROGRAM

## 2024-02-17 PROCEDURE — A9585 GADOBUTROL INJECTION: HCPCS | Performed by: STUDENT IN AN ORGANIZED HEALTH CARE EDUCATION/TRAINING PROGRAM

## 2024-02-17 PROCEDURE — 72197 MRI PELVIS W/O & W/DYE: CPT | Mod: 26,,, | Performed by: RADIOLOGY

## 2024-02-17 RX ORDER — GADOBUTROL 604.72 MG/ML
10 INJECTION INTRAVENOUS
Status: COMPLETED | OUTPATIENT
Start: 2024-02-17 | End: 2024-02-17

## 2024-02-17 RX ADMIN — GADOBUTROL 10 ML: 604.72 INJECTION INTRAVENOUS at 11:02

## 2024-02-20 ENCOUNTER — TELEPHONE (OUTPATIENT)
Dept: INTERNAL MEDICINE | Facility: CLINIC | Age: 74
End: 2024-02-20
Payer: MEDICARE

## 2024-02-20 ENCOUNTER — TELEPHONE (OUTPATIENT)
Dept: INTERVENTIONAL RADIOLOGY/VASCULAR | Facility: HOSPITAL | Age: 74
End: 2024-02-20
Payer: MEDICARE

## 2024-02-20 NOTE — TELEPHONE ENCOUNTER
Fax [facility] from [facility]. They have had several failed attempts to schedule pt. If pt would like to go here she can call them to schedule appt.

## 2024-02-20 NOTE — NURSING
Pre-procedure call complete.  Arrival time 7am.  Patient instructed not to eat or drink anything after midnight the night before procedure.  Patient aware will need someone to provide transport home and monitor pt 8 hours post procedure.  No driving for at least 24 hours after procedure.   Patient reports she does not take blood pressure, heart medications, seizure and anti-rejection medications.   Do not take sleep medication (including OTC) the night before procedure.  Arrival time and location given.  Expected length of stay reviewed.  Covid screening completed.  Patient verbalized understanding of all pre-procedure instructions.  Written instructions and directions sent to patient in Mychart/portal.

## 2024-02-21 ENCOUNTER — HOSPITAL ENCOUNTER (OUTPATIENT)
Dept: INTERVENTIONAL RADIOLOGY/VASCULAR | Facility: HOSPITAL | Age: 74
Discharge: HOME | End: 2024-02-21
Attending: REGISTERED NURSE | Admitting: STUDENT IN AN ORGANIZED HEALTH CARE EDUCATION/TRAINING PROGRAM
Payer: MEDICARE

## 2024-02-21 VITALS
RESPIRATION RATE: 17 BRPM | WEIGHT: 160 LBS | OXYGEN SATURATION: 100 % | HEIGHT: 66 IN | SYSTOLIC BLOOD PRESSURE: 125 MMHG | BODY MASS INDEX: 25.71 KG/M2 | TEMPERATURE: 98 F | HEART RATE: 61 BPM | DIASTOLIC BLOOD PRESSURE: 63 MMHG

## 2024-02-21 DIAGNOSIS — C18.9 COLON CANCER: ICD-10-CM

## 2024-02-21 DIAGNOSIS — D49.9 NEOPLASM: ICD-10-CM

## 2024-02-21 DIAGNOSIS — C18.2 MALIGNANT NEOPLASM OF ASCENDING COLON: Primary | ICD-10-CM

## 2024-02-21 PROCEDURE — 36561 INSERT TUNNELED CV CATH: CPT | Mod: RT | Performed by: STUDENT IN AN ORGANIZED HEALTH CARE EDUCATION/TRAINING PROGRAM

## 2024-02-21 PROCEDURE — 25000003 PHARM REV CODE 250: Performed by: STUDENT IN AN ORGANIZED HEALTH CARE EDUCATION/TRAINING PROGRAM

## 2024-02-21 PROCEDURE — 76937 US GUIDE VASCULAR ACCESS: CPT | Performed by: STUDENT IN AN ORGANIZED HEALTH CARE EDUCATION/TRAINING PROGRAM

## 2024-02-21 PROCEDURE — 76937 US GUIDE VASCULAR ACCESS: CPT | Mod: 26,,, | Performed by: STUDENT IN AN ORGANIZED HEALTH CARE EDUCATION/TRAINING PROGRAM

## 2024-02-21 PROCEDURE — 77001 FLUOROGUIDE FOR VEIN DEVICE: CPT | Mod: 26,,, | Performed by: STUDENT IN AN ORGANIZED HEALTH CARE EDUCATION/TRAINING PROGRAM

## 2024-02-21 PROCEDURE — 99152 MOD SED SAME PHYS/QHP 5/>YRS: CPT | Mod: ,,, | Performed by: STUDENT IN AN ORGANIZED HEALTH CARE EDUCATION/TRAINING PROGRAM

## 2024-02-21 PROCEDURE — 63600175 PHARM REV CODE 636 W HCPCS: Performed by: STUDENT IN AN ORGANIZED HEALTH CARE EDUCATION/TRAINING PROGRAM

## 2024-02-21 PROCEDURE — C1788 PORT, INDWELLING, IMP: HCPCS

## 2024-02-21 PROCEDURE — 99900035 HC TECH TIME PER 15 MIN (STAT)

## 2024-02-21 PROCEDURE — C1894 INTRO/SHEATH, NON-LASER: HCPCS

## 2024-02-21 PROCEDURE — 94761 N-INVAS EAR/PLS OXIMETRY MLT: CPT

## 2024-02-21 PROCEDURE — 99152 MOD SED SAME PHYS/QHP 5/>YRS: CPT | Performed by: STUDENT IN AN ORGANIZED HEALTH CARE EDUCATION/TRAINING PROGRAM

## 2024-02-21 PROCEDURE — 77001 FLUOROGUIDE FOR VEIN DEVICE: CPT | Performed by: STUDENT IN AN ORGANIZED HEALTH CARE EDUCATION/TRAINING PROGRAM

## 2024-02-21 RX ORDER — LIDOCAINE HYDROCHLORIDE 20 MG/ML
INJECTION, SOLUTION INFILTRATION; PERINEURAL
Status: COMPLETED | OUTPATIENT
Start: 2024-02-21 | End: 2024-02-21

## 2024-02-21 RX ORDER — MIDAZOLAM HYDROCHLORIDE 1 MG/ML
INJECTION INTRAMUSCULAR; INTRAVENOUS
Status: COMPLETED | OUTPATIENT
Start: 2024-02-21 | End: 2024-02-21

## 2024-02-21 RX ORDER — SODIUM CHLORIDE 9 MG/ML
INJECTION, SOLUTION INTRAVENOUS CONTINUOUS
Status: DISCONTINUED | OUTPATIENT
Start: 2024-02-21 | End: 2024-02-22 | Stop reason: HOSPADM

## 2024-02-21 RX ORDER — HEPARIN SODIUM 1000 [USP'U]/ML
INJECTION, SOLUTION INTRAVENOUS; SUBCUTANEOUS
Status: COMPLETED | OUTPATIENT
Start: 2024-02-21 | End: 2024-02-21

## 2024-02-21 RX ORDER — LIDOCAINE HYDROCHLORIDE 10 MG/ML
1 INJECTION, SOLUTION EPIDURAL; INFILTRATION; INTRACAUDAL; PERINEURAL ONCE
Status: DISCONTINUED | OUTPATIENT
Start: 2024-02-21 | End: 2024-02-22 | Stop reason: HOSPADM

## 2024-02-21 RX ORDER — FENTANYL CITRATE 50 UG/ML
INJECTION, SOLUTION INTRAMUSCULAR; INTRAVENOUS
Status: COMPLETED | OUTPATIENT
Start: 2024-02-21 | End: 2024-02-21

## 2024-02-21 RX ADMIN — LIDOCAINE HYDROCHLORIDE 10 MG: 20 INJECTION, SOLUTION INFILTRATION; PERINEURAL at 08:02

## 2024-02-21 RX ADMIN — FENTANYL CITRATE 50 MCG: 50 INJECTION, SOLUTION INTRAMUSCULAR; INTRAVENOUS at 08:02

## 2024-02-21 RX ADMIN — MIDAZOLAM HYDROCHLORIDE 1 MG: 1 INJECTION INTRAMUSCULAR; INTRAVENOUS at 08:02

## 2024-02-21 RX ADMIN — HEPARIN SODIUM 500 UNITS: 1000 INJECTION, SOLUTION INTRAVENOUS; SUBCUTANEOUS at 08:02

## 2024-02-21 NOTE — Clinical Note
Right: Chest.   Scrubbed with Chlorohexidine.   Painted with Chlorohexidine.  Hair: N/A.  Skin prep dry before draping.  Prepped by: Susan Warren RCS 2/21/2024 8:11 AM.

## 2024-02-21 NOTE — PLAN OF CARE
Pt arrived to recovery dosc via stretcher per ENDO team. Bedside report received. Pt attached to bedside monitor. VSS. Pt _awake__ post procedure. Pt on room air; oxygen sats _100___%.  Warm blanket applied.Will continue to monitor.

## 2024-02-21 NOTE — PROCEDURES
Interventional Radiology Post-Procedure Note    Pre Op Diagnosis: colon cancer    Post Op Diagnosis: Same    Procedure: Port placement    Procedure performed by: Susan Sims MD    Written Informed Consent Obtained:  Yes    Specimen Removed: No    Estimated Blood Loss:  Minimal     Findings:   Patent right internal jugular vein  Successful placement of 8F port catheter in right internal jugular vein with catheter tip in the right atrium    Port is ready for use.       Susan Sims MD  Interventional Radiology

## 2024-02-21 NOTE — DISCHARGE SUMMARY
Radiology Discharge Summary      Hospital Course: No complications    Admit Date: 2/21/2024  Discharge Date: 02/21/2024     Instructions Given to Patient: Yes  Diet: Resume prior diet  Activity: activity as tolerated and no driving for today    Description of Condition on Discharge: Stable  Vital Signs (Most Recent): Temp: 97.9 °F (36.6 °C) (02/21/24 0705)  Pulse: 60 (02/21/24 0837)  Resp: 16 (02/21/24 0837)  BP: 124/63 (02/21/24 0837)  SpO2: 98 % (02/21/24 0837)    Discharge Disposition: Home    Discharge Diagnosis: CRC     Follow-up: as needed    Susan Sims MD

## 2024-02-21 NOTE — H&P
H&P Note  Interventional Radiology    Reason for Consult: port placement    SUBJECTIVE:     Chief Complaint: CRC    History of Present Illness: 73F PMHx LUANNE, BPD, HLD and colon adenocarcinoma presenting for port placement.      Past Medical History:   Diagnosis Date    Anxiety     Arthritis     Bipolar 1 disorder     Bursitis of right hip     GI bleed due to NSAIDs     Multinodular goiter 11/15/2021    Sacroiliitis     right side    Sleep apnea      Past Surgical History:   Procedure Laterality Date    ANKLE FRACTURE SURGERY Left 2001    BLADDER SUSPENSION      CARPAL TUNNEL RELEASE Bilateral     CHOLECYSTECTOMY      Laparoscopic    COLONOSCOPY      COLONOSCOPY N/A 12/21/2023    Procedure: COLONOSCOPY;  Surgeon: Alberto Albright MD;  Location: [facility];  Service: Endoscopy;  Laterality: N/A;    ESOPHAGOGASTRODUODENOSCOPY N/A 07/29/2021    Procedure: EGD (ESOPHAGOGASTRODUODENOSCOPY);  Surgeon: Susan Mcclain MD;  Location: [facility];  Service: Endoscopy;  Laterality: N/A;    EYE SURGERY      FOOT ARTHRODESIS Right 05/03/2023    Procedure: FUSION, FOOT;  Surgeon: Lauri Alejandra DPM;  Location: [facility];  Service: Podiatry;  Laterality: Right;  mini c-arm, Arthrex dowel bone graft harvester, locking plate and screws festus notified cc    HYSTERECTOMY  1986    vaginal prolapse    INJECTION OF ANESTHETIC AGENT AROUND MEDIAL BRANCH NERVES INNERVATING CERVICAL FACET JOINT Bilateral 05/27/2022    Procedure: CERVICAL MEDIAL BRANCH NERVE BLOCK (C3-4,C4-5);  Surgeon: Mamie Roman MD;  Location: [facility];  Service: Pain Management;  Laterality: Bilateral;    INJECTION OF ANESTHETIC AGENT AROUND MEDIAL BRANCH NERVES INNERVATING LUMBAR FACET JOINT Right 02/05/2021    Procedure: LUMBAR FACET JOINT BLOCK (L3-4,L4-5,L5-S1);  Surgeon: Mamie Roman MD;  Location: [facility];  Service: Pain Management;  Laterality: Right;    INJECTION OF ANESTHETIC AGENT AROUND MEDIAL BRANCH NERVES INNERVATING LUMBAR FACET JOINT Right  2021    Procedure: LUMBAR FACET JOINT BLOCK (L3-4,L4-5,L5-S1);  Surgeon: Mamie Roman MD;  Location: [facility];  Service: Pain Management;  Laterality: Right;    INJECTION OF ANESTHETIC AGENT INTO SACROILIAC JOINT Right 2020    Procedure: SACROILIAC JOINT INJECTION;  Surgeon: Mamie Roman MD;  Location: [facility];  Service: Pain Management;  Laterality: Right;    INJECTION OF JOINT Right 2020    Procedure: GREATER TROCHANTERIC BURSA INJECTION;  Surgeon: Mamie Roman MD;  Location: [facility];  Service: Pain Management;  Laterality: Right;    LAPAROSCOPIC RIGHT COLON RESECTION N/A 2024    Procedure: COLECTOMY, RIGHT, LAPAROSCOPIC (eras low, lithotomy);  Surgeon: Alberto Albright MD;  [facility];  Service: Colon and Rectal;  Laterality: N/A;    NASAL SEPTUM SURGERY      RECTAL PROLAPSE REPAIR      TONSILLECTOMY       Family History   Problem Relation Age of Onset    No Known Problems Maternal Aunt     Colon cancer Maternal Uncle     Colon cancer Maternal Uncle     Colon cancer Maternal Uncle     No Known Problems Paternal Aunt     Esophageal cancer Paternal Uncle     Heart attack Maternal Grandmother     Leukemia Maternal Grandfather     No Known Problems Paternal Grandmother     Stroke Paternal Grandfather      Social History     Tobacco Use    Smoking status: Former     Current packs/day: 0.00     Average packs/day: 1 pack/day for 41.7 years (41.7 ttl pk-yrs)     Types: Cigarettes     Start date: 3/30/1982     Quit date: 2023     Years since quittin.1    Smokeless tobacco: Never   Substance Use Topics    Alcohol use: Yes     Comment: Socially-2 or 3 times a year-6 or 7 drinks    Drug use: No       Review of Systems:  Constitutional/General:No fever, chills, change in appetite or weight loss.  Hematological/Immuno: no known coagulopathies  Respiratory: no shortness of breath  Cardiovascular: no chest pain  Gastrointestinal: no abdominal pain  Genito-Urinary: no  "dysuria  Musculoskeletal: negative  Skin: Negative for rash, itching, pigmentation changes, nail or hair changes.  Neurological: no TIA or stroke symptoms  Psychiatric: normal mood/affect, good insight/judgement      OBJECTIVE:     Vital Signs Range (Last 24H):  Temp:  [97.9 °F (36.6 °C)]   Pulse:  [60]   Resp:  [16]   BP: (119)/(60)   SpO2:  [98 %]     Physical Exam:  General- Patient AAO x3 in NAD  ENT- EOMI  Neck- No masses  CV- Normal rate  Resp-  No increased WOB  GI- Non tender, non-distended  Extrem- No cyanosis, clubbing, edema  Derm- No rashes, masses, or lesions noted  Neuro-  No focal deficits noted    Physical Exam  Body mass index is 25.82 kg/m².    Scheduled Meds:    LIDOcaine (PF) 10 mg/ml (1%)  1 mL Other Once     Continuous Infusions:    sodium chloride 0.9%       PRN Meds:    Allergies:   Review of patient's allergies indicates:   Allergen Reactions    Nsaids (non-steroidal anti-inflammatory drug) Other (See Comments)     Gastrointestinal bleeding requiring blood transfusion    Demerol [meperidine] Rash       Labs:  No results for input(s): "INR", "PT", "PTT" in the last 168 hours.  No results for input(s): "WBC", "HGB", "HCT", "MCV", "PLT" in the last 168 hours. No results for input(s): "GLU", "NA", "K", "CL", "CO2", "BUN", "CREATININE", "CALCIUM", "MG", "ALT", "AST", "ALBUMIN", "BILITOT", "BILIDIR" in the last 168 hours.    Vitals (Most Recent):  Temp: 97.9 °F (36.6 °C) (02/21/24 0705)  Pulse: 60 (02/21/24 0705)  Resp: 16 (02/21/24 0705)  BP: 119/60 (02/21/24 0705)  SpO2: 98 % (02/21/24 0705)    ASA: 2  Mallampati: 3    Consent obtained.     ASSESSMENT/PLAN:     73F PMHx LUANNE, BPD, HLD and colon adenocarcinoma presenting for port placement with moderate sedation.      There are no hospital problems to display for this patient.          Susan Sims MD    "

## 2024-02-22 LAB
ONEOME COMMENT: NORMAL
ONEOME METHOD: NORMAL

## 2024-02-23 ENCOUNTER — LAB VISIT (OUTPATIENT)
Dept: LAB | Facility: HOSPITAL | Age: 74
End: 2024-02-23
Attending: INTERNAL MEDICINE
Payer: MEDICARE

## 2024-02-23 ENCOUNTER — PATIENT MESSAGE (OUTPATIENT)
Dept: INTERVENTIONAL RADIOLOGY/VASCULAR | Facility: HOSPITAL | Age: 74
End: 2024-02-23
Payer: MEDICARE

## 2024-02-23 ENCOUNTER — TELEPHONE (OUTPATIENT)
Dept: HEMATOLOGY/ONCOLOGY | Facility: CLINIC | Age: 74
End: 2024-02-23
Payer: MEDICARE

## 2024-02-23 DIAGNOSIS — C18.2 MALIGNANT NEOPLASM OF ASCENDING COLON: ICD-10-CM

## 2024-02-23 LAB
BILIRUB UR QL STRIP: NEGATIVE
CLARITY UR: CLEAR
COLOR UR: YELLOW
GLUCOSE UR QL STRIP: NEGATIVE
HGB UR QL STRIP: NEGATIVE
KETONES UR QL STRIP: NEGATIVE
LEUKOCYTE ESTERASE UR QL STRIP: NEGATIVE
NITRITE UR QL STRIP: NEGATIVE
PH UR STRIP: 6 [PH] (ref 5–8)
PROT UR QL STRIP: NEGATIVE
SP GR UR STRIP: 1.02 (ref 1–1.03)
URN SPEC COLLECT METH UR: NORMAL
UROBILINOGEN UR STRIP-ACNC: NEGATIVE EU/DL

## 2024-02-23 PROCEDURE — 81003 URINALYSIS AUTO W/O SCOPE: CPT | Performed by: REGISTERED NURSE

## 2024-02-23 NOTE — TELEPHONE ENCOUNTER
Messaged IR Nurse JAK Quinones RN for post op port placement instructions.  Returned call to patient and instructed her to leave dressing covering port for another day or two.  Then she can remove dressing but leave steri-strips in place as they should fall off in 5-10 days.  Instructed patient to check portal as Mamie sent instructions and phone number for patient to call if she has further questions.  Patient verbalized understanding

## 2024-02-23 NOTE — TELEPHONE ENCOUNTER
----- Message from Meggan Campbell sent at 2/23/2024  8:36 AM CST -----  Regarding: Wants to know when to removed the dressing on her wound  2/23/2024       Hello,         I received a call from SIMONE OLIVER [[id number]] regarding her wound due to port placement. She wants to know when should she take the dressing off. Please give her a call. She can be reached at [phone].          Thank you,   Meggan LUZ.   Access Navigator

## 2024-02-26 ENCOUNTER — INFUSION (OUTPATIENT)
Dept: INFUSION THERAPY | Facility: HOSPITAL | Age: 74
End: 2024-02-26
Payer: MEDICARE

## 2024-02-26 ENCOUNTER — OFFICE VISIT (OUTPATIENT)
Dept: HEMATOLOGY/ONCOLOGY | Facility: CLINIC | Age: 74
End: 2024-02-26
Payer: MEDICARE

## 2024-02-26 VITALS
BODY MASS INDEX: 26.14 KG/M2 | OXYGEN SATURATION: 98 % | HEART RATE: 77 BPM | SYSTOLIC BLOOD PRESSURE: 137 MMHG | HEIGHT: 66 IN | WEIGHT: 162.69 LBS | TEMPERATURE: 98 F | DIASTOLIC BLOOD PRESSURE: 65 MMHG

## 2024-02-26 VITALS
OXYGEN SATURATION: 98 % | DIASTOLIC BLOOD PRESSURE: 63 MMHG | RESPIRATION RATE: 17 BRPM | HEIGHT: 66 IN | WEIGHT: 162.69 LBS | BODY MASS INDEX: 26.14 KG/M2 | HEART RATE: 67 BPM | SYSTOLIC BLOOD PRESSURE: 130 MMHG

## 2024-02-26 DIAGNOSIS — T45.1X5A IMMUNODEFICIENCY SECONDARY TO CHEMOTHERAPY: ICD-10-CM

## 2024-02-26 DIAGNOSIS — C78.6 MALIGNANT NEOPLASM METASTATIC TO OMENTUM: ICD-10-CM

## 2024-02-26 DIAGNOSIS — C18.2 MALIGNANT NEOPLASM OF ASCENDING COLON: Primary | ICD-10-CM

## 2024-02-26 DIAGNOSIS — D84.821 IMMUNODEFICIENCY SECONDARY TO CHEMOTHERAPY: ICD-10-CM

## 2024-02-26 DIAGNOSIS — Z79.899 IMMUNODEFICIENCY SECONDARY TO CHEMOTHERAPY: ICD-10-CM

## 2024-02-26 DIAGNOSIS — D49.9 IMMUNODEFICIENCY SECONDARY TO NEOPLASM: ICD-10-CM

## 2024-02-26 DIAGNOSIS — D84.81 IMMUNODEFICIENCY SECONDARY TO NEOPLASM: ICD-10-CM

## 2024-02-26 PROCEDURE — 96417 CHEMO IV INFUS EACH ADDL SEQ: CPT

## 2024-02-26 PROCEDURE — 99999 PR PBB SHADOW E&M-EST. PATIENT-LVL III: CPT | Mod: PBBFAC,,, | Performed by: REGISTERED NURSE

## 2024-02-26 PROCEDURE — 99215 OFFICE O/P EST HI 40 MIN: CPT | Mod: S$GLB,,, | Performed by: REGISTERED NURSE

## 2024-02-26 PROCEDURE — 96416 CHEMO PROLONG INFUSE W/PUMP: CPT

## 2024-02-26 PROCEDURE — 3008F BODY MASS INDEX DOCD: CPT | Mod: CPTII,S$GLB,, | Performed by: REGISTERED NURSE

## 2024-02-26 PROCEDURE — 25000003 PHARM REV CODE 250: Performed by: INTERNAL MEDICINE

## 2024-02-26 PROCEDURE — 3288F FALL RISK ASSESSMENT DOCD: CPT | Mod: CPTII,S$GLB,, | Performed by: REGISTERED NURSE

## 2024-02-26 PROCEDURE — 96367 TX/PROPH/DG ADDL SEQ IV INF: CPT

## 2024-02-26 PROCEDURE — 1101F PT FALLS ASSESS-DOCD LE1/YR: CPT | Mod: CPTII,S$GLB,, | Performed by: REGISTERED NURSE

## 2024-02-26 PROCEDURE — 1126F AMNT PAIN NOTED NONE PRSNT: CPT | Mod: CPTII,S$GLB,, | Performed by: REGISTERED NURSE

## 2024-02-26 PROCEDURE — 96413 CHEMO IV INFUSION 1 HR: CPT

## 2024-02-26 PROCEDURE — 3078F DIAST BP <80 MM HG: CPT | Mod: CPTII,S$GLB,, | Performed by: REGISTERED NURSE

## 2024-02-26 PROCEDURE — 3075F SYST BP GE 130 - 139MM HG: CPT | Mod: CPTII,S$GLB,, | Performed by: REGISTERED NURSE

## 2024-02-26 PROCEDURE — 63600175 PHARM REV CODE 636 W HCPCS: Mod: JW,JG | Performed by: INTERNAL MEDICINE

## 2024-02-26 PROCEDURE — 1159F MED LIST DOCD IN RCRD: CPT | Mod: CPTII,S$GLB,, | Performed by: REGISTERED NURSE

## 2024-02-26 RX ORDER — DIPHENHYDRAMINE HYDROCHLORIDE 50 MG/ML
50 INJECTION INTRAMUSCULAR; INTRAVENOUS ONCE AS NEEDED
Status: DISCONTINUED | OUTPATIENT
Start: 2024-02-26 | End: 2024-02-26 | Stop reason: HOSPADM

## 2024-02-26 RX ORDER — DIPHENHYDRAMINE HYDROCHLORIDE 50 MG/ML
50 INJECTION INTRAMUSCULAR; INTRAVENOUS ONCE AS NEEDED
Status: CANCELLED | OUTPATIENT
Start: 2024-02-26

## 2024-02-26 RX ORDER — EPINEPHRINE 0.3 MG/.3ML
0.3 INJECTION SUBCUTANEOUS ONCE AS NEEDED
Status: DISCONTINUED | OUTPATIENT
Start: 2024-02-26 | End: 2024-02-26 | Stop reason: HOSPADM

## 2024-02-26 RX ORDER — PROCHLORPERAZINE EDISYLATE 5 MG/ML
5 INJECTION INTRAMUSCULAR; INTRAVENOUS ONCE AS NEEDED
Status: CANCELLED | OUTPATIENT
Start: 2024-02-26

## 2024-02-26 RX ORDER — SODIUM CHLORIDE 0.9 % (FLUSH) 0.9 %
10 SYRINGE (ML) INJECTION
Status: CANCELLED | OUTPATIENT
Start: 2024-02-28

## 2024-02-26 RX ORDER — EPINEPHRINE 0.3 MG/.3ML
0.3 INJECTION SUBCUTANEOUS ONCE AS NEEDED
Status: CANCELLED | OUTPATIENT
Start: 2024-02-26

## 2024-02-26 RX ORDER — SODIUM CHLORIDE 0.9 % (FLUSH) 0.9 %
10 SYRINGE (ML) INJECTION
Status: DISCONTINUED | OUTPATIENT
Start: 2024-02-26 | End: 2024-02-26 | Stop reason: HOSPADM

## 2024-02-26 RX ORDER — HEPARIN 100 UNIT/ML
500 SYRINGE INTRAVENOUS
Status: CANCELLED | OUTPATIENT
Start: 2024-02-28

## 2024-02-26 RX ORDER — HEPARIN 100 UNIT/ML
500 SYRINGE INTRAVENOUS
Status: CANCELLED | OUTPATIENT
Start: 2024-02-26

## 2024-02-26 RX ORDER — HEPARIN 100 UNIT/ML
500 SYRINGE INTRAVENOUS
Status: DISCONTINUED | OUTPATIENT
Start: 2024-02-26 | End: 2024-02-26 | Stop reason: HOSPADM

## 2024-02-26 RX ORDER — PROCHLORPERAZINE EDISYLATE 5 MG/ML
5 INJECTION INTRAMUSCULAR; INTRAVENOUS ONCE AS NEEDED
Status: CANCELLED | OUTPATIENT
Start: 2024-02-28

## 2024-02-26 RX ORDER — PROCHLORPERAZINE EDISYLATE 5 MG/ML
5 INJECTION INTRAMUSCULAR; INTRAVENOUS ONCE AS NEEDED
Status: DISCONTINUED | OUTPATIENT
Start: 2024-02-26 | End: 2024-02-26 | Stop reason: HOSPADM

## 2024-02-26 RX ORDER — SODIUM CHLORIDE 0.9 % (FLUSH) 0.9 %
10 SYRINGE (ML) INJECTION
Status: CANCELLED | OUTPATIENT
Start: 2024-02-26

## 2024-02-26 RX ADMIN — FLUOROURACIL 4440 MG: 50 INJECTION, SOLUTION INTRAVENOUS at 12:02

## 2024-02-26 RX ADMIN — OXALIPLATIN 150 MG: 5 INJECTION, SOLUTION INTRAVENOUS at 10:02

## 2024-02-26 RX ADMIN — DEXTROSE MONOHYDRATE: 50 INJECTION, SOLUTION INTRAVENOUS at 10:02

## 2024-02-26 RX ADMIN — DEXAMETHASONE SODIUM PHOSPHATE 0.25 MG: 4 INJECTION, SOLUTION INTRA-ARTICULAR; INTRALESIONAL; INTRAMUSCULAR; INTRAVENOUS; SOFT TISSUE at 10:02

## 2024-02-26 RX ADMIN — BEVACIZUMAB-AWWB 365 MG: 400 INJECTION, SOLUTION INTRAVENOUS at 09:02

## 2024-02-26 RX ADMIN — SODIUM CHLORIDE: 9 INJECTION, SOLUTION INTRAVENOUS at 09:02

## 2024-02-26 NOTE — PROGRESS NOTES
MEDICAL ONCOLOGY - ESTABLISHED PATIENT VISIT    Reason for visit: colon adenocarcinoma    Best Contact Phone Number(s): [phone] (home)      Cancer/Stage/TNM:    Cancer Staging   Colon adenocarcinoma  Staging form: Colon and Rectum, AJCC 8th Edition  - Pathologic stage from 1/22/2024: Stage IVC (pT4a, pN2b, cM1c) - Signed by Minna Menendez CNS on 2/13/2024       Oncology History   Colon adenocarcinoma   12/21/2023 Tumor Markers    Patient's tumor was tested for the following markers: CEA.                                              Results of the tumor marker test revealed 3.3     12/21/2023 Procedure    Colonoscopy  Cecal polyp removed with jumbo forceps, 3 mm  Multiple ascending colon polyps ranging in size from 5 to 12 mm - semi-pedunculated removed with hot snare  Hepatic flexure mass identified - central ulceration, concerning for malignancy, 3 cm, not obstructing, this was biopsied - tattoo placed distal to mass  Sigmoid diverticular disease     12/21/2023 Tumor Markers    Patient's tumor was tested for the following markers: CEA.                                              Results of the tumor marker test revealed 3.3     12/22/2023 Imaging Significant Findings    CT CAP  Evaluation of the colon is somewhat limited as there is significant colonic stool and oral contrast material has not opacified the large bowel.  There does appear to be some abnormal thickening of the walls of the proximal right colon and a patient with a known history of colonic malignancy.  There is some soft tissue density external to the walls of the colon on the right pericolic gutter which could represent peritoneal nodularity versus subserosal extent of tumor.  Slightly more distal to this area there is a 2nd area of irregularity of the walls of the colon, difficult to fully evaluate if this is related to the colonic walls versus stool with central fat.     Two hypodensities in the liver, the larger measuring 0.8 cm, too  small to accurately characterize on the basis of this examination.  Attention on follow-up.     No pulmonary nodules are seen.     Cholecystectomy.     12/29/2023 Initial Diagnosis    Hepatic flexure colon adenocarcinoma  MMR intact     1/22/2024 Cancer Staged    Staging form: Colon and Rectum, AJCC 8th Edition  - Pathologic stage from 1/22/2024: Stage IVC (pT4a, pN2b, cM1c)     Malignant neoplasm of ascending colon   2/12/2024 Initial Diagnosis    Malignant neoplasm of ascending colon     2/26/2024 -  Chemotherapy    Treatment Summary   Plan Name: OP GI [id number] (oxaliplatin leucovorin fluorouracil) with bevacizumab Q2W  Treatment Goal: Palliative  Status: Active  Start Date: 2/26/2024 (Planned)  End Date: 1/15/2025 (Planned)  Provider: Kemar Zaragoza MD  Chemotherapy: fluorouraciL injection 740 mg, 400 mg/m2 = 740 mg, Intravenous, Clinic/HOD 1 time, 0 of 24 cycles  oxaliplatin (ELOXATIN) 85 mg/m2 = 157 mg in dextrose 5 % (D5W) 531.4 mL chemo infusion, 85 mg/m2 = 157 mg, Intravenous, Clinic/HOD 1 time, 0 of 24 cycles  bevacizumab-awwb (MVASI) 5 mg/kg = 365 mg in sodium chloride 0.9% 100 mL infusion, 5 mg/kg = 365 mg, Intravenous, Clinic/HOD 1 time, 0 of 24 cycles          HPI:   73 y.o. female with LUANNE, bipolar, HLD who presents prior to cycle 1 FOLFOX + bevacizumab. Doing well overall. Port placed 2/21/24, healing well with no signs of infection. She does report baseline numbness to her right foot from a prior surgery. Denies fever/chills, SOB, CP, palpitations, N/V, C/D, pain, blood in urine/stool, paresthesias.     ECOG 0. Presents with her .     ROS:   Review of Systems   Constitutional:  Negative for chills, fever and malaise/fatigue.   HENT:  Negative for congestion and sore throat.    Respiratory:  Negative for shortness of breath.    Cardiovascular:  Negative for chest pain, palpitations and leg swelling.   Gastrointestinal:  Negative for blood in stool, constipation, diarrhea and nausea.    Genitourinary:  Negative for hematuria.   Musculoskeletal:  Negative for back pain, falls and myalgias.   Skin:  Negative for rash.   Neurological:  Negative for dizziness, tingling, weakness and headaches.       Past Medical History:   Past Medical History:   Diagnosis Date    Anxiety     Arthritis     Bipolar 1 disorder     Bursitis of right hip     GI bleed due to NSAIDs     Multinodular goiter 11/15/2021    Sacroiliitis     right side    Sleep apnea         Past Surgical History:   Past Surgical History:   Procedure Laterality Date    ANKLE FRACTURE SURGERY Left 2001    BLADDER SUSPENSION      CARPAL TUNNEL RELEASE Bilateral     CHOLECYSTECTOMY      Laparoscopic    COLONOSCOPY      COLONOSCOPY N/A 12/21/2023    Procedure: COLONOSCOPY;  Surgeon: Alberto Albright MD;  Location: [facility];  Service: Endoscopy;  Laterality: N/A;    ESOPHAGOGASTRODUODENOSCOPY N/A 07/29/2021    Procedure: EGD (ESOPHAGOGASTRODUODENOSCOPY);  Surgeon: Susan Mcclain MD;  Location: [facility];  Service: Endoscopy;  Laterality: N/A;    EYE SURGERY      FOOT ARTHRODESIS Right 05/03/2023    Procedure: FUSION, FOOT;  Surgeon: Lauri Alejandra DPM;  Location: [facility];  Service: Podiatry;  Laterality: Right;  mini c-arm, Arthrex dowel bone graft harvester, locking plate and screws festus notified cc    HYSTERECTOMY  1986    vaginal prolapse    INJECTION OF ANESTHETIC AGENT AROUND MEDIAL BRANCH NERVES INNERVATING CERVICAL FACET JOINT Bilateral 05/27/2022    Procedure: CERVICAL MEDIAL BRANCH NERVE BLOCK (C3-4,C4-5);  Surgeon: Mamie Roman MD;  Location: [facility];  Service: Pain Management;  Laterality: Bilateral;    INJECTION OF ANESTHETIC AGENT AROUND MEDIAL BRANCH NERVES INNERVATING LUMBAR FACET JOINT Right 02/05/2021    Procedure: LUMBAR FACET JOINT BLOCK (L3-4,L4-5,L5-S1);  Surgeon: Mamie Roman MD;  Location: [facility];  Service: Pain Management;  Laterality: Right;    INJECTION OF ANESTHETIC AGENT AROUND MEDIAL BRANCH NERVES  INNERVATING LUMBAR FACET JOINT Right 2021    Procedure: LUMBAR FACET JOINT BLOCK (L3-4,L4-5,L5-S1);  Surgeon: Mamie Roman MD;  Location: [facility];  Service: Pain Management;  Laterality: Right;    INJECTION OF ANESTHETIC AGENT INTO SACROILIAC JOINT Right 2020    Procedure: SACROILIAC JOINT INJECTION;  Surgeon: Mamie Roman MD;  Location: [facility];  Service: Pain Management;  Laterality: Right;    INJECTION OF JOINT Right 2020    Procedure: GREATER TROCHANTERIC BURSA INJECTION;  Surgeon: Mamie Roman MD;  Location: [facility];  Service: Pain Management;  Laterality: Right;    LAPAROSCOPIC RIGHT COLON RESECTION N/A 2024    Procedure: COLECTOMY, RIGHT, LAPAROSCOPIC (eras low, lithotomy);  Surgeon: Alberto Albright MD;  [facility]: [facility];  Service: Colon and Rectal;  Laterality: N/A;    NASAL SEPTUM SURGERY      RECTAL PROLAPSE REPAIR      TONSILLECTOMY          Family History:   Family History   Problem Relation Age of Onset    No Known Problems Maternal Aunt     Colon cancer Maternal Uncle     Colon cancer Maternal Uncle     Colon cancer Maternal Uncle     No Known Problems Paternal Aunt     Esophageal cancer Paternal Uncle     Heart attack Maternal Grandmother     Leukemia Maternal Grandfather     No Known Problems Paternal Grandmother     Stroke Paternal Grandfather         Social History:   Social History     Tobacco Use    Smoking status: Former     Current packs/day: 0.00     Average packs/day: 1 pack/day for 41.7 years (41.7 ttl pk-yrs)     Types: Cigarettes     Start date: 3/30/1982     Quit date: 2023     Years since quittin.1    Smokeless tobacco: Never   Substance Use Topics    Alcohol use: Yes     Comment: Socially-2 or 3 times a year-6 or 7 drinks        I have reviewed and updated the patient's past medical, surgical, family and social histories.    Allergies:   Review of patient's allergies indicates:   Allergen Reactions    Nsaids (non-steroidal  anti-inflammatory drug) Other (See Comments)     Gastrointestinal bleeding requiring blood transfusion    Demerol [meperidine] Rash        Medications:   Current Outpatient Medications   Medication Sig Dispense Refill    albuterol (PROVENTIL/VENTOLIN HFA) 90 mcg/actuation inhaler inhale 2 puffs into the lungs every 6 hours as needed for wheezing. 18 g 1    dexAMETHasone (DECADRON) 4 MG Tab Take 2 tablets (8 mg total) by mouth once daily only on days 2, 3 and 4 of each chemotherapy cycle. 40 tablet 0    EScitalopram oxalate (LEXAPRO) 20 MG tablet Take 1 tablet (20 mg total) by mouth once daily. (Patient taking differently: Take 20 mg by mouth every evening.) 30 tablet 4    gabapentin (NEURONTIN) 300 MG capsule Begin by taking one at bedtime x 2 days then twice daily x 2 days followed by three times daily for if tolerating well. 90 capsule 11    gabapentin (NEURONTIN) 300 MG capsule Take 1 capsule (300 mg total) by mouth 3 (three) times daily. 90 capsule 0    lamoTRIgine (LAMICTAL) 150 MG Tab Take 2 tablets (300 mg total) by mouth every evening. 180 tablet 3    LIDOcaine-prilocaine (EMLA) cream Apply topically as needed (place to port site 45-60 minutes prior to chemotherapy). 30 g 5    pantoprazole (PROTONIX) 40 MG tablet Take 1 tablet (40 mg total) by mouth once daily. (Patient taking differently: Take 40 mg by mouth nightly.) 90 tablet 3    QUEtiapine (SEROQUEL) 100 MG Tab Take 1 tablet (100 mg total) by mouth every evening. 30 tablet 4    rosuvastatin (CRESTOR) 10 MG tablet Take 1 tablet (10 mg total) by mouth once daily. (Patient taking differently: Take 10 mg by mouth every evening.) 90 tablet 3    aspirin 81 MG Chew Take 1 tablet (81 mg total) by mouth once daily. (Patient not taking: Reported on 2/26/2024) 30 tablet 5    ondansetron (ZOFRAN-ODT) 8 MG TbDL dissolve 1 tablet (8 mg total) under the tongue every 6 (six) hours as needed (nausea). (Patient not taking: Reported on 2/26/2024) 30 tablet 5     "prochlorperazine (COMPAZINE) 10 MG tablet Take 1 tablet (10 mg total) by mouth every 6 (six) hours as needed (nausea). (Patient not taking: Reported on 2024) 30 tablet 2     No current facility-administered medications for this visit.        Physical Exam:   /65 (BP Location: Left arm, Patient Position: Sitting, BP Method: Medium (Automatic))   Pulse 77   Temp 97.9 °F (36.6 °C) (Skin)   Ht 5' 6" (1.676 m)   Wt 73.8 kg (162 lb 11.2 oz)   SpO2 98%   BMI 26.26 kg/m²      ECOG Performance status: 0          Physical Exam  Vitals reviewed.   Constitutional:       General: She is not in acute distress.     Appearance: Normal appearance. She is normal weight. She is not ill-appearing, toxic-appearing or diaphoretic.   HENT:      Head: Normocephalic and atraumatic.      Right Ear: External ear normal.      Left Ear: External ear normal.      Nose: Nose normal.      Mouth/Throat:      Pharynx: Oropharynx is clear.   Eyes:      General: No scleral icterus.     Conjunctiva/sclera: Conjunctivae normal.   Cardiovascular:      Rate and Rhythm: Normal rate.   Pulmonary:      Effort: Pulmonary effort is normal. No respiratory distress.   Chest:      Comments: RCW port, steristrips in place  Abdominal:      General: There is no distension.   Skin:     General: Skin is warm and dry.      Coloration: Skin is not jaundiced or pale.      Findings: No bruising, erythema or rash.   Neurological:      Mental Status: She is alert and oriented to person, place, and time. Mental status is at baseline.      Motor: No weakness.      Gait: Gait normal.   Psychiatric:         Mood and Affect: Mood normal.         Labs:   No results found for this or any previous visit (from the past 48 hour(s)).    I have reviewed the pertinent labs.      Imagin/22/23 - CT CAP   Impression:     Evaluation of the colon is somewhat limited as there is significant colonic stool and oral contrast material has not opacified the large bowel.  " There does appear to be some abnormal thickening of the walls of the proximal right colon and a patient with a known history of colonic malignancy.  There is some soft tissue density external to the walls of the colon on the right pericolic gutter which could represent peritoneal nodularity versus subserosal extent of tumor.  Slightly more distal to this area there is a 2nd area of irregularity of the walls of the colon, difficult to fully evaluate if this is related to the colonic walls versus stool with central fat.     Two hypodensities in the liver, the larger measuring 0.8 cm, too small to accurately characterize on the basis of this examination.  Attention on follow-up.     No pulmonary nodules are seen.     Cholecystectomy.    Path:   1/22/24 - R Hemicolectomy   Final Pathologic Diagnosis     THE DIAGNOSES REMAIN THE SAME.  THIS CORRECTED REPORT IS ISSUED TO CHANGE M STATUS to reflect tumor in the omentum.  This change is discussed with Dr. RANJANA Albright via phone, 2/1/2024.    1. Colon, right and terminal ileum (right hemicolectomy with EN bloc abdominal wall resection):  Invasive adenocarcinoma.  See cancer synoptic report     2. Omentum (resection):    Positive for carcinoma    CORRECT SYNOPTIC AND M STATUS  Cancer synoptic report  - Procedure: Right hemicolectomy with EN bloc abdominal wall resection  - Tumor site:  Ascending  - Histologic type:  Adenocarcinoma  - Histologic grade:  G2, moderately differentiated.  See comment.  COMMENT:  While the majority of the tumor consists of well formed glands, a significant proportion includes abortive glands, single file infiltration, and malignant cells with signet ring cell morphology. The tumor has an insidious pattern of  infiltration with tumor present far from the advancing front of the tumor.  - Tumor size:  2.0 x 2.0 x 1.0 cm  - Tumor extent:  Invades visceral peritoneum, multifocal  - Macroscopic perforation: Not identified  - Lymphovascular invasion:   Present, extensive.  Small vessel, large vessel, intra and extramural.  - Perineural invasion:  Not identified  - Tumor budding score:  High (>10), multifocal single cell infiltration  - Treatment effect: No known pre-surgical therapy    Margins  Margin involved by invasive carcinoma, radial (slide 1C)  All margins negative for dysplasia.    pTNM stage classification (AJCC 8th edition)  pT Category  pT4a:  Tumor invades through the visceral peritoneum    pN Category  pN2b:  7 or more regional lymph nodes are positive  Number of positive lymph nodes:  19  Number of lymph nodes examined: 26  Number of tumor deposits:  4    pM Category  pM1c:  Metastasis to the peritoneal surface of the omentum.    Additional findings:  - Sessile serrated polyp, no cytologic dysplasia, 1.1 cm at ileocecal valve  - Tubulovillous adenoma, 1.0 cm, proximal ascending  - Tubular adenoma, 0.4 cm, mid ascending  - Tubular adenoma, 0.4, distal ascending  - Tubular adenoma, 0.6 cm, distal ascending  - Submucosal lipoma, 2.0 cm  - Appendix with no specific histopathologic changes    Ancillary studies  Immunohistochemistry for mismatch repair proteins performed on prior biopsy material (SAS-, 12/21/23): pMMR.         Assessment:       1. Malignant neoplasm of ascending colon    2. 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 Immunodeficiency secondary to neoplasm    4. Immunodeficiency secondary to chemotherapy        Plan:        # Colon cancer   Mrs. Maguire is a 73 y.o. female who presents for oncologic evaluation for metastatic colon cancer. She underwent a R hemicolectomy with en-bloc abdominal wal resection on 1/22/24 with Dr. Albright. Pathology revealed pT4a N2b disease with 19/26 positive LNs and omentum positive for carcinoma.    We had an in-depth conversation during our initial consult re: her diagnosis and treatment options. Reviewed recommendation for IV systemic therapy for at least 6 months. Plan for FOLFOX + bevacizumab  to be given every 2 weeks. Briefly reviewed side effects of treatment. Handouts provided. Port placed 2/21/24.     PGX - DPYD normal metabolizer, [id number] intermediate metabolizer   Dexamethasone, zofran, compazine and lidocaine sent to pharmacy previously. Reviewed admin instructions.     Pending response, she may be a candidate for CRS/HIPEC with surgical oncology team. Continue to evaluate.     Will repeat imaging after ~3 months.     Presents today for cycle 1 FOLFOX + bevacizumab.   Labs reviewed, adequate for treatment.   Proceed with cycle 1 today.      Patient was consented for chemotherapy today 2/26/2024. An extensive discussion was had which included a thorough discussion of the risk and benefits of treatment and alternatives. Risks, including but not limited to, possible hair loss, bone marrow damage (anemia, thrombocytopenia, immune suppression, neutropenia), damage to body organs (brain, heart, liver, kidney, lungs, nervous system, skin, and others), allergic reactions, sterility, nausea/vomiting, constipation/diarrhea, sores in the mouth, secondary cancers, local damage at possible injection sites, and rarely death were all discussed. Consented the patient to the treatment plan and the patient was educated on the planned duration of the treatment and schedule of the treatment administration. The patient agrees with the plan, and all questions have been answered to their satisfaction. Consent was signed the patient, provider, and a third party witness.      Follow up: 2 weeks for cycle 2.     Patient is in agreement with the proposed treatment plan. All questions were answered to the patient's satisfaction. Pt knows to call clinic if anything is needed before the next clinic visit.    Patient discussed with and seen in conjunction with collaborating physician, Dr. Zaragoza.    At least 40 minutes were spent today on this encounter including face to face time with the patient, data gathering/interpretation  and documentation.       Minna Menendez, MSN, APRN, [facility]  Hematology and Medical Oncology  Clinical Nurse Specialist to Dr. Zaragoza, Dr. Jin & Dr. Goss Onc Chart Routing      Follow up with physician . 8 weeks with labs for cycle 5.   Follow up with KRISTEN 6 weeks. with labs for cycle 4.   Infusion scheduling note   treatment every 2 weeks, pump d/c on day 3   Injection scheduling note    Labs CBC, CMP, CEA and urinalysis   Scheduling:  Preferred lab:  Lab interval: every 2 weeks  at [facility] 1-2 days prior to visit/chemo   Imaging    Pharmacy appointment    Other referrals

## 2024-02-26 NOTE — PLAN OF CARE
1300  Patient completed MVASI, Oxaliplatin infusion, tolerated well. CADD connected to port to infuse @ 2.2 cc/hr for 46 hours to total 100 cc of 5FU.  CADD infusing prior to patient ambulating off floor accompanied by .  Patient aware to RTC @ 1100 on 2/28/24 for pump DC.

## 2024-02-26 NOTE — PLAN OF CARE
0900  Patient seated in chair accompanied by .  VSS, assessment done.  Port accessed, flushed, blood return noted, Started NS @ 50 cc/hr KVO while waiting for MVASi, Oxaliplatin, Leucovorin, Fluorouracil INJ, 5FU from pharmacy for D1C1.  [facility] for safety

## 2024-02-28 ENCOUNTER — INFUSION (OUTPATIENT)
Dept: INFUSION THERAPY | Facility: HOSPITAL | Age: 74
End: 2024-02-28
Payer: MEDICARE

## 2024-02-28 VITALS
SYSTOLIC BLOOD PRESSURE: 125 MMHG | DIASTOLIC BLOOD PRESSURE: 75 MMHG | HEART RATE: 69 BPM | RESPIRATION RATE: 18 BRPM | OXYGEN SATURATION: 97 % | TEMPERATURE: 99 F

## 2024-02-28 DIAGNOSIS — C18.2 MALIGNANT NEOPLASM OF ASCENDING COLON: Primary | ICD-10-CM

## 2024-02-28 PROCEDURE — 63600175 PHARM REV CODE 636 W HCPCS: Performed by: INTERNAL MEDICINE

## 2024-02-28 PROCEDURE — 25000003 PHARM REV CODE 250: Performed by: INTERNAL MEDICINE

## 2024-02-28 PROCEDURE — A4216 STERILE WATER/SALINE, 10 ML: HCPCS | Performed by: INTERNAL MEDICINE

## 2024-02-28 RX ORDER — HEPARIN 100 UNIT/ML
500 SYRINGE INTRAVENOUS
Status: DISCONTINUED | OUTPATIENT
Start: 2024-02-28 | End: 2024-02-28 | Stop reason: HOSPADM

## 2024-02-28 RX ORDER — PROCHLORPERAZINE EDISYLATE 5 MG/ML
5 INJECTION INTRAMUSCULAR; INTRAVENOUS ONCE AS NEEDED
Status: DISCONTINUED | OUTPATIENT
Start: 2024-02-28 | End: 2024-02-28 | Stop reason: HOSPADM

## 2024-02-28 RX ORDER — SODIUM CHLORIDE 0.9 % (FLUSH) 0.9 %
10 SYRINGE (ML) INJECTION
Status: DISCONTINUED | OUTPATIENT
Start: 2024-02-28 | End: 2024-02-28 | Stop reason: HOSPADM

## 2024-02-28 RX ADMIN — Medication 10 ML: at 10:02

## 2024-02-28 RX ADMIN — HEPARIN 500 UNITS: 100 SYRINGE at 10:02

## 2024-02-28 NOTE — PLAN OF CARE
1055-Pt ambulatory to clinic today for pump d/c. Denies any complaints. Pump completed prior to arrival. Port deaccessed after flushing. Ambulatory from clinic in [facility].

## 2024-03-05 DIAGNOSIS — K92.2 ACUTE GI BLEEDING: ICD-10-CM

## 2024-03-05 RX ORDER — PANTOPRAZOLE SODIUM 40 MG/1
40 TABLET, DELAYED RELEASE ORAL DAILY
Qty: 90 TABLET | Refills: 3 | Status: SHIPPED | OUTPATIENT
Start: 2024-03-05

## 2024-03-08 ENCOUNTER — LAB VISIT (OUTPATIENT)
Dept: LAB | Facility: HOSPITAL | Age: 74
End: 2024-03-08
Attending: REGISTERED NURSE
Payer: MEDICARE

## 2024-03-08 DIAGNOSIS — C18.2 MALIGNANT NEOPLASM OF ASCENDING COLON: ICD-10-CM

## 2024-03-08 LAB
ALBUMIN SERPL BCP-MCNC: 3.4 G/DL (ref 3.5–5.2)
ALP SERPL-CCNC: 75 U/L (ref 55–135)
ALT SERPL W/O P-5'-P-CCNC: 11 U/L (ref 10–44)
ANION GAP SERPL CALC-SCNC: 6 MMOL/L (ref 8–16)
AST SERPL-CCNC: 16 U/L (ref 10–40)
BILIRUB SERPL-MCNC: 0.3 MG/DL (ref 0.1–1)
BILIRUB UR QL STRIP: NEGATIVE
BUN SERPL-MCNC: 10 MG/DL (ref 8–23)
CALCIUM SERPL-MCNC: 9.3 MG/DL (ref 8.7–10.5)
CEA SERPL-MCNC: 2.9 NG/ML (ref 0–5)
CHLORIDE SERPL-SCNC: 107 MMOL/L (ref 95–110)
CLARITY UR: CLEAR
CO2 SERPL-SCNC: 26 MMOL/L (ref 23–29)
COLOR UR: YELLOW
CREAT SERPL-MCNC: 0.8 MG/DL (ref 0.5–1.4)
ERYTHROCYTE [DISTWIDTH] IN BLOOD BY AUTOMATED COUNT: 14.6 % (ref 11.5–14.5)
EST. GFR  (NO RACE VARIABLE): >60 ML/MIN/1.73 M^2
GLUCOSE SERPL-MCNC: 94 MG/DL (ref 70–110)
GLUCOSE UR QL STRIP: NEGATIVE
HCT VFR BLD AUTO: 33.2 % (ref 37–48.5)
HGB BLD-MCNC: 10.7 G/DL (ref 12–16)
HGB UR QL STRIP: NEGATIVE
IMM GRANULOCYTES # BLD AUTO: 0.01 K/UL (ref 0–0.04)
KETONES UR QL STRIP: NEGATIVE
LEUKOCYTE ESTERASE UR QL STRIP: NEGATIVE
MCH RBC QN AUTO: 28.5 PG (ref 27–31)
MCHC RBC AUTO-ENTMCNC: 32.2 G/DL (ref 32–36)
MCV RBC AUTO: 89 FL (ref 82–98)
NEUTROPHILS # BLD AUTO: 2.4 K/UL (ref 1.8–7.7)
NITRITE UR QL STRIP: NEGATIVE
PH UR STRIP: 6 [PH] (ref 5–8)
PLATELET # BLD AUTO: 312 K/UL (ref 150–450)
PMV BLD AUTO: 9.6 FL (ref 9.2–12.9)
POTASSIUM SERPL-SCNC: 4 MMOL/L (ref 3.5–5.1)
PROT SERPL-MCNC: 6.5 G/DL (ref 6–8.4)
PROT UR QL STRIP: NEGATIVE
RBC # BLD AUTO: 3.75 M/UL (ref 4–5.4)
SODIUM SERPL-SCNC: 139 MMOL/L (ref 136–145)
SP GR UR STRIP: 1.02 (ref 1–1.03)
URN SPEC COLLECT METH UR: NORMAL
UROBILINOGEN UR STRIP-ACNC: NEGATIVE EU/DL
WBC # BLD AUTO: 5.16 K/UL (ref 3.9–12.7)

## 2024-03-08 PROCEDURE — 82378 CARCINOEMBRYONIC ANTIGEN: CPT | Performed by: REGISTERED NURSE

## 2024-03-08 PROCEDURE — 36415 COLL VENOUS BLD VENIPUNCTURE: CPT | Performed by: REGISTERED NURSE

## 2024-03-08 PROCEDURE — 80053 COMPREHEN METABOLIC PANEL: CPT | Performed by: REGISTERED NURSE

## 2024-03-08 PROCEDURE — 81003 URINALYSIS AUTO W/O SCOPE: CPT | Performed by: REGISTERED NURSE

## 2024-03-08 PROCEDURE — 85027 COMPLETE CBC AUTOMATED: CPT | Performed by: REGISTERED NURSE

## 2024-03-11 ENCOUNTER — INFUSION (OUTPATIENT)
Dept: INFUSION THERAPY | Facility: HOSPITAL | Age: 74
End: 2024-03-11
Payer: MEDICARE

## 2024-03-11 ENCOUNTER — OFFICE VISIT (OUTPATIENT)
Dept: HEMATOLOGY/ONCOLOGY | Facility: CLINIC | Age: 74
End: 2024-03-11
Payer: MEDICARE

## 2024-03-11 VITALS
RESPIRATION RATE: 18 BRPM | DIASTOLIC BLOOD PRESSURE: 65 MMHG | HEART RATE: 73 BPM | TEMPERATURE: 98 F | SYSTOLIC BLOOD PRESSURE: 133 MMHG

## 2024-03-11 VITALS
BODY MASS INDEX: 26.7 KG/M2 | OXYGEN SATURATION: 99 % | HEIGHT: 66 IN | RESPIRATION RATE: 16 BRPM | WEIGHT: 166.13 LBS | HEART RATE: 80 BPM | DIASTOLIC BLOOD PRESSURE: 62 MMHG | SYSTOLIC BLOOD PRESSURE: 129 MMHG

## 2024-03-11 DIAGNOSIS — C18.2 MALIGNANT NEOPLASM OF ASCENDING COLON: Primary | ICD-10-CM

## 2024-03-11 DIAGNOSIS — C78.6 MALIGNANT NEOPLASM METASTATIC TO OMENTUM: ICD-10-CM

## 2024-03-11 DIAGNOSIS — T45.1X5A IMMUNODEFICIENCY SECONDARY TO CHEMOTHERAPY: ICD-10-CM

## 2024-03-11 DIAGNOSIS — D84.81 IMMUNODEFICIENCY SECONDARY TO NEOPLASM: ICD-10-CM

## 2024-03-11 DIAGNOSIS — D84.821 IMMUNODEFICIENCY SECONDARY TO CHEMOTHERAPY: ICD-10-CM

## 2024-03-11 DIAGNOSIS — D49.9 IMMUNODEFICIENCY SECONDARY TO NEOPLASM: ICD-10-CM

## 2024-03-11 DIAGNOSIS — Z79.899 IMMUNODEFICIENCY SECONDARY TO CHEMOTHERAPY: ICD-10-CM

## 2024-03-11 PROCEDURE — 3008F BODY MASS INDEX DOCD: CPT | Mod: CPTII,S$GLB,, | Performed by: PHYSICIAN ASSISTANT

## 2024-03-11 PROCEDURE — 1126F AMNT PAIN NOTED NONE PRSNT: CPT | Mod: CPTII,S$GLB,, | Performed by: PHYSICIAN ASSISTANT

## 2024-03-11 PROCEDURE — 63600175 PHARM REV CODE 636 W HCPCS: Performed by: PHYSICIAN ASSISTANT

## 2024-03-11 PROCEDURE — 25000003 PHARM REV CODE 250: Performed by: PHYSICIAN ASSISTANT

## 2024-03-11 PROCEDURE — 96415 CHEMO IV INFUSION ADDL HR: CPT

## 2024-03-11 PROCEDURE — 96416 CHEMO PROLONG INFUSE W/PUMP: CPT

## 2024-03-11 PROCEDURE — 3074F SYST BP LT 130 MM HG: CPT | Mod: CPTII,S$GLB,, | Performed by: PHYSICIAN ASSISTANT

## 2024-03-11 PROCEDURE — 99215 OFFICE O/P EST HI 40 MIN: CPT | Mod: S$GLB,,, | Performed by: PHYSICIAN ASSISTANT

## 2024-03-11 PROCEDURE — 96367 TX/PROPH/DG ADDL SEQ IV INF: CPT

## 2024-03-11 PROCEDURE — 3288F FALL RISK ASSESSMENT DOCD: CPT | Mod: CPTII,S$GLB,, | Performed by: PHYSICIAN ASSISTANT

## 2024-03-11 PROCEDURE — 99999 PR PBB SHADOW E&M-EST. PATIENT-LVL III: CPT | Mod: PBBFAC,,, | Performed by: PHYSICIAN ASSISTANT

## 2024-03-11 PROCEDURE — 96417 CHEMO IV INFUS EACH ADDL SEQ: CPT

## 2024-03-11 PROCEDURE — 1159F MED LIST DOCD IN RCRD: CPT | Mod: CPTII,S$GLB,, | Performed by: PHYSICIAN ASSISTANT

## 2024-03-11 PROCEDURE — 96413 CHEMO IV INFUSION 1 HR: CPT

## 2024-03-11 PROCEDURE — 3078F DIAST BP <80 MM HG: CPT | Mod: CPTII,S$GLB,, | Performed by: PHYSICIAN ASSISTANT

## 2024-03-11 PROCEDURE — 1101F PT FALLS ASSESS-DOCD LE1/YR: CPT | Mod: CPTII,S$GLB,, | Performed by: PHYSICIAN ASSISTANT

## 2024-03-11 RX ORDER — EPINEPHRINE 0.3 MG/.3ML
0.3 INJECTION SUBCUTANEOUS ONCE AS NEEDED
Status: CANCELLED | OUTPATIENT
Start: 2024-03-11

## 2024-03-11 RX ORDER — PROCHLORPERAZINE EDISYLATE 5 MG/ML
5 INJECTION INTRAMUSCULAR; INTRAVENOUS ONCE AS NEEDED
Status: DISCONTINUED | OUTPATIENT
Start: 2024-03-11 | End: 2024-03-11 | Stop reason: HOSPADM

## 2024-03-11 RX ORDER — PROCHLORPERAZINE EDISYLATE 5 MG/ML
5 INJECTION INTRAMUSCULAR; INTRAVENOUS ONCE AS NEEDED
Status: CANCELLED | OUTPATIENT
Start: 2024-03-13

## 2024-03-11 RX ORDER — SODIUM CHLORIDE 0.9 % (FLUSH) 0.9 %
10 SYRINGE (ML) INJECTION
Status: DISCONTINUED | OUTPATIENT
Start: 2024-03-11 | End: 2024-03-11 | Stop reason: HOSPADM

## 2024-03-11 RX ORDER — SODIUM CHLORIDE 0.9 % (FLUSH) 0.9 %
10 SYRINGE (ML) INJECTION
Status: CANCELLED | OUTPATIENT
Start: 2024-03-11

## 2024-03-11 RX ORDER — DIPHENHYDRAMINE HYDROCHLORIDE 50 MG/ML
50 INJECTION INTRAMUSCULAR; INTRAVENOUS ONCE AS NEEDED
Status: DISCONTINUED | OUTPATIENT
Start: 2024-03-11 | End: 2024-03-11 | Stop reason: HOSPADM

## 2024-03-11 RX ORDER — EPINEPHRINE 0.3 MG/.3ML
0.3 INJECTION SUBCUTANEOUS ONCE AS NEEDED
Status: DISCONTINUED | OUTPATIENT
Start: 2024-03-11 | End: 2024-03-11 | Stop reason: HOSPADM

## 2024-03-11 RX ORDER — SODIUM CHLORIDE 0.9 % (FLUSH) 0.9 %
10 SYRINGE (ML) INJECTION
Status: CANCELLED | OUTPATIENT
Start: 2024-03-13

## 2024-03-11 RX ORDER — DIPHENHYDRAMINE HYDROCHLORIDE 50 MG/ML
50 INJECTION INTRAMUSCULAR; INTRAVENOUS ONCE AS NEEDED
Status: CANCELLED | OUTPATIENT
Start: 2024-03-11

## 2024-03-11 RX ORDER — HEPARIN 100 UNIT/ML
500 SYRINGE INTRAVENOUS
Status: DISCONTINUED | OUTPATIENT
Start: 2024-03-11 | End: 2024-03-11 | Stop reason: HOSPADM

## 2024-03-11 RX ORDER — PROCHLORPERAZINE EDISYLATE 5 MG/ML
5 INJECTION INTRAMUSCULAR; INTRAVENOUS ONCE AS NEEDED
Status: CANCELLED | OUTPATIENT
Start: 2024-03-11

## 2024-03-11 RX ORDER — HEPARIN 100 UNIT/ML
500 SYRINGE INTRAVENOUS
Status: CANCELLED | OUTPATIENT
Start: 2024-03-13

## 2024-03-11 RX ORDER — HEPARIN 100 UNIT/ML
500 SYRINGE INTRAVENOUS
Status: CANCELLED | OUTPATIENT
Start: 2024-03-11

## 2024-03-11 RX ADMIN — SODIUM CHLORIDE: 9 INJECTION, SOLUTION INTRAVENOUS at 07:03

## 2024-03-11 RX ADMIN — DEXAMETHASONE SODIUM PHOSPHATE 0.25 MG: 4 INJECTION, SOLUTION INTRA-ARTICULAR; INTRALESIONAL; INTRAMUSCULAR; INTRAVENOUS; SOFT TISSUE at 08:03

## 2024-03-11 RX ADMIN — BEVACIZUMAB-AWWB 365 MG: 400 INJECTION, SOLUTION INTRAVENOUS at 08:03

## 2024-03-11 RX ADMIN — FLUOROURACIL 4440 MG: 50 INJECTION, SOLUTION INTRAVENOUS at 11:03

## 2024-03-11 RX ADMIN — DEXTROSE MONOHYDRATE: 50 INJECTION, SOLUTION INTRAVENOUS at 09:03

## 2024-03-11 RX ADMIN — OXALIPLATIN 150 MG: 5 INJECTION, SOLUTION INTRAVENOUS at 09:03

## 2024-03-11 NOTE — PLAN OF CARE
1125 Patient tolerated C2D1 MVASI + mFOLFOX without incident. Seen by Grisel THOMAS prior to treatment. Labs from 3/8 reviewed and within parameters to treat. Vitals stable before, during, and after treatment. Port with brisk blood return and flushed without resistance before, during and after infusion. Patient connected to 5FU home infusion pump at completion of infusion, port with blood return noted and flushed without resistance. Confirmed pump was infusing, visualized decrease in reservoir volume. Line connections secured. Patient verbalized understanding of home care of pump and aware of number to call on side of pump if issues arise. Patient aware of her pump d/c at 0930 on Wednesday 3/13. To contact provider with questions or concerns. D/C ambulatory and stable with her .

## 2024-03-11 NOTE — PROGRESS NOTES
MEDICAL ONCOLOGY - ESTABLISHED PATIENT VISIT    Reason for visit: colon adenocarcinoma    Best Contact Phone Number(s): [phone] (home)      Cancer/Stage/TNM:    Cancer Staging   Colon adenocarcinoma  Staging form: Colon and Rectum, AJCC 8th Edition  - Pathologic stage from 1/22/2024: Stage IVC (pT4a, pN2b, cM1c) - Signed by Minna Menendez CNS on 2/13/2024       Oncology History   Colon adenocarcinoma   12/21/2023 Tumor Markers    Patient's tumor was tested for the following markers: CEA.                                              Results of the tumor marker test revealed 3.3     12/21/2023 Procedure    Colonoscopy  Cecal polyp removed with jumbo forceps, 3 mm  Multiple ascending colon polyps ranging in size from 5 to 12 mm - semi-pedunculated removed with hot snare  Hepatic flexure mass identified - central ulceration, concerning for malignancy, 3 cm, not obstructing, this was biopsied - tattoo placed distal to mass  Sigmoid diverticular disease     12/21/2023 Tumor Markers    Patient's tumor was tested for the following markers: CEA.                                              Results of the tumor marker test revealed 3.3     12/22/2023 Imaging Significant Findings    CT CAP  Evaluation of the colon is somewhat limited as there is significant colonic stool and oral contrast material has not opacified the large bowel.  There does appear to be some abnormal thickening of the walls of the proximal right colon and a patient with a known history of colonic malignancy.  There is some soft tissue density external to the walls of the colon on the right pericolic gutter which could represent peritoneal nodularity versus subserosal extent of tumor.  Slightly more distal to this area there is a 2nd area of irregularity of the walls of the colon, difficult to fully evaluate if this is related to the colonic walls versus stool with central fat.     Two hypodensities in the liver, the larger measuring 0.8 cm, too  small to accurately characterize on the basis of this examination.  Attention on follow-up.     No pulmonary nodules are seen.     Cholecystectomy.     12/29/2023 Initial Diagnosis    Hepatic flexure colon adenocarcinoma  MMR intact     1/22/2024 Cancer Staged    Staging form: Colon and Rectum, AJCC 8th Edition  - Pathologic stage from 1/22/2024: Stage IVC (pT4a, pN2b, cM1c)     Malignant neoplasm of ascending colon   2/12/2024 Initial Diagnosis    Malignant neoplasm of ascending colon     2/26/2024 -  Chemotherapy    Treatment Summary   Plan Name: OP GI [id number] (oxaliplatin leucovorin fluorouracil) with bevacizumab Q2W  Treatment Goal: Palliative  Status: Active  Start Date: 2/26/2024  End Date: 1/15/2025 (Planned)  Provider: Kemar Zaragoza MD  Chemotherapy: oxaliplatin (ELOXATIN) 150 mg in dextrose 5 % (D5W) 595 mL chemo infusion, 157 mg, Intravenous, Clinic/HOD 1 time, 1 of 24 cycles  Administration: 150 mg (2/26/2024)  bevacizumab-awwb (MVASI) 5 mg/kg = 365 mg in sodium chloride 0.9% 100 mL infusion, 5 mg/kg = 365 mg, Intravenous, Clinic/HOD 1 time, 1 of 24 cycles  Administration: 365 mg (2/26/2024)          HPI:   73 y.o. female with LUANNE, bipolar, HLD who presents prior to cycle 2 FOLFOX + bevacizumab. Doing very well and tolerated her first cycle very well. Port placed 2/21/24, healing well with no signs of infection. She does report baseline numbness to her right foot from a prior surgery. This has not worsened since her first cycle of chemotherapy. She is eating well and has a good appetite. Denies fever/chills, SOB, CP, palpitations, N/V, C/D, pain, blood in urine/stool, paresthesias. Did very well. No concerns or complaints today    ECOG 0. Presents with her .     ROS:   Review of Systems   Constitutional:  Negative for chills, fever and malaise/fatigue.   HENT:  Negative for congestion and sore throat.    Respiratory:  Negative for shortness of breath.    Cardiovascular:  Negative for chest  pain, palpitations and leg swelling.   Gastrointestinal:  Negative for blood in stool, constipation, diarrhea and nausea.   Genitourinary:  Negative for hematuria.   Musculoskeletal:  Negative for back pain, falls and myalgias.   Skin:  Negative for rash.   Neurological:  Negative for dizziness, tingling, weakness and headaches.       Past Medical History:   Past Medical History:   Diagnosis Date    Anxiety     Arthritis     Bipolar 1 disorder     Bursitis of right hip     GI bleed due to NSAIDs     Multinodular goiter 11/15/2021    Sacroiliitis     right side    Sleep apnea         Past Surgical History:   Past Surgical History:   Procedure Laterality Date    ANKLE FRACTURE SURGERY Left 2001    BLADDER SUSPENSION      CARPAL TUNNEL RELEASE Bilateral     CHOLECYSTECTOMY      Laparoscopic    COLONOSCOPY      COLONOSCOPY N/A 12/21/2023    Procedure: COLONOSCOPY;  Surgeon: Alberto Albright MD;  Location: [facility];  Service: Endoscopy;  Laterality: N/A;    ESOPHAGOGASTRODUODENOSCOPY N/A 07/29/2021    Procedure: EGD (ESOPHAGOGASTRODUODENOSCOPY);  Surgeon: Susan Mcclain MD;  Location: [facility];  Service: Endoscopy;  Laterality: N/A;    EYE SURGERY      FOOT ARTHRODESIS Right 05/03/2023    Procedure: FUSION, FOOT;  Surgeon: Lauri Alejandra DPM;  Location: [facility];  Service: Podiatry;  Laterality: Right;  mini c-arm, Arthrex dowel bone graft harvester, locking plate and screws festus notified cc    HYSTERECTOMY  1986    vaginal prolapse    INJECTION OF ANESTHETIC AGENT AROUND MEDIAL BRANCH NERVES INNERVATING CERVICAL FACET JOINT Bilateral 05/27/2022    Procedure: CERVICAL MEDIAL BRANCH NERVE BLOCK (C3-4,C4-5);  Surgeon: Mamie Roman MD;  Location: [facility];  Service: Pain Management;  Laterality: Bilateral;    INJECTION OF ANESTHETIC AGENT AROUND MEDIAL BRANCH NERVES INNERVATING LUMBAR FACET JOINT Right 02/05/2021    Procedure: LUMBAR FACET JOINT BLOCK (L3-4,L4-5,L5-S1);  Surgeon: Mamie Roman MD;  Location:  [facility];  Service: Pain Management;  Laterality: Right;    INJECTION OF ANESTHETIC AGENT AROUND MEDIAL BRANCH NERVES INNERVATING LUMBAR FACET JOINT Right 2021    Procedure: LUMBAR FACET JOINT BLOCK (L3-4,L4-5,L5-S1);  Surgeon: Mamie Roman MD;  Location: [facility];  Service: Pain Management;  Laterality: Right;    INJECTION OF ANESTHETIC AGENT INTO SACROILIAC JOINT Right 2020    Procedure: SACROILIAC JOINT INJECTION;  Surgeon: Mamie Roman MD;  Location: [facility];  Service: Pain Management;  Laterality: Right;    INJECTION OF JOINT Right 2020    Procedure: GREATER TROCHANTERIC BURSA INJECTION;  Surgeon: Mamie Roman MD;  Location: [facility];  Service: Pain Management;  Laterality: Right;    LAPAROSCOPIC RIGHT COLON RESECTION N/A 2024    Procedure: COLECTOMY, RIGHT, LAPAROSCOPIC (eras low, lithotomy);  Surgeon: Alberto Albright MD;  [facility];  Service: Colon and Rectal;  Laterality: N/A;    NASAL SEPTUM SURGERY      RECTAL PROLAPSE REPAIR      TONSILLECTOMY          Family History:   Family History   Problem Relation Age of Onset    No Known Problems Maternal Aunt     Colon cancer Maternal Uncle     Colon cancer Maternal Uncle     Colon cancer Maternal Uncle     No Known Problems Paternal Aunt     Esophageal cancer Paternal Uncle     Heart attack Maternal Grandmother     Leukemia Maternal Grandfather     No Known Problems Paternal Grandmother     Stroke Paternal Grandfather         Social History:   Social History     Tobacco Use    Smoking status: Former     Current packs/day: 0.00     Average packs/day: 1 pack/day for 41.7 years (41.7 ttl pk-yrs)     Types: Cigarettes     Start date: 3/30/1982     Quit date: 2023     Years since quittin.2    Smokeless tobacco: Never   Substance Use Topics    Alcohol use: Yes     Comment: Socially-2 or 3 times a year-6 or 7 drinks        I have reviewed and updated the patient's past medical, surgical, family and social  histories.    Allergies:   Review of patient's allergies indicates:   Allergen Reactions    Nsaids (non-steroidal anti-inflammatory drug) Other (See Comments)     Gastrointestinal bleeding requiring blood transfusion    Demerol [meperidine] Rash        Medications:   Current Outpatient Medications   Medication Sig Dispense Refill    albuterol (PROVENTIL/VENTOLIN HFA) 90 mcg/actuation inhaler inhale 2 puffs into the lungs every 6 hours as needed for wheezing. 18 g 1    dexAMETHasone (DECADRON) 4 MG Tab Take 2 tablets (8 mg total) by mouth once daily only on days 2, 3 and 4 of each chemotherapy cycle. 40 tablet 0    EScitalopram oxalate (LEXAPRO) 20 MG tablet Take 1 tablet (20 mg total) by mouth once daily. (Patient taking differently: Take 20 mg by mouth every evening.) 30 tablet 4    gabapentin (NEURONTIN) 300 MG capsule Begin by taking one at bedtime for 2 days then twice daily for 2 days followed by three times daily for if tolerating well. 90 capsule 11    lamoTRIgine (LAMICTAL) 150 MG Tab Take 2 tablets (300 mg total) by mouth every evening. 180 tablet 3    LIDOcaine-prilocaine (EMLA) cream Apply topically as needed (place to port site 45-60 minutes prior to chemotherapy). 30 g 5    pantoprazole (PROTONIX) 40 MG tablet Take 1 tablet (40 mg total) by mouth once daily. 90 tablet 3    QUEtiapine (SEROQUEL) 100 MG Tab Take 1 tablet (100 mg total) by mouth every evening. 30 tablet 4    rosuvastatin (CRESTOR) 10 MG tablet Take 1 tablet (10 mg total) by mouth once daily. (Patient taking differently: Take 10 mg by mouth every evening.) 90 tablet 3    aspirin 81 MG Chew Take 1 tablet (81 mg total) by mouth once daily. (Patient not taking: Reported on 2/26/2024) 30 tablet 5    ondansetron (ZOFRAN-ODT) 8 MG TbDL dissolve 1 tablet (8 mg total) under the tongue every 6 (six) hours as needed (nausea). (Patient not taking: Reported on 2/26/2024) 30 tablet 5    prochlorperazine (COMPAZINE) 10 MG tablet Take 1 tablet (10 mg  "total) by mouth every 6 (six) hours as needed (nausea). (Patient not taking: Reported on 2024) 30 tablet 2     No current facility-administered medications for this visit.        Physical Exam:   /62   Pulse 80   Resp 16   Ht 5' 6" (1.676 m)   Wt 75.4 kg (166 lb 1.9 oz)   SpO2 99%   BMI 26.81 kg/m²      ECOG Performance status: 0          Physical Exam  Vitals reviewed.   Constitutional:       General: She is not in acute distress.     Appearance: Normal appearance. She is normal weight. She is not ill-appearing, toxic-appearing or diaphoretic.   HENT:      Head: Normocephalic and atraumatic.      Right Ear: External ear normal.      Left Ear: External ear normal.      Nose: Nose normal.      Mouth/Throat:      Pharynx: Oropharynx is clear.   Eyes:      General: No scleral icterus.     Conjunctiva/sclera: Conjunctivae normal.   Cardiovascular:      Rate and Rhythm: Normal rate.   Pulmonary:      Effort: Pulmonary effort is normal. No respiratory distress.   Chest:      Comments: RCW port, steristrips in place  Abdominal:      General: There is no distension.   Skin:     General: Skin is warm and dry.      Coloration: Skin is not jaundiced or pale.      Findings: No bruising, erythema or rash.   Neurological:      Mental Status: She is alert and oriented to person, place, and time. Mental status is at baseline.      Motor: No weakness.      Gait: Gait normal.   Psychiatric:         Mood and Affect: Mood normal.         Labs:     I have reviewed the pertinent labs.      Imagin/22/23 - CT CAP   Impression:     Evaluation of the colon is somewhat limited as there is significant colonic stool and oral contrast material has not opacified the large bowel.  There does appear to be some abnormal thickening of the walls of the proximal right colon and a patient with a known history of colonic malignancy.  There is some soft tissue density external to the walls of the colon on the right pericolic " gutter which could represent peritoneal nodularity versus subserosal extent of tumor.  Slightly more distal to this area there is a 2nd area of irregularity of the walls of the colon, difficult to fully evaluate if this is related to the colonic walls versus stool with central fat.     Two hypodensities in the liver, the larger measuring 0.8 cm, too small to accurately characterize on the basis of this examination.  Attention on follow-up.     No pulmonary nodules are seen.     Cholecystectomy.    Path:   1/22/24 - R Hemicolectomy   Final Pathologic Diagnosis     THE DIAGNOSES REMAIN THE SAME.  THIS CORRECTED REPORT IS ISSUED TO CHANGE M STATUS to reflect tumor in the omentum.  This change is discussed with Dr. RANJANA Albright via phone, 2/1/2024.    1. Colon, right and terminal ileum (right hemicolectomy with EN bloc abdominal wall resection):  Invasive adenocarcinoma.  See cancer synoptic report     2. Omentum (resection):    Positive for carcinoma    CORRECT SYNOPTIC AND M STATUS  Cancer synoptic report  - Procedure: Right hemicolectomy with EN bloc abdominal wall resection  - Tumor site:  Ascending  - Histologic type:  Adenocarcinoma  - Histologic grade:  G2, moderately differentiated.  See comment.  COMMENT:  While the majority of the tumor consists of well formed glands, a significant proportion includes abortive glands, single file infiltration, and malignant cells with signet ring cell morphology. The tumor has an insidious pattern of  infiltration with tumor present far from the advancing front of the tumor.  - Tumor size:  2.0 x 2.0 x 1.0 cm  - Tumor extent:  Invades visceral peritoneum, multifocal  - Macroscopic perforation: Not identified  - Lymphovascular invasion:  Present, extensive.  Small vessel, large vessel, intra and extramural.  - Perineural invasion:  Not identified  - Tumor budding score:  High (>10), multifocal single cell infiltration  - Treatment effect: No known pre-surgical  therapy    Margins  Margin involved by invasive carcinoma, radial (slide 1C)  All margins negative for dysplasia.    pTNM stage classification (AJCC 8th edition)  pT Category  pT4a:  Tumor invades through the visceral peritoneum    pN Category  pN2b:  7 or more regional lymph nodes are positive  Number of positive lymph nodes:  19  Number of lymph nodes examined: 26  Number of tumor deposits:  4    pM Category  pM1c:  Metastasis to the peritoneal surface of the omentum.    Additional findings:  - Sessile serrated polyp, no cytologic dysplasia, 1.1 cm at ileocecal valve  - Tubulovillous adenoma, 1.0 cm, proximal ascending  - Tubular adenoma, 0.4 cm, mid ascending  - Tubular adenoma, 0.4, distal ascending  - Tubular adenoma, 0.6 cm, distal ascending  - Submucosal lipoma, 2.0 cm  - Appendix with no specific histopathologic changes    Ancillary studies  Immunohistochemistry for mismatch repair proteins performed on prior biopsy material (SAS-, 12/21/23): pMMR.         Assessment:       1. Malignant neoplasm of ascending colon    2. 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 Immunodeficiency secondary to neoplasm    4. Immunodeficiency secondary to chemotherapy          Plan:        # Colon cancer   Mrs. Maguire is a 73 y.o. female who presents for oncologic evaluation for metastatic colon cancer. She underwent a R hemicolectomy with en-bloc abdominal wal resection on 1/22/24 with Dr. Albright. Pathology revealed pT4a N2b disease with 19/26 positive LNs and omentum positive for carcinoma.    We had an in-depth conversation during our initial consult re: her diagnosis and treatment options. Reviewed recommendation for IV systemic therapy for at least 6 months. Plan for FOLFOX + bevacizumab to be given every 2 weeks. Briefly reviewed side effects of treatment. Handouts provided. Port placed 2/21/24.     PGX - DPYD normal metabolizer, [id number] intermediate metabolizer   Dexamethasone, zofran, compazine and lidocaine  sent to pharmacy previously. Reviewed admin instructions.     Pending response, she may be a candidate for CRS/HIPEC with surgical oncology team. Continue to evaluate.     Will repeat imaging after ~3 months.     Presents today for cycle 2 FOLFOX + bevacizumab.   - Did very well with cycle 1. She tolerated treatment without any complication. Eating well and has a good appetite.   - Labs reviewed, adequate for treatment.   - Proceed with cycle 2 today.      Follow up: 2 weeks for cycle 3.     Patient is in agreement with the proposed treatment plan. All questions were answered to the patient's satisfaction. Pt knows to call clinic if anything is needed before the next clinic visit.        Med Onc Chart Routing      Follow up with physician 2 weeks and 6 weeks. For cycle 3. See Dr. Zaragoza in 6 weeks with lab work, CT CAP and treatment discussion with cycle 5 of FOLFOX.   Follow up with KRISTEN 4 weeks. See KRISTEN as scheduled for cycle 4 of FOLFOX.   Infusion scheduling note    Injection scheduling note    Labs CBC, CMP, CEA and urinalysis   Scheduling:  Preferred lab:  Lab interval: every 2 weeks     Imaging CT chest abdomen pelvis      Pharmacy appointment    Other referrals

## 2024-03-13 ENCOUNTER — INFUSION (OUTPATIENT)
Dept: INFUSION THERAPY | Facility: HOSPITAL | Age: 74
End: 2024-03-13
Payer: MEDICARE

## 2024-03-13 DIAGNOSIS — C18.2 MALIGNANT NEOPLASM OF ASCENDING COLON: Primary | ICD-10-CM

## 2024-03-13 PROCEDURE — A4216 STERILE WATER/SALINE, 10 ML: HCPCS | Performed by: PHYSICIAN ASSISTANT

## 2024-03-13 PROCEDURE — 63600175 PHARM REV CODE 636 W HCPCS: Performed by: PHYSICIAN ASSISTANT

## 2024-03-13 PROCEDURE — 25000003 PHARM REV CODE 250: Performed by: PHYSICIAN ASSISTANT

## 2024-03-13 RX ORDER — PROCHLORPERAZINE EDISYLATE 5 MG/ML
5 INJECTION INTRAMUSCULAR; INTRAVENOUS ONCE AS NEEDED
Status: DISCONTINUED | OUTPATIENT
Start: 2024-03-13 | End: 2024-03-13 | Stop reason: HOSPADM

## 2024-03-13 RX ORDER — SODIUM CHLORIDE 0.9 % (FLUSH) 0.9 %
10 SYRINGE (ML) INJECTION
Status: DISCONTINUED | OUTPATIENT
Start: 2024-03-13 | End: 2024-03-13 | Stop reason: HOSPADM

## 2024-03-13 RX ORDER — HEPARIN 100 UNIT/ML
500 SYRINGE INTRAVENOUS
Status: DISCONTINUED | OUTPATIENT
Start: 2024-03-13 | End: 2024-03-13 | Stop reason: HOSPADM

## 2024-03-13 RX ADMIN — Medication 10 ML: at 08:03

## 2024-03-13 RX ADMIN — HEPARIN 500 UNITS: 100 SYRINGE at 08:03

## 2024-03-14 ENCOUNTER — PATIENT MESSAGE (OUTPATIENT)
Dept: HEMATOLOGY/ONCOLOGY | Facility: CLINIC | Age: 74
End: 2024-03-14
Payer: MEDICARE

## 2024-03-21 DIAGNOSIS — C18.2 MALIGNANT NEOPLASM OF ASCENDING COLON: Primary | ICD-10-CM

## 2024-03-22 ENCOUNTER — LAB VISIT (OUTPATIENT)
Dept: LAB | Facility: HOSPITAL | Age: 74
End: 2024-03-22
Attending: REGISTERED NURSE
Payer: MEDICARE

## 2024-03-22 DIAGNOSIS — C18.2 MALIGNANT NEOPLASM OF ASCENDING COLON: ICD-10-CM

## 2024-03-22 LAB
ALBUMIN SERPL BCP-MCNC: 3.2 G/DL (ref 3.5–5.2)
ALP SERPL-CCNC: 85 U/L (ref 55–135)
ALT SERPL W/O P-5'-P-CCNC: 16 U/L (ref 10–44)
ANION GAP SERPL CALC-SCNC: 7 MMOL/L (ref 8–16)
AST SERPL-CCNC: 13 U/L (ref 10–40)
BILIRUB SERPL-MCNC: 0.2 MG/DL (ref 0.1–1)
BILIRUB UR QL STRIP: NEGATIVE
BUN SERPL-MCNC: 11 MG/DL (ref 8–23)
CALCIUM SERPL-MCNC: 9.2 MG/DL (ref 8.7–10.5)
CEA SERPL-MCNC: 3.5 NG/ML (ref 0–5)
CHLORIDE SERPL-SCNC: 109 MMOL/L (ref 95–110)
CLARITY UR: CLEAR
CO2 SERPL-SCNC: 24 MMOL/L (ref 23–29)
COLOR UR: YELLOW
CREAT SERPL-MCNC: 0.8 MG/DL (ref 0.5–1.4)
ERYTHROCYTE [DISTWIDTH] IN BLOOD BY AUTOMATED COUNT: 15.1 % (ref 11.5–14.5)
EST. GFR  (NO RACE VARIABLE): >60 ML/MIN/1.73 M^2
GLUCOSE SERPL-MCNC: 102 MG/DL (ref 70–110)
GLUCOSE UR QL STRIP: NEGATIVE
HCT VFR BLD AUTO: 34 % (ref 37–48.5)
HGB BLD-MCNC: 11.1 G/DL (ref 12–16)
HGB UR QL STRIP: NEGATIVE
IMM GRANULOCYTES # BLD AUTO: 0.02 K/UL (ref 0–0.04)
KETONES UR QL STRIP: NEGATIVE
LEUKOCYTE ESTERASE UR QL STRIP: NEGATIVE
MCH RBC QN AUTO: 28.6 PG (ref 27–31)
MCHC RBC AUTO-ENTMCNC: 32.6 G/DL (ref 32–36)
MCV RBC AUTO: 88 FL (ref 82–98)
NEUTROPHILS # BLD AUTO: 2.9 K/UL (ref 1.8–7.7)
NITRITE UR QL STRIP: NEGATIVE
PH UR STRIP: 6 [PH] (ref 5–8)
PLATELET # BLD AUTO: 252 K/UL (ref 150–450)
PMV BLD AUTO: 9.5 FL (ref 9.2–12.9)
POTASSIUM SERPL-SCNC: 4.4 MMOL/L (ref 3.5–5.1)
PROT SERPL-MCNC: 6.2 G/DL (ref 6–8.4)
PROT UR QL STRIP: NEGATIVE
RBC # BLD AUTO: 3.88 M/UL (ref 4–5.4)
SODIUM SERPL-SCNC: 140 MMOL/L (ref 136–145)
SP GR UR STRIP: 1.02 (ref 1–1.03)
URN SPEC COLLECT METH UR: NORMAL
UROBILINOGEN UR STRIP-ACNC: NEGATIVE EU/DL
WBC # BLD AUTO: 5.79 K/UL (ref 3.9–12.7)

## 2024-03-22 PROCEDURE — 81003 URINALYSIS AUTO W/O SCOPE: CPT | Performed by: REGISTERED NURSE

## 2024-03-22 PROCEDURE — 80053 COMPREHEN METABOLIC PANEL: CPT | Performed by: REGISTERED NURSE

## 2024-03-22 PROCEDURE — 85027 COMPLETE CBC AUTOMATED: CPT | Performed by: REGISTERED NURSE

## 2024-03-22 PROCEDURE — 82378 CARCINOEMBRYONIC ANTIGEN: CPT | Performed by: REGISTERED NURSE

## 2024-03-22 PROCEDURE — 36415 COLL VENOUS BLD VENIPUNCTURE: CPT | Performed by: REGISTERED NURSE

## 2024-03-25 ENCOUNTER — INFUSION (OUTPATIENT)
Dept: INFUSION THERAPY | Facility: HOSPITAL | Age: 74
End: 2024-03-25
Payer: MEDICARE

## 2024-03-25 ENCOUNTER — OFFICE VISIT (OUTPATIENT)
Dept: HEMATOLOGY/ONCOLOGY | Facility: CLINIC | Age: 74
End: 2024-03-25
Payer: MEDICARE

## 2024-03-25 VITALS
BODY MASS INDEX: 26.77 KG/M2 | WEIGHT: 166.56 LBS | TEMPERATURE: 99 F | OXYGEN SATURATION: 98 % | DIASTOLIC BLOOD PRESSURE: 70 MMHG | HEART RATE: 67 BPM | HEIGHT: 66 IN | SYSTOLIC BLOOD PRESSURE: 146 MMHG

## 2024-03-25 VITALS
WEIGHT: 166.56 LBS | RESPIRATION RATE: 18 BRPM | BODY MASS INDEX: 26.77 KG/M2 | TEMPERATURE: 98 F | HEIGHT: 66 IN | SYSTOLIC BLOOD PRESSURE: 130 MMHG | OXYGEN SATURATION: 98 % | DIASTOLIC BLOOD PRESSURE: 72 MMHG | HEART RATE: 73 BPM

## 2024-03-25 DIAGNOSIS — C78.6 MALIGNANT NEOPLASM METASTATIC TO OMENTUM: ICD-10-CM

## 2024-03-25 DIAGNOSIS — C18.2 MALIGNANT NEOPLASM OF ASCENDING COLON: Primary | ICD-10-CM

## 2024-03-25 DIAGNOSIS — D49.9 IMMUNODEFICIENCY SECONDARY TO NEOPLASM: ICD-10-CM

## 2024-03-25 DIAGNOSIS — D84.821 IMMUNODEFICIENCY SECONDARY TO CHEMOTHERAPY: ICD-10-CM

## 2024-03-25 DIAGNOSIS — T45.1X5A IMMUNODEFICIENCY SECONDARY TO CHEMOTHERAPY: ICD-10-CM

## 2024-03-25 DIAGNOSIS — D84.81 IMMUNODEFICIENCY SECONDARY TO NEOPLASM: ICD-10-CM

## 2024-03-25 DIAGNOSIS — Z79.899 IMMUNODEFICIENCY SECONDARY TO CHEMOTHERAPY: ICD-10-CM

## 2024-03-25 PROCEDURE — 96367 TX/PROPH/DG ADDL SEQ IV INF: CPT

## 2024-03-25 PROCEDURE — 25000003 PHARM REV CODE 250: Performed by: REGISTERED NURSE

## 2024-03-25 PROCEDURE — 3078F DIAST BP <80 MM HG: CPT | Mod: CPTII,S$GLB,, | Performed by: REGISTERED NURSE

## 2024-03-25 PROCEDURE — 3008F BODY MASS INDEX DOCD: CPT | Mod: CPTII,S$GLB,, | Performed by: REGISTERED NURSE

## 2024-03-25 PROCEDURE — 96413 CHEMO IV INFUSION 1 HR: CPT

## 2024-03-25 PROCEDURE — 96416 CHEMO PROLONG INFUSE W/PUMP: CPT

## 2024-03-25 PROCEDURE — 63600175 PHARM REV CODE 636 W HCPCS: Mod: JZ,JG | Performed by: REGISTERED NURSE

## 2024-03-25 PROCEDURE — 99999 PR PBB SHADOW E&M-EST. PATIENT-LVL III: CPT | Mod: PBBFAC,,, | Performed by: REGISTERED NURSE

## 2024-03-25 PROCEDURE — 3077F SYST BP >= 140 MM HG: CPT | Mod: CPTII,S$GLB,, | Performed by: REGISTERED NURSE

## 2024-03-25 PROCEDURE — 1126F AMNT PAIN NOTED NONE PRSNT: CPT | Mod: CPTII,S$GLB,, | Performed by: REGISTERED NURSE

## 2024-03-25 PROCEDURE — 99215 OFFICE O/P EST HI 40 MIN: CPT | Mod: S$GLB,,, | Performed by: REGISTERED NURSE

## 2024-03-25 PROCEDURE — 1101F PT FALLS ASSESS-DOCD LE1/YR: CPT | Mod: CPTII,S$GLB,, | Performed by: REGISTERED NURSE

## 2024-03-25 PROCEDURE — 96417 CHEMO IV INFUS EACH ADDL SEQ: CPT

## 2024-03-25 PROCEDURE — 3288F FALL RISK ASSESSMENT DOCD: CPT | Mod: CPTII,S$GLB,, | Performed by: REGISTERED NURSE

## 2024-03-25 PROCEDURE — 96415 CHEMO IV INFUSION ADDL HR: CPT

## 2024-03-25 RX ORDER — EPINEPHRINE 0.3 MG/.3ML
0.3 INJECTION SUBCUTANEOUS ONCE AS NEEDED
Status: DISCONTINUED | OUTPATIENT
Start: 2024-03-25 | End: 2024-03-25 | Stop reason: HOSPADM

## 2024-03-25 RX ORDER — HEPARIN 100 UNIT/ML
500 SYRINGE INTRAVENOUS
Status: DISCONTINUED | OUTPATIENT
Start: 2024-03-25 | End: 2024-03-25 | Stop reason: HOSPADM

## 2024-03-25 RX ORDER — HEPARIN 100 UNIT/ML
500 SYRINGE INTRAVENOUS
Status: CANCELLED | OUTPATIENT
Start: 2024-03-25

## 2024-03-25 RX ORDER — PROCHLORPERAZINE EDISYLATE 5 MG/ML
5 INJECTION INTRAMUSCULAR; INTRAVENOUS ONCE AS NEEDED
Status: DISCONTINUED | OUTPATIENT
Start: 2024-03-25 | End: 2024-03-25 | Stop reason: HOSPADM

## 2024-03-25 RX ORDER — SODIUM CHLORIDE 0.9 % (FLUSH) 0.9 %
10 SYRINGE (ML) INJECTION
Status: CANCELLED | OUTPATIENT
Start: 2024-03-27

## 2024-03-25 RX ORDER — DIPHENHYDRAMINE HYDROCHLORIDE 50 MG/ML
50 INJECTION INTRAMUSCULAR; INTRAVENOUS ONCE AS NEEDED
Status: DISCONTINUED | OUTPATIENT
Start: 2024-03-25 | End: 2024-03-25 | Stop reason: HOSPADM

## 2024-03-25 RX ORDER — EPINEPHRINE 0.3 MG/.3ML
0.3 INJECTION SUBCUTANEOUS ONCE AS NEEDED
Status: CANCELLED | OUTPATIENT
Start: 2024-03-25

## 2024-03-25 RX ORDER — SODIUM CHLORIDE 0.9 % (FLUSH) 0.9 %
10 SYRINGE (ML) INJECTION
Status: CANCELLED | OUTPATIENT
Start: 2024-03-25

## 2024-03-25 RX ORDER — PROCHLORPERAZINE EDISYLATE 5 MG/ML
5 INJECTION INTRAMUSCULAR; INTRAVENOUS ONCE AS NEEDED
Status: CANCELLED | OUTPATIENT
Start: 2024-03-27

## 2024-03-25 RX ORDER — PROCHLORPERAZINE EDISYLATE 5 MG/ML
5 INJECTION INTRAMUSCULAR; INTRAVENOUS ONCE AS NEEDED
Status: CANCELLED | OUTPATIENT
Start: 2024-03-25

## 2024-03-25 RX ORDER — HEPARIN 100 UNIT/ML
500 SYRINGE INTRAVENOUS
Status: CANCELLED | OUTPATIENT
Start: 2024-03-27

## 2024-03-25 RX ORDER — SODIUM CHLORIDE 0.9 % (FLUSH) 0.9 %
10 SYRINGE (ML) INJECTION
Status: DISCONTINUED | OUTPATIENT
Start: 2024-03-25 | End: 2024-03-25 | Stop reason: HOSPADM

## 2024-03-25 RX ORDER — DIPHENHYDRAMINE HYDROCHLORIDE 50 MG/ML
50 INJECTION INTRAMUSCULAR; INTRAVENOUS ONCE AS NEEDED
Status: CANCELLED | OUTPATIENT
Start: 2024-03-25

## 2024-03-25 RX ADMIN — OXALIPLATIN 150 MG: 5 INJECTION, SOLUTION INTRAVENOUS at 11:03

## 2024-03-25 RX ADMIN — BEVACIZUMAB-AWWB 365 MG: 400 INJECTION, SOLUTION INTRAVENOUS at 10:03

## 2024-03-25 RX ADMIN — DEXAMETHASONE SODIUM PHOSPHATE 0.25 MG: 4 INJECTION, SOLUTION INTRA-ARTICULAR; INTRALESIONAL; INTRAMUSCULAR; INTRAVENOUS; SOFT TISSUE at 10:03

## 2024-03-25 RX ADMIN — SODIUM CHLORIDE: 9 INJECTION, SOLUTION INTRAVENOUS at 08:03

## 2024-03-25 RX ADMIN — FLUOROURACIL 4440 MG: 50 INJECTION, SOLUTION INTRAVENOUS at 01:03

## 2024-03-25 RX ADMIN — DEXTROSE MONOHYDRATE: 50 INJECTION, SOLUTION INTRAVENOUS at 10:03

## 2024-03-25 NOTE — PLAN OF CARE
1310 Patient tolerated mvasi and folfox with no s/s of reaction and no complaints. 5FU pump applied secured and running without difficulty. AVS given to patient and she understands to come on wed at 11am for pump d/c. She ambulated out with .

## 2024-03-25 NOTE — PROGRESS NOTES
MEDICAL ONCOLOGY - ESTABLISHED PATIENT VISIT    Reason for visit: colon adenocarcinoma    Best Contact Phone Number(s): [phone] (home)      Cancer/Stage/TNM:    Cancer Staging   Colon adenocarcinoma  Staging form: Colon and Rectum, AJCC 8th Edition  - Pathologic stage from 1/22/2024: Stage IVC (pT4a, pN2b, cM1c) - Signed by Minna Menendez CNS on 2/13/2024       Oncology History   Colon adenocarcinoma   12/21/2023 Tumor Markers    Patient's tumor was tested for the following markers: CEA.                                              Results of the tumor marker test revealed 3.3     12/21/2023 Procedure    Colonoscopy  Cecal polyp removed with jumbo forceps, 3 mm  Multiple ascending colon polyps ranging in size from 5 to 12 mm - semi-pedunculated removed with hot snare  Hepatic flexure mass identified - central ulceration, concerning for malignancy, 3 cm, not obstructing, this was biopsied - tattoo placed distal to mass  Sigmoid diverticular disease     12/21/2023 Tumor Markers    Patient's tumor was tested for the following markers: CEA.                                              Results of the tumor marker test revealed 3.3     12/22/2023 Imaging Significant Findings    CT CAP  Evaluation of the colon is somewhat limited as there is significant colonic stool and oral contrast material has not opacified the large bowel.  There does appear to be some abnormal thickening of the walls of the proximal right colon and a patient with a known history of colonic malignancy.  There is some soft tissue density external to the walls of the colon on the right pericolic gutter which could represent peritoneal nodularity versus subserosal extent of tumor.  Slightly more distal to this area there is a 2nd area of irregularity of the walls of the colon, difficult to fully evaluate if this is related to the colonic walls versus stool with central fat.     Two hypodensities in the liver, the larger measuring 0.8 cm, too  small to accurately characterize on the basis of this examination.  Attention on follow-up.     No pulmonary nodules are seen.     Cholecystectomy.     12/29/2023 Initial Diagnosis    Hepatic flexure colon adenocarcinoma  MMR intact     1/22/2024 Cancer Staged    Staging form: Colon and Rectum, AJCC 8th Edition  - Pathologic stage from 1/22/2024: Stage IVC (pT4a, pN2b, cM1c)     Malignant neoplasm of ascending colon   2/12/2024 Initial Diagnosis    Malignant neoplasm of ascending colon     2/26/2024 -  Chemotherapy    Treatment Summary   Plan Name: OP GI [id number] (oxaliplatin leucovorin fluorouracil) with bevacizumab Q2W  Treatment Goal: Palliative  Status: Active  Start Date: 2/26/2024  End Date: 1/15/2025 (Planned)  Provider: Kemar Zaragoza MD  Chemotherapy: oxaliplatin (ELOXATIN) 150 mg in dextrose 5 % (D5W) 595 mL chemo infusion, 157 mg, Intravenous, Clinic/HOD 1 time, 2 of 24 cycles  Administration: 150 mg (2/26/2024), 150 mg (3/11/2024)  fluorouracil (ADRUCIL) 2,400 mg/m2 = 4,440 mg in sodium chloride 0.9% 100 mL chemo infusion, 2,400 mg/m2 = 4,440 mg, Intravenous, Clinic/HOD 1 time, 2 of 24 cycles  Administration: 4,440 mg (2/26/2024), 4,440 mg (3/11/2024)  bevacizumab-awwb (MVASI) 5 mg/kg = 365 mg in sodium chloride 0.9% 100 mL infusion, 5 mg/kg = 365 mg, Intravenous, Clinic/HOD 1 time, 2 of 24 cycles  Administration: 365 mg (2/26/2024), 365 mg (3/11/2024)          HPI:   73 y.o. female with LUANNE, bipolar, HLD who presents prior to cycle 3 FOLFOX + bevacizumab. Doing well overall and tolerating chemo. Had 1 day with increased back pain the Saturday following her infusion, resolved by the same afternoon without pharmaceutical intervention. Reports constipation, was up to 4 days between BMs. Eating well, mild nausea. Cold sensitivity lasted ~1 week, no persistent paresthesias. No fevers, chills, SOB, CP, palpitations, vomiting, diarrhea, blood in urine or stool.     ECOG 0. Presents with her .      ROS:   Review of Systems   Constitutional:  Negative for chills, fever, malaise/fatigue and weight loss.   HENT:  Negative for congestion and sore throat.    Respiratory:  Negative for shortness of breath.    Cardiovascular:  Negative for chest pain, palpitations and leg swelling.   Gastrointestinal:  Positive for constipation. Negative for abdominal pain, blood in stool, diarrhea, nausea and vomiting.   Genitourinary:  Negative for hematuria.   Musculoskeletal:  Negative for back pain, falls and myalgias.   Skin:  Negative for rash.   Neurological:  Negative for dizziness, tingling, sensory change, weakness and headaches.       Past Medical History:   Past Medical History:   Diagnosis Date    Anxiety     Arthritis     Bipolar 1 disorder     Bursitis of right hip     GI bleed due to NSAIDs     Multinodular goiter 11/15/2021    Sacroiliitis     right side    Sleep apnea         Past Surgical History:   Past Surgical History:   Procedure Laterality Date    ANKLE FRACTURE SURGERY Left 2001    BLADDER SUSPENSION      CARPAL TUNNEL RELEASE Bilateral     CHOLECYSTECTOMY      Laparoscopic    COLONOSCOPY      COLONOSCOPY N/A 12/21/2023    Procedure: COLONOSCOPY;  Surgeon: Alberto Albright MD;  Location: [facility];  Service: Endoscopy;  Laterality: N/A;    ESOPHAGOGASTRODUODENOSCOPY N/A 07/29/2021    Procedure: EGD (ESOPHAGOGASTRODUODENOSCOPY);  Surgeon: Susan Mcclain MD;  Location: [facility];  Service: Endoscopy;  Laterality: N/A;    EYE SURGERY      FOOT ARTHRODESIS Right 05/03/2023    Procedure: FUSION, FOOT;  Surgeon: Lauri Alejandra DPM;  Location: [facility];  Service: Podiatry;  Laterality: Right;  mini c-arm, Arthrex dowel bone graft harvester, locking plate and screws festus notified cc    HYSTERECTOMY  1986    vaginal prolapse    INJECTION OF ANESTHETIC AGENT AROUND MEDIAL BRANCH NERVES INNERVATING CERVICAL FACET JOINT Bilateral 05/27/2022    Procedure: CERVICAL MEDIAL BRANCH NERVE BLOCK (C3-4,C4-5);   Surgeon: Mamie Roman MD;  Location: [facility] OR;  Service: Pain Management;  Laterality: Bilateral;    INJECTION OF ANESTHETIC AGENT AROUND MEDIAL BRANCH NERVES INNERVATING LUMBAR FACET JOINT Right 02/05/2021    Procedure: LUMBAR FACET JOINT BLOCK (L3-4,L4-5,L5-S1);  Surgeon: Mamie Roman MD;  Location: [facility] OR;  Service: Pain Management;  Laterality: Right;    INJECTION OF ANESTHETIC AGENT AROUND MEDIAL BRANCH NERVES INNERVATING LUMBAR FACET JOINT Right 04/30/2021    Procedure: LUMBAR FACET JOINT BLOCK (L3-4,L4-5,L5-S1);  Surgeon: Mamie Roman MD;  Location: [facility] OR;  Service: Pain Management;  Laterality: Right;    INJECTION OF ANESTHETIC AGENT INTO SACROILIAC JOINT Right 12/04/2020    Procedure: SACROILIAC JOINT INJECTION;  Surgeon: Mamie Roman MD;  Location: [facility] OR;  Service: Pain Management;  Laterality: Right;    INJECTION OF JOINT Right 12/04/2020    Procedure: GREATER TROCHANTERIC BURSA INJECTION;  Surgeon: Mamie Roman MD;  Location: [facility] OR;  Service: Pain Management;  Laterality: Right;    LAPAROSCOPIC RIGHT COLON RESECTION N/A 1/22/2024    Procedure: COLECTOMY, RIGHT, LAPAROSCOPIC (eras low, lithotomy);  Surgeon: Alberto Albright MD;  Location: [address];  Service: Colon and Rectal;  Laterality: N/A;    NASAL SEPTUM SURGERY      RECTAL PROLAPSE REPAIR      TONSILLECTOMY          Family History:   Family History   Problem Relation Age of Onset    No Known Problems Maternal Aunt     Colon cancer Maternal Uncle     Colon cancer Maternal Uncle     Colon cancer Maternal Uncle     No Known Problems Paternal Aunt     Esophageal cancer Paternal Uncle     Heart attack Maternal Grandmother     Leukemia Maternal Grandfather     No Known Problems Paternal Grandmother     Stroke Paternal Grandfather         Social History:   Social History     Tobacco Use    Smoking status: Former     Current packs/day: 0.00     Average packs/day: 1 pack/day for 41.7 years (41.7 ttl pk-yrs)     Types:  Cigarettes     Start date: 3/30/1982     Quit date: 2023     Years since quittin.2    Smokeless tobacco: Never   Substance Use Topics    Alcohol use: Yes     Comment: Socially-2 or 3 times a year-6 or 7 drinks        I have reviewed and updated the patient's past medical, surgical, family and social histories.    Allergies:   Review of patient's allergies indicates:   Allergen Reactions    Nsaids (non-steroidal anti-inflammatory drug) Other (See Comments)     Gastrointestinal bleeding requiring blood transfusion    Demerol [meperidine] Rash        Medications:   Current Outpatient Medications   Medication Sig Dispense Refill    albuterol (PROVENTIL/VENTOLIN HFA) 90 mcg/actuation inhaler inhale 2 puffs into the lungs every 6 hours as needed for wheezing. 18 g 1    aspirin 81 MG Chew Take 1 tablet (81 mg total) by mouth once daily. (Patient not taking: Reported on 2024) 30 tablet 5    dexAMETHasone (DECADRON) 4 MG Tab Take 2 tablets (8 mg total) by mouth once daily only on days 2, 3 and 4 of each chemotherapy cycle. 40 tablet 0    EScitalopram oxalate (LEXAPRO) 20 MG tablet Take 1 tablet (20 mg total) by mouth once daily. (Patient taking differently: Take 20 mg by mouth every evening.) 30 tablet 4    gabapentin (NEURONTIN) 300 MG capsule Begin by taking one at bedtime for 2 days then twice daily for 2 days followed by three times daily for if tolerating well. 90 capsule 11    lamoTRIgine (LAMICTAL) 150 MG Tab Take 2 tablets (300 mg total) by mouth every evening. 180 tablet 3    LIDOcaine-prilocaine (EMLA) cream Apply topically as needed (place to port site 45-60 minutes prior to chemotherapy). 30 g 5    ondansetron (ZOFRAN-ODT) 8 MG TbDL dissolve 1 tablet (8 mg total) under the tongue every 6 (six) hours as needed (nausea). (Patient not taking: Reported on 2024) 30 tablet 5    pantoprazole (PROTONIX) 40 MG tablet Take 1 tablet (40 mg total) by mouth once daily. 90 tablet 3    prochlorperazine  (COMPAZINE) 10 MG tablet Take 1 tablet (10 mg total) by mouth every 6 (six) hours as needed (nausea). (Patient not taking: Reported on 2/26/2024) 30 tablet 2    QUEtiapine (SEROQUEL) 100 MG Tab Take 1 tablet (100 mg total) by mouth every evening. 30 tablet 4    rosuvastatin (CRESTOR) 10 MG tablet Take 1 tablet (10 mg total) by mouth once daily. (Patient taking differently: Take 10 mg by mouth every evening.) 90 tablet 3     No current facility-administered medications for this visit.        Physical Exam:   There were no vitals taken for this visit.     ECOG Performance status: 0          Physical Exam  Vitals reviewed.   Constitutional:       General: She is not in acute distress.     Appearance: Normal appearance. She is normal weight. She is not ill-appearing, toxic-appearing or diaphoretic.   HENT:      Head: Normocephalic and atraumatic.      Right Ear: External ear normal.      Left Ear: External ear normal.      Nose: Nose normal.      Mouth/Throat:      Pharynx: Oropharynx is clear.   Eyes:      General: No scleral icterus.     Conjunctiva/sclera: Conjunctivae normal.   Cardiovascular:      Rate and Rhythm: Normal rate.      Heart sounds: Normal heart sounds. No murmur heard.  Pulmonary:      Effort: Pulmonary effort is normal. No respiratory distress.      Breath sounds: Normal breath sounds. No wheezing.   Chest:      Comments: RCW port  Abdominal:      General: Abdomen is flat. Bowel sounds are normal. There is no distension.      Palpations: Abdomen is soft.      Tenderness: There is no abdominal tenderness. There is no guarding.   Skin:     General: Skin is warm and dry.      Coloration: Skin is not jaundiced or pale.      Findings: No bruising, erythema or rash.   Neurological:      Mental Status: She is alert and oriented to person, place, and time. Mental status is at baseline.      Motor: No weakness.      Gait: Gait normal.   Psychiatric:         Mood and Affect: Mood normal.         Behavior:  Behavior normal.         Labs:     I have reviewed the pertinent labs.      Imagin/22/23 - CT CAP   Impression:     Evaluation of the colon is somewhat limited as there is significant colonic stool and oral contrast material has not opacified the large bowel.  There does appear to be some abnormal thickening of the walls of the proximal right colon and a patient with a known history of colonic malignancy.  There is some soft tissue density external to the walls of the colon on the right pericolic gutter which could represent peritoneal nodularity versus subserosal extent of tumor.  Slightly more distal to this area there is a 2nd area of irregularity of the walls of the colon, difficult to fully evaluate if this is related to the colonic walls versus stool with central fat.     Two hypodensities in the liver, the larger measuring 0.8 cm, too small to accurately characterize on the basis of this examination.  Attention on follow-up.     No pulmonary nodules are seen.     Cholecystectomy.    Path:   24 - R Hemicolectomy   Final Pathologic Diagnosis     THE DIAGNOSES REMAIN THE SAME.  THIS CORRECTED REPORT IS ISSUED TO CHANGE M STATUS to reflect tumor in the omentum.  This change is discussed with Dr. RANJANA Albright via phone, 2024.    1. Colon, right and terminal ileum (right hemicolectomy with EN bloc abdominal wall resection):  Invasive adenocarcinoma.  See cancer synoptic report     2. Omentum (resection):    Positive for carcinoma    CORRECT SYNOPTIC AND M STATUS  Cancer synoptic report  - Procedure: Right hemicolectomy with EN bloc abdominal wall resection  - Tumor site:  Ascending  - Histologic type:  Adenocarcinoma  - Histologic grade:  G2, moderately differentiated.  See comment.  COMMENT:  While the majority of the tumor consists of well formed glands, a significant proportion includes abortive glands, single file infiltration, and malignant cells with signet ring cell morphology. The tumor has  an insidious pattern of  infiltration with tumor present far from the advancing front of the tumor.  - Tumor size:  2.0 x 2.0 x 1.0 cm  - Tumor extent:  Invades visceral peritoneum, multifocal  - Macroscopic perforation: Not identified  - Lymphovascular invasion:  Present, extensive.  Small vessel, large vessel, intra and extramural.  - Perineural invasion:  Not identified  - Tumor budding score:  High (>10), multifocal single cell infiltration  - Treatment effect: No known pre-surgical therapy    Margins  Margin involved by invasive carcinoma, radial (slide 1C)  All margins negative for dysplasia.    pTNM stage classification (AJCC 8th edition)  pT Category  pT4a:  Tumor invades through the visceral peritoneum    pN Category  pN2b:  7 or more regional lymph nodes are positive  Number of positive lymph nodes:  19  Number of lymph nodes examined: 26  Number of tumor deposits:  4    pM Category  pM1c:  Metastasis to the peritoneal surface of the omentum.    Additional findings:  - Sessile serrated polyp, no cytologic dysplasia, 1.1 cm at ileocecal valve  - Tubulovillous adenoma, 1.0 cm, proximal ascending  - Tubular adenoma, 0.4 cm, mid ascending  - Tubular adenoma, 0.4, distal ascending  - Tubular adenoma, 0.6 cm, distal ascending  - Submucosal lipoma, 2.0 cm  - Appendix with no specific histopathologic changes    Ancillary studies  Immunohistochemistry for mismatch repair proteins performed on prior biopsy material (SAS-, 12/21/23): pMMR.         Assessment:       1. Malignant neoplasm of ascending colon    2. 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 Immunodeficiency secondary to neoplasm    4. Immunodeficiency secondary to chemotherapy        Plan:        # Colon cancer   Mrs. Maguire is a 73 y.o. female who presents for oncologic evaluation for metastatic colon cancer. She underwent a R hemicolectomy with en-bloc abdominal wal resection on 1/22/24 with Dr. Albright. Pathology revealed pT4a N2b disease  with 19/26 positive LNs and omentum positive for carcinoma.    We had an in-depth conversation during our initial consult re: her diagnosis and treatment options. Reviewed recommendation for IV systemic therapy for at least 6 months. Plan for FOLFOX + bevacizumab to be given every 2 weeks. Briefly reviewed side effects of treatment. Handouts provided. Port placed 2/21/24.     PGX - DPYD normal metabolizer, [id number] intermediate metabolizer   Dexamethasone, zofran, compazine and lidocaine sent to pharmacy previously. Reviewed admin instructions.     Pending response, she may be a candidate for CRS/HIPEC with surgical oncology team. Continue to evaluate.     Will repeat imaging after ~3 months.     Cycle 1 administered on 2/26/24. Tolerating treatment well overall.   Presents today for cycle 3 FOLFOX + bevacizumab.   - Labs reviewed, adequate for treatment.   - Proceed with cycle 3 today.      Follow up: 2 weeks for cycle 4.     Patient is in agreement with the proposed treatment plan. All questions were answered to the patient's satisfaction. Pt knows to call clinic if anything is needed before the next clinic visit.    Patient discussed with collaborating physician, Dr. Zaragoza.    At least 40 minutes were spent today on this encounter including face to face time with the patient, data gathering/interpretation and documentation.       Minna Menendez, MSN, APRN, ACCNS-AG  Hematology and Medical Oncology  Clinical Nurse Specialist to Dr. Zaragoza, Dr. Jin & Dr. Goss Onc Chart Routing      Follow up with physician . 8 weeks with labs for infusion   Follow up with KRISTEN    Infusion scheduling note   infusion every 2 weeks, pump d/c on day 3   Injection scheduling note    Labs CBC, CMP, CEA and urinalysis   Scheduling:  Preferred lab:  Lab interval: every 2 weeks  at [place] 1-2 days prior to visit/infusion   Imaging    Pharmacy appointment    Other referrals

## 2024-03-25 NOTE — PLAN OF CARE
0830 Patient is here for C3 folfox and MVASI. Labs, meds and hx reviewed. Patient seen by MD this am and is appropriate for treatment. Port accessed and NS started at 25ml/hr. [place] and snack offered.

## 2024-03-26 ENCOUNTER — OFFICE VISIT (OUTPATIENT)
Dept: PSYCHIATRY | Facility: CLINIC | Age: 74
End: 2024-03-26
Payer: MEDICARE

## 2024-03-26 VITALS
SYSTOLIC BLOOD PRESSURE: 131 MMHG | OXYGEN SATURATION: 98 % | DIASTOLIC BLOOD PRESSURE: 63 MMHG | BODY MASS INDEX: 27.29 KG/M2 | HEART RATE: 82 BPM | WEIGHT: 169.81 LBS | RESPIRATION RATE: 17 BRPM | HEIGHT: 66 IN

## 2024-03-26 DIAGNOSIS — G47.00 INSOMNIA, UNSPECIFIED TYPE: ICD-10-CM

## 2024-03-26 DIAGNOSIS — F31.9 BIPOLAR 1 DISORDER: ICD-10-CM

## 2024-03-26 DIAGNOSIS — F41.1 GAD (GENERALIZED ANXIETY DISORDER): ICD-10-CM

## 2024-03-26 DIAGNOSIS — F31.9 BIPOLAR DEPRESSION: Primary | ICD-10-CM

## 2024-03-26 PROCEDURE — 3008F BODY MASS INDEX DOCD: CPT | Mod: CPTII,S$GLB,, | Performed by: STUDENT IN AN ORGANIZED HEALTH CARE EDUCATION/TRAINING PROGRAM

## 2024-03-26 PROCEDURE — 90833 PSYTX W PT W E/M 30 MIN: CPT | Mod: S$GLB,,, | Performed by: STUDENT IN AN ORGANIZED HEALTH CARE EDUCATION/TRAINING PROGRAM

## 2024-03-26 PROCEDURE — 99214 OFFICE O/P EST MOD 30 MIN: CPT | Mod: S$GLB,,, | Performed by: STUDENT IN AN ORGANIZED HEALTH CARE EDUCATION/TRAINING PROGRAM

## 2024-03-26 PROCEDURE — 3078F DIAST BP <80 MM HG: CPT | Mod: CPTII,S$GLB,, | Performed by: STUDENT IN AN ORGANIZED HEALTH CARE EDUCATION/TRAINING PROGRAM

## 2024-03-26 PROCEDURE — 1159F MED LIST DOCD IN RCRD: CPT | Mod: CPTII,S$GLB,, | Performed by: STUDENT IN AN ORGANIZED HEALTH CARE EDUCATION/TRAINING PROGRAM

## 2024-03-26 PROCEDURE — 3075F SYST BP GE 130 - 139MM HG: CPT | Mod: CPTII,S$GLB,, | Performed by: STUDENT IN AN ORGANIZED HEALTH CARE EDUCATION/TRAINING PROGRAM

## 2024-03-26 PROCEDURE — 99999 PR PBB SHADOW E&M-EST. PATIENT-LVL III: CPT | Mod: PBBFAC,,, | Performed by: STUDENT IN AN ORGANIZED HEALTH CARE EDUCATION/TRAINING PROGRAM

## 2024-03-26 PROCEDURE — 1160F RVW MEDS BY RX/DR IN RCRD: CPT | Mod: CPTII,S$GLB,, | Performed by: STUDENT IN AN ORGANIZED HEALTH CARE EDUCATION/TRAINING PROGRAM

## 2024-03-26 RX ORDER — ESCITALOPRAM OXALATE 20 MG/1
20 TABLET ORAL NIGHTLY
Qty: 30 TABLET | Refills: 4 | Status: SHIPPED | OUTPATIENT
Start: 2024-03-26 | End: 2024-06-18 | Stop reason: SDUPTHER

## 2024-03-26 RX ORDER — LAMOTRIGINE 150 MG/1
300 TABLET ORAL NIGHTLY
Qty: 180 TABLET | Refills: 1 | Status: SHIPPED | OUTPATIENT
Start: 2024-03-26

## 2024-03-26 RX ORDER — QUETIAPINE FUMARATE 100 MG/1
TABLET, FILM COATED ORAL
Qty: 60 TABLET | Refills: 4 | Status: SHIPPED | OUTPATIENT
Start: 2024-03-26 | End: 2024-06-18 | Stop reason: SDUPTHER

## 2024-03-26 NOTE — PROGRESS NOTES
"  03/26/2024  1:21 PM  Karin Maguire  [id number]    Outpatient Psychiatry Follow-Up Visit (MD/NP)    3/26/2024    Clinical Status of Patient:  Outpatient (Ambulatory)    Chief Complaint:  Karin Maguire is a 73 y.o. female who presents today for follow-up of depression and anxiety.  Met with patient.        Interval History and Content of Current Session:  Interim Events/Subjective Report/Content of Current Session:   MDD with mixed features vs. Unspecified bipolar disorder (pt poor historian)  LUANNE  Insomnia  Gambling disorder  Obesity  Psychosocial stressors     Abnormal movements            Currently undergoing treatments for "stage four colon cancer... my doctor said it's aggressive, I had a colonoscopy 5 years ago and I went back for my regular one in December and they found it, I never had any symptoms." She states she relying on her [employer] believes and family to cope, "I just pray and my sons are so worried about me." She states she has not been feeling depressed. She states her anxiety levels are "great... everyone is worried about me, 5 days after my surgery I went to [facility], I'm not letting it stop me." Reports "my only issue is I can't sleep, I don't know if it's the steroids, but I need to rest."    Reports "because of the way it happened, I have to wrap my mind around it, I have no doubts about what I have to do, I'm just like let's get started."      Stressors: health (cancer, stage IV)        Psychiatric Review Of Systems - Is patient experiencing or having changes in:  sleep: yes  appetite: no  weight: no  energy/anergy: no  Interest/pleasure/anhedonia: no  Anxiety: improving  panic: no  Guilty/hopelessness/worthlessness: no  concentration: no  S.I.B.s/risky behavior: no  Irritability: no  Substance abuse: no  Racing thoughts: no  Impulsive behaviors: no  Paranoia: no  AVH: no  Gambling: less, has not gambled in 7 weeks  Michelle/hypomania: no          Psychotherapy:  Target symptoms: " adjustment  Why chosen therapy is appropriate versus another modality: relevant to diagnosis  Outcome monitoring methods: self-report  Therapeutic intervention type: supportive psychotherapy  Topics discussed/themes: stress related to medical comorbidities, building skills sets for symptom management, symptom recognition  The patient's response to the intervention is accepting. The patient's progress toward treatment goals is fair.   Duration of intervention: 16 minutes.            Review of Systems   Review of Systems   Constitutional:  Negative for chills and fever.   HENT:  Negative for hearing loss.    Eyes:  Negative for blurred vision and double vision.   Respiratory:  Negative for shortness of breath.    Cardiovascular:  Negative for chest pain.   Gastrointestinal:  Negative for constipation, diarrhea, nausea and vomiting.   Genitourinary:  Negative for dysuria.   Musculoskeletal:  Negative for back pain and joint pain.   Skin:  Negative for rash.   Neurological:  Negative for dizziness and headaches.   Endo/Heme/Allergies:  Negative for environmental allergies.       Past Medical, Family and Social History: The patient's past medical, family and social history have been reviewed and updated as appropriate within the electronic medical record - see encounter notes.    Social History     Socioeconomic History    Marital status:    Tobacco Use    Smoking status: Former     Current packs/day: 0.00     Average packs/day: 1 pack/day for 41.7 years (41.7 ttl pk-yrs)     Types: Cigarettes     Start date: 3/30/1982     Quit date: 2023     Years since quittin.2    Smokeless tobacco: Never   Substance and Sexual Activity    Alcohol use: Yes     Comment: Socially-2 or 3 times a year-6 or 7 drinks    Drug use: No    Sexual activity: Yes     Partners: Male     Birth control/protection: Surgical     Comment:      Social Determinants of Health     Financial Resource Strain: Low Risk  (2024)     Overall Financial Resource Strain (CARDIA)     Difficulty of Paying Living Expenses: Not hard at all   Food Insecurity: No Food Insecurity (1/24/2024)    Hunger Vital Sign     Worried About Running Out of Food in the Last Year: Never true     Ran Out of Food in the Last Year: Never true   Transportation Needs: No Transportation Needs (1/24/2024)    PRAPARE - Transportation     Lack of Transportation (Medical): No     Lack of Transportation (Non-Medical): No   Physical Activity: Inactive (1/24/2024)    Exercise Vital Sign     Days of Exercise per Week: 0 days     Minutes of Exercise per Session: 0 min   Stress: No Stress Concern Present (1/24/2024)    [employer] - Occupational Stress Questionnaire     Feeling of Stress : Not at all   Social Connections: Socially Integrated (1/24/2024)    Social Connection and Isolation Panel [NHANES]     Frequency of Communication with Friends and Family: Three times a week     Frequency of Social Gatherings with Friends and Family: Three times a week     Attends Jainism Services: More than 4 times per year     Active Member of Clubs or Organizations: No     Attends Club or Organization Meetings: More than 4 times per year     Marital Status:    Housing Stability: Low Risk  (1/24/2024)    Housing Stability Vital Sign     Unable to Pay for Housing in the Last Year: No     Number of Places Lived in the Last Year: 1     Unstable Housing in the Last Year: No         Compliance: yes    Side effects: None    Risk Parameters:  Patient reports no suicidal ideation  Patient reports no homicidal ideation  Patient reports no self-injurious behavior  Patient reports no violent behavior        Exam (detailed: at least 9 elements; comprehensive: all 15 elements)     Vitals:    03/26/24 0828   BP: 131/63   Pulse: 82   Resp: 17       Wt Readings from Last 5 Encounters:   03/26/24 77 kg (169 lb 12.8 oz)   03/25/24 75.5 kg (166 lb 8.9 oz)   03/25/24 75.5 kg (166  lb 8.9 oz)   03/11/24 75.4 kg (166 lb 1.9 oz)   02/26/24 73.8 kg (162 lb 11.2 oz)         CONSTITUTIONAL  General Appearance: unremarkable, age appropriate    MUSCULOSKELETAL  Muscle Strength and Tone:no tremor, no tic,   Abnormal Involuntary Movements: yes, repetitive movements noted (head, neck, legs, trunk)  Gait and Station: non-ataxic    PSYCHIATRIC   Level of Consciousness: awake and alert   Orientation: person, place and situation  Grooming: Casually dressed and Well groomed  Psychomotor Behavior: normal, cooperative  Speech: normal tone, normal rate, normal pitch, normal volume  Language: grossly intact  Mood: better  Affect: Consistent with mood  Thought Process: linear, logical  Associations: intact   Thought Content: DENIES suicidal ideation, DENIES homicidal ideation, and no delusion  Perceptions: denies hallucinations  Memory: Able to recall past events, Remote intact and Recent intact  Attention:Attends to interview without distraction  Fund of Knowledge: Aware of current events and Vocabulary appropriate   Estimate if Intelligence:  Average based on work/education history, vocabulary and mental status exam  Insight: has awareness of illness  Judgment: behavior is adequate to circumstances          Assessment and Diagnosis   Status/Progress: Based on the examination today, the patient's problem(s) is/are improved.  New problems have not been presented today.   Co-morbidities are complicating management of the primary condition.          Impresssion/Assessment:  MDD with mixed features vs. Unspecified bipolar disorder (pt poor historian)  LUANNE  Insomnia  Gambling disorder  Obesity  Psychosocial stressors     Abnormal movements    Chronic pain         Plan:    Pt much improved. Appears very stable. She declines changes to medications.       MDD with mixed features vs. Unspecified bipolar disorder (pt poor historian)  - continue lamictal 300 mg PO qd  - continue lexapro 20 mg PO qd  - increase seroquel 100  "to 150 mg PO qhs, x 1 week, if still not sleeping can then increase to 200 mg PO qhs  - strongly recommended psychotherapy, provided with resources       Insomnia  - reports has not been using CPAP  - pt counseled        Gambling disorder  - pt counseled  - consider meetings/therapy; patient declines        LUANNE  - continue hydroxyzine  mg PO q 6 hours PRN  - lexapro as above  - consider psychotherapy, patient declines        Psychosocial stressors  - pt counseled  - strongly recommended psychotherapy, couples counseling to patient, patient refusing        Obesity  -  tapered off seroquel        Abnormal movements (?TD)  - frequent non-stereotyped movements of hands and limbs, neck, trunk; patient states she has always been "fidgety" like this since childhood, reports she has seen neurology about these movements before in past, pt continues to refuse referral, pt states "I've been this way all my life"  - declines changing seroquel despite risk of TD/AIM, pt has been counseled extensively on risks  - AIMS: 0, when testing movements stop           Chronic pain  - pt counseled        - Instructed patient to keep all scheduled appointments, take medications as prescribed and abstain from substance abuse. Instructed to call 911 or present to ER for emergency including SI or HI.    - Discussed diagnosis, risks and benefits of proposed treatment above vs alternative treatments vs no treatment, and potential side effects of these treatments. The patient expresses understanding of the above and displays the capacity to agree with this treatment given said understanding. Patient also agrees that, currently, the benefits outweigh the risks and would like to pursue treatment at this time.         Estevan Zaman III, MD    Return to Clinic: 3-4 m            "

## 2024-03-27 ENCOUNTER — INFUSION (OUTPATIENT)
Dept: INFUSION THERAPY | Facility: HOSPITAL | Age: 74
End: 2024-03-27
Payer: MEDICARE

## 2024-03-27 VITALS
HEART RATE: 63 BPM | SYSTOLIC BLOOD PRESSURE: 156 MMHG | DIASTOLIC BLOOD PRESSURE: 72 MMHG | RESPIRATION RATE: 18 BRPM | TEMPERATURE: 99 F

## 2024-03-27 DIAGNOSIS — C18.2 MALIGNANT NEOPLASM OF ASCENDING COLON: Primary | ICD-10-CM

## 2024-03-27 PROCEDURE — 63600175 PHARM REV CODE 636 W HCPCS: Performed by: REGISTERED NURSE

## 2024-03-27 PROCEDURE — 25000003 PHARM REV CODE 250: Performed by: REGISTERED NURSE

## 2024-03-27 PROCEDURE — A4216 STERILE WATER/SALINE, 10 ML: HCPCS | Performed by: REGISTERED NURSE

## 2024-03-27 RX ORDER — SODIUM CHLORIDE 0.9 % (FLUSH) 0.9 %
10 SYRINGE (ML) INJECTION
Status: DISCONTINUED | OUTPATIENT
Start: 2024-03-27 | End: 2024-03-27 | Stop reason: HOSPADM

## 2024-03-27 RX ORDER — PROCHLORPERAZINE EDISYLATE 5 MG/ML
5 INJECTION INTRAMUSCULAR; INTRAVENOUS ONCE AS NEEDED
Status: DISCONTINUED | OUTPATIENT
Start: 2024-03-27 | End: 2024-03-27 | Stop reason: HOSPADM

## 2024-03-27 RX ORDER — HEPARIN 100 UNIT/ML
500 SYRINGE INTRAVENOUS
Status: DISCONTINUED | OUTPATIENT
Start: 2024-03-27 | End: 2024-03-27 | Stop reason: HOSPADM

## 2024-03-27 RX ADMIN — HEPARIN 500 UNITS: 100 SYRINGE at 10:03

## 2024-03-27 RX ADMIN — Medication 10 ML: at 10:03

## 2024-03-27 NOTE — NURSING
1100- Patient arrived ambulatory to the unit for pump dc with infusion to portable pump already complete.  Port was flushed and heparin locked, then de-accessed.  Patient given AVS and discharged to home setting, instructed her to contact MD with any issues or concerns.

## 2024-04-05 ENCOUNTER — LAB VISIT (OUTPATIENT)
Dept: LAB | Facility: HOSPITAL | Age: 74
End: 2024-04-05
Attending: INTERNAL MEDICINE
Payer: MEDICARE

## 2024-04-05 DIAGNOSIS — C18.2 MALIGNANT NEOPLASM OF ASCENDING COLON: ICD-10-CM

## 2024-04-05 LAB
ALBUMIN SERPL BCP-MCNC: 3.2 G/DL (ref 3.5–5.2)
ALP SERPL-CCNC: 80 U/L (ref 55–135)
ALT SERPL W/O P-5'-P-CCNC: 8 U/L (ref 10–44)
ANION GAP SERPL CALC-SCNC: 6 MMOL/L (ref 8–16)
AST SERPL-CCNC: 14 U/L (ref 10–40)
BILIRUB SERPL-MCNC: 0.2 MG/DL (ref 0.1–1)
BILIRUB UR QL STRIP: NEGATIVE
BUN SERPL-MCNC: 15 MG/DL (ref 8–23)
CALCIUM SERPL-MCNC: 9.4 MG/DL (ref 8.7–10.5)
CEA SERPL-MCNC: 3.4 NG/ML (ref 0–5)
CHLORIDE SERPL-SCNC: 109 MMOL/L (ref 95–110)
CLARITY UR: CLEAR
CO2 SERPL-SCNC: 23 MMOL/L (ref 23–29)
COLOR UR: YELLOW
CREAT SERPL-MCNC: 0.7 MG/DL (ref 0.5–1.4)
ERYTHROCYTE [DISTWIDTH] IN BLOOD BY AUTOMATED COUNT: 15.6 % (ref 11.5–14.5)
EST. GFR  (NO RACE VARIABLE): >60 ML/MIN/1.73 M^2
GLUCOSE SERPL-MCNC: 108 MG/DL (ref 70–110)
GLUCOSE UR QL STRIP: NEGATIVE
HCT VFR BLD AUTO: 32.7 % (ref 37–48.5)
HGB BLD-MCNC: 10.6 G/DL (ref 12–16)
HGB UR QL STRIP: ABNORMAL
IMM GRANULOCYTES # BLD AUTO: 0.02 K/UL (ref 0–0.04)
KETONES UR QL STRIP: NEGATIVE
LEUKOCYTE ESTERASE UR QL STRIP: NEGATIVE
MCH RBC QN AUTO: 28 PG (ref 27–31)
MCHC RBC AUTO-ENTMCNC: 32.4 G/DL (ref 32–36)
MCV RBC AUTO: 86 FL (ref 82–98)
NEUTROPHILS # BLD AUTO: 2.7 K/UL (ref 1.8–7.7)
NITRITE UR QL STRIP: NEGATIVE
PH UR STRIP: 6 [PH] (ref 5–8)
PLATELET # BLD AUTO: 239 K/UL (ref 150–450)
PMV BLD AUTO: 10.1 FL (ref 9.2–12.9)
POTASSIUM SERPL-SCNC: 4.6 MMOL/L (ref 3.5–5.1)
PROT SERPL-MCNC: 6.3 G/DL (ref 6–8.4)
PROT UR QL STRIP: NEGATIVE
RBC # BLD AUTO: 3.79 M/UL (ref 4–5.4)
SODIUM SERPL-SCNC: 138 MMOL/L (ref 136–145)
SP GR UR STRIP: 1.02 (ref 1–1.03)
URN SPEC COLLECT METH UR: ABNORMAL
UROBILINOGEN UR STRIP-ACNC: NEGATIVE EU/DL
WBC # BLD AUTO: 5.1 K/UL (ref 3.9–12.7)

## 2024-04-05 PROCEDURE — 81003 URINALYSIS AUTO W/O SCOPE: CPT | Performed by: REGISTERED NURSE

## 2024-04-05 PROCEDURE — 85027 COMPLETE CBC AUTOMATED: CPT | Performed by: REGISTERED NURSE

## 2024-04-05 PROCEDURE — 80053 COMPREHEN METABOLIC PANEL: CPT | Performed by: REGISTERED NURSE

## 2024-04-05 PROCEDURE — 82378 CARCINOEMBRYONIC ANTIGEN: CPT | Performed by: REGISTERED NURSE

## 2024-04-05 PROCEDURE — 36415 COLL VENOUS BLD VENIPUNCTURE: CPT | Performed by: REGISTERED NURSE

## 2024-04-08 ENCOUNTER — INFUSION (OUTPATIENT)
Dept: INFUSION THERAPY | Facility: HOSPITAL | Age: 74
End: 2024-04-08
Payer: MEDICARE

## 2024-04-08 ENCOUNTER — OFFICE VISIT (OUTPATIENT)
Dept: HEMATOLOGY/ONCOLOGY | Facility: CLINIC | Age: 74
End: 2024-04-08
Payer: MEDICARE

## 2024-04-08 VITALS
HEIGHT: 66 IN | RESPIRATION RATE: 18 BRPM | DIASTOLIC BLOOD PRESSURE: 67 MMHG | BODY MASS INDEX: 26.86 KG/M2 | HEART RATE: 81 BPM | SYSTOLIC BLOOD PRESSURE: 146 MMHG | TEMPERATURE: 99 F | WEIGHT: 167.13 LBS | OXYGEN SATURATION: 96 %

## 2024-04-08 VITALS
BODY MASS INDEX: 26.86 KG/M2 | WEIGHT: 167.13 LBS | RESPIRATION RATE: 20 BRPM | DIASTOLIC BLOOD PRESSURE: 63 MMHG | OXYGEN SATURATION: 98 % | HEIGHT: 66 IN | HEART RATE: 89 BPM | SYSTOLIC BLOOD PRESSURE: 131 MMHG

## 2024-04-08 DIAGNOSIS — D84.81 IMMUNODEFICIENCY SECONDARY TO NEOPLASM: ICD-10-CM

## 2024-04-08 DIAGNOSIS — Z79.899 IMMUNODEFICIENCY SECONDARY TO CHEMOTHERAPY: ICD-10-CM

## 2024-04-08 DIAGNOSIS — T45.1X5A IMMUNODEFICIENCY SECONDARY TO CHEMOTHERAPY: ICD-10-CM

## 2024-04-08 DIAGNOSIS — C78.6 MALIGNANT NEOPLASM METASTATIC TO OMENTUM: ICD-10-CM

## 2024-04-08 DIAGNOSIS — R97.8 OTHER ABNORMAL TUMOR MARKERS: ICD-10-CM

## 2024-04-08 DIAGNOSIS — C18.2 MALIGNANT NEOPLASM OF ASCENDING COLON: Primary | ICD-10-CM

## 2024-04-08 DIAGNOSIS — D84.821 IMMUNODEFICIENCY SECONDARY TO CHEMOTHERAPY: ICD-10-CM

## 2024-04-08 DIAGNOSIS — D49.9 IMMUNODEFICIENCY SECONDARY TO NEOPLASM: ICD-10-CM

## 2024-04-08 DIAGNOSIS — R68.89 COLD SENSITIVITY: ICD-10-CM

## 2024-04-08 DIAGNOSIS — R53.0 NEOPLASTIC MALIGNANT RELATED FATIGUE: ICD-10-CM

## 2024-04-08 PROCEDURE — 3075F SYST BP GE 130 - 139MM HG: CPT | Mod: CPTII,S$GLB,, | Performed by: PHYSICIAN ASSISTANT

## 2024-04-08 PROCEDURE — 1159F MED LIST DOCD IN RCRD: CPT | Mod: CPTII,S$GLB,, | Performed by: PHYSICIAN ASSISTANT

## 2024-04-08 PROCEDURE — 1125F AMNT PAIN NOTED PAIN PRSNT: CPT | Mod: CPTII,S$GLB,, | Performed by: PHYSICIAN ASSISTANT

## 2024-04-08 PROCEDURE — 96367 TX/PROPH/DG ADDL SEQ IV INF: CPT

## 2024-04-08 PROCEDURE — 3078F DIAST BP <80 MM HG: CPT | Mod: CPTII,S$GLB,, | Performed by: PHYSICIAN ASSISTANT

## 2024-04-08 PROCEDURE — 99215 OFFICE O/P EST HI 40 MIN: CPT | Mod: S$GLB,,, | Performed by: PHYSICIAN ASSISTANT

## 2024-04-08 PROCEDURE — 3288F FALL RISK ASSESSMENT DOCD: CPT | Mod: CPTII,S$GLB,, | Performed by: PHYSICIAN ASSISTANT

## 2024-04-08 PROCEDURE — G2211 COMPLEX E/M VISIT ADD ON: HCPCS | Mod: S$GLB,,, | Performed by: PHYSICIAN ASSISTANT

## 2024-04-08 PROCEDURE — 3008F BODY MASS INDEX DOCD: CPT | Mod: CPTII,S$GLB,, | Performed by: PHYSICIAN ASSISTANT

## 2024-04-08 PROCEDURE — 99999 PR PBB SHADOW E&M-EST. PATIENT-LVL III: CPT | Mod: PBBFAC,,, | Performed by: PHYSICIAN ASSISTANT

## 2024-04-08 PROCEDURE — 1101F PT FALLS ASSESS-DOCD LE1/YR: CPT | Mod: CPTII,S$GLB,, | Performed by: PHYSICIAN ASSISTANT

## 2024-04-08 PROCEDURE — 96417 CHEMO IV INFUS EACH ADDL SEQ: CPT

## 2024-04-08 PROCEDURE — 96416 CHEMO PROLONG INFUSE W/PUMP: CPT

## 2024-04-08 PROCEDURE — 63600175 PHARM REV CODE 636 W HCPCS: Performed by: PHYSICIAN ASSISTANT

## 2024-04-08 PROCEDURE — 25000003 PHARM REV CODE 250: Performed by: PHYSICIAN ASSISTANT

## 2024-04-08 PROCEDURE — 96415 CHEMO IV INFUSION ADDL HR: CPT

## 2024-04-08 PROCEDURE — 96413 CHEMO IV INFUSION 1 HR: CPT

## 2024-04-08 RX ORDER — SODIUM CHLORIDE 0.9 % (FLUSH) 0.9 %
10 SYRINGE (ML) INJECTION
Status: CANCELLED | OUTPATIENT
Start: 2024-04-10

## 2024-04-08 RX ORDER — DIPHENHYDRAMINE HYDROCHLORIDE 50 MG/ML
50 INJECTION INTRAMUSCULAR; INTRAVENOUS ONCE AS NEEDED
Status: DISCONTINUED | OUTPATIENT
Start: 2024-04-08 | End: 2024-04-08 | Stop reason: HOSPADM

## 2024-04-08 RX ORDER — SODIUM CHLORIDE 0.9 % (FLUSH) 0.9 %
10 SYRINGE (ML) INJECTION
Status: CANCELLED | OUTPATIENT
Start: 2024-04-08

## 2024-04-08 RX ORDER — HEPARIN 100 UNIT/ML
500 SYRINGE INTRAVENOUS
Status: CANCELLED | OUTPATIENT
Start: 2024-04-08

## 2024-04-08 RX ORDER — EPINEPHRINE 0.3 MG/.3ML
0.3 INJECTION SUBCUTANEOUS ONCE AS NEEDED
Status: DISCONTINUED | OUTPATIENT
Start: 2024-04-08 | End: 2024-04-08 | Stop reason: HOSPADM

## 2024-04-08 RX ORDER — PROCHLORPERAZINE EDISYLATE 5 MG/ML
5 INJECTION INTRAMUSCULAR; INTRAVENOUS ONCE AS NEEDED
Status: CANCELLED | OUTPATIENT
Start: 2024-04-10

## 2024-04-08 RX ORDER — PROCHLORPERAZINE EDISYLATE 5 MG/ML
5 INJECTION INTRAMUSCULAR; INTRAVENOUS ONCE AS NEEDED
Status: DISCONTINUED | OUTPATIENT
Start: 2024-04-08 | End: 2024-04-08 | Stop reason: HOSPADM

## 2024-04-08 RX ORDER — HEPARIN 100 UNIT/ML
500 SYRINGE INTRAVENOUS
Status: DISCONTINUED | OUTPATIENT
Start: 2024-04-08 | End: 2024-04-08 | Stop reason: HOSPADM

## 2024-04-08 RX ORDER — EPINEPHRINE 0.3 MG/.3ML
0.3 INJECTION SUBCUTANEOUS ONCE AS NEEDED
Status: CANCELLED | OUTPATIENT
Start: 2024-04-08

## 2024-04-08 RX ORDER — PROCHLORPERAZINE EDISYLATE 5 MG/ML
5 INJECTION INTRAMUSCULAR; INTRAVENOUS ONCE AS NEEDED
Status: CANCELLED | OUTPATIENT
Start: 2024-04-08

## 2024-04-08 RX ORDER — DIPHENHYDRAMINE HYDROCHLORIDE 50 MG/ML
50 INJECTION INTRAMUSCULAR; INTRAVENOUS ONCE AS NEEDED
Status: CANCELLED | OUTPATIENT
Start: 2024-04-08

## 2024-04-08 RX ORDER — SODIUM CHLORIDE 0.9 % (FLUSH) 0.9 %
10 SYRINGE (ML) INJECTION
Status: DISCONTINUED | OUTPATIENT
Start: 2024-04-08 | End: 2024-04-08 | Stop reason: HOSPADM

## 2024-04-08 RX ORDER — HEPARIN 100 UNIT/ML
500 SYRINGE INTRAVENOUS
Status: CANCELLED | OUTPATIENT
Start: 2024-04-10

## 2024-04-08 RX ADMIN — SODIUM CHLORIDE: 9 INJECTION, SOLUTION INTRAVENOUS at 08:04

## 2024-04-08 RX ADMIN — DEXAMETHASONE SODIUM PHOSPHATE 0.25 MG: 4 INJECTION, SOLUTION INTRA-ARTICULAR; INTRALESIONAL; INTRAMUSCULAR; INTRAVENOUS; SOFT TISSUE at 09:04

## 2024-04-08 RX ADMIN — DEXTROSE MONOHYDRATE: 50 INJECTION, SOLUTION INTRAVENOUS at 10:04

## 2024-04-08 RX ADMIN — FLUOROURACIL 4440 MG: 50 INJECTION, SOLUTION INTRAVENOUS at 12:04

## 2024-04-08 RX ADMIN — BEVACIZUMAB-AWWB 365 MG: 400 INJECTION, SOLUTION INTRAVENOUS at 09:04

## 2024-04-08 RX ADMIN — OXALIPLATIN 150 MG: 5 INJECTION, SOLUTION INTRAVENOUS at 10:04

## 2024-04-08 NOTE — PROGRESS NOTES
MEDICAL ONCOLOGY - ESTABLISHED PATIENT VISIT    Reason for visit: colon adenocarcinoma    Best Contact Phone Number(s): [phone] (home)      Cancer/Stage/TNM:    Cancer Staging   Colon adenocarcinoma  Staging form: Colon and Rectum, AJCC 8th Edition  - Pathologic stage from 1/22/2024: Stage IVC (pT4a, pN2b, cM1c) - Signed by Minna Menendez CNS on 2/13/2024       Oncology History   Colon adenocarcinoma   12/21/2023 Tumor Markers    Patient's tumor was tested for the following markers: CEA.                                              Results of the tumor marker test revealed 3.3     12/21/2023 Procedure    Colonoscopy  Cecal polyp removed with jumbo forceps, 3 mm  Multiple ascending colon polyps ranging in size from 5 to 12 mm - semi-pedunculated removed with hot snare  Hepatic flexure mass identified - central ulceration, concerning for malignancy, 3 cm, not obstructing, this was biopsied - tattoo placed distal to mass  Sigmoid diverticular disease     12/21/2023 Tumor Markers    Patient's tumor was tested for the following markers: CEA.                                              Results of the tumor marker test revealed 3.3     12/22/2023 Imaging Significant Findings    CT CAP  Evaluation of the colon is somewhat limited as there is significant colonic stool and oral contrast material has not opacified the large bowel.  There does appear to be some abnormal thickening of the walls of the proximal right colon and a patient with a known history of colonic malignancy.  There is some soft tissue density external to the walls of the colon on the right pericolic gutter which could represent peritoneal nodularity versus subserosal extent of tumor.  Slightly more distal to this area there is a 2nd area of irregularity of the walls of the colon, difficult to fully evaluate if this is related to the colonic walls versus stool with central fat.     Two hypodensities in the liver, the larger measuring 0.8 cm, too  small to accurately characterize on the basis of this examination.  Attention on follow-up.     No pulmonary nodules are seen.     Cholecystectomy.     12/29/2023 Initial Diagnosis    Hepatic flexure colon adenocarcinoma  MMR intact     1/22/2024 Cancer Staged    Staging form: Colon and Rectum, AJCC 8th Edition  - Pathologic stage from 1/22/2024: Stage IVC (pT4a, pN2b, cM1c)     Malignant neoplasm of ascending colon   2/12/2024 Initial Diagnosis    Malignant neoplasm of ascending colon     2/26/2024 -  Chemotherapy    Treatment Summary   Plan Name: OP GI [id number] (oxaliplatin leucovorin fluorouracil) with bevacizumab Q2W  Treatment Goal: Palliative  Status: Active  Start Date: 2/26/2024  End Date: 1/15/2025 (Planned)  Provider: Kemar Zaragoza MD  Chemotherapy: oxaliplatin (ELOXATIN) 150 mg in dextrose 5 % (D5W) 595 mL chemo infusion, 157 mg, Intravenous, Clinic/HOD 1 time, 3 of 24 cycles  Administration: 150 mg (2/26/2024), 150 mg (3/11/2024), 150 mg (3/25/2024)  fluorouracil (ADRUCIL) 2,400 mg/m2 = 4,440 mg in sodium chloride 0.9% 100 mL chemo infusion, 2,400 mg/m2 = 4,440 mg, Intravenous, Clinic/HOD 1 time, 3 of 24 cycles  Administration: 4,440 mg (2/26/2024), 4,440 mg (3/11/2024), 4,440 mg (3/25/2024)  bevacizumab-awwb (MVASI) 5 mg/kg = 365 mg in sodium chloride 0.9% 100 mL infusion, 5 mg/kg = 365 mg, Intravenous, Clinic/HOD 1 time, 3 of 24 cycles  Administration: 365 mg (2/26/2024), 365 mg (3/11/2024), 365 mg (3/25/2024)          HPI:   73 y.o. female with LUANNE, bipolar, HLD who presents prior to cycle 4 FOLFOX + bevacizumab. Doing very well and has tolerated the chemotherapy well without complication. Port placed 2/21/24, healing well with no signs of infection. She does report baseline numbness to her right foot from a prior surgery. This has not worsened since her first cycle of chemotherapy. She is eating well and has a good appetite. Denies fever/chills, SOB, CP, palpitations, N/V, C/D, pain, blood in  urine/stool, paresthesias. Did very well. Cold sensitivity is stable. Fatigue is lasting a little bit longer but was able to work in her garden. No concerns or complaints today. Overall, doing well.     ECOG 0. Presents with her .     ROS:   Review of Systems   Constitutional:  Negative for chills, fever and malaise/fatigue.   HENT:  Negative for congestion and sore throat.    Respiratory:  Negative for shortness of breath.    Cardiovascular:  Negative for chest pain, palpitations and leg swelling.   Gastrointestinal:  Negative for blood in stool, constipation, diarrhea and nausea.   Genitourinary:  Negative for hematuria.   Musculoskeletal:  Negative for back pain, falls and myalgias.   Skin:  Negative for rash.   Neurological:  Negative for dizziness, tingling, weakness and headaches.       Past Medical History:   Past Medical History:   Diagnosis Date    Anxiety     Arthritis     Bipolar 1 disorder     Bursitis of right hip     GI bleed due to NSAIDs     Multinodular goiter 11/15/2021    Sacroiliitis     right side    Sleep apnea         Past Surgical History:   Past Surgical History:   Procedure Laterality Date    ANKLE FRACTURE SURGERY Left 2001    BLADDER SUSPENSION      CARPAL TUNNEL RELEASE Bilateral     CHOLECYSTECTOMY      Laparoscopic    COLONOSCOPY      COLONOSCOPY N/A 12/21/2023    Procedure: COLONOSCOPY;  Surgeon: Alberto Albright MD;  Location: [facility];  Service: Endoscopy;  Laterality: N/A;    ESOPHAGOGASTRODUODENOSCOPY N/A 07/29/2021    Procedure: EGD (ESOPHAGOGASTRODUODENOSCOPY);  Surgeon: Susan Mcclain MD;  Location: [facility];  Service: Endoscopy;  Laterality: N/A;    EYE SURGERY      FOOT ARTHRODESIS Right 05/03/2023    Procedure: FUSION, FOOT;  Surgeon: Lauri Alejandra DPM;  Location: [facility];  Service: Podiatry;  Laterality: Right;  mini c-arm, Arthrex dowel bone graft harvester, locking plate and screws festus notified cc    HYSTERECTOMY  1986    vaginal prolapse     INJECTION OF ANESTHETIC AGENT AROUND MEDIAL BRANCH NERVES INNERVATING CERVICAL FACET JOINT Bilateral 05/27/2022    Procedure: CERVICAL MEDIAL BRANCH NERVE BLOCK (C3-4,C4-5);  Surgeon: Mamie Roman MD;  Location: [facility];  Service: Pain Management;  Laterality: Bilateral;    INJECTION OF ANESTHETIC AGENT AROUND MEDIAL BRANCH NERVES INNERVATING LUMBAR FACET JOINT Right 02/05/2021    Procedure: LUMBAR FACET JOINT BLOCK (L3-4,L4-5,L5-S1);  Surgeon: Mamie Roman MD;  Location: [facility];  Service: Pain Management;  Laterality: Right;    INJECTION OF ANESTHETIC AGENT AROUND MEDIAL BRANCH NERVES INNERVATING LUMBAR FACET JOINT Right 04/30/2021    Procedure: LUMBAR FACET JOINT BLOCK (L3-4,L4-5,L5-S1);  Surgeon: Mamie Roman MD;  Location: [facility];  Service: Pain Management;  Laterality: Right;    INJECTION OF ANESTHETIC AGENT INTO SACROILIAC JOINT Right 12/04/2020    Procedure: SACROILIAC JOINT INJECTION;  Surgeon: Mamie Roman MD;  Location: [facility];  Service: Pain Management;  Laterality: Right;    INJECTION OF JOINT Right 12/04/2020    Procedure: GREATER TROCHANTERIC BURSA INJECTION;  Surgeon: Mamie Roman MD;  Location: [facility];  Service: Pain Management;  Laterality: Right;    LAPAROSCOPIC RIGHT COLON RESECTION N/A 1/22/2024    Procedure: COLECTOMY, RIGHT, LAPAROSCOPIC (eras low, lithotomy);  Surgeon: Alberto Albright MD;  Location: [address];  Service: Colon and Rectal;  Laterality: N/A;    NASAL SEPTUM SURGERY      RECTAL PROLAPSE REPAIR      TONSILLECTOMY          Family History:   Family History   Problem Relation Age of Onset    No Known Problems Maternal Aunt     Colon cancer Maternal Uncle     Colon cancer Maternal Uncle     Colon cancer Maternal Uncle     No Known Problems Paternal Aunt     Esophageal cancer Paternal Uncle     Heart attack Maternal Grandmother     Leukemia Maternal Grandfather     No Known Problems Paternal Grandmother     Stroke Paternal Grandfather         Social  History:   Social History     Tobacco Use    Smoking status: Former     Current packs/day: 0.00     Average packs/day: 1 pack/day for 41.7 years (41.7 ttl pk-yrs)     Types: Cigarettes     Start date: 3/30/1982     Quit date: 2023     Years since quittin.2    Smokeless tobacco: Never   Substance Use Topics    Alcohol use: Yes     Comment: Socially-2 or 3 times a year-6 or 7 drinks        I have reviewed and updated the patient's past medical, surgical, family and social histories.    Allergies:   Review of patient's allergies indicates:   Allergen Reactions    Nsaids (non-steroidal anti-inflammatory drug) Other (See Comments)     Gastrointestinal bleeding requiring blood transfusion    Demerol [meperidine] Rash        Medications:   Current Outpatient Medications   Medication Sig Dispense Refill    albuterol (PROVENTIL/VENTOLIN HFA) 90 mcg/actuation inhaler inhale 2 puffs into the lungs every 6 hours as needed for wheezing. 18 g 1    dexAMETHasone (DECADRON) 4 MG Tab Take 2 tablets (8 mg total) by mouth once daily only on days 2, 3 and 4 of each chemotherapy cycle. 40 tablet 0    EScitalopram oxalate (LEXAPRO) 20 MG tablet Take 1 tablet (20 mg total) by mouth every evening. 30 tablet 4    lamoTRIgine (LAMICTAL) 150 MG Tab Take 2 tablets (300 mg total) by mouth every evening. 180 tablet 1    LIDOcaine-prilocaine (EMLA) cream Apply topically as needed (place to port site 45-60 minutes prior to chemotherapy). 30 g 5    ondansetron (ZOFRAN-ODT) 8 MG TbDL dissolve 1 tablet (8 mg total) under the tongue every 6 (six) hours as needed (nausea). 30 tablet 5    pantoprazole (PROTONIX) 40 MG tablet Take 1 tablet (40 mg total) by mouth once daily. 90 tablet 3    prochlorperazine (COMPAZINE) 10 MG tablet Take 1 tablet (10 mg total) by mouth every 6 (six) hours as needed (nausea). 30 tablet 2    QUEtiapine (SEROQUEL) 100 MG Tab Take 1.5 tablets at bedtime for 1 week then if needed can increase to 2 tablets at bedtime  "for sleep 60 tablet 4    rosuvastatin (CRESTOR) 10 MG tablet Take 1 tablet (10 mg total) by mouth once daily. (Patient taking differently: Take 10 mg by mouth every evening.) 90 tablet 3    aspirin 81 MG Chew Take 1 tablet (81 mg total) by mouth once daily. (Patient not taking: Reported on 2/26/2024) 30 tablet 5    gabapentin (NEURONTIN) 300 MG capsule Begin by taking one at bedtime for 2 days then twice daily for 2 days followed by three times daily for if tolerating well. (Patient not taking: Reported on 3/25/2024) 90 capsule 11     No current facility-administered medications for this visit.        Physical Exam:   /63   Pulse 89   Resp 20   Ht 5' 6" (1.676 m)   Wt 75.8 kg (167 lb 1.7 oz)   SpO2 98%   BMI 26.97 kg/m²      ECOG Performance status: 0          Physical Exam  Vitals reviewed.   Constitutional:       General: She is not in acute distress.     Appearance: Normal appearance. She is normal weight. She is not ill-appearing, toxic-appearing or diaphoretic.   HENT:      Head: Normocephalic and atraumatic.      Right Ear: External ear normal.      Left Ear: External ear normal.      Nose: Nose normal.      Mouth/Throat:      Pharynx: Oropharynx is clear.   Eyes:      General: No scleral icterus.     Conjunctiva/sclera: Conjunctivae normal.   Cardiovascular:      Rate and Rhythm: Normal rate.   Pulmonary:      Effort: Pulmonary effort is normal. No respiratory distress.   Chest:      Comments: RCW port, steristrips in place  Abdominal:      General: There is no distension.   Skin:     General: Skin is warm and dry.      Coloration: Skin is not jaundiced or pale.      Findings: No bruising, erythema or rash.   Neurological:      Mental Status: She is alert and oriented to person, place, and time. Mental status is at baseline.      Motor: No weakness.      Gait: Gait normal.   Psychiatric:         Mood and Affect: Mood normal.         Labs:     I have reviewed the pertinent labs.      Imaging:  "   12/22/23 - CT CAP   Impression:     Evaluation of the colon is somewhat limited as there is significant colonic stool and oral contrast material has not opacified the large bowel.  There does appear to be some abnormal thickening of the walls of the proximal right colon and a patient with a known history of colonic malignancy.  There is some soft tissue density external to the walls of the colon on the right pericolic gutter which could represent peritoneal nodularity versus subserosal extent of tumor.  Slightly more distal to this area there is a 2nd area of irregularity of the walls of the colon, difficult to fully evaluate if this is related to the colonic walls versus stool with central fat.     Two hypodensities in the liver, the larger measuring 0.8 cm, too small to accurately characterize on the basis of this examination.  Attention on follow-up.     No pulmonary nodules are seen.     Cholecystectomy.    Path:   1/22/24 - R Hemicolectomy   Final Pathologic Diagnosis     THE DIAGNOSES REMAIN THE SAME.  THIS CORRECTED REPORT IS ISSUED TO CHANGE M STATUS to reflect tumor in the omentum.  This change is discussed with Dr. RANJANA Albright via phone, 2/1/2024.    1. Colon, right and terminal ileum (right hemicolectomy with EN bloc abdominal wall resection):  Invasive adenocarcinoma.  See cancer synoptic report     2. Omentum (resection):    Positive for carcinoma    CORRECT SYNOPTIC AND M STATUS  Cancer synoptic report  - Procedure: Right hemicolectomy with EN bloc abdominal wall resection  - Tumor site:  Ascending  - Histologic type:  Adenocarcinoma  - Histologic grade:  G2, moderately differentiated.  See comment.  COMMENT:  While the majority of the tumor consists of well formed glands, a significant proportion includes abortive glands, single file infiltration, and malignant cells with signet ring cell morphology. The tumor has an insidious pattern of  infiltration with tumor present far from the advancing front  of the tumor.  - Tumor size:  2.0 x 2.0 x 1.0 cm  - Tumor extent:  Invades visceral peritoneum, multifocal  - Macroscopic perforation: Not identified  - Lymphovascular invasion:  Present, extensive.  Small vessel, large vessel, intra and extramural.  - Perineural invasion:  Not identified  - Tumor budding score:  High (>10), multifocal single cell infiltration  - Treatment effect: No known pre-surgical therapy    Margins  Margin involved by invasive carcinoma, radial (slide 1C)  All margins negative for dysplasia.    pTNM stage classification (AJCC 8th edition)  pT Category  pT4a:  Tumor invades through the visceral peritoneum    pN Category  pN2b:  7 or more regional lymph nodes are positive  Number of positive lymph nodes:  19  Number of lymph nodes examined: 26  Number of tumor deposits:  4    pM Category  pM1c:  Metastasis to the peritoneal surface of the omentum.    Additional findings:  - Sessile serrated polyp, no cytologic dysplasia, 1.1 cm at ileocecal valve  - Tubulovillous adenoma, 1.0 cm, proximal ascending  - Tubular adenoma, 0.4 cm, mid ascending  - Tubular adenoma, 0.4, distal ascending  - Tubular adenoma, 0.6 cm, distal ascending  - Submucosal lipoma, 2.0 cm  - Appendix with no specific histopathologic changes    Ancillary studies  Immunohistochemistry for mismatch repair proteins performed on prior biopsy material (SAS-, 12/21/23): pMMR.         Assessment:       1. Malignant neoplasm of ascending colon    2. 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 Immunodeficiency secondary to neoplasm    4. Immunodeficiency secondary to chemotherapy    5. Other abnormal tumor markers    6. Neoplastic malignant related fatigue    7. Cold sensitivity            Plan:        # Colon cancer   Mrs. Maguire is a 73 y.o. female who presents for oncologic evaluation for metastatic colon cancer. She underwent a R hemicolectomy with en-bloc abdominal wal resection on 1/22/24 with Dr. Albright. Pathology  revealed pT4a N2b disease with 19/26 positive LNs and omentum positive for carcinoma.    We had an in-depth conversation during our initial consult re: her diagnosis and treatment options. Reviewed recommendation for IV systemic therapy for at least 6 months. Plan for FOLFOX + bevacizumab to be given every 2 weeks. Briefly reviewed side effects of treatment. Handouts provided. Port placed 2/21/24.     PGX - DPYD normal metabolizer, [id number] intermediate metabolizer   Dexamethasone, zofran, compazine and lidocaine sent to pharmacy previously. Reviewed admin instructions.     Pending response, she may be a candidate for CRS/HIPEC with surgical oncology team. Continue to evaluate.     Will repeat imaging after ~3 months.     Presents today for cycle 4 FOLFOX + bevacizumab.   - Did very well with previous cycle. She tolerated treatment without any complication. Eating well and has a good appetite.   - Labs reviewed, adequate for treatment.   - Proceed with cycle 4 today.      Follow up: 2 weeks for cycle 5.     Cold sensitivity/neoplasm related fatigue:  2/2 chemotherapy. Still continues to function very well. Works in her garden without complication.   No peripheral neuropathy. Will continue to monitor.     Patient is in agreement with the proposed treatment plan. All questions were answered to the patient's satisfaction. Pt knows to call clinic if anything is needed before the next clinic visit.    RTC in 2 weeks with lab work, CT CAP and treatment discussion with Dr. Zaragoza    Med Onc Chart Routing      Follow up with physician . Keep appointments as scheduled with Dr. Zaragoza and KRISTEN.   Follow up with KRISTEN    Infusion scheduling note    Injection scheduling note    Labs CBC, CMP and CEA   Scheduling:  Preferred lab:  Lab interval:     Imaging CT chest abdomen pelvis   Scheduled. Thank you   Pharmacy appointment    Other referrals

## 2024-04-08 NOTE — PLAN OF CARE
1235 Patient tolerated mvasi and folfox with no s/s of reaction and no complaints. 5FU pump applied secured and running without difficulty. Instructed patient to return on Wednesday 4/10/24 1030am for pump d/c. She declined the AVS and understands next appts. She ambulated out with .

## 2024-04-08 NOTE — PLAN OF CARE
0840 Patient here for C4 MVASI and Folfox. Labs, meds and hx reviewed. Patient was see in MD office today and is appropriate for treatment. Port accessed and NS started. [place] and snack offered.

## 2024-04-10 ENCOUNTER — INFUSION (OUTPATIENT)
Dept: INFUSION THERAPY | Facility: HOSPITAL | Age: 74
End: 2024-04-10
Payer: MEDICARE

## 2024-04-10 VITALS
TEMPERATURE: 99 F | HEART RATE: 85 BPM | OXYGEN SATURATION: 97 % | RESPIRATION RATE: 18 BRPM | DIASTOLIC BLOOD PRESSURE: 77 MMHG | SYSTOLIC BLOOD PRESSURE: 140 MMHG

## 2024-04-10 DIAGNOSIS — C18.2 MALIGNANT NEOPLASM OF ASCENDING COLON: Primary | ICD-10-CM

## 2024-04-10 PROCEDURE — 25000003 PHARM REV CODE 250: Performed by: PHYSICIAN ASSISTANT

## 2024-04-10 PROCEDURE — A4216 STERILE WATER/SALINE, 10 ML: HCPCS | Performed by: PHYSICIAN ASSISTANT

## 2024-04-10 PROCEDURE — 63600175 PHARM REV CODE 636 W HCPCS: Performed by: PHYSICIAN ASSISTANT

## 2024-04-10 RX ORDER — HEPARIN 100 UNIT/ML
500 SYRINGE INTRAVENOUS
Status: DISCONTINUED | OUTPATIENT
Start: 2024-04-10 | End: 2024-04-10 | Stop reason: HOSPADM

## 2024-04-10 RX ORDER — SODIUM CHLORIDE 0.9 % (FLUSH) 0.9 %
10 SYRINGE (ML) INJECTION
Status: DISCONTINUED | OUTPATIENT
Start: 2024-04-10 | End: 2024-04-10 | Stop reason: HOSPADM

## 2024-04-10 RX ORDER — PROCHLORPERAZINE EDISYLATE 5 MG/ML
5 INJECTION INTRAMUSCULAR; INTRAVENOUS ONCE AS NEEDED
Status: DISCONTINUED | OUTPATIENT
Start: 2024-04-10 | End: 2024-04-10 | Stop reason: HOSPADM

## 2024-04-10 RX ADMIN — Medication 10 ML: at 10:04

## 2024-04-10 RX ADMIN — HEPARIN 500 UNITS: 100 SYRINGE at 10:04

## 2024-04-10 NOTE — PLAN OF CARE
1007 Patient disconnected from her home infusion pump without incident. Vitals stable. Reservoir volume at 0. Port heparin locked and deaccessed, blood return noted. Patient aware of her next appointment date/time, uses [facility]. To contact provider with questions or concerns. D/C ambulatory and stable with her .

## 2024-04-17 ENCOUNTER — OFFICE VISIT (OUTPATIENT)
Dept: INTERNAL MEDICINE | Facility: CLINIC | Age: 74
End: 2024-04-17
Payer: MEDICARE

## 2024-04-17 VITALS
HEART RATE: 81 BPM | RESPIRATION RATE: 18 BRPM | DIASTOLIC BLOOD PRESSURE: 70 MMHG | BODY MASS INDEX: 27.14 KG/M2 | HEIGHT: 66 IN | WEIGHT: 168.88 LBS | SYSTOLIC BLOOD PRESSURE: 130 MMHG | OXYGEN SATURATION: 98 %

## 2024-04-17 DIAGNOSIS — C18.9 COLON ADENOCARCINOMA: ICD-10-CM

## 2024-04-17 DIAGNOSIS — I70.0 AORTIC ATHEROSCLEROSIS: ICD-10-CM

## 2024-04-17 DIAGNOSIS — E04.9 GOITER: ICD-10-CM

## 2024-04-17 DIAGNOSIS — R25.9 ABNORMAL MOVEMENTS: ICD-10-CM

## 2024-04-17 DIAGNOSIS — E78.5 HYPERLIPIDEMIA, UNSPECIFIED HYPERLIPIDEMIA TYPE: Primary | ICD-10-CM

## 2024-04-17 DIAGNOSIS — E04.2 MULTINODULAR GOITER: ICD-10-CM

## 2024-04-17 DIAGNOSIS — Z13.29 SCREENING FOR HYPOTHYROIDISM: ICD-10-CM

## 2024-04-17 DIAGNOSIS — F31.9 BIPOLAR 1 DISORDER: ICD-10-CM

## 2024-04-17 PROCEDURE — 1159F MED LIST DOCD IN RCRD: CPT | Mod: CPTII,S$GLB,, | Performed by: NURSE PRACTITIONER

## 2024-04-17 PROCEDURE — 1160F RVW MEDS BY RX/DR IN RCRD: CPT | Mod: CPTII,S$GLB,, | Performed by: NURSE PRACTITIONER

## 2024-04-17 PROCEDURE — 3288F FALL RISK ASSESSMENT DOCD: CPT | Mod: CPTII,S$GLB,, | Performed by: NURSE PRACTITIONER

## 2024-04-17 PROCEDURE — 3008F BODY MASS INDEX DOCD: CPT | Mod: CPTII,S$GLB,, | Performed by: NURSE PRACTITIONER

## 2024-04-17 PROCEDURE — 99999 PR PBB SHADOW E&M-EST. PATIENT-LVL IV: CPT | Mod: PBBFAC,,, | Performed by: NURSE PRACTITIONER

## 2024-04-17 PROCEDURE — 1126F AMNT PAIN NOTED NONE PRSNT: CPT | Mod: CPTII,S$GLB,, | Performed by: NURSE PRACTITIONER

## 2024-04-17 PROCEDURE — 1101F PT FALLS ASSESS-DOCD LE1/YR: CPT | Mod: CPTII,S$GLB,, | Performed by: NURSE PRACTITIONER

## 2024-04-17 PROCEDURE — 99214 OFFICE O/P EST MOD 30 MIN: CPT | Mod: S$GLB,,, | Performed by: NURSE PRACTITIONER

## 2024-04-17 PROCEDURE — 3078F DIAST BP <80 MM HG: CPT | Mod: CPTII,S$GLB,, | Performed by: NURSE PRACTITIONER

## 2024-04-17 PROCEDURE — 3075F SYST BP GE 130 - 139MM HG: CPT | Mod: CPTII,S$GLB,, | Performed by: NURSE PRACTITIONER

## 2024-04-17 NOTE — PROGRESS NOTES
"Subjective:       Patient ID: Karin Maguire is a 73 y.o. female.    Chief Complaint: Follow-up (6 months)    HPI: Pt presents to clinic today known to me for routine 6 month f/u. She reports that she is doing very well. She feels like a "fake" she only needed 2 days pain meds after surgery- was shopping at [employer] 5 days after colon sx and has only needed 3 doses of anti nausea meds. She report that her only complaint is fatigue. Some days has normal energy- sometimes"half". But "when I feel good I go outside and plant my flower beds. Today I'm cleaning freezers". She is due for labs Friday for oncology - on chemo every other week. Has completed 4 cycles (6 months planned)  Review of Systems   Constitutional:  Negative for chills, fatigue, fever and unexpected weight change.   HENT:  Negative for congestion, ear pain, sore throat and trouble swallowing.    Eyes:  Negative for pain and visual disturbance.   Respiratory:  Negative for cough, chest tightness and shortness of breath.    Cardiovascular:  Negative for chest pain, palpitations and leg swelling.   Gastrointestinal:  Negative for abdominal distention, abdominal pain, constipation, diarrhea and vomiting.   Genitourinary:  Negative for difficulty urinating, dysuria, flank pain, frequency and hematuria.   Musculoskeletal:  Negative for back pain, gait problem, joint swelling, neck pain and neck stiffness.   Skin:  Negative for rash and wound.   Neurological:  Negative for dizziness, seizures, speech difficulty, light-headedness and headaches.       Objective:      Physical Exam  Constitutional:       Appearance: She is well-developed.   HENT:      Head: Normocephalic and atraumatic.      Right Ear: External ear normal.      Left Ear: External ear normal.      Nose: Nose normal.   Eyes:      Conjunctiva/sclera: Conjunctivae normal.      Pupils: Pupils are equal, round, and reactive to light.   Cardiovascular:      Rate and Rhythm: Normal rate and regular " rhythm.      Heart sounds: Normal heart sounds.   Pulmonary:      Effort: Pulmonary effort is normal.      Breath sounds: Normal breath sounds.   Abdominal:      General: Bowel sounds are normal.      Palpations: Abdomen is soft.   Musculoskeletal:         General: Normal range of motion.      Cervical back: Normal range of motion and neck supple.   Skin:     General: Skin is warm and dry.      Capillary Refill: Capillary refill takes less than 2 seconds.   Neurological:      Mental Status: She is alert and oriented to person, place, and time.      Comments: Constant movement    Psychiatric:         Behavior: Behavior normal.         Thought Content: Thought content normal.         Judgment: Judgment normal.         Assessment:       1. Hyperlipidemia, unspecified hyperlipidemia type    2. Screening for hypothyroidism    3. Goiter    4. Abnormal movements    5. Aortic atherosclerosis    6. Bipolar 1 disorder    7. Colon adenocarcinoma    8. Multinodular goiter        Plan:     Problem List Items Addressed This Visit       Bipolar 1 disorder    Abnormal movements    Multinodular goiter    Aortic atherosclerosis    Hyperlipidemia - Primary    Relevant Orders    Lipid Panel    Colon adenocarcinoma     Other Visit Diagnoses       Screening for hypothyroidism        Goiter        Relevant Orders    TSH              Cont same meds and treatment- follows with heme onc/kethem-colorectal/kearns-psych  Will add lipid and tsh to her [facility] labs schedueld Friday- will go fasting- has f/u with endo for goiter every 6 months due 6/2024

## 2024-04-19 ENCOUNTER — HOSPITAL ENCOUNTER (OUTPATIENT)
Dept: RADIOLOGY | Facility: HOSPITAL | Age: 74
Discharge: HOME | End: 2024-04-19
Attending: INTERNAL MEDICINE
Payer: MEDICARE

## 2024-04-19 DIAGNOSIS — C18.2 MALIGNANT NEOPLASM OF ASCENDING COLON: ICD-10-CM

## 2024-04-19 PROCEDURE — 74177 CT ABD & PELVIS W/CONTRAST: CPT | Mod: 26,,, | Performed by: RADIOLOGY

## 2024-04-19 PROCEDURE — 74177 CT ABD & PELVIS W/CONTRAST: CPT | Mod: TC

## 2024-04-19 PROCEDURE — 71260 CT THORAX DX C+: CPT | Mod: 26,,, | Performed by: RADIOLOGY

## 2024-04-19 PROCEDURE — 25500020 PHARM REV CODE 255: Performed by: INTERNAL MEDICINE

## 2024-04-19 RX ADMIN — IOHEXOL 75 ML: 350 INJECTION, SOLUTION INTRAVENOUS at 08:04

## 2024-04-19 RX ADMIN — IOHEXOL 30 ML: 350 INJECTION, SOLUTION INTRAVENOUS at 08:04

## 2024-04-22 ENCOUNTER — INFUSION (OUTPATIENT)
Dept: INFUSION THERAPY | Facility: HOSPITAL | Age: 74
End: 2024-04-22
Payer: MEDICARE

## 2024-04-22 ENCOUNTER — OFFICE VISIT (OUTPATIENT)
Dept: HEMATOLOGY/ONCOLOGY | Facility: CLINIC | Age: 74
End: 2024-04-22
Payer: MEDICARE

## 2024-04-22 VITALS
RESPIRATION RATE: 18 BRPM | DIASTOLIC BLOOD PRESSURE: 67 MMHG | HEIGHT: 66 IN | WEIGHT: 172.5 LBS | HEART RATE: 80 BPM | BODY MASS INDEX: 27.72 KG/M2 | SYSTOLIC BLOOD PRESSURE: 139 MMHG | TEMPERATURE: 99 F

## 2024-04-22 VITALS
SYSTOLIC BLOOD PRESSURE: 139 MMHG | WEIGHT: 172.5 LBS | HEART RATE: 80 BPM | TEMPERATURE: 99 F | HEIGHT: 66 IN | DIASTOLIC BLOOD PRESSURE: 67 MMHG | OXYGEN SATURATION: 100 % | BODY MASS INDEX: 27.72 KG/M2

## 2024-04-22 DIAGNOSIS — Z79.899 IMMUNODEFICIENCY SECONDARY TO CHEMOTHERAPY: ICD-10-CM

## 2024-04-22 DIAGNOSIS — C18.2 MALIGNANT NEOPLASM OF ASCENDING COLON: Primary | ICD-10-CM

## 2024-04-22 DIAGNOSIS — D49.9 IMMUNODEFICIENCY SECONDARY TO NEOPLASM: ICD-10-CM

## 2024-04-22 DIAGNOSIS — C78.6 MALIGNANT NEOPLASM METASTATIC TO OMENTUM: ICD-10-CM

## 2024-04-22 DIAGNOSIS — D84.821 IMMUNODEFICIENCY SECONDARY TO CHEMOTHERAPY: ICD-10-CM

## 2024-04-22 DIAGNOSIS — R68.89 COLD SENSITIVITY: ICD-10-CM

## 2024-04-22 DIAGNOSIS — D84.81 IMMUNODEFICIENCY SECONDARY TO NEOPLASM: ICD-10-CM

## 2024-04-22 DIAGNOSIS — R53.0 NEOPLASTIC MALIGNANT RELATED FATIGUE: ICD-10-CM

## 2024-04-22 DIAGNOSIS — T45.1X5A IMMUNODEFICIENCY SECONDARY TO CHEMOTHERAPY: ICD-10-CM

## 2024-04-22 PROCEDURE — 25000003 PHARM REV CODE 250: Performed by: INTERNAL MEDICINE

## 2024-04-22 PROCEDURE — 96415 CHEMO IV INFUSION ADDL HR: CPT

## 2024-04-22 PROCEDURE — 1159F MED LIST DOCD IN RCRD: CPT | Mod: CPTII,S$GLB,, | Performed by: INTERNAL MEDICINE

## 2024-04-22 PROCEDURE — 96416 CHEMO PROLONG INFUSE W/PUMP: CPT

## 2024-04-22 PROCEDURE — 3288F FALL RISK ASSESSMENT DOCD: CPT | Mod: CPTII,S$GLB,, | Performed by: INTERNAL MEDICINE

## 2024-04-22 PROCEDURE — 1101F PT FALLS ASSESS-DOCD LE1/YR: CPT | Mod: CPTII,S$GLB,, | Performed by: INTERNAL MEDICINE

## 2024-04-22 PROCEDURE — 99999 PR PBB SHADOW E&M-EST. PATIENT-LVL III: CPT | Mod: PBBFAC,,, | Performed by: INTERNAL MEDICINE

## 2024-04-22 PROCEDURE — 3008F BODY MASS INDEX DOCD: CPT | Mod: CPTII,S$GLB,, | Performed by: INTERNAL MEDICINE

## 2024-04-22 PROCEDURE — 96367 TX/PROPH/DG ADDL SEQ IV INF: CPT

## 2024-04-22 PROCEDURE — 96413 CHEMO IV INFUSION 1 HR: CPT

## 2024-04-22 PROCEDURE — 99215 OFFICE O/P EST HI 40 MIN: CPT | Mod: S$GLB,,, | Performed by: INTERNAL MEDICINE

## 2024-04-22 PROCEDURE — 1126F AMNT PAIN NOTED NONE PRSNT: CPT | Mod: CPTII,S$GLB,, | Performed by: INTERNAL MEDICINE

## 2024-04-22 PROCEDURE — 3075F SYST BP GE 130 - 139MM HG: CPT | Mod: CPTII,S$GLB,, | Performed by: INTERNAL MEDICINE

## 2024-04-22 PROCEDURE — 3078F DIAST BP <80 MM HG: CPT | Mod: CPTII,S$GLB,, | Performed by: INTERNAL MEDICINE

## 2024-04-22 PROCEDURE — 63600175 PHARM REV CODE 636 W HCPCS: Performed by: INTERNAL MEDICINE

## 2024-04-22 PROCEDURE — G2211 COMPLEX E/M VISIT ADD ON: HCPCS | Mod: S$GLB,,, | Performed by: INTERNAL MEDICINE

## 2024-04-22 RX ORDER — DIPHENHYDRAMINE HYDROCHLORIDE 50 MG/ML
50 INJECTION INTRAMUSCULAR; INTRAVENOUS ONCE AS NEEDED
Status: DISCONTINUED | OUTPATIENT
Start: 2024-04-22 | End: 2024-04-22 | Stop reason: HOSPADM

## 2024-04-22 RX ORDER — DIPHENHYDRAMINE HYDROCHLORIDE 50 MG/ML
50 INJECTION INTRAMUSCULAR; INTRAVENOUS ONCE AS NEEDED
Status: CANCELLED | OUTPATIENT
Start: 2024-04-22

## 2024-04-22 RX ORDER — SODIUM CHLORIDE 0.9 % (FLUSH) 0.9 %
10 SYRINGE (ML) INJECTION
Status: CANCELLED | OUTPATIENT
Start: 2024-04-22

## 2024-04-22 RX ORDER — PROCHLORPERAZINE EDISYLATE 5 MG/ML
5 INJECTION INTRAMUSCULAR; INTRAVENOUS ONCE AS NEEDED
Status: CANCELLED | OUTPATIENT
Start: 2024-04-24

## 2024-04-22 RX ORDER — HEPARIN 100 UNIT/ML
500 SYRINGE INTRAVENOUS
Status: CANCELLED | OUTPATIENT
Start: 2024-04-24

## 2024-04-22 RX ORDER — PROCHLORPERAZINE EDISYLATE 5 MG/ML
5 INJECTION INTRAMUSCULAR; INTRAVENOUS ONCE AS NEEDED
Status: CANCELLED | OUTPATIENT
Start: 2024-04-22

## 2024-04-22 RX ORDER — HEPARIN 100 UNIT/ML
500 SYRINGE INTRAVENOUS
Status: DISCONTINUED | OUTPATIENT
Start: 2024-04-22 | End: 2024-04-22 | Stop reason: HOSPADM

## 2024-04-22 RX ORDER — EPINEPHRINE 0.3 MG/.3ML
0.3 INJECTION SUBCUTANEOUS ONCE AS NEEDED
Status: CANCELLED | OUTPATIENT
Start: 2024-04-22

## 2024-04-22 RX ORDER — EPINEPHRINE 0.3 MG/.3ML
0.3 INJECTION SUBCUTANEOUS ONCE AS NEEDED
Status: DISCONTINUED | OUTPATIENT
Start: 2024-04-22 | End: 2024-04-22 | Stop reason: HOSPADM

## 2024-04-22 RX ORDER — PROCHLORPERAZINE EDISYLATE 5 MG/ML
5 INJECTION INTRAMUSCULAR; INTRAVENOUS ONCE AS NEEDED
Status: DISCONTINUED | OUTPATIENT
Start: 2024-04-22 | End: 2024-04-22 | Stop reason: HOSPADM

## 2024-04-22 RX ORDER — SODIUM CHLORIDE 0.9 % (FLUSH) 0.9 %
10 SYRINGE (ML) INJECTION
Status: CANCELLED | OUTPATIENT
Start: 2024-04-24

## 2024-04-22 RX ORDER — SODIUM CHLORIDE 0.9 % (FLUSH) 0.9 %
10 SYRINGE (ML) INJECTION
Status: DISCONTINUED | OUTPATIENT
Start: 2024-04-22 | End: 2024-04-22 | Stop reason: HOSPADM

## 2024-04-22 RX ORDER — HEPARIN 100 UNIT/ML
500 SYRINGE INTRAVENOUS
Status: CANCELLED | OUTPATIENT
Start: 2024-04-22

## 2024-04-22 RX ADMIN — DEXTROSE MONOHYDRATE: 50 INJECTION, SOLUTION INTRAVENOUS at 10:04

## 2024-04-22 RX ADMIN — SODIUM CHLORIDE: 9 INJECTION, SOLUTION INTRAVENOUS at 10:04

## 2024-04-22 RX ADMIN — OXALIPLATIN 150 MG: 5 INJECTION, SOLUTION INTRAVENOUS at 12:04

## 2024-04-22 RX ADMIN — DEXAMETHASONE SODIUM PHOSPHATE 0.25 MG: 4 INJECTION, SOLUTION INTRA-ARTICULAR; INTRALESIONAL; INTRAMUSCULAR; INTRAVENOUS; SOFT TISSUE at 11:04

## 2024-04-22 RX ADMIN — FLUOROURACIL 4440 MG: 50 INJECTION, SOLUTION INTRAVENOUS at 02:04

## 2024-04-22 RX ADMIN — BEVACIZUMAB-AWWB 365 MG: 400 INJECTION, SOLUTION INTRAVENOUS at 10:04

## 2024-04-22 NOTE — PROGRESS NOTES
aMEDICAL ONCOLOGY - ESTABLISHED PATIENT VISIT    Reason for visit: colon adenocarcinoma    Best Contact Phone Number(s): [phone] (home)      Cancer/Stage/TNM:    Cancer Staging   Colon adenocarcinoma  Staging form: Colon and Rectum, AJCC 8th Edition  - Pathologic stage from 1/22/2024: Stage IVC (pT4a, pN2b, cM1c) - Signed by Minna Menendez CNS on 2/13/2024       Oncology History   Colon adenocarcinoma   12/21/2023 Tumor Markers    Patient's tumor was tested for the following markers: CEA.                                              Results of the tumor marker test revealed 3.3     12/21/2023 Procedure    Colonoscopy  Cecal polyp removed with jumbo forceps, 3 mm  Multiple ascending colon polyps ranging in size from 5 to 12 mm - semi-pedunculated removed with hot snare  Hepatic flexure mass identified - central ulceration, concerning for malignancy, 3 cm, not obstructing, this was biopsied - tattoo placed distal to mass  Sigmoid diverticular disease     12/21/2023 Tumor Markers    Patient's tumor was tested for the following markers: CEA.                                              Results of the tumor marker test revealed 3.3     12/22/2023 Imaging Significant Findings    CT CAP  Evaluation of the colon is somewhat limited as there is significant colonic stool and oral contrast material has not opacified the large bowel.  There does appear to be some abnormal thickening of the walls of the proximal right colon and a patient with a known history of colonic malignancy.  There is some soft tissue density external to the walls of the colon on the right pericolic gutter which could represent peritoneal nodularity versus subserosal extent of tumor.  Slightly more distal to this area there is a 2nd area of irregularity of the walls of the colon, difficult to fully evaluate if this is related to the colonic walls versus stool with central fat.     Two hypodensities in the liver, the larger measuring 0.8 cm, too  small to accurately characterize on the basis of this examination.  Attention on follow-up.     No pulmonary nodules are seen.     Cholecystectomy.     12/29/2023 Initial Diagnosis    Hepatic flexure colon adenocarcinoma  MMR intact     1/22/2024 Cancer Staged    Staging form: Colon and Rectum, AJCC 8th Edition  - Pathologic stage from 1/22/2024: Stage IVC (pT4a, pN2b, cM1c)     Malignant neoplasm of ascending colon   2/12/2024 Initial Diagnosis    Malignant neoplasm of ascending colon     2/26/2024 -  Chemotherapy    Treatment Summary   Plan Name: OP GI [id number] (oxaliplatin leucovorin fluorouracil) with bevacizumab Q2W  Treatment Goal: Palliative  Status: Active  Start Date: 2/26/2024  End Date: 1/15/2025 (Planned)  Provider: Kemar Zaragoza MD  Chemotherapy: oxaliplatin (ELOXATIN) 150 mg in dextrose 5 % (D5W) 595 mL chemo infusion, 157 mg, Intravenous, Clinic/HOD 1 time, 4 of 24 cycles  Administration: 150 mg (2/26/2024), 150 mg (3/11/2024), 150 mg (3/25/2024), 150 mg (4/8/2024)  fluorouracil (ADRUCIL) 2,400 mg/m2 = 4,440 mg in sodium chloride 0.9% 100 mL chemo infusion, 2,400 mg/m2 = 4,440 mg, Intravenous, Clinic/HOD 1 time, 4 of 24 cycles  Administration: 4,440 mg (2/26/2024), 4,440 mg (3/11/2024), 4,440 mg (3/25/2024), 4,440 mg (4/8/2024)  bevacizumab-awwb (MVASI) 5 mg/kg = 365 mg in sodium chloride 0.9% 100 mL infusion, 5 mg/kg = 365 mg, Intravenous, Clinic/HOD 1 time, 4 of 24 cycles  Administration: 365 mg (2/26/2024), 365 mg (3/11/2024), 365 mg (3/25/2024), 365 mg (4/8/2024)          Interim History:   73 y.o. female with LUANNE, bipolar, HLD who presents prior to cycle 5 FOLFOX + bevacizumab.  She is feeling well. She denies any significant significant side effects from chemotherapy.  She has mild fatigue.  No significant nausea/vomiting.  Denies pain.  She has no persistent paresthesias.  She denies any constipation or diarrhea.    ECOG 0. Presents with her .     ROS:   Review of Systems    Constitutional:  Negative for chills, fever and malaise/fatigue.   HENT:  Negative for congestion and sore throat.    Respiratory:  Negative for shortness of breath.    Cardiovascular:  Negative for chest pain, palpitations and leg swelling.   Gastrointestinal:  Negative for blood in stool, constipation, diarrhea and nausea.   Genitourinary:  Negative for hematuria.   Musculoskeletal:  Negative for back pain, falls and myalgias.   Skin:  Negative for rash.   Neurological:  Negative for dizziness, tingling, weakness and headaches.       Past Medical History:   Past Medical History:   Diagnosis Date    Anxiety     Arthritis     Bipolar 1 disorder     Bursitis of right hip     GI bleed due to NSAIDs     Multinodular goiter 11/15/2021    Sacroiliitis     right side    Sleep apnea         Past Surgical History:   Past Surgical History:   Procedure Laterality Date    ANKLE FRACTURE SURGERY Left 2001    BLADDER SUSPENSION      CARPAL TUNNEL RELEASE Bilateral     CHOLECYSTECTOMY      Laparoscopic    COLONOSCOPY      COLONOSCOPY N/A 12/21/2023    Procedure: COLONOSCOPY;  Surgeon: Alberto Albright MD;  Location: [facility];  Service: Endoscopy;  Laterality: N/A;    ESOPHAGOGASTRODUODENOSCOPY N/A 07/29/2021    Procedure: EGD (ESOPHAGOGASTRODUODENOSCOPY);  Surgeon: Susan Mcclain MD;  Location: [facility];  Service: Endoscopy;  Laterality: N/A;    EYE SURGERY      FOOT ARTHRODESIS Right 05/03/2023    Procedure: FUSION, FOOT;  Surgeon: Lauri Alejandra DPM;  Location: [facility];  Service: Podiatry;  Laterality: Right;  mini c-arm, Arthrex dowel bone graft harvester, locking plate and screws festus notified cc    HYSTERECTOMY  1986    vaginal prolapse    INJECTION OF ANESTHETIC AGENT AROUND MEDIAL BRANCH NERVES INNERVATING CERVICAL FACET JOINT Bilateral 05/27/2022    Procedure: CERVICAL MEDIAL BRANCH NERVE BLOCK (C3-4,C4-5);  Surgeon: Mamie Roman MD;  Location: [facility];  Service: Pain Management;  Laterality: Bilateral;     INJECTION OF ANESTHETIC AGENT AROUND MEDIAL BRANCH NERVES INNERVATING LUMBAR FACET JOINT Right 02/05/2021    Procedure: LUMBAR FACET JOINT BLOCK (L3-4,L4-5,L5-S1);  Surgeon: Mamie Roman MD;  Location: [facility];  Service: Pain Management;  Laterality: Right;    INJECTION OF ANESTHETIC AGENT AROUND MEDIAL BRANCH NERVES INNERVATING LUMBAR FACET JOINT Right 04/30/2021    Procedure: LUMBAR FACET JOINT BLOCK (L3-4,L4-5,L5-S1);  Surgeon: Mamie Roman MD;  Location: [facility];  Service: Pain Management;  Laterality: Right;    INJECTION OF ANESTHETIC AGENT INTO SACROILIAC JOINT Right 12/04/2020    Procedure: SACROILIAC JOINT INJECTION;  Surgeon: Mamie Roman MD;  Location: [facility];  Service: Pain Management;  Laterality: Right;    INJECTION OF JOINT Right 12/04/2020    Procedure: GREATER TROCHANTERIC BURSA INJECTION;  Surgeon: Mamie Roman MD;  Location: [facility];  Service: Pain Management;  Laterality: Right;    LAPAROSCOPIC RIGHT COLON RESECTION N/A 1/22/2024    Procedure: COLECTOMY, RIGHT, LAPAROSCOPIC (eras low, lithotomy);  Surgeon: Alberto Albright MD;  Location: [facility] OR [address] [place] [address];  Service: Colon and Rectal;  Laterality: N/A;    NASAL SEPTUM SURGERY      RECTAL PROLAPSE REPAIR      TONSILLECTOMY          Family History:   Family History   Problem Relation Name Age of Onset    No Known Problems Maternal Aunt Rubio Glasgow     Colon cancer Maternal Uncle Kalpeshwis     Colon cancer Maternal Uncle Yovani     Colon cancer Maternal Uncle Konstantin     No Known Problems Paternal Aunt Vero     Esophageal cancer Paternal Uncle Nat Jr     Heart attack Maternal Grandmother Elmira     Leukemia Maternal Grandfather manish Pang     No Known Problems Paternal Grandmother Lizett     Stroke Paternal Grandfather Nat Sr         Social History:   Social History     Tobacco Use    Smoking status: Former     Current packs/day: 0.00     Average packs/day: 1 pack/day for 41.7 years (41.7 ttl pk-yrs)     Types: Cigarettes      Start date: 3/30/1982     Quit date: 2023     Years since quittin.3    Smokeless tobacco: Never   Substance Use Topics    Alcohol use: Yes     Comment: Socially-2 or 3 times a year-6 or 7 drinks        I have reviewed and updated the patient's past medical, surgical, family and social histories.    Allergies:   Review of patient's allergies indicates:   Allergen Reactions    Nsaids (non-steroidal anti-inflammatory drug) Other (See Comments)     Gastrointestinal bleeding requiring blood transfusion    Demerol [meperidine] Rash        Medications:   Current Outpatient Medications   Medication Sig Dispense Refill    albuterol (PROVENTIL/VENTOLIN HFA) 90 mcg/actuation inhaler inhale 2 puffs into the lungs every 6 hours as needed for wheezing. 18 g 1    dexAMETHasone (DECADRON) 4 MG Tab Take 2 tablets (8 mg total) by mouth once daily only on days 2, 3 and 4 of each chemotherapy cycle. 40 tablet 0    EScitalopram oxalate (LEXAPRO) 20 MG tablet Take 1 tablet (20 mg total) by mouth every evening. 30 tablet 4    lamoTRIgine (LAMICTAL) 150 MG Tab Take 2 tablets (300 mg total) by mouth every evening. 180 tablet 1    LIDOcaine-prilocaine (EMLA) cream Apply topically as needed (place to port site 45-60 minutes prior to chemotherapy). 30 g 5    ondansetron (ZOFRAN-ODT) 8 MG TbDL dissolve 1 tablet (8 mg total) under the tongue every 6 (six) hours as needed (nausea). 30 tablet 5    pantoprazole (PROTONIX) 40 MG tablet Take 1 tablet (40 mg total) by mouth once daily. 90 tablet 3    prochlorperazine (COMPAZINE) 10 MG tablet Take 1 tablet (10 mg total) by mouth every 6 (six) hours as needed (nausea). 30 tablet 2    QUEtiapine (SEROQUEL) 100 MG Tab Take 1.5 tablets at bedtime for 1 week then if needed can increase to 2 tablets at bedtime for sleep (Patient taking differently: Take 200 mg by mouth nightly. Take 1.5 tablets at bedtime for 1 week then if needed can increase to 2 tablets at bedtime for sleep) 60 tablet 4     "rosuvastatin (CRESTOR) 10 MG tablet Take 1 tablet (10 mg total) by mouth once daily. (Patient taking differently: Take 10 mg by mouth every evening.) 90 tablet 3     No current facility-administered medications for this visit.        Physical Exam:   /67 (BP Location: Left arm, Patient Position: Sitting, BP Method: Medium (Automatic))   Pulse 80   Temp 98.5 °F (36.9 °C) (Oral)   Ht 5' 6" (1.676 m)   Wt 78.3 kg (172 lb 8.2 oz)   SpO2 100%   BMI 27.84 kg/m²      ECOG Performance status: 0          Physical Exam  Vitals reviewed.   Constitutional:       General: She is not in acute distress.     Appearance: Normal appearance. She is normal weight. She is not ill-appearing, toxic-appearing or diaphoretic.   HENT:      Head: Normocephalic and atraumatic.      Right Ear: External ear normal.      Left Ear: External ear normal.      Nose: Nose normal.      Mouth/Throat:      Pharynx: Oropharynx is clear.   Eyes:      General: No scleral icterus.     Conjunctiva/sclera: Conjunctivae normal.   Cardiovascular:      Rate and Rhythm: Normal rate.   Pulmonary:      Effort: Pulmonary effort is normal. No respiratory distress.   Chest:      Comments: RCW port, steristrips in place  Abdominal:      General: There is no distension.   Skin:     General: Skin is warm and dry.      Coloration: Skin is not jaundiced or pale.      Findings: No bruising, erythema or rash.   Neurological:      Mental Status: She is alert and oriented to person, place, and time. Mental status is at baseline.      Motor: No weakness.      Gait: Gait normal.   Psychiatric:         Mood and Affect: Mood normal.         Labs:     I have reviewed the pertinent labs. Mild stable anemia.     Imagin/19/24 - CT CAP:    Impression:     Postoperative changes of right colonic resection from patient's known colonic malignancy.  No free air or obstruction.  No pathologically enlarged abdominal or pelvic lymph nodes are seen.     5 mm hypodensity in the " right hepatic lobe, too small to accurately characterize.  The 2nd hypodensity seen on the prior study is not as clearly seen on today's examination.     No pulmonary nodules.     Cholecystectomy.     Mild thickening of the walls of the distal esophagus which could suggest esophagitis.  Clinical correlation suggested.     Constipation.    Path:   1/22/24 - R Hemicolectomy   Final Pathologic Diagnosis     THE DIAGNOSES REMAIN THE SAME.  THIS CORRECTED REPORT IS ISSUED TO CHANGE M STATUS to reflect tumor in the omentum.  This change is discussed with Dr. RANJANA Albright via phone, 2/1/2024.    1. Colon, right and terminal ileum (right hemicolectomy with EN bloc abdominal wall resection):  Invasive adenocarcinoma.  See cancer synoptic report     2. Omentum (resection):    Positive for carcinoma    CORRECT SYNOPTIC AND M STATUS  Cancer synoptic report  - Procedure: Right hemicolectomy with EN bloc abdominal wall resection  - Tumor site:  Ascending  - Histologic type:  Adenocarcinoma  - Histologic grade:  G2, moderately differentiated.  See comment.  COMMENT:  While the majority of the tumor consists of well formed glands, a significant proportion includes abortive glands, single file infiltration, and malignant cells with signet ring cell morphology. The tumor has an insidious pattern of  infiltration with tumor present far from the advancing front of the tumor.  - Tumor size:  2.0 x 2.0 x 1.0 cm  - Tumor extent:  Invades visceral peritoneum, multifocal  - Macroscopic perforation: Not identified  - Lymphovascular invasion:  Present, extensive.  Small vessel, large vessel, intra and extramural.  - Perineural invasion:  Not identified  - Tumor budding score:  High (>10), multifocal single cell infiltration  - Treatment effect: No known pre-surgical therapy    Margins  Margin involved by invasive carcinoma, radial (slide 1C)  All margins negative for dysplasia.    pTNM stage classification (AJCC 8th edition)  pT Category  pT4a:   Tumor invades through the visceral peritoneum    pN Category  pN2b:  7 or more regional lymph nodes are positive  Number of positive lymph nodes:  19  Number of lymph nodes examined: 26  Number of tumor deposits:  4    pM Category  pM1c:  Metastasis to the peritoneal surface of the omentum.    Additional findings:  - Sessile serrated polyp, no cytologic dysplasia, 1.1 cm at ileocecal valve  - Tubulovillous adenoma, 1.0 cm, proximal ascending  - Tubular adenoma, 0.4 cm, mid ascending  - Tubular adenoma, 0.4, distal ascending  - Tubular adenoma, 0.6 cm, distal ascending  - Submucosal lipoma, 2.0 cm  - Appendix with no specific histopathologic changes    Ancillary studies  Immunohistochemistry for mismatch repair proteins performed on prior biopsy material (GTS-, 12/21/23): pMMR.         Assessment:       1. Malignant neoplasm of ascending colon    2. 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 Immunodeficiency secondary to neoplasm    4. Immunodeficiency secondary to chemotherapy    5. Neoplastic malignant related fatigue    6. Cold sensitivity              Plan:        # Colon cancer   Mrs. Maguire is a 73 y.o. female who presents for oncologic evaluation for metastatic colon cancer. She underwent a R hemicolectomy with en-bloc abdominal wal resection on 1/22/24 with Dr. Albright. Pathology revealed pT4a N2b disease with 19/26 positive LNs and omentum positive for carcinoma.    We had an in-depth conversation during our initial consult re: her diagnosis and treatment options. Reviewed recommendation for IV systemic therapy for at least 6 months. Plan for FOLFOX + bevacizumab to be given every 2 weeks. Briefly reviewed side effects of treatment. Handouts provided. Port placed 2/21/24.     PGX - DPYD normal metabolizer, [id number] intermediate metabolizer   Dexamethasone, zofran, compazine and lidocaine sent to pharmacy previously. Reviewed admin instructions.     Pending response, she may be a candidate for  CRS/HIPEC with surgical oncology team. Continue to evaluate.     CT CAP after cycle 4 shows no clear evidence of disease.  Presents today for cycle 5 FOLFOX + bevacizumab.   She tolerated treatment without any complication.   - Labs reviewed, adequate for treatment.   - Proceed with cycle 5 today.      Unfortunately she is not eligible for our maintenance BGB CRC study because she does not have measurable disease.    Follow up: 2 weeks for cycle 6.     Cold sensitivity/neoplasm related fatigue:  2/2 chemotherapy. Still continues to function very well. Works in her garden without complication.   No peripheral neuropathy. Will continue to monitor.     Patient is in agreement with the proposed treatment plan. All questions were answered to the patient's satisfaction. Pt knows to call clinic if anything is needed before the next clinic visit.    Kemar Zaragoza MD  Hematology/Oncology  [facility]        Med Onc Chart Routing      Follow up with physician 4 weeks.   Follow up with KRISTEN 2 weeks.   Infusion scheduling note    Injection scheduling note    Labs CBC, CMP and urinalysis   Scheduling:  Preferred lab:  Lab interval: every 2 weeks     Imaging    Pharmacy appointment    Other referrals

## 2024-04-22 NOTE — PLAN OF CARE
1410 pt tolerated tx. Port flushed and connected to CADD pump with 5 FU infusing at 2.2 ml/hr for 46 hours, RUN noted on pump, counting down prior to d/c from clinic. Pt instructed to retun to clinic at 1200 pm on 4/24/24 for removal of pump after infusion complete. Pt verbalized understanding. Pt d/c from clinic.   PT attempted to see pt this AM. Patient was not available for the therapy session at this time.  Reason not seen: Being off the floor at medical test/procedure(MRI) (06/11/19 1038).     Re-Attempt Plan: Will re-attempt per established treatment plan (06/11/19 1038).    2nd attempt 1404, pt off floor for another MRI. Counter code applied for multiple attempts, time spent.

## 2024-04-24 ENCOUNTER — INFUSION (OUTPATIENT)
Dept: INFUSION THERAPY | Facility: HOSPITAL | Age: 74
End: 2024-04-24
Payer: MEDICARE

## 2024-04-24 VITALS
TEMPERATURE: 99 F | SYSTOLIC BLOOD PRESSURE: 166 MMHG | RESPIRATION RATE: 18 BRPM | HEIGHT: 66 IN | HEART RATE: 77 BPM | WEIGHT: 172.5 LBS | DIASTOLIC BLOOD PRESSURE: 72 MMHG | BODY MASS INDEX: 27.72 KG/M2

## 2024-04-24 DIAGNOSIS — C18.2 MALIGNANT NEOPLASM OF ASCENDING COLON: Primary | ICD-10-CM

## 2024-04-24 PROCEDURE — 63600175 PHARM REV CODE 636 W HCPCS: Performed by: INTERNAL MEDICINE

## 2024-04-24 RX ORDER — PROCHLORPERAZINE EDISYLATE 5 MG/ML
5 INJECTION INTRAMUSCULAR; INTRAVENOUS ONCE AS NEEDED
Status: DISCONTINUED | OUTPATIENT
Start: 2024-04-24 | End: 2024-04-24 | Stop reason: HOSPADM

## 2024-04-24 RX ORDER — SODIUM CHLORIDE 0.9 % (FLUSH) 0.9 %
10 SYRINGE (ML) INJECTION
Status: DISCONTINUED | OUTPATIENT
Start: 2024-04-24 | End: 2024-04-24 | Stop reason: HOSPADM

## 2024-04-24 RX ORDER — HEPARIN 100 UNIT/ML
500 SYRINGE INTRAVENOUS
Status: DISCONTINUED | OUTPATIENT
Start: 2024-04-24 | End: 2024-04-24 | Stop reason: HOSPADM

## 2024-04-24 RX ADMIN — HEPARIN 500 UNITS: 100 SYRINGE at 11:04

## 2024-04-24 NOTE — PLAN OF CARE
1153-Pt tolerated home infusion well, no complaints or complications reported, VSS, vessel empty upon arrival. Pt disconnected from pump, positive blood return noted. Pt aware to call provider with any questions or concerns, aware of upcoming appts, ambulatory from clinic with steady gait, no distress noted.

## 2024-05-03 ENCOUNTER — LAB VISIT (OUTPATIENT)
Dept: LAB | Facility: HOSPITAL | Age: 74
End: 2024-05-03
Attending: PHYSICIAN ASSISTANT
Payer: MEDICARE

## 2024-05-03 DIAGNOSIS — C18.2 MALIGNANT NEOPLASM OF ASCENDING COLON: ICD-10-CM

## 2024-05-03 LAB
ALBUMIN SERPL BCP-MCNC: 3.6 G/DL (ref 3.5–5.2)
ALP SERPL-CCNC: 72 U/L (ref 55–135)
ALT SERPL W/O P-5'-P-CCNC: 8 U/L (ref 10–44)
ANION GAP SERPL CALC-SCNC: 8 MMOL/L (ref 8–16)
AST SERPL-CCNC: 17 U/L (ref 10–40)
BASOPHILS # BLD AUTO: 0.04 K/UL (ref 0–0.2)
BASOPHILS NFR BLD: 0.8 % (ref 0–1.9)
BILIRUB SERPL-MCNC: 0.3 MG/DL (ref 0.1–1)
BILIRUB UR QL STRIP: NEGATIVE
BUN SERPL-MCNC: 16 MG/DL (ref 8–23)
CALCIUM SERPL-MCNC: 9.6 MG/DL (ref 8.7–10.5)
CEA SERPL-MCNC: 3.3 NG/ML (ref 0–5)
CHLORIDE SERPL-SCNC: 107 MMOL/L (ref 95–110)
CLARITY UR: CLEAR
CO2 SERPL-SCNC: 24 MMOL/L (ref 23–29)
COLOR UR: YELLOW
CREAT SERPL-MCNC: 0.8 MG/DL (ref 0.5–1.4)
DIFFERENTIAL METHOD BLD: ABNORMAL
EOSINOPHIL # BLD AUTO: 0.1 K/UL (ref 0–0.5)
EOSINOPHIL NFR BLD: 2.4 % (ref 0–8)
ERYTHROCYTE [DISTWIDTH] IN BLOOD BY AUTOMATED COUNT: 17.3 % (ref 11.5–14.5)
EST. GFR  (NO RACE VARIABLE): >60 ML/MIN/1.73 M^2
GLUCOSE SERPL-MCNC: 102 MG/DL (ref 70–110)
GLUCOSE UR QL STRIP: NEGATIVE
HCT VFR BLD AUTO: 35.7 % (ref 37–48.5)
HGB BLD-MCNC: 11.5 G/DL (ref 12–16)
HGB UR QL STRIP: NEGATIVE
IMM GRANULOCYTES # BLD AUTO: 0.01 K/UL (ref 0–0.04)
IMM GRANULOCYTES NFR BLD AUTO: 0.2 % (ref 0–0.5)
KETONES UR QL STRIP: NEGATIVE
LEUKOCYTE ESTERASE UR QL STRIP: NEGATIVE
LYMPHOCYTES # BLD AUTO: 1.7 K/UL (ref 1–4.8)
LYMPHOCYTES NFR BLD: 34.3 % (ref 18–48)
MCH RBC QN AUTO: 28 PG (ref 27–31)
MCHC RBC AUTO-ENTMCNC: 32.2 G/DL (ref 32–36)
MCV RBC AUTO: 87 FL (ref 82–98)
MONOCYTES # BLD AUTO: 0.9 K/UL (ref 0.3–1)
MONOCYTES NFR BLD: 17.2 % (ref 4–15)
NEUTROPHILS # BLD AUTO: 2.3 K/UL (ref 1.8–7.7)
NEUTROPHILS NFR BLD: 45.1 % (ref 38–73)
NITRITE UR QL STRIP: NEGATIVE
NRBC BLD-RTO: 0 /100 WBC
PH UR STRIP: 6 [PH] (ref 5–8)
PLATELET # BLD AUTO: 223 K/UL (ref 150–450)
PMV BLD AUTO: 10.1 FL (ref 9.2–12.9)
POTASSIUM SERPL-SCNC: 4.6 MMOL/L (ref 3.5–5.1)
PROT SERPL-MCNC: 6.2 G/DL (ref 6–8.4)
PROT UR QL STRIP: NEGATIVE
RBC # BLD AUTO: 4.1 M/UL (ref 4–5.4)
SODIUM SERPL-SCNC: 139 MMOL/L (ref 136–145)
SP GR UR STRIP: 1.02 (ref 1–1.03)
URN SPEC COLLECT METH UR: NORMAL
UROBILINOGEN UR STRIP-ACNC: NEGATIVE EU/DL
WBC # BLD AUTO: 5.07 K/UL (ref 3.9–12.7)

## 2024-05-03 PROCEDURE — 82378 CARCINOEMBRYONIC ANTIGEN: CPT | Performed by: PHYSICIAN ASSISTANT

## 2024-05-03 PROCEDURE — 36415 COLL VENOUS BLD VENIPUNCTURE: CPT | Performed by: PHYSICIAN ASSISTANT

## 2024-05-03 PROCEDURE — 80053 COMPREHEN METABOLIC PANEL: CPT | Performed by: PHYSICIAN ASSISTANT

## 2024-05-03 PROCEDURE — 81003 URINALYSIS AUTO W/O SCOPE: CPT | Performed by: PHYSICIAN ASSISTANT

## 2024-05-03 PROCEDURE — 85025 COMPLETE CBC W/AUTO DIFF WBC: CPT | Performed by: PHYSICIAN ASSISTANT

## 2024-05-06 ENCOUNTER — INFUSION (OUTPATIENT)
Dept: INFUSION THERAPY | Facility: HOSPITAL | Age: 74
End: 2024-05-06
Payer: MEDICARE

## 2024-05-06 ENCOUNTER — OFFICE VISIT (OUTPATIENT)
Dept: HEMATOLOGY/ONCOLOGY | Facility: CLINIC | Age: 74
End: 2024-05-06
Payer: MEDICARE

## 2024-05-06 VITALS
DIASTOLIC BLOOD PRESSURE: 76 MMHG | HEIGHT: 66 IN | BODY MASS INDEX: 27.46 KG/M2 | SYSTOLIC BLOOD PRESSURE: 146 MMHG | OXYGEN SATURATION: 98 % | TEMPERATURE: 99 F | RESPIRATION RATE: 20 BRPM | WEIGHT: 170.88 LBS | HEART RATE: 75 BPM

## 2024-05-06 VITALS
SYSTOLIC BLOOD PRESSURE: 131 MMHG | BODY MASS INDEX: 27.46 KG/M2 | HEART RATE: 72 BPM | DIASTOLIC BLOOD PRESSURE: 79 MMHG | WEIGHT: 170.88 LBS | HEIGHT: 66 IN

## 2024-05-06 DIAGNOSIS — C78.6 MALIGNANT NEOPLASM METASTATIC TO OMENTUM: ICD-10-CM

## 2024-05-06 DIAGNOSIS — T45.1X5A IMMUNODEFICIENCY SECONDARY TO CHEMOTHERAPY: ICD-10-CM

## 2024-05-06 DIAGNOSIS — Z79.899 IMMUNODEFICIENCY SECONDARY TO CHEMOTHERAPY: ICD-10-CM

## 2024-05-06 DIAGNOSIS — D84.821 IMMUNODEFICIENCY SECONDARY TO CHEMOTHERAPY: ICD-10-CM

## 2024-05-06 DIAGNOSIS — C18.2 MALIGNANT NEOPLASM OF ASCENDING COLON: Primary | ICD-10-CM

## 2024-05-06 DIAGNOSIS — R68.89 COLD SENSITIVITY: ICD-10-CM

## 2024-05-06 DIAGNOSIS — D49.9 IMMUNODEFICIENCY SECONDARY TO NEOPLASM: ICD-10-CM

## 2024-05-06 DIAGNOSIS — R14.3 EXCESSIVE GAS: ICD-10-CM

## 2024-05-06 DIAGNOSIS — R53.0 NEOPLASTIC MALIGNANT RELATED FATIGUE: ICD-10-CM

## 2024-05-06 DIAGNOSIS — K59.09 OTHER CONSTIPATION: ICD-10-CM

## 2024-05-06 DIAGNOSIS — D84.81 IMMUNODEFICIENCY SECONDARY TO NEOPLASM: ICD-10-CM

## 2024-05-06 PROCEDURE — 99999 PR PBB SHADOW E&M-EST. PATIENT-LVL IV: CPT | Mod: PBBFAC,,, | Performed by: PHYSICIAN ASSISTANT

## 2024-05-06 PROCEDURE — 96416 CHEMO PROLONG INFUSE W/PUMP: CPT

## 2024-05-06 PROCEDURE — G2211 COMPLEX E/M VISIT ADD ON: HCPCS | Mod: S$GLB,,, | Performed by: PHYSICIAN ASSISTANT

## 2024-05-06 PROCEDURE — 96367 TX/PROPH/DG ADDL SEQ IV INF: CPT

## 2024-05-06 PROCEDURE — 96415 CHEMO IV INFUSION ADDL HR: CPT

## 2024-05-06 PROCEDURE — 96413 CHEMO IV INFUSION 1 HR: CPT

## 2024-05-06 PROCEDURE — 1159F MED LIST DOCD IN RCRD: CPT | Mod: CPTII,S$GLB,, | Performed by: PHYSICIAN ASSISTANT

## 2024-05-06 PROCEDURE — 25000003 PHARM REV CODE 250: Performed by: PHYSICIAN ASSISTANT

## 2024-05-06 PROCEDURE — 3078F DIAST BP <80 MM HG: CPT | Mod: CPTII,S$GLB,, | Performed by: PHYSICIAN ASSISTANT

## 2024-05-06 PROCEDURE — 3077F SYST BP >= 140 MM HG: CPT | Mod: CPTII,S$GLB,, | Performed by: PHYSICIAN ASSISTANT

## 2024-05-06 PROCEDURE — 63600175 PHARM REV CODE 636 W HCPCS: Mod: JZ,JG | Performed by: PHYSICIAN ASSISTANT

## 2024-05-06 PROCEDURE — 3008F BODY MASS INDEX DOCD: CPT | Mod: CPTII,S$GLB,, | Performed by: PHYSICIAN ASSISTANT

## 2024-05-06 PROCEDURE — 3288F FALL RISK ASSESSMENT DOCD: CPT | Mod: CPTII,S$GLB,, | Performed by: PHYSICIAN ASSISTANT

## 2024-05-06 PROCEDURE — 1101F PT FALLS ASSESS-DOCD LE1/YR: CPT | Mod: CPTII,S$GLB,, | Performed by: PHYSICIAN ASSISTANT

## 2024-05-06 PROCEDURE — 99215 OFFICE O/P EST HI 40 MIN: CPT | Mod: S$GLB,,, | Performed by: PHYSICIAN ASSISTANT

## 2024-05-06 PROCEDURE — 1126F AMNT PAIN NOTED NONE PRSNT: CPT | Mod: CPTII,S$GLB,, | Performed by: PHYSICIAN ASSISTANT

## 2024-05-06 PROCEDURE — 96417 CHEMO IV INFUS EACH ADDL SEQ: CPT

## 2024-05-06 RX ORDER — EPINEPHRINE 0.3 MG/.3ML
0.3 INJECTION SUBCUTANEOUS ONCE AS NEEDED
Status: DISCONTINUED | OUTPATIENT
Start: 2024-05-06 | End: 2024-05-06 | Stop reason: HOSPADM

## 2024-05-06 RX ORDER — PROCHLORPERAZINE EDISYLATE 5 MG/ML
5 INJECTION INTRAMUSCULAR; INTRAVENOUS ONCE AS NEEDED
Status: DISCONTINUED | OUTPATIENT
Start: 2024-05-06 | End: 2024-05-06 | Stop reason: HOSPADM

## 2024-05-06 RX ORDER — HEPARIN 100 UNIT/ML
500 SYRINGE INTRAVENOUS
Status: CANCELLED | OUTPATIENT
Start: 2024-05-08

## 2024-05-06 RX ORDER — HEPARIN 100 UNIT/ML
500 SYRINGE INTRAVENOUS
Status: CANCELLED | OUTPATIENT
Start: 2024-05-06

## 2024-05-06 RX ORDER — SODIUM CHLORIDE 0.9 % (FLUSH) 0.9 %
10 SYRINGE (ML) INJECTION
Status: CANCELLED | OUTPATIENT
Start: 2024-05-06

## 2024-05-06 RX ORDER — SODIUM CHLORIDE 0.9 % (FLUSH) 0.9 %
10 SYRINGE (ML) INJECTION
Status: DISCONTINUED | OUTPATIENT
Start: 2024-05-06 | End: 2024-05-06 | Stop reason: HOSPADM

## 2024-05-06 RX ORDER — PROCHLORPERAZINE EDISYLATE 5 MG/ML
5 INJECTION INTRAMUSCULAR; INTRAVENOUS ONCE AS NEEDED
Status: CANCELLED | OUTPATIENT
Start: 2024-05-06

## 2024-05-06 RX ORDER — PROCHLORPERAZINE EDISYLATE 5 MG/ML
5 INJECTION INTRAMUSCULAR; INTRAVENOUS ONCE AS NEEDED
Status: CANCELLED | OUTPATIENT
Start: 2024-05-08

## 2024-05-06 RX ORDER — HEPARIN 100 UNIT/ML
500 SYRINGE INTRAVENOUS
Status: DISCONTINUED | OUTPATIENT
Start: 2024-05-06 | End: 2024-05-06 | Stop reason: HOSPADM

## 2024-05-06 RX ORDER — DIPHENHYDRAMINE HYDROCHLORIDE 50 MG/ML
50 INJECTION INTRAMUSCULAR; INTRAVENOUS ONCE AS NEEDED
Status: DISCONTINUED | OUTPATIENT
Start: 2024-05-06 | End: 2024-05-06 | Stop reason: HOSPADM

## 2024-05-06 RX ORDER — DIPHENHYDRAMINE HYDROCHLORIDE 50 MG/ML
50 INJECTION INTRAMUSCULAR; INTRAVENOUS ONCE AS NEEDED
Status: CANCELLED | OUTPATIENT
Start: 2024-05-06

## 2024-05-06 RX ORDER — EPINEPHRINE 0.3 MG/.3ML
0.3 INJECTION SUBCUTANEOUS ONCE AS NEEDED
Status: CANCELLED | OUTPATIENT
Start: 2024-05-06

## 2024-05-06 RX ORDER — SODIUM CHLORIDE 0.9 % (FLUSH) 0.9 %
10 SYRINGE (ML) INJECTION
Status: CANCELLED | OUTPATIENT
Start: 2024-05-08

## 2024-05-06 RX ADMIN — SODIUM CHLORIDE: 9 INJECTION, SOLUTION INTRAVENOUS at 08:05

## 2024-05-06 RX ADMIN — BEVACIZUMAB-AWWB 365 MG: 400 INJECTION, SOLUTION INTRAVENOUS at 09:05

## 2024-05-06 RX ADMIN — OXALIPLATIN 150 MG: 5 INJECTION, SOLUTION INTRAVENOUS at 10:05

## 2024-05-06 RX ADMIN — FLUOROURACIL 4440 MG: 50 INJECTION, SOLUTION INTRAVENOUS at 12:05

## 2024-05-06 RX ADMIN — DEXAMETHASONE SODIUM PHOSPHATE 0.25 MG: 4 INJECTION, SOLUTION INTRA-ARTICULAR; INTRALESIONAL; INTRAMUSCULAR; INTRAVENOUS; SOFT TISSUE at 09:05

## 2024-05-06 RX ADMIN — DEXTROSE MONOHYDRATE: 50 INJECTION, SOLUTION INTRAVENOUS at 10:05

## 2024-05-06 NOTE — PROGRESS NOTES
[facility] ONCOL[facility]Y - ESTABLISHED PATIENT VISIT    Reason for visit: colon adenocarcinoma    Best Contact Phone Number(s): [phone] (home)      Cancer/Stage/TNM:    Cancer Staging   Colon adenocarcinoma  Staging form: Colon and Rectum, AJCC 8th Edition  - Pathologic stage from 1/22/2024: Stage IVC (pT4a, pN2b, cM1c) - Signed by Minna Menendez CNS on 2/13/2024       Oncology History   Colon adenocarcinoma   12/21/2023 Tumor Markers    Patient's tumor was tested for the following markers: CEA.                                              Results of the tumor marker test revealed 3.3     12/21/2023 Procedure    Colonoscopy  Cecal polyp removed with jumbo forceps, 3 mm  Multiple ascending colon polyps ranging in size from 5 to 12 mm - semi-pedunculated removed with hot snare  Hepatic flexure mass identified - central ulceration, concerning for malignancy, 3 cm, not obstructing, this was biopsied - tattoo placed distal to mass  Sigmoid diverticular disease     12/21/2023 Tumor Markers    Patient's tumor was tested for the following markers: CEA.                                              Results of the tumor marker test revealed 3.3     12/22/2023 Imaging Significant Findings    CT CAP  Evaluation of the colon is somewhat limited as there is significant colonic stool and oral contrast material has not opacified the large bowel.  There does appear to be some abnormal thickening of the walls of the proximal right colon and a patient with a known history of colonic malignancy.  There is some soft tissue density external to the walls of the colon on the right pericolic gutter which could represent peritoneal nodularity versus subserosal extent of tumor.  Slightly more distal to this area there is a 2nd area of irregularity of the walls of the colon, difficult to fully evaluate if this is related to the colonic walls versus stool with central fat.     Two hypodensities in the liver, the larger measuring 0.8 cm, too  small to accurately characterize on the basis of this examination.  Attention on follow-up.     No pulmonary nodules are seen.     Cholecystectomy.     12/29/2023 Initial Diagnosis    Hepatic flexure colon adenocarcinoma  MMR intact     1/22/2024 Cancer Staged    Staging form: Colon and Rectum, AJCC 8th Edition  - Pathologic stage from 1/22/2024: Stage IVC (pT4a, pN2b, cM1c)     Malignant neoplasm of ascending colon   2/12/2024 Initial Diagnosis    Malignant neoplasm of ascending colon     2/26/2024 -  Chemotherapy    Treatment Summary   Plan Name: OP GI [id number] (oxaliplatin leucovorin fluorouracil) with bevacizumab Q2W  Treatment Goal: Palliative  Status: Active  Start Date: 2/26/2024  End Date: 1/15/2025 (Planned)  Provider: Kemar Zaragoza MD  Chemotherapy: oxaliplatin (ELOXATIN) 150 mg in dextrose 5 % (D5W) 595 mL chemo infusion, 157 mg, Intravenous, Clinic/HOD 1 time, 5 of 24 cycles  Administration: 150 mg (2/26/2024), 150 mg (3/11/2024), 150 mg (3/25/2024), 150 mg (4/8/2024), 150 mg (4/22/2024)  fluorouracil (ADRUCIL) 2,400 mg/m2 = 4,440 mg in sodium chloride 0.9% 100 mL chemo infusion, 2,400 mg/m2 = 4,440 mg, Intravenous, Clinic/HOD 1 time, 5 of 24 cycles  Administration: 4,440 mg (2/26/2024), 4,440 mg (3/11/2024), 4,440 mg (3/25/2024), 4,440 mg (4/8/2024), 4,440 mg (4/22/2024)  bevacizumab-awwb (MVASI) 5 mg/kg = 365 mg in sodium chloride 0.9% 100 mL infusion, 5 mg/kg = 365 mg, Intravenous, Clinic/HOD 1 time, 5 of 24 cycles  Administration: 365 mg (2/26/2024), 365 mg (3/11/2024), 365 mg (3/25/2024), 365 mg (4/8/2024), 365 mg (4/22/2024)          Interim History:   73 y.o. female with LUANNE, bipolar, HLD who presents prior to cycle 6 FOLFOX + bevacizumab.  She is feeling well. She does report having increased gas and constipation. She is not taking any medication for the gas or the constipation. She denies any significant significant side effects from chemotherapy.  She has mild fatigue.  No significant  nausea/vomiting.  Denies pain.  She has no persistent paresthesias.  She denies any diarrhea. Overall, she is feeling good. She continues to be active.     ECOG 0. Presents with her .     ROS:   Review of Systems   Constitutional:  Negative for chills, fever and malaise/fatigue.   HENT:  Negative for congestion and sore throat.    Respiratory:  Negative for shortness of breath.    Cardiovascular:  Negative for chest pain, palpitations and leg swelling.   Gastrointestinal:  Positive for constipation. Negative for blood in stool, diarrhea and nausea.        Gas   Genitourinary:  Negative for hematuria.   Musculoskeletal:  Negative for back pain, falls and myalgias.   Skin:  Negative for rash.   Neurological:  Negative for dizziness, tingling, weakness and headaches.       Past Medical History:   Past Medical History:   Diagnosis Date    Anxiety     Arthritis     Bipolar 1 disorder     Bursitis of right hip     GI bleed due to NSAIDs     Multinodular goiter 11/15/2021    Sacroiliitis     right side    Sleep apnea         Past Surgical History:   Past Surgical History:   Procedure Laterality Date    ANKLE FRACTURE SURGERY Left 2001    BLADDER SUSPENSION      CARPAL TUNNEL RELEASE Bilateral     CHOLECYSTECTOMY      Laparoscopic    COLONOSCOPY      COLONOSCOPY N/A 12/21/2023    Procedure: COLONOSCOPY;  Surgeon: Alberto Albright MD;  Location: [facility];  Service: Endoscopy;  Laterality: N/A;    ESOPHAGOGASTRODUODENOSCOPY N/A 07/29/2021    Procedure: EGD (ESOPHAGOGASTRODUODENOSCOPY);  Surgeon: Susan Mcclain MD;  Location: [facility];  Service: Endoscopy;  Laterality: N/A;    EYE SURGERY      FOOT ARTHRODESIS Right 05/03/2023    Procedure: FUSION, FOOT;  Surgeon: Lauri Alejandra DPM;  Location: [facility] OR;  Service: Podiatry;  Laterality: Right;  mini c-arm, Arthrex dowel bone graft harvester, locking plate and screws festus notified cc    HYSTERECTOMY  1986    vaginal prolapse    INJECTION OF ANESTHETIC AGENT  AROUND MEDIAL BRANCH NERVES INNERVATING CERVICAL FACET JOINT Bilateral 05/27/2022    Procedure: CERVICAL MEDIAL BRANCH NERVE BLOCK (C3-4,C4-5);  Surgeon: Mamie Roman MD;  Location: [facility] OR;  Service: Pain Management;  Laterality: Bilateral;    INJECTION OF ANESTHETIC AGENT AROUND MEDIAL BRANCH NERVES INNERVATING LUMBAR FACET JOINT Right 02/05/2021    Procedure: LUMBAR FACET JOINT BLOCK (L3-4,L4-5,L5-S1);  Surgeon: Mamie Roman MD;  Location: [facility] OR;  Service: Pain Management;  Laterality: Right;    INJECTION OF ANESTHETIC AGENT AROUND MEDIAL BRANCH NERVES INNERVATING LUMBAR FACET JOINT Right 04/30/2021    Procedure: LUMBAR FACET JOINT BLOCK (L3-4,L4-5,L5-S1);  Surgeon: Mamie Roman MD;  Location: [facility] OR;  Service: Pain Management;  Laterality: Right;    INJECTION OF ANESTHETIC AGENT INTO SACROILIAC JOINT Right 12/04/2020    Procedure: SACROILIAC JOINT INJECTION;  Surgeon: Mamie Roman MD;  Location: [facility] OR;  Service: Pain Management;  Laterality: Right;    INJECTION OF JOINT Right 12/04/2020    Procedure: GREATER TROCHANTERIC BURSA INJECTION;  Surgeon: Mamie Roman MD;  Location: [facility] OR;  Service: Pain Management;  Laterality: Right;    LAPAROSCOPIC RIGHT COLON RESECTION N/A 1/22/2024    Procedure: COLECTOMY, RIGHT, LAPAROSCOPIC (eras low, lithotomy);  Surgeon: Alberto Albright MD;  Location: [facility] OR [address] [place] [address];  Service: Colon and Rectal;  Laterality: N/A;    NASAL SEPTUM SURGERY      RECTAL PROLAPSE REPAIR      TONSILLECTOMY          Family History:   Family History   Problem Relation Name Age of Onset    No Known Problems Maternal Aunt Rubio Glasgow     Colon cancer Maternal Uncle Edwis     Colon cancer Maternal Uncle Yovani     Colon cancer Maternal Uncle Konstantin     No Known Problems Paternal Aunt Vero     Esophageal cancer Paternal Uncle Viltresse Jr     Heart attack Maternal Grandmother Elmira     Leukemia Maternal Grandfather manish Pang     No Known Problems Paternal Grandmother Lizett      Stroke Paternal Grandfather Nat Moreno         Social History:   Social History     Tobacco Use    Smoking status: Former     Current packs/day: 0.00     Average packs/day: 1 pack/day for 41.7 years (41.7 ttl pk-yrs)     Types: Cigarettes     Start date: 3/30/1982     Quit date: 2023     Years since quittin.3    Smokeless tobacco: Never   Substance Use Topics    Alcohol use: Yes     Comment: Socially-2 or 3 times a year-6 or 7 drinks        I have reviewed and updated the patient's past medical, surgical, family and social histories.    Allergies:   Review of patient's allergies indicates:   Allergen Reactions    Nsaids (non-steroidal anti-inflammatory drug) Other (See Comments)     Gastrointestinal bleeding requiring blood transfusion    Demerol [meperidine] Rash        Medications:   Current Outpatient Medications   Medication Sig Dispense Refill    albuterol (PROVENTIL/VENTOLIN HFA) 90 mcg/actuation inhaler inhale 2 puffs into the lungs every 6 hours as needed for wheezing. 18 g 1    dexAMETHasone (DECADRON) 4 MG Tab Take 2 tablets (8 mg total) by mouth once daily only on days 2, 3 and 4 of each chemotherapy cycle. 40 tablet 0    EScitalopram oxalate (LEXAPRO) 20 MG tablet Take 1 tablet (20 mg total) by mouth every evening. 30 tablet 4    lamoTRIgine (LAMICTAL) 150 MG Tab Take 2 tablets (300 mg total) by mouth every evening. 180 tablet 1    LIDOcaine-prilocaine (EMLA) cream Apply topically as needed (place to port site 45-60 minutes prior to chemotherapy). 30 g 5    ondansetron (ZOFRAN-ODT) 8 MG TbDL dissolve 1 tablet (8 mg total) under the tongue every 6 (six) hours as needed (nausea). 30 tablet 5    pantoprazole (PROTONIX) 40 MG tablet Take 1 tablet (40 mg total) by mouth once daily. 90 tablet 3    prochlorperazine (COMPAZINE) 10 MG tablet Take 1 tablet (10 mg total) by mouth every 6 (six) hours as needed (nausea). 30 tablet 2    QUEtiapine (SEROQUEL) 100 MG Tab Take 1.5 tablets at bedtime for 1  "week then if needed can increase to 2 tablets at bedtime for sleep (Patient taking differently: Take 200 mg by mouth nightly. Take 1.5 tablets at bedtime for 1 week then if needed can increase to 2 tablets at bedtime for sleep) 60 tablet 4    rosuvastatin (CRESTOR) 10 MG tablet Take 1 tablet (10 mg total) by mouth once daily. (Patient taking differently: Take 10 mg by mouth every evening.) 90 tablet 3     No current facility-administered medications for this visit.        Physical Exam:   BP (!) 146/76   Pulse 75   Temp 98.8 °F (37.1 °C) (Oral)   Resp 20   Ht 5' 6" (1.676 m)   Wt 77.5 kg (170 lb 13.7 oz)   SpO2 98%   BMI 27.58 kg/m²      ECOG Performance status: 0          Physical Exam  Vitals reviewed.   Constitutional:       General: She is not in acute distress.     Appearance: Normal appearance. She is normal weight. She is not ill-appearing, toxic-appearing or diaphoretic.   HENT:      Head: Normocephalic and atraumatic.      Right Ear: External ear normal.      Left Ear: External ear normal.      Nose: Nose normal.      Mouth/Throat:      Pharynx: Oropharynx is clear.   Eyes:      General: No scleral icterus.     Conjunctiva/sclera: Conjunctivae normal.   Cardiovascular:      Rate and Rhythm: Normal rate.   Pulmonary:      Effort: Pulmonary effort is normal. No respiratory distress.   Chest:      Comments: RCW port, steristrips in place  Abdominal:      General: There is no distension.   Skin:     General: Skin is warm and dry.      Coloration: Skin is not jaundiced or pale.      Findings: No bruising, erythema or rash.   Neurological:      Mental Status: She is alert and oriented to person, place, and time. Mental status is at baseline.      Motor: No weakness.      Gait: Gait normal.   Psychiatric:         Mood and Affect: Mood normal.         Labs:     I have reviewed the pertinent labs. Mild stable anemia.     Imagin/19/24 - CT CAP:    Impression:     Postoperative changes of right colonic " resection from patient's known colonic malignancy.  No free air or obstruction.  No pathologically enlarged abdominal or pelvic lymph nodes are seen.     5 mm hypodensity in the right hepatic lobe, too small to accurately characterize.  The 2nd hypodensity seen on the prior study is not as clearly seen on today's examination.     No pulmonary nodules.     Cholecystectomy.     Mild thickening of the walls of the distal esophagus which could suggest esophagitis.  Clinical correlation suggested.     Constipation.    Path:   1/22/24 - R Hemicolectomy   Final Pathologic Diagnosis     THE DIAGNOSES REMAIN THE SAME.  THIS CORRECTED REPORT IS ISSUED TO CHANGE M STATUS to reflect tumor in the omentum.  This change is discussed with Dr. RANJANA Albright via phone, 2/1/2024.    1. Colon, right and terminal ileum (right hemicolectomy with EN bloc abdominal wall resection):  Invasive adenocarcinoma.  See cancer synoptic report     2. Omentum (resection):    Positive for carcinoma    CORRECT SYNOPTIC AND M STATUS  Cancer synoptic report  - Procedure: Right hemicolectomy with EN bloc abdominal wall resection  - Tumor site:  Ascending  - Histologic type:  Adenocarcinoma  - Histologic grade:  G2, moderately differentiated.  See comment.  COMMENT:  While the majority of the tumor consists of well formed glands, a significant proportion includes abortive glands, single file infiltration, and malignant cells with signet ring cell morphology. The tumor has an insidious pattern of  infiltration with tumor present far from the advancing front of the tumor.  - Tumor size:  2.0 x 2.0 x 1.0 cm  - Tumor extent:  Invades visceral peritoneum, multifocal  - Macroscopic perforation: Not identified  - Lymphovascular invasion:  Present, extensive.  Small vessel, large vessel, intra and extramural.  - Perineural invasion:  Not identified  - Tumor budding score:  High (>10), multifocal single cell infiltration  - Treatment effect: No known pre-surgical  therapy    Margins  Margin involved by invasive carcinoma, radial (slide 1C)  All margins negative for dysplasia.    pTNM stage classification (AJCC 8th edition)  pT Category  pT4a:  Tumor invades through the visceral peritoneum    pN Category  pN2b:  7 or more regional lymph nodes are positive  Number of positive lymph nodes:  19  Number of lymph nodes examined: 26  Number of tumor deposits:  4    pM Category  pM1c:  Metastasis to the peritoneal surface of the omentum.    Additional findings:  - Sessile serrated polyp, no cytologic dysplasia, 1.1 cm at ileocecal valve  - Tubulovillous adenoma, 1.0 cm, proximal ascending  - Tubular adenoma, 0.4 cm, mid ascending  - Tubular adenoma, 0.4, distal ascending  - Tubular adenoma, 0.6 cm, distal ascending  - Submucosal lipoma, 2.0 cm  - Appendix with no specific histopathologic changes    Ancillary studies  Immunohistochemistry for mismatch repair proteins performed on prior biopsy material (SAS-, 12/21/23): pMMR.         Assessment:       1. Malignant neoplasm of ascending colon    2. 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 Immunodeficiency secondary to neoplasm    4. Immunodeficiency secondary to chemotherapy    5. Neoplastic malignant related fatigue    6. Cold sensitivity    7. Other constipation    8. Excessive gas                Plan:        # Colon cancer   Mrs. Maguire is a 73 y.o. female who presents for oncologic evaluation for metastatic colon cancer. She underwent a R hemicolectomy with en-bloc abdominal wal resection on 1/22/24 with Dr. Albright. Pathology revealed pT4a N2b disease with 19/26 positive LNs and omentum positive for carcinoma.    We had an in-depth conversation during our initial consult re: her diagnosis and treatment options. Reviewed recommendation for IV systemic therapy for at least 6 months. Plan for FOLFOX + bevacizumab to be given every 2 weeks. Briefly reviewed side effects of treatment. Handouts provided. Port placed  2/21/24.     PGX - DPYD normal metabolizer, [id number] intermediate metabolizer   Dexamethasone, zofran, compazine and lidocaine sent to pharmacy previously. Reviewed admin instructions.     Pending response, she may be a candidate for CRS/HIPEC with surgical oncology team. Continue to evaluate.     CT CAP after cycle 4 shows no clear evidence of disease.  Presents today for cycle 6 FOLFOX + bevacizumab.   She tolerated treatment without any complication.   - Labs reviewed, adequate for treatment.   - Proceed with cycle 6 today.      Unfortunately she is not eligible for our maintenance BGB CRC study because she does not have measurable disease.    Follow up: 2 weeks for cycle 7. Repeat imaging after cycle 8    Cold sensitivity/neoplasm related fatigue:  2/2 chemotherapy. Still continues to function very well. Works in her garden without complication.   No peripheral neuropathy. Will continue to monitor.     Constipation/Gas:   Advised her to take 2 stool softeners a day with Miralax daily to have regular bowel movements. Can also had an anti-gas medication such as Phazyme, Ibgard, simethicone etc to help with the gas.     Patient is in agreement with the proposed treatment plan. All questions were answered to the patient's satisfaction. Pt knows to call clinic if anything is needed before the next clinic visit.        DIANE Fonseca, PA-C  Physician Assistant Certified  Dept of Hematology/Oncology  PA-C to Dr. Rocha, Dr. Zaragoza and Dr. Smith       Med Onc Chart Routing      Follow up with physician 2 weeks and 6 weeks. 2 weeks, 6 weeks with lab work, CT CAP and treatment discussion   Follow up with KRISTEN 4 weeks. cycle 8   Infusion scheduling note    Injection scheduling note    Labs CBC, CMP, CEA and urinalysis   Scheduling:  Preferred lab:  Lab interval: every 2 weeks     Imaging CT chest abdomen pelvis   Please schedule in 6 weeks prior to clinic visit with Dr. Zaragoza   Pharmacy appointment    Other referrals

## 2024-05-06 NOTE — PLAN OF CARE
1238  Patient completed MVASI and oxaliplatin infusion, tolerated well.  CADD pump connected to port to infuse @ 2.2 cc/hr for 46 hours for a total of 100 cc of 5FU.  CADD infusing prior to pt ambulating off floor accompanied by .  Patient's  has doctor appt on Wednesday and will be coming to DC pump around 1130 on 5/8/24.

## 2024-05-06 NOTE — PLAN OF CARE
Patient seated in chair accompanied by . VSS, assessment done.  Port accessed, flushed, blood return noted.  Patient co pain when inserting needle despite using lidocane cream.  Pt was advised to apply a generous amt, do not rub in, and apply saran wrap over so clothing does not absorb the cream. Patient verbalized understanding.  Saline @ 25 cc/hr started while waiting for MVASI, Oxaliplatin and 5FU  from pharmacy.  WCTM for safety

## 2024-05-08 ENCOUNTER — INFUSION (OUTPATIENT)
Dept: INFUSION THERAPY | Facility: HOSPITAL | Age: 74
End: 2024-05-08
Payer: MEDICARE

## 2024-05-08 VITALS
SYSTOLIC BLOOD PRESSURE: 150 MMHG | RESPIRATION RATE: 18 BRPM | DIASTOLIC BLOOD PRESSURE: 71 MMHG | HEART RATE: 74 BPM | TEMPERATURE: 99 F

## 2024-05-08 DIAGNOSIS — C18.2 MALIGNANT NEOPLASM OF ASCENDING COLON: Primary | ICD-10-CM

## 2024-05-08 PROCEDURE — 63600175 PHARM REV CODE 636 W HCPCS: Performed by: PHYSICIAN ASSISTANT

## 2024-05-08 PROCEDURE — 25000003 PHARM REV CODE 250: Performed by: PHYSICIAN ASSISTANT

## 2024-05-08 PROCEDURE — A4216 STERILE WATER/SALINE, 10 ML: HCPCS | Performed by: PHYSICIAN ASSISTANT

## 2024-05-08 RX ORDER — PROCHLORPERAZINE EDISYLATE 5 MG/ML
5 INJECTION INTRAMUSCULAR; INTRAVENOUS ONCE AS NEEDED
Status: DISCONTINUED | OUTPATIENT
Start: 2024-05-08 | End: 2024-05-08 | Stop reason: HOSPADM

## 2024-05-08 RX ORDER — HEPARIN 100 UNIT/ML
500 SYRINGE INTRAVENOUS
Status: DISCONTINUED | OUTPATIENT
Start: 2024-05-08 | End: 2024-05-08 | Stop reason: HOSPADM

## 2024-05-08 RX ORDER — SODIUM CHLORIDE 0.9 % (FLUSH) 0.9 %
10 SYRINGE (ML) INJECTION
Status: DISCONTINUED | OUTPATIENT
Start: 2024-05-08 | End: 2024-05-08 | Stop reason: HOSPADM

## 2024-05-08 RX ADMIN — Medication 10 ML: at 11:05

## 2024-05-08 RX ADMIN — HEPARIN 500 UNITS: 100 SYRINGE at 11:05

## 2024-05-08 NOTE — NURSING
Pt here for pumnp D/C.  Infusion completed upon arrival.  Pump D/C'd.  PAC flushed and blood return noted.  PAC heparin locked and de accessed.  VSS.  Pt tolerated.  Left unit with spouse in NAD.

## 2024-05-17 ENCOUNTER — LAB VISIT (OUTPATIENT)
Dept: LAB | Facility: HOSPITAL | Age: 74
End: 2024-05-17
Attending: REGISTERED NURSE
Payer: MEDICARE

## 2024-05-17 DIAGNOSIS — C18.2 MALIGNANT NEOPLASM OF ASCENDING COLON: ICD-10-CM

## 2024-05-17 LAB
ALBUMIN SERPL BCP-MCNC: 3.5 G/DL (ref 3.5–5.2)
ALP SERPL-CCNC: 77 U/L (ref 55–135)
ALT SERPL W/O P-5'-P-CCNC: 8 U/L (ref 10–44)
ANION GAP SERPL CALC-SCNC: 9 MMOL/L (ref 8–16)
AST SERPL-CCNC: 17 U/L (ref 10–40)
BILIRUB SERPL-MCNC: 0.2 MG/DL (ref 0.1–1)
BILIRUB UR QL STRIP: NEGATIVE
BUN SERPL-MCNC: 15 MG/DL (ref 8–23)
CALCIUM SERPL-MCNC: 9.4 MG/DL (ref 8.7–10.5)
CEA SERPL-MCNC: 3.3 NG/ML (ref 0–5)
CHLORIDE SERPL-SCNC: 109 MMOL/L (ref 95–110)
CLARITY UR: CLEAR
CO2 SERPL-SCNC: 23 MMOL/L (ref 23–29)
COLOR UR: YELLOW
CREAT SERPL-MCNC: 0.8 MG/DL (ref 0.5–1.4)
ERYTHROCYTE [DISTWIDTH] IN BLOOD BY AUTOMATED COUNT: 17.9 % (ref 11.5–14.5)
EST. GFR  (NO RACE VARIABLE): >60 ML/MIN/1.73 M^2
GLUCOSE SERPL-MCNC: 96 MG/DL (ref 70–110)
GLUCOSE UR QL STRIP: NEGATIVE
HCT VFR BLD AUTO: 35.1 % (ref 37–48.5)
HGB BLD-MCNC: 11.4 G/DL (ref 12–16)
HGB UR QL STRIP: NEGATIVE
IMM GRANULOCYTES # BLD AUTO: 0.02 K/UL (ref 0–0.04)
KETONES UR QL STRIP: NEGATIVE
LEUKOCYTE ESTERASE UR QL STRIP: NEGATIVE
MCH RBC QN AUTO: 28.4 PG (ref 27–31)
MCHC RBC AUTO-ENTMCNC: 32.5 G/DL (ref 32–36)
MCV RBC AUTO: 87 FL (ref 82–98)
NEUTROPHILS # BLD AUTO: 2.5 K/UL (ref 1.8–7.7)
NITRITE UR QL STRIP: NEGATIVE
PH UR STRIP: 6 [PH] (ref 5–8)
PLATELET # BLD AUTO: 226 K/UL (ref 150–450)
PMV BLD AUTO: 9.8 FL (ref 9.2–12.9)
POTASSIUM SERPL-SCNC: 4.4 MMOL/L (ref 3.5–5.1)
PROT SERPL-MCNC: 6.4 G/DL (ref 6–8.4)
PROT UR QL STRIP: NEGATIVE
RBC # BLD AUTO: 4.02 M/UL (ref 4–5.4)
SODIUM SERPL-SCNC: 141 MMOL/L (ref 136–145)
SP GR UR STRIP: 1.02 (ref 1–1.03)
URN SPEC COLLECT METH UR: NORMAL
UROBILINOGEN UR STRIP-ACNC: NEGATIVE EU/DL
WBC # BLD AUTO: 5.36 K/UL (ref 3.9–12.7)

## 2024-05-17 PROCEDURE — 82378 CARCINOEMBRYONIC ANTIGEN: CPT | Performed by: REGISTERED NURSE

## 2024-05-17 PROCEDURE — 80053 COMPREHEN METABOLIC PANEL: CPT | Performed by: REGISTERED NURSE

## 2024-05-17 PROCEDURE — 85027 COMPLETE CBC AUTOMATED: CPT | Performed by: REGISTERED NURSE

## 2024-05-17 PROCEDURE — 81003 URINALYSIS AUTO W/O SCOPE: CPT | Performed by: REGISTERED NURSE

## 2024-05-17 PROCEDURE — 36415 COLL VENOUS BLD VENIPUNCTURE: CPT | Performed by: REGISTERED NURSE

## 2024-05-20 ENCOUNTER — OFFICE VISIT (OUTPATIENT)
Dept: HEMATOLOGY/ONCOLOGY | Facility: CLINIC | Age: 74
End: 2024-05-20
Payer: MEDICARE

## 2024-05-20 ENCOUNTER — INFUSION (OUTPATIENT)
Dept: INFUSION THERAPY | Facility: HOSPITAL | Age: 74
End: 2024-05-20
Payer: MEDICARE

## 2024-05-20 VITALS
DIASTOLIC BLOOD PRESSURE: 73 MMHG | OXYGEN SATURATION: 94 % | TEMPERATURE: 99 F | HEIGHT: 66 IN | DIASTOLIC BLOOD PRESSURE: 75 MMHG | WEIGHT: 173.5 LBS | HEART RATE: 97 BPM | HEART RATE: 91 BPM | OXYGEN SATURATION: 97 % | TEMPERATURE: 99 F | BODY MASS INDEX: 27.88 KG/M2 | RESPIRATION RATE: 18 BRPM | SYSTOLIC BLOOD PRESSURE: 140 MMHG | SYSTOLIC BLOOD PRESSURE: 136 MMHG

## 2024-05-20 DIAGNOSIS — R53.0 NEOPLASTIC MALIGNANT RELATED FATIGUE: ICD-10-CM

## 2024-05-20 DIAGNOSIS — K59.09 OTHER CONSTIPATION: ICD-10-CM

## 2024-05-20 DIAGNOSIS — R68.89 COLD SENSITIVITY: ICD-10-CM

## 2024-05-20 DIAGNOSIS — C78.6 MALIGNANT NEOPLASM METASTATIC TO OMENTUM: ICD-10-CM

## 2024-05-20 DIAGNOSIS — T45.1X5A IMMUNODEFICIENCY SECONDARY TO CHEMOTHERAPY: ICD-10-CM

## 2024-05-20 DIAGNOSIS — C18.2 MALIGNANT NEOPLASM OF ASCENDING COLON: Primary | ICD-10-CM

## 2024-05-20 DIAGNOSIS — D49.9 IMMUNODEFICIENCY SECONDARY TO NEOPLASM: ICD-10-CM

## 2024-05-20 DIAGNOSIS — Z79.899 IMMUNODEFICIENCY SECONDARY TO CHEMOTHERAPY: ICD-10-CM

## 2024-05-20 DIAGNOSIS — R14.3 EXCESSIVE GAS: ICD-10-CM

## 2024-05-20 DIAGNOSIS — D84.81 IMMUNODEFICIENCY SECONDARY TO NEOPLASM: ICD-10-CM

## 2024-05-20 DIAGNOSIS — D84.821 IMMUNODEFICIENCY SECONDARY TO CHEMOTHERAPY: ICD-10-CM

## 2024-05-20 PROCEDURE — 1159F MED LIST DOCD IN RCRD: CPT | Mod: CPTII,S$GLB,, | Performed by: INTERNAL MEDICINE

## 2024-05-20 PROCEDURE — 3078F DIAST BP <80 MM HG: CPT | Mod: CPTII,S$GLB,, | Performed by: INTERNAL MEDICINE

## 2024-05-20 PROCEDURE — 1101F PT FALLS ASSESS-DOCD LE1/YR: CPT | Mod: CPTII,S$GLB,, | Performed by: INTERNAL MEDICINE

## 2024-05-20 PROCEDURE — 96413 CHEMO IV INFUSION 1 HR: CPT

## 2024-05-20 PROCEDURE — 96417 CHEMO IV INFUS EACH ADDL SEQ: CPT

## 2024-05-20 PROCEDURE — 63600175 PHARM REV CODE 636 W HCPCS: Mod: JG | Performed by: INTERNAL MEDICINE

## 2024-05-20 PROCEDURE — 3008F BODY MASS INDEX DOCD: CPT | Mod: CPTII,S$GLB,, | Performed by: INTERNAL MEDICINE

## 2024-05-20 PROCEDURE — 25000003 PHARM REV CODE 250: Performed by: INTERNAL MEDICINE

## 2024-05-20 PROCEDURE — 99999 PR PBB SHADOW E&M-EST. PATIENT-LVL III: CPT | Mod: PBBFAC,,, | Performed by: INTERNAL MEDICINE

## 2024-05-20 PROCEDURE — 96367 TX/PROPH/DG ADDL SEQ IV INF: CPT

## 2024-05-20 PROCEDURE — 1126F AMNT PAIN NOTED NONE PRSNT: CPT | Mod: CPTII,S$GLB,, | Performed by: INTERNAL MEDICINE

## 2024-05-20 PROCEDURE — G2211 COMPLEX E/M VISIT ADD ON: HCPCS | Mod: S$GLB,,, | Performed by: INTERNAL MEDICINE

## 2024-05-20 PROCEDURE — 96415 CHEMO IV INFUSION ADDL HR: CPT

## 2024-05-20 PROCEDURE — 3288F FALL RISK ASSESSMENT DOCD: CPT | Mod: CPTII,S$GLB,, | Performed by: INTERNAL MEDICINE

## 2024-05-20 PROCEDURE — 96416 CHEMO PROLONG INFUSE W/PUMP: CPT

## 2024-05-20 PROCEDURE — 99215 OFFICE O/P EST HI 40 MIN: CPT | Mod: S$GLB,,, | Performed by: INTERNAL MEDICINE

## 2024-05-20 PROCEDURE — 3075F SYST BP GE 130 - 139MM HG: CPT | Mod: CPTII,S$GLB,, | Performed by: INTERNAL MEDICINE

## 2024-05-20 RX ORDER — SODIUM CHLORIDE 0.9 % (FLUSH) 0.9 %
10 SYRINGE (ML) INJECTION
Status: DISCONTINUED | OUTPATIENT
Start: 2024-05-20 | End: 2024-05-20 | Stop reason: HOSPADM

## 2024-05-20 RX ORDER — DIPHENHYDRAMINE HYDROCHLORIDE 50 MG/ML
50 INJECTION INTRAMUSCULAR; INTRAVENOUS ONCE AS NEEDED
Status: DISCONTINUED | OUTPATIENT
Start: 2024-05-20 | End: 2024-05-20 | Stop reason: HOSPADM

## 2024-05-20 RX ORDER — EPINEPHRINE 0.3 MG/.3ML
0.3 INJECTION SUBCUTANEOUS ONCE AS NEEDED
Status: DISCONTINUED | OUTPATIENT
Start: 2024-05-20 | End: 2024-05-20 | Stop reason: HOSPADM

## 2024-05-20 RX ORDER — HEPARIN 100 UNIT/ML
500 SYRINGE INTRAVENOUS
Status: CANCELLED | OUTPATIENT
Start: 2024-05-22

## 2024-05-20 RX ORDER — DEXAMETHASONE 4 MG/1
TABLET ORAL
Qty: 40 TABLET | Refills: 0 | Status: SHIPPED | OUTPATIENT
Start: 2024-05-20

## 2024-05-20 RX ORDER — PROCHLORPERAZINE EDISYLATE 5 MG/ML
5 INJECTION INTRAMUSCULAR; INTRAVENOUS ONCE AS NEEDED
Status: DISCONTINUED | OUTPATIENT
Start: 2024-05-20 | End: 2024-05-20 | Stop reason: HOSPADM

## 2024-05-20 RX ORDER — SODIUM CHLORIDE 0.9 % (FLUSH) 0.9 %
10 SYRINGE (ML) INJECTION
Status: CANCELLED | OUTPATIENT
Start: 2024-05-22

## 2024-05-20 RX ORDER — HEPARIN 100 UNIT/ML
500 SYRINGE INTRAVENOUS
Status: DISCONTINUED | OUTPATIENT
Start: 2024-05-20 | End: 2024-05-20 | Stop reason: HOSPADM

## 2024-05-20 RX ORDER — PROCHLORPERAZINE EDISYLATE 5 MG/ML
5 INJECTION INTRAMUSCULAR; INTRAVENOUS ONCE AS NEEDED
Status: CANCELLED | OUTPATIENT
Start: 2024-05-22

## 2024-05-20 RX ADMIN — BEVACIZUMAB-AWWB 365 MG: 400 INJECTION, SOLUTION INTRAVENOUS at 10:05

## 2024-05-20 RX ADMIN — DEXTROSE MONOHYDRATE: 5 INJECTION, SOLUTION INTRAVENOUS at 11:05

## 2024-05-20 RX ADMIN — OXALIPLATIN 150 MG: 5 INJECTION, SOLUTION INTRAVENOUS at 11:05

## 2024-05-20 RX ADMIN — FLUOROURACIL 4440 MG: 50 INJECTION, SOLUTION INTRAVENOUS at 01:05

## 2024-05-20 RX ADMIN — SODIUM CHLORIDE: 0.9 INJECTION, SOLUTION INTRAVENOUS at 09:05

## 2024-05-20 RX ADMIN — DEXAMETHASONE SODIUM PHOSPHATE 0.25 MG: 4 INJECTION, SOLUTION INTRA-ARTICULAR; INTRALESIONAL; INTRAMUSCULAR; INTRAVENOUS; SOFT TISSUE at 10:05

## 2024-05-20 NOTE — PLAN OF CARE
Problem: Chemotherapy Effects  Goal: Anemia Symptom Improvement  Outcome: Progressing  Goal: Safety Maintained  Outcome: Progressing  Goal: Absence of Hematuria  Outcome: Progressing  Goal: Nausea and Vomiting Relief  Outcome: Progressing  Goal: Neurotoxicity Symptom Control  Outcome: Progressing  Goal: Absence of Infection  Outcome: Progressing  Goal: Absence of Bleeding  Outcome: Progressing     Ambulatory to clinic with family.  No c/o of adverse effects or s/s of infection.  PAC accessed, flushed with out difficulty, blood return noted and infused with no problems.  MVASI, premeds, and folfox tolerated with no problems.  5FU attached via CADD pump, screen read RUN and volume decreased.  RTC Wednesday, 5/22/2024 at 11 am for pump dc.  Ambulatory home with family.  NAD noted.

## 2024-05-20 NOTE — PROGRESS NOTES
MEDICAL ONCOLOGY - ESTABLISHED PATIENT VISIT    Reason for visit: colon adenocarcinoma    Best Contact Phone Number(s): [phone] (home)      Cancer/Stage/TNM:    Cancer Staging   Colon adenocarcinoma  Staging form: Colon and Rectum, AJCC 8th Edition  - Pathologic stage from 1/22/2024: Stage IVC (pT4a, pN2b, cM1c) - Signed by Minna Menendez CNS on 2/13/2024       Oncology History   Colon adenocarcinoma   12/21/2023 Tumor Markers    Patient's tumor was tested for the following markers: CEA.                                              Results of the tumor marker test revealed 3.3     12/21/2023 Procedure    Colonoscopy  Cecal polyp removed with jumbo forceps, 3 mm  Multiple ascending colon polyps ranging in size from 5 to 12 mm - semi-pedunculated removed with hot snare  Hepatic flexure mass identified - central ulceration, concerning for malignancy, 3 cm, not obstructing, this was biopsied - tattoo placed distal to mass  Sigmoid diverticular disease     12/21/2023 Tumor Markers    Patient's tumor was tested for the following markers: CEA.                                              Results of the tumor marker test revealed 3.3     12/22/2023 Imaging Significant Findings    CT CAP  Evaluation of the colon is somewhat limited as there is significant colonic stool and oral contrast material has not opacified the large bowel.  There does appear to be some abnormal thickening of the walls of the proximal right colon and a patient with a known history of colonic malignancy.  There is some soft tissue density external to the walls of the colon on the right pericolic gutter which could represent peritoneal nodularity versus subserosal extent of tumor.  Slightly more distal to this area there is a 2nd area of irregularity of the walls of the colon, difficult to fully evaluate if this is related to the colonic walls versus stool with central fat.     Two hypodensities in the liver, the larger measuring 0.8 cm, too  small to accurately characterize on the basis of this examination.  Attention on follow-up.     No pulmonary nodules are seen.     Cholecystectomy.     12/29/2023 Initial Diagnosis    Hepatic flexure colon adenocarcinoma  MMR intact     1/22/2024 Cancer Staged    Staging form: Colon and Rectum, AJCC 8th Edition  - Pathologic stage from 1/22/2024: Stage IVC (pT4a, pN2b, cM1c)     Malignant neoplasm of ascending colon   2/12/2024 Initial Diagnosis    Malignant neoplasm of ascending colon     2/26/2024 -  Chemotherapy    Treatment Summary   Plan Name: OP GI [id number] (oxaliplatin leucovorin fluorouracil) with bevacizumab Q2W  Treatment Goal: Palliative  Status: Active  Start Date: 2/26/2024  End Date: 1/15/2025 (Planned)  Provider: Kemar Zaragoza MD  Chemotherapy: oxaliplatin (ELOXATIN) 150 mg in dextrose 5 % (D5W) 595 mL chemo infusion, 157 mg, Intravenous, Clinic/HOD 1 time, 6 of 24 cycles  Administration: 150 mg (2/26/2024), 150 mg (3/11/2024), 150 mg (3/25/2024), 150 mg (4/8/2024), 150 mg (4/22/2024), 150 mg (5/6/2024)  fluorouracil (ADRUCIL) 2,400 mg/m2 = 4,440 mg in sodium chloride 0.9% 100 mL chemo infusion, 2,400 mg/m2 = 4,440 mg, Intravenous, Clinic/HOD 1 time, 6 of 24 cycles  Administration: 4,440 mg (2/26/2024), 4,440 mg (3/11/2024), 4,440 mg (3/25/2024), 4,440 mg (4/8/2024), 4,440 mg (4/22/2024), 4,440 mg (5/6/2024)  bevacizumab-awwb (MVASI) 5 mg/kg = 365 mg in sodium chloride 0.9% 100 mL infusion, 5 mg/kg = 365 mg, Intravenous, Clinic/HOD 1 time, 6 of 24 cycles  Administration: 365 mg (2/26/2024), 365 mg (3/11/2024), 365 mg (3/25/2024), 365 mg (4/8/2024), 365 mg (4/22/2024), 365 mg (5/6/2024)          Interim History:   73 y.o. female with LUANNE, bipolar, HLD who presents prior to cycle 7 FOLFOX + bevacizumab for her metastatic ascending colon cancer.    She continues to feel very well.  She has mild dyspnea on exertion but this is not consistent.  No dyspnea at rest, no chest pain, no leg swelling,  palpitations.  No coughing.  Constipation has improved.  She has been taking two stool softeners daily which has helped.  She has some mild nausea in the morning, no vomiting.  Does respond to anti-emetics.  Denies any paresthesias.    ECOG 0. Presents with her .     ROS:   Review of Systems   Constitutional:  Negative for chills, fever and malaise/fatigue.   HENT:  Negative for congestion and sore throat.    Respiratory:  Negative for shortness of breath.    Cardiovascular:  Negative for chest pain, palpitations and leg swelling.   Gastrointestinal:  Negative for blood in stool, constipation, diarrhea and nausea.   Genitourinary:  Negative for hematuria.   Musculoskeletal:  Negative for back pain, falls and myalgias.   Skin:  Negative for rash.   Neurological:  Negative for dizziness, tingling, weakness and headaches.       Past Medical History:   Past Medical History:   Diagnosis Date    Anxiety     Arthritis     Bipolar 1 disorder     Bursitis of right hip     GI bleed due to NSAIDs     Multinodular goiter 11/15/2021    Sacroiliitis     right side    Sleep apnea         Past Surgical History:   Past Surgical History:   Procedure Laterality Date    ANKLE FRACTURE SURGERY Left 2001    BLADDER SUSPENSION      CARPAL TUNNEL RELEASE Bilateral     CHOLECYSTECTOMY      Laparoscopic    COLONOSCOPY      COLONOSCOPY N/A 12/21/2023    Procedure: COLONOSCOPY;  Surgeon: Alberto Albright MD;  Location: [facility];  Service: Endoscopy;  Laterality: N/A;    ESOPHAGOGASTRODUODENOSCOPY N/A 07/29/2021    Procedure: EGD (ESOPHAGOGASTRODUODENOSCOPY);  Surgeon: Susan Mcclain MD;  Location: [facility];  Service: Endoscopy;  Laterality: N/A;    EYE SURGERY      FOOT ARTHRODESIS Right 05/03/2023    Procedure: FUSION, FOOT;  Surgeon: Lauri Alejandra DPM;  Location: [facility];  Service: Podiatry;  Laterality: Right;  mini c-arm, Arthrex dowel bone graft harvester, locking plate and screws festus notified cc    HYSTERECTOMY   1986    vaginal prolapse    INJECTION OF ANESTHETIC AGENT AROUND MEDIAL BRANCH NERVES INNERVATING CERVICAL FACET JOINT Bilateral 05/27/2022    Procedure: CERVICAL MEDIAL BRANCH NERVE BLOCK (C3-4,C4-5);  Surgeon: Mamie Roman MD;  Location: [facility] OR;  Service: Pain Management;  Laterality: Bilateral;    INJECTION OF ANESTHETIC AGENT AROUND MEDIAL BRANCH NERVES INNERVATING LUMBAR FACET JOINT Right 02/05/2021    Procedure: LUMBAR FACET JOINT BLOCK (L3-4,L4-5,L5-S1);  Surgeon: Mamie Roman MD;  Location: [facility] OR;  Service: Pain Management;  Laterality: Right;    INJECTION OF ANESTHETIC AGENT AROUND MEDIAL BRANCH NERVES INNERVATING LUMBAR FACET JOINT Right 04/30/2021    Procedure: LUMBAR FACET JOINT BLOCK (L3-4,L4-5,L5-S1);  Surgeon: Mamie Roman MD;  Location: [facility] OR;  Service: Pain Management;  Laterality: Right;    INJECTION OF ANESTHETIC AGENT INTO SACROILIAC JOINT Right 12/04/2020    Procedure: SACROILIAC JOINT INJECTION;  Surgeon: Mamie Roman MD;  Location: [facility] OR;  Service: Pain Management;  Laterality: Right;    INJECTION OF JOINT Right 12/04/2020    Procedure: GREATER TROCHANTERIC BURSA INJECTION;  Surgeon: Mamie Roman MD;  Location: [facility] OR;  Service: Pain Management;  Laterality: Right;    LAPAROSCOPIC RIGHT COLON RESECTION N/A 1/22/2024    Procedure: COLECTOMY, RIGHT, LAPAROSCOPIC (eras low, lithotomy);  Surgeon: Alberto Albright MD;  Location: [address];  Service: Colon and Rectal;  Laterality: N/A;    NASAL SEPTUM SURGERY      RECTAL PROLAPSE REPAIR      TONSILLECTOMY          Family History:   Family History   Problem Relation Name Age of Onset    No Known Problems Maternal Aunt Rubio Glasgow     Colon cancer Maternal Uncle Edwis     Colon cancer Maternal Uncle Yovani     Colon cancer Maternal Uncle Konstantin     No Known Problems Paternal Aunt Vero     Esophageal cancer Paternal Uncle Viltresse Jr     Heart attack Maternal Grandmother Elmira     Leukemia Maternal Grandfather  manish Pang     No Known Problems Paternal Grandmother Lizett     Stroke Paternal Grandfather Nat Sr         Social History:   Social History     Tobacco Use    Smoking status: Former     Current packs/day: 0.00     Average packs/day: 1 pack/day for 41.7 years (41.7 ttl pk-yrs)     Types: Cigarettes     Start date: 3/30/1982     Quit date: 2023     Years since quittin.4    Smokeless tobacco: Never   Substance Use Topics    Alcohol use: Yes     Comment: Socially-2 or 3 times a year-6 or 7 drinks        I have reviewed and updated the patient's past medical, surgical, family and social histories.    Allergies:   Review of patient's allergies indicates:   Allergen Reactions    Nsaids (non-steroidal anti-inflammatory drug) Other (See Comments)     Gastrointestinal bleeding requiring blood transfusion    Demerol [meperidine] Rash        Medications:   Current Outpatient Medications   Medication Sig Dispense Refill    albuterol (PROVENTIL/VENTOLIN HFA) 90 mcg/actuation inhaler inhale 2 puffs into the lungs every 6 hours as needed for wheezing. 18 g 1    EScitalopram oxalate (LEXAPRO) 20 MG tablet Take 1 tablet (20 mg total) by mouth every evening. 30 tablet 4    lamoTRIgine (LAMICTAL) 150 MG Tab Take 2 tablets (300 mg total) by mouth every evening. 180 tablet 1    LIDOcaine-prilocaine (EMLA) cream Apply topically as needed (place to port site 45-60 minutes prior to chemotherapy). 30 g 5    ondansetron (ZOFRAN-ODT) 8 MG TbDL dissolve 1 tablet (8 mg total) under the tongue every 6 (six) hours as needed (nausea). 30 tablet 5    pantoprazole (PROTONIX) 40 MG tablet Take 1 tablet (40 mg total) by mouth once daily. 90 tablet 3    prochlorperazine (COMPAZINE) 10 MG tablet Take 1 tablet (10 mg total) by mouth every 6 (six) hours as needed (nausea). 30 tablet 2    QUEtiapine (SEROQUEL) 100 MG Tab Take 1.5 tablets at bedtime for 1 week then if needed can increase to 2 tablets at bedtime for sleep (Patient taking  "differently: Take 200 mg by mouth nightly. Take 1.5 tablets at bedtime for 1 week then if needed can increase to 2 tablets at bedtime for sleep) 60 tablet 4    rosuvastatin (CRESTOR) 10 MG tablet Take 1 tablet (10 mg total) by mouth once daily. (Patient taking differently: Take 10 mg by mouth every evening.) 90 tablet 3    dexAMETHasone (DECADRON) 4 MG Tab Take 2 tablets (8 mg total) by mouth once daily only on days 2, 3 and 4 of each chemotherapy cycle. 40 tablet 0     No current facility-administered medications for this visit.        Physical Exam:   /75 (BP Location: Left arm, Patient Position: Sitting, BP Method: Medium (Automatic))   Pulse 97   Temp 99 °F (37.2 °C) (Oral)   Ht 5' 6" (1.676 m)   Wt 78.7 kg (173 lb 8 oz)   SpO2 (!) 94%   BMI 28.00 kg/m²              Physical Exam  Vitals reviewed.   Constitutional:       General: She is not in acute distress.     Appearance: Normal appearance. She is normal weight. She is not ill-appearing, toxic-appearing or diaphoretic.   HENT:      Head: Normocephalic and atraumatic.      Right Ear: External ear normal.      Left Ear: External ear normal.      Nose: Nose normal.      Mouth/Throat:      Pharynx: Oropharynx is clear.   Eyes:      General: No scleral icterus.     Conjunctiva/sclera: Conjunctivae normal.   Cardiovascular:      Rate and Rhythm: Normal rate.   Pulmonary:      Effort: Pulmonary effort is normal. No respiratory distress.   Chest:      Comments: RCW port  Abdominal:      General: There is no distension.   Skin:     General: Skin is warm and dry.      Coloration: Skin is not jaundiced or pale.      Findings: No bruising, erythema or rash.   Neurological:      Mental Status: She is alert and oriented to person, place, and time. Mental status is at baseline.      Motor: No weakness.      Gait: Gait normal.   Psychiatric:         Mood and Affect: Mood normal.         Labs:     I have reviewed the pertinent labs. Mild stable anemia.   "   Imagin/19/24 - CT CAP:    Impression:     Postoperative changes of right colonic resection from patient's known colonic malignancy.  No free air or obstruction.  No pathologically enlarged abdominal or pelvic lymph nodes are seen.     5 mm hypodensity in the right hepatic lobe, too small to accurately characterize.  The 2nd hypodensity seen on the prior study is not as clearly seen on today's examination.     No pulmonary nodules.     Cholecystectomy.     Mild thickening of the walls of the distal esophagus which could suggest esophagitis.  Clinical correlation suggested.     Constipation.    Path:   24 - R Hemicolectomy   Final Pathologic Diagnosis     THE DIAGNOSES REMAIN THE SAME.  THIS CORRECTED REPORT IS ISSUED TO CHANGE M STATUS to reflect tumor in the omentum.  This change is discussed with Dr. RANJANA Albright via phone, 2024.    1. Colon, right and terminal ileum (right hemicolectomy with EN bloc abdominal wall resection):  Invasive adenocarcinoma.  See cancer synoptic report     2. Omentum (resection):    Positive for carcinoma    CORRECT SYNOPTIC AND M STATUS  Cancer synoptic report  - Procedure: Right hemicolectomy with EN bloc abdominal wall resection  - Tumor site:  Ascending  - Histologic type:  Adenocarcinoma  - Histologic grade:  G2, moderately differentiated.  See comment.  COMMENT:  While the majority of the tumor consists of well formed glands, a significant proportion includes abortive glands, single file infiltration, and malignant cells with signet ring cell morphology. The tumor has an insidious pattern of  infiltration with tumor present far from the advancing front of the tumor.  - Tumor size:  2.0 x 2.0 x 1.0 cm  - Tumor extent:  Invades visceral peritoneum, multifocal  - Macroscopic perforation: Not identified  - Lymphovascular invasion:  Present, extensive.  Small vessel, large vessel, intra and extramural.  - Perineural invasion:  Not identified  - Tumor budding score:  High  (>10), multifocal single cell infiltration  - Treatment effect: No known pre-surgical therapy    Margins  Margin involved by invasive carcinoma, radial (slide 1C)  All margins negative for dysplasia.    pTNM stage classification (AJCC 8th edition)  pT Category  pT4a:  Tumor invades through the visceral peritoneum    pN Category  pN2b:  7 or more regional lymph nodes are positive  Number of positive lymph nodes:  19  Number of lymph nodes examined: 26  Number of tumor deposits:  4    pM Category  pM1c:  Metastasis to the peritoneal surface of the omentum.    Additional findings:  - Sessile serrated polyp, no cytologic dysplasia, 1.1 cm at ileocecal valve  - Tubulovillous adenoma, 1.0 cm, proximal ascending  - Tubular adenoma, 0.4 cm, mid ascending  - Tubular adenoma, 0.4, distal ascending  - Tubular adenoma, 0.6 cm, distal ascending  - Submucosal lipoma, 2.0 cm  - Appendix with no specific histopathologic changes    Ancillary studies  Immunohistochemistry for mismatch repair proteins performed on prior biopsy material (SAS-, 12/21/23): pMMR.         Assessment:       1. Malignant neoplasm of ascending colon    2. 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 Immunodeficiency secondary to neoplasm    4. Immunodeficiency secondary to chemotherapy    5. Neoplastic malignant related fatigue    6. Cold sensitivity    7. Other constipation    8. Excessive gas              Plan:        # Colon cancer   Mrs. Maguire is a 73 y.o. female who presents for oncologic evaluation for metastatic colon cancer. She underwent a R hemicolectomy with en-bloc abdominal wal resection on 1/22/24 with Dr. Albright. Pathology revealed pT4a N2b disease with 19/26 positive LNs and omentum positive for carcinoma.    We had an in-depth conversation during our initial consult re: her diagnosis and treatment options. Reviewed recommendation for IV systemic therapy for at least 6 months. Plan for FOLFOX + bevacizumab to be given every 2  weeks. Briefly reviewed side effects of treatment. Handouts provided. Port placed 2/21/24.     PGX - DPYD normal metabolizer, [id number] intermediate metabolizer   Dexamethasone, zofran, compazine and lidocaine sent to pharmacy previously. Reviewed admin instructions.     Pending response, she may be a candidate for CRS/HIPEC with surgical oncology team. Continue to evaluate and re-start discussion if still without radiographic evidence of disease after CT on cycle 8.    CT CAP after cycle 4 shows no clear evidence of disease.  Presents today for cycle 7 FOLFOX + bevacizumab.   She tolerated treatment without any complication.   - Labs reviewed, adequate for treatment.   - Proceed with cycle 7 today.      Unfortunately she is not eligible for our maintenance BGB CRC study because she does not have measurable disease.    Follow up: 2 weeks for cycle 8. Repeat imaging after cycle 8    Cold sensitivity/neoplasm related fatigue:  2/2 chemotherapy. Still continues to function very well. Works in her garden without complication.   No peripheral neuropathy. Will continue to monitor.     Constipation/Gas:   Advised her to take 2 stool softeners a day with Miralax daily to have regular bowel movements.   This has helped.    Patient is in agreement with the proposed treatment plan. All questions were answered to the patient's satisfaction. Pt knows to call clinic if anything is needed before the next clinic visit.      Kemar Zaragoza MD  Hematology/Oncology  [facility] [place]        Med Onc Chart Routing      Follow up with physician 4 weeks. for FOLFOX + Avastin   Follow up with KRISTEN 2 weeks. for FOLFOX + Avastin   Infusion scheduling note    Injection scheduling note    Labs CBC, CMP and urinalysis   Scheduling:  Preferred lab:  Lab interval: every 2 weeks     Imaging CT chest abdomen pelvis   prior to f/u in 4 weeks   Pharmacy appointment    Other referrals

## 2024-05-22 ENCOUNTER — INFUSION (OUTPATIENT)
Dept: INFUSION THERAPY | Facility: HOSPITAL | Age: 74
End: 2024-05-22
Payer: MEDICARE

## 2024-05-22 VITALS
OXYGEN SATURATION: 97 % | RESPIRATION RATE: 16 BRPM | DIASTOLIC BLOOD PRESSURE: 70 MMHG | HEART RATE: 71 BPM | TEMPERATURE: 98 F | SYSTOLIC BLOOD PRESSURE: 152 MMHG

## 2024-05-22 DIAGNOSIS — C18.2 MALIGNANT NEOPLASM OF ASCENDING COLON: Primary | ICD-10-CM

## 2024-05-22 PROCEDURE — 25000003 PHARM REV CODE 250: Performed by: INTERNAL MEDICINE

## 2024-05-22 PROCEDURE — 63600175 PHARM REV CODE 636 W HCPCS: Performed by: INTERNAL MEDICINE

## 2024-05-22 PROCEDURE — A4216 STERILE WATER/SALINE, 10 ML: HCPCS | Performed by: INTERNAL MEDICINE

## 2024-05-22 RX ORDER — SODIUM CHLORIDE 0.9 % (FLUSH) 0.9 %
10 SYRINGE (ML) INJECTION
Status: DISCONTINUED | OUTPATIENT
Start: 2024-05-22 | End: 2024-05-22 | Stop reason: HOSPADM

## 2024-05-22 RX ORDER — HEPARIN 100 UNIT/ML
500 SYRINGE INTRAVENOUS
Status: DISCONTINUED | OUTPATIENT
Start: 2024-05-22 | End: 2024-05-22 | Stop reason: HOSPADM

## 2024-05-22 RX ORDER — PROCHLORPERAZINE EDISYLATE 5 MG/ML
5 INJECTION INTRAMUSCULAR; INTRAVENOUS ONCE AS NEEDED
Status: DISCONTINUED | OUTPATIENT
Start: 2024-05-22 | End: 2024-05-22 | Stop reason: HOSPADM

## 2024-05-22 RX ADMIN — Medication 10 ML: at 10:05

## 2024-05-22 RX ADMIN — HEPARIN 500 UNITS: 100 SYRINGE at 10:05

## 2024-05-22 NOTE — PLAN OF CARE
Pt here for pump d/c. Pump complete. RTC 6/3. D/c in stable condition, ambulated interdependently.

## 2024-05-31 ENCOUNTER — LAB VISIT (OUTPATIENT)
Dept: LAB | Facility: HOSPITAL | Age: 74
End: 2024-05-31
Attending: REGISTERED NURSE
Payer: MEDICARE

## 2024-05-31 DIAGNOSIS — C18.2 MALIGNANT NEOPLASM OF ASCENDING COLON: ICD-10-CM

## 2024-05-31 LAB
ALBUMIN SERPL BCP-MCNC: 3.2 G/DL (ref 3.5–5.2)
ALP SERPL-CCNC: 76 U/L (ref 55–135)
ALT SERPL W/O P-5'-P-CCNC: 10 U/L (ref 10–44)
ANION GAP SERPL CALC-SCNC: 6 MMOL/L (ref 8–16)
AST SERPL-CCNC: 17 U/L (ref 10–40)
BILIRUB SERPL-MCNC: 0.3 MG/DL (ref 0.1–1)
BILIRUB UR QL STRIP: NEGATIVE
BUN SERPL-MCNC: 10 MG/DL (ref 8–23)
CALCIUM SERPL-MCNC: 9.4 MG/DL (ref 8.7–10.5)
CEA SERPL-MCNC: 3.4 NG/ML (ref 0–5)
CHLORIDE SERPL-SCNC: 108 MMOL/L (ref 95–110)
CLARITY UR: CLEAR
CO2 SERPL-SCNC: 24 MMOL/L (ref 23–29)
COLOR UR: YELLOW
CREAT SERPL-MCNC: 0.8 MG/DL (ref 0.5–1.4)
ERYTHROCYTE [DISTWIDTH] IN BLOOD BY AUTOMATED COUNT: 18.5 % (ref 11.5–14.5)
EST. GFR  (NO RACE VARIABLE): >60 ML/MIN/1.73 M^2
GLUCOSE SERPL-MCNC: 104 MG/DL (ref 70–110)
GLUCOSE UR QL STRIP: NEGATIVE
HCT VFR BLD AUTO: 34.4 % (ref 37–48.5)
HGB BLD-MCNC: 11.2 G/DL (ref 12–16)
HGB UR QL STRIP: NEGATIVE
IMM GRANULOCYTES # BLD AUTO: 0.01 K/UL (ref 0–0.04)
KETONES UR QL STRIP: NEGATIVE
LEUKOCYTE ESTERASE UR QL STRIP: NEGATIVE
MCH RBC QN AUTO: 28.6 PG (ref 27–31)
MCHC RBC AUTO-ENTMCNC: 32.6 G/DL (ref 32–36)
MCV RBC AUTO: 88 FL (ref 82–98)
NEUTROPHILS # BLD AUTO: 1.9 K/UL (ref 1.8–7.7)
NITRITE UR QL STRIP: NEGATIVE
PH UR STRIP: 6 [PH] (ref 5–8)
PLATELET # BLD AUTO: 202 K/UL (ref 150–450)
PMV BLD AUTO: 9.7 FL (ref 9.2–12.9)
POTASSIUM SERPL-SCNC: 4.2 MMOL/L (ref 3.5–5.1)
PROT SERPL-MCNC: 6.4 G/DL (ref 6–8.4)
PROT UR QL STRIP: NEGATIVE
RBC # BLD AUTO: 3.92 M/UL (ref 4–5.4)
SODIUM SERPL-SCNC: 138 MMOL/L (ref 136–145)
SP GR UR STRIP: 1.02 (ref 1–1.03)
URN SPEC COLLECT METH UR: NORMAL
UROBILINOGEN UR STRIP-ACNC: NEGATIVE EU/DL
WBC # BLD AUTO: 4.15 K/UL (ref 3.9–12.7)

## 2024-05-31 PROCEDURE — 36415 COLL VENOUS BLD VENIPUNCTURE: CPT | Performed by: REGISTERED NURSE

## 2024-05-31 PROCEDURE — 82378 CARCINOEMBRYONIC ANTIGEN: CPT | Performed by: REGISTERED NURSE

## 2024-05-31 PROCEDURE — 80053 COMPREHEN METABOLIC PANEL: CPT | Performed by: REGISTERED NURSE

## 2024-05-31 PROCEDURE — 85027 COMPLETE CBC AUTOMATED: CPT | Performed by: REGISTERED NURSE

## 2024-05-31 PROCEDURE — 81003 URINALYSIS AUTO W/O SCOPE: CPT | Performed by: REGISTERED NURSE

## 2024-06-03 ENCOUNTER — OFFICE VISIT (OUTPATIENT)
Dept: HEMATOLOGY/ONCOLOGY | Facility: CLINIC | Age: 74
End: 2024-06-03
Payer: MEDICARE

## 2024-06-03 ENCOUNTER — INFUSION (OUTPATIENT)
Dept: INFUSION THERAPY | Facility: HOSPITAL | Age: 74
End: 2024-06-03
Payer: MEDICARE

## 2024-06-03 VITALS
HEART RATE: 78 BPM | OXYGEN SATURATION: 99 % | RESPIRATION RATE: 14 BRPM | SYSTOLIC BLOOD PRESSURE: 143 MMHG | BODY MASS INDEX: 28 KG/M2 | HEIGHT: 66 IN | DIASTOLIC BLOOD PRESSURE: 75 MMHG | TEMPERATURE: 98 F

## 2024-06-03 VITALS
TEMPERATURE: 99 F | HEART RATE: 76 BPM | WEIGHT: 175.25 LBS | RESPIRATION RATE: 18 BRPM | BODY MASS INDEX: 28.16 KG/M2 | SYSTOLIC BLOOD PRESSURE: 136 MMHG | HEIGHT: 66 IN | OXYGEN SATURATION: 99 % | DIASTOLIC BLOOD PRESSURE: 75 MMHG

## 2024-06-03 DIAGNOSIS — R68.89 COLD SENSITIVITY: ICD-10-CM

## 2024-06-03 DIAGNOSIS — Z79.899 IMMUNODEFICIENCY SECONDARY TO CHEMOTHERAPY: ICD-10-CM

## 2024-06-03 DIAGNOSIS — D84.81 IMMUNODEFICIENCY SECONDARY TO NEOPLASM: ICD-10-CM

## 2024-06-03 DIAGNOSIS — R53.0 NEOPLASTIC MALIGNANT RELATED FATIGUE: ICD-10-CM

## 2024-06-03 DIAGNOSIS — T45.1X5A IMMUNODEFICIENCY SECONDARY TO CHEMOTHERAPY: ICD-10-CM

## 2024-06-03 DIAGNOSIS — K59.09 OTHER CONSTIPATION: ICD-10-CM

## 2024-06-03 DIAGNOSIS — R14.3 EXCESSIVE GAS: ICD-10-CM

## 2024-06-03 DIAGNOSIS — C18.2 MALIGNANT NEOPLASM OF ASCENDING COLON: Primary | ICD-10-CM

## 2024-06-03 DIAGNOSIS — D49.9 IMMUNODEFICIENCY SECONDARY TO NEOPLASM: ICD-10-CM

## 2024-06-03 DIAGNOSIS — C78.6 MALIGNANT NEOPLASM METASTATIC TO OMENTUM: ICD-10-CM

## 2024-06-03 DIAGNOSIS — D84.821 IMMUNODEFICIENCY SECONDARY TO CHEMOTHERAPY: ICD-10-CM

## 2024-06-03 PROCEDURE — 96413 CHEMO IV INFUSION 1 HR: CPT

## 2024-06-03 PROCEDURE — 96416 CHEMO PROLONG INFUSE W/PUMP: CPT

## 2024-06-03 PROCEDURE — 96415 CHEMO IV INFUSION ADDL HR: CPT

## 2024-06-03 PROCEDURE — 3078F DIAST BP <80 MM HG: CPT | Mod: CPTII,S$GLB,, | Performed by: PHYSICIAN ASSISTANT

## 2024-06-03 PROCEDURE — 3008F BODY MASS INDEX DOCD: CPT | Mod: CPTII,S$GLB,, | Performed by: PHYSICIAN ASSISTANT

## 2024-06-03 PROCEDURE — 96417 CHEMO IV INFUS EACH ADDL SEQ: CPT

## 2024-06-03 PROCEDURE — G2211 COMPLEX E/M VISIT ADD ON: HCPCS | Mod: S$GLB,,, | Performed by: PHYSICIAN ASSISTANT

## 2024-06-03 PROCEDURE — 25000003 PHARM REV CODE 250: Performed by: PHYSICIAN ASSISTANT

## 2024-06-03 PROCEDURE — 96367 TX/PROPH/DG ADDL SEQ IV INF: CPT

## 2024-06-03 PROCEDURE — 99215 OFFICE O/P EST HI 40 MIN: CPT | Mod: S$GLB,,, | Performed by: PHYSICIAN ASSISTANT

## 2024-06-03 PROCEDURE — 99999 PR PBB SHADOW E&M-EST. PATIENT-LVL III: CPT | Mod: PBBFAC,,, | Performed by: PHYSICIAN ASSISTANT

## 2024-06-03 PROCEDURE — 1160F RVW MEDS BY RX/DR IN RCRD: CPT | Mod: CPTII,S$GLB,, | Performed by: PHYSICIAN ASSISTANT

## 2024-06-03 PROCEDURE — 3077F SYST BP >= 140 MM HG: CPT | Mod: CPTII,S$GLB,, | Performed by: PHYSICIAN ASSISTANT

## 2024-06-03 PROCEDURE — 63600175 PHARM REV CODE 636 W HCPCS: Performed by: PHYSICIAN ASSISTANT

## 2024-06-03 PROCEDURE — 1159F MED LIST DOCD IN RCRD: CPT | Mod: CPTII,S$GLB,, | Performed by: PHYSICIAN ASSISTANT

## 2024-06-03 RX ORDER — HEPARIN 100 UNIT/ML
500 SYRINGE INTRAVENOUS
Status: DISCONTINUED | OUTPATIENT
Start: 2024-06-03 | End: 2024-06-03 | Stop reason: HOSPADM

## 2024-06-03 RX ORDER — PROCHLORPERAZINE EDISYLATE 5 MG/ML
5 INJECTION INTRAMUSCULAR; INTRAVENOUS ONCE AS NEEDED
Status: CANCELLED | OUTPATIENT
Start: 2024-06-03

## 2024-06-03 RX ORDER — EPINEPHRINE 0.3 MG/.3ML
0.3 INJECTION SUBCUTANEOUS ONCE AS NEEDED
Status: DISCONTINUED | OUTPATIENT
Start: 2024-06-03 | End: 2024-06-03 | Stop reason: HOSPADM

## 2024-06-03 RX ORDER — HEPARIN 100 UNIT/ML
500 SYRINGE INTRAVENOUS
Status: CANCELLED | OUTPATIENT
Start: 2024-06-05

## 2024-06-03 RX ORDER — PROCHLORPERAZINE EDISYLATE 5 MG/ML
5 INJECTION INTRAMUSCULAR; INTRAVENOUS ONCE AS NEEDED
Status: DISCONTINUED | OUTPATIENT
Start: 2024-06-03 | End: 2024-06-03 | Stop reason: HOSPADM

## 2024-06-03 RX ORDER — DIPHENHYDRAMINE HYDROCHLORIDE 50 MG/ML
50 INJECTION INTRAMUSCULAR; INTRAVENOUS ONCE AS NEEDED
Status: DISCONTINUED | OUTPATIENT
Start: 2024-06-03 | End: 2024-06-03 | Stop reason: HOSPADM

## 2024-06-03 RX ORDER — SODIUM CHLORIDE 0.9 % (FLUSH) 0.9 %
10 SYRINGE (ML) INJECTION
Status: CANCELLED | OUTPATIENT
Start: 2024-06-05

## 2024-06-03 RX ORDER — EPINEPHRINE 0.3 MG/.3ML
0.3 INJECTION SUBCUTANEOUS ONCE AS NEEDED
Status: CANCELLED | OUTPATIENT
Start: 2024-06-03

## 2024-06-03 RX ORDER — DIPHENHYDRAMINE HYDROCHLORIDE 50 MG/ML
50 INJECTION INTRAMUSCULAR; INTRAVENOUS ONCE AS NEEDED
Status: CANCELLED | OUTPATIENT
Start: 2024-06-03

## 2024-06-03 RX ORDER — PROCHLORPERAZINE EDISYLATE 5 MG/ML
5 INJECTION INTRAMUSCULAR; INTRAVENOUS ONCE AS NEEDED
Status: CANCELLED | OUTPATIENT
Start: 2024-06-05

## 2024-06-03 RX ORDER — SODIUM CHLORIDE 0.9 % (FLUSH) 0.9 %
10 SYRINGE (ML) INJECTION
Status: CANCELLED | OUTPATIENT
Start: 2024-06-03

## 2024-06-03 RX ORDER — SODIUM CHLORIDE 0.9 % (FLUSH) 0.9 %
10 SYRINGE (ML) INJECTION
Status: DISCONTINUED | OUTPATIENT
Start: 2024-06-03 | End: 2024-06-03 | Stop reason: HOSPADM

## 2024-06-03 RX ORDER — HEPARIN 100 UNIT/ML
500 SYRINGE INTRAVENOUS
Status: CANCELLED | OUTPATIENT
Start: 2024-06-03

## 2024-06-03 RX ADMIN — BEVACIZUMAB-AWWB 365 MG: 400 INJECTION, SOLUTION INTRAVENOUS at 09:06

## 2024-06-03 RX ADMIN — FLUOROURACIL 4440 MG: 50 INJECTION, SOLUTION INTRAVENOUS at 12:06

## 2024-06-03 RX ADMIN — DEXAMETHASONE SODIUM PHOSPHATE 0.25 MG: 4 INJECTION, SOLUTION INTRA-ARTICULAR; INTRALESIONAL; INTRAMUSCULAR; INTRAVENOUS; SOFT TISSUE at 09:06

## 2024-06-03 RX ADMIN — OXALIPLATIN 150 MG: 5 INJECTION, SOLUTION INTRAVENOUS at 10:06

## 2024-06-03 NOTE — PLAN OF CARE
Problem: Chemotherapy Effects  Goal: Anemia Symptom Improvement  Outcome: Progressing  Goal: Safety Maintained  Outcome: Progressing  Goal: Absence of Hematuria  Outcome: Progressing  Goal: Nausea and Vomiting Relief  Outcome: Progressing  Goal: Neurotoxicity Symptom Control  Outcome: Progressing  Goal: Absence of Infection  Outcome: Progressing  Goal: Absence of Bleeding  Outcome: Progressing     Ambulatory to clinic with family.  No c/o adverse effects or s/s of infection.  PAC accessed, flushed with out difficulty, blood return noted and infused with no problems.  MVASI, premeds and Folfox tolerated with no problems.  5FU attached via CADD pump, screen read RUN and volume decreased.  RTC Wednesday, 6/5/2024 at 1018 for pump dc.  Ambulatory home with family.  NAD noted.

## 2024-06-03 NOTE — PROGRESS NOTES
MEDICAL ONCOLOGY - ESTABLISHED PATIENT VISIT    Reason for visit: colon adenocarcinoma    Best Contact Phone Number(s): [phone] (home)      Cancer/Stage/TNM:    Cancer Staging   Colon adenocarcinoma  Staging form: Colon and Rectum, AJCC 8th Edition  - Pathologic stage from 1/22/2024: Stage IVC (pT4a, pN2b, cM1c) - Signed by Minna Menendez CNS on 2/13/2024       Oncology History   Colon adenocarcinoma   12/21/2023 Tumor Markers    Patient's tumor was tested for the following markers: CEA.                                              Results of the tumor marker test revealed 3.3     12/21/2023 Procedure    Colonoscopy  Cecal polyp removed with jumbo forceps, 3 mm  Multiple ascending colon polyps ranging in size from 5 to 12 mm - semi-pedunculated removed with hot snare  Hepatic flexure mass identified - central ulceration, concerning for malignancy, 3 cm, not obstructing, this was biopsied - tattoo placed distal to mass  Sigmoid diverticular disease     12/21/2023 Tumor Markers    Patient's tumor was tested for the following markers: CEA.                                              Results of the tumor marker test revealed 3.3     12/22/2023 Imaging Significant Findings    CT CAP  Evaluation of the colon is somewhat limited as there is significant colonic stool and oral contrast material has not opacified the large bowel.  There does appear to be some abnormal thickening of the walls of the proximal right colon and a patient with a known history of colonic malignancy.  There is some soft tissue density external to the walls of the colon on the right pericolic gutter which could represent peritoneal nodularity versus subserosal extent of tumor.  Slightly more distal to this area there is a 2nd area of irregularity of the walls of the colon, difficult to fully evaluate if this is related to the colonic walls versus stool with central fat.     Two hypodensities in the liver, the larger measuring 0.8 cm, too  small to accurately characterize on the basis of this examination.  Attention on follow-up.     No pulmonary nodules are seen.     Cholecystectomy.     12/29/2023 Initial Diagnosis    Hepatic flexure colon adenocarcinoma  MMR intact     1/22/2024 Cancer Staged    Staging form: Colon and Rectum, AJCC 8th Edition  - Pathologic stage from 1/22/2024: Stage IVC (pT4a, pN2b, cM1c)     Malignant neoplasm of ascending colon   2/12/2024 Initial Diagnosis    Malignant neoplasm of ascending colon     2/26/2024 -  Chemotherapy    Treatment Summary   Plan Name: OP GI [id number] (oxaliplatin leucovorin fluorouracil) with bevacizumab Q2W  Treatment Goal: Palliative  Status: Active  Start Date: 2/26/2024  End Date: 1/15/2025 (Planned)  Provider: Kemar Zaragoza MD  Chemotherapy: oxaliplatin (ELOXATIN) 150 mg in dextrose 5 % (D5W) 595 mL chemo infusion, 157 mg, Intravenous, Clinic/HOD 1 time, 7 of 24 cycles  Administration: 150 mg (2/26/2024), 150 mg (3/11/2024), 150 mg (3/25/2024), 150 mg (4/8/2024), 150 mg (4/22/2024), 150 mg (5/6/2024), 150 mg (5/20/2024)  fluorouracil (ADRUCIL) 2,400 mg/m2 = 4,440 mg in sodium chloride 0.9% 100 mL chemo infusion, 2,400 mg/m2 = 4,440 mg, Intravenous, Clinic/HOD 1 time, 7 of 24 cycles  Administration: 4,440 mg (2/26/2024), 4,440 mg (3/11/2024), 4,440 mg (3/25/2024), 4,440 mg (4/8/2024), 4,440 mg (4/22/2024), 4,440 mg (5/6/2024), 4,440 mg (5/20/2024)  bevacizumab-awwb (MVASI) 5 mg/kg = 365 mg in sodium chloride 0.9% 100 mL infusion, 5 mg/kg = 365 mg, Intravenous, Clinic/HOD 1 time, 7 of 24 cycles  Administration: 365 mg (2/26/2024), 365 mg (3/11/2024), 365 mg (3/25/2024), 365 mg (4/8/2024), 365 mg (4/22/2024), 365 mg (5/6/2024), 365 mg (5/20/2024)          Interim History:   74 y.o. female with LUANNE, bipolar, HLD who presents prior to cycle 8 FOLFOX + bevacizumab for her metastatic ascending colon cancer.    She continues to feel very well. Does report some mucous within the stool and muscle  aches. No bleeding per rectum or in the stool.  Doing well overall. Muscle cramps to the ankles. Still functions well. Gardening and eating well. Mild cold sensitivity and neuropathy to the hands.  No dyspnea at rest, no chest pain, no leg swelling, palpitations.  No coughing.  Constipation has improved.  She has been taking two stool softeners daily which has helped.  She has some mild nausea in the morning, no vomiting.  Does respond to anti-emetics.  Denies any paresthesias.    ECOG 0. Presents with her .     ROS:   Review of Systems   Constitutional:  Negative for chills, fever and malaise/fatigue.   HENT:  Negative for congestion and sore throat.    Respiratory:  Negative for shortness of breath.    Cardiovascular:  Negative for chest pain, palpitations and leg swelling.   Gastrointestinal:  Negative for blood in stool, constipation, diarrhea and nausea.   Genitourinary:  Negative for hematuria.   Musculoskeletal:  Negative for back pain, falls and myalgias.   Skin:  Negative for rash.   Neurological:  Negative for dizziness, tingling, weakness and headaches.       Past Medical History:   Past Medical History:   Diagnosis Date    Anxiety     Arthritis     Bipolar 1 disorder     Bursitis of right hip     GI bleed due to NSAIDs     Multinodular goiter 11/15/2021    Sacroiliitis     right side    Sleep apnea         Past Surgical History:   Past Surgical History:   Procedure Laterality Date    ANKLE FRACTURE SURGERY Left 2001    BLADDER SUSPENSION      CARPAL TUNNEL RELEASE Bilateral     CHOLECYSTECTOMY      Laparoscopic    COLONOSCOPY      COLONOSCOPY N/A 12/21/2023    Procedure: COLONOSCOPY;  Surgeon: Alberto Albright MD;  Location: [facility];  Service: Endoscopy;  Laterality: N/A;    ESOPHAGOGASTRODUODENOSCOPY N/A 07/29/2021    Procedure: EGD (ESOPHAGOGASTRODUODENOSCOPY);  Surgeon: Susan Mcclain MD;  Location: [facility];  Service: Endoscopy;  Laterality: N/A;    EYE SURGERY      FOOT ARTHRODESIS  Right 05/03/2023    Procedure: FUSION, FOOT;  Surgeon: Lauri Alejandra DPM;  Location: [facility] OR;  Service: Podiatry;  Laterality: Right;  mini c-arm, Arthrex dowel bone graft harvester, locking plate and screws festus notified cc    HYSTERECTOMY  1986    vaginal prolapse    INJECTION OF ANESTHETIC AGENT AROUND MEDIAL BRANCH NERVES INNERVATING CERVICAL FACET JOINT Bilateral 05/27/2022    Procedure: CERVICAL MEDIAL BRANCH NERVE BLOCK (C3-4,C4-5);  Surgeon: Mamie Roman MD;  Location: [facility] OR;  Service: Pain Management;  Laterality: Bilateral;    INJECTION OF ANESTHETIC AGENT AROUND MEDIAL BRANCH NERVES INNERVATING LUMBAR FACET JOINT Right 02/05/2021    Procedure: LUMBAR FACET JOINT BLOCK (L3-4,L4-5,L5-S1);  Surgeon: Mamie Roman MD;  Location: [facility] OR;  Service: Pain Management;  Laterality: Right;    INJECTION OF ANESTHETIC AGENT AROUND MEDIAL BRANCH NERVES INNERVATING LUMBAR FACET JOINT Right 04/30/2021    Procedure: LUMBAR FACET JOINT BLOCK (L3-4,L4-5,L5-S1);  Surgeon: Mamie Roman MD;  Location: [facility] OR;  Service: Pain Management;  Laterality: Right;    INJECTION OF ANESTHETIC AGENT INTO SACROILIAC JOINT Right 12/04/2020    Procedure: SACROILIAC JOINT INJECTION;  Surgeon: Mamie Roman MD;  Location: [facility] OR;  Service: Pain Management;  Laterality: Right;    INJECTION OF JOINT Right 12/04/2020    Procedure: GREATER TROCHANTERIC BURSA INJECTION;  Surgeon: Mamie Roman MD;  Location: [facility] OR;  Service: Pain Management;  Laterality: Right;    LAPAROSCOPIC RIGHT COLON RESECTION N/A 1/22/2024    Procedure: COLECTOMY, RIGHT, LAPAROSCOPIC (eras low, lithotomy);  Surgeon: Alberto Albright MD;  Location: [address];  Service: Colon and Rectal;  Laterality: N/A;    NASAL SEPTUM SURGERY      RECTAL PROLAPSE REPAIR      TONSILLECTOMY          Family History:   Family History   Problem Relation Name Age of Onset    No Known Problems Maternal Aunt Rubio Glasgow     Colon cancer Maternal Uncle Edwis      Colon cancer Maternal Uncle Yovani     Colon cancer Maternal Uncle Konstantin     No Known Problems Paternal Aunt Vero     Esophageal cancer Paternal Uncle Nat Garcia     Heart attack Maternal Grandmother Elmira     Leukemia Maternal Grandfather manish Pang     No Known Problems Paternal Grandmother Lizett     Stroke Paternal Grandfather Nat Sr         Social History:   Social History     Tobacco Use    Smoking status: Former     Current packs/day: 0.00     Average packs/day: 1 pack/day for 41.7 years (41.7 ttl pk-yrs)     Types: Cigarettes     Start date: 3/30/1982     Quit date: 2023     Years since quittin.4    Smokeless tobacco: Never   Substance Use Topics    Alcohol use: Yes     Comment: Socially-2 or 3 times a year-6 or 7 drinks        I have reviewed and updated the patient's past medical, surgical, family and social histories.    Allergies:   Review of patient's allergies indicates:   Allergen Reactions    Nsaids (non-steroidal anti-inflammatory drug) Other (See Comments)     Gastrointestinal bleeding requiring blood transfusion    Demerol [meperidine] Rash        Medications:   Current Outpatient Medications   Medication Sig Dispense Refill    albuterol (PROVENTIL/VENTOLIN HFA) 90 mcg/actuation inhaler inhale 2 puffs into the lungs every 6 hours as needed for wheezing. 18 g 1    dexAMETHasone (DECADRON) 4 MG Tab Take 2 tablets (8 mg total) by mouth once daily only on days 2, 3 and 4 of each chemotherapy cycle. 40 tablet 0    EScitalopram oxalate (LEXAPRO) 20 MG tablet Take 1 tablet (20 mg total) by mouth every evening. 30 tablet 4    lamoTRIgine (LAMICTAL) 150 MG Tab Take 2 tablets (300 mg total) by mouth every evening. 180 tablet 1    LIDOcaine-prilocaine (EMLA) cream Apply topically as needed (place to port site 45-60 minutes prior to chemotherapy). 30 g 5    ondansetron (ZOFRAN-ODT) 8 MG TbDL dissolve 1 tablet (8 mg total) under the tongue every 6 (six) hours as needed (nausea). 30  tablet 5    pantoprazole (PROTONIX) 40 MG tablet Take 1 tablet (40 mg total) by mouth once daily. 90 tablet 3    prochlorperazine (COMPAZINE) 10 MG tablet Take 1 tablet (10 mg total) by mouth every 6 (six) hours as needed (nausea). 30 tablet 2    QUEtiapine (SEROQUEL) 100 MG Tab Take 1.5 tablets at bedtime for 1 week then if needed can increase to 2 tablets at bedtime for sleep (Patient taking differently: Take 200 mg by mouth nightly. Take 1.5 tablets at bedtime for 1 week then if needed can increase to 2 tablets at bedtime for sleep) 60 tablet 4    rosuvastatin (CRESTOR) 10 MG tablet Take 1 tablet (10 mg total) by mouth once daily. (Patient taking differently: Take 10 mg by mouth every evening.) 90 tablet 3     No current facility-administered medications for this visit.        Physical Exam:   There were no vitals taken for this visit.             Physical Exam  Vitals reviewed.   Constitutional:       General: She is not in acute distress.     Appearance: Normal appearance. She is normal weight. She is not ill-appearing, toxic-appearing or diaphoretic.   HENT:      Head: Normocephalic and atraumatic.      Right Ear: External ear normal.      Left Ear: External ear normal.      Nose: Nose normal.      Mouth/Throat:      Pharynx: Oropharynx is clear.   Eyes:      General: No scleral icterus.     Conjunctiva/sclera: Conjunctivae normal.   Cardiovascular:      Rate and Rhythm: Normal rate.   Pulmonary:      Effort: Pulmonary effort is normal. No respiratory distress.   Chest:      Comments: RCW port  Abdominal:      General: There is no distension.   Skin:     General: Skin is warm and dry.      Coloration: Skin is not jaundiced or pale.      Findings: No bruising, erythema or rash.   Neurological:      Mental Status: She is alert and oriented to person, place, and time. Mental status is at baseline.      Motor: No weakness.      Gait: Gait normal.   Psychiatric:         Mood and Affect: Mood normal.         Labs:      I have reviewed the pertinent labs. Mild stable anemia.     Imagin/19/24 - CT CAP:    Impression:     Postoperative changes of right colonic resection from patient's known colonic malignancy.  No free air or obstruction.  No pathologically enlarged abdominal or pelvic lymph nodes are seen.     5 mm hypodensity in the right hepatic lobe, too small to accurately characterize.  The 2nd hypodensity seen on the prior study is not as clearly seen on today's examination.     No pulmonary nodules.     Cholecystectomy.     Mild thickening of the walls of the distal esophagus which could suggest esophagitis.  Clinical correlation suggested.     Constipation.    Path:   24 - R Hemicolectomy   Final Pathologic Diagnosis     THE DIAGNOSES REMAIN THE SAME.  THIS CORRECTED REPORT IS ISSUED TO CHANGE M STATUS to reflect tumor in the omentum.  This change is discussed with Dr. RANJANA Albright via phone, 2024.    1. Colon, right and terminal ileum (right hemicolectomy with EN bloc abdominal wall resection):  Invasive adenocarcinoma.  See cancer synoptic report     2. Omentum (resection):    Positive for carcinoma    CORRECT SYNOPTIC AND M STATUS  Cancer synoptic report  - Procedure: Right hemicolectomy with EN bloc abdominal wall resection  - Tumor site:  Ascending  - Histologic type:  Adenocarcinoma  - Histologic grade:  G2, moderately differentiated.  See comment.  COMMENT:  While the majority of the tumor consists of well formed glands, a significant proportion includes abortive glands, single file infiltration, and malignant cells with signet ring cell morphology. The tumor has an insidious pattern of  infiltration with tumor present far from the advancing front of the tumor.  - Tumor size:  2.0 x 2.0 x 1.0 cm  - Tumor extent:  Invades visceral peritoneum, multifocal  - Macroscopic perforation: Not identified  - Lymphovascular invasion:  Present, extensive.  Small vessel, large vessel, intra and extramural.  -  Perineural invasion:  Not identified  - Tumor budding score:  High (>10), multifocal single cell infiltration  - Treatment effect: No known pre-surgical therapy    Margins  Margin involved by invasive carcinoma, radial (slide 1C)  All margins negative for dysplasia.    pTNM stage classification (AJCC 8th edition)  pT Category  pT4a:  Tumor invades through the visceral peritoneum    pN Category  pN2b:  7 or more regional lymph nodes are positive  Number of positive lymph nodes:  19  Number of lymph nodes examined: 26  Number of tumor deposits:  4    pM Category  pM1c:  Metastasis to the peritoneal surface of the omentum.    Additional findings:  - Sessile serrated polyp, no cytologic dysplasia, 1.1 cm at ileocecal valve  - Tubulovillous adenoma, 1.0 cm, proximal ascending  - Tubular adenoma, 0.4 cm, mid ascending  - Tubular adenoma, 0.4, distal ascending  - Tubular adenoma, 0.6 cm, distal ascending  - Submucosal lipoma, 2.0 cm  - Appendix with no specific histopathologic changes    Ancillary studies  Immunohistochemistry for mismatch repair proteins performed on prior biopsy material (SAS-, 12/21/23): pMMR.         Assessment:       1. Malignant neoplasm of ascending colon    2. 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 Immunodeficiency secondary to neoplasm    4. Immunodeficiency secondary to chemotherapy    5. Neoplastic malignant related fatigue    6. Cold sensitivity    7. Other constipation    8. Excessive gas                Plan:        # Colon cancer   Mrs. Maguire is a 74 y.o. female who presents for oncologic evaluation for metastatic colon cancer. She underwent a R hemicolectomy with en-bloc abdominal wal resection on 1/22/24 with Dr. Albright. Pathology revealed pT4a N2b disease with 19/26 positive LNs and omentum positive for carcinoma.    We had an in-depth conversation during our initial consult re: her diagnosis and treatment options. Reviewed recommendation for IV systemic therapy for at  least 6 months. Plan for FOLFOX + bevacizumab to be given every 2 weeks. Briefly reviewed side effects of treatment. Handouts provided. Port placed 2/21/24.     PGX - DPYD normal metabolizer, [id number] intermediate metabolizer   Dexamethasone, zofran, compazine and lidocaine sent to pharmacy previously. Reviewed admin instructions.     Pending response, she may be a candidate for CRS/HIPEC with surgical oncology team. Continue to evaluate and re-start discussion if still without radiographic evidence of disease after CT on cycle 8.    CT CAP after cycle 4 shows no clear evidence of disease.  Presents today for cycle 8 FOLFOX + bevacizumab. Doing well overall. Some mucous within the stool, and muscle cramps to the ankles. Still functions well. Gardening and eating well. Mild cold sensitivity and neuropathy to the hands.   She tolerated treatment without any complication.   - Labs reviewed, adequate for treatment.   - Proceed with cycle 8 today.      Unfortunately she is not eligible for our maintenance BGB CRC study because she does not have measurable disease.    Follow up: 2 weeks for cycle 9. Repeat imaging after this cycle    Cold sensitivity/neoplasm related fatigue:  2/2 chemotherapy. Still continues to function very well. Works in her garden without complication.   No peripheral neuropathy. Will continue to monitor.     Constipation/Gas:   Advised her to take 2 stool softeners a day with Miralax daily to have regular bowel movements.   This has helped.    Patient is in agreement with the proposed treatment plan. All questions were answered to the patient's satisfaction. Pt knows to call clinic if anything is needed before the next clinic visit.        DIANE Fonseca, PA-C  Physician Assistant Certified  Dept of Hematology/Oncology  PAIvanC to Dr. Rocha, Dr. Zaragoza and Dr. Smith         Med Onc Chart Routing      Follow up with physician 2 weeks and 6 weeks. Keep appointments as scheduled. See Dr. Zaragoza in 6  weeks   Follow up with KRISTEN 4 weeks.   Infusion scheduling note    Injection scheduling note    Labs CBC, CMP, CEA and urinalysis   Scheduling:  Preferred lab:  Lab interval: every 2 weeks     Imaging CT chest abdomen pelvis   scheduled.   Pharmacy appointment    Other referrals

## 2024-06-05 ENCOUNTER — INFUSION (OUTPATIENT)
Dept: INFUSION THERAPY | Facility: HOSPITAL | Age: 74
End: 2024-06-05
Payer: MEDICARE

## 2024-06-05 VITALS
DIASTOLIC BLOOD PRESSURE: 70 MMHG | SYSTOLIC BLOOD PRESSURE: 150 MMHG | HEART RATE: 70 BPM | RESPIRATION RATE: 18 BRPM | TEMPERATURE: 98 F | OXYGEN SATURATION: 96 %

## 2024-06-05 DIAGNOSIS — C18.2 MALIGNANT NEOPLASM OF ASCENDING COLON: Primary | ICD-10-CM

## 2024-06-05 PROCEDURE — A4216 STERILE WATER/SALINE, 10 ML: HCPCS | Performed by: PHYSICIAN ASSISTANT

## 2024-06-05 PROCEDURE — 63600175 PHARM REV CODE 636 W HCPCS: Performed by: PHYSICIAN ASSISTANT

## 2024-06-05 PROCEDURE — 25000003 PHARM REV CODE 250: Performed by: PHYSICIAN ASSISTANT

## 2024-06-05 PROCEDURE — 96523 IRRIG DRUG DELIVERY DEVICE: CPT

## 2024-06-05 RX ORDER — SODIUM CHLORIDE 0.9 % (FLUSH) 0.9 %
10 SYRINGE (ML) INJECTION
Status: DISCONTINUED | OUTPATIENT
Start: 2024-06-05 | End: 2024-06-05 | Stop reason: HOSPADM

## 2024-06-05 RX ORDER — PROCHLORPERAZINE EDISYLATE 5 MG/ML
5 INJECTION INTRAMUSCULAR; INTRAVENOUS ONCE AS NEEDED
Status: DISCONTINUED | OUTPATIENT
Start: 2024-06-05 | End: 2024-06-05 | Stop reason: HOSPADM

## 2024-06-05 RX ORDER — HEPARIN 100 UNIT/ML
500 SYRINGE INTRAVENOUS
Status: DISCONTINUED | OUTPATIENT
Start: 2024-06-05 | End: 2024-06-05 | Stop reason: HOSPADM

## 2024-06-05 RX ADMIN — HEPARIN 500 UNITS: 100 SYRINGE at 10:06

## 2024-06-05 RX ADMIN — Medication 10 ML: at 10:06

## 2024-06-14 ENCOUNTER — HOSPITAL ENCOUNTER (OUTPATIENT)
Dept: RADIOLOGY | Facility: HOSPITAL | Age: 74
Discharge: HOME | End: 2024-06-14
Attending: INTERNAL MEDICINE
Payer: MEDICARE

## 2024-06-14 DIAGNOSIS — C18.2 MALIGNANT NEOPLASM OF ASCENDING COLON: ICD-10-CM

## 2024-06-14 PROCEDURE — 25500020 PHARM REV CODE 255: Performed by: INTERNAL MEDICINE

## 2024-06-14 PROCEDURE — 74177 CT ABD & PELVIS W/CONTRAST: CPT | Mod: 26,,, | Performed by: RADIOLOGY

## 2024-06-14 PROCEDURE — 71260 CT THORAX DX C+: CPT | Mod: TC

## 2024-06-14 PROCEDURE — 74177 CT ABD & PELVIS W/CONTRAST: CPT | Mod: TC

## 2024-06-14 PROCEDURE — 71260 CT THORAX DX C+: CPT | Mod: 26,,, | Performed by: RADIOLOGY

## 2024-06-14 RX ADMIN — IOHEXOL 75 ML: 350 INJECTION, SOLUTION INTRAVENOUS at 01:06

## 2024-06-14 RX ADMIN — IOHEXOL 30 ML: 350 INJECTION, SOLUTION INTRAVENOUS at 01:06

## 2024-06-17 ENCOUNTER — OFFICE VISIT (OUTPATIENT)
Dept: HEMATOLOGY/ONCOLOGY | Facility: CLINIC | Age: 74
End: 2024-06-17
Payer: MEDICARE

## 2024-06-17 ENCOUNTER — INFUSION (OUTPATIENT)
Dept: INFUSION THERAPY | Facility: HOSPITAL | Age: 74
End: 2024-06-17
Payer: MEDICARE

## 2024-06-17 VITALS
HEART RATE: 78 BPM | WEIGHT: 173.38 LBS | DIASTOLIC BLOOD PRESSURE: 73 MMHG | SYSTOLIC BLOOD PRESSURE: 128 MMHG | HEIGHT: 66 IN | BODY MASS INDEX: 27.86 KG/M2 | TEMPERATURE: 98 F | OXYGEN SATURATION: 100 %

## 2024-06-17 VITALS
TEMPERATURE: 99 F | OXYGEN SATURATION: 98 % | HEART RATE: 74 BPM | RESPIRATION RATE: 19 BRPM | DIASTOLIC BLOOD PRESSURE: 68 MMHG | SYSTOLIC BLOOD PRESSURE: 130 MMHG

## 2024-06-17 DIAGNOSIS — D84.81 IMMUNODEFICIENCY SECONDARY TO NEOPLASM: ICD-10-CM

## 2024-06-17 DIAGNOSIS — C18.2 MALIGNANT NEOPLASM OF ASCENDING COLON: Primary | ICD-10-CM

## 2024-06-17 DIAGNOSIS — C78.6 MALIGNANT NEOPLASM METASTATIC TO OMENTUM: ICD-10-CM

## 2024-06-17 DIAGNOSIS — T45.1X5A IMMUNODEFICIENCY SECONDARY TO CHEMOTHERAPY: ICD-10-CM

## 2024-06-17 DIAGNOSIS — Z79.899 IMMUNODEFICIENCY SECONDARY TO CHEMOTHERAPY: ICD-10-CM

## 2024-06-17 DIAGNOSIS — R53.0 NEOPLASTIC MALIGNANT RELATED FATIGUE: ICD-10-CM

## 2024-06-17 DIAGNOSIS — R68.89 COLD SENSITIVITY: ICD-10-CM

## 2024-06-17 DIAGNOSIS — D49.9 IMMUNODEFICIENCY SECONDARY TO NEOPLASM: ICD-10-CM

## 2024-06-17 DIAGNOSIS — D84.821 IMMUNODEFICIENCY SECONDARY TO CHEMOTHERAPY: ICD-10-CM

## 2024-06-17 PROCEDURE — 3008F BODY MASS INDEX DOCD: CPT | Mod: CPTII,S$GLB,, | Performed by: INTERNAL MEDICINE

## 2024-06-17 PROCEDURE — 1101F PT FALLS ASSESS-DOCD LE1/YR: CPT | Mod: CPTII,S$GLB,, | Performed by: INTERNAL MEDICINE

## 2024-06-17 PROCEDURE — 1126F AMNT PAIN NOTED NONE PRSNT: CPT | Mod: CPTII,S$GLB,, | Performed by: INTERNAL MEDICINE

## 2024-06-17 PROCEDURE — 63600175 PHARM REV CODE 636 W HCPCS: Mod: JG | Performed by: INTERNAL MEDICINE

## 2024-06-17 PROCEDURE — G2211 COMPLEX E/M VISIT ADD ON: HCPCS | Mod: S$GLB,,, | Performed by: INTERNAL MEDICINE

## 2024-06-17 PROCEDURE — 96367 TX/PROPH/DG ADDL SEQ IV INF: CPT

## 2024-06-17 PROCEDURE — 1159F MED LIST DOCD IN RCRD: CPT | Mod: CPTII,S$GLB,, | Performed by: INTERNAL MEDICINE

## 2024-06-17 PROCEDURE — 99215 OFFICE O/P EST HI 40 MIN: CPT | Mod: S$GLB,,, | Performed by: INTERNAL MEDICINE

## 2024-06-17 PROCEDURE — 25000003 PHARM REV CODE 250: Performed by: INTERNAL MEDICINE

## 2024-06-17 PROCEDURE — 3078F DIAST BP <80 MM HG: CPT | Mod: CPTII,S$GLB,, | Performed by: INTERNAL MEDICINE

## 2024-06-17 PROCEDURE — 96416 CHEMO PROLONG INFUSE W/PUMP: CPT

## 2024-06-17 PROCEDURE — 99999 PR PBB SHADOW E&M-EST. PATIENT-LVL III: CPT | Mod: PBBFAC,,, | Performed by: INTERNAL MEDICINE

## 2024-06-17 PROCEDURE — 96413 CHEMO IV INFUSION 1 HR: CPT

## 2024-06-17 PROCEDURE — 3074F SYST BP LT 130 MM HG: CPT | Mod: CPTII,S$GLB,, | Performed by: INTERNAL MEDICINE

## 2024-06-17 PROCEDURE — 3288F FALL RISK ASSESSMENT DOCD: CPT | Mod: CPTII,S$GLB,, | Performed by: INTERNAL MEDICINE

## 2024-06-17 RX ORDER — HEPARIN 100 UNIT/ML
500 SYRINGE INTRAVENOUS
Status: DISCONTINUED | OUTPATIENT
Start: 2024-06-17 | End: 2024-06-17 | Stop reason: HOSPADM

## 2024-06-17 RX ORDER — EPINEPHRINE 0.3 MG/.3ML
0.3 INJECTION SUBCUTANEOUS ONCE AS NEEDED
Status: DISCONTINUED | OUTPATIENT
Start: 2024-06-17 | End: 2024-06-17 | Stop reason: HOSPADM

## 2024-06-17 RX ORDER — EPINEPHRINE 0.3 MG/.3ML
0.3 INJECTION SUBCUTANEOUS ONCE AS NEEDED
Status: CANCELLED | OUTPATIENT
Start: 2024-06-17

## 2024-06-17 RX ORDER — HEPARIN 100 UNIT/ML
500 SYRINGE INTRAVENOUS
Status: CANCELLED | OUTPATIENT
Start: 2024-06-19

## 2024-06-17 RX ORDER — DIPHENHYDRAMINE HYDROCHLORIDE 50 MG/ML
50 INJECTION INTRAMUSCULAR; INTRAVENOUS ONCE AS NEEDED
Status: DISCONTINUED | OUTPATIENT
Start: 2024-06-17 | End: 2024-06-17 | Stop reason: HOSPADM

## 2024-06-17 RX ORDER — PROCHLORPERAZINE EDISYLATE 5 MG/ML
5 INJECTION INTRAMUSCULAR; INTRAVENOUS ONCE AS NEEDED
Status: DISCONTINUED | OUTPATIENT
Start: 2024-06-17 | End: 2024-06-17 | Stop reason: HOSPADM

## 2024-06-17 RX ORDER — DIPHENHYDRAMINE HYDROCHLORIDE 50 MG/ML
50 INJECTION INTRAMUSCULAR; INTRAVENOUS ONCE AS NEEDED
Status: CANCELLED | OUTPATIENT
Start: 2024-06-17

## 2024-06-17 RX ORDER — SODIUM CHLORIDE 0.9 % (FLUSH) 0.9 %
10 SYRINGE (ML) INJECTION
Status: CANCELLED | OUTPATIENT
Start: 2024-06-19

## 2024-06-17 RX ORDER — SODIUM CHLORIDE 0.9 % (FLUSH) 0.9 %
10 SYRINGE (ML) INJECTION
Status: DISCONTINUED | OUTPATIENT
Start: 2024-06-17 | End: 2024-06-17 | Stop reason: HOSPADM

## 2024-06-17 RX ORDER — PROCHLORPERAZINE EDISYLATE 5 MG/ML
5 INJECTION INTRAMUSCULAR; INTRAVENOUS ONCE AS NEEDED
Status: CANCELLED | OUTPATIENT
Start: 2024-06-19

## 2024-06-17 RX ORDER — SODIUM CHLORIDE 0.9 % (FLUSH) 0.9 %
10 SYRINGE (ML) INJECTION
Status: CANCELLED | OUTPATIENT
Start: 2024-06-17

## 2024-06-17 RX ORDER — HEPARIN 100 UNIT/ML
500 SYRINGE INTRAVENOUS
Status: CANCELLED | OUTPATIENT
Start: 2024-06-17

## 2024-06-17 RX ORDER — PROCHLORPERAZINE EDISYLATE 5 MG/ML
5 INJECTION INTRAMUSCULAR; INTRAVENOUS ONCE AS NEEDED
Status: CANCELLED | OUTPATIENT
Start: 2024-06-17

## 2024-06-17 RX ADMIN — DEXAMETHASONE SODIUM PHOSPHATE 0.25 MG: 4 INJECTION, SOLUTION INTRA-ARTICULAR; INTRALESIONAL; INTRAMUSCULAR; INTRAVENOUS; SOFT TISSUE at 11:06

## 2024-06-17 RX ADMIN — BEVACIZUMAB-AWWB 365 MG: 400 INJECTION, SOLUTION INTRAVENOUS at 10:06

## 2024-06-17 RX ADMIN — FLUOROURACIL 4440 MG: 50 INJECTION, SOLUTION INTRAVENOUS at 11:06

## 2024-06-17 NOTE — PLAN OF CARE
Pt ambulatory to clinic with sig other for MVASI and 5 FU pump. Denies any complaints. Port accessed without difficulty. Good blood return. Pt tolerated infusion well. 5FU infusing via CADD pump with RUN and volume decreasing. RTC on Wednesday at 1200. From clinic in [facility].

## 2024-06-17 NOTE — PROGRESS NOTES
MEDICAL ONCOLOGY - ESTABLISHED PATIENT VISIT    Reason for visit: colon adenocarcinoma    Best Contact Phone Number(s): [phone] (home)      Cancer/Stage/TNM:    Cancer Staging   Colon adenocarcinoma  Staging form: Colon and Rectum, AJCC 8th Edition  - Pathologic stage from 1/22/2024: Stage IVC (pT4a, pN2b, cM1c) - Signed by Minna Menendez CNS on 2/13/2024       Oncology History   Colon adenocarcinoma   12/21/2023 Tumor Markers    Patient's tumor was tested for the following markers: CEA.                                              Results of the tumor marker test revealed 3.3     12/21/2023 Procedure    Colonoscopy  Cecal polyp removed with jumbo forceps, 3 mm  Multiple ascending colon polyps ranging in size from 5 to 12 mm - semi-pedunculated removed with hot snare  Hepatic flexure mass identified - central ulceration, concerning for malignancy, 3 cm, not obstructing, this was biopsied - tattoo placed distal to mass  Sigmoid diverticular disease     12/21/2023 Tumor Markers    Patient's tumor was tested for the following markers: CEA.                                              Results of the tumor marker test revealed 3.3     12/22/2023 Imaging Significant Findings    CT CAP  Evaluation of the colon is somewhat limited as there is significant colonic stool and oral contrast material has not opacified the large bowel.  There does appear to be some abnormal thickening of the walls of the proximal right colon and a patient with a known history of colonic malignancy.  There is some soft tissue density external to the walls of the colon on the right pericolic gutter which could represent peritoneal nodularity versus subserosal extent of tumor.  Slightly more distal to this area there is a 2nd area of irregularity of the walls of the colon, difficult to fully evaluate if this is related to the colonic walls versus stool with central fat.     Two hypodensities in the liver, the larger measuring 0.8 cm, too  small to accurately characterize on the basis of this examination.  Attention on follow-up.     No pulmonary nodules are seen.     Cholecystectomy.     12/29/2023 Initial Diagnosis    Hepatic flexure colon adenocarcinoma  MMR intact     1/22/2024 Cancer Staged    Staging form: Colon and Rectum, AJCC 8th Edition  - Pathologic stage from 1/22/2024: Stage IVC (pT4a, pN2b, cM1c)     Malignant neoplasm of ascending colon   2/12/2024 Initial Diagnosis    Malignant neoplasm of ascending colon     2/26/2024 -  Chemotherapy    Treatment Summary   Plan Name: OP GI [id number] (oxaliplatin leucovorin fluorouracil) with bevacizumab Q2W  Treatment Goal: Palliative  Status: Active  Start Date: 2/26/2024  End Date: 1/15/2025 (Planned)  Provider: Kemar Zaragoza MD  Chemotherapy: oxaliplatin (ELOXATIN) 150 mg in dextrose 5 % (D5W) 595 mL chemo infusion, 157 mg, Intravenous, Clinic/HOD 1 time, 8 of 8 cycles  Administration: 150 mg (2/26/2024), 150 mg (3/11/2024), 150 mg (3/25/2024), 150 mg (4/8/2024), 150 mg (4/22/2024), 150 mg (5/6/2024), 150 mg (5/20/2024), 150 mg (6/3/2024)  fluorouracil (ADRUCIL) 2,400 mg/m2 = 4,440 mg in sodium chloride 0.9% 100 mL chemo infusion, 2,400 mg/m2 = 4,440 mg, Intravenous, Clinic/HOD 1 time, 8 of 24 cycles  Administration: 4,440 mg (2/26/2024), 4,440 mg (3/11/2024), 4,440 mg (3/25/2024), 4,440 mg (4/8/2024), 4,440 mg (4/22/2024), 4,440 mg (5/6/2024), 4,440 mg (5/20/2024), 4,440 mg (6/3/2024)  bevacizumab-awwb (MVASI) 5 mg/kg = 365 mg in sodium chloride 0.9% 100 mL infusion, 5 mg/kg = 365 mg, Intravenous, Clinic/HOD 1 time, 8 of 24 cycles  Administration: 365 mg (2/26/2024), 365 mg (3/11/2024), 365 mg (3/25/2024), 365 mg (4/8/2024), 365 mg (4/22/2024), 365 mg (5/6/2024), 365 mg (5/20/2024), 365 mg (6/3/2024)          Interim History:   74 y.o. female with LUANNE, bipolar, HLD who presents prior to cycle 9 FOLFOX + bevacizumab for her metastatic ascending colon cancer.    She notes more fatigue with  this last cycle.  She is also having some orthostatic hypotension and had a presyncopal event in the last couple days.    She does have persistent cold sensitivity for 2 weeks in her fingers.    She does not have any paresthesias at present.    ECOG 0. Presents with her .     ROS:   Review of Systems   Constitutional:  Positive for malaise/fatigue. Negative for chills and fever.   HENT:  Negative for congestion and sore throat.    Respiratory:  Negative for shortness of breath.    Cardiovascular:  Negative for chest pain, palpitations and leg swelling.   Gastrointestinal:  Negative for blood in stool, constipation, diarrhea and nausea.   Genitourinary:  Negative for hematuria.   Musculoskeletal:  Negative for back pain, falls and myalgias.   Skin:  Negative for rash.   Neurological:  Positive for dizziness. Negative for tingling, weakness and headaches.       Past Medical History:   Past Medical History:   Diagnosis Date    Anxiety     Arthritis     Bipolar 1 disorder     Bursitis of right hip     GI bleed due to NSAIDs     Multinodular goiter 11/15/2021    Sacroiliitis     right side    Sleep apnea         Past Surgical History:   Past Surgical History:   Procedure Laterality Date    ANKLE FRACTURE SURGERY Left 2001    BLADDER SUSPENSION      CARPAL TUNNEL RELEASE Bilateral     CHOLECYSTECTOMY      Laparoscopic    COLONOSCOPY      COLONOSCOPY N/A 12/21/2023    Procedure: COLONOSCOPY;  Surgeon: Alberto Albright MD;  Location: [facility];  Service: Endoscopy;  Laterality: N/A;    ESOPHAGOGASTRODUODENOSCOPY N/A 07/29/2021    Procedure: EGD (ESOPHAGOGASTRODUODENOSCOPY);  Surgeon: Susan Mcclain MD;  Location: [facility];  Service: Endoscopy;  Laterality: N/A;    EYE SURGERY      FOOT ARTHRODESIS Right 05/03/2023    Procedure: FUSION, FOOT;  Surgeon: Lauri Alejandra DPM;  Location: [facility] OR;  Service: Podiatry;  Laterality: Right;  mini c-arm, Arthrex dowel bone graft harvester, locking plate and screws  festus notified cc    HYSTERECTOMY  1986    vaginal prolapse    INJECTION OF ANESTHETIC AGENT AROUND MEDIAL BRANCH NERVES INNERVATING CERVICAL FACET JOINT Bilateral 05/27/2022    Procedure: CERVICAL MEDIAL BRANCH NERVE BLOCK (C3-4,C4-5);  Surgeon: Mamie Roman MD;  Location: [facility] OR;  Service: Pain Management;  Laterality: Bilateral;    INJECTION OF ANESTHETIC AGENT AROUND MEDIAL BRANCH NERVES INNERVATING LUMBAR FACET JOINT Right 02/05/2021    Procedure: LUMBAR FACET JOINT BLOCK (L3-4,L4-5,L5-S1);  Surgeon: Mamie Roman MD;  Location: [facility] OR;  Service: Pain Management;  Laterality: Right;    INJECTION OF ANESTHETIC AGENT AROUND MEDIAL BRANCH NERVES INNERVATING LUMBAR FACET JOINT Right 04/30/2021    Procedure: LUMBAR FACET JOINT BLOCK (L3-4,L4-5,L5-S1);  Surgeon: Mamie Roman MD;  Location: [facility] OR;  Service: Pain Management;  Laterality: Right;    INJECTION OF ANESTHETIC AGENT INTO SACROILIAC JOINT Right 12/04/2020    Procedure: SACROILIAC JOINT INJECTION;  Surgeon: Mamie Roman MD;  Location: [facility] OR;  Service: Pain Management;  Laterality: Right;    INJECTION OF JOINT Right 12/04/2020    Procedure: GREATER TROCHANTERIC BURSA INJECTION;  Surgeon: Mamie Roman MD;  Location: [facility] OR;  Service: Pain Management;  Laterality: Right;    LAPAROSCOPIC RIGHT COLON RESECTION N/A 1/22/2024    Procedure: COLECTOMY, RIGHT, LAPAROSCOPIC (eras low, lithotomy);  Surgeon: Alberto Albright MD;  Location: [address];  Service: Colon and Rectal;  Laterality: N/A;    NASAL SEPTUM SURGERY      RECTAL PROLAPSE REPAIR      TONSILLECTOMY          Family History:   Family History   Problem Relation Name Age of Onset    No Known Problems Maternal Aunt Rubio Glasgow     Colon cancer Maternal Uncle Edwis     Colon cancer Maternal Uncle Yovani     Colon cancer Maternal Uncle Konstantin     No Known Problems Paternal Aunt Vero     Esophageal cancer Paternal Uncle Balbinae Jr     Heart attack Maternal Grandmother Elmira      Leukemia Maternal Grandfather manish Pang     No Known Problems Paternal Grandmother Lizett     Stroke Paternal Grandfather Nat Sr         Social History:   Social History     Tobacco Use    Smoking status: Former     Current packs/day: 0.00     Average packs/day: 1 pack/day for 41.7 years (41.7 ttl pk-yrs)     Types: Cigarettes     Start date: 3/30/1982     Quit date: 2023     Years since quittin.4    Smokeless tobacco: Never   Substance Use Topics    Alcohol use: Yes     Comment: Socially-2 or 3 times a year-6 or 7 drinks        I have reviewed and updated the patient's past medical, surgical, family and social histories.    Allergies:   Review of patient's allergies indicates:   Allergen Reactions    Nsaids (non-steroidal anti-inflammatory drug) Other (See Comments)     Gastrointestinal bleeding requiring blood transfusion    Demerol [meperidine] Rash        Medications:   Current Outpatient Medications   Medication Sig Dispense Refill    albuterol (PROVENTIL/VENTOLIN HFA) 90 mcg/actuation inhaler inhale 2 puffs into the lungs every 6 hours as needed for wheezing. 18 g 1    dexAMETHasone (DECADRON) 4 MG Tab Take 2 tablets (8 mg total) by mouth once daily only on days 2, 3 and 4 of each chemotherapy cycle. 40 tablet 0    EScitalopram oxalate (LEXAPRO) 20 MG tablet Take 1 tablet (20 mg total) by mouth every evening. 30 tablet 4    lamoTRIgine (LAMICTAL) 150 MG Tab Take 2 tablets (300 mg total) by mouth every evening. 180 tablet 1    LIDOcaine-prilocaine (EMLA) cream Apply topically as needed (place to port site 45-60 minutes prior to chemotherapy). 30 g 5    ondansetron (ZOFRAN-ODT) 8 MG TbDL dissolve 1 tablet (8 mg total) under the tongue every 6 (six) hours as needed (nausea). 30 tablet 5    pantoprazole (PROTONIX) 40 MG tablet Take 1 tablet (40 mg total) by mouth once daily. 90 tablet 3    prochlorperazine (COMPAZINE) 10 MG tablet Take 1 tablet (10 mg total) by mouth every 6 (six) hours as  "needed (nausea). 30 tablet 2    QUEtiapine (SEROQUEL) 100 MG Tab Take 1.5 tablets at bedtime for 1 week then if needed can increase to 2 tablets at bedtime for sleep (Patient taking differently: Take 200 mg by mouth nightly. Take 1.5 tablets at bedtime for 1 week then if needed can increase to 2 tablets at bedtime for sleep) 60 tablet 4    rosuvastatin (CRESTOR) 10 MG tablet Take 1 tablet (10 mg total) by mouth once daily. (Patient taking differently: Take 10 mg by mouth every evening.) 90 tablet 3     No current facility-administered medications for this visit.        Physical Exam:   /73 (BP Location: Left arm, Patient Position: Sitting, BP Method: Medium (Automatic))   Pulse 78   Temp 98.3 °F (36.8 °C) (Oral)   Ht 5' 6" (1.676 m)   Wt 78.7 kg (173 lb 6.3 oz)   SpO2 100%   BMI 27.99 kg/m²              Physical Exam  Vitals reviewed.   Constitutional:       General: She is not in acute distress.     Appearance: Normal appearance. She is normal weight. She is not ill-appearing, toxic-appearing or diaphoretic.   HENT:      Head: Normocephalic and atraumatic.      Right Ear: External ear normal.      Left Ear: External ear normal.      Nose: Nose normal.      Mouth/Throat:      Pharynx: Oropharynx is clear.   Eyes:      General: No scleral icterus.     Conjunctiva/sclera: Conjunctivae normal.   Cardiovascular:      Rate and Rhythm: Normal rate.   Pulmonary:      Effort: Pulmonary effort is normal. No respiratory distress.   Chest:      Comments: RCW port  Abdominal:      General: There is no distension.   Skin:     General: Skin is warm and dry.      Coloration: Skin is not jaundiced or pale.      Findings: No bruising, erythema or rash.   Neurological:      Mental Status: She is alert and oriented to person, place, and time. Mental status is at baseline.      Motor: No weakness.      Gait: Gait normal.   Psychiatric:         Mood and Affect: Mood normal.         Labs:     I have reviewed the pertinent " labs. Mild stable anemia.     Imagin/14/24 - CT CAP:    Impression:     Postoperative changes of right colonic resection for patient's known colonic malignancy.  No free air or obstruction.  No pathologically enlarged abdominal or pelvic lymph nodes are seen.     Previously noted hepatic lesions are not seen on today's study.     No pulmonary nodules.     Cholecystectomy.    Path:   24 - R Hemicolectomy   Final Pathologic Diagnosis     THE DIAGNOSES REMAIN THE SAME.  THIS CORRECTED REPORT IS ISSUED TO CHANGE M STATUS to reflect tumor in the omentum.  This change is discussed with Dr. RANJANA Albright via phone, 2024.    1. Colon, right and terminal ileum (right hemicolectomy with EN bloc abdominal wall resection):  Invasive adenocarcinoma.  See cancer synoptic report     2. Omentum (resection):    Positive for carcinoma    CORRECT SYNOPTIC AND M STATUS  Cancer synoptic report  - Procedure: Right hemicolectomy with EN bloc abdominal wall resection  - Tumor site:  Ascending  - Histologic type:  Adenocarcinoma  - Histologic grade:  G2, moderately differentiated.  See comment.  COMMENT:  While the majority of the tumor consists of well formed glands, a significant proportion includes abortive glands, single file infiltration, and malignant cells with signet ring cell morphology. The tumor has an insidious pattern of  infiltration with tumor present far from the advancing front of the tumor.  - Tumor size:  2.0 x 2.0 x 1.0 cm  - Tumor extent:  Invades visceral peritoneum, multifocal  - Macroscopic perforation: Not identified  - Lymphovascular invasion:  Present, extensive.  Small vessel, large vessel, intra and extramural.  - Perineural invasion:  Not identified  - Tumor budding score:  High (>10), multifocal single cell infiltration  - Treatment effect: No known pre-surgical therapy    Margins  Margin involved by invasive carcinoma, radial (slide 1C)  All margins negative for dysplasia.    pTNM stage  classification (AJCC 8th edition)  pT Category  pT4a:  Tumor invades through the visceral peritoneum    pN Category  pN2b:  7 or more regional lymph nodes are positive  Number of positive lymph nodes:  19  Number of lymph nodes examined: 26  Number of tumor deposits:  4    pM Category  pM1c:  Metastasis to the peritoneal surface of the omentum.    Additional findings:  - Sessile serrated polyp, no cytologic dysplasia, 1.1 cm at ileocecal valve  - Tubulovillous adenoma, 1.0 cm, proximal ascending  - Tubular adenoma, 0.4 cm, mid ascending  - Tubular adenoma, 0.4, distal ascending  - Tubular adenoma, 0.6 cm, distal ascending  - Submucosal lipoma, 2.0 cm  - Appendix with no specific histopathologic changes    Ancillary studies  Immunohistochemistry for mismatch repair proteins performed on prior biopsy material (SAS-, 12/21/23): pMMR.         Assessment:       1. Malignant neoplasm of ascending colon    2. 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 Immunodeficiency secondary to neoplasm    4. Immunodeficiency secondary to chemotherapy    5. Neoplastic malignant related fatigue    6. Cold sensitivity            Plan:        # Colon cancer   Mrs. Maguire is a 74 y.o. female who presents for management of her metastatic colon cancer. She underwent a R hemicolectomy with en-bloc abdominal wall resection on 1/22/24 with Dr. Albright. Pathology revealed pT4a N2b disease with 19/26 positive LNs and omentum positive for carcinoma.    We had an in-depth conversation during our initial consult re: her diagnosis and treatment options. Reviewed recommendation for IV systemic therapy for at least 6 months. Plan for FOLFOX + bevacizumab to be given every 2 weeks. Briefly reviewed side effects of treatment. Handouts provided. Port placed 2/21/24.     PGX - DPYD normal metabolizer, [id number] intermediate metabolizer   Dexamethasone, zofran, compazine and lidocaine sent to pharmacy previously. Reviewed admin instructions.      Pending response, she may be a candidate for CRS/HIPEC with surgical oncology team. Continue to evaluate and re-start discussion if still without radiographic evidence of disease after CT on cycle 8.    CT CAP after cycle 4 shows no clear evidence of disease.  CT CAP after cycle 8 shows no clear evidence of disease.    Tolerance of chemotherapy has been excellent.    Will set her back up with Dr. Albright and potentially surg onc for consolidative therapy (CRS/HIPEC) given well controlled disease.    Presents today for cycle 9.  Will hold oxaliplatin starting today and proceed with 5-FU + bevacizumab maintenance.  - Labs reviewed, adequate for treatment.   - Proceed with cycle 9 today.      Unfortunately she is not eligible for our maintenance BGB CRC study because she does not have measurable disease.    Follow up: 2 weeks for cycle 10. Repeat imaging after cycle 10.    Cold sensitivity/neoplasm related fatigue:  2/2 chemotherapy. Will stop oxaliplatin beginning with cycle 9 to prevent persistent paresthesias.    Constipation/Gas:   Advised her to take 2 stool softeners a day with Miralax daily to have regular bowel movements.   This has helped.    Patient is in agreement with the proposed treatment plan. All questions were answered to the patient's satisfaction. Pt knows to call clinic if anything is needed before the next clinic visit.      Kemar Zaragoza MD  Hematology/Oncology  [facility]          Med Onc Chart Routing      Follow up with physician 4 weeks. for 5-FU + Avastin   Follow up with KRISTEN 2 weeks. for 5-FU + Avastin   Infusion scheduling note    Injection scheduling note    Labs CBC, CMP, CEA and urinalysis   Scheduling:  Preferred lab:  Lab interval: every 2 weeks     Imaging    Pharmacy appointment    Other referrals

## 2024-06-18 ENCOUNTER — OFFICE VISIT (OUTPATIENT)
Dept: PSYCHIATRY | Facility: CLINIC | Age: 74
End: 2024-06-18
Payer: MEDICARE

## 2024-06-18 VITALS
HEART RATE: 80 BPM | SYSTOLIC BLOOD PRESSURE: 128 MMHG | DIASTOLIC BLOOD PRESSURE: 58 MMHG | RESPIRATION RATE: 18 BRPM | HEIGHT: 66 IN | OXYGEN SATURATION: 98 % | WEIGHT: 176 LBS | BODY MASS INDEX: 28.28 KG/M2

## 2024-06-18 DIAGNOSIS — Z65.8 PSYCHOSOCIAL STRESSORS: ICD-10-CM

## 2024-06-18 DIAGNOSIS — Z86.59 HISTORY OF GAMBLING: ICD-10-CM

## 2024-06-18 DIAGNOSIS — F31.9 BIPOLAR 1 DISORDER: ICD-10-CM

## 2024-06-18 DIAGNOSIS — F41.1 GAD (GENERALIZED ANXIETY DISORDER): ICD-10-CM

## 2024-06-18 DIAGNOSIS — F31.9 BIPOLAR DEPRESSION: Primary | ICD-10-CM

## 2024-06-18 PROCEDURE — 99999 PR PBB SHADOW E&M-EST. PATIENT-LVL III: CPT | Mod: PBBFAC,,, | Performed by: STUDENT IN AN ORGANIZED HEALTH CARE EDUCATION/TRAINING PROGRAM

## 2024-06-18 PROCEDURE — 1159F MED LIST DOCD IN RCRD: CPT | Mod: CPTII,S$GLB,, | Performed by: STUDENT IN AN ORGANIZED HEALTH CARE EDUCATION/TRAINING PROGRAM

## 2024-06-18 PROCEDURE — 3074F SYST BP LT 130 MM HG: CPT | Mod: CPTII,S$GLB,, | Performed by: STUDENT IN AN ORGANIZED HEALTH CARE EDUCATION/TRAINING PROGRAM

## 2024-06-18 PROCEDURE — 1160F RVW MEDS BY RX/DR IN RCRD: CPT | Mod: CPTII,S$GLB,, | Performed by: STUDENT IN AN ORGANIZED HEALTH CARE EDUCATION/TRAINING PROGRAM

## 2024-06-18 PROCEDURE — 1101F PT FALLS ASSESS-DOCD LE1/YR: CPT | Mod: CPTII,S$GLB,, | Performed by: STUDENT IN AN ORGANIZED HEALTH CARE EDUCATION/TRAINING PROGRAM

## 2024-06-18 PROCEDURE — 3008F BODY MASS INDEX DOCD: CPT | Mod: CPTII,S$GLB,, | Performed by: STUDENT IN AN ORGANIZED HEALTH CARE EDUCATION/TRAINING PROGRAM

## 2024-06-18 PROCEDURE — 1126F AMNT PAIN NOTED NONE PRSNT: CPT | Mod: CPTII,S$GLB,, | Performed by: STUDENT IN AN ORGANIZED HEALTH CARE EDUCATION/TRAINING PROGRAM

## 2024-06-18 PROCEDURE — 3288F FALL RISK ASSESSMENT DOCD: CPT | Mod: CPTII,S$GLB,, | Performed by: STUDENT IN AN ORGANIZED HEALTH CARE EDUCATION/TRAINING PROGRAM

## 2024-06-18 PROCEDURE — 99214 OFFICE O/P EST MOD 30 MIN: CPT | Mod: S$GLB,,, | Performed by: STUDENT IN AN ORGANIZED HEALTH CARE EDUCATION/TRAINING PROGRAM

## 2024-06-18 PROCEDURE — 3078F DIAST BP <80 MM HG: CPT | Mod: CPTII,S$GLB,, | Performed by: STUDENT IN AN ORGANIZED HEALTH CARE EDUCATION/TRAINING PROGRAM

## 2024-06-18 RX ORDER — ESCITALOPRAM OXALATE 20 MG/1
20 TABLET ORAL DAILY
Qty: 30 TABLET | Refills: 4 | Status: SHIPPED | OUTPATIENT
Start: 2024-06-18 | End: 2025-06-18

## 2024-06-18 RX ORDER — QUETIAPINE FUMARATE 200 MG/1
200 TABLET, FILM COATED ORAL NIGHTLY
Qty: 30 TABLET | Refills: 3 | Status: SHIPPED | OUTPATIENT
Start: 2024-06-18 | End: 2024-06-18

## 2024-06-18 RX ORDER — ESCITALOPRAM OXALATE 20 MG/1
20 TABLET ORAL NIGHTLY
Qty: 30 TABLET | Refills: 4 | Status: SHIPPED | OUTPATIENT
Start: 2024-06-18 | End: 2024-06-18

## 2024-06-18 RX ORDER — QUETIAPINE FUMARATE 200 MG/1
200 TABLET, FILM COATED ORAL NIGHTLY
Qty: 30 TABLET | Refills: 3 | Status: SHIPPED | OUTPATIENT
Start: 2024-06-18

## 2024-06-18 NOTE — PROGRESS NOTES
"  06/18/2024  1:21 PM  Karin Maguire  [id number]    Outpatient Psychiatry Follow-Up Visit (MD/NP)    6/18/2024    Clinical Status of Patient:  Outpatient (Ambulatory)    Chief Complaint:  Karin Maguire is a 74 y.o. female who presents today for follow-up of depression and anxiety.  Met with patient.        Interval History and Content of Current Session:  Interim Events/Subjective Report/Content of Current Session:   MDD with mixed features vs. Unspecified bipolar disorder (pt poor historian)  LUANNE  Insomnia  Gambling disorder  Obesity  Psychosocial stressors     Abnormal movements          Reports has a new dog which she is enjoying. She is continuing chemotherapy, has 3 more treatments left, "nothing shows up on the CAT scans, my oncologist said we are further along, everything is going great... just a little more fatigue, and upset stomach... I've done 8 treatments." She is relying on her grover for coping, also time with her dog and doing gardening. She is possibly going to have another operation.    She states her mood has been "good," no recent depression, "I won't let any negativity around me, I just walk away, I have to stay positive." LUANNE symptoms are minimal.        Stressors: health (cancer, stage IV)      Psychiatric Review Of Systems - Is patient experiencing or having changes in:  sleep: yes, seroquel partially effective  appetite: no  weight: no  energy/anergy: no  Interest/pleasure/anhedonia: no  Anxiety: improving  panic: no  Guilty/hopelessness/worthlessness: no  concentration: no  S.I.B.s/risky behavior: no  Irritability: no  Substance abuse: no  Racing thoughts: no  Impulsive behaviors: no  Paranoia: no  AVH: no  Gambling: less, but  complicates this as he continues to [employer], "he says he needs it for stress, but a lot of the times I tell him no, I don't want to go."  Michelle/hypomania: no          Review of Systems   Review of Systems   Constitutional:  Negative for chills and fever. "   HENT:  Negative for hearing loss.    Eyes:  Negative for blurred vision and double vision.   Respiratory:  Negative for shortness of breath.    Cardiovascular:  Negative for chest pain.   Gastrointestinal:  Negative for constipation, diarrhea, nausea and vomiting.   Genitourinary:  Negative for dysuria.   Musculoskeletal:  Negative for back pain and joint pain.   Skin:  Negative for rash.   Neurological:  Negative for dizziness and headaches.   Endo/Heme/Allergies:  Negative for environmental allergies.       Past Medical, Family and Social History: The patient's past medical, family and social history have been reviewed and updated as appropriate within the electronic medical record - see encounter notes.    Social History     Socioeconomic History    Marital status:    Tobacco Use    Smoking status: Former     Current packs/day: 0.00     Average packs/day: 1 pack/day for 41.7 years (41.7 ttl pk-yrs)     Types: Cigarettes     Start date: 3/30/1982     Quit date: 2023     Years since quittin.4    Smokeless tobacco: Never   Substance and Sexual Activity    Alcohol use: Yes     Comment: Socially-2 or 3 times a year-6 or 7 drinks    Drug use: No    Sexual activity: Yes     Partners: Male     Birth control/protection: Surgical     Comment:      Social Determinants of Health     Financial Resource Strain: Low Risk  (2024)    Overall Financial Resource Strain (CARDIA)     Difficulty of Paying Living Expenses: Not hard at all   Food Insecurity: No Food Insecurity (2024)    Hunger Vital Sign     Worried About Running Out of Food in the Last Year: Never true     Ran Out of Food in the Last Year: Never true   Transportation Needs: No Transportation Needs (2024)    PRAPARE - Transportation     Lack of Transportation (Medical): No     Lack of Transportation (Non-Medical): No   Physical Activity: Inactive (2024)    Exercise Vital Sign     Days of Exercise per Week: 0 days     Minutes  of Exercise per Session: 0 min   Stress: No Stress Concern Present (1/24/2024)    [employer] [place] [employer] - Occupational Stress Questionnaire     Feeling of Stress : Not at all   Housing Stability: Low Risk  (1/24/2024)    Housing Stability Vital Sign     Unable to Pay for Housing in the Last Year: No     Number of Places Lived in the Last Year: 1     Unstable Housing in the Last Year: No         Compliance: yes    Side effects: None    Risk Parameters:  Patient reports no suicidal ideation  Patient reports no homicidal ideation  Patient reports no self-injurious behavior  Patient reports no violent behavior        Exam (detailed: at least 9 elements; comprehensive: all 15 elements)     Vitals:    06/18/24 0909   BP: (!) 128/58   Pulse: 80   Resp: 18       Wt Readings from Last 5 Encounters:   06/18/24 79.8 kg (176 lb)   06/17/24 78.7 kg (173 lb 6.3 oz)   06/03/24 79.5 kg (175 lb 4.3 oz)   05/20/24 78.7 kg (173 lb 8 oz)   05/06/24 77.5 kg (170 lb 13.7 oz)         CONSTITUTIONAL  General Appearance: unremarkable, age appropriate    MUSCULOSKELETAL  Muscle Strength and Tone:no tremor, no tic,   Abnormal Involuntary Movements: yes, repetitive movements noted (head, neck, legs, trunk)  Gait and Station: non-ataxic    PSYCHIATRIC   Level of Consciousness: awake and alert   Orientation: person, place and situation  Grooming: Casually dressed and Well groomed  Psychomotor Behavior: normal, cooperative  Speech: normal tone, normal rate, normal pitch, normal volume  Language: grossly intact  Mood: good  Affect: Consistent with mood  Thought Process: linear, logical  Associations: intact   Thought Content: DENIES suicidal ideation, DENIES homicidal ideation, and no delusion  Perceptions: denies hallucinations  Memory: Able to recall past events, Remote intact and Recent intact  Attention:Attends to interview without distraction  Fund of Knowledge: Aware of current events and Vocabulary appropriate   Estimate  "if Intelligence:  Average based on work/education history, vocabulary and mental status exam  Insight: has awareness of illness  Judgment: behavior is adequate to circumstances          Assessment and Diagnosis   Status/Progress: Based on the examination today, the patient's problem(s) is/are improved.  New problems have not been presented today.   Co-morbidities are complicating management of the primary condition.          Impresssion/Assessment:  MDD with mixed features vs. Unspecified bipolar disorder (pt poor historian)  LUANNE  Insomnia  Gambling disorder  Obesity  Psychosocial stressors     Abnormal movements    Chronic pain         Plan:    Pt much improved. Appears very stable. She declines changes to medications.       MDD with mixed features vs. Unspecified bipolar disorder (pt poor historian)  - continue lamictal 300 mg PO qd  - continue lexapro 20 mg PO qd  - continue seroquel 200 mg PO qhs  - strongly recommended psychotherapy, provided with resources       Insomnia  - reports has not been using CPAP  - pt counseled        Gambling disorder  - pt counseled  - consider meetings/therapy; patient declines        LUANNE  - continue hydroxyzine  mg PO q 6 hours PRN  - lexapro as above  - consider psychotherapy, patient declines        Psychosocial stressors  - pt counseled  - strongly recommended psychotherapy, couples counseling to patient, patient refusing        Obesity  -  tapered off seroquel        Abnormal movements (?TD)  - frequent non-stereotyped movements of hands and limbs, neck, trunk; patient states she has always been "fidgety" like this since childhood, reports she has seen neurology about these movements before in past, pt continues to refuse referral, pt states "I've been this way all my life"  - declines changing seroquel despite risk of TD/AIM, pt has been counseled extensively on risks  - AIMS: 0, when testing movements stop           Chronic pain  - pt counseled        - Instructed " patient to keep all scheduled appointments, take medications as prescribed and abstain from substance abuse. Instructed to call 911 or present to ER for emergency including SI or HI.    - Discussed diagnosis, risks and benefits of proposed treatment above vs alternative treatments vs no treatment, and potential side effects of these treatments. The patient expresses understanding of the above and displays the capacity to agree with this treatment given said understanding. Patient also agrees that, currently, the benefits outweigh the risks and would like to pursue treatment at this time.         Estevan Zaman III, MD    Return to Clinic: 3-4 m

## 2024-06-19 ENCOUNTER — INFUSION (OUTPATIENT)
Dept: INFUSION THERAPY | Facility: HOSPITAL | Age: 74
End: 2024-06-19
Payer: MEDICARE

## 2024-06-19 VITALS
RESPIRATION RATE: 16 BRPM | OXYGEN SATURATION: 95 % | HEART RATE: 74 BPM | SYSTOLIC BLOOD PRESSURE: 143 MMHG | TEMPERATURE: 99 F | DIASTOLIC BLOOD PRESSURE: 66 MMHG

## 2024-06-19 DIAGNOSIS — C18.2 MALIGNANT NEOPLASM OF ASCENDING COLON: Primary | ICD-10-CM

## 2024-06-19 PROCEDURE — A4216 STERILE WATER/SALINE, 10 ML: HCPCS | Performed by: INTERNAL MEDICINE

## 2024-06-19 PROCEDURE — 63600175 PHARM REV CODE 636 W HCPCS: Performed by: INTERNAL MEDICINE

## 2024-06-19 PROCEDURE — 25000003 PHARM REV CODE 250: Performed by: INTERNAL MEDICINE

## 2024-06-19 RX ORDER — SODIUM CHLORIDE 0.9 % (FLUSH) 0.9 %
10 SYRINGE (ML) INJECTION
Status: DISCONTINUED | OUTPATIENT
Start: 2024-06-19 | End: 2024-06-19 | Stop reason: HOSPADM

## 2024-06-19 RX ORDER — PROCHLORPERAZINE EDISYLATE 5 MG/ML
5 INJECTION INTRAMUSCULAR; INTRAVENOUS ONCE AS NEEDED
Status: DISCONTINUED | OUTPATIENT
Start: 2024-06-19 | End: 2024-06-19 | Stop reason: HOSPADM

## 2024-06-19 RX ORDER — HEPARIN 100 UNIT/ML
500 SYRINGE INTRAVENOUS
Status: DISCONTINUED | OUTPATIENT
Start: 2024-06-19 | End: 2024-06-19 | Stop reason: HOSPADM

## 2024-06-19 RX ADMIN — Medication 10 ML: at 10:06

## 2024-06-19 RX ADMIN — HEPARIN 500 UNITS: 100 SYRINGE at 10:06

## 2024-06-25 ENCOUNTER — PATIENT MESSAGE (OUTPATIENT)
Dept: HEMATOLOGY/ONCOLOGY | Facility: CLINIC | Age: 74
End: 2024-06-25
Payer: MEDICARE

## 2024-07-01 ENCOUNTER — LAB VISIT (OUTPATIENT)
Dept: LAB | Facility: HOSPITAL | Age: 74
End: 2024-07-01
Attending: REGISTERED NURSE
Payer: MEDICARE

## 2024-07-01 DIAGNOSIS — C18.2 MALIGNANT NEOPLASM OF ASCENDING COLON: ICD-10-CM

## 2024-07-01 LAB
ALBUMIN SERPL BCP-MCNC: 3.3 G/DL (ref 3.5–5.2)
ALP SERPL-CCNC: 82 U/L (ref 55–135)
ALT SERPL W/O P-5'-P-CCNC: 8 U/L (ref 10–44)
ANION GAP SERPL CALC-SCNC: 6 MMOL/L (ref 8–16)
AST SERPL-CCNC: 17 U/L (ref 10–40)
BACTERIA #/AREA URNS HPF: ABNORMAL /HPF
BILIRUB SERPL-MCNC: 0.3 MG/DL (ref 0.1–1)
BILIRUB UR QL STRIP: NEGATIVE
BUN SERPL-MCNC: 13 MG/DL (ref 8–23)
CALCIUM SERPL-MCNC: 9.3 MG/DL (ref 8.7–10.5)
CEA SERPL-MCNC: 3 NG/ML (ref 0–5)
CHLORIDE SERPL-SCNC: 107 MMOL/L (ref 95–110)
CLARITY UR: CLEAR
CO2 SERPL-SCNC: 25 MMOL/L (ref 23–29)
COLOR UR: YELLOW
CREAT SERPL-MCNC: 0.8 MG/DL (ref 0.5–1.4)
ERYTHROCYTE [DISTWIDTH] IN BLOOD BY AUTOMATED COUNT: 18.6 % (ref 11.5–14.5)
EST. GFR  (NO RACE VARIABLE): >60 ML/MIN/1.73 M^2
GLUCOSE SERPL-MCNC: 101 MG/DL (ref 70–110)
GLUCOSE UR QL STRIP: NEGATIVE
HCT VFR BLD AUTO: 34.3 % (ref 37–48.5)
HGB BLD-MCNC: 11.1 G/DL (ref 12–16)
HGB UR QL STRIP: NEGATIVE
HYALINE CASTS #/AREA URNS LPF: 6 /LPF
IMM GRANULOCYTES # BLD AUTO: 0.01 K/UL (ref 0–0.04)
KETONES UR QL STRIP: NEGATIVE
LEUKOCYTE ESTERASE UR QL STRIP: ABNORMAL
MCH RBC QN AUTO: 29.3 PG (ref 27–31)
MCHC RBC AUTO-ENTMCNC: 32.4 G/DL (ref 32–36)
MCV RBC AUTO: 91 FL (ref 82–98)
MICROSCOPIC COMMENT: ABNORMAL
NEUTROPHILS # BLD AUTO: 2.1 K/UL (ref 1.8–7.7)
NITRITE UR QL STRIP: NEGATIVE
PH UR STRIP: 6 [PH] (ref 5–8)
PLATELET # BLD AUTO: 185 K/UL (ref 150–450)
PMV BLD AUTO: 9.4 FL (ref 9.2–12.9)
POTASSIUM SERPL-SCNC: 4.5 MMOL/L (ref 3.5–5.1)
PROT SERPL-MCNC: 6.7 G/DL (ref 6–8.4)
PROT UR QL STRIP: NEGATIVE
RBC # BLD AUTO: 3.79 M/UL (ref 4–5.4)
RBC #/AREA URNS HPF: 1 /HPF (ref 0–4)
SODIUM SERPL-SCNC: 138 MMOL/L (ref 136–145)
SP GR UR STRIP: 1.01 (ref 1–1.03)
URN SPEC COLLECT METH UR: ABNORMAL
UROBILINOGEN UR STRIP-ACNC: NEGATIVE EU/DL
WBC # BLD AUTO: 4.06 K/UL (ref 3.9–12.7)
WBC #/AREA URNS HPF: 7 /HPF (ref 0–5)
WBC CLUMPS URNS QL MICRO: ABNORMAL

## 2024-07-01 PROCEDURE — 81000 URINALYSIS NONAUTO W/SCOPE: CPT | Performed by: REGISTERED NURSE

## 2024-07-01 PROCEDURE — 36415 COLL VENOUS BLD VENIPUNCTURE: CPT | Performed by: REGISTERED NURSE

## 2024-07-01 PROCEDURE — 80053 COMPREHEN METABOLIC PANEL: CPT | Performed by: REGISTERED NURSE

## 2024-07-01 PROCEDURE — 82378 CARCINOEMBRYONIC ANTIGEN: CPT | Performed by: REGISTERED NURSE

## 2024-07-01 PROCEDURE — 85027 COMPLETE CBC AUTOMATED: CPT | Performed by: REGISTERED NURSE

## 2024-07-02 ENCOUNTER — OFFICE VISIT (OUTPATIENT)
Dept: ENDOCRINOLOGY | Facility: CLINIC | Age: 74
End: 2024-07-02
Payer: MEDICARE

## 2024-07-02 VITALS
SYSTOLIC BLOOD PRESSURE: 147 MMHG | HEART RATE: 80 BPM | RESPIRATION RATE: 17 BRPM | HEIGHT: 66 IN | BODY MASS INDEX: 27.93 KG/M2 | WEIGHT: 173.81 LBS | DIASTOLIC BLOOD PRESSURE: 73 MMHG

## 2024-07-02 DIAGNOSIS — E04.2 MULTINODULAR GOITER: Primary | ICD-10-CM

## 2024-07-02 PROCEDURE — 3008F BODY MASS INDEX DOCD: CPT | Mod: CPTII,S$GLB,, | Performed by: STUDENT IN AN ORGANIZED HEALTH CARE EDUCATION/TRAINING PROGRAM

## 2024-07-02 PROCEDURE — 3078F DIAST BP <80 MM HG: CPT | Mod: CPTII,S$GLB,, | Performed by: STUDENT IN AN ORGANIZED HEALTH CARE EDUCATION/TRAINING PROGRAM

## 2024-07-02 PROCEDURE — 99214 OFFICE O/P EST MOD 30 MIN: CPT | Mod: S$GLB,,, | Performed by: STUDENT IN AN ORGANIZED HEALTH CARE EDUCATION/TRAINING PROGRAM

## 2024-07-02 PROCEDURE — 1159F MED LIST DOCD IN RCRD: CPT | Mod: CPTII,S$GLB,, | Performed by: STUDENT IN AN ORGANIZED HEALTH CARE EDUCATION/TRAINING PROGRAM

## 2024-07-02 PROCEDURE — 3077F SYST BP >= 140 MM HG: CPT | Mod: CPTII,S$GLB,, | Performed by: STUDENT IN AN ORGANIZED HEALTH CARE EDUCATION/TRAINING PROGRAM

## 2024-07-02 PROCEDURE — G2211 COMPLEX E/M VISIT ADD ON: HCPCS | Mod: S$GLB,,, | Performed by: STUDENT IN AN ORGANIZED HEALTH CARE EDUCATION/TRAINING PROGRAM

## 2024-07-02 PROCEDURE — 99999 PR PBB SHADOW E&M-EST. PATIENT-LVL IV: CPT | Mod: PBBFAC,,, | Performed by: STUDENT IN AN ORGANIZED HEALTH CARE EDUCATION/TRAINING PROGRAM

## 2024-07-02 PROCEDURE — 1160F RVW MEDS BY RX/DR IN RCRD: CPT | Mod: CPTII,S$GLB,, | Performed by: STUDENT IN AN ORGANIZED HEALTH CARE EDUCATION/TRAINING PROGRAM

## 2024-07-02 NOTE — ASSESSMENT & PLAN NOTE
I had reviewed management options including observation, FNA or surgery.  In Nov 2021 we opted to do US surveillance  Since then, nodules had grown slightly and given the ones on L side are solid/hypoechoic and > 1 cm, I strongly suggested undergoing FNA - she underwent in [place] - benign  She is due for repeat Thyroid US - will arrange  Noted recent normal TSH

## 2024-07-02 NOTE — PROGRESS NOTES
Subjective:      Patient ID: Karin Maguire is a 74 y.o. female.    Chief Complaint:  Follow-up and Thyroid Nodule        History of Present Illness  This is a 74 y.o. female. with a past medical history of anxiety, MDD, HLD, colon cancer on chemotherapy here for thyroid nodules.    Thyroid nodules  Found incidentally on CTA neck in Oct 2021  Confirmed with US in Nov 2021    Lab Results   Component Value Date    TSH 1.334 04/19/2024         She reports she was told she had thyroid nodules more than 10 years ago by outside provider and was following at that time with no significant change.    Father had thyroidectomy - benign per patient  Mother had thyroid issues but unsure of condition    No radiation therapy in childhood    No neck swelling, dysphagia, dysphonia.    Outside thyroid FNA  Dec 2022  Left lower Thyroid: Benign follicular nodule         Thyroid US (Nov 16, 2022)  The thyroid is normal in size.  Right lobe of the thyroid measures 4.7 x 1.1 x 2.5 cm.  Left lobe of the thyroid measures 4.2 x 1.2 x 1.7 cm.  Normal thyroid parenchyma.  Solid nodule with cystic component in the mid right thyroid lobe measuring 1.1 x 0.7 x 1.2 cm, stable.  Solid hypoechoic nodule in the superior aspect of the left thyroid lobe measuring 1.4 x 1.1 x 1.0 cm.  This has slightly increased in size.  Solid isoechoic nodule in the mid aspect of the left thyroid lobe measuring 1.6 x 0.9 x 1.1 cm.  This is slightly increased in size.  Cystic nodule in the mid aspect of the left thyroid lobe measures 0.8 cm.  Solid nodule at the lower aspect of the left thyroid lobe measures 0.4 cm.  Cervical lymph nodes demonstrate normal morphology and size.  Impression:  Imaging findings in keeping with a multinodular thyroid goiter.  The 2 larger nodules in the upper and mid aspect of the left thyroid lobe having increased in size as compared to the previous examination.  The solid hypoechoic nodule measuring 1.4 cm in the superior aspect of  the left thyroid lobe is characterized as a TR 5 nodule and further evaluation with FNA is recommended.  The solid isoechoic nodule in the mid aspect of the left thyroid lobe is characterized as a TR 3 nodule and follow-up at 1 3 and 5 years is recommended based upon size.      Thyroid US (May 26, 2022)  The thyroid is normal in size.  Right lobe of the thyroid measures 4.6 x 1.5 x 1.9 cm.  Left lobe of the thyroid measures 4.5 x 1.4 x 1.8 cm.  Normal thyroid parenchyma.  Complex solid and cystic nodule in the mid aspect of the right thyroid lobe measures 1.3 x 1.2 x 0.8 cm, containing peripheral vascularity.  Solid hypoechoic nodule in the upper pole of the left thyroid lobe measuring 1.0 x 1.1 x 1.1 cm, with ill-defined margins.  Solid isoechoic nodule in the midpole of the left thyroid lobe measuring 1.3 x 1.2 x 1.5 cm with ill-defined margins.  Adjacent cystic nodule in the midpole of the left thyroid lobe measuring 0.7 cm.  Isoechoic solid nodule in the inferior aspect of the left thyroid lobe measuring 0.5 cm.  Cervical lymph nodes demonstrate normal morphology and size.  Impression:  Imaging findings compatible with a multinodular thyroid goiter.  The solid 1.1 cm hypoechoic nodule in the upper pole of the left thyroid lobe is characterized as a TR 5 nodule and further evaluation with FNA is recommended.  The solid isoechoic nodule measuring 1.5 cm is characterized as a TR 3 nodule and follow-up at 1, 3 and 5 years is recommended.       Thyroid US Nov 2021  The right hemithyroid measures 4.4 x 1.4 x 2.0 cm and the left 4.6 x 1.3 x 1.7 cm.  The thyroid isthmus measures 3 mm.  The thyroid parenchyma is homogeneous without hypervascularity.  Multiple nodules are present bilaterally.  On the right a mixed cystic and solid nodule is present measuring 1.2 cm.  On the left, there is a solid hypoechoic nodule within the upper pole measuring 1.2 cm.  There is a solid isoechoic nodule within the midpole measuring 1.6  "cm.  No evidence for cervical lymphadenopathy.  Impression:  Multinodular thyroid gland.  The 2 left lita thyroid nodules meet criteria for follow-up.           Review of Systems  As above    Social and family history reviewed  Current medications and allergies reviewed    Objective:   BP (!) 147/73   Pulse 80   Resp 17   Ht 5' 6" (1.676 m)   Wt 78.8 kg (173 lb 12.8 oz)   BMI 28.05 kg/m²   Physical Exam   Thyroid normal in size and consistency    BP Readings from Last 1 Encounters:   07/02/24 (!) 147/73      Wt Readings from Last 1 Encounters:   07/02/24 0813 78.8 kg (173 lb 12.8 oz)     Body mass index is 28.05 kg/m².    Lab Review:   Lab Results   Component Value Date    HGBA1C 5.7 (H) 11/04/2021     Lab Results   Component Value Date    CHOL 203 (H) 04/19/2024     (H) 04/19/2024    LDLCALC 80.0 04/19/2024    TRIG 75 04/19/2024    CHOLHDL 53.2 (H) 04/19/2024     Lab Results   Component Value Date     07/01/2024    K 4.5 07/01/2024     07/01/2024    CO2 25 07/01/2024     07/01/2024    BUN 13 07/01/2024    CREATININE 0.8 07/01/2024    CALCIUM 9.3 07/01/2024    PROT 6.7 07/01/2024    ALBUMIN 3.3 (L) 07/01/2024    BILITOT 0.3 07/01/2024    ALKPHOS 82 07/01/2024    AST 17 07/01/2024    ALT 8 (L) 07/01/2024    ANIONGAP 6 (L) 07/01/2024    ESTGFRAFRICA >60 07/28/2022    EGFRNONAA >60 07/28/2022    TSH 1.334 04/19/2024       All pertinent labs reviewed    Assessment and Plan     Multinodular goiter  I had reviewed management options including observation, FNA or surgery.  In Nov 2021 we opted to do US surveillance  Since then, nodules had grown slightly and given the ones on L side are solid/hypoechoic and > 1 cm, I strongly suggested undergoing FNA - she underwent in [place] - benign  She is due for repeat Thyroid US - will arrange  Noted recent normal TSH      F/u 1 year     Andrade Ty MD  Endocrinology      32 minutes of total time spent on the encounter, which includes face to " face time and non-face to face time preparing to see the patient (e.g., review of tests), obtaining and/or reviewing separately obtained history, documenting clinical information in the electronic or other health record, independently interpreting results (not separately reported) and communicating results to the patient/family/caregiver, or care coordination (not separately reported).

## 2024-07-03 ENCOUNTER — OFFICE VISIT (OUTPATIENT)
Dept: HEMATOLOGY/ONCOLOGY | Facility: CLINIC | Age: 74
End: 2024-07-03
Payer: MEDICARE

## 2024-07-03 ENCOUNTER — INFUSION (OUTPATIENT)
Dept: INFUSION THERAPY | Facility: HOSPITAL | Age: 74
End: 2024-07-03
Payer: MEDICARE

## 2024-07-03 VITALS
HEIGHT: 66 IN | SYSTOLIC BLOOD PRESSURE: 159 MMHG | HEART RATE: 71 BPM | DIASTOLIC BLOOD PRESSURE: 72 MMHG | BODY MASS INDEX: 27.93 KG/M2 | WEIGHT: 173.81 LBS | OXYGEN SATURATION: 100 % | RESPIRATION RATE: 18 BRPM

## 2024-07-03 VITALS
BODY MASS INDEX: 27.93 KG/M2 | DIASTOLIC BLOOD PRESSURE: 74 MMHG | OXYGEN SATURATION: 100 % | HEIGHT: 66 IN | WEIGHT: 173.81 LBS | SYSTOLIC BLOOD PRESSURE: 154 MMHG | HEART RATE: 78 BPM | TEMPERATURE: 99 F

## 2024-07-03 DIAGNOSIS — D84.821 IMMUNODEFICIENCY SECONDARY TO CHEMOTHERAPY: ICD-10-CM

## 2024-07-03 DIAGNOSIS — D84.81 IMMUNODEFICIENCY SECONDARY TO NEOPLASM: ICD-10-CM

## 2024-07-03 DIAGNOSIS — Z79.899 IMMUNODEFICIENCY SECONDARY TO CHEMOTHERAPY: ICD-10-CM

## 2024-07-03 DIAGNOSIS — R68.89 COLD SENSITIVITY: ICD-10-CM

## 2024-07-03 DIAGNOSIS — T45.1X5A IMMUNODEFICIENCY SECONDARY TO CHEMOTHERAPY: ICD-10-CM

## 2024-07-03 DIAGNOSIS — D49.9 IMMUNODEFICIENCY SECONDARY TO NEOPLASM: ICD-10-CM

## 2024-07-03 DIAGNOSIS — C18.2 MALIGNANT NEOPLASM OF ASCENDING COLON: Primary | ICD-10-CM

## 2024-07-03 DIAGNOSIS — R97.8 OTHER ABNORMAL TUMOR MARKERS: ICD-10-CM

## 2024-07-03 DIAGNOSIS — R53.0 NEOPLASTIC MALIGNANT RELATED FATIGUE: ICD-10-CM

## 2024-07-03 DIAGNOSIS — C78.6 MALIGNANT NEOPLASM METASTATIC TO OMENTUM: ICD-10-CM

## 2024-07-03 PROCEDURE — 1126F AMNT PAIN NOTED NONE PRSNT: CPT | Mod: CPTII,S$GLB,, | Performed by: PHYSICIAN ASSISTANT

## 2024-07-03 PROCEDURE — 3008F BODY MASS INDEX DOCD: CPT | Mod: CPTII,S$GLB,, | Performed by: PHYSICIAN ASSISTANT

## 2024-07-03 PROCEDURE — 99999 PR PBB SHADOW E&M-EST. PATIENT-LVL III: CPT | Mod: PBBFAC,,, | Performed by: PHYSICIAN ASSISTANT

## 2024-07-03 PROCEDURE — 3077F SYST BP >= 140 MM HG: CPT | Mod: CPTII,S$GLB,, | Performed by: PHYSICIAN ASSISTANT

## 2024-07-03 PROCEDURE — 96413 CHEMO IV INFUSION 1 HR: CPT

## 2024-07-03 PROCEDURE — 1101F PT FALLS ASSESS-DOCD LE1/YR: CPT | Mod: CPTII,S$GLB,, | Performed by: PHYSICIAN ASSISTANT

## 2024-07-03 PROCEDURE — 1159F MED LIST DOCD IN RCRD: CPT | Mod: CPTII,S$GLB,, | Performed by: PHYSICIAN ASSISTANT

## 2024-07-03 PROCEDURE — 3078F DIAST BP <80 MM HG: CPT | Mod: CPTII,S$GLB,, | Performed by: PHYSICIAN ASSISTANT

## 2024-07-03 PROCEDURE — G2211 COMPLEX E/M VISIT ADD ON: HCPCS | Mod: S$GLB,,, | Performed by: PHYSICIAN ASSISTANT

## 2024-07-03 PROCEDURE — 25000003 PHARM REV CODE 250: Performed by: PHYSICIAN ASSISTANT

## 2024-07-03 PROCEDURE — 99215 OFFICE O/P EST HI 40 MIN: CPT | Mod: S$GLB,,, | Performed by: PHYSICIAN ASSISTANT

## 2024-07-03 PROCEDURE — 63600175 PHARM REV CODE 636 W HCPCS: Performed by: PHYSICIAN ASSISTANT

## 2024-07-03 PROCEDURE — 96367 TX/PROPH/DG ADDL SEQ IV INF: CPT

## 2024-07-03 PROCEDURE — 96416 CHEMO PROLONG INFUSE W/PUMP: CPT

## 2024-07-03 PROCEDURE — 3288F FALL RISK ASSESSMENT DOCD: CPT | Mod: CPTII,S$GLB,, | Performed by: PHYSICIAN ASSISTANT

## 2024-07-03 RX ORDER — HEPARIN 100 UNIT/ML
500 SYRINGE INTRAVENOUS
Status: CANCELLED | OUTPATIENT
Start: 2024-07-03

## 2024-07-03 RX ORDER — PROCHLORPERAZINE EDISYLATE 5 MG/ML
5 INJECTION INTRAMUSCULAR; INTRAVENOUS ONCE AS NEEDED
Status: CANCELLED | OUTPATIENT
Start: 2024-07-04

## 2024-07-03 RX ORDER — EPINEPHRINE 0.3 MG/.3ML
0.3 INJECTION SUBCUTANEOUS ONCE AS NEEDED
Status: DISCONTINUED | OUTPATIENT
Start: 2024-07-03 | End: 2024-07-03 | Stop reason: HOSPADM

## 2024-07-03 RX ORDER — DIPHENHYDRAMINE HYDROCHLORIDE 50 MG/ML
50 INJECTION INTRAMUSCULAR; INTRAVENOUS ONCE AS NEEDED
Status: CANCELLED | OUTPATIENT
Start: 2024-07-03

## 2024-07-03 RX ORDER — HEPARIN 100 UNIT/ML
500 SYRINGE INTRAVENOUS
Status: CANCELLED | OUTPATIENT
Start: 2024-07-04

## 2024-07-03 RX ORDER — DIPHENHYDRAMINE HYDROCHLORIDE 50 MG/ML
50 INJECTION INTRAMUSCULAR; INTRAVENOUS ONCE AS NEEDED
Status: DISCONTINUED | OUTPATIENT
Start: 2024-07-03 | End: 2024-07-03 | Stop reason: HOSPADM

## 2024-07-03 RX ORDER — EPINEPHRINE 0.3 MG/.3ML
0.3 INJECTION SUBCUTANEOUS ONCE AS NEEDED
Status: CANCELLED | OUTPATIENT
Start: 2024-07-03

## 2024-07-03 RX ORDER — HEPARIN 100 UNIT/ML
500 SYRINGE INTRAVENOUS
Status: DISCONTINUED | OUTPATIENT
Start: 2024-07-03 | End: 2024-07-03 | Stop reason: HOSPADM

## 2024-07-03 RX ORDER — PROCHLORPERAZINE EDISYLATE 5 MG/ML
5 INJECTION INTRAMUSCULAR; INTRAVENOUS ONCE AS NEEDED
Status: CANCELLED | OUTPATIENT
Start: 2024-07-03

## 2024-07-03 RX ORDER — PROCHLORPERAZINE EDISYLATE 5 MG/ML
5 INJECTION INTRAMUSCULAR; INTRAVENOUS ONCE AS NEEDED
Status: DISCONTINUED | OUTPATIENT
Start: 2024-07-03 | End: 2024-07-03 | Stop reason: HOSPADM

## 2024-07-03 RX ORDER — SODIUM CHLORIDE 0.9 % (FLUSH) 0.9 %
10 SYRINGE (ML) INJECTION
Status: CANCELLED | OUTPATIENT
Start: 2024-07-03

## 2024-07-03 RX ORDER — SODIUM CHLORIDE 0.9 % (FLUSH) 0.9 %
10 SYRINGE (ML) INJECTION
Status: DISCONTINUED | OUTPATIENT
Start: 2024-07-03 | End: 2024-07-03 | Stop reason: HOSPADM

## 2024-07-03 RX ORDER — SODIUM CHLORIDE 0.9 % (FLUSH) 0.9 %
10 SYRINGE (ML) INJECTION
Status: CANCELLED | OUTPATIENT
Start: 2024-07-04

## 2024-07-03 RX ADMIN — SODIUM CHLORIDE: 9 INJECTION, SOLUTION INTRAVENOUS at 12:07

## 2024-07-03 RX ADMIN — FLUOROURACIL 4440 MG: 50 INJECTION, SOLUTION INTRAVENOUS at 01:07

## 2024-07-03 RX ADMIN — DEXAMETHASONE SODIUM PHOSPHATE 0.25 MG: 4 INJECTION, SOLUTION INTRA-ARTICULAR; INTRALESIONAL; INTRAMUSCULAR; INTRAVENOUS; SOFT TISSUE at 01:07

## 2024-07-03 RX ADMIN — BEVACIZUMAB-AWWB 365 MG: 400 INJECTION, SOLUTION INTRAVENOUS at 12:07

## 2024-07-03 NOTE — PLAN OF CARE
"Pt received MVASI and 5FU pump applied. Tolerated well. Pt educated about MVASI and 5FU pump (trouble shooting, not getting pump wet and handling pump w/ care. Pt instructed to check pump twice a day to ensure that pump says "RUN" and volume is counting down. Pt verbalized and demonstrated understanding. Aware of phone # for [employer] pump company. VSS. 5FU pump connected and infusing without difficulty to established ACW port, blood return noted. Port site secured. Pat to return to clinic 7-5-24 for 1100 for pump DC. Pt discharged with no distress noted and ambulated off unit, w/ fx, w/o event, pleased.  "

## 2024-07-03 NOTE — PROGRESS NOTES
MEDICAL ONCOLOGY - ESTABLISHED PATIENT VISIT    Reason for visit: colon adenocarcinoma    Best Contact Phone Number(s): [phone] (home)      Cancer/Stage/TNM:    Cancer Staging   Colon adenocarcinoma  Staging form: Colon and Rectum, AJCC 8th Edition  - Pathologic stage from 1/22/2024: Stage IVC (pT4a, pN2b, cM1c) - Signed by Minna Menendez CNS on 2/13/2024       Oncology History   Colon adenocarcinoma   12/21/2023 Tumor Markers    Patient's tumor was tested for the following markers: CEA.                                              Results of the tumor marker test revealed 3.3     12/21/2023 Procedure    Colonoscopy  Cecal polyp removed with jumbo forceps, 3 mm  Multiple ascending colon polyps ranging in size from 5 to 12 mm - semi-pedunculated removed with hot snare  Hepatic flexure mass identified - central ulceration, concerning for malignancy, 3 cm, not obstructing, this was biopsied - tattoo placed distal to mass  Sigmoid diverticular disease     12/21/2023 Tumor Markers    Patient's tumor was tested for the following markers: CEA.                                              Results of the tumor marker test revealed 3.3     12/22/2023 Imaging Significant Findings    CT CAP  Evaluation of the colon is somewhat limited as there is significant colonic stool and oral contrast material has not opacified the large bowel.  There does appear to be some abnormal thickening of the walls of the proximal right colon and a patient with a known history of colonic malignancy.  There is some soft tissue density external to the walls of the colon on the right pericolic gutter which could represent peritoneal nodularity versus subserosal extent of tumor.  Slightly more distal to this area there is a 2nd area of irregularity of the walls of the colon, difficult to fully evaluate if this is related to the colonic walls versus stool with central fat.     Two hypodensities in the liver, the larger measuring 0.8 cm, too  small to accurately characterize on the basis of this examination.  Attention on follow-up.     No pulmonary nodules are seen.     Cholecystectomy.     12/29/2023 Initial Diagnosis    Hepatic flexure colon adenocarcinoma  MMR intact     1/22/2024 Cancer Staged    Staging form: Colon and Rectum, AJCC 8th Edition  - Pathologic stage from 1/22/2024: Stage IVC (pT4a, pN2b, cM1c)     Malignant neoplasm of ascending colon   2/12/2024 Initial Diagnosis    Malignant neoplasm of ascending colon     2/26/2024 -  Chemotherapy    Treatment Summary   Plan Name: OP GI [id number] (oxaliplatin leucovorin fluorouracil) with bevacizumab Q2W  Treatment Goal: Palliative  Status: Active  Start Date: 2/26/2024  End Date: 1/15/2025 (Planned)  Provider: Kemar Zaragoza MD  Chemotherapy: oxaliplatin (ELOXATIN) 150 mg in dextrose 5 % (D5W) 595 mL chemo infusion, 157 mg, Intravenous, Clinic/HOD 1 time, 8 of 8 cycles  Administration: 150 mg (2/26/2024), 150 mg (3/11/2024), 150 mg (3/25/2024), 150 mg (4/8/2024), 150 mg (4/22/2024), 150 mg (5/6/2024), 150 mg (5/20/2024), 150 mg (6/3/2024)  fluorouracil (ADRUCIL) 2,400 mg/m2 = 4,440 mg in sodium chloride 0.9% 100 mL chemo infusion, 2,400 mg/m2 = 4,440 mg, Intravenous, Clinic/HOD 1 time, 9 of 24 cycles  Administration: 4,440 mg (2/26/2024), 4,440 mg (3/11/2024), 4,440 mg (3/25/2024), 4,440 mg (4/8/2024), 4,440 mg (4/22/2024), 4,440 mg (5/6/2024), 4,440 mg (5/20/2024), 4,440 mg (6/3/2024), 4,440 mg (6/17/2024)  bevacizumab-awwb (MVASI) 5 mg/kg = 365 mg in sodium chloride 0.9% 100 mL infusion, 5 mg/kg = 365 mg, Intravenous, Clinic/HOD 1 time, 9 of 24 cycles  Administration: 365 mg (2/26/2024), 365 mg (3/11/2024), 365 mg (3/25/2024), 365 mg (4/8/2024), 365 mg (4/22/2024), 365 mg (5/6/2024), 365 mg (5/20/2024), 365 mg (6/3/2024), 365 mg (6/17/2024)          Interim History:   74 y.o. female with LUANNE, bipolar, HLD who presents prior to cycle 10 FOLFOX + bevacizumab for her metastatic ascending colon  "cancer, now on maintenance chemotherapy. She started maintenance chemotherapy with 5-FU plus avastin with cycle 9.    She notes more fatigue with this last cycle. She is also having some orthostatic hypotension and had a presyncopal event in the last couple days. She mentioned this during her previous visit as well but this has not worsened. She did trip but have not fallen. She does not feel that she needs any walking aids at this time. No headaches, changes to her vision, tinnitus.   She did have persistent cold sensitivity for 2 weeks in her fingers but this has resolved since stopping the Oxaliplatin.   She does not have any paresthesias at present.    She is eating well and "still doing really good"     ECOG 0. Presents with her .     ROS:   Review of Systems   Constitutional:  Positive for malaise/fatigue. Negative for chills and fever.   HENT:  Negative for congestion and sore throat.    Respiratory:  Negative for shortness of breath.    Cardiovascular:  Negative for chest pain, palpitations and leg swelling.   Gastrointestinal:  Negative for blood in stool, constipation, diarrhea and nausea.   Genitourinary:  Negative for hematuria.   Musculoskeletal:  Negative for back pain, falls and myalgias.   Skin:  Negative for rash.   Neurological:  Positive for dizziness. Negative for tingling, weakness and headaches.       Past Medical History:   Past Medical History:   Diagnosis Date    Anxiety     Arthritis     Bipolar 1 disorder     Bursitis of right hip     GI bleed due to NSAIDs     Multinodular goiter 11/15/2021    Sacroiliitis     right side    Sleep apnea         Past Surgical History:   Past Surgical History:   Procedure Laterality Date    ANKLE FRACTURE SURGERY Left 2001    BLADDER SUSPENSION      CARPAL TUNNEL RELEASE Bilateral     CHOLECYSTECTOMY      Laparoscopic    COLONOSCOPY      COLONOSCOPY N/A 12/21/2023    Procedure: COLONOSCOPY;  Surgeon: Alberto Albright MD;  Location: [facility];  " Service: Endoscopy;  Laterality: N/A;    ESOPHAGOGASTRODUODENOSCOPY N/A 07/29/2021    Procedure: EGD (ESOPHAGOGASTRODUODENOSCOPY);  Surgeon: Susan Mcclain MD;  Location[facility];  Service: Endoscopy;  Laterality: N/A;    EYE SURGERY      FOOT ARTHRODESIS Right 05/03/2023    Procedure: FUSION, FOOT;  Surgeon: Lauri Alejandra [facility];  Location: [facility];  Service: Podiatry;  Laterality: Right;  mini c-arm, Arthrex dowel bone graft harvester, locking plate and screws festus notified cc    HYSTERECTOMY  1986    vaginal prolapse    INJECTION OF ANESTHETIC AGENT AROUND MEDIAL BRANCH NERVES INNERVATING CERVICAL FACET JOINT Bilateral 05/27/2022    Procedure: CERVICAL MEDIAL BRANCH NERVE BLOCK (C3-4,C4-5);  Surgeon: Mamie Roman MD;  Location: [facility];  Service: Pain Management;  Laterality: Bilateral;    INJECTION OF ANESTHETIC AGENT AROUND MEDIAL BRANCH NERVES INNERVATING LUMBAR FACET JOINT Right 02/05/2021    Procedure: LUMBAR FACET JOINT BLOCK (L3-4,L4-5,L5-S1);  Surgeon: Mamie Roman MD;  Location: [facility];  Service: Pain Management;  Laterality: Right;    INJECTION OF ANESTHETIC AGENT AROUND MEDIAL BRANCH NERVES INNERVATING LUMBAR FACET JOINT Right 04/30/2021    Procedure: LUMBAR FACET JOINT BLOCK (L3-4,L4-5,L5-S1);  Surgeon: Mamie Roman MD;  Location: [facility];  Service: Pain Management;  Laterality: Right;    INJECTION OF ANESTHETIC AGENT INTO SACROILIAC JOINT Right 12/04/2020    Procedure: SACROILIAC JOINT INJECTION;  Surgeon: Mamie Roman MD;  Location: [facility];  Service: Pain Management;  Laterality: Right;    INJECTION OF JOINT Right 12/04/2020    Procedure: GREATER TROCHANTERIC BURSA INJECTION;  Surgeon: Mamie Roman MD;  Location: [facility];  Service: Pain Management;  Laterality: Right;    LAPAROSCOPIC RIGHT COLON RESECTION N/A 1/22/2024    Procedure: COLECTOMY, RIGHT, LAPAROSCOPIC (eras low, lithotomy);  Surgeon: Alberto Albright MD;  Location: [address];  Service: Colon and Rectal;   Laterality: N/A;    NASAL SEPTUM SURGERY      RECTAL PROLAPSE REPAIR      TONSILLECTOMY          Family History:   Family History   Problem Relation Name Age of Onset    No Known Problems Maternal Aunt Rubio Glasgow     Colon cancer Maternal Uncle Joshua     Colon cancer Maternal Uncle Yovani     Colon cancer Maternal Uncle Konstantin     No Known Problems Paternal Aunt Vero     Esophageal cancer Paternal Uncle Nat Garcia     Heart attack Maternal Grandmother Elmira     Leukemia Maternal Grandfather manish Pang     No Known Problems Paternal Grandmother Lizett     Stroke Paternal Grandfather Nat Sr         Social History:   Social History     Tobacco Use    Smoking status: Former     Current packs/day: 0.00     Average packs/day: 1 pack/day for 41.7 years (41.7 ttl pk-yrs)     Types: Cigarettes     Start date: 3/30/1982     Quit date: 2023     Years since quittin.5    Smokeless tobacco: Never   Substance Use Topics    Alcohol use: Yes     Comment: Socially-2 or 3 times a year-6 or 7 drinks        I have reviewed and updated the patient's past medical, surgical, family and social histories.    Allergies:   Review of patient's allergies indicates:   Allergen Reactions    Nsaids (non-steroidal anti-inflammatory drug) Other (See Comments)     Gastrointestinal bleeding requiring blood transfusion    Demerol [meperidine] Rash        Medications:   Current Outpatient Medications   Medication Sig Dispense Refill    albuterol (PROVENTIL/VENTOLIN HFA) 90 mcg/actuation inhaler inhale 2 puffs into the lungs every 6 hours as needed for wheezing. 18 g 1    dexAMETHasone (DECADRON) 4 MG Tab Take 2 tablets (8 mg total) by mouth once daily only on days 2, 3 and 4 of each chemotherapy cycle. 40 tablet 0    EScitalopram oxalate (LEXAPRO) 20 MG tablet Take 1 tablet (20 mg total) by mouth once daily. 30 tablet 4    lamoTRIgine (LAMICTAL) 150 MG Tab Take 2 tablets (300 mg total) by mouth every evening. 180 tablet 1     LIDOcaine-prilocaine (EMLA) cream Apply topically as needed (place to port site 45-60 minutes prior to chemotherapy). 30 g 5    ondansetron (ZOFRAN-ODT) 8 MG TbDL dissolve 1 tablet (8 mg total) under the tongue every 6 (six) hours as needed (nausea). 30 tablet 5    pantoprazole (PROTONIX) 40 MG tablet Take 1 tablet (40 mg total) by mouth once daily. 90 tablet 3    prochlorperazine (COMPAZINE) 10 MG tablet Take 1 tablet (10 mg total) by mouth every 6 (six) hours as needed (nausea). 30 tablet 2    QUEtiapine (SEROQUEL) 200 MG Tab Take 1 tablet (200 mg total) by mouth every evening. 30 tablet 3    rosuvastatin (CRESTOR) 10 MG tablet Take 1 tablet (10 mg total) by mouth once daily. (Patient taking differently: Take 10 mg by mouth every evening.) 90 tablet 3     No current facility-administered medications for this visit.        Physical Exam:   There were no vitals taken for this visit.             Physical Exam  Vitals reviewed.   Constitutional:       General: She is not in acute distress.     Appearance: Normal appearance. She is normal weight. She is not ill-appearing, toxic-appearing or diaphoretic.   HENT:      Head: Normocephalic and atraumatic.      Right Ear: External ear normal.      Left Ear: External ear normal.      Nose: Nose normal.      Mouth/Throat:      Pharynx: Oropharynx is clear.   Eyes:      General: No scleral icterus.     Conjunctiva/sclera: Conjunctivae normal.   Cardiovascular:      Rate and Rhythm: Normal rate.   Pulmonary:      Effort: Pulmonary effort is normal. No respiratory distress.   Chest:      Comments: RCW port  Abdominal:      General: There is no distension.   Skin:     General: Skin is warm and dry.      Coloration: Skin is not jaundiced or pale.      Findings: No bruising, erythema or rash.   Neurological:      Mental Status: She is alert and oriented to person, place, and time. Mental status is at baseline.      Motor: No weakness.      Gait: Gait normal.   Psychiatric:          Mood and Affect: Mood normal.         Labs:     I have reviewed the pertinent labs.      Imagin/14/24 - CT CAP:    Impression:     Postoperative changes of right colonic resection for patient's known colonic malignancy.  No free air or obstruction.  No pathologically enlarged abdominal or pelvic lymph nodes are seen.     Previously noted hepatic lesions are not seen on today's study.     No pulmonary nodules.     Cholecystectomy.    Path:   24 - R Hemicolectomy   Final Pathologic Diagnosis     THE DIAGNOSES REMAIN THE SAME.  THIS CORRECTED REPORT IS ISSUED TO CHANGE M STATUS to reflect tumor in the omentum.  This change is discussed with Dr. RANJANA Albright via phone, 2024.    1. Colon, right and terminal ileum (right hemicolectomy with EN bloc abdominal wall resection):  Invasive adenocarcinoma.  See cancer synoptic report     2. Omentum (resection):    Positive for carcinoma    CORRECT SYNOPTIC AND M STATUS  Cancer synoptic report  - Procedure: Right hemicolectomy with EN bloc abdominal wall resection  - Tumor site:  Ascending  - Histologic type:  Adenocarcinoma  - Histologic grade:  G2, moderately differentiated.  See comment.  COMMENT:  While the majority of the tumor consists of well formed glands, a significant proportion includes abortive glands, single file infiltration, and malignant cells with signet ring cell morphology. The tumor has an insidious pattern of  infiltration with tumor present far from the advancing front of the tumor.  - Tumor size:  2.0 x 2.0 x 1.0 cm  - Tumor extent:  Invades visceral peritoneum, multifocal  - Macroscopic perforation: Not identified  - Lymphovascular invasion:  Present, extensive.  Small vessel, large vessel, intra and extramural.  - Perineural invasion:  Not identified  - Tumor budding score:  High (>10), multifocal single cell infiltration  - Treatment effect: No known pre-surgical therapy    Margins  Margin involved by invasive carcinoma, radial (slide  1C)  All margins negative for dysplasia.    pTNM stage classification (AJCC 8th edition)  pT Category  pT4a:  Tumor invades through the visceral peritoneum    pN Category  pN2b:  7 or more regional lymph nodes are positive  Number of positive lymph nodes:  19  Number of lymph nodes examined: 26  Number of tumor deposits:  4    pM Category  pM1c:  Metastasis to the peritoneal surface of the omentum.    Additional findings:  - Sessile serrated polyp, no cytologic dysplasia, 1.1 cm at ileocecal valve  - Tubulovillous adenoma, 1.0 cm, proximal ascending  - Tubular adenoma, 0.4 cm, mid ascending  - Tubular adenoma, 0.4, distal ascending  - Tubular adenoma, 0.6 cm, distal ascending  - Submucosal lipoma, 2.0 cm  - Appendix with no specific histopathologic changes    Ancillary studies  Immunohistochemistry for mismatch repair proteins performed on prior biopsy material (SAS-, 12/21/23): pMMR.         Assessment:       1. Malignant neoplasm of ascending colon    2. 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 Immunodeficiency secondary to neoplasm    4. Immunodeficiency secondary to chemotherapy    5. Neoplastic malignant related fatigue    6. Cold sensitivity    7. Other abnormal tumor markers              Plan:        # Colon cancer   Mrs. Maguire is a 74 y.o. female who presents for management of her metastatic colon cancer. She underwent a R hemicolectomy with en-bloc abdominal wall resection on 1/22/24 with Dr. Albright. Pathology revealed pT4a N2b disease with 19/26 positive LNs and omentum positive for carcinoma.    We had an in-depth conversation during our initial consult re: her diagnosis and treatment options. Reviewed recommendation for IV systemic therapy for at least 6 months. Plan for FOLFOX + bevacizumab to be given every 2 weeks. Briefly reviewed side effects of treatment. Handouts provided. Port placed 2/21/24.     PGX - DPYD normal metabolizer, [id number] intermediate metabolizer   Dexamethasone,  zofran, compazine and lidocaine sent to pharmacy previously. Reviewed admin instructions.     Pending response, she may be a candidate for CRS/HIPEC with surgical oncology team. Continue to evaluate and re-start discussion if still without radiographic evidence of disease after CT on cycle 8.    CT CAP after cycle 4 shows no clear evidence of disease.  CT CAP after cycle 8 shows no clear evidence of disease.    Tolerance of chemotherapy has been excellent.    Will set her back up with Dr. Albright and potentially surg onc for consolidative therapy (CRS/HIPEC) given well controlled disease.    Presents today for cycle 10.  We held oxaliplatin and proceed with 5-FU + bevacizumab maintenance with cycle 9  - did well with maintenance chemotherapy. Doing well today.  - Eating well. Still has intermittent presyncopal episodes but has not fallen. Will monitor for now.   - Labs reviewed, adequate for treatment. No signs of dehydration  - Proceed with cycle 10 today.      Unfortunately she is not eligible for our maintenance BGB CRC study because she does not have measurable disease.    Follow up: 2 weeks for cycle 11. Will repeat imaging in August 2024, about 6 weeks.     Cold sensitivity/neoplasm related fatigue:  2/2 chemotherapy. Will stop oxaliplatin beginning with cycle 9 to prevent persistent paresthesias.    Constipation/Gas:   Advised her to take 2 stool softeners a day with Miralax daily to have regular bowel movements.   This has helped.    Patient is in agreement with the proposed treatment plan. All questions were answered to the patient's satisfaction. Pt knows to call clinic if anything is needed before the next clinic visit.      DIANE Fonseca, PAIvanC  [facility] MD [facility]  Dept of Hematology/Oncology  [place] to GI Oncology team             Med Onc Chart Routing      Follow up with physician 2 weeks and 6 weeks. 5-FU, avastin maintenance. 6 weeks with lab work, CT CAP and clinic visit with Dr. Zaragoza    Follow up with KRISTEN 4 weeks. 5-FU, avastin maintenance   Infusion scheduling note    Injection scheduling note    Labs CBC, CMP, urinalysis and CEA   Scheduling:  Preferred lab:  Lab interval: every 2 weeks     Imaging CT chest abdomen pelvis   Please schedule in 6 weeks prior to clinic visit with Dr. Zaragoza   Pharmacy appointment    Other referrals

## 2024-07-05 ENCOUNTER — INFUSION (OUTPATIENT)
Dept: INFUSION THERAPY | Facility: HOSPITAL | Age: 74
End: 2024-07-05
Payer: MEDICARE

## 2024-07-05 VITALS
HEART RATE: 79 BPM | DIASTOLIC BLOOD PRESSURE: 78 MMHG | OXYGEN SATURATION: 98 % | RESPIRATION RATE: 18 BRPM | SYSTOLIC BLOOD PRESSURE: 140 MMHG | TEMPERATURE: 98 F

## 2024-07-05 DIAGNOSIS — C18.2 MALIGNANT NEOPLASM OF ASCENDING COLON: Primary | ICD-10-CM

## 2024-07-05 PROCEDURE — 25000003 PHARM REV CODE 250: Performed by: PHYSICIAN ASSISTANT

## 2024-07-05 PROCEDURE — A4216 STERILE WATER/SALINE, 10 ML: HCPCS | Performed by: PHYSICIAN ASSISTANT

## 2024-07-05 PROCEDURE — 63600175 PHARM REV CODE 636 W HCPCS: Performed by: PHYSICIAN ASSISTANT

## 2024-07-05 RX ORDER — PROCHLORPERAZINE EDISYLATE 5 MG/ML
5 INJECTION INTRAMUSCULAR; INTRAVENOUS ONCE AS NEEDED
Status: DISCONTINUED | OUTPATIENT
Start: 2024-07-05 | End: 2024-07-05 | Stop reason: HOSPADM

## 2024-07-05 RX ORDER — HEPARIN 100 UNIT/ML
500 SYRINGE INTRAVENOUS
Status: DISCONTINUED | OUTPATIENT
Start: 2024-07-05 | End: 2024-07-05 | Stop reason: HOSPADM

## 2024-07-05 RX ORDER — SODIUM CHLORIDE 0.9 % (FLUSH) 0.9 %
10 SYRINGE (ML) INJECTION
Status: DISCONTINUED | OUTPATIENT
Start: 2024-07-05 | End: 2024-07-05 | Stop reason: HOSPADM

## 2024-07-05 RX ADMIN — HEPARIN 500 UNITS: 100 SYRINGE at 10:07

## 2024-07-05 RX ADMIN — Medication 10 ML: at 10:07

## 2024-07-05 NOTE — PLAN OF CARE
Pt seated in chair. Vss. Assessment complete. CADD volume zero. Infusion complete, pt tolerated well. CADD disconnected from port. Port deaccessed, flushed, blood return noted, heparin locked. Pt ambulated off floor. NAD.

## 2024-07-09 ENCOUNTER — HOSPITAL ENCOUNTER (OUTPATIENT)
Dept: RADIOLOGY | Facility: HOSPITAL | Age: 74
Discharge: HOME | End: 2024-07-09
Attending: STUDENT IN AN ORGANIZED HEALTH CARE EDUCATION/TRAINING PROGRAM
Payer: MEDICARE

## 2024-07-09 ENCOUNTER — PATIENT MESSAGE (OUTPATIENT)
Dept: ENDOCRINOLOGY | Facility: CLINIC | Age: 74
End: 2024-07-09
Payer: MEDICARE

## 2024-07-09 DIAGNOSIS — E04.2 MULTINODULAR GOITER: ICD-10-CM

## 2024-07-09 PROCEDURE — 76536 US EXAM OF HEAD AND NECK: CPT | Mod: TC

## 2024-07-09 PROCEDURE — 76536 US EXAM OF HEAD AND NECK: CPT | Mod: 26,,, | Performed by: RADIOLOGY

## 2024-07-15 ENCOUNTER — LAB VISIT (OUTPATIENT)
Dept: LAB | Facility: HOSPITAL | Age: 74
End: 2024-07-15
Attending: PHYSICIAN ASSISTANT
Payer: MEDICARE

## 2024-07-15 DIAGNOSIS — C18.2 MALIGNANT NEOPLASM OF ASCENDING COLON: ICD-10-CM

## 2024-07-15 LAB
ALBUMIN SERPL BCP-MCNC: 3.6 G/DL (ref 3.5–5.2)
ALP SERPL-CCNC: 78 U/L (ref 55–135)
ALT SERPL W/O P-5'-P-CCNC: 10 U/L (ref 10–44)
ANION GAP SERPL CALC-SCNC: 7 MMOL/L (ref 8–16)
AST SERPL-CCNC: 19 U/L (ref 10–40)
BACTERIA #/AREA URNS HPF: ABNORMAL /HPF
BASOPHILS # BLD AUTO: 0.03 K/UL (ref 0–0.2)
BASOPHILS NFR BLD: 0.5 % (ref 0–1.9)
BILIRUB SERPL-MCNC: 0.4 MG/DL (ref 0.1–1)
BILIRUB UR QL STRIP: NEGATIVE
BUN SERPL-MCNC: 11 MG/DL (ref 8–23)
CALCIUM SERPL-MCNC: 9.6 MG/DL (ref 8.7–10.5)
CEA SERPL-MCNC: 2.5 NG/ML (ref 0–5)
CHLORIDE SERPL-SCNC: 105 MMOL/L (ref 95–110)
CLARITY UR: CLEAR
CO2 SERPL-SCNC: 25 MMOL/L (ref 23–29)
COLOR UR: YELLOW
CREAT SERPL-MCNC: 0.9 MG/DL (ref 0.5–1.4)
DIFFERENTIAL METHOD BLD: ABNORMAL
EOSINOPHIL # BLD AUTO: 0.1 K/UL (ref 0–0.5)
EOSINOPHIL NFR BLD: 0.9 % (ref 0–8)
ERYTHROCYTE [DISTWIDTH] IN BLOOD BY AUTOMATED COUNT: 18.1 % (ref 11.5–14.5)
EST. GFR  (NO RACE VARIABLE): >60 ML/MIN/1.73 M^2
GLUCOSE SERPL-MCNC: 93 MG/DL (ref 70–110)
GLUCOSE UR QL STRIP: NEGATIVE
HCT VFR BLD AUTO: 34.1 % (ref 37–48.5)
HGB BLD-MCNC: 11 G/DL (ref 12–16)
HGB UR QL STRIP: NEGATIVE
IMM GRANULOCYTES # BLD AUTO: 0.02 K/UL (ref 0–0.04)
IMM GRANULOCYTES NFR BLD AUTO: 0.4 % (ref 0–0.5)
KETONES UR QL STRIP: NEGATIVE
LEUKOCYTE ESTERASE UR QL STRIP: ABNORMAL
LYMPHOCYTES # BLD AUTO: 1.6 K/UL (ref 1–4.8)
LYMPHOCYTES NFR BLD: 29.3 % (ref 18–48)
MCH RBC QN AUTO: 29.4 PG (ref 27–31)
MCHC RBC AUTO-ENTMCNC: 32.3 G/DL (ref 32–36)
MCV RBC AUTO: 91 FL (ref 82–98)
MICROSCOPIC COMMENT: ABNORMAL
MONOCYTES # BLD AUTO: 0.8 K/UL (ref 0.3–1)
MONOCYTES NFR BLD: 15.2 % (ref 4–15)
NEUTROPHILS # BLD AUTO: 3 K/UL (ref 1.8–7.7)
NEUTROPHILS NFR BLD: 53.7 % (ref 38–73)
NITRITE UR QL STRIP: NEGATIVE
NRBC BLD-RTO: 0 /100 WBC
PH UR STRIP: 6 [PH] (ref 5–8)
PLATELET # BLD AUTO: 231 K/UL (ref 150–450)
PMV BLD AUTO: 10.2 FL (ref 9.2–12.9)
POTASSIUM SERPL-SCNC: 4.4 MMOL/L (ref 3.5–5.1)
PROT SERPL-MCNC: 6.7 G/DL (ref 6–8.4)
PROT UR QL STRIP: NEGATIVE
RBC # BLD AUTO: 3.74 M/UL (ref 4–5.4)
SODIUM SERPL-SCNC: 137 MMOL/L (ref 136–145)
SP GR UR STRIP: 1.02 (ref 1–1.03)
URN SPEC COLLECT METH UR: ABNORMAL
UROBILINOGEN UR STRIP-ACNC: NEGATIVE EU/DL
WBC # BLD AUTO: 5.53 K/UL (ref 3.9–12.7)
WBC #/AREA URNS HPF: 10 /HPF (ref 0–5)

## 2024-07-15 PROCEDURE — 85025 COMPLETE CBC W/AUTO DIFF WBC: CPT | Performed by: PHYSICIAN ASSISTANT

## 2024-07-15 PROCEDURE — 81000 URINALYSIS NONAUTO W/SCOPE: CPT | Performed by: REGISTERED NURSE

## 2024-07-15 PROCEDURE — 80053 COMPREHEN METABOLIC PANEL: CPT | Performed by: PHYSICIAN ASSISTANT

## 2024-07-15 PROCEDURE — 82378 CARCINOEMBRYONIC ANTIGEN: CPT | Performed by: PHYSICIAN ASSISTANT

## 2024-07-15 PROCEDURE — 36415 COLL VENOUS BLD VENIPUNCTURE: CPT | Performed by: PHYSICIAN ASSISTANT

## 2024-07-16 ENCOUNTER — OFFICE VISIT (OUTPATIENT)
Dept: HEMATOLOGY/ONCOLOGY | Facility: CLINIC | Age: 74
End: 2024-07-16
Payer: MEDICARE

## 2024-07-16 ENCOUNTER — INFUSION (OUTPATIENT)
Dept: INFUSION THERAPY | Facility: HOSPITAL | Age: 74
End: 2024-07-16
Payer: MEDICARE

## 2024-07-16 VITALS
BODY MASS INDEX: 26.86 KG/M2 | RESPIRATION RATE: 18 BRPM | HEIGHT: 66 IN | DIASTOLIC BLOOD PRESSURE: 68 MMHG | SYSTOLIC BLOOD PRESSURE: 151 MMHG | OXYGEN SATURATION: 97 % | HEART RATE: 76 BPM | TEMPERATURE: 97 F | WEIGHT: 167.13 LBS

## 2024-07-16 VITALS
WEIGHT: 167.13 LBS | TEMPERATURE: 97 F | BODY MASS INDEX: 26.86 KG/M2 | DIASTOLIC BLOOD PRESSURE: 68 MMHG | SYSTOLIC BLOOD PRESSURE: 151 MMHG | OXYGEN SATURATION: 97 % | HEIGHT: 66 IN | HEART RATE: 76 BPM

## 2024-07-16 DIAGNOSIS — C78.6 MALIGNANT NEOPLASM METASTATIC TO OMENTUM: ICD-10-CM

## 2024-07-16 DIAGNOSIS — D84.821 IMMUNODEFICIENCY SECONDARY TO CHEMOTHERAPY: ICD-10-CM

## 2024-07-16 DIAGNOSIS — R68.89 COLD SENSITIVITY: ICD-10-CM

## 2024-07-16 DIAGNOSIS — K59.09 OTHER CONSTIPATION: ICD-10-CM

## 2024-07-16 DIAGNOSIS — T45.1X5A IMMUNODEFICIENCY SECONDARY TO CHEMOTHERAPY: ICD-10-CM

## 2024-07-16 DIAGNOSIS — R53.0 NEOPLASTIC MALIGNANT RELATED FATIGUE: ICD-10-CM

## 2024-07-16 DIAGNOSIS — D84.81 IMMUNODEFICIENCY SECONDARY TO NEOPLASM: ICD-10-CM

## 2024-07-16 DIAGNOSIS — Z79.899 IMMUNODEFICIENCY SECONDARY TO CHEMOTHERAPY: ICD-10-CM

## 2024-07-16 DIAGNOSIS — C18.2 MALIGNANT NEOPLASM OF ASCENDING COLON: Primary | ICD-10-CM

## 2024-07-16 DIAGNOSIS — D49.9 IMMUNODEFICIENCY SECONDARY TO NEOPLASM: ICD-10-CM

## 2024-07-16 PROCEDURE — 96413 CHEMO IV INFUSION 1 HR: CPT

## 2024-07-16 PROCEDURE — 1159F MED LIST DOCD IN RCRD: CPT | Mod: CPTII,S$GLB,, | Performed by: INTERNAL MEDICINE

## 2024-07-16 PROCEDURE — 25000003 PHARM REV CODE 250: Performed by: INTERNAL MEDICINE

## 2024-07-16 PROCEDURE — 3008F BODY MASS INDEX DOCD: CPT | Mod: CPTII,S$GLB,, | Performed by: INTERNAL MEDICINE

## 2024-07-16 PROCEDURE — 99999 PR PBB SHADOW E&M-EST. PATIENT-LVL III: CPT | Mod: PBBFAC,,, | Performed by: INTERNAL MEDICINE

## 2024-07-16 PROCEDURE — 3077F SYST BP >= 140 MM HG: CPT | Mod: CPTII,S$GLB,, | Performed by: INTERNAL MEDICINE

## 2024-07-16 PROCEDURE — 63600175 PHARM REV CODE 636 W HCPCS: Performed by: INTERNAL MEDICINE

## 2024-07-16 PROCEDURE — 3288F FALL RISK ASSESSMENT DOCD: CPT | Mod: CPTII,S$GLB,, | Performed by: INTERNAL MEDICINE

## 2024-07-16 PROCEDURE — 1126F AMNT PAIN NOTED NONE PRSNT: CPT | Mod: CPTII,S$GLB,, | Performed by: INTERNAL MEDICINE

## 2024-07-16 PROCEDURE — 96416 CHEMO PROLONG INFUSE W/PUMP: CPT

## 2024-07-16 PROCEDURE — G2211 COMPLEX E/M VISIT ADD ON: HCPCS | Mod: S$GLB,,, | Performed by: INTERNAL MEDICINE

## 2024-07-16 PROCEDURE — 3078F DIAST BP <80 MM HG: CPT | Mod: CPTII,S$GLB,, | Performed by: INTERNAL MEDICINE

## 2024-07-16 PROCEDURE — 99215 OFFICE O/P EST HI 40 MIN: CPT | Mod: S$GLB,,, | Performed by: INTERNAL MEDICINE

## 2024-07-16 PROCEDURE — 96367 TX/PROPH/DG ADDL SEQ IV INF: CPT

## 2024-07-16 PROCEDURE — 1101F PT FALLS ASSESS-DOCD LE1/YR: CPT | Mod: CPTII,S$GLB,, | Performed by: INTERNAL MEDICINE

## 2024-07-16 RX ORDER — HEPARIN 100 UNIT/ML
500 SYRINGE INTRAVENOUS
Status: DISCONTINUED | OUTPATIENT
Start: 2024-07-16 | End: 2024-07-16 | Stop reason: HOSPADM

## 2024-07-16 RX ORDER — HEPARIN 100 UNIT/ML
500 SYRINGE INTRAVENOUS
Status: CANCELLED | OUTPATIENT
Start: 2024-07-18

## 2024-07-16 RX ORDER — PROCHLORPERAZINE EDISYLATE 5 MG/ML
5 INJECTION INTRAMUSCULAR; INTRAVENOUS ONCE AS NEEDED
Status: CANCELLED | OUTPATIENT
Start: 2024-07-16

## 2024-07-16 RX ORDER — PROCHLORPERAZINE EDISYLATE 5 MG/ML
5 INJECTION INTRAMUSCULAR; INTRAVENOUS ONCE AS NEEDED
Status: CANCELLED | OUTPATIENT
Start: 2024-07-18

## 2024-07-16 RX ORDER — DIPHENHYDRAMINE HYDROCHLORIDE 50 MG/ML
50 INJECTION INTRAMUSCULAR; INTRAVENOUS ONCE AS NEEDED
Status: DISCONTINUED | OUTPATIENT
Start: 2024-07-16 | End: 2024-07-16 | Stop reason: HOSPADM

## 2024-07-16 RX ORDER — SODIUM CHLORIDE 0.9 % (FLUSH) 0.9 %
10 SYRINGE (ML) INJECTION
Status: CANCELLED | OUTPATIENT
Start: 2024-07-16

## 2024-07-16 RX ORDER — PROCHLORPERAZINE EDISYLATE 5 MG/ML
5 INJECTION INTRAMUSCULAR; INTRAVENOUS ONCE AS NEEDED
Status: DISCONTINUED | OUTPATIENT
Start: 2024-07-16 | End: 2024-07-16 | Stop reason: HOSPADM

## 2024-07-16 RX ORDER — EPINEPHRINE 0.3 MG/.3ML
0.3 INJECTION SUBCUTANEOUS ONCE AS NEEDED
Status: CANCELLED | OUTPATIENT
Start: 2024-07-16

## 2024-07-16 RX ORDER — SODIUM CHLORIDE 0.9 % (FLUSH) 0.9 %
10 SYRINGE (ML) INJECTION
Status: DISCONTINUED | OUTPATIENT
Start: 2024-07-16 | End: 2024-07-16 | Stop reason: HOSPADM

## 2024-07-16 RX ORDER — DIPHENHYDRAMINE HYDROCHLORIDE 50 MG/ML
50 INJECTION INTRAMUSCULAR; INTRAVENOUS ONCE AS NEEDED
Status: CANCELLED | OUTPATIENT
Start: 2024-07-16

## 2024-07-16 RX ORDER — SODIUM CHLORIDE 0.9 % (FLUSH) 0.9 %
10 SYRINGE (ML) INJECTION
Status: CANCELLED | OUTPATIENT
Start: 2024-07-18

## 2024-07-16 RX ORDER — HEPARIN 100 UNIT/ML
500 SYRINGE INTRAVENOUS
Status: CANCELLED | OUTPATIENT
Start: 2024-07-16

## 2024-07-16 RX ORDER — EPINEPHRINE 0.3 MG/.3ML
0.3 INJECTION SUBCUTANEOUS ONCE AS NEEDED
Status: DISCONTINUED | OUTPATIENT
Start: 2024-07-16 | End: 2024-07-16 | Stop reason: HOSPADM

## 2024-07-16 RX ADMIN — SODIUM CHLORIDE: 9 INJECTION, SOLUTION INTRAVENOUS at 11:07

## 2024-07-16 RX ADMIN — FLUOROURACIL 4440 MG: 50 INJECTION, SOLUTION INTRAVENOUS at 01:07

## 2024-07-16 RX ADMIN — DEXAMETHASONE SODIUM PHOSPHATE 0.25 MG: 4 INJECTION, SOLUTION INTRA-ARTICULAR; INTRALESIONAL; INTRAMUSCULAR; INTRAVENOUS; SOFT TISSUE at 12:07

## 2024-07-16 RX ADMIN — BEVACIZUMAB-AWWB 365 MG: 400 INJECTION, SOLUTION INTRAVENOUS at 12:07

## 2024-07-16 NOTE — NURSING
PT tolerated MVASI infusion well. No adverse reaction noted. PAC remains accessed with 5FU infusing via home infusion pump at 2.2ml/hr. All connections are secure, clamps are open, and 5FU is actively infusing as evidenced by the reservoir volume counting down. Pt education reinforced on side effects, what to expect and when to contact the doctor. Pt verbalized understanding. Instructed to return to the infusion clinic 7/18/24 @ 1100 for a pump d/c. Discharged home with .

## 2024-07-16 NOTE — PROGRESS NOTES
MEDICAL ONCOLOGY - ESTABLISHED PATIENT VISIT    Reason for visit: colon adenocarcinoma    Best Contact Phone Number(s): [phone] (home)      Cancer/Stage/TNM:    Cancer Staging   Colon adenocarcinoma  Staging form: Colon and Rectum, AJCC 8th Edition  - Pathologic stage from 1/22/2024: Stage IVC (pT4a, pN2b, cM1c) - Signed by Minna Menendez CNS on 2/13/2024       Oncology History   Colon adenocarcinoma   12/21/2023 Tumor Markers    Patient's tumor was tested for the following markers: CEA.                                              Results of the tumor marker test revealed 3.3     12/21/2023 Procedure    Colonoscopy  Cecal polyp removed with jumbo forceps, 3 mm  Multiple ascending colon polyps ranging in size from 5 to 12 mm - semi-pedunculated removed with hot snare  Hepatic flexure mass identified - central ulceration, concerning for malignancy, 3 cm, not obstructing, this was biopsied - tattoo placed distal to mass  Sigmoid diverticular disease     12/21/2023 Tumor Markers    Patient's tumor was tested for the following markers: CEA.                                              Results of the tumor marker test revealed 3.3     12/22/2023 Imaging Significant Findings    CT CAP  Evaluation of the colon is somewhat limited as there is significant colonic stool and oral contrast material has not opacified the large bowel.  There does appear to be some abnormal thickening of the walls of the proximal right colon and a patient with a known history of colonic malignancy.  There is some soft tissue density external to the walls of the colon on the right pericolic gutter which could represent peritoneal nodularity versus subserosal extent of tumor.  Slightly more distal to this area there is a 2nd area of irregularity of the walls of the colon, difficult to fully evaluate if this is related to the colonic walls versus stool with central fat.     Two hypodensities in the liver, the larger measuring 0.8 cm, too  small to accurately characterize on the basis of this examination.  Attention on follow-up.     No pulmonary nodules are seen.     Cholecystectomy.     12/29/2023 Initial Diagnosis    Hepatic flexure colon adenocarcinoma  MMR intact     1/22/2024 Cancer Staged    Staging form: Colon and Rectum, AJCC 8th Edition  - Pathologic stage from 1/22/2024: Stage IVC (pT4a, pN2b, cM1c)     Malignant neoplasm of ascending colon   2/12/2024 Initial Diagnosis    Malignant neoplasm of ascending colon     2/26/2024 -  Chemotherapy    Treatment Summary   Plan Name: OP GI [id number] (oxaliplatin leucovorin fluorouracil) with bevacizumab Q2W  Treatment Goal: Palliative  Status: Active  Start Date: 2/26/2024  End Date: 1/16/2025 (Planned)  Provider: Kemar Zaragoza MD  Chemotherapy: oxaliplatin (ELOXATIN) 150 mg in dextrose 5 % (D5W) 595 mL chemo infusion, 157 mg, Intravenous, Clinic/[facility] 1 time, 8 of 8 cycles  Administration: 150 mg (2/26/2024), 150 mg (3/11/2024), 150 mg (3/25/2024), 150 mg (4/8/2024), 150 mg (4/22/2024), 150 mg (5/6/2024), 150 mg (5/20/2024), 150 mg (6/3/2024)  fluorouracil (ADRUCIL) 2,400 mg/m2 = 4,440 mg in sodium chloride 0.9% 100 mL chemo infusion, 2,400 mg/m2 = 4,440 mg, Intravenous, Clinic/[facility] 1 time, 10 of 24 cycles  Administration: 4,440 mg (2/26/2024), 4,440 mg (3/11/2024), 4,440 mg (3/25/2024), 4,440 mg (4/8/2024), 4,440 mg (4/22/2024), 4,440 mg (5/6/2024), 4,440 mg (5/20/2024), 4,440 mg (6/3/2024), 4,440 mg (6/17/2024), 4,440 mg (7/3/2024)  bevacizumab-awwb (MVASI) 5 mg/kg = 365 mg in sodium chloride 0.9% 100 mL infusion, 5 mg/kg = 365 mg, Intravenous, Clinic[facility] 1 time, 10 of 24 cycles  Administration: 365 mg (2/26/2024), 365 mg (3/11/2024), 365 mg (3/25/2024), 365 mg (4/8/2024), 365 mg (4/22/2024), 365 mg (5/6/2024), 365 mg (5/20/2024), 365 mg (6/3/2024), 365 mg (6/17/2024), 365 mg (7/3/2024)          Interim History:   74 y.o. female with LUANNE, bipolar, HLD who presents prior to cycle 11 FOLFOX +  "bevacizumab for her metastatic ascending colon cancer, now on maintenance chemotherapy. She started maintenance chemotherapy with 5-FU plus avastin with cycle 9.    She has some worsened paresthesias in her fingertips which can become painful after prolonged use.  Does have sporadic episodes of fatigue.  No falls.  Had two "explosive" loose stools Sunday but that has since resolved.  No nausea.      ECOG 0. Presents with her .     ROS:   Review of Systems   Constitutional:  Positive for malaise/fatigue. Negative for chills and fever.   HENT:  Negative for congestion and sore throat.    Respiratory:  Negative for shortness of breath.    Cardiovascular:  Negative for chest pain, palpitations and leg swelling.   Gastrointestinal:  Negative for blood in stool, constipation, diarrhea and nausea.   Genitourinary:  Negative for hematuria.   Musculoskeletal:  Negative for back pain, falls and myalgias.   Skin:  Negative for rash.   Neurological:  Positive for dizziness and tingling. Negative for weakness and headaches.       Past Medical History:   Past Medical History:   Diagnosis Date    Anxiety     Arthritis     Bipolar 1 disorder     Bursitis of right hip     GI bleed due to NSAIDs     Multinodular goiter 11/15/2021    Sacroiliitis     right side    Sleep apnea         Past Surgical History:   Past Surgical History:   Procedure Laterality Date    ANKLE FRACTURE SURGERY Left 2001    BLADDER SUSPENSION      CARPAL TUNNEL RELEASE Bilateral     CHOLECYSTECTOMY      Laparoscopic    COLONOSCOPY      COLONOSCOPY N/A 12/21/2023    Procedure: COLONOSCOPY;  Surgeon: Alberto Albright MD;  Location: [facility];  Service: Endoscopy;  Laterality: N/A;    ESOPHAGOGASTRODUODENOSCOPY N/A 07/29/2021    Procedure: EGD (ESOPHAGOGASTRODUODENOSCOPY);  Surgeon: Susan Mcclain MD;  Location: [facility];  Service: Endoscopy;  Laterality: N/A;    EYE SURGERY      FOOT ARTHRODESIS Right 05/03/2023    Procedure: FUSION, FOOT;  Surgeon: " Lauri Alejandra DPM;  Location: [facility];  Service: Podiatry;  Laterality: Right;  mini c-arm, Arthrex dowel bone graft harvester, locking plate and screws festus notified cc    HYSTERECTOMY  1986    vaginal prolapse    INJECTION OF ANESTHETIC AGENT AROUND MEDIAL BRANCH NERVES INNERVATING CERVICAL FACET JOINT Bilateral 05/27/2022    Procedure: CERVICAL MEDIAL BRANCH NERVE BLOCK (C3-4,C4-5);  Surgeon: Mamie Roman MD;  Location: [facility];  Service: Pain Management;  Laterality: Bilateral;    INJECTION OF ANESTHETIC AGENT AROUND MEDIAL BRANCH NERVES INNERVATING LUMBAR FACET JOINT Right 02/05/2021    Procedure: LUMBAR FACET JOINT BLOCK (L3-4,L4-5,L5-S1);  Surgeon: Mamie Roman MD;  Location: [facility];  Service: Pain Management;  Laterality: Right;    INJECTION OF ANESTHETIC AGENT AROUND MEDIAL BRANCH NERVES INNERVATING LUMBAR FACET JOINT Right 04/30/2021    Procedure: LUMBAR FACET JOINT BLOCK (L3-4,L4-5,L5-S1);  Surgeon: Mamie Roman MD;  Location: [facility];  Service: Pain Management;  Laterality: Right;    INJECTION OF ANESTHETIC AGENT INTO SACROILIAC JOINT Right 12/04/2020    Procedure: SACROILIAC JOINT INJECTION;  Surgeon: Mamie Roman MD;  Location: [facility];  Service: Pain Management;  Laterality: Right;    INJECTION OF JOINT Right 12/04/2020    Procedure: GREATER TROCHANTERIC BURSA INJECTION;  Surgeon: Mamie Roman MD;  Location: [facility];  Service: Pain Management;  Laterality: Right;    LAPAROSCOPIC RIGHT COLON RESECTION N/A 1/22/2024    Procedure: COLECTOMY, RIGHT, LAPAROSCOPIC (eras low, lithotomy);  Surgeon: Alberto Albright MD;  Location: [address];  Service: Colon and Rectal;  Laterality: N/A;    NASAL SEPTUM SURGERY      RECTAL PROLAPSE REPAIR      TONSILLECTOMY          Family History:   Family History   Problem Relation Name Age of Onset    No Known Problems Maternal Aunt Rubio Zerangue     Colon cancer Maternal Uncle Edwis     Colon cancer Maternal Uncle Yovani     Colon cancer  Maternal Uncle Konstantin     No Known Problems Paternal Aunt Vero     Esophageal cancer Paternal Uncle Nat Garcia     Heart attack Maternal Grandmother Elmira     Leukemia Maternal Grandfather manish Pang     No Known Problems Paternal Grandmother Lizett     Stroke Paternal Grandfather Nat Sr         Social History:   Social History     Tobacco Use    Smoking status: Former     Current packs/day: 0.00     Average packs/day: 1 pack/day for 41.7 years (41.7 ttl pk-yrs)     Types: Cigarettes     Start date: 3/30/1982     Quit date: 2023     Years since quittin.5    Smokeless tobacco: Never   Substance Use Topics    Alcohol use: Yes     Comment: Socially-2 or 3 times a year-6 or 7 drinks        I have reviewed and updated the patient's past medical, surgical, family and social histories.    Allergies:   Review of patient's allergies indicates:   Allergen Reactions    Nsaids (non-steroidal anti-inflammatory drug) Other (See Comments)     Gastrointestinal bleeding requiring blood transfusion    Demerol [meperidine] Rash        Medications:   Current Outpatient Medications   Medication Sig Dispense Refill    albuterol (PROVENTIL/VENTOLIN HFA) 90 mcg/actuation inhaler inhale 2 puffs into the lungs every 6 hours as needed for wheezing. 18 g 1    dexAMETHasone (DECADRON) 4 MG Tab Take 2 tablets (8 mg total) by mouth once daily only on days 2, 3 and 4 of each chemotherapy cycle. 40 tablet 0    EScitalopram oxalate (LEXAPRO) 20 MG tablet Take 1 tablet (20 mg total) by mouth once daily. 30 tablet 4    lamoTRIgine (LAMICTAL) 150 MG Tab Take 2 tablets (300 mg total) by mouth every evening. 180 tablet 1    LIDOcaine-prilocaine (EMLA) cream Apply topically as needed (place to port site 45-60 minutes prior to chemotherapy). 30 g 5    ondansetron (ZOFRAN-ODT) 8 MG TbDL dissolve 1 tablet (8 mg total) under the tongue every 6 (six) hours as needed (nausea). 30 tablet 5    pantoprazole (PROTONIX) 40 MG tablet Take 1  "tablet (40 mg total) by mouth once daily. 90 tablet 3    prochlorperazine (COMPAZINE) 10 MG tablet Take 1 tablet (10 mg total) by mouth every 6 (six) hours as needed (nausea). 30 tablet 2    QUEtiapine (SEROQUEL) 200 MG Tab Take 1 tablet (200 mg total) by mouth every evening. 30 tablet 3    rosuvastatin (CRESTOR) 10 MG tablet Take 1 tablet (10 mg total) by mouth once daily. (Patient taking differently: Take 10 mg by mouth every evening.) 90 tablet 3     No current facility-administered medications for this visit.        Physical Exam:   BP (!) 151/68 (BP Location: Left arm, Patient Position: Sitting, BP Method: Medium (Automatic))   Pulse 76   Temp 97.2 °F (36.2 °C) (Temporal)   Ht 5' 6" (1.676 m)   Wt 75.8 kg (167 lb 1.7 oz)   SpO2 97%   BMI 26.97 kg/m²              Physical Exam  Vitals reviewed.   Constitutional:       General: She is not in acute distress.     Appearance: Normal appearance. She is normal weight. She is not ill-appearing, toxic-appearing or diaphoretic.   HENT:      Head: Normocephalic and atraumatic.      Right Ear: External ear normal.      Left Ear: External ear normal.      Nose: Nose normal.      Mouth/Throat:      Pharynx: Oropharynx is clear.   Eyes:      General: No scleral icterus.     Conjunctiva/sclera: Conjunctivae normal.   Cardiovascular:      Rate and Rhythm: Normal rate.   Pulmonary:      Effort: Pulmonary effort is normal. No respiratory distress.   Chest:      Comments: RCW port  Abdominal:      General: There is no distension.   Skin:     General: Skin is warm and dry.      Coloration: Skin is not jaundiced or pale.      Findings: No bruising, erythema or rash.   Neurological:      Mental Status: She is alert and oriented to person, place, and time. Mental status is at baseline.      Motor: No weakness.      Gait: Gait normal.   Psychiatric:         Mood and Affect: Mood normal.         Labs:     I have reviewed the pertinent labs. Stable anemia.  CEA normal.     Imaging: "    6/14/24 - CT CAP:    Impression:     Postoperative changes of right colonic resection for patient's known colonic malignancy.  No free air or obstruction.  No pathologically enlarged abdominal or pelvic lymph nodes are seen.     Previously noted hepatic lesions are not seen on today's study.     No pulmonary nodules.     Cholecystectomy.    Path:   1/22/24 - R Hemicolectomy   Final Pathologic Diagnosis     THE DIAGNOSES REMAIN THE SAME.  THIS CORRECTED REPORT IS ISSUED TO CHANGE M STATUS to reflect tumor in the omentum.  This change is discussed with Dr. RANJANA Albright via phone, 2/1/2024.    1. Colon, right and terminal ileum (right hemicolectomy with EN bloc abdominal wall resection):  Invasive adenocarcinoma.  See cancer synoptic report     2. Omentum (resection):    Positive for carcinoma    CORRECT SYNOPTIC AND M STATUS  Cancer synoptic report  - Procedure: Right hemicolectomy with EN bloc abdominal wall resection  - Tumor site:  Ascending  - Histologic type:  Adenocarcinoma  - Histologic grade:  G2, moderately differentiated.  See comment.  COMMENT:  While the majority of the tumor consists of well formed glands, a significant proportion includes abortive glands, single file infiltration, and malignant cells with signet ring cell morphology. The tumor has an insidious pattern of  infiltration with tumor present far from the advancing front of the tumor.  - Tumor size:  2.0 x 2.0 x 1.0 cm  - Tumor extent:  Invades visceral peritoneum, multifocal  - Macroscopic perforation: Not identified  - Lymphovascular invasion:  Present, extensive.  Small vessel, large vessel, intra and extramural.  - Perineural invasion:  Not identified  - Tumor budding score:  High (>10), multifocal single cell infiltration  - Treatment effect: No known pre-surgical therapy    Margins  Margin involved by invasive carcinoma, radial (slide 1C)  All margins negative for dysplasia.    pTNM stage classification (AJCC 8th edition)  pT  Category  pT4a:  Tumor invades through the visceral peritoneum    pN Category  pN2b:  7 or more regional lymph nodes are positive  Number of positive lymph nodes:  19  Number of lymph nodes examined: 26  Number of tumor deposits:  4    pM Category  pM1c:  Metastasis to the peritoneal surface of the omentum.    Additional findings:  - Sessile serrated polyp, no cytologic dysplasia, 1.1 cm at ileocecal valve  - Tubulovillous adenoma, 1.0 cm, proximal ascending  - Tubular adenoma, 0.4 cm, mid ascending  - Tubular adenoma, 0.4, distal ascending  - Tubular adenoma, 0.6 cm, distal ascending  - Submucosal lipoma, 2.0 cm  - Appendix with no specific histopathologic changes    Ancillary studies  Immunohistochemistry for mismatch repair proteins performed on prior biopsy material (LBA-, 12/21/23): pMMR.         Assessment:       1. Malignant neoplasm of ascending colon    2. 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 Immunodeficiency secondary to neoplasm    4. Immunodeficiency secondary to chemotherapy    5. Neoplastic malignant related fatigue    6. Cold sensitivity    7. Other constipation              Plan:        # Colon cancer   Mrs. Maguire is a 74 y.o. female who presents for management of her metastatic colon cancer. She underwent a R hemicolectomy with en-bloc abdominal wall resection on 1/22/24 with Dr. Albright. Pathology revealed pT4a N2b disease with 19/26 positive LNs and omentum positive for carcinoma.    We had an in-depth conversation during our initial consult re: her diagnosis and treatment options. Reviewed recommendation for IV systemic therapy for at least 6 months. Plan for FOLFOX + bevacizumab to be given every 2 weeks. Briefly reviewed side effects of treatment. Handouts provided. Port placed 2/21/24.     PGX - DPYD normal metabolizer, [id number] intermediate metabolizer   Dexamethasone, zofran, compazine and lidocaine sent to pharmacy previously. Reviewed admin instructions.     Pending  response, she may be a candidate for CRS/HIPEC with surgical oncology team. Continue to evaluate and re-start discussion if still without radiographic evidence of disease after CT on cycle 8.    CT CAP after cycle 4 shows no clear evidence of disease.  CT CAP after cycle 8 shows no clear evidence of disease.    Tolerance of chemotherapy has been excellent.    Will set her back up with Dr. Albright and potentially surg onc for consolidative therapy (CRS/HIPEC) given well controlled disease.    Presents today for cycle 11.  We held oxaliplatin and proceed with 5-FU + bevacizumab maintenance with cycle 9  - Doing well with maintenance chemotherapy.   - Eating well. Still has intermittent presyncopal episodes but has not fallen. Will monitor for now.   - Labs reviewed, adequate for treatment. No signs of dehydration  - Proceed with cycle 11 today.      Unfortunately she is not eligible for our maintenance BGB CRC study because she does not have measurable disease.    Follow up: 2 weeks for cycle 12. Will repeat imaging in August.    Cold sensitivity/neoplasm related fatigue:  2/2 chemotherapy. Stopped oxaliplatin beginning with cycle 9 to prevent persistent paresthesias.  Mild persistent paresthesias at this time.  Will monitor.    Constipation/Gas:   Advised her to take 2 stool softeners a day with Miralax daily to have regular bowel movements.   This has helped.  Had loose stools the other day but this seemed to be an isolated incident.    Patient is in agreement with the proposed treatment plan. All questions were answered to the patient's satisfaction. Pt knows to call clinic if anything is needed before the next clinic visit.    Kemar Zaragoza MD  Hematology/Oncology  [facility]               Med Onc Chart Routing      Follow up with physician 4 weeks. for FOLFOX   Follow up with KRISTEN 2 weeks. for FOLFOX   Infusion scheduling note    Injection scheduling note    Labs CBC, CMP and CEA    Scheduling:  Preferred lab:  Lab interval: every 2 weeks     Imaging    Pharmacy appointment    Other referrals

## 2024-07-18 ENCOUNTER — INFUSION (OUTPATIENT)
Dept: INFUSION THERAPY | Facility: HOSPITAL | Age: 74
End: 2024-07-18
Payer: MEDICARE

## 2024-07-18 VITALS
RESPIRATION RATE: 18 BRPM | TEMPERATURE: 99 F | SYSTOLIC BLOOD PRESSURE: 153 MMHG | DIASTOLIC BLOOD PRESSURE: 75 MMHG | HEART RATE: 74 BPM

## 2024-07-18 DIAGNOSIS — C18.2 MALIGNANT NEOPLASM OF ASCENDING COLON: Primary | ICD-10-CM

## 2024-07-18 PROCEDURE — A4216 STERILE WATER/SALINE, 10 ML: HCPCS | Performed by: INTERNAL MEDICINE

## 2024-07-18 PROCEDURE — 25000003 PHARM REV CODE 250: Performed by: INTERNAL MEDICINE

## 2024-07-18 PROCEDURE — 63600175 PHARM REV CODE 636 W HCPCS: Performed by: INTERNAL MEDICINE

## 2024-07-18 RX ORDER — PROCHLORPERAZINE EDISYLATE 5 MG/ML
5 INJECTION INTRAMUSCULAR; INTRAVENOUS ONCE AS NEEDED
Status: DISCONTINUED | OUTPATIENT
Start: 2024-07-18 | End: 2024-07-18 | Stop reason: HOSPADM

## 2024-07-18 RX ORDER — HEPARIN 100 UNIT/ML
500 SYRINGE INTRAVENOUS
Status: DISCONTINUED | OUTPATIENT
Start: 2024-07-18 | End: 2024-07-18 | Stop reason: HOSPADM

## 2024-07-18 RX ORDER — SODIUM CHLORIDE 0.9 % (FLUSH) 0.9 %
10 SYRINGE (ML) INJECTION
Status: DISCONTINUED | OUTPATIENT
Start: 2024-07-18 | End: 2024-07-18 | Stop reason: HOSPADM

## 2024-07-18 RX ADMIN — Medication 10 ML: at 11:07

## 2024-07-18 RX ADMIN — HEPARIN 500 UNITS: 100 SYRINGE at 11:07

## 2024-07-18 NOTE — NURSING
1112  Pt here for pump d/c, port flush, accompanied by spouse, no new complaints or concerns and reports tolerating treatment; CADD infusion completed, port flushed per protocol; pt instructed to increase water hydration daily, to pace activities to reduce fatigue; monitor neuropathy, report worsening condition to MD; discussed when to contact MD, when to report to ED; pt verbalized understanding of all discussed and when to report next

## 2024-07-31 ENCOUNTER — INFUSION (OUTPATIENT)
Dept: INFUSION THERAPY | Facility: HOSPITAL | Age: 74
End: 2024-07-31
Payer: MEDICARE

## 2024-07-31 ENCOUNTER — OFFICE VISIT (OUTPATIENT)
Dept: HEMATOLOGY/ONCOLOGY | Facility: CLINIC | Age: 74
End: 2024-07-31
Payer: MEDICARE

## 2024-07-31 ENCOUNTER — LAB VISIT (OUTPATIENT)
Dept: LAB | Facility: HOSPITAL | Age: 74
End: 2024-07-31
Payer: MEDICARE

## 2024-07-31 VITALS
WEIGHT: 172.31 LBS | HEART RATE: 74 BPM | DIASTOLIC BLOOD PRESSURE: 76 MMHG | SYSTOLIC BLOOD PRESSURE: 152 MMHG | HEIGHT: 66 IN | RESPIRATION RATE: 18 BRPM | BODY MASS INDEX: 27.69 KG/M2 | TEMPERATURE: 98 F

## 2024-07-31 VITALS
HEIGHT: 66 IN | HEART RATE: 70 BPM | OXYGEN SATURATION: 100 % | DIASTOLIC BLOOD PRESSURE: 77 MMHG | WEIGHT: 172.31 LBS | BODY MASS INDEX: 27.69 KG/M2 | RESPIRATION RATE: 18 BRPM | TEMPERATURE: 98 F | SYSTOLIC BLOOD PRESSURE: 162 MMHG

## 2024-07-31 DIAGNOSIS — D49.9 IMMUNODEFICIENCY SECONDARY TO NEOPLASM: ICD-10-CM

## 2024-07-31 DIAGNOSIS — K59.09 OTHER CONSTIPATION: ICD-10-CM

## 2024-07-31 DIAGNOSIS — R68.89 COLD SENSITIVITY: ICD-10-CM

## 2024-07-31 DIAGNOSIS — Z79.899 IMMUNODEFICIENCY SECONDARY TO CHEMOTHERAPY: ICD-10-CM

## 2024-07-31 DIAGNOSIS — C18.2 MALIGNANT NEOPLASM OF ASCENDING COLON: Primary | ICD-10-CM

## 2024-07-31 DIAGNOSIS — C18.2 MALIGNANT NEOPLASM OF ASCENDING COLON: ICD-10-CM

## 2024-07-31 DIAGNOSIS — C78.6 MALIGNANT NEOPLASM METASTATIC TO OMENTUM: ICD-10-CM

## 2024-07-31 DIAGNOSIS — D84.821 IMMUNODEFICIENCY SECONDARY TO CHEMOTHERAPY: ICD-10-CM

## 2024-07-31 DIAGNOSIS — R14.3 EXCESSIVE GAS: ICD-10-CM

## 2024-07-31 DIAGNOSIS — T45.1X5A IMMUNODEFICIENCY SECONDARY TO CHEMOTHERAPY: ICD-10-CM

## 2024-07-31 DIAGNOSIS — R53.0 NEOPLASTIC MALIGNANT RELATED FATIGUE: ICD-10-CM

## 2024-07-31 DIAGNOSIS — D84.81 IMMUNODEFICIENCY SECONDARY TO NEOPLASM: ICD-10-CM

## 2024-07-31 LAB
ALBUMIN SERPL BCP-MCNC: 3.4 G/DL (ref 3.5–5.2)
ALP SERPL-CCNC: 72 U/L (ref 55–135)
ALT SERPL W/O P-5'-P-CCNC: 8 U/L (ref 10–44)
ANION GAP SERPL CALC-SCNC: 8 MMOL/L (ref 8–16)
AST SERPL-CCNC: 16 U/L (ref 10–40)
BILIRUB SERPL-MCNC: 0.4 MG/DL (ref 0.1–1)
BUN SERPL-MCNC: 14 MG/DL (ref 8–23)
CALCIUM SERPL-MCNC: 9.4 MG/DL (ref 8.7–10.5)
CEA SERPL-MCNC: 2.3 NG/ML (ref 0–5)
CHLORIDE SERPL-SCNC: 105 MMOL/L (ref 95–110)
CO2 SERPL-SCNC: 24 MMOL/L (ref 23–29)
CREAT SERPL-MCNC: 0.9 MG/DL (ref 0.5–1.4)
ERYTHROCYTE [DISTWIDTH] IN BLOOD BY AUTOMATED COUNT: 17.5 % (ref 11.5–14.5)
EST. GFR  (NO RACE VARIABLE): >60 ML/MIN/1.73 M^2
GLUCOSE SERPL-MCNC: 133 MG/DL (ref 70–110)
HCT VFR BLD AUTO: 36.8 % (ref 37–48.5)
HGB BLD-MCNC: 11.5 G/DL (ref 12–16)
IMM GRANULOCYTES # BLD AUTO: 0.02 K/UL (ref 0–0.04)
MCH RBC QN AUTO: 29.6 PG (ref 27–31)
MCHC RBC AUTO-ENTMCNC: 31.3 G/DL (ref 32–36)
MCV RBC AUTO: 95 FL (ref 82–98)
NEUTROPHILS # BLD AUTO: 3.2 K/UL (ref 1.8–7.7)
PLATELET # BLD AUTO: 200 K/UL (ref 150–450)
PMV BLD AUTO: 10.2 FL (ref 9.2–12.9)
POTASSIUM SERPL-SCNC: 4.7 MMOL/L (ref 3.5–5.1)
PROT SERPL-MCNC: 6.5 G/DL (ref 6–8.4)
RBC # BLD AUTO: 3.89 M/UL (ref 4–5.4)
SODIUM SERPL-SCNC: 137 MMOL/L (ref 136–145)
WBC # BLD AUTO: 5.68 K/UL (ref 3.9–12.7)

## 2024-07-31 PROCEDURE — 3008F BODY MASS INDEX DOCD: CPT | Mod: CPTII,S$GLB,, | Performed by: PHYSICIAN ASSISTANT

## 2024-07-31 PROCEDURE — 25000003 PHARM REV CODE 250: Performed by: PHYSICIAN ASSISTANT

## 2024-07-31 PROCEDURE — 96416 CHEMO PROLONG INFUSE W/PUMP: CPT

## 2024-07-31 PROCEDURE — 1101F PT FALLS ASSESS-DOCD LE1/YR: CPT | Mod: CPTII,S$GLB,, | Performed by: PHYSICIAN ASSISTANT

## 2024-07-31 PROCEDURE — 63600175 PHARM REV CODE 636 W HCPCS: Performed by: PHYSICIAN ASSISTANT

## 2024-07-31 PROCEDURE — 96413 CHEMO IV INFUSION 1 HR: CPT

## 2024-07-31 PROCEDURE — 3288F FALL RISK ASSESSMENT DOCD: CPT | Mod: CPTII,S$GLB,, | Performed by: PHYSICIAN ASSISTANT

## 2024-07-31 PROCEDURE — 3078F DIAST BP <80 MM HG: CPT | Mod: CPTII,S$GLB,, | Performed by: PHYSICIAN ASSISTANT

## 2024-07-31 PROCEDURE — 80053 COMPREHEN METABOLIC PANEL: CPT | Performed by: REGISTERED NURSE

## 2024-07-31 PROCEDURE — 82378 CARCINOEMBRYONIC ANTIGEN: CPT | Performed by: REGISTERED NURSE

## 2024-07-31 PROCEDURE — 1126F AMNT PAIN NOTED NONE PRSNT: CPT | Mod: CPTII,S$GLB,, | Performed by: PHYSICIAN ASSISTANT

## 2024-07-31 PROCEDURE — 3077F SYST BP >= 140 MM HG: CPT | Mod: CPTII,S$GLB,, | Performed by: PHYSICIAN ASSISTANT

## 2024-07-31 PROCEDURE — 99215 OFFICE O/P EST HI 40 MIN: CPT | Mod: S$GLB,,, | Performed by: PHYSICIAN ASSISTANT

## 2024-07-31 PROCEDURE — 96367 TX/PROPH/DG ADDL SEQ IV INF: CPT

## 2024-07-31 PROCEDURE — G2211 COMPLEX E/M VISIT ADD ON: HCPCS | Mod: S$GLB,,, | Performed by: PHYSICIAN ASSISTANT

## 2024-07-31 PROCEDURE — 99999 PR PBB SHADOW E&M-EST. PATIENT-LVL IV: CPT | Mod: PBBFAC,,, | Performed by: PHYSICIAN ASSISTANT

## 2024-07-31 PROCEDURE — 85027 COMPLETE CBC AUTOMATED: CPT | Performed by: REGISTERED NURSE

## 2024-07-31 PROCEDURE — 36415 COLL VENOUS BLD VENIPUNCTURE: CPT | Performed by: REGISTERED NURSE

## 2024-07-31 RX ORDER — SODIUM CHLORIDE 0.9 % (FLUSH) 0.9 %
10 SYRINGE (ML) INJECTION
Status: CANCELLED | OUTPATIENT
Start: 2024-07-31

## 2024-07-31 RX ORDER — HEPARIN 100 UNIT/ML
500 SYRINGE INTRAVENOUS
Status: CANCELLED | OUTPATIENT
Start: 2024-07-31

## 2024-07-31 RX ORDER — EPINEPHRINE 0.3 MG/.3ML
0.3 INJECTION SUBCUTANEOUS ONCE AS NEEDED
Status: CANCELLED | OUTPATIENT
Start: 2024-07-31

## 2024-07-31 RX ORDER — DIPHENHYDRAMINE HYDROCHLORIDE 50 MG/ML
50 INJECTION INTRAMUSCULAR; INTRAVENOUS ONCE AS NEEDED
Status: CANCELLED | OUTPATIENT
Start: 2024-07-31

## 2024-07-31 RX ORDER — PROCHLORPERAZINE EDISYLATE 5 MG/ML
5 INJECTION INTRAMUSCULAR; INTRAVENOUS ONCE AS NEEDED
Status: CANCELLED | OUTPATIENT
Start: 2024-07-31

## 2024-07-31 RX ORDER — HEPARIN 100 UNIT/ML
500 SYRINGE INTRAVENOUS
Status: CANCELLED | OUTPATIENT
Start: 2024-08-01

## 2024-07-31 RX ORDER — HEPARIN 100 UNIT/ML
500 SYRINGE INTRAVENOUS
Status: DISCONTINUED | OUTPATIENT
Start: 2024-07-31 | End: 2024-07-31 | Stop reason: HOSPADM

## 2024-07-31 RX ORDER — SODIUM CHLORIDE 0.9 % (FLUSH) 0.9 %
10 SYRINGE (ML) INJECTION
Status: CANCELLED | OUTPATIENT
Start: 2024-08-01

## 2024-07-31 RX ORDER — PROCHLORPERAZINE EDISYLATE 5 MG/ML
5 INJECTION INTRAMUSCULAR; INTRAVENOUS ONCE AS NEEDED
Status: CANCELLED | OUTPATIENT
Start: 2024-08-01

## 2024-07-31 RX ORDER — DIPHENHYDRAMINE HYDROCHLORIDE 50 MG/ML
50 INJECTION INTRAMUSCULAR; INTRAVENOUS ONCE AS NEEDED
Status: DISCONTINUED | OUTPATIENT
Start: 2024-07-31 | End: 2024-07-31 | Stop reason: HOSPADM

## 2024-07-31 RX ORDER — EPINEPHRINE 0.3 MG/.3ML
0.3 INJECTION SUBCUTANEOUS ONCE AS NEEDED
Status: DISCONTINUED | OUTPATIENT
Start: 2024-07-31 | End: 2024-07-31 | Stop reason: HOSPADM

## 2024-07-31 RX ORDER — SODIUM CHLORIDE 0.9 % (FLUSH) 0.9 %
10 SYRINGE (ML) INJECTION
Status: DISCONTINUED | OUTPATIENT
Start: 2024-07-31 | End: 2024-07-31 | Stop reason: HOSPADM

## 2024-07-31 RX ORDER — PROCHLORPERAZINE EDISYLATE 5 MG/ML
5 INJECTION INTRAMUSCULAR; INTRAVENOUS ONCE AS NEEDED
Status: DISCONTINUED | OUTPATIENT
Start: 2024-07-31 | End: 2024-07-31 | Stop reason: HOSPADM

## 2024-07-31 RX ADMIN — SODIUM CHLORIDE: 9 INJECTION, SOLUTION INTRAVENOUS at 12:07

## 2024-07-31 RX ADMIN — DEXAMETHASONE SODIUM PHOSPHATE 0.25 MG: 4 INJECTION, SOLUTION INTRA-ARTICULAR; INTRALESIONAL; INTRAMUSCULAR; INTRAVENOUS; SOFT TISSUE at 02:07

## 2024-07-31 RX ADMIN — BEVACIZUMAB-AWWB 365 MG: 400 INJECTION, SOLUTION INTRAVENOUS at 01:07

## 2024-07-31 RX ADMIN — FLUOROURACIL 4440 MG: 50 INJECTION, SOLUTION INTRAVENOUS at 02:07

## 2024-07-31 NOTE — PLAN OF CARE
1500 pt tolerated tx. Port connected to CADD pump with 5 FU infusing at 2.2 ml/hr for 46 hours. RUN noted on pump and counting down. Pt instructed to return to clinic at 1 pm on 8/2/24 for pump d/c. Pt verbalized understanding. Pt d/c from clinic.

## 2024-07-31 NOTE — PROGRESS NOTES
MEDICAL ONCOLOGY - ESTABLISHED PATIENT VISIT    Reason for visit: colon adenocarcinoma    Best Contact Phone Number(s): [phone] (home)      Cancer/Stage/TNM:    Cancer Staging   Colon adenocarcinoma  Staging form: Colon and Rectum, AJCC 8th Edition  - Pathologic stage from 1/22/2024: Stage IVC (pT4a, pN2b, cM1c) - Signed by Minna Menendez CNS on 2/13/2024       Oncology History   Colon adenocarcinoma   12/21/2023 Tumor Markers    Patient's tumor was tested for the following markers: CEA.                                              Results of the tumor marker test revealed 3.3     12/21/2023 Procedure    Colonoscopy  Cecal polyp removed with jumbo forceps, 3 mm  Multiple ascending colon polyps ranging in size from 5 to 12 mm - semi-pedunculated removed with hot snare  Hepatic flexure mass identified - central ulceration, concerning for malignancy, 3 cm, not obstructing, this was biopsied - tattoo placed distal to mass  Sigmoid diverticular disease     12/21/2023 Tumor Markers    Patient's tumor was tested for the following markers: CEA.                                              Results of the tumor marker test revealed 3.3     12/22/2023 Imaging Significant Findings    CT CAP  Evaluation of the colon is somewhat limited as there is significant colonic stool and oral contrast material has not opacified the large bowel.  There does appear to be some abnormal thickening of the walls of the proximal right colon and a patient with a known history of colonic malignancy.  There is some soft tissue density external to the walls of the colon on the right pericolic gutter which could represent peritoneal nodularity versus subserosal extent of tumor.  Slightly more distal to this area there is a 2nd area of irregularity of the walls of the colon, difficult to fully evaluate if this is related to the colonic walls versus stool with central fat.     Two hypodensities in the liver, the larger measuring 0.8 cm, too  small to accurately characterize on the basis of this examination.  Attention on follow-up.     No pulmonary nodules are seen.     Cholecystectomy.     12/29/2023 Initial Diagnosis    Hepatic flexure colon adenocarcinoma  MMR intact     1/22/2024 Cancer Staged    Staging form: Colon and Rectum, AJCC 8th Edition  - Pathologic stage from 1/22/2024: Stage IVC (pT4a, pN2b, cM1c)     Malignant neoplasm of ascending colon   2/12/2024 Initial Diagnosis    Malignant neoplasm of ascending colon     2/26/2024 -  Chemotherapy    Treatment Summary   Plan Name: OP GI [id number] (oxaliplatin leucovorin fluorouracil) with bevacizumab Q2W  Treatment Goal: Palliative  Status: Active  Start Date: 2/26/2024  End Date: 1/16/2025 (Planned)  Provider: Kemar Zaragoza MD  Chemotherapy: oxaliplatin (ELOXATIN) 150 mg in dextrose 5 % (D5W) 595 mL chemo infusion, 157 mg, Intravenous, Clinic/HOD 1 time, 8 of 8 cycles  Administration: 150 mg (2/26/2024), 150 mg (3/11/2024), 150 mg (3/25/2024), 150 mg (4/8/2024), 150 mg (4/22/2024), 150 mg (5/6/2024), 150 mg (5/20/2024), 150 mg (6/3/2024)  fluorouracil (ADRUCIL) 2,400 mg/m2 = 4,440 mg in sodium chloride 0.9% 100 mL chemo infusion, 2,400 mg/m2 = 4,440 mg, Intravenous, Clinic/HOD 1 time, 11 of 24 cycles  Administration: 4,440 mg (2/26/2024), 4,440 mg (3/11/2024), 4,440 mg (3/25/2024), 4,440 mg (4/8/2024), 4,440 mg (4/22/2024), 4,440 mg (5/6/2024), 4,440 mg (5/20/2024), 4,440 mg (6/3/2024), 4,440 mg (6/17/2024), 4,440 mg (7/3/2024), 4,440 mg (7/16/2024)  bevacizumab-awwb (MVASI) 5 mg/kg = 365 mg in sodium chloride 0.9% 100 mL infusion, 5 mg/kg = 365 mg, Intravenous, Clinic/HOD 1 time, 11 of 24 cycles  Administration: 365 mg (2/26/2024), 365 mg (3/11/2024), 365 mg (3/25/2024), 365 mg (4/8/2024), 365 mg (4/22/2024), 365 mg (5/6/2024), 365 mg (5/20/2024), 365 mg (6/3/2024), 365 mg (6/17/2024), 365 mg (7/3/2024), 365 mg (7/16/2024)          Interim History:   74 y.o. female with LUANNE, bipolar, HLD  "who presents prior to cycle 12 FOLFOX + bevacizumab for her metastatic ascending colon cancer, now on maintenance chemotherapy. She started maintenance chemotherapy with 5-FU plus avastin with cycle 9.    She has some worsened paresthesias in her fingertips which can become painful after prolonged use. She is taking Cymbalta and has tried Gabapentin but has not found this helpful. Does have sporadic episodes of fatigue.  No falls.  Had two "explosive" loose stools Sunday but that has since resolved.  No nausea.  Overall, she is doing well and tolerating treatment. No other concerns or complaints today.     ECOG 0. Presents with her .     ROS:   Review of Systems   Constitutional:  Positive for malaise/fatigue. Negative for chills and fever.   HENT:  Negative for congestion and sore throat.    Respiratory:  Negative for shortness of breath.    Cardiovascular:  Negative for chest pain, palpitations and leg swelling.   Gastrointestinal:  Negative for blood in stool, constipation, diarrhea and nausea.   Genitourinary:  Negative for hematuria.   Musculoskeletal:  Negative for back pain, falls and myalgias.   Skin:  Negative for rash.   Neurological:  Positive for dizziness and tingling. Negative for weakness and headaches.       Past Medical History:   Past Medical History:   Diagnosis Date    Anxiety     Arthritis     Bipolar 1 disorder     Bursitis of right hip     GI bleed due to NSAIDs     Multinodular goiter 11/15/2021    Sacroiliitis     right side    Sleep apnea         Past Surgical History:   Past Surgical History:   Procedure Laterality Date    ANKLE FRACTURE SURGERY Left 2001    BLADDER SUSPENSION      CARPAL TUNNEL RELEASE Bilateral     CHOLECYSTECTOMY      Laparoscopic    COLONOSCOPY      COLONOSCOPY N/A 12/21/2023    Procedure: COLONOSCOPY;  Surgeon: Alberto Albright MD;  Location: [facility];  Service: Endoscopy;  Laterality: N/A;    ESOPHAGOGASTRODUODENOSCOPY N/A 07/29/2021    " Procedure: EGD (ESOPHAGOGASTRODUODENOSCOPY);  Surgeon: Susan Mcclain MD;  Location: [facility];  Service: Endoscopy;  Laterality: N/A;    EYE SURGERY      FOOT ARTHRODESIS Right 05/03/2023    Procedure: FUSION, FOOT;  Surgeon: [facility];  Location: [facility] OR;  Service: Podiatry;  Laterality: Right;  mini c-arm, Arthrex dowel bone graft harvester, locking plate and screws festus notified cc    HYSTERECTOMY  1986    vaginal prolapse    INJECTION OF ANESTHETIC AGENT AROUND MEDIAL BRANCH NERVES INNERVATING CERVICAL FACET JOINT Bilateral 05/27/2022    Procedure: CERVICAL MEDIAL BRANCH NERVE BLOCK (C3-4,C4-5);  Surgeon: Mamie Roman MD;  Location: [facility];  Service: Pain Management;  Laterality: Bilateral;    INJECTION OF ANESTHETIC AGENT AROUND MEDIAL BRANCH NERVES INNERVATING LUMBAR FACET JOINT Right 02/05/2021    Procedure: LUMBAR FACET JOINT BLOCK (L3-4,L4-5,L5-S1);  Surgeon: Mamie Roman MD;  Location: [facility];  Service: Pain Management;  Laterality: Right;    INJECTION OF ANESTHETIC AGENT AROUND MEDIAL BRANCH NERVES INNERVATING LUMBAR FACET JOINT Right 04/30/2021    Procedure: LUMBAR FACET JOINT BLOCK (L3-4,L4-5,L5-S1);  Surgeon: Mamie Roman MD;  Location: [facility];  Service: Pain Management;  Laterality: Right;    INJECTION OF ANESTHETIC AGENT INTO SACROILIAC JOINT Right 12/04/2020    Procedure: SACROILIAC JOINT INJECTION;  Surgeon: Mamie Roman MD;  Location: [facility] OR;  Service: Pain Management;  Laterality: Right;    INJECTION OF JOINT Right 12/04/2020    Procedure: GREATER TROCHANTERIC BURSA INJECTION;  Surgeon: Mamie Roman MD;  Location: [facility];  Service: Pain Management;  Laterality: Right;    LAPAROSCOPIC RIGHT COLON RESECTION N/A 1/22/2024    Procedure: COLECTOMY, RIGHT, LAPAROSCOPIC (eras low, lithotomy);  Surgeon: Alberto Albright MD;  Location: [address];  Service: Colon and Rectal;  Laterality: N/A;    NASAL SEPTUM SURGERY      RECTAL PROLAPSE REPAIR       TONSILLECTOMY          Family History:   Family History   Problem Relation Name Age of Onset    No Known Problems Maternal Aunt Rubio Glasgow     Colon cancer Maternal Uncle Joshua     Colon cancer Maternal Uncle Yovani     Colon cancer Maternal Uncle Konstantin     No Known Problems Paternal Aunt Vero     Esophageal cancer Paternal Uncle Nat Garcia     Heart attack Maternal Grandmother Elmira     Leukemia Maternal Grandfather manish Pang     No Known Problems Paternal Grandmother Lizett     Stroke Paternal Grandfather Nat Sr         Social History:   Social History     Tobacco Use    Smoking status: Former     Current packs/day: 0.00     Average packs/day: 1 pack/day for 41.7 years (41.7 ttl pk-yrs)     Types: Cigarettes     Start date: 3/30/1982     Quit date: 2023     Years since quittin.6    Smokeless tobacco: Never   Substance Use Topics    Alcohol use: Yes     Comment: Socially-2 or 3 times a year-6 or 7 drinks        I have reviewed and updated the patient's past medical, surgical, family and social histories.    Allergies:   Review of patient's allergies indicates:   Allergen Reactions    Nsaids (non-steroidal anti-inflammatory drug) Other (See Comments)     Gastrointestinal bleeding requiring blood transfusion    Demerol [meperidine] Rash        Medications:   Current Outpatient Medications   Medication Sig Dispense Refill    albuterol (PROVENTIL/VENTOLIN HFA) 90 mcg/actuation inhaler inhale 2 puffs into the lungs every 6 hours as needed for wheezing. 18 g 1    dexAMETHasone (DECADRON) 4 MG Tab Take 2 tablets (8 mg total) by mouth once daily only on days 2, 3 and 4 of each chemotherapy cycle. 40 tablet 0    EScitalopram oxalate (LEXAPRO) 20 MG tablet Take 1 tablet (20 mg total) by mouth once daily. 30 tablet 4    lamoTRIgine (LAMICTAL) 150 MG Tab Take 2 tablets (300 mg total) by mouth every evening. 180 tablet 1    LIDOcaine-prilocaine (EMLA) cream Apply topically as  "needed (place to port site 45-60 minutes prior to chemotherapy). 30 g 5    ondansetron (ZOFRAN-ODT) 8 MG TbDL dissolve 1 tablet (8 mg total) under the tongue every 6 (six) hours as needed (nausea). 30 tablet 5    pantoprazole (PROTONIX) 40 MG tablet Take 1 tablet (40 mg total) by mouth once daily. 90 tablet 3    prochlorperazine (COMPAZINE) 10 MG tablet Take 1 tablet (10 mg total) by mouth every 6 (six) hours as needed (nausea). 30 tablet 2    QUEtiapine (SEROQUEL) 200 MG Tab Take 1 tablet (200 mg total) by mouth every evening. 30 tablet 3    rosuvastatin (CRESTOR) 10 MG tablet Take 1 tablet (10 mg total) by mouth once daily. (Patient taking differently: Take 10 mg by mouth every evening.) 90 tablet 3     No current facility-administered medications for this visit.        Physical Exam:   BP (!) 162/77 (BP Location: Left arm, Patient Position: Sitting, BP Method: Large (Automatic))   Pulse 70   Temp 98.3 °F (36.8 °C) (Oral)   Resp 18   Ht 5' 6" (1.676 m)   Wt 78.1 kg (172 lb 4.6 oz)   SpO2 100%   BMI 27.81 kg/m²              Physical Exam  Vitals reviewed.   Constitutional:       General: She is not in acute distress.     Appearance: Normal appearance. She is normal weight. She is not ill-appearing, toxic-appearing or diaphoretic.   HENT:      Head: Normocephalic and atraumatic.      Right Ear: External ear normal.      Left Ear: External ear normal.      Nose: Nose normal.      Mouth/Throat:      Pharynx: Oropharynx is clear.   Eyes:      General: No scleral icterus.     Conjunctiva/sclera: Conjunctivae normal.   Cardiovascular:      Rate and Rhythm: Normal rate.   Pulmonary:      Effort: Pulmonary effort is normal. No respiratory distress.   Chest:      Comments: RCW port  Abdominal:      General: There is no distension.   Skin:     General: Skin is warm and dry.      Coloration: Skin is not jaundiced or pale.      Findings: No bruising, erythema or rash.   Neurological:      Mental Status: She is " alert and oriented to person, place, and time. Mental status is at baseline.      Motor: No weakness.      Gait: Gait normal.   Psychiatric:         Mood and Affect: Mood normal.       Labs:     I have reviewed the pertinent labs.      Imagin/14/24 - CT CAP:    Impression:     Postoperative changes of right colonic resection for patient's known colonic malignancy.  No free air or obstruction.  No pathologically enlarged abdominal or pelvic lymph nodes are seen.     Previously noted hepatic lesions are not seen on today's study.     No pulmonary nodules.     Cholecystectomy.    Path:   24 - R Hemicolectomy   Final Pathologic Diagnosis     THE DIAGNOSES REMAIN THE SAME.  THIS CORRECTED REPORT IS ISSUED TO CHANGE M STATUS to reflect tumor in the omentum.  This change is discussed with Dr. RANJANA Albright via phone, 2024.    1. Colon, right and terminal ileum (right hemicolectomy with EN bloc abdominal wall resection):  Invasive adenocarcinoma.  See cancer synoptic report     2. Omentum (resection):    Positive for carcinoma    CORRECT SYNOPTIC AND M STATUS  Cancer synoptic report  - Procedure: Right hemicolectomy with EN bloc abdominal wall resection  - Tumor site:  Ascending  - Histologic type:  Adenocarcinoma  - Histologic grade:  G2, moderately differentiated.  See comment.  COMMENT:  While the majority of the tumor consists of well formed glands, a significant proportion includes abortive glands, single file infiltration, and malignant cells with signet ring cell morphology. The tumor has an insidious pattern of  infiltration with tumor present far from the advancing front of the tumor.  - Tumor size:  2.0 x 2.0 x 1.0 cm  - Tumor extent:  Invades visceral peritoneum, multifocal  - Macroscopic perforation: Not identified  - Lymphovascular invasion:  Present, extensive.  Small vessel, large vessel, intra and extramural.  - Perineural invasion:  Not identified  - Tumor budding score:  High (>10), multifocal  single cell infiltration  - Treatment effect: No known pre-surgical therapy    Margins  Margin involved by invasive carcinoma, radial (slide 1C)  All margins negative for dysplasia.    pTNM stage classification (AJCC 8th edition)  pT Category  pT4a:  Tumor invades through the visceral peritoneum    pN Category  pN2b:  7 or more regional lymph nodes are positive  Number of positive lymph nodes:  19  Number of lymph nodes examined: 26  Number of tumor deposits:  4    pM Category  pM1c:  Metastasis to the peritoneal surface of the omentum.    Additional findings:  - Sessile serrated polyp, no cytologic dysplasia, 1.1 cm at ileocecal valve  - Tubulovillous adenoma, 1.0 cm, proximal ascending  - Tubular adenoma, 0.4 cm, mid ascending  - Tubular adenoma, 0.4, distal ascending  - Tubular adenoma, 0.6 cm, distal ascending  - Submucosal lipoma, 2.0 cm  - Appendix with no specific histopathologic changes    Ancillary studies  Immunohistochemistry for mismatch repair proteins performed on prior biopsy material (SAS-, 12/21/23): pMMR.         Assessment:       1. Malignant neoplasm of ascending colon    2. 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 Immunodeficiency secondary to neoplasm    4. Immunodeficiency secondary to chemotherapy    5. Neoplastic malignant related fatigue    6. Cold sensitivity    7. Other constipation    8. Excessive gas                Plan:        # Colon cancer   Mrs. Maguire is a 74 y.o. female who presents for management of her metastatic colon cancer. She underwent a R hemicolectomy with en-bloc abdominal wall resection on 1/22/24 with Dr. Albright. Pathology revealed pT4a N2b disease with 19/26 positive LNs and omentum positive for carcinoma.    We had an in-depth conversation during our initial consult re: her diagnosis and treatment options. Reviewed recommendation for IV systemic therapy for at least 6 months. Plan for FOLFOX + bevacizumab to be given every 2 weeks. Briefly reviewed  side effects of treatment. Handouts provided. Port placed 2/21/24.     PGX - DPYD normal metabolizer, [id number] intermediate metabolizer   Dexamethasone, zofran, compazine and lidocaine sent to pharmacy previously. Reviewed admin instructions.     Pending response, she may be a candidate for CRS/HIPEC with surgical oncology team. Continue to evaluate and re-start discussion if still without radiographic evidence of disease after CT on cycle 8.    CT CAP after cycle 4 shows no clear evidence of disease.  CT CAP after cycle 8 shows no clear evidence of disease.    Tolerance of chemotherapy has been excellent.    Will set her back up with Dr. Albright and potentially surg onc for consolidative therapy (CRS/HIPEC) given well controlled disease.    Presents today for cycle 12.  We held oxaliplatin and proceed with 5-FU + bevacizumab maintenance with cycle  - Doing well with maintenance chemotherapy.   - Eating well. Still has intermittent presyncopal episodes but has not fallen. Will monitor for now.   - Labs reviewed, adequate for treatment. No signs of dehydration  - Proceed with cycle 12 today.      Unfortunately she is not eligible for our maintenance BGB CRC study because she does not have measurable disease.    Follow up: 2 weeks for cycle 13. Will repeat imaging in August.    Cold sensitivity/neoplasm related fatigue:  2/2 chemotherapy. Stopped oxaliplatin beginning with cycle 9 to prevent persistent paresthesias.  Mild persistent paresthesias at this time.  Will monitor. She is taking Cymbalta but not finding very helpful    Constipation/Gas:   Advised her to take 2 stool softeners a day with Miralax daily to have regular bowel movements.   This has helped.  Had loose stools the other day but this seemed to be an isolated incident.    Patient is in agreement with the proposed treatment plan. All questions were answered to the patient's satisfaction. Pt knows to call clinic if anything is needed before the next clinic  visit.                   Med Onc Chart Routing      Follow up with physician 2 weeks and 4 weeks. See Dr Zaragoza in 2 weeks with lab work, CT CAP and treatment discussion. 4 weeks for maintenance 5-FU/avastin   Follow up with KRISTEN 6 weeks. 5-FU, avastin   Infusion scheduling note    Injection scheduling note    Labs CBC, CMP, CEA and urinalysis   Scheduling:  Preferred lab:  Lab interval: every 2 weeks     Imaging CT chest abdomen pelvis   Scheduled in 2 weeks. Thank you   Pharmacy appointment    Other referrals

## 2024-08-02 ENCOUNTER — INFUSION (OUTPATIENT)
Dept: INFUSION THERAPY | Facility: HOSPITAL | Age: 74
End: 2024-08-02
Payer: MEDICARE

## 2024-08-02 VITALS
RESPIRATION RATE: 18 BRPM | TEMPERATURE: 99 F | DIASTOLIC BLOOD PRESSURE: 57 MMHG | HEART RATE: 81 BPM | SYSTOLIC BLOOD PRESSURE: 118 MMHG

## 2024-08-02 DIAGNOSIS — C18.2 MALIGNANT NEOPLASM OF ASCENDING COLON: Primary | ICD-10-CM

## 2024-08-02 PROCEDURE — 25000003 PHARM REV CODE 250: Performed by: PHYSICIAN ASSISTANT

## 2024-08-02 PROCEDURE — 63600175 PHARM REV CODE 636 W HCPCS: Performed by: PHYSICIAN ASSISTANT

## 2024-08-02 PROCEDURE — A4216 STERILE WATER/SALINE, 10 ML: HCPCS | Performed by: PHYSICIAN ASSISTANT

## 2024-08-02 RX ORDER — PROCHLORPERAZINE EDISYLATE 5 MG/ML
5 INJECTION INTRAMUSCULAR; INTRAVENOUS ONCE AS NEEDED
Status: DISCONTINUED | OUTPATIENT
Start: 2024-08-02 | End: 2024-08-02 | Stop reason: HOSPADM

## 2024-08-02 RX ORDER — SODIUM CHLORIDE 0.9 % (FLUSH) 0.9 %
10 SYRINGE (ML) INJECTION
Status: DISCONTINUED | OUTPATIENT
Start: 2024-08-02 | End: 2024-08-02 | Stop reason: HOSPADM

## 2024-08-02 RX ORDER — HEPARIN 100 UNIT/ML
500 SYRINGE INTRAVENOUS
Status: DISCONTINUED | OUTPATIENT
Start: 2024-08-02 | End: 2024-08-02 | Stop reason: HOSPADM

## 2024-08-02 RX ADMIN — Medication 10 ML: at 01:08

## 2024-08-02 RX ADMIN — HEPARIN 500 UNITS: 100 SYRINGE at 01:08

## 2024-08-02 NOTE — PLAN OF CARE
CADD pump completed upon arrival. PAC + blood return upon deaccess. NAD noted. VSS. Discharged home.

## 2024-08-09 ENCOUNTER — HOSPITAL ENCOUNTER (OUTPATIENT)
Dept: RADIOLOGY | Facility: HOSPITAL | Age: 74
Discharge: HOME | End: 2024-08-09
Attending: PHYSICIAN ASSISTANT
Payer: MEDICARE

## 2024-08-09 DIAGNOSIS — C18.2 MALIGNANT NEOPLASM OF ASCENDING COLON: ICD-10-CM

## 2024-08-09 PROCEDURE — 74177 CT ABD & PELVIS W/CONTRAST: CPT | Mod: TC

## 2024-08-09 PROCEDURE — 71260 CT THORAX DX C+: CPT | Mod: 26,,, | Performed by: RADIOLOGY

## 2024-08-09 PROCEDURE — 74177 CT ABD & PELVIS W/CONTRAST: CPT | Mod: 26,,, | Performed by: RADIOLOGY

## 2024-08-09 PROCEDURE — 25500020 PHARM REV CODE 255: Performed by: PHYSICIAN ASSISTANT

## 2024-08-09 PROCEDURE — 71260 CT THORAX DX C+: CPT | Mod: TC

## 2024-08-09 RX ADMIN — IOHEXOL 75 ML: 350 INJECTION, SOLUTION INTRAVENOUS at 10:08

## 2024-08-09 RX ADMIN — IOHEXOL 30 ML: 350 INJECTION, SOLUTION INTRAVENOUS at 10:08

## 2024-08-13 ENCOUNTER — INFUSION (OUTPATIENT)
Dept: INFUSION THERAPY | Facility: HOSPITAL | Age: 74
End: 2024-08-13
Payer: MEDICARE

## 2024-08-13 ENCOUNTER — OFFICE VISIT (OUTPATIENT)
Dept: HEMATOLOGY/ONCOLOGY | Facility: CLINIC | Age: 74
End: 2024-08-13
Payer: MEDICARE

## 2024-08-13 ENCOUNTER — TELEPHONE (OUTPATIENT)
Dept: SURGERY | Facility: CLINIC | Age: 74
End: 2024-08-13
Payer: MEDICARE

## 2024-08-13 VITALS
SYSTOLIC BLOOD PRESSURE: 153 MMHG | OXYGEN SATURATION: 99 % | WEIGHT: 176.25 LBS | BODY MASS INDEX: 28.45 KG/M2 | DIASTOLIC BLOOD PRESSURE: 69 MMHG | HEART RATE: 77 BPM

## 2024-08-13 VITALS — HEART RATE: 70 BPM | SYSTOLIC BLOOD PRESSURE: 158 MMHG | DIASTOLIC BLOOD PRESSURE: 71 MMHG

## 2024-08-13 DIAGNOSIS — R53.0 NEOPLASTIC MALIGNANT RELATED FATIGUE: ICD-10-CM

## 2024-08-13 DIAGNOSIS — C18.2 MALIGNANT NEOPLASM OF ASCENDING COLON: Primary | ICD-10-CM

## 2024-08-13 DIAGNOSIS — Z79.899 IMMUNODEFICIENCY SECONDARY TO CHEMOTHERAPY: ICD-10-CM

## 2024-08-13 DIAGNOSIS — D84.81 IMMUNODEFICIENCY SECONDARY TO NEOPLASM: ICD-10-CM

## 2024-08-13 DIAGNOSIS — T45.1X5A IMMUNODEFICIENCY SECONDARY TO CHEMOTHERAPY: ICD-10-CM

## 2024-08-13 DIAGNOSIS — D84.821 IMMUNODEFICIENCY SECONDARY TO CHEMOTHERAPY: ICD-10-CM

## 2024-08-13 DIAGNOSIS — C78.6 MALIGNANT NEOPLASM METASTATIC TO OMENTUM: ICD-10-CM

## 2024-08-13 DIAGNOSIS — D49.9 IMMUNODEFICIENCY SECONDARY TO NEOPLASM: ICD-10-CM

## 2024-08-13 DIAGNOSIS — K59.09 OTHER CONSTIPATION: ICD-10-CM

## 2024-08-13 DIAGNOSIS — R68.89 COLD SENSITIVITY: ICD-10-CM

## 2024-08-13 PROCEDURE — 96413 CHEMO IV INFUSION 1 HR: CPT

## 2024-08-13 PROCEDURE — 1159F MED LIST DOCD IN RCRD: CPT | Mod: CPTII,S$GLB,, | Performed by: INTERNAL MEDICINE

## 2024-08-13 PROCEDURE — 3077F SYST BP >= 140 MM HG: CPT | Mod: CPTII,S$GLB,, | Performed by: INTERNAL MEDICINE

## 2024-08-13 PROCEDURE — 96416 CHEMO PROLONG INFUSE W/PUMP: CPT

## 2024-08-13 PROCEDURE — 99215 OFFICE O/P EST HI 40 MIN: CPT | Mod: S$GLB,,, | Performed by: INTERNAL MEDICINE

## 2024-08-13 PROCEDURE — 1126F AMNT PAIN NOTED NONE PRSNT: CPT | Mod: CPTII,S$GLB,, | Performed by: INTERNAL MEDICINE

## 2024-08-13 PROCEDURE — 3008F BODY MASS INDEX DOCD: CPT | Mod: CPTII,S$GLB,, | Performed by: INTERNAL MEDICINE

## 2024-08-13 PROCEDURE — 96367 TX/PROPH/DG ADDL SEQ IV INF: CPT

## 2024-08-13 PROCEDURE — 25000003 PHARM REV CODE 250: Performed by: INTERNAL MEDICINE

## 2024-08-13 PROCEDURE — G2211 COMPLEX E/M VISIT ADD ON: HCPCS | Mod: S$GLB,,, | Performed by: INTERNAL MEDICINE

## 2024-08-13 PROCEDURE — 3078F DIAST BP <80 MM HG: CPT | Mod: CPTII,S$GLB,, | Performed by: INTERNAL MEDICINE

## 2024-08-13 PROCEDURE — 99999 PR PBB SHADOW E&M-EST. PATIENT-LVL III: CPT | Mod: PBBFAC,,, | Performed by: INTERNAL MEDICINE

## 2024-08-13 PROCEDURE — 63600175 PHARM REV CODE 636 W HCPCS: Mod: JW,JG | Performed by: INTERNAL MEDICINE

## 2024-08-13 RX ORDER — HEPARIN 100 UNIT/ML
500 SYRINGE INTRAVENOUS
Status: CANCELLED | OUTPATIENT
Start: 2024-08-13

## 2024-08-13 RX ORDER — SODIUM CHLORIDE 0.9 % (FLUSH) 0.9 %
10 SYRINGE (ML) INJECTION
Status: DISCONTINUED | OUTPATIENT
Start: 2024-08-13 | End: 2024-08-13 | Stop reason: HOSPADM

## 2024-08-13 RX ORDER — PROCHLORPERAZINE EDISYLATE 5 MG/ML
5 INJECTION INTRAMUSCULAR; INTRAVENOUS ONCE AS NEEDED
Status: CANCELLED | OUTPATIENT
Start: 2024-08-13

## 2024-08-13 RX ORDER — PROCHLORPERAZINE EDISYLATE 5 MG/ML
5 INJECTION INTRAMUSCULAR; INTRAVENOUS ONCE AS NEEDED
Status: CANCELLED | OUTPATIENT
Start: 2024-08-15

## 2024-08-13 RX ORDER — PROCHLORPERAZINE EDISYLATE 5 MG/ML
5 INJECTION INTRAMUSCULAR; INTRAVENOUS ONCE AS NEEDED
Status: DISCONTINUED | OUTPATIENT
Start: 2024-08-13 | End: 2024-08-13 | Stop reason: HOSPADM

## 2024-08-13 RX ORDER — SODIUM CHLORIDE 0.9 % (FLUSH) 0.9 %
10 SYRINGE (ML) INJECTION
Status: CANCELLED | OUTPATIENT
Start: 2024-08-15

## 2024-08-13 RX ORDER — HEPARIN 100 UNIT/ML
500 SYRINGE INTRAVENOUS
Status: DISCONTINUED | OUTPATIENT
Start: 2024-08-13 | End: 2024-08-13 | Stop reason: HOSPADM

## 2024-08-13 RX ORDER — DEXAMETHASONE 4 MG/1
TABLET ORAL
Qty: 40 TABLET | Refills: 0 | Status: SHIPPED | OUTPATIENT
Start: 2024-08-13

## 2024-08-13 RX ORDER — SODIUM CHLORIDE 0.9 % (FLUSH) 0.9 %
10 SYRINGE (ML) INJECTION
Status: CANCELLED | OUTPATIENT
Start: 2024-08-13

## 2024-08-13 RX ORDER — DIPHENHYDRAMINE HYDROCHLORIDE 50 MG/ML
50 INJECTION INTRAMUSCULAR; INTRAVENOUS ONCE AS NEEDED
Status: DISCONTINUED | OUTPATIENT
Start: 2024-08-13 | End: 2024-08-13 | Stop reason: HOSPADM

## 2024-08-13 RX ORDER — EPINEPHRINE 0.3 MG/.3ML
0.3 INJECTION SUBCUTANEOUS ONCE AS NEEDED
Status: DISCONTINUED | OUTPATIENT
Start: 2024-08-13 | End: 2024-08-13 | Stop reason: HOSPADM

## 2024-08-13 RX ORDER — HEPARIN 100 UNIT/ML
500 SYRINGE INTRAVENOUS
Status: CANCELLED | OUTPATIENT
Start: 2024-08-15

## 2024-08-13 RX ORDER — DIPHENHYDRAMINE HYDROCHLORIDE 50 MG/ML
50 INJECTION INTRAMUSCULAR; INTRAVENOUS ONCE AS NEEDED
Status: CANCELLED | OUTPATIENT
Start: 2024-08-13

## 2024-08-13 RX ORDER — EPINEPHRINE 0.3 MG/.3ML
0.3 INJECTION SUBCUTANEOUS ONCE AS NEEDED
Status: CANCELLED | OUTPATIENT
Start: 2024-08-13

## 2024-08-13 RX ADMIN — BEVACIZUMAB-AWWB 365 MG: 400 INJECTION, SOLUTION INTRAVENOUS at 09:08

## 2024-08-13 RX ADMIN — DEXAMETHASONE SODIUM PHOSPHATE 0.25 MG: 4 INJECTION, SOLUTION INTRA-ARTICULAR; INTRALESIONAL; INTRAMUSCULAR; INTRAVENOUS; SOFT TISSUE at 10:08

## 2024-08-13 RX ADMIN — FLUOROURACIL 4440 MG: 50 INJECTION, SOLUTION INTRAVENOUS at 10:08

## 2024-08-13 NOTE — PLAN OF CARE
28-Mar-2023 12:16 Patient tolerated d1c13 Mvasi/5fu treatment. NAD noted. VSS. CADD pump infusing 5fu at 2.2 ml/hr for 46 hrs. Verified by 2 Rns. RTC 8/15/24 @ 0830 for pump dc. Discharged home and ambulated independently off unit.

## 2024-08-13 NOTE — PROGRESS NOTES
MEDICAL ONCOLOGY - ESTABLISHED PATIENT VISIT    Reason for visit: colon adenocarcinoma    Best Contact Phone Number(s): [phone] (home)      Cancer/Stage/TNM:    Cancer Staging   Colon adenocarcinoma  Staging form: Colon and Rectum, AJCC 8th Edition  - Pathologic stage from 1/22/2024: Stage IVC (pT4a, pN2b, cM1c) - Signed by Minna Menendez CNS on 2/13/2024       Oncology History   Colon adenocarcinoma   12/21/2023 Tumor Markers    Patient's tumor was tested for the following markers: CEA.                                              Results of the tumor marker test revealed 3.3     12/21/2023 Procedure    Colonoscopy  Cecal polyp removed with jumbo forceps, 3 mm  Multiple ascending colon polyps ranging in size from 5 to 12 mm - semi-pedunculated removed with hot snare  Hepatic flexure mass identified - central ulceration, concerning for malignancy, 3 cm, not obstructing, this was biopsied - tattoo placed distal to mass  Sigmoid diverticular disease     12/21/2023 Tumor Markers    Patient's tumor was tested for the following markers: CEA.                                              Results of the tumor marker test revealed 3.3     12/22/2023 Imaging Significant Findings    CT CAP  Evaluation of the colon is somewhat limited as there is significant colonic stool and oral contrast material has not opacified the large bowel.  There does appear to be some abnormal thickening of the walls of the proximal right colon and a patient with a known history of colonic malignancy.  There is some soft tissue density external to the walls of the colon on the right pericolic gutter which could represent peritoneal nodularity versus subserosal extent of tumor.  Slightly more distal to this area there is a 2nd area of irregularity of the walls of the colon, difficult to fully evaluate if this is related to the colonic walls versus stool with central fat.     Two hypodensities in the liver, the larger measuring 0.8 cm, too  small to accurately characterize on the basis of this examination.  Attention on follow-up.     No pulmonary nodules are seen.     Cholecystectomy.     12/29/2023 Initial Diagnosis    Hepatic flexure colon adenocarcinoma  MMR intact     1/22/2024 Cancer Staged    Staging form: Colon and Rectum, AJCC 8th Edition  - Pathologic stage from 1/22/2024: Stage IVC (pT4a, pN2b, cM1c)     Malignant neoplasm of ascending colon   2/12/2024 Initial Diagnosis    Malignant neoplasm of ascending colon     2/26/2024 -  Chemotherapy    Treatment Summary   Plan Name: OP GI [id number] (oxaliplatin leucovorin fluorouracil) with bevacizumab Q2W  Treatment Goal: Palliative  Status: Active  Start Date: 2/26/2024  End Date: 1/16/2025 (Planned)  Provider: Kemar Zaragoza MD  Chemotherapy: oxaliplatin (ELOXATIN) 150 mg in dextrose 5 % (D5W) 595 mL chemo infusion, 157 mg, Intravenous, Clinic/HOD 1 time, 8 of 8 cycles  Administration: 150 mg (2/26/2024), 150 mg (3/11/2024), 150 mg (3/25/2024), 150 mg (4/8/2024), 150 mg (4/22/2024), 150 mg (5/6/2024), 150 mg (5/20/2024), 150 mg (6/3/2024)  fluorouracil (ADRUCIL) 2,400 mg/m2 = 4,440 mg in sodium chloride 0.9% 100 mL chemo infusion, 2,400 mg/m2 = 4,440 mg, Intravenous, Clinic/HOD 1 time, 12 of 24 cycles  Administration: 4,440 mg (2/26/2024), 4,440 mg (3/11/2024), 4,440 mg (3/25/2024), 4,440 mg (4/8/2024), 4,440 mg (4/22/2024), 4,440 mg (5/6/2024), 4,440 mg (5/20/2024), 4,440 mg (6/3/2024), 4,440 mg (6/17/2024), 4,440 mg (7/3/2024), 4,440 mg (7/16/2024), 4,440 mg (7/31/2024)  bevacizumab-awwb (MVASI) 5 mg/kg = 365 mg in sodium chloride 0.9% 100 mL infusion, 5 mg/kg = 365 mg, Intravenous, [facility] 1 time, 12 of 24 cycles  Administration: 365 mg (2/26/2024), 365 mg (3/11/2024), 365 mg (3/25/2024), 365 mg (4/8/2024), 365 mg (4/22/2024), 365 mg (5/6/2024), 365 mg (5/20/2024), 365 mg (6/3/2024), 365 mg (6/17/2024), 365 mg (7/3/2024), 365 mg (7/16/2024), 365 mg (7/31/2024)          Interim History:  "  74 y.o. female with LUANNE, bipolar, HLD who presents prior to cycle 13 FOLFOX + bevacizumab for her metastatic ascending colon cancer, now on maintenance chemotherapy. She started maintenance chemotherapy with 5-FU plus avastin with cycle 9.    She is feeling well.  Has continued paresthesias in her fingertips.  She has no pain.  Constipation is about the same.  If she takes Miralax she does have large, "explosive bowel movements."    ECOG 0. Presents with her .     ROS:   Review of Systems   Constitutional:  Positive for malaise/fatigue. Negative for chills and fever.   HENT:  Negative for congestion and sore throat.    Respiratory:  Negative for shortness of breath.    Cardiovascular:  Negative for chest pain, palpitations and leg swelling.   Gastrointestinal:  Negative for blood in stool, constipation, diarrhea and nausea.   Genitourinary:  Negative for hematuria.   Musculoskeletal:  Negative for back pain, falls and myalgias.   Skin:  Negative for rash.   Neurological:  Positive for dizziness and tingling. Negative for weakness and headaches.       Past Medical History:   Past Medical History:   Diagnosis Date    Anxiety     Arthritis     Bipolar 1 disorder     Bursitis of right hip     GI bleed due to NSAIDs     Multinodular goiter 11/15/2021    Sacroiliitis     right side    Sleep apnea         Past Surgical History:   Past Surgical History:   Procedure Laterality Date    ANKLE FRACTURE SURGERY Left 2001    BLADDER SUSPENSION      CARPAL TUNNEL RELEASE Bilateral     CHOLECYSTECTOMY      Laparoscopic    COLONOSCOPY      COLONOSCOPY N/A 12/21/2023    Procedure: COLONOSCOPY;  Surgeon: Alberto Albright MD;  Location: [facility];  Service: Endoscopy;  Laterality: N/A;    ESOPHAGOGASTRODUODENOSCOPY N/A 07/29/2021    Procedure: EGD (ESOPHAGOGASTRODUODENOSCOPY);  Surgeon: Susan Mcclain MD;  Location: [facility];  Service: Endoscopy;  Laterality: N/A;    EYE SURGERY      FOOT ARTHRODESIS Right 05/03/2023    " Procedure: FUSION, FOOT;  Surgeon: Lauri Alejandra DPM;  Location: [facility];  Service: Podiatry;  Laterality: Right;  mini c-arm, Arthrex dowel bone graft harvester, locking plate and screws festus notified cc    HYSTERECTOMY  1986    vaginal prolapse    INJECTION OF ANESTHETIC AGENT AROUND MEDIAL BRANCH NERVES INNERVATING CERVICAL FACET JOINT Bilateral 05/27/2022    Procedure: CERVICAL MEDIAL BRANCH NERVE BLOCK (C3-4,C4-5);  Surgeon: Mamie Roman MD;  Location: [facility] OR;  Service: Pain Management;  Laterality: Bilateral;    INJECTION OF ANESTHETIC AGENT AROUND MEDIAL BRANCH NERVES INNERVATING LUMBAR FACET JOINT Right 02/05/2021    Procedure: LUMBAR FACET JOINT BLOCK (L3-4,L4-5,L5-S1);  Surgeon: Mamie Roman MD;  Location: [facility];  Service: Pain Management;  Laterality: Right;    INJECTION OF ANESTHETIC AGENT AROUND MEDIAL BRANCH NERVES INNERVATING LUMBAR FACET JOINT Right 04/30/2021    Procedure: LUMBAR FACET JOINT BLOCK (L3-4,L4-5,L5-S1);  Surgeon: Mamie Roman MD;  Location: [facility];  Service: Pain Management;  Laterality: Right;    INJECTION OF ANESTHETIC AGENT INTO SACROILIAC JOINT Right 12/04/2020    Procedure: SACROILIAC JOINT INJECTION;  Surgeon: Mamie Roman MD;  Location: [facility];  Service: Pain Management;  Laterality: Right;    INJECTION OF JOINT Right 12/04/2020    Procedure: GREATER TROCHANTERIC BURSA INJECTION;  Surgeon: Mamie Roman MD;  Location: [facility] OR;  Service: Pain Management;  Laterality: Right;    LAPAROSCOPIC RIGHT COLON RESECTION N/A 1/22/2024    Procedure: COLECTOMY, RIGHT, LAPAROSCOPIC (eras low, lithotomy);  Surgeon: Alberto Albright MD;  Location: [address];  Service: Colon and Rectal;  Laterality: N/A;    NASAL SEPTUM SURGERY      RECTAL PROLAPSE REPAIR      TONSILLECTOMY          Family History:   Family History   Problem Relation Name Age of Onset    No Known Problems Maternal Aunt Rubio Glasgow     Colon cancer Maternal Uncle Edwis     Colon cancer  Maternal Uncle Yovani     Colon cancer Maternal Uncle Konstantin     No Known Problems Paternal Aunt Vero     Esophageal cancer Paternal Uncle Nat Garcia     Heart attack Maternal Grandmother Elmira     Leukemia Maternal Grandfather manish Pang     No Known Problems Paternal Grandmother Lizett     Stroke Paternal Grandfather Nat Sr         Social History:   Social History     Tobacco Use    Smoking status: Former     Current packs/day: 0.00     Average packs/day: 1 pack/day for 41.7 years (41.7 ttl pk-yrs)     Types: Cigarettes     Start date: 3/30/1982     Quit date: 2023     Years since quittin.6    Smokeless tobacco: Never   Substance Use Topics    Alcohol use: Yes     Comment: Socially-2 or 3 times a year-6 or 7 drinks        I have reviewed and updated the patient's past medical, surgical, family and social histories.    Allergies:   Review of patient's allergies indicates:   Allergen Reactions    Nsaids (non-steroidal anti-inflammatory drug) Other (See Comments)     Gastrointestinal bleeding requiring blood transfusion    Demerol [meperidine] Rash        Medications:   Current Outpatient Medications   Medication Sig Dispense Refill    albuterol (PROVENTIL/VENTOLIN HFA) 90 mcg/actuation inhaler inhale 2 puffs into the lungs every 6 hours as needed for wheezing. 18 g 1    dexAMETHasone (DECADRON) 4 MG Tab Take 2 tablets (8 mg total) by mouth once daily only on days 2, 3 and 4 of each chemotherapy cycle. 40 tablet 0    EScitalopram oxalate (LEXAPRO) 20 MG tablet Take 1 tablet (20 mg total) by mouth once daily. 30 tablet 4    lamoTRIgine (LAMICTAL) 150 MG Tab Take 2 tablets (300 mg total) by mouth every evening. 180 tablet 1    LIDOcaine-prilocaine (EMLA) cream Apply topically as needed (place to port site 45-60 minutes prior to chemotherapy). 30 g 5    ondansetron (ZOFRAN-ODT) 8 MG TbDL dissolve 1 tablet (8 mg total) under the tongue every 6 (six) hours as needed (nausea). 30 tablet 5     pantoprazole (PROTONIX) 40 MG tablet Take 1 tablet (40 mg total) by mouth once daily. 90 tablet 3    prochlorperazine (COMPAZINE) 10 MG tablet Take 1 tablet (10 mg total) by mouth every 6 (six) hours as needed (nausea). 30 tablet 2    QUEtiapine (SEROQUEL) 200 MG Tab Take 1 tablet (200 mg total) by mouth every evening. 30 tablet 3    rosuvastatin (CRESTOR) 10 MG tablet Take 1 tablet (10 mg total) by mouth once daily. (Patient taking differently: Take 10 mg by mouth every evening.) 90 tablet 3     No current facility-administered medications for this visit.        Physical Exam:   BP (!) 153/69 (BP Location: Left arm, Patient Position: Sitting, BP Method: Large (Automatic))   Pulse 77   Wt 79.9 kg (176 lb 4.1 oz)   SpO2 99%   BMI 28.45 kg/m²              Physical Exam  Vitals reviewed.   Constitutional:       General: She is not in acute distress.     Appearance: Normal appearance. She is normal weight. She is not ill-appearing, toxic-appearing or diaphoretic.   HENT:      Head: Normocephalic and atraumatic.      Right Ear: External ear normal.      Left Ear: External ear normal.      Nose: Nose normal.      Mouth/Throat:      Pharynx: Oropharynx is clear.   Eyes:      General: No scleral icterus.     Conjunctiva/sclera: Conjunctivae normal.   Cardiovascular:      Rate and Rhythm: Normal rate.   Pulmonary:      Effort: Pulmonary effort is normal. No respiratory distress.   Chest:      Comments: RCW port  Abdominal:      General: There is no distension.   Skin:     General: Skin is warm and dry.      Coloration: Skin is not jaundiced or pale.      Findings: No bruising, erythema or rash.   Neurological:      Mental Status: She is alert and oriented to person, place, and time. Mental status is at baseline.      Motor: No weakness.      Gait: Gait normal.   Psychiatric:         Mood and Affect: Mood normal.         Labs:     I have reviewed the pertinent labs. CEA 2.2.     Imaging:    CT CAP -  8/9/24:    Impression:     Postoperative changes of right colon resection for patient's known colonic malignancy.  Persistent small prominent 1 cm lymph node adjacent to the aorta at the level of the renal vessels, unchanged over multiple prior examinations.  No free air or obstruction.     Tiny 3 mm hypodensity in the right hepatic lobe, likely a small cyst or hemangioma.     Mild thickening of the 2nd portion of the duodenum which could suggest an inflammatory/infectious process.  Clinical correlation suggested.     Constipation.       Path:   1/22/24 - R Hemicolectomy   Final Pathologic Diagnosis     THE DIAGNOSES REMAIN THE SAME.  THIS CORRECTED REPORT IS ISSUED TO CHANGE M STATUS to reflect tumor in the omentum.  This change is discussed with Dr. RANJANA Albright via phone, 2/1/2024.    1. Colon, right and terminal ileum (right hemicolectomy with EN bloc abdominal wall resection):  Invasive adenocarcinoma.  See cancer synoptic report     2. Omentum (resection):    Positive for carcinoma    CORRECT SYNOPTIC AND M STATUS  Cancer synoptic report  - Procedure: Right hemicolectomy with EN bloc abdominal wall resection  - Tumor site:  Ascending  - Histologic type:  Adenocarcinoma  - Histologic grade:  G2, moderately differentiated.  See comment.  COMMENT:  While the majority of the tumor consists of well formed glands, a significant proportion includes abortive glands, single file infiltration, and malignant cells with signet ring cell morphology. The tumor has an insidious pattern of  infiltration with tumor present far from the advancing front of the tumor.  - Tumor size:  2.0 x 2.0 x 1.0 cm  - Tumor extent:  Invades visceral peritoneum, multifocal  - Macroscopic perforation: Not identified  - Lymphovascular invasion:  Present, extensive.  Small vessel, large vessel, intra and extramural.  - Perineural invasion:  Not identified  - Tumor budding score:  High (>10), multifocal single cell infiltration  - Treatment effect:  No known pre-surgical therapy    Margins  Margin involved by invasive carcinoma, radial (slide 1C)  All margins negative for dysplasia.    pTNM stage classification (AJCC 8th edition)  pT Category  pT4a:  Tumor invades through the visceral peritoneum    pN Category  pN2b:  7 or more regional lymph nodes are positive  Number of positive lymph nodes:  19  Number of lymph nodes examined: 26  Number of tumor deposits:  4    pM Category  pM1c:  Metastasis to the peritoneal surface of the omentum.    Additional findings:  - Sessile serrated polyp, no cytologic dysplasia, 1.1 cm at ileocecal valve  - Tubulovillous adenoma, 1.0 cm, proximal ascending  - Tubular adenoma, 0.4 cm, mid ascending  - Tubular adenoma, 0.4, distal ascending  - Tubular adenoma, 0.6 cm, distal ascending  - Submucosal lipoma, 2.0 cm  - Appendix with no specific histopathologic changes    Ancillary studies  Immunohistochemistry for mismatch repair proteins performed on prior biopsy material (SAS-, 12/21/23): pMMR.         Assessment:       1. Malignant neoplasm of ascending colon    2. 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 Immunodeficiency secondary to neoplasm    4. Immunodeficiency secondary to chemotherapy    5. Neoplastic malignant related fatigue    6. Cold sensitivity    7. Other constipation                Plan:        # Colon cancer   Mrs. Maguire is a 74 y.o. female who presents for management of her metastatic colon cancer. She underwent a R hemicolectomy with en-bloc abdominal wall resection on 1/22/24 with Dr. Albright. Pathology revealed pT4a N2b disease with 19/26 positive LNs and omentum positive for carcinoma.    We had an in-depth conversation during our initial consult re: her diagnosis and treatment options. Reviewed recommendation for IV systemic therapy for at least 6 months. Plan for FOLFOX + bevacizumab to be given every 2 weeks. Briefly reviewed side effects of treatment. Handouts provided. Port placed 2/21/24.      PGX - DPYD normal metabolizer, [id number] intermediate metabolizer   Dexamethasone, zofran, compazine and lidocaine sent to pharmacy previously. Reviewed admin instructions.     Pending response, she may be a candidate for CRS/HIPEC with surgical oncology team. Continue to evaluate and re-start discussion if still without radiographic evidence of disease after CT on cycle 8.    CT CAP after cycle 4 shows no clear evidence of disease.  CT CAP after cycle 8 shows no clear evidence of disease.  CT CAP after cycle 12 shows no clear evidence of disease.    Tolerance of chemotherapy has been excellent.    Will set her back up with Dr. Albright and potentially surg onc for consolidative therapy (CRS/HIPEC) given well controlled disease.    Presents today for cycle 13.  We held oxaliplatin and proceed with 5-FU + bevacizumab maintenance with cycle  - Doing well with maintenance chemotherapy.   - Eating well. Still has intermittent presyncopal episodes but has not fallen. Will monitor for now.   - Labs reviewed, adequate for treatment. No signs of dehydration  - Proceed with cycle 13 today.      Unfortunately she was not eligible for our maintenance BGB CRC study because she did not have measurable disease.    Follow up: 2 weeks for cycle 14.     Cold sensitivity/neoplasm related fatigue:  2/2 chemotherapy. Stopped oxaliplatin beginning with cycle 9 to prevent persistent paresthesias.  Mild persistent paresthesias at this time.  Will monitor. She is taking Cymbalta but not finding very helpful    Constipation/Gas:   Advised her to take 2 stool softeners a day with Miralax daily to have regular bowel movements.   This has helped.    Recommended she try taking a half cap of Miralax to avoid more explosive BM.    Patient is in agreement with the proposed treatment plan. All questions were answered to the patient's satisfaction. Pt knows to call clinic if anything is needed before the next clinic visit.    Kemar Zaragoza,  MD  Hematology/Oncology  [facility]                   Med Onc Route Chart for Scheduling

## 2024-08-15 ENCOUNTER — INFUSION (OUTPATIENT)
Dept: INFUSION THERAPY | Facility: HOSPITAL | Age: 74
End: 2024-08-15
Payer: MEDICARE

## 2024-08-15 ENCOUNTER — OFFICE VISIT (OUTPATIENT)
Dept: SURGERY | Facility: CLINIC | Age: 74
End: 2024-08-15
Payer: MEDICARE

## 2024-08-15 VITALS
BODY MASS INDEX: 28.61 KG/M2 | HEART RATE: 76 BPM | SYSTOLIC BLOOD PRESSURE: 148 MMHG | OXYGEN SATURATION: 98 % | WEIGHT: 177.25 LBS | RESPIRATION RATE: 18 BRPM | DIASTOLIC BLOOD PRESSURE: 70 MMHG

## 2024-08-15 VITALS
TEMPERATURE: 99 F | DIASTOLIC BLOOD PRESSURE: 66 MMHG | OXYGEN SATURATION: 98 % | RESPIRATION RATE: 20 BRPM | HEART RATE: 69 BPM | SYSTOLIC BLOOD PRESSURE: 146 MMHG

## 2024-08-15 DIAGNOSIS — C18.2 CANCER OF ASCENDING COLON METASTATIC TO INTRA-ABDOMINAL LYMPH NODE: Primary | ICD-10-CM

## 2024-08-15 DIAGNOSIS — C77.2 CANCER OF ASCENDING COLON METASTATIC TO INTRA-ABDOMINAL LYMPH NODE: Primary | ICD-10-CM

## 2024-08-15 DIAGNOSIS — C18.2 MALIGNANT NEOPLASM OF ASCENDING COLON: Primary | ICD-10-CM

## 2024-08-15 PROCEDURE — 25000003 PHARM REV CODE 250: Performed by: INTERNAL MEDICINE

## 2024-08-15 PROCEDURE — 1159F MED LIST DOCD IN RCRD: CPT | Mod: CPTII,S$GLB,, | Performed by: SURGERY

## 2024-08-15 PROCEDURE — 3008F BODY MASS INDEX DOCD: CPT | Mod: CPTII,S$GLB,, | Performed by: SURGERY

## 2024-08-15 PROCEDURE — 1101F PT FALLS ASSESS-DOCD LE1/YR: CPT | Mod: CPTII,S$GLB,, | Performed by: SURGERY

## 2024-08-15 PROCEDURE — 3078F DIAST BP <80 MM HG: CPT | Mod: CPTII,S$GLB,, | Performed by: SURGERY

## 2024-08-15 PROCEDURE — A4216 STERILE WATER/SALINE, 10 ML: HCPCS | Performed by: INTERNAL MEDICINE

## 2024-08-15 PROCEDURE — 3077F SYST BP >= 140 MM HG: CPT | Mod: CPTII,S$GLB,, | Performed by: SURGERY

## 2024-08-15 PROCEDURE — 99204 OFFICE O/P NEW MOD 45 MIN: CPT | Mod: S$GLB,,, | Performed by: SURGERY

## 2024-08-15 PROCEDURE — 1126F AMNT PAIN NOTED NONE PRSNT: CPT | Mod: CPTII,S$GLB,, | Performed by: SURGERY

## 2024-08-15 PROCEDURE — 99999 PR PBB SHADOW E&M-EST. PATIENT-LVL III: CPT | Mod: PBBFAC,,, | Performed by: SURGERY

## 2024-08-15 PROCEDURE — 3288F FALL RISK ASSESSMENT DOCD: CPT | Mod: CPTII,S$GLB,, | Performed by: SURGERY

## 2024-08-15 PROCEDURE — 63600175 PHARM REV CODE 636 W HCPCS: Performed by: INTERNAL MEDICINE

## 2024-08-15 RX ORDER — HEPARIN 100 UNIT/ML
500 SYRINGE INTRAVENOUS
Status: DISCONTINUED | OUTPATIENT
Start: 2024-08-15 | End: 2024-08-15 | Stop reason: HOSPADM

## 2024-08-15 RX ORDER — ASPIRIN 81 MG/1
81 TABLET ORAL DAILY
COMMUNITY

## 2024-08-15 RX ORDER — PROCHLORPERAZINE EDISYLATE 5 MG/ML
5 INJECTION INTRAMUSCULAR; INTRAVENOUS ONCE AS NEEDED
Status: DISCONTINUED | OUTPATIENT
Start: 2024-08-15 | End: 2024-08-15 | Stop reason: HOSPADM

## 2024-08-15 RX ORDER — SODIUM CHLORIDE 0.9 % (FLUSH) 0.9 %
10 SYRINGE (ML) INJECTION
Status: DISCONTINUED | OUTPATIENT
Start: 2024-08-15 | End: 2024-08-15 | Stop reason: HOSPADM

## 2024-08-15 RX ADMIN — Medication 10 ML: at 08:08

## 2024-08-15 RX ADMIN — HEPARIN 500 UNITS: 100 SYRINGE at 08:08

## 2024-08-15 NOTE — PROGRESS NOTES
Surgical Oncology History and Physical    Encounter Date:  8/15/2024    Patient ID: Karin Maguire  Age:  74 y.o. :  1950    Chief Complaint:  Metastatic Colon Cancer      History:    Ms. Maguire is a 74 y.o. female with a history of ascending colon cancer metastatic to the peritoneum. She underwent laparoscopic right hemicolectomy with Dr. Albright on 24. Pathology showed a moderately differentiated ascending colon cancer with 19/26 positive lymph nodes. There was metastatic adenocarcinoma in the omentum, although Dr. Albright did not see any disease in the omentum at the time of surgery. She has now received 13 cycles of FOLFOX and Bevacizumab and presents for consideration of cytoreductive surgery with or without HIPEC. She has tolerated chemotherapy very well.     Past Medical History:   Diagnosis Date    Anxiety     Arthritis     Bipolar 1 disorder     Bursitis of right hip     GI bleed due to NSAIDs     Multinodular goiter 11/15/2021    Sacroiliitis     right side    Sleep apnea      Past Surgical History:   Procedure Laterality Date    ANKLE FRACTURE SURGERY Left     BLADDER SUSPENSION      CARPAL TUNNEL RELEASE Bilateral     CHOLECYSTECTOMY      Laparoscopic    COLONOSCOPY      COLONOSCOPY N/A 2023    Procedure: COLONOSCOPY;  Surgeon: Alberto Albright MD;  Location: [facility];  Service: Endoscopy;  Laterality: N/A;    ESOPHAGOGASTRODUODENOSCOPY N/A 2021    Procedure: EGD (ESOPHAGOGASTRODUODENOSCOPY);  Surgeon: Susan Mcclain MD;  Location: [facility];  Service: Endoscopy;  Laterality: N/A;    EYE SURGERY      FOOT ARTHRODESIS Right 2023    Procedure: FUSION, FOOT;  Surgeon: Lauri Alejandra DPM;  Location: [facility];  Service: Podiatry;  Laterality: Right;  mini c-arm, Arthrex dowel bone graft harvester, locking plate and screws festus notified cc    HYSTERECTOMY      vaginal prolapse    INJECTION OF ANESTHETIC AGENT AROUND MEDIAL BRANCH NERVES INNERVATING  CERVICAL FACET JOINT Bilateral 05/27/2022    Procedure: CERVICAL MEDIAL BRANCH NERVE BLOCK (C3-4,C4-5);  Surgeon: Mamie Roman MD;  Location: [facility];  Service: Pain Management;  Laterality: Bilateral;    INJECTION OF ANESTHETIC AGENT AROUND MEDIAL BRANCH NERVES INNERVATING LUMBAR FACET JOINT Right 02/05/2021    Procedure: LUMBAR FACET JOINT BLOCK (L3-4,L4-5,L5-S1);  Surgeon: Mamie Roman MD;  Location: [facility];  Service: Pain Management;  Laterality: Right;    INJECTION OF ANESTHETIC AGENT AROUND MEDIAL BRANCH NERVES INNERVATING LUMBAR FACET JOINT Right 04/30/2021    Procedure: LUMBAR FACET JOINT BLOCK (L3-4,L4-5,L5-S1);  Surgeon: Mamie Roman MD;  Location: [facility];  Service: Pain Management;  Laterality: Right;    INJECTION OF ANESTHETIC AGENT INTO SACROILIAC JOINT Right 12/04/2020    Procedure: SACROILIAC JOINT INJECTION;  Surgeon: Mamie Roman MD;  Location: [facility];  Service: Pain Management;  Laterality: Right;    INJECTION OF JOINT Right 12/04/2020    Procedure: GREATER TROCHANTERIC BURSA INJECTION;  Surgeon: Mamie Roman MD;  Location: [facility];  Service: Pain Management;  Laterality: Right;    LAPAROSCOPIC RIGHT COLON RESECTION N/A 1/22/2024    Procedure: COLECTOMY, RIGHT, LAPAROSCOPIC (eras low, lithotomy);  Surgeon: Alberto Albright MD;  Location: [facility] OR [address] [place] [address];  Service: Colon and Rectal;  Laterality: N/A;    NASAL SEPTUM SURGERY      RECTAL PROLAPSE REPAIR      TONSILLECTOMY       Current Outpatient Medications on File Prior to Visit   Medication Sig Dispense Refill    albuterol (PROVENTIL/VENTOLIN HFA) 90 mcg/actuation inhaler inhale 2 puffs into the lungs every 6 hours as needed for wheezing. 18 g 1    dexAMETHasone (DECADRON) 4 MG Tab Take 2 tablets (8 mg total) by mouth once daily only on days 2, 3 and 4 of each chemotherapy cycle. 40 tablet 0    EScitalopram oxalate (LEXAPRO) 20 MG tablet Take 1 tablet (20 mg total) by mouth once daily. 30 tablet 4    lamoTRIgine (LAMICTAL) 150  MG Tab Take 2 tablets (300 mg total) by mouth every evening. 180 tablet 1    LIDOcaine-prilocaine (EMLA) cream Apply topically as needed (place to port site 45-60 minutes prior to chemotherapy). 30 g 5    ondansetron (ZOFRAN-ODT) 8 MG TbDL dissolve 1 tablet (8 mg total) under the tongue every 6 (six) hours as needed (nausea). 30 tablet 5    pantoprazole (PROTONIX) 40 MG tablet Take 1 tablet (40 mg total) by mouth once daily. 90 tablet 3    prochlorperazine (COMPAZINE) 10 MG tablet Take 1 tablet (10 mg total) by mouth every 6 (six) hours as needed (nausea). 30 tablet 2    QUEtiapine (SEROQUEL) 200 MG Tab Take 1 tablet (200 mg total) by mouth every evening. 30 tablet 3    rosuvastatin (CRESTOR) 10 MG tablet Take 1 tablet (10 mg total) by mouth once daily. (Patient taking differently: Take 10 mg by mouth every evening.) 90 tablet 3     No current facility-administered medications on file prior to visit.     Review of patient's allergies indicates:   Allergen Reactions    Nsaids (non-steroidal anti-inflammatory drug) Other (See Comments)     Gastrointestinal bleeding requiring blood transfusion    Demerol [meperidine] Rash       Family History:  Her family history includes Colon cancer in her maternal uncle, maternal uncle, and maternal uncle; Esophageal cancer in her paternal uncle; Heart attack in her maternal grandmother; Leukemia in her maternal grandfather; No Known Problems in her maternal aunt, paternal aunt, and paternal grandmother; Stroke in her paternal grandfather.     Social History:  She reports that she quit smoking about 7 months ago. Her smoking use included cigarettes. She started smoking about 42 years ago. She has a 41.7 pack-year smoking history. She has never used smokeless tobacco. She reports current alcohol use. She reports that she does not use drugs.     ROS:     Review of Systems  Pertinent positive/negatives detailed in HPI, all other systems negative.     Physical Exam:  There were no  vitals taken for this visit.    Physical Exam    Constitutional:  Non-toxic, no acute distress.  Performance status: ECOG 0  Eyes:  Sclerae anicteric, gaze symmetrical  Neck:  Trachea midline, thyroid, non enlarged without palpable nodules,  FROM  Resp:  Easy work of breathing, no wheezes  CV:  Regular pulse, no JVD  Abd:  Soft, non-tender, no masses, no hepatosplenomegaly, no ascites, no superficial varices  Lymphatics:  No cervical, supraclavicular, axillary, or inguinal lymphadenopathy  Musculoskeletal:  Ambulatory, normal gait, no muscle wasting  Neuro:  No gross deficits  Psych:  Awake, alert, oriented.  Answers and asks questions appropriately    Data:     Radiology:  I personally reviewed these images: I reviewed the CT of the Chest, Abdomen, and Pelvis and a CT and MRI prior to surgery. There is a stable 1 cm lymph node to the right of the IVC. There is no other evidence of recurrent disease. There were two small hypodensities in the liver at diagnosis, but these did not have an MRI correlate.     Labs:  CEA 2.2    Pathology:    Component 6 mo ago   Final Pathologic Diagnosis     THE DIAGNOSES REMAIN THE SAME.  THIS CORRECTED REPORT IS ISSUED TO CHANGE M STATUS to reflect tumor in the omentum.  This change is discussed with Dr. RANJANA Albright via phone, 2/1/2024.    1. Colon, right and terminal ileum (right hemicolectomy with EN bloc abdominal wall resection):  Invasive adenocarcinoma.  See cancer synoptic report     2. Omentum (resection):    Positive for carcinoma    CORRECT SYNOPTIC AND M STATUS  Cancer synoptic report  - Procedure: Right hemicolectomy with EN bloc abdominal wall resection  - Tumor site:  Ascending  - Histologic type:  Adenocarcinoma  - Histologic grade:  G2, moderately differentiated.  See comment.  COMMENT:  While the majority of the tumor consists of well formed glands, a significant proportion includes abortive glands, single file infiltration, and malignant cells with signet ring cell  morphology. The tumor has an insidious pattern of  infiltration with tumor present far from the advancing front of the tumor.  - Tumor size:  2.0 x 2.0 x 1.0 cm  - Tumor extent:  Invades visceral peritoneum, multifocal  - Macroscopic perforation: Not identified  - Lymphovascular invasion:  Present, extensive.  Small vessel, large vessel, intra and extramural.  - Perineural invasion:  Not identified  - Tumor budding score:  High (>10), multifocal single cell infiltration  - Treatment effect: No known pre-surgical therapy    Margins  Margin involved by invasive carcinoma, radial (slide 1C)  All margins negative for dysplasia.    pTNM stage classification (AJCC 8th edition)  pT Category  pT4a:  Tumor invades through the visceral peritoneum    pN Category  pN2b:  7 or more regional lymph nodes are positive  Number of positive lymph nodes:  19  Number of lymph nodes examined: 26  Number of tumor deposits:  4    pM Category  pM1c:  Metastasis to the peritoneal surface of the omentum.    Additional findings:  - Sessile serrated polyp, no cytologic dysplasia, 1.1 cm at ileocecal valve  - Tubulovillous adenoma, 1.0 cm, proximal ascending  - Tubular adenoma, 0.4 cm, mid ascending  - Tubular adenoma, 0.4, distal ascending  - Tubular adenoma, 0.6 cm, distal ascending  - Submucosal lipoma, 2.0 cm  - Appendix with no specific histopathologic changes    Ancillary studies  Immunohistochemistry for mismatch repair proteins performed on prior biopsy material (SAS-, 12/21/23): pMMR.      INCORRECT, FOR DOCUMENTATION PURPOSES ONLY.  Cancer synoptic report  - Procedure: Right hemicolectomy with EN bloc abdominal wall resection  - Tumor site:  Ascending  - Histologic type:  Adenocarcinoma  - Histologic grade:  G2, moderately differentiated.  See comment.  COMMENT:  While the majority of the tumor consists of well formed glands, a significant proportion includes abortive glands, single file infiltration, and malignant cells with  signet ring cell morphology. The tumor has an insidious pattern of  infiltration with tumor present far from the advancing front of the tumor.  - Tumor size:  2.0 x 2.0 x 1.0 cm  - Tumor extent:  Invades visceral peritoneum, multifocal  - Macroscopic perforation: Not identified  - Lymphovascular invasion:  Present, extensive.  Small vessel, large vessel, intra and extramural.  - Perineural invasion:  Not identified  - Tumor budding score:  High (>10), multifocal single cell infiltration  - Treatment effect: No known pre-surgical therapy    Margins  Margin involved by invasive carcinoma, radial (slide 1C)  All margins negative for dysplasia.    pTNM stage classification (AJCC 8th edition)  pT Category  pT4a:  Tumor invades through the visceral peritoneum    pN Category  pN2b:  7 or more regional lymph nodes are positive  Number of positive lymph nodes:  19  Number of lymph nodes examined: 26  Number of tumor deposits:  4    pM Category  None submitted.    Additional findings:  - Sessile serrated polyp, no cytologic dysplasia, 1.1 cm at ileocecal valve  - Tubulovillous adenoma, 1.0 cm, proximal ascending  - Tubular adenoma, 0.4 cm, mid ascending  - Tubular adenoma, 0.4, distal ascending  - Tubular adenoma, 0.6 cm, distal ascending  - Submucosal lipoma, 2.0 cm  - Appendix with no specific histopathologic changes    Ancillary studies  Immunohistochemistry for mismatch repair proteins performed on prior biopsy material (SAS-, 12/21/23): pMMR. VC      Comment: Interp By Sil Conroy M.D., Signed on 02/01/2024 at 12:56         Assessment: This is a 74 year old woman with colon cancer metastatic to the peritoneum. She has no evidence of disease currently on imaging. Her tumor is not mucinous, and she did not have ascites at diagnosis, so I think there is limited role for HIPEC, but cytoreduction may make sense if there is measurable peritoneal disease. I would like to stage the peritoneum with a diagnostic  laparoscopy. If she has evidence of disease on laparoscopy, we could consider cytoreduction.       Plan:  - We will plan diagnostic laparoscopy 6 weeks from her last Avastin. She can receive FOLFOX only with her next cycle of chemo. I discussed this with Dr. Zaragoza.   - I will see her back after the laparoscopy.     Benjy Contreras MD  Surgical Oncology  [facility] [place]         Karin Maguire 1950

## 2024-08-23 ENCOUNTER — PATIENT MESSAGE (OUTPATIENT)
Dept: HEMATOLOGY/ONCOLOGY | Facility: CLINIC | Age: 74
End: 2024-08-23
Payer: MEDICARE

## 2024-08-27 ENCOUNTER — LAB VISIT (OUTPATIENT)
Dept: LAB | Facility: HOSPITAL | Age: 74
End: 2024-08-27
Attending: REGISTERED NURSE
Payer: MEDICARE

## 2024-08-27 DIAGNOSIS — K63.89 COLONIC MASS: ICD-10-CM

## 2024-08-27 DIAGNOSIS — C18.2 MALIGNANT NEOPLASM OF ASCENDING COLON: ICD-10-CM

## 2024-08-27 LAB
ALBUMIN SERPL BCP-MCNC: 3.5 G/DL (ref 3.5–5.2)
ALP SERPL-CCNC: 72 U/L (ref 55–135)
ALT SERPL W/O P-5'-P-CCNC: 12 U/L (ref 10–44)
ANION GAP SERPL CALC-SCNC: 8 MMOL/L (ref 8–16)
AST SERPL-CCNC: 15 U/L (ref 10–40)
BILIRUB SERPL-MCNC: 0.4 MG/DL (ref 0.1–1)
BILIRUB UR QL STRIP: NEGATIVE
BUN SERPL-MCNC: 14 MG/DL (ref 8–23)
CALCIUM SERPL-MCNC: 9.8 MG/DL (ref 8.7–10.5)
CEA SERPL-MCNC: 2.3 NG/ML (ref 0–5)
CHLORIDE SERPL-SCNC: 105 MMOL/L (ref 95–110)
CLARITY UR: CLEAR
CO2 SERPL-SCNC: 25 MMOL/L (ref 23–29)
COLOR UR: YELLOW
CREAT SERPL-MCNC: 0.9 MG/DL (ref 0.5–1.4)
ERYTHROCYTE [DISTWIDTH] IN BLOOD BY AUTOMATED COUNT: 16.7 % (ref 11.5–14.5)
EST. GFR  (NO RACE VARIABLE): >60 ML/MIN/1.73 M^2
GLUCOSE SERPL-MCNC: 98 MG/DL (ref 70–110)
GLUCOSE UR QL STRIP: NEGATIVE
HCT VFR BLD AUTO: 36.7 % (ref 37–48.5)
HGB BLD-MCNC: 11.9 G/DL (ref 12–16)
HGB UR QL STRIP: NEGATIVE
KETONES UR QL STRIP: NEGATIVE
LEUKOCYTE ESTERASE UR QL STRIP: NEGATIVE
MCH RBC QN AUTO: 29.8 PG (ref 27–31)
MCHC RBC AUTO-ENTMCNC: 32.4 G/DL (ref 32–36)
MCV RBC AUTO: 92 FL (ref 82–98)
NITRITE UR QL STRIP: NEGATIVE
PH UR STRIP: 6 [PH] (ref 5–8)
PLATELET # BLD AUTO: 201 K/UL (ref 150–450)
PMV BLD AUTO: 9.8 FL (ref 9.2–12.9)
POTASSIUM SERPL-SCNC: 4.4 MMOL/L (ref 3.5–5.1)
PROT SERPL-MCNC: 6.9 G/DL (ref 6–8.4)
PROT UR QL STRIP: NEGATIVE
RBC # BLD AUTO: 3.99 M/UL (ref 4–5.4)
SODIUM SERPL-SCNC: 138 MMOL/L (ref 136–145)
SP GR UR STRIP: 1.02 (ref 1–1.03)
URN SPEC COLLECT METH UR: NORMAL
UROBILINOGEN UR STRIP-ACNC: NEGATIVE EU/DL
WBC # BLD AUTO: 4.56 K/UL (ref 3.9–12.7)

## 2024-08-27 PROCEDURE — 36415 COLL VENOUS BLD VENIPUNCTURE: CPT | Performed by: STUDENT IN AN ORGANIZED HEALTH CARE EDUCATION/TRAINING PROGRAM

## 2024-08-27 PROCEDURE — 82378 CARCINOEMBRYONIC ANTIGEN: CPT | Performed by: STUDENT IN AN ORGANIZED HEALTH CARE EDUCATION/TRAINING PROGRAM

## 2024-08-27 PROCEDURE — 80053 COMPREHEN METABOLIC PANEL: CPT | Performed by: STUDENT IN AN ORGANIZED HEALTH CARE EDUCATION/TRAINING PROGRAM

## 2024-08-27 PROCEDURE — 85027 COMPLETE CBC AUTOMATED: CPT | Performed by: STUDENT IN AN ORGANIZED HEALTH CARE EDUCATION/TRAINING PROGRAM

## 2024-08-27 PROCEDURE — 81003 URINALYSIS AUTO W/O SCOPE: CPT | Performed by: REGISTERED NURSE

## 2024-08-28 ENCOUNTER — OFFICE VISIT (OUTPATIENT)
Dept: HEMATOLOGY/ONCOLOGY | Facility: CLINIC | Age: 74
End: 2024-08-28
Payer: MEDICARE

## 2024-08-28 ENCOUNTER — INFUSION (OUTPATIENT)
Dept: INFUSION THERAPY | Facility: HOSPITAL | Age: 74
End: 2024-08-28
Payer: MEDICARE

## 2024-08-28 VITALS
BODY MASS INDEX: 28.06 KG/M2 | HEART RATE: 79 BPM | WEIGHT: 174.63 LBS | SYSTOLIC BLOOD PRESSURE: 137 MMHG | HEIGHT: 66 IN | DIASTOLIC BLOOD PRESSURE: 74 MMHG | RESPIRATION RATE: 18 BRPM

## 2024-08-28 VITALS
DIASTOLIC BLOOD PRESSURE: 69 MMHG | BODY MASS INDEX: 28.06 KG/M2 | WEIGHT: 174.63 LBS | SYSTOLIC BLOOD PRESSURE: 124 MMHG | HEART RATE: 70 BPM | TEMPERATURE: 98 F | HEIGHT: 66 IN | RESPIRATION RATE: 18 BRPM

## 2024-08-28 DIAGNOSIS — C78.6 MALIGNANT NEOPLASM METASTATIC TO OMENTUM: ICD-10-CM

## 2024-08-28 DIAGNOSIS — R53.0 NEOPLASTIC MALIGNANT RELATED FATIGUE: ICD-10-CM

## 2024-08-28 DIAGNOSIS — Z79.899 IMMUNODEFICIENCY SECONDARY TO CHEMOTHERAPY: ICD-10-CM

## 2024-08-28 DIAGNOSIS — D49.9 IMMUNODEFICIENCY SECONDARY TO NEOPLASM: ICD-10-CM

## 2024-08-28 DIAGNOSIS — K59.09 OTHER CONSTIPATION: ICD-10-CM

## 2024-08-28 DIAGNOSIS — C18.2 MALIGNANT NEOPLASM OF ASCENDING COLON: Primary | ICD-10-CM

## 2024-08-28 DIAGNOSIS — D84.821 IMMUNODEFICIENCY SECONDARY TO CHEMOTHERAPY: ICD-10-CM

## 2024-08-28 DIAGNOSIS — D84.81 IMMUNODEFICIENCY SECONDARY TO NEOPLASM: ICD-10-CM

## 2024-08-28 DIAGNOSIS — R68.89 COLD SENSITIVITY: ICD-10-CM

## 2024-08-28 DIAGNOSIS — T45.1X5A IMMUNODEFICIENCY SECONDARY TO CHEMOTHERAPY: ICD-10-CM

## 2024-08-28 PROCEDURE — 96416 CHEMO PROLONG INFUSE W/PUMP: CPT

## 2024-08-28 PROCEDURE — 96365 THER/PROPH/DIAG IV INF INIT: CPT

## 2024-08-28 PROCEDURE — 3008F BODY MASS INDEX DOCD: CPT | Mod: CPTII,S$GLB,, | Performed by: REGISTERED NURSE

## 2024-08-28 PROCEDURE — 1160F RVW MEDS BY RX/DR IN RCRD: CPT | Mod: CPTII,S$GLB,, | Performed by: REGISTERED NURSE

## 2024-08-28 PROCEDURE — 1159F MED LIST DOCD IN RCRD: CPT | Mod: CPTII,S$GLB,, | Performed by: REGISTERED NURSE

## 2024-08-28 PROCEDURE — 99999 PR PBB SHADOW E&M-EST. PATIENT-LVL III: CPT | Mod: PBBFAC,,, | Performed by: REGISTERED NURSE

## 2024-08-28 PROCEDURE — 3078F DIAST BP <80 MM HG: CPT | Mod: CPTII,S$GLB,, | Performed by: REGISTERED NURSE

## 2024-08-28 PROCEDURE — G2211 COMPLEX E/M VISIT ADD ON: HCPCS | Mod: S$GLB,,, | Performed by: REGISTERED NURSE

## 2024-08-28 PROCEDURE — 63600175 PHARM REV CODE 636 W HCPCS: Performed by: REGISTERED NURSE

## 2024-08-28 PROCEDURE — 1125F AMNT PAIN NOTED PAIN PRSNT: CPT | Mod: CPTII,S$GLB,, | Performed by: REGISTERED NURSE

## 2024-08-28 PROCEDURE — 3075F SYST BP GE 130 - 139MM HG: CPT | Mod: CPTII,S$GLB,, | Performed by: REGISTERED NURSE

## 2024-08-28 PROCEDURE — 99215 OFFICE O/P EST HI 40 MIN: CPT | Mod: S$GLB,,, | Performed by: REGISTERED NURSE

## 2024-08-28 PROCEDURE — 25000003 PHARM REV CODE 250: Performed by: REGISTERED NURSE

## 2024-08-28 RX ORDER — EPINEPHRINE 0.3 MG/.3ML
0.3 INJECTION SUBCUTANEOUS ONCE AS NEEDED
Status: DISCONTINUED | OUTPATIENT
Start: 2024-08-28 | End: 2024-08-28 | Stop reason: HOSPADM

## 2024-08-28 RX ORDER — PROCHLORPERAZINE EDISYLATE 5 MG/ML
5 INJECTION INTRAMUSCULAR; INTRAVENOUS ONCE AS NEEDED
Status: CANCELLED | OUTPATIENT
Start: 2024-08-28

## 2024-08-28 RX ORDER — DIPHENHYDRAMINE HYDROCHLORIDE 50 MG/ML
50 INJECTION INTRAMUSCULAR; INTRAVENOUS ONCE AS NEEDED
Status: DISCONTINUED | OUTPATIENT
Start: 2024-08-28 | End: 2024-08-28 | Stop reason: HOSPADM

## 2024-08-28 RX ORDER — HEPARIN 100 UNIT/ML
500 SYRINGE INTRAVENOUS
Status: CANCELLED | OUTPATIENT
Start: 2024-08-28

## 2024-08-28 RX ORDER — PROCHLORPERAZINE EDISYLATE 5 MG/ML
5 INJECTION INTRAMUSCULAR; INTRAVENOUS ONCE AS NEEDED
Status: CANCELLED | OUTPATIENT
Start: 2024-08-29

## 2024-08-28 RX ORDER — EPINEPHRINE 0.3 MG/.3ML
0.3 INJECTION SUBCUTANEOUS ONCE AS NEEDED
Status: CANCELLED | OUTPATIENT
Start: 2024-08-28

## 2024-08-28 RX ORDER — SODIUM CHLORIDE 0.9 % (FLUSH) 0.9 %
10 SYRINGE (ML) INJECTION
Status: CANCELLED | OUTPATIENT
Start: 2024-08-29

## 2024-08-28 RX ORDER — HEPARIN 100 UNIT/ML
500 SYRINGE INTRAVENOUS
Status: DISCONTINUED | OUTPATIENT
Start: 2024-08-28 | End: 2024-08-28 | Stop reason: HOSPADM

## 2024-08-28 RX ORDER — PROCHLORPERAZINE EDISYLATE 5 MG/ML
5 INJECTION INTRAMUSCULAR; INTRAVENOUS ONCE AS NEEDED
Status: DISCONTINUED | OUTPATIENT
Start: 2024-08-28 | End: 2024-08-28 | Stop reason: HOSPADM

## 2024-08-28 RX ORDER — SODIUM CHLORIDE 0.9 % (FLUSH) 0.9 %
10 SYRINGE (ML) INJECTION
Status: CANCELLED | OUTPATIENT
Start: 2024-08-28

## 2024-08-28 RX ORDER — SODIUM CHLORIDE 0.9 % (FLUSH) 0.9 %
10 SYRINGE (ML) INJECTION
Status: DISCONTINUED | OUTPATIENT
Start: 2024-08-28 | End: 2024-08-28 | Stop reason: HOSPADM

## 2024-08-28 RX ORDER — DIPHENHYDRAMINE HYDROCHLORIDE 50 MG/ML
50 INJECTION INTRAMUSCULAR; INTRAVENOUS ONCE AS NEEDED
Status: CANCELLED | OUTPATIENT
Start: 2024-08-28

## 2024-08-28 RX ORDER — HEPARIN 100 UNIT/ML
500 SYRINGE INTRAVENOUS
Status: CANCELLED | OUTPATIENT
Start: 2024-08-29

## 2024-08-28 RX ADMIN — FLUOROURACIL 4440 MG: 50 INJECTION, SOLUTION INTRAVENOUS at 10:08

## 2024-08-28 RX ADMIN — PALONOSETRON HYDROCHLORIDE 0.25 MG: 0.25 INJECTION INTRAVENOUS at 07:08

## 2024-08-28 RX ADMIN — SODIUM CHLORIDE: 9 INJECTION, SOLUTION INTRAVENOUS at 07:08

## 2024-08-28 NOTE — PLAN OF CARE
1008- Connected to CADD pump for home infusion, connection sites secured, pump infusing properly at time of discharge. Pt aware to call provider with any questions or concerns, knows to return to clinic on Friday August 30, 2024 at approx 0800 for pump d/c, verbalized understanding. Pt ambulatory from clinic with steady gait, no distress noted.

## 2024-08-28 NOTE — PROGRESS NOTES
MEDICAL ONCOLOGY - ESTABLISHED PATIENT VISIT    Reason for visit: colon adenocarcinoma    Best Contact Phone Number(s): [phone] (home)      Cancer/Stage/TNM:    Cancer Staging   Colon adenocarcinoma  Staging form: Colon and Rectum, AJCC 8th Edition  - Pathologic stage from 1/22/2024: Stage IVC (pT4a, pN2b, cM1c) - Signed by Minna Menendez CNS on 2/13/2024       Oncology History   Colon adenocarcinoma   12/21/2023 Tumor Markers    Patient's tumor was tested for the following markers: CEA.                                              Results of the tumor marker test revealed 3.3     12/21/2023 Procedure    Colonoscopy  Cecal polyp removed with jumbo forceps, 3 mm  Multiple ascending colon polyps ranging in size from 5 to 12 mm - semi-pedunculated removed with hot snare  Hepatic flexure mass identified - central ulceration, concerning for malignancy, 3 cm, not obstructing, this was biopsied - tattoo placed distal to mass  Sigmoid diverticular disease     12/21/2023 Tumor Markers    Patient's tumor was tested for the following markers: CEA.                                              Results of the tumor marker test revealed 3.3     12/22/2023 Imaging Significant Findings    CT CAP  Evaluation of the colon is somewhat limited as there is significant colonic stool and oral contrast material has not opacified the large bowel.  There does appear to be some abnormal thickening of the walls of the proximal right colon and a patient with a known history of colonic malignancy.  There is some soft tissue density external to the walls of the colon on the right pericolic gutter which could represent peritoneal nodularity versus subserosal extent of tumor.  Slightly more distal to this area there is a 2nd area of irregularity of the walls of the colon, difficult to fully evaluate if this is related to the colonic walls versus stool with central fat.     Two hypodensities in the liver, the larger measuring 0.8 cm, too  small to accurately characterize on the basis of this examination.  Attention on follow-up.     No pulmonary nodules are seen.     Cholecystectomy.     12/29/2023 Initial Diagnosis    Hepatic flexure colon adenocarcinoma  MMR intact     1/22/2024 Cancer Staged    Staging form: Colon and Rectum, AJCC 8th Edition  - Pathologic stage from 1/22/2024: Stage IVC (pT4a, pN2b, cM1c)     8/27/2024 Tumor Markers    Patient's tumor was tested for the following markers: CEA.                                              Results of the tumor marker test revealed 2.3      Malignant neoplasm of ascending colon   2/12/2024 Initial Diagnosis    Malignant neoplasm of ascending colon     2/26/2024 -  Chemotherapy    Treatment Summary   Plan Name: OP GI [id number] (oxaliplatin leucovorin fluorouracil) with bevacizumab Q2W  Treatment Goal: Palliative  Status: Active  Start Date: 2/26/2024  End Date: 1/16/2025 (Planned)  Provider: Kemar Zaragoza MD  Chemotherapy: oxaliplatin (ELOXATIN) 150 mg in dextrose 5 % (D5W) 595 mL chemo infusion, 157 mg, Intravenous, Clinic/HOD 1 time, 8 of 8 cycles  Administration: 150 mg (2/26/2024), 150 mg (3/11/2024), 150 mg (3/25/2024), 150 mg (4/8/2024), 150 mg (4/22/2024), 150 mg (5/6/2024), 150 mg (5/20/2024), 150 mg (6/3/2024)  fluorouracil (ADRUCIL) 2,400 mg/m2 = 4,440 mg in sodium chloride 0.9% 100 mL chemo infusion, 2,400 mg/m2 = 4,440 mg, Intravenous, Clinic/HOD 1 time, 13 of 24 cycles  Administration: 4,440 mg (2/26/2024), 4,440 mg (3/11/2024), 4,440 mg (3/25/2024), 4,440 mg (4/8/2024), 4,440 mg (4/22/2024), 4,440 mg (5/6/2024), 4,440 mg (5/20/2024), 4,440 mg (6/3/2024), 4,440 mg (6/17/2024), 4,440 mg (7/3/2024), 4,440 mg (7/16/2024), 4,440 mg (7/31/2024), 4,440 mg (8/13/2024)  bevacizumab-awwb (MVASI) 5 mg/kg = 365 mg in sodium chloride 0.9% 100 mL infusion, 5 mg/kg = 365 mg, Intravenous, [facility] 1 time, 13 of 22 cycles  Administration: 365 mg (2/26/2024), 365 mg (3/11/2024), 365 mg  (3/25/2024), 365 mg (4/8/2024), 365 mg (4/22/2024), 365 mg (5/6/2024), 365 mg (5/20/2024), 365 mg (6/3/2024), 365 mg (6/17/2024), 365 mg (7/3/2024), 365 mg (7/16/2024), 365 mg (7/31/2024), 365 mg (8/13/2024)     8/27/2024 Tumor Markers    Patient's tumor was tested for the following markers: CEA.                                              Results of the tumor marker test revealed 2.3           Interim History:   74 y.o. female with LUANNE, bipolar, HLD who presents prior to cycle 14 FOLFOX + bevacizumab for her metastatic ascending colon cancer, now on maintenance chemotherapy. Doing well overall. Reports intermittent fatigue but remains independent with ADLs. Constipation improved. Mild nausea if she eats raisin bran in the morning. Otherwise, no nausea or vomiting. Continued paresthesias in her fingertips, worse towards end of the day after using her hands all day. She does have mild muscular pain to the backs of her thighs, improving daily. Feels this could be related to chasing after her dog during a walk.     ECOG 0. Presents with her .     ROS:   Review of Systems   Constitutional:  Positive for malaise/fatigue. Negative for chills and fever.   HENT:  Negative for congestion and sore throat.    Respiratory:  Negative for shortness of breath.    Cardiovascular:  Negative for chest pain, palpitations and leg swelling.   Gastrointestinal:  Negative for blood in stool, constipation, diarrhea and nausea.   Genitourinary:  Negative for hematuria.   Musculoskeletal:  Negative for back pain, falls and myalgias.   Skin:  Negative for rash.   Neurological:  Positive for dizziness and tingling. Negative for weakness and headaches.       Past Medical History:   Past Medical History:   Diagnosis Date    Anxiety     Arthritis     Bipolar 1 disorder     Bursitis of right hip     GI bleed due to NSAIDs     Multinodular goiter 11/15/2021    Sacroiliitis     right side    Sleep apnea         Past Surgical History:   Past  Surgical History:   Procedure Laterality Date    ANKLE FRACTURE SURGERY Left 2001    BLADDER SUSPENSION      CARPAL TUNNEL RELEASE Bilateral     CHOLECYSTECTOMY      Laparoscopic    COLONOSCOPY      COLONOSCOPY N/A 12/21/2023    Procedure: COLONOSCOPY;  Surgeon: Alberto Albright MD;  Location: [facility];  Service: Endoscopy;  Laterality: N/A;    ESOPHAGOGASTRODUODENOSCOPY N/A 07/29/2021    Procedure: EGD (ESOPHAGOGASTRODUODENOSCOPY);  Surgeon: Susan Mcclain MD;  Location: [facility];  Service: Endoscopy;  Laterality: N/A;    EYE SURGERY      FOOT ARTHRODESIS Right 05/03/2023    Procedure: FUSION, FOOT;  Surgeon: Lauri Alejandra DPM;  Location: [facility];  Service: Podiatry;  Laterality: Right;  mini c-arm, Arthrex dowel bone graft harvester, locking plate and screws festus notified cc    HYSTERECTOMY  1986    vaginal prolapse    INJECTION OF ANESTHETIC AGENT AROUND MEDIAL BRANCH NERVES INNERVATING CERVICAL FACET JOINT Bilateral 05/27/2022    Procedure: CERVICAL MEDIAL BRANCH NERVE BLOCK (C3-4,C4-5);  Surgeon: Mamie Roman MD;  Location: [facility];  Service: Pain Management;  Laterality: Bilateral;    INJECTION OF ANESTHETIC AGENT AROUND MEDIAL BRANCH NERVES INNERVATING LUMBAR FACET JOINT Right 02/05/2021    Procedure: LUMBAR FACET JOINT BLOCK (L3-4,L4-5,L5-S1);  Surgeon: Mamie Roman MD;  Location: [facility];  Service: Pain Management;  Laterality: Right;    INJECTION OF ANESTHETIC AGENT AROUND MEDIAL BRANCH NERVES INNERVATING LUMBAR FACET JOINT Right 04/30/2021    Procedure: LUMBAR FACET JOINT BLOCK (L3-4,L4-5,L5-S1);  Surgeon: Mamie Roman MD;  Location: [facility];  Service: Pain Management;  Laterality: Right;    INJECTION OF ANESTHETIC AGENT INTO SACROILIAC JOINT Right 12/04/2020    Procedure: SACROILIAC JOINT INJECTION;  Surgeon: Mamie Roman MD;  Location: [facility];  Service: Pain Management;  Laterality: Right;    INJECTION OF JOINT Right 12/04/2020    Procedure: GREATER TROCHANTERIC BURSA  INJECTION;  Surgeon: Mamie Roman MD;  Location: [facility];  Service: Pain Management;  Laterality: Right;    LAPAROSCOPIC RIGHT COLON RESECTION N/A 2024    Procedure: COLECTOMY, RIGHT, LAPAROSCOPIC (eras low, lithotomy);  Surgeon: Alberto Albright MD;  Location: [facility] OR 2ND FLR;  Service: Colon and Rectal;  Laterality: N/A;    NASAL SEPTUM SURGERY      RECTAL PROLAPSE REPAIR      TONSILLECTOMY          Family History:   Family History   Problem Relation Name Age of Onset    No Known Problems Maternal Aunt Rubio Glasgow     Colon cancer Maternal Uncle Joshua     Colon cancer Maternal Uncle Yovani     Colon cancer Maternal Uncle Konstantin     No Known Problems Paternal Aunt Vero     Esophageal cancer Paternal Uncle Nat Garcia     Heart attack Maternal Grandmother Elmira     Leukemia Maternal Grandfather manish Pang     No Known Problems Paternal Grandmother Lizett     Stroke Paternal Grandfather Nat Sr         Social History:   Social History     Tobacco Use    Smoking status: Former     Current packs/day: 0.00     Average packs/day: 1 pack/day for 41.7 years (41.7 ttl pk-yrs)     Types: Cigarettes     Start date: 3/30/1982     Quit date: 2023     Years since quittin.6    Smokeless tobacco: Never   Substance Use Topics    Alcohol use: Yes     Comment: Socially-2 or 3 times a year-6 or 7 drinks        I have reviewed and updated the patient's past medical, surgical, family and social histories.    Allergies:   Review of patient's allergies indicates:   Allergen Reactions    Nsaids (non-steroidal anti-inflammatory drug) Other (See Comments)     Gastrointestinal bleeding requiring blood transfusion    Demerol [meperidine] Rash        Medications:   Current Outpatient Medications   Medication Sig Dispense Refill    albuterol (PROVENTIL/VENTOLIN HFA) 90 mcg/actuation inhaler inhale 2 puffs into the lungs every 6 hours as needed for wheezing. 18 g 1    aspirin (ECOTRIN) 81 MG EC tablet Take 81 mg  "by mouth once daily.      dexAMETHasone (DECADRON) 4 MG Tab Take 2 tablets (8 mg total) by mouth once daily only on days 2, 3 and 4 of each chemotherapy cycle. 40 tablet 0    EScitalopram oxalate (LEXAPRO) 20 MG tablet Take 1 tablet (20 mg total) by mouth once daily. 30 tablet 4    lamoTRIgine (LAMICTAL) 150 MG Tab Take 2 tablets (300 mg total) by mouth every evening. 180 tablet 1    LIDOcaine-prilocaine (EMLA) cream Apply topically as needed (place to port site 45-60 minutes prior to chemotherapy). 30 g 5    ondansetron (ZOFRAN-ODT) 8 MG TbDL dissolve 1 tablet (8 mg total) under the tongue every 6 (six) hours as needed (nausea). 30 tablet 5    pantoprazole (PROTONIX) 40 MG tablet Take 1 tablet (40 mg total) by mouth once daily. 90 tablet 3    prochlorperazine (COMPAZINE) 10 MG tablet Take 1 tablet (10 mg total) by mouth every 6 (six) hours as needed (nausea). 30 tablet 2    QUEtiapine (SEROQUEL) 200 MG Tab Take 1 tablet (200 mg total) by mouth every evening. 30 tablet 3    rosuvastatin (CRESTOR) 10 MG tablet Take 1 tablet (10 mg total) by mouth once daily. (Patient taking differently: Take 10 mg by mouth every evening.) 90 tablet 3     No current facility-administered medications for this visit.        Physical Exam:   /74 (BP Location: Right arm, Patient Position: Sitting)   Pulse 79   Resp 18   Ht 5' 6" (1.676 m)   Wt 79.2 kg (174 lb 9.7 oz)   BMI 28.18 kg/m²           Physical Exam  Vitals reviewed.   Constitutional:       General: She is not in acute distress.     Appearance: Normal appearance. She is normal weight. She is not ill-appearing, toxic-appearing or diaphoretic.   HENT:      Head: Normocephalic and atraumatic.      Right Ear: External ear normal.      Left Ear: External ear normal.      Nose: Nose normal.      Mouth/Throat:      Pharynx: Oropharynx is clear.   Eyes:      General: No scleral icterus.     Conjunctiva/sclera: Conjunctivae normal.   Cardiovascular:      Rate and Rhythm: Normal " rate.   Pulmonary:      Effort: Pulmonary effort is normal. No respiratory distress.   Chest:      Comments: RCW port  Abdominal:      General: There is no distension.   Skin:     General: Skin is warm and dry.      Coloration: Skin is not jaundiced or pale.      Findings: No bruising, erythema or rash.   Neurological:      Mental Status: She is alert and oriented to person, place, and time. Mental status is at baseline.      Motor: No weakness.      Gait: Gait normal.   Psychiatric:         Mood and Affect: Mood normal.         Labs:     I have reviewed the pertinent labs.      Imaging:    CT CAP - 8/9/24:    Impression:     Postoperative changes of right colon resection for patient's known colonic malignancy.  Persistent small prominent 1 cm lymph node adjacent to the aorta at the level of the renal vessels, unchanged over multiple prior examinations.  No free air or obstruction.     Tiny 3 mm hypodensity in the right hepatic lobe, likely a small cyst or hemangioma.     Mild thickening of the 2nd portion of the duodenum which could suggest an inflammatory/infectious process.  Clinical correlation suggested.     Constipation.       Path:   1/22/24 - R Hemicolectomy   Final Pathologic Diagnosis     THE DIAGNOSES REMAIN THE SAME.  THIS CORRECTED REPORT IS ISSUED TO CHANGE M STATUS to reflect tumor in the omentum.  This change is discussed with Dr. RANJANA Albright via phone, 2/1/2024.    1. Colon, right and terminal ileum (right hemicolectomy with EN bloc abdominal wall resection):  Invasive adenocarcinoma.  See cancer synoptic report     2. Omentum (resection):    Positive for carcinoma    CORRECT SYNOPTIC AND M STATUS  Cancer synoptic report  - Procedure: Right hemicolectomy with EN bloc abdominal wall resection  - Tumor site:  Ascending  - Histologic type:  Adenocarcinoma  - Histologic grade:  G2, moderately differentiated.  See comment.  COMMENT:  While the majority of the tumor consists of well formed glands, a  significant proportion includes abortive glands, single file infiltration, and malignant cells with signet ring cell morphology. The tumor has an insidious pattern of  infiltration with tumor present far from the advancing front of the tumor.  - Tumor size:  2.0 x 2.0 x 1.0 cm  - Tumor extent:  Invades visceral peritoneum, multifocal  - Macroscopic perforation: Not identified  - Lymphovascular invasion:  Present, extensive.  Small vessel, large vessel, intra and extramural.  - Perineural invasion:  Not identified  - Tumor budding score:  High (>10), multifocal single cell infiltration  - Treatment effect: No known pre-surgical therapy    Margins  Margin involved by invasive carcinoma, radial (slide 1C)  All margins negative for dysplasia.    pTNM stage classification (AJCC 8th edition)  pT Category  pT4a:  Tumor invades through the visceral peritoneum    pN Category  pN2b:  7 or more regional lymph nodes are positive  Number of positive lymph nodes:  19  Number of lymph nodes examined: 26  Number of tumor deposits:  4    pM Category  pM1c:  Metastasis to the peritoneal surface of the omentum.    Additional findings:  - Sessile serrated polyp, no cytologic dysplasia, 1.1 cm at ileocecal valve  - Tubulovillous adenoma, 1.0 cm, proximal ascending  - Tubular adenoma, 0.4 cm, mid ascending  - Tubular adenoma, 0.4, distal ascending  - Tubular adenoma, 0.6 cm, distal ascending  - Submucosal lipoma, 2.0 cm  - Appendix with no specific histopathologic changes    Ancillary studies  Immunohistochemistry for mismatch repair proteins performed on prior biopsy material (SAS-, 12/21/23): pMMR.         Assessment:       1. Malignant neoplasm of ascending colon    2. 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 Immunodeficiency secondary to neoplasm    4. Immunodeficiency secondary to chemotherapy    5. Cold sensitivity    6. Neoplastic malignant related fatigue    7. Other constipation        Plan:        # Colon cancer    Mrs. Maguire is a 74 y.o. female who presents for management of her metastatic colon cancer. She underwent a R hemicolectomy with en-bloc abdominal wall resection on 1/22/24 with Dr. Albright. Pathology revealed pT4a N2b disease with 19/26 positive LNs and omentum positive for carcinoma.    We had an in-depth conversation during our initial consult re: her diagnosis and treatment options. Reviewed recommendation for IV systemic therapy for at least 6 months. Plan for FOLFOX + bevacizumab to be given every 2 weeks. Briefly reviewed side effects of treatment. Handouts provided. Port placed 2/21/24.     PGX - DPYD normal metabolizer, [id number] intermediate metabolizer   Dexamethasone, zofran, compazine and lidocaine sent to pharmacy previously. Reviewed admin instructions.     Pending response, she may be a candidate for CRS/HIPEC with surgical oncology team. Continue to evaluate and re-start discussion if still without radiographic evidence of disease after CT on cycle 8.    CT CAP after cycle 4 shows no clear evidence of disease.  CT CAP after cycle 8 shows no clear evidence of disease.  CT CAP after cycle 12 shows no clear evidence of disease.    Tolerance of chemotherapy has been excellent.    Will set her back up with Dr. Albright and potentially surg onc for consolidative therapy (CRS/HIPEC) given well controlled disease.    Presents today for cycle 14.  We held oxaliplatin and proceed with 5-FU + bevacizumab maintenance with cycle  - Doing well with maintenance chemotherapy.   - Eating well. Still has intermittent presyncopal episodes but has not fallen. Will monitor for now.   - Labs reviewed, adequate for treatment. No signs of dehydration  - Proceed with cycle 14 today. Will hold Avastin d/t upcoming cytoreductive surgery with Dr. Contreras.     Unfortunately she was not eligible for our maintenance BGB CRC study because she did not have measurable disease.    Follow up: 2 weeks for cycle 15.     Cold  sensitivity/neoplasm related fatigue:  2/2 chemotherapy. Stopped oxaliplatin beginning with cycle 9 to prevent persistent paresthesias.  Mild persistent paresthesias at this time.  Will monitor. She is taking Cymbalta but not finding very helpful. Worse towards end of day.     Constipation/Gas:   Advised her to take 2 stool softeners a day with Miralax daily to have regular bowel movements.   This has helped.    Recommended she try taking a half cap of Miralax to avoid more explosive BM.    Patient is in agreement with the proposed treatment plan. All questions were answered to the patient's satisfaction. Pt knows to call clinic if anything is needed before the next clinic visit.    Patient discussed with collaborating physician, Dr. Zaragoza.    At least 40 minutes were spent today on this encounter including face to face time with the patient, data gathering/interpretation and documentation.       Minna Menendez, MSN, APRN, ACCNS-AG  Hematology and Medical Oncology  Clinical Nurse Specialist to Dr. Zaragoza, Dr. Jin & Dr. Goss Onc Chart Routing      Follow up with physician 4 weeks. with labs for chemo   Follow up with [facility]    Infusion scheduling note   chemo every 2 weeks, pump d/c on day 3   Injection scheduling note    Labs CBC, CMP and CEA   Scheduling:  Preferred lab:  Lab interval:     Imaging    Pharmacy appointment    Other referrals

## 2024-08-30 ENCOUNTER — INFUSION (OUTPATIENT)
Dept: INFUSION THERAPY | Facility: HOSPITAL | Age: 74
End: 2024-08-30
Payer: MEDICARE

## 2024-08-30 VITALS — DIASTOLIC BLOOD PRESSURE: 75 MMHG | SYSTOLIC BLOOD PRESSURE: 158 MMHG | HEART RATE: 75 BPM

## 2024-08-30 DIAGNOSIS — C18.2 MALIGNANT NEOPLASM OF ASCENDING COLON: Primary | ICD-10-CM

## 2024-08-30 PROCEDURE — 25000003 PHARM REV CODE 250: Performed by: REGISTERED NURSE

## 2024-08-30 PROCEDURE — A4216 STERILE WATER/SALINE, 10 ML: HCPCS | Performed by: REGISTERED NURSE

## 2024-08-30 PROCEDURE — 63600175 PHARM REV CODE 636 W HCPCS: Performed by: REGISTERED NURSE

## 2024-08-30 RX ORDER — HEPARIN 100 UNIT/ML
500 SYRINGE INTRAVENOUS
Status: DISCONTINUED | OUTPATIENT
Start: 2024-08-30 | End: 2024-08-30 | Stop reason: HOSPADM

## 2024-08-30 RX ORDER — SODIUM CHLORIDE 0.9 % (FLUSH) 0.9 %
10 SYRINGE (ML) INJECTION
Status: DISCONTINUED | OUTPATIENT
Start: 2024-08-30 | End: 2024-08-30 | Stop reason: HOSPADM

## 2024-08-30 RX ORDER — PROCHLORPERAZINE EDISYLATE 5 MG/ML
5 INJECTION INTRAMUSCULAR; INTRAVENOUS ONCE AS NEEDED
Status: DISCONTINUED | OUTPATIENT
Start: 2024-08-30 | End: 2024-08-30 | Stop reason: HOSPADM

## 2024-08-30 RX ADMIN — Medication 10 ML: at 08:08

## 2024-08-30 RX ADMIN — HEPARIN 500 UNITS: 100 SYRINGE at 08:08

## 2024-09-10 ENCOUNTER — HOSPITAL ENCOUNTER (EMERGENCY)
Facility: HOSPITAL | Age: 74
Discharge: HOME | End: 2024-09-10
Attending: SURGERY
Payer: MEDICARE

## 2024-09-10 VITALS
DIASTOLIC BLOOD PRESSURE: 66 MMHG | HEIGHT: 66 IN | TEMPERATURE: 99 F | RESPIRATION RATE: 16 BRPM | HEART RATE: 82 BPM | OXYGEN SATURATION: 97 % | SYSTOLIC BLOOD PRESSURE: 130 MMHG | WEIGHT: 174 LBS | BODY MASS INDEX: 27.97 KG/M2

## 2024-09-10 DIAGNOSIS — M25.579 ANKLE PAIN: ICD-10-CM

## 2024-09-10 DIAGNOSIS — S93.401A SPRAIN OF RIGHT ANKLE, UNSPECIFIED LIGAMENT, INITIAL ENCOUNTER: Primary | ICD-10-CM

## 2024-09-10 PROCEDURE — 99283 EMERGENCY DEPT VISIT LOW MDM: CPT | Mod: 25

## 2024-09-10 RX ORDER — OXYCODONE HYDROCHLORIDE 5 MG/1
5 TABLET ORAL EVERY 6 HOURS PRN
Qty: 11 TABLET | Refills: 0 | Status: SHIPPED | OUTPATIENT
Start: 2024-09-10

## 2024-09-10 NOTE — ED TRIAGE NOTES
"74 y.o. female presents to ER ED 02/ED 02A   Chief Complaint   Patient presents with    Ankle Pain     Pt presents to ED with c/o R ankle pain rated 8/10 after "twisting ankle" yesterday. Pt reports history of surgery on same ankle and arrives today with a walking boot that she states was used on the previous surgery. Pt reports taking Oxycodone pta with no relief.       "

## 2024-09-10 NOTE — ED PROVIDER NOTES
"Encounter Date: 9/10/2024    Source of History:   Patient and medical record, without language barrier or      Chief complaint:  Ankle Pain (Pt presents to ED with c/o R ankle pain rated 8/10 after "twisting ankle" yesterday. Pt reports history of surgery on same ankle and arrives today with a walking boot that she states was used on the previous surgery. Pt reports taking Oxycodone pta with no relief.)    HPI:  Karin Maguire is a 74 y.o. female with right foot fusion surgery about a year and a half ago presenting with chief complaint of ankle pain.  Patient states last night she twisted her ankle through an inversion injury.  She had a fusion of that same foot about a year and a half ago for arthritis.  She is endorsing pain below her lateral malleolus with mild swelling.  She has a walking boot from her previous surgery which she put on after injuring her ankle.  Patient states she has a wheelchair, walker and crutches at home to assist with ambulation.    This is the extent to the patients complaints today here in the emergency department.    ROS: A review of systems was conducted with pertinent positive and negative findings documented in HPI with all other systems reviewed and negative.  ROS    Review of patient's allergies indicates:   Allergen Reactions    Nsaids (non-steroidal anti-inflammatory drug) Other (See Comments)     Gastrointestinal bleeding requiring blood transfusion    Demerol [meperidine] Rash       PMH:  As per HPI and below:  Past Medical History:   Diagnosis Date    Anxiety     Arthritis     Bipolar 1 disorder     Bursitis of right hip     GI bleed due to NSAIDs     Multinodular goiter 11/15/2021    Sacroiliitis     right side    Sleep apnea      Past Surgical History:   Procedure Laterality Date    ANKLE FRACTURE SURGERY Left 2001    BLADDER SUSPENSION      CARPAL TUNNEL RELEASE Bilateral     CHOLECYSTECTOMY      Laparoscopic    COLONOSCOPY      COLONOSCOPY N/A 12/21/2023    " Procedure: COLONOSCOPY;  Surgeon: Alberto Albright MD;  Location: [facility];  Service: Endoscopy;  Laterality: N/A;    ESOPHAGOGASTRODUODENOSCOPY N/A 07/29/2021    Procedure: EGD (ESOPHAGOGASTRODUODENOSCOPY);  Surgeon: Susan Mcclain MD;  Location: [facility];  Service: Endoscopy;  Laterality: N/A;    EYE SURGERY      FOOT ARTHRODESIS Right 05/03/2023    Procedure: FUSION, FOOT;  Surgeon: ODALIS Campuzano;  Location: [facility];  Service: Podiatry;  Laterality: Right;  mini c-arm, Arthrex dowel bone graft harvester, locking plate and screws festus notified cc    HYSTERECTOMY  1986    vaginal prolapse    INJECTION OF ANESTHETIC AGENT AROUND MEDIAL BRANCH NERVES INNERVATING CERVICAL FACET JOINT Bilateral 05/27/2022    Procedure: CERVICAL MEDIAL BRANCH NERVE BLOCK (C3-4,C4-5);  Surgeon: Mamie Roman MD;  Location: [facility];  Service: Pain Management;  Laterality: Bilateral;    INJECTION OF ANESTHETIC AGENT AROUND MEDIAL BRANCH NERVES INNERVATING LUMBAR FACET JOINT Right 02/05/2021    Procedure: LUMBAR FACET JOINT BLOCK (L3-4,L4-5,L5-S1);  Surgeon: Mamie Roman MD;  Location: [facility];  Service: Pain Management;  Laterality: Right;    INJECTION OF ANESTHETIC AGENT AROUND MEDIAL BRANCH NERVES INNERVATING LUMBAR FACET JOINT Right 04/30/2021    Procedure: LUMBAR FACET JOINT BLOCK (L3-4,L4-5,L5-S1);  Surgeon: Mamie Roman MD;  Location: [facility];  Service: Pain Management;  Laterality: Right;    INJECTION OF ANESTHETIC AGENT INTO SACROILIAC JOINT Right 12/04/2020    Procedure: SACROILIAC JOINT INJECTION;  Surgeon: Mamie Roman MD;  Location: [facility];  Service: Pain Management;  Laterality: Right;    INJECTION OF JOINT Right 12/04/2020    Procedure: GREATER TROCHANTERIC BURSA INJECTION;  Surgeon: Mamie Roman MD;  Location: [facility];  Service: Pain Management;  Laterality: Right;    LAPAROSCOPIC RIGHT COLON RESECTION N/A 1/22/2024    Procedure: COLECTOMY, RIGHT, LAPAROSCOPIC (eras low, lithotomy);  Surgeon:  Alberto Albright MD;  Location: [facility] OR [address] [place] [address];  Service: Colon and Rectal;  Laterality: N/A;    NASAL SEPTUM SURGERY      RECTAL PROLAPSE REPAIR      TONSILLECTOMY       Social History     Socioeconomic History    Marital status:    Tobacco Use    Smoking status: Former     Current packs/day: 0.00     Average packs/day: 1 pack/day for 41.7 years (41.7 ttl pk-yrs)     Types: Cigarettes     Start date: 3/30/1982     Quit date: 2023     Years since quittin.7    Smokeless tobacco: Never   Substance and Sexual Activity    Alcohol use: Yes     Comment: Socially-2 or 3 times a year-6 or 7 drinks    Drug use: No    Sexual activity: Yes     Partners: Male     Birth control/protection: Surgical     Comment:      Social Determinants of Health     Financial Resource Strain: Low Risk  (2024)    Overall Financial Resource Strain (CARDIA)     Difficulty of Paying Living Expenses: Not hard at all   Food Insecurity: No Food Insecurity (2024)    Hunger Vital Sign     Worried About Running Out of Food in the Last Year: Never true     Ran Out of Food in the Last Year: Never true   Transportation Needs: No Transportation Needs (2024)    PRAPARE - Transportation     Lack of Transportation (Medical): No     Lack of Transportation (Non-Medical): No   Physical Activity: Inactive (2024)    Exercise Vital Sign     Days of Exercise per Week: 0 days     Minutes of Exercise per Session: 0 min   Stress: No Stress Concern Present (2024)    [employer] - Occupational Stress Questionnaire     Feeling of Stress : Not at all   Housing Stability: Low Risk  (2024)    Housing Stability Vital Sign     Unable to Pay for Housing in the Last Year: No     Number of Places Lived in the Last Year: 1     Unstable Housing in the Last Year: No     Vitals:    /66 (BP Location: Right arm, Patient Position: Sitting)   Pulse 82   Temp 99 °F (37.2 °C) (Oral)   Resp 16    "Ht 5' 6" (1.676 m)   Wt 78.9 kg (174 lb)   SpO2 97%   Breastfeeding No   BMI 28.08 kg/m²     Physical Exam  Vitals and nursing note reviewed.   Constitutional:       General: She is not in acute distress.     Appearance: Normal appearance. She is not toxic-appearing or diaphoretic.   HENT:      Head: Normocephalic and atraumatic.      Right Ear: External ear normal.      Left Ear: External ear normal.   Eyes:      General: No scleral icterus.     Conjunctiva/sclera: Conjunctivae normal.   Cardiovascular:      Rate and Rhythm: Normal rate and regular rhythm.   Pulmonary:      Effort: Pulmonary effort is normal. No respiratory distress.      Breath sounds: No stridor.   Abdominal:      General: Abdomen is flat. There is no distension.   Musculoskeletal:         General: No swelling.      Cervical back: Normal range of motion and neck supple.      Comments:   RLE:  Skin intact, no deformity  Mild swelling below lateral malleolus over anterior talofibular ligament with mild tenderness  Compartments soft  Full painless ROM throughout lower extremity  SILT   Motor intact   2+ DP/PT   Skin:     General: Skin is dry.      Coloration: Skin is not jaundiced.   Neurological:      Mental Status: She is alert and oriented to person, place, and time. Mental status is at baseline.   Psychiatric:         Mood and Affect: Mood normal.         Behavior: Behavior normal.       Procedures    Laboratory Studies:  Labs that have been ordered have been independently reviewed and interpreted by myself.  Labs Reviewed - No data to display  Imaging Results              X-Ray Ankle Complete Right (Final result)  Result time 09/10/24 08:26:10      Final result by Arnie Stout MD (09/10/24 08:26:10)                   Impression:      As above      Electronically signed by: Arnie Stout MD  Date:    09/10/2024  Time:    08:26               Narrative:    EXAMINATION:  XR ANKLE COMPLETE 3 VIEW RIGHT    CLINICAL HISTORY:  Pain in " unspecified ankle and joints of unspecified foot    TECHNIQUE:  AP, lateral, and oblique images of the right ankle were performed.    COMPARISON:  None    FINDINGS:  There is no acute fracture or dislocation.  There is scattered small marginal osteophyte formation about the ankle.  There has been arthrodesis across the 2nd tarsometatarsal joint.  A small plantar heel spur is present.  Mild soft tissue swelling is present most pronounced along the dorsal midfoot.                                    Imaging (independently interpreted by me):  Right ankle x-ray:  no fracture or dislocation, previous foot fusion    I decided to obtain the patient's medical records.  Summary of Medical Records:    Medications - No data to display  MDM:    74 y.o. female with ankle pain    Differential Dx:  Differential includes but is not limited to ankle sprain, doubt fracture, doubt dislocation    ED Management:  Patient's presentation is highly consistent with a ankle sprain.  Her x-rays do not appear to show any avulsion injuries.  She has not have any significant bruising on exam and only has mild swelling at this time.  I am not concerned for complete ligamentous tear.  Patient has allergy to NSAIDs we will defer prescribing anti-inflammatories at this time.  Patient given prescription for oxycodone for pain control at home and instructed to take Tylenol at baseline with oxycodone for breakthrough pain.  Instructed to follow up with PCP at the end of the week for re-evaluation.  Discussed results, diagnosis, and treatment plan with patient; advised close follow-up with PCP. Reviewed strict return precautions. Patient confirms understanding and ability to comply. Patient was given the opportunity to ask questions prior to discharge and all questions answered.     Medical Decision Making  Amount and/or Complexity of Data Reviewed  Radiology: ordered.    Risk  Prescription drug management.            Diagnostic Impression:    Final  diagnoses:  [M25.579] Ankle pain  [S93.401A] Sprain of right ankle, unspecified ligament, initial encounter (Primary)     ED Disposition Condition    Discharge Stable          ED Prescriptions       Medication Sig Dispense Start Date End Date Auth. Provider    oxyCODONE (ROXICODONE) 5 MG immediate release tablet Take 1 tablet (5 mg total) by mouth every 6 (six) hours as needed for Pain. 11 tablet 9/10/2024 -- Thomas Dawson MD          Follow-up Information       Follow up With Specialties Details Why Contact Info    Lottie Hough, NP Family Medicine   [address] [facility] [address]  [phone]      [facility] - Emergency Dept Emergency Medicine Go to  As needed, If symptoms worsen [address] [facility] [id number] [phone]                Thomas Dawson MD  09/10/24 1048

## 2024-09-10 NOTE — DISCHARGE INSTRUCTIONS
Thank you for coming to our Emergency Department today!     -take Tylenol for pain and oxycodone for breakthrough pain  -use wheelchair, crutches, walker to help with ambulation  -wear walking boot for the next week  -Follow-up with primary care doctor within 3-7 days to discuss your recent ER visit and any additional concerns that you may have.    Return to the Emergency Department for symptoms including but not limited to: persistence or worsening of symptoms, shortness of breath or chest pain, inability to drink without vomiting, passing out/fainting/ loss of consciousness, or if you have other concerns.

## 2024-09-12 ENCOUNTER — OFFICE VISIT (OUTPATIENT)
Dept: HEMATOLOGY/ONCOLOGY | Facility: CLINIC | Age: 74
End: 2024-09-12
Payer: MEDICARE

## 2024-09-12 ENCOUNTER — LAB VISIT (OUTPATIENT)
Dept: LAB | Facility: HOSPITAL | Age: 74
End: 2024-09-12
Payer: MEDICARE

## 2024-09-12 VITALS
WEIGHT: 177 LBS | SYSTOLIC BLOOD PRESSURE: 135 MMHG | DIASTOLIC BLOOD PRESSURE: 67 MMHG | HEIGHT: 66 IN | HEART RATE: 80 BPM | RESPIRATION RATE: 17 BRPM | TEMPERATURE: 98 F | OXYGEN SATURATION: 99 % | BODY MASS INDEX: 28.45 KG/M2

## 2024-09-12 DIAGNOSIS — R68.89 COLD SENSITIVITY: ICD-10-CM

## 2024-09-12 DIAGNOSIS — D84.81 IMMUNODEFICIENCY SECONDARY TO NEOPLASM: ICD-10-CM

## 2024-09-12 DIAGNOSIS — D49.9 IMMUNODEFICIENCY SECONDARY TO NEOPLASM: ICD-10-CM

## 2024-09-12 DIAGNOSIS — K59.09 OTHER CONSTIPATION: ICD-10-CM

## 2024-09-12 DIAGNOSIS — C18.2 MALIGNANT NEOPLASM OF ASCENDING COLON: ICD-10-CM

## 2024-09-12 DIAGNOSIS — D84.821 IMMUNODEFICIENCY SECONDARY TO CHEMOTHERAPY: ICD-10-CM

## 2024-09-12 DIAGNOSIS — C18.2 MALIGNANT NEOPLASM OF ASCENDING COLON: Primary | ICD-10-CM

## 2024-09-12 DIAGNOSIS — R53.0 NEOPLASTIC MALIGNANT RELATED FATIGUE: ICD-10-CM

## 2024-09-12 DIAGNOSIS — Z79.899 IMMUNODEFICIENCY SECONDARY TO CHEMOTHERAPY: ICD-10-CM

## 2024-09-12 DIAGNOSIS — C78.6 MALIGNANT NEOPLASM METASTATIC TO OMENTUM: ICD-10-CM

## 2024-09-12 DIAGNOSIS — T45.1X5A IMMUNODEFICIENCY SECONDARY TO CHEMOTHERAPY: ICD-10-CM

## 2024-09-12 LAB
ALBUMIN SERPL BCP-MCNC: 3.4 G/DL (ref 3.5–5.2)
ALP SERPL-CCNC: 85 U/L (ref 55–135)
ALT SERPL W/O P-5'-P-CCNC: 10 U/L (ref 10–44)
ANION GAP SERPL CALC-SCNC: 7 MMOL/L (ref 8–16)
AST SERPL-CCNC: 16 U/L (ref 10–40)
BILIRUB SERPL-MCNC: 0.3 MG/DL (ref 0.1–1)
BUN SERPL-MCNC: 14 MG/DL (ref 8–23)
CALCIUM SERPL-MCNC: 9.7 MG/DL (ref 8.7–10.5)
CEA SERPL-MCNC: 3 NG/ML (ref 0–5)
CHLORIDE SERPL-SCNC: 107 MMOL/L (ref 95–110)
CO2 SERPL-SCNC: 25 MMOL/L (ref 23–29)
CREAT SERPL-MCNC: 0.9 MG/DL (ref 0.5–1.4)
ERYTHROCYTE [DISTWIDTH] IN BLOOD BY AUTOMATED COUNT: 16.2 % (ref 11.5–14.5)
EST. GFR  (NO RACE VARIABLE): >60 ML/MIN/1.73 M^2
GLUCOSE SERPL-MCNC: 109 MG/DL (ref 70–110)
HCT VFR BLD AUTO: 37.8 % (ref 37–48.5)
HGB BLD-MCNC: 11.8 G/DL (ref 12–16)
IMM GRANULOCYTES # BLD AUTO: 0.01 K/UL (ref 0–0.04)
MCH RBC QN AUTO: 29 PG (ref 27–31)
MCHC RBC AUTO-ENTMCNC: 31.2 G/DL (ref 32–36)
MCV RBC AUTO: 93 FL (ref 82–98)
NEUTROPHILS # BLD AUTO: 3 K/UL (ref 1.8–7.7)
PLATELET # BLD AUTO: 211 K/UL (ref 150–450)
PMV BLD AUTO: 10.7 FL (ref 9.2–12.9)
POTASSIUM SERPL-SCNC: 4.4 MMOL/L (ref 3.5–5.1)
PROT SERPL-MCNC: 6.7 G/DL (ref 6–8.4)
RBC # BLD AUTO: 4.07 M/UL (ref 4–5.4)
SODIUM SERPL-SCNC: 139 MMOL/L (ref 136–145)
WBC # BLD AUTO: 5.02 K/UL (ref 3.9–12.7)

## 2024-09-12 PROCEDURE — 36415 COLL VENOUS BLD VENIPUNCTURE: CPT | Performed by: REGISTERED NURSE

## 2024-09-12 PROCEDURE — 99214 OFFICE O/P EST MOD 30 MIN: CPT | Mod: S$GLB,,, | Performed by: INTERNAL MEDICINE

## 2024-09-12 PROCEDURE — 80053 COMPREHEN METABOLIC PANEL: CPT | Performed by: REGISTERED NURSE

## 2024-09-12 PROCEDURE — 1159F MED LIST DOCD IN RCRD: CPT | Mod: CPTII,S$GLB,, | Performed by: INTERNAL MEDICINE

## 2024-09-12 PROCEDURE — 82378 CARCINOEMBRYONIC ANTIGEN: CPT | Performed by: REGISTERED NURSE

## 2024-09-12 PROCEDURE — 3075F SYST BP GE 130 - 139MM HG: CPT | Mod: CPTII,S$GLB,, | Performed by: INTERNAL MEDICINE

## 2024-09-12 PROCEDURE — 3008F BODY MASS INDEX DOCD: CPT | Mod: CPTII,S$GLB,, | Performed by: INTERNAL MEDICINE

## 2024-09-12 PROCEDURE — 1126F AMNT PAIN NOTED NONE PRSNT: CPT | Mod: CPTII,S$GLB,, | Performed by: INTERNAL MEDICINE

## 2024-09-12 PROCEDURE — 99999 PR PBB SHADOW E&M-EST. PATIENT-LVL III: CPT | Mod: PBBFAC,,, | Performed by: INTERNAL MEDICINE

## 2024-09-12 PROCEDURE — 1101F PT FALLS ASSESS-DOCD LE1/YR: CPT | Mod: CPTII,S$GLB,, | Performed by: INTERNAL MEDICINE

## 2024-09-12 PROCEDURE — 85027 COMPLETE CBC AUTOMATED: CPT | Performed by: REGISTERED NURSE

## 2024-09-12 PROCEDURE — G2211 COMPLEX E/M VISIT ADD ON: HCPCS | Mod: S$GLB,,, | Performed by: INTERNAL MEDICINE

## 2024-09-12 PROCEDURE — 3078F DIAST BP <80 MM HG: CPT | Mod: CPTII,S$GLB,, | Performed by: INTERNAL MEDICINE

## 2024-09-12 PROCEDURE — 3288F FALL RISK ASSESSMENT DOCD: CPT | Mod: CPTII,S$GLB,, | Performed by: INTERNAL MEDICINE

## 2024-09-12 NOTE — PROGRESS NOTES
MEDICAL ONCOLOGY - ESTABLISHED PATIENT VISIT    Reason for visit: colon adenocarcinoma    Best Contact Phone Number(s): [phone] (home)      Cancer/Stage/TNM:    Cancer Staging   Colon adenocarcinoma  Staging form: Colon and Rectum, AJCC 8th Edition  - Pathologic stage from 1/22/2024: Stage IVC (pT4a, pN2b, cM1c) - Signed by Minna Menendez CNS on 2/13/2024       Oncology History   Colon adenocarcinoma   12/21/2023 Tumor Markers    Patient's tumor was tested for the following markers: CEA.                                              Results of the tumor marker test revealed 3.3     12/21/2023 Procedure    Colonoscopy  Cecal polyp removed with jumbo forceps, 3 mm  Multiple ascending colon polyps ranging in size from 5 to 12 mm - semi-pedunculated removed with hot snare  Hepatic flexure mass identified - central ulceration, concerning for malignancy, 3 cm, not obstructing, this was biopsied - tattoo placed distal to mass  Sigmoid diverticular disease     12/21/2023 Tumor Markers    Patient's tumor was tested for the following markers: CEA.                                              Results of the tumor marker test revealed 3.3     12/22/2023 Imaging Significant Findings    CT CAP  Evaluation of the colon is somewhat limited as there is significant colonic stool and oral contrast material has not opacified the large bowel.  There does appear to be some abnormal thickening of the walls of the proximal right colon and a patient with a known history of colonic malignancy.  There is some soft tissue density external to the walls of the colon on the right pericolic gutter which could represent peritoneal nodularity versus subserosal extent of tumor.  Slightly more distal to this area there is a 2nd area of irregularity of the walls of the colon, difficult to fully evaluate if this is related to the colonic walls versus stool with central fat.     Two hypodensities in the liver, the larger measuring 0.8 cm, too  small to accurately characterize on the basis of this examination.  Attention on follow-up.     No pulmonary nodules are seen.     Cholecystectomy.     12/29/2023 Initial Diagnosis    Hepatic flexure colon adenocarcinoma  MMR intact     1/22/2024 Cancer Staged    Staging form: Colon and Rectum, AJCC 8th Edition  - Pathologic stage from 1/22/2024: Stage IVC (pT4a, pN2b, cM1c)     8/27/2024 Tumor Markers    Patient's tumor was tested for the following markers: CEA.                                              Results of the tumor marker test revealed 2.3      Malignant neoplasm of ascending colon   2/12/2024 Initial Diagnosis    Malignant neoplasm of ascending colon     2/26/2024 -  Chemotherapy    Treatment Summary   Plan Name: OP GI [id number] (oxaliplatin leucovorin fluorouracil) with bevacizumab Q2W  Treatment Goal: Palliative  Status: Active  Start Date: 2/26/2024  End Date: 1/16/2025 (Planned)  Provider: Kemar Zaragoza MD  Chemotherapy: oxaliplatin (ELOXATIN) 150 mg in dextrose 5 % (D5W) 595 mL chemo infusion, 157 mg, Intravenous, Clinic/HOD 1 time, 8 of 8 cycles  Administration: 150 mg (2/26/2024), 150 mg (3/11/2024), 150 mg (3/25/2024), 150 mg (4/8/2024), 150 mg (4/22/2024), 150 mg (5/6/2024), 150 mg (5/20/2024), 150 mg (6/3/2024)  fluorouracil (ADRUCIL) 2,400 mg/m2 = 4,440 mg in sodium chloride 0.9% 100 mL chemo infusion, 2,400 mg/m2 = 4,440 mg, Intravenous, Clinic/HOD 1 time, 14 of 24 cycles  Administration: 4,440 mg (2/26/2024), 4,440 mg (3/11/2024), 4,440 mg (3/25/2024), 4,440 mg (4/8/2024), 4,440 mg (4/22/2024), 4,440 mg (5/6/2024), 4,440 mg (5/20/2024), 4,440 mg (6/3/2024), 4,440 mg (6/17/2024), 4,440 mg (7/3/2024), 4,440 mg (7/16/2024), 4,440 mg (7/31/2024), 4,440 mg (8/13/2024), 4,440 mg (8/28/2024)  bevacizumab-awwb (MVASI) 5 mg/kg = 365 mg in sodium chloride 0.9% 100 mL infusion, 5 mg/kg = 365 mg, Intravenous, [facility] 1 time, 13 of 22 cycles  Administration: 365 mg (2/26/2024), 365 mg  (3/11/2024), 365 mg (3/25/2024), 365 mg (4/8/2024), 365 mg (4/22/2024), 365 mg (5/6/2024), 365 mg (5/20/2024), 365 mg (6/3/2024), 365 mg (6/17/2024), 365 mg (7/3/2024), 365 mg (7/16/2024), 365 mg (7/31/2024), 365 mg (8/13/2024)     8/27/2024 Tumor Markers    Patient's tumor was tested for the following markers: CEA.                                              Results of the tumor marker test revealed 2.3           Interim History:   74 y.o. female with LUANNE, bipolar, HLD who presents following cycle 14 FOLFOX + bevacizumab for her metastatic ascending colon cancer, now on maintenance chemotherapy. We held her bevacizumab last cycle in anticipation of her upcoming surgery with Dr. Contreras next Friday.  She is feeling well.  No new complains of fatigue or nausea.  Bowels are moving well. Mild paresthesias in her hands.    ECOG 0. Presents with her .     ROS:   Review of Systems   Constitutional:  Positive for malaise/fatigue. Negative for chills and fever.   HENT:  Negative for congestion and sore throat.    Respiratory:  Negative for shortness of breath.    Cardiovascular:  Negative for chest pain, palpitations and leg swelling.   Gastrointestinal:  Negative for blood in stool, constipation, diarrhea and nausea.   Genitourinary:  Negative for hematuria.   Musculoskeletal:  Negative for back pain, falls and myalgias.   Skin:  Negative for rash.   Neurological:  Positive for dizziness and tingling. Negative for weakness and headaches.       Past Medical History:   Past Medical History:   Diagnosis Date    Anxiety     Arthritis     Bipolar 1 disorder     Bursitis of right hip     GI bleed due to NSAIDs     Multinodular goiter 11/15/2021    Sacroiliitis     right side    Sleep apnea         Past Surgical History:   Past Surgical History:   Procedure Laterality Date    ANKLE FRACTURE SURGERY Left 2001    BLADDER SUSPENSION      CARPAL TUNNEL RELEASE Bilateral     CHOLECYSTECTOMY      Laparoscopic    COLONOSCOPY       COLONOSCOPY N/A 12/21/2023    Procedure: COLONOSCOPY;  Surgeon: Alberto lAbright MD;  Location: [facility];  Service: Endoscopy;  Laterality: N/A;    ESOPHAGOGASTRODUODENOSCOPY N/A 07/29/2021    Procedure: EGD (ESOPHAGOGASTRODUODENOSCOPY);  Surgeon: Susan Mcclain MD;  Location: [facility];  Service: Endoscopy;  Laterality: N/A;    EYE SURGERY      FOOT ARTHRODESIS Right 05/03/2023    Procedure: FUSION, FOOT;  Surgeon: Lauri Alejandra DPM;  Location: [facility];  Service: Podiatry;  Laterality: Right;  mini c-arm, Arthrex dowel bone graft harvester, locking plate and screws festus notified cc    HYSTERECTOMY  1986    vaginal prolapse    INJECTION OF ANESTHETIC AGENT AROUND MEDIAL BRANCH NERVES INNERVATING CERVICAL FACET JOINT Bilateral 05/27/2022    Procedure: CERVICAL MEDIAL BRANCH NERVE BLOCK (C3-4,C4-5);  Surgeon: Mamie Roman MD;  Location: [facility];  Service: Pain Management;  Laterality: Bilateral;    INJECTION OF ANESTHETIC AGENT AROUND MEDIAL BRANCH NERVES INNERVATING LUMBAR FACET JOINT Right 02/05/2021    Procedure: LUMBAR FACET JOINT BLOCK (L3-4,L4-5,L5-S1);  Surgeon: Mamie Roman MD;  Location: [facility];  Service: Pain Management;  Laterality: Right;    INJECTION OF ANESTHETIC AGENT AROUND MEDIAL BRANCH NERVES INNERVATING LUMBAR FACET JOINT Right 04/30/2021    Procedure: LUMBAR FACET JOINT BLOCK (L3-4,L4-5,L5-S1);  Surgeon: Mamie Roman MD;  Location: [facility];  Service: Pain Management;  Laterality: Right;    INJECTION OF ANESTHETIC AGENT INTO SACROILIAC JOINT Right 12/04/2020    Procedure: SACROILIAC JOINT INJECTION;  Surgeon: Mamie Roman MD;  Location: [facility];  Service: Pain Management;  Laterality: Right;    INJECTION OF JOINT Right 12/04/2020    Procedure: GREATER TROCHANTERIC BURSA INJECTION;  Surgeon: Mamie Roman MD;  Location: [facility];  Service: Pain Management;  Laterality: Right;    LAPAROSCOPIC RIGHT COLON RESECTION N/A 1/22/2024    Procedure: COLECTOMY, RIGHT, LAPAROSCOPIC  (eras low, lithotomy);  Surgeon: Alberto Albright MD;  Location: [facility] OR [address] [place] [address];  Service: Colon and Rectal;  Laterality: N/A;    NASAL SEPTUM SURGERY      RECTAL PROLAPSE REPAIR      TONSILLECTOMY          Family History:   Family History   Problem Relation Name Age of Onset    No Known Problems Maternal Aunt Rubio Glasgow     Colon cancer Maternal Uncle Joshua     Colon cancer Maternal Uncle Yovani     Colon cancer Maternal Uncle Konstantin     No Known Problems Paternal Aunt Vero     Esophageal cancer Paternal Uncle Nat Garcia     Heart attack Maternal Grandmother Elmira     Leukemia Maternal Grandfather manish Pang     No Known Problems Paternal Grandmother Lizett     Stroke Paternal Grandfather Nat Sr         Social History:   Social History     Tobacco Use    Smoking status: Former     Current packs/day: 0.00     Average packs/day: 1 pack/day for 41.7 years (41.7 ttl pk-yrs)     Types: Cigarettes     Start date: 3/30/1982     Quit date: 2023     Years since quittin.7    Smokeless tobacco: Never   Substance Use Topics    Alcohol use: Yes     Comment: Socially-2 or 3 times a year-6 or 7 drinks        I have reviewed and updated the patient's past medical, surgical, family and social histories.    Allergies:   Review of patient's allergies indicates:   Allergen Reactions    Nsaids (non-steroidal anti-inflammatory drug) Other (See Comments)     Gastrointestinal bleeding requiring blood transfusion    Demerol [meperidine] Rash        Medications:   Current Outpatient Medications   Medication Sig Dispense Refill    albuterol (PROVENTIL/VENTOLIN HFA) 90 mcg/actuation inhaler inhale 2 puffs into the lungs every 6 hours as needed for wheezing. 18 g 1    dexAMETHasone (DECADRON) 4 MG Tab Take 2 tablets (8 mg total) by mouth once daily only on days 2, 3 and 4 of each chemotherapy cycle. 40 tablet 0    EScitalopram oxalate (LEXAPRO) 20 MG tablet Take 1 tablet (20 mg total) by mouth once daily. 30  "tablet 4    lamoTRIgine (LAMICTAL) 150 MG Tab Take 2 tablets (300 mg total) by mouth every evening. 180 tablet 1    LIDOcaine-prilocaine (EMLA) cream Apply topically as needed (place to port site 45-60 minutes prior to chemotherapy). 30 g 5    ondansetron (ZOFRAN-ODT) 8 MG TbDL dissolve 1 tablet (8 mg total) under the tongue every 6 (six) hours as needed (nausea). 30 tablet 5    oxyCODONE (ROXICODONE) 5 MG immediate release tablet Take 1 tablet (5 mg total) by mouth every 6 (six) hours as needed for Pain. 11 tablet 0    pantoprazole (PROTONIX) 40 MG tablet Take 1 tablet (40 mg total) by mouth once daily. 90 tablet 3    prochlorperazine (COMPAZINE) 10 MG tablet Take 1 tablet (10 mg total) by mouth every 6 (six) hours as needed (nausea). 30 tablet 2    QUEtiapine (SEROQUEL) 200 MG Tab Take 1 tablet (200 mg total) by mouth every evening. 30 tablet 3    rosuvastatin (CRESTOR) 10 MG tablet Take 1 tablet (10 mg total) by mouth once daily. (Patient taking differently: Take 10 mg by mouth every evening.) 90 tablet 3    aspirin (ECOTRIN) 81 MG EC tablet Take 81 mg by mouth once daily.       No current facility-administered medications for this visit.        Physical Exam:   /67 (BP Location: Left arm, Patient Position: Sitting, BP Method: Medium (Automatic))   Pulse 80   Temp 98.3 °F (36.8 °C) (Oral)   Resp 17   Ht 5' 6" (1.676 m)   Wt 80.3 kg (177 lb 0.5 oz)   SpO2 99%   BMI 28.57 kg/m²           Physical Exam  Vitals reviewed.   Constitutional:       General: She is not in acute distress.     Appearance: Normal appearance. She is normal weight. She is not ill-appearing, toxic-appearing or diaphoretic.   HENT:      Head: Normocephalic and atraumatic.      Right Ear: External ear normal.      Left Ear: External ear normal.      Nose: Nose normal.      Mouth/Throat:      Pharynx: Oropharynx is clear.   Eyes:      General: No scleral icterus.     Conjunctiva/sclera: Conjunctivae normal.   Cardiovascular:      Rate " and Rhythm: Normal rate.   Pulmonary:      Effort: Pulmonary effort is normal. No respiratory distress.   Chest:      Comments: RCW port  Abdominal:      General: There is no distension.   Skin:     General: Skin is warm and dry.      Coloration: Skin is not jaundiced or pale.      Findings: No bruising, erythema or rash.   Neurological:      Mental Status: She is alert and oriented to person, place, and time. Mental status is at baseline.      Motor: No weakness.      Gait: Gait normal.   Psychiatric:         Mood and Affect: Mood normal.         Labs:     I have reviewed the pertinent labs. Hgb 11.8.  CEA 3.0.     Imaging:    CT CAP - 8/9/24:    Impression:     Postoperative changes of right colon resection for patient's known colonic malignancy.  Persistent small prominent 1 cm lymph node adjacent to the aorta at the level of the renal vessels, unchanged over multiple prior examinations.  No free air or obstruction.     Tiny 3 mm hypodensity in the right hepatic lobe, likely a small cyst or hemangioma.     Mild thickening of the 2nd portion of the duodenum which could suggest an inflammatory/infectious process.  Clinical correlation suggested.     Constipation.       Path:   1/22/24 - R Hemicolectomy   Final Pathologic Diagnosis     THE DIAGNOSES REMAIN THE SAME.  THIS CORRECTED REPORT IS ISSUED TO CHANGE M STATUS to reflect tumor in the omentum.  This change is discussed with Dr. RANJANA Albright via phone, 2/1/2024.    1. Colon, right and terminal ileum (right hemicolectomy with EN bloc abdominal wall resection):  Invasive adenocarcinoma.  See cancer synoptic report     2. Omentum (resection):    Positive for carcinoma    CORRECT SYNOPTIC AND M STATUS  Cancer synoptic report  - Procedure: Right hemicolectomy with EN bloc abdominal wall resection  - Tumor site:  Ascending  - Histologic type:  Adenocarcinoma  - Histologic grade:  G2, moderately differentiated.  See comment.  COMMENT:  While the majority of the tumor  consists of well formed glands, a significant proportion includes abortive glands, single file infiltration, and malignant cells with signet ring cell morphology. The tumor has an insidious pattern of  infiltration with tumor present far from the advancing front of the tumor.  - Tumor size:  2.0 x 2.0 x 1.0 cm  - Tumor extent:  Invades visceral peritoneum, multifocal  - Macroscopic perforation: Not identified  - Lymphovascular invasion:  Present, extensive.  Small vessel, large vessel, intra and extramural.  - Perineural invasion:  Not identified  - Tumor budding score:  High (>10), multifocal single cell infiltration  - Treatment effect: No known pre-surgical therapy    Margins  Margin involved by invasive carcinoma, radial (slide 1C)  All margins negative for dysplasia.    pTNM stage classification (AJCC 8th edition)  pT Category  pT4a:  Tumor invades through the visceral peritoneum    pN Category  pN2b:  7 or more regional lymph nodes are positive  Number of positive lymph nodes:  19  Number of lymph nodes examined: 26  Number of tumor deposits:  4    pM Category  pM1c:  Metastasis to the peritoneal surface of the omentum.    Additional findings:  - Sessile serrated polyp, no cytologic dysplasia, 1.1 cm at ileocecal valve  - Tubulovillous adenoma, 1.0 cm, proximal ascending  - Tubular adenoma, 0.4 cm, mid ascending  - Tubular adenoma, 0.4, distal ascending  - Tubular adenoma, 0.6 cm, distal ascending  - Submucosal lipoma, 2.0 cm  - Appendix with no specific histopathologic changes    Ancillary studies  Immunohistochemistry for mismatch repair proteins performed on prior biopsy material (SAS-, 12/21/23): pMMR.         Assessment:       1. Malignant neoplasm of ascending colon    2. 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 Immunodeficiency secondary to neoplasm    4. Immunodeficiency secondary to chemotherapy    5. Cold sensitivity    6. Neoplastic malignant related fatigue    7. Other constipation           Plan:        # Colon cancer   Mrs. Maguire is a 74 y.o. female who presents for management of her metastatic colon cancer. She underwent a R hemicolectomy with en-bloc abdominal wall resection on 1/22/24 with Dr. Albright. Pathology revealed pT4a N2b disease with 19/26 positive LNs and omentum positive for carcinoma.    We had an in-depth conversation during our initial consult re: her diagnosis and treatment options. Reviewed recommendation for IV systemic therapy for at least 6 months. Plan for FOLFOX + bevacizumab to be given every 2 weeks. Briefly reviewed side effects of treatment. Handouts provided. Port placed 2/21/24.     PGX - DPYD normal metabolizer, [id number] intermediate metabolizer   Dexamethasone, zofran, compazine and lidocaine sent to pharmacy previously. Reviewed admin instructions.     Pending response, she may be a candidate for CRS/HIPEC with surgical oncology team. Continue to evaluate and re-start discussion if still without radiographic evidence of disease after CT on cycle 8.    CT CAP after cycle 4 shows no clear evidence of disease.  CT CAP after cycle 8 shows no clear evidence of disease.  CT CAP after cycle 12 shows no clear evidence of disease.    Tolerance of chemotherapy has been excellent.    We meanwhile set her back up with Dr. Albright and potentially surg onc for consolidative therapy (CRS) given well controlled disease.    Met with Dr. Contreras and will plan for diagnostic laparoscopy and potential cytoreductive surgery on 9/20/24.  Will hold further chemotherapy for now.  Last cycle was cycle 14 of 5-FU alone.    Consideration for post-operative systemic chemotherapy if significant peritoneal disease burden is still present.  Will see her in late October to discuss.     Cold sensitivity/neoplasm related fatigue:  2/2 chemotherapy. Stopped oxaliplatin beginning with cycle 9 to prevent persistent paresthesias.  Mild persistent paresthesias at this time.  Will monitor. She is  taking Cymbalta but not finding very helpful. Worse towards end of day.     Constipation/Gas:   Advised her to take 2 stool softeners a day with Miralax daily to have regular bowel movements.   This has helped.    Recommended she try taking a half cap of Miralax to avoid more explosive BM.    Patient is in agreement with the proposed treatment plan. All questions were answered to the patient's satisfaction. Pt knows to call clinic if anything is needed before the next clinic visit.    Kemar Zaragoza MD  Hematology/Oncology  [facility]        Med Onc Chart Routing      Follow up with physician 6 weeks. with labs   Follow up with KRISTEN    Infusion scheduling note    Injection scheduling note    Labs CBC, CMP and CEA   Scheduling:  Preferred lab:  Lab interval:     Imaging    Pharmacy appointment    Other referrals

## 2024-09-13 DIAGNOSIS — C18.2 CANCER OF ASCENDING COLON METASTATIC TO INTRA-ABDOMINAL LYMPH NODE: Primary | ICD-10-CM

## 2024-09-13 DIAGNOSIS — C77.2 CANCER OF ASCENDING COLON METASTATIC TO INTRA-ABDOMINAL LYMPH NODE: Primary | ICD-10-CM

## 2024-09-13 DIAGNOSIS — C18.2 MALIGNANT NEOPLASM OF ASCENDING COLON: ICD-10-CM

## 2024-09-13 RX ORDER — CEFAZOLIN SODIUM 2 G/50ML
2 SOLUTION INTRAVENOUS
OUTPATIENT
Start: 2024-09-13

## 2024-09-19 ENCOUNTER — ANESTHESIA EVENT (OUTPATIENT)
Dept: SURGERY | Facility: HOSPITAL | Age: 74
End: 2024-09-19
Payer: MEDICARE

## 2024-09-19 ENCOUNTER — TELEPHONE (OUTPATIENT)
Dept: SURGERY | Facility: CLINIC | Age: 74
End: 2024-09-19
Payer: MEDICARE

## 2024-09-19 NOTE — ANESTHESIA PREPROCEDURE EVALUATION
[facility]  Anesthesia Pre-Operative Evaluation         Patient Name: Karin Maguire  YOB: 1950  MRN: [id number]    SUBJECTIVE:     Pre-operative evaluation for Procedure(s) (LRB):  LAPAROSCOPY, DIAGNOSTIC (N/A)     09/19/2024    Karin Maguire is a 74 y.o. female w/ a significant PMHx of HLD, BANDAR, LUANNE, BPD1, cervical sponylosis, 40py smoking history, TIA, colon cancer s/p laparoscopic hemicolectomy 1/2024, chemotherapy. Being considered for cytoreduction surgery / HIPEC.     Presenting for the above procedure(s).       2D ECHO:  TTE:  Results for orders placed during the hospital encounter of 11/05/21    Echo Saline Bubble? Yes    Interpretation Summary  · Negative bubble study. There is no evidence of intracardiac shunting.  · The left ventricle is normal in size with normal systolic function.  · The estimated ejection fraction is 65%.  · Normal left ventricular diastolic function.  · Mild aortic regurgitation.  · Normal right ventricular size with normal right ventricular systolic function.  · The estimated PA systolic pressure is 18 mmHg.  · Normal central venous pressure (3 mmHg).      SOILA:  No results found for this or any previous visit.    LDA:   Lines/Drains/Airways       Central Venous Catheter Line  Duration                  PowerPort A Cath Single Lumen 02/21/24 0836 Atrial Right;Internal Jugular Right 211 days                    Prev airway:     Intubation     Date/Time: 1/22/2024 7:31 AM     Performed by: Yoko Vee MD  Authorized by: Mauricio Sprague MD    Intubation:     Induction:  Intravenous    Intubated:  Postinduction    Mask Ventilation:  Easy with oral airway    Attempts:  1    Attempted By:  Resident anesthesiologist    Method of Intubation:  Direct    Blade:  Liz 3    Laryngeal View Grade: Grade IIA - cords partially seen      Difficult Airway Encountered?: No      Complications:  None    Airway Device:  Oral endotracheal tube    Airway  Device Size:  7.0    Style/Cuff Inflation:  Cuffed (inflated to minimal occlusive pressure)    Tube secured:  22    Secured at:  The teeth    Placement Verified By:  Capnometry    Complicating Factors:  None    Findings Post-Intubation:  BS equal bilateral and atraumatic/condition of teeth unchanged          Patient Active Problem List   Diagnosis    Bipolar 1 disorder    Hyponatremia    History of GI bleed    History of encephalitis    Sacroiliitis    Trochanteric bursitis of right hip    History of gambling    LUANNE (generalized anxiety disorder)    Insomnia    Abnormal movements    Obstructive sleep apnea (adult) (pediatric)    Chronic right sacroiliac joint pain    Psychosocial stressors    Lumbar spondylosis    Multinodular goiter    Pure hypercholesterolemia    Decreased strength of upper extremity    Overuse syndrome of left hand    Pain in left hand    Weakness    Cervical spondylosis    Arthrosis of right foot    Preop testing    Aortic atherosclerosis    Bipolar depression    Chronic bronchitis    Hyperlipidemia    Tobacco use disorder    Colon adenocarcinoma    Unspecified psychosis not due to a substance or known physiological condition    Malignant neoplasm of ascending colon       Review of patient's allergies indicates:   Allergen Reactions    Nsaids (non-steroidal anti-inflammatory drug) Other (See Comments)     Gastrointestinal bleeding requiring blood transfusion    Demerol [meperidine] Rash       Current Medications:  Current Outpatient Medications   Medication Instructions    albuterol (PROVENTIL/VENTOLIN HFA) 90 mcg/actuation inhaler inhale 2 puffs into the lungs every 6 hours as needed for wheezing.    aspirin (ECOTRIN) 81 mg, Oral, Nightly    dexAMETHasone (DECADRON) 4 MG Tab Take 2 tablets (8 mg total) by mouth once daily only on days 2, 3 and 4 of each chemotherapy cycle.    EScitalopram oxalate (LEXAPRO) 20 mg, Oral, Daily    lamoTRIgine (LAMICTAL) 300 mg, Oral, Nightly     LIDOcaine-prilocaine (EMLA) cream Topical (Top), As needed (PRN)    ondansetron (ZOFRAN-ODT) 8 MG TbDL dissolve 1 tablet (8 mg total) under the tongue every 6 (six) hours as needed (nausea).    oxyCODONE (ROXICODONE) 5 mg, Oral, Every 6 hours PRN    pantoprazole (PROTONIX) 40 mg, Oral, Daily    prochlorperazine (COMPAZINE) 10 mg, Oral, Every 6 hours PRN    QUEtiapine (SEROQUEL) 200 mg, Oral, Nightly    rosuvastatin (CRESTOR) 10 mg, Oral, Daily       Past Surgical History:   Procedure Laterality Date    ANKLE FRACTURE SURGERY Left 2001    BLADDER SUSPENSION      CARPAL TUNNEL RELEASE Bilateral     CHOLECYSTECTOMY      Laparoscopic    COLONOSCOPY      COLONOSCOPY N/A 12/21/2023    Procedure: COLONOSCOPY;  Surgeon: Alberto Albright MD;  Location: [facility];  Service: Endoscopy;  Laterality: N/A;    ESOPHAGOGASTRODUODENOSCOPY N/A 07/29/2021    Procedure: EGD (ESOPHAGOGASTRODUODENOSCOPY);  Surgeon: Susan Mcclain MD;  [facility];  Service: Endoscopy;  Laterality: N/A;    EYE SURGERY      FOOT ARTHRODESIS Right 05/03/2023    Procedure: FUSION, FOOT;  Surgeon: Lauri Alejandra DPM;  Location: [facility];  Service: Podiatry;  Laterality: Right;  mini c-arm, Arthrex dowel bone graft harvester, locking plate and screws festus notified cc    HYSTERECTOMY  1986    vaginal prolapse    INJECTION OF ANESTHETIC AGENT AROUND MEDIAL BRANCH NERVES INNERVATING CERVICAL FACET JOINT Bilateral 05/27/2022    Procedure: CERVICAL MEDIAL BRANCH NERVE BLOCK (C3-4,C4-5);  Surgeon: Mamie Roman MD;  Location: [facility];  Service: Pain Management;  Laterality: Bilateral;    INJECTION OF ANESTHETIC AGENT AROUND MEDIAL BRANCH NERVES INNERVATING LUMBAR FACET JOINT Right 02/05/2021    Procedure: LUMBAR FACET JOINT BLOCK (L3-4,L4-5,L5-S1);  Surgeon: Mamie Roman MD;  Location: [facility];  Service: Pain Management;  Laterality: Right;    INJECTION OF ANESTHETIC AGENT AROUND MEDIAL BRANCH NERVES INNERVATING LUMBAR FACET JOINT Right  04/30/2021    Procedure: LUMBAR FACET JOINT BLOCK (L3-4,L4-5,L5-S1);  Surgeon: Mamie Roman MD;  Location: [facility];  Service: Pain Management;  Laterality: Right;    INJECTION OF ANESTHETIC AGENT INTO SACROILIAC JOINT Right 12/04/2020    Procedure: SACROILIAC JOINT INJECTION;  Surgeon: Mamie Roman MD;  Location: [facility];  Service: Pain Management;  Laterality: Right;    INJECTION OF JOINT Right 12/04/2020    Procedure: GREATER TROCHANTERIC BURSA INJECTION;  Surgeon: Mamie Roman MD;  Location: [facility];  Service: Pain Management;  Laterality: Right;    LAPAROSCOPIC RIGHT COLON RESECTION N/A 1/22/2024    Procedure: COLECTOMY, RIGHT, LAPAROSCOPIC (eras low, lithotomy);  Surgeon: Alberto Albright MD;  [facility];  Service: Colon and Rectal;  Laterality: N/A;    NASAL SEPTUM SURGERY      RECTAL PROLAPSE REPAIR      TONSILLECTOMY           OBJECTIVE:     Vital Signs Range (Last 24H):         Significant Labs:  Lab Results   Component Value Date    WBC 5.02 09/12/2024    HGB 11.8 (L) 09/12/2024    HCT 37.8 09/12/2024     09/12/2024    INR 0.9 10/26/2021       Lab Results   Component Value Date     09/12/2024    K 4.4 09/12/2024     09/12/2024    CREATININE 0.9 09/12/2024    BUN 14 09/12/2024    CO2 25 09/12/2024       Lab Results   Component Value Date    TSH 1.334 04/19/2024    HGBA1C 5.7 (H) 11/04/2021       EKG:   Results for orders placed or performed during the hospital encounter of 01/08/24   SCHEDULED EKG 12-LEAD (to Muse)    Collection Time: 01/08/24 10:42 AM    Narrative    Test Reason : Z01.818    Vent. Rate : 061 BPM     Atrial Rate : 061 BPM     P-R Int : 160 ms          QRS Dur : 090 ms      QT Int : 416 ms       P-R-T Axes : 060 039 073 degrees     QTc Int : 418 ms    Normal sinus rhythm  Normal ECG  When compared with ECG of 17-APR-2023 08:10,  No significant change was found  Confirmed by Oscar MARTINEZ, Babak RAMSEY ([id number]) on 1/9/2024 7:46:47 AM    Referred By: MARTHA  SEAMUS           Confirmed By:Babak Moore MD       ASSESSMENT/PLAN:          Pre-op Assessment    I have reviewed the Patient Summary Reports.     I have reviewed the Nursing Notes. I have reviewed the NPO Status.   I have reviewed the Medications.   Steroids Taken In Past Year: Decadron    Review of Systems  Anesthesia Hx:  No problems with previous Anesthesia   History of prior surgery of interest to airway management or planning:          Denies Family Hx of Anesthesia complications.    Denies Personal Hx of Anesthesia complications.                    Social:  Former Smoker       Hematology/Oncology:                      Current/Recent Cancer.         surgery and chemotherapy   Oncology Comments: Colon cancer     EENT/Dental:  EENT/Dental Normal           Cardiovascular:      Denies Hypertension.    Denies CAD.           hyperlipidemia                             Pulmonary:   COPD     Sleep Apnea                Hepatic/GI:     GERD             Musculoskeletal:  Arthritis (cervical and lumbar spondylosis)               Neurological:    Denies CVA.    Denies Seizures.                                Endocrine:  Endocrine Normal            Psych:   anxiety  BPD1               Physical Exam  General: Well nourished, Cooperative, Alert and Oriented    Airway:  Mallampati: II   Mouth Opening: Normal  TM Distance: Normal  Tongue: Normal  Neck ROM: Normal ROM    Dental:  Intact    Chest/Lungs:  Normal Respiratory Rate    Heart:  Rate: Normal  Rhythm: Regular Rhythm        Anesthesia Plan  Type of Anesthesia, risks & benefits discussed:    Anesthesia Type: Gen ETT  Intra-op Monitoring Plan: Standard ASA Monitors  Post Op Pain Control Plan: multimodal analgesia and IV/PO Opioids PRN  Induction:  IV  Airway Plan: Direct and Video  ASA Score: 3  Day of Surgery Review of History & Physical: H&P Update referred to the surgeon/provider.  Anesthesia Plan Notes: Consider duonebs preOp if wheezing/SOB   Consider RSI based on  GERD symptoms     Ready For Surgery From Anesthesia Perspective.     .

## 2024-09-19 NOTE — TELEPHONE ENCOUNTER
Spoke to pt and confirmed procedure for 9/20/24 with Dr. Contreras. Informed to arrive at the [facility] on the 2nd floor on [facility] for 0830 and surgery time is approximately 1020. Surgery Instructions provided as follows:  instructed to remain NPO solids 8 hours prior to surgery, clear liquids until 2 hours prior to surgery, to shower with antibacterial soap the night before surgery and morning of surgery before arrival, not to apply any lotions, powders or deodorant, remove all metal and jewelry, to wear comfortable clothes, and leave all valuables at home. Medications reviewed with pre op nurse. Confirmed pt  will have transportation home and family member will accompany pt on DOS. Instructed to bring surgery folder on DOS. Pt verbalized understanding to all of the above.

## 2024-09-20 ENCOUNTER — ANESTHESIA (OUTPATIENT)
Dept: SURGERY | Facility: HOSPITAL | Age: 74
End: 2024-09-20
Payer: MEDICARE

## 2024-09-20 ENCOUNTER — HOSPITAL ENCOUNTER (OUTPATIENT)
Facility: HOSPITAL | Age: 74
Discharge: HOME | End: 2024-09-20
Attending: SURGERY | Admitting: SURGERY
Payer: MEDICARE

## 2024-09-20 VITALS
DIASTOLIC BLOOD PRESSURE: 74 MMHG | SYSTOLIC BLOOD PRESSURE: 160 MMHG | WEIGHT: 172.19 LBS | OXYGEN SATURATION: 96 % | BODY MASS INDEX: 27.67 KG/M2 | RESPIRATION RATE: 16 BRPM | TEMPERATURE: 97 F | HEIGHT: 66 IN | HEART RATE: 72 BPM

## 2024-09-20 DIAGNOSIS — C18.2 CANCER OF ASCENDING COLON METASTATIC TO INTRA-ABDOMINAL LYMPH NODE: ICD-10-CM

## 2024-09-20 DIAGNOSIS — C77.2 CANCER OF ASCENDING COLON METASTATIC TO INTRA-ABDOMINAL LYMPH NODE: ICD-10-CM

## 2024-09-20 DIAGNOSIS — C18.2 MALIGNANT NEOPLASM OF ASCENDING COLON: Primary | ICD-10-CM

## 2024-09-20 PROCEDURE — 49321 LAPAROSCOPY BIOPSY: CPT | Mod: ,,, | Performed by: SURGERY

## 2024-09-20 PROCEDURE — 88305 TISSUE EXAM BY PATHOLOGIST: CPT | Mod: 59 | Performed by: PATHOLOGY

## 2024-09-20 PROCEDURE — 88360 TUMOR IMMUNOHISTOCHEM/MANUAL: CPT | Mod: 26,,, | Performed by: PATHOLOGY

## 2024-09-20 PROCEDURE — 25000003 PHARM REV CODE 250: Performed by: SURGERY

## 2024-09-20 PROCEDURE — 27201423 OPTIME MED/SURG SUP & DEVICES STERILE SUPPLY: Performed by: SURGERY

## 2024-09-20 PROCEDURE — 25000003 PHARM REV CODE 250

## 2024-09-20 PROCEDURE — 88342 IMHCHEM/IMCYTCHM 1ST ANTB: CPT | Performed by: PATHOLOGY

## 2024-09-20 PROCEDURE — 71000015 HC POSTOP RECOV 1ST HR: Performed by: SURGERY

## 2024-09-20 PROCEDURE — 88305 TISSUE EXAM BY PATHOLOGIST: CPT | Mod: 26,,, | Performed by: PATHOLOGY

## 2024-09-20 PROCEDURE — 37000009 HC ANESTHESIA EA ADD 15 MINS: Performed by: SURGERY

## 2024-09-20 PROCEDURE — 88360 TUMOR IMMUNOHISTOCHEM/MANUAL: CPT | Mod: 59 | Performed by: PATHOLOGY

## 2024-09-20 PROCEDURE — 88112 CYTOPATH CELL ENHANCE TECH: CPT | Mod: 26,,, | Performed by: PATHOLOGY

## 2024-09-20 PROCEDURE — 88342 IMHCHEM/IMCYTCHM 1ST ANTB: CPT | Mod: 26,XU,, | Performed by: PATHOLOGY

## 2024-09-20 PROCEDURE — 36000708 HC OR TIME LEV III 1ST 15 MIN: Performed by: SURGERY

## 2024-09-20 PROCEDURE — 88112 CYTOPATH CELL ENHANCE TECH: CPT | Performed by: PATHOLOGY

## 2024-09-20 PROCEDURE — 71000044 HC DOSC ROUTINE RECOVERY FIRST HOUR: Performed by: SURGERY

## 2024-09-20 PROCEDURE — 37000008 HC ANESTHESIA 1ST 15 MINUTES: Performed by: SURGERY

## 2024-09-20 PROCEDURE — 36000709 HC OR TIME LEV III EA ADD 15 MIN: Performed by: SURGERY

## 2024-09-20 PROCEDURE — 88341 IMHCHEM/IMCYTCHM EA ADD ANTB: CPT | Performed by: PATHOLOGY

## 2024-09-20 PROCEDURE — 88341 IMHCHEM/IMCYTCHM EA ADD ANTB: CPT | Mod: 26,59,, | Performed by: PATHOLOGY

## 2024-09-20 PROCEDURE — 63600175 PHARM REV CODE 636 W HCPCS

## 2024-09-20 PROCEDURE — 88305 TISSUE EXAM BY PATHOLOGIST: CPT | Performed by: PATHOLOGY

## 2024-09-20 RX ORDER — DEXAMETHASONE SODIUM PHOSPHATE 4 MG/ML
INJECTION, SOLUTION INTRA-ARTICULAR; INTRALESIONAL; INTRAMUSCULAR; INTRAVENOUS; SOFT TISSUE
Status: DISCONTINUED | OUTPATIENT
Start: 2024-09-20 | End: 2024-09-20

## 2024-09-20 RX ORDER — SODIUM CHLORIDE 0.9 % (FLUSH) 0.9 %
10 SYRINGE (ML) INJECTION
Status: DISCONTINUED | OUTPATIENT
Start: 2024-09-20 | End: 2024-09-20 | Stop reason: HOSPADM

## 2024-09-20 RX ORDER — EPHEDRINE SULFATE 50 MG/ML
INJECTION, SOLUTION INTRAVENOUS
Status: DISCONTINUED | OUTPATIENT
Start: 2024-09-20 | End: 2024-09-20

## 2024-09-20 RX ORDER — TRAMADOL HYDROCHLORIDE 50 MG/1
50 TABLET ORAL EVERY 6 HOURS PRN
Qty: 5 TABLET | Refills: 0 | Status: SHIPPED | OUTPATIENT
Start: 2024-09-20

## 2024-09-20 RX ORDER — PROCHLORPERAZINE EDISYLATE 5 MG/ML
5 INJECTION INTRAMUSCULAR; INTRAVENOUS EVERY 30 MIN PRN
Status: DISCONTINUED | OUTPATIENT
Start: 2024-09-20 | End: 2024-09-20 | Stop reason: HOSPADM

## 2024-09-20 RX ORDER — CEFAZOLIN 2 G/1
INJECTION, POWDER, FOR SOLUTION INTRAMUSCULAR; INTRAVENOUS
Status: DISCONTINUED | OUTPATIENT
Start: 2024-09-20 | End: 2024-09-20

## 2024-09-20 RX ORDER — LIDOCAINE HYDROCHLORIDE 10 MG/ML
INJECTION, SOLUTION EPIDURAL; INFILTRATION; INTRACAUDAL; PERINEURAL
Status: DISCONTINUED
Start: 2024-09-20 | End: 2024-09-20 | Stop reason: HOSPADM

## 2024-09-20 RX ORDER — ONDANSETRON HYDROCHLORIDE 2 MG/ML
INJECTION, SOLUTION INTRAVENOUS
Status: DISCONTINUED | OUTPATIENT
Start: 2024-09-20 | End: 2024-09-20

## 2024-09-20 RX ORDER — DEXTROMETHORPHAN HYDROBROMIDE, GUAIFENESIN 5; 100 MG/5ML; MG/5ML
650 LIQUID ORAL EVERY 8 HOURS
COMMUNITY
Start: 2024-09-20 | End: 2024-09-27

## 2024-09-20 RX ORDER — PROPOFOL 10 MG/ML
VIAL (ML) INTRAVENOUS
Status: DISCONTINUED | OUTPATIENT
Start: 2024-09-20 | End: 2024-09-20

## 2024-09-20 RX ORDER — BUPIVACAINE HYDROCHLORIDE AND EPINEPHRINE 5; 5 MG/ML; UG/ML
INJECTION, SOLUTION EPIDURAL; INTRACAUDAL; PERINEURAL
Status: DISCONTINUED | OUTPATIENT
Start: 2024-09-20 | End: 2024-09-20 | Stop reason: HOSPADM

## 2024-09-20 RX ORDER — ROCURONIUM BROMIDE 10 MG/ML
INJECTION, SOLUTION INTRAVENOUS
Status: DISCONTINUED | OUTPATIENT
Start: 2024-09-20 | End: 2024-09-20

## 2024-09-20 RX ORDER — ACETAMINOPHEN 500 MG
1000 TABLET ORAL
Status: COMPLETED | OUTPATIENT
Start: 2024-09-20 | End: 2024-09-20

## 2024-09-20 RX ORDER — FENTANYL CITRATE 50 UG/ML
INJECTION, SOLUTION INTRAMUSCULAR; INTRAVENOUS
Status: DISCONTINUED | OUTPATIENT
Start: 2024-09-20 | End: 2024-09-20

## 2024-09-20 RX ORDER — DEXMEDETOMIDINE HYDROCHLORIDE 100 UG/ML
INJECTION, SOLUTION INTRAVENOUS
Status: DISCONTINUED | OUTPATIENT
Start: 2024-09-20 | End: 2024-09-20

## 2024-09-20 RX ORDER — LIDOCAINE HYDROCHLORIDE 20 MG/ML
INJECTION INTRAVENOUS
Status: DISCONTINUED | OUTPATIENT
Start: 2024-09-20 | End: 2024-09-20

## 2024-09-20 RX ORDER — HYDROMORPHONE HYDROCHLORIDE 1 MG/ML
0.2 INJECTION, SOLUTION INTRAMUSCULAR; INTRAVENOUS; SUBCUTANEOUS EVERY 5 MIN PRN
Status: DISCONTINUED | OUTPATIENT
Start: 2024-09-20 | End: 2024-09-20 | Stop reason: HOSPADM

## 2024-09-20 RX ORDER — KETAMINE HCL IN 0.9 % NACL 50 MG/5 ML
SYRINGE (ML) INTRAVENOUS
Status: DISCONTINUED | OUTPATIENT
Start: 2024-09-20 | End: 2024-09-20

## 2024-09-20 RX ORDER — MIDAZOLAM HYDROCHLORIDE 1 MG/ML
INJECTION INTRAMUSCULAR; INTRAVENOUS
Status: DISCONTINUED | OUTPATIENT
Start: 2024-09-20 | End: 2024-09-20

## 2024-09-20 RX ORDER — GLUCAGON 1 MG
1 KIT INJECTION
Status: DISCONTINUED | OUTPATIENT
Start: 2024-09-20 | End: 2024-09-20 | Stop reason: HOSPADM

## 2024-09-20 RX ADMIN — MIDAZOLAM HYDROCHLORIDE 1 MG: 2 INJECTION, SOLUTION INTRAMUSCULAR; INTRAVENOUS at 10:09

## 2024-09-20 RX ADMIN — Medication 20 MG: at 11:09

## 2024-09-20 RX ADMIN — ROCURONIUM BROMIDE 50 MG: 10 INJECTION, SOLUTION INTRAVENOUS at 10:09

## 2024-09-20 RX ADMIN — LIDOCAINE HYDROCHLORIDE 80 MG: 20 INJECTION INTRAVENOUS at 10:09

## 2024-09-20 RX ADMIN — PROPOFOL 30 MG: 10 INJECTION, EMULSION INTRAVENOUS at 11:09

## 2024-09-20 RX ADMIN — SODIUM CHLORIDE: 0.9 INJECTION, SOLUTION INTRAVENOUS at 10:09

## 2024-09-20 RX ADMIN — DEXAMETHASONE SODIUM PHOSPHATE 4 MG: 4 INJECTION, SOLUTION INTRAMUSCULAR; INTRAVENOUS at 11:09

## 2024-09-20 RX ADMIN — FENTANYL CITRATE 100 MCG: 50 INJECTION, SOLUTION INTRAMUSCULAR; INTRAVENOUS at 10:09

## 2024-09-20 RX ADMIN — ACETAMINOPHEN 1000 MG: 500 TABLET ORAL at 09:09

## 2024-09-20 RX ADMIN — ONDANSETRON 4 MG: 2 INJECTION INTRAMUSCULAR; INTRAVENOUS at 11:09

## 2024-09-20 RX ADMIN — PROPOFOL 150 MG: 10 INJECTION, EMULSION INTRAVENOUS at 10:09

## 2024-09-20 RX ADMIN — DEXMEDETOMIDINE 4 MCG: 200 INJECTION, SOLUTION INTRAVENOUS at 12:09

## 2024-09-20 RX ADMIN — SODIUM CHLORIDE, SODIUM GLUCONATE, SODIUM ACETATE, POTASSIUM CHLORIDE, MAGNESIUM CHLORIDE, SODIUM PHOSPHATE, DIBASIC, AND POTASSIUM PHOSPHATE: .53; .5; .37; .037; .03; .012; .00082 INJECTION, SOLUTION INTRAVENOUS at 11:09

## 2024-09-20 RX ADMIN — GLYCOPYRROLATE 0.1 MG: 0.2 INJECTION INTRAMUSCULAR; INTRAVENOUS at 11:09

## 2024-09-20 RX ADMIN — CEFAZOLIN 2 G: 2 INJECTION, POWDER, FOR SOLUTION INTRAMUSCULAR; INTRAVENOUS at 11:09

## 2024-09-20 RX ADMIN — EPHEDRINE SULFATE 5 MG: 50 INJECTION INTRAVENOUS at 11:09

## 2024-09-20 RX ADMIN — SUGAMMADEX 200 MG: 100 INJECTION, SOLUTION INTRAVENOUS at 12:09

## 2024-09-20 NOTE — ANESTHESIA PROCEDURE NOTES
Intubation    Date/Time: 9/20/2024 11:01 AM    Performed by: An Lancaster MD  Authorized by: Gil Ford Jr., MD    Intubation:     Induction:  Intravenous    Intubated:  Postinduction    Mask Ventilation:  Easy with oral airway    Attempts:  1    Performed by: Doroteo Ochoa - medical student.    Method of Intubation:  Video laryngoscopy    Blade:  Blanco 3    Laryngeal View Grade: Grade I - full view of cords      Difficult Airway Encountered?: No      Complications:  None    Airway Device:  Oral endotracheal tube    Airway Device Size:  7.0    Style/Cuff Inflation:  Cuffed    Tube secured:  22    Secured at:  The lips    Placement Verified By:  Capnometry    Complicating Factors:  None    Findings Post-Intubation:  BS equal bilateral

## 2024-09-20 NOTE — H&P
FOCUSED SURGICAL H&P    Karin Maguire is a 74 y.o. female. MRN is [id number].    CC: Here today for the following surgical procedure(s):  LAPAROSCOPY, DIAGNOSTIC (Abdomen)    HPI: For a detailed history of the patients history of present illness please refer to the last progress note. In brief, this is a 74 y.o. female with a known history of anxiety, bipolar 1, and GI bleed, here today for surgical intervention. There has been no recent changes in the patients health, including fevers, chest pain, or shortness of breath, and no new medications have been started. The patient has not had anything to eat or drink for the last 8 hours. The patient has held all blood thinners       Past Medical History:   Past Medical History:   Diagnosis Date    Anxiety     Arthritis     Bipolar 1 disorder     Bursitis of right hip     GI bleed due to NSAIDs     Multinodular goiter 11/15/2021    Sacroiliitis     right side    Sleep apnea        Past Surgical History:   Past Surgical History:   Procedure Laterality Date    ANKLE FRACTURE SURGERY Left 2001    BLADDER SUSPENSION      CARPAL TUNNEL RELEASE Bilateral     CHOLECYSTECTOMY      Laparoscopic    COLONOSCOPY      COLONOSCOPY N/A 12/21/2023    Procedure: COLONOSCOPY;  Surgeon: Alberto Albright MD;  Location: [facility];  Service: Endoscopy;  Laterality: N/A;    ESOPHAGOGASTRODUODENOSCOPY N/A 07/29/2021    Procedure: EGD (ESOPHAGOGASTRODUODENOSCOPY);  Surgeon: Susan Mcclain MD;  Location: [facility];  Service: Endoscopy;  Laterality: N/A;    EYE SURGERY      FOOT ARTHRODESIS Right 05/03/2023    Procedure: FUSION, FOOT;  Surgeon: Lauri Alejandra DPM;  Location: [facility] OR;  Service: Podiatry;  Laterality: Right;  mini c-arm, Arthrex dowel bone graft harvester, locking plate and screws festus notified cc    HYSTERECTOMY  1986    vaginal prolapse    INJECTION OF ANESTHETIC AGENT AROUND MEDIAL BRANCH NERVES INNERVATING CERVICAL FACET JOINT Bilateral 05/27/2022    Procedure:  CERVICAL MEDIAL BRANCH NERVE BLOCK (C3-4,C4-5);  Surgeon: Mamie Roman MD;  Location: [facility];  Service: Pain Management;  Laterality: Bilateral;    INJECTION OF ANESTHETIC AGENT AROUND MEDIAL BRANCH NERVES INNERVATING LUMBAR FACET JOINT Right 2021    Procedure: LUMBAR FACET JOINT BLOCK (L3-4,L4-5,L5-S1);  Surgeon: Mamie Roman MD;  Location: [facility];  Service: Pain Management;  Laterality: Right;    INJECTION OF ANESTHETIC AGENT AROUND MEDIAL BRANCH NERVES INNERVATING LUMBAR FACET JOINT Right 2021    Procedure: LUMBAR FACET JOINT BLOCK (L3-4,L4-5,L5-S1);  Surgeon: Mamie Roman MD;  Location: [facility];  Service: Pain Management;  Laterality: Right;    INJECTION OF ANESTHETIC AGENT INTO SACROILIAC JOINT Right 2020    Procedure: SACROILIAC JOINT INJECTION;  Surgeon: Mamie Roman MD;  Location: [facility];  Service: Pain Management;  Laterality: Right;    INJECTION OF JOINT Right 2020    Procedure: GREATER TROCHANTERIC BURSA INJECTION;  Surgeon: Mamie Roman MD;  Location: [facility] OR;  Service: Pain Management;  Laterality: Right;    LAPAROSCOPIC RIGHT COLON RESECTION N/A 2024    Procedure: COLECTOMY, RIGHT, LAPAROSCOPIC (eras low, lithotomy);  Surgeon: Alberto Albright MD;  Location: [facility] OR [address] [place] [address];  Service: Colon and Rectal;  Laterality: N/A;    NASAL SEPTUM SURGERY      RECTAL PROLAPSE REPAIR      TONSILLECTOMY         Social History:   Social History     Socioeconomic History    Marital status:    Tobacco Use    Smoking status: Former     Current packs/day: 0.00     Average packs/day: 1 pack/day for 41.7 years (41.7 ttl pk-yrs)     Types: Cigarettes     Start date: 3/30/1982     Quit date: 2023     Years since quittin.7    Smokeless tobacco: Never   Substance and Sexual Activity    Alcohol use: Yes     Comment: Socially-2 or 3 times a year-6 or 7 drinks    Drug use: No    Sexual activity: Yes     Partners: Male     Birth control/protection: Surgical      Comment:      Social Determinants of Health     Financial Resource Strain: Low Risk  (1/24/2024)    Overall Financial Resource Strain (CARDIA)     Difficulty of Paying Living Expenses: Not hard at all   Food Insecurity: No Food Insecurity (1/24/2024)    Hunger Vital Sign     Worried About Running Out of Food in the Last Year: Never true     Ran Out of Food in the Last Year: Never true   Transportation Needs: No Transportation Needs (1/24/2024)    PRAPARE - Transportation     Lack of Transportation (Medical): No     Lack of Transportation (Non-Medical): No   Physical Activity: Inactive (1/24/2024)    Exercise Vital Sign     Days of Exercise per Week: 0 days     Minutes of Exercise per Session: 0 min   Stress: No Stress Concern Present (1/24/2024)    [employer] [place] [employer] - Occupational Stress Questionnaire     Feeling of Stress : Not at all   Housing Stability: Low Risk  (1/24/2024)    Housing Stability Vital Sign     Unable to Pay for Housing in the Last Year: No     Number of Places Lived in the Last Year: 1     Unstable Housing in the Last Year: No       Family History:   Family History   Problem Relation Name Age of Onset    No Known Problems Maternal Aunt Rubio [place]     Colon cancer Maternal Uncle Joshua     Colon cancer Maternal Uncle Yovani     Colon cancer Maternal Uncle Konstantin     No Known Problems Paternal Aunt Vero     Esophageal cancer Paternal Uncle Denaeloraine Jr     Heart attack Maternal Grandmother Elmira     Leukemia Maternal Grandfather manish Pang     No Known Problems Paternal Grandmother Lizett     Stroke Paternal Grandfather Nat Sr           Allergies:  Review of patient's allergies indicates:   Allergen Reactions    Nsaids (non-steroidal anti-inflammatory drug) Other (See Comments)     Gastrointestinal bleeding requiring blood transfusion    Demerol [meperidine] Rash         Medications:    Current Facility-Administered Medications:     ceFAZolin 2 g in D5W 50  "mL IVPB (MB+), 2 g, Intravenous, On Call Procedure, Lynne Khan, NP    dextrose 10% bolus 125 mL 125 mL, 12.5 g, Intravenous, PRN, An Lancaster MD    dextrose 10% bolus 250 mL 250 mL, 25 g, Intravenous, PRN, An Lancaster MD    LIDOcaine (PF) 10 mg/ml (1%) 10 mg/mL (1 %) injection, , , ,                   Vital Signs:  Vitals:    09/20/24 0905   BP: (!) 157/73   Pulse: 80   Resp: 18   Temp: 97.9 °F (36.6 °C)         Physical Exam:  Neuro: awake, alert, no acute distress.  HEENT: PERRLA, neck supple, no lymphadenopathy.  Heart: regular rate/rhythm  Lungs: equal chest expansion bilaterally, no increased work of breathing on RA  Abdomen: soft, non-distended, non-tender to palpation.  Extremities: warm, well-perfused       Labs:  Lab Results   Component Value Date/Time    WBC 5.02 09/12/2024 11:09 AM    HGB 11.8 (L) 09/12/2024 11:09 AM    HCT 37.8 09/12/2024 11:09 AM     09/12/2024 11:09 AM    MCV 93 09/12/2024 11:09 AM     Lab Results   Component Value Date/Time     09/12/2024 11:09 AM    K 4.4 09/12/2024 11:09 AM     09/12/2024 11:09 AM    CO2 25 09/12/2024 11:09 AM    BUN 14 09/12/2024 11:09 AM     09/12/2024 11:09 AM    MG 2.2 01/23/2024 05:26 AM    PHOS 2.2 (L) 01/23/2024 05:26 AM     Lab Results   Component Value Date/Time    INR 0.9 10/26/2021 11:23 PM     No components found for: "TROPI"  Lab Results   Component Value Date/Time    ALT 10 09/12/2024 11:09 AM    AST 16 09/12/2024 11:09 AM    LIPASE 18 07/28/2021 10:05 AM          Assessment/Plan:  74 y.o. female here today for the following surgical procedure:    LAPAROSCOPY, DIAGNOSTIC (Abdomen)       The indications for surgery, highlighting the risks and benefits of the procedure were discussed with the patient. These included but are not limited to swelling, bleeding, pain, infection, and adverse anesthesia-related event. I also discussed risk of injury to nearby structures. The patient seems to understand the risks, as well " as the alternatives including nonoperative observation/survellience and wishes to proceed with the surgical intervention.    Patient has been examined and consented.      Plan discussed with and agreed by Dr. Ben Oates, DO  PGY-1

## 2024-09-20 NOTE — TRANSFER OF CARE
"Anesthesia Transfer of Care Note    Patient: Karin Maguire    Procedure(s) Performed: Procedure(s) (LRB):  LAPAROSCOPY, DIAGNOSTIC (N/A)    Patient location: PACU    Anesthesia Type: general    Transport from OR: Transported from OR on 6-10 L/min O2 by face mask with adequate spontaneous ventilation    Post pain: adequate analgesia    Post assessment: no apparent anesthetic complications    Post vital signs: stable    Level of consciousness: awake    Nausea/Vomiting: no nausea/vomiting    Complications: none    Transfer of care protocol was followed      Last vitals: Visit Vitals  BP (!) 157/73 (BP Location: Left arm, Patient Position: Lying)   Pulse 75   Temp 36.1 °C (97 °F) (Skin)   Resp 16   Ht 5' 6" (1.676 m)   Wt 78.1 kg (172 lb 2.9 oz)   SpO2 100%   Breastfeeding No   BMI 27.79 kg/m²     "

## 2024-09-20 NOTE — OP NOTE
Dmitriy Steward - Surgery ([facility])  Surgery Department  Operative Note       Date of Procedure: 9/20/2024     Procedure: Procedure(s) (LRB):  LAPAROSCOPY, DIAGNOSTIC (N/A)     Surgeons and Role:     * Benjy Contreras MD - Primary     * Benjy Patterson MD - Resident - Assisting        Pre-Operative Diagnosis:   Colon cancer metastatic to peritoneum      Post-Operative Diagnosis:   Same    Comorbidities:  Anxiety  Bipolar disorder  GI bleed due to NSAIDs  Sleep apnea    Anesthesia: General    Operative Findings:   No ascites  White plaques consistent with treated tumor predominantly on the right diaphragm, left abdominal wall, pelvis, right abdominal wall. The small bowel was spared of disease. The estimated PCI was 14 pending final pathology    Region Name and Lesion Size  (0) Central (midline abdominal wall, entire greater omentum, and transverse colon): 0  (1) Right upper quadrant (superior surface right lobe liver, right subdiaphragmatic surface, right retrohepatic space): 2  (2) Epigastrium (left liver, lesser omentum, falciform, epigastric fat pad): 1  (3) Left upper quadrant (Spleen, pancreas tail, stomach, left subdiaphragmatic surface): 1  (4) Left flank (Descending colon and left abdominal gutter): 2  (5) Left lower (pelvic sidewall to the left of sigmoid colon): 1  (6) Pelvis (bladder, rectosigmoid colon): 3  (7) Right lower quadrant (pelvic sidewall to right of sigmoid colon, cecum and appendix): 2  (8) Right flank (ascending colon and right abdominal gutter): 2  (9) Upper jejunum: 0  (10) Lower jejunum: 0  (11) Upper ileum: 0  (12) Lower ileum: 0     Lesion size:  0-no visible disease  1-tumor up to 0.5 cm  2-tumor up to 5 cm  3-tumor greater than 5 cm or confluent disease    Procedures:  Diagnostic laparoscopy with peritoneal washings and peritoneal biopsies    Estimated Blood Loss (EBL):  10 mL           Indications:  Karin Maguire is a 74 year old woman with metastatic colon cancer to the peritoneum.  She underwent right hemicolectomy with Dr. Albright in January. The pathology showed a [id number] tumor. Dr. Albright did not see any metastatic disease but sent some omentum that had metastatic adenocarcinoma on final pathology. She has now completed 6 months of chemotherapy and presents for diagnostic laparoscopy to assess her candidacy for cytoreductive surgery. Risks and benefits were reviewed including but not limited to: bleeding, infection, damage to other abdominal organs, cardiovascular and pulmonary complications, need for additional procedures, death, and imponderables.  She understands and signed written informed consent to proceed.    Details:  The patient was transported to the operating room and satisfactory anesthesia established. Preoperative antibiotics were administered. The patient was placed in the supine position and extremities were padded and protected throughout. The abdomen was prepped and draped. An appropriate timeout was performed.    The abdomen was entered in the left upper quadrant at Patten's point using a 5 mm trocar and the Optiview technique. The abdomen was insufflated. The site of entry was examined to ensure there was no injury. Two additional 5 mm trocars were placed in the right abdomen. A diagnostic laparoscopy was performed. See PCI calculation above. There were thin white plaques on the right diaphragm consistent with treated tumor. There was a single suspicious nodule on the anterior stomach. There were a couple of very small nodules on the left diaphragm. There was evidence of treated tumor on the the left abdominal wall in the paracolic gutter and to the left of the sigmoid colon. There was disease in the pelvis on the anterior abdominal wall. There was disease on the right abdominal wall. No disease was seen in the omentum. The small bowel was examined, and the small bowel serosa and mesentery both seemed free of disease.     Peritoneal washings were then performed. One  liter of saline was distributed through bilateral upper quadrants and the pelvis. This was suctioned back out and sent to pathology for cytology. Representative biopsies were then taken from the areas with the most dense tumor in the right upper quadrant and the pelvis. The biopsies were taken with scissors, and hemostasis was achieved with cautery. The specimens were removed and sent to pathology.     The CO2 was evacuated. The incisions were closed with Monocryl and dermabond.      All needle, lap, and sponge counts were reported as correct. I communicated the intraoperative findings with the family following the procedure.     Implants: * No implants in log *    Specimens:   Specimen (24h ago, onward)       Start     Ordered    09/20/24 1145  Cytology, Fluid/Wash/Brush  Once        Question Answer Comment   Source: Peritoneal/abdominal/pelvic wash    Clinical Information: Cancer of ascending colon metastatic to intra-abdominal lymph node    Specific Site: Peritoneal washings    Release to patient Immediate        09/20/24 1155    09/20/24 1137  Specimen to Pathology, Surgery General Surgery  Once        Comments: Pre-op Diagnosis: Cancer of ascending colon metastatic to intra-abdominal lymph node [C18.2, C77.2]Procedure(s):LAPAROSCOPY, DIAGNOSTIC Number of specimens: 2Name of specimens: 1. Pelvic peritoneum- permanent2. Right diaphragm peritoneum- permanent     References:    Click here for ordering Quick Tip   Question Answer Comment   Procedure Type: General Surgery    Specimen Class: Known or suspected malignancy    Release to patient Immediate        09/20/24 1155                            Condition: Good    Disposition: PACU - hemodynamically stable.    Attestation: I was present and scrubbed for the entire procedure.    Benjy Contreras MD  Staff Surgeon  Surgical Oncology

## 2024-09-20 NOTE — PROGRESS NOTES
Informed Dr. Ford of pts high bp, ne will come assess pt, pt denies pain, in NADN.No complaints at this time.

## 2024-09-20 NOTE — ANESTHESIA POSTPROCEDURE EVALUATION
Anesthesia Post Evaluation    Patient: Karin Maguire    Procedure(s) Performed: Procedure(s) (LRB):  LAPAROSCOPY, DIAGNOSTIC (N/A)    Final Anesthesia Type: general      Patient location during evaluation: PACU  Patient participation: Yes- Able to Participate  Level of consciousness: awake and alert and oriented  Post-procedure vital signs: reviewed and stable  Pain management: adequate  Airway patency: patent    PONV status at discharge: No PONV  Anesthetic complications: no      Cardiovascular status: blood pressure returned to baseline, hemodynamically stable and stable  Respiratory status: unassisted, room air and spontaneous ventilation  Hydration status: euvolemic  Follow-up not needed.              Vitals Value Taken Time   /74 09/20/24 1329   Temp 36.1 °C (97 °F) 09/20/24 1215   Pulse 72 09/20/24 1329   Resp 16 09/20/24 1329   SpO2 96 % 09/20/24 1329         No case tracking events are documented in the log.      Pain/Mercedes Score: Pain Rating Prior to Med Admin: 0 (9/20/2024  9:32 AM)  Mercedes Score: 10 (9/20/2024  1:11 PM)

## 2024-09-20 NOTE — BRIEF OP NOTE
Dmitriy Steward - Surgery ([facility])  Brief Operative Note    Surgery Date: 9/20/2024     Surgeons and Role:     * Benjy Contreras MD - Primary     * Benjy Patterson MD - Resident - Assisting        Pre-op Diagnosis:  Cancer of ascending colon metastatic to intra-abdominal lymph node [C18.2, C77.2]    Post-op Diagnosis:  Post-Op Diagnosis Codes:     * Cancer of ascending colon metastatic to intra-abdominal lymph node [C18.2, C77.2]    Procedure(s) (LRB):  LAPAROSCOPY, DIAGNOSTIC (N/A)    Anesthesia: General    Operative Findings: Diagnostic lap with peritoneal disease present. Biopsies and peritoneal washings taken.     Estimated Blood Loss: * No values recorded between 9/20/2024 11:17 AM and 9/20/2024 12:10 PM *         Specimens:   Specimen (24h ago, onward)       Start     Ordered    09/20/24 1145  Cytology, Fluid/Wash/Brush  Once        Question Answer Comment   Source: Peritoneal/abdominal/pelvic wash    Clinical Information: Cancer of ascending colon metastatic to intra-abdominal lymph node    Specific Site: Peritoneal washings    Release to patient Immediate        09/20/24 1155    09/20/24 1137  Specimen to Pathology, Surgery General Surgery  Once        Comments: Pre-op Diagnosis: Cancer of ascending colon metastatic to intra-abdominal lymph node [C18.2, C77.2]Procedure(s):LAPAROSCOPY, DIAGNOSTIC Number of specimens: 2Name of specimens: 1. Pelvic peritoneum- permanent2. Right diaphragm peritoneum- permanent     References:    Click here for ordering Quick Tip   Question Answer Comment   Procedure Type: General Surgery    Specimen Class: Known or suspected malignancy    Release to patient Immediate        09/20/24 1155                      Discharge Note    OUTCOME: Patient tolerated treatment/procedure well without complication and is now ready for discharge.    DISPOSITION: Home or Self Care    FINAL DIAGNOSIS:  <principal problem not specified>    FOLLOWUP: In clinic    DISCHARGE INSTRUCTIONS:    Discharge  Procedure Orders   Diet Adult Regular     Lifting restrictions   Order Comments: Do not lift anything heavier than 15 lbs for 6 weeks following surgery, or until you are cleared at your post-operative appointment     No dressing needed   Order Comments: Do not pick at surgical glue. This will fall off on its own over the next 1-3 weeks or we will remove it at your post op follow up appointment.     Notify your health care provider if you experience any of the following:  temperature >100.4     Notify your health care provider if you experience any of the following:  persistent nausea and vomiting or diarrhea     Notify your health care provider if you experience any of the following:  severe uncontrolled pain     Notify your health care provider if you experience any of the following:  redness, tenderness, or signs of infection (pain, swelling, redness, odor or green/yellow discharge around incision site)     Notify your health care provider if you experience any of the following:  difficulty breathing or increased cough     Notify your health care provider if you experience any of the following:  severe persistent headache     Notify your health care provider if you experience any of the following:  worsening rash     Notify your health care provider if you experience any of the following:  persistent dizziness, light-headedness, or visual disturbances     Notify your health care provider if you experience any of the following:  increased confusion or weakness     Activity as tolerated   Order Comments: You may shower starting 24 hours after surgery allowing warm soapy water to run over incisions, but do not scrub or submerge (ie no baths) you incisions.

## 2024-09-20 NOTE — PROGRESS NOTES
Discharge instructions reviewed w/pt and family, verbalized understanding. Pt in NADn.No complaints at this time. Tolerated liquids w/ no issues. Escorted in wheelchair by sister to meet . RX delivered to bedside

## 2024-09-23 LAB
FINAL PATHOLOGIC DIAGNOSIS: NORMAL
Lab: NORMAL

## 2024-09-24 LAB
FINAL PATHOLOGIC DIAGNOSIS: ABNORMAL
GROSS: ABNORMAL
Lab: ABNORMAL
MICROSCOPIC EXAM: ABNORMAL

## 2024-09-25 DIAGNOSIS — Z00.00 ENCOUNTER FOR MEDICARE ANNUAL WELLNESS EXAM: ICD-10-CM

## 2024-09-26 ENCOUNTER — OFFICE VISIT (OUTPATIENT)
Dept: HEMATOLOGY/ONCOLOGY | Facility: CLINIC | Age: 74
End: 2024-09-26
Payer: MEDICARE

## 2024-09-26 ENCOUNTER — OFFICE VISIT (OUTPATIENT)
Dept: SURGERY | Facility: CLINIC | Age: 74
End: 2024-09-26
Payer: MEDICARE

## 2024-09-26 VITALS
OXYGEN SATURATION: 99 % | BODY MASS INDEX: 28.04 KG/M2 | DIASTOLIC BLOOD PRESSURE: 69 MMHG | WEIGHT: 173.75 LBS | HEART RATE: 89 BPM | SYSTOLIC BLOOD PRESSURE: 146 MMHG

## 2024-09-26 DIAGNOSIS — D84.821 IMMUNODEFICIENCY SECONDARY TO CHEMOTHERAPY: ICD-10-CM

## 2024-09-26 DIAGNOSIS — Z79.899 IMMUNODEFICIENCY SECONDARY TO CHEMOTHERAPY: ICD-10-CM

## 2024-09-26 DIAGNOSIS — D49.9 IMMUNODEFICIENCY SECONDARY TO NEOPLASM: ICD-10-CM

## 2024-09-26 DIAGNOSIS — R53.0 NEOPLASTIC MALIGNANT RELATED FATIGUE: ICD-10-CM

## 2024-09-26 DIAGNOSIS — D84.81 IMMUNODEFICIENCY SECONDARY TO NEOPLASM: ICD-10-CM

## 2024-09-26 DIAGNOSIS — R68.89 COLD SENSITIVITY: ICD-10-CM

## 2024-09-26 DIAGNOSIS — C18.2 MALIGNANT NEOPLASM OF ASCENDING COLON: Primary | ICD-10-CM

## 2024-09-26 DIAGNOSIS — C78.6 PERITONEAL CARCINOMATOSIS: Primary | ICD-10-CM

## 2024-09-26 DIAGNOSIS — K59.09 OTHER CONSTIPATION: ICD-10-CM

## 2024-09-26 DIAGNOSIS — T45.1X5A IMMUNODEFICIENCY SECONDARY TO CHEMOTHERAPY: ICD-10-CM

## 2024-09-26 DIAGNOSIS — C78.6 MALIGNANT NEOPLASM METASTATIC TO OMENTUM: ICD-10-CM

## 2024-09-26 PROCEDURE — 3078F DIAST BP <80 MM HG: CPT | Mod: CPTII,S$GLB,, | Performed by: SURGERY

## 2024-09-26 PROCEDURE — 99215 OFFICE O/P EST HI 40 MIN: CPT | Mod: S$GLB,,, | Performed by: INTERNAL MEDICINE

## 2024-09-26 PROCEDURE — 99024 POSTOP FOLLOW-UP VISIT: CPT | Mod: S$GLB,,, | Performed by: SURGERY

## 2024-09-26 PROCEDURE — 3288F FALL RISK ASSESSMENT DOCD: CPT | Mod: CPTII,S$GLB,, | Performed by: SURGERY

## 2024-09-26 PROCEDURE — 1101F PT FALLS ASSESS-DOCD LE1/YR: CPT | Mod: CPTII,S$GLB,, | Performed by: SURGERY

## 2024-09-26 PROCEDURE — G2211 COMPLEX E/M VISIT ADD ON: HCPCS | Mod: S$GLB,,, | Performed by: INTERNAL MEDICINE

## 2024-09-26 PROCEDURE — 1126F AMNT PAIN NOTED NONE PRSNT: CPT | Mod: CPTII,S$GLB,, | Performed by: SURGERY

## 2024-09-26 PROCEDURE — 99999 PR PBB SHADOW E&M-EST. PATIENT-LVL III: CPT | Mod: PBBFAC,,, | Performed by: SURGERY

## 2024-09-26 PROCEDURE — 3077F SYST BP >= 140 MM HG: CPT | Mod: CPTII,S$GLB,, | Performed by: SURGERY

## 2024-09-26 PROCEDURE — 1159F MED LIST DOCD IN RCRD: CPT | Mod: CPTII,S$GLB,, | Performed by: SURGERY

## 2024-09-26 NOTE — PROGRESS NOTES
MEDICAL ONCOLOGY - ESTABLISHED PATIENT VISIT    Reason for visit: colon adenocarcinoma    Best Contact Phone Number(s): [phone] (home)      Cancer/Stage/TNM:    Cancer Staging   Colon adenocarcinoma  Staging form: Colon and Rectum, AJCC 8th Edition  - Pathologic stage from 1/22/2024: Stage IVC (pT4a, pN2b, cM1c) - Signed by Minna Menendez CNS on 2/13/2024       Oncology History   Colon adenocarcinoma   12/21/2023 Tumor Markers    Patient's tumor was tested for the following markers: CEA.                                              Results of the tumor marker test revealed 3.3     12/21/2023 Procedure    Colonoscopy  Cecal polyp removed with jumbo forceps, 3 mm  Multiple ascending colon polyps ranging in size from 5 to 12 mm - semi-pedunculated removed with hot snare  Hepatic flexure mass identified - central ulceration, concerning for malignancy, 3 cm, not obstructing, this was biopsied - tattoo placed distal to mass  Sigmoid diverticular disease     12/21/2023 Tumor Markers    Patient's tumor was tested for the following markers: CEA.                                              Results of the tumor marker test revealed 3.3     12/22/2023 Imaging Significant Findings    CT CAP  Evaluation of the colon is somewhat limited as there is significant colonic stool and oral contrast material has not opacified the large bowel.  There does appear to be some abnormal thickening of the walls of the proximal right colon and a patient with a known history of colonic malignancy.  There is some soft tissue density external to the walls of the colon on the right pericolic gutter which could represent peritoneal nodularity versus subserosal extent of tumor.  Slightly more distal to this area there is a 2nd area of irregularity of the walls of the colon, difficult to fully evaluate if this is related to the colonic walls versus stool with central fat.     Two hypodensities in the liver, the larger measuring 0.8 cm, too  small to accurately characterize on the basis of this examination.  Attention on follow-up.     No pulmonary nodules are seen.     Cholecystectomy.     12/29/2023 Initial Diagnosis    Hepatic flexure colon adenocarcinoma  MMR intact     1/22/2024 Cancer Staged    Staging form: Colon and Rectum, AJCC 8th Edition  - Pathologic stage from 1/22/2024: Stage IVC (pT4a, pN2b, cM1c)     8/27/2024 Tumor Markers    Patient's tumor was tested for the following markers: CEA.                                              Results of the tumor marker test revealed 2.3      Malignant neoplasm of ascending colon   2/12/2024 Initial Diagnosis    Malignant neoplasm of ascending colon     2/26/2024 -  Chemotherapy    Treatment Summary   Plan Name: OP GI [id number] (oxaliplatin leucovorin fluorouracil) with bevacizumab Q2W  Treatment Goal: Palliative  Status: Active  Start Date: 2/26/2024  End Date: 1/16/2025 (Planned)  Provider: Kemar Zaragoza MD  Chemotherapy: oxaliplatin (ELOXATIN) 150 mg in dextrose 5 % (D5W) 595 mL chemo infusion, 157 mg, Intravenous, Clinic/HOD 1 time, 8 of 8 cycles  Administration: 150 mg (2/26/2024), 150 mg (3/11/2024), 150 mg (3/25/2024), 150 mg (4/8/2024), 150 mg (4/22/2024), 150 mg (5/6/2024), 150 mg (5/20/2024), 150 mg (6/3/2024)  fluorouracil (ADRUCIL) 2,400 mg/m2 = 4,440 mg in sodium chloride 0.9% 100 mL chemo infusion, 2,400 mg/m2 = 4,440 mg, Intravenous, Clinic/HOD 1 time, 14 of 24 cycles  Administration: 4,440 mg (2/26/2024), 4,440 mg (3/11/2024), 4,440 mg (3/25/2024), 4,440 mg (4/8/2024), 4,440 mg (4/22/2024), 4,440 mg (5/6/2024), 4,440 mg (5/20/2024), 4,440 mg (6/3/2024), 4,440 mg (6/17/2024), 4,440 mg (7/3/2024), 4,440 mg (7/16/2024), 4,440 mg (7/31/2024), 4,440 mg (8/13/2024), 4,440 mg (8/28/2024)  bevacizumab-awwb (MVASI) 5 mg/kg = 365 mg in sodium chloride 0.9% 100 mL infusion, 5 mg/kg = 365 mg, Intravenous, [facility] 1 time, 13 of 22 cycles  Administration: 365 mg (2/26/2024), 365 mg  (3/11/2024), 365 mg (3/25/2024), 365 mg (4/8/2024), 365 mg (4/22/2024), 365 mg (5/6/2024), 365 mg (5/20/2024), 365 mg (6/3/2024), 365 mg (6/17/2024), 365 mg (7/3/2024), 365 mg (7/16/2024), 365 mg (7/31/2024), 365 mg (8/13/2024)     8/27/2024 Tumor Markers    Patient's tumor was tested for the following markers: CEA.                                              Results of the tumor marker test revealed 2.3           Interim History:   74 y.o. female with LUANNE, bipolar, HLD who presents following cycle 14 FOLFOX + bevacizumab for her metastatic ascending colon cancer, now on maintenance chemotherapy.  She had a diagnostic lap with Dr. Contreras with findings of known peritoneal nodules; biopsies confirmed metastatic adenocarcinoma.  She had a discussion with him today about potentially not proceeding with CRS/HIPEC.  She feels very well.  She states she has had no side effects from chemotherapy.    ECOG 0. Presents with her  and sister.    ROS:   Review of Systems   Constitutional:  Positive for malaise/fatigue. Negative for chills and fever.   HENT:  Negative for congestion and sore throat.    Respiratory:  Negative for shortness of breath.    Cardiovascular:  Negative for chest pain, palpitations and leg swelling.   Gastrointestinal:  Negative for blood in stool, constipation, diarrhea and nausea.   Genitourinary:  Negative for hematuria.   Musculoskeletal:  Negative for back pain, falls and myalgias.   Skin:  Negative for rash.   Neurological:  Positive for dizziness and tingling. Negative for weakness and headaches.       Past Medical History:   Past Medical History:   Diagnosis Date    Anxiety     Arthritis     Bipolar 1 disorder     Bursitis of right hip     GI bleed due to NSAIDs     Multinodular goiter 11/15/2021    Sacroiliitis     right side    Sleep apnea         Past Surgical History:   Past Surgical History:   Procedure Laterality Date    ANKLE FRACTURE SURGERY Left 2001    BLADDER SUSPENSION       CARPAL TUNNEL RELEASE Bilateral     CHOLECYSTECTOMY      Laparoscopic    COLONOSCOPY      COLONOSCOPY N/A 12/21/2023    Procedure: COLONOSCOPY;  Surgeon: Alberto Albright MD;  Location: [facility];  Service: Endoscopy;  Laterality: N/A;    DIAGNOSTIC LAPAROSCOPY N/A 9/20/2024    Procedure: LAPAROSCOPY, DIAGNOSTIC;  Surgeon: Benjy Contreras MD;  Location: [address];  Service: General;  Laterality: N/A;    ESOPHAGOGASTRODUODENOSCOPY N/A 07/29/2021    Procedure: EGD (ESOPHAGOGASTRODUODENOSCOPY);  Surgeon: Susan Mcclain MD;  Location: [facility];  Service: Endoscopy;  Laterality: N/A;    EYE SURGERY      FOOT ARTHRODESIS Right 05/03/2023    Procedure: FUSION, FOOT;  Surgeon: Lauri Alejandra DPM;  Location: [facility];  Service: Podiatry;  Laterality: Right;  mini c-arm, Arthrex dowel bone graft harvester, locking plate and screws festus notified cc    HYSTERECTOMY  1986    vaginal prolapse    INJECTION OF ANESTHETIC AGENT AROUND MEDIAL BRANCH NERVES INNERVATING CERVICAL FACET JOINT Bilateral 05/27/2022    Procedure: CERVICAL MEDIAL BRANCH NERVE BLOCK (C3-4,C4-5);  Surgeon: Mamie Roman MD;  Location: [facility];  Service: Pain Management;  Laterality: Bilateral;    INJECTION OF ANESTHETIC AGENT AROUND MEDIAL BRANCH NERVES INNERVATING LUMBAR FACET JOINT Right 02/05/2021    Procedure: LUMBAR FACET JOINT BLOCK (L3-4,L4-5,L5-S1);  Surgeon: Mamie Roman MD;  Location: [facility];  Service: Pain Management;  Laterality: Right;    INJECTION OF ANESTHETIC AGENT AROUND MEDIAL BRANCH NERVES INNERVATING LUMBAR FACET JOINT Right 04/30/2021    Procedure: LUMBAR FACET JOINT BLOCK (L3-4,L4-5,L5-S1);  Surgeon: Mamie Roman MD;  Location: [facility];  Service: Pain Management;  Laterality: Right;    INJECTION OF ANESTHETIC AGENT INTO SACROILIAC JOINT Right 12/04/2020    Procedure: SACROILIAC JOINT INJECTION;  Surgeon: Mamie Roman MD;  Location: [facility];  Service: Pain Management;  Laterality: Right;    INJECTION OF  JOINT Right 2020    Procedure: GREATER TROCHANTERIC BURSA INJECTION;  Surgeon: Mamie Roman MD;  Location: [facility];  Service: Pain Management;  Laterality: Right;    LAPAROSCOPIC RIGHT COLON RESECTION N/A 2024    Procedure: COLECTOMY, RIGHT, LAPAROSCOPIC (eras low, lithotomy);  Surgeon: Alberto Albright MD;  [facility];  Service: Colon and Rectal;  Laterality: N/A;    NASAL SEPTUM SURGERY      RECTAL PROLAPSE REPAIR      TONSILLECTOMY          Family History:   Family History   Problem Relation Name Age of Onset    No Known Problems Maternal Aunt Rubio Glasgow     Colon cancer Maternal Uncle Joshua     Colon cancer Maternal Uncle Yovani     Colon cancer Maternal Uncle Konstantin     No Known Problems Paternal Aunt Vero     Esophageal cancer Paternal Uncle Nat Garcia     Heart attack Maternal Grandmother Elmira     Leukemia Maternal Grandfather manish Pang     No Known Problems Paternal Grandmother Lizett     Stroke Paternal Grandfather Nat Sr         Social History:   Social History     Tobacco Use    Smoking status: Former     Current packs/day: 0.00     Average packs/day: 1 pack/day for 41.7 years (41.7 ttl pk-yrs)     Types: Cigarettes     Start date: 3/30/1982     Quit date: 2023     Years since quittin.7    Smokeless tobacco: Never   Substance Use Topics    Alcohol use: Yes     Comment: Socially-2 or 3 times a year-6 or 7 drinks        I have reviewed and updated the patient's past medical, surgical, family and social histories.    Allergies:   Review of patient's allergies indicates:   Allergen Reactions    Nsaids (non-steroidal anti-inflammatory drug) Other (See Comments)     Gastrointestinal bleeding requiring blood transfusion    Demerol [meperidine] Rash        Medications:   Current Outpatient Medications   Medication Sig Dispense Refill    acetaminophen (TYLENOL) 650 MG TbSR Take 1 tablet (650 mg total) by mouth every 8 (eight) hours. for 7 days       albuterol (PROVENTIL/VENTOLIN HFA) 90 mcg/actuation inhaler inhale 2 puffs into the lungs every 6 hours as needed for wheezing. 18 g 1    aspirin (ECOTRIN) 81 MG EC tablet Take 81 mg by mouth nightly.      dexAMETHasone (DECADRON) 4 MG Tab Take 2 tablets (8 mg total) by mouth once daily only on days 2, 3 and 4 of each chemotherapy cycle. 40 tablet 0    EScitalopram oxalate (LEXAPRO) 20 MG tablet Take 1 tablet (20 mg total) by mouth once daily. (Patient taking differently: Take 20 mg by mouth every evening.) 30 tablet 4    lamoTRIgine (LAMICTAL) 150 MG Tab Take 2 tablets (300 mg total) by mouth every evening. 180 tablet 1    LIDOcaine-prilocaine (EMLA) cream Apply topically as needed (place to port site 45-60 minutes prior to chemotherapy). 30 g 5    ondansetron (ZOFRAN-ODT) 8 MG TbDL dissolve 1 tablet (8 mg total) under the tongue every 6 (six) hours as needed (nausea). 30 tablet 5    oxyCODONE (ROXICODONE) 5 MG immediate release tablet Take 1 tablet (5 mg total) by mouth every 6 (six) hours as needed for Pain. 11 tablet 0    pantoprazole (PROTONIX) 40 MG tablet Take 1 tablet (40 mg total) by mouth once daily. (Patient taking differently: Take 40 mg by mouth nightly.) 90 tablet 3    prochlorperazine (COMPAZINE) 10 MG tablet Take 1 tablet (10 mg total) by mouth every 6 (six) hours as needed (nausea). 30 tablet 2    QUEtiapine (SEROQUEL) 200 MG Tab Take 1 tablet (200 mg total) by mouth every evening. 30 tablet 3    rosuvastatin (CRESTOR) 10 MG tablet Take 1 tablet (10 mg total) by mouth once daily. (Patient taking differently: Take 10 mg by mouth every evening.) 90 tablet 3    traMADoL (ULTRAM) 50 mg tablet Take 1 tablet (50 mg total) by mouth every 6 (six) hours as needed for Pain. 5 tablet 0     No current facility-administered medications for this visit.        Physical Exam:   There were no vitals taken for this visit.          Physical Exam  Vitals reviewed.   Constitutional:       General: She is not in acute  distress.     Appearance: Normal appearance. She is normal weight. She is not ill-appearing, toxic-appearing or diaphoretic.   HENT:      Head: Normocephalic and atraumatic.      Right Ear: External ear normal.      Left Ear: External ear normal.      Nose: Nose normal.      Mouth/Throat:      Pharynx: Oropharynx is clear.   Eyes:      General: No scleral icterus.     Conjunctiva/sclera: Conjunctivae normal.   Cardiovascular:      Rate and Rhythm: Normal rate.   Pulmonary:      Effort: Pulmonary effort is normal. No respiratory distress.   Chest:      Comments: RCW port  Abdominal:      General: There is no distension.   Skin:     General: Skin is warm and dry.      Coloration: Skin is not jaundiced or pale.      Findings: No bruising, erythema or rash.   Neurological:      Mental Status: She is alert and oriented to person, place, and time. Mental status is at baseline.      Motor: No weakness.      Gait: Gait normal.   Psychiatric:         Mood and Affect: Mood normal.         Labs:     I have reviewed the pertinent labs. Hgb 11.8.  CEA 3.0.     Imaging:    CT CAP - 8/9/24:    Impression:     Postoperative changes of right colon resection for patient's known colonic malignancy.  Persistent small prominent 1 cm lymph node adjacent to the aorta at the level of the renal vessels, unchanged over multiple prior examinations.  No free air or obstruction.     Tiny 3 mm hypodensity in the right hepatic lobe, likely a small cyst or hemangioma.     Mild thickening of the 2nd portion of the duodenum which could suggest an inflammatory/infectious process.  Clinical correlation suggested.     Constipation.       Path:   1/22/24 - R Hemicolectomy   Final Pathologic Diagnosis     THE DIAGNOSES REMAIN THE SAME.  THIS CORRECTED REPORT IS ISSUED TO CHANGE M STATUS to reflect tumor in the omentum.  This change is discussed with Dr. RANJANA Albright via phone, 2/1/2024.    1. Colon, right and terminal ileum (right hemicolectomy with EN  bloc abdominal wall resection):  Invasive adenocarcinoma.  See cancer synoptic report     2. Omentum (resection):    Positive for carcinoma    CORRECT SYNOPTIC AND M STATUS  Cancer synoptic report  - Procedure: Right hemicolectomy with EN bloc abdominal wall resection  - Tumor site:  Ascending  - Histologic type:  Adenocarcinoma  - Histologic grade:  G2, moderately differentiated.  See comment.  COMMENT:  While the majority of the tumor consists of well formed glands, a significant proportion includes abortive glands, single file infiltration, and malignant cells with signet ring cell morphology. The tumor has an insidious pattern of  infiltration with tumor present far from the advancing front of the tumor.  - Tumor size:  2.0 x 2.0 x 1.0 cm  - Tumor extent:  Invades visceral peritoneum, multifocal  - Macroscopic perforation: Not identified  - Lymphovascular invasion:  Present, extensive.  Small vessel, large vessel, intra and extramural.  - Perineural invasion:  Not identified  - Tumor budding score:  High (>10), multifocal single cell infiltration  - Treatment effect: No known pre-surgical therapy    Margins  Margin involved by invasive carcinoma, radial (slide 1C)  All margins negative for dysplasia.    pTNM stage classification (AJCC 8th edition)  pT Category  pT4a:  Tumor invades through the visceral peritoneum    pN Category  pN2b:  7 or more regional lymph nodes are positive  Number of positive lymph nodes:  19  Number of lymph nodes examined: 26  Number of tumor deposits:  4    pM Category  pM1c:  Metastasis to the peritoneal surface of the omentum.    Additional findings:  - Sessile serrated polyp, no cytologic dysplasia, 1.1 cm at ileocecal valve  - Tubulovillous adenoma, 1.0 cm, proximal ascending  - Tubular adenoma, 0.4 cm, mid ascending  - Tubular adenoma, 0.4, distal ascending  - Tubular adenoma, 0.6 cm, distal ascending  - Submucosal lipoma, 2.0 cm  - Appendix with no specific histopathologic  changes    Ancillary studies  Immunohistochemistry for mismatch repair proteins performed on prior biopsy material ([facility]-, 12/21/23): pMMR.         Assessment:       1. Malignant neoplasm of ascending colon    2. 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 Immunodeficiency secondary to neoplasm    4. Immunodeficiency secondary to chemotherapy    5. Cold sensitivity    6. Neoplastic malignant related fatigue    7. Other constipation            Plan:        # Colon cancer   Mrs. Maguire is a 74 y.o. female who presents for management of her metastatic colon cancer. She underwent a R hemicolectomy with en-bloc abdominal wall resection on 1/22/24 with Dr. Albright. Pathology revealed pT4a N2b disease with 19/26 positive LNs and omentum positive for carcinoma.    We had an in-depth conversation during our initial consult re: her diagnosis and treatment options. Reviewed recommendation for IV systemic therapy for at least 6 months. Plan for FOLFOX + bevacizumab to be given every 2 weeks. Briefly reviewed side effects of treatment. Handouts provided. Port placed 2/21/24.     PGX - DPYD normal metabolizer, [id number] intermediate metabolizer   Dexamethasone, zofran, compazine and lidocaine sent to pharmacy previously. Reviewed admin instructions.     Pending response, she may be a candidate for CRS/HIPEC with surgical oncology team. Continue to evaluate and re-start discussion if still without radiographic evidence of disease after CT on cycle 8.    CT CAP after cycle 4 shows no clear evidence of disease.  CT CAP after cycle 8 shows no clear evidence of disease.  CT CAP after cycle 12 shows no clear evidence of disease.    Tolerance of chemotherapy has been excellent.    Underwent diagnostic laparoscopy on 9/20/24 with PCI of 14.  Discussed with Dr. Contreras and with patient that options include continuing systemic therapy at this time vs CRS/HIPEC.  We are in agreement that with her volume of disease and histology,  likelihood of CRS/HIPEC being beneficial for survival is not high.  After discussion, we will proceed with continued maintenance chemotherapy.  RTC in 2 weeks to restart.    Cold sensitivity/neoplasm related fatigue:  2/2 chemotherapy. Stopped oxaliplatin beginning with cycle 9 to prevent persistent paresthesias.  Mild persistent paresthesias at this time.  Will monitor. She is taking Cymbalta but not finding very helpful. Worse towards end of day.     Constipation/Gas:   Continue 2 stool softeners a day with Miralax daily to have regular bowel movements.   This has helped.    Recommended she try taking a half cap of Miralax to avoid more explosive BM.    Patient is in agreement with the proposed treatment plan. All questions were answered to the patient's satisfaction. Pt knows to call clinic if anything is needed before the next clinic visit.    Kemar Zaragoza MD  Hematology/Oncology  [facility]        Med Onc Chart Routing  Urgent    Follow up with physician 4 weeks. for 5-FU + Avastin   Follow up with KRISTEN 2 weeks. for 5-FU + Avastin   Infusion scheduling note    Injection scheduling note    Labs CBC, CMP, CEA and urinalysis   Scheduling:  Preferred lab:  Lab interval: every 2 weeks     Imaging    Pharmacy appointment    Other referrals

## 2024-09-26 NOTE — PROGRESS NOTES
Surgical Oncology History and Physical    Encounter Date:  2024    Patient ID: Karin Maguire  Age:  74 y.o. :  1950    Chief Complaint:  Metastatic Colon Cancer      History:    Ms. Maguire is a 74 y.o. female with a history of ascending colon cancer metastatic to the peritoneum. She underwent laparoscopic right hemicolectomy with Dr. Albright on 24. Pathology showed a moderately differentiated ascending colon cancer with 19/26 positive lymph nodes. There was metastatic adenocarcinoma in the omentum, although Dr. Albright did not see any disease in the omentum at the time of surgery. She has now received 13 cycles of FOLFOX and Bevacizumab and presents for consideration of cytoreductive surgery with or without HIPEC. She has tolerated chemotherapy very well.     Interval history (24):   She is now s/p diagnostic lap on  with pathology demonstrating positive peritoneal disease and negative cytology from washouts. Patient states she has been feeling very good since her procedure. She denies any post operative problems. States she has a great appetite and has had no difficulty maintaining her weight. Denies any n/v. States she is back to her normal schedule of bowel movements every 3-4 days. She takes dulcolax at home for management. States she feels very healthy and is open to the conversation about treatment options for her condition.     Past Medical History:   Diagnosis Date    Anxiety     Arthritis     Bipolar 1 disorder     Bursitis of right hip     GI bleed due to NSAIDs     Multinodular goiter 11/15/2021    Sacroiliitis     right side    Sleep apnea      Past Surgical History:   Procedure Laterality Date    ANKLE FRACTURE SURGERY Left     BLADDER SUSPENSION      CARPAL TUNNEL RELEASE Bilateral     CHOLECYSTECTOMY      Laparoscopic    COLONOSCOPY      COLONOSCOPY N/A 2023    Procedure: COLONOSCOPY;  Surgeon: Alberto Albright MD;  Location: [facility];  Service:  Endoscopy;  Laterality: N/A;    DIAGNOSTIC LAPAROSCOPY N/A 9/20/2024    Procedure: LAPAROSCOPY, DIAGNOSTIC;  Surgeon: Benjy Contreras MD;  Location: [address];  Service: General;  Laterality: N/A;    ESOPHAGOGASTRODUODENOSCOPY N/A 07/29/2021    Procedure: EGD (ESOPHAGOGASTRODUODENOSCOPY);  Surgeon: Susan Mcclain MD;  Location: [facility];  Service: Endoscopy;  Laterality: N/A;    EYE SURGERY      FOOT ARTHRODESIS Right 05/03/2023    Procedure: FUSION, FOOT;  Surgeon: Lauri Alejandra DPM;  Location: [facility];  Service: Podiatry;  Laterality: Right;  mini c-arm, Arthrex dowel bone graft harvester, locking plate and screws festus notified cc    HYSTERECTOMY  1986    vaginal prolapse    INJECTION OF ANESTHETIC AGENT AROUND MEDIAL BRANCH NERVES INNERVATING CERVICAL FACET JOINT Bilateral 05/27/2022    Procedure: CERVICAL MEDIAL BRANCH NERVE BLOCK (C3-4,C4-5);  Surgeon: Mamie Roman MD;  Location: [facility];  Service: Pain Management;  Laterality: Bilateral;    INJECTION OF ANESTHETIC AGENT AROUND MEDIAL BRANCH NERVES INNERVATING LUMBAR FACET JOINT Right 02/05/2021    Procedure: LUMBAR FACET JOINT BLOCK (L3-4,L4-5,L5-S1);  Surgeon: Mamie Roman MD;  Location: [facility];  Service: Pain Management;  Laterality: Right;    INJECTION OF ANESTHETIC AGENT AROUND MEDIAL BRANCH NERVES INNERVATING LUMBAR FACET JOINT Right 04/30/2021    Procedure: LUMBAR FACET JOINT BLOCK (L3-4,L4-5,L5-S1);  Surgeon: Mamie Roman MD;  Location: [facility];  Service: Pain Management;  Laterality: Right;    INJECTION OF ANESTHETIC AGENT INTO SACROILIAC JOINT Right 12/04/2020    Procedure: SACROILIAC JOINT INJECTION;  Surgeon: Mamie Roman MD;  Location: [facility];  Service: Pain Management;  Laterality: Right;    INJECTION OF JOINT Right 12/04/2020    Procedure: GREATER TROCHANTERIC BURSA INJECTION;  Surgeon: Mamie Roman MD;  Location: [facility];  Service: Pain Management;  Laterality: Right;    LAPAROSCOPIC RIGHT COLON RESECTION N/A  1/22/2024    Procedure: COLECTOMY, RIGHT, LAPAROSCOPIC (eras low, lithotomy);  Surgeon: Alberto Albright MD;  Location: [facility] OR [address] [place] [address];  Service: Colon and Rectal;  Laterality: N/A;    NASAL SEPTUM SURGERY      RECTAL PROLAPSE REPAIR      TONSILLECTOMY       Current Outpatient Medications on File Prior to Visit   Medication Sig Dispense Refill    acetaminophen (TYLENOL) 650 MG TbSR Take 1 tablet (650 mg total) by mouth every 8 (eight) hours. for 7 days      albuterol (PROVENTIL/VENTOLIN HFA) 90 mcg/actuation inhaler inhale 2 puffs into the lungs every 6 hours as needed for wheezing. 18 g 1    aspirin (ECOTRIN) 81 MG EC tablet Take 81 mg by mouth nightly.      dexAMETHasone (DECADRON) 4 MG Tab Take 2 tablets (8 mg total) by mouth once daily only on days 2, 3 and 4 of each chemotherapy cycle. 40 tablet 0    EScitalopram oxalate (LEXAPRO) 20 MG tablet Take 1 tablet (20 mg total) by mouth once daily. (Patient taking differently: Take 20 mg by mouth every evening.) 30 tablet 4    lamoTRIgine (LAMICTAL) 150 MG Tab Take 2 tablets (300 mg total) by mouth every evening. 180 tablet 1    LIDOcaine-prilocaine (EMLA) cream Apply topically as needed (place to port site 45-60 minutes prior to chemotherapy). 30 g 5    ondansetron (ZOFRAN-ODT) 8 MG TbDL dissolve 1 tablet (8 mg total) under the tongue every 6 (six) hours as needed (nausea). 30 tablet 5    oxyCODONE (ROXICODONE) 5 MG immediate release tablet Take 1 tablet (5 mg total) by mouth every 6 (six) hours as needed for Pain. 11 tablet 0    pantoprazole (PROTONIX) 40 MG tablet Take 1 tablet (40 mg total) by mouth once daily. (Patient taking differently: Take 40 mg by mouth nightly.) 90 tablet 3    prochlorperazine (COMPAZINE) 10 MG tablet Take 1 tablet (10 mg total) by mouth every 6 (six) hours as needed (nausea). 30 tablet 2    QUEtiapine (SEROQUEL) 200 MG Tab Take 1 tablet (200 mg total) by mouth every evening. 30 tablet 3    rosuvastatin (CRESTOR) 10 MG tablet Take 1  tablet (10 mg total) by mouth once daily. (Patient taking differently: Take 10 mg by mouth every evening.) 90 tablet 3    traMADoL (ULTRAM) 50 mg tablet Take 1 tablet (50 mg total) by mouth every 6 (six) hours as needed for Pain. 5 tablet 0     No current facility-administered medications on file prior to visit.     Review of patient's allergies indicates:   Allergen Reactions    Nsaids (non-steroidal anti-inflammatory drug) Other (See Comments)     Gastrointestinal bleeding requiring blood transfusion    Demerol [meperidine] Rash       Family History:  Her family history includes Colon cancer in her maternal uncle, maternal uncle, and maternal uncle; Esophageal cancer in her paternal uncle; Heart attack in her maternal grandmother; Leukemia in her maternal grandfather; No Known Problems in her maternal aunt, paternal aunt, and paternal grandmother; Stroke in her paternal grandfather.     Social History:  She reports that she quit smoking about 9 months ago. Her smoking use included cigarettes. She started smoking about 42 years ago. She has a 41.7 pack-year smoking history. She has never used smokeless tobacco. She reports current alcohol use. She reports that she does not use drugs.     ROS:    Pertinent positive/negatives detailed in HPI, all other systems negative.     Physical Exam:  BP (!) 146/69 (BP Location: Left arm, Patient Position: Sitting, BP Method: Medium (Automatic))   Pulse 89   Wt 78.8 kg (173 lb 11.6 oz)   SpO2 99%   BMI 28.04 kg/m²     Physical Exam    Constitutional:  Non-toxic, no acute distress.  Performance status: ECOG 0  Eyes:  Sclerae anicteric, gaze symmetrical  Neck:  Trachea midline, thyroid, non enlarged without palpable nodules,  FROM  Resp:  Easy work of breathing, no wheezes  CV:  Regular pulse, no JVD  Abd:  Soft, non-tender, no masses, no hepatosplenomegaly, no ascites, no superficial varices, incision sites c/d/i  Lymphatics:  No cervical, supraclavicular, axillary, or  inguinal lymphadenopathy  Musculoskeletal:  Ambulatory, normal gait, no muscle wasting  Neuro:  No gross deficits  Psych:  Awake, alert, oriented.  Answers and asks questions appropriately    Data:         Labs:  CEA 3.0    Pathology:    Component 6 mo ago   Final Pathologic Diagnosis     THE DIAGNOSES REMAIN THE SAME.  THIS CORRECTED REPORT IS ISSUED TO CHANGE M STATUS to reflect tumor in the omentum.  This change is discussed with Dr. RANJANA Albright via phone, 2/1/2024.    1. Colon, right and terminal ileum (right hemicolectomy with EN bloc abdominal wall resection):  Invasive adenocarcinoma.  See cancer synoptic report     2. Omentum (resection):    Positive for carcinoma    CORRECT SYNOPTIC AND M STATUS  Cancer synoptic report  - Procedure: Right hemicolectomy with EN bloc abdominal wall resection  - Tumor site:  Ascending  - Histologic type:  Adenocarcinoma  - Histologic grade:  G2, moderately differentiated.  See comment.  COMMENT:  While the majority of the tumor consists of well formed glands, a significant proportion includes abortive glands, single file infiltration, and malignant cells with signet ring cell morphology. The tumor has an insidious pattern of  infiltration with tumor present far from the advancing front of the tumor.  - Tumor size:  2.0 x 2.0 x 1.0 cm  - Tumor extent:  Invades visceral peritoneum, multifocal  - Macroscopic perforation: Not identified  - Lymphovascular invasion:  Present, extensive.  Small vessel, large vessel, intra and extramural.  - Perineural invasion:  Not identified  - Tumor budding score:  High (>10), multifocal single cell infiltration  - Treatment effect: No known pre-surgical therapy    Margins  Margin involved by invasive carcinoma, radial (slide 1C)  All margins negative for dysplasia.    pTNM stage classification (AJCC 8th edition)  pT Category  pT4a:  Tumor invades through the visceral peritoneum    pN Category  pN2b:  7 or more regional lymph nodes are  positive  Number of positive lymph nodes:  19  Number of lymph nodes examined: 26  Number of tumor deposits:  4    pM Category  pM1c:  Metastasis to the peritoneal surface of the omentum.    Additional findings:  - Sessile serrated polyp, no cytologic dysplasia, 1.1 cm at ileocecal valve  - Tubulovillous adenoma, 1.0 cm, proximal ascending  - Tubular adenoma, 0.4 cm, mid ascending  - Tubular adenoma, 0.4, distal ascending  - Tubular adenoma, 0.6 cm, distal ascending  - Submucosal lipoma, 2.0 cm  - Appendix with no specific histopathologic changes    Ancillary studies  Immunohistochemistry for mismatch repair proteins performed on prior biopsy material (SAS-, 12/21/23): pMMR.      INCORRECT, FOR DOCUMENTATION PURPOSES ONLY.  Cancer synoptic report  - Procedure: Right hemicolectomy with EN bloc abdominal wall resection  - Tumor site:  Ascending  - Histologic type:  Adenocarcinoma  - Histologic grade:  G2, moderately differentiated.  See comment.  COMMENT:  While the majority of the tumor consists of well formed glands, a significant proportion includes abortive glands, single file infiltration, and malignant cells with signet ring cell morphology. The tumor has an insidious pattern of  infiltration with tumor present far from the advancing front of the tumor.  - Tumor size:  2.0 x 2.0 x 1.0 cm  - Tumor extent:  Invades visceral peritoneum, multifocal  - Macroscopic perforation: Not identified  - Lymphovascular invasion:  Present, extensive.  Small vessel, large vessel, intra and extramural.  - Perineural invasion:  Not identified  - Tumor budding score:  High (>10), multifocal single cell infiltration  - Treatment effect: No known pre-surgical therapy    Margins  Margin involved by invasive carcinoma, radial (slide 1C)  All margins negative for dysplasia.    pTNM stage classification (AJCC 8th edition)  pT Category  pT4a:  Tumor invades through the visceral peritoneum    pN Category  pN2b:  7 or more regional  lymph nodes are positive  Number of positive lymph nodes:  19  Number of lymph nodes examined: 26  Number of tumor deposits:  4    pM Category  None submitted.    Additional findings:  - Sessile serrated polyp, no cytologic dysplasia, 1.1 cm at ileocecal valve  - Tubulovillous adenoma, 1.0 cm, proximal ascending  - Tubular adenoma, 0.4 cm, mid ascending  - Tubular adenoma, 0.4, distal ascending  - Tubular adenoma, 0.6 cm, distal ascending  - Submucosal lipoma, 2.0 cm  - Appendix with no specific histopathologic changes    Ancillary studies  Immunohistochemistry for mismatch repair proteins performed on prior biopsy material (SAS-, 12/21/23): pMMR. VC      Comment: Interp By Sil Conroy M.D., Signed on 02/01/2024 at 12:56     Pathology Results  (Last 30 days)                 09/20/24 1156  Specimen to Pathology, Surgery General Surgery (Abnormal) Final result    Narrative:  Pre-op Diagnosis: Cancer of ascending colon metastatic to   intra-abdominal lymph node [C18.2, C77.2]   Procedure(s):   LAPAROSCOPY, DIAGNOSTIC   Number of specimens: 2   Name of specimens: 1. Pelvic peritoneum- permanent   2. Right diaphragm peritoneum- permanent   Release to patient->Immediate   Specimen total (fresh, frozen, permanent):->2                   Assessment: This is a 74 year old woman with colon cancer metastatic to the peritoneum. She is now s/p diagnostic lap to stage the peritoneum. We discussed extensively with her treatment options which include cytoreduction with HIPEC versus continue chemotherapy. Our conversation included the risks and benefits of both options including that cytoreduction with HIPEC is a very large 12 hour surgery which requires 10-14 days in hospital and many months of recovery and there is no guarantee that this option will cure her disease. We also discussed given her positive reaction to the chemotherapy along with tolerable side effects this would be a much less invasive option and could likely  provide similar benefit. She has an appointment with Dr. Zaragoza to discuss his recommendations as well. Patient expressed understanding and all questions were answered.        Plan:  - Has an appointment to follow with Dr. Zaragoza to discuss treatment options and his recommendations   - She will call the office once her decision has been made for HIPEC versus chemotherapy       Brady Oates DO  PGY 1  [facility] [place], [place]

## 2024-09-30 ENCOUNTER — OFFICE VISIT (OUTPATIENT)
Dept: PSYCHIATRY | Facility: CLINIC | Age: 74
End: 2024-09-30
Payer: MEDICARE

## 2024-09-30 ENCOUNTER — TELEPHONE (OUTPATIENT)
Dept: HEMATOLOGY/ONCOLOGY | Facility: CLINIC | Age: 74
End: 2024-09-30
Payer: MEDICARE

## 2024-09-30 VITALS
BODY MASS INDEX: 27.72 KG/M2 | SYSTOLIC BLOOD PRESSURE: 144 MMHG | HEIGHT: 66 IN | HEART RATE: 84 BPM | OXYGEN SATURATION: 99 % | WEIGHT: 172.5 LBS | RESPIRATION RATE: 17 BRPM | DIASTOLIC BLOOD PRESSURE: 78 MMHG

## 2024-09-30 DIAGNOSIS — F31.9 BIPOLAR 1 DISORDER: ICD-10-CM

## 2024-09-30 DIAGNOSIS — F41.1 GAD (GENERALIZED ANXIETY DISORDER): ICD-10-CM

## 2024-09-30 DIAGNOSIS — Z86.59 HISTORY OF GAMBLING: ICD-10-CM

## 2024-09-30 DIAGNOSIS — F31.9 BIPOLAR DEPRESSION: Primary | ICD-10-CM

## 2024-09-30 DIAGNOSIS — E78.5 HYPERLIPIDEMIA, UNSPECIFIED HYPERLIPIDEMIA TYPE: ICD-10-CM

## 2024-09-30 PROCEDURE — 99999 PR PBB SHADOW E&M-EST. PATIENT-LVL III: CPT | Mod: PBBFAC,,, | Performed by: STUDENT IN AN ORGANIZED HEALTH CARE EDUCATION/TRAINING PROGRAM

## 2024-09-30 PROCEDURE — 1159F MED LIST DOCD IN RCRD: CPT | Mod: CPTII,S$GLB,, | Performed by: STUDENT IN AN ORGANIZED HEALTH CARE EDUCATION/TRAINING PROGRAM

## 2024-09-30 PROCEDURE — 3078F DIAST BP <80 MM HG: CPT | Mod: CPTII,S$GLB,, | Performed by: STUDENT IN AN ORGANIZED HEALTH CARE EDUCATION/TRAINING PROGRAM

## 2024-09-30 PROCEDURE — 3077F SYST BP >= 140 MM HG: CPT | Mod: CPTII,S$GLB,, | Performed by: STUDENT IN AN ORGANIZED HEALTH CARE EDUCATION/TRAINING PROGRAM

## 2024-09-30 PROCEDURE — 3008F BODY MASS INDEX DOCD: CPT | Mod: CPTII,S$GLB,, | Performed by: STUDENT IN AN ORGANIZED HEALTH CARE EDUCATION/TRAINING PROGRAM

## 2024-09-30 PROCEDURE — 1160F RVW MEDS BY RX/DR IN RCRD: CPT | Mod: CPTII,S$GLB,, | Performed by: STUDENT IN AN ORGANIZED HEALTH CARE EDUCATION/TRAINING PROGRAM

## 2024-09-30 PROCEDURE — 99214 OFFICE O/P EST MOD 30 MIN: CPT | Mod: S$GLB,,, | Performed by: STUDENT IN AN ORGANIZED HEALTH CARE EDUCATION/TRAINING PROGRAM

## 2024-09-30 RX ORDER — ESCITALOPRAM OXALATE 20 MG/1
20 TABLET ORAL DAILY
Qty: 30 TABLET | Refills: 3 | Status: SHIPPED | OUTPATIENT
Start: 2024-09-30 | End: 2025-09-30

## 2024-09-30 RX ORDER — ROSUVASTATIN CALCIUM 10 MG/1
10 TABLET, COATED ORAL DAILY
Qty: 90 TABLET | Refills: 3 | Status: SHIPPED | OUTPATIENT
Start: 2024-09-30

## 2024-09-30 RX ORDER — QUETIAPINE FUMARATE 200 MG/1
200 TABLET, FILM COATED ORAL NIGHTLY
Qty: 30 TABLET | Refills: 3 | Status: SHIPPED | OUTPATIENT
Start: 2024-09-30

## 2024-09-30 RX ORDER — LAMOTRIGINE 150 MG/1
300 TABLET ORAL NIGHTLY
Qty: 180 TABLET | Refills: 1 | Status: SHIPPED | OUTPATIENT
Start: 2024-09-30

## 2024-09-30 NOTE — PROGRESS NOTES
"  09/30/2024  1:21 PM  Karin Maguire  [id number]    Outpatient Psychiatry Follow-Up Visit (MD/NP)    9/30/2024    Clinical Status of Patient:  Outpatient (Ambulatory)    Chief Complaint:  Karin Maguire is a 74 y.o. female who presents today for follow-up of depression and anxiety.  Met with patient.        Interval History and Content of Current Session:  Interim Events/Subjective Report/Content of Current Session:   MDD with mixed features vs. Unspecified bipolar disorder (pt poor historian)  LUANNE  Insomnia  Gambling disorder  Obesity  Psychosocial stressors     Abnormal movements            Reports "I am doing good, I'm staying positive, I'm not getting depressed, we have a plan with the oncologist." She is having to have more chemo, "it keeps me really busy."    LUANNE symptoms are minimal, "I don't want anything negative around me."     She is gambling more, "I have a thousand dollar allowance per week, my survival rate is not great, so I've decided I'm going to do what I want to do."        Stressors: health (cancer, stage IV)      Psychiatric Review Of Systems - Is patient experiencing or having changes in:  sleep: no  appetite: no  weight: no  energy/anergy: variable, "because of chemo"  Interest/pleasure/anhedonia: no  Anxiety: improving  panic: no  Guilty/hopelessness/worthlessness: no  concentration: no  S.I.B.s/risky behavior: no  Irritability: no  Substance abuse: no  Racing thoughts: no  Impulsive behaviors: no  Paranoia: no  AVH: no  Gambling: lyes  Michelle/hypomania: no          Review of Systems   Review of Systems   Constitutional:  Negative for chills and fever.   HENT:  Negative for hearing loss.    Eyes:  Negative for blurred vision and double vision.   Respiratory:  Negative for shortness of breath.    Cardiovascular:  Negative for chest pain.   Gastrointestinal:  Negative for constipation, diarrhea, nausea and vomiting.   Genitourinary:  Negative for dysuria.   Musculoskeletal:  Negative for " back pain and joint pain.   Skin:  Negative for rash.   Neurological:  Negative for dizziness and headaches.   Endo/Heme/Allergies:  Negative for environmental allergies.       Past Medical, Family and Social History: The patient's past medical, family and social history have been reviewed and updated as appropriate within the electronic medical record - see encounter notes.    Social History     Socioeconomic History    Marital status:    Tobacco Use    Smoking status: Former     Current packs/day: 0.00     Average packs/day: 1 pack/day for 41.7 years (41.7 ttl pk-yrs)     Types: Cigarettes     Start date: 3/30/1982     Quit date: 2023     Years since quittin.7    Smokeless tobacco: Never   Substance and Sexual Activity    Alcohol use: Yes     Comment: Socially-2 or 3 times a year-6 or 7 drinks    Drug use: No    Sexual activity: Yes     Partners: Male     Birth control/protection: Surgical     Comment:      Social Drivers of Health     Financial Resource Strain: Low Risk  (2024)    Overall Financial Resource Strain (CARDIA)     Difficulty of Paying Living Expenses: Not hard at all   Food Insecurity: No Food Insecurity (2024)    Hunger Vital Sign     Worried About Running Out of Food in the Last Year: Never true     Ran Out of Food in the Last Year: Never true   Transportation Needs: No Transportation Needs (2024)    PRAPARE - Transportation     Lack of Transportation (Medical): No     Lack of Transportation (Non-Medical): No   Physical Activity: Inactive (2024)    Exercise Vital Sign     Days of Exercise per Week: 0 days     Minutes of Exercise per Session: 0 min   Stress: No Stress Concern Present (2024)    [employer] - Occupational Stress Questionnaire     Feeling of Stress : Not at all   Housing Stability: Low Risk  (2024)    Housing Stability Vital Sign     Unable to Pay for Housing in the Last Year: No     Number of Places  Lived in the Last Year: 1     Unstable Housing in the Last Year: No         Compliance: yes    Side effects: None    Risk Parameters:  Patient reports no suicidal ideation  Patient reports no homicidal ideation  Patient reports no self-injurious behavior  Patient reports no violent behavior        Exam (detailed: at least 9 elements; comprehensive: all 15 elements)     Vitals:    09/30/24 0923   BP: (!) 144/78   Pulse: 84   Resp: 17       Wt Readings from Last 5 Encounters:   09/30/24 78.2 kg (172 lb 8 oz)   09/26/24 78.8 kg (173 lb 11.6 oz)   09/20/24 78.1 kg (172 lb 2.9 oz)   09/12/24 80.3 kg (177 lb 0.5 oz)   09/10/24 78.9 kg (174 lb)         CONSTITUTIONAL  General Appearance: unremarkable, age appropriate    MUSCULOSKELETAL  Muscle Strength and Tone:no tremor, no tic,   Abnormal Involuntary Movements: yes, repetitive movements noted (head, neck, legs, trunk)  Gait and Station: non-ataxic    PSYCHIATRIC   Level of Consciousness: awake and alert   Orientation: person, place and situation  Grooming: Casually dressed and Well groomed  Psychomotor Behavior: normal, cooperative  Speech: normal tone, normal rate, normal pitch, normal volume  Language: grossly intact  Mood: good  Affect: Consistent with mood  Thought Process: linear, logical  Associations: intact   Thought Content: DENIES suicidal ideation, DENIES homicidal ideation, and no delusion  Perceptions: denies hallucinations  Memory: Able to recall past events, Remote intact and Recent intact  Attention:Attends to interview without distraction  Fund of Knowledge: Aware of current events and Vocabulary appropriate   Estimate if Intelligence:  Average based on work/education history, vocabulary and mental status exam  Insight: has awareness of illness  Judgment: behavior is adequate to circumstances          Assessment and Diagnosis   Status/Progress: Based on the examination today, the patient's problem(s) is/are improved.  New problems have not been presented  "today.   Co-morbidities are complicating management of the primary condition.          Impresssion/Assessment:  MDD with mixed features vs. Unspecified bipolar disorder (pt poor historian)  LUANNE  Insomnia  Gambling disorder  Obesity  Psychosocial stressors     Abnormal movements    Chronic pain         Plan:    Pt much improved. Appears very stable. She declines changes to medications.       MDD with mixed features vs. Unspecified bipolar disorder (pt poor historian)  - continue lamictal 300 mg PO qd  - continue lexapro 20 mg PO qd  - continue seroquel 200 mg PO qhs  - strongly recommended psychotherapy, provided with resources       Insomnia  - reports has not been using CPAP  - pt counseled        Gambling disorder  - pt counseled  - consider meetings/therapy; patient declines        LUANNE  - continue hydroxyzine  mg PO q 6 hours PRN  - lexapro as above  - consider psychotherapy, patient declines        Psychosocial stressors  - pt counseled  - strongly recommended psychotherapy, couples counseling to patient, patient refusing        Obesity  -  tapered off seroquel        Abnormal movements (?TD)  - frequent non-stereotyped movements of hands and limbs, neck, trunk; patient states she has always been "fidgety" like this since childhood, reports she has seen neurology about these movements before in past, pt continues to refuse referral, pt states "I've been this way all my life"  - declines changing seroquel despite risk of TD/AIM, pt has been counseled extensively on risks  - AIMS: 0, when testing movements stop           Chronic pain  - pt counseled        - Instructed patient to keep all scheduled appointments, take medications as prescribed and abstain from substance abuse. Instructed to call 911 or present to ER for emergency including SI or HI.    - Discussed diagnosis, risks and benefits of proposed treatment above vs alternative treatments vs no treatment, and potential side effects of these " treatments. The patient expresses understanding of the above and displays the capacity to agree with this treatment given said understanding. Patient also agrees that, currently, the benefits outweigh the risks and would like to pursue treatment at this time.         Estevan Zaman III, MD    Return to Clinic: 3-4 m

## 2024-09-30 NOTE — TELEPHONE ENCOUNTER
Tried to call pt re waitlist for chemo on 10/10 but had to leave message      ----- Message -----  From: Naty Monique  Sent: 9/30/2024  12:29 PM CDT  To: Belkis Zimmer RN  Subject: RE: Patient advice                               She was supposed to have surgery but I added her to the wait list on 10/10      ----- Message -----  From: Niyah Anthony  Sent: 9/30/2024  10:50 AM CDT  To: Nik Reinoso Staff  Subject: Patient advice                                     Name of Caller: Karin      Contact Preference: [phone]    Nature of Call: Requesting a call back to get status on infusion being scheduled for this week

## 2024-10-04 ENCOUNTER — PATIENT MESSAGE (OUTPATIENT)
Dept: HEMATOLOGY/ONCOLOGY | Facility: CLINIC | Age: 74
End: 2024-10-04
Payer: MEDICARE

## 2024-10-09 ENCOUNTER — TELEPHONE (OUTPATIENT)
Dept: HEMATOLOGY/ONCOLOGY | Facility: CLINIC | Age: 74
End: 2024-10-09
Payer: MEDICARE

## 2024-10-10 ENCOUNTER — OFFICE VISIT (OUTPATIENT)
Dept: HEMATOLOGY/ONCOLOGY | Facility: CLINIC | Age: 74
End: 2024-10-10
Payer: MEDICARE

## 2024-10-10 ENCOUNTER — LAB VISIT (OUTPATIENT)
Dept: LAB | Facility: HOSPITAL | Age: 74
End: 2024-10-10
Payer: MEDICARE

## 2024-10-10 ENCOUNTER — INFUSION (OUTPATIENT)
Dept: INFUSION THERAPY | Facility: HOSPITAL | Age: 74
End: 2024-10-10
Payer: MEDICARE

## 2024-10-10 VITALS
WEIGHT: 173.81 LBS | HEIGHT: 66 IN | RESPIRATION RATE: 16 BRPM | HEART RATE: 90 BPM | BODY MASS INDEX: 27.93 KG/M2 | TEMPERATURE: 98 F | SYSTOLIC BLOOD PRESSURE: 138 MMHG | DIASTOLIC BLOOD PRESSURE: 84 MMHG | OXYGEN SATURATION: 99 %

## 2024-10-10 VITALS
WEIGHT: 173.81 LBS | RESPIRATION RATE: 16 BRPM | BODY MASS INDEX: 27.93 KG/M2 | SYSTOLIC BLOOD PRESSURE: 138 MMHG | OXYGEN SATURATION: 99 % | TEMPERATURE: 98 F | HEIGHT: 66 IN | HEART RATE: 90 BPM | DIASTOLIC BLOOD PRESSURE: 84 MMHG

## 2024-10-10 DIAGNOSIS — K59.09 OTHER CONSTIPATION: ICD-10-CM

## 2024-10-10 DIAGNOSIS — C18.2 MALIGNANT NEOPLASM OF ASCENDING COLON: ICD-10-CM

## 2024-10-10 DIAGNOSIS — C18.2 MALIGNANT NEOPLASM OF ASCENDING COLON: Primary | ICD-10-CM

## 2024-10-10 DIAGNOSIS — C78.6 MALIGNANT NEOPLASM METASTATIC TO OMENTUM: ICD-10-CM

## 2024-10-10 DIAGNOSIS — D84.81 IMMUNODEFICIENCY SECONDARY TO NEOPLASM: ICD-10-CM

## 2024-10-10 DIAGNOSIS — D49.9 IMMUNODEFICIENCY SECONDARY TO NEOPLASM: ICD-10-CM

## 2024-10-10 DIAGNOSIS — R53.0 NEOPLASTIC MALIGNANT RELATED FATIGUE: ICD-10-CM

## 2024-10-10 DIAGNOSIS — T45.1X5A IMMUNODEFICIENCY SECONDARY TO CHEMOTHERAPY: ICD-10-CM

## 2024-10-10 DIAGNOSIS — R68.89 COLD SENSITIVITY: ICD-10-CM

## 2024-10-10 DIAGNOSIS — R14.3 EXCESSIVE GAS: ICD-10-CM

## 2024-10-10 DIAGNOSIS — D84.821 IMMUNODEFICIENCY SECONDARY TO CHEMOTHERAPY: ICD-10-CM

## 2024-10-10 DIAGNOSIS — Z79.899 IMMUNODEFICIENCY SECONDARY TO CHEMOTHERAPY: ICD-10-CM

## 2024-10-10 LAB
ALBUMIN SERPL BCP-MCNC: 3.6 G/DL (ref 3.5–5.2)
ALP SERPL-CCNC: 80 U/L (ref 55–135)
ALT SERPL W/O P-5'-P-CCNC: 7 U/L (ref 10–44)
ANION GAP SERPL CALC-SCNC: 7 MMOL/L (ref 8–16)
AST SERPL-CCNC: 15 U/L (ref 10–40)
BASOPHILS # BLD AUTO: 0.04 K/UL (ref 0–0.2)
BASOPHILS NFR BLD: 0.7 % (ref 0–1.9)
BILIRUB SERPL-MCNC: 0.2 MG/DL (ref 0.1–1)
BUN SERPL-MCNC: 9 MG/DL (ref 8–23)
CALCIUM SERPL-MCNC: 9.9 MG/DL (ref 8.7–10.5)
CEA SERPL-MCNC: 2.4 NG/ML (ref 0–5)
CHLORIDE SERPL-SCNC: 107 MMOL/L (ref 95–110)
CO2 SERPL-SCNC: 24 MMOL/L (ref 23–29)
CREAT SERPL-MCNC: 0.8 MG/DL (ref 0.5–1.4)
DIFFERENTIAL METHOD BLD: ABNORMAL
EOSINOPHIL # BLD AUTO: 0.1 K/UL (ref 0–0.5)
EOSINOPHIL NFR BLD: 1.3 % (ref 0–8)
ERYTHROCYTE [DISTWIDTH] IN BLOOD BY AUTOMATED COUNT: 15.3 % (ref 11.5–14.5)
EST. GFR  (NO RACE VARIABLE): >60 ML/MIN/1.73 M^2
GLUCOSE SERPL-MCNC: 121 MG/DL (ref 70–110)
HCT VFR BLD AUTO: 37.7 % (ref 37–48.5)
HGB BLD-MCNC: 12.2 G/DL (ref 12–16)
IMM GRANULOCYTES # BLD AUTO: 0.01 K/UL (ref 0–0.04)
IMM GRANULOCYTES NFR BLD AUTO: 0.2 % (ref 0–0.5)
LYMPHOCYTES # BLD AUTO: 1.2 K/UL (ref 1–4.8)
LYMPHOCYTES NFR BLD: 21.2 % (ref 18–48)
MCH RBC QN AUTO: 29.6 PG (ref 27–31)
MCHC RBC AUTO-ENTMCNC: 32.4 G/DL (ref 32–36)
MCV RBC AUTO: 92 FL (ref 82–98)
MONOCYTES # BLD AUTO: 0.5 K/UL (ref 0.3–1)
MONOCYTES NFR BLD: 9.8 % (ref 4–15)
NEUTROPHILS # BLD AUTO: 3.6 K/UL (ref 1.8–7.7)
NEUTROPHILS NFR BLD: 66.8 % (ref 38–73)
NRBC BLD-RTO: 0 /100 WBC
PLATELET # BLD AUTO: 223 K/UL (ref 150–450)
PMV BLD AUTO: 10.6 FL (ref 9.2–12.9)
POTASSIUM SERPL-SCNC: 4.1 MMOL/L (ref 3.5–5.1)
PROT SERPL-MCNC: 6.7 G/DL (ref 6–8.4)
RBC # BLD AUTO: 4.12 M/UL (ref 4–5.4)
SODIUM SERPL-SCNC: 138 MMOL/L (ref 136–145)
WBC # BLD AUTO: 5.42 K/UL (ref 3.9–12.7)

## 2024-10-10 PROCEDURE — 85025 COMPLETE CBC W/AUTO DIFF WBC: CPT | Performed by: PHYSICIAN ASSISTANT

## 2024-10-10 PROCEDURE — G2211 COMPLEX E/M VISIT ADD ON: HCPCS | Mod: S$GLB,,, | Performed by: PHYSICIAN ASSISTANT

## 2024-10-10 PROCEDURE — 96365 THER/PROPH/DIAG IV INF INIT: CPT

## 2024-10-10 PROCEDURE — 1160F RVW MEDS BY RX/DR IN RCRD: CPT | Mod: CPTII,S$GLB,, | Performed by: PHYSICIAN ASSISTANT

## 2024-10-10 PROCEDURE — 1101F PT FALLS ASSESS-DOCD LE1/YR: CPT | Mod: CPTII,S$GLB,, | Performed by: PHYSICIAN ASSISTANT

## 2024-10-10 PROCEDURE — 3075F SYST BP GE 130 - 139MM HG: CPT | Mod: CPTII,S$GLB,, | Performed by: PHYSICIAN ASSISTANT

## 2024-10-10 PROCEDURE — 25000003 PHARM REV CODE 250: Performed by: PHYSICIAN ASSISTANT

## 2024-10-10 PROCEDURE — 36415 COLL VENOUS BLD VENIPUNCTURE: CPT | Performed by: PHYSICIAN ASSISTANT

## 2024-10-10 PROCEDURE — 3079F DIAST BP 80-89 MM HG: CPT | Mod: CPTII,S$GLB,, | Performed by: PHYSICIAN ASSISTANT

## 2024-10-10 PROCEDURE — 96416 CHEMO PROLONG INFUSE W/PUMP: CPT

## 2024-10-10 PROCEDURE — 63600175 PHARM REV CODE 636 W HCPCS: Performed by: PHYSICIAN ASSISTANT

## 2024-10-10 PROCEDURE — 80053 COMPREHEN METABOLIC PANEL: CPT | Performed by: PHYSICIAN ASSISTANT

## 2024-10-10 PROCEDURE — 99215 OFFICE O/P EST HI 40 MIN: CPT | Mod: S$GLB,,, | Performed by: PHYSICIAN ASSISTANT

## 2024-10-10 PROCEDURE — 82378 CARCINOEMBRYONIC ANTIGEN: CPT | Performed by: PHYSICIAN ASSISTANT

## 2024-10-10 PROCEDURE — 3288F FALL RISK ASSESSMENT DOCD: CPT | Mod: CPTII,S$GLB,, | Performed by: PHYSICIAN ASSISTANT

## 2024-10-10 PROCEDURE — 1159F MED LIST DOCD IN RCRD: CPT | Mod: CPTII,S$GLB,, | Performed by: PHYSICIAN ASSISTANT

## 2024-10-10 PROCEDURE — 1126F AMNT PAIN NOTED NONE PRSNT: CPT | Mod: CPTII,S$GLB,, | Performed by: PHYSICIAN ASSISTANT

## 2024-10-10 PROCEDURE — 3008F BODY MASS INDEX DOCD: CPT | Mod: CPTII,S$GLB,, | Performed by: PHYSICIAN ASSISTANT

## 2024-10-10 PROCEDURE — 99999 PR PBB SHADOW E&M-EST. PATIENT-LVL IV: CPT | Mod: PBBFAC,,, | Performed by: PHYSICIAN ASSISTANT

## 2024-10-10 RX ORDER — HEPARIN 100 UNIT/ML
500 SYRINGE INTRAVENOUS
Status: CANCELLED | OUTPATIENT
Start: 2024-10-11

## 2024-10-10 RX ORDER — EPINEPHRINE 0.3 MG/.3ML
0.3 INJECTION SUBCUTANEOUS ONCE AS NEEDED
Status: CANCELLED | OUTPATIENT
Start: 2024-10-10

## 2024-10-10 RX ORDER — DIPHENHYDRAMINE HYDROCHLORIDE 50 MG/ML
50 INJECTION INTRAMUSCULAR; INTRAVENOUS ONCE AS NEEDED
Status: CANCELLED | OUTPATIENT
Start: 2024-10-10

## 2024-10-10 RX ORDER — PROCHLORPERAZINE EDISYLATE 5 MG/ML
5 INJECTION INTRAMUSCULAR; INTRAVENOUS ONCE AS NEEDED
Status: CANCELLED | OUTPATIENT
Start: 2024-10-10

## 2024-10-10 RX ORDER — SODIUM CHLORIDE 0.9 % (FLUSH) 0.9 %
10 SYRINGE (ML) INJECTION
Status: CANCELLED | OUTPATIENT
Start: 2024-10-11

## 2024-10-10 RX ORDER — HEPARIN 100 UNIT/ML
500 SYRINGE INTRAVENOUS
Status: DISCONTINUED | OUTPATIENT
Start: 2024-10-10 | End: 2024-10-10 | Stop reason: HOSPADM

## 2024-10-10 RX ORDER — PROCHLORPERAZINE EDISYLATE 5 MG/ML
5 INJECTION INTRAMUSCULAR; INTRAVENOUS ONCE AS NEEDED
Status: DISCONTINUED | OUTPATIENT
Start: 2024-10-10 | End: 2024-10-10 | Stop reason: HOSPADM

## 2024-10-10 RX ORDER — DIPHENHYDRAMINE HYDROCHLORIDE 50 MG/ML
50 INJECTION INTRAMUSCULAR; INTRAVENOUS ONCE AS NEEDED
Status: DISCONTINUED | OUTPATIENT
Start: 2024-10-10 | End: 2024-10-10 | Stop reason: HOSPADM

## 2024-10-10 RX ORDER — PROCHLORPERAZINE EDISYLATE 5 MG/ML
5 INJECTION INTRAMUSCULAR; INTRAVENOUS ONCE AS NEEDED
Status: CANCELLED | OUTPATIENT
Start: 2024-10-11

## 2024-10-10 RX ORDER — DEXAMETHASONE 4 MG/1
TABLET ORAL
Qty: 40 TABLET | Refills: 0 | Status: SHIPPED | OUTPATIENT
Start: 2024-10-10

## 2024-10-10 RX ORDER — EPINEPHRINE 0.3 MG/.3ML
0.3 INJECTION SUBCUTANEOUS ONCE AS NEEDED
Status: DISCONTINUED | OUTPATIENT
Start: 2024-10-10 | End: 2024-10-10 | Stop reason: HOSPADM

## 2024-10-10 RX ORDER — SODIUM CHLORIDE 0.9 % (FLUSH) 0.9 %
10 SYRINGE (ML) INJECTION
Status: CANCELLED | OUTPATIENT
Start: 2024-10-10

## 2024-10-10 RX ORDER — HEPARIN 100 UNIT/ML
500 SYRINGE INTRAVENOUS
Status: CANCELLED | OUTPATIENT
Start: 2024-10-10

## 2024-10-10 RX ORDER — SODIUM CHLORIDE 0.9 % (FLUSH) 0.9 %
10 SYRINGE (ML) INJECTION
Status: DISCONTINUED | OUTPATIENT
Start: 2024-10-10 | End: 2024-10-10 | Stop reason: HOSPADM

## 2024-10-10 RX ADMIN — SODIUM CHLORIDE: 9 INJECTION, SOLUTION INTRAVENOUS at 02:10

## 2024-10-10 RX ADMIN — FLUOROURACIL 4440 MG: 50 INJECTION, SOLUTION INTRAVENOUS at 03:10

## 2024-10-10 RX ADMIN — DEXAMETHASONE SODIUM PHOSPHATE 0.25 MG: 4 INJECTION, SOLUTION INTRA-ARTICULAR; INTRALESIONAL; INTRAMUSCULAR; INTRAVENOUS; SOFT TISSUE at 02:10

## 2024-10-10 NOTE — PLAN OF CARE
"  Problem: Fatigue  Goal: Improved Activity Tolerance  Outcome: Progressing  Intervention: Promote Improved Energy  Flowsheets (Taken 10/10/2024 1418)  Fatigue Management:   activity schedule adjusted   activity assistance provided   fatigue-related activity identified   frequent rest breaks encouraged   paced activity encouraged  Sleep/Rest Enhancement:   awakenings minimized   consistent schedule promoted   family presence promoted   natural light exposure provided   noise level reduced   reading promoted   regular sleep/rest pattern promoted   relaxation techniques promoted  Activity Management:   Ambulated -L4   Up in chair - L3  Environmental Support:   calm environment promoted   caregiver consistency promoted   comfort object encouraged   distractions minimized   environmental consistency promoted   personal routine supported   rest periods encouraged  /84 (Patient Position: Sitting)   Pulse 90   Temp 98.4 °F (36.9 °C)   Resp 16   Ht 5' 6" (1.676 m)   Wt 78.8 kg (173 lb 13.3 oz)   SpO2 99%   BMI 28.06 kg/m²   Pleasant, alert and oriented patient to Chemo Infusion per self with  for C15 5fu pump - VSS and RCW Port accessed with blood return observed, flushed with NS, Bio-Patch with dressing applied and patient tolerated procedure well - patient's pump attached and infusing with blood return observed with NS flush, pump infusing, all clamps open and checked, connection secured and patient discharged to home with no concerns - RTC on 10/12/24 at 1200 for pump d/c     "

## 2024-10-10 NOTE — PROGRESS NOTES
MEDICAL ONCOLOGY - ESTABLISHED PATIENT VISIT    Reason for visit: colon adenocarcinoma    Best Contact Phone Number(s): [phone] (home)      Cancer/Stage/TNM:    Cancer Staging   Colon adenocarcinoma  Staging form: Colon and Rectum, AJCC 8th Edition  - Pathologic stage from 1/22/2024: Stage IVC (pT4a, pN2b, cM1c) - Signed by Minna Menendez CNS on 2/13/2024       Oncology History   Colon adenocarcinoma   12/21/2023 Tumor Markers    Patient's tumor was tested for the following markers: CEA.                                              Results of the tumor marker test revealed 3.3     12/21/2023 Procedure    Colonoscopy  Cecal polyp removed with jumbo forceps, 3 mm  Multiple ascending colon polyps ranging in size from 5 to 12 mm - semi-pedunculated removed with hot snare  Hepatic flexure mass identified - central ulceration, concerning for malignancy, 3 cm, not obstructing, this was biopsied - tattoo placed distal to mass  Sigmoid diverticular disease     12/21/2023 Tumor Markers    Patient's tumor was tested for the following markers: CEA.                                              Results of the tumor marker test revealed 3.3     12/22/2023 Imaging Significant Findings    CT CAP  Evaluation of the colon is somewhat limited as there is significant colonic stool and oral contrast material has not opacified the large bowel.  There does appear to be some abnormal thickening of the walls of the proximal right colon and a patient with a known history of colonic malignancy.  There is some soft tissue density external to the walls of the colon on the right pericolic gutter which could represent peritoneal nodularity versus subserosal extent of tumor.  Slightly more distal to this area there is a 2nd area of irregularity of the walls of the colon, difficult to fully evaluate if this is related to the colonic walls versus stool with central fat.     Two hypodensities in the liver, the larger measuring 0.8 cm, too  small to accurately characterize on the basis of this examination.  Attention on follow-up.     No pulmonary nodules are seen.     Cholecystectomy.     12/29/2023 Initial Diagnosis    Hepatic flexure colon adenocarcinoma  MMR intact     1/22/2024 Cancer Staged    Staging form: Colon and Rectum, AJCC 8th Edition  - Pathologic stage from 1/22/2024: Stage IVC (pT4a, pN2b, cM1c)     8/27/2024 Tumor Markers    Patient's tumor was tested for the following markers: CEA.                                              Results of the tumor marker test revealed 2.3      Malignant neoplasm of ascending colon   2/12/2024 Initial Diagnosis    Malignant neoplasm of ascending colon     2/26/2024 -  Chemotherapy    Treatment Summary   Plan Name: OP GI [id number] (oxaliplatin leucovorin fluorouracil) with bevacizumab Q2W  Treatment Goal: Palliative  Status: Active  Start Date: 2/26/2024  End Date: 1/16/2025 (Planned)  Provider: Kemar Zaragoza MD  Chemotherapy: oxaliplatin (ELOXATIN) 150 mg in dextrose 5 % (D5W) 595 mL chemo infusion, 157 mg, Intravenous, Clinic/HOD 1 time, 8 of 8 cycles  Administration: 150 mg (2/26/2024), 150 mg (3/11/2024), 150 mg (3/25/2024), 150 mg (4/8/2024), 150 mg (4/22/2024), 150 mg (5/6/2024), 150 mg (5/20/2024), 150 mg (6/3/2024)  fluorouracil (ADRUCIL) 2,400 mg/m2 = 4,440 mg in sodium chloride 0.9% 100 mL chemo infusion, 2,400 mg/m2 = 4,440 mg, Intravenous, Clinic/HOD 1 time, 14 of 24 cycles  Administration: 4,440 mg (2/26/2024), 4,440 mg (3/11/2024), 4,440 mg (3/25/2024), 4,440 mg (4/8/2024), 4,440 mg (4/22/2024), 4,440 mg (5/6/2024), 4,440 mg (5/20/2024), 4,440 mg (6/3/2024), 4,440 mg (6/17/2024), 4,440 mg (7/3/2024), 4,440 mg (7/16/2024), 4,440 mg (7/31/2024), 4,440 mg (8/13/2024), 4,440 mg (8/28/2024)  bevacizumab-awwb (MVASI) 5 mg/kg = 365 mg in sodium chloride 0.9% 100 mL infusion, 5 mg/kg = 365 mg, Intravenous, [facility] 1 time, 13 of 22 cycles  Administration: 365 mg (2/26/2024), 365 mg  (3/11/2024), 365 mg (3/25/2024), 365 mg (4/8/2024), 365 mg (4/22/2024), 365 mg (5/6/2024), 365 mg (5/20/2024), 365 mg (6/3/2024), 365 mg (6/17/2024), 365 mg (7/3/2024), 365 mg (7/16/2024), 365 mg (7/31/2024), 365 mg (8/13/2024)     8/27/2024 Tumor Markers    Patient's tumor was tested for the following markers: CEA.                                              Results of the tumor marker test revealed 2.3           Interim History:   74 y.o. female with LUANNE, bipolar, HLD who presents following cycle 14 FOLFOX + bevacizumab for her metastatic ascending colon cancer, now on maintenance chemotherapy.  She had a diagnostic lap with Dr. Contreras with findings of known peritoneal nodules; biopsies confirmed metastatic adenocarcinoma.  She had a discussion with him about potentially not proceeding with CRS/HIPEC.  She feels very well.  She does report having taste changes and mild cold sensitivity. Stable mild neuropathy to the feet. No falls. Overall, feeling fairly well.     ECOG 0. Presents with her     ROS:   Review of Systems   Constitutional:  Positive for malaise/fatigue. Negative for chills and fever.   HENT:  Negative for congestion and sore throat.    Respiratory:  Negative for shortness of breath.    Cardiovascular:  Negative for chest pain, palpitations and leg swelling.   Gastrointestinal:  Negative for blood in stool, constipation, diarrhea and nausea.   Genitourinary:  Negative for hematuria.   Musculoskeletal:  Negative for back pain, falls and myalgias.   Skin:  Negative for rash.   Neurological:  Positive for dizziness and tingling. Negative for weakness and headaches.       Past Medical History:   Past Medical History:   Diagnosis Date    Anxiety     Arthritis     Bipolar 1 disorder     Bursitis of right hip     GI bleed due to NSAIDs     Multinodular goiter 11/15/2021    Sacroiliitis     right side    Sleep apnea         Past Surgical History:   Past Surgical History:   Procedure Laterality Date     ANKLE FRACTURE SURGERY Left 2001    BLADDER SUSPENSION      CARPAL TUNNEL RELEASE Bilateral     CHOLECYSTECTOMY      Laparoscopic    COLONOSCOPY      COLONOSCOPY N/A 12/21/2023    Procedure: COLONOSCOPY;  Surgeon: Alberto Albright MD;  Location: [facility];  Service: Endoscopy;  Laterality: N/A;    DIAGNOSTIC LAPAROSCOPY N/A 9/20/2024    Procedure: LAPAROSCOPY, DIAGNOSTIC;  Surgeon: Benjy Contreras MD;  Location: [address];  Service: General;  Laterality: N/A;    ESOPHAGOGASTRODUODENOSCOPY N/A 07/29/2021    Procedure: EGD (ESOPHAGOGASTRODUODENOSCOPY);  Surgeon: Susan Mcclain MD;  Location: [facility];  Service: Endoscopy;  Laterality: N/A;    EYE SURGERY      FOOT ARTHRODESIS Right 05/03/2023    Procedure: FUSION, FOOT;  Surgeon: ODALIS Campuzano;  Location: [facility];  Service: Podiatry;  Laterality: Right;  mini c-arm, Arthrex dowel bone graft harvester, locking plate and screws festus notified cc    HYSTERECTOMY  1986    vaginal prolapse    INJECTION OF ANESTHETIC AGENT AROUND MEDIAL BRANCH NERVES INNERVATING CERVICAL FACET JOINT Bilateral 05/27/2022    Procedure: CERVICAL MEDIAL BRANCH NERVE BLOCK (C3-4,C4-5);  Surgeon: Mamie Roman MD;  Location: [facility];  Service: Pain Management;  Laterality: Bilateral;    INJECTION OF ANESTHETIC AGENT AROUND MEDIAL BRANCH NERVES INNERVATING LUMBAR FACET JOINT Right 02/05/2021    Procedure: LUMBAR FACET JOINT BLOCK (L3-4,L4-5,L5-S1);  Surgeon: Mamie Roman MD;  Location: [facility];  Service: Pain Management;  Laterality: Right;    INJECTION OF ANESTHETIC AGENT AROUND MEDIAL BRANCH NERVES INNERVATING LUMBAR FACET JOINT Right 04/30/2021    Procedure: LUMBAR FACET JOINT BLOCK (L3-4,L4-5,L5-S1);  Surgeon: Mamie Roman MD;  Location: [facility];  Service: Pain Management;  Laterality: Right;    INJECTION OF ANESTHETIC AGENT INTO SACROILIAC JOINT Right 12/04/2020    Procedure: SACROILIAC JOINT INJECTION;  Surgeon: Mamie Roman MD;  Location: [facility];   Service: Pain Management;  Laterality: Right;    INJECTION OF JOINT Right 2020    Procedure: GREATER TROCHANTERIC BURSA INJECTION;  Surgeon: Mamie Roman MD;  Location: [facility];  Service: Pain Management;  Laterality: Right;    LAPAROSCOPIC RIGHT COLON RESECTION N/A 2024    Procedure: COLECTOMY, RIGHT, LAPAROSCOPIC (eras low, lithotomy);  Surgeon: Alberto Albright MD;  Location: [facility] OR 2ND FLR;  Service: Colon and Rectal;  Laterality: N/A;    NASAL SEPTUM SURGERY      RECTAL PROLAPSE REPAIR      TONSILLECTOMY          Family History:   Family History   Problem Relation Name Age of Onset    No Known Problems Maternal Aunt Rubio Glasgow     Colon cancer Maternal Uncle Joshua     Colon cancer Maternal Uncle Yovani     Colon cancer Maternal Uncle Konstantin     No Known Problems Paternal Aunt Vero     Esophageal cancer Paternal Uncle Nat Garcia     Heart attack Maternal Grandmother Elmira     Leukemia Maternal Grandfather manish Pang     No Known Problems Paternal Grandmother Lizett     Stroke Paternal Grandfather Nat Sr         Social History:   Social History     Tobacco Use    Smoking status: Former     Current packs/day: 0.00     Average packs/day: 1 pack/day for 41.7 years (41.7 ttl pk-yrs)     Types: Cigarettes     Start date: 3/30/1982     Quit date: 2023     Years since quittin.8    Smokeless tobacco: Never   Substance Use Topics    Alcohol use: Yes     Comment: Socially-2 or 3 times a year-6 or 7 drinks        I have reviewed and updated the patient's past medical, surgical, family and social histories.    Allergies:   Review of patient's allergies indicates:   Allergen Reactions    Nsaids (non-steroidal anti-inflammatory drug) Other (See Comments)     Gastrointestinal bleeding requiring blood transfusion    Demerol [meperidine] Rash        Medications:   Current Outpatient Medications   Medication Sig Dispense Refill    albuterol (PROVENTIL/VENTOLIN HFA) 90 mcg/actuation  inhaler inhale 2 puffs into the lungs every 6 hours as needed for wheezing. 18 g 1    aspirin (ECOTRIN) 81 MG EC tablet Take 81 mg by mouth nightly.      dexAMETHasone (DECADRON) 4 MG Tab Take 2 tablets (8 mg total) by mouth once daily only on days 2, 3 and 4 of each chemotherapy cycle. 40 tablet 0    EScitalopram oxalate (LEXAPRO) 20 MG tablet Take 1 tablet (20 mg total) by mouth once daily. 30 tablet 3    lamoTRIgine (LAMICTAL) 150 MG Tab Take 2 tablets (300 mg total) by mouth every evening. 180 tablet 1    LIDOcaine-prilocaine (EMLA) cream Apply topically as needed (place to port site 45-60 minutes prior to chemotherapy). 30 g 5    ondansetron (ZOFRAN-ODT) 8 MG TbDL dissolve 1 tablet (8 mg total) under the tongue every 6 (six) hours as needed (nausea). 30 tablet 5    oxyCODONE (ROXICODONE) 5 MG immediate release tablet Take 1 tablet (5 mg total) by mouth every 6 (six) hours as needed for Pain. 11 tablet 0    pantoprazole (PROTONIX) 40 MG tablet Take 1 tablet (40 mg total) by mouth once daily. (Patient taking differently: Take 40 mg by mouth nightly.) 90 tablet 3    prochlorperazine (COMPAZINE) 10 MG tablet Take 1 tablet (10 mg total) by mouth every 6 (six) hours as needed (nausea). 30 tablet 2    QUEtiapine (SEROQUEL) 200 MG Tab Take 1 tablet (200 mg total) by mouth every evening. 30 tablet 3    rosuvastatin (CRESTOR) 10 MG tablet Take 1 tablet (10 mg total) by mouth once daily. 90 tablet 3    traMADoL (ULTRAM) 50 mg tablet Take 1 tablet (50 mg total) by mouth every 6 (six) hours as needed for Pain. 5 tablet 0     No current facility-administered medications for this visit.        Physical Exam:   There were no vitals taken for this visit.          Physical Exam  Vitals reviewed.   Constitutional:       General: She is not in acute distress.     Appearance: Normal appearance. She is normal weight. She is not ill-appearing, toxic-appearing or diaphoretic.   HENT:      Head: Normocephalic and atraumatic.      Right  Ear: External ear normal.      Left Ear: External ear normal.      Nose: Nose normal.      Mouth/Throat:      Pharynx: Oropharynx is clear.   Eyes:      General: No scleral icterus.     Conjunctiva/sclera: Conjunctivae normal.   Cardiovascular:      Rate and Rhythm: Normal rate.   Pulmonary:      Effort: Pulmonary effort is normal. No respiratory distress.   Chest:      Comments: RCW port  Abdominal:      General: There is no distension.   Skin:     General: Skin is warm and dry.      Coloration: Skin is not jaundiced or pale.      Findings: No bruising, erythema or rash.   Neurological:      Mental Status: She is alert and oriented to person, place, and time. Mental status is at baseline.      Motor: No weakness.      Gait: Gait normal.   Psychiatric:         Mood and Affect: Mood normal.         Labs:     I have reviewed the pertinent labs.     Imaging:    CT CAP - 8/9/24:    Impression:     Postoperative changes of right colon resection for patient's known colonic malignancy.  Persistent small prominent 1 cm lymph node adjacent to the aorta at the level of the renal vessels, unchanged over multiple prior examinations.  No free air or obstruction.     Tiny 3 mm hypodensity in the right hepatic lobe, likely a small cyst or hemangioma.     Mild thickening of the 2nd portion of the duodenum which could suggest an inflammatory/infectious process.  Clinical correlation suggested.     Constipation.       Path:   1/22/24 - R Hemicolectomy   Final Pathologic Diagnosis     THE DIAGNOSES REMAIN THE SAME.  THIS CORRECTED REPORT IS ISSUED TO CHANGE M STATUS to reflect tumor in the omentum.  This change is discussed with Dr. RANJANA Albright via phone, 2/1/2024.    1. Colon, right and terminal ileum (right hemicolectomy with EN bloc abdominal wall resection):  Invasive adenocarcinoma.  See cancer synoptic report     2. Omentum (resection):    Positive for carcinoma    CORRECT SYNOPTIC AND M STATUS  Cancer synoptic report  -  Procedure: Right hemicolectomy with EN bloc abdominal wall resection  - Tumor site:  Ascending  - Histologic type:  Adenocarcinoma  - Histologic grade:  G2, moderately differentiated.  See comment.  COMMENT:  While the majority of the tumor consists of well formed glands, a significant proportion includes abortive glands, single file infiltration, and malignant cells with signet ring cell morphology. The tumor has an insidious pattern of  infiltration with tumor present far from the advancing front of the tumor.  - Tumor size:  2.0 x 2.0 x 1.0 cm  - Tumor extent:  Invades visceral peritoneum, multifocal  - Macroscopic perforation: Not identified  - Lymphovascular invasion:  Present, extensive.  Small vessel, large vessel, intra and extramural.  - Perineural invasion:  Not identified  - Tumor budding score:  High (>10), multifocal single cell infiltration  - Treatment effect: No known pre-surgical therapy    Margins  Margin involved by invasive carcinoma, radial (slide 1C)  All margins negative for dysplasia.    pTNM stage classification (AJCC 8th edition)  pT Category  pT4a:  Tumor invades through the visceral peritoneum    pN Category  pN2b:  7 or more regional lymph nodes are positive  Number of positive lymph nodes:  19  Number of lymph nodes examined: 26  Number of tumor deposits:  4    pM Category  pM1c:  Metastasis to the peritoneal surface of the omentum.    Additional findings:  - Sessile serrated polyp, no cytologic dysplasia, 1.1 cm at ileocecal valve  - Tubulovillous adenoma, 1.0 cm, proximal ascending  - Tubular adenoma, 0.4 cm, mid ascending  - Tubular adenoma, 0.4, distal ascending  - Tubular adenoma, 0.6 cm, distal ascending  - Submucosal lipoma, 2.0 cm  - Appendix with no specific histopathologic changes    Ancillary studies  Immunohistochemistry for mismatch repair proteins performed on prior biopsy material (SAS-, 12/21/23): pMMR.         Assessment:       1. Malignant neoplasm of ascending  colon    2. 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 Immunodeficiency secondary to neoplasm    4. Immunodeficiency secondary to chemotherapy    5. Cold sensitivity    6. Neoplastic malignant related fatigue    7. Other constipation    8. Excessive gas              Plan:        # Colon cancer   Mrs. Maguire is a 74 y.o. female who presents for management of her metastatic colon cancer. She underwent a R hemicolectomy with en-bloc abdominal wall resection on 1/22/24 with Dr. Albright. Pathology revealed pT4a N2b disease with 19/26 positive LNs and omentum positive for carcinoma.    We had an in-depth conversation during our initial consult re: her diagnosis and treatment options. Reviewed recommendation for IV systemic therapy for at least 6 months. Plan for FOLFOX + bevacizumab to be given every 2 weeks. Briefly reviewed side effects of treatment. Handouts provided. Port placed 2/21/24.     PGX - DPYD normal metabolizer, [id number] intermediate metabolizer   Dexamethasone, zofran, compazine and lidocaine sent to pharmacy previously. Reviewed admin instructions.     Pending response, she may be a candidate for CRS/HIPEC with surgical oncology team. Continue to evaluate and re-start discussion if still without radiographic evidence of disease after CT on cycle 8.    CT CAP after cycle 4 shows no clear evidence of disease.  CT CAP after cycle 8 shows no clear evidence of disease.  CT CAP after cycle 12 shows no clear evidence of disease.    Tolerance of chemotherapy has been excellent.    Underwent diagnostic laparoscopy on 9/20/24 with PCI of 14.  Discussed with Dr. Contreras and with patient that options include continuing systemic therapy at this time vs CRS/HIPEC.  We are in agreement that with her volume of disease and histology, likelihood of CRS/HIPEC being beneficial for survival is not high.  I have reviewed the CBC and CMP, adequate for treatment   After discussion, we will proceed with continued maintenance  chemotherapy.  Proceed with maintenance 5-FU    RTC in 2 weeks for next cycle.     Cold sensitivity/neoplasm related fatigue:  2/2 chemotherapy. Stopped oxaliplatin beginning with cycle 9 to prevent persistent paresthesias.  Mild persistent paresthesias at this time.  Will monitor. She is taking Cymbalta but not finding very helpful. Worse towards end of day.     Constipation/Gas:   Continue 2 stool softeners a day with Miralax daily to have regular bowel movements.   This has helped.    Continue Miralax to avoid more explosive BM.    Pte and family members displayed understanding of the above encounter and treatment plan. All thoughtful questions were answered to their satisfaction. Pte was advised to notify the care team or proceed to the ER if signs and symptoms worsen.     Visit today included increased complexity associated with the care of the episodic problem chemotherapy  addressed and managing the longitudinal care of the patient due to the serious and/or complex managed problem(s) GI malignancies/cancer      DIANE Fonseca, PA-C  [facility] MD [facility]  Dept of Hematology/Oncology  [place] to GI Oncology team           Med Onc Chart Routing      Follow up with physician 2 weeks and 6 weeks. 5-FU, avastin   Follow up with KRISTEN 4 weeks. 5-FU, Avastin   Infusion scheduling note    Injection scheduling note    Labs CBC, CMP, CEA and urinalysis   Scheduling:  Preferred lab:  Lab interval: every 2 weeks     Imaging    Pharmacy appointment    Other referrals

## 2024-10-12 ENCOUNTER — INFUSION (OUTPATIENT)
Dept: INFUSION THERAPY | Facility: HOSPITAL | Age: 74
End: 2024-10-12
Payer: MEDICARE

## 2024-10-12 VITALS
HEIGHT: 66 IN | SYSTOLIC BLOOD PRESSURE: 136 MMHG | WEIGHT: 173.75 LBS | DIASTOLIC BLOOD PRESSURE: 67 MMHG | RESPIRATION RATE: 16 BRPM | HEART RATE: 68 BPM | BODY MASS INDEX: 27.92 KG/M2 | OXYGEN SATURATION: 98 %

## 2024-10-12 DIAGNOSIS — C18.2 MALIGNANT NEOPLASM OF ASCENDING COLON: Primary | ICD-10-CM

## 2024-10-12 PROCEDURE — 63600175 PHARM REV CODE 636 W HCPCS: Performed by: PHYSICIAN ASSISTANT

## 2024-10-12 PROCEDURE — A4216 STERILE WATER/SALINE, 10 ML: HCPCS | Performed by: PHYSICIAN ASSISTANT

## 2024-10-12 PROCEDURE — 25000003 PHARM REV CODE 250: Performed by: PHYSICIAN ASSISTANT

## 2024-10-12 RX ORDER — SODIUM CHLORIDE 0.9 % (FLUSH) 0.9 %
10 SYRINGE (ML) INJECTION
Status: DISCONTINUED | OUTPATIENT
Start: 2024-10-12 | End: 2024-10-12 | Stop reason: HOSPADM

## 2024-10-12 RX ORDER — HEPARIN 100 UNIT/ML
500 SYRINGE INTRAVENOUS
Status: DISCONTINUED | OUTPATIENT
Start: 2024-10-12 | End: 2024-10-12 | Stop reason: HOSPADM

## 2024-10-12 RX ORDER — PROCHLORPERAZINE EDISYLATE 5 MG/ML
5 INJECTION INTRAMUSCULAR; INTRAVENOUS ONCE AS NEEDED
Status: DISCONTINUED | OUTPATIENT
Start: 2024-10-12 | End: 2024-10-12 | Stop reason: HOSPADM

## 2024-10-12 RX ADMIN — HEPARIN 500 UNITS: 100 SYRINGE at 10:10

## 2024-10-12 RX ADMIN — Medication 10 ML: at 10:10

## 2024-10-12 NOTE — NURSING
Patient seated in chair, VSS, assessment done.  CADD volume 0, infusion completed, tolerated well. CADD disconnected from port.  Port flushed, blood return noted, heparin locked. Patient ambulated off floor accompanied by .

## 2024-10-15 ENCOUNTER — PATIENT MESSAGE (OUTPATIENT)
Dept: RESEARCH | Facility: HOSPITAL | Age: 74
End: 2024-10-15
Payer: MEDICARE

## 2024-10-17 ENCOUNTER — OFFICE VISIT (OUTPATIENT)
Dept: INTERNAL MEDICINE | Facility: CLINIC | Age: 74
End: 2024-10-17
Payer: MEDICARE

## 2024-10-17 VITALS
SYSTOLIC BLOOD PRESSURE: 142 MMHG | WEIGHT: 172.38 LBS | HEART RATE: 86 BPM | DIASTOLIC BLOOD PRESSURE: 88 MMHG | RESPIRATION RATE: 18 BRPM | OXYGEN SATURATION: 99 % | BODY MASS INDEX: 27.7 KG/M2 | HEIGHT: 66 IN

## 2024-10-17 DIAGNOSIS — E78.89 ELEVATED HDL: ICD-10-CM

## 2024-10-17 DIAGNOSIS — F31.9 BIPOLAR 1 DISORDER: ICD-10-CM

## 2024-10-17 DIAGNOSIS — Z12.31 ENCOUNTER FOR SCREENING MAMMOGRAM FOR MALIGNANT NEOPLASM OF BREAST: ICD-10-CM

## 2024-10-17 DIAGNOSIS — Z12.39 ENCOUNTER FOR SCREENING FOR MALIGNANT NEOPLASM OF BREAST, UNSPECIFIED SCREENING MODALITY: ICD-10-CM

## 2024-10-17 DIAGNOSIS — E04.1 THYROID NODULE: ICD-10-CM

## 2024-10-17 DIAGNOSIS — R73.9 ELEVATED RANDOM BLOOD GLUCOSE LEVEL: Primary | ICD-10-CM

## 2024-10-17 DIAGNOSIS — R25.9 ABNORMAL MOVEMENTS: ICD-10-CM

## 2024-10-17 DIAGNOSIS — E78.5 HYPERLIPIDEMIA, UNSPECIFIED HYPERLIPIDEMIA TYPE: ICD-10-CM

## 2024-10-17 DIAGNOSIS — C18.9 COLON ADENOCARCINOMA: ICD-10-CM

## 2024-10-17 PROCEDURE — 99999 PR PBB SHADOW E&M-EST. PATIENT-LVL IV: CPT | Mod: PBBFAC,,, | Performed by: NURSE PRACTITIONER

## 2024-10-17 NOTE — PROGRESS NOTES
Subjective:       Patient ID: Karin Maguire is a 74 y.o. female.    Chief Complaint: Follow-up (6 months)      History of Present Illness    CHIEF COMPLAINT:  Karin presents today for routine follow-up and to discuss ongoing cancer treatment.  Just bought a camper and planning her first trio with grand kids and  Ray    COLON CANCER TREATMENT:  She undergoes chemotherapy every other week for colon cancer, with labs at the same frequency and a CT scan after every four treatments. No visible cancer has been detected so far. Laparoscopy for HIPEC was considered but not performed as chemotherapy was deemed sufficiently effective by her oncologist. She reports a positive experience with chemotherapy, expressing a favorable attitude towards the treatment and denying any problems or side effects.    THYROID:  Recent thyroid ultrasound shows no growth in thyroid nodules. Follow-up is recommended in one year.    PREVENTIVE CARE:  She is due for a cholesterol screening, with the last check performed in April. She is also due for a mammogram next month. A bone density scan last year indicated strong bones, though she perceives herself as getting shorter. She had a RexVie vaccination in January and is up to date on pneumonia shots. She thought she could not receive the shingles vaccine due to restrictions on live vaccines. Discussed attenuated virus in vaccine     BLOOD SUGAR:  Her labs has historically been excellent. An elevated blood sugar was noted during her last check, which she attributes to not fasting prior to the test. She denies any prior concerns regarding blood sugar or infections.    MENTAL HEALTH:  She sees a psychiatrist regularly and reports excellent mental health. Her psychiatrist indicates she is handling her cancer diagnosis well. She expresses a positive outlook on life and does not dwell on her diagnosis, viewing it as simply another item on her to-do list.    ALTERNATIVE TREATMENTS:  She  expresses skepticism about using Ivermectin for cancer treatment, acknowledging it is typically used for parasites. She is not interested in pursuing alternative treatments at this time, as her current treatment appears effective. She would only consider alternatives if her current treatment was not working or significantly impacting her quality of life.      ROS:  Constitutional: -fevers, -change in weight  Musculoskeletal: +muscle weakness  Psychiatric: -anxiety          Objective:      Physical Exam    General: No acute distress. Well-developed. Well-nourished.  Eyes: EOMI. Sclerae anicteric.  HENT: Normocephalic. Atraumatic. Nares patent. Moist oral mucosa.  Ears: Bilateral TMs clear. Bilateral EACs clear.  Cardiovascular: Regular rate. Regular rhythm. No murmurs. No rubs. No gallops. Normal S1, S2.  Respiratory: Normal respiratory effort. Clear to auscultation bilaterally. No rales. No rhonchi. No wheezing.  Abdomen: Soft. Non-tender. Non-distended. Normoactive bowel sounds.  Musculoskeletal: No  obvious deformity.  Extremities: No lower extremity edema.  Neurological: Alert & oriented x3. No slurred speech. Normal gait.  Psychiatric: Normal mood. Normal affect. Good insight. Good judgment.  Skin: Warm. Dry. No rash.          Assessment:       1. Elevated random blood glucose level    2. Thyroid nodule    3. Elevated HDL    4. Encounter for screening for malignant neoplasm of breast, unspecified screening modality    5. Encounter for screening mammogram for malignant neoplasm of breast    6. Abnormal movements    7. Bipolar 1 disorder    8. Colon adenocarcinoma    9. Hyperlipidemia, unspecified hyperlipidemia type        Plan:     Problem List Items Addressed This Visit       Bipolar 1 disorder> cont with psychiatry on lexapro/lamictal and seroquel     Abnormal movements> noted during exam    Hyperlipidemia> cont crestor    Colon adenocarcinoma> cont with treatment and onc f/u every other week      Other Visit  Diagnoses       Elevated random blood glucose level    -  Primary    Relevant Orders    Hemoglobin A1C    Thyroid nodule        Relevant Orders    TSH    Elevated HDL        Relevant Orders    Lipid Panel    Encounter for screening for malignant neoplasm of breast, unspecified screening modality        Relevant Orders    Mammo Digital Screening Bilat w/ Volodymyr    Encounter for screening mammogram for malignant neoplasm of breast        Relevant Orders    Mammo Digital Screening Bilat w/ Volodymyr          Assessment & Plan    Reviewed ongoing chemotherapy treatment for colon cancer, noting good tolerance and no visible cancer on recent scans  Assessed thyroid status, noting stable nodules on recent ultrasound  Evaluated recent lab work, including blood counts, kidney and liver function, all within normal limits  Considered cholesterol and thyroid screening due to time elapsed since last checks  Noted elevated sugar in recent lab work, likely due to non-fasting state; planned A1C check to rule out glucose metabolism issues    SHINGLES VACCINATION:  - Explained that Shingrix is an attenuated virus vaccine, not a live virus like Zostavax.  - Karin to discuss Shingrix and flu vaccine with oncologist.    HYPERLIPIDEMIA: cont crestor  - Added lipid panel to upcoming lab work scheduled for next Wednesday.    THYROID DISORDER:  - Added TSH to upcoming lab work scheduled for next Wednesday.    DIABETES:  - Ordered A1C test to be included with upcoming lab work.    BREAST CANCER SCREENING:  - Schedule mammogram for next month.    FOLLOW UP:  - Follow up in 6 months.          This note was generated with the assistance of ambient listening technology. Verbal consent was obtained by the patient and accompanying visitor(s) for the recording of patient appointment to facilitate this note. I attest to having reviewed and edited the generated note for accuracy, though some syntax or spelling errors may persist. Please contact the  author of this note for any clarification.

## 2024-10-22 ENCOUNTER — PATIENT MESSAGE (OUTPATIENT)
Dept: RESEARCH | Facility: HOSPITAL | Age: 74
End: 2024-10-22
Payer: MEDICARE

## 2024-10-23 ENCOUNTER — LAB VISIT (OUTPATIENT)
Dept: LAB | Facility: HOSPITAL | Age: 74
End: 2024-10-23
Attending: PHYSICIAN ASSISTANT
Payer: MEDICARE

## 2024-10-23 DIAGNOSIS — C18.2 MALIGNANT NEOPLASM OF ASCENDING COLON: ICD-10-CM

## 2024-10-23 DIAGNOSIS — E78.89 ELEVATED HDL: ICD-10-CM

## 2024-10-23 DIAGNOSIS — E04.1 THYROID NODULE: ICD-10-CM

## 2024-10-23 DIAGNOSIS — R73.9 ELEVATED RANDOM BLOOD GLUCOSE LEVEL: ICD-10-CM

## 2024-10-23 LAB
ALBUMIN SERPL BCP-MCNC: 3.3 G/DL (ref 3.5–5.2)
ALP SERPL-CCNC: 59 U/L (ref 40–150)
ALT SERPL W/O P-5'-P-CCNC: 10 U/L (ref 10–44)
ANION GAP SERPL CALC-SCNC: 8 MMOL/L (ref 8–16)
AST SERPL-CCNC: 15 U/L (ref 10–40)
BASOPHILS # BLD AUTO: 0.03 K/UL (ref 0–0.2)
BASOPHILS NFR BLD: 0.6 % (ref 0–1.9)
BILIRUB SERPL-MCNC: 0.4 MG/DL (ref 0.1–1)
BILIRUB UR QL STRIP: NEGATIVE
BUN SERPL-MCNC: 14 MG/DL (ref 8–23)
CALCIUM SERPL-MCNC: 9.1 MG/DL (ref 8.7–10.5)
CEA SERPL-MCNC: 1.9 NG/ML (ref 0–5)
CHLORIDE SERPL-SCNC: 110 MMOL/L (ref 95–110)
CHOLEST SERPL-MCNC: 174 MG/DL (ref 120–199)
CHOLEST/HDLC SERPL: 2 {RATIO} (ref 2–5)
CLARITY UR: CLEAR
CO2 SERPL-SCNC: 22 MMOL/L (ref 23–29)
COLOR UR: YELLOW
CREAT SERPL-MCNC: 0.8 MG/DL (ref 0.5–1.4)
DIFFERENTIAL METHOD BLD: ABNORMAL
EOSINOPHIL # BLD AUTO: 0.1 K/UL (ref 0–0.5)
EOSINOPHIL NFR BLD: 2.1 % (ref 0–8)
ERYTHROCYTE [DISTWIDTH] IN BLOOD BY AUTOMATED COUNT: 15.2 % (ref 11.5–14.5)
EST. GFR  (NO RACE VARIABLE): >60 ML/MIN/1.73 M^2
ESTIMATED AVG GLUCOSE: 111 MG/DL (ref 68–131)
GLUCOSE SERPL-MCNC: 105 MG/DL (ref 70–110)
GLUCOSE UR QL STRIP: NEGATIVE
HBA1C MFR BLD: 5.5 % (ref 4–5.6)
HCT VFR BLD AUTO: 35.3 % (ref 37–48.5)
HDLC SERPL-MCNC: 85 MG/DL (ref 40–75)
HDLC SERPL: 48.9 % (ref 20–50)
HGB BLD-MCNC: 11.7 G/DL (ref 12–16)
HGB UR QL STRIP: NEGATIVE
IMM GRANULOCYTES # BLD AUTO: 0.02 K/UL (ref 0–0.04)
IMM GRANULOCYTES NFR BLD AUTO: 0.4 % (ref 0–0.5)
KETONES UR QL STRIP: NEGATIVE
LDLC SERPL CALC-MCNC: 75.4 MG/DL (ref 63–159)
LEUKOCYTE ESTERASE UR QL STRIP: NEGATIVE
LYMPHOCYTES # BLD AUTO: 1.4 K/UL (ref 1–4.8)
LYMPHOCYTES NFR BLD: 28.8 % (ref 18–48)
MCH RBC QN AUTO: 29.5 PG (ref 27–31)
MCHC RBC AUTO-ENTMCNC: 33.1 G/DL (ref 32–36)
MCV RBC AUTO: 89 FL (ref 82–98)
MONOCYTES # BLD AUTO: 0.5 K/UL (ref 0.3–1)
MONOCYTES NFR BLD: 11.1 % (ref 4–15)
NEUTROPHILS # BLD AUTO: 2.7 K/UL (ref 1.8–7.7)
NEUTROPHILS NFR BLD: 57 % (ref 38–73)
NITRITE UR QL STRIP: NEGATIVE
NONHDLC SERPL-MCNC: 89 MG/DL
NRBC BLD-RTO: 0 /100 WBC
PH UR STRIP: 6 [PH] (ref 5–8)
PLATELET # BLD AUTO: 204 K/UL (ref 150–450)
PMV BLD AUTO: 9.6 FL (ref 9.2–12.9)
POTASSIUM SERPL-SCNC: 4 MMOL/L (ref 3.5–5.1)
PROT SERPL-MCNC: 6.4 G/DL (ref 6–8.4)
PROT UR QL STRIP: NEGATIVE
RBC # BLD AUTO: 3.96 M/UL (ref 4–5.4)
SODIUM SERPL-SCNC: 140 MMOL/L (ref 136–145)
SP GR UR STRIP: 1.02 (ref 1–1.03)
TRIGL SERPL-MCNC: 68 MG/DL (ref 30–150)
TSH SERPL DL<=0.005 MIU/L-ACNC: 0.82 UIU/ML (ref 0.4–4)
URN SPEC COLLECT METH UR: NORMAL
UROBILINOGEN UR STRIP-ACNC: NEGATIVE EU/DL
WBC # BLD AUTO: 4.76 K/UL (ref 3.9–12.7)

## 2024-10-23 PROCEDURE — 84443 ASSAY THYROID STIM HORMONE: CPT | Performed by: NURSE PRACTITIONER

## 2024-10-23 PROCEDURE — 36415 COLL VENOUS BLD VENIPUNCTURE: CPT | Performed by: NURSE PRACTITIONER

## 2024-10-23 PROCEDURE — 82378 CARCINOEMBRYONIC ANTIGEN: CPT | Performed by: PHYSICIAN ASSISTANT

## 2024-10-23 PROCEDURE — 80061 LIPID PANEL: CPT | Performed by: NURSE PRACTITIONER

## 2024-10-23 PROCEDURE — 80053 COMPREHEN METABOLIC PANEL: CPT | Performed by: PHYSICIAN ASSISTANT

## 2024-10-23 PROCEDURE — 85025 COMPLETE CBC W/AUTO DIFF WBC: CPT | Performed by: PHYSICIAN ASSISTANT

## 2024-10-23 PROCEDURE — 81003 URINALYSIS AUTO W/O SCOPE: CPT | Performed by: REGISTERED NURSE

## 2024-10-23 PROCEDURE — 83036 HEMOGLOBIN GLYCOSYLATED A1C: CPT | Performed by: NURSE PRACTITIONER

## 2024-10-24 ENCOUNTER — INFUSION (OUTPATIENT)
Dept: INFUSION THERAPY | Facility: HOSPITAL | Age: 74
End: 2024-10-24
Payer: MEDICARE

## 2024-10-24 ENCOUNTER — OFFICE VISIT (OUTPATIENT)
Dept: HEMATOLOGY/ONCOLOGY | Facility: CLINIC | Age: 74
End: 2024-10-24
Payer: MEDICARE

## 2024-10-24 VITALS
TEMPERATURE: 96 F | OXYGEN SATURATION: 99 % | BODY MASS INDEX: 28.14 KG/M2 | HEIGHT: 66 IN | SYSTOLIC BLOOD PRESSURE: 139 MMHG | DIASTOLIC BLOOD PRESSURE: 79 MMHG | WEIGHT: 175.06 LBS | HEART RATE: 80 BPM

## 2024-10-24 VITALS
SYSTOLIC BLOOD PRESSURE: 136 MMHG | DIASTOLIC BLOOD PRESSURE: 70 MMHG | HEART RATE: 82 BPM | TEMPERATURE: 99 F | BODY MASS INDEX: 28.14 KG/M2 | RESPIRATION RATE: 18 BRPM | HEIGHT: 66 IN | WEIGHT: 175.06 LBS

## 2024-10-24 DIAGNOSIS — C78.6 MALIGNANT NEOPLASM METASTATIC TO OMENTUM: ICD-10-CM

## 2024-10-24 DIAGNOSIS — D84.821 IMMUNODEFICIENCY SECONDARY TO CHEMOTHERAPY: ICD-10-CM

## 2024-10-24 DIAGNOSIS — K59.09 OTHER CONSTIPATION: ICD-10-CM

## 2024-10-24 DIAGNOSIS — T45.1X5A IMMUNODEFICIENCY SECONDARY TO CHEMOTHERAPY: ICD-10-CM

## 2024-10-24 DIAGNOSIS — R53.0 NEOPLASTIC MALIGNANT RELATED FATIGUE: ICD-10-CM

## 2024-10-24 DIAGNOSIS — C18.2 MALIGNANT NEOPLASM OF ASCENDING COLON: Primary | ICD-10-CM

## 2024-10-24 DIAGNOSIS — R68.89 COLD SENSITIVITY: ICD-10-CM

## 2024-10-24 DIAGNOSIS — D84.81 IMMUNODEFICIENCY SECONDARY TO NEOPLASM: ICD-10-CM

## 2024-10-24 DIAGNOSIS — Z79.899 IMMUNODEFICIENCY SECONDARY TO CHEMOTHERAPY: ICD-10-CM

## 2024-10-24 DIAGNOSIS — D49.9 IMMUNODEFICIENCY SECONDARY TO NEOPLASM: ICD-10-CM

## 2024-10-24 PROCEDURE — 3288F FALL RISK ASSESSMENT DOCD: CPT | Mod: CPTII,S$GLB,, | Performed by: INTERNAL MEDICINE

## 2024-10-24 PROCEDURE — 99999 PR PBB SHADOW E&M-EST. PATIENT-LVL III: CPT | Mod: PBBFAC,,, | Performed by: INTERNAL MEDICINE

## 2024-10-24 PROCEDURE — 3008F BODY MASS INDEX DOCD: CPT | Mod: CPTII,S$GLB,, | Performed by: INTERNAL MEDICINE

## 2024-10-24 PROCEDURE — 25000003 PHARM REV CODE 250: Performed by: INTERNAL MEDICINE

## 2024-10-24 PROCEDURE — 96416 CHEMO PROLONG INFUSE W/PUMP: CPT

## 2024-10-24 PROCEDURE — 1125F AMNT PAIN NOTED PAIN PRSNT: CPT | Mod: CPTII,S$GLB,, | Performed by: INTERNAL MEDICINE

## 2024-10-24 PROCEDURE — G2211 COMPLEX E/M VISIT ADD ON: HCPCS | Mod: S$GLB,,, | Performed by: INTERNAL MEDICINE

## 2024-10-24 PROCEDURE — 3044F HG A1C LEVEL LT 7.0%: CPT | Mod: CPTII,S$GLB,, | Performed by: INTERNAL MEDICINE

## 2024-10-24 PROCEDURE — 3075F SYST BP GE 130 - 139MM HG: CPT | Mod: CPTII,S$GLB,, | Performed by: INTERNAL MEDICINE

## 2024-10-24 PROCEDURE — 1101F PT FALLS ASSESS-DOCD LE1/YR: CPT | Mod: CPTII,S$GLB,, | Performed by: INTERNAL MEDICINE

## 2024-10-24 PROCEDURE — 3078F DIAST BP <80 MM HG: CPT | Mod: CPTII,S$GLB,, | Performed by: INTERNAL MEDICINE

## 2024-10-24 PROCEDURE — 99215 OFFICE O/P EST HI 40 MIN: CPT | Mod: S$GLB,,, | Performed by: INTERNAL MEDICINE

## 2024-10-24 PROCEDURE — 96413 CHEMO IV INFUSION 1 HR: CPT

## 2024-10-24 PROCEDURE — 63600175 PHARM REV CODE 636 W HCPCS: Mod: JW,JG | Performed by: INTERNAL MEDICINE

## 2024-10-24 PROCEDURE — 96367 TX/PROPH/DG ADDL SEQ IV INF: CPT

## 2024-10-24 RX ORDER — PROCHLORPERAZINE EDISYLATE 5 MG/ML
5 INJECTION INTRAMUSCULAR; INTRAVENOUS ONCE AS NEEDED
Status: DISCONTINUED | OUTPATIENT
Start: 2024-10-24 | End: 2024-10-24 | Stop reason: HOSPADM

## 2024-10-24 RX ORDER — HEPARIN 100 UNIT/ML
500 SYRINGE INTRAVENOUS
Status: CANCELLED | OUTPATIENT
Start: 2024-10-24

## 2024-10-24 RX ORDER — HEPARIN 100 UNIT/ML
500 SYRINGE INTRAVENOUS
Status: DISCONTINUED | OUTPATIENT
Start: 2024-10-24 | End: 2024-10-24 | Stop reason: HOSPADM

## 2024-10-24 RX ORDER — HEPARIN 100 UNIT/ML
500 SYRINGE INTRAVENOUS
Status: CANCELLED | OUTPATIENT
Start: 2024-10-25

## 2024-10-24 RX ORDER — SODIUM CHLORIDE 0.9 % (FLUSH) 0.9 %
10 SYRINGE (ML) INJECTION
Status: CANCELLED | OUTPATIENT
Start: 2024-10-24

## 2024-10-24 RX ORDER — EPINEPHRINE 0.3 MG/.3ML
0.3 INJECTION SUBCUTANEOUS ONCE AS NEEDED
Status: CANCELLED | OUTPATIENT
Start: 2024-10-24

## 2024-10-24 RX ORDER — PROCHLORPERAZINE EDISYLATE 5 MG/ML
5 INJECTION INTRAMUSCULAR; INTRAVENOUS ONCE AS NEEDED
Status: CANCELLED | OUTPATIENT
Start: 2024-10-24

## 2024-10-24 RX ORDER — SODIUM CHLORIDE 0.9 % (FLUSH) 0.9 %
10 SYRINGE (ML) INJECTION
Status: DISCONTINUED | OUTPATIENT
Start: 2024-10-24 | End: 2024-10-24 | Stop reason: HOSPADM

## 2024-10-24 RX ORDER — SODIUM CHLORIDE 0.9 % (FLUSH) 0.9 %
10 SYRINGE (ML) INJECTION
Status: CANCELLED | OUTPATIENT
Start: 2024-10-25

## 2024-10-24 RX ORDER — PROCHLORPERAZINE EDISYLATE 5 MG/ML
5 INJECTION INTRAMUSCULAR; INTRAVENOUS ONCE AS NEEDED
Status: CANCELLED | OUTPATIENT
Start: 2024-10-25

## 2024-10-24 RX ORDER — DIPHENHYDRAMINE HYDROCHLORIDE 50 MG/ML
50 INJECTION INTRAMUSCULAR; INTRAVENOUS ONCE AS NEEDED
Status: CANCELLED | OUTPATIENT
Start: 2024-10-24

## 2024-10-24 RX ORDER — EPINEPHRINE 0.3 MG/.3ML
0.3 INJECTION SUBCUTANEOUS ONCE AS NEEDED
Status: DISCONTINUED | OUTPATIENT
Start: 2024-10-24 | End: 2024-10-24 | Stop reason: HOSPADM

## 2024-10-24 RX ORDER — DIPHENHYDRAMINE HYDROCHLORIDE 50 MG/ML
50 INJECTION INTRAMUSCULAR; INTRAVENOUS ONCE AS NEEDED
Status: DISCONTINUED | OUTPATIENT
Start: 2024-10-24 | End: 2024-10-24 | Stop reason: HOSPADM

## 2024-10-24 RX ADMIN — DEXAMETHASONE SODIUM PHOSPHATE 0.25 MG: 4 INJECTION, SOLUTION INTRA-ARTICULAR; INTRALESIONAL; INTRAMUSCULAR; INTRAVENOUS; SOFT TISSUE at 01:10

## 2024-10-24 RX ADMIN — SODIUM CHLORIDE 365 MG: 9 INJECTION, SOLUTION INTRAVENOUS at 01:10

## 2024-10-24 RX ADMIN — SODIUM CHLORIDE: 9 INJECTION, SOLUTION INTRAVENOUS at 12:10

## 2024-10-24 RX ADMIN — FLUOROURACIL 4440 MG: 50 INJECTION, SOLUTION INTRAVENOUS at 02:10

## 2024-10-24 NOTE — NURSING
1154  pt here for MVASI, 5FU pump, accompanied by spouse, no new complaints or concerns, reports tolerating treatment; discussed treatment plan for today, all questions answered and pt agrees to proceed

## 2024-10-24 NOTE — PLAN OF CARE
1423  Infusion completed, pt tolerated; CADD pump connected, infusion w/out issue; reviewed home care of port site/dressing; instructed to remain well hydrated; discussed when to contact MD, when to report to ED; pt and spouse verbalized understanding of all discussed and to report for pump d/c on Sat 10/26/24 at 1215

## 2024-10-24 NOTE — PROGRESS NOTES
MEDICAL ONCOLOGY - ESTABLISHED PATIENT VISIT    Reason for visit: colon adenocarcinoma    Best Contact Phone Number(s): [phone] (home)      Cancer/Stage/TNM:    Cancer Staging   Colon adenocarcinoma  Staging form: Colon and Rectum, AJCC 8th Edition  - Pathologic stage from 1/22/2024: Stage IVC (pT4a, pN2b, cM1c) - Signed by Minna Menendez CNS on 2/13/2024       Oncology History   Colon adenocarcinoma   12/21/2023 Tumor Markers    Patient's tumor was tested for the following markers: CEA.                                              Results of the tumor marker test revealed 3.3     12/21/2023 Procedure    Colonoscopy  Cecal polyp removed with jumbo forceps, 3 mm  Multiple ascending colon polyps ranging in size from 5 to 12 mm - semi-pedunculated removed with hot snare  Hepatic flexure mass identified - central ulceration, concerning for malignancy, 3 cm, not obstructing, this was biopsied - tattoo placed distal to mass  Sigmoid diverticular disease     12/21/2023 Tumor Markers    Patient's tumor was tested for the following markers: CEA.                                              Results of the tumor marker test revealed 3.3     12/22/2023 Imaging Significant Findings    CT CAP  Evaluation of the colon is somewhat limited as there is significant colonic stool and oral contrast material has not opacified the large bowel.  There does appear to be some abnormal thickening of the walls of the proximal right colon and a patient with a known history of colonic malignancy.  There is some soft tissue density external to the walls of the colon on the right pericolic gutter which could represent peritoneal nodularity versus subserosal extent of tumor.  Slightly more distal to this area there is a 2nd area of irregularity of the walls of the colon, difficult to fully evaluate if this is related to the colonic walls versus stool with central fat.     Two hypodensities in the liver, the larger measuring 0.8 cm, too  small to accurately characterize on the basis of this examination.  Attention on follow-up.     No pulmonary nodules are seen.     Cholecystectomy.     12/29/2023 Initial Diagnosis    Hepatic flexure colon adenocarcinoma  MMR intact     1/22/2024 Cancer Staged    Staging form: Colon and Rectum, AJCC 8th Edition  - Pathologic stage from 1/22/2024: Stage IVC (pT4a, pN2b, cM1c)     8/27/2024 Tumor Markers    Patient's tumor was tested for the following markers: CEA.                                              Results of the tumor marker test revealed 2.3      Malignant neoplasm of ascending colon   2/12/2024 Initial Diagnosis    Malignant neoplasm of ascending colon     2/26/2024 -  Chemotherapy    Treatment Summary   Plan Name: OP GI [id number] (oxaliplatin leucovorin fluorouracil) with bevacizumab Q2W  Treatment Goal: Palliative  Status: Active  Start Date: 2/26/2024  End Date: 2/14/2025 (Planned)  Provider: Kemar Zaragoza MD  Chemotherapy: oxaliplatin (ELOXATIN) 150 mg in dextrose 5 % (D5W) 595 mL chemo infusion, 157 mg, Intravenous, Clinic/HOD 1 time, 8 of 8 cycles  Administration: 150 mg (2/26/2024), 150 mg (3/11/2024), 150 mg (3/25/2024), 150 mg (4/8/2024), 150 mg (4/22/2024), 150 mg (5/6/2024), 150 mg (5/20/2024), 150 mg (6/3/2024)  fluorouracil (ADRUCIL) 2,400 mg/m2 = 4,440 mg in sodium chloride 0.9% 100 mL chemo infusion, 2,400 mg/m2 = 4,440 mg, Intravenous, Clinic/HOD 1 time, 16 of 24 cycles  Administration: 4,440 mg (2/26/2024), 4,440 mg (3/11/2024), 4,440 mg (3/25/2024), 4,440 mg (4/8/2024), 4,440 mg (4/22/2024), 4,440 mg (5/6/2024), 4,440 mg (5/20/2024), 4,440 mg (6/3/2024), 4,440 mg (6/17/2024), 4,440 mg (7/3/2024), 4,440 mg (7/16/2024), 4,440 mg (7/31/2024), 4,440 mg (8/13/2024), 4,440 mg (8/28/2024), 4,440 mg (10/10/2024)  bevacizumab-awwb (MVASI) 5 mg/kg = 365 mg in sodium chloride 0.9% 100 mL infusion, 5 mg/kg = 365 mg, Intravenous, [facility]/\[facility] [place] [facility]\"" 1 time, 14 of 22 cycles  Administration: 365 mg  (2/26/2024), 365 mg (3/11/2024), 365 mg (3/25/2024), 365 mg (4/8/2024), 365 mg (4/22/2024), 365 mg (5/6/2024), 365 mg (5/20/2024), 365 mg (6/3/2024), 365 mg (6/17/2024), 365 mg (7/3/2024), 365 mg (7/16/2024), 365 mg (7/31/2024), 365 mg (8/13/2024), 365 mg (10/24/2024)     8/27/2024 Tumor Markers    Patient's tumor was tested for the following markers: CEA.                                              Results of the tumor marker test revealed 2.3           Interim History:   74 y.o. female with LUANNE, bipolar, HLD who presents prior to cycle 16 FOLFOX + bevacizumab for her metastatic ascending colon cancer, now on maintenance 5-FU + Bevacizumab.  She had a diagnostic lap with Dr. Contreras with findings of known peritoneal nodules; biopsies confirmed metastatic adenocarcinoma.  She had a discussion with him about potentially not proceeding with CRS/HIPEC and agrees with continuing maintenance chemo.  She feels very well.  She does report having taste changes and mild cold sensitivity. Stable mild neuropathy to the feet. No falls. Overall, feeling fairly well.     ECOG 0. Presents with her     ROS:   Review of Systems   Constitutional:  Negative for chills, fever and malaise/fatigue.   HENT:  Negative for congestion and sore throat.    Respiratory:  Negative for shortness of breath.    Cardiovascular:  Negative for chest pain, palpitations and leg swelling.   Gastrointestinal:  Negative for blood in stool, constipation, diarrhea and nausea.   Genitourinary:  Negative for hematuria.   Musculoskeletal:  Negative for back pain, falls and myalgias.   Skin:  Negative for rash.   Neurological:  Positive for tingling. Negative for dizziness, weakness and headaches.       Past Medical History:   Past Medical History:   Diagnosis Date    Anxiety     Arthritis     Bipolar 1 disorder     Bursitis of right hip     GI bleed due to NSAIDs     Multinodular goiter 11/15/2021    Sacroiliitis     right side    Sleep apnea          Past Surgical History:   Past Surgical History:   Procedure Laterality Date    ANKLE FRACTURE SURGERY Left 2001    BLADDER SUSPENSION      CARPAL TUNNEL RELEASE Bilateral     CHOLECYSTECTOMY      Laparoscopic    COLONOSCOPY      COLONOSCOPY N/A 12/21/2023    Procedure: COLONOSCOPY;  Surgeon: Alberto Albright MD;  Location: [facility];  Service: Endoscopy;  Laterality: N/A;    DIAGNOSTIC LAPAROSCOPY N/A 9/20/2024    Procedure: LAPAROSCOPY, DIAGNOSTIC;  Surgeon: Benjy Contreras MD;  Location: [address];  Service: General;  Laterality: N/A;    ESOPHAGOGASTRODUODENOSCOPY N/A 07/29/2021    Procedure: EGD (ESOPHAGOGASTRODUODENOSCOPY);  Surgeon: Susan Mcclain MD;  Location: [facility];  Service: Endoscopy;  Laterality: N/A;    EYE SURGERY      FOOT ARTHRODESIS Right 05/03/2023    Procedure: FUSION, FOOT;  Surgeon: Lauri Alejandra DPM;  Location: [facility];  Service: Podiatry;  Laterality: Right;  mini c-arm, Arthrex dowel bone graft harvester, locking plate and screws festus notified cc    HYSTERECTOMY  1986    vaginal prolapse    INJECTION OF ANESTHETIC AGENT AROUND MEDIAL BRANCH NERVES INNERVATING CERVICAL FACET JOINT Bilateral 05/27/2022    Procedure: CERVICAL MEDIAL BRANCH NERVE BLOCK (C3-4,C4-5);  Surgeon: Mamie Roman MD;  Location: [facility];  Service: Pain Management;  Laterality: Bilateral;    INJECTION OF ANESTHETIC AGENT AROUND MEDIAL BRANCH NERVES INNERVATING LUMBAR FACET JOINT Right 02/05/2021    Procedure: LUMBAR FACET JOINT BLOCK (L3-4,L4-5,L5-S1);  Surgeon: Mamie Roman MD;  Location: [facility];  Service: Pain Management;  Laterality: Right;    INJECTION OF ANESTHETIC AGENT AROUND MEDIAL BRANCH NERVES INNERVATING LUMBAR FACET JOINT Right 04/30/2021    Procedure: LUMBAR FACET JOINT BLOCK (L3-4,L4-5,L5-S1);  Surgeon: Mamie Roman MD;  Location: [facility];  Service: Pain Management;  Laterality: Right;    INJECTION OF ANESTHETIC AGENT INTO SACROILIAC JOINT Right 12/04/2020    Procedure:  SACROILIAC JOINT INJECTION;  Surgeon: Mamie Roman MD;  Location: [facility];  Service: Pain Management;  Laterality: Right;    INJECTION OF JOINT Right 2020    Procedure: GREATER TROCHANTERIC BURSA INJECTION;  Surgeon: Mamie Roamn MD;  Location: [facility];  Service: Pain Management;  Laterality: Right;    LAPAROSCOPIC RIGHT COLON RESECTION N/A 2024    Procedure: COLECTOMY, RIGHT, LAPAROSCOPIC (eras low, lithotomy);  Surgeon: Alberto Albright MD;  [facility];  Service: Colon and Rectal;  Laterality: N/A;    NASAL SEPTUM SURGERY      RECTAL PROLAPSE REPAIR      TONSILLECTOMY          Family History:   Family History   Problem Relation Name Age of Onset    No Known Problems Maternal Aunt Rubio Glasgow     Colon cancer Maternal Uncle Joshua     Colon cancer Maternal Uncle Yovani     Colon cancer Maternal Uncle Konstantin     No Known Problems Paternal Aunt Vero     Esophageal cancer Paternal Uncle Nat Garcia     Heart attack Maternal Grandmother Elmira     Leukemia Maternal Grandfather manish Pang     No Known Problems Paternal Grandmother Lizett     Stroke Paternal Grandfather Nat Sr         Social History:   Social History     Tobacco Use    Smoking status: Former     Current packs/day: 0.00     Average packs/day: 1 pack/day for 41.7 years (41.7 ttl pk-yrs)     Types: Cigarettes     Start date: 3/30/1982     Quit date: 2023     Years since quittin.8    Smokeless tobacco: Never   Substance Use Topics    Alcohol use: Yes     Comment: Socially-2 or 3 times a year-6 or 7 drinks        I have reviewed and updated the patient's past medical, surgical, family and social histories.    Allergies:   Review of patient's allergies indicates:   Allergen Reactions    Nsaids (non-steroidal anti-inflammatory drug) Other (See Comments)     Gastrointestinal bleeding requiring blood transfusion    Demerol [meperidine] Rash        Medications:   Current Outpatient Medications   Medication  "Sig Dispense Refill    albuterol (PROVENTIL/VENTOLIN HFA) 90 mcg/actuation inhaler inhale 2 puffs into the lungs every 6 hours as needed for wheezing. 18 g 1    aspirin (ECOTRIN) 81 MG EC tablet Take 81 mg by mouth nightly.      dexAMETHasone (DECADRON) 4 MG Tab Take 2 tablets (8 mg total) by mouth once daily only on days 2, 3 and 4 of each chemotherapy cycle. (Patient taking differently: Take 2 tablets (8 mg total) by mouth once daily only on days 2, 3 and 4 of each chemotherapy cycle.    Every other week for 3 days) 40 tablet 0    EScitalopram oxalate (LEXAPRO) 20 MG tablet Take 1 tablet (20 mg total) by mouth once daily. 30 tablet 3    lamoTRIgine (LAMICTAL) 150 MG Tab Take 2 tablets (300 mg total) by mouth every evening. 180 tablet 1    LIDOcaine-prilocaine (EMLA) cream Apply topically as needed (place to port site 45-60 minutes prior to chemotherapy). 30 g 5    ondansetron (ZOFRAN-ODT) 8 MG TbDL dissolve 1 tablet (8 mg total) under the tongue every 6 (six) hours as needed (nausea). 30 tablet 5    oxyCODONE (ROXICODONE) 5 MG immediate release tablet Take 1 tablet (5 mg total) by mouth every 6 (six) hours as needed for Pain. 11 tablet 0    pantoprazole (PROTONIX) 40 MG tablet Take 1 tablet (40 mg total) by mouth once daily. 90 tablet 3    prochlorperazine (COMPAZINE) 10 MG tablet Take 1 tablet (10 mg total) by mouth every 6 (six) hours as needed (nausea). 30 tablet 2    QUEtiapine (SEROQUEL) 200 MG Tab Take 1 tablet (200 mg total) by mouth every evening. 30 tablet 3    rosuvastatin (CRESTOR) 10 MG tablet Take 1 tablet (10 mg total) by mouth once daily. 90 tablet 3    traMADoL (ULTRAM) 50 mg tablet Take 1 tablet (50 mg total) by mouth every 6 (six) hours as needed for Pain. 5 tablet 0     No current facility-administered medications for this visit.        Physical Exam:   /79 (BP Location: Left arm, Patient Position: Sitting)   Pulse 80   Temp 96.4 °F (35.8 °C) (Temporal)   Ht 5' 6" (1.676 m)   Wt 79.4 " kg (175 lb 0.7 oz)   SpO2 99%   BMI 28.25 kg/m²           Physical Exam  Vitals reviewed.   Constitutional:       General: She is not in acute distress.     Appearance: Normal appearance. She is normal weight. She is not ill-appearing, toxic-appearing or diaphoretic.   HENT:      Head: Normocephalic and atraumatic.      Right Ear: External ear normal.      Left Ear: External ear normal.      Nose: Nose normal.      Mouth/Throat:      Pharynx: Oropharynx is clear.   Eyes:      General: No scleral icterus.     Conjunctiva/sclera: Conjunctivae normal.   Cardiovascular:      Rate and Rhythm: Normal rate.   Pulmonary:      Effort: Pulmonary effort is normal. No respiratory distress.   Chest:      Comments: RCW port  Abdominal:      General: There is no distension.   Skin:     General: Skin is warm and dry.      Coloration: Skin is not jaundiced or pale.      Findings: No bruising, erythema or rash.   Neurological:      Mental Status: She is alert and oriented to person, place, and time. Mental status is at baseline.      Motor: No weakness.      Gait: Gait normal.   Psychiatric:         Mood and Affect: Mood normal.         Labs:     I have reviewed the pertinent labs.     Imaging:    CT CAP - 8/9/24:    Impression:     Postoperative changes of right colon resection for patient's known colonic malignancy.  Persistent small prominent 1 cm lymph node adjacent to the aorta at the level of the renal vessels, unchanged over multiple prior examinations.  No free air or obstruction.     Tiny 3 mm hypodensity in the right hepatic lobe, likely a small cyst or hemangioma.     Mild thickening of the 2nd portion of the duodenum which could suggest an inflammatory/infectious process.  Clinical correlation suggested.     Constipation.       Path:   1/22/24 - R Hemicolectomy   Final Pathologic Diagnosis     THE DIAGNOSES REMAIN THE SAME.  THIS CORRECTED REPORT IS ISSUED TO CHANGE M STATUS to reflect tumor in the omentum.  This change  is discussed with Dr. RANJANA Albright via phone, 2/1/2024.    1. Colon, right and terminal ileum (right hemicolectomy with EN bloc abdominal wall resection):  Invasive adenocarcinoma.  See cancer synoptic report     2. Omentum (resection):    Positive for carcinoma    CORRECT SYNOPTIC AND M STATUS  Cancer synoptic report  - Procedure: Right hemicolectomy with EN bloc abdominal wall resection  - Tumor site:  Ascending  - Histologic type:  Adenocarcinoma  - Histologic grade:  G2, moderately differentiated.  See comment.  COMMENT:  While the majority of the tumor consists of well formed glands, a significant proportion includes abortive glands, single file infiltration, and malignant cells with signet ring cell morphology. The tumor has an insidious pattern of  infiltration with tumor present far from the advancing front of the tumor.  - Tumor size:  2.0 x 2.0 x 1.0 cm  - Tumor extent:  Invades visceral peritoneum, multifocal  - Macroscopic perforation: Not identified  - Lymphovascular invasion:  Present, extensive.  Small vessel, large vessel, intra and extramural.  - Perineural invasion:  Not identified  - Tumor budding score:  High (>10), multifocal single cell infiltration  - Treatment effect: No known pre-surgical therapy    Margins  Margin involved by invasive carcinoma, radial (slide 1C)  All margins negative for dysplasia.    pTNM stage classification (AJCC 8th edition)  pT Category  pT4a:  Tumor invades through the visceral peritoneum    pN Category  pN2b:  7 or more regional lymph nodes are positive  Number of positive lymph nodes:  19  Number of lymph nodes examined: 26  Number of tumor deposits:  4    pM Category  pM1c:  Metastasis to the peritoneal surface of the omentum.    Additional findings:  - Sessile serrated polyp, no cytologic dysplasia, 1.1 cm at ileocecal valve  - Tubulovillous adenoma, 1.0 cm, proximal ascending  - Tubular adenoma, 0.4 cm, mid ascending  - Tubular adenoma, 0.4, distal ascending  -  Tubular adenoma, 0.6 cm, distal ascending  - Submucosal lipoma, 2.0 cm  - Appendix with no specific histopathologic changes    Ancillary studies  Immunohistochemistry for mismatch repair proteins performed on prior biopsy material (ANN-, 12/21/23): pMMR.         Assessment:       1. Malignant neoplasm of ascending colon    2. 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 Neoplastic malignant related fatigue    4. Immunodeficiency secondary to neoplasm    5. Immunodeficiency secondary to chemotherapy    6. Cold sensitivity    7. Other constipation                Plan:        # Colon cancer   Mrs. Maguire is a 74 y.o. female who presents for management of her metastatic colon cancer. She underwent a R hemicolectomy with en-bloc abdominal wall resection on 1/22/24 with Dr. Albright. Pathology revealed pT4a N2b disease with 19/26 positive LNs and omentum positive for carcinoma.    We had an in-depth conversation during our initial consult re: her diagnosis and treatment options. Reviewed recommendation for IV systemic therapy for at least 6 months. Plan for FOLFOX + bevacizumab to be given every 2 weeks. Briefly reviewed side effects of treatment. Handouts provided. Port placed 2/21/24.     PGX - DPYD normal metabolizer, [id number] intermediate metabolizer   Dexamethasone, zofran, compazine and lidocaine sent to pharmacy previously. Reviewed admin instructions.     Pending response, she may be a candidate for CRS/HIPEC with surgical oncology team. Continue to evaluate and re-start discussion if still without radiographic evidence of disease after CT on cycle 8.    CT CAP after cycle 4 shows no clear evidence of disease.  CT CAP after cycle 8 shows no clear evidence of disease.  CT CAP after cycle 12 shows no clear evidence of disease.    Tolerance of chemotherapy has been excellent.    Underwent diagnostic laparoscopy on 9/20/24 with PCI of 14.  Discussed with Dr. Contreras and with patient that options include  continuing systemic therapy at this time vs CRS/HIPEC.  We are in agreement that with her volume of disease and histology, likelihood of CRS/HIPEC being beneficial for survival is not high.    I have reviewed the CBC and CMP, adequate for treatment   After discussion, we will proceed with continued maintenance chemotherapy. Proceed with maintenance 5-FU + Felisa cycle 16 today.    RTC in 2 weeks for next cycle. Will plan to repeat scans after cycle 18.    Cold sensitivity/neoplasm related fatigue:  2/2 chemotherapy. Stopped oxaliplatin beginning with cycle 9 to prevent persistent paresthesias.  Mild persistent paresthesias at this time.  Will monitor. She is taking Cymbalta but not finding very helpful. Worse towards end of day.     Constipation/Gas:   Continue 2 stool softeners a day with Miralax daily to have regular bowel movements.   This has helped.    Continue Miralax to avoid more explosive BM.    Pte and family members displayed understanding of the above encounter and treatment plan. All thoughtful questions were answered to their satisfaction. Pte was advised to notify the care team or proceed to the ER if signs and symptoms worsen.     Visit today included increased complexity associated with the care of the episodic problem chemotherapy  addressed and managing the longitudinal care of the patient due to the serious and/or complex managed problem(s) GI malignancies/cancer    Trey Hammer MD  Hematology/Oncology PGY-V            Med Onc Chart Routing      Follow up with physician 4 weeks. for 5-FU + Felisa   Follow up with KRISTEN 2 weeks. for 5-FU + Felisa   Infusion scheduling note    Injection scheduling note    Labs CBC, CMP, CEA and urinalysis   Scheduling:  Preferred lab:  Lab interval: every 2 weeks  u/a every other visit   Imaging    Pharmacy appointment    Other referrals

## 2024-10-26 ENCOUNTER — INFUSION (OUTPATIENT)
Dept: INFUSION THERAPY | Facility: HOSPITAL | Age: 74
End: 2024-10-26
Payer: MEDICARE

## 2024-10-26 VITALS
TEMPERATURE: 98 F | DIASTOLIC BLOOD PRESSURE: 66 MMHG | HEART RATE: 81 BPM | RESPIRATION RATE: 18 BRPM | SYSTOLIC BLOOD PRESSURE: 140 MMHG

## 2024-10-26 DIAGNOSIS — C18.2 MALIGNANT NEOPLASM OF ASCENDING COLON: Primary | ICD-10-CM

## 2024-10-26 PROCEDURE — A4216 STERILE WATER/SALINE, 10 ML: HCPCS | Performed by: INTERNAL MEDICINE

## 2024-10-26 PROCEDURE — 25000003 PHARM REV CODE 250: Performed by: INTERNAL MEDICINE

## 2024-10-26 PROCEDURE — 63600175 PHARM REV CODE 636 W HCPCS: Performed by: INTERNAL MEDICINE

## 2024-10-26 RX ORDER — PROCHLORPERAZINE EDISYLATE 5 MG/ML
5 INJECTION INTRAMUSCULAR; INTRAVENOUS ONCE AS NEEDED
Status: DISCONTINUED | OUTPATIENT
Start: 2024-10-26 | End: 2024-10-26 | Stop reason: HOSPADM

## 2024-10-26 RX ORDER — SODIUM CHLORIDE 0.9 % (FLUSH) 0.9 %
10 SYRINGE (ML) INJECTION
Status: DISCONTINUED | OUTPATIENT
Start: 2024-10-26 | End: 2024-10-26 | Stop reason: HOSPADM

## 2024-10-26 RX ORDER — HEPARIN 100 UNIT/ML
500 SYRINGE INTRAVENOUS
Status: DISCONTINUED | OUTPATIENT
Start: 2024-10-26 | End: 2024-10-26 | Stop reason: HOSPADM

## 2024-10-26 RX ADMIN — HEPARIN 500 UNITS: 100 SYRINGE at 12:10

## 2024-10-26 RX ADMIN — Medication 10 ML: at 12:10

## 2024-11-04 ENCOUNTER — OFFICE VISIT (OUTPATIENT)
Dept: INTERNAL MEDICINE | Facility: CLINIC | Age: 74
End: 2024-11-04
Payer: MEDICARE

## 2024-11-04 VITALS
HEIGHT: 66 IN | SYSTOLIC BLOOD PRESSURE: 128 MMHG | HEART RATE: 134 BPM | WEIGHT: 173.06 LBS | BODY MASS INDEX: 27.81 KG/M2 | DIASTOLIC BLOOD PRESSURE: 72 MMHG | OXYGEN SATURATION: 98 %

## 2024-11-04 DIAGNOSIS — I70.0 AORTIC ATHEROSCLEROSIS: ICD-10-CM

## 2024-11-04 DIAGNOSIS — C18.2 MALIGNANT NEOPLASM OF ASCENDING COLON: ICD-10-CM

## 2024-11-04 DIAGNOSIS — E04.2 MULTINODULAR GOITER: ICD-10-CM

## 2024-11-04 DIAGNOSIS — J41.0 SIMPLE CHRONIC BRONCHITIS: ICD-10-CM

## 2024-11-04 DIAGNOSIS — G62.0 NEUROPATHY DUE TO CHEMOTHERAPEUTIC DRUG: ICD-10-CM

## 2024-11-04 DIAGNOSIS — F31.9 BIPOLAR DEPRESSION: ICD-10-CM

## 2024-11-04 DIAGNOSIS — Z00.00 ENCOUNTER FOR PREVENTIVE HEALTH EXAMINATION: Primary | ICD-10-CM

## 2024-11-04 DIAGNOSIS — Z11.59 NEED FOR HEPATITIS C SCREENING TEST: ICD-10-CM

## 2024-11-04 DIAGNOSIS — T45.1X5A NEUROPATHY DUE TO CHEMOTHERAPEUTIC DRUG: ICD-10-CM

## 2024-11-04 DIAGNOSIS — Z00.00 ENCOUNTER FOR MEDICARE ANNUAL WELLNESS EXAM: ICD-10-CM

## 2024-11-04 PROCEDURE — 1160F RVW MEDS BY RX/DR IN RCRD: CPT | Mod: CPTII,S$GLB,, | Performed by: NURSE PRACTITIONER

## 2024-11-04 PROCEDURE — 99999 PR PBB SHADOW E&M-EST. PATIENT-LVL V: CPT | Mod: PBBFAC,,, | Performed by: NURSE PRACTITIONER

## 2024-11-04 PROCEDURE — 1158F ADVNC CARE PLAN TLK DOCD: CPT | Mod: CPTII,S$GLB,, | Performed by: NURSE PRACTITIONER

## 2024-11-04 PROCEDURE — G0439 PPPS, SUBSEQ VISIT: HCPCS | Mod: S$GLB,,, | Performed by: NURSE PRACTITIONER

## 2024-11-04 PROCEDURE — 3074F SYST BP LT 130 MM HG: CPT | Mod: CPTII,S$GLB,, | Performed by: NURSE PRACTITIONER

## 2024-11-04 PROCEDURE — 3078F DIAST BP <80 MM HG: CPT | Mod: CPTII,S$GLB,, | Performed by: NURSE PRACTITIONER

## 2024-11-04 PROCEDURE — 1125F AMNT PAIN NOTED PAIN PRSNT: CPT | Mod: CPTII,S$GLB,, | Performed by: NURSE PRACTITIONER

## 2024-11-04 PROCEDURE — 1170F FXNL STATUS ASSESSED: CPT | Mod: CPTII,S$GLB,, | Performed by: NURSE PRACTITIONER

## 2024-11-04 PROCEDURE — 3044F HG A1C LEVEL LT 7.0%: CPT | Mod: CPTII,S$GLB,, | Performed by: NURSE PRACTITIONER

## 2024-11-04 PROCEDURE — 1159F MED LIST DOCD IN RCRD: CPT | Mod: CPTII,S$GLB,, | Performed by: NURSE PRACTITIONER

## 2024-11-04 NOTE — PATIENT INSTRUCTIONS
Counseling and Referral of Other Preventative  (Italic type indicates deductible and co-insurance are waived)    Patient Name: Karin Maguire  Today's Date: 11/4/2024    Health Maintenance       Date Due Completion Date    Hepatitis C Screening Never done ---    Shingles Vaccine (1 of 2) Never done ---    LDCT Lung Screen Never done ---    Influenza Vaccine (1) 09/01/2024 1/8/2024    COVID-19 Vaccine (4 - 2024-25 season) 09/01/2024 5/12/2022    Mammogram 11/09/2024 11/9/2023    DEXA Scan 11/16/2024 11/16/2022    Lipid Panel 10/23/2025 10/23/2024    High Dose Statin 11/04/2025 11/4/2024    Colorectal Cancer Screening 12/21/2028 12/21/2023    TETANUS VACCINE 07/15/2031 7/15/2021        Orders Placed This Encounter   Procedures    Hepatitis C antibody       The following information is provided to all patients.  This information is to help you find resources for any of the problems found today that may be affecting your health:                  Living healthy guide: www.[facility].[place].gov      Understanding Diabetes: www.diabetes.org      Eating healthy: www.cdc.gov/healthyweight      CDC home safety checklist: www.cdc.gov/steadi/patient.html      Agency on Aging: www.goea.[place].[facility]      Alcoholics anonymous (AA): www.aa.org      Physical Activity: www.senia.nih.gov/fo9ecjk      Tobacco use: www.quitwithusla.org

## 2024-11-04 NOTE — PROGRESS NOTES
"          Karin Maguire presented for a  Medicare AWV and comprehensive Health Risk Assessment today. The following components were reviewed and updated:    Medical history  Family History  Social history  Allergies and Current Medications  Health Risk Assessment  Health Maintenance  Care Team         ** See Completed Assessments for Annual Wellness Visit within the encounter summary.**         The following assessments were completed:  Living Situation  CAGE  Depression Screening  Timed Get Up and Go  Whisper Test  Cognitive Function Screening  Nutrition Screening  ADL Screening  PAQ Screening      Opioid documentation:      Patient does not have a current opioid prescription.      Pt filled tramadol 50mg 5 tablets 9/20/204 and oxycodone 5mg 11 tablets 9/10/24 but prior to that had not filled he oxycodone since 1/25/24 20 tablets. She also filled gabapentin 300mg capsules 90 count 2/20245 and 3/2024- but no longer taking it   Vitals:    11/04/24 0735   BP: 128/72   Pulse: (!) 134   SpO2: 98%   Weight: 78.5 kg (173 lb 1 oz)   Height: 5' 6" (1.676 m)     Body mass index is 27.93 kg/m².  Physical Exam  Vitals and nursing note reviewed.   Constitutional:       Appearance: Normal appearance.   HENT:      Head: Normocephalic and atraumatic.   Cardiovascular:      Rate and Rhythm: Normal rate and regular rhythm.   Pulmonary:      Effort: Pulmonary effort is normal. No respiratory distress.      Breath sounds: Normal breath sounds.   Abdominal:      General: There is no distension.      Palpations: Abdomen is soft.   Musculoskeletal:         General: No swelling. Normal range of motion.      Comments: Has constant movements   Skin:     General: Skin is warm and dry.      Capillary Refill: Capillary refill takes less than 2 seconds.      Findings: No bruising.   Neurological:      General: No focal deficit present.      Mental Status: She is alert.   Psychiatric:         Mood and Affect: Mood normal.         Behavior: " Behavior normal.         Thought Content: Thought content normal.         Judgment: Judgment normal.               Diagnoses and health risks identified today and associated recommendations/orders:    Problem List Items Addressed This Visit      Encounter for preventive health examination    -  Primary   Encounter for Medicare annual wellness exam       She had a full set of labs done 10/2024 and will be seen for routine in 4/2025 for 6 months check up- we discussed at last visit her shingrix and flu but she would like to talk to heme onc about this and is seeing them this week so will see what they say. UTD with other vaccines. She had colonoscopy 1/2024 which is when her colon ca was found. Also Mammo is scheduled 11/11/24- dexa was done 2022 no bone loss    Multinodular goiter    Aortic atherosclerosis> 8/9/2024 CT chest abd and pelvis> Mild calcified atheromatous disease of the aorta and its branch vessels   Pt is on asa and crestor     Bipolar depression  Follows with Dr Zaman with psych and is stable on lexapro, lamictal and seroquel     Simple chronic bronchitis> still smokes 2-3 a day- only with her sister. Has frequent chest abd pelvis ct for colon ca surveillance so no need for LDCT- encouraged to quit all together- has albuterol that she rarely uses     Malignant neoplasm of ascending colon> follows with [facility]am [facility] onc at [facility]-0 has treatments every 2 weeks- completed 17 and after 18 will have repeat imaging-0 no end in future will change meds once ct shows no longer working she is tolerating VERY well     Neuropathy due to chemotherapeutic drug> has numbness and tingling to bilateral hands from chemo   Tried gabapentin without relief                 Need for hepatitis C screening test        Relevant Orders    Hepatitis C antibody> will add to next set of labs              Provided Karin with a 5-10 year written screening schedule and personal prevention plan. Recommendations were developed using  the USPSTF age appropriate recommendations. Education, counseling, and referrals were provided as needed. After Visit Summary printed and given to patient which includes a list of additional screenings\tests needed.    No follow-ups on file.    Lottie Huogh NP          I offered to discuss advanced care planning, including how to pick a person who would make decisions for you if you were unable to make them for yourself, called a health care power of , and what kind of decisions you might make such as use of life sustaining treatments such as ventilators and tube feeding when faced with a life limiting illness recorded on a living will that they will need to know. (How you want to be cared for as you near the end of your natural life)     X  Patient has advanced directives written and agrees to provide copies to the institution. She report that she completed LW and POA with . She has her  and her eldest son Paco listed as her POA. Will bring in copies if and when she can remember

## 2024-11-05 ENCOUNTER — LAB VISIT (OUTPATIENT)
Dept: LAB | Facility: HOSPITAL | Age: 74
End: 2024-11-05
Attending: PHYSICIAN ASSISTANT
Payer: MEDICARE

## 2024-11-05 DIAGNOSIS — Z11.59 NEED FOR HEPATITIS C SCREENING TEST: ICD-10-CM

## 2024-11-05 DIAGNOSIS — C18.2 MALIGNANT NEOPLASM OF ASCENDING COLON: ICD-10-CM

## 2024-11-05 LAB
ALBUMIN SERPL BCP-MCNC: 3.5 G/DL (ref 3.5–5.2)
ALP SERPL-CCNC: 71 U/L (ref 40–150)
ALT SERPL W/O P-5'-P-CCNC: 15 U/L (ref 10–44)
ANION GAP SERPL CALC-SCNC: 7 MMOL/L (ref 8–16)
AST SERPL-CCNC: 18 U/L (ref 10–40)
BASOPHILS # BLD AUTO: 0.03 K/UL (ref 0–0.2)
BASOPHILS NFR BLD: 0.6 % (ref 0–1.9)
BILIRUB SERPL-MCNC: 0.3 MG/DL (ref 0.1–1)
BILIRUB UR QL STRIP: NEGATIVE
BUN SERPL-MCNC: 15 MG/DL (ref 8–23)
CALCIUM SERPL-MCNC: 9.4 MG/DL (ref 8.7–10.5)
CEA SERPL-MCNC: 2.3 NG/ML (ref 0–5)
CHLORIDE SERPL-SCNC: 104 MMOL/L (ref 95–110)
CLARITY UR: CLEAR
CO2 SERPL-SCNC: 26 MMOL/L (ref 23–29)
COLOR UR: YELLOW
CREAT SERPL-MCNC: 0.8 MG/DL (ref 0.5–1.4)
DIFFERENTIAL METHOD BLD: ABNORMAL
EOSINOPHIL # BLD AUTO: 0.1 K/UL (ref 0–0.5)
EOSINOPHIL NFR BLD: 2.4 % (ref 0–8)
ERYTHROCYTE [DISTWIDTH] IN BLOOD BY AUTOMATED COUNT: 15.8 % (ref 11.5–14.5)
EST. GFR  (NO RACE VARIABLE): >60 ML/MIN/1.73 M^2
GLUCOSE SERPL-MCNC: 96 MG/DL (ref 70–110)
GLUCOSE UR QL STRIP: NEGATIVE
HCT VFR BLD AUTO: 35.5 % (ref 37–48.5)
HCV AB SERPL QL IA: NORMAL
HGB BLD-MCNC: 11.5 G/DL (ref 12–16)
HGB UR QL STRIP: NEGATIVE
HYALINE CASTS #/AREA URNS LPF: 3 /LPF
IMM GRANULOCYTES # BLD AUTO: 0.02 K/UL (ref 0–0.04)
IMM GRANULOCYTES NFR BLD AUTO: 0.4 % (ref 0–0.5)
KETONES UR QL STRIP: NEGATIVE
LEUKOCYTE ESTERASE UR QL STRIP: ABNORMAL
LYMPHOCYTES # BLD AUTO: 1.6 K/UL (ref 1–4.8)
LYMPHOCYTES NFR BLD: 31.8 % (ref 18–48)
MCH RBC QN AUTO: 29.6 PG (ref 27–31)
MCHC RBC AUTO-ENTMCNC: 32.4 G/DL (ref 32–36)
MCV RBC AUTO: 91 FL (ref 82–98)
MICROSCOPIC COMMENT: ABNORMAL
MONOCYTES # BLD AUTO: 0.7 K/UL (ref 0.3–1)
MONOCYTES NFR BLD: 13.7 % (ref 4–15)
NEUTROPHILS # BLD AUTO: 2.6 K/UL (ref 1.8–7.7)
NEUTROPHILS NFR BLD: 51.1 % (ref 38–73)
NITRITE UR QL STRIP: NEGATIVE
NON-SQ EPI CELLS #/AREA URNS HPF: 1 /HPF
NRBC BLD-RTO: 0 /100 WBC
PH UR STRIP: 8 [PH] (ref 5–8)
PLATELET # BLD AUTO: 239 K/UL (ref 150–450)
PMV BLD AUTO: 9.8 FL (ref 9.2–12.9)
POTASSIUM SERPL-SCNC: 5 MMOL/L (ref 3.5–5.1)
PROT SERPL-MCNC: 6.4 G/DL (ref 6–8.4)
PROT UR QL STRIP: NEGATIVE
RBC # BLD AUTO: 3.89 M/UL (ref 4–5.4)
RBC #/AREA URNS HPF: 1 /HPF (ref 0–4)
SODIUM SERPL-SCNC: 137 MMOL/L (ref 136–145)
SP GR UR STRIP: 1.02 (ref 1–1.03)
SQUAMOUS #/AREA URNS HPF: 1 /HPF
URN SPEC COLLECT METH UR: ABNORMAL
UROBILINOGEN UR STRIP-ACNC: NEGATIVE EU/DL
WBC # BLD AUTO: 5.1 K/UL (ref 3.9–12.7)
WBC #/AREA URNS HPF: 4 /HPF (ref 0–5)

## 2024-11-05 PROCEDURE — 82378 CARCINOEMBRYONIC ANTIGEN: CPT | Performed by: PHYSICIAN ASSISTANT

## 2024-11-05 PROCEDURE — 86803 HEPATITIS C AB TEST: CPT | Performed by: NURSE PRACTITIONER

## 2024-11-05 PROCEDURE — 80053 COMPREHEN METABOLIC PANEL: CPT | Performed by: PHYSICIAN ASSISTANT

## 2024-11-05 PROCEDURE — 81000 URINALYSIS NONAUTO W/SCOPE: CPT | Performed by: REGISTERED NURSE

## 2024-11-05 PROCEDURE — 85025 COMPLETE CBC W/AUTO DIFF WBC: CPT | Performed by: PHYSICIAN ASSISTANT

## 2024-11-05 PROCEDURE — 36415 COLL VENOUS BLD VENIPUNCTURE: CPT | Performed by: NURSE PRACTITIONER

## 2024-11-06 ENCOUNTER — OFFICE VISIT (OUTPATIENT)
Dept: HEMATOLOGY/ONCOLOGY | Facility: CLINIC | Age: 74
End: 2024-11-06
Payer: MEDICARE

## 2024-11-06 ENCOUNTER — INFUSION (OUTPATIENT)
Dept: INFUSION THERAPY | Facility: HOSPITAL | Age: 74
End: 2024-11-06
Payer: MEDICARE

## 2024-11-06 VITALS
WEIGHT: 174.19 LBS | DIASTOLIC BLOOD PRESSURE: 65 MMHG | RESPIRATION RATE: 20 BRPM | BODY MASS INDEX: 27.99 KG/M2 | HEART RATE: 78 BPM | SYSTOLIC BLOOD PRESSURE: 136 MMHG | TEMPERATURE: 99 F | OXYGEN SATURATION: 98 % | HEIGHT: 66 IN

## 2024-11-06 VITALS
HEART RATE: 70 BPM | HEIGHT: 66 IN | DIASTOLIC BLOOD PRESSURE: 70 MMHG | RESPIRATION RATE: 20 BRPM | TEMPERATURE: 98 F | SYSTOLIC BLOOD PRESSURE: 139 MMHG | WEIGHT: 174.19 LBS | BODY MASS INDEX: 27.99 KG/M2 | OXYGEN SATURATION: 98 %

## 2024-11-06 DIAGNOSIS — M54.50 ACUTE LOW BACK PAIN, UNSPECIFIED BACK PAIN LATERALITY, UNSPECIFIED WHETHER SCIATICA PRESENT: ICD-10-CM

## 2024-11-06 DIAGNOSIS — Z79.899 IMMUNODEFICIENCY SECONDARY TO CHEMOTHERAPY: ICD-10-CM

## 2024-11-06 DIAGNOSIS — D84.81 IMMUNODEFICIENCY SECONDARY TO NEOPLASM: ICD-10-CM

## 2024-11-06 DIAGNOSIS — R14.3 EXCESSIVE GAS: ICD-10-CM

## 2024-11-06 DIAGNOSIS — R68.89 COLD SENSITIVITY: ICD-10-CM

## 2024-11-06 DIAGNOSIS — D84.821 IMMUNODEFICIENCY SECONDARY TO CHEMOTHERAPY: ICD-10-CM

## 2024-11-06 DIAGNOSIS — K59.09 OTHER CONSTIPATION: ICD-10-CM

## 2024-11-06 DIAGNOSIS — C18.2 MALIGNANT NEOPLASM OF ASCENDING COLON: Primary | ICD-10-CM

## 2024-11-06 DIAGNOSIS — D49.9 IMMUNODEFICIENCY SECONDARY TO NEOPLASM: ICD-10-CM

## 2024-11-06 DIAGNOSIS — T45.1X5A IMMUNODEFICIENCY SECONDARY TO CHEMOTHERAPY: ICD-10-CM

## 2024-11-06 DIAGNOSIS — R53.0 NEOPLASTIC MALIGNANT RELATED FATIGUE: ICD-10-CM

## 2024-11-06 DIAGNOSIS — C78.6 MALIGNANT NEOPLASM METASTATIC TO OMENTUM: ICD-10-CM

## 2024-11-06 PROCEDURE — G2211 COMPLEX E/M VISIT ADD ON: HCPCS | Mod: S$GLB,,, | Performed by: PHYSICIAN ASSISTANT

## 2024-11-06 PROCEDURE — 3008F BODY MASS INDEX DOCD: CPT | Mod: CPTII,S$GLB,, | Performed by: PHYSICIAN ASSISTANT

## 2024-11-06 PROCEDURE — 25000003 PHARM REV CODE 250: Performed by: PHYSICIAN ASSISTANT

## 2024-11-06 PROCEDURE — 3288F FALL RISK ASSESSMENT DOCD: CPT | Mod: CPTII,S$GLB,, | Performed by: PHYSICIAN ASSISTANT

## 2024-11-06 PROCEDURE — 3078F DIAST BP <80 MM HG: CPT | Mod: CPTII,S$GLB,, | Performed by: PHYSICIAN ASSISTANT

## 2024-11-06 PROCEDURE — 3044F HG A1C LEVEL LT 7.0%: CPT | Mod: CPTII,S$GLB,, | Performed by: PHYSICIAN ASSISTANT

## 2024-11-06 PROCEDURE — 3075F SYST BP GE 130 - 139MM HG: CPT | Mod: CPTII,S$GLB,, | Performed by: PHYSICIAN ASSISTANT

## 2024-11-06 PROCEDURE — 63600175 PHARM REV CODE 636 W HCPCS: Mod: JG | Performed by: PHYSICIAN ASSISTANT

## 2024-11-06 PROCEDURE — 96413 CHEMO IV INFUSION 1 HR: CPT

## 2024-11-06 PROCEDURE — 96367 TX/PROPH/DG ADDL SEQ IV INF: CPT

## 2024-11-06 PROCEDURE — 99215 OFFICE O/P EST HI 40 MIN: CPT | Mod: S$GLB,,, | Performed by: PHYSICIAN ASSISTANT

## 2024-11-06 PROCEDURE — 1125F AMNT PAIN NOTED PAIN PRSNT: CPT | Mod: CPTII,S$GLB,, | Performed by: PHYSICIAN ASSISTANT

## 2024-11-06 PROCEDURE — 96416 CHEMO PROLONG INFUSE W/PUMP: CPT

## 2024-11-06 PROCEDURE — 1101F PT FALLS ASSESS-DOCD LE1/YR: CPT | Mod: CPTII,S$GLB,, | Performed by: PHYSICIAN ASSISTANT

## 2024-11-06 PROCEDURE — 99999 PR PBB SHADOW E&M-EST. PATIENT-LVL IV: CPT | Mod: PBBFAC,,, | Performed by: PHYSICIAN ASSISTANT

## 2024-11-06 PROCEDURE — 1160F RVW MEDS BY RX/DR IN RCRD: CPT | Mod: CPTII,S$GLB,, | Performed by: PHYSICIAN ASSISTANT

## 2024-11-06 PROCEDURE — 1159F MED LIST DOCD IN RCRD: CPT | Mod: CPTII,S$GLB,, | Performed by: PHYSICIAN ASSISTANT

## 2024-11-06 RX ORDER — HEPARIN 100 UNIT/ML
500 SYRINGE INTRAVENOUS
Status: DISCONTINUED | OUTPATIENT
Start: 2024-11-06 | End: 2024-11-06 | Stop reason: HOSPADM

## 2024-11-06 RX ORDER — SODIUM CHLORIDE 0.9 % (FLUSH) 0.9 %
10 SYRINGE (ML) INJECTION
Status: CANCELLED | OUTPATIENT
Start: 2024-11-08

## 2024-11-06 RX ORDER — PROCHLORPERAZINE EDISYLATE 5 MG/ML
5 INJECTION INTRAMUSCULAR; INTRAVENOUS ONCE AS NEEDED
Status: CANCELLED | OUTPATIENT
Start: 2024-11-06

## 2024-11-06 RX ORDER — SODIUM CHLORIDE 0.9 % (FLUSH) 0.9 %
10 SYRINGE (ML) INJECTION
Status: DISCONTINUED | OUTPATIENT
Start: 2024-11-06 | End: 2024-11-06 | Stop reason: HOSPADM

## 2024-11-06 RX ORDER — SODIUM CHLORIDE 0.9 % (FLUSH) 0.9 %
10 SYRINGE (ML) INJECTION
Status: CANCELLED | OUTPATIENT
Start: 2024-11-06

## 2024-11-06 RX ORDER — DIPHENHYDRAMINE HYDROCHLORIDE 50 MG/ML
50 INJECTION INTRAMUSCULAR; INTRAVENOUS ONCE AS NEEDED
Status: DISCONTINUED | OUTPATIENT
Start: 2024-11-06 | End: 2024-11-06 | Stop reason: HOSPADM

## 2024-11-06 RX ORDER — EPINEPHRINE 0.3 MG/.3ML
0.3 INJECTION SUBCUTANEOUS ONCE AS NEEDED
Status: CANCELLED | OUTPATIENT
Start: 2024-11-06

## 2024-11-06 RX ORDER — DIPHENHYDRAMINE HYDROCHLORIDE 50 MG/ML
50 INJECTION INTRAMUSCULAR; INTRAVENOUS ONCE AS NEEDED
Status: CANCELLED | OUTPATIENT
Start: 2024-11-06

## 2024-11-06 RX ORDER — PROCHLORPERAZINE EDISYLATE 5 MG/ML
5 INJECTION INTRAMUSCULAR; INTRAVENOUS ONCE AS NEEDED
Status: DISCONTINUED | OUTPATIENT
Start: 2024-11-06 | End: 2024-11-06 | Stop reason: HOSPADM

## 2024-11-06 RX ORDER — EPINEPHRINE 0.3 MG/.3ML
0.3 INJECTION SUBCUTANEOUS ONCE AS NEEDED
Status: DISCONTINUED | OUTPATIENT
Start: 2024-11-06 | End: 2024-11-06 | Stop reason: HOSPADM

## 2024-11-06 RX ORDER — PROCHLORPERAZINE EDISYLATE 5 MG/ML
5 INJECTION INTRAMUSCULAR; INTRAVENOUS ONCE AS NEEDED
Status: CANCELLED | OUTPATIENT
Start: 2024-11-08

## 2024-11-06 RX ORDER — HEPARIN 100 UNIT/ML
500 SYRINGE INTRAVENOUS
Status: CANCELLED | OUTPATIENT
Start: 2024-11-08

## 2024-11-06 RX ORDER — HEPARIN 100 UNIT/ML
500 SYRINGE INTRAVENOUS
Status: CANCELLED | OUTPATIENT
Start: 2024-11-06

## 2024-11-06 RX ADMIN — FLUOROURACIL 4440 MG: 50 INJECTION, SOLUTION INTRAVENOUS at 11:11

## 2024-11-06 RX ADMIN — DEXAMETHASONE SODIUM PHOSPHATE 0.25 MG: 4 INJECTION, SOLUTION INTRA-ARTICULAR; INTRALESIONAL; INTRAMUSCULAR; INTRAVENOUS; SOFT TISSUE at 10:11

## 2024-11-06 RX ADMIN — SODIUM CHLORIDE: 9 INJECTION, SOLUTION INTRAVENOUS at 09:11

## 2024-11-06 RX ADMIN — BEVACIZUMAB-AWWB 365 MG: 400 INJECTION, SOLUTION INTRAVENOUS at 10:11

## 2024-11-06 NOTE — PROGRESS NOTES
MEDICAL ONCOLOGY - ESTABLISHED PATIENT VISIT    Reason for visit: colon adenocarcinoma    Best Contact Phone Number(s): [phone] (home)      Cancer/Stage/TNM:    Cancer Staging   Colon adenocarcinoma  Staging form: Colon and Rectum, AJCC 8th Edition  - Pathologic stage from 1/22/2024: Stage IVC (pT4a, pN2b, cM1c) - Signed by Minna Menendez CNS on 2/13/2024       Oncology History   Colon adenocarcinoma   12/21/2023 Tumor Markers    Patient's tumor was tested for the following markers: CEA.                                              Results of the tumor marker test revealed 3.3     12/21/2023 Procedure    Colonoscopy  Cecal polyp removed with jumbo forceps, 3 mm  Multiple ascending colon polyps ranging in size from 5 to 12 mm - semi-pedunculated removed with hot snare  Hepatic flexure mass identified - central ulceration, concerning for malignancy, 3 cm, not obstructing, this was biopsied - tattoo placed distal to mass  Sigmoid diverticular disease     12/21/2023 Tumor Markers    Patient's tumor was tested for the following markers: CEA.                                              Results of the tumor marker test revealed 3.3     12/22/2023 Imaging Significant Findings    CT CAP  Evaluation of the colon is somewhat limited as there is significant colonic stool and oral contrast material has not opacified the large bowel.  There does appear to be some abnormal thickening of the walls of the proximal right colon and a patient with a known history of colonic malignancy.  There is some soft tissue density external to the walls of the colon on the right pericolic gutter which could represent peritoneal nodularity versus subserosal extent of tumor.  Slightly more distal to this area there is a 2nd area of irregularity of the walls of the colon, difficult to fully evaluate if this is related to the colonic walls versus stool with central fat.     Two hypodensities in the liver, the larger measuring 0.8 cm, too  small to accurately characterize on the basis of this examination.  Attention on follow-up.     No pulmonary nodules are seen.     Cholecystectomy.     12/29/2023 Initial Diagnosis    Hepatic flexure colon adenocarcinoma  MMR intact     1/22/2024 Cancer Staged    Staging form: Colon and Rectum, AJCC 8th Edition  - Pathologic stage from 1/22/2024: Stage IVC (pT4a, pN2b, cM1c)     8/27/2024 Tumor Markers    Patient's tumor was tested for the following markers: CEA.                                              Results of the tumor marker test revealed 2.3      Malignant neoplasm of ascending colon   2/12/2024 Initial Diagnosis    Malignant neoplasm of ascending colon     2/26/2024 -  Chemotherapy    Treatment Summary   Plan Name: OP GI [id number] (oxaliplatin leucovorin fluorouracil) with bevacizumab Q2W  Treatment Goal: Palliative  Status: Active  Start Date: 2/26/2024  End Date: 2/14/2025 (Planned)  Provider: Kemar Zaragoza MD  Chemotherapy: oxaliplatin (ELOXATIN) 150 mg in dextrose 5 % (D5W) 595 mL chemo infusion, 157 mg, Intravenous, Clinic/HOD 1 time, 8 of 8 cycles  Administration: 150 mg (2/26/2024), 150 mg (3/11/2024), 150 mg (3/25/2024), 150 mg (4/8/2024), 150 mg (4/22/2024), 150 mg (5/6/2024), 150 mg (5/20/2024), 150 mg (6/3/2024)  fluorouracil (ADRUCIL) 2,400 mg/m2 = 4,440 mg in sodium chloride 0.9% 100 mL chemo infusion, 2,400 mg/m2 = 4,440 mg, Intravenous, Clinic/HOD 1 time, 16 of 24 cycles  Administration: 4,440 mg (2/26/2024), 4,440 mg (3/11/2024), 4,440 mg (3/25/2024), 4,440 mg (4/8/2024), 4,440 mg (4/22/2024), 4,440 mg (5/6/2024), 4,440 mg (5/20/2024), 4,440 mg (6/3/2024), 4,440 mg (6/17/2024), 4,440 mg (7/3/2024), 4,440 mg (7/16/2024), 4,440 mg (7/31/2024), 4,440 mg (8/13/2024), 4,440 mg (8/28/2024), 4,440 mg (10/24/2024), 4,440 mg (10/10/2024)  bevacizumab-awwb (MVASI) 5 mg/kg = 365 mg in sodium chloride 0.9% 100 mL infusion, 5 mg/kg = 365 mg, Intravenous, [facility] [place] 1 time, 14 of 22  cycles  Administration: 365 mg (2/26/2024), 365 mg (3/11/2024), 365 mg (3/25/2024), 365 mg (4/8/2024), 365 mg (4/22/2024), 365 mg (5/6/2024), 365 mg (5/20/2024), 365 mg (6/3/2024), 365 mg (6/17/2024), 365 mg (7/3/2024), 365 mg (7/16/2024), 365 mg (7/31/2024), 365 mg (8/13/2024), 365 mg (10/24/2024)     8/27/2024 Tumor Markers    Patient's tumor was tested for the following markers: CEA.                                              Results of the tumor marker test revealed 2.3           Interim History:   74 y.o. female with LUANNE, bipolar, HLD who presents prior to cycle 17 FOLFOX + bevacizumab for her metastatic ascending colon cancer, now on maintenance 5-FU + Bevacizumab.  She had a diagnostic lap with Dr. Contreras with findings of known peritoneal nodules; biopsies confirmed metastatic adenocarcinoma.  She had a discussion with him about potentially not proceeding with CRS/HIPEC and agrees with continuing maintenance chemo.     Overall, she is doing fairly well. She does report having back pain that is worse in the morning but improves throughout the day. She does have to take pain medication sometimes to help relieve the discomfort. She continues to function and walk without complication. No urinary or stool incontinence. This has been present for about 3 weeks and she denies any recent trauma. Taste changes are stable and she is eating. Has mild cold sensitivity. Stable mild neuropathy to the feet. No falls. Overall, feeling fairly well.     ECOG 0. Presents with her     ROS:   Review of Systems   Constitutional:  Negative for chills, fever and malaise/fatigue.   HENT:  Negative for congestion and sore throat.    Respiratory:  Negative for shortness of breath.    Cardiovascular:  Negative for chest pain, palpitations and leg swelling.   Gastrointestinal:  Negative for blood in stool, constipation, diarrhea and nausea.   Genitourinary:  Negative for hematuria.   Musculoskeletal:  Negative for back pain,  falls and myalgias.   Skin:  Negative for rash.   Neurological:  Positive for tingling. Negative for dizziness, weakness and headaches.       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    Sacroiliitis     right side    Sleep apnea     can not tolerate cpap        Past Surgical History:   Past Surgical History:   Procedure Laterality Date    ANKLE FRACTURE SURGERY Left 2001    BLADDER SUSPENSION      CARPAL TUNNEL RELEASE Bilateral     CHOLECYSTECTOMY      Laparoscopic    COLONOSCOPY      COLONOSCOPY N/A 12/21/2023    Procedure: COLONOSCOPY;  Surgeon: Alberto Albright MD;  Location: [facility];  Service: Endoscopy;  Laterality: N/A;    DIAGNOSTIC LAPAROSCOPY N/A 9/20/2024    Procedure: LAPAROSCOPY, DIAGNOSTIC;  Surgeon: Benjy Contreras MD;  Location: [address];  Service: General;  Laterality: N/A;    ESOPHAGOGASTRODUODENOSCOPY N/A 07/29/2021    Procedure: EGD (ESOPHAGOGASTRODUODENOSCOPY);  Surgeon: Susan Mcclain MD;  Location: [facility];  Service: Endoscopy;  Laterality: N/A;    EYE SURGERY      FOOT ARTHRODESIS Right 05/03/2023    Procedure: FUSION, FOOT;  Surgeon: Lauri Alejandra DPM;  Location: [facility];  Service: Podiatry;  Laterality: Right;  mini c-arm, Arthrex dowel bone graft harvester, locking plate and screws festus notified cc    HYSTERECTOMY  1986    vaginal prolapse    INJECTION OF ANESTHETIC AGENT AROUND MEDIAL BRANCH NERVES INNERVATING CERVICAL FACET JOINT Bilateral 05/27/2022    Procedure: CERVICAL MEDIAL BRANCH NERVE BLOCK (C3-4,C4-5);  Surgeon: Mamie Roman MD;  Location: [facility];  Service: Pain Management;  Laterality: Bilateral;    INJECTION OF ANESTHETIC AGENT AROUND MEDIAL BRANCH NERVES  INNERVATING LUMBAR FACET JOINT Right 02/05/2021    Procedure: LUMBAR FACET JOINT BLOCK (L3-4,L4-5,L5-S1);  Surgeon: Mamie Roman MD;  Location: [facility];  Service: Pain Management;  Laterality: Right;    INJECTION OF ANESTHETIC AGENT AROUND MEDIAL BRANCH NERVES INNERVATING LUMBAR FACET JOINT Right 04/30/2021    Procedure: LUMBAR FACET JOINT BLOCK (L3-4,L4-5,L5-S1);  Surgeon: Mamie Roman MD;  Location: [facility];  Service: Pain Management;  Laterality: Right;    INJECTION OF ANESTHETIC AGENT INTO SACROILIAC JOINT Right 12/04/2020    Procedure: SACROILIAC JOINT INJECTION;  Surgeon: Mamie Roman MD;  Location: [facility];  Service: Pain Management;  Laterality: Right;    INJECTION OF JOINT Right 12/04/2020    Procedure: GREATER TROCHANTERIC BURSA INJECTION;  Surgeon: Mamie Romna MD;  Location: [facility];  Service: Pain Management;  Laterality: Right;    LAPAROSCOPIC RIGHT COLON RESECTION N/A 1/22/2024    Procedure: COLECTOMY, RIGHT, LAPAROSCOPIC (eras low, lithotomy);  Surgeon: Alberto Albright MD;  Location: [facility];  Service: Colon and Rectal;  Laterality: N/A;    NASAL SEPTUM SURGERY      RECTAL PROLAPSE REPAIR      TONSILLECTOMY          Family History:   Family History   Problem Relation Name Age of Onset    No Known Problems Maternal Aunt Rubio Glasgow     Colon cancer Maternal Uncle Kalpeshwis     Colon cancer Maternal Uncle Yovani     Colon cancer Maternal Uncle Konstantin     No Known Problems Paternal Aunt Vero     Esophageal cancer Paternal Uncle Nat Jr     Heart attack Maternal Grandmother Elmira     Leukemia Maternal Grandfather manish Pang     No Known Problems Paternal Grandmother Lizett     Stroke Paternal Grandfather Denaelorenanorman Sr         Social History:   Social History     Tobacco Use    Smoking status: Former     Current packs/day: 0.00     Average packs/day: 1 pack/day for 41.7 years (41.7 ttl pk-yrs)     Types: Cigarettes     Start date: 3/30/1982     Quit date: 12/23/2023      Years since quittin.8    Smokeless tobacco: Never   Substance Use Topics    Alcohol use: Yes     Comment: Socially-2 or 3 times a year-6 or 7 drinks        I have reviewed and updated the patient's past medical, surgical, family and social histories.    Allergies:   Review of patient's allergies indicates:   Allergen Reactions    Nsaids (non-steroidal anti-inflammatory drug) Other (See Comments)     Gastrointestinal bleeding requiring blood transfusion    Demerol [meperidine] Rash        Medications:   Current Outpatient Medications   Medication Sig Dispense Refill    albuterol (PROVENTIL/VENTOLIN HFA) 90 mcg/actuation inhaler inhale 2 puffs into the lungs every 6 hours as needed for wheezing. 18 g 1    aspirin (ECOTRIN) 81 MG EC tablet Take 81 mg by mouth nightly.      dexAMETHasone (DECADRON) 4 MG Tab Take 2 tablets (8 mg total) by mouth once daily only on days 2, 3 and 4 of each chemotherapy cycle. 40 tablet 0    EScitalopram oxalate (LEXAPRO) 20 MG tablet Take 1 tablet (20 mg total) by mouth once daily. 30 tablet 3    lamoTRIgine (LAMICTAL) 150 MG Tab Take 2 tablets (300 mg total) by mouth every evening. 180 tablet 1    LIDOcaine-prilocaine (EMLA) cream Apply topically as needed (place to port site 45-60 minutes prior to chemotherapy). 30 g 5    ondansetron (ZOFRAN-ODT) 8 MG TbDL dissolve 1 tablet (8 mg total) under the tongue every 6 (six) hours as needed (nausea). 30 tablet 5    oxyCODONE (ROXICODONE) 5 MG immediate release tablet Take 1 tablet (5 mg total) by mouth every 6 (six) hours as needed for Pain. 11 tablet 0    pantoprazole (PROTONIX) 40 MG tablet Take 1 tablet (40 mg total) by mouth once daily. 90 tablet 3    prochlorperazine (COMPAZINE) 10 MG tablet Take 1 tablet (10 mg total) by mouth every 6 (six) hours as needed (nausea). 30 tablet 2    QUEtiapine (SEROQUEL) 200 MG Tab Take 1 tablet (200 mg total) by mouth every evening. 30 tablet 3    rosuvastatin (CRESTOR) 10 MG tablet Take 1 tablet (10 mg  "total) by mouth once daily. 90 tablet 3    traMADoL (ULTRAM) 50 mg tablet Take 1 tablet (50 mg total) by mouth every 6 (six) hours as needed for Pain. 5 tablet 0     No current facility-administered medications for this visit.        Physical Exam:   /65   Pulse 78   Temp 98.8 °F (37.1 °C) (Oral)   Resp 20   Ht 5' 6" (1.676 m)   Wt 79 kg (174 lb 2.6 oz)   SpO2 98%   BMI 28.11 kg/m²           Physical Exam  Vitals reviewed.   Constitutional:       General: She is not in acute distress.     Appearance: Normal appearance. She is normal weight. She is not ill-appearing, toxic-appearing or diaphoretic.   HENT:      Head: Normocephalic and atraumatic.      Right Ear: External ear normal.      Left Ear: External ear normal.      Nose: Nose normal.      Mouth/Throat:      Pharynx: Oropharynx is clear.   Eyes:      General: No scleral icterus.     Conjunctiva/sclera: Conjunctivae normal.   Cardiovascular:      Rate and Rhythm: Normal rate.   Pulmonary:      Effort: Pulmonary effort is normal. No respiratory distress.   Chest:      Comments: RCW port  Abdominal:      General: There is no distension.   Skin:     General: Skin is warm and dry.      Coloration: Skin is not jaundiced or pale.      Findings: No bruising, erythema or rash.   Neurological:      Mental Status: She is alert and oriented to person, place, and time. Mental status is at baseline.      Motor: No weakness.      Gait: Gait normal.   Psychiatric:         Mood and Affect: Mood normal.         Labs:     I have reviewed the pertinent labs.     Imaging:    CT CAP - 8/9/24:    Impression:     Postoperative changes of right colon resection for patient's known colonic malignancy.  Persistent small prominent 1 cm lymph node adjacent to the aorta at the level of the renal vessels, unchanged over multiple prior examinations.  No free air or obstruction.     Tiny 3 mm hypodensity in the right hepatic lobe, likely a small cyst or hemangioma.     Mild " thickening of the 2nd portion of the duodenum which could suggest an inflammatory/infectious process.  Clinical correlation suggested.     Constipation.       Path:   1/22/24 - R Hemicolectomy   Final Pathologic Diagnosis     THE DIAGNOSES REMAIN THE SAME.  THIS CORRECTED REPORT IS ISSUED TO CHANGE M STATUS to reflect tumor in the omentum.  This change is discussed with Dr. RANJANA Albright via phone, 2/1/2024.    1. Colon, right and terminal ileum (right hemicolectomy with EN bloc abdominal wall resection):  Invasive adenocarcinoma.  See cancer synoptic report     2. Omentum (resection):    Positive for carcinoma    CORRECT SYNOPTIC AND M STATUS  Cancer synoptic report  - Procedure: Right hemicolectomy with EN bloc abdominal wall resection  - Tumor site:  Ascending  - Histologic type:  Adenocarcinoma  - Histologic grade:  G2, moderately differentiated.  See comment.  COMMENT:  While the majority of the tumor consists of well formed glands, a significant proportion includes abortive glands, single file infiltration, and malignant cells with signet ring cell morphology. The tumor has an insidious pattern of  infiltration with tumor present far from the advancing front of the tumor.  - Tumor size:  2.0 x 2.0 x 1.0 cm  - Tumor extent:  Invades visceral peritoneum, multifocal  - Macroscopic perforation: Not identified  - Lymphovascular invasion:  Present, extensive.  Small vessel, large vessel, intra and extramural.  - Perineural invasion:  Not identified  - Tumor budding score:  High (>10), multifocal single cell infiltration  - Treatment effect: No known pre-surgical therapy    Margins  Margin involved by invasive carcinoma, radial (slide 1C)  All margins negative for dysplasia.    pTNM stage classification (AJCC 8th edition)  pT Category  pT4a:  Tumor invades through the visceral peritoneum    pN Category  pN2b:  7 or more regional lymph nodes are positive  Number of positive lymph nodes:  19  Number of lymph nodes  examined: 26  Number of tumor deposits:  4    pM Category  pM1c:  Metastasis to the peritoneal surface of the omentum.    Additional findings:  - Sessile serrated polyp, no cytologic dysplasia, 1.1 cm at ileocecal valve  - Tubulovillous adenoma, 1.0 cm, proximal ascending  - Tubular adenoma, 0.4 cm, mid ascending  - Tubular adenoma, 0.4, distal ascending  - Tubular adenoma, 0.6 cm, distal ascending  - Submucosal lipoma, 2.0 cm  - Appendix with no specific histopathologic changes    Ancillary studies  Immunohistochemistry for mismatch repair proteins performed on prior biopsy material (SAS-, 12/21/23): pMMR.         Assessment:       1. Malignant neoplasm of ascending colon    2. 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 Neoplastic malignant related fatigue    4. Immunodeficiency secondary to neoplasm    5. Immunodeficiency secondary to chemotherapy    6. Cold sensitivity    7. Other constipation    8. Excessive gas    9. Acute low back pain, unspecified back pain laterality, unspecified whether sciatica present                  Plan:        # Colon cancer   Mrs. Maguire is a 74 y.o. female who presents for management of her metastatic colon cancer. She underwent a R hemicolectomy with en-bloc abdominal wall resection on 1/22/24 with Dr. Albright. Pathology revealed pT4a N2b disease with 19/26 positive LNs and omentum positive for carcinoma.    We had an in-depth conversation during our initial consult re: her diagnosis and treatment options. Reviewed recommendation for IV systemic therapy for at least 6 months. Plan for FOLFOX + bevacizumab to be given every 2 weeks. Briefly reviewed side effects of treatment. Handouts provided. Port placed 2/21/24.     PGX - DPYD normal metabolizer, [id number] intermediate metabolizer   Dexamethasone, zofran, compazine and lidocaine sent to pharmacy previously. Reviewed admin instructions.     Pending response, she may be a candidate for CRS/HIPEC with surgical oncology  team. Continue to evaluate and re-start discussion if still without radiographic evidence of disease after CT on cycle 8.    CT CAP after cycle 4 shows no clear evidence of disease.  CT CAP after cycle 8 shows no clear evidence of disease.  CT CAP after cycle 12 shows no clear evidence of disease. Underwent diagnostic laparoscopy on 9/20/24 with PCI of 14.  Discussed with Dr. Contreras and with patient that options include continuing systemic therapy at this time vs CRS/HIPEC.  We are in agreement that with her volume of disease and histology, likelihood of CRS/HIPEC being beneficial for survival is not high.    Tolerance of chemotherapy has been excellent. Eating well  I have reviewed the CBC and CMP, adequate for treatment   After discussion, we will proceed with continued maintenance chemotherapy. Proceed with maintenance 5-FU + Felisa cycle 17 today.    RTC in 2 weeks for next cycle. Will plan to repeat scans after cycle 18.    Cold sensitivity/neoplasm related fatigue:  2/2 chemotherapy. Stopped oxaliplatin beginning with cycle 9 to prevent persistent paresthesias.  Mild persistent paresthesias at this time.  Will monitor. She is taking Cymbalta but not finding very helpful. Worse towards end of day.     Constipation/Gas, acute low back pain   Continue 2 stool softeners a day with Miralax daily to have regular bowel movements.   This has helped.    Continue Miralax to avoid more explosive BM.    Fairly controlled with medication when needed. Will monitor for now, as Dr Zaragoza did not think that this was related to the cancer. However, if the pain worsens or changes, we will consider performing imaging studies.     RTC in 2 weeks.     Pte and family members displayed understanding of the above encounter and treatment plan. All thoughtful questions were answered to their satisfaction. Pte was advised to notify the care team or proceed to the ER if signs and symptoms worsen.     Visit today included increased  complexity associated with the care of the episodic problem chemotherapy  addressed and managing the longitudinal care of the patient due to the serious and/or complex managed problem(s) GI malignancies/cancer      DIANE Fonseca, PA-C [facility]  Dept of Hematology/Oncology  RADHA to GI Oncology team           Med Onc Chart Routing  Urgent    Follow up with physician 6 weeks and 4 weeks. Needs lab work, CT CAP and treatment discussion with Dr Zaragoza in 4 weeks if possible with 5-FU/kevin. 5-FU/kevin in 6 weeks with Dr. Zaragoza   Follow up with KRISTEN 2 weeks and 4 weeks. 5-FU/kevin in 2 weeks. Currently scheduled with KRISTEN in 4 weeks but should see Dr Zaragoza to discuss scan results.   Infusion scheduling note    Injection scheduling note    Labs CBC, CMP, CEA and urinalysis   Scheduling:  Preferred lab:  Lab interval: every 2 weeks     Imaging CT chest abdomen pelvis   Please schedule in 4 weeks prior to clinic visit with Dr Zaragoza if possible.   Pharmacy appointment    Other referrals

## 2024-11-06 NOTE — PLAN OF CARE
Pt admitted for C17 D1 MVASI/5FU. Labs reviewed and assessment performed. Lingering neuropathy to hands and feet addressed. Acupuncture  discussed. Pt tolerated tx well. 5FU pump started @ 11:04. Pt to RTC Friday 11/08 for pump DC. Discharged home with

## 2024-11-08 ENCOUNTER — INFUSION (OUTPATIENT)
Dept: INFUSION THERAPY | Facility: HOSPITAL | Age: 74
End: 2024-11-08
Payer: MEDICARE

## 2024-11-08 VITALS
DIASTOLIC BLOOD PRESSURE: 77 MMHG | TEMPERATURE: 98 F | OXYGEN SATURATION: 99 % | SYSTOLIC BLOOD PRESSURE: 172 MMHG | HEART RATE: 72 BPM | RESPIRATION RATE: 18 BRPM

## 2024-11-08 DIAGNOSIS — C18.2 MALIGNANT NEOPLASM OF ASCENDING COLON: Primary | ICD-10-CM

## 2024-11-08 PROCEDURE — 63600175 PHARM REV CODE 636 W HCPCS: Performed by: PHYSICIAN ASSISTANT

## 2024-11-08 PROCEDURE — 25000003 PHARM REV CODE 250: Performed by: PHYSICIAN ASSISTANT

## 2024-11-08 PROCEDURE — A4216 STERILE WATER/SALINE, 10 ML: HCPCS | Performed by: PHYSICIAN ASSISTANT

## 2024-11-08 RX ORDER — PROCHLORPERAZINE EDISYLATE 5 MG/ML
5 INJECTION INTRAMUSCULAR; INTRAVENOUS ONCE AS NEEDED
Status: DISCONTINUED | OUTPATIENT
Start: 2024-11-08 | End: 2024-11-08 | Stop reason: HOSPADM

## 2024-11-08 RX ORDER — HEPARIN 100 UNIT/ML
500 SYRINGE INTRAVENOUS
Status: DISCONTINUED | OUTPATIENT
Start: 2024-11-08 | End: 2024-11-08 | Stop reason: HOSPADM

## 2024-11-08 RX ORDER — SODIUM CHLORIDE 0.9 % (FLUSH) 0.9 %
10 SYRINGE (ML) INJECTION
Status: DISCONTINUED | OUTPATIENT
Start: 2024-11-08 | End: 2024-11-08 | Stop reason: HOSPADM

## 2024-11-08 RX ADMIN — HEPARIN 500 UNITS: 100 SYRINGE at 09:11

## 2024-11-08 RX ADMIN — SODIUM CHLORIDE, PRESERVATIVE FREE 10 ML: 5 INJECTION INTRAVENOUS at 09:11

## 2024-11-08 NOTE — PLAN OF CARE
0915 Patient disconnected from her home infusion pump without incident. Vitals stable. Reservoir volume at 0. Port heparin locked and deaccessed, blood return noted. Patient aware of her next appointment date/time, uses [facility] portal. To contact provider with questions or concerns. D/C ambulatory and stable with her sister.

## 2024-11-18 ENCOUNTER — LAB VISIT (OUTPATIENT)
Dept: LAB | Facility: HOSPITAL | Age: 74
End: 2024-11-18
Attending: INTERNAL MEDICINE
Payer: MEDICARE

## 2024-11-18 DIAGNOSIS — C18.2 MALIGNANT NEOPLASM OF ASCENDING COLON: ICD-10-CM

## 2024-11-18 LAB
ALBUMIN SERPL BCP-MCNC: 3.3 G/DL (ref 3.5–5.2)
ALP SERPL-CCNC: 76 U/L (ref 40–150)
ALT SERPL W/O P-5'-P-CCNC: 11 U/L (ref 10–44)
ANION GAP SERPL CALC-SCNC: 8 MMOL/L (ref 8–16)
AST SERPL-CCNC: 15 U/L (ref 10–40)
BACTERIA #/AREA URNS HPF: ABNORMAL /HPF
BASOPHILS # BLD AUTO: 0.04 K/UL (ref 0–0.2)
BASOPHILS NFR BLD: 0.6 % (ref 0–1.9)
BILIRUB SERPL-MCNC: 0.4 MG/DL (ref 0.1–1)
BILIRUB UR QL STRIP: NEGATIVE
BUN SERPL-MCNC: 9 MG/DL (ref 8–23)
CALCIUM SERPL-MCNC: 10.3 MG/DL (ref 8.7–10.5)
CEA SERPL-MCNC: 2.9 NG/ML (ref 0–5)
CHLORIDE SERPL-SCNC: 105 MMOL/L (ref 95–110)
CLARITY UR: CLEAR
CO2 SERPL-SCNC: 25 MMOL/L (ref 23–29)
COLOR UR: YELLOW
CREAT SERPL-MCNC: 0.8 MG/DL (ref 0.5–1.4)
DIFFERENTIAL METHOD BLD: ABNORMAL
EOSINOPHIL # BLD AUTO: 0.1 K/UL (ref 0–0.5)
EOSINOPHIL NFR BLD: 1.9 % (ref 0–8)
ERYTHROCYTE [DISTWIDTH] IN BLOOD BY AUTOMATED COUNT: 16.2 % (ref 11.5–14.5)
EST. GFR  (NO RACE VARIABLE): >60 ML/MIN/1.73 M^2
GLUCOSE SERPL-MCNC: 112 MG/DL (ref 70–110)
GLUCOSE UR QL STRIP: NEGATIVE
HCT VFR BLD AUTO: 35 % (ref 37–48.5)
HGB BLD-MCNC: 11.7 G/DL (ref 12–16)
HGB UR QL STRIP: NEGATIVE
IMM GRANULOCYTES # BLD AUTO: 0.03 K/UL (ref 0–0.04)
IMM GRANULOCYTES NFR BLD AUTO: 0.4 % (ref 0–0.5)
KETONES UR QL STRIP: NEGATIVE
LEUKOCYTE ESTERASE UR QL STRIP: ABNORMAL
LYMPHOCYTES # BLD AUTO: 1.4 K/UL (ref 1–4.8)
LYMPHOCYTES NFR BLD: 19.2 % (ref 18–48)
MCH RBC QN AUTO: 29.9 PG (ref 27–31)
MCHC RBC AUTO-ENTMCNC: 33.4 G/DL (ref 32–36)
MCV RBC AUTO: 90 FL (ref 82–98)
MICROSCOPIC COMMENT: ABNORMAL
MONOCYTES # BLD AUTO: 1.1 K/UL (ref 0.3–1)
MONOCYTES NFR BLD: 14.6 % (ref 4–15)
NEUTROPHILS # BLD AUTO: 4.6 K/UL (ref 1.8–7.7)
NEUTROPHILS NFR BLD: 63.3 % (ref 38–73)
NITRITE UR QL STRIP: NEGATIVE
NRBC BLD-RTO: 0 /100 WBC
PH UR STRIP: 7 [PH] (ref 5–8)
PLATELET # BLD AUTO: 209 K/UL (ref 150–450)
PMV BLD AUTO: 9.8 FL (ref 9.2–12.9)
POTASSIUM SERPL-SCNC: 4.5 MMOL/L (ref 3.5–5.1)
PROT SERPL-MCNC: 6.8 G/DL (ref 6–8.4)
PROT UR QL STRIP: NEGATIVE
RBC # BLD AUTO: 3.91 M/UL (ref 4–5.4)
SODIUM SERPL-SCNC: 138 MMOL/L (ref 136–145)
SP GR UR STRIP: 1.02 (ref 1–1.03)
SQUAMOUS #/AREA URNS HPF: 10 /HPF
URN SPEC COLLECT METH UR: ABNORMAL
UROBILINOGEN UR STRIP-ACNC: NEGATIVE EU/DL
WBC # BLD AUTO: 7.19 K/UL (ref 3.9–12.7)
WBC #/AREA URNS HPF: 8 /HPF (ref 0–5)

## 2024-11-18 PROCEDURE — 36415 COLL VENOUS BLD VENIPUNCTURE: CPT | Performed by: PHYSICIAN ASSISTANT

## 2024-11-18 PROCEDURE — 85025 COMPLETE CBC W/AUTO DIFF WBC: CPT | Performed by: PHYSICIAN ASSISTANT

## 2024-11-18 PROCEDURE — 80053 COMPREHEN METABOLIC PANEL: CPT | Performed by: PHYSICIAN ASSISTANT

## 2024-11-18 PROCEDURE — 82378 CARCINOEMBRYONIC ANTIGEN: CPT | Performed by: PHYSICIAN ASSISTANT

## 2024-11-18 PROCEDURE — 81000 URINALYSIS NONAUTO W/SCOPE: CPT | Performed by: REGISTERED NURSE

## 2024-11-19 ENCOUNTER — INFUSION (OUTPATIENT)
Dept: INFUSION THERAPY | Facility: HOSPITAL | Age: 74
End: 2024-11-19
Payer: MEDICARE

## 2024-11-19 ENCOUNTER — OFFICE VISIT (OUTPATIENT)
Dept: HEMATOLOGY/ONCOLOGY | Facility: CLINIC | Age: 74
End: 2024-11-19
Payer: MEDICARE

## 2024-11-19 VITALS
DIASTOLIC BLOOD PRESSURE: 76 MMHG | HEART RATE: 80 BPM | BODY MASS INDEX: 27.74 KG/M2 | WEIGHT: 172.63 LBS | SYSTOLIC BLOOD PRESSURE: 140 MMHG | TEMPERATURE: 98 F | HEIGHT: 66 IN

## 2024-11-19 VITALS
DIASTOLIC BLOOD PRESSURE: 59 MMHG | SYSTOLIC BLOOD PRESSURE: 127 MMHG | OXYGEN SATURATION: 98 % | TEMPERATURE: 98 F | RESPIRATION RATE: 16 BRPM | HEART RATE: 83 BPM

## 2024-11-19 DIAGNOSIS — R14.3 EXCESSIVE GAS: ICD-10-CM

## 2024-11-19 DIAGNOSIS — D84.821 IMMUNODEFICIENCY SECONDARY TO CHEMOTHERAPY: ICD-10-CM

## 2024-11-19 DIAGNOSIS — R68.89 COLD SENSITIVITY: ICD-10-CM

## 2024-11-19 DIAGNOSIS — C18.2 MALIGNANT NEOPLASM OF ASCENDING COLON: Primary | ICD-10-CM

## 2024-11-19 DIAGNOSIS — D49.9 IMMUNODEFICIENCY SECONDARY TO NEOPLASM: ICD-10-CM

## 2024-11-19 DIAGNOSIS — C78.6 MALIGNANT NEOPLASM METASTATIC TO OMENTUM: ICD-10-CM

## 2024-11-19 DIAGNOSIS — R53.0 NEOPLASTIC MALIGNANT RELATED FATIGUE: ICD-10-CM

## 2024-11-19 DIAGNOSIS — R09.81 SINUS CONGESTION: ICD-10-CM

## 2024-11-19 DIAGNOSIS — D84.81 IMMUNODEFICIENCY SECONDARY TO NEOPLASM: ICD-10-CM

## 2024-11-19 DIAGNOSIS — T45.1X5A IMMUNODEFICIENCY SECONDARY TO CHEMOTHERAPY: ICD-10-CM

## 2024-11-19 DIAGNOSIS — Z79.899 IMMUNODEFICIENCY SECONDARY TO CHEMOTHERAPY: ICD-10-CM

## 2024-11-19 DIAGNOSIS — R05.1 ACUTE COUGH: ICD-10-CM

## 2024-11-19 DIAGNOSIS — K59.09 OTHER CONSTIPATION: ICD-10-CM

## 2024-11-19 PROCEDURE — 3008F BODY MASS INDEX DOCD: CPT | Mod: CPTII,S$GLB,, | Performed by: PHYSICIAN ASSISTANT

## 2024-11-19 PROCEDURE — 96367 TX/PROPH/DG ADDL SEQ IV INF: CPT

## 2024-11-19 PROCEDURE — 96413 CHEMO IV INFUSION 1 HR: CPT

## 2024-11-19 PROCEDURE — 1160F RVW MEDS BY RX/DR IN RCRD: CPT | Mod: CPTII,S$GLB,, | Performed by: PHYSICIAN ASSISTANT

## 2024-11-19 PROCEDURE — 1101F PT FALLS ASSESS-DOCD LE1/YR: CPT | Mod: CPTII,S$GLB,, | Performed by: PHYSICIAN ASSISTANT

## 2024-11-19 PROCEDURE — 3077F SYST BP >= 140 MM HG: CPT | Mod: CPTII,S$GLB,, | Performed by: PHYSICIAN ASSISTANT

## 2024-11-19 PROCEDURE — 3078F DIAST BP <80 MM HG: CPT | Mod: CPTII,S$GLB,, | Performed by: PHYSICIAN ASSISTANT

## 2024-11-19 PROCEDURE — 99215 OFFICE O/P EST HI 40 MIN: CPT | Mod: S$GLB,,, | Performed by: PHYSICIAN ASSISTANT

## 2024-11-19 PROCEDURE — 3288F FALL RISK ASSESSMENT DOCD: CPT | Mod: CPTII,S$GLB,, | Performed by: PHYSICIAN ASSISTANT

## 2024-11-19 PROCEDURE — 25000003 PHARM REV CODE 250: Performed by: PHYSICIAN ASSISTANT

## 2024-11-19 PROCEDURE — G2211 COMPLEX E/M VISIT ADD ON: HCPCS | Mod: S$GLB,,, | Performed by: PHYSICIAN ASSISTANT

## 2024-11-19 PROCEDURE — 63600175 PHARM REV CODE 636 W HCPCS: Performed by: PHYSICIAN ASSISTANT

## 2024-11-19 PROCEDURE — 3044F HG A1C LEVEL LT 7.0%: CPT | Mod: CPTII,S$GLB,, | Performed by: PHYSICIAN ASSISTANT

## 2024-11-19 PROCEDURE — 1159F MED LIST DOCD IN RCRD: CPT | Mod: CPTII,S$GLB,, | Performed by: PHYSICIAN ASSISTANT

## 2024-11-19 PROCEDURE — 96416 CHEMO PROLONG INFUSE W/PUMP: CPT

## 2024-11-19 PROCEDURE — 1126F AMNT PAIN NOTED NONE PRSNT: CPT | Mod: CPTII,S$GLB,, | Performed by: PHYSICIAN ASSISTANT

## 2024-11-19 PROCEDURE — 99999 PR PBB SHADOW E&M-EST. PATIENT-LVL III: CPT | Mod: PBBFAC,,, | Performed by: PHYSICIAN ASSISTANT

## 2024-11-19 RX ORDER — HEPARIN 100 UNIT/ML
500 SYRINGE INTRAVENOUS
Status: CANCELLED | OUTPATIENT
Start: 2024-11-20

## 2024-11-19 RX ORDER — EPINEPHRINE 0.3 MG/.3ML
0.3 INJECTION SUBCUTANEOUS ONCE AS NEEDED
Status: CANCELLED | OUTPATIENT
Start: 2024-11-20

## 2024-11-19 RX ORDER — SODIUM CHLORIDE 0.9 % (FLUSH) 0.9 %
10 SYRINGE (ML) INJECTION
Status: DISCONTINUED | OUTPATIENT
Start: 2024-11-19 | End: 2024-11-19 | Stop reason: HOSPADM

## 2024-11-19 RX ORDER — HEPARIN 100 UNIT/ML
500 SYRINGE INTRAVENOUS
Status: CANCELLED | OUTPATIENT
Start: 2024-11-22

## 2024-11-19 RX ORDER — DIPHENHYDRAMINE HYDROCHLORIDE 50 MG/ML
50 INJECTION INTRAMUSCULAR; INTRAVENOUS ONCE AS NEEDED
Status: DISCONTINUED | OUTPATIENT
Start: 2024-11-19 | End: 2024-11-19 | Stop reason: HOSPADM

## 2024-11-19 RX ORDER — SODIUM CHLORIDE 0.9 % (FLUSH) 0.9 %
10 SYRINGE (ML) INJECTION
Status: CANCELLED | OUTPATIENT
Start: 2024-11-22

## 2024-11-19 RX ORDER — EPINEPHRINE 0.3 MG/.3ML
0.3 INJECTION SUBCUTANEOUS ONCE AS NEEDED
Status: DISCONTINUED | OUTPATIENT
Start: 2024-11-19 | End: 2024-11-19 | Stop reason: HOSPADM

## 2024-11-19 RX ORDER — PROCHLORPERAZINE EDISYLATE 5 MG/ML
5 INJECTION INTRAMUSCULAR; INTRAVENOUS ONCE AS NEEDED
Status: CANCELLED | OUTPATIENT
Start: 2024-11-22

## 2024-11-19 RX ORDER — SODIUM CHLORIDE 0.9 % (FLUSH) 0.9 %
10 SYRINGE (ML) INJECTION
Status: CANCELLED | OUTPATIENT
Start: 2024-11-20

## 2024-11-19 RX ORDER — DIPHENHYDRAMINE HYDROCHLORIDE 50 MG/ML
50 INJECTION INTRAMUSCULAR; INTRAVENOUS ONCE AS NEEDED
Status: CANCELLED | OUTPATIENT
Start: 2024-11-20

## 2024-11-19 RX ORDER — PROCHLORPERAZINE EDISYLATE 5 MG/ML
5 INJECTION INTRAMUSCULAR; INTRAVENOUS ONCE AS NEEDED
Status: CANCELLED | OUTPATIENT
Start: 2024-11-20

## 2024-11-19 RX ORDER — PROCHLORPERAZINE EDISYLATE 5 MG/ML
5 INJECTION INTRAMUSCULAR; INTRAVENOUS ONCE AS NEEDED
Status: DISCONTINUED | OUTPATIENT
Start: 2024-11-19 | End: 2024-11-19 | Stop reason: HOSPADM

## 2024-11-19 RX ORDER — HEPARIN 100 UNIT/ML
500 SYRINGE INTRAVENOUS
Status: DISCONTINUED | OUTPATIENT
Start: 2024-11-19 | End: 2024-11-19 | Stop reason: HOSPADM

## 2024-11-19 RX ADMIN — SODIUM CHLORIDE: 9 INJECTION, SOLUTION INTRAVENOUS at 11:11

## 2024-11-19 RX ADMIN — DEXAMETHASONE SODIUM PHOSPHATE 0.25 MG: 4 INJECTION, SOLUTION INTRA-ARTICULAR; INTRALESIONAL; INTRAMUSCULAR; INTRAVENOUS; SOFT TISSUE at 12:11

## 2024-11-19 RX ADMIN — BEVACIZUMAB-AWWB 365 MG: 400 INJECTION, SOLUTION INTRAVENOUS at 11:11

## 2024-11-19 RX ADMIN — FLUOROURACIL 4440 MG: 50 INJECTION, SOLUTION INTRAVENOUS at 12:11

## 2024-11-19 NOTE — PROGRESS NOTES
MEDICAL ONCOLOGY - ESTABLISHED PATIENT VISIT    Reason for visit: colon adenocarcinoma    Best Contact Phone Number(s): [phone] (home)      Cancer/Stage/TNM:    Cancer Staging   Colon adenocarcinoma  Staging form: Colon and Rectum, AJCC 8th Edition  - Pathologic stage from 1/22/2024: Stage IVC (pT4a, pN2b, cM1c) - Signed by Minna Menendez CNS on 2/13/2024       Oncology History   Colon adenocarcinoma   12/21/2023 Tumor Markers    Patient's tumor was tested for the following markers: CEA.                                              Results of the tumor marker test revealed 3.3     12/21/2023 Procedure    Colonoscopy  Cecal polyp removed with jumbo forceps, 3 mm  Multiple ascending colon polyps ranging in size from 5 to 12 mm - semi-pedunculated removed with hot snare  Hepatic flexure mass identified - central ulceration, concerning for malignancy, 3 cm, not obstructing, this was biopsied - tattoo placed distal to mass  Sigmoid diverticular disease     12/21/2023 Tumor Markers    Patient's tumor was tested for the following markers: CEA.                                              Results of the tumor marker test revealed 3.3     12/22/2023 Imaging Significant Findings    CT CAP  Evaluation of the colon is somewhat limited as there is significant colonic stool and oral contrast material has not opacified the large bowel.  There does appear to be some abnormal thickening of the walls of the proximal right colon and a patient with a known history of colonic malignancy.  There is some soft tissue density external to the walls of the colon on the right pericolic gutter which could represent peritoneal nodularity versus subserosal extent of tumor.  Slightly more distal to this area there is a 2nd area of irregularity of the walls of the colon, difficult to fully evaluate if this is related to the colonic walls versus stool with central fat.     Two hypodensities in the liver, the larger measuring 0.8 cm, too  small to accurately characterize on the basis of this examination.  Attention on follow-up.     No pulmonary nodules are seen.     Cholecystectomy.     12/29/2023 Initial Diagnosis    Hepatic flexure colon adenocarcinoma  MMR intact     1/22/2024 Cancer Staged    Staging form: Colon and Rectum, AJCC 8th Edition  - Pathologic stage from 1/22/2024: Stage IVC (pT4a, pN2b, cM1c)     8/27/2024 Tumor Markers    Patient's tumor was tested for the following markers: CEA.                                              Results of the tumor marker test revealed 2.3      Malignant neoplasm of ascending colon   2/12/2024 Initial Diagnosis    Malignant neoplasm of ascending colon     2/26/2024 -  Chemotherapy    Treatment Summary   Plan Name: OP GI [id number] (oxaliplatin leucovorin fluorouracil) with bevacizumab Q2W  Treatment Goal: Palliative  Status: Active  Start Date: 2/26/2024  End Date: 2/14/2025 (Planned)  Provider: Kemar Zaragoza MD  Chemotherapy: oxaliplatin (ELOXATIN) 150 mg in dextrose 5 % (D5W) 595 mL chemo infusion, 157 mg, Intravenous, Clinic/[facility] 1 time, 8 of 8 cycles  Administration: 150 mg (2/26/2024), 150 mg (3/11/2024), 150 mg (3/25/2024), 150 mg (4/8/2024), 150 mg (4/22/2024), 150 mg (5/6/2024), 150 mg (5/20/2024), 150 mg (6/3/2024)  fluorouracil (ADRUCIL) 2,400 mg/m2 = 4,440 mg in sodium chloride 0.9% 100 mL chemo infusion, 2,400 mg/m2 = 4,440 mg, Intravenous, Clinic/[facility] 1 time, 17 of 24 cycles  Administration: 4,440 mg (2/26/2024), 4,440 mg (3/11/2024), 4,440 mg (3/25/2024), 4,440 mg (4/8/2024), 4,440 mg (4/22/2024), 4,440 mg (5/6/2024), 4,440 mg (5/20/2024), 4,440 mg (6/3/2024), 4,440 mg (6/17/2024), 4,440 mg (7/3/2024), 4,440 mg (7/16/2024), 4,440 mg (7/31/2024), 4,440 mg (8/13/2024), 4,440 mg (8/28/2024), 4,440 mg (10/24/2024), 4,440 mg (10/10/2024), 4,440 mg (11/6/2024)  bevacizumab-awwb (MVASI) 5 mg/kg = 365 mg in sodium chloride 0.9% 100 mL infusion, 5 mg/kg = 365 mg, Intravenous, Clinic/[facility] 1 time,  15 of 22 cycles  Administration: 365 mg (2/26/2024), 365 mg (3/11/2024), 365 mg (3/25/2024), 365 mg (4/8/2024), 365 mg (4/22/2024), 365 mg (5/6/2024), 365 mg (5/20/2024), 365 mg (6/3/2024), 365 mg (6/17/2024), 365 mg (7/3/2024), 365 mg (7/16/2024), 365 mg (7/31/2024), 365 mg (8/13/2024), 365 mg (10/24/2024), 365 mg (11/6/2024)     8/27/2024 Tumor Markers    Patient's tumor was tested for the following markers: CEA.                                              Results of the tumor marker test revealed 2.3           Interim History:   74 y.o. female with LUANNE, bipolar, HLD who presents prior to cycle 17 FOLFOX + bevacizumab for her metastatic ascending colon cancer, now on maintenance 5-FU + Bevacizumab.  She had a diagnostic lap with Dr. Contreras with findings of known peritoneal nodules; biopsies confirmed metastatic adenocarcinoma.  She had a discussion with him about potentially not proceeding with CRS/HIPEC and agrees with continuing maintenance chemo.     Overall, she is doing fairly well. She reports having a productive cough, sinus congestion. She is taking her Albuterol that does help. No fever, nausea, vomiting or diarrhea. Previous concern with back pain has resolved. She did not need medication for this, as it resolved on it's own. She continues to function and walk without complication. No urinary or stool incontinence. Taste changes are stable and she is eating. Has mild cold sensitivity. Stable mild neuropathy to the feet. No falls. Overall, feeling fairly well.     ECOG 0. Presents with her     ROS:   Review of Systems   Constitutional:  Negative for chills, fever and malaise/fatigue.   HENT:  Negative for congestion and sore throat.    Respiratory:  Negative for shortness of breath.    Cardiovascular:  Negative for chest pain, palpitations and leg swelling.   Gastrointestinal:  Negative for blood in stool, constipation, diarrhea and nausea.   Genitourinary:  Negative for hematuria.    Musculoskeletal:  Negative for back pain, falls and myalgias.   Skin:  Negative for rash.   Neurological:  Positive for tingling. Negative for dizziness, weakness and headaches.       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    Sacroiliitis     right side    Sleep apnea     can not tolerate cpap        Past Surgical History:   Past Surgical History:   Procedure Laterality Date    ANKLE FRACTURE SURGERY Left 2001    BLADDER SUSPENSION      CARPAL TUNNEL RELEASE Bilateral     CHOLECYSTECTOMY      Laparoscopic    COLONOSCOPY      COLONOSCOPY N/A 12/21/2023    Procedure: COLONOSCOPY;  Surgeon: Alberto Albright MD;  Location: [facility];  Service: Endoscopy;  Laterality: N/A;    DIAGNOSTIC LAPAROSCOPY N/A 9/20/2024    Procedure: LAPAROSCOPY, DIAGNOSTIC;  Surgeon: Benjy Contreras MD;  Location: [address];  Service: General;  Laterality: N/A;    ESOPHAGOGASTRODUODENOSCOPY N/A 07/29/2021    Procedure: EGD (ESOPHAGOGASTRODUODENOSCOPY);  Surgeon: Susan Mcclain MD;  Location: [facility];  Service: Endoscopy;  Laterality: N/A;    EYE SURGERY      FOOT ARTHRODESIS Right 05/03/2023    Procedure: FUSION, FOOT;  Surgeon: Lauri Alejandra DPM;  Location: [facility];  Service: Podiatry;  Laterality: Right;  mini c-arm, Arthrex dowel bone graft harvester, locking plate and screws festus notified cc    HYSTERECTOMY  1986    vaginal prolapse    INJECTION OF ANESTHETIC AGENT AROUND MEDIAL BRANCH NERVES INNERVATING CERVICAL FACET JOINT Bilateral 05/27/2022    Procedure: CERVICAL MEDIAL BRANCH NERVE BLOCK (C3-4,C4-5);  Surgeon: Mamie Roman MD;  Location: [facility];  Service: Pain Management;  Laterality: Bilateral;    INJECTION OF  ANESTHETIC AGENT AROUND MEDIAL BRANCH NERVES INNERVATING LUMBAR FACET JOINT Right 02/05/2021    Procedure: LUMBAR FACET JOINT BLOCK (L3-4,L4-5,L5-S1);  Surgeon: Mamie Roman MD;  Location: [facility];  Service: Pain Management;  Laterality: Right;    INJECTION OF ANESTHETIC AGENT AROUND MEDIAL BRANCH NERVES INNERVATING LUMBAR FACET JOINT Right 04/30/2021    Procedure: LUMBAR FACET JOINT BLOCK (L3-4,L4-5,L5-S1);  Surgeon: Mamie Roman MD;  Location: [facility];  Service: Pain Management;  Laterality: Right;    INJECTION OF ANESTHETIC AGENT INTO SACROILIAC JOINT Right 12/04/2020    Procedure: SACROILIAC JOINT INJECTION;  Surgeon: Mamie Roman MD;  Location: [facility];  Service: Pain Management;  Laterality: Right;    INJECTION OF JOINT Right 12/04/2020    Procedure: GREATER TROCHANTERIC BURSA INJECTION;  Surgeon: Mamie Roman MD;  Location: [facility];  Service: Pain Management;  Laterality: Right;    LAPAROSCOPIC RIGHT COLON RESECTION N/A 1/22/2024    Procedure: COLECTOMY, RIGHT, LAPAROSCOPIC (eras low, lithotomy);  Surgeon: Alberto Albright MD;  Location: [facility] OR [address] [place] [address];  Service: Colon and Rectal;  Laterality: N/A;    NASAL SEPTUM SURGERY      RECTAL PROLAPSE REPAIR      TONSILLECTOMY          Family History:   Family History   Problem Relation Name Age of Onset    No Known Problems Maternal Aunt Rubio Glasgow     Colon cancer Maternal Uncle Kalpeshwis     Colon cancer Maternal Uncle Yovani     Colon cancer Maternal Uncle Konstantin     No Known Problems Paternal Aunt Vero     Esophageal cancer Paternal Uncle Nat Jr     Heart attack Maternal Grandmother Elmira     Leukemia Maternal Grandfather manish Pang     No Known Problems Paternal Grandmother Lizett     Stroke Paternal Grandfather Balbinanorman Sr         Social History:   Social History     Tobacco Use    Smoking status: Former     Current packs/day: 0.00     Average packs/day: 1 pack/day for 41.7 years (41.7 ttl pk-yrs)     Types: Cigarettes     Start date:  3/30/1982     Quit date: 2023     Years since quittin.9    Smokeless tobacco: Never   Substance Use Topics    Alcohol use: Yes     Comment: Socially-2 or 3 times a year-6 or 7 drinks        I have reviewed and updated the patient's past medical, surgical, family and social histories.    Allergies:   Review of patient's allergies indicates:   Allergen Reactions    Nsaids (non-steroidal anti-inflammatory drug) Other (See Comments)     Gastrointestinal bleeding requiring blood transfusion    Demerol [meperidine] Rash        Medications:   Current Outpatient Medications   Medication Sig Dispense Refill    albuterol (PROVENTIL/VENTOLIN HFA) 90 mcg/actuation inhaler inhale 2 puffs into the lungs every 6 hours as needed for wheezing. 18 g 1    aspirin (ECOTRIN) 81 MG EC tablet Take 81 mg by mouth nightly.      dexAMETHasone (DECADRON) 4 MG Tab Take 2 tablets (8 mg total) by mouth once daily only on days 2, 3 and 4 of each chemotherapy cycle. 40 tablet 0    EScitalopram oxalate (LEXAPRO) 20 MG tablet Take 1 tablet (20 mg total) by mouth once daily. 30 tablet 3    lamoTRIgine (LAMICTAL) 150 MG Tab Take 2 tablets (300 mg total) by mouth every evening. 180 tablet 1    LIDOcaine-prilocaine (EMLA) cream Apply topically as needed (place to port site 45-60 minutes prior to chemotherapy). 30 g 5    ondansetron (ZOFRAN-ODT) 8 MG TbDL dissolve 1 tablet (8 mg total) under the tongue every 6 (six) hours as needed (nausea). 30 tablet 5    oxyCODONE (ROXICODONE) 5 MG immediate release tablet Take 1 tablet (5 mg total) by mouth every 6 (six) hours as needed for Pain. 11 tablet 0    pantoprazole (PROTONIX) 40 MG tablet Take 1 tablet (40 mg total) by mouth once daily. 90 tablet 3    prochlorperazine (COMPAZINE) 10 MG tablet Take 1 tablet (10 mg total) by mouth every 6 (six) hours as needed (nausea). 30 tablet 2    QUEtiapine (SEROQUEL) 200 MG Tab Take 1 tablet (200 mg total) by mouth every evening. 30 tablet 3    rosuvastatin  "(CRESTOR) 10 MG tablet Take 1 tablet (10 mg total) by mouth once daily. 90 tablet 3    traMADoL (ULTRAM) 50 mg tablet Take 1 tablet (50 mg total) by mouth every 6 (six) hours as needed for Pain. 5 tablet 0     No current facility-administered medications for this visit.        Physical Exam:   BP (!) 140/76 (BP Location: Right arm, Patient Position: Sitting)   Pulse 80   Temp 97.9 °F (36.6 °C) (Temporal)   Ht 5' 6" (1.676 m)   Wt 78.3 kg (172 lb 9.9 oz)   SpO2 (P) 98%   BMI 27.86 kg/m²           Physical Exam  Vitals reviewed.   Constitutional:       General: She is not in acute distress.     Appearance: Normal appearance. She is normal weight. She is not ill-appearing, toxic-appearing or diaphoretic.   HENT:      Head: Normocephalic and atraumatic.      Right Ear: External ear normal.      Left Ear: External ear normal.      Nose: Nose normal.      Mouth/Throat:      Pharynx: Oropharynx is clear.   Eyes:      General: No scleral icterus.     Conjunctiva/sclera: Conjunctivae normal.   Cardiovascular:      Rate and Rhythm: Normal rate.   Pulmonary:      Effort: Pulmonary effort is normal. No respiratory distress.   Chest:      Comments: RCW port  Abdominal:      General: There is no distension.   Skin:     General: Skin is warm and dry.      Coloration: Skin is not jaundiced or pale.      Findings: No bruising, erythema or rash.   Neurological:      Mental Status: She is alert and oriented to person, place, and time. Mental status is at baseline.      Motor: No weakness.      Gait: Gait normal.   Psychiatric:         Mood and Affect: Mood normal.         Labs:     I have reviewed the pertinent labs.     Imaging:    CT CAP - 8/9/24:    Impression:     Postoperative changes of right colon resection for patient's known colonic malignancy.  Persistent small prominent 1 cm lymph node adjacent to the aorta at the level of the renal vessels, unchanged over multiple prior examinations.  No free air or obstruction.   "   Tiny 3 mm hypodensity in the right hepatic lobe, likely a small cyst or hemangioma.     Mild thickening of the 2nd portion of the duodenum which could suggest an inflammatory/infectious process.  Clinical correlation suggested.     Constipation.       Path:   1/22/24 - R Hemicolectomy   Final Pathologic Diagnosis     THE DIAGNOSES REMAIN THE SAME.  THIS CORRECTED REPORT IS ISSUED TO CHANGE M STATUS to reflect tumor in the omentum.  This change is discussed with Dr. RANJANA Albright via phone, 2/1/2024.    1. Colon, right and terminal ileum (right hemicolectomy with EN bloc abdominal wall resection):  Invasive adenocarcinoma.  See cancer synoptic report     2. Omentum (resection):    Positive for carcinoma    CORRECT SYNOPTIC AND M STATUS  Cancer synoptic report  - Procedure: Right hemicolectomy with EN bloc abdominal wall resection  - Tumor site:  Ascending  - Histologic type:  Adenocarcinoma  - Histologic grade:  G2, moderately differentiated.  See comment.  COMMENT:  While the majority of the tumor consists of well formed glands, a significant proportion includes abortive glands, single file infiltration, and malignant cells with signet ring cell morphology. The tumor has an insidious pattern of  infiltration with tumor present far from the advancing front of the tumor.  - Tumor size:  2.0 x 2.0 x 1.0 cm  - Tumor extent:  Invades visceral peritoneum, multifocal  - Macroscopic perforation: Not identified  - Lymphovascular invasion:  Present, extensive.  Small vessel, large vessel, intra and extramural.  - Perineural invasion:  Not identified  - Tumor budding score:  High (>10), multifocal single cell infiltration  - Treatment effect: No known pre-surgical therapy    Margins  Margin involved by invasive carcinoma, radial (slide 1C)  All margins negative for dysplasia.    pTNM stage classification (AJCC 8th edition)  pT Category  pT4a:  Tumor invades through the visceral peritoneum    pN Category  pN2b:  7 or more  regional lymph nodes are positive  Number of positive lymph nodes:  19  Number of lymph nodes examined: 26  Number of tumor deposits:  4    pM Category  pM1c:  Metastasis to the peritoneal surface of the omentum.    Additional findings:  - Sessile serrated polyp, no cytologic dysplasia, 1.1 cm at ileocecal valve  - Tubulovillous adenoma, 1.0 cm, proximal ascending  - Tubular adenoma, 0.4 cm, mid ascending  - Tubular adenoma, 0.4, distal ascending  - Tubular adenoma, 0.6 cm, distal ascending  - Submucosal lipoma, 2.0 cm  - Appendix with no specific histopathologic changes    Ancillary studies  Immunohistochemistry for mismatch repair proteins performed on prior biopsy material (SAS-, 12/21/23): pMMR.         Assessment:       1. Malignant neoplasm of ascending colon    2. 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 Immunodeficiency secondary to neoplasm    4. Immunodeficiency secondary to chemotherapy    5. Neoplastic malignant related fatigue    6. Cold sensitivity    7. Other constipation    8. Excessive gas        Plan:        # Colon cancer   Mrs. Maguire is a 74 y.o. female who presents for management of her metastatic colon cancer. She underwent a R hemicolectomy with en-bloc abdominal wall resection on 1/22/24 with Dr. Albright. Pathology revealed pT4a N2b disease with 19/26 positive LNs and omentum positive for carcinoma.    We had an in-depth conversation during our initial consult re: her diagnosis and treatment options. Reviewed recommendation for IV systemic therapy for at least 6 months. Plan for FOLFOX + bevacizumab to be given every 2 weeks. Briefly reviewed side effects of treatment. Handouts provided. Port placed 2/21/24.     PGX - DPYD normal metabolizer, [id number] intermediate metabolizer   Dexamethasone, zofran, compazine and lidocaine sent to pharmacy previously. Reviewed admin instructions.     Pending response, she may be a candidate for CRS/HIPEC with surgical oncology team. Continue to  evaluate and re-start discussion if still without radiographic evidence of disease after CT on cycle 8.    CT CAP after cycle 4 shows no clear evidence of disease.  CT CAP after cycle 8 shows no clear evidence of disease.  CT CAP after cycle 12 shows no clear evidence of disease. Underwent diagnostic laparoscopy on 9/20/24 with PCI of 14.  Discussed with Dr. Contreras and with patient that options include continuing systemic therapy at this time vs CRS/HIPEC.  We are in agreement that with her volume of disease and histology, likelihood of CRS/HIPEC being beneficial for survival is not high.    Tolerance of chemotherapy has been excellent. Eating well. Dealing with a cold and sinus congestion. Taking medication that helps. No fever, chills, nausea, vomiting or diarrhea.   I have reviewed the CBC and CMP, adequate for treatment   CEA remains normal  After discussion, we will proceed with continued maintenance chemotherapy.   Proceed with maintenance 5-FU + Felisa cycle 18 today.    RTC in 2 weeks for lab work, CT CAP and treatment discussion with cycle 19 5-FU plus felisa. Looking forward to a camping trip in [place], leaving this Sunday.     Cold sensitivity/neoplasm related fatigue:  2/2 chemotherapy. Stopped oxaliplatin beginning with cycle 9 to prevent persistent paresthesias.  Mild persistent paresthesias at this time.  Will monitor. She is taking Cymbalta but not finding very helpful. Worse towards end of day.     Constipation/Gas, acute low back pain, sinus congestion with cough  Continue 2 stool softeners a day with Miralax daily to have regular bowel movements.   This has helped.    Continue Miralax to avoid more explosive BM.    Back pain resolved. Will continue to monitor    Continue to take Albuterol as needed. Mucinex is ok to take as well prn. Doing well overall.     RTC in 2 weeks.     Pte and family members displayed understanding of the above encounter and treatment plan. All thoughtful questions were  answered to their satisfaction. Pte was advised to notify the care team or proceed to the ER if signs and symptoms worsen.     Visit today included increased complexity associated with the care of the episodic problem chemotherapy  addressed and managing the longitudinal care of the patient due to the serious and/or complex managed problem(s) GI malignancies/cancer      DIANE Fonseca, PAIvanC  [facility] MD [place]  Dept of Hematology/Oncology  [place] to GI Oncology team           Med Onc Chart Routing      Follow up with physician 2 weeks and 6 weeks. See Dr Zaragoza in 2 weeks with lab work, CT scan and treatment discussion. 6 weeks for 5-FU plus avastin   Follow up with RKISTEN 4 weeks. 5-FU plus avastin   Infusion scheduling note    Injection scheduling note    Labs CBC, CMP, CEA and urinalysis   Scheduling:  Preferred lab:  Lab interval: every 2 weeks     Imaging CT chest abdomen pelvis   Scheduled   Pharmacy appointment    Other referrals

## 2024-11-19 NOTE — PLAN OF CARE
1240-pt tolerated MVASI infusion well, no complications or side effects, POC and meds discussed with pt, 5 FU CADD connected to pt, site secured, infusing w/out issue; pt instructed to remain well hydration;pt to report for pump d/c on Thurs. 11/21/24 @ 1030am, pt aware of upcoming appts, pt knows to call MD with any questions or concerns. Pt ambulated off unit, no distress noted.

## 2024-11-21 ENCOUNTER — INFUSION (OUTPATIENT)
Dept: INFUSION THERAPY | Facility: HOSPITAL | Age: 74
End: 2024-11-21
Payer: MEDICARE

## 2024-11-21 VITALS
HEART RATE: 81 BPM | SYSTOLIC BLOOD PRESSURE: 175 MMHG | DIASTOLIC BLOOD PRESSURE: 88 MMHG | TEMPERATURE: 98 F | RESPIRATION RATE: 16 BRPM

## 2024-11-21 DIAGNOSIS — C18.2 MALIGNANT NEOPLASM OF ASCENDING COLON: Primary | ICD-10-CM

## 2024-11-21 PROCEDURE — A4216 STERILE WATER/SALINE, 10 ML: HCPCS | Performed by: PHYSICIAN ASSISTANT

## 2024-11-21 PROCEDURE — 63600175 PHARM REV CODE 636 W HCPCS: Performed by: PHYSICIAN ASSISTANT

## 2024-11-21 PROCEDURE — 25000003 PHARM REV CODE 250: Performed by: PHYSICIAN ASSISTANT

## 2024-11-21 RX ORDER — SODIUM CHLORIDE 0.9 % (FLUSH) 0.9 %
10 SYRINGE (ML) INJECTION
Status: DISCONTINUED | OUTPATIENT
Start: 2024-11-21 | End: 2024-11-21 | Stop reason: HOSPADM

## 2024-11-21 RX ORDER — PROCHLORPERAZINE EDISYLATE 5 MG/ML
5 INJECTION INTRAMUSCULAR; INTRAVENOUS ONCE AS NEEDED
Status: DISCONTINUED | OUTPATIENT
Start: 2024-11-21 | End: 2024-11-21 | Stop reason: HOSPADM

## 2024-11-21 RX ORDER — HEPARIN 100 UNIT/ML
500 SYRINGE INTRAVENOUS
Status: DISCONTINUED | OUTPATIENT
Start: 2024-11-21 | End: 2024-11-21 | Stop reason: HOSPADM

## 2024-11-21 RX ADMIN — HEPARIN 500 UNITS: 100 SYRINGE at 10:11

## 2024-11-21 RX ADMIN — Medication 10 ML: at 10:11

## 2024-11-21 NOTE — PLAN OF CARE
Pt seated in chair. Vss. Assessment complete. CADD volume zero. Infusion complete, pt tolerated well. CADD disconnected from port. Port deaccessed, flushed, blood return noted, heparin locked. Pt ambulated off floor NAD.

## 2024-11-22 ENCOUNTER — PATIENT MESSAGE (OUTPATIENT)
Dept: HEMATOLOGY/ONCOLOGY | Facility: CLINIC | Age: 74
End: 2024-11-22
Payer: MEDICARE

## 2024-12-02 ENCOUNTER — HOSPITAL ENCOUNTER (OUTPATIENT)
Dept: RADIOLOGY | Facility: HOSPITAL | Age: 74
Discharge: HOME | End: 2024-12-02
Attending: PHYSICIAN ASSISTANT
Payer: MEDICARE

## 2024-12-02 DIAGNOSIS — C18.2 MALIGNANT NEOPLASM OF ASCENDING COLON: ICD-10-CM

## 2024-12-02 PROCEDURE — 25500020 PHARM REV CODE 255: Performed by: PHYSICIAN ASSISTANT

## 2024-12-02 PROCEDURE — 74177 CT ABD & PELVIS W/CONTRAST: CPT | Mod: TC

## 2024-12-02 RX ADMIN — IOHEXOL 75 ML: 350 INJECTION, SOLUTION INTRAVENOUS at 02:12

## 2024-12-02 RX ADMIN — IOHEXOL 30 ML: 350 INJECTION, SOLUTION INTRAVENOUS at 02:12

## 2024-12-03 ENCOUNTER — INFUSION (OUTPATIENT)
Dept: INFUSION THERAPY | Facility: HOSPITAL | Age: 74
End: 2024-12-03
Payer: MEDICARE

## 2024-12-03 ENCOUNTER — OFFICE VISIT (OUTPATIENT)
Dept: HEMATOLOGY/ONCOLOGY | Facility: CLINIC | Age: 74
End: 2024-12-03
Payer: MEDICARE

## 2024-12-03 VITALS
BODY MASS INDEX: 28.26 KG/M2 | WEIGHT: 175.81 LBS | OXYGEN SATURATION: 98 % | BODY MASS INDEX: 28.26 KG/M2 | SYSTOLIC BLOOD PRESSURE: 123 MMHG | HEART RATE: 81 BPM | TEMPERATURE: 97 F | OXYGEN SATURATION: 98 % | HEART RATE: 81 BPM | DIASTOLIC BLOOD PRESSURE: 70 MMHG | HEIGHT: 66 IN | DIASTOLIC BLOOD PRESSURE: 70 MMHG | WEIGHT: 175.81 LBS | SYSTOLIC BLOOD PRESSURE: 123 MMHG | HEIGHT: 66 IN | RESPIRATION RATE: 16 BRPM

## 2024-12-03 DIAGNOSIS — Z79.899 IMMUNODEFICIENCY SECONDARY TO CHEMOTHERAPY: ICD-10-CM

## 2024-12-03 DIAGNOSIS — R53.0 NEOPLASTIC MALIGNANT RELATED FATIGUE: ICD-10-CM

## 2024-12-03 DIAGNOSIS — C18.2 MALIGNANT NEOPLASM OF ASCENDING COLON: Primary | ICD-10-CM

## 2024-12-03 DIAGNOSIS — D84.81 IMMUNODEFICIENCY SECONDARY TO NEOPLASM: ICD-10-CM

## 2024-12-03 DIAGNOSIS — D49.9 IMMUNODEFICIENCY SECONDARY TO NEOPLASM: ICD-10-CM

## 2024-12-03 DIAGNOSIS — T45.1X5A IMMUNODEFICIENCY SECONDARY TO CHEMOTHERAPY: ICD-10-CM

## 2024-12-03 DIAGNOSIS — R68.89 COLD SENSITIVITY: ICD-10-CM

## 2024-12-03 DIAGNOSIS — R14.3 EXCESSIVE GAS: ICD-10-CM

## 2024-12-03 DIAGNOSIS — C78.6 MALIGNANT NEOPLASM METASTATIC TO OMENTUM: ICD-10-CM

## 2024-12-03 DIAGNOSIS — K59.09 OTHER CONSTIPATION: ICD-10-CM

## 2024-12-03 DIAGNOSIS — D84.821 IMMUNODEFICIENCY SECONDARY TO CHEMOTHERAPY: ICD-10-CM

## 2024-12-03 PROCEDURE — 63600175 PHARM REV CODE 636 W HCPCS: Performed by: INTERNAL MEDICINE

## 2024-12-03 PROCEDURE — 1101F PT FALLS ASSESS-DOCD LE1/YR: CPT | Mod: CPTII,S$GLB,, | Performed by: INTERNAL MEDICINE

## 2024-12-03 PROCEDURE — 3044F HG A1C LEVEL LT 7.0%: CPT | Mod: CPTII,S$GLB,, | Performed by: INTERNAL MEDICINE

## 2024-12-03 PROCEDURE — 3074F SYST BP LT 130 MM HG: CPT | Mod: CPTII,S$GLB,, | Performed by: INTERNAL MEDICINE

## 2024-12-03 PROCEDURE — 99215 OFFICE O/P EST HI 40 MIN: CPT | Mod: S$GLB,,, | Performed by: INTERNAL MEDICINE

## 2024-12-03 PROCEDURE — 1159F MED LIST DOCD IN RCRD: CPT | Mod: CPTII,S$GLB,, | Performed by: INTERNAL MEDICINE

## 2024-12-03 PROCEDURE — 96413 CHEMO IV INFUSION 1 HR: CPT

## 2024-12-03 PROCEDURE — 1125F AMNT PAIN NOTED PAIN PRSNT: CPT | Mod: CPTII,S$GLB,, | Performed by: INTERNAL MEDICINE

## 2024-12-03 PROCEDURE — G2211 COMPLEX E/M VISIT ADD ON: HCPCS | Mod: S$GLB,,, | Performed by: INTERNAL MEDICINE

## 2024-12-03 PROCEDURE — 3288F FALL RISK ASSESSMENT DOCD: CPT | Mod: CPTII,S$GLB,, | Performed by: INTERNAL MEDICINE

## 2024-12-03 PROCEDURE — 3008F BODY MASS INDEX DOCD: CPT | Mod: CPTII,S$GLB,, | Performed by: INTERNAL MEDICINE

## 2024-12-03 PROCEDURE — 25000003 PHARM REV CODE 250: Performed by: INTERNAL MEDICINE

## 2024-12-03 PROCEDURE — 96416 CHEMO PROLONG INFUSE W/PUMP: CPT

## 2024-12-03 PROCEDURE — 96367 TX/PROPH/DG ADDL SEQ IV INF: CPT

## 2024-12-03 PROCEDURE — 99999 PR PBB SHADOW E&M-EST. PATIENT-LVL III: CPT | Mod: PBBFAC,,, | Performed by: INTERNAL MEDICINE

## 2024-12-03 PROCEDURE — 3078F DIAST BP <80 MM HG: CPT | Mod: CPTII,S$GLB,, | Performed by: INTERNAL MEDICINE

## 2024-12-03 RX ORDER — DIPHENHYDRAMINE HYDROCHLORIDE 50 MG/ML
50 INJECTION INTRAMUSCULAR; INTRAVENOUS ONCE AS NEEDED
Status: CANCELLED | OUTPATIENT
Start: 2024-12-04

## 2024-12-03 RX ORDER — SODIUM CHLORIDE 0.9 % (FLUSH) 0.9 %
10 SYRINGE (ML) INJECTION
Status: CANCELLED | OUTPATIENT
Start: 2024-12-04

## 2024-12-03 RX ORDER — SODIUM CHLORIDE 0.9 % (FLUSH) 0.9 %
10 SYRINGE (ML) INJECTION
Status: CANCELLED | OUTPATIENT
Start: 2024-12-06

## 2024-12-03 RX ORDER — PROCHLORPERAZINE EDISYLATE 5 MG/ML
5 INJECTION INTRAMUSCULAR; INTRAVENOUS ONCE AS NEEDED
Status: CANCELLED | OUTPATIENT
Start: 2024-12-04

## 2024-12-03 RX ORDER — SODIUM CHLORIDE 0.9 % (FLUSH) 0.9 %
10 SYRINGE (ML) INJECTION
Status: DISCONTINUED | OUTPATIENT
Start: 2024-12-03 | End: 2024-12-03 | Stop reason: HOSPADM

## 2024-12-03 RX ORDER — EPINEPHRINE 0.3 MG/.3ML
0.3 INJECTION SUBCUTANEOUS ONCE AS NEEDED
Status: CANCELLED | OUTPATIENT
Start: 2024-12-04

## 2024-12-03 RX ORDER — HEPARIN 100 UNIT/ML
500 SYRINGE INTRAVENOUS
Status: CANCELLED | OUTPATIENT
Start: 2024-12-04

## 2024-12-03 RX ORDER — EPINEPHRINE 0.3 MG/.3ML
0.3 INJECTION SUBCUTANEOUS ONCE AS NEEDED
Status: DISCONTINUED | OUTPATIENT
Start: 2024-12-03 | End: 2024-12-03 | Stop reason: HOSPADM

## 2024-12-03 RX ORDER — DIPHENHYDRAMINE HYDROCHLORIDE 50 MG/ML
50 INJECTION INTRAMUSCULAR; INTRAVENOUS ONCE AS NEEDED
Status: DISCONTINUED | OUTPATIENT
Start: 2024-12-03 | End: 2024-12-03 | Stop reason: HOSPADM

## 2024-12-03 RX ORDER — PROCHLORPERAZINE EDISYLATE 5 MG/ML
5 INJECTION INTRAMUSCULAR; INTRAVENOUS ONCE AS NEEDED
Status: CANCELLED | OUTPATIENT
Start: 2024-12-06

## 2024-12-03 RX ORDER — HEPARIN 100 UNIT/ML
500 SYRINGE INTRAVENOUS
Status: DISCONTINUED | OUTPATIENT
Start: 2024-12-03 | End: 2024-12-03 | Stop reason: HOSPADM

## 2024-12-03 RX ORDER — PROCHLORPERAZINE EDISYLATE 5 MG/ML
5 INJECTION INTRAMUSCULAR; INTRAVENOUS ONCE AS NEEDED
Status: DISCONTINUED | OUTPATIENT
Start: 2024-12-03 | End: 2024-12-03 | Stop reason: HOSPADM

## 2024-12-03 RX ORDER — HEPARIN 100 UNIT/ML
500 SYRINGE INTRAVENOUS
Status: CANCELLED | OUTPATIENT
Start: 2024-12-06

## 2024-12-03 RX ADMIN — DEXAMETHASONE SODIUM PHOSPHATE 0.25 MG: 4 INJECTION, SOLUTION INTRA-ARTICULAR; INTRALESIONAL; INTRAMUSCULAR; INTRAVENOUS; SOFT TISSUE at 10:12

## 2024-12-03 RX ADMIN — FLUOROURACIL 4440 MG: 50 INJECTION, SOLUTION INTRAVENOUS at 10:12

## 2024-12-03 RX ADMIN — SODIUM CHLORIDE: 9 INJECTION, SOLUTION INTRAVENOUS at 08:12

## 2024-12-03 RX ADMIN — BEVACIZUMAB-AWWB 365 MG: 400 INJECTION, SOLUTION INTRAVENOUS at 09:12

## 2024-12-03 NOTE — PROGRESS NOTES
MEDICAL ONCOLOGY - ESTABLISHED PATIENT VISIT    Reason for visit: colon adenocarcinoma    Best Contact Phone Number(s): [phone] (home)      Cancer/Stage/TNM:    Cancer Staging   Colon adenocarcinoma  Staging form: Colon and Rectum, AJCC 8th Edition  - Pathologic stage from 1/22/2024: Stage IVC (pT4a, pN2b, cM1c) - Signed by Minna Menendez CNS on 2/13/2024       Oncology History   Colon adenocarcinoma   12/21/2023 Tumor Markers    Patient's tumor was tested for the following markers: CEA.                                              Results of the tumor marker test revealed 3.3     12/21/2023 Procedure    Colonoscopy  Cecal polyp removed with jumbo forceps, 3 mm  Multiple ascending colon polyps ranging in size from 5 to 12 mm - semi-pedunculated removed with hot snare  Hepatic flexure mass identified - central ulceration, concerning for malignancy, 3 cm, not obstructing, this was biopsied - tattoo placed distal to mass  Sigmoid diverticular disease     12/21/2023 Tumor Markers    Patient's tumor was tested for the following markers: CEA.                                              Results of the tumor marker test revealed 3.3     12/22/2023 Imaging Significant Findings    CT CAP  Evaluation of the colon is somewhat limited as there is significant colonic stool and oral contrast material has not opacified the large bowel.  There does appear to be some abnormal thickening of the walls of the proximal right colon and a patient with a known history of colonic malignancy.  There is some soft tissue density external to the walls of the colon on the right pericolic gutter which could represent peritoneal nodularity versus subserosal extent of tumor.  Slightly more distal to this area there is a 2nd area of irregularity of the walls of the colon, difficult to fully evaluate if this is related to the colonic walls versus stool with central fat.     Two hypodensities in the liver, the larger measuring 0.8 cm, too  small to accurately characterize on the basis of this examination.  Attention on follow-up.     No pulmonary nodules are seen.     Cholecystectomy.     12/29/2023 Initial Diagnosis    Hepatic flexure colon adenocarcinoma  MMR intact     1/22/2024 Cancer Staged    Staging form: Colon and Rectum, AJCC 8th Edition  - Pathologic stage from 1/22/2024: Stage IVC (pT4a, pN2b, cM1c)     8/27/2024 Tumor Markers    Patient's tumor was tested for the following markers: CEA.                                              Results of the tumor marker test revealed 2.3      Malignant neoplasm of ascending colon   2/12/2024 Initial Diagnosis    Malignant neoplasm of ascending colon     2/26/2024 -  Chemotherapy    Treatment Summary   Plan Name: OP GI [id number] (oxaliplatin leucovorin fluorouracil) with bevacizumab Q2W  Treatment Goal: Palliative  Status: Active  Start Date: 2/26/2024  End Date: 2/14/2025 (Planned)  Provider: Kemar Zaragoza MD  Chemotherapy: oxaliplatin (ELOXATIN) 150 mg in dextrose 5 % (D5W) 595 mL chemo infusion, 157 mg, Intravenous, Clinic/HOD 1 time, 8 of 8 cycles  Administration: 150 mg (2/26/2024), 150 mg (3/11/2024), 150 mg (3/25/2024), 150 mg (4/8/2024), 150 mg (4/22/2024), 150 mg (5/6/2024), 150 mg (5/20/2024), 150 mg (6/3/2024)  fluorouracil (ADRUCIL) 2,400 mg/m2 = 4,440 mg in sodium chloride 0.9% 100 mL chemo infusion, 2,400 mg/m2 = 4,440 mg, Intravenous, Clinic/HOD 1 time, 18 of 24 cycles  Administration: 4,440 mg (2/26/2024), 4,440 mg (3/11/2024), 4,440 mg (3/25/2024), 4,440 mg (4/8/2024), 4,440 mg (4/22/2024), 4,440 mg (5/6/2024), 4,440 mg (5/20/2024), 4,440 mg (6/3/2024), 4,440 mg (6/17/2024), 4,440 mg (7/3/2024), 4,440 mg (7/16/2024), 4,440 mg (7/31/2024), 4,440 mg (8/13/2024), 4,440 mg (8/28/2024), 4,440 mg (10/24/2024), 4,440 mg (10/10/2024), 4,440 mg (11/6/2024), 4,440 mg (11/19/2024)  bevacizumab-awwb (MVASI) 5 mg/kg = 365 mg in sodium chloride 0.9% 100 mL infusion, 5 mg/kg = 365 mg,  Intravenous, Clinic/HOD 1 time, 16 of 22 cycles  Administration: 365 mg (2/26/2024), 365 mg (3/11/2024), 365 mg (3/25/2024), 365 mg (4/8/2024), 365 mg (4/22/2024), 365 mg (5/6/2024), 365 mg (5/20/2024), 365 mg (6/3/2024), 365 mg (6/17/2024), 365 mg (7/3/2024), 365 mg (7/16/2024), 365 mg (7/31/2024), 365 mg (8/13/2024), 365 mg (10/24/2024), 365 mg (11/6/2024), 365 mg (11/19/2024)     8/27/2024 Tumor Markers    Patient's tumor was tested for the following markers: CEA.                                              Results of the tumor marker test revealed 2.3           Interim History:   74 y.o. female with LUANNE, bipolar, HLD who presents prior to cycle 19 FOLFOX + bevacizumab for her metastatic ascending colon cancer, now on maintenance 5-FU + Bevacizumab.  She had a diagnostic lap with Dr. Contreras with findings of known peritoneal nodules; biopsies confirmed metastatic adenocarcinoma.  She had a discussion with him about potentially not proceeding with CRS/HIPEC and agrees with continuing maintenance chemo.     She is feeling very well.  She is eating well, has no pain.  No new complications from chemotherapy.    ECOG 0. Presents with her     ROS:   Review of Systems   Constitutional:  Negative for chills, fever and malaise/fatigue.   HENT:  Negative for congestion and sore throat.    Respiratory:  Negative for shortness of breath.    Cardiovascular:  Negative for chest pain, palpitations and leg swelling.   Gastrointestinal:  Negative for blood in stool, constipation, diarrhea and nausea.   Genitourinary:  Negative for hematuria.   Musculoskeletal:  Negative for back pain, falls and myalgias.   Skin:  Negative for rash.   Neurological:  Positive for tingling. Negative for dizziness, weakness and headaches.       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;  Service: Endoscopy;  Laterality: N/A;    DIAGNOSTIC LAPAROSCOPY N/A 9/20/2024    Procedure: LAPAROSCOPY, DIAGNOSTIC;  Surgeon: Benjy Contreras MD;  Location: [address];  Service: General;  Laterality: N/A;    ESOPHAGOGASTRODUODENOSCOPY N/A 07/29/2021    Procedure: EGD (ESOPHAGOGASTRODUODENOSCOPY);  Surgeon: Susan Mcclain MD;  Location: [facility];  Service: Endoscopy;  Laterality: N/A;    EYE SURGERY      FOOT ARTHRODESIS Right 05/03/2023    Procedure: FUSION, FOOT;  Surgeon: Lauri Alejandra DPM;  Location: [facility];  Service: Podiatry;  Laterality: Right;  mini c-arm, Arthrex dowel bone graft harvester, locking plate and screws festus notified cc    HYSTERECTOMY  1986    vaginal prolapse    INJECTION OF ANESTHETIC AGENT AROUND MEDIAL BRANCH NERVES INNERVATING CERVICAL FACET JOINT Bilateral 05/27/2022    Procedure: CERVICAL MEDIAL BRANCH NERVE BLOCK (C3-4,C4-5);  Surgeon: Mamie Roman MD;  Location: [facility];  Service: Pain Management;  Laterality: Bilateral;    INJECTION OF ANESTHETIC AGENT AROUND MEDIAL BRANCH NERVES INNERVATING LUMBAR FACET JOINT Right 02/05/2021    Procedure: LUMBAR FACET JOINT BLOCK (L3-4,L4-5,L5-S1);  Surgeon: Mamie Roman MD;  Location: [facility];  Service: Pain Management;  Laterality: Right;    INJECTION OF ANESTHETIC AGENT AROUND MEDIAL BRANCH NERVES INNERVATING LUMBAR FACET JOINT Right 04/30/2021    Procedure: LUMBAR FACET JOINT BLOCK  (L3-4,L4-5,L5-S1);  Surgeon: Mamie Roman MD;  Location: [facility];  Service: Pain Management;  Laterality: Right;    INJECTION OF ANESTHETIC AGENT INTO SACROILIAC JOINT Right 2020    Procedure: SACROILIAC JOINT INJECTION;  Surgeon: Mamie Roman MD;  Location: [facility];  Service: Pain Management;  Laterality: Right;    INJECTION OF JOINT Right 2020    Procedure: GREATER TROCHANTERIC BURSA INJECTION;  Surgeon: Mamie Roman MD;  Location: [facility];  Service: Pain Management;  Laterality: Right;    LAPAROSCOPIC RIGHT COLON RESECTION N/A 2024    Procedure: COLECTOMY, RIGHT, LAPAROSCOPIC (eras low, lithotomy);  Surgeon: Alberto Albright MD;  [facility];  Service: Colon and Rectal;  Laterality: N/A;    NASAL SEPTUM SURGERY      RECTAL PROLAPSE REPAIR      TONSILLECTOMY          Family History:   Family History   Problem Relation Name Age of Onset    No Known Problems Maternal Aunt Rubio Glasgow     Colon cancer Maternal Uncle Joshua     Colon cancer Maternal Uncle Yovani     Colon cancer Maternal Uncle Konstantin     No Known Problems Paternal Aunt Vero     Esophageal cancer Paternal Uncle Nat Garcia     Heart attack Maternal Grandmother Elmira     Leukemia Maternal Grandfather manish Pang     No Known Problems Paternal Grandmother Lizett     Stroke Paternal Grandfather Nat Sr         Social History:   Social History     Tobacco Use    Smoking status: Former     Current packs/day: 0.00     Average packs/day: 1 pack/day for 41.7 years (41.7 ttl pk-yrs)     Types: Cigarettes     Start date: 3/30/1982     Quit date: 2023     Years since quittin.9    Smokeless tobacco: Never   Substance Use Topics    Alcohol use: Yes     Comment: Socially-2 or 3 times a year-6 or 7 drinks        I have reviewed and updated the patient's past medical, surgical, family and social histories.    Allergies:   Review of patient's allergies indicates:   Allergen Reactions    Nsaids  (non-steroidal anti-inflammatory drug) Other (See Comments)     Gastrointestinal bleeding requiring blood transfusion    Demerol [meperidine] Rash        Medications:   Current Outpatient Medications   Medication Sig Dispense Refill    albuterol (PROVENTIL/VENTOLIN HFA) 90 mcg/actuation inhaler inhale 2 puffs into the lungs every 6 hours as needed for wheezing. 18 g 1    aspirin (ECOTRIN) 81 MG EC tablet Take 81 mg by mouth nightly.      dexAMETHasone (DECADRON) 4 MG Tab Take 2 tablets (8 mg total) by mouth once daily only on days 2, 3 and 4 of each chemotherapy cycle. 40 tablet 0    EScitalopram oxalate (LEXAPRO) 20 MG tablet Take 1 tablet (20 mg total) by mouth once daily. 30 tablet 3    lamoTRIgine (LAMICTAL) 150 MG Tab Take 2 tablets (300 mg total) by mouth every evening. 180 tablet 1    LIDOcaine-prilocaine (EMLA) cream Apply topically as needed (place to port site 45-60 minutes prior to chemotherapy). 30 g 5    ondansetron (ZOFRAN-ODT) 8 MG TbDL dissolve 1 tablet (8 mg total) under the tongue every 6 (six) hours as needed (nausea). 30 tablet 5    oxyCODONE (ROXICODONE) 5 MG immediate release tablet Take 1 tablet (5 mg total) by mouth every 6 (six) hours as needed for Pain. 11 tablet 0    pantoprazole (PROTONIX) 40 MG tablet Take 1 tablet (40 mg total) by mouth once daily. 90 tablet 3    prochlorperazine (COMPAZINE) 10 MG tablet Take 1 tablet (10 mg total) by mouth every 6 (six) hours as needed (nausea). 30 tablet 2    QUEtiapine (SEROQUEL) 200 MG Tab Take 1 tablet (200 mg total) by mouth every evening. 30 tablet 3    rosuvastatin (CRESTOR) 10 MG tablet Take 1 tablet (10 mg total) by mouth once daily. 90 tablet 3    traMADoL (ULTRAM) 50 mg tablet Take 1 tablet (50 mg total) by mouth every 6 (six) hours as needed for Pain. 5 tablet 0     No current facility-administered medications for this visit.        Physical Exam:   /70 (BP Location: Left arm, Patient Position: Sitting)   Pulse 81   Temp 97.4 °F  "(36.3 °C) (Oral)   Ht 5' 6" (1.676 m)   Wt 79.8 kg (175 lb 13.1 oz)   SpO2 98%   BMI 28.38 kg/m²           Physical Exam  Vitals reviewed.   Constitutional:       General: She is not in acute distress.     Appearance: Normal appearance. She is normal weight. She is not ill-appearing, toxic-appearing or diaphoretic.   HENT:      Head: Normocephalic and atraumatic.      Right Ear: External ear normal.      Left Ear: External ear normal.      Nose: Nose normal.      Mouth/Throat:      Pharynx: Oropharynx is clear.   Eyes:      General: No scleral icterus.     Conjunctiva/sclera: Conjunctivae normal.   Cardiovascular:      Rate and Rhythm: Normal rate.   Pulmonary:      Effort: Pulmonary effort is normal. No respiratory distress.   Chest:      Comments: RCW port  Abdominal:      General: There is no distension.   Skin:     General: Skin is warm and dry.      Coloration: Skin is not jaundiced or pale.      Findings: No bruising, erythema or rash.   Neurological:      Mental Status: She is alert and oriented to person, place, and time. Mental status is at baseline.      Motor: No weakness.      Gait: Gait normal.   Psychiatric:         Mood and Affect: Mood normal.         Labs:     I have reviewed the pertinent labs.  Very mild anemia.  Cr 1.4 (baseline 0.8).     Imaging:    CT CAP - 12/2/24:      Impression:     No definitive evidence of intrathoracic or intra-abdominal/pelvic metastatic disease.     Stable sub 5 mm hypodense lesion within the right hepatic lobe, possibly a cyst.     Similar changes of a right hemicolectomy.     Additional observations as above.    Path:   1/22/24 - R Hemicolectomy   Final Pathologic Diagnosis     THE DIAGNOSES REMAIN THE SAME.  THIS CORRECTED REPORT IS ISSUED TO CHANGE M STATUS to reflect tumor in the omentum.  This change is discussed with Dr. RANJANA Albright via phone, 2/1/2024.    1. Colon, right and terminal ileum (right hemicolectomy with EN bloc abdominal wall " resection):  Invasive adenocarcinoma.  See cancer synoptic report     2. Omentum (resection):    Positive for carcinoma    CORRECT SYNOPTIC AND M STATUS  Cancer synoptic report  - Procedure: Right hemicolectomy with EN bloc abdominal wall resection  - Tumor site:  Ascending  - Histologic type:  Adenocarcinoma  - Histologic grade:  G2, moderately differentiated.  See comment.  COMMENT:  While the majority of the tumor consists of well formed glands, a significant proportion includes abortive glands, single file infiltration, and malignant cells with signet ring cell morphology. The tumor has an insidious pattern of  infiltration with tumor present far from the advancing front of the tumor.  - Tumor size:  2.0 x 2.0 x 1.0 cm  - Tumor extent:  Invades visceral peritoneum, multifocal  - Macroscopic perforation: Not identified  - Lymphovascular invasion:  Present, extensive.  Small vessel, large vessel, intra and extramural.  - Perineural invasion:  Not identified  - Tumor budding score:  High (>10), multifocal single cell infiltration  - Treatment effect: No known pre-surgical therapy    Margins  Margin involved by invasive carcinoma, radial (slide 1C)  All margins negative for dysplasia.    pTNM stage classification (AJCC 8th edition)  pT Category  pT4a:  Tumor invades through the visceral peritoneum    pN Category  pN2b:  7 or more regional lymph nodes are positive  Number of positive lymph nodes:  19  Number of lymph nodes examined: 26  Number of tumor deposits:  4    pM Category  pM1c:  Metastasis to the peritoneal surface of the omentum.    Additional findings:  - Sessile serrated polyp, no cytologic dysplasia, 1.1 cm at ileocecal valve  - Tubulovillous adenoma, 1.0 cm, proximal ascending  - Tubular adenoma, 0.4 cm, mid ascending  - Tubular adenoma, 0.4, distal ascending  - Tubular adenoma, 0.6 cm, distal ascending  - Submucosal lipoma, 2.0 cm  - Appendix with no specific histopathologic changes    Ancillary  studies  Immunohistochemistry for mismatch repair proteins performed on prior biopsy material (SAS-, 12/21/23): pMMR.         Assessment:       1. Malignant neoplasm of ascending colon    2. 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 Immunodeficiency secondary to neoplasm    4. Immunodeficiency secondary to chemotherapy    5. Neoplastic malignant related fatigue    6. Cold sensitivity    7. Other constipation    8. Excessive gas          Plan:        # Colon cancer   Mrs. Maguire is a 74 y.o. female who presents for management of her metastatic colon cancer. She underwent a R hemicolectomy with en-bloc abdominal wall resection on 1/22/24 with Dr. Albright. Pathology revealed pT4a N2b disease with 19/26 positive LNs and omentum positive for carcinoma.    We had an in-depth conversation during our initial consult re: her diagnosis and treatment options. Reviewed recommendation for IV systemic therapy for at least 6 months. Plan for FOLFOX + bevacizumab to be given every 2 weeks. Briefly reviewed side effects of treatment. Handouts provided. Port placed 2/21/24.     PGX - DPYD normal metabolizer, [id number] intermediate metabolizer   Dexamethasone, zofran, compazine and lidocaine sent to pharmacy previously. Reviewed admin instructions.     Pending response, she may be a candidate for CRS/HIPEC with surgical oncology team. Continue to evaluate and re-start discussion if still without radiographic evidence of disease after CT on cycle 8.    CT CAP after cycle 4 shows no clear evidence of disease.  CT CAP after cycle 8 shows no clear evidence of disease.  CT CAP after cycle 12 shows no clear evidence of disease. Underwent diagnostic laparoscopy on 9/20/24 with PCI of 14.  Discussed with Dr. Contreras and with patient that options include continuing systemic therapy at this time vs CRS/HIPEC.  We are in agreement that with her volume of disease and histology, likelihood of CRS/HIPEC being beneficial for survival is not  high.  CT CAP after cycle 18 shows no clear evidence of disease radiographically.    Presents today for cycle 19 of 5-FU + Felisa maintenance.  Tolerance of chemotherapy has been excellent. I have reviewed the CBC and CMP, adequate for treatment   CEA remains normal  Proceed with cycle 19.    RTC in 2 weeks for next cycle.    Cold sensitivity/neoplasm related fatigue:  2/2 chemotherapy. Stopped oxaliplatin beginning with cycle 9 to prevent persistent paresthesias.  Mild persistent paresthesias at this time.  Will monitor. She is taking Cymbalta but not finding very helpful. Worse towards end of day.     Constipation/Gas  Continue 2 stool softeners a day with Miralax daily to have regular bowel movements.   This has helped.    Continue Miralax to avoid more explosive BM.    RTC in 2 weeks.     Pte and family members displayed understanding of the above encounter and treatment plan. All thoughtful questions were answered to their satisfaction. Pte was advised to notify the care team or proceed to the ER if signs and symptoms worsen.     Visit today included increased complexity associated with the care of the episodic problem chemotherapy  addressed and managing the longitudinal care of the patient due to the serious and/or complex managed problem(s) GI malignancies/cancer    Kemar Zaragoza MD  Hematology/Oncology  [facility]             Med Onc Chart Routing      Follow up with physician 4 weeks. for 5-FU + felisa   Follow up with KRISTEN 2 weeks. for 5-FU + felisa   Infusion scheduling note    Injection scheduling note    Labs CBC, CMP, CEA and urinalysis   Scheduling:  Preferred lab:  Lab interval: every 2 weeks     Imaging    Pharmacy appointment    Other referrals

## 2024-12-03 NOTE — PLAN OF CARE
Patient seated in chair accompanied by , VSS< assessment done.  Port accessed, biopatch applied, flushed, blood return noted, stared NS @ 25 cc/hr KVO while waiting for MVASI & 5FU from pharmacy. WCTM for safety

## 2024-12-03 NOTE — PLAN OF CARE
Patient completed MVASI infusion, tolerated well.  CADD pump connected to port to infuse @ 2.2 cc/hr for 46 hrs for a total of 100 mL of 5fu.  CADD infusing prior to patient ambulating off floor accompanied by .  Pt aware to RTC @ 0900 on Thurs 12/5/24 for pump DC.

## 2024-12-05 ENCOUNTER — INFUSION (OUTPATIENT)
Dept: INFUSION THERAPY | Facility: HOSPITAL | Age: 74
End: 2024-12-05
Payer: MEDICARE

## 2024-12-05 VITALS
DIASTOLIC BLOOD PRESSURE: 72 MMHG | RESPIRATION RATE: 18 BRPM | SYSTOLIC BLOOD PRESSURE: 135 MMHG | TEMPERATURE: 98 F | HEART RATE: 81 BPM

## 2024-12-05 DIAGNOSIS — C18.2 MALIGNANT NEOPLASM OF ASCENDING COLON: Primary | ICD-10-CM

## 2024-12-05 PROCEDURE — 25000003 PHARM REV CODE 250: Performed by: INTERNAL MEDICINE

## 2024-12-05 PROCEDURE — A4216 STERILE WATER/SALINE, 10 ML: HCPCS | Performed by: INTERNAL MEDICINE

## 2024-12-05 PROCEDURE — 63600175 PHARM REV CODE 636 W HCPCS: Performed by: INTERNAL MEDICINE

## 2024-12-05 RX ORDER — SODIUM CHLORIDE 0.9 % (FLUSH) 0.9 %
10 SYRINGE (ML) INJECTION
Status: DISCONTINUED | OUTPATIENT
Start: 2024-12-05 | End: 2024-12-05 | Stop reason: HOSPADM

## 2024-12-05 RX ORDER — PROCHLORPERAZINE EDISYLATE 5 MG/ML
5 INJECTION INTRAMUSCULAR; INTRAVENOUS ONCE AS NEEDED
Status: DISCONTINUED | OUTPATIENT
Start: 2024-12-05 | End: 2024-12-05 | Stop reason: HOSPADM

## 2024-12-05 RX ORDER — HEPARIN 100 UNIT/ML
500 SYRINGE INTRAVENOUS
Status: DISCONTINUED | OUTPATIENT
Start: 2024-12-05 | End: 2024-12-05 | Stop reason: HOSPADM

## 2024-12-05 RX ADMIN — Medication 10 ML: at 08:12

## 2024-12-05 RX ADMIN — HEPARIN 500 UNITS: 100 SYRINGE at 08:12

## 2024-12-13 ENCOUNTER — OFFICE VISIT (OUTPATIENT)
Dept: OPHTHALMOLOGY | Facility: CLINIC | Age: 74
End: 2024-12-13
Payer: MEDICARE

## 2024-12-13 DIAGNOSIS — H02.831 DERMATOCHALASIS OF BOTH UPPER EYELIDS: ICD-10-CM

## 2024-12-13 DIAGNOSIS — H40.053 OCULAR HYPERTENSION, BILATERAL: Primary | ICD-10-CM

## 2024-12-13 DIAGNOSIS — H25.12 NUCLEAR SCLEROSIS OF LEFT EYE: ICD-10-CM

## 2024-12-13 DIAGNOSIS — H02.834 DERMATOCHALASIS OF BOTH UPPER EYELIDS: ICD-10-CM

## 2024-12-13 DIAGNOSIS — H25.11 NUCLEAR SCLEROSIS OF RIGHT EYE: ICD-10-CM

## 2024-12-13 PROCEDURE — 99999 PR PBB SHADOW E&M-EST. PATIENT-LVL III: CPT | Mod: PBBFAC,,, | Performed by: STUDENT IN AN ORGANIZED HEALTH CARE EDUCATION/TRAINING PROGRAM

## 2024-12-13 RX ORDER — BRIMONIDINE TARTRATE 2 MG/ML
1 SOLUTION/ DROPS OPHTHALMIC 2 TIMES DAILY
Qty: 15 ML | Refills: 4 | Status: SHIPPED | OUTPATIENT
Start: 2024-12-13 | End: 2025-12-13

## 2024-12-13 NOTE — PROGRESS NOTES
HPI     Annual Exam     Additional comments: Pt here for an annual exam. Slight decrease in   distance [facility]; current gls ~1 year, but pt is having chemo treatments (19 to   date w/ more scheduled)-- wanting to update gls rx today.  No other   concerns at this time.            Comments    1. Dry eyes OU          Last edited by Mellissa Gallo on 12/13/2024 10:34 AM.            Assessment /Plan     For exam results, see Encounter Report.    Dermatochalasis of both upper eyelids- Follow    Ocular hypertension, bilateral  -    IOP today Elevated.   Will plan for GOCT, IOP, PACH at next visit     Start-  Brimonidine BID OU    Nuclear sclerosis, bilateral- - Not visually significant, monitor  MRx dispensed      RTC 2 WEEK IOP, GOCT, PACH

## 2024-12-16 ENCOUNTER — LAB VISIT (OUTPATIENT)
Dept: LAB | Facility: HOSPITAL | Age: 74
End: 2024-12-16
Attending: INTERNAL MEDICINE
Payer: MEDICARE

## 2024-12-16 DIAGNOSIS — C18.2 MALIGNANT NEOPLASM OF ASCENDING COLON: ICD-10-CM

## 2024-12-16 LAB
ALBUMIN SERPL BCP-MCNC: 3.3 G/DL (ref 3.5–5.2)
ALP SERPL-CCNC: 75 U/L (ref 40–150)
ALT SERPL W/O P-5'-P-CCNC: 11 U/L (ref 10–44)
ANION GAP SERPL CALC-SCNC: 8 MMOL/L (ref 8–16)
AST SERPL-CCNC: 16 U/L (ref 10–40)
BASOPHILS # BLD AUTO: 0.05 K/UL (ref 0–0.2)
BASOPHILS NFR BLD: 0.8 % (ref 0–1.9)
BILIRUB SERPL-MCNC: 0.3 MG/DL (ref 0.1–1)
BILIRUB UR QL STRIP: NEGATIVE
BUN SERPL-MCNC: 15 MG/DL (ref 8–23)
CALCIUM SERPL-MCNC: 9.9 MG/DL (ref 8.7–10.5)
CEA SERPL-MCNC: 2.5 NG/ML (ref 0–5)
CHLORIDE SERPL-SCNC: 103 MMOL/L (ref 95–110)
CLARITY UR: CLEAR
CO2 SERPL-SCNC: 27 MMOL/L (ref 23–29)
COLOR UR: YELLOW
CREAT SERPL-MCNC: 0.9 MG/DL (ref 0.5–1.4)
DIFFERENTIAL METHOD BLD: ABNORMAL
EOSINOPHIL # BLD AUTO: 0.1 K/UL (ref 0–0.5)
EOSINOPHIL NFR BLD: 0.9 % (ref 0–8)
ERYTHROCYTE [DISTWIDTH] IN BLOOD BY AUTOMATED COUNT: 16.3 % (ref 11.5–14.5)
EST. GFR  (NO RACE VARIABLE): >60 ML/MIN/1.73 M^2
GLUCOSE SERPL-MCNC: 119 MG/DL (ref 70–110)
GLUCOSE UR QL STRIP: NEGATIVE
HCT VFR BLD AUTO: 35.7 % (ref 37–48.5)
HGB BLD-MCNC: 11.8 G/DL (ref 12–16)
HGB UR QL STRIP: NEGATIVE
IMM GRANULOCYTES # BLD AUTO: 0.02 K/UL (ref 0–0.04)
IMM GRANULOCYTES NFR BLD AUTO: 0.3 % (ref 0–0.5)
KETONES UR QL STRIP: NEGATIVE
LEUKOCYTE ESTERASE UR QL STRIP: NEGATIVE
LYMPHOCYTES # BLD AUTO: 1.4 K/UL (ref 1–4.8)
LYMPHOCYTES NFR BLD: 21.3 % (ref 18–48)
MCH RBC QN AUTO: 30 PG (ref 27–31)
MCHC RBC AUTO-ENTMCNC: 33.1 G/DL (ref 32–36)
MCV RBC AUTO: 91 FL (ref 82–98)
MONOCYTES # BLD AUTO: 0.7 K/UL (ref 0.3–1)
MONOCYTES NFR BLD: 10.9 % (ref 4–15)
NEUTROPHILS # BLD AUTO: 4.2 K/UL (ref 1.8–7.7)
NEUTROPHILS NFR BLD: 65.8 % (ref 38–73)
NITRITE UR QL STRIP: NEGATIVE
NRBC BLD-RTO: 0 /100 WBC
PH UR STRIP: 6 [PH] (ref 5–8)
PLATELET # BLD AUTO: 269 K/UL (ref 150–450)
PMV BLD AUTO: 9.4 FL (ref 9.2–12.9)
POTASSIUM SERPL-SCNC: 4.3 MMOL/L (ref 3.5–5.1)
PROT SERPL-MCNC: 6.7 G/DL (ref 6–8.4)
PROT UR QL STRIP: NEGATIVE
RBC # BLD AUTO: 3.93 M/UL (ref 4–5.4)
SODIUM SERPL-SCNC: 138 MMOL/L (ref 136–145)
SP GR UR STRIP: 1.01 (ref 1–1.03)
URN SPEC COLLECT METH UR: NORMAL
UROBILINOGEN UR STRIP-ACNC: NEGATIVE EU/DL
WBC # BLD AUTO: 6.43 K/UL (ref 3.9–12.7)

## 2024-12-16 PROCEDURE — 82378 CARCINOEMBRYONIC ANTIGEN: CPT | Performed by: PHYSICIAN ASSISTANT

## 2024-12-16 PROCEDURE — 85025 COMPLETE CBC W/AUTO DIFF WBC: CPT | Performed by: PHYSICIAN ASSISTANT

## 2024-12-16 PROCEDURE — 80053 COMPREHEN METABOLIC PANEL: CPT | Performed by: PHYSICIAN ASSISTANT

## 2024-12-16 PROCEDURE — 81003 URINALYSIS AUTO W/O SCOPE: CPT | Performed by: REGISTERED NURSE

## 2024-12-16 PROCEDURE — 36415 COLL VENOUS BLD VENIPUNCTURE: CPT | Performed by: PHYSICIAN ASSISTANT

## 2024-12-17 ENCOUNTER — INFUSION (OUTPATIENT)
Dept: INFUSION THERAPY | Facility: HOSPITAL | Age: 74
End: 2024-12-17
Payer: MEDICARE

## 2024-12-17 ENCOUNTER — OFFICE VISIT (OUTPATIENT)
Dept: HEMATOLOGY/ONCOLOGY | Facility: CLINIC | Age: 74
End: 2024-12-17
Payer: MEDICARE

## 2024-12-17 VITALS
HEART RATE: 81 BPM | HEIGHT: 66 IN | DIASTOLIC BLOOD PRESSURE: 69 MMHG | SYSTOLIC BLOOD PRESSURE: 129 MMHG | HEART RATE: 81 BPM | SYSTOLIC BLOOD PRESSURE: 129 MMHG | TEMPERATURE: 98 F | BODY MASS INDEX: 28.09 KG/M2 | BODY MASS INDEX: 28.11 KG/M2 | RESPIRATION RATE: 18 BRPM | OXYGEN SATURATION: 98 % | WEIGHT: 174.81 LBS | HEIGHT: 66 IN | WEIGHT: 174.94 LBS | RESPIRATION RATE: 18 BRPM | DIASTOLIC BLOOD PRESSURE: 69 MMHG

## 2024-12-17 DIAGNOSIS — C78.6 MALIGNANT NEOPLASM METASTATIC TO OMENTUM: ICD-10-CM

## 2024-12-17 DIAGNOSIS — D84.81 IMMUNODEFICIENCY SECONDARY TO NEOPLASM: ICD-10-CM

## 2024-12-17 DIAGNOSIS — R14.3 EXCESSIVE GAS: ICD-10-CM

## 2024-12-17 DIAGNOSIS — C18.2 MALIGNANT NEOPLASM OF ASCENDING COLON: Primary | ICD-10-CM

## 2024-12-17 DIAGNOSIS — R53.0 NEOPLASTIC MALIGNANT RELATED FATIGUE: ICD-10-CM

## 2024-12-17 DIAGNOSIS — T45.1X5A IMMUNODEFICIENCY SECONDARY TO CHEMOTHERAPY: ICD-10-CM

## 2024-12-17 DIAGNOSIS — R68.89 COLD SENSITIVITY: ICD-10-CM

## 2024-12-17 DIAGNOSIS — K59.09 OTHER CONSTIPATION: ICD-10-CM

## 2024-12-17 DIAGNOSIS — D49.9 IMMUNODEFICIENCY SECONDARY TO NEOPLASM: ICD-10-CM

## 2024-12-17 DIAGNOSIS — D84.821 IMMUNODEFICIENCY SECONDARY TO CHEMOTHERAPY: ICD-10-CM

## 2024-12-17 DIAGNOSIS — Z79.899 IMMUNODEFICIENCY SECONDARY TO CHEMOTHERAPY: ICD-10-CM

## 2024-12-17 PROCEDURE — 3008F BODY MASS INDEX DOCD: CPT | Mod: CPTII,S$GLB,, | Performed by: REGISTERED NURSE

## 2024-12-17 PROCEDURE — 3074F SYST BP LT 130 MM HG: CPT | Mod: CPTII,S$GLB,, | Performed by: REGISTERED NURSE

## 2024-12-17 PROCEDURE — 96413 CHEMO IV INFUSION 1 HR: CPT

## 2024-12-17 PROCEDURE — 1160F RVW MEDS BY RX/DR IN RCRD: CPT | Mod: CPTII,S$GLB,, | Performed by: REGISTERED NURSE

## 2024-12-17 PROCEDURE — 63600175 PHARM REV CODE 636 W HCPCS: Mod: JG | Performed by: REGISTERED NURSE

## 2024-12-17 PROCEDURE — 3078F DIAST BP <80 MM HG: CPT | Mod: CPTII,S$GLB,, | Performed by: REGISTERED NURSE

## 2024-12-17 PROCEDURE — 25000003 PHARM REV CODE 250: Performed by: REGISTERED NURSE

## 2024-12-17 PROCEDURE — 1126F AMNT PAIN NOTED NONE PRSNT: CPT | Mod: CPTII,S$GLB,, | Performed by: REGISTERED NURSE

## 2024-12-17 PROCEDURE — G2211 COMPLEX E/M VISIT ADD ON: HCPCS | Mod: S$GLB,,, | Performed by: REGISTERED NURSE

## 2024-12-17 PROCEDURE — 99215 OFFICE O/P EST HI 40 MIN: CPT | Mod: S$GLB,,, | Performed by: REGISTERED NURSE

## 2024-12-17 PROCEDURE — 99999 PR PBB SHADOW E&M-EST. PATIENT-LVL III: CPT | Mod: PBBFAC,,, | Performed by: REGISTERED NURSE

## 2024-12-17 PROCEDURE — 3044F HG A1C LEVEL LT 7.0%: CPT | Mod: CPTII,S$GLB,, | Performed by: REGISTERED NURSE

## 2024-12-17 PROCEDURE — 1159F MED LIST DOCD IN RCRD: CPT | Mod: CPTII,S$GLB,, | Performed by: REGISTERED NURSE

## 2024-12-17 PROCEDURE — 96367 TX/PROPH/DG ADDL SEQ IV INF: CPT

## 2024-12-17 RX ORDER — PROCHLORPERAZINE EDISYLATE 5 MG/ML
5 INJECTION INTRAMUSCULAR; INTRAVENOUS ONCE AS NEEDED
Status: CANCELLED | OUTPATIENT
Start: 2024-12-20

## 2024-12-17 RX ORDER — EPINEPHRINE 0.3 MG/.3ML
0.3 INJECTION SUBCUTANEOUS ONCE AS NEEDED
Status: CANCELLED | OUTPATIENT
Start: 2024-12-18

## 2024-12-17 RX ORDER — HEPARIN 100 UNIT/ML
500 SYRINGE INTRAVENOUS
Status: CANCELLED | OUTPATIENT
Start: 2024-12-18

## 2024-12-17 RX ORDER — EPINEPHRINE 0.3 MG/.3ML
0.3 INJECTION SUBCUTANEOUS ONCE AS NEEDED
Status: DISCONTINUED | OUTPATIENT
Start: 2024-12-17 | End: 2024-12-17 | Stop reason: HOSPADM

## 2024-12-17 RX ORDER — DIPHENHYDRAMINE HYDROCHLORIDE 50 MG/ML
50 INJECTION INTRAMUSCULAR; INTRAVENOUS ONCE AS NEEDED
Status: DISCONTINUED | OUTPATIENT
Start: 2024-12-17 | End: 2024-12-17 | Stop reason: HOSPADM

## 2024-12-17 RX ORDER — PROCHLORPERAZINE EDISYLATE 5 MG/ML
5 INJECTION INTRAMUSCULAR; INTRAVENOUS ONCE AS NEEDED
Status: CANCELLED | OUTPATIENT
Start: 2024-12-18

## 2024-12-17 RX ORDER — SODIUM CHLORIDE 0.9 % (FLUSH) 0.9 %
10 SYRINGE (ML) INJECTION
Status: CANCELLED | OUTPATIENT
Start: 2024-12-20

## 2024-12-17 RX ORDER — HEPARIN 100 UNIT/ML
500 SYRINGE INTRAVENOUS
Status: DISCONTINUED | OUTPATIENT
Start: 2024-12-17 | End: 2024-12-17 | Stop reason: HOSPADM

## 2024-12-17 RX ORDER — SODIUM CHLORIDE 0.9 % (FLUSH) 0.9 %
10 SYRINGE (ML) INJECTION
Status: CANCELLED | OUTPATIENT
Start: 2024-12-18

## 2024-12-17 RX ORDER — HEPARIN 100 UNIT/ML
500 SYRINGE INTRAVENOUS
Status: CANCELLED | OUTPATIENT
Start: 2024-12-20

## 2024-12-17 RX ORDER — DIPHENHYDRAMINE HYDROCHLORIDE 50 MG/ML
50 INJECTION INTRAMUSCULAR; INTRAVENOUS ONCE AS NEEDED
Status: CANCELLED | OUTPATIENT
Start: 2024-12-18

## 2024-12-17 RX ORDER — PROCHLORPERAZINE EDISYLATE 5 MG/ML
5 INJECTION INTRAMUSCULAR; INTRAVENOUS ONCE AS NEEDED
Status: DISCONTINUED | OUTPATIENT
Start: 2024-12-17 | End: 2024-12-17 | Stop reason: HOSPADM

## 2024-12-17 RX ORDER — SODIUM CHLORIDE 0.9 % (FLUSH) 0.9 %
10 SYRINGE (ML) INJECTION
Status: DISCONTINUED | OUTPATIENT
Start: 2024-12-17 | End: 2024-12-17 | Stop reason: HOSPADM

## 2024-12-17 RX ADMIN — DEXAMETHASONE SODIUM PHOSPHATE 0.25 MG: 4 INJECTION, SOLUTION INTRA-ARTICULAR; INTRALESIONAL; INTRAMUSCULAR; INTRAVENOUS; SOFT TISSUE at 08:12

## 2024-12-17 RX ADMIN — BEVACIZUMAB-AWWB 365 MG: 400 INJECTION, SOLUTION INTRAVENOUS at 08:12

## 2024-12-17 RX ADMIN — FLUOROURACIL 4440 MG: 50 INJECTION, SOLUTION INTRAVENOUS at 09:12

## 2024-12-17 NOTE — PROGRESS NOTES
MEDICAL ONCOLOGY - ESTABLISHED PATIENT VISIT    Reason for visit: colon adenocarcinoma    Best Contact Phone Number(s): [phone] (home)      Cancer/Stage/TNM:    Cancer Staging   Colon adenocarcinoma  Staging form: Colon and Rectum, AJCC 8th Edition  - Pathologic stage from 1/22/2024: Stage IVC (pT4a, pN2b, cM1c) - Signed by Minna Menendez CNS on 2/13/2024       Oncology History   Colon adenocarcinoma   12/21/2023 Tumor Markers    Patient's tumor was tested for the following markers: CEA.                                              Results of the tumor marker test revealed 3.3     12/21/2023 Procedure    Colonoscopy  Cecal polyp removed with jumbo forceps, 3 mm  Multiple ascending colon polyps ranging in size from 5 to 12 mm - semi-pedunculated removed with hot snare  Hepatic flexure mass identified - central ulceration, concerning for malignancy, 3 cm, not obstructing, this was biopsied - tattoo placed distal to mass  Sigmoid diverticular disease     12/21/2023 Tumor Markers    Patient's tumor was tested for the following markers: CEA.                                              Results of the tumor marker test revealed 3.3     12/22/2023 Imaging Significant Findings    CT CAP  Evaluation of the colon is somewhat limited as there is significant colonic stool and oral contrast material has not opacified the large bowel.  There does appear to be some abnormal thickening of the walls of the proximal right colon and a patient with a known history of colonic malignancy.  There is some soft tissue density external to the walls of the colon on the right pericolic gutter which could represent peritoneal nodularity versus subserosal extent of tumor.  Slightly more distal to this area there is a 2nd area of irregularity of the walls of the colon, difficult to fully evaluate if this is related to the colonic walls versus stool with central fat.     Two hypodensities in the liver, the larger measuring 0.8 cm, too  small to accurately characterize on the basis of this examination.  Attention on follow-up.     No pulmonary nodules are seen.     Cholecystectomy.     12/29/2023 Initial Diagnosis    Hepatic flexure colon adenocarcinoma  MMR intact     1/22/2024 Cancer Staged    Staging form: Colon and Rectum, AJCC 8th Edition  - Pathologic stage from 1/22/2024: Stage IVC (pT4a, pN2b, cM1c)     8/27/2024 Tumor Markers    Patient's tumor was tested for the following markers: CEA.                                              Results of the tumor marker test revealed 2.3      Malignant neoplasm of ascending colon   2/12/2024 Initial Diagnosis    Malignant neoplasm of ascending colon     2/26/2024 -  Chemotherapy    Treatment Summary   Plan Name: OP GI [id number] (oxaliplatin leucovorin fluorouracil) with bevacizumab Q2W  Treatment Goal: Palliative  Status: Active  Start Date: 2/26/2024  End Date: 2/14/2025 (Planned)  Provider: Kemar Zaragoza MD  Chemotherapy: oxaliplatin (ELOXATIN) 150 mg in dextrose 5 % (D5W) 595 mL chemo infusion, 157 mg, Intravenous, Clinic/[facility] 1 time, 8 of 8 cycles  Administration: 150 mg (2/26/2024), 150 mg (3/11/2024), 150 mg (3/25/2024), 150 mg (4/8/2024), 150 mg (4/22/2024), 150 mg (5/6/2024), 150 mg (5/20/2024), 150 mg (6/3/2024)  fluorouracil (ADRUCIL) 2,400 mg/m2 = 4,440 mg in sodium chloride 0.9% 100 mL chemo infusion, 2,400 mg/m2 = 4,440 mg, Intravenous, Clinic/HOD 1 time, 20 of 24 cycles  Administration: 4,440 mg (2/26/2024), 4,440 mg (3/11/2024), 4,440 mg (3/25/2024), 4,440 mg (4/8/2024), 4,440 mg (4/22/2024), 4,440 mg (5/6/2024), 4,440 mg (5/20/2024), 4,440 mg (6/3/2024), 4,440 mg (6/17/2024), 4,440 mg (7/3/2024), 4,440 mg (7/16/2024), 4,440 mg (7/31/2024), 4,440 mg (8/13/2024), 4,440 mg (8/28/2024), 4,440 mg (10/24/2024), 4,440 mg (10/10/2024), 4,440 mg (11/6/2024), 4,440 mg (11/19/2024), 4,440 mg (12/3/2024)  bevacizumab-awwb (MVASI) 5 mg/kg = 365 mg in sodium chloride 0.9% 100 mL infusion, 5  mg/kg = 365 mg, Intravenous, Clinic/HOD 1 time, 18 of 22 cycles  Administration: 365 mg (2/26/2024), 365 mg (3/11/2024), 365 mg (3/25/2024), 365 mg (4/8/2024), 365 mg (4/22/2024), 365 mg (5/6/2024), 365 mg (5/20/2024), 365 mg (6/3/2024), 365 mg (6/17/2024), 365 mg (7/3/2024), 365 mg (7/16/2024), 365 mg (7/31/2024), 365 mg (8/13/2024), 365 mg (10/24/2024), 365 mg (11/6/2024), 365 mg (11/19/2024), 365 mg (12/3/2024)     8/27/2024 Tumor Markers    Patient's tumor was tested for the following markers: CEA.                                              Results of the tumor marker test revealed 2.3           Interim History:   74 y.o. female with LUANNE, bipolar, HLD who presents prior to cycle 20 FOLFOX + bevacizumab for her metastatic ascending colon cancer, now on maintenance 5-FU + Bevacizumab. She had a diagnostic lap with Dr. Contreras with findings of known peritoneal nodules; biopsies confirmed metastatic adenocarcinoma. She had a discussion with him about potentially not proceeding with CRS/HIPEC and agrees with continuing maintenance chemo.     Doing well overall. Eating well and remains active. She saw her eye doctor who noted increased pressure in both eyes. Started on brimonidine drops and will follow up in 2 weeks. Had a fall while bringing her sister's dog outside. She did injure her hand but this has gotten better. No nausea, vomiting or diarrhea. Chronic constipation, stable. No changes in paresthesias.     ECOG 0. Presents with her .     ROS:   Review of Systems   Constitutional:  Negative for chills, fever and malaise/fatigue.   HENT:  Negative for congestion and sore throat.    Respiratory:  Negative for shortness of breath.    Cardiovascular:  Negative for chest pain, palpitations and leg swelling.   Gastrointestinal:  Positive for constipation. Negative for blood in stool, diarrhea and nausea.   Genitourinary:  Negative for hematuria.   Musculoskeletal:  Positive for falls. Negative for back pain and  myalgias.   Skin:  Negative for rash.   Neurological:  Positive for tingling. Negative for dizziness, weakness and headaches.       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    Sacroiliitis     right side    Sleep apnea     can not tolerate cpap        Past Surgical History:   Past Surgical History:   Procedure Laterality Date    ANKLE FRACTURE SURGERY Left 2001    BLADDER SUSPENSION      CARPAL TUNNEL RELEASE Bilateral     CHOLECYSTECTOMY      Laparoscopic    COLONOSCOPY      COLONOSCOPY N/A 12/21/2023    Procedure: COLONOSCOPY;  Surgeon: Alberto Albright MD;  Location: [facility];  Service: Endoscopy;  Laterality: N/A;    DIAGNOSTIC LAPAROSCOPY N/A 9/20/2024    Procedure: LAPAROSCOPY, DIAGNOSTIC;  Surgeon: Benjy Contreras MD;  Location: [address];  Service: General;  Laterality: N/A;    ESOPHAGOGASTRODUODENOSCOPY N/A 07/29/2021    Procedure: EGD (ESOPHAGOGASTRODUODENOSCOPY);  Surgeon: Susan Mcclain MD;  Location: [facility];  Service: Endoscopy;  Laterality: N/A;    EYE SURGERY      FOOT ARTHRODESIS Right 05/03/2023    Procedure: FUSION, FOOT;  Surgeon: Lauri Alejandra DPM;  Location: [facility];  Service: Podiatry;  Laterality: Right;  mini c-arm, Arthrex dowel bone graft harvester, locking plate and screws festus notified cc    HYSTERECTOMY  1986    vaginal prolapse    INJECTION OF ANESTHETIC AGENT AROUND MEDIAL BRANCH NERVES INNERVATING CERVICAL FACET JOINT Bilateral 05/27/2022    Procedure: CERVICAL MEDIAL BRANCH NERVE BLOCK (C3-4,C4-5);  Surgeon: Mamie Roman MD;  Location: [facility];  Service: Pain Management;  Laterality: Bilateral;    INJECTION OF ANESTHETIC AGENT AROUND MEDIAL BRANCH NERVES INNERVATING  LUMBAR FACET JOINT Right 02/05/2021    Procedure: LUMBAR FACET JOINT BLOCK (L3-4,L4-5,L5-S1);  Surgeon: Mamie Roman MD;  Location: [facility];  Service: Pain Management;  Laterality: Right;    INJECTION OF ANESTHETIC AGENT AROUND MEDIAL BRANCH NERVES INNERVATING LUMBAR FACET JOINT Right 04/30/2021    Procedure: LUMBAR FACET JOINT BLOCK (L3-4,L4-5,L5-S1);  Surgeon: Mamie Roman MD;  Location: [facility];  Service: Pain Management;  Laterality: Right;    INJECTION OF ANESTHETIC AGENT INTO SACROILIAC JOINT Right 12/04/2020    Procedure: SACROILIAC JOINT INJECTION;  Surgeon: Mamie Roman MD;  Location: [facility];  Service: Pain Management;  Laterality: Right;    INJECTION OF JOINT Right 12/04/2020    Procedure: GREATER TROCHANTERIC BURSA INJECTION;  Surgeon: Mamie Roman MD;  Location: [facility];  Service: Pain Management;  Laterality: Right;    LAPAROSCOPIC RIGHT COLON RESECTION N/A 1/22/2024    Procedure: COLECTOMY, RIGHT, LAPAROSCOPIC (eras low, lithotomy);  Surgeon: Alberto Albright MD;  [facility];  Service: Colon and Rectal;  Laterality: N/A;    NASAL SEPTUM SURGERY      RECTAL PROLAPSE REPAIR      TONSILLECTOMY          Family History:   Family History   Problem Relation Name Age of Onset    No Known Problems Maternal Aunt Rubio Glasgow     Colon cancer Maternal Uncle Kalpeshwis     Colon cancer Maternal Uncle Yovani     Colon cancer Maternal Uncle Konstantin     No Known Problems Paternal Aunt Vero     Esophageal cancer Paternal Uncle Nat Jr     Heart attack Maternal Grandmother Elmira     Leukemia Maternal Grandfather manish Pang     No Known Problems Paternal Grandmother Lizett     Stroke Paternal Grandfather Balbinanorman Sr         Social History:   Social History     Tobacco Use    Smoking status: Former     Current packs/day: 0.00     Average packs/day: 1 pack/day for 41.7 years (41.7 ttl pk-yrs)     Types: Cigarettes     Start date: 3/30/1982     Quit date: 12/23/2023     Years since  quittin.9    Smokeless tobacco: Never   Substance Use Topics    Alcohol use: Yes     Comment: Socially-2 or 3 times a year-6 or 7 drinks        I have reviewed and updated the patient's past medical, surgical, family and social histories.    Allergies:   Review of patient's allergies indicates:   Allergen Reactions    Nsaids (non-steroidal anti-inflammatory drug) Other (See Comments)     Gastrointestinal bleeding requiring blood transfusion    Demerol [meperidine] Rash        Medications:   Current Outpatient Medications   Medication Sig Dispense Refill    albuterol (PROVENTIL/VENTOLIN HFA) 90 mcg/actuation inhaler inhale 2 puffs into the lungs every 6 hours as needed for wheezing. 18 g 1    aspirin (ECOTRIN) 81 MG EC tablet Take 81 mg by mouth nightly.      brimonidine 0.2% (ALPHAGAN) 0.2 % Drop Place 1 drop into both eyes 2 (two) times a day. 15 mL 4    dexAMETHasone (DECADRON) 4 MG Tab Take 2 tablets (8 mg total) by mouth once daily only on days 2, 3 and 4 of each chemotherapy cycle. 40 tablet 0    EScitalopram oxalate (LEXAPRO) 20 MG tablet Take 1 tablet (20 mg total) by mouth once daily. 30 tablet 3    lamoTRIgine (LAMICTAL) 150 MG Tab Take 2 tablets (300 mg total) by mouth every evening. 180 tablet 1    LIDOcaine-prilocaine (EMLA) cream Apply topically as needed (place to port site 45-60 minutes prior to chemotherapy). 30 g 5    ondansetron (ZOFRAN-ODT) 8 MG TbDL dissolve 1 tablet (8 mg total) under the tongue every 6 (six) hours as needed (nausea). 30 tablet 5    oxyCODONE (ROXICODONE) 5 MG immediate release tablet Take 1 tablet (5 mg total) by mouth every 6 (six) hours as needed for Pain. 11 tablet 0    pantoprazole (PROTONIX) 40 MG tablet Take 1 tablet (40 mg total) by mouth once daily. 90 tablet 3    prochlorperazine (COMPAZINE) 10 MG tablet Take 1 tablet (10 mg total) by mouth every 6 (six) hours as needed (nausea). 30 tablet 2    QUEtiapine (SEROQUEL) 200 MG Tab Take 1 tablet (200 mg total) by mouth  "every evening. 30 tablet 3    rosuvastatin (CRESTOR) 10 MG tablet Take 1 tablet (10 mg total) by mouth once daily. 90 tablet 3    traMADoL (ULTRAM) 50 mg tablet Take 1 tablet (50 mg total) by mouth every 6 (six) hours as needed for Pain. 5 tablet 0     No current facility-administered medications for this visit.     Facility-Administered Medications Ordered in Other Visits   Medication Dose Route Frequency Provider Last Rate Last Admin    0.9% NaCl 250 mL flush bag   Intravenous 1 time in Clinic/[facility] Minna Menendez, CNS        alteplase injection 2 mg  2 mg Intra-Catheter PRN Minna Menendez, CNS        bevacizumab-awwb (MVASI) 5 mg/kg = 365 mg in 0.9% NaCl 100 mL infusion  5 mg/kg (Treatment Plan Recorded) Intravenous 1 time in Clinic/Minna Avalos, CNS        D5W 250 mL flush bag   Intravenous 1 time in [facility] Minna Menendez, CNS        diphenhydrAMINE injection 50 mg  50 mg Intravenous Once PRN Minna Menendez, CNS        EPINEPHrine (EPIPEN) 0.3 mg/0.3 mL pen injection 0.3 mg  0.3 mg Intramuscular Once PRN Minna Menendez, CNS        fluorouracil (Adrucil) 2,400 mg/m2 = 4,440 mg in 0.9% NaCl 100 mL chemo infusion  2,400 mg/m2 (Treatment Plan Recorded) Intravenous 1 time in Clinic/[facility] Minna Menendez, CNS        heparin, porcine (PF) 100 unit/mL injection flush 500 Units  500 Units Intravenous PRN Minna Menendez, CNS        hydrocortisone sodium succinate injection 100 mg  100 mg Intravenous Once PRN Minna Menendez, CNS        palonosetron 0.25mg/dexAMETHasone 12mg in NS IVPB 0.25 mg 50 mL  0.25 mg Intravenous 1 time in Clinic/[facility] Minna Menendez, CNS        prochlorperazine injection 5 mg  5 mg Intravenous Once PRN Minna Menendez, CNS        sodium chloride 0.9% flush 10 mL  10 mL Intravenous PRN Minna Menendez, CNS            Physical Exam:   /69 (BP Location: Left arm, Patient Position: Sitting)   Pulse 81   Resp 18   Ht 5' 6" (1.676 m)   Wt 79.3 kg (174 lb 15 oz)   SpO2 " 98%   BMI 28.24 kg/m²           Physical Exam  Vitals reviewed.   Constitutional:       General: She is not in acute distress.     Appearance: Normal appearance. She is normal weight. She is not ill-appearing, toxic-appearing or diaphoretic.   HENT:      Head: Normocephalic and atraumatic.      Right Ear: External ear normal.      Left Ear: External ear normal.      Nose: Nose normal.      Mouth/Throat:      Pharynx: Oropharynx is clear.   Eyes:      General: No scleral icterus.     Conjunctiva/sclera: Conjunctivae normal.   Cardiovascular:      Rate and Rhythm: Normal rate.   Pulmonary:      Effort: Pulmonary effort is normal. No respiratory distress.   Chest:      Comments: RCW port  Abdominal:      General: There is no distension.   Skin:     General: Skin is warm and dry.      Coloration: Skin is not jaundiced or pale.      Findings: No bruising, erythema or rash.   Neurological:      Mental Status: She is alert and oriented to person, place, and time. Mental status is at baseline.      Motor: No weakness.      Gait: Gait normal.   Psychiatric:         Mood and Affect: Mood normal.         Labs:   I have reviewed the pertinent labs.      Imaging:    CT CAP - 12/2/24:      Impression:     No definitive evidence of intrathoracic or intra-abdominal/pelvic metastatic disease.     Stable sub 5 mm hypodense lesion within the right hepatic lobe, possibly a cyst.     Similar changes of a right hemicolectomy.     Additional observations as above.    Path:   1/22/24 - R Hemicolectomy   Final Pathologic Diagnosis     THE DIAGNOSES REMAIN THE SAME.  THIS CORRECTED REPORT IS ISSUED TO CHANGE M STATUS to reflect tumor in the omentum.  This change is discussed with Dr. RANJANA Albright via phone, 2/1/2024.    1. Colon, right and terminal ileum (right hemicolectomy with EN bloc abdominal wall resection):  Invasive adenocarcinoma.  See cancer synoptic report     2. Omentum (resection):    Positive for carcinoma    CORRECT SYNOPTIC  AND M STATUS  Cancer synoptic report  - Procedure: Right hemicolectomy with EN bloc abdominal wall resection  - Tumor site:  Ascending  - Histologic type:  Adenocarcinoma  - Histologic grade:  G2, moderately differentiated.  See comment.  COMMENT:  While the majority of the tumor consists of well formed glands, a significant proportion includes abortive glands, single file infiltration, and malignant cells with signet ring cell morphology. The tumor has an insidious pattern of  infiltration with tumor present far from the advancing front of the tumor.  - Tumor size:  2.0 x 2.0 x 1.0 cm  - Tumor extent:  Invades visceral peritoneum, multifocal  - Macroscopic perforation: Not identified  - Lymphovascular invasion:  Present, extensive.  Small vessel, large vessel, intra and extramural.  - Perineural invasion:  Not identified  - Tumor budding score:  High (>10), multifocal single cell infiltration  - Treatment effect: No known pre-surgical therapy    Margins  Margin involved by invasive carcinoma, radial (slide 1C)  All margins negative for dysplasia.    pTNM stage classification (AJCC 8th edition)  pT Category  pT4a:  Tumor invades through the visceral peritoneum    pN Category  pN2b:  7 or more regional lymph nodes are positive  Number of positive lymph nodes:  19  Number of lymph nodes examined: 26  Number of tumor deposits:  4    pM Category  pM1c:  Metastasis to the peritoneal surface of the omentum.    Additional findings:  - Sessile serrated polyp, no cytologic dysplasia, 1.1 cm at ileocecal valve  - Tubulovillous adenoma, 1.0 cm, proximal ascending  - Tubular adenoma, 0.4 cm, mid ascending  - Tubular adenoma, 0.4, distal ascending  - Tubular adenoma, 0.6 cm, distal ascending  - Submucosal lipoma, 2.0 cm  - Appendix with no specific histopathologic changes    Ancillary studies  Immunohistochemistry for mismatch repair proteins performed on prior biopsy material (SAS-, 12/21/23): pMMR.         Assessment:        1. Malignant neoplasm of ascending colon    2. 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 Immunodeficiency secondary to neoplasm    4. Immunodeficiency secondary to chemotherapy    5. Neoplastic malignant related fatigue    6. Cold sensitivity    7. Other constipation    8. Excessive gas        Plan:        # Colon cancer   Mrs. Maguire is a 74 y.o. female who presents for management of her metastatic colon cancer. She underwent a R hemicolectomy with en-bloc abdominal wall resection on 1/22/24 with Dr. Albright. Pathology revealed pT4a N2b disease with 19/26 positive LNs and omentum positive for carcinoma.    We had an in-depth conversation during our initial consult re: her diagnosis and treatment options. Reviewed recommendation for IV systemic therapy for at least 6 months. Plan for FOLFOX + bevacizumab to be given every 2 weeks. Briefly reviewed side effects of treatment. Handouts provided. Port placed 2/21/24.     PGX - DPYD normal metabolizer, [id number] intermediate metabolizer   Dexamethasone, zofran, compazine and lidocaine sent to pharmacy previously. Reviewed admin instructions.     Pending response, she may be a candidate for CRS/HIPEC with surgical oncology team. Continue to evaluate and re-start discussion if still without radiographic evidence of disease after CT on cycle 8.    CT CAP after cycle 4 shows no clear evidence of disease.  CT CAP after cycle 8 shows no clear evidence of disease.  CT CAP after cycle 12 shows no clear evidence of disease. Underwent diagnostic laparoscopy on 9/20/24 with PCI of 14.  Discussed with Dr. Contreras and with patient that options include continuing systemic therapy at this time vs CRS/HIPEC.  We are in agreement that with her volume of disease and histology, likelihood of CRS/HIPEC being beneficial for survival is not high.  CT CAP after cycle 18 shows no clear evidence of disease radiographically.    Presents today for cycle 20 of 5-FU + Felisa  maintenance.  Tolerance of chemotherapy has been excellent. I have reviewed the CBC and CMP, adequate for treatment   CEA remains normal  Proceed with cycle 20.    RTC in 2 weeks for next cycle.    Cold sensitivity/neoplasm related fatigue:  2/2 chemotherapy. Stopped oxaliplatin beginning with cycle 9 to prevent persistent paresthesias.  Mild persistent paresthesias at this time.  Will monitor. She is taking Cymbalta but not finding very helpful. Worse towards end of day.     Constipation/Gas  Continue 2 stool softeners a day with Miralax daily to have regular bowel movements.   This has helped.    Continue Miralax to avoid more explosive BM.    RTC in 2 weeks.     Patient is in agreement with the proposed treatment plan. All questions were answered to the patient's satisfaction. Pt knows to call clinic if anything is needed before the next clinic visit.    Patient discussed with collaborating physician, Dr. Zaragoza.    At least 40 minutes were spent today on this encounter including face to face time with the patient, data gathering/interpretation and documentation.       Minna Menendez, MSN, APRN, ACCNS-  Hematology and Medical Oncology  Clinical Nurse Specialist to Dr. Zaragoza, Dr. Jin & Dr. Goss Onc Chart Routing      Follow up with physician 4 weeks. with labs for chemo   Follow up with KRISTEN 6 weeks. with labs for chemo. keep chemo appt in 2 weeks as scheduled without provider visit.   Infusion scheduling note   chemo every 2 weeks, pump d/c on day 3   Injection scheduling note    Labs CBC, CMP, CEA and urinalysis   Scheduling:  Preferred lab:  Lab interval: every 2 weeks  labs the day prior to visit/infusion at [facility]    Pharmacy appointment    Other referrals

## 2024-12-18 DIAGNOSIS — Z78.0 MENOPAUSE: ICD-10-CM

## 2024-12-19 ENCOUNTER — INFUSION (OUTPATIENT)
Dept: INFUSION THERAPY | Facility: HOSPITAL | Age: 74
End: 2024-12-19
Payer: MEDICARE

## 2024-12-19 VITALS
TEMPERATURE: 98 F | SYSTOLIC BLOOD PRESSURE: 112 MMHG | DIASTOLIC BLOOD PRESSURE: 55 MMHG | HEART RATE: 71 BPM | RESPIRATION RATE: 18 BRPM

## 2024-12-19 DIAGNOSIS — C18.2 MALIGNANT NEOPLASM OF ASCENDING COLON: Primary | ICD-10-CM

## 2024-12-19 PROCEDURE — A4216 STERILE WATER/SALINE, 10 ML: HCPCS | Performed by: REGISTERED NURSE

## 2024-12-19 PROCEDURE — 63600175 PHARM REV CODE 636 W HCPCS: Performed by: REGISTERED NURSE

## 2024-12-19 PROCEDURE — 25000003 PHARM REV CODE 250: Performed by: REGISTERED NURSE

## 2024-12-19 RX ORDER — SODIUM CHLORIDE 0.9 % (FLUSH) 0.9 %
10 SYRINGE (ML) INJECTION
Status: DISCONTINUED | OUTPATIENT
Start: 2024-12-19 | End: 2024-12-19 | Stop reason: HOSPADM

## 2024-12-19 RX ORDER — PROCHLORPERAZINE EDISYLATE 5 MG/ML
5 INJECTION INTRAMUSCULAR; INTRAVENOUS ONCE AS NEEDED
Status: DISCONTINUED | OUTPATIENT
Start: 2024-12-19 | End: 2024-12-19 | Stop reason: HOSPADM

## 2024-12-19 RX ORDER — HEPARIN 100 UNIT/ML
500 SYRINGE INTRAVENOUS
Status: DISCONTINUED | OUTPATIENT
Start: 2024-12-19 | End: 2024-12-19 | Stop reason: HOSPADM

## 2024-12-19 RX ADMIN — Medication 10 ML: at 07:12

## 2024-12-19 RX ADMIN — HEPARIN SODIUM (PORCINE) LOCK FLUSH IV SOLN 100 UNIT/ML 500 UNITS: 100 SOLUTION at 07:12

## 2024-12-19 NOTE — PLAN OF CARE
0738-Pt tolerated home infusion well, no complaints or complications reported, VSS, vessel empty upon arrival. Pt disconnected from pump, positive blood return noted. Pt aware to call provider with any questions or concerns, aware of upcoming appts, ambulatory from clinic with steady gait, no distress noted.

## 2024-12-27 ENCOUNTER — OFFICE VISIT (OUTPATIENT)
Dept: URGENT CARE | Facility: CLINIC | Age: 74
End: 2024-12-27
Payer: MEDICARE

## 2024-12-27 VITALS
WEIGHT: 179.44 LBS | HEART RATE: 93 BPM | OXYGEN SATURATION: 96 % | TEMPERATURE: 99 F | BODY MASS INDEX: 28.96 KG/M2 | DIASTOLIC BLOOD PRESSURE: 70 MMHG | SYSTOLIC BLOOD PRESSURE: 155 MMHG | RESPIRATION RATE: 17 BRPM

## 2024-12-27 DIAGNOSIS — B96.89 BACTERIAL LOWER RESPIRATORY INFECTION: Primary | ICD-10-CM

## 2024-12-27 DIAGNOSIS — R05.9 COUGH, UNSPECIFIED TYPE: ICD-10-CM

## 2024-12-27 DIAGNOSIS — D84.9 IMMUNOCOMPROMISED: ICD-10-CM

## 2024-12-27 DIAGNOSIS — J22 BACTERIAL LOWER RESPIRATORY INFECTION: Primary | ICD-10-CM

## 2024-12-27 DIAGNOSIS — C18.2 MALIGNANT NEOPLASM OF ASCENDING COLON: ICD-10-CM

## 2024-12-27 LAB
CTP QC/QA: YES
POC MOLECULAR INFLUENZA A AGN: NEGATIVE
POC MOLECULAR INFLUENZA B AGN: NEGATIVE

## 2024-12-27 RX ORDER — AZITHROMYCIN 250 MG/1
TABLET, FILM COATED ORAL
Qty: 6 TABLET | Refills: 0 | Status: SHIPPED | OUTPATIENT
Start: 2024-12-27

## 2024-12-27 RX ORDER — ALBUTEROL SULFATE 90 UG/1
2 INHALANT RESPIRATORY (INHALATION) EVERY 4 HOURS PRN
Qty: 16 G | Refills: 1 | Status: SHIPPED | OUTPATIENT
Start: 2024-12-27

## 2024-12-27 NOTE — PROGRESS NOTES
Subjective:      Patient ID: Karin Maguire is a 74 y.o. female.    Vitals:  weight is 81.4 kg (179 lb 7.3 oz). Her tympanic temperature is 98.5 °F (36.9 °C). Her blood pressure is 155/70 (abnormal) and her pulse is 93. Her respiration is 17 and oxygen saturation is 96%.     Chief Complaint: Cough    Presents with productive cough, post nasal drip, runny nose and chest congestion. Symptoms started on 12/23/24. Has taken mucin ex. No relief.  No fever, no aches, no shortness of breath   She does have hx of asthma, out of her inhaler     Cough  This is a new problem. The current episode started in the past 7 days. The problem has been unchanged. Associated symptoms include postnasal drip and wheezing. Pertinent negatives include no chills, ear pain, eye redness, fever, headaches, myalgias, rash or shortness of breath. Treatments tried: mucin ex. The treatment provided no relief.       Constitution: Negative for activity change, appetite change, chills, sweating, fatigue and fever.   HENT:  Positive for postnasal drip. Negative for ear pain and ear discharge.    Neck: Negative for neck pain and neck stiffness.   Eyes:  Negative for eye pain, eye redness and blurred vision.   Respiratory:  Positive for cough, wheezing and asthma. Negative for shortness of breath.    Gastrointestinal:  Negative for abdominal pain, nausea, vomiting and diarrhea.   Genitourinary:  Negative for dysuria, frequency and urgency.   Musculoskeletal:  Negative for pain and muscle ache.   Skin:  Negative for rash.   Allergic/Immunologic: Positive for asthma.   Neurological:  Negative for dizziness, light-headedness, headaches and disorientation.   Psychiatric/Behavioral:  Negative for disorientation, confusion, agitation and nervous/anxious. The patient is not nervous/anxious.       Objective:     Physical Exam   Constitutional: She is oriented to person, place, and time. She appears well-developed. She is cooperative.  Non-toxic appearance.  She does not appear ill. No distress.   HENT:   Head: Normocephalic and atraumatic.   Ears:   Right Ear: Hearing, tympanic membrane, external ear and ear canal normal.   Left Ear: Hearing, tympanic membrane, external ear and ear canal normal.   Nose: Nose normal. No mucosal edema, rhinorrhea or nasal deformity. No epistaxis. Right sinus exhibits no maxillary sinus tenderness and no frontal sinus tenderness. Left sinus exhibits no maxillary sinus tenderness and no frontal sinus tenderness.   Mouth/Throat: Uvula is midline, oropharynx is clear and moist and mucous membranes are normal. No trismus in the jaw. Normal dentition. No uvula swelling. No oropharyngeal exudate, posterior oropharyngeal edema or posterior oropharyngeal erythema.   Eyes: Conjunctivae and lids are normal. No scleral icterus.   Neck: Trachea normal and phonation normal. Neck supple. No edema present. No erythema present. No neck rigidity present.   Cardiovascular: Normal rate, regular rhythm, normal heart sounds and normal pulses.   Pulmonary/Chest: Effort normal. No respiratory distress. She has no decreased breath sounds. She has wheezes. She has no rhonchi.   Abdominal: Normal appearance.   Musculoskeletal: Normal range of motion.         General: No deformity. Normal range of motion.   Neurological: She is alert and oriented to person, place, and time. She exhibits normal muscle tone. Coordination normal.   Skin: Skin is warm, dry, intact, not diaphoretic and not pale.   Psychiatric: Her speech is normal and behavior is normal. Judgment and thought content normal.   Nursing note and vitals reviewed.      Assessment:     1. Bacterial lower respiratory infection    2. Cough, unspecified type    3. Malignant neoplasm of ascending colon    4. Immunocompromised        Plan:       Bacterial lower respiratory infection  -     azithromycin (Z-BOSTON) 250 MG tablet; As per packet instructions  Dispense: 6 tablet; Refill: 0  Will treat with antibiotics due  to immunocompromised status/on chemo/ stage 4 colon cancer    Cough, unspecified type  -     POCT Influenza A/B MOLECULAR  -     albuterol (PROVENTIL/VENTOLIN HFA) 90 mcg/actuation inhaler; Inhale 2 puffs into the lungs every 4 (four) hours as needed for Wheezing or Shortness of Breath.  Dispense: 8 g; Refill: 1  Flu Negative    Malignant neoplasm of ascending colon  Stable, followed by oncology; chemo infusions every 2 weeks    Immunocompromised  Stable, followed by oncology; chemo infusions every 2 weeks

## 2024-12-31 ENCOUNTER — INFUSION (OUTPATIENT)
Dept: INFUSION THERAPY | Facility: HOSPITAL | Age: 74
End: 2024-12-31
Payer: MEDICARE

## 2024-12-31 ENCOUNTER — LAB VISIT (OUTPATIENT)
Dept: LAB | Facility: HOSPITAL | Age: 74
End: 2024-12-31
Payer: MEDICARE

## 2024-12-31 VITALS
WEIGHT: 179.44 LBS | TEMPERATURE: 99 F | RESPIRATION RATE: 18 BRPM | DIASTOLIC BLOOD PRESSURE: 77 MMHG | OXYGEN SATURATION: 97 % | HEART RATE: 75 BPM | HEIGHT: 66 IN | BODY MASS INDEX: 28.84 KG/M2 | SYSTOLIC BLOOD PRESSURE: 156 MMHG

## 2024-12-31 DIAGNOSIS — C18.2 MALIGNANT NEOPLASM OF ASCENDING COLON: Primary | ICD-10-CM

## 2024-12-31 DIAGNOSIS — C18.2 MALIGNANT NEOPLASM OF ASCENDING COLON: ICD-10-CM

## 2024-12-31 LAB
ALBUMIN SERPL BCP-MCNC: 3.3 G/DL (ref 3.5–5.2)
ALP SERPL-CCNC: 84 U/L (ref 40–150)
ALT SERPL W/O P-5'-P-CCNC: 10 U/L (ref 10–44)
ANION GAP SERPL CALC-SCNC: 8 MMOL/L (ref 8–16)
AST SERPL-CCNC: 17 U/L (ref 10–40)
BASOPHILS # BLD AUTO: 0.04 K/UL (ref 0–0.2)
BASOPHILS NFR BLD: 0.5 % (ref 0–1.9)
BILIRUB SERPL-MCNC: 0.4 MG/DL (ref 0.1–1)
BUN SERPL-MCNC: 14 MG/DL (ref 8–23)
CALCIUM SERPL-MCNC: 9.6 MG/DL (ref 8.7–10.5)
CEA SERPL-MCNC: 2.5 NG/ML (ref 0–5)
CHLORIDE SERPL-SCNC: 102 MMOL/L (ref 95–110)
CO2 SERPL-SCNC: 25 MMOL/L (ref 23–29)
CREAT SERPL-MCNC: 0.8 MG/DL (ref 0.5–1.4)
DIFFERENTIAL METHOD BLD: ABNORMAL
EOSINOPHIL # BLD AUTO: 0.1 K/UL (ref 0–0.5)
EOSINOPHIL NFR BLD: 1.5 % (ref 0–8)
ERYTHROCYTE [DISTWIDTH] IN BLOOD BY AUTOMATED COUNT: 16.5 % (ref 11.5–14.5)
EST. GFR  (NO RACE VARIABLE): >60 ML/MIN/1.73 M^2
GLUCOSE SERPL-MCNC: 115 MG/DL (ref 70–110)
HCT VFR BLD AUTO: 36.8 % (ref 37–48.5)
HGB BLD-MCNC: 11.9 G/DL (ref 12–16)
IMM GRANULOCYTES # BLD AUTO: 0.03 K/UL (ref 0–0.04)
IMM GRANULOCYTES NFR BLD AUTO: 0.3 % (ref 0–0.5)
LYMPHOCYTES # BLD AUTO: 1.6 K/UL (ref 1–4.8)
LYMPHOCYTES NFR BLD: 18.8 % (ref 18–48)
MCH RBC QN AUTO: 29.7 PG (ref 27–31)
MCHC RBC AUTO-ENTMCNC: 32.3 G/DL (ref 32–36)
MCV RBC AUTO: 92 FL (ref 82–98)
MONOCYTES # BLD AUTO: 0.9 K/UL (ref 0.3–1)
MONOCYTES NFR BLD: 10.6 % (ref 4–15)
NEUTROPHILS # BLD AUTO: 5.9 K/UL (ref 1.8–7.7)
NEUTROPHILS NFR BLD: 68.3 % (ref 38–73)
NRBC BLD-RTO: 0 /100 WBC
PLATELET # BLD AUTO: 230 K/UL (ref 150–450)
PMV BLD AUTO: 10.3 FL (ref 9.2–12.9)
POTASSIUM SERPL-SCNC: 4 MMOL/L (ref 3.5–5.1)
PROT SERPL-MCNC: 7 G/DL (ref 6–8.4)
RBC # BLD AUTO: 4.01 M/UL (ref 4–5.4)
SODIUM SERPL-SCNC: 135 MMOL/L (ref 136–145)
WBC # BLD AUTO: 8.66 K/UL (ref 3.9–12.7)

## 2024-12-31 PROCEDURE — 96367 TX/PROPH/DG ADDL SEQ IV INF: CPT

## 2024-12-31 PROCEDURE — 36415 COLL VENOUS BLD VENIPUNCTURE: CPT | Performed by: PHYSICIAN ASSISTANT

## 2024-12-31 PROCEDURE — 25000003 PHARM REV CODE 250: Performed by: PHYSICIAN ASSISTANT

## 2024-12-31 PROCEDURE — 96413 CHEMO IV INFUSION 1 HR: CPT

## 2024-12-31 PROCEDURE — 63600175 PHARM REV CODE 636 W HCPCS: Performed by: PHYSICIAN ASSISTANT

## 2024-12-31 PROCEDURE — 82378 CARCINOEMBRYONIC ANTIGEN: CPT | Performed by: PHYSICIAN ASSISTANT

## 2024-12-31 PROCEDURE — 96416 CHEMO PROLONG INFUSE W/PUMP: CPT

## 2024-12-31 PROCEDURE — 80053 COMPREHEN METABOLIC PANEL: CPT | Performed by: PHYSICIAN ASSISTANT

## 2024-12-31 PROCEDURE — 85025 COMPLETE CBC W/AUTO DIFF WBC: CPT | Performed by: PHYSICIAN ASSISTANT

## 2024-12-31 RX ORDER — SODIUM CHLORIDE 0.9 % (FLUSH) 0.9 %
10 SYRINGE (ML) INJECTION
Status: DISCONTINUED | OUTPATIENT
Start: 2024-12-31 | End: 2024-12-31 | Stop reason: HOSPADM

## 2024-12-31 RX ORDER — EPINEPHRINE 0.3 MG/.3ML
0.3 INJECTION SUBCUTANEOUS ONCE AS NEEDED
Status: DISCONTINUED | OUTPATIENT
Start: 2024-12-31 | End: 2024-12-31 | Stop reason: HOSPADM

## 2024-12-31 RX ORDER — HEPARIN 100 UNIT/ML
500 SYRINGE INTRAVENOUS
Status: CANCELLED | OUTPATIENT
Start: 2025-01-03

## 2024-12-31 RX ORDER — SODIUM CHLORIDE 0.9 % (FLUSH) 0.9 %
10 SYRINGE (ML) INJECTION
Status: CANCELLED | OUTPATIENT
Start: 2025-01-03

## 2024-12-31 RX ORDER — HEPARIN 100 UNIT/ML
500 SYRINGE INTRAVENOUS
Status: DISCONTINUED | OUTPATIENT
Start: 2024-12-31 | End: 2024-12-31 | Stop reason: HOSPADM

## 2024-12-31 RX ORDER — PROCHLORPERAZINE EDISYLATE 5 MG/ML
5 INJECTION INTRAMUSCULAR; INTRAVENOUS ONCE AS NEEDED
Status: DISCONTINUED | OUTPATIENT
Start: 2024-12-31 | End: 2024-12-31 | Stop reason: HOSPADM

## 2024-12-31 RX ORDER — DIPHENHYDRAMINE HYDROCHLORIDE 50 MG/ML
50 INJECTION INTRAMUSCULAR; INTRAVENOUS ONCE AS NEEDED
Status: DISCONTINUED | OUTPATIENT
Start: 2024-12-31 | End: 2024-12-31 | Stop reason: HOSPADM

## 2024-12-31 RX ORDER — PROCHLORPERAZINE EDISYLATE 5 MG/ML
5 INJECTION INTRAMUSCULAR; INTRAVENOUS ONCE AS NEEDED
Status: CANCELLED | OUTPATIENT
Start: 2025-01-03

## 2024-12-31 RX ADMIN — SODIUM CHLORIDE: 9 INJECTION, SOLUTION INTRAVENOUS at 12:12

## 2024-12-31 RX ADMIN — BEVACIZUMAB-AWWB 400 MG: 400 INJECTION, SOLUTION INTRAVENOUS at 01:12

## 2024-12-31 RX ADMIN — DEXAMETHASONE SODIUM PHOSPHATE 0.25 MG: 4 INJECTION, SOLUTION INTRA-ARTICULAR; INTRALESIONAL; INTRAMUSCULAR; INTRAVENOUS; SOFT TISSUE at 02:12

## 2024-12-31 RX ADMIN — FLUOROURACIL 4500 MG: 50 INJECTION, SOLUTION INTRAVENOUS at 02:12

## 2024-12-31 NOTE — PLAN OF CARE
Pt tolerated MVASI with no issues. CADD pump connected with 5FU 100 ml @ 2.2 cc/hr for 46 hours. RTC on 1/2/24 at 1245 pm. Confirmed pump infusing as 99.9 left in reservoir prior to leaving the unit. Pt ambulated accompanied by  off the unit. NAD.

## 2024-12-31 NOTE — PLAN OF CARE
Pt sitting in chair, VSS, assessment complete. Pt reports she has been congested with cough and went to urgent care on 12/27/24 and is taking antibiotics. Denies chills, fever. Port accessed flushed with blood return infusing NS @ 25 cc/hr while waiting for MVASI. WCTM for safety.

## 2025-01-02 ENCOUNTER — INFUSION (OUTPATIENT)
Dept: INFUSION THERAPY | Facility: HOSPITAL | Age: 75
End: 2025-01-02
Payer: MEDICARE

## 2025-01-02 VITALS
TEMPERATURE: 98 F | DIASTOLIC BLOOD PRESSURE: 67 MMHG | RESPIRATION RATE: 20 BRPM | OXYGEN SATURATION: 98 % | SYSTOLIC BLOOD PRESSURE: 138 MMHG | HEART RATE: 70 BPM

## 2025-01-02 DIAGNOSIS — C18.2 MALIGNANT NEOPLASM OF ASCENDING COLON: Primary | ICD-10-CM

## 2025-01-02 PROCEDURE — 25000003 PHARM REV CODE 250: Performed by: PHYSICIAN ASSISTANT

## 2025-01-02 PROCEDURE — A4216 STERILE WATER/SALINE, 10 ML: HCPCS | Performed by: PHYSICIAN ASSISTANT

## 2025-01-02 PROCEDURE — 63600175 PHARM REV CODE 636 W HCPCS: Performed by: PHYSICIAN ASSISTANT

## 2025-01-02 RX ORDER — HEPARIN 100 UNIT/ML
500 SYRINGE INTRAVENOUS
Status: DISCONTINUED | OUTPATIENT
Start: 2025-01-02 | End: 2025-01-02 | Stop reason: HOSPADM

## 2025-01-02 RX ORDER — SODIUM CHLORIDE 0.9 % (FLUSH) 0.9 %
10 SYRINGE (ML) INJECTION
Status: DISCONTINUED | OUTPATIENT
Start: 2025-01-02 | End: 2025-01-02 | Stop reason: HOSPADM

## 2025-01-02 RX ADMIN — Medication 10 ML: at 12:01

## 2025-01-02 RX ADMIN — HEPARIN SODIUM (PORCINE) LOCK FLUSH IV SOLN 100 UNIT/ML 500 UNITS: 100 SOLUTION at 12:01

## 2025-01-09 ENCOUNTER — OFFICE VISIT (OUTPATIENT)
Dept: OPHTHALMOLOGY | Facility: CLINIC | Age: 75
End: 2025-01-09
Payer: MEDICARE

## 2025-01-09 DIAGNOSIS — H40.1134 PRIMARY OPEN ANGLE GLAUCOMA (POAG) OF BOTH EYES, INDETERMINATE STAGE: Primary | ICD-10-CM

## 2025-01-09 PROCEDURE — 1159F MED LIST DOCD IN RCRD: CPT | Mod: CPTII,S$GLB,, | Performed by: STUDENT IN AN ORGANIZED HEALTH CARE EDUCATION/TRAINING PROGRAM

## 2025-01-09 PROCEDURE — 1160F RVW MEDS BY RX/DR IN RCRD: CPT | Mod: CPTII,S$GLB,, | Performed by: STUDENT IN AN ORGANIZED HEALTH CARE EDUCATION/TRAINING PROGRAM

## 2025-01-09 PROCEDURE — 99999 PR PBB SHADOW E&M-EST. PATIENT-LVL III: CPT | Mod: PBBFAC,,, | Performed by: STUDENT IN AN ORGANIZED HEALTH CARE EDUCATION/TRAINING PROGRAM

## 2025-01-09 PROCEDURE — 99214 OFFICE O/P EST MOD 30 MIN: CPT | Mod: S$GLB,,, | Performed by: STUDENT IN AN ORGANIZED HEALTH CARE EDUCATION/TRAINING PROGRAM

## 2025-01-09 PROCEDURE — 92133 CPTRZD OPH DX IMG PST SGM ON: CPT | Mod: S$GLB,,, | Performed by: STUDENT IN AN ORGANIZED HEALTH CARE EDUCATION/TRAINING PROGRAM

## 2025-01-09 RX ORDER — DORZOLAMIDE HYDROCHLORIDE AND TIMOLOL MALEATE 20; 5 MG/ML; MG/ML
1 SOLUTION/ DROPS OPHTHALMIC 2 TIMES DAILY
Qty: 10 ML | Refills: 4 | Status: SHIPPED | OUTPATIENT
Start: 2025-01-09 | End: 2026-01-09

## 2025-01-09 NOTE — PROGRESS NOTES
HPI     Glaucoma     Additional comments: 2 WEEK IOP, GOCT, PACH.            Comments    1. Dry eyes OU          Last edited by Junior Conley on 1/9/2025 10:49 AM.            Assessment /Plan     For exam results, see Encounter Report.    Primary open angle glaucoma (POAG) of both eyes, indeterminate stage  -   IOP elevated with risk of irreversible visual loss. Additional treatment required.  Discussed options, risks, and benefits of additional medication, SLT laser, or incisional glaucoma surgery.     IOP goal: Mid teens    Start  Cosopt BID OU    Stop  Brimonidine OU    Reviewed importance of continued compliance with treatment and follow up.        RTC 1-2M HVF 24-2, IOP

## 2025-01-14 ENCOUNTER — LAB VISIT (OUTPATIENT)
Dept: LAB | Facility: HOSPITAL | Age: 75
End: 2025-01-14
Attending: REGISTERED NURSE
Payer: MEDICARE

## 2025-01-14 DIAGNOSIS — C18.2 MALIGNANT NEOPLASM OF ASCENDING COLON: ICD-10-CM

## 2025-01-14 LAB
ALBUMIN SERPL BCP-MCNC: 3.4 G/DL (ref 3.5–5.2)
ALP SERPL-CCNC: 74 U/L (ref 40–150)
ALT SERPL W/O P-5'-P-CCNC: 12 U/L (ref 10–44)
ANION GAP SERPL CALC-SCNC: 6 MMOL/L (ref 8–16)
AST SERPL-CCNC: 18 U/L (ref 10–40)
BILIRUB SERPL-MCNC: 0.5 MG/DL (ref 0.1–1)
BILIRUB UR QL STRIP: NEGATIVE
BUN SERPL-MCNC: 10 MG/DL (ref 8–23)
CALCIUM SERPL-MCNC: 9.2 MG/DL (ref 8.7–10.5)
CEA SERPL-MCNC: 2.9 NG/ML (ref 0–5)
CHLORIDE SERPL-SCNC: 105 MMOL/L (ref 95–110)
CLARITY UR: CLEAR
CO2 SERPL-SCNC: 26 MMOL/L (ref 23–29)
COLOR UR: YELLOW
CREAT SERPL-MCNC: 0.8 MG/DL (ref 0.5–1.4)
ERYTHROCYTE [DISTWIDTH] IN BLOOD BY AUTOMATED COUNT: 16.3 % (ref 11.5–14.5)
EST. GFR  (NO RACE VARIABLE): >60 ML/MIN/1.73 M^2
GLUCOSE SERPL-MCNC: 97 MG/DL (ref 70–110)
GLUCOSE UR QL STRIP: NEGATIVE
HCT VFR BLD AUTO: 37.7 % (ref 37–48.5)
HGB BLD-MCNC: 12.2 G/DL (ref 12–16)
HGB UR QL STRIP: NEGATIVE
IMM GRANULOCYTES # BLD AUTO: 0.02 K/UL (ref 0–0.04)
KETONES UR QL STRIP: NEGATIVE
LEUKOCYTE ESTERASE UR QL STRIP: NEGATIVE
MCH RBC QN AUTO: 29.1 PG (ref 27–31)
MCHC RBC AUTO-ENTMCNC: 32.4 G/DL (ref 32–36)
MCV RBC AUTO: 90 FL (ref 82–98)
NEUTROPHILS # BLD AUTO: 3.3 K/UL (ref 1.8–7.7)
NITRITE UR QL STRIP: NEGATIVE
PH UR STRIP: 6 [PH] (ref 5–8)
PLATELET # BLD AUTO: 267 K/UL (ref 150–450)
PMV BLD AUTO: 9.8 FL (ref 9.2–12.9)
POTASSIUM SERPL-SCNC: 4.5 MMOL/L (ref 3.5–5.1)
PROT SERPL-MCNC: 6.8 G/DL (ref 6–8.4)
PROT UR QL STRIP: NEGATIVE
RBC # BLD AUTO: 4.19 M/UL (ref 4–5.4)
SODIUM SERPL-SCNC: 137 MMOL/L (ref 136–145)
SP GR UR STRIP: 1.01 (ref 1–1.03)
URN SPEC COLLECT METH UR: NORMAL
UROBILINOGEN UR STRIP-ACNC: NEGATIVE EU/DL
WBC # BLD AUTO: 5.73 K/UL (ref 3.9–12.7)

## 2025-01-14 PROCEDURE — 36415 COLL VENOUS BLD VENIPUNCTURE: CPT | Performed by: REGISTERED NURSE

## 2025-01-14 PROCEDURE — 81003 URINALYSIS AUTO W/O SCOPE: CPT | Performed by: REGISTERED NURSE

## 2025-01-14 PROCEDURE — 82378 CARCINOEMBRYONIC ANTIGEN: CPT | Performed by: REGISTERED NURSE

## 2025-01-14 PROCEDURE — 85027 COMPLETE CBC AUTOMATED: CPT | Performed by: REGISTERED NURSE

## 2025-01-14 PROCEDURE — 80053 COMPREHEN METABOLIC PANEL: CPT | Performed by: REGISTERED NURSE

## 2025-01-15 ENCOUNTER — INFUSION (OUTPATIENT)
Dept: INFUSION THERAPY | Facility: HOSPITAL | Age: 75
End: 2025-01-15
Payer: MEDICARE

## 2025-01-15 ENCOUNTER — OFFICE VISIT (OUTPATIENT)
Dept: HEMATOLOGY/ONCOLOGY | Facility: CLINIC | Age: 75
End: 2025-01-15
Payer: MEDICARE

## 2025-01-15 VITALS
TEMPERATURE: 98 F | OXYGEN SATURATION: 99 % | HEART RATE: 63 BPM | DIASTOLIC BLOOD PRESSURE: 94 MMHG | WEIGHT: 174.38 LBS | RESPIRATION RATE: 18 BRPM | BODY MASS INDEX: 28.03 KG/M2 | HEIGHT: 66 IN | SYSTOLIC BLOOD PRESSURE: 137 MMHG

## 2025-01-15 VITALS
OXYGEN SATURATION: 99 % | BODY MASS INDEX: 28.03 KG/M2 | HEIGHT: 66 IN | RESPIRATION RATE: 16 BRPM | HEART RATE: 71 BPM | TEMPERATURE: 98 F | DIASTOLIC BLOOD PRESSURE: 73 MMHG | SYSTOLIC BLOOD PRESSURE: 137 MMHG | WEIGHT: 174.38 LBS

## 2025-01-15 DIAGNOSIS — R68.89 COLD SENSITIVITY: ICD-10-CM

## 2025-01-15 DIAGNOSIS — D84.81 IMMUNODEFICIENCY SECONDARY TO NEOPLASM: ICD-10-CM

## 2025-01-15 DIAGNOSIS — D49.9 IMMUNODEFICIENCY SECONDARY TO NEOPLASM: ICD-10-CM

## 2025-01-15 DIAGNOSIS — C18.2 MALIGNANT NEOPLASM OF ASCENDING COLON: Primary | ICD-10-CM

## 2025-01-15 DIAGNOSIS — D84.821 IMMUNODEFICIENCY SECONDARY TO CHEMOTHERAPY: ICD-10-CM

## 2025-01-15 DIAGNOSIS — T45.1X5A IMMUNODEFICIENCY SECONDARY TO CHEMOTHERAPY: ICD-10-CM

## 2025-01-15 DIAGNOSIS — R53.0 NEOPLASTIC MALIGNANT RELATED FATIGUE: ICD-10-CM

## 2025-01-15 DIAGNOSIS — K59.09 OTHER CONSTIPATION: ICD-10-CM

## 2025-01-15 DIAGNOSIS — Z79.899 IMMUNODEFICIENCY SECONDARY TO CHEMOTHERAPY: ICD-10-CM

## 2025-01-15 DIAGNOSIS — C78.6 MALIGNANT NEOPLASM METASTATIC TO OMENTUM: ICD-10-CM

## 2025-01-15 PROCEDURE — 3288F FALL RISK ASSESSMENT DOCD: CPT | Mod: CPTII,S$GLB,, | Performed by: PHYSICIAN ASSISTANT

## 2025-01-15 PROCEDURE — 3075F SYST BP GE 130 - 139MM HG: CPT | Mod: CPTII,S$GLB,, | Performed by: PHYSICIAN ASSISTANT

## 2025-01-15 PROCEDURE — 96416 CHEMO PROLONG INFUSE W/PUMP: CPT

## 2025-01-15 PROCEDURE — 3078F DIAST BP <80 MM HG: CPT | Mod: CPTII,S$GLB,, | Performed by: PHYSICIAN ASSISTANT

## 2025-01-15 PROCEDURE — 1160F RVW MEDS BY RX/DR IN RCRD: CPT | Mod: CPTII,S$GLB,, | Performed by: PHYSICIAN ASSISTANT

## 2025-01-15 PROCEDURE — 3008F BODY MASS INDEX DOCD: CPT | Mod: CPTII,S$GLB,, | Performed by: PHYSICIAN ASSISTANT

## 2025-01-15 PROCEDURE — 1101F PT FALLS ASSESS-DOCD LE1/YR: CPT | Mod: CPTII,S$GLB,, | Performed by: PHYSICIAN ASSISTANT

## 2025-01-15 PROCEDURE — 1159F MED LIST DOCD IN RCRD: CPT | Mod: CPTII,S$GLB,, | Performed by: PHYSICIAN ASSISTANT

## 2025-01-15 PROCEDURE — 99214 OFFICE O/P EST MOD 30 MIN: CPT | Mod: S$GLB,,, | Performed by: PHYSICIAN ASSISTANT

## 2025-01-15 PROCEDURE — 99999 PR PBB SHADOW E&M-EST. PATIENT-LVL IV: CPT | Mod: PBBFAC,,, | Performed by: PHYSICIAN ASSISTANT

## 2025-01-15 PROCEDURE — 25000003 PHARM REV CODE 250: Performed by: PHYSICIAN ASSISTANT

## 2025-01-15 PROCEDURE — 1126F AMNT PAIN NOTED NONE PRSNT: CPT | Mod: CPTII,S$GLB,, | Performed by: PHYSICIAN ASSISTANT

## 2025-01-15 PROCEDURE — 96413 CHEMO IV INFUSION 1 HR: CPT

## 2025-01-15 PROCEDURE — 63600175 PHARM REV CODE 636 W HCPCS: Performed by: PHYSICIAN ASSISTANT

## 2025-01-15 PROCEDURE — 96367 TX/PROPH/DG ADDL SEQ IV INF: CPT

## 2025-01-15 PROCEDURE — G2211 COMPLEX E/M VISIT ADD ON: HCPCS | Mod: S$GLB,,, | Performed by: PHYSICIAN ASSISTANT

## 2025-01-15 RX ORDER — PROCHLORPERAZINE EDISYLATE 5 MG/ML
5 INJECTION INTRAMUSCULAR; INTRAVENOUS ONCE AS NEEDED
Status: CANCELLED | OUTPATIENT
Start: 2025-01-17

## 2025-01-15 RX ORDER — EPINEPHRINE 0.3 MG/.3ML
0.3 INJECTION SUBCUTANEOUS ONCE AS NEEDED
Status: CANCELLED | OUTPATIENT
Start: 2025-01-15

## 2025-01-15 RX ORDER — HEPARIN 100 UNIT/ML
500 SYRINGE INTRAVENOUS
Status: DISCONTINUED | OUTPATIENT
Start: 2025-01-15 | End: 2025-01-15 | Stop reason: HOSPADM

## 2025-01-15 RX ORDER — DIPHENHYDRAMINE HYDROCHLORIDE 50 MG/ML
50 INJECTION INTRAMUSCULAR; INTRAVENOUS ONCE AS NEEDED
Status: CANCELLED | OUTPATIENT
Start: 2025-01-15

## 2025-01-15 RX ORDER — SODIUM CHLORIDE 0.9 % (FLUSH) 0.9 %
10 SYRINGE (ML) INJECTION
Status: CANCELLED | OUTPATIENT
Start: 2025-01-17

## 2025-01-15 RX ORDER — DIPHENHYDRAMINE HYDROCHLORIDE 50 MG/ML
50 INJECTION INTRAMUSCULAR; INTRAVENOUS ONCE AS NEEDED
Status: DISCONTINUED | OUTPATIENT
Start: 2025-01-15 | End: 2025-01-15 | Stop reason: HOSPADM

## 2025-01-15 RX ORDER — PROCHLORPERAZINE EDISYLATE 5 MG/ML
5 INJECTION INTRAMUSCULAR; INTRAVENOUS ONCE AS NEEDED
Status: DISCONTINUED | OUTPATIENT
Start: 2025-01-15 | End: 2025-01-15 | Stop reason: HOSPADM

## 2025-01-15 RX ORDER — HEPARIN 100 UNIT/ML
500 SYRINGE INTRAVENOUS
Status: CANCELLED | OUTPATIENT
Start: 2025-01-17

## 2025-01-15 RX ORDER — PROCHLORPERAZINE EDISYLATE 5 MG/ML
5 INJECTION INTRAMUSCULAR; INTRAVENOUS ONCE AS NEEDED
Status: CANCELLED | OUTPATIENT
Start: 2025-01-15

## 2025-01-15 RX ORDER — EPINEPHRINE 0.3 MG/.3ML
0.3 INJECTION SUBCUTANEOUS ONCE AS NEEDED
Status: DISCONTINUED | OUTPATIENT
Start: 2025-01-15 | End: 2025-01-15 | Stop reason: HOSPADM

## 2025-01-15 RX ORDER — HEPARIN 100 UNIT/ML
500 SYRINGE INTRAVENOUS
Status: CANCELLED | OUTPATIENT
Start: 2025-01-15

## 2025-01-15 RX ORDER — SODIUM CHLORIDE 0.9 % (FLUSH) 0.9 %
10 SYRINGE (ML) INJECTION
Status: DISCONTINUED | OUTPATIENT
Start: 2025-01-15 | End: 2025-01-15 | Stop reason: HOSPADM

## 2025-01-15 RX ORDER — SODIUM CHLORIDE 0.9 % (FLUSH) 0.9 %
10 SYRINGE (ML) INJECTION
Status: CANCELLED | OUTPATIENT
Start: 2025-01-15

## 2025-01-15 RX ADMIN — DEXAMETHASONE SODIUM PHOSPHATE 0.25 MG: 4 INJECTION, SOLUTION INTRA-ARTICULAR; INTRALESIONAL; INTRAMUSCULAR; INTRAVENOUS; SOFT TISSUE at 11:01

## 2025-01-15 RX ADMIN — FLUOROURACIL 4500 MG: 50 INJECTION, SOLUTION INTRAVENOUS at 12:01

## 2025-01-15 RX ADMIN — SODIUM CHLORIDE: 9 INJECTION, SOLUTION INTRAVENOUS at 10:01

## 2025-01-15 RX ADMIN — BEVACIZUMAB-AWWB 400 MG: 400 INJECTION, SOLUTION INTRAVENOUS at 11:01

## 2025-01-15 NOTE — PLAN OF CARE
1205- Portable pump infusing on discharge all clamps open and connections secured. No kinks in tubing noted. Settings double checked by JAK Guzman RN. Educated patient to be sure pump should say run and volume infused number should increase daily as reservoir volume decreases. Instructed to call with any issues with pump. Instructed to call provider for any questions or concerns, patient will return on Friday at 1000 for pump dc.

## 2025-01-15 NOTE — PROGRESS NOTES
MEDICAL ONCOLOGY - ESTABLISHED PATIENT VISIT    Reason for visit: colon adenocarcinoma    Best Contact Phone Number(s): [phone] (home)      Cancer/Stage/TNM:    Cancer Staging   Colon adenocarcinoma  Staging form: Colon and Rectum, AJCC 8th Edition  - Pathologic stage from 1/22/2024: Stage IVC (pT4a, pN2b, cM1c) - Signed by Minna Menendez CNS on 2/13/2024       Oncology History   Colon adenocarcinoma   12/21/2023 Tumor Markers    Patient's tumor was tested for the following markers: CEA.                                              Results of the tumor marker test revealed 3.3     12/21/2023 Procedure    Colonoscopy  Cecal polyp removed with jumbo forceps, 3 mm  Multiple ascending colon polyps ranging in size from 5 to 12 mm - semi-pedunculated removed with hot snare  Hepatic flexure mass identified - central ulceration, concerning for malignancy, 3 cm, not obstructing, this was biopsied - tattoo placed distal to mass  Sigmoid diverticular disease     12/21/2023 Tumor Markers    Patient's tumor was tested for the following markers: CEA.                                              Results of the tumor marker test revealed 3.3     12/22/2023 Imaging Significant Findings    CT CAP  Evaluation of the colon is somewhat limited as there is significant colonic stool and oral contrast material has not opacified the large bowel.  There does appear to be some abnormal thickening of the walls of the proximal right colon and a patient with a known history of colonic malignancy.  There is some soft tissue density external to the walls of the colon on the right pericolic gutter which could represent peritoneal nodularity versus subserosal extent of tumor.  Slightly more distal to this area there is a 2nd area of irregularity of the walls of the colon, difficult to fully evaluate if this is related to the colonic walls versus stool with central fat.     Two hypodensities in the liver, the larger measuring 0.8 cm, too  small to accurately characterize on the basis of this examination.  Attention on follow-up.     No pulmonary nodules are seen.     Cholecystectomy.     12/29/2023 Initial Diagnosis    Hepatic flexure colon adenocarcinoma  MMR intact     1/22/2024 Cancer Staged    Staging form: Colon and Rectum, AJCC 8th Edition  - Pathologic stage from 1/22/2024: Stage IVC (pT4a, pN2b, cM1c)     8/27/2024 Tumor Markers    Patient's tumor was tested for the following markers: CEA.                                              Results of the tumor marker test revealed 2.3      Malignant neoplasm of ascending colon   2/12/2024 Initial Diagnosis    Malignant neoplasm of ascending colon     2/26/2024 -  Chemotherapy    Treatment Summary   Plan Name: OP GI [id number] (oxaliplatin leucovorin fluorouracil) with bevacizumab Q2W  Treatment Goal: Palliative  Status: Active  Start Date: 2/26/2024  End Date: 2/14/2025 (Planned)  Provider: Kemar Zaragoza MD  Chemotherapy: oxaliplatin (ELOXATIN) 150 mg in dextrose 5 % (D5W) 595 mL chemo infusion, 157 mg, Intravenous, Clinic/HOD 1 time, 8 of 8 cycles  Administration: 150 mg (2/26/2024), 150 mg (3/11/2024), 150 mg (3/25/2024), 150 mg (4/8/2024), 150 mg (4/22/2024), 150 mg (5/6/2024), 150 mg (5/20/2024), 150 mg (6/3/2024)  fluorouracil (ADRUCIL) 2,400 mg/m2 = 4,440 mg in sodium chloride 0.9% 100 mL chemo infusion, 2,400 mg/m2 = 4,440 mg, Intravenous, Clinic/HOD 1 time, 21 of 24 cycles  Administration: 4,440 mg (2/26/2024), 4,440 mg (3/11/2024), 4,440 mg (3/25/2024), 4,440 mg (4/8/2024), 4,440 mg (4/22/2024), 4,440 mg (5/6/2024), 4,440 mg (5/20/2024), 4,440 mg (6/3/2024), 4,440 mg (6/17/2024), 4,440 mg (7/3/2024), 4,440 mg (7/16/2024), 4,440 mg (7/31/2024), 4,440 mg (8/13/2024), 4,440 mg (8/28/2024), 4,440 mg (10/24/2024), 4,440 mg (10/10/2024), 4,440 mg (11/6/2024), 4,440 mg (11/19/2024), 4,440 mg (12/3/2024), 4,440 mg (12/17/2024), 4,500 mg (12/31/2024)  bevacizumab-awwb (MVASI) 5 mg/kg = 365 mg  in sodium chloride 0.9% 100 mL infusion, 5 mg/kg = 365 mg, Intravenous, Clinic/HOD 1 time, 19 of 22 cycles  Administration: 365 mg (2/26/2024), 365 mg (3/11/2024), 365 mg (3/25/2024), 365 mg (4/8/2024), 365 mg (4/22/2024), 365 mg (5/6/2024), 365 mg (5/20/2024), 365 mg (6/3/2024), 365 mg (6/17/2024), 365 mg (7/3/2024), 365 mg (7/16/2024), 365 mg (7/31/2024), 365 mg (8/13/2024), 365 mg (10/24/2024), 365 mg (11/6/2024), 365 mg (11/19/2024), 365 mg (12/3/2024), 365 mg (12/17/2024), 400 mg (12/31/2024)     8/27/2024 Tumor Markers    Patient's tumor was tested for the following markers: CEA.                                              Results of the tumor marker test revealed 2.3           Interim History:   74 y.o. female with LUANNE, bipolar, HLD who presents prior to cycle 22 FOLFOX + bevacizumab for her metastatic ascending colon cancer, now on maintenance 5-FU + Bevacizumab.  She had a diagnostic lap with Dr. Contreras with findings of known peritoneal nodules; biopsies confirmed metastatic adenocarcinoma.  She had a discussion with him about potentially not proceeding with CRS/HIPEC and agrees with continuing maintenance chemo.     She is feeling very well.  She is eating well, has no pain.  No new complications from chemotherapy.    ECOG 0. Presents alone today    ROS:   Review of Systems   Constitutional:  Negative for chills, fever and malaise/fatigue.   HENT:  Negative for congestion and sore throat.    Respiratory:  Negative for shortness of breath.    Cardiovascular:  Negative for chest pain, palpitations and leg swelling.   Gastrointestinal:  Negative for blood in stool, constipation, diarrhea and nausea.   Genitourinary:  Negative for hematuria.   Musculoskeletal:  Negative for back pain, falls and myalgias.   Skin:  Negative for rash.   Neurological:  Positive for tingling. Negative for dizziness, weakness and headaches.       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    Sacroiliitis     right side    Sleep apnea     can not tolerate cpap        Past Surgical History:   Past Surgical History:   Procedure Laterality Date    ANKLE FRACTURE SURGERY Left 2001    BLADDER SUSPENSION      CARPAL TUNNEL RELEASE Bilateral     CHOLECYSTECTOMY      Laparoscopic    COLONOSCOPY      COLONOSCOPY N/A 12/21/2023    Procedure: COLONOSCOPY;  Surgeon: Alberto Albright MD;  Location: [facility];  Service: Endoscopy;  Laterality: N/A;    DIAGNOSTIC LAPAROSCOPY N/A 9/20/2024    Procedure: LAPAROSCOPY, DIAGNOSTIC;  Surgeon: Benjy Contreras MD;  Location: [facility] OR [address] [place] [address];  Service: General;  Laterality: N/A;    ESOPHAGOGASTRODUODENOSCOPY N/A 07/29/2021    Procedure: EGD (ESOPHAGOGASTRODUODENOSCOPY);  Surgeon: Susan Mcclain MD;  Location: [facility];  Service: Endoscopy;  Laterality: N/A;    EYE SURGERY      FOOT ARTHRODESIS Right 05/03/2023    Procedure: FUSION, FOOT;  Surgeon: Lauri Alejandra DPM;  Location: [facility];  Service: Podiatry;  Laterality: Right;  mini c-arm, Arthrex dowel bone graft harvester, locking plate and screws festus notified cc    HYSTERECTOMY  1986    vaginal prolapse    INJECTION OF ANESTHETIC AGENT AROUND MEDIAL BRANCH NERVES INNERVATING CERVICAL FACET JOINT Bilateral 05/27/2022    Procedure: CERVICAL MEDIAL BRANCH NERVE BLOCK (C3-4,C4-5);  Surgeon: Mamie Roman MD;  Location: [facility];  Service: Pain Management;  Laterality: Bilateral;    INJECTION OF ANESTHETIC AGENT AROUND MEDIAL BRANCH NERVES INNERVATING LUMBAR FACET JOINT Right 02/05/2021    Procedure: LUMBAR FACET JOINT BLOCK (L3-4,L4-5,L5-S1);  Surgeon: Mamie Roman MD;  Location: [facility];  Service: Pain Management;  Laterality: Right;    INJECTION OF ANESTHETIC AGENT  AROUND MEDIAL BRANCH NERVES INNERVATING LUMBAR FACET JOINT Right 2021    Procedure: LUMBAR FACET JOINT BLOCK (L3-4,L4-5,L5-S1);  Surgeon: Mamie Roman MD;  Location: [facility] OR;  Service: Pain Management;  Laterality: Right;    INJECTION OF ANESTHETIC AGENT INTO SACROILIAC JOINT Right 2020    Procedure: SACROILIAC JOINT INJECTION;  Surgeon: Mamie Roman MD;  Location: [facility] OR;  Service: Pain Management;  Laterality: Right;    INJECTION OF JOINT Right 2020    Procedure: GREATER TROCHANTERIC BURSA INJECTION;  Surgeon: Mamie Roman MD;  Location: [facility] OR;  Service: Pain Management;  Laterality: Right;    LAPAROSCOPIC RIGHT COLON RESECTION N/A 2024    Procedure: COLECTOMY, RIGHT, LAPAROSCOPIC (eras low, lithotomy);  Surgeon: Alberto Albright MD;  Location: [facility] OR 2ND FLR;  Service: Colon and Rectal;  Laterality: N/A;    NASAL SEPTUM SURGERY      RECTAL PROLAPSE REPAIR      TONSILLECTOMY          Family History:   Family History   Problem Relation Name Age of Onset    No Known Problems Maternal Aunt Rubio Glasgow     Colon cancer Maternal Uncle Kalpeshwis     Colon cancer Maternal Uncle Yovani     Colon cancer Maternal Uncle Konstantin     No Known Problems Paternal Aunt Vero     Esophageal cancer Paternal Uncle Nat Jr     Heart attack Maternal Grandmother Elmira     Leukemia Maternal Grandfather manish Pang     No Known Problems Paternal Grandmother Lizett     Stroke Paternal Grandfather Nat Sr         Social History:   Social History     Tobacco Use    Smoking status: Former     Current packs/day: 0.00     Average packs/day: 1 pack/day for 41.7 years (41.7 ttl pk-yrs)     Types: Cigarettes     Start date: 3/30/1982     Quit date: 2023     Years since quittin.0    Smokeless tobacco: Never   Substance Use Topics    Alcohol use: Yes     Comment: Socially-2 or 3 times a year-6 or 7 drinks        I have reviewed and updated the patient's past medical, surgical, family and  social histories.    Allergies:   Review of patient's allergies indicates:   Allergen Reactions    Nsaids (non-steroidal anti-inflammatory drug) Other (See Comments)     Gastrointestinal bleeding requiring blood transfusion    Demerol [meperidine] Rash        Medications:   Current Outpatient Medications   Medication Sig Dispense Refill    albuterol (PROVENTIL/VENTOLIN HFA) 90 mcg/actuation inhaler Inhale 2 puffs into the lungs every 4 (four) hours as needed for Wheezing or Shortness of Breath. 16 g 1    aspirin (ECOTRIN) 81 MG EC tablet Take 81 mg by mouth nightly.      azithromycin (Z-BOSTON) 250 MG tablet As per packet instructions 6 tablet 0    brimonidine 0.2% (ALPHAGAN) 0.2 % Drop Place 1 drop into both eyes 2 (two) times a day. 15 mL 4    dexAMETHasone (DECADRON) 4 MG Tab Take 2 tablets (8 mg total) by mouth once daily only on days 2, 3 and 4 of each chemotherapy cycle. 40 tablet 0    dorzolamide-timolol 2-0.5% (COSOPT) 22.3-6.8 mg/mL ophthalmic solution Place 1 drop into both eyes 2 (two) times daily. 10 mL 4    EScitalopram oxalate (LEXAPRO) 20 MG tablet Take 1 tablet (20 mg total) by mouth once daily. 30 tablet 3    lamoTRIgine (LAMICTAL) 150 MG Tab Take 2 tablets (300 mg total) by mouth every evening. 180 tablet 1    LIDOcaine-prilocaine (EMLA) cream Apply topically as needed (place to port site 45-60 minutes prior to chemotherapy). 30 g 5    ondansetron (ZOFRAN-ODT) 8 MG TbDL dissolve 1 tablet (8 mg total) under the tongue every 6 (six) hours as needed (nausea). 30 tablet 5    oxyCODONE (ROXICODONE) 5 MG immediate release tablet Take 1 tablet (5 mg total) by mouth every 6 (six) hours as needed for Pain. 11 tablet 0    pantoprazole (PROTONIX) 40 MG tablet Take 1 tablet (40 mg total) by mouth once daily. 90 tablet 3    prochlorperazine (COMPAZINE) 10 MG tablet Take 1 tablet (10 mg total) by mouth every 6 (six) hours as needed (nausea). 30 tablet 2    QUEtiapine (SEROQUEL) 200 MG Tab Take 1 tablet (200 mg  "total) by mouth every evening. 30 tablet 3    rosuvastatin (CRESTOR) 10 MG tablet Take 1 tablet (10 mg total) by mouth once daily. 90 tablet 3    traMADoL (ULTRAM) 50 mg tablet Take 1 tablet (50 mg total) by mouth every 6 (six) hours as needed for Pain. 5 tablet 0     No current facility-administered medications for this visit.        Physical Exam:   Ht 5' 6" (1.676 m)   BMI 28.96 kg/m²           Physical Exam  Vitals reviewed.   Constitutional:       General: She is not in acute distress.     Appearance: Normal appearance. She is normal weight. She is not ill-appearing, toxic-appearing or diaphoretic.   HENT:      Head: Normocephalic and atraumatic.      Right Ear: External ear normal.      Left Ear: External ear normal.      Nose: Nose normal.      Mouth/Throat:      Pharynx: Oropharynx is clear.   Eyes:      General: No scleral icterus.     Conjunctiva/sclera: Conjunctivae normal.   Cardiovascular:      Rate and Rhythm: Normal rate.   Pulmonary:      Effort: Pulmonary effort is normal. No respiratory distress.   Chest:      Comments: RCW port  Abdominal:      General: There is no distension.   Skin:     General: Skin is warm and dry.      Coloration: Skin is not jaundiced or pale.      Findings: No bruising, erythema or rash.   Neurological:      Mental Status: She is alert and oriented to person, place, and time. Mental status is at baseline.      Motor: No weakness.      Gait: Gait normal.   Psychiatric:         Mood and Affect: Mood normal.         Labs:     I have reviewed the pertinent labs.  Very mild anemia.  Cr 1.4 (baseline 0.8).     Imaging:    CT CAP - 12/2/24:      Impression:     No definitive evidence of intrathoracic or intra-abdominal/pelvic metastatic disease.     Stable sub 5 mm hypodense lesion within the right hepatic lobe, possibly a cyst.     Similar changes of a right hemicolectomy.     Additional observations as above.    Path:   1/22/24 - R Hemicolectomy   Final Pathologic Diagnosis   "   THE DIAGNOSES REMAIN THE SAME.  THIS CORRECTED REPORT IS ISSUED TO CHANGE M STATUS to reflect tumor in the omentum.  This change is discussed with Dr. RANJANA Albright via phone, 2/1/2024.    1. Colon, right and terminal ileum (right hemicolectomy with EN bloc abdominal wall resection):  Invasive adenocarcinoma.  See cancer synoptic report     2. Omentum (resection):    Positive for carcinoma    CORRECT SYNOPTIC AND M STATUS  Cancer synoptic report  - Procedure: Right hemicolectomy with EN bloc abdominal wall resection  - Tumor site:  Ascending  - Histologic type:  Adenocarcinoma  - Histologic grade:  G2, moderately differentiated.  See comment.  COMMENT:  While the majority of the tumor consists of well formed glands, a significant proportion includes abortive glands, single file infiltration, and malignant cells with signet ring cell morphology. The tumor has an insidious pattern of  infiltration with tumor present far from the advancing front of the tumor.  - Tumor size:  2.0 x 2.0 x 1.0 cm  - Tumor extent:  Invades visceral peritoneum, multifocal  - Macroscopic perforation: Not identified  - Lymphovascular invasion:  Present, extensive.  Small vessel, large vessel, intra and extramural.  - Perineural invasion:  Not identified  - Tumor budding score:  High (>10), multifocal single cell infiltration  - Treatment effect: No known pre-surgical therapy    Margins  Margin involved by invasive carcinoma, radial (slide 1C)  All margins negative for dysplasia.    pTNM stage classification (AJCC 8th edition)  pT Category  pT4a:  Tumor invades through the visceral peritoneum    pN Category  pN2b:  7 or more regional lymph nodes are positive  Number of positive lymph nodes:  19  Number of lymph nodes examined: 26  Number of tumor deposits:  4    pM Category  pM1c:  Metastasis to the peritoneal surface of the omentum.    Additional findings:  - Sessile serrated polyp, no cytologic dysplasia, 1.1 cm at ileocecal valve  -  Tubulovillous adenoma, 1.0 cm, proximal ascending  - Tubular adenoma, 0.4 cm, mid ascending  - Tubular adenoma, 0.4, distal ascending  - Tubular adenoma, 0.6 cm, distal ascending  - Submucosal lipoma, 2.0 cm  - Appendix with no specific histopathologic changes    Ancillary studies  Immunohistochemistry for mismatch repair proteins performed on prior biopsy material (SAS-, 12/21/23): pMMR.         Assessment:       1. Malignant neoplasm metastatic to omentum          Plan:        # Colon cancer   Mrs. Maguire is a 74 y.o. female who presents for management of her metastatic colon cancer. She underwent a R hemicolectomy with en-bloc abdominal wall resection on 1/22/24 with Dr. Albright. Pathology revealed pT4a N2b disease with 19/26 positive LNs and omentum positive for carcinoma.    We had an in-depth conversation during our initial consult re: her diagnosis and treatment options. Reviewed recommendation for IV systemic therapy for at least 6 months. Plan for FOLFOX + bevacizumab to be given every 2 weeks. Briefly reviewed side effects of treatment. Handouts provided. Port placed 2/21/24.     PGX - DPYD normal metabolizer, [id number] intermediate metabolizer   Dexamethasone, zofran, compazine and lidocaine sent to pharmacy previously. Reviewed admin instructions.     Pending response, she may be a candidate for CRS/HIPEC with surgical oncology team. Continue to evaluate and re-start discussion if still without radiographic evidence of disease after CT on cycle 8.    CT CAP after cycle 4 shows no clear evidence of disease.  CT CAP after cycle 8 shows no clear evidence of disease.  CT CAP after cycle 12 shows no clear evidence of disease. Underwent diagnostic laparoscopy on 9/20/24 with PCI of 14.  Discussed with Dr. Contreras and with patient that options include continuing systemic therapy at this time vs CRS/HIPEC.  We are in agreement that with her volume of disease and histology, likelihood of CRS/HIPEC being  beneficial for survival is not high.  CT CAP after cycle 18 shows no clear evidence of disease radiographically.    Presents today for cycle 22 of 5-FU + Felisa maintenance.  Tolerance of chemotherapy has been excellent. I have reviewed the CBC and CMP, adequate for treatment   CEA remains normal  Proceed with cycle 22.    RTC in 2 weeks for next cycle. Will repeat imaging studies in 4 weeks with clinic visit with Dr Zaragoza.    Cold sensitivity/neoplasm related fatigue:  2/2 chemotherapy. Stopped oxaliplatin beginning with cycle 9 to prevent persistent paresthesias.  Mild persistent paresthesias at this time.  Will monitor. She is taking Cymbalta but not finding very helpful. Worse towards end of day.     Constipation/Gas  Continue 2 stool softeners a day with Miralax daily to have regular bowel movements.   This has helped.    Continue Miralax to avoid more explosive BM.    RTC in 2 weeks.     Pte and family members displayed understanding of the above encounter and treatment plan. All thoughtful questions were answered to their satisfaction. Pte was advised to notify the care team or proceed to the ER if signs and symptoms worsen.     30 minutes were spent today on this encounter including face to face time with the patient, data gathering/interpretation and documentation. Greater than 50% of this time involved counseling or coordination of care. I have provided the patient with an opportunity to ask questions and have all questions answered to patient's satisfaction.     Visit today included increased complexity associated with the care of the episodic problem chemotherapy  addressed and managing the longitudinal care of the patient due to the serious and/or complex managed problem(s) GI malignancies/cancer      DIANE Fonseca, PA-C [facility] [place]  Dept of Hematology/Oncology  [place] to GI Oncology team           Med Onc Chart Routing      Follow up with physician 4 weeks and 6 weeks. 5-FU, avastin with  lab work and CT CAP in 4 weeks. 6 weeks with 5-FU, Avastin   Follow up with KRISTEN 2 weeks. 5-FU, avastin in 2 weeks and 8 weeks.   Infusion scheduling note    Injection scheduling note    Labs CBC, CMP, CEA and urinalysis   Scheduling:  Preferred lab:  Lab interval: every 2 weeks     Imaging CT chest abdomen pelvis   Please schedule CT CAP in 4 weeks prior to clinic visit with Dr Zaragoza   Pharmacy appointment    Other referrals

## 2025-01-15 NOTE — PLAN OF CARE
1049-Labs , hx, and medications reviewed, patient was seen in clinic prior to arrival. Assessment completed. Patient denies any open/unhealed wounds,denies any bleeding for MVASI.  Discussed plan of care with patient. Patient in agreement. Chair reclined and warm blanket and snack offered.

## 2025-01-17 ENCOUNTER — INFUSION (OUTPATIENT)
Dept: INFUSION THERAPY | Facility: HOSPITAL | Age: 75
End: 2025-01-17
Payer: MEDICARE

## 2025-01-17 VITALS
HEART RATE: 63 BPM | OXYGEN SATURATION: 94 % | SYSTOLIC BLOOD PRESSURE: 154 MMHG | RESPIRATION RATE: 18 BRPM | TEMPERATURE: 99 F | DIASTOLIC BLOOD PRESSURE: 70 MMHG

## 2025-01-17 DIAGNOSIS — C18.2 MALIGNANT NEOPLASM OF ASCENDING COLON: Primary | ICD-10-CM

## 2025-01-17 PROCEDURE — 25000003 PHARM REV CODE 250: Performed by: PHYSICIAN ASSISTANT

## 2025-01-17 PROCEDURE — A4216 STERILE WATER/SALINE, 10 ML: HCPCS | Performed by: PHYSICIAN ASSISTANT

## 2025-01-17 PROCEDURE — 63600175 PHARM REV CODE 636 W HCPCS: Performed by: PHYSICIAN ASSISTANT

## 2025-01-17 RX ORDER — SODIUM CHLORIDE 0.9 % (FLUSH) 0.9 %
10 SYRINGE (ML) INJECTION
Status: DISCONTINUED | OUTPATIENT
Start: 2025-01-17 | End: 2025-01-17 | Stop reason: HOSPADM

## 2025-01-17 RX ORDER — PROCHLORPERAZINE EDISYLATE 5 MG/ML
5 INJECTION INTRAMUSCULAR; INTRAVENOUS ONCE AS NEEDED
Status: DISCONTINUED | OUTPATIENT
Start: 2025-01-17 | End: 2025-01-17 | Stop reason: HOSPADM

## 2025-01-17 RX ORDER — HEPARIN 100 UNIT/ML
500 SYRINGE INTRAVENOUS
Status: DISCONTINUED | OUTPATIENT
Start: 2025-01-17 | End: 2025-01-17 | Stop reason: HOSPADM

## 2025-01-17 RX ADMIN — Medication 10 ML: at 10:01

## 2025-01-17 RX ADMIN — HEPARIN 500 UNITS: 100 SYRINGE at 10:01

## 2025-01-17 NOTE — PLAN OF CARE
1027 Patient disconnected from her home infusion pump without incident. Vitals stable. Reservoir volume at 0. Port heparin locked and deaccessed, blood return noted. Patient aware of her next appointment date/time, uses [facility] portal. To contact provider with questions or concerns. D/C ambulatory and stable with her daughter.

## 2025-01-25 DIAGNOSIS — K92.2 ACUTE GI BLEEDING: ICD-10-CM

## 2025-01-25 RX ORDER — PANTOPRAZOLE SODIUM 40 MG/1
40 TABLET, DELAYED RELEASE ORAL DAILY
Qty: 90 TABLET | Refills: 3 | Status: SHIPPED | OUTPATIENT
Start: 2025-01-25

## 2025-01-28 ENCOUNTER — LAB VISIT (OUTPATIENT)
Dept: LAB | Facility: HOSPITAL | Age: 75
End: 2025-01-28
Attending: REGISTERED NURSE
Payer: MEDICARE

## 2025-01-28 DIAGNOSIS — C18.2 MALIGNANT NEOPLASM OF ASCENDING COLON: ICD-10-CM

## 2025-01-28 LAB
ALBUMIN SERPL BCP-MCNC: 3.5 G/DL (ref 3.5–5.2)
ALP SERPL-CCNC: 70 U/L (ref 40–150)
ALT SERPL W/O P-5'-P-CCNC: 13 U/L (ref 10–44)
ANION GAP SERPL CALC-SCNC: 7 MMOL/L (ref 8–16)
AST SERPL-CCNC: 16 U/L (ref 10–40)
BILIRUB SERPL-MCNC: 0.4 MG/DL (ref 0.1–1)
BILIRUB UR QL STRIP: NEGATIVE
BUN SERPL-MCNC: 17 MG/DL (ref 8–23)
CALCIUM SERPL-MCNC: 9.4 MG/DL (ref 8.7–10.5)
CEA SERPL-MCNC: 3.1 NG/ML (ref 0–5)
CHLORIDE SERPL-SCNC: 108 MMOL/L (ref 95–110)
CLARITY UR: CLEAR
CO2 SERPL-SCNC: 24 MMOL/L (ref 23–29)
COLOR UR: YELLOW
CREAT SERPL-MCNC: 0.8 MG/DL (ref 0.5–1.4)
ERYTHROCYTE [DISTWIDTH] IN BLOOD BY AUTOMATED COUNT: 16.1 % (ref 11.5–14.5)
EST. GFR  (NO RACE VARIABLE): >60 ML/MIN/1.73 M^2
GLUCOSE SERPL-MCNC: 109 MG/DL (ref 70–110)
GLUCOSE UR QL STRIP: NEGATIVE
HCT VFR BLD AUTO: 37.7 % (ref 37–48.5)
HGB BLD-MCNC: 12 G/DL (ref 12–16)
HGB UR QL STRIP: NEGATIVE
IMM GRANULOCYTES # BLD AUTO: 0.01 K/UL (ref 0–0.04)
KETONES UR QL STRIP: NEGATIVE
LEUKOCYTE ESTERASE UR QL STRIP: NEGATIVE
MCH RBC QN AUTO: 29 PG (ref 27–31)
MCHC RBC AUTO-ENTMCNC: 31.8 G/DL (ref 32–36)
MCV RBC AUTO: 91 FL (ref 82–98)
NEUTROPHILS # BLD AUTO: 2.3 K/UL (ref 1.8–7.7)
NITRITE UR QL STRIP: NEGATIVE
PH UR STRIP: 6 [PH] (ref 5–8)
PLATELET # BLD AUTO: 265 K/UL (ref 150–450)
PMV BLD AUTO: 9.9 FL (ref 9.2–12.9)
POTASSIUM SERPL-SCNC: 4.8 MMOL/L (ref 3.5–5.1)
PROT SERPL-MCNC: 6.7 G/DL (ref 6–8.4)
PROT UR QL STRIP: NEGATIVE
RBC # BLD AUTO: 4.14 M/UL (ref 4–5.4)
SODIUM SERPL-SCNC: 139 MMOL/L (ref 136–145)
SP GR UR STRIP: 1.02 (ref 1–1.03)
URN SPEC COLLECT METH UR: NORMAL
UROBILINOGEN UR STRIP-ACNC: NEGATIVE EU/DL
WBC # BLD AUTO: 4.36 K/UL (ref 3.9–12.7)

## 2025-01-28 PROCEDURE — 36415 COLL VENOUS BLD VENIPUNCTURE: CPT | Performed by: REGISTERED NURSE

## 2025-01-28 PROCEDURE — 80053 COMPREHEN METABOLIC PANEL: CPT | Performed by: REGISTERED NURSE

## 2025-01-28 PROCEDURE — 85027 COMPLETE CBC AUTOMATED: CPT | Performed by: REGISTERED NURSE

## 2025-01-28 PROCEDURE — 81003 URINALYSIS AUTO W/O SCOPE: CPT | Performed by: REGISTERED NURSE

## 2025-01-28 PROCEDURE — 82378 CARCINOEMBRYONIC ANTIGEN: CPT | Performed by: REGISTERED NURSE

## 2025-01-29 ENCOUNTER — INFUSION (OUTPATIENT)
Dept: INFUSION THERAPY | Facility: HOSPITAL | Age: 75
End: 2025-01-29
Payer: MEDICARE

## 2025-01-29 ENCOUNTER — OFFICE VISIT (OUTPATIENT)
Dept: HEMATOLOGY/ONCOLOGY | Facility: CLINIC | Age: 75
End: 2025-01-29
Payer: MEDICARE

## 2025-01-29 VITALS
WEIGHT: 174.38 LBS | BODY MASS INDEX: 28.03 KG/M2 | TEMPERATURE: 98 F | DIASTOLIC BLOOD PRESSURE: 84 MMHG | HEIGHT: 66 IN | HEART RATE: 76 BPM | OXYGEN SATURATION: 98 % | SYSTOLIC BLOOD PRESSURE: 142 MMHG

## 2025-01-29 VITALS
DIASTOLIC BLOOD PRESSURE: 84 MMHG | HEART RATE: 76 BPM | TEMPERATURE: 98 F | RESPIRATION RATE: 18 BRPM | SYSTOLIC BLOOD PRESSURE: 142 MMHG | HEIGHT: 66 IN | WEIGHT: 174.38 LBS | BODY MASS INDEX: 28.03 KG/M2

## 2025-01-29 DIAGNOSIS — C18.2 MALIGNANT NEOPLASM OF ASCENDING COLON: ICD-10-CM

## 2025-01-29 DIAGNOSIS — T45.1X5A IMMUNODEFICIENCY SECONDARY TO CHEMOTHERAPY: ICD-10-CM

## 2025-01-29 DIAGNOSIS — R14.3 EXCESSIVE GAS: ICD-10-CM

## 2025-01-29 DIAGNOSIS — C18.2 MALIGNANT NEOPLASM OF ASCENDING COLON: Primary | ICD-10-CM

## 2025-01-29 DIAGNOSIS — C78.6 MALIGNANT NEOPLASM METASTATIC TO OMENTUM: Primary | ICD-10-CM

## 2025-01-29 DIAGNOSIS — D84.81 IMMUNODEFICIENCY SECONDARY TO NEOPLASM: ICD-10-CM

## 2025-01-29 DIAGNOSIS — D49.9 IMMUNODEFICIENCY SECONDARY TO NEOPLASM: ICD-10-CM

## 2025-01-29 DIAGNOSIS — K59.09 OTHER CONSTIPATION: ICD-10-CM

## 2025-01-29 DIAGNOSIS — R53.0 NEOPLASTIC MALIGNANT RELATED FATIGUE: ICD-10-CM

## 2025-01-29 DIAGNOSIS — R68.89 COLD SENSITIVITY: ICD-10-CM

## 2025-01-29 DIAGNOSIS — Z79.899 IMMUNODEFICIENCY SECONDARY TO CHEMOTHERAPY: ICD-10-CM

## 2025-01-29 DIAGNOSIS — D84.821 IMMUNODEFICIENCY SECONDARY TO CHEMOTHERAPY: ICD-10-CM

## 2025-01-29 PROCEDURE — 96375 TX/PRO/DX INJ NEW DRUG ADDON: CPT

## 2025-01-29 PROCEDURE — 3288F FALL RISK ASSESSMENT DOCD: CPT | Mod: CPTII,S$GLB,, | Performed by: REGISTERED NURSE

## 2025-01-29 PROCEDURE — 1160F RVW MEDS BY RX/DR IN RCRD: CPT | Mod: CPTII,S$GLB,, | Performed by: REGISTERED NURSE

## 2025-01-29 PROCEDURE — 99215 OFFICE O/P EST HI 40 MIN: CPT | Mod: S$GLB,,, | Performed by: REGISTERED NURSE

## 2025-01-29 PROCEDURE — 96365 THER/PROPH/DIAG IV INF INIT: CPT

## 2025-01-29 PROCEDURE — 96416 CHEMO PROLONG INFUSE W/PUMP: CPT

## 2025-01-29 PROCEDURE — 3077F SYST BP >= 140 MM HG: CPT | Mod: CPTII,S$GLB,, | Performed by: REGISTERED NURSE

## 2025-01-29 PROCEDURE — 1126F AMNT PAIN NOTED NONE PRSNT: CPT | Mod: CPTII,S$GLB,, | Performed by: REGISTERED NURSE

## 2025-01-29 PROCEDURE — 99999 PR PBB SHADOW E&M-EST. PATIENT-LVL III: CPT | Mod: PBBFAC,,, | Performed by: REGISTERED NURSE

## 2025-01-29 PROCEDURE — 63600175 PHARM REV CODE 636 W HCPCS: Performed by: REGISTERED NURSE

## 2025-01-29 PROCEDURE — 3008F BODY MASS INDEX DOCD: CPT | Mod: CPTII,S$GLB,, | Performed by: REGISTERED NURSE

## 2025-01-29 PROCEDURE — 1101F PT FALLS ASSESS-DOCD LE1/YR: CPT | Mod: CPTII,S$GLB,, | Performed by: REGISTERED NURSE

## 2025-01-29 PROCEDURE — G2211 COMPLEX E/M VISIT ADD ON: HCPCS | Mod: S$GLB,,, | Performed by: REGISTERED NURSE

## 2025-01-29 PROCEDURE — 3079F DIAST BP 80-89 MM HG: CPT | Mod: CPTII,S$GLB,, | Performed by: REGISTERED NURSE

## 2025-01-29 PROCEDURE — 25000003 PHARM REV CODE 250: Performed by: REGISTERED NURSE

## 2025-01-29 PROCEDURE — 1159F MED LIST DOCD IN RCRD: CPT | Mod: CPTII,S$GLB,, | Performed by: REGISTERED NURSE

## 2025-01-29 RX ORDER — PROCHLORPERAZINE EDISYLATE 5 MG/ML
5 INJECTION INTRAMUSCULAR; INTRAVENOUS ONCE AS NEEDED
Status: DISCONTINUED | OUTPATIENT
Start: 2025-01-29 | End: 2025-01-29 | Stop reason: HOSPADM

## 2025-01-29 RX ORDER — EPINEPHRINE 0.3 MG/.3ML
0.3 INJECTION SUBCUTANEOUS ONCE AS NEEDED
Status: DISCONTINUED | OUTPATIENT
Start: 2025-01-29 | End: 2025-01-29 | Stop reason: HOSPADM

## 2025-01-29 RX ORDER — SODIUM CHLORIDE 0.9 % (FLUSH) 0.9 %
10 SYRINGE (ML) INJECTION
Status: CANCELLED | OUTPATIENT
Start: 2025-01-29

## 2025-01-29 RX ORDER — SODIUM CHLORIDE 0.9 % (FLUSH) 0.9 %
10 SYRINGE (ML) INJECTION
Status: DISCONTINUED | OUTPATIENT
Start: 2025-01-29 | End: 2025-01-29 | Stop reason: HOSPADM

## 2025-01-29 RX ORDER — SODIUM CHLORIDE 0.9 % (FLUSH) 0.9 %
10 SYRINGE (ML) INJECTION
Status: CANCELLED | OUTPATIENT
Start: 2025-01-31

## 2025-01-29 RX ORDER — HEPARIN 100 UNIT/ML
500 SYRINGE INTRAVENOUS
Status: DISCONTINUED | OUTPATIENT
Start: 2025-01-29 | End: 2025-01-29 | Stop reason: HOSPADM

## 2025-01-29 RX ORDER — DIPHENHYDRAMINE HYDROCHLORIDE 50 MG/ML
50 INJECTION INTRAMUSCULAR; INTRAVENOUS ONCE AS NEEDED
Status: CANCELLED | OUTPATIENT
Start: 2025-01-29

## 2025-01-29 RX ORDER — EPINEPHRINE 0.3 MG/.3ML
0.3 INJECTION SUBCUTANEOUS ONCE AS NEEDED
Status: CANCELLED | OUTPATIENT
Start: 2025-01-29

## 2025-01-29 RX ORDER — PROCHLORPERAZINE EDISYLATE 5 MG/ML
5 INJECTION INTRAMUSCULAR; INTRAVENOUS ONCE AS NEEDED
Status: CANCELLED | OUTPATIENT
Start: 2025-01-29

## 2025-01-29 RX ORDER — HEPARIN 100 UNIT/ML
500 SYRINGE INTRAVENOUS
Status: CANCELLED | OUTPATIENT
Start: 2025-01-31

## 2025-01-29 RX ORDER — PROCHLORPERAZINE EDISYLATE 5 MG/ML
5 INJECTION INTRAMUSCULAR; INTRAVENOUS ONCE AS NEEDED
Status: CANCELLED | OUTPATIENT
Start: 2025-01-31

## 2025-01-29 RX ORDER — DIPHENHYDRAMINE HYDROCHLORIDE 50 MG/ML
50 INJECTION INTRAMUSCULAR; INTRAVENOUS ONCE AS NEEDED
Status: DISCONTINUED | OUTPATIENT
Start: 2025-01-29 | End: 2025-01-29 | Stop reason: HOSPADM

## 2025-01-29 RX ORDER — HEPARIN 100 UNIT/ML
500 SYRINGE INTRAVENOUS
Status: CANCELLED | OUTPATIENT
Start: 2025-01-29

## 2025-01-29 RX ADMIN — FLUOROURACIL 4500 MG: 50 INJECTION, SOLUTION INTRAVENOUS at 11:01

## 2025-01-29 RX ADMIN — BEVACIZUMAB-AWWB 400 MG: 400 INJECTION, SOLUTION INTRAVENOUS at 10:01

## 2025-01-29 RX ADMIN — DEXAMETHASONE SODIUM PHOSPHATE 0.25 MG: 4 INJECTION, SOLUTION INTRA-ARTICULAR; INTRALESIONAL; INTRAMUSCULAR; INTRAVENOUS; SOFT TISSUE at 11:01

## 2025-01-29 NOTE — PROGRESS NOTES
MEDICAL ONCOLOGY - ESTABLISHED PATIENT VISIT    Reason for visit: colon adenocarcinoma    Best Contact Phone Number(s): [phone] (home)      Cancer/Stage/TNM:    Cancer Staging   Colon adenocarcinoma  Staging form: Colon and Rectum, AJCC 8th Edition  - Pathologic stage from 1/22/2024: Stage IVC (pT4a, pN2b, cM1c) - Signed by Minna Menendez CNS on 2/13/2024       Oncology History   Colon adenocarcinoma   12/21/2023 Tumor Markers    Patient's tumor was tested for the following markers: CEA.                                              Results of the tumor marker test revealed 3.3     12/21/2023 Procedure    Colonoscopy  Cecal polyp removed with jumbo forceps, 3 mm  Multiple ascending colon polyps ranging in size from 5 to 12 mm - semi-pedunculated removed with hot snare  Hepatic flexure mass identified - central ulceration, concerning for malignancy, 3 cm, not obstructing, this was biopsied - tattoo placed distal to mass  Sigmoid diverticular disease     12/21/2023 Tumor Markers    Patient's tumor was tested for the following markers: CEA.                                              Results of the tumor marker test revealed 3.3     12/22/2023 Imaging Significant Findings    CT CAP  Evaluation of the colon is somewhat limited as there is significant colonic stool and oral contrast material has not opacified the large bowel.  There does appear to be some abnormal thickening of the walls of the proximal right colon and a patient with a known history of colonic malignancy.  There is some soft tissue density external to the walls of the colon on the right pericolic gutter which could represent peritoneal nodularity versus subserosal extent of tumor.  Slightly more distal to this area there is a 2nd area of irregularity of the walls of the colon, difficult to fully evaluate if this is related to the colonic walls versus stool with central fat.     Two hypodensities in the liver, the larger measuring 0.8 cm, too  small to accurately characterize on the basis of this examination.  Attention on follow-up.     No pulmonary nodules are seen.     Cholecystectomy.     12/29/2023 Initial Diagnosis    Hepatic flexure colon adenocarcinoma  MMR intact     1/22/2024 Cancer Staged    Staging form: Colon and Rectum, AJCC 8th Edition  - Pathologic stage from 1/22/2024: Stage IVC (pT4a, pN2b, cM1c)     8/27/2024 Tumor Markers    Patient's tumor was tested for the following markers: CEA.                                              Results of the tumor marker test revealed 2.3      Malignant neoplasm of ascending colon   2/12/2024 Initial Diagnosis    Malignant neoplasm of ascending colon     2/26/2024 -  Chemotherapy    Treatment Summary   Plan Name: OP GI [id number] (oxaliplatin leucovorin fluorouracil) with bevacizumab Q2W  Treatment Goal: Palliative  Status: Active  Start Date: 2/26/2024  End Date: 2/14/2025 (Planned)  Provider: Kemar Zaragoza MD  Chemotherapy: oxaliplatin (ELOXATIN) 150 mg in dextrose 5 % (D5W) 595 mL chemo infusion, 157 mg, Intravenous, Clinic/HOD 1 time, 8 of 8 cycles  Administration: 150 mg (2/26/2024), 150 mg (3/11/2024), 150 mg (3/25/2024), 150 mg (4/8/2024), 150 mg (4/22/2024), 150 mg (5/6/2024), 150 mg (5/20/2024), 150 mg (6/3/2024)  fluorouracil (ADRUCIL) 2,400 mg/m2 = 4,440 mg in sodium chloride 0.9% 100 mL chemo infusion, 2,400 mg/m2 = 4,440 mg, Intravenous, Clinic/HOD 1 time, 22 of 24 cycles  Administration: 4,440 mg (2/26/2024), 4,440 mg (3/11/2024), 4,440 mg (3/25/2024), 4,440 mg (4/8/2024), 4,440 mg (4/22/2024), 4,440 mg (5/6/2024), 4,440 mg (5/20/2024), 4,440 mg (6/3/2024), 4,440 mg (6/17/2024), 4,440 mg (7/3/2024), 4,440 mg (7/16/2024), 4,440 mg (7/31/2024), 4,440 mg (8/13/2024), 4,440 mg (8/28/2024), 4,440 mg (10/24/2024), 4,440 mg (10/10/2024), 4,440 mg (11/6/2024), 4,440 mg (11/19/2024), 4,440 mg (12/3/2024), 4,440 mg (12/17/2024), 4,500 mg (1/15/2025), 4,500 mg (12/31/2024)  bevacizumab-awwb  (MVASI) 5 mg/kg = 365 mg in sodium chloride 0.9% 100 mL infusion, 5 mg/kg = 365 mg, Intravenous, Clinic/HOD 1 time, 20 of 22 cycles  Administration: 365 mg (2/26/2024), 365 mg (3/11/2024), 365 mg (3/25/2024), 365 mg (4/8/2024), 365 mg (4/22/2024), 365 mg (5/6/2024), 365 mg (5/20/2024), 365 mg (6/3/2024), 365 mg (6/17/2024), 365 mg (7/3/2024), 365 mg (7/16/2024), 365 mg (7/31/2024), 365 mg (8/13/2024), 365 mg (10/24/2024), 365 mg (11/6/2024), 365 mg (11/19/2024), 365 mg (12/3/2024), 365 mg (12/17/2024), 400 mg (1/15/2025), 400 mg (12/31/2024)     8/27/2024 Tumor Markers    Patient's tumor was tested for the following markers: CEA.                                              Results of the tumor marker test revealed 2.3           Interim History:   74 y.o. female with LUANNE, bipolar, HLD who presents prior to cycle 23 FOLFOX + bevacizumab for her metastatic ascending colon cancer, now on maintenance 5-FU + Bevacizumab. She continues to do very well with no significant toxicities. Energy and appetite are good. No fevers, chills, SOB, CP, palpitations, nausea/vomiting, bowel changes, overt bleeding. Paresthesias stable without changes. Enjoyed the recent snow.     ECOG 0. Presents with her spouse today.    ROS:   Review of Systems   Constitutional:  Negative for chills, fever and malaise/fatigue.   HENT:  Negative for congestion and sore throat.    Respiratory:  Negative for shortness of breath.    Cardiovascular:  Negative for chest pain, palpitations and leg swelling.   Gastrointestinal:  Negative for blood in stool, constipation, diarrhea and nausea.   Genitourinary:  Negative for hematuria.   Musculoskeletal:  Negative for back pain, falls and myalgias.   Skin:  Negative for rash.   Neurological:  Positive for tingling. Negative for dizziness, weakness and headaches.       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    Sacroiliitis     right side    Sleep apnea     can not tolerate cpap        Past Surgical History:   Past Surgical History:   Procedure Laterality Date    ANKLE FRACTURE SURGERY Left 2001    BLADDER SUSPENSION      CARPAL TUNNEL RELEASE Bilateral     CHOLECYSTECTOMY      Laparoscopic    COLONOSCOPY      COLONOSCOPY N/A 12/21/2023    Procedure: COLONOSCOPY;  Surgeon: Alberto Albright MD;  Location[facility];  Service: Endoscopy;  Laterality: N/A;    DIAGNOSTIC LAPAROSCOPY N/A 9/20/2024    Procedure: LAPAROSCOPY, DIAGNOSTIC;  Surgeon: Benjy Contreras MD;  Location: [address];  Service: General;  Laterality: N/A;    ESOPHAGOGASTRODUODENOSCOPY N/A 07/29/2021    Procedure: EGD (ESOPHAGOGASTRODUODENOSCOPY);  Surgeon: Susan Mcclain MD;  Location: [facility];  Service: Endoscopy;  Laterality: N/A;    EYE SURGERY      FOOT ARTHRODESIS Right 05/03/2023    Procedure: FUSION, FOOT;  Surgeon: Lauri Alejandra DPM;  Location: [facility];  Service: Podiatry;  Laterality: Right;  mini c-arm, Arthrex dowel bone graft harvester, locking plate and screws festus notified cc    HYSTERECTOMY  1986    vaginal prolapse    INJECTION OF ANESTHETIC AGENT AROUND MEDIAL BRANCH NERVES INNERVATING CERVICAL FACET JOINT Bilateral 05/27/2022    Procedure: CERVICAL MEDIAL BRANCH NERVE BLOCK (C3-4,C4-5);  Surgeon: Mamie Roman MD;  Location: [facility];  Service: Pain Management;  Laterality: Bilateral;    INJECTION OF ANESTHETIC AGENT AROUND MEDIAL BRANCH NERVES INNERVATING LUMBAR FACET JOINT Right 02/05/2021    Procedure: LUMBAR FACET JOINT BLOCK (L3-4,L4-5,L5-S1);  Surgeon: Mamie Roman MD;  Location: [facility];  Service: Pain Management;  Laterality: Right;    INJECTION OF ANESTHETIC AGENT AROUND MEDIAL BRANCH NERVES INNERVATING LUMBAR FACET JOINT  Right 2021    Procedure: LUMBAR FACET JOINT BLOCK (L3-4,L4-5,L5-S1);  Surgeon: Mamie Roman MD;  Location: [facility];  Service: Pain Management;  Laterality: Right;    INJECTION OF ANESTHETIC AGENT INTO SACROILIAC JOINT Right 2020    Procedure: SACROILIAC JOINT INJECTION;  Surgeon: Mamie Roman MD;  Location: [facility];  Service: Pain Management;  Laterality: Right;    INJECTION OF JOINT Right 2020    Procedure: GREATER TROCHANTERIC BURSA INJECTION;  Surgeon: Mamie Roman MD;  Location: [facility];  Service: Pain Management;  Laterality: Right;    LAPAROSCOPIC RIGHT COLON RESECTION N/A 2024    Procedure: COLECTOMY, RIGHT, LAPAROSCOPIC (eras low, lithotomy);  Surgeon: Alberto Albright MD;  Location: [facility];  Service: Colon and Rectal;  Laterality: N/A;    NASAL SEPTUM SURGERY      RECTAL PROLAPSE REPAIR      TONSILLECTOMY          Family History:   Family History   Problem Relation Name Age of Onset    No Known Problems Maternal Aunt Rubio Glasgow     Colon cancer Maternal Uncle Joshua     Colon cancer Maternal Uncle Yovani     Colon cancer Maternal Uncle Konstantin     No Known Problems Paternal Aunt Vero     Esophageal cancer Paternal Uncle Nat Garcia     Heart attack Maternal Grandmother Elmira     Leukemia Maternal Grandfather manish Pang     No Known Problems Paternal Grandmother Lizett     Stroke Paternal Grandfather Nat Sr         Social History:   Social History     Tobacco Use    Smoking status: Former     Current packs/day: 0.00     Average packs/day: 1 pack/day for 41.7 years (41.7 ttl pk-yrs)     Types: Cigarettes     Start date: 3/30/1982     Quit date: 2023     Years since quittin.1    Smokeless tobacco: Never   Substance Use Topics    Alcohol use: Yes     Comment: Socially-2 or 3 times a year-6 or 7 drinks        I have reviewed and updated the patient's past medical, surgical, family and social histories.    Allergies:   Review of patient's  allergies indicates:   Allergen Reactions    Nsaids (non-steroidal anti-inflammatory drug) Other (See Comments)     Gastrointestinal bleeding requiring blood transfusion    Demerol [meperidine] Rash        Medications:   Current Outpatient Medications   Medication Sig Dispense Refill    albuterol (PROVENTIL/VENTOLIN HFA) 90 mcg/actuation inhaler Inhale 2 puffs into the lungs every 4 (four) hours as needed for Wheezing or Shortness of Breath. 16 g 1    aspirin (ECOTRIN) 81 MG EC tablet Take 81 mg by mouth nightly.      azithromycin (Z-BOSTON) 250 MG tablet As per packet instructions 6 tablet 0    dexAMETHasone (DECADRON) 4 MG Tab Take 2 tablets (8 mg total) by mouth once daily only on days 2, 3 and 4 of each chemotherapy cycle. 40 tablet 0    dorzolamide-timolol 2-0.5% (COSOPT) 22.3-6.8 mg/mL ophthalmic solution Place 1 drop into both eyes 2 (two) times daily. 10 mL 4    EScitalopram oxalate (LEXAPRO) 20 MG tablet Take 1 tablet (20 mg total) by mouth once daily. 30 tablet 3    lamoTRIgine (LAMICTAL) 150 MG Tab Take 2 tablets (300 mg total) by mouth every evening. 180 tablet 1    LIDOcaine-prilocaine (EMLA) cream Apply topically as needed (place to port site 45-60 minutes prior to chemotherapy). 30 g 5    ondansetron (ZOFRAN-ODT) 8 MG TbDL dissolve 1 tablet (8 mg total) under the tongue every 6 (six) hours as needed (nausea). 30 tablet 5    oxyCODONE (ROXICODONE) 5 MG immediate release tablet Take 1 tablet (5 mg total) by mouth every 6 (six) hours as needed for Pain. 11 tablet 0    pantoprazole (PROTONIX) 40 MG tablet Take 1 tablet (40 mg total) by mouth once daily. 90 tablet 3    prochlorperazine (COMPAZINE) 10 MG tablet Take 1 tablet (10 mg total) by mouth every 6 (six) hours as needed (nausea). 30 tablet 2    QUEtiapine (SEROQUEL) 200 MG Tab Take 1 tablet (200 mg total) by mouth every evening. 30 tablet 3    rosuvastatin (CRESTOR) 10 MG tablet Take 1 tablet (10 mg total) by mouth once daily. 90 tablet 3    traMADoL  "(ULTRAM) 50 mg tablet Take 1 tablet (50 mg total) by mouth every 6 (six) hours as needed for Pain. 5 tablet 0     No current facility-administered medications for this visit.        Physical Exam:   BP (!) 142/84 (BP Location: Left arm, Patient Position: Sitting)   Pulse 76   Temp 98.4 °F (36.9 °C) (Temporal)   Ht 5' 6" (1.676 m)   Wt 79.1 kg (174 lb 6.1 oz)   SpO2 98%   BMI 28.15 kg/m²           Physical Exam  Vitals reviewed.   Constitutional:       General: She is not in acute distress.     Appearance: Normal appearance. She is normal weight. She is not ill-appearing, toxic-appearing or diaphoretic.   HENT:      Head: Normocephalic and atraumatic.      Right Ear: External ear normal.      Left Ear: External ear normal.      Nose: Nose normal.      Mouth/Throat:      Pharynx: Oropharynx is clear.   Eyes:      General: No scleral icterus.     Conjunctiva/sclera: Conjunctivae normal.   Cardiovascular:      Rate and Rhythm: Normal rate.   Pulmonary:      Effort: Pulmonary effort is normal. No respiratory distress.   Chest:      Comments: RCW port  Abdominal:      General: There is no distension.   Skin:     General: Skin is warm and dry.      Coloration: Skin is not jaundiced or pale.      Findings: No bruising, erythema or rash.   Neurological:      Mental Status: She is alert and oriented to person, place, and time. Mental status is at baseline.      Motor: No weakness.      Gait: Gait normal.   Psychiatric:         Mood and Affect: Mood normal.         Labs:     I have reviewed the pertinent labs.       Imaging:    CT CAP - 12/2/24:      Impression:     No definitive evidence of intrathoracic or intra-abdominal/pelvic metastatic disease.     Stable sub 5 mm hypodense lesion within the right hepatic lobe, possibly a cyst.     Similar changes of a right hemicolectomy.     Additional observations as above.    Path:   1/22/24 - R Hemicolectomy   Final Pathologic Diagnosis     THE DIAGNOSES REMAIN THE SAME.  THIS " CORRECTED REPORT IS ISSUED TO CHANGE M STATUS to reflect tumor in the omentum.  This change is discussed with Dr. RANJANA Albright via phone, 2/1/2024.    1. Colon, right and terminal ileum (right hemicolectomy with EN bloc abdominal wall resection):  Invasive adenocarcinoma.  See cancer synoptic report     2. Omentum (resection):    Positive for carcinoma    CORRECT SYNOPTIC AND M STATUS  Cancer synoptic report  - Procedure: Right hemicolectomy with EN bloc abdominal wall resection  - Tumor site:  Ascending  - Histologic type:  Adenocarcinoma  - Histologic grade:  G2, moderately differentiated.  See comment.  COMMENT:  While the majority of the tumor consists of well formed glands, a significant proportion includes abortive glands, single file infiltration, and malignant cells with signet ring cell morphology. The tumor has an insidious pattern of  infiltration with tumor present far from the advancing front of the tumor.  - Tumor size:  2.0 x 2.0 x 1.0 cm  - Tumor extent:  Invades visceral peritoneum, multifocal  - Macroscopic perforation: Not identified  - Lymphovascular invasion:  Present, extensive.  Small vessel, large vessel, intra and extramural.  - Perineural invasion:  Not identified  - Tumor budding score:  High (>10), multifocal single cell infiltration  - Treatment effect: No known pre-surgical therapy    Margins  Margin involved by invasive carcinoma, radial (slide 1C)  All margins negative for dysplasia.    pTNM stage classification (AJCC 8th edition)  pT Category  pT4a:  Tumor invades through the visceral peritoneum    pN Category  pN2b:  7 or more regional lymph nodes are positive  Number of positive lymph nodes:  19  Number of lymph nodes examined: 26  Number of tumor deposits:  4    pM Category  pM1c:  Metastasis to the peritoneal surface of the omentum.    Additional findings:  - Sessile serrated polyp, no cytologic dysplasia, 1.1 cm at ileocecal valve  - Tubulovillous adenoma, 1.0 cm, proximal  ascending  - Tubular adenoma, 0.4 cm, mid ascending  - Tubular adenoma, 0.4, distal ascending  - Tubular adenoma, 0.6 cm, distal ascending  - Submucosal lipoma, 2.0 cm  - Appendix with no specific histopathologic changes    Ancillary studies  Immunohistochemistry for mismatch repair proteins performed on prior biopsy material (SAS-, 12/21/23): pMMR.         Assessment:       1. Malignant neoplasm metastatic to omentum    2. Malignant neoplasm of ascending colon    3. Immunodeficiency secondary to neoplasm    4. Immunodeficiency secondary to chemotherapy    5. Neoplastic malignant related fatigue    6. Cold sensitivity    7. Other constipation    8. Excessive gas        Plan:        # Colon cancer   Mrs. Maguire is a 74 y.o. female who presents for management of her metastatic colon cancer. She underwent a R hemicolectomy with en-bloc abdominal wall resection on 1/22/24 with Dr. Albright. Pathology revealed pT4a N2b disease with 19/26 positive LNs and omentum positive for carcinoma.    We had an in-depth conversation during our initial consult re: her diagnosis and treatment options. Reviewed recommendation for IV systemic therapy for at least 6 months. Plan for FOLFOX + bevacizumab to be given every 2 weeks. Briefly reviewed side effects of treatment. Handouts provided. Port placed 2/21/24.     PGX - DPYD normal metabolizer, [id number] intermediate metabolizer   Dexamethasone, zofran, compazine and lidocaine sent to pharmacy previously. Reviewed admin instructions.     Pending response, she may be a candidate for CRS/HIPEC with surgical oncology team. Continue to evaluate and re-start discussion if still without radiographic evidence of disease after CT on cycle 8.    CT CAP after cycle 4 shows no clear evidence of disease.  CT CAP after cycle 8 shows no clear evidence of disease.  CT CAP after cycle 12 shows no clear evidence of disease. Underwent diagnostic laparoscopy on 9/20/24 with PCI of 14.  Discussed with  Dr. Contreras and with patient that options include continuing systemic therapy at this time vs CRS/HIPEC.  We are in agreement that with her volume of disease and histology, likelihood of CRS/HIPEC being beneficial for survival is not high.  CT CAP after cycle 18 shows no clear evidence of disease radiographically.    Presents today for cycle 23 of 5-FU + Felisa maintenance.  Tolerance of chemotherapy has been excellent.   I have reviewed the CBC and CMP, adequate for treatment   CEA remains normal.   Proceed with cycle 23.    RTC in 2 weeks for next cycle.   Will repeat imaging studies in 2 weeks with clinic visit with Dr Zaragoza.    Cold sensitivity/neoplasm related fatigue:  2/2 chemotherapy. Stopped oxaliplatin beginning with cycle 9 to prevent persistent paresthesias.  Mild persistent paresthesias at this time. Will monitor. She is taking Cymbalta but not finding very helpful. Worse towards end of day.     Constipation/Gas  Continue 2 stool softeners a day with Miralax daily to have regular bowel movements.   Continue Miralax to avoid more explosive BM.    RTC in 2 weeks.     Patient is in agreement with the proposed treatment plan. All questions were answered to the patient's satisfaction. Pt knows to call clinic if anything is needed before the next clinic visit.    Patient discussed with collaborating physician, Dr. Zaragoza.    At least 40 minutes were spent today on this encounter including face to face time with the patient, data gathering/interpretation and documentation.       Minna Menendez, MSN, APRN, ACCNS-  Hematology and Medical Oncology  Clinical Nurse Specialist to Dr. Zaragoza, Dr. Jin & Dr. Goss Onc Chart Routing      Follow up with physician 6 weeks. with labs and chemo. keep current appointments as scheduled.   Follow up with KRISTEN . 8 weeks with labs for chemo.   Infusion scheduling note   chemo every 2 weeks, pump d/c on day 3   Injection scheduling note    Labs CBC, CMP, CEA and  urinalysis   Scheduling:  Preferred lab:  Lab interval: every 2 weeks     Imaging    Pharmacy appointment    Other referrals

## 2025-01-31 ENCOUNTER — INFUSION (OUTPATIENT)
Dept: INFUSION THERAPY | Facility: HOSPITAL | Age: 75
End: 2025-01-31
Payer: MEDICARE

## 2025-01-31 VITALS
TEMPERATURE: 99 F | DIASTOLIC BLOOD PRESSURE: 75 MMHG | RESPIRATION RATE: 20 BRPM | SYSTOLIC BLOOD PRESSURE: 153 MMHG | HEART RATE: 68 BPM | OXYGEN SATURATION: 97 %

## 2025-01-31 DIAGNOSIS — C18.2 MALIGNANT NEOPLASM OF ASCENDING COLON: Primary | ICD-10-CM

## 2025-01-31 PROCEDURE — 63600175 PHARM REV CODE 636 W HCPCS: Performed by: REGISTERED NURSE

## 2025-01-31 PROCEDURE — A4216 STERILE WATER/SALINE, 10 ML: HCPCS | Performed by: REGISTERED NURSE

## 2025-01-31 PROCEDURE — 25000003 PHARM REV CODE 250: Performed by: REGISTERED NURSE

## 2025-01-31 RX ORDER — PROCHLORPERAZINE EDISYLATE 5 MG/ML
5 INJECTION INTRAMUSCULAR; INTRAVENOUS ONCE AS NEEDED
Status: DISCONTINUED | OUTPATIENT
Start: 2025-01-31 | End: 2025-01-31 | Stop reason: HOSPADM

## 2025-01-31 RX ORDER — HEPARIN 100 UNIT/ML
500 SYRINGE INTRAVENOUS
Status: DISCONTINUED | OUTPATIENT
Start: 2025-01-31 | End: 2025-01-31 | Stop reason: HOSPADM

## 2025-01-31 RX ORDER — SODIUM CHLORIDE 0.9 % (FLUSH) 0.9 %
10 SYRINGE (ML) INJECTION
Status: DISCONTINUED | OUTPATIENT
Start: 2025-01-31 | End: 2025-01-31 | Stop reason: HOSPADM

## 2025-01-31 RX ADMIN — Medication 10 ML: at 10:01

## 2025-01-31 RX ADMIN — HEPARIN 500 UNITS: 100 SYRINGE at 10:01

## 2025-01-31 NOTE — PLAN OF CARE
Pt ambulatory to clinic, accompanied by family member. Pump infusion complete on arrival. Pump d/c'd. Port flushed and de-accessed. VSS. Pt has no complaints. Ambulatory to exit.

## 2025-02-10 ENCOUNTER — HOSPITAL ENCOUNTER (OUTPATIENT)
Dept: RADIOLOGY | Facility: HOSPITAL | Age: 75
Discharge: HOME | End: 2025-02-10
Attending: PHYSICIAN ASSISTANT
Payer: MEDICARE

## 2025-02-10 DIAGNOSIS — C18.2 MALIGNANT NEOPLASM OF ASCENDING COLON: ICD-10-CM

## 2025-02-10 PROCEDURE — 25500020 PHARM REV CODE 255: Performed by: PHYSICIAN ASSISTANT

## 2025-02-10 PROCEDURE — 74177 CT ABD & PELVIS W/CONTRAST: CPT | Mod: TC

## 2025-02-10 PROCEDURE — 71260 CT THORAX DX C+: CPT | Mod: 26,,, | Performed by: RADIOLOGY

## 2025-02-10 PROCEDURE — A9698 NON-RAD CONTRAST MATERIALNOC: HCPCS | Performed by: PHYSICIAN ASSISTANT

## 2025-02-10 PROCEDURE — 74177 CT ABD & PELVIS W/CONTRAST: CPT | Mod: 26,,, | Performed by: RADIOLOGY

## 2025-02-10 RX ADMIN — IOHEXOL 100 ML: 350 INJECTION, SOLUTION INTRAVENOUS at 02:02

## 2025-02-10 RX ADMIN — BARIUM SULFATE 450 ML: 20 SUSPENSION ORAL at 02:02

## 2025-02-11 ENCOUNTER — OFFICE VISIT (OUTPATIENT)
Dept: HEMATOLOGY/ONCOLOGY | Facility: CLINIC | Age: 75
End: 2025-02-11
Payer: MEDICARE

## 2025-02-11 VITALS
HEART RATE: 81 BPM | HEIGHT: 66 IN | TEMPERATURE: 98 F | WEIGHT: 171.94 LBS | RESPIRATION RATE: 18 BRPM | OXYGEN SATURATION: 98 % | BODY MASS INDEX: 27.63 KG/M2 | SYSTOLIC BLOOD PRESSURE: 128 MMHG | DIASTOLIC BLOOD PRESSURE: 64 MMHG

## 2025-02-11 DIAGNOSIS — D84.821 IMMUNODEFICIENCY SECONDARY TO CHEMOTHERAPY: ICD-10-CM

## 2025-02-11 DIAGNOSIS — T45.1X5A IMMUNODEFICIENCY SECONDARY TO CHEMOTHERAPY: ICD-10-CM

## 2025-02-11 DIAGNOSIS — R53.0 NEOPLASTIC MALIGNANT RELATED FATIGUE: ICD-10-CM

## 2025-02-11 DIAGNOSIS — R68.89 COLD SENSITIVITY: ICD-10-CM

## 2025-02-11 DIAGNOSIS — C78.6 MALIGNANT NEOPLASM METASTATIC TO OMENTUM: ICD-10-CM

## 2025-02-11 DIAGNOSIS — R14.3 EXCESSIVE GAS: ICD-10-CM

## 2025-02-11 DIAGNOSIS — D49.9 IMMUNODEFICIENCY SECONDARY TO NEOPLASM: ICD-10-CM

## 2025-02-11 DIAGNOSIS — C18.2 MALIGNANT NEOPLASM OF ASCENDING COLON: Primary | ICD-10-CM

## 2025-02-11 DIAGNOSIS — K59.09 OTHER CONSTIPATION: ICD-10-CM

## 2025-02-11 DIAGNOSIS — Z79.899 IMMUNODEFICIENCY SECONDARY TO CHEMOTHERAPY: ICD-10-CM

## 2025-02-11 DIAGNOSIS — D84.81 IMMUNODEFICIENCY SECONDARY TO NEOPLASM: ICD-10-CM

## 2025-02-11 PROCEDURE — 1159F MED LIST DOCD IN RCRD: CPT | Mod: CPTII,S$GLB,, | Performed by: INTERNAL MEDICINE

## 2025-02-11 PROCEDURE — G2211 COMPLEX E/M VISIT ADD ON: HCPCS | Mod: S$GLB,,, | Performed by: INTERNAL MEDICINE

## 2025-02-11 PROCEDURE — 1126F AMNT PAIN NOTED NONE PRSNT: CPT | Mod: CPTII,S$GLB,, | Performed by: INTERNAL MEDICINE

## 2025-02-11 PROCEDURE — 3288F FALL RISK ASSESSMENT DOCD: CPT | Mod: CPTII,S$GLB,, | Performed by: INTERNAL MEDICINE

## 2025-02-11 PROCEDURE — 3074F SYST BP LT 130 MM HG: CPT | Mod: CPTII,S$GLB,, | Performed by: INTERNAL MEDICINE

## 2025-02-11 PROCEDURE — 99215 OFFICE O/P EST HI 40 MIN: CPT | Mod: S$GLB,,, | Performed by: INTERNAL MEDICINE

## 2025-02-11 PROCEDURE — 99999 PR PBB SHADOW E&M-EST. PATIENT-LVL IV: CPT | Mod: PBBFAC,,, | Performed by: INTERNAL MEDICINE

## 2025-02-11 PROCEDURE — 3078F DIAST BP <80 MM HG: CPT | Mod: CPTII,S$GLB,, | Performed by: INTERNAL MEDICINE

## 2025-02-11 PROCEDURE — 3008F BODY MASS INDEX DOCD: CPT | Mod: CPTII,S$GLB,, | Performed by: INTERNAL MEDICINE

## 2025-02-11 PROCEDURE — 1101F PT FALLS ASSESS-DOCD LE1/YR: CPT | Mod: CPTII,S$GLB,, | Performed by: INTERNAL MEDICINE

## 2025-02-11 RX ORDER — SODIUM CHLORIDE 0.9 % (FLUSH) 0.9 %
10 SYRINGE (ML) INJECTION
OUTPATIENT
Start: 2025-02-26

## 2025-02-11 RX ORDER — SODIUM CHLORIDE 0.9 % (FLUSH) 0.9 %
10 SYRINGE (ML) INJECTION
Status: CANCELLED | OUTPATIENT
Start: 2025-02-14

## 2025-02-11 RX ORDER — HEPARIN 100 UNIT/ML
500 SYRINGE INTRAVENOUS
Status: CANCELLED | OUTPATIENT
Start: 2025-02-14

## 2025-02-11 RX ORDER — SODIUM CHLORIDE 0.9 % (FLUSH) 0.9 %
10 SYRINGE (ML) INJECTION
OUTPATIENT
Start: 2025-02-28

## 2025-02-11 RX ORDER — PROCHLORPERAZINE EDISYLATE 5 MG/ML
5 INJECTION INTRAMUSCULAR; INTRAVENOUS ONCE AS NEEDED
OUTPATIENT
Start: 2025-02-28

## 2025-02-11 RX ORDER — DIPHENHYDRAMINE HYDROCHLORIDE 50 MG/ML
50 INJECTION INTRAMUSCULAR; INTRAVENOUS ONCE AS NEEDED
OUTPATIENT
Start: 2025-02-26

## 2025-02-11 RX ORDER — HEPARIN 100 UNIT/ML
500 SYRINGE INTRAVENOUS
OUTPATIENT
Start: 2025-02-28

## 2025-02-11 RX ORDER — PROCHLORPERAZINE EDISYLATE 5 MG/ML
5 INJECTION INTRAMUSCULAR; INTRAVENOUS ONCE AS NEEDED
Status: CANCELLED | OUTPATIENT
Start: 2025-02-14

## 2025-02-11 RX ORDER — EPINEPHRINE 0.3 MG/.3ML
0.3 INJECTION SUBCUTANEOUS ONCE AS NEEDED
Status: CANCELLED | OUTPATIENT
Start: 2025-02-12

## 2025-02-11 RX ORDER — PROCHLORPERAZINE EDISYLATE 5 MG/ML
5 INJECTION INTRAMUSCULAR; INTRAVENOUS ONCE AS NEEDED
Status: CANCELLED | OUTPATIENT
Start: 2025-02-12

## 2025-02-11 RX ORDER — EPINEPHRINE 0.3 MG/.3ML
0.3 INJECTION SUBCUTANEOUS ONCE AS NEEDED
OUTPATIENT
Start: 2025-02-26

## 2025-02-11 RX ORDER — HEPARIN 100 UNIT/ML
500 SYRINGE INTRAVENOUS
Status: CANCELLED | OUTPATIENT
Start: 2025-02-12

## 2025-02-11 RX ORDER — PROCHLORPERAZINE EDISYLATE 5 MG/ML
5 INJECTION INTRAMUSCULAR; INTRAVENOUS ONCE AS NEEDED
OUTPATIENT
Start: 2025-02-26

## 2025-02-11 RX ORDER — SODIUM CHLORIDE 0.9 % (FLUSH) 0.9 %
10 SYRINGE (ML) INJECTION
Status: CANCELLED | OUTPATIENT
Start: 2025-02-12

## 2025-02-11 RX ORDER — HEPARIN 100 UNIT/ML
500 SYRINGE INTRAVENOUS
OUTPATIENT
Start: 2025-02-26

## 2025-02-11 RX ORDER — DIPHENHYDRAMINE HYDROCHLORIDE 50 MG/ML
50 INJECTION INTRAMUSCULAR; INTRAVENOUS ONCE AS NEEDED
Status: CANCELLED | OUTPATIENT
Start: 2025-02-12

## 2025-02-11 NOTE — PROGRESS NOTES
MEDICAL ONCOLOGY - ESTABLISHED PATIENT VISIT    Reason for visit: colon adenocarcinoma    Best Contact Phone Number(s): [phone] (home)      Cancer/Stage/TNM:    Cancer Staging   Colon adenocarcinoma  Staging form: Colon and Rectum, AJCC 8th Edition  - Pathologic stage from 1/22/2024: Stage IVC (pT4a, pN2b, cM1c) - Signed by Minna Menendez CNS on 2/13/2024       Oncology History   Colon adenocarcinoma   12/21/2023 Tumor Markers    Patient's tumor was tested for the following markers: CEA.                                              Results of the tumor marker test revealed 3.3     12/21/2023 Procedure    Colonoscopy  Cecal polyp removed with jumbo forceps, 3 mm  Multiple ascending colon polyps ranging in size from 5 to 12 mm - semi-pedunculated removed with hot snare  Hepatic flexure mass identified - central ulceration, concerning for malignancy, 3 cm, not obstructing, this was biopsied - tattoo placed distal to mass  Sigmoid diverticular disease     12/21/2023 Tumor Markers    Patient's tumor was tested for the following markers: CEA.                                              Results of the tumor marker test revealed 3.3     12/22/2023 Imaging Significant Findings    CT CAP  Evaluation of the colon is somewhat limited as there is significant colonic stool and oral contrast material has not opacified the large bowel.  There does appear to be some abnormal thickening of the walls of the proximal right colon and a patient with a known history of colonic malignancy.  There is some soft tissue density external to the walls of the colon on the right pericolic gutter which could represent peritoneal nodularity versus subserosal extent of tumor.  Slightly more distal to this area there is a 2nd area of irregularity of the walls of the colon, difficult to fully evaluate if this is related to the colonic walls versus stool with central fat.     Two hypodensities in the liver, the larger measuring 0.8 cm, too  small to accurately characterize on the basis of this examination.  Attention on follow-up.     No pulmonary nodules are seen.     Cholecystectomy.     12/29/2023 Initial Diagnosis    Hepatic flexure colon adenocarcinoma  MMR intact     1/22/2024 Cancer Staged    Staging form: Colon and Rectum, AJCC 8th Edition  - Pathologic stage from 1/22/2024: Stage IVC (pT4a, pN2b, cM1c)     8/27/2024 Tumor Markers    Patient's tumor was tested for the following markers: CEA.                                              Results of the tumor marker test revealed 2.3      Malignant neoplasm of ascending colon   2/12/2024 Initial Diagnosis    Malignant neoplasm of ascending colon     2/26/2024 -  Chemotherapy    Treatment Summary   Plan Name: OP GI [id number] (oxaliplatin leucovorin fluorouracil) with bevacizumab Q2W  Treatment Goal: Palliative  Status: Active  Start Date: 2/26/2024  End Date: 5/23/2025 (Planned)  Provider: Kemar Zaragoza MD  Chemotherapy: oxaliplatin (ELOXATIN) 150 mg in dextrose 5 % (D5W) 595 mL chemo infusion, 157 mg, Intravenous, Clinic/HOD 1 time, 8 of 8 cycles  Administration: 150 mg (2/26/2024), 150 mg (3/11/2024), 150 mg (3/25/2024), 150 mg (4/8/2024), 150 mg (4/22/2024), 150 mg (5/6/2024), 150 mg (5/20/2024), 150 mg (6/3/2024)  fluorouracil (ADRUCIL) 2,400 mg/m2 = 4,440 mg in sodium chloride 0.9% 100 mL chemo infusion, 2,400 mg/m2 = 4,440 mg, Intravenous, Clinic/HOD 1 time, 23 of 31 cycles  Administration: 4,440 mg (2/26/2024), 4,440 mg (3/11/2024), 4,440 mg (3/25/2024), 4,440 mg (4/8/2024), 4,440 mg (4/22/2024), 4,440 mg (5/6/2024), 4,440 mg (5/20/2024), 4,440 mg (6/3/2024), 4,440 mg (6/17/2024), 4,440 mg (7/3/2024), 4,440 mg (7/16/2024), 4,440 mg (7/31/2024), 4,440 mg (8/13/2024), 4,440 mg (8/28/2024), 4,440 mg (10/24/2024), 4,440 mg (10/10/2024), 4,440 mg (11/6/2024), 4,440 mg (11/19/2024), 4,440 mg (12/3/2024), 4,440 mg (12/17/2024), 4,500 mg (1/15/2025), 4,500 mg (12/31/2024), 4,500 mg  (1/29/2025)  bevacizumab-awwb (MVASI) 5 mg/kg = 365 mg in sodium chloride 0.9% 100 mL infusion, 5 mg/kg = 365 mg, Intravenous, [facility]\"" 1 time, 21 of 29 cycles  Administration: 365 mg (2/26/2024), 365 mg (3/11/2024), 365 mg (3/25/2024), 365 mg (4/8/2024), 365 mg (4/22/2024), 365 mg (5/6/2024), 365 mg (5/20/2024), 365 mg (6/3/2024), 365 mg (6/17/2024), 365 mg (7/3/2024), 365 mg (7/16/2024), 365 mg (7/31/2024), 365 mg (8/13/2024), 365 mg (10/24/2024), 365 mg (11/6/2024), 365 mg (11/19/2024), 365 mg (12/3/2024), 365 mg (12/17/2024), 400 mg (1/15/2025), 400 mg (12/31/2024), 400 mg (1/29/2025)     8/27/2024 Tumor Markers    Patient's tumor was tested for the following markers: CEA.                                              Results of the tumor marker test revealed 2.3           Interim History:   74 y.o. female with LUANNE, bipolar, HLD who presents prior to cycle 24 FOLFOX + bevacizumab for her metastatic ascending colon cancer, now on maintenance 5-FU + Bevacizumab. She is feeling very well without pain, nausea, fatigue.  She is not having any bleeding.  Stable paresthesias.     ECOG 0. Presents with her spouse and sister today.    ROS:   Review of Systems   Constitutional:  Negative for chills, fever and malaise/fatigue.   HENT:  Negative for congestion and sore throat.    Respiratory:  Negative for shortness of breath.    Cardiovascular:  Negative for chest pain, palpitations and leg swelling.   Gastrointestinal:  Negative for blood in stool, constipation, diarrhea and nausea.   Genitourinary:  Negative for hematuria.   Musculoskeletal:  Negative for back pain, falls and myalgias.   Skin:  Negative for rash.   Neurological:  Positive for tingling. Negative for dizziness, weakness and headaches.       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    Sacroiliitis     right side    Sleep apnea     can not tolerate cpap        Past Surgical History:   Past Surgical History:   Procedure Laterality Date    ANKLE FRACTURE SURGERY Left 2001    BLADDER SUSPENSION      CARPAL TUNNEL RELEASE Bilateral     CHOLECYSTECTOMY      Laparoscopic    COLONOSCOPY      COLONOSCOPY N/A 12/21/2023    Procedure: COLONOSCOPY;  Surgeon: Alberto Albright MD;  Location: [facility];  Service: Endoscopy;  Laterality: N/A;    DIAGNOSTIC LAPAROSCOPY N/A 9/20/2024    Procedure: LAPAROSCOPY, DIAGNOSTIC;  Surgeon: Benjy Contreras MD;  Location: [address];  Service: General;  Laterality: N/A;    ESOPHAGOGASTRODUODENOSCOPY N/A 07/29/2021    Procedure: EGD (ESOPHAGOGASTRODUODENOSCOPY);  Surgeon: Susan Mcclain MD;  Location: [facility];  Service: Endoscopy;  Laterality: N/A;    EYE SURGERY      FOOT ARTHRODESIS Right 05/03/2023    Procedure: FUSION, FOOT;  Surgeon: ODALIS Campuzano;  Location: [facility];  Service: Podiatry;  Laterality: Right;  mini c-arm, Arthrex dowel bone graft harvester, locking plate and screws festus notified cc    HYSTERECTOMY  1986    vaginal prolapse    INJECTION OF ANESTHETIC AGENT AROUND MEDIAL BRANCH NERVES INNERVATING CERVICAL FACET JOINT Bilateral 05/27/2022    Procedure: CERVICAL MEDIAL BRANCH NERVE BLOCK (C3-4,C4-5);  Surgeon: Mamie Roman MD;  Location: [facility];  Service: Pain Management;  Laterality: Bilateral;    INJECTION OF ANESTHETIC AGENT AROUND MEDIAL BRANCH NERVES INNERVATING LUMBAR FACET JOINT Right 02/05/2021    Procedure: LUMBAR FACET JOINT BLOCK (L3-4,L4-5,L5-S1);  Surgeon: Mamie Roman MD;  Location: [facility];  Service: Pain Management;  Laterality: Right;    INJECTION OF ANESTHETIC AGENT AROUND MEDIAL BRANCH NERVES INNERVATING LUMBAR FACET JOINT Right 04/30/2021    Procedure: LUMBAR FACET JOINT BLOCK  (L3-4,L4-5,L5-S1);  Surgeon: Mamie Roman MD;  Location: [facility];  Service: Pain Management;  Laterality: Right;    INJECTION OF ANESTHETIC AGENT INTO SACROILIAC JOINT Right 2020    Procedure: SACROILIAC JOINT INJECTION;  Surgeon: Mamie Roman MD;  Location: [facility];  Service: Pain Management;  Laterality: Right;    INJECTION OF JOINT Right 2020    Procedure: GREATER TROCHANTERIC BURSA INJECTION;  Surgeon: Mamie Roman MD;  Location: [facility];  Service: Pain Management;  Laterality: Right;    LAPAROSCOPIC RIGHT COLON RESECTION N/A 2024    Procedure: COLECTOMY, RIGHT, LAPAROSCOPIC (eras low, lithotomy);  Surgeon: Alberto Albright MD;  [facility];  Service: Colon and Rectal;  Laterality: N/A;    NASAL SEPTUM SURGERY      RECTAL PROLAPSE REPAIR      TONSILLECTOMY          Family History:   Family History   Problem Relation Name Age of Onset    No Known Problems Maternal Aunt Rubio Glasgow     Colon cancer Maternal Uncle Joshua     Colon cancer Maternal Uncle Yovani     Colon cancer Maternal Uncle Konstantin     No Known Problems Paternal Aunt Vero     Esophageal cancer Paternal Uncle Nat Garcia     Heart attack Maternal Grandmother Elmira     Leukemia Maternal Grandfather manish Pang     No Known Problems Paternal Grandmother Lizett     Stroke Paternal Grandfather Nat Sr         Social History:   Social History     Tobacco Use    Smoking status: Former     Current packs/day: 0.00     Average packs/day: 1 pack/day for 41.7 years (41.7 ttl pk-yrs)     Types: Cigarettes     Start date: 3/30/1982     Quit date: 2023     Years since quittin.1    Smokeless tobacco: Never   Substance Use Topics    Alcohol use: Yes     Comment: Socially-2 or 3 times a year-6 or 7 drinks        I have reviewed and updated the patient's past medical, surgical, family and social histories.    Allergies:   Review of patient's allergies indicates:   Allergen Reactions    Nsaids  (non-steroidal anti-inflammatory drug) Other (See Comments)     Gastrointestinal bleeding requiring blood transfusion    Demerol [meperidine] Rash        Medications:   Current Outpatient Medications   Medication Sig Dispense Refill    albuterol (PROVENTIL/VENTOLIN HFA) 90 mcg/actuation inhaler Inhale 2 puffs into the lungs every 4 (four) hours as needed for Wheezing or Shortness of Breath. 16 g 1    aspirin (ECOTRIN) 81 MG EC tablet Take 81 mg by mouth nightly.      azithromycin (Z-BOSTON) 250 MG tablet As per packet instructions 6 tablet 0    dexAMETHasone (DECADRON) 4 MG Tab Take 2 tablets (8 mg total) by mouth once daily only on days 2, 3 and 4 of each chemotherapy cycle. 40 tablet 0    dorzolamide-timolol 2-0.5% (COSOPT) 22.3-6.8 mg/mL ophthalmic solution Place 1 drop into both eyes 2 (two) times daily. 10 mL 4    EScitalopram oxalate (LEXAPRO) 20 MG tablet Take 1 tablet (20 mg total) by mouth once daily. 30 tablet 3    lamoTRIgine (LAMICTAL) 150 MG Tab Take 2 tablets (300 mg total) by mouth every evening. 180 tablet 1    LIDOcaine-prilocaine (EMLA) cream Apply topically as needed (place to port site 45-60 minutes prior to chemotherapy). 30 g 5    ondansetron (ZOFRAN-ODT) 8 MG TbDL dissolve 1 tablet (8 mg total) under the tongue every 6 (six) hours as needed (nausea). 30 tablet 5    oxyCODONE (ROXICODONE) 5 MG immediate release tablet Take 1 tablet (5 mg total) by mouth every 6 (six) hours as needed for Pain. 11 tablet 0    pantoprazole (PROTONIX) 40 MG tablet Take 1 tablet (40 mg total) by mouth once daily. 90 tablet 3    prochlorperazine (COMPAZINE) 10 MG tablet Take 1 tablet (10 mg total) by mouth every 6 (six) hours as needed (nausea). 30 tablet 2    QUEtiapine (SEROQUEL) 200 MG Tab Take 1 tablet (200 mg total) by mouth every evening. 30 tablet 3    rosuvastatin (CRESTOR) 10 MG tablet Take 1 tablet (10 mg total) by mouth once daily. 90 tablet 3    traMADoL (ULTRAM) 50 mg tablet Take 1 tablet (50 mg total) by  "mouth every 6 (six) hours as needed for Pain. 5 tablet 0     No current facility-administered medications for this visit.        Physical Exam:   /64 (BP Location: Left arm, Patient Position: Sitting)   Pulse 81   Temp 98 °F (36.7 °C) (Temporal)   Resp 18   Ht 5' 6" (1.676 m)   Wt 78 kg (171 lb 15.3 oz)   SpO2 98%   BMI 27.75 kg/m²           Physical Exam  Vitals reviewed.   Constitutional:       General: She is not in acute distress.     Appearance: Normal appearance. She is normal weight. She is not ill-appearing, toxic-appearing or diaphoretic.   HENT:      Head: Normocephalic and atraumatic.      Right Ear: External ear normal.      Left Ear: External ear normal.      Nose: Nose normal.      Mouth/Throat:      Pharynx: Oropharynx is clear.   Eyes:      General: No scleral icterus.     Conjunctiva/sclera: Conjunctivae normal.   Cardiovascular:      Rate and Rhythm: Normal rate.   Pulmonary:      Effort: Pulmonary effort is normal. No respiratory distress.   Chest:      Comments: RCW port  Abdominal:      General: There is no distension.   Skin:     General: Skin is warm and dry.      Coloration: Skin is not jaundiced or pale.      Findings: No bruising, erythema or rash.   Neurological:      Mental Status: She is alert and oriented to person, place, and time. Mental status is at baseline.      Motor: No weakness.      Gait: Gait normal.   Psychiatric:         Mood and Affect: Mood normal.         Labs:     I have reviewed the pertinent labs.  Normal blood counts, normal CEA.     Imaging:    CT CAP - 2/11/25:          Impression:     In this patient with history of colon cancer post right hemicolectomy, no CT findings to suggest disease recurrence or metastasis.     Stable prominent lateral caval lymph node.     Right hepatic hypodense lesion is not seen on today's exam.     Additional findings as above    Path:   1/22/24 - R Hemicolectomy   Final Pathologic Diagnosis     THE DIAGNOSES REMAIN THE " SAME.  THIS CORRECTED REPORT IS ISSUED TO CHANGE M STATUS to reflect tumor in the omentum.  This change is discussed with Dr. RANJANA Albright via phone, 2/1/2024.    1. Colon, right and terminal ileum (right hemicolectomy with EN bloc abdominal wall resection):  Invasive adenocarcinoma.  See cancer synoptic report     2. Omentum (resection):    Positive for carcinoma    CORRECT SYNOPTIC AND M STATUS  Cancer synoptic report  - Procedure: Right hemicolectomy with EN bloc abdominal wall resection  - Tumor site:  Ascending  - Histologic type:  Adenocarcinoma  - Histologic grade:  G2, moderately differentiated.  See comment.  COMMENT:  While the majority of the tumor consists of well formed glands, a significant proportion includes abortive glands, single file infiltration, and malignant cells with signet ring cell morphology. The tumor has an insidious pattern of  infiltration with tumor present far from the advancing front of the tumor.  - Tumor size:  2.0 x 2.0 x 1.0 cm  - Tumor extent:  Invades visceral peritoneum, multifocal  - Macroscopic perforation: Not identified  - Lymphovascular invasion:  Present, extensive.  Small vessel, large vessel, intra and extramural.  - Perineural invasion:  Not identified  - Tumor budding score:  High (>10), multifocal single cell infiltration  - Treatment effect: No known pre-surgical therapy    Margins  Margin involved by invasive carcinoma, radial (slide 1C)  All margins negative for dysplasia.    pTNM stage classification (AJCC 8th edition)  pT Category  pT4a:  Tumor invades through the visceral peritoneum    pN Category  pN2b:  7 or more regional lymph nodes are positive  Number of positive lymph nodes:  19  Number of lymph nodes examined: 26  Number of tumor deposits:  4    pM Category  pM1c:  Metastasis to the peritoneal surface of the omentum.    Additional findings:  - Sessile serrated polyp, no cytologic dysplasia, 1.1 cm at ileocecal valve  - Tubulovillous adenoma, 1.0 cm,  proximal ascending  - Tubular adenoma, 0.4 cm, mid ascending  - Tubular adenoma, 0.4, distal ascending  - Tubular adenoma, 0.6 cm, distal ascending  - Submucosal lipoma, 2.0 cm  - Appendix with no specific histopathologic changes    Ancillary studies  Immunohistochemistry for mismatch repair proteins performed on prior biopsy material (SAS-, 12/21/23): pMMR.         Assessment:       1. Malignant neoplasm of ascending colon    2. 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 Immunodeficiency secondary to neoplasm    4. Immunodeficiency secondary to chemotherapy    5. Neoplastic malignant related fatigue    6. Cold sensitivity    7. Other constipation    8. Excessive gas          Plan:        # Colon cancer   Mrs. Maguire is a 74 y.o. female who presents for management of her metastatic colon cancer. She underwent a R hemicolectomy with en-bloc abdominal wall resection on 1/22/24 with Dr. Albright. Pathology revealed pT4a N2b disease with 19/26 positive LNs and omentum positive for carcinoma.    We had an in-depth conversation during our initial consult re: her diagnosis and treatment options. Reviewed recommendation for IV systemic therapy for at least 6 months. Plan for FOLFOX + bevacizumab to be given every 2 weeks. Briefly reviewed side effects of treatment. Handouts provided. Port placed 2/21/24.     PGX - DPYD normal metabolizer, [id number] intermediate metabolizer   Dexamethasone, zofran, compazine and lidocaine sent to pharmacy previously. Reviewed admin instructions.     Pending response, she may be a candidate for CRS/HIPEC with surgical oncology team. Continue to evaluate and re-start discussion if still without radiographic evidence of disease after CT on cycle 8.    CT CAP after cycle 4 shows no clear evidence of disease.  CT CAP after cycle 8 shows no clear evidence of disease.  CT CAP after cycle 12 shows no clear evidence of disease. Underwent diagnostic laparoscopy on 9/20/24 with PCI of  14.  Discussed with Dr. Contreras and with patient that options include continuing systemic therapy at this time vs CRS/HIPEC.  We are in agreement that with her volume of disease and histology, likelihood of CRS/HIPEC being beneficial for survival is not high.  CT CAP after cycle 18 shows no clear evidence of disease radiographically.  CT CAP after cycle 23 shows no clear evidence of disease.    Presents today for cycle 24 of 5-FU + Felisa maintenance.  Tolerance of chemotherapy has been excellent.   I have reviewed the CBC and CMP, adequate for treatment   CEA remains normal.   Will see her every other cycle from here on out.  Will stay on chemo indefinitely every 2 weeks.  Proceed with cycle 24 and 25.    RTC in 4 weeks for cycle 26.  Will repeat imaging studies in early May.    Cold sensitivity/neoplasm related fatigue:  2/2 chemotherapy. Stopped oxaliplatin beginning with cycle 9 to prevent persistent paresthesias.  Mild persistent paresthesias at this time. Will monitor. She is taking Cymbalta, prescribed by her psychiatrist.    Constipation/Gas  Continue 2 stool softeners a day with Miralax daily to have regular bowel movements.   Continue Miralax to avoid more explosive BM.    RTC in 4 weeks.     Patient is in agreement with the proposed treatment plan. All questions were answered to the patient's satisfaction. Pt knows to call clinic if anything is needed before the next clinic visit.     code applied: patient requires or will require a continuous, longitudinal, and active collaborative plan of care related to this patient's health condition, colon cancer --the management of which requires the direction of a practitioner with specialized clinical knowledge, skill, and expertise.       Kemar Zaragoza MD  Hematology/Oncology  [facility]      Med Onc Chart Routing      Follow up with physician 2 months.   Follow up with KRISTEN 4 weeks.   Infusion scheduling note   5-FU + Felisa every 2 weeks  (needs office visit every 4 weeks)   Injection scheduling note    Labs CBC, CMP, CEA and urinalysis   Scheduling:  Preferred lab:  Lab interval: every 2 weeks  U/A every other cycle   Imaging    Pharmacy appointment    Other referrals

## 2025-02-12 ENCOUNTER — INFUSION (OUTPATIENT)
Dept: INFUSION THERAPY | Facility: HOSPITAL | Age: 75
End: 2025-02-12
Payer: MEDICARE

## 2025-02-12 VITALS
BODY MASS INDEX: 27.63 KG/M2 | SYSTOLIC BLOOD PRESSURE: 135 MMHG | WEIGHT: 171.94 LBS | TEMPERATURE: 98 F | HEART RATE: 67 BPM | HEIGHT: 66 IN | RESPIRATION RATE: 16 BRPM | DIASTOLIC BLOOD PRESSURE: 65 MMHG

## 2025-02-12 DIAGNOSIS — C18.2 MALIGNANT NEOPLASM OF ASCENDING COLON: Primary | ICD-10-CM

## 2025-02-12 PROCEDURE — 96367 TX/PROPH/DG ADDL SEQ IV INF: CPT

## 2025-02-12 PROCEDURE — 96365 THER/PROPH/DIAG IV INF INIT: CPT

## 2025-02-12 PROCEDURE — 96416 CHEMO PROLONG INFUSE W/PUMP: CPT

## 2025-02-12 PROCEDURE — 25000003 PHARM REV CODE 250: Performed by: INTERNAL MEDICINE

## 2025-02-12 PROCEDURE — 63600175 PHARM REV CODE 636 W HCPCS: Mod: JZ,TB | Performed by: INTERNAL MEDICINE

## 2025-02-12 PROCEDURE — A4216 STERILE WATER/SALINE, 10 ML: HCPCS | Performed by: INTERNAL MEDICINE

## 2025-02-12 RX ORDER — DIPHENHYDRAMINE HYDROCHLORIDE 50 MG/ML
50 INJECTION INTRAMUSCULAR; INTRAVENOUS ONCE AS NEEDED
Status: DISCONTINUED | OUTPATIENT
Start: 2025-02-12 | End: 2025-02-12 | Stop reason: HOSPADM

## 2025-02-12 RX ORDER — HEPARIN 100 UNIT/ML
500 SYRINGE INTRAVENOUS
Status: DISCONTINUED | OUTPATIENT
Start: 2025-02-12 | End: 2025-02-12 | Stop reason: HOSPADM

## 2025-02-12 RX ORDER — SODIUM CHLORIDE 0.9 % (FLUSH) 0.9 %
10 SYRINGE (ML) INJECTION
Status: DISCONTINUED | OUTPATIENT
Start: 2025-02-12 | End: 2025-02-12 | Stop reason: HOSPADM

## 2025-02-12 RX ORDER — PROCHLORPERAZINE EDISYLATE 5 MG/ML
5 INJECTION INTRAMUSCULAR; INTRAVENOUS ONCE AS NEEDED
Status: DISCONTINUED | OUTPATIENT
Start: 2025-02-12 | End: 2025-02-12 | Stop reason: HOSPADM

## 2025-02-12 RX ORDER — EPINEPHRINE 0.3 MG/.3ML
0.3 INJECTION SUBCUTANEOUS ONCE AS NEEDED
Status: DISCONTINUED | OUTPATIENT
Start: 2025-02-12 | End: 2025-02-12 | Stop reason: HOSPADM

## 2025-02-12 RX ADMIN — SODIUM CHLORIDE: 9 INJECTION, SOLUTION INTRAVENOUS at 10:02

## 2025-02-12 RX ADMIN — FLUOROURACIL 4500 MG: 50 INJECTION, SOLUTION INTRAVENOUS at 11:02

## 2025-02-12 RX ADMIN — DEXAMETHASONE SODIUM PHOSPHATE 0.25 MG: 4 INJECTION, SOLUTION INTRA-ARTICULAR; INTRALESIONAL; INTRAMUSCULAR; INTRAVENOUS; SOFT TISSUE at 11:02

## 2025-02-12 RX ADMIN — SODIUM CHLORIDE, PRESERVATIVE FREE 10 ML: 5 INJECTION INTRAVENOUS at 12:02

## 2025-02-12 RX ADMIN — BEVACIZUMAB-AWWB 400 MG: 400 INJECTION, SOLUTION INTRAVENOUS at 10:02

## 2025-02-12 NOTE — PLAN OF CARE
1200- Pt tolerated Avastin infusion well today, no complaints or complications. Pt connected to 5FU CADD pump for home infusion, connection sites secured, pump infusing properly at time of discharge. Pt aware to call provider with any questions or concerns, knows to return to clinic at approx 1000 2/14/25  for pump d/c. Pt verbalized understanding. Pt ambulatory from clinic with steady gait, no distress noted.

## 2025-02-14 ENCOUNTER — INFUSION (OUTPATIENT)
Dept: INFUSION THERAPY | Facility: HOSPITAL | Age: 75
End: 2025-02-14
Payer: MEDICARE

## 2025-02-14 VITALS
WEIGHT: 171.94 LBS | HEART RATE: 73 BPM | DIASTOLIC BLOOD PRESSURE: 69 MMHG | TEMPERATURE: 98 F | SYSTOLIC BLOOD PRESSURE: 150 MMHG | BODY MASS INDEX: 27.63 KG/M2 | HEIGHT: 66 IN | RESPIRATION RATE: 18 BRPM

## 2025-02-14 DIAGNOSIS — C18.2 MALIGNANT NEOPLASM OF ASCENDING COLON: Primary | ICD-10-CM

## 2025-02-14 PROCEDURE — A4216 STERILE WATER/SALINE, 10 ML: HCPCS | Performed by: INTERNAL MEDICINE

## 2025-02-14 PROCEDURE — 63600175 PHARM REV CODE 636 W HCPCS: Performed by: INTERNAL MEDICINE

## 2025-02-14 PROCEDURE — 25000003 PHARM REV CODE 250: Performed by: INTERNAL MEDICINE

## 2025-02-14 RX ORDER — PROCHLORPERAZINE EDISYLATE 5 MG/ML
5 INJECTION INTRAMUSCULAR; INTRAVENOUS ONCE AS NEEDED
Status: DISCONTINUED | OUTPATIENT
Start: 2025-02-14 | End: 2025-02-14 | Stop reason: HOSPADM

## 2025-02-14 RX ORDER — SODIUM CHLORIDE 0.9 % (FLUSH) 0.9 %
10 SYRINGE (ML) INJECTION
Status: DISCONTINUED | OUTPATIENT
Start: 2025-02-14 | End: 2025-02-14 | Stop reason: HOSPADM

## 2025-02-14 RX ORDER — HEPARIN 100 UNIT/ML
500 SYRINGE INTRAVENOUS
Status: DISCONTINUED | OUTPATIENT
Start: 2025-02-14 | End: 2025-02-14 | Stop reason: HOSPADM

## 2025-02-14 RX ADMIN — HEPARIN SODIUM (PORCINE) LOCK FLUSH IV SOLN 100 UNIT/ML 500 UNITS: 100 SOLUTION at 10:02

## 2025-02-14 RX ADMIN — Medication 10 ML: at 10:02

## 2025-02-19 DIAGNOSIS — F31.9 BIPOLAR 1 DISORDER: ICD-10-CM

## 2025-02-19 RX ORDER — QUETIAPINE FUMARATE 200 MG/1
200 TABLET, FILM COATED ORAL NIGHTLY
Qty: 30 TABLET | Refills: 3 | Status: SHIPPED | OUTPATIENT
Start: 2025-02-19

## 2025-02-19 RX ORDER — LAMOTRIGINE 150 MG/1
300 TABLET ORAL NIGHTLY
Qty: 180 TABLET | Refills: 1 | Status: SHIPPED | OUTPATIENT
Start: 2025-02-19

## 2025-02-26 ENCOUNTER — OFFICE VISIT (OUTPATIENT)
Dept: HEMATOLOGY/ONCOLOGY | Facility: CLINIC | Age: 75
End: 2025-02-26
Payer: MEDICARE

## 2025-02-26 ENCOUNTER — LAB VISIT (OUTPATIENT)
Dept: LAB | Facility: HOSPITAL | Age: 75
End: 2025-02-26
Payer: MEDICARE

## 2025-02-26 ENCOUNTER — INFUSION (OUTPATIENT)
Dept: INFUSION THERAPY | Facility: HOSPITAL | Age: 75
End: 2025-02-26
Payer: MEDICARE

## 2025-02-26 ENCOUNTER — TELEPHONE (OUTPATIENT)
Dept: HEMATOLOGY/ONCOLOGY | Facility: CLINIC | Age: 75
End: 2025-02-26
Payer: MEDICARE

## 2025-02-26 VITALS
BODY MASS INDEX: 28.4 KG/M2 | SYSTOLIC BLOOD PRESSURE: 150 MMHG | HEART RATE: 81 BPM | DIASTOLIC BLOOD PRESSURE: 80 MMHG | WEIGHT: 176.69 LBS | SYSTOLIC BLOOD PRESSURE: 150 MMHG | BODY MASS INDEX: 28.38 KG/M2 | HEIGHT: 66 IN | RESPIRATION RATE: 18 BRPM | WEIGHT: 176.56 LBS | HEIGHT: 66 IN | HEART RATE: 81 BPM | DIASTOLIC BLOOD PRESSURE: 80 MMHG | OXYGEN SATURATION: 99 %

## 2025-02-26 DIAGNOSIS — R14.3 EXCESSIVE GAS: ICD-10-CM

## 2025-02-26 DIAGNOSIS — K59.09 OTHER CONSTIPATION: ICD-10-CM

## 2025-02-26 DIAGNOSIS — Z79.899 IMMUNODEFICIENCY SECONDARY TO CHEMOTHERAPY: ICD-10-CM

## 2025-02-26 DIAGNOSIS — D49.9 IMMUNODEFICIENCY SECONDARY TO NEOPLASM: ICD-10-CM

## 2025-02-26 DIAGNOSIS — T45.1X5A IMMUNODEFICIENCY SECONDARY TO CHEMOTHERAPY: ICD-10-CM

## 2025-02-26 DIAGNOSIS — R53.0 NEOPLASTIC MALIGNANT RELATED FATIGUE: ICD-10-CM

## 2025-02-26 DIAGNOSIS — D84.81 IMMUNODEFICIENCY SECONDARY TO NEOPLASM: ICD-10-CM

## 2025-02-26 DIAGNOSIS — C18.2 MALIGNANT NEOPLASM OF ASCENDING COLON: Primary | ICD-10-CM

## 2025-02-26 DIAGNOSIS — R68.89 COLD SENSITIVITY: ICD-10-CM

## 2025-02-26 DIAGNOSIS — D84.821 IMMUNODEFICIENCY SECONDARY TO CHEMOTHERAPY: ICD-10-CM

## 2025-02-26 DIAGNOSIS — C78.6 MALIGNANT NEOPLASM METASTATIC TO OMENTUM: ICD-10-CM

## 2025-02-26 DIAGNOSIS — C18.2 MALIGNANT NEOPLASM OF ASCENDING COLON: ICD-10-CM

## 2025-02-26 LAB
ALBUMIN SERPL BCP-MCNC: 3.4 G/DL (ref 3.5–5.2)
ALP SERPL-CCNC: 71 U/L (ref 40–150)
ALT SERPL W/O P-5'-P-CCNC: 11 U/L (ref 10–44)
ANION GAP SERPL CALC-SCNC: 7 MMOL/L (ref 8–16)
AST SERPL-CCNC: 23 U/L (ref 10–40)
BILIRUB SERPL-MCNC: 0.3 MG/DL (ref 0.1–1)
BUN SERPL-MCNC: 10 MG/DL (ref 8–23)
CALCIUM SERPL-MCNC: 8.8 MG/DL (ref 8.7–10.5)
CEA SERPL-MCNC: 3.8 NG/ML (ref 0–5)
CHLORIDE SERPL-SCNC: 105 MMOL/L (ref 95–110)
CO2 SERPL-SCNC: 24 MMOL/L (ref 23–29)
CREAT SERPL-MCNC: 0.8 MG/DL (ref 0.5–1.4)
ERYTHROCYTE [DISTWIDTH] IN BLOOD BY AUTOMATED COUNT: 16.2 % (ref 11.5–14.5)
EST. GFR  (NO RACE VARIABLE): >60 ML/MIN/1.73 M^2
GLUCOSE SERPL-MCNC: 97 MG/DL (ref 70–110)
HCT VFR BLD AUTO: 37.9 % (ref 37–48.5)
HGB BLD-MCNC: 12.1 G/DL (ref 12–16)
IMM GRANULOCYTES # BLD AUTO: 0.02 K/UL (ref 0–0.04)
MCH RBC QN AUTO: 29.7 PG (ref 27–31)
MCHC RBC AUTO-ENTMCNC: 31.9 G/DL (ref 32–36)
MCV RBC AUTO: 93 FL (ref 82–98)
NEUTROPHILS # BLD AUTO: 3.2 K/UL (ref 1.8–7.7)
PLATELET # BLD AUTO: 220 K/UL (ref 150–450)
PMV BLD AUTO: 10 FL (ref 9.2–12.9)
POTASSIUM SERPL-SCNC: 4.5 MMOL/L (ref 3.5–5.1)
PROT SERPL-MCNC: 6.5 G/DL (ref 6–8.4)
RBC # BLD AUTO: 4.07 M/UL (ref 4–5.4)
SODIUM SERPL-SCNC: 136 MMOL/L (ref 136–145)
WBC # BLD AUTO: 5.56 K/UL (ref 3.9–12.7)

## 2025-02-26 PROCEDURE — 3079F DIAST BP 80-89 MM HG: CPT | Mod: CPTII,S$GLB,, | Performed by: REGISTERED NURSE

## 2025-02-26 PROCEDURE — 36415 COLL VENOUS BLD VENIPUNCTURE: CPT | Performed by: REGISTERED NURSE

## 2025-02-26 PROCEDURE — G2211 COMPLEX E/M VISIT ADD ON: HCPCS | Mod: S$GLB,,, | Performed by: REGISTERED NURSE

## 2025-02-26 PROCEDURE — 85027 COMPLETE CBC AUTOMATED: CPT | Performed by: REGISTERED NURSE

## 2025-02-26 PROCEDURE — 96416 CHEMO PROLONG INFUSE W/PUMP: CPT

## 2025-02-26 PROCEDURE — 99999 PR PBB SHADOW E&M-EST. PATIENT-LVL IV: CPT | Mod: PBBFAC,,, | Performed by: REGISTERED NURSE

## 2025-02-26 PROCEDURE — 63600175 PHARM REV CODE 636 W HCPCS: Mod: JZ,TB | Performed by: INTERNAL MEDICINE

## 2025-02-26 PROCEDURE — 1126F AMNT PAIN NOTED NONE PRSNT: CPT | Mod: CPTII,S$GLB,, | Performed by: REGISTERED NURSE

## 2025-02-26 PROCEDURE — 3077F SYST BP >= 140 MM HG: CPT | Mod: CPTII,S$GLB,, | Performed by: REGISTERED NURSE

## 2025-02-26 PROCEDURE — 82378 CARCINOEMBRYONIC ANTIGEN: CPT | Performed by: REGISTERED NURSE

## 2025-02-26 PROCEDURE — 96367 TX/PROPH/DG ADDL SEQ IV INF: CPT

## 2025-02-26 PROCEDURE — 80053 COMPREHEN METABOLIC PANEL: CPT | Performed by: REGISTERED NURSE

## 2025-02-26 PROCEDURE — 1101F PT FALLS ASSESS-DOCD LE1/YR: CPT | Mod: CPTII,S$GLB,, | Performed by: REGISTERED NURSE

## 2025-02-26 PROCEDURE — 25000003 PHARM REV CODE 250: Performed by: INTERNAL MEDICINE

## 2025-02-26 PROCEDURE — 3008F BODY MASS INDEX DOCD: CPT | Mod: CPTII,S$GLB,, | Performed by: REGISTERED NURSE

## 2025-02-26 PROCEDURE — 1160F RVW MEDS BY RX/DR IN RCRD: CPT | Mod: CPTII,S$GLB,, | Performed by: REGISTERED NURSE

## 2025-02-26 PROCEDURE — 99215 OFFICE O/P EST HI 40 MIN: CPT | Mod: S$GLB,,, | Performed by: REGISTERED NURSE

## 2025-02-26 PROCEDURE — 3288F FALL RISK ASSESSMENT DOCD: CPT | Mod: CPTII,S$GLB,, | Performed by: REGISTERED NURSE

## 2025-02-26 PROCEDURE — 96413 CHEMO IV INFUSION 1 HR: CPT

## 2025-02-26 PROCEDURE — 1159F MED LIST DOCD IN RCRD: CPT | Mod: CPTII,S$GLB,, | Performed by: REGISTERED NURSE

## 2025-02-26 RX ORDER — EPINEPHRINE 0.3 MG/.3ML
0.3 INJECTION SUBCUTANEOUS ONCE AS NEEDED
OUTPATIENT
Start: 2025-03-12

## 2025-02-26 RX ORDER — PROCHLORPERAZINE EDISYLATE 5 MG/ML
5 INJECTION INTRAMUSCULAR; INTRAVENOUS ONCE AS NEEDED
Status: DISCONTINUED | OUTPATIENT
Start: 2025-02-26 | End: 2025-02-26 | Stop reason: HOSPADM

## 2025-02-26 RX ORDER — DIPHENHYDRAMINE HYDROCHLORIDE 50 MG/ML
50 INJECTION INTRAMUSCULAR; INTRAVENOUS ONCE AS NEEDED
OUTPATIENT
Start: 2025-03-12

## 2025-02-26 RX ORDER — PROCHLORPERAZINE EDISYLATE 5 MG/ML
5 INJECTION INTRAMUSCULAR; INTRAVENOUS ONCE AS NEEDED
OUTPATIENT
Start: 2025-03-12

## 2025-02-26 RX ORDER — SODIUM CHLORIDE 0.9 % (FLUSH) 0.9 %
10 SYRINGE (ML) INJECTION
OUTPATIENT
Start: 2025-03-14

## 2025-02-26 RX ORDER — DIPHENHYDRAMINE HYDROCHLORIDE 50 MG/ML
50 INJECTION INTRAMUSCULAR; INTRAVENOUS ONCE AS NEEDED
Status: DISCONTINUED | OUTPATIENT
Start: 2025-02-26 | End: 2025-02-26 | Stop reason: HOSPADM

## 2025-02-26 RX ORDER — HEPARIN 100 UNIT/ML
500 SYRINGE INTRAVENOUS
Status: DISCONTINUED | OUTPATIENT
Start: 2025-02-26 | End: 2025-02-26 | Stop reason: HOSPADM

## 2025-02-26 RX ORDER — HEPARIN 100 UNIT/ML
500 SYRINGE INTRAVENOUS
OUTPATIENT
Start: 2025-03-14

## 2025-02-26 RX ORDER — SODIUM CHLORIDE 0.9 % (FLUSH) 0.9 %
10 SYRINGE (ML) INJECTION
Status: DISCONTINUED | OUTPATIENT
Start: 2025-02-26 | End: 2025-02-26 | Stop reason: HOSPADM

## 2025-02-26 RX ORDER — PROCHLORPERAZINE EDISYLATE 5 MG/ML
5 INJECTION INTRAMUSCULAR; INTRAVENOUS ONCE AS NEEDED
OUTPATIENT
Start: 2025-03-14

## 2025-02-26 RX ORDER — HEPARIN 100 UNIT/ML
500 SYRINGE INTRAVENOUS
OUTPATIENT
Start: 2025-03-12

## 2025-02-26 RX ORDER — SODIUM CHLORIDE 0.9 % (FLUSH) 0.9 %
10 SYRINGE (ML) INJECTION
OUTPATIENT
Start: 2025-03-12

## 2025-02-26 RX ORDER — EPINEPHRINE 0.3 MG/.3ML
0.3 INJECTION SUBCUTANEOUS ONCE AS NEEDED
Status: DISCONTINUED | OUTPATIENT
Start: 2025-02-26 | End: 2025-02-26 | Stop reason: HOSPADM

## 2025-02-26 RX ADMIN — DEXAMETHASONE SODIUM PHOSPHATE 0.25 MG: 4 INJECTION, SOLUTION INTRA-ARTICULAR; INTRALESIONAL; INTRAMUSCULAR; INTRAVENOUS; SOFT TISSUE at 10:02

## 2025-02-26 RX ADMIN — FLUOROURACIL 4500 MG: 50 INJECTION, SOLUTION INTRAVENOUS at 12:02

## 2025-02-26 RX ADMIN — BEVACIZUMAB-AWWB 400 MG: 400 INJECTION, SOLUTION INTRAVENOUS at 12:02

## 2025-02-26 NOTE — PROGRESS NOTES
MEDICAL ONCOLOGY - ESTABLISHED PATIENT VISIT    Reason for visit: colon adenocarcinoma    Best Contact Phone Number(s): [phone] (home)      Cancer/Stage/TNM:    Cancer Staging   Colon adenocarcinoma  Staging form: Colon and Rectum, AJCC 8th Edition  - Pathologic stage from 1/22/2024: Stage IVC (pT4a, pN2b, cM1c) - Signed by Minna Menendez CNS on 2/13/2024       Oncology History   Colon adenocarcinoma   12/21/2023 Tumor Markers    Patient's tumor was tested for the following markers: CEA.                                              Results of the tumor marker test revealed 3.3     12/21/2023 Procedure    Colonoscopy  Cecal polyp removed with jumbo forceps, 3 mm  Multiple ascending colon polyps ranging in size from 5 to 12 mm - semi-pedunculated removed with hot snare  Hepatic flexure mass identified - central ulceration, concerning for malignancy, 3 cm, not obstructing, this was biopsied - tattoo placed distal to mass  Sigmoid diverticular disease     12/21/2023 Tumor Markers    Patient's tumor was tested for the following markers: CEA.                                              Results of the tumor marker test revealed 3.3     12/22/2023 Imaging Significant Findings    CT CAP  Evaluation of the colon is somewhat limited as there is significant colonic stool and oral contrast material has not opacified the large bowel.  There does appear to be some abnormal thickening of the walls of the proximal right colon and a patient with a known history of colonic malignancy.  There is some soft tissue density external to the walls of the colon on the right pericolic gutter which could represent peritoneal nodularity versus subserosal extent of tumor.  Slightly more distal to this area there is a 2nd area of irregularity of the walls of the colon, difficult to fully evaluate if this is related to the colonic walls versus stool with central fat.     Two hypodensities in the liver, the larger measuring 0.8 cm, too  small to accurately characterize on the basis of this examination.  Attention on follow-up.     No pulmonary nodules are seen.     Cholecystectomy.     12/29/2023 Initial Diagnosis    Hepatic flexure colon adenocarcinoma  MMR intact     1/22/2024 Cancer Staged    Staging form: Colon and Rectum, AJCC 8th Edition  - Pathologic stage from 1/22/2024: Stage IVC (pT4a, pN2b, cM1c)     8/27/2024 Tumor Markers    Patient's tumor was tested for the following markers: CEA.                                              Results of the tumor marker test revealed 2.3      Malignant neoplasm of ascending colon   2/12/2024 Initial Diagnosis    Malignant neoplasm of ascending colon     2/26/2024 -  Chemotherapy    Treatment Summary   Plan Name: OP GI [id number] (oxaliplatin leucovorin fluorouracil) with bevacizumab Q2W  Treatment Goal: Palliative  Status: Active  Start Date: 2/26/2024  End Date: 5/23/2025 (Planned)  Provider: Kemar Zaragoza MD  Chemotherapy: oxaliplatin (ELOXATIN) 150 mg in dextrose 5 % (D5W) 595 mL chemo infusion, 157 mg, Intravenous, Clinic/[facility] 1 time, 8 of 8 cycles  Administration: 150 mg (2/26/2024), 150 mg (3/11/2024), 150 mg (3/25/2024), 150 mg (4/8/2024), 150 mg (4/22/2024), 150 mg (5/6/2024), 150 mg (5/20/2024), 150 mg (6/3/2024)  fluorouracil (ADRUCIL) 2,400 mg/m2 = 4,440 mg in sodium chloride 0.9% 100 mL chemo infusion, 2,400 mg/m2 = 4,440 mg, Intravenous, Clinic/[facility] 1 time, 24 of 31 cycles  Administration: 4,440 mg (2/26/2024), 4,440 mg (3/11/2024), 4,440 mg (3/25/2024), 4,440 mg (4/8/2024), 4,440 mg (4/22/2024), 4,440 mg (5/6/2024), 4,440 mg (5/20/2024), 4,440 mg (6/3/2024), 4,440 mg (6/17/2024), 4,440 mg (7/3/2024), 4,440 mg (7/16/2024), 4,440 mg (7/31/2024), 4,440 mg (8/13/2024), 4,440 mg (8/28/2024), 4,440 mg (10/24/2024), 4,440 mg (10/10/2024), 4,440 mg (11/6/2024), 4,440 mg (11/19/2024), 4,440 mg (12/3/2024), 4,440 mg (12/17/2024), 4,500 mg (1/15/2025), 4,500 mg (12/31/2024), 4,500 mg  (1/29/2025), 4,500 mg (2/12/2025)  bevacizumab-awwb (MVASI) 5 mg/kg = 365 mg in sodium chloride 0.9% 100 mL infusion, 5 mg/kg = 365 mg, Intravenous, Clinic/[facility] 1 time, 22 of 29 cycles  Administration: 365 mg (2/26/2024), 365 mg (3/11/2024), 365 mg (3/25/2024), 365 mg (4/8/2024), 365 mg (4/22/2024), 365 mg (5/6/2024), 365 mg (5/20/2024), 365 mg (6/3/2024), 365 mg (6/17/2024), 365 mg (7/3/2024), 365 mg (7/16/2024), 365 mg (7/31/2024), 365 mg (8/13/2024), 365 mg (10/24/2024), 365 mg (11/6/2024), 365 mg (11/19/2024), 365 mg (12/3/2024), 365 mg (12/17/2024), 400 mg (1/15/2025), 400 mg (12/31/2024), 400 mg (1/29/2025), 400 mg (2/12/2025)     8/27/2024 Tumor Markers    Patient's tumor was tested for the following markers: CEA.                                              Results of the tumor marker test revealed 2.3           Interim History:   74 y.o. female with LUANNE, bipolar, HLD who presents prior to cycle 25 FOLFOX + bevacizumab for her metastatic ascending colon cancer, now on maintenance 5-FU + Bevacizumab. She is doing very well overall. Denies fever/chills, SOB, CP, palpitations, N/V, C/D, pain, blood in urine/stool, or rashes. Stable paresthesias without changes. She did have one fall with no injuries after tripping on the ramp going to her house.     ECOG 0. Presents with her sister today.    ROS:   Review of Systems   Constitutional:  Negative for chills, fever and malaise/fatigue.   HENT:  Negative for congestion and sore throat.    Respiratory:  Negative for shortness of breath.    Cardiovascular:  Negative for chest pain, palpitations and leg swelling.   Gastrointestinal:  Negative for blood in stool, constipation, diarrhea and nausea.   Genitourinary:  Negative for hematuria.   Musculoskeletal:  Negative for back pain, falls and myalgias.   Skin:  Negative for rash.   Neurological:  Positive for tingling. Negative for dizziness, weakness and headaches.       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    Sacroiliitis     right side    Sleep apnea     can not tolerate cpap        Past Surgical History:   Past Surgical History:   Procedure Laterality Date    ANKLE FRACTURE SURGERY Left 2001    BLADDER SUSPENSION      CARPAL TUNNEL RELEASE Bilateral     CHOLECYSTECTOMY      Laparoscopic    COLONOSCOPY      COLONOSCOPY N/A 12/21/2023    Procedure: COLONOSCOPY;  Surgeon: Alberto Albright MD;  Location: [facility];  Service: Endoscopy;  Laterality: N/A;    DIAGNOSTIC LAPAROSCOPY N/A 9/20/2024    Procedure: LAPAROSCOPY, DIAGNOSTIC;  Surgeon: Benjy Contreras MD;  Location: [facility] OR [address] [place] [address];  Service: General;  Laterality: N/A;    ESOPHAGOGASTRODUODENOSCOPY N/A 07/29/2021    Procedure: EGD (ESOPHAGOGASTRODUODENOSCOPY);  Surgeon: Susan Mcclain MD;  Location: [facility];  Service: Endoscopy;  Laterality: N/A;    EYE SURGERY      FOOT ARTHRODESIS Right 05/03/2023    Procedure: FUSION, FOOT;  Surgeon: Lauri Alejandra DPM;  Location: [facility];  Service: Podiatry;  Laterality: Right;  mini c-arm, Arthrex dowel bone graft harvester, locking plate and screws festus notified cc    HYSTERECTOMY  1986    vaginal prolapse    INJECTION OF ANESTHETIC AGENT AROUND MEDIAL BRANCH NERVES INNERVATING CERVICAL FACET JOINT Bilateral 05/27/2022    Procedure: CERVICAL MEDIAL BRANCH NERVE BLOCK (C3-4,C4-5);  Surgeon: Mamie Roman MD;  Location: [facility];  Service: Pain Management;  Laterality: Bilateral;    INJECTION OF ANESTHETIC AGENT AROUND MEDIAL BRANCH NERVES INNERVATING LUMBAR FACET JOINT Right 02/05/2021    Procedure: LUMBAR FACET JOINT BLOCK (L3-4,L4-5,L5-S1);  Surgeon: Mamie Roman MD;  Location: [facility];  Service: Pain Management;  Laterality:  Right;    INJECTION OF ANESTHETIC AGENT AROUND MEDIAL BRANCH NERVES INNERVATING LUMBAR FACET JOINT Right 2021    Procedure: LUMBAR FACET JOINT BLOCK (L3-4,L4-5,L5-S1);  Surgeon: Mamie Roman MD;  Location: [facility] OR;  Service: Pain Management;  Laterality: Right;    INJECTION OF ANESTHETIC AGENT INTO SACROILIAC JOINT Right 2020    Procedure: SACROILIAC JOINT INJECTION;  Surgeon: Mamie Roman MD;  Location: [facility] OR;  Service: Pain Management;  Laterality: Right;    INJECTION OF JOINT Right 2020    Procedure: GREATER TROCHANTERIC BURSA INJECTION;  Surgeon: Mamie Roman MD;  Location: [facility] OR;  Service: Pain Management;  Laterality: Right;    LAPAROSCOPIC RIGHT COLON RESECTION N/A 2024    Procedure: COLECTOMY, RIGHT, LAPAROSCOPIC (eras low, lithotomy);  Surgeon: Alberto Albright MD;  [facility];  Service: Colon and Rectal;  Laterality: N/A;    NASAL SEPTUM SURGERY      RECTAL PROLAPSE REPAIR      TONSILLECTOMY          Family History:   Family History   Problem Relation Name Age of Onset    No Known Problems Maternal Aunt Rubio Glasgow     Colon cancer Maternal Uncle Edwis     Colon cancer Maternal Uncle Yovani     Colon cancer Maternal Uncle Konstantin     No Known Problems Paternal Aunt Vero     Esophageal cancer Paternal Uncle Nat Jr     Heart attack Maternal Grandmother Elmira     Leukemia Maternal Grandfather manish Pang     No Known Problems Paternal Grandmother Lizett     Stroke Paternal Grandfather Nat Sr         Social History:   Social History     Tobacco Use    Smoking status: Former     Current packs/day: 0.00     Average packs/day: 1 pack/day for 41.7 years (41.7 ttl pk-yrs)     Types: Cigarettes     Start date: 3/30/1982     Quit date: 2023     Years since quittin.1    Smokeless tobacco: Never   Substance Use Topics    Alcohol use: Yes     Comment: Socially-2 or 3 times a year-6 or 7 drinks        I have reviewed and updated the  patient's past medical, surgical, family and social histories.    Allergies:   Review of patient's allergies indicates:   Allergen Reactions    Nsaids (non-steroidal anti-inflammatory drug) Other (See Comments)     Gastrointestinal bleeding requiring blood transfusion    Demerol [meperidine] Rash        Medications:   Current Outpatient Medications   Medication Sig Dispense Refill    albuterol (PROVENTIL/VENTOLIN HFA) 90 mcg/actuation inhaler Inhale 2 puffs into the lungs every 4 (four) hours as needed for Wheezing or Shortness of Breath. 16 g 1    aspirin (ECOTRIN) 81 MG EC tablet Take 81 mg by mouth nightly.      azithromycin (Z-BOSTON) 250 MG tablet As per packet instructions 6 tablet 0    dexAMETHasone (DECADRON) 4 MG Tab Take 2 tablets (8 mg total) by mouth once daily only on days 2, 3 and 4 of each chemotherapy cycle. 40 tablet 0    dorzolamide-timolol 2-0.5% (COSOPT) 22.3-6.8 mg/mL ophthalmic solution Place 1 drop into both eyes 2 (two) times daily. 10 mL 4    EScitalopram oxalate (LEXAPRO) 20 MG tablet Take 1 tablet (20 mg total) by mouth once daily. 30 tablet 3    lamoTRIgine (LAMICTAL) 150 MG Tab Take 2 tablets (300 mg total) by mouth every evening. 180 tablet 1    LIDOcaine-prilocaine (EMLA) cream Apply topically as needed (place to port site 45-60 minutes prior to chemotherapy). 30 g 5    oxyCODONE (ROXICODONE) 5 MG immediate release tablet Take 1 tablet (5 mg total) by mouth every 6 (six) hours as needed for Pain. 11 tablet 0    pantoprazole (PROTONIX) 40 MG tablet Take 1 tablet (40 mg total) by mouth once daily. 90 tablet 3    QUEtiapine (SEROQUEL) 200 MG Tab Take 1 tablet (200 mg total) by mouth every evening. 30 tablet 3    rosuvastatin (CRESTOR) 10 MG tablet Take 1 tablet (10 mg total) by mouth once daily. 90 tablet 3    traMADoL (ULTRAM) 50 mg tablet Take 1 tablet (50 mg total) by mouth every 6 (six) hours as needed for Pain. 5 tablet 0     No current facility-administered medications for this visit.  "       Physical Exam:   BP (!) 150/80 (Patient Position: Sitting) Comment: first take 172/120 2 take was 167/101  Pulse 81   Ht 5' 6" (1.676 m)   Wt 80.2 kg (176 lb 11.2 oz)   SpO2 99%   BMI 28.52 kg/m²           Physical Exam  Vitals reviewed.   Constitutional:       General: She is not in acute distress.     Appearance: Normal appearance. She is normal weight. She is not ill-appearing, toxic-appearing or diaphoretic.   HENT:      Head: Normocephalic and atraumatic.      Right Ear: External ear normal.      Left Ear: External ear normal.      Nose: Nose normal.      Mouth/Throat:      Pharynx: Oropharynx is clear.   Eyes:      General: No scleral icterus.     Conjunctiva/sclera: Conjunctivae normal.   Cardiovascular:      Rate and Rhythm: Normal rate.   Pulmonary:      Effort: Pulmonary effort is normal. No respiratory distress.   Chest:      Comments: RCW port  Abdominal:      General: There is no distension.   Skin:     General: Skin is warm and dry.      Coloration: Skin is not jaundiced or pale.      Findings: No bruising, erythema or rash.   Neurological:      Mental Status: She is alert and oriented to person, place, and time. Mental status is at baseline.      Motor: No weakness.      Gait: Gait normal.   Psychiatric:         Mood and Affect: Mood normal.         Labs:     I have reviewed the pertinent labs.  Normal blood counts, normal CEA.     Imaging:    CT CAP - 2/11/25:  Impression:     In this patient with history of colon cancer post right hemicolectomy, no CT findings to suggest disease recurrence or metastasis.     Stable prominent lateral caval lymph node.     Right hepatic hypodense lesion is not seen on today's exam.     Additional findings as above    Path:   1/22/24 - R Hemicolectomy   Final Pathologic Diagnosis     THE DIAGNOSES REMAIN THE SAME.  THIS CORRECTED REPORT IS ISSUED TO CHANGE M STATUS to reflect tumor in the omentum.  This change is discussed with Dr. RANJANA Albright via phone, " 2/1/2024.    1. Colon, right and terminal ileum (right hemicolectomy with EN bloc abdominal wall resection):  Invasive adenocarcinoma.  See cancer synoptic report     2. Omentum (resection):    Positive for carcinoma    CORRECT SYNOPTIC AND M STATUS  Cancer synoptic report  - Procedure: Right hemicolectomy with EN bloc abdominal wall resection  - Tumor site:  Ascending  - Histologic type:  Adenocarcinoma  - Histologic grade:  G2, moderately differentiated.  See comment.  COMMENT:  While the majority of the tumor consists of well formed glands, a significant proportion includes abortive glands, single file infiltration, and malignant cells with signet ring cell morphology. The tumor has an insidious pattern of  infiltration with tumor present far from the advancing front of the tumor.  - Tumor size:  2.0 x 2.0 x 1.0 cm  - Tumor extent:  Invades visceral peritoneum, multifocal  - Macroscopic perforation: Not identified  - Lymphovascular invasion:  Present, extensive.  Small vessel, large vessel, intra and extramural.  - Perineural invasion:  Not identified  - Tumor budding score:  High (>10), multifocal single cell infiltration  - Treatment effect: No known pre-surgical therapy    Margins  Margin involved by invasive carcinoma, radial (slide 1C)  All margins negative for dysplasia.    pTNM stage classification (AJCC 8th edition)  pT Category  pT4a:  Tumor invades through the visceral peritoneum    pN Category  pN2b:  7 or more regional lymph nodes are positive  Number of positive lymph nodes:  19  Number of lymph nodes examined: 26  Number of tumor deposits:  4    pM Category  pM1c:  Metastasis to the peritoneal surface of the omentum.    Additional findings:  - Sessile serrated polyp, no cytologic dysplasia, 1.1 cm at ileocecal valve  - Tubulovillous adenoma, 1.0 cm, proximal ascending  - Tubular adenoma, 0.4 cm, mid ascending  - Tubular adenoma, 0.4, distal ascending  - Tubular adenoma, 0.6 cm, distal ascending  -  Submucosal lipoma, 2.0 cm  - Appendix with no specific histopathologic changes    Ancillary studies  Immunohistochemistry for mismatch repair proteins performed on prior biopsy material (EHP-, 12/21/23): pMMR.         Assessment:       1. Malignant neoplasm of ascending colon    2. 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 Immunodeficiency secondary to neoplasm    4. Immunodeficiency secondary to chemotherapy    5. Neoplastic malignant related fatigue    6. Cold sensitivity    7. Other constipation    8. Excessive gas        Plan:        # Colon cancer   Mrs. Maguire is a 74 y.o. female who presents for management of her metastatic colon cancer. She underwent a R hemicolectomy with en-bloc abdominal wall resection on 1/22/24 with Dr. Albright. Pathology revealed pT4a N2b disease with 19/26 positive LNs and omentum positive for carcinoma.    We had an in-depth conversation during our initial consult re: her diagnosis and treatment options. Reviewed recommendation for IV systemic therapy for at least 6 months. Plan for FOLFOX + bevacizumab to be given every 2 weeks. Briefly reviewed side effects of treatment. Handouts provided. Port placed 2/21/24.     PGX - DPYD normal metabolizer, UTG1A1 intermediate metabolizer   Dexamethasone, zofran, compazine and lidocaine sent to pharmacy previously. Reviewed admin instructions.     Pending response, she may be a candidate for CRS/HIPEC with surgical oncology team. Continue to evaluate and re-start discussion if still without radiographic evidence of disease after CT on cycle 8.    CT CAP after cycle 4 shows no clear evidence of disease.  CT CAP after cycle 8 shows no clear evidence of disease.  CT CAP after cycle 12 shows no clear evidence of disease. Underwent diagnostic laparoscopy on 9/20/24 with PCI of 14.  Discussed with Dr. Contreras and with patient that options include continuing systemic therapy at this time vs CRS/HIPEC.  We are in agreement that with her  volume of disease and histology, likelihood of CRS/HIPEC being beneficial for survival is not high.  CT CAP after cycle 18 shows no clear evidence of disease radiographically.  CT CAP after cycle 23 shows no clear evidence of disease.    Presents today for cycle 25 of 5-FU + Felisa maintenance.  Tolerance of chemotherapy has been excellent.   I have reviewed the CBC and CMP, adequate for treatment   CEA remains normal.   Will see her every other cycle from here on out.    Will stay on chemo indefinitely every 2 weeks.  Proceed with cycle 25 and 26.    RTC in 4 weeks for cycle 27.  Will repeat imaging studies in early May.    Cold sensitivity/neoplasm related fatigue:  2/2 chemotherapy. Stopped oxaliplatin beginning with cycle 9 to prevent persistent paresthesias.  Mild persistent paresthesias at this time. Will monitor. She is taking Cymbalta, prescribed by her psychiatrist.    Constipation/Gas  Continue 2 stool softeners a day with Miralax daily to have regular bowel movements.   Continue Miralax to avoid more explosive BM.    RTC in 4 weeks.     Patient is in agreement with the proposed treatment plan. All questions were answered to the patient's satisfaction. Pt knows to call clinic if anything is needed before the next clinic visit.    Patient discussed with collaborating physician, Dr. Zaragoza.    At least 40 minutes were spent today on this encounter including face to face time with the patient, data gathering/interpretation and documentation.       Minna Menendez, MSN, APRN, ACCNS-  Hematology and Medical Oncology  Clinical Nurse Specialist to Dr. Zaragoza, Dr. Jin & Dr. Rocha         code applied: patient requires or will require a continuous, longitudinal, and active collaborative plan of care related to this patient's health condition, metastatic colon cancer --the management of which requires the direction of a practitioner with specialized clinical knowledge, skill, and expertise.       Med Onc Chart  Routing      Follow up with physician . Keep appointments in 2 and 4 weeks as scheduled. 6 weeks with labs for infusion (no provider visit needed). 8 weeks with labs to see Dr. Zaragoza for infusion.   Follow up with KRISTEN    Infusion scheduling note   chemo every 2 weeks, pump d/c on day 3. will see provider every other infusion.   Injection scheduling note    Labs CBC, CMP, CEA and urinalysis   Scheduling:  Preferred lab:  Lab interval: every 2 weeks     Imaging    Pharmacy appointment    Other referrals

## 2025-02-26 NOTE — TELEPHONE ENCOUNTER
Called pt to see if they where running late NA right to VM so I left a VM saying to give us a call back or to message us on the portal

## 2025-02-28 ENCOUNTER — INFUSION (OUTPATIENT)
Dept: INFUSION THERAPY | Facility: HOSPITAL | Age: 75
End: 2025-02-28
Payer: MEDICARE

## 2025-02-28 VITALS
TEMPERATURE: 98 F | SYSTOLIC BLOOD PRESSURE: 159 MMHG | DIASTOLIC BLOOD PRESSURE: 67 MMHG | HEART RATE: 69 BPM | RESPIRATION RATE: 18 BRPM

## 2025-02-28 DIAGNOSIS — C18.2 MALIGNANT NEOPLASM OF ASCENDING COLON: Primary | ICD-10-CM

## 2025-02-28 PROCEDURE — 63600175 PHARM REV CODE 636 W HCPCS: Performed by: INTERNAL MEDICINE

## 2025-02-28 PROCEDURE — A4216 STERILE WATER/SALINE, 10 ML: HCPCS | Performed by: INTERNAL MEDICINE

## 2025-02-28 PROCEDURE — 25000003 PHARM REV CODE 250: Performed by: INTERNAL MEDICINE

## 2025-02-28 RX ORDER — HEPARIN 100 UNIT/ML
500 SYRINGE INTRAVENOUS
Status: DISCONTINUED | OUTPATIENT
Start: 2025-02-28 | End: 2025-02-28 | Stop reason: HOSPADM

## 2025-02-28 RX ORDER — SODIUM CHLORIDE 0.9 % (FLUSH) 0.9 %
10 SYRINGE (ML) INJECTION
Status: DISCONTINUED | OUTPATIENT
Start: 2025-02-28 | End: 2025-02-28 | Stop reason: HOSPADM

## 2025-02-28 RX ADMIN — HEPARIN SODIUM (PORCINE) LOCK FLUSH IV SOLN 100 UNIT/ML 500 UNITS: 100 SOLUTION at 11:02

## 2025-02-28 RX ADMIN — Medication 10 ML: at 11:02

## 2025-02-28 NOTE — NURSING
0069  Pt here for pump d/c, port flush, no new complaints or concerns and reports tolerating treatment; CADD infusion completed, port flushed per protocol; pt instructed to monitor neuropathy, report to MD worsening condition; instructed to increase water hydration daily, pace activities to prevent fatigue; discussed when to contact MD, when to report to ED; pt verbalized understanding of all discussed and when to report next

## 2025-03-04 ENCOUNTER — OFFICE VISIT (OUTPATIENT)
Dept: OPHTHALMOLOGY | Facility: CLINIC | Age: 75
End: 2025-03-04
Payer: MEDICARE

## 2025-03-04 DIAGNOSIS — H02.831 DERMATOCHALASIS OF BOTH UPPER EYELIDS: ICD-10-CM

## 2025-03-04 DIAGNOSIS — H25.12 NUCLEAR SCLEROSIS OF LEFT EYE: ICD-10-CM

## 2025-03-04 DIAGNOSIS — H40.1134 PRIMARY OPEN ANGLE GLAUCOMA (POAG) OF BOTH EYES, INDETERMINATE STAGE: Primary | ICD-10-CM

## 2025-03-04 DIAGNOSIS — H25.11 NUCLEAR SCLEROSIS OF RIGHT EYE: ICD-10-CM

## 2025-03-04 DIAGNOSIS — H02.834 DERMATOCHALASIS OF BOTH UPPER EYELIDS: ICD-10-CM

## 2025-03-04 PROCEDURE — 99214 OFFICE O/P EST MOD 30 MIN: CPT | Mod: S$GLB,,, | Performed by: STUDENT IN AN ORGANIZED HEALTH CARE EDUCATION/TRAINING PROGRAM

## 2025-03-04 PROCEDURE — 99999 PR PBB SHADOW E&M-EST. PATIENT-LVL III: CPT | Mod: PBBFAC,,, | Performed by: STUDENT IN AN ORGANIZED HEALTH CARE EDUCATION/TRAINING PROGRAM

## 2025-03-04 PROCEDURE — 1159F MED LIST DOCD IN RCRD: CPT | Mod: CPTII,S$GLB,, | Performed by: STUDENT IN AN ORGANIZED HEALTH CARE EDUCATION/TRAINING PROGRAM

## 2025-03-04 PROCEDURE — 1160F RVW MEDS BY RX/DR IN RCRD: CPT | Mod: CPTII,S$GLB,, | Performed by: STUDENT IN AN ORGANIZED HEALTH CARE EDUCATION/TRAINING PROGRAM

## 2025-03-04 NOTE — PROGRESS NOTES
HPI     Glaucoma            Comments: Pt in today for a 2 month follow-up for glaucoma    Pt states his vision has been stable since last visit. No pain or   discomfort.     Patient is currently taking the following drops:  Cosopt BID OU    Patient reports good compliance with drops.    Do you need refills on your prescriptions? no    Testing completed or to be completed today GOCT/GCL           Comments    1. Dry eyes OU    Cosopt BID OU          Last edited by Junior Conley on 3/4/2025  3:15 PM.            Assessment /Plan     For exam results, see Encounter Report.    Primary open angle glaucoma (POAG) of both eyes, indeterminate stage- IOP elevated with risk of irreversible visual loss. Additional treatment required.  Discussed options, risks, and benefits of additional medication, SLT laser, or incisional glaucoma surgery.   *Patient did not take drops this morning, expect better IOP control when using drops    Continue    Cosopt BID OU    Reviewed importance of continued compliance with treatment and follow up.    Dermatochalasis of both upper eyelids    Nuclear sclerosis of right eye  Nuclear sclerosis of left eye- Visually Significant Cataract OU  Patient reports decreased vision consistent with the clinical amount of lenticular opacity, which reaches the level of visual significance and affects activities of daily living including reading and glare. Risks, benefits, and alternatives to cataract surgery were discussed.  Discussion of risks included possibility of infection as well as permanent vision loss.The pt expressed a desire to proceed with surgery with the potential for some reasonable degree of visual improvement. Recommended regular use of artificial tears and good lid hygiene to optimize surgical outcome.   *Plan for itrack w/ hydrus     RTC next aval. HVF 24-2, Cat AUGUST pompa

## 2025-03-06 DIAGNOSIS — C18.2 MALIGNANT NEOPLASM OF ASCENDING COLON: Primary | ICD-10-CM

## 2025-03-12 ENCOUNTER — LAB VISIT (OUTPATIENT)
Dept: LAB | Facility: HOSPITAL | Age: 75
End: 2025-03-12
Payer: MEDICARE

## 2025-03-12 ENCOUNTER — INFUSION (OUTPATIENT)
Dept: INFUSION THERAPY | Facility: HOSPITAL | Age: 75
End: 2025-03-12
Payer: MEDICARE

## 2025-03-12 VITALS
WEIGHT: 177.25 LBS | DIASTOLIC BLOOD PRESSURE: 69 MMHG | BODY MASS INDEX: 28.49 KG/M2 | HEIGHT: 66 IN | RESPIRATION RATE: 18 BRPM | HEART RATE: 67 BPM | SYSTOLIC BLOOD PRESSURE: 118 MMHG | TEMPERATURE: 98 F | OXYGEN SATURATION: 98 %

## 2025-03-12 DIAGNOSIS — C18.2 MALIGNANT NEOPLASM OF ASCENDING COLON: ICD-10-CM

## 2025-03-12 DIAGNOSIS — C18.2 MALIGNANT NEOPLASM OF ASCENDING COLON: Primary | ICD-10-CM

## 2025-03-12 LAB
ALBUMIN SERPL BCP-MCNC: 3.3 G/DL (ref 3.5–5.2)
ALP SERPL-CCNC: 64 U/L (ref 40–150)
ALT SERPL W/O P-5'-P-CCNC: 14 U/L (ref 10–44)
ANION GAP SERPL CALC-SCNC: 7 MMOL/L (ref 8–16)
AST SERPL-CCNC: 22 U/L (ref 10–40)
BILIRUB SERPL-MCNC: 0.4 MG/DL (ref 0.1–1)
BUN SERPL-MCNC: 16 MG/DL (ref 8–23)
CALCIUM SERPL-MCNC: 9.6 MG/DL (ref 8.7–10.5)
CEA SERPL-MCNC: 3.4 NG/ML (ref 0–5)
CHLORIDE SERPL-SCNC: 106 MMOL/L (ref 95–110)
CO2 SERPL-SCNC: 23 MMOL/L (ref 23–29)
CREAT SERPL-MCNC: 0.8 MG/DL (ref 0.5–1.4)
ERYTHROCYTE [DISTWIDTH] IN BLOOD BY AUTOMATED COUNT: 17.1 % (ref 11.5–14.5)
EST. GFR  (NO RACE VARIABLE): >60 ML/MIN/1.73 M^2
GLUCOSE SERPL-MCNC: 91 MG/DL (ref 70–110)
HCT VFR BLD AUTO: 38 % (ref 37–48.5)
HGB BLD-MCNC: 11.8 G/DL (ref 12–16)
IMM GRANULOCYTES # BLD AUTO: 0.02 K/UL (ref 0–0.04)
MCH RBC QN AUTO: 28.4 PG (ref 27–31)
MCHC RBC AUTO-ENTMCNC: 31.1 G/DL (ref 32–36)
MCV RBC AUTO: 91 FL (ref 82–98)
NEUTROPHILS # BLD AUTO: 3 K/UL (ref 1.8–7.7)
PLATELET # BLD AUTO: 221 K/UL (ref 150–450)
PMV BLD AUTO: 10.3 FL (ref 9.2–12.9)
POTASSIUM SERPL-SCNC: 4.2 MMOL/L (ref 3.5–5.1)
PROT SERPL-MCNC: 6.5 G/DL (ref 6–8.4)
RBC # BLD AUTO: 4.16 M/UL (ref 4–5.4)
SODIUM SERPL-SCNC: 136 MMOL/L (ref 136–145)
WBC # BLD AUTO: 5.42 K/UL (ref 3.9–12.7)

## 2025-03-12 PROCEDURE — 96367 TX/PROPH/DG ADDL SEQ IV INF: CPT

## 2025-03-12 PROCEDURE — 85027 COMPLETE CBC AUTOMATED: CPT | Performed by: REGISTERED NURSE

## 2025-03-12 PROCEDURE — 82378 CARCINOEMBRYONIC ANTIGEN: CPT | Performed by: REGISTERED NURSE

## 2025-03-12 PROCEDURE — 96416 CHEMO PROLONG INFUSE W/PUMP: CPT

## 2025-03-12 PROCEDURE — 80053 COMPREHEN METABOLIC PANEL: CPT | Performed by: REGISTERED NURSE

## 2025-03-12 PROCEDURE — 63600175 PHARM REV CODE 636 W HCPCS: Mod: JZ,TB | Performed by: REGISTERED NURSE

## 2025-03-12 PROCEDURE — 25000003 PHARM REV CODE 250: Performed by: REGISTERED NURSE

## 2025-03-12 PROCEDURE — 36415 COLL VENOUS BLD VENIPUNCTURE: CPT | Performed by: REGISTERED NURSE

## 2025-03-12 PROCEDURE — 96413 CHEMO IV INFUSION 1 HR: CPT

## 2025-03-12 RX ORDER — EPINEPHRINE 0.3 MG/.3ML
0.3 INJECTION SUBCUTANEOUS ONCE AS NEEDED
Status: DISCONTINUED | OUTPATIENT
Start: 2025-03-12 | End: 2025-03-12 | Stop reason: HOSPADM

## 2025-03-12 RX ORDER — DIPHENHYDRAMINE HYDROCHLORIDE 50 MG/ML
50 INJECTION, SOLUTION INTRAMUSCULAR; INTRAVENOUS ONCE AS NEEDED
Status: DISCONTINUED | OUTPATIENT
Start: 2025-03-12 | End: 2025-03-12 | Stop reason: HOSPADM

## 2025-03-12 RX ORDER — SODIUM CHLORIDE 0.9 % (FLUSH) 0.9 %
10 SYRINGE (ML) INJECTION
Status: DISCONTINUED | OUTPATIENT
Start: 2025-03-12 | End: 2025-03-12 | Stop reason: HOSPADM

## 2025-03-12 RX ORDER — HEPARIN 100 UNIT/ML
500 SYRINGE INTRAVENOUS
Status: DISCONTINUED | OUTPATIENT
Start: 2025-03-12 | End: 2025-03-12 | Stop reason: HOSPADM

## 2025-03-12 RX ORDER — PROCHLORPERAZINE EDISYLATE 5 MG/ML
5 INJECTION INTRAMUSCULAR; INTRAVENOUS ONCE AS NEEDED
Status: DISCONTINUED | OUTPATIENT
Start: 2025-03-12 | End: 2025-03-12 | Stop reason: HOSPADM

## 2025-03-12 RX ADMIN — SODIUM CHLORIDE: 9 INJECTION, SOLUTION INTRAVENOUS at 10:03

## 2025-03-12 RX ADMIN — BEVACIZUMAB-AWWB 400 MG: 400 INJECTION, SOLUTION INTRAVENOUS at 11:03

## 2025-03-12 RX ADMIN — DEXAMETHASONE SODIUM PHOSPHATE 0.25 MG: 4 INJECTION, SOLUTION INTRA-ARTICULAR; INTRALESIONAL; INTRAMUSCULAR; INTRAVENOUS; SOFT TISSUE at 11:03

## 2025-03-12 RX ADMIN — FLUOROURACIL 4500 MG: 50 INJECTION, SOLUTION INTRAVENOUS at 12:03

## 2025-03-12 NOTE — PLAN OF CARE
1000 Patient is here for C26 avastin and 5FU. Labs, meds and hx reviewed. Port accessed and NS started at 25ml/hr. [place] and snack provided.

## 2025-03-12 NOTE — PLAN OF CARE
1220 Patient tolerated mvasi infusion. 5FU pump applied, secured and running without difficulty. Instructed patient to return on Friday at 1015 for pump d/c and she verbalized understanding. She declined the AVS and ambulated out with her family member.

## 2025-03-14 ENCOUNTER — INFUSION (OUTPATIENT)
Dept: INFUSION THERAPY | Facility: HOSPITAL | Age: 75
End: 2025-03-14
Payer: MEDICARE

## 2025-03-14 VITALS
RESPIRATION RATE: 18 BRPM | SYSTOLIC BLOOD PRESSURE: 154 MMHG | DIASTOLIC BLOOD PRESSURE: 74 MMHG | TEMPERATURE: 98 F | HEART RATE: 78 BPM

## 2025-03-14 DIAGNOSIS — C18.2 MALIGNANT NEOPLASM OF ASCENDING COLON: ICD-10-CM

## 2025-03-14 DIAGNOSIS — C18.2 MALIGNANT NEOPLASM OF ASCENDING COLON: Primary | ICD-10-CM

## 2025-03-14 PROCEDURE — A4216 STERILE WATER/SALINE, 10 ML: HCPCS | Performed by: REGISTERED NURSE

## 2025-03-14 PROCEDURE — 25000003 PHARM REV CODE 250: Performed by: REGISTERED NURSE

## 2025-03-14 PROCEDURE — 63600175 PHARM REV CODE 636 W HCPCS: Performed by: REGISTERED NURSE

## 2025-03-14 RX ORDER — PROCHLORPERAZINE EDISYLATE 5 MG/ML
5 INJECTION INTRAMUSCULAR; INTRAVENOUS ONCE AS NEEDED
Status: DISCONTINUED | OUTPATIENT
Start: 2025-03-14 | End: 2025-03-14 | Stop reason: HOSPADM

## 2025-03-14 RX ORDER — SODIUM CHLORIDE 0.9 % (FLUSH) 0.9 %
10 SYRINGE (ML) INJECTION
Status: DISCONTINUED | OUTPATIENT
Start: 2025-03-14 | End: 2025-03-14 | Stop reason: HOSPADM

## 2025-03-14 RX ORDER — HEPARIN 100 UNIT/ML
500 SYRINGE INTRAVENOUS
Status: DISCONTINUED | OUTPATIENT
Start: 2025-03-14 | End: 2025-03-14 | Stop reason: HOSPADM

## 2025-03-14 RX ORDER — LIDOCAINE AND PRILOCAINE 25; 25 MG/G; MG/G
CREAM TOPICAL
Qty: 30 G | Refills: 5 | Status: SHIPPED | OUTPATIENT
Start: 2025-03-14

## 2025-03-14 RX ORDER — DEXAMETHASONE 4 MG/1
TABLET ORAL
Qty: 40 TABLET | Refills: 0 | Status: SHIPPED | OUTPATIENT
Start: 2025-03-14

## 2025-03-14 RX ADMIN — HEPARIN SODIUM (PORCINE) LOCK FLUSH IV SOLN 100 UNIT/ML 500 UNITS: 100 SOLUTION at 10:03

## 2025-03-14 RX ADMIN — Medication 10 ML: at 10:03

## 2025-03-14 NOTE — NURSING
1035- Patient arrived ambulatory to the unit for pump dc with infusion to portable pump already complete.  Patient has no new complaints. Port was flushed and heparin locked, then de-accessed.  Patient discharged to home setting, instructed her to contact MD with any issues or concerns.

## 2025-03-17 ENCOUNTER — PATIENT MESSAGE (OUTPATIENT)
Dept: PSYCHIATRY | Facility: CLINIC | Age: 75
End: 2025-03-17
Payer: MEDICARE

## 2025-03-18 ENCOUNTER — OFFICE VISIT (OUTPATIENT)
Dept: PSYCHIATRY | Facility: CLINIC | Age: 75
End: 2025-03-18
Payer: MEDICARE

## 2025-03-18 VITALS
OXYGEN SATURATION: 98 % | HEART RATE: 112 BPM | SYSTOLIC BLOOD PRESSURE: 134 MMHG | DIASTOLIC BLOOD PRESSURE: 88 MMHG | RESPIRATION RATE: 17 BRPM | BODY MASS INDEX: 28.75 KG/M2 | HEIGHT: 66 IN | WEIGHT: 178.88 LBS

## 2025-03-18 DIAGNOSIS — Z65.8 PSYCHOSOCIAL STRESSORS: ICD-10-CM

## 2025-03-18 DIAGNOSIS — F41.1 GAD (GENERALIZED ANXIETY DISORDER): ICD-10-CM

## 2025-03-18 DIAGNOSIS — F31.9 BIPOLAR DEPRESSION: Primary | ICD-10-CM

## 2025-03-18 PROCEDURE — 99999 PR PBB SHADOW E&M-EST. PATIENT-LVL III: CPT | Mod: PBBFAC,,, | Performed by: STUDENT IN AN ORGANIZED HEALTH CARE EDUCATION/TRAINING PROGRAM

## 2025-03-18 RX ORDER — ESCITALOPRAM OXALATE 20 MG/1
20 TABLET ORAL DAILY
Qty: 30 TABLET | Refills: 3 | Status: SHIPPED | OUTPATIENT
Start: 2025-03-18 | End: 2026-03-18

## 2025-03-18 NOTE — PROGRESS NOTES
"  03/18/2025  1:21 PM  Karin Maguire  [id number]    Outpatient Psychiatry Follow-Up Visit (MD/NP)    3/18/2025    Clinical Status of Patient:  Outpatient (Ambulatory)    Chief Complaint:  aKrin Maguire is a 74 y.o. female who presents today for follow-up of depression and anxiety.  Met with patient.        Interval History and Content of Current Session:  Interim Events/Subjective Report/Content of Current Session:   MDD with mixed features vs. Unspecified bipolar disorder (pt poor historian)  LUANNE  Insomnia  Gambling disorder  Obesity  Psychosocial stressors     Abnormal movements        Reports has been "awesome, I don't remember the last time I felt so good, I've realized what's important now, just living life." No recent depression, "I have every reason to be happy and feel good." Pt denies any issues with anxiety, "I just do what the doctor tells me, that's all I can do, I can't control it."        Stressors: health (cancer, stage IV)      Psychiatric Review Of Systems - Is patient experiencing or having changes in:  sleep: no  appetite: no  weight: no  energy/anergy: variable,  Interest/pleasure/anhedonia: no  Anxiety: improving  panic: no  Guilty/hopelessness/worthlessness: no  concentration: no  S.I.B.s/risky behavior: no  Irritability: no  Substance abuse: no  Racing thoughts: no  Impulsive behaviors: no  Paranoia: no  AVH: no  Gambling: yes, "working on it, it kind of got out of hand," has not gambled in a week, "just pulling back gradually"  Michelle/hypomania: no          Review of Systems   Review of Systems   Constitutional:  Negative for chills and fever.   HENT:  Negative for hearing loss.    Eyes:  Negative for blurred vision and double vision.   Respiratory:  Negative for shortness of breath.    Cardiovascular:  Negative for chest pain.   Gastrointestinal:  Negative for constipation, diarrhea, nausea and vomiting.   Genitourinary:  Negative for dysuria.   Musculoskeletal:  Negative for back " pain and joint pain.   Skin:  Negative for rash.   Neurological:  Negative for dizziness and headaches.   Endo/Heme/Allergies:  Negative for environmental allergies.       Past Medical, Family and Social History: The patient's past medical, family and social history have been reviewed and updated as appropriate within the electronic medical record - see encounter notes.    Social History     Socioeconomic History    Marital status:    Tobacco Use    Smoking status: Former     Current packs/day: 0.00     Average packs/day: 1 pack/day for 41.7 years (41.7 ttl pk-yrs)     Types: Cigarettes     Start date: 3/30/1982     Quit date: 2023     Years since quittin.2    Smokeless tobacco: Never   Substance and Sexual Activity    Alcohol use: Yes     Comment: Socially-2 or 3 times a year-6 or 7 drinks    Drug use: No    Sexual activity: Yes     Partners: Male     Birth control/protection: Surgical     Comment:      Social Drivers of Health     Financial Resource Strain: Low Risk  (2024)    Overall Financial Resource Strain (CARDIA)     Difficulty of Paying Living Expenses: Not hard at all   Food Insecurity: No Food Insecurity (2024)    Hunger Vital Sign     Worried About Running Out of Food in the Last Year: Never true     Ran Out of Food in the Last Year: Never true   Transportation Needs: No Transportation Needs (2024)    PRAPARE - Transportation     Lack of Transportation (Medical): No     Lack of Transportation (Non-Medical): No   Physical Activity: Inactive (2024)    Exercise Vital Sign     Days of Exercise per Week: 0 days     Minutes of Exercise per Session: 0 min   Stress: No Stress Concern Present (2024)    [employer] - Occupational Stress Questionnaire     Feeling of Stress : Not at all   Housing Stability: Unknown (2024)    Housing Stability Vital Sign     Unable to Pay for Housing in the Last Year: No     Homeless in the Last Year:  No         Compliance: yes    Side effects: None    Risk Parameters:  Patient reports no suicidal ideation  Patient reports no homicidal ideation  Patient reports no self-injurious behavior  Patient reports no violent behavior        Exam (detailed: at least 9 elements; comprehensive: all 15 elements)     Vitals:    03/18/25 1401   BP: 134/88   Pulse: (!) 112   Resp: 17         Wt Readings from Last 5 Encounters:   03/12/25 80.4 kg (177 lb 4 oz)   02/26/25 80.1 kg (176 lb 9.4 oz)   02/26/25 80.2 kg (176 lb 11.2 oz)   02/14/25 78 kg (171 lb 15.3 oz)   02/12/25 78 kg (171 lb 15.3 oz)         CONSTITUTIONAL  General Appearance: unremarkable, age appropriate    MUSCULOSKELETAL  Muscle Strength and Tone:no tremor, no tic,   Abnormal Involuntary Movements: yes, repetitive movements noted (head, neck, legs, trunk)  Gait and Station: non-ataxic    PSYCHIATRIC   Level of Consciousness: awake and alert   Orientation: person, place and situation  Grooming: Casually dressed and Well groomed  Psychomotor Behavior: normal, cooperative  Speech: normal tone, normal rate, normal pitch, normal volume  Language: grossly intact  Mood: good  Affect: Consistent with mood  Thought Process: linear, logical  Associations: intact   Thought Content: DENIES suicidal ideation, DENIES homicidal ideation, and no delusion  Perceptions: denies hallucinations  Memory: Able to recall past events, Remote intact and Recent intact  Attention:Attends to interview without distraction  Fund of Knowledge: Aware of current events and Vocabulary appropriate   Estimate if Intelligence:  Average based on work/education history, vocabulary and mental status exam  Insight: has awareness of illness  Judgment: behavior is adequate to circumstances          Assessment and Diagnosis   Status/Progress: Based on the examination today, the patient's problem(s) is/are improved.  New problems have not been presented today.   Co-morbidities are complicating management of  "the primary condition.          Impresssion/Assessment:  MDD with mixed features vs. Unspecified bipolar disorder (pt poor historian)  LUANNE  Insomnia  Gambling disorder  Obesity  Psychosocial stressors     Abnormal movements    Chronic pain         Plan:    Pt much improved. Appears very stable. She declines changes to medications.       MDD with mixed features vs. Unspecified bipolar disorder (pt poor historian)  - continue lamictal 300 mg PO qd  - continue lexapro 20 mg PO qd  - continue seroquel 200 mg PO qhs  - strongly recommended psychotherapy, provided with resources       Insomnia  - reports has not been using CPAP  - pt counseled        Gambling disorder  - pt counseled  - consider meetings/therapy; patient declines        LUANNE  - continue hydroxyzine  mg PO q 6 hours PRN  - lexapro as above  - consider psychotherapy, patient declines        Psychosocial stressors  - pt counseled  - strongly recommended psychotherapy, couples counseling to patient, patient refusing        Obesity  -  tapered off seroquel        Abnormal movements (?TD)  - frequent non-stereotyped movements of hands and limbs, neck, trunk; patient states she has always been "fidgety" like this since childhood, reports she has seen neurology about these movements before in past, pt continues to refuse referral, pt states "I've been this way all my life"  - declines changing seroquel despite risk of TD/AIM, pt has been counseled extensively on risks  - AIMS: 0, when testing movements stop           Chronic pain  - pt counseled        - Instructed patient to keep all scheduled appointments, take medications as prescribed and abstain from substance abuse. Instructed to call 911 or present to ER for emergency including SI or HI.    - Discussed diagnosis, risks and benefits of proposed treatment above vs alternative treatments vs no treatment, and potential side effects of these treatments. The patient expresses understanding of the above and " displays the capacity to agree with this treatment given said understanding. Patient also agrees that, currently, the benefits outweigh the risks and would like to pursue treatment at this time.         Estevan Zaman III, MD    Return to Clinic: 3-4 m

## 2025-03-26 ENCOUNTER — OFFICE VISIT (OUTPATIENT)
Dept: HEMATOLOGY/ONCOLOGY | Facility: CLINIC | Age: 75
End: 2025-03-26
Payer: MEDICARE

## 2025-03-26 ENCOUNTER — INFUSION (OUTPATIENT)
Dept: INFUSION THERAPY | Facility: HOSPITAL | Age: 75
End: 2025-03-26
Payer: MEDICARE

## 2025-03-26 ENCOUNTER — LAB VISIT (OUTPATIENT)
Dept: LAB | Facility: HOSPITAL | Age: 75
End: 2025-03-26
Payer: MEDICARE

## 2025-03-26 VITALS
RESPIRATION RATE: 18 BRPM | BODY MASS INDEX: 28.65 KG/M2 | TEMPERATURE: 98 F | HEART RATE: 67 BPM | OXYGEN SATURATION: 99 % | DIASTOLIC BLOOD PRESSURE: 77 MMHG | WEIGHT: 178.25 LBS | HEIGHT: 66 IN | SYSTOLIC BLOOD PRESSURE: 147 MMHG

## 2025-03-26 VITALS
DIASTOLIC BLOOD PRESSURE: 77 MMHG | HEIGHT: 66 IN | SYSTOLIC BLOOD PRESSURE: 147 MMHG | TEMPERATURE: 98 F | HEART RATE: 67 BPM | BODY MASS INDEX: 28.63 KG/M2 | WEIGHT: 178.13 LBS | RESPIRATION RATE: 18 BRPM

## 2025-03-26 DIAGNOSIS — C18.2 MALIGNANT NEOPLASM OF ASCENDING COLON: ICD-10-CM

## 2025-03-26 DIAGNOSIS — K59.09 OTHER CONSTIPATION: ICD-10-CM

## 2025-03-26 DIAGNOSIS — D84.81 IMMUNODEFICIENCY SECONDARY TO NEOPLASM: ICD-10-CM

## 2025-03-26 DIAGNOSIS — D49.9 IMMUNODEFICIENCY SECONDARY TO NEOPLASM: ICD-10-CM

## 2025-03-26 DIAGNOSIS — R53.0 NEOPLASTIC MALIGNANT RELATED FATIGUE: ICD-10-CM

## 2025-03-26 DIAGNOSIS — C78.6 MALIGNANT NEOPLASM METASTATIC TO OMENTUM: ICD-10-CM

## 2025-03-26 DIAGNOSIS — Z79.899 IMMUNODEFICIENCY SECONDARY TO CHEMOTHERAPY: ICD-10-CM

## 2025-03-26 DIAGNOSIS — C18.2 MALIGNANT NEOPLASM OF ASCENDING COLON: Primary | ICD-10-CM

## 2025-03-26 DIAGNOSIS — T45.1X5A IMMUNODEFICIENCY SECONDARY TO CHEMOTHERAPY: ICD-10-CM

## 2025-03-26 DIAGNOSIS — R14.3 EXCESSIVE GAS: ICD-10-CM

## 2025-03-26 DIAGNOSIS — D84.821 IMMUNODEFICIENCY SECONDARY TO CHEMOTHERAPY: ICD-10-CM

## 2025-03-26 DIAGNOSIS — R68.89 COLD SENSITIVITY: ICD-10-CM

## 2025-03-26 LAB
ABSOLUTE NEUTROPHIL COUNT (OHS): 2.72 K/UL (ref 1.8–7.7)
ALBUMIN SERPL BCP-MCNC: 3.2 G/DL (ref 3.5–5.2)
ALP SERPL-CCNC: 77 UNIT/L (ref 40–150)
ALT SERPL W/O P-5'-P-CCNC: 10 UNIT/L (ref 10–44)
ANION GAP (OHS): 8 MMOL/L (ref 8–16)
AST SERPL-CCNC: 15 UNIT/L (ref 11–45)
BACTERIA #/AREA URNS AUTO: NORMAL /HPF
BILIRUB SERPL-MCNC: 0.3 MG/DL (ref 0.1–1)
BILIRUB UR QL STRIP.AUTO: NEGATIVE
BUN SERPL-MCNC: 18 MG/DL (ref 8–23)
CALCIUM SERPL-MCNC: 9.4 MG/DL (ref 8.7–10.5)
CARCINOEMBRYONIC ANTIGEN (OHS): 3.4 NG/ML
CHLORIDE SERPL-SCNC: 108 MMOL/L (ref 95–110)
CLARITY UR: CLEAR
CO2 SERPL-SCNC: 23 MMOL/L (ref 23–29)
COLOR UR AUTO: YELLOW
CREAT SERPL-MCNC: 0.8 MG/DL (ref 0.5–1.4)
ERYTHROCYTE [DISTWIDTH] IN BLOOD BY AUTOMATED COUNT: 17.2 % (ref 11.5–14.5)
GFR SERPLBLD CREATININE-BSD FMLA CKD-EPI: >60 ML/MIN/1.73/M2
GLUCOSE SERPL-MCNC: 90 MG/DL (ref 70–110)
GLUCOSE UR QL STRIP: NEGATIVE
HCT VFR BLD AUTO: 37.2 % (ref 37–48.5)
HGB BLD-MCNC: 11.8 GM/DL (ref 12–16)
HGB UR QL STRIP: NEGATIVE
IMM GRANULOCYTES # BLD AUTO: 0.01 K/UL (ref 0–0.04)
KETONES UR QL STRIP: NEGATIVE
LEUKOCYTE ESTERASE UR QL STRIP: ABNORMAL
MCH RBC QN AUTO: 29.3 PG (ref 27–50)
MCHC RBC AUTO-ENTMCNC: 31.7 G/DL (ref 32–36)
MCV RBC AUTO: 92 FL (ref 82–98)
MICROSCOPIC COMMENT: NORMAL
NITRITE UR QL STRIP: NEGATIVE
PH UR STRIP: 6 [PH]
PLATELET # BLD AUTO: 225 K/UL (ref 150–450)
PMV BLD AUTO: 10.6 FL (ref 9.2–12.9)
POTASSIUM SERPL-SCNC: 4 MMOL/L (ref 3.5–5.1)
PROT SERPL-MCNC: 6.5 GM/DL (ref 6–8.4)
PROT UR QL STRIP: NEGATIVE
RBC # BLD AUTO: 4.03 M/UL (ref 4–5.4)
RBC #/AREA URNS AUTO: 2 /HPF (ref 0–4)
SODIUM SERPL-SCNC: 139 MMOL/L (ref 136–145)
SP GR UR STRIP: 1.02
SQUAMOUS #/AREA URNS AUTO: 8 /HPF
UROBILINOGEN UR STRIP-ACNC: NEGATIVE EU/DL
WBC # BLD AUTO: 5.28 K/UL (ref 3.9–12.7)
WBC #/AREA URNS AUTO: 5 /HPF (ref 0–5)

## 2025-03-26 PROCEDURE — 36415 COLL VENOUS BLD VENIPUNCTURE: CPT

## 2025-03-26 PROCEDURE — 1160F RVW MEDS BY RX/DR IN RCRD: CPT | Mod: CPTII,S$GLB,, | Performed by: REGISTERED NURSE

## 2025-03-26 PROCEDURE — 80053 COMPREHEN METABOLIC PANEL: CPT

## 2025-03-26 PROCEDURE — G2211 COMPLEX E/M VISIT ADD ON: HCPCS | Mod: S$GLB,,, | Performed by: REGISTERED NURSE

## 2025-03-26 PROCEDURE — 3078F DIAST BP <80 MM HG: CPT | Mod: CPTII,S$GLB,, | Performed by: REGISTERED NURSE

## 2025-03-26 PROCEDURE — 1101F PT FALLS ASSESS-DOCD LE1/YR: CPT | Mod: CPTII,S$GLB,, | Performed by: REGISTERED NURSE

## 2025-03-26 PROCEDURE — 3077F SYST BP >= 140 MM HG: CPT | Mod: CPTII,S$GLB,, | Performed by: REGISTERED NURSE

## 2025-03-26 PROCEDURE — 82378 CARCINOEMBRYONIC ANTIGEN: CPT

## 2025-03-26 PROCEDURE — 3288F FALL RISK ASSESSMENT DOCD: CPT | Mod: CPTII,S$GLB,, | Performed by: REGISTERED NURSE

## 2025-03-26 PROCEDURE — 96416 CHEMO PROLONG INFUSE W/PUMP: CPT

## 2025-03-26 PROCEDURE — 1159F MED LIST DOCD IN RCRD: CPT | Mod: CPTII,S$GLB,, | Performed by: REGISTERED NURSE

## 2025-03-26 PROCEDURE — 85027 COMPLETE CBC AUTOMATED: CPT

## 2025-03-26 PROCEDURE — 1125F AMNT PAIN NOTED PAIN PRSNT: CPT | Mod: CPTII,S$GLB,, | Performed by: REGISTERED NURSE

## 2025-03-26 PROCEDURE — 99999 PR PBB SHADOW E&M-EST. PATIENT-LVL V: CPT | Mod: PBBFAC,,, | Performed by: REGISTERED NURSE

## 2025-03-26 PROCEDURE — 81003 URINALYSIS AUTO W/O SCOPE: CPT | Performed by: REGISTERED NURSE

## 2025-03-26 PROCEDURE — 99215 OFFICE O/P EST HI 40 MIN: CPT | Mod: S$GLB,,, | Performed by: REGISTERED NURSE

## 2025-03-26 PROCEDURE — 25000003 PHARM REV CODE 250: Performed by: REGISTERED NURSE

## 2025-03-26 PROCEDURE — 3008F BODY MASS INDEX DOCD: CPT | Mod: CPTII,S$GLB,, | Performed by: REGISTERED NURSE

## 2025-03-26 PROCEDURE — 63600175 PHARM REV CODE 636 W HCPCS: Performed by: REGISTERED NURSE

## 2025-03-26 PROCEDURE — 96367 TX/PROPH/DG ADDL SEQ IV INF: CPT

## 2025-03-26 PROCEDURE — 96413 CHEMO IV INFUSION 1 HR: CPT

## 2025-03-26 RX ORDER — SODIUM CHLORIDE 0.9 % (FLUSH) 0.9 %
10 SYRINGE (ML) INJECTION
Status: CANCELLED | OUTPATIENT
Start: 2025-03-26

## 2025-03-26 RX ORDER — DIPHENHYDRAMINE HYDROCHLORIDE 50 MG/ML
50 INJECTION, SOLUTION INTRAMUSCULAR; INTRAVENOUS ONCE AS NEEDED
Status: DISCONTINUED | OUTPATIENT
Start: 2025-03-26 | End: 2025-03-26 | Stop reason: HOSPADM

## 2025-03-26 RX ORDER — EPINEPHRINE 0.3 MG/.3ML
0.3 INJECTION SUBCUTANEOUS ONCE AS NEEDED
Status: CANCELLED | OUTPATIENT
Start: 2025-03-26

## 2025-03-26 RX ORDER — PROCHLORPERAZINE EDISYLATE 5 MG/ML
5 INJECTION INTRAMUSCULAR; INTRAVENOUS ONCE AS NEEDED
Status: CANCELLED | OUTPATIENT
Start: 2025-03-26

## 2025-03-26 RX ORDER — SODIUM CHLORIDE 0.9 % (FLUSH) 0.9 %
10 SYRINGE (ML) INJECTION
Status: CANCELLED | OUTPATIENT
Start: 2025-03-28

## 2025-03-26 RX ORDER — PROCHLORPERAZINE EDISYLATE 5 MG/ML
5 INJECTION INTRAMUSCULAR; INTRAVENOUS ONCE AS NEEDED
Status: CANCELLED | OUTPATIENT
Start: 2025-03-28

## 2025-03-26 RX ORDER — SODIUM CHLORIDE 0.9 % (FLUSH) 0.9 %
10 SYRINGE (ML) INJECTION
Status: DISCONTINUED | OUTPATIENT
Start: 2025-03-26 | End: 2025-03-26 | Stop reason: HOSPADM

## 2025-03-26 RX ORDER — HEPARIN 100 UNIT/ML
500 SYRINGE INTRAVENOUS
Status: CANCELLED | OUTPATIENT
Start: 2025-03-26

## 2025-03-26 RX ORDER — HEPARIN 100 UNIT/ML
500 SYRINGE INTRAVENOUS
Status: CANCELLED | OUTPATIENT
Start: 2025-03-28

## 2025-03-26 RX ORDER — EPINEPHRINE 0.3 MG/.3ML
0.3 INJECTION SUBCUTANEOUS ONCE AS NEEDED
Status: DISCONTINUED | OUTPATIENT
Start: 2025-03-26 | End: 2025-03-26 | Stop reason: HOSPADM

## 2025-03-26 RX ORDER — PROCHLORPERAZINE EDISYLATE 5 MG/ML
5 INJECTION INTRAMUSCULAR; INTRAVENOUS ONCE AS NEEDED
Status: DISCONTINUED | OUTPATIENT
Start: 2025-03-26 | End: 2025-03-26 | Stop reason: HOSPADM

## 2025-03-26 RX ORDER — HEPARIN 100 UNIT/ML
500 SYRINGE INTRAVENOUS
Status: DISCONTINUED | OUTPATIENT
Start: 2025-03-26 | End: 2025-03-26 | Stop reason: HOSPADM

## 2025-03-26 RX ORDER — DIPHENHYDRAMINE HYDROCHLORIDE 50 MG/ML
50 INJECTION, SOLUTION INTRAMUSCULAR; INTRAVENOUS ONCE AS NEEDED
Status: CANCELLED | OUTPATIENT
Start: 2025-03-26

## 2025-03-26 RX ADMIN — FLUOROURACIL 4500 MG: 50 INJECTION, SOLUTION INTRAVENOUS at 12:03

## 2025-03-26 RX ADMIN — BEVACIZUMAB-AWWB 400 MG: 400 INJECTION, SOLUTION INTRAVENOUS at 11:03

## 2025-03-26 RX ADMIN — DEXAMETHASONE SODIUM PHOSPHATE 0.25 MG: 4 INJECTION, SOLUTION INTRA-ARTICULAR; INTRALESIONAL; INTRAMUSCULAR; INTRAVENOUS; SOFT TISSUE at 10:03

## 2025-03-26 NOTE — PROGRESS NOTES
MEDICAL ONCOLOGY - ESTABLISHED PATIENT VISIT    Reason for visit: colon adenocarcinoma    Best Contact Phone Number(s): [phone] (home)      Cancer/Stage/TNM:    Cancer Staging   Colon adenocarcinoma  Staging form: Colon and Rectum, AJCC 8th Edition  - Pathologic stage from 1/22/2024: Stage IVC (pT4a, pN2b, cM1c) - Signed by Minna Menendez CNS on 2/13/2024       Oncology History   Colon adenocarcinoma   12/21/2023 Tumor Markers    Patient's tumor was tested for the following markers: CEA.                                              Results of the tumor marker test revealed 3.3     12/21/2023 Procedure    Colonoscopy  Cecal polyp removed with jumbo forceps, 3 mm  Multiple ascending colon polyps ranging in size from 5 to 12 mm - semi-pedunculated removed with hot snare  Hepatic flexure mass identified - central ulceration, concerning for malignancy, 3 cm, not obstructing, this was biopsied - tattoo placed distal to mass  Sigmoid diverticular disease     12/21/2023 Tumor Markers    Patient's tumor was tested for the following markers: CEA.                                              Results of the tumor marker test revealed 3.3     12/22/2023 Imaging Significant Findings    CT CAP  Evaluation of the colon is somewhat limited as there is significant colonic stool and oral contrast material has not opacified the large bowel.  There does appear to be some abnormal thickening of the walls of the proximal right colon and a patient with a known history of colonic malignancy.  There is some soft tissue density external to the walls of the colon on the right pericolic gutter which could represent peritoneal nodularity versus subserosal extent of tumor.  Slightly more distal to this area there is a 2nd area of irregularity of the walls of the colon, difficult to fully evaluate if this is related to the colonic walls versus stool with central fat.     Two hypodensities in the liver, the larger measuring 0.8 cm, too  small to accurately characterize on the basis of this examination.  Attention on follow-up.     No pulmonary nodules are seen.     Cholecystectomy.     12/29/2023 Initial Diagnosis    Hepatic flexure colon adenocarcinoma  MMR intact     1/22/2024 Cancer Staged    Staging form: Colon and Rectum, AJCC 8th Edition  - Pathologic stage from 1/22/2024: Stage IVC (pT4a, pN2b, cM1c)     8/27/2024 Tumor Markers    Patient's tumor was tested for the following markers: CEA.                                              Results of the tumor marker test revealed 2.3      Malignant neoplasm of ascending colon   2/12/2024 Initial Diagnosis    Malignant neoplasm of ascending colon     2/26/2024 -  Chemotherapy    Treatment Summary   Plan Name: OP GI [id number] (oxaliplatin leucovorin fluorouracil) with bevacizumab Q2W  Treatment Goal: Palliative  Status: Active  Start Date: 2/26/2024  End Date: 5/23/2025 (Planned)  Provider: Kemar Zaragoza MD  Chemotherapy: oxaliplatin (ELOXATIN) 150 mg in dextrose 5 % (D5W) 595 mL chemo infusion, 157 mg, Intravenous, Clinic/[facility] 1 time, 8 of 8 cycles  Administration: 150 mg (2/26/2024), 150 mg (3/11/2024), 150 mg (3/25/2024), 150 mg (4/8/2024), 150 mg (4/22/2024), 150 mg (5/6/2024), 150 mg (5/20/2024), 150 mg (6/3/2024)  fluorouracil (ADRUCIL) 2,400 mg/m2 = 4,440 mg in sodium chloride 0.9% 100 mL chemo infusion, 2,400 mg/m2 = 4,440 mg, Intravenous, Clinic/H[facility] 1 time, 27 of 31 cycles  Administration: 4,440 mg (2/26/2024), 4,440 mg (3/11/2024), 4,440 mg (3/25/2024), 4,440 mg (4/8/2024), 4,440 mg (4/22/2024), 4,440 mg (5/6/2024), 4,440 mg (5/20/2024), 4,440 mg (6/3/2024), 4,440 mg (6/17/2024), 4,440 mg (7/3/2024), 4,440 mg (7/16/2024), 4,440 mg (7/31/2024), 4,440 mg (8/13/2024), 4,440 mg (8/28/2024), 4,440 mg (10/24/2024), 4,440 mg (10/10/2024), 4,440 mg (11/6/2024), 4,440 mg (11/19/2024), 4,440 mg (12/3/2024), 4,440 mg (12/17/2024), 4,500 mg (1/15/2025), 4,500 mg (12/31/2024), 4,500 mg  (1/29/2025), 4,500 mg (2/12/2025), 4,500 mg (2/26/2025), 4,500 mg (3/12/2025), 4,500 mg (3/26/2025)  bevacizumab-awwb (MVASI) 5 mg/kg = 365 mg in sodium chloride 0.9% 100 mL infusion, 5 mg/kg = 365 mg, Intravenous, [facility] 1 time, 25 of 29 cycles  Administration: 365 mg (2/26/2024), 365 mg (3/11/2024), 365 mg (3/25/2024), 365 mg (4/8/2024), 365 mg (4/22/2024), 365 mg (5/6/2024), 365 mg (5/20/2024), 365 mg (6/3/2024), 365 mg (6/17/2024), 365 mg (7/3/2024), 365 mg (7/16/2024), 365 mg (7/31/2024), 365 mg (8/13/2024), 365 mg (10/24/2024), 365 mg (11/6/2024), 365 mg (11/19/2024), 365 mg (12/3/2024), 365 mg (12/17/2024), 400 mg (1/15/2025), 400 mg (12/31/2024), 400 mg (1/29/2025), 400 mg (2/12/2025), 400 mg (2/26/2025), 400 mg (3/12/2025), 400 mg (3/26/2025)     8/27/2024 Tumor Markers    Patient's tumor was tested for the following markers: CEA.                                              Results of the tumor marker test revealed 2.3           Interim History:   74 y.o. female with LUANNE, bipolar, HLD who presents prior to cycle 27 FOLFOX + bevacizumab for her metastatic ascending colon cancer, now on maintenance 5-FU + Bevacizumab. Doing very well overall. No new concerns or falls since last visit. Denies fevers, chills, SOB, CP, palpitations, nausea, vomiting, bowel changes, overt bleeding, skin changes or rashes. Neuropathy generally stable.     ECOG 0. Presents with her family today.    ROS:   Review of Systems   Constitutional:  Negative for chills, fever and malaise/fatigue.   HENT:  Negative for congestion and sore throat.    Respiratory:  Negative for shortness of breath.    Cardiovascular:  Negative for chest pain, palpitations and leg swelling.   Gastrointestinal:  Negative for blood in stool, constipation, diarrhea and nausea.   Genitourinary:  Negative for hematuria.   Musculoskeletal:  Negative for back pain, falls and myalgias.   Skin:  Negative for rash.   Neurological:  Positive for tingling. Negative  for dizziness, weakness and headaches.       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    Sacroiliitis     right side    Sleep apnea     can not tolerate cpap        Past Surgical History:   Past Surgical History:   Procedure Laterality Date    ANKLE FRACTURE SURGERY Left 2001    BLADDER SUSPENSION      CARPAL TUNNEL RELEASE Bilateral     CHOLECYSTECTOMY      Laparoscopic    COLONOSCOPY      COLONOSCOPY N/A 12/21/2023    Procedure: COLONOSCOPY;  Surgeon: Alberto Albright MD;  Location: [facility];  Service: Endoscopy;  Laterality: N/A;    DIAGNOSTIC LAPAROSCOPY N/A 9/20/2024    Procedure: LAPAROSCOPY, DIAGNOSTIC;  Surgeon: Benjy Contreras MD;  Location: [address];  Service: General;  Laterality: N/A;    ESOPHAGOGASTRODUODENOSCOPY N/A 07/29/2021    Procedure: EGD (ESOPHAGOGASTRODUODENOSCOPY);  Surgeon: Susan Mcclain MD;  Location: [facility];  Service: Endoscopy;  Laterality: N/A;    EYE SURGERY      FOOT ARTHRODESIS Right 05/03/2023    Procedure: FUSION, FOOT;  Surgeon: Lauri Alejandra DPM;  Location: [facility];  Service: Podiatry;  Laterality: Right;  mini c-arm, Arthrex dowel bone graft harvester, locking plate and screws festus notified cc    HYSTERECTOMY  1986    vaginal prolapse    INJECTION OF ANESTHETIC AGENT AROUND MEDIAL BRANCH NERVES INNERVATING CERVICAL FACET JOINT Bilateral 05/27/2022    Procedure: CERVICAL MEDIAL BRANCH NERVE BLOCK (C3-4,C4-5);  Surgeon: Mamie Roman MD;  Location: [facility];  Service: Pain Management;  Laterality: Bilateral;    INJECTION OF ANESTHETIC AGENT AROUND MEDIAL BRANCH NERVES INNERVATING LUMBAR FACET JOINT Right 02/05/2021    Procedure: LUMBAR FACET JOINT BLOCK  (L3-4,L4-5,L5-S1);  Surgeon: Mamie Roman MD;  Location: [facility];  Service: Pain Management;  Laterality: Right;    INJECTION OF ANESTHETIC AGENT AROUND MEDIAL BRANCH NERVES INNERVATING LUMBAR FACET JOINT Right 2021    Procedure: LUMBAR FACET JOINT BLOCK (L3-4,L4-5,L5-S1);  Surgeon: Mamie Roman MD;  Location: [facility];  Service: Pain Management;  Laterality: Right;    INJECTION OF ANESTHETIC AGENT INTO SACROILIAC JOINT Right 2020    Procedure: SACROILIAC JOINT INJECTION;  Surgeon: Mamie Roman MD;  Location: [facility];  Service: Pain Management;  Laterality: Right;    INJECTION OF JOINT Right 2020    Procedure: GREATER TROCHANTERIC BURSA INJECTION;  Surgeon: Mamie Roman MD;  Location: [facility];  Service: Pain Management;  Laterality: Right;    LAPAROSCOPIC RIGHT COLON RESECTION N/A 2024    Procedure: COLECTOMY, RIGHT, LAPAROSCOPIC (eras low, lithotomy);  Surgeon: Alberto Albright MD;  [facility];  Service: Colon and Rectal;  Laterality: N/A;    NASAL SEPTUM SURGERY      RECTAL PROLAPSE REPAIR      TONSILLECTOMY          Family History:   Family History   Problem Relation Name Age of Onset    No Known Problems Maternal Aunt Rubio Glasgow     Colon cancer Maternal Uncle Joshua     Colon cancer Maternal Uncle Yovani     Colon cancer Maternal Uncle Konstantin     No Known Problems Paternal Aunt Vero     Esophageal cancer Paternal Uncle Nat Jr     Heart attack Maternal Grandmother Elmira     Leukemia Maternal Grandfather manish Pang     No Known Problems Paternal Grandmother Lizett     Stroke Paternal Grandfather Nat Sr         Social History:   Social History     Tobacco Use    Smoking status: Former     Current packs/day: 0.00     Average packs/day: 1 pack/day for 41.7 years (41.7 ttl pk-yrs)     Types: Cigarettes     Start date: 3/30/1982     Quit date: 2023     Years since quittin.2    Smokeless tobacco: Never   Substance Use Topics    Alcohol  use: Yes     Comment: Socially-2 or 3 times a year-6 or 7 drinks        I have reviewed and updated the patient's past medical, surgical, family and social histories.    Allergies:   Review of patient's allergies indicates:   Allergen Reactions    Nsaids (non-steroidal anti-inflammatory drug) Other (See Comments)     Gastrointestinal bleeding requiring blood transfusion    Demerol [meperidine] Rash        Medications:   Current Outpatient Medications   Medication Sig Dispense Refill    albuterol (PROVENTIL/VENTOLIN HFA) 90 mcg/actuation inhaler Inhale 2 puffs into the lungs every 4 (four) hours as needed for Wheezing or Shortness of Breath. 16 g 1    aspirin (ECOTRIN) 81 MG EC tablet Take 81 mg by mouth nightly.      dexAMETHasone (DECADRON) 4 MG Tab Take 2 tablets (8 mg total) by mouth once daily only on days 2, 3 and 4 of each chemotherapy cycle. 40 tablet 0    dorzolamide-timolol 2-0.5% (COSOPT) 22.3-6.8 mg/mL ophthalmic solution Place 1 drop into both eyes 2 (two) times daily. 10 mL 4    EScitalopram oxalate (LEXAPRO) 20 MG tablet Take 1 tablet (20 mg total) by mouth once daily. 30 tablet 3    lamoTRIgine (LAMICTAL) 150 MG Tab Take 2 tablets (300 mg total) by mouth every evening. 180 tablet 1    LIDOcaine-prilocaine (EMLA) cream Apply topically as needed (place to port site 45-60 minutes prior to chemotherapy). 30 g 5    pantoprazole (PROTONIX) 40 MG tablet Take 1 tablet (40 mg total) by mouth once daily. 90 tablet 3    QUEtiapine (SEROQUEL) 200 MG Tab Take 1 tablet (200 mg total) by mouth every evening. 30 tablet 3    rosuvastatin (CRESTOR) 10 MG tablet Take 1 tablet (10 mg total) by mouth once daily. 90 tablet 3    azithromycin (Z-BOSTON) 250 MG tablet As per packet instructions (Patient not taking: Reported on 3/26/2025) 6 tablet 0    oxyCODONE (ROXICODONE) 5 MG immediate release tablet Take 1 tablet (5 mg total) by mouth every 6 (six) hours as needed for Pain. (Patient not taking: Reported on 3/26/2025) 11  "tablet 0    traMADoL (ULTRAM) 50 mg tablet Take 1 tablet (50 mg total) by mouth every 6 (six) hours as needed for Pain. (Patient not taking: Reported on 3/26/2025) 5 tablet 0     No current facility-administered medications for this visit.     Facility-Administered Medications Ordered in Other Visits   Medication Dose Route Frequency Provider Last Rate Last Admin    0.9% NaCl 250 mL flush bag   Intravenous 1 time in Clinic/[facility] Minna Menendez, CNS        alteplase injection 2 mg  2 mg Intra-Catheter PRN Minna Menendez, CNS        D5W 250 mL flush bag   Intravenous 1 time in Clinic/[facility] Minna Menendez, CNS        diphenhydrAMINE injection 50 mg  50 mg Intravenous Once PRN Minna Menendez, CNS        EPINEPHrine (EPIPEN) 0.3 mg/0.3 mL pen injection 0.3 mg  0.3 mg Intramuscular Once PRN Minna Menendez, CNS        fluorouracil (Adrucil) 4,500 mg in 0.9% NaCl 100 mL chemo infusion  4,500 mg Intravenous 1 time in Clinic/[facility] Minna Menendez, CNS   4,500 mg at 03/26/25 1215    heparin, porcine (PF) 100 unit/mL injection flush 500 Units  500 Units Intravenous PRN Minna Menendez, CNS        hydrocortisone sodium succinate injection 100 mg  100 mg Intravenous Once PRN Minna Menendez, CNS        prochlorperazine injection Soln 5 mg  5 mg Intravenous Once PRN Minna Menendez, CNS        sodium chloride 0.9% flush 10 mL  10 mL Intravenous PRN Minna Menendez, CNS            Physical Exam:   BP (!) 147/77 (BP Location: Left arm, Patient Position: Sitting)   Pulse 67   Temp 97.8 °F (36.6 °C) (Temporal)   Resp 18   Ht 5' 6" (1.676 m)   Wt 80.8 kg (178 lb 3.9 oz)   SpO2 99%   BMI 28.77 kg/m²           Physical Exam  Vitals reviewed.   Constitutional:       General: She is not in acute distress.     Appearance: Normal appearance. She is normal weight. She is not ill-appearing, toxic-appearing or diaphoretic.   HENT:      Head: Normocephalic and atraumatic.      Right Ear: External ear normal.      Left Ear: " External ear normal.      Nose: Nose normal.      Mouth/Throat:      Pharynx: Oropharynx is clear.   Eyes:      General: No scleral icterus.     Conjunctiva/sclera: Conjunctivae normal.   Cardiovascular:      Rate and Rhythm: Normal rate.   Pulmonary:      Effort: Pulmonary effort is normal. No respiratory distress.   Chest:      Comments: RCW port  Abdominal:      General: There is no distension.   Skin:     General: Skin is warm and dry.      Coloration: Skin is not jaundiced or pale.      Findings: No bruising, erythema or rash.   Neurological:      Mental Status: She is alert and oriented to person, place, and time. Mental status is at baseline.      Motor: No weakness.      Gait: Gait normal.   Psychiatric:         Mood and Affect: Mood normal.         Labs:   I have reviewed the pertinent labs.       Imaging:    CT CAP - 2/11/25:  Impression:     In this patient with history of colon cancer post right hemicolectomy, no CT findings to suggest disease recurrence or metastasis.     Stable prominent lateral caval lymph node.     Right hepatic hypodense lesion is not seen on today's exam.     Additional findings as above    Path:   1/22/24 - R Hemicolectomy   Final Pathologic Diagnosis     THE DIAGNOSES REMAIN THE SAME.  THIS CORRECTED REPORT IS ISSUED TO CHANGE M STATUS to reflect tumor in the omentum.  This change is discussed with Dr. RANJANA Albright via phone, 2/1/2024.    1. Colon, right and terminal ileum (right hemicolectomy with EN bloc abdominal wall resection):  Invasive adenocarcinoma.  See cancer synoptic report     2. Omentum (resection):    Positive for carcinoma    CORRECT SYNOPTIC AND M STATUS  Cancer synoptic report  - Procedure: Right hemicolectomy with EN bloc abdominal wall resection  - Tumor site:  Ascending  - Histologic type:  Adenocarcinoma  - Histologic grade:  G2, moderately differentiated.  See comment.  COMMENT:  While the majority of the tumor consists of well formed glands, a significant  proportion includes abortive glands, single file infiltration, and malignant cells with signet ring cell morphology. The tumor has an insidious pattern of  infiltration with tumor present far from the advancing front of the tumor.  - Tumor size:  2.0 x 2.0 x 1.0 cm  - Tumor extent:  Invades visceral peritoneum, multifocal  - Macroscopic perforation: Not identified  - Lymphovascular invasion:  Present, extensive.  Small vessel, large vessel, intra and extramural.  - Perineural invasion:  Not identified  - Tumor budding score:  High (>10), multifocal single cell infiltration  - Treatment effect: No known pre-surgical therapy    Margins  Margin involved by invasive carcinoma, radial (slide 1C)  All margins negative for dysplasia.    pTNM stage classification (AJCC 8th edition)  pT Category  pT4a:  Tumor invades through the visceral peritoneum    pN Category  pN2b:  7 or more regional lymph nodes are positive  Number of positive lymph nodes:  19  Number of lymph nodes examined: 26  Number of tumor deposits:  4    pM Category  pM1c:  Metastasis to the peritoneal surface of the omentum.    Additional findings:  - Sessile serrated polyp, no cytologic dysplasia, 1.1 cm at ileocecal valve  - Tubulovillous adenoma, 1.0 cm, proximal ascending  - Tubular adenoma, 0.4 cm, mid ascending  - Tubular adenoma, 0.4, distal ascending  - Tubular adenoma, 0.6 cm, distal ascending  - Submucosal lipoma, 2.0 cm  - Appendix with no specific histopathologic changes    Ancillary studies  Immunohistochemistry for mismatch repair proteins performed on prior biopsy material (SAS-, 12/21/23): pMMR.         Assessment:       1. Malignant neoplasm of ascending colon    2. 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 Immunodeficiency secondary to neoplasm    4. Immunodeficiency secondary to chemotherapy    5. Neoplastic malignant related fatigue    6. Cold sensitivity    7. Other constipation    8. Excessive gas        Plan:        # Colon  cancer   Mrs. Maguire is a 74 y.o. female who presents for management of her metastatic colon cancer. She underwent a R hemicolectomy with en-bloc abdominal wall resection on 1/22/24 with Dr. Albright. Pathology revealed pT4a N2b disease with 19/26 positive LNs and omentum positive for carcinoma.    We had an in-depth conversation during our initial consult re: her diagnosis and treatment options. Reviewed recommendation for IV systemic therapy for at least 6 months. Plan for FOLFOX + bevacizumab to be given every 2 weeks. Briefly reviewed side effects of treatment. Handouts provided. Port placed 2/21/24.     PGX - DPYD normal metabolizer, UTG1A1 intermediate metabolizer   Dexamethasone, zofran, compazine and lidocaine sent to pharmacy previously. Reviewed admin instructions.     Pending response, she may be a candidate for CRS/HIPEC with surgical oncology team. Continue to evaluate and re-start discussion if still without radiographic evidence of disease after CT on cycle 8.    CT CAP after cycle 4 shows no clear evidence of disease.  CT CAP after cycle 8 shows no clear evidence of disease.  CT CAP after cycle 12 shows no clear evidence of disease. Underwent diagnostic laparoscopy on 9/20/24 with PCI of 14.  Discussed with Dr. Contreras and with patient that options include continuing systemic therapy at this time vs CRS/HIPEC.  We are in agreement that with her volume of disease and histology, likelihood of CRS/HIPEC being beneficial for survival is not high.  CT CAP after cycle 18 shows no clear evidence of disease radiographically.  CT CAP after cycle 23 shows no clear evidence of disease.    Presents today for cycle 27 of 5-FU + Felisa maintenance.  Tolerance of chemotherapy has been excellent.   I have reviewed the CBC and CMP, adequate for treatment   CEA remains normal.   Will see her every other cycle from here on out.    Will stay on chemo indefinitely every 2 weeks.  Proceed with cycle 27 and 28.     RTC in 4  weeks for cycle 29.  Will repeat imaging studies in early May.    Cold sensitivity/neoplasm related fatigue:  2/2 chemotherapy. Stopped oxaliplatin beginning with cycle 9 to prevent persistent paresthesias.  Mild persistent paresthesias at this time. Will monitor. She is taking Cymbalta, prescribed by her psychiatrist.    Constipation/Gas  Continue 2 stool softeners a day with Miralax daily to have regular bowel movements.   Continue Miralax to avoid more explosive BM.    RTC in 4 weeks.     Patient is in agreement with the proposed treatment plan. All questions were answered to the patient's satisfaction. Pt knows to call clinic if anything is needed before the next clinic visit.    Patient discussed with collaborating physician, Dr. Zaragoza.    At least 40 minutes were spent today on this encounter including face to face time with the patient, data gathering/interpretation and documentation.       Minna Menendez, MSN, APRN, [facility]-  Hematology and Medical Oncology  Clinical Nurse Specialist to Dr. Zaragoza, Dr. Jin & Dr. Rocha         code applied: patient requires or will require a continuous, longitudinal, and active collaborative plan of care related to this patient's health condition, metastatic colon cancer --the management of which requires the direction of a practitioner with specialized clinical knowledge, skill, and expertise.       Med Onc Chart Routing      Follow up with physician . Keep as scheduled (provider visit prior to every other infusion)   Follow up with KRISTEN    Infusion scheduling note   chemo every 2 weeks, pump d/c on day 3   Injection scheduling note    Labs CBC, CMP, CEA and urinalysis   Scheduling:  Preferred lab:  Lab interval: every 2 weeks     Imaging    Pharmacy appointment    Other referrals

## 2025-03-28 ENCOUNTER — INFUSION (OUTPATIENT)
Dept: INFUSION THERAPY | Facility: HOSPITAL | Age: 75
End: 2025-03-28
Payer: MEDICARE

## 2025-03-28 VITALS
SYSTOLIC BLOOD PRESSURE: 150 MMHG | RESPIRATION RATE: 18 BRPM | HEART RATE: 67 BPM | TEMPERATURE: 99 F | DIASTOLIC BLOOD PRESSURE: 76 MMHG

## 2025-03-28 DIAGNOSIS — C18.2 MALIGNANT NEOPLASM OF ASCENDING COLON: Primary | ICD-10-CM

## 2025-03-28 PROCEDURE — 25000003 PHARM REV CODE 250: Performed by: REGISTERED NURSE

## 2025-03-28 PROCEDURE — A4216 STERILE WATER/SALINE, 10 ML: HCPCS | Performed by: REGISTERED NURSE

## 2025-03-28 PROCEDURE — 63600175 PHARM REV CODE 636 W HCPCS: Performed by: REGISTERED NURSE

## 2025-03-28 RX ORDER — SODIUM CHLORIDE 0.9 % (FLUSH) 0.9 %
10 SYRINGE (ML) INJECTION
Status: DISCONTINUED | OUTPATIENT
Start: 2025-03-28 | End: 2025-03-28 | Stop reason: HOSPADM

## 2025-03-28 RX ORDER — HEPARIN 100 UNIT/ML
500 SYRINGE INTRAVENOUS
Status: DISCONTINUED | OUTPATIENT
Start: 2025-03-28 | End: 2025-03-28 | Stop reason: HOSPADM

## 2025-03-28 RX ORDER — PROCHLORPERAZINE EDISYLATE 5 MG/ML
5 INJECTION INTRAMUSCULAR; INTRAVENOUS ONCE AS NEEDED
Status: DISCONTINUED | OUTPATIENT
Start: 2025-03-28 | End: 2025-03-28 | Stop reason: HOSPADM

## 2025-03-28 RX ADMIN — Medication 10 ML: at 10:03

## 2025-03-28 RX ADMIN — HEPARIN 500 UNITS: 100 SYRINGE at 10:03

## 2025-03-28 NOTE — NURSING
1058- Patient arrived ambulatory to the unit for pump dc with infusion to portable pump already complete and with no complaints.  Port was flushed and heparin locked, then de-accessed.  Patient discharged to home setting, instructed her to contact Md with any issues or concerns.

## 2025-04-09 ENCOUNTER — LAB VISIT (OUTPATIENT)
Dept: LAB | Facility: HOSPITAL | Age: 75
End: 2025-04-09
Payer: MEDICARE

## 2025-04-09 ENCOUNTER — INFUSION (OUTPATIENT)
Dept: INFUSION THERAPY | Facility: HOSPITAL | Age: 75
End: 2025-04-09
Payer: MEDICARE

## 2025-04-09 VITALS
RESPIRATION RATE: 18 BRPM | DIASTOLIC BLOOD PRESSURE: 63 MMHG | HEIGHT: 66 IN | BODY MASS INDEX: 28.77 KG/M2 | TEMPERATURE: 98 F | HEART RATE: 76 BPM | SYSTOLIC BLOOD PRESSURE: 128 MMHG | OXYGEN SATURATION: 100 % | WEIGHT: 179 LBS

## 2025-04-09 DIAGNOSIS — C18.2 MALIGNANT NEOPLASM OF ASCENDING COLON: Primary | ICD-10-CM

## 2025-04-09 DIAGNOSIS — C18.2 MALIGNANT NEOPLASM OF ASCENDING COLON: ICD-10-CM

## 2025-04-09 LAB
ABSOLUTE NEUTROPHIL COUNT (OHS): 2.74 K/UL (ref 1.8–7.7)
ALBUMIN SERPL BCP-MCNC: 3.3 G/DL (ref 3.5–5.2)
ALP SERPL-CCNC: 77 UNIT/L (ref 40–150)
ALT SERPL W/O P-5'-P-CCNC: 13 UNIT/L (ref 10–44)
ANION GAP (OHS): 7 MMOL/L (ref 8–16)
AST SERPL-CCNC: 19 UNIT/L (ref 11–45)
BILIRUB SERPL-MCNC: 0.2 MG/DL (ref 0.1–1)
BUN SERPL-MCNC: 19 MG/DL (ref 8–23)
CALCIUM SERPL-MCNC: 8.9 MG/DL (ref 8.7–10.5)
CARCINOEMBRYONIC ANTIGEN (OHS): 3.5 NG/ML
CHLORIDE SERPL-SCNC: 107 MMOL/L (ref 95–110)
CO2 SERPL-SCNC: 24 MMOL/L (ref 23–29)
CREAT SERPL-MCNC: 0.8 MG/DL (ref 0.5–1.4)
ERYTHROCYTE [DISTWIDTH] IN BLOOD BY AUTOMATED COUNT: 17.7 % (ref 11.5–14.5)
GFR SERPLBLD CREATININE-BSD FMLA CKD-EPI: >60 ML/MIN/1.73/M2
GLUCOSE SERPL-MCNC: 127 MG/DL (ref 70–110)
HCT VFR BLD AUTO: 38.2 % (ref 37–48.5)
HGB BLD-MCNC: 11.9 GM/DL (ref 12–16)
IMM GRANULOCYTES # BLD AUTO: 0.02 K/UL (ref 0–0.04)
MCH RBC QN AUTO: 28.6 PG (ref 27–31)
MCHC RBC AUTO-ENTMCNC: 31.2 G/DL (ref 32–36)
MCV RBC AUTO: 92 FL (ref 82–98)
PLATELET # BLD AUTO: 218 K/UL (ref 150–450)
PMV BLD AUTO: 10.5 FL (ref 9.2–12.9)
POTASSIUM SERPL-SCNC: 4.6 MMOL/L (ref 3.5–5.1)
PROT SERPL-MCNC: 6.5 GM/DL (ref 6–8.4)
RBC # BLD AUTO: 4.16 M/UL (ref 4–5.4)
SODIUM SERPL-SCNC: 138 MMOL/L (ref 136–145)
WBC # BLD AUTO: 5.41 K/UL (ref 3.9–12.7)

## 2025-04-09 PROCEDURE — 63600175 PHARM REV CODE 636 W HCPCS: Performed by: INTERNAL MEDICINE

## 2025-04-09 PROCEDURE — 25000003 PHARM REV CODE 250: Performed by: INTERNAL MEDICINE

## 2025-04-09 PROCEDURE — 85027 COMPLETE CBC AUTOMATED: CPT

## 2025-04-09 PROCEDURE — 96367 TX/PROPH/DG ADDL SEQ IV INF: CPT

## 2025-04-09 PROCEDURE — 82378 CARCINOEMBRYONIC ANTIGEN: CPT

## 2025-04-09 PROCEDURE — 36415 COLL VENOUS BLD VENIPUNCTURE: CPT

## 2025-04-09 PROCEDURE — 96413 CHEMO IV INFUSION 1 HR: CPT

## 2025-04-09 PROCEDURE — 82040 ASSAY OF SERUM ALBUMIN: CPT

## 2025-04-09 PROCEDURE — 96416 CHEMO PROLONG INFUSE W/PUMP: CPT

## 2025-04-09 RX ORDER — DIPHENHYDRAMINE HYDROCHLORIDE 50 MG/ML
50 INJECTION, SOLUTION INTRAMUSCULAR; INTRAVENOUS ONCE AS NEEDED
Status: DISCONTINUED | OUTPATIENT
Start: 2025-04-09 | End: 2025-04-09 | Stop reason: HOSPADM

## 2025-04-09 RX ORDER — EPINEPHRINE 0.3 MG/.3ML
0.3 INJECTION SUBCUTANEOUS ONCE AS NEEDED
Status: DISCONTINUED | OUTPATIENT
Start: 2025-04-09 | End: 2025-04-09 | Stop reason: HOSPADM

## 2025-04-09 RX ORDER — HEPARIN 100 UNIT/ML
500 SYRINGE INTRAVENOUS
Status: CANCELLED | OUTPATIENT
Start: 2025-04-11

## 2025-04-09 RX ORDER — SODIUM CHLORIDE 0.9 % (FLUSH) 0.9 %
10 SYRINGE (ML) INJECTION
Status: CANCELLED | OUTPATIENT
Start: 2025-04-11

## 2025-04-09 RX ORDER — PROCHLORPERAZINE EDISYLATE 5 MG/ML
5 INJECTION INTRAMUSCULAR; INTRAVENOUS ONCE AS NEEDED
Status: CANCELLED | OUTPATIENT
Start: 2025-04-11

## 2025-04-09 RX ORDER — HEPARIN 100 UNIT/ML
500 SYRINGE INTRAVENOUS
Status: DISCONTINUED | OUTPATIENT
Start: 2025-04-09 | End: 2025-04-09 | Stop reason: HOSPADM

## 2025-04-09 RX ORDER — PROCHLORPERAZINE EDISYLATE 5 MG/ML
5 INJECTION INTRAMUSCULAR; INTRAVENOUS ONCE AS NEEDED
Status: DISCONTINUED | OUTPATIENT
Start: 2025-04-09 | End: 2025-04-09 | Stop reason: HOSPADM

## 2025-04-09 RX ORDER — SODIUM CHLORIDE 0.9 % (FLUSH) 0.9 %
10 SYRINGE (ML) INJECTION
Status: DISCONTINUED | OUTPATIENT
Start: 2025-04-09 | End: 2025-04-09 | Stop reason: HOSPADM

## 2025-04-09 RX ADMIN — DEXAMETHASONE SODIUM PHOSPHATE 0.25 MG: 4 INJECTION, SOLUTION INTRA-ARTICULAR; INTRALESIONAL; INTRAMUSCULAR; INTRAVENOUS; SOFT TISSUE at 11:04

## 2025-04-09 RX ADMIN — FLUOROURACIL 4500 MG: 50 INJECTION, SOLUTION INTRAVENOUS at 11:04

## 2025-04-09 RX ADMIN — BEVACIZUMAB-AWWB 400 MG: 400 INJECTION, SOLUTION INTRAVENOUS at 10:04

## 2025-04-09 NOTE — PLAN OF CARE
"/63 (Patient Position: Sitting)   Pulse 76   Temp 97.6 °F (36.4 °C)   Resp 18   Ht 5' 6" (1.676 m)   Wt 81.2 kg (179 lb 0.2 oz)   SpO2 100%   BMI 28.89 kg/m² Pleasant, alert and oriented patient to Chemo Infusion per self with  for C28D1 MVASI/5fu pump - VSS and RCW Port accessed with blood return observed, flushed with NS, Bio-Patch with dressing applied and patient tolerated procedure well - patient tolerated treatment with no AVE's, port flushed with NS, blood return observed, pump attached and infusing, all clamps open and checked, connection secured and patient discharged to home with no concerns - RTC on 4/11/25 at 10am for pump d/c       "

## 2025-04-11 ENCOUNTER — INFUSION (OUTPATIENT)
Dept: INFUSION THERAPY | Facility: HOSPITAL | Age: 75
End: 2025-04-11
Payer: MEDICARE

## 2025-04-11 VITALS
TEMPERATURE: 98 F | RESPIRATION RATE: 18 BRPM | DIASTOLIC BLOOD PRESSURE: 72 MMHG | SYSTOLIC BLOOD PRESSURE: 160 MMHG | BODY MASS INDEX: 28.77 KG/M2 | WEIGHT: 179 LBS | HEART RATE: 70 BPM | HEIGHT: 66 IN | OXYGEN SATURATION: 100 %

## 2025-04-11 DIAGNOSIS — C18.2 MALIGNANT NEOPLASM OF ASCENDING COLON: Primary | ICD-10-CM

## 2025-04-11 PROCEDURE — 63600175 PHARM REV CODE 636 W HCPCS: Performed by: INTERNAL MEDICINE

## 2025-04-11 PROCEDURE — A4216 STERILE WATER/SALINE, 10 ML: HCPCS | Performed by: INTERNAL MEDICINE

## 2025-04-11 PROCEDURE — 25000003 PHARM REV CODE 250: Performed by: INTERNAL MEDICINE

## 2025-04-11 RX ORDER — HEPARIN 100 UNIT/ML
500 SYRINGE INTRAVENOUS
Status: DISCONTINUED | OUTPATIENT
Start: 2025-04-11 | End: 2025-04-11 | Stop reason: HOSPADM

## 2025-04-11 RX ORDER — PROCHLORPERAZINE EDISYLATE 5 MG/ML
5 INJECTION INTRAMUSCULAR; INTRAVENOUS ONCE AS NEEDED
Status: DISCONTINUED | OUTPATIENT
Start: 2025-04-11 | End: 2025-04-11 | Stop reason: HOSPADM

## 2025-04-11 RX ORDER — SODIUM CHLORIDE 0.9 % (FLUSH) 0.9 %
10 SYRINGE (ML) INJECTION
Status: DISCONTINUED | OUTPATIENT
Start: 2025-04-11 | End: 2025-04-11 | Stop reason: HOSPADM

## 2025-04-11 RX ADMIN — HEPARIN 500 UNITS: 100 SYRINGE at 10:04

## 2025-04-11 RX ADMIN — Medication 10 ML: at 10:04

## 2025-04-11 NOTE — PLAN OF CARE
"BP (!) 160/72 (Patient Position: Sitting)   Pulse 70   Temp 97.7 °F (36.5 °C)   Resp 18   Ht 5' 6" (1.676 m)   Wt 81.2 kg (179 lb 0.2 oz)   SpO2 100%   BMI 28.89 kg/m²   Pleasant, alert and oriented patient to Chemo Infusion per self for pump d/c - VSS and RCW port flushed with NS/Heparin, blood return observed, pump removed, port de-accessed, band-aide applied and patient discharged to home with no concerns - RTC on 4/22/25  "

## 2025-04-17 ENCOUNTER — OFFICE VISIT (OUTPATIENT)
Dept: INTERNAL MEDICINE | Facility: CLINIC | Age: 75
End: 2025-04-17
Payer: MEDICARE

## 2025-04-17 ENCOUNTER — PATIENT MESSAGE (OUTPATIENT)
Dept: HEMATOLOGY/ONCOLOGY | Facility: CLINIC | Age: 75
End: 2025-04-17

## 2025-04-17 ENCOUNTER — TELEPHONE (OUTPATIENT)
Dept: HEMATOLOGY/ONCOLOGY | Facility: CLINIC | Age: 75
End: 2025-04-17

## 2025-04-17 VITALS
WEIGHT: 175.06 LBS | HEART RATE: 77 BPM | RESPIRATION RATE: 18 BRPM | SYSTOLIC BLOOD PRESSURE: 138 MMHG | BODY MASS INDEX: 28.14 KG/M2 | HEIGHT: 66 IN | DIASTOLIC BLOOD PRESSURE: 70 MMHG | OXYGEN SATURATION: 98 %

## 2025-04-17 DIAGNOSIS — Z20.822 CLOSE EXPOSURE TO COVID-19 VIRUS: Primary | ICD-10-CM

## 2025-04-17 LAB
CTP QC/QA: YES
SARS-COV-2 AG RESP QL IA.RAPID: POSITIVE

## 2025-04-17 PROCEDURE — 99999 PR PBB SHADOW E&M-EST. PATIENT-LVL IV: CPT | Mod: PBBFAC,,, | Performed by: NURSE PRACTITIONER

## 2025-04-17 NOTE — TELEPHONE ENCOUNTER
----- Message from [employer] sent at 4/17/2025 12:38 PM CDT -----  Regarding: Consult/Advisory  Contact: Karin Maguire   Consult/Advisory Name Of Caller:Karin Maguire   Contact Preference:[phone] (home)   Nature of call:Patient tested positive for Covid this morning and not sure if she can still come in for appt today. Requesting a call back

## 2025-04-17 NOTE — PROGRESS NOTES
Subjective:       Patient ID: Karin Maguire is a 74 y.o. female.    Chief Complaint: Follow-up (6 months)      History of Present Illness    CHIEF COMPLAINT:  Karin presents today for COVID symptoms    COVID-19 RELATED SYMPTOMS:  She reports symptoms began Monday with mild congestion. She denies fever, nausea, vomiting, chest pain, and body aches. Her symptoms are improving.    MEDICAL HISTORY:  She has a history of stage 4 colon cancer.      ROS:  Constitutional: -fevers  ENT: +nasal congestion  Cardiovascular: -chest pain  Gastrointestinal: -nausea, -vomiting          Objective:      Physical Exam    General: No acute distress. Well-developed. Well-nourished.  Eyes: EOMI. Sclerae anicteric.  HENT: Normocephalic. Atraumatic. Nares patent. Moist oral mucosa.  Ears: Bilateral TMs clear. Bilateral EACs clear.  Cardiovascular: Regular rate. Regular rhythm. No murmurs. No rubs. No gallops. Normal S1, S2.  Respiratory: Normal respiratory effort. Clear to auscultation bilaterally. No rales. No rhonchi. No wheezing.  Abdomen: Soft. Non-tender. Non-distended. Normoactive bowel sounds.  Musculoskeletal: No  obvious deformity.  Extremities: No lower extremity edema.  Neurological: Alert & oriented x3. No slurred speech. Normal gait.  Psychiatric: Normal mood. Normal affect. Good insight. Good judgment.  Skin: Warm. Dry. No rash.          Assessment:       1. Close exposure to COVID-19 virus        Plan:     Problem List Items Addressed This Visit    None  Visit Diagnoses         Close exposure to COVID-19 virus    -  Primary    Relevant Orders    SARS Coronavirus 2 Antigen, POCT (Completed)        Covid risk score 3    Assessment & Plan    J41.0 Simple chronic bronchitis  U07.1 COVID-19  C18.9 Malignant neoplasm of colon, unspecified    IMPRESSION:  - Tested positive for COVID-19, currently on day 4 of symptoms.  - Assessed eligibility for Paxlovid antiviral treatment based on risk scoring system with score of 3  qualifying for treatment.  - Decided against immediate Paxlovid use due to mild symptoms showing improvement and potential side effects, particularly diarrhea which could exacerbate stage 4 colon cancer.    SIMPLE CHRONIC BRONCHITIS:  - Karin reports mild congestion.  - Evaluation revealed no fever, nausea, vomiting, chest pain, or body aches.    COVID-19:  - Karin is on day 4 of symptoms (started Monday).  - Risk score of 3 qualifies for precautionary Paxlovid prescription.  - Explained Paxlovid's mechanism and potential for relapse.  - Karin instructed to fill prescription but only take if symptoms worsen over the weekend. Must hold crestor is she has to take it   - Recommend continuing quarantine until next week.  - Advised to inform oncologist about positive test and quarantine status via phone call.  - In-person oncology appointment scheduled for this afternoon has been postponed due to positive status.    COLON CANCER:  - Evaluated patient's condition, noting exceptionally good response to treatment despite stage 4 colon cancer.  - Expressed optimism for continued progress in cancer treatment.          This note was generated with the assistance of ambient listening technology. Verbal consent was obtained by the patient and accompanying visitor(s) for the recording of patient appointment to facilitate this note. I attest to having reviewed and edited the generated note for accuracy, though some syntax or spelling errors may persist. Please contact the author of this note for any clarification.

## 2025-04-21 DIAGNOSIS — C18.2 MALIGNANT NEOPLASM OF ASCENDING COLON: Primary | ICD-10-CM

## 2025-04-22 ENCOUNTER — LAB VISIT (OUTPATIENT)
Dept: LAB | Facility: HOSPITAL | Age: 75
End: 2025-04-22
Payer: MEDICARE

## 2025-04-22 ENCOUNTER — INFUSION (OUTPATIENT)
Dept: INFUSION THERAPY | Facility: HOSPITAL | Age: 75
End: 2025-04-22
Payer: MEDICARE

## 2025-04-22 VITALS
HEART RATE: 84 BPM | RESPIRATION RATE: 18 BRPM | DIASTOLIC BLOOD PRESSURE: 78 MMHG | OXYGEN SATURATION: 99 % | SYSTOLIC BLOOD PRESSURE: 126 MMHG

## 2025-04-22 DIAGNOSIS — C18.2 MALIGNANT NEOPLASM OF ASCENDING COLON: ICD-10-CM

## 2025-04-22 DIAGNOSIS — C18.2 MALIGNANT NEOPLASM OF ASCENDING COLON: Primary | ICD-10-CM

## 2025-04-22 LAB
ABSOLUTE NEUTROPHIL COUNT (OHS): 3.33 K/UL (ref 1.8–7.7)
ALBUMIN SERPL BCP-MCNC: 3 G/DL (ref 3.5–5.2)
ALP SERPL-CCNC: 87 UNIT/L (ref 40–150)
ALT SERPL W/O P-5'-P-CCNC: 10 UNIT/L (ref 10–44)
ANION GAP (OHS): 5 MMOL/L (ref 8–16)
AST SERPL-CCNC: 18 UNIT/L (ref 11–45)
BILIRUB SERPL-MCNC: 0.3 MG/DL (ref 0.1–1)
BUN SERPL-MCNC: 7 MG/DL (ref 8–23)
CALCIUM SERPL-MCNC: 9 MG/DL (ref 8.7–10.5)
CARCINOEMBRYONIC ANTIGEN (OHS): 3.3 NG/ML
CHLORIDE SERPL-SCNC: 107 MMOL/L (ref 95–110)
CO2 SERPL-SCNC: 25 MMOL/L (ref 23–29)
CREAT SERPL-MCNC: 0.8 MG/DL (ref 0.5–1.4)
ERYTHROCYTE [DISTWIDTH] IN BLOOD BY AUTOMATED COUNT: 18.2 % (ref 11.5–14.5)
GFR SERPLBLD CREATININE-BSD FMLA CKD-EPI: >60 ML/MIN/1.73/M2
GLUCOSE SERPL-MCNC: 125 MG/DL (ref 70–110)
HCT VFR BLD AUTO: 37.4 % (ref 37–48.5)
HGB BLD-MCNC: 11.4 GM/DL (ref 12–16)
IMM GRANULOCYTES # BLD AUTO: 0.05 K/UL (ref 0–0.04)
MCH RBC QN AUTO: 27.9 PG (ref 27–31)
MCHC RBC AUTO-ENTMCNC: 30.5 G/DL (ref 32–36)
MCV RBC AUTO: 92 FL (ref 82–98)
PLATELET # BLD AUTO: 227 K/UL (ref 150–450)
PMV BLD AUTO: 10.4 FL (ref 9.2–12.9)
POTASSIUM SERPL-SCNC: 4 MMOL/L (ref 3.5–5.1)
PROT SERPL-MCNC: 6.6 GM/DL (ref 6–8.4)
RBC # BLD AUTO: 4.08 M/UL (ref 4–5.4)
SODIUM SERPL-SCNC: 137 MMOL/L (ref 136–145)
WBC # BLD AUTO: 5.57 K/UL (ref 3.9–12.7)

## 2025-04-22 PROCEDURE — 63600175 PHARM REV CODE 636 W HCPCS: Performed by: INTERNAL MEDICINE

## 2025-04-22 PROCEDURE — 82378 CARCINOEMBRYONIC ANTIGEN: CPT

## 2025-04-22 PROCEDURE — 96367 TX/PROPH/DG ADDL SEQ IV INF: CPT

## 2025-04-22 PROCEDURE — 36415 COLL VENOUS BLD VENIPUNCTURE: CPT

## 2025-04-22 PROCEDURE — 96413 CHEMO IV INFUSION 1 HR: CPT

## 2025-04-22 PROCEDURE — 85027 COMPLETE CBC AUTOMATED: CPT

## 2025-04-22 PROCEDURE — 80053 COMPREHEN METABOLIC PANEL: CPT

## 2025-04-22 PROCEDURE — 96416 CHEMO PROLONG INFUSE W/PUMP: CPT

## 2025-04-22 PROCEDURE — 25000003 PHARM REV CODE 250: Performed by: INTERNAL MEDICINE

## 2025-04-22 RX ORDER — PROCHLORPERAZINE EDISYLATE 5 MG/ML
5 INJECTION INTRAMUSCULAR; INTRAVENOUS ONCE AS NEEDED
Status: DISCONTINUED | OUTPATIENT
Start: 2025-04-22 | End: 2025-04-22 | Stop reason: HOSPADM

## 2025-04-22 RX ORDER — DIPHENHYDRAMINE HYDROCHLORIDE 50 MG/ML
50 INJECTION, SOLUTION INTRAMUSCULAR; INTRAVENOUS ONCE AS NEEDED
Status: DISCONTINUED | OUTPATIENT
Start: 2025-04-22 | End: 2025-04-22 | Stop reason: HOSPADM

## 2025-04-22 RX ORDER — EPINEPHRINE 0.3 MG/.3ML
0.3 INJECTION SUBCUTANEOUS ONCE AS NEEDED
Status: DISCONTINUED | OUTPATIENT
Start: 2025-04-22 | End: 2025-04-22 | Stop reason: HOSPADM

## 2025-04-22 RX ORDER — HEPARIN 100 UNIT/ML
500 SYRINGE INTRAVENOUS
Status: DISCONTINUED | OUTPATIENT
Start: 2025-04-22 | End: 2025-04-22 | Stop reason: HOSPADM

## 2025-04-22 RX ORDER — SODIUM CHLORIDE 0.9 % (FLUSH) 0.9 %
10 SYRINGE (ML) INJECTION
Status: DISCONTINUED | OUTPATIENT
Start: 2025-04-22 | End: 2025-04-22 | Stop reason: HOSPADM

## 2025-04-22 RX ADMIN — BEVACIZUMAB-AWWB 400 MG: 400 INJECTION, SOLUTION INTRAVENOUS at 01:04

## 2025-04-22 RX ADMIN — DEXAMETHASONE SODIUM PHOSPHATE 0.25 MG: 4 INJECTION, SOLUTION INTRA-ARTICULAR; INTRALESIONAL; INTRAMUSCULAR; INTRAVENOUS; SOFT TISSUE at 01:04

## 2025-04-22 RX ADMIN — FLUOROURACIL 4500 MG: 50 INJECTION, SOLUTION INTRAVENOUS at 02:04

## 2025-04-22 RX ADMIN — SODIUM CHLORIDE: 9 INJECTION, SOLUTION INTRAVENOUS at 01:04

## 2025-04-22 NOTE — PLAN OF CARE
1437 Pt tolerated infusion well today, no complaints or complications. Connected to CADD pump for home infusion, connection sites secured, pump infusing properly at time of discharge. Pt aware to call provider with any questions or concerns, knows to return to clinic at approx 1200 for pump d/c, verbalized understanding. Pt ambulatory from clinic with steady gait, no distress noted.

## 2025-04-22 NOTE — PLAN OF CARE
1200 Labs, hx, and medications reviewed, pt meets parameters for treatment today. Assessment completed and plan of care reviewed. Pt verbalized understanding. PAC accessed with no complications. Pt voices no new complaints or concerns, will continue to monitor for safety.

## 2025-04-24 ENCOUNTER — INFUSION (OUTPATIENT)
Dept: INFUSION THERAPY | Facility: HOSPITAL | Age: 75
End: 2025-04-24
Payer: MEDICARE

## 2025-04-24 VITALS
SYSTOLIC BLOOD PRESSURE: 138 MMHG | TEMPERATURE: 98 F | RESPIRATION RATE: 18 BRPM | HEART RATE: 80 BPM | DIASTOLIC BLOOD PRESSURE: 70 MMHG

## 2025-04-24 DIAGNOSIS — C18.2 MALIGNANT NEOPLASM OF ASCENDING COLON: Primary | ICD-10-CM

## 2025-04-24 PROCEDURE — 25000003 PHARM REV CODE 250: Performed by: INTERNAL MEDICINE

## 2025-04-24 PROCEDURE — 63600175 PHARM REV CODE 636 W HCPCS: Performed by: INTERNAL MEDICINE

## 2025-04-24 PROCEDURE — A4216 STERILE WATER/SALINE, 10 ML: HCPCS | Performed by: INTERNAL MEDICINE

## 2025-04-24 RX ORDER — HEPARIN 100 UNIT/ML
500 SYRINGE INTRAVENOUS
Status: DISCONTINUED | OUTPATIENT
Start: 2025-04-24 | End: 2025-04-24 | Stop reason: HOSPADM

## 2025-04-24 RX ORDER — SODIUM CHLORIDE 0.9 % (FLUSH) 0.9 %
10 SYRINGE (ML) INJECTION
Status: DISCONTINUED | OUTPATIENT
Start: 2025-04-24 | End: 2025-04-24 | Stop reason: HOSPADM

## 2025-04-24 RX ORDER — PROCHLORPERAZINE EDISYLATE 5 MG/ML
5 INJECTION INTRAMUSCULAR; INTRAVENOUS ONCE AS NEEDED
Status: DISCONTINUED | OUTPATIENT
Start: 2025-04-24 | End: 2025-04-24 | Stop reason: HOSPADM

## 2025-04-24 RX ADMIN — HEPARIN 500 UNITS: 100 SYRINGE at 12:04

## 2025-04-24 RX ADMIN — Medication 10 ML: at 12:04

## 2025-05-02 ENCOUNTER — HOSPITAL ENCOUNTER (OUTPATIENT)
Dept: RADIOLOGY | Facility: HOSPITAL | Age: 75
Discharge: HOME | End: 2025-05-02
Attending: INTERNAL MEDICINE
Payer: MEDICARE

## 2025-05-02 DIAGNOSIS — C18.2 MALIGNANT NEOPLASM OF ASCENDING COLON: ICD-10-CM

## 2025-05-02 PROCEDURE — 74177 CT ABD & PELVIS W/CONTRAST: CPT | Mod: 26,,, | Performed by: INTERNAL MEDICINE

## 2025-05-02 PROCEDURE — 25500020 PHARM REV CODE 255: Performed by: INTERNAL MEDICINE

## 2025-05-02 PROCEDURE — 71260 CT THORAX DX C+: CPT | Mod: 26,,, | Performed by: INTERNAL MEDICINE

## 2025-05-02 PROCEDURE — 71260 CT THORAX DX C+: CPT | Mod: TC

## 2025-05-02 PROCEDURE — A9698 NON-RAD CONTRAST MATERIALNOC: HCPCS | Performed by: INTERNAL MEDICINE

## 2025-05-02 RX ADMIN — BARIUM SULFATE 450 ML: 20 SUSPENSION ORAL at 04:05

## 2025-05-02 RX ADMIN — IOHEXOL 75 ML: 350 INJECTION, SOLUTION INTRAVENOUS at 04:05

## 2025-05-06 ENCOUNTER — OFFICE VISIT (OUTPATIENT)
Dept: HEMATOLOGY/ONCOLOGY | Facility: CLINIC | Age: 75
End: 2025-05-06
Payer: MEDICARE

## 2025-05-06 ENCOUNTER — INFUSION (OUTPATIENT)
Dept: INFUSION THERAPY | Facility: HOSPITAL | Age: 75
End: 2025-05-06
Payer: MEDICARE

## 2025-05-06 VITALS
HEIGHT: 66 IN | RESPIRATION RATE: 20 BRPM | SYSTOLIC BLOOD PRESSURE: 139 MMHG | DIASTOLIC BLOOD PRESSURE: 78 MMHG | OXYGEN SATURATION: 98 % | TEMPERATURE: 97 F | WEIGHT: 178.88 LBS | BODY MASS INDEX: 28.75 KG/M2 | HEART RATE: 113 BPM

## 2025-05-06 VITALS
OXYGEN SATURATION: 97 % | SYSTOLIC BLOOD PRESSURE: 119 MMHG | HEART RATE: 79 BPM | DIASTOLIC BLOOD PRESSURE: 62 MMHG | RESPIRATION RATE: 18 BRPM | TEMPERATURE: 98 F

## 2025-05-06 DIAGNOSIS — C78.6 MALIGNANT NEOPLASM METASTATIC TO OMENTUM: ICD-10-CM

## 2025-05-06 DIAGNOSIS — D49.9 IMMUNODEFICIENCY SECONDARY TO NEOPLASM: ICD-10-CM

## 2025-05-06 DIAGNOSIS — C18.2 MALIGNANT NEOPLASM OF ASCENDING COLON: Primary | ICD-10-CM

## 2025-05-06 DIAGNOSIS — R14.3 EXCESSIVE GAS: ICD-10-CM

## 2025-05-06 DIAGNOSIS — T45.1X5A IMMUNODEFICIENCY SECONDARY TO CHEMOTHERAPY: ICD-10-CM

## 2025-05-06 DIAGNOSIS — R53.0 NEOPLASTIC MALIGNANT RELATED FATIGUE: ICD-10-CM

## 2025-05-06 DIAGNOSIS — D84.821 IMMUNODEFICIENCY SECONDARY TO CHEMOTHERAPY: ICD-10-CM

## 2025-05-06 DIAGNOSIS — Z79.69 IMMUNODEFICIENCY SECONDARY TO CHEMOTHERAPY: ICD-10-CM

## 2025-05-06 DIAGNOSIS — D84.81 IMMUNODEFICIENCY SECONDARY TO NEOPLASM: ICD-10-CM

## 2025-05-06 DIAGNOSIS — K59.09 OTHER CONSTIPATION: ICD-10-CM

## 2025-05-06 DIAGNOSIS — R68.89 COLD SENSITIVITY: ICD-10-CM

## 2025-05-06 PROCEDURE — 99215 OFFICE O/P EST HI 40 MIN: CPT | Mod: S$GLB,,, | Performed by: INTERNAL MEDICINE

## 2025-05-06 PROCEDURE — 96413 CHEMO IV INFUSION 1 HR: CPT

## 2025-05-06 PROCEDURE — 99999 PR PBB SHADOW E&M-EST. PATIENT-LVL III: CPT | Mod: PBBFAC,,, | Performed by: INTERNAL MEDICINE

## 2025-05-06 PROCEDURE — 96367 TX/PROPH/DG ADDL SEQ IV INF: CPT

## 2025-05-06 PROCEDURE — 1159F MED LIST DOCD IN RCRD: CPT | Mod: CPTII,S$GLB,, | Performed by: INTERNAL MEDICINE

## 2025-05-06 PROCEDURE — 25000003 PHARM REV CODE 250: Performed by: INTERNAL MEDICINE

## 2025-05-06 PROCEDURE — 3008F BODY MASS INDEX DOCD: CPT | Mod: CPTII,S$GLB,, | Performed by: INTERNAL MEDICINE

## 2025-05-06 PROCEDURE — 3288F FALL RISK ASSESSMENT DOCD: CPT | Mod: CPTII,S$GLB,, | Performed by: INTERNAL MEDICINE

## 2025-05-06 PROCEDURE — G2211 COMPLEX E/M VISIT ADD ON: HCPCS | Mod: S$GLB,,, | Performed by: INTERNAL MEDICINE

## 2025-05-06 PROCEDURE — 96416 CHEMO PROLONG INFUSE W/PUMP: CPT

## 2025-05-06 PROCEDURE — 63600175 PHARM REV CODE 636 W HCPCS: Performed by: INTERNAL MEDICINE

## 2025-05-06 PROCEDURE — 3078F DIAST BP <80 MM HG: CPT | Mod: CPTII,S$GLB,, | Performed by: INTERNAL MEDICINE

## 2025-05-06 PROCEDURE — 1101F PT FALLS ASSESS-DOCD LE1/YR: CPT | Mod: CPTII,S$GLB,, | Performed by: INTERNAL MEDICINE

## 2025-05-06 PROCEDURE — 3075F SYST BP GE 130 - 139MM HG: CPT | Mod: CPTII,S$GLB,, | Performed by: INTERNAL MEDICINE

## 2025-05-06 PROCEDURE — 1125F AMNT PAIN NOTED PAIN PRSNT: CPT | Mod: CPTII,S$GLB,, | Performed by: INTERNAL MEDICINE

## 2025-05-06 RX ORDER — SODIUM CHLORIDE 0.9 % (FLUSH) 0.9 %
10 SYRINGE (ML) INJECTION
Status: DISCONTINUED | OUTPATIENT
Start: 2025-05-06 | End: 2025-05-06 | Stop reason: HOSPADM

## 2025-05-06 RX ORDER — SODIUM CHLORIDE 0.9 % (FLUSH) 0.9 %
10 SYRINGE (ML) INJECTION
OUTPATIENT
Start: 2025-05-22

## 2025-05-06 RX ORDER — PROCHLORPERAZINE EDISYLATE 5 MG/ML
5 INJECTION INTRAMUSCULAR; INTRAVENOUS ONCE AS NEEDED
OUTPATIENT
Start: 2025-05-22

## 2025-05-06 RX ORDER — DIPHENHYDRAMINE HYDROCHLORIDE 50 MG/ML
50 INJECTION, SOLUTION INTRAMUSCULAR; INTRAVENOUS ONCE AS NEEDED
Status: DISCONTINUED | OUTPATIENT
Start: 2025-05-06 | End: 2025-05-06 | Stop reason: HOSPADM

## 2025-05-06 RX ORDER — HEPARIN 100 UNIT/ML
500 SYRINGE INTRAVENOUS
Status: DISCONTINUED | OUTPATIENT
Start: 2025-05-06 | End: 2025-05-06 | Stop reason: HOSPADM

## 2025-05-06 RX ORDER — SODIUM CHLORIDE 0.9 % (FLUSH) 0.9 %
10 SYRINGE (ML) INJECTION
OUTPATIENT
Start: 2025-05-20

## 2025-05-06 RX ORDER — EPINEPHRINE 0.3 MG/.3ML
0.3 INJECTION SUBCUTANEOUS ONCE AS NEEDED
OUTPATIENT
Start: 2025-05-20

## 2025-05-06 RX ORDER — EPINEPHRINE 0.3 MG/.3ML
0.3 INJECTION SUBCUTANEOUS ONCE AS NEEDED
Status: DISCONTINUED | OUTPATIENT
Start: 2025-05-06 | End: 2025-05-06 | Stop reason: HOSPADM

## 2025-05-06 RX ORDER — DIPHENHYDRAMINE HYDROCHLORIDE 50 MG/ML
50 INJECTION, SOLUTION INTRAMUSCULAR; INTRAVENOUS ONCE AS NEEDED
OUTPATIENT
Start: 2025-05-20

## 2025-05-06 RX ORDER — HEPARIN 100 UNIT/ML
500 SYRINGE INTRAVENOUS
OUTPATIENT
Start: 2025-05-20

## 2025-05-06 RX ORDER — PROCHLORPERAZINE EDISYLATE 5 MG/ML
5 INJECTION INTRAMUSCULAR; INTRAVENOUS ONCE AS NEEDED
Status: DISCONTINUED | OUTPATIENT
Start: 2025-05-06 | End: 2025-05-06 | Stop reason: HOSPADM

## 2025-05-06 RX ORDER — HEPARIN 100 UNIT/ML
500 SYRINGE INTRAVENOUS
OUTPATIENT
Start: 2025-05-22

## 2025-05-06 RX ORDER — PROCHLORPERAZINE EDISYLATE 5 MG/ML
5 INJECTION INTRAMUSCULAR; INTRAVENOUS ONCE AS NEEDED
OUTPATIENT
Start: 2025-05-20

## 2025-05-06 RX ADMIN — BEVACIZUMAB-AWWB 400 MG: 400 INJECTION, SOLUTION INTRAVENOUS at 10:05

## 2025-05-06 RX ADMIN — FLUOROURACIL 4500 MG: 50 INJECTION, SOLUTION INTRAVENOUS at 10:05

## 2025-05-06 RX ADMIN — SODIUM CHLORIDE: 0.9 INJECTION, SOLUTION INTRAVENOUS at 09:05

## 2025-05-06 RX ADMIN — DEXAMETHASONE SODIUM PHOSPHATE 0.25 MG: 4 INJECTION, SOLUTION INTRA-ARTICULAR; INTRALESIONAL; INTRAMUSCULAR; INTRAVENOUS; SOFT TISSUE at 09:05

## 2025-05-06 NOTE — PROGRESS NOTES
MEDICAL ONCOLOGY - ESTABLISHED PATIENT VISIT    Reason for visit: colon adenocarcinoma    Best Contact Phone Number(s): [phone] (home)      Cancer/Stage/TNM:    Cancer Staging   Colon adenocarcinoma  Staging form: Colon and Rectum, AJCC 8th Edition  - Pathologic stage from 1/22/2024: Stage IVC (pT4a, pN2b, cM1c) - Signed by Minna Menendez CNS on 2/13/2024       Oncology History   Colon adenocarcinoma   12/21/2023 Tumor Markers    Patient's tumor was tested for the following markers: CEA.                                              Results of the tumor marker test revealed 3.3     12/21/2023 Procedure    Colonoscopy  Cecal polyp removed with jumbo forceps, 3 mm  Multiple ascending colon polyps ranging in size from 5 to 12 mm - semi-pedunculated removed with hot snare  Hepatic flexure mass identified - central ulceration, concerning for malignancy, 3 cm, not obstructing, this was biopsied - tattoo placed distal to mass  Sigmoid diverticular disease     12/21/2023 Tumor Markers    Patient's tumor was tested for the following markers: CEA.                                              Results of the tumor marker test revealed 3.3     12/22/2023 Imaging Significant Findings    CT CAP  Evaluation of the colon is somewhat limited as there is significant colonic stool and oral contrast material has not opacified the large bowel.  There does appear to be some abnormal thickening of the walls of the proximal right colon and a patient with a known history of colonic malignancy.  There is some soft tissue density external to the walls of the colon on the right pericolic gutter which could represent peritoneal nodularity versus subserosal extent of tumor.  Slightly more distal to this area there is a 2nd area of irregularity of the walls of the colon, difficult to fully evaluate if this is related to the colonic walls versus stool with central fat.     Two hypodensities in the liver, the larger measuring 0.8 cm, too  small to accurately characterize on the basis of this examination.  Attention on follow-up.     No pulmonary nodules are seen.     Cholecystectomy.     12/29/2023 Initial Diagnosis    Hepatic flexure colon adenocarcinoma  MMR intact     1/22/2024 Cancer Staged    Staging form: Colon and Rectum, AJCC 8th Edition  - Pathologic stage from 1/22/2024: Stage IVC (pT4a, pN2b, cM1c)     8/27/2024 Tumor Markers    Patient's tumor was tested for the following markers: CEA.                                              Results of the tumor marker test revealed 2.3      Malignant neoplasm of ascending colon   2/12/2024 Initial Diagnosis    Malignant neoplasm of ascending colon     2/26/2024 -  Chemotherapy    Treatment Summary   Plan Name: OP GI [id number] (oxaliplatin leucovorin fluorouracil) with bevacizumab Q2W  Treatment Goal: Palliative  Status: Active  Start Date: 2/26/2024  End Date: 8/14/2025 (Planned)  Provider: Kemar Zaragoza MD  Chemotherapy: oxaliplatin (ELOXATIN) 150 mg in dextrose 5 % (D5W) 595 mL chemo infusion, 157 mg, Intravenous, Clinic/HOD 1 time, 8 of 8 cycles  Administration: 150 mg (2/26/2024), 150 mg (3/11/2024), 150 mg (3/25/2024), 150 mg (4/8/2024), 150 mg (4/22/2024), 150 mg (5/6/2024), 150 mg (5/20/2024), 150 mg (6/3/2024)  fluorouracil (ADRUCIL) 2,400 mg/m2 = 4,440 mg in sodium chloride 0.9% 100 mL chemo infusion, 2,400 mg/m2 = 4,440 mg, Intravenous, Clinic/HOD 1 time, 29 of 37 cycles  Administration: 4,440 mg (2/26/2024), 4,440 mg (3/11/2024), 4,440 mg (3/25/2024), 4,440 mg (4/8/2024), 4,440 mg (4/22/2024), 4,440 mg (5/6/2024), 4,440 mg (5/20/2024), 4,440 mg (6/3/2024), 4,440 mg (6/17/2024), 4,440 mg (7/3/2024), 4,440 mg (7/16/2024), 4,440 mg (7/31/2024), 4,440 mg (8/13/2024), 4,440 mg (8/28/2024), 4,440 mg (10/24/2024), 4,440 mg (10/10/2024), 4,440 mg (11/6/2024), 4,440 mg (11/19/2024), 4,440 mg (12/3/2024), 4,440 mg (12/17/2024), 4,500 mg (1/15/2025), 4,500 mg (12/31/2024), 4,500 mg  (1/29/2025), 4,500 mg (2/12/2025), 4,500 mg (2/26/2025), 4,500 mg (3/12/2025), 4,500 mg (3/26/2025), 4,500 mg (4/9/2025), 4,500 mg (4/22/2025)  bevacizumab-awwb (MVASI) 5 mg/kg = 365 mg in sodium chloride 0.9% 100 mL infusion, 5 mg/kg = 365 mg, Intravenous, [facility] [place] [facility]\"" 1 time, 27 of 35 cycles  Administration: 365 mg (2/26/2024), 365 mg (3/11/2024), 365 mg (3/25/2024), 365 mg (4/8/2024), 365 mg (4/22/2024), 365 mg (5/6/2024), 365 mg (5/20/2024), 365 mg (6/3/2024), 365 mg (6/17/2024), 365 mg (7/3/2024), 365 mg (7/16/2024), 365 mg (7/31/2024), 365 mg (8/13/2024), 365 mg (10/24/2024), 365 mg (11/6/2024), 365 mg (11/19/2024), 365 mg (12/3/2024), 365 mg (12/17/2024), 400 mg (1/15/2025), 400 mg (12/31/2024), 400 mg (1/29/2025), 400 mg (2/12/2025), 400 mg (2/26/2025), 400 mg (3/12/2025), 400 mg (3/26/2025), 400 mg (4/9/2025), 400 mg (4/22/2025)     8/27/2024 Tumor Markers    Patient's tumor was tested for the following markers: CEA.                                              Results of the tumor marker test revealed 2.3           Interim History:   74 y.o. female with LUANNE, bipolar, HLD who presents prior to cycle 29 FOLFOX + bevacizumab for her metastatic ascending colon cancer, now on maintenance 5-FU + Bevacizumab. She continues to feel very well. She had Covid diagnosed just before cycle 28, did not have significant symptoms.  Completely resolved.  No pain.  No N/V or bowel changes.     ECOG 0. Presents with her  today.    ROS:   Review of Systems   Constitutional:  Negative for chills, fever and malaise/fatigue.   HENT:  Negative for congestion and sore throat.    Respiratory:  Negative for shortness of breath.    Cardiovascular:  Negative for chest pain, palpitations and leg swelling.   Gastrointestinal:  Negative for blood in stool, constipation, diarrhea and nausea.   Genitourinary:  Negative for hematuria.   Musculoskeletal:  Negative for back pain, falls and myalgias.   Skin:  Negative for rash.    Neurological:  Positive for tingling. Negative for dizziness, weakness and headaches.       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    Sacroiliitis     right side    Sleep apnea     can not tolerate cpap        Past Surgical History:   Past Surgical History:   Procedure Laterality Date    ANKLE FRACTURE SURGERY Left 2001    BLADDER SUSPENSION      CARPAL TUNNEL RELEASE Bilateral     CHOLECYSTECTOMY      Laparoscopic    COLONOSCOPY      COLONOSCOPY N/A 12/21/2023    Procedure: COLONOSCOPY;  Surgeon: Alberto Albright MD;  Location: [facility];  Service: Endoscopy;  Laterality: N/A;    DIAGNOSTIC LAPAROSCOPY N/A 9/20/2024    Procedure: LAPAROSCOPY, DIAGNOSTIC;  Surgeon: Benjy Contreras MD;  Location: [address];  Service: General;  Laterality: N/A;    ESOPHAGOGASTRODUODENOSCOPY N/A 07/29/2021    Procedure: EGD (ESOPHAGOGASTRODUODENOSCOPY);  Surgeon: Susan Mcclain MD;  Location: [facility];  Service: Endoscopy;  Laterality: N/A;    EYE SURGERY      FOOT ARTHRODESIS Right 05/03/2023    Procedure: FUSION, FOOT;  Surgeon: Lauri Alejandra DPM;  Location: [facility];  Service: Podiatry;  Laterality: Right;  mini c-arm, Arthrex dowel bone graft harvester, locking plate and screws festus notified cc    HYSTERECTOMY  1986    vaginal prolapse    INJECTION OF ANESTHETIC AGENT AROUND MEDIAL BRANCH NERVES INNERVATING CERVICAL FACET JOINT Bilateral 05/27/2022    Procedure: CERVICAL MEDIAL BRANCH NERVE BLOCK (C3-4,C4-5);  Surgeon: Mamie Roman MD;  Location: [facility];  Service: Pain Management;  Laterality: Bilateral;    INJECTION OF ANESTHETIC AGENT AROUND MEDIAL BRANCH NERVES INNERVATING LUMBAR FACET JOINT Right 02/05/2021     Procedure: LUMBAR FACET JOINT BLOCK (L3-4,L4-5,L5-S1);  Surgeon: Mamie Roman MD;  Location: [facility];  Service: Pain Management;  Laterality: Right;    INJECTION OF ANESTHETIC AGENT AROUND MEDIAL BRANCH NERVES INNERVATING LUMBAR FACET JOINT Right 2021    Procedure: LUMBAR FACET JOINT BLOCK (L3-4,L4-5,L5-S1);  Surgeon: Mamie Roman MD;  Location: [facility];  Service: Pain Management;  Laterality: Right;    INJECTION OF ANESTHETIC AGENT INTO SACROILIAC JOINT Right 2020    Procedure: SACROILIAC JOINT INJECTION;  Surgeon: Mamie Roman MD;  Location: [facility];  Service: Pain Management;  Laterality: Right;    INJECTION OF JOINT Right 2020    Procedure: GREATER TROCHANTERIC BURSA INJECTION;  Surgeon: Mamie Roman MD;  Location: [facility];  Service: Pain Management;  Laterality: Right;    LAPAROSCOPIC RIGHT COLON RESECTION N/A 2024    Procedure: COLECTOMY, RIGHT, LAPAROSCOPIC (eras low, lithotomy);  Surgeon: Alberto Albright MD;  [facility];  Service: Colon and Rectal;  Laterality: N/A;    NASAL SEPTUM SURGERY      RECTAL PROLAPSE REPAIR      TONSILLECTOMY          Family History:   Family History   Problem Relation Name Age of Onset    No Known Problems Maternal Aunt Rubio Glasgow     Colon cancer Maternal Uncle Kalpeshwis     Colon cancer Maternal Uncle Yovani     Colon cancer Maternal Uncle Konstantin     No Known Problems Paternal Aunt Vero     Esophageal cancer Paternal Uncle Nat Jr     Heart attack Maternal Grandmother Elmira     Leukemia Maternal Grandfather manish Pang     No Known Problems Paternal Grandmother Lizett     Stroke Paternal Grandfather Nat Sr         Social History:   Social History     Tobacco Use    Smoking status: Former     Current packs/day: 0.00     Average packs/day: 1 pack/day for 41.7 years (41.7 ttl pk-yrs)     Types: Cigarettes     Start date: 3/30/1982     Quit date: 2023     Years since quittin.3    Smokeless tobacco: Never  "  Substance Use Topics    Alcohol use: Yes     Comment: Socially-2 or 3 times a year-6 or 7 drinks        I have reviewed and updated the patient's past medical, surgical, family and social histories.    Allergies:   Review of patient's allergies indicates:   Allergen Reactions    Nsaids (non-steroidal anti-inflammatory drug) Other (See Comments)     Gastrointestinal bleeding requiring blood transfusion    Demerol [meperidine] Rash        Medications:   Current Outpatient Medications   Medication Sig Dispense Refill    albuterol (PROVENTIL/VENTOLIN HFA) 90 mcg/actuation inhaler Inhale 2 puffs into the lungs every 4 (four) hours as needed for Wheezing or Shortness of Breath. 16 g 1    aspirin (ECOTRIN) 81 MG EC tablet Take 81 mg by mouth nightly.      dexAMETHasone (DECADRON) 4 MG Tab Take 2 tablets (8 mg total) by mouth once daily only on days 2, 3 and 4 of each chemotherapy cycle. 40 tablet 0    dorzolamide-timolol 2-0.5% (COSOPT) 22.3-6.8 mg/mL ophthalmic solution Place 1 drop into both eyes 2 (two) times daily. 10 mL 4    EScitalopram oxalate (LEXAPRO) 20 MG tablet Take 1 tablet (20 mg total) by mouth once daily. 30 tablet 3    lamoTRIgine (LAMICTAL) 150 MG Tab Take 2 tablets (300 mg total) by mouth every evening. 180 tablet 1    LIDOcaine-prilocaine (EMLA) cream Apply topically as needed (place to port site 45-60 minutes prior to chemotherapy). 30 g 5    pantoprazole (PROTONIX) 40 MG tablet Take 1 tablet (40 mg total) by mouth once daily. 90 tablet 3    QUEtiapine (SEROQUEL) 200 MG Tab Take 1 tablet (200 mg total) by mouth every evening. 30 tablet 3    rosuvastatin (CRESTOR) 10 MG tablet Take 1 tablet (10 mg total) by mouth once daily. 90 tablet 3     No current facility-administered medications for this visit.        Physical Exam:   /78 (BP Location: Left arm, Patient Position: Sitting)   Pulse (!) 113   Temp 96.6 °F (35.9 °C) (Temporal)   Resp 20   Ht 5' 6" (1.676 m)   Wt 81.1 kg (178 lb 14.5 oz)  "  SpO2 98%   BMI 28.88 kg/m²           Physical Exam  Vitals reviewed.   Constitutional:       General: She is not in acute distress.     Appearance: Normal appearance. She is normal weight. She is not ill-appearing, toxic-appearing or diaphoretic.   HENT:      Head: Normocephalic and atraumatic.      Right Ear: External ear normal.      Left Ear: External ear normal.      Nose: Nose normal.      Mouth/Throat:      Pharynx: Oropharynx is clear.   Eyes:      General: No scleral icterus.     Conjunctiva/sclera: Conjunctivae normal.   Cardiovascular:      Rate and Rhythm: Normal rate.   Pulmonary:      Effort: Pulmonary effort is normal. No respiratory distress.   Chest:      Comments: RCW port  Abdominal:      General: There is no distension.   Skin:     General: Skin is warm and dry.      Coloration: Skin is not jaundiced or pale.      Findings: No bruising, erythema or rash.   Neurological:      Mental Status: She is alert and oriented to person, place, and time. Mental status is at baseline.      Motor: No weakness.      Gait: Gait normal.   Psychiatric:         Mood and Affect: Mood normal.         Labs:   I have reviewed the pertinent labs.  CEA 3.5.     Imaging:    CT CAP - 5/2/25:    Impression:     History of colon cancer status post right hemicolectomy.     Prominent pericaval lymph node, unchanged.     Enlarging 2.1 cm left adnexal mass, which could represent metastasis or a primary ovarian neoplasm.     No thoracic metastases.    Path:   1/22/24 - R Hemicolectomy   Final Pathologic Diagnosis     THE DIAGNOSES REMAIN THE SAME.  THIS CORRECTED REPORT IS ISSUED TO CHANGE M STATUS to reflect tumor in the omentum.  This change is discussed with Dr. RANJANA Albright via phone, 2/1/2024.    1. Colon, right and terminal ileum (right hemicolectomy with EN bloc abdominal wall resection):  Invasive adenocarcinoma.  See cancer synoptic report     2. Omentum (resection):    Positive for carcinoma    CORRECT SYNOPTIC AND M  STATUS  Cancer synoptic report  - Procedure: Right hemicolectomy with EN bloc abdominal wall resection  - Tumor site:  Ascending  - Histologic type:  Adenocarcinoma  - Histologic grade:  G2, moderately differentiated.  See comment.  COMMENT:  While the majority of the tumor consists of well formed glands, a significant proportion includes abortive glands, single file infiltration, and malignant cells with signet ring cell morphology. The tumor has an insidious pattern of  infiltration with tumor present far from the advancing front of the tumor.  - Tumor size:  2.0 x 2.0 x 1.0 cm  - Tumor extent:  Invades visceral peritoneum, multifocal  - Macroscopic perforation: Not identified  - Lymphovascular invasion:  Present, extensive.  Small vessel, large vessel, intra and extramural.  - Perineural invasion:  Not identified  - Tumor budding score:  High (>10), multifocal single cell infiltration  - Treatment effect: No known pre-surgical therapy    Margins  Margin involved by invasive carcinoma, radial (slide 1C)  All margins negative for dysplasia.    pTNM stage classification (AJCC 8th edition)  pT Category  pT4a:  Tumor invades through the visceral peritoneum    pN Category  pN2b:  7 or more regional lymph nodes are positive  Number of positive lymph nodes:  19  Number of lymph nodes examined: 26  Number of tumor deposits:  4    pM Category  pM1c:  Metastasis to the peritoneal surface of the omentum.    Additional findings:  - Sessile serrated polyp, no cytologic dysplasia, 1.1 cm at ileocecal valve  - Tubulovillous adenoma, 1.0 cm, proximal ascending  - Tubular adenoma, 0.4 cm, mid ascending  - Tubular adenoma, 0.4, distal ascending  - Tubular adenoma, 0.6 cm, distal ascending  - Submucosal lipoma, 2.0 cm  - Appendix with no specific histopathologic changes    Ancillary studies  Immunohistochemistry for mismatch repair proteins performed on prior biopsy material (SAS-, 12/21/23): pMMR.         Assessment:        1. Malignant neoplasm of ascending colon    2. 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 Immunodeficiency secondary to neoplasm    4. Immunodeficiency secondary to chemotherapy    5. Neoplastic malignant related fatigue    6. Cold sensitivity    7. Other constipation    8. Excessive gas          Plan:        # Colon cancer   Mrs. Maguire is a 74 y.o. female who presents for management of her metastatic colon cancer. She underwent a R hemicolectomy with en-bloc abdominal wall resection on 1/22/24 with Dr. Albright. Pathology revealed pT4a N2b disease with 19/26 positive LNs and omentum positive for carcinoma.    We had an in-depth conversation during our initial consult re: her diagnosis and treatment options. Reviewed recommendation for IV systemic therapy for at least 6 months. Plan for FOLFOX + bevacizumab to be given every 2 weeks. Briefly reviewed side effects of treatment. Handouts provided. Port placed 2/21/24.     PGX - DPYD normal metabolizer, [id number] intermediate metabolizer   Dexamethasone, zofran, compazine and lidocaine sent to pharmacy previously. Reviewed admin instructions.     Pending response, she may be a candidate for CRS/HIPEC with surgical oncology team. Continue to evaluate and re-start discussion if still without radiographic evidence of disease after CT on cycle 8.    CT CAP after cycle 4 shows no clear evidence of disease.  CT CAP after cycle 8 shows no clear evidence of disease.  CT CAP after cycle 12 shows no clear evidence of disease. Underwent diagnostic laparoscopy on 9/20/24 with PCI of 14.  Discussed with Dr. Contreras and with patient that options include continuing systemic therapy at this time vs CRS/HIPEC.  We are in agreement that with her volume of disease and histology, likelihood of CRS/HIPEC being beneficial for survival is not high.  CT CAP after cycle 18 shows no clear evidence of disease radiographically.  CT CAP after cycle 23 shows no clear evidence of disease.  CT CAP  after cycle 29 shows very mild growth (3mm) in left ovarian soft tissue lesion, now about 2.1cm.  no other new findings.     Discussed recommendation to continue with maintenance therapy and just monitor this area of likely metastasis.  She is agreeable.    Presents today for cycle 30 of 5-FU + Felisa maintenance.  Tolerance of chemotherapy has been excellent.   I have reviewed the CBC and CMP, adequate for treatment   CEA remains normal.   Will see her every other cycle from here on out.    Will stay on chemo indefinitely every 2 weeks.  Proceed with cycle 30 and 31.    RTC in 4 weeks for cycle 31.  Will repeat imaging studies in early August.    Cold sensitivity/neoplasm related fatigue:  2/2 chemotherapy. Stopped oxaliplatin beginning with cycle 9 to prevent persistent paresthesias.  Mild persistent paresthesias at this time. Will monitor. She is taking Cymbalta, prescribed by her psychiatrist.    Constipation/Gas  Continue 2 stool softeners a day with Miralax daily to have regular bowel movements.   Continue Miralax to avoid more explosive BM.    RTC in 4 weeks.     Patient is in agreement with the proposed treatment plan. All questions were answered to the patient's satisfaction. Pt knows to call clinic if anything is needed before the next clinic visit.    Kemar Zaragoza MD  Hematology/Oncology  [facility]           code applied: patient requires or will require a continuous, longitudinal, and active collaborative plan of care related to this patient's health condition, metastatic colon cancer --the management of which requires the direction of a practitioner with specialized clinical knowledge, skill, and expertise.       Med Onc Chart Routing      Follow up with physician    Follow up with KRISTEN 4 weeks. 5-FU + felisa every 2 weeks; see us for visit every other chemo   Infusion scheduling note    Injection scheduling note    Labs CBC, CMP, CEA and urinalysis   Scheduling:  Preferred  lab:  Lab interval:  U/A every other time   Imaging    Pharmacy appointment    Other referrals

## 2025-05-06 NOTE — PLAN OF CARE
Problem: Chemotherapy Effects  Goal: Anemia Symptom Improvement  Outcome: Progressing  Goal: Safety Maintained  Outcome: Progressing  Goal: Absence of Hematuria  Outcome: Progressing  Goal: Nausea and Vomiting Relief  Outcome: Progressing  Goal: Neurotoxicity Symptom Control  Outcome: Progressing  Goal: Absence of Infection  Outcome: Progressing  Goal: Absence of Bleeding  Outcome: Progressing     Ambulatory to clinic; accompanied by . MVASI given without any issues; VSS and done as ordered. Pt to discharge with 5FU home infusion pump; infusing to R chest port. Volume decreased prior to discharge. Pt to return 5/8/2025 for pump d/c. No other issues reported at this time.

## 2025-05-08 ENCOUNTER — INFUSION (OUTPATIENT)
Dept: INFUSION THERAPY | Facility: HOSPITAL | Age: 75
End: 2025-05-08
Payer: MEDICARE

## 2025-05-08 VITALS
SYSTOLIC BLOOD PRESSURE: 157 MMHG | TEMPERATURE: 98 F | OXYGEN SATURATION: 97 % | DIASTOLIC BLOOD PRESSURE: 84 MMHG | HEART RATE: 80 BPM | RESPIRATION RATE: 18 BRPM

## 2025-05-08 DIAGNOSIS — C18.2 MALIGNANT NEOPLASM OF ASCENDING COLON: Primary | ICD-10-CM

## 2025-05-08 PROCEDURE — 25000003 PHARM REV CODE 250: Performed by: INTERNAL MEDICINE

## 2025-05-08 PROCEDURE — 63600175 PHARM REV CODE 636 W HCPCS: Performed by: INTERNAL MEDICINE

## 2025-05-08 PROCEDURE — A4216 STERILE WATER/SALINE, 10 ML: HCPCS | Performed by: INTERNAL MEDICINE

## 2025-05-08 RX ORDER — PROCHLORPERAZINE EDISYLATE 5 MG/ML
5 INJECTION INTRAMUSCULAR; INTRAVENOUS ONCE AS NEEDED
Status: DISCONTINUED | OUTPATIENT
Start: 2025-05-08 | End: 2025-05-08 | Stop reason: HOSPADM

## 2025-05-08 RX ORDER — HEPARIN 100 UNIT/ML
500 SYRINGE INTRAVENOUS
Status: DISCONTINUED | OUTPATIENT
Start: 2025-05-08 | End: 2025-05-08 | Stop reason: HOSPADM

## 2025-05-08 RX ORDER — SODIUM CHLORIDE 0.9 % (FLUSH) 0.9 %
10 SYRINGE (ML) INJECTION
Status: DISCONTINUED | OUTPATIENT
Start: 2025-05-08 | End: 2025-05-08 | Stop reason: HOSPADM

## 2025-05-08 RX ADMIN — HEPARIN 500 UNITS: 100 SYRINGE at 08:05

## 2025-05-08 RX ADMIN — Medication 10 ML: at 08:05

## 2025-05-08 NOTE — PLAN OF CARE
0855 - Pt tolerated home 5-FU infusion well, no complaints or complications reported. VSS. Vessel empty upon arrival; Pt disconnected from pump. Positive blood return noted from port; port flushed, heparin locked, and deaccessed. Pt aware to call provider with any questions or concerns, and is aware of upcoming appts. Pt ambulatory from clinic independently with steady gait, no distress noted, accompanied by daughter.

## 2025-05-20 ENCOUNTER — INFUSION (OUTPATIENT)
Dept: INFUSION THERAPY | Facility: HOSPITAL | Age: 75
End: 2025-05-20
Payer: MEDICARE

## 2025-05-20 ENCOUNTER — LAB VISIT (OUTPATIENT)
Dept: LAB | Facility: HOSPITAL | Age: 75
End: 2025-05-20
Payer: MEDICARE

## 2025-05-20 VITALS
RESPIRATION RATE: 18 BRPM | TEMPERATURE: 98 F | WEIGHT: 179.25 LBS | HEIGHT: 66 IN | SYSTOLIC BLOOD PRESSURE: 160 MMHG | DIASTOLIC BLOOD PRESSURE: 74 MMHG | BODY MASS INDEX: 28.81 KG/M2 | HEART RATE: 83 BPM

## 2025-05-20 DIAGNOSIS — C18.2 MALIGNANT NEOPLASM OF ASCENDING COLON: Primary | ICD-10-CM

## 2025-05-20 DIAGNOSIS — C18.2 MALIGNANT NEOPLASM OF ASCENDING COLON: ICD-10-CM

## 2025-05-20 LAB
ABSOLUTE NEUTROPHIL COUNT (OHS): 2.79 K/UL (ref 1.8–7.7)
ALBUMIN SERPL BCP-MCNC: 3.1 G/DL (ref 3.5–5.2)
ALP SERPL-CCNC: 83 UNIT/L (ref 40–150)
ALT SERPL W/O P-5'-P-CCNC: 10 UNIT/L (ref 10–44)
ANION GAP (OHS): 11 MMOL/L (ref 8–16)
AST SERPL-CCNC: 16 UNIT/L (ref 11–45)
BILIRUB SERPL-MCNC: 0.2 MG/DL (ref 0.1–1)
BILIRUB UR QL STRIP.AUTO: NEGATIVE
BUN SERPL-MCNC: 14 MG/DL (ref 8–23)
CALCIUM SERPL-MCNC: 9.6 MG/DL (ref 8.7–10.5)
CARCINOEMBRYONIC ANTIGEN (OHS): 3.8 NG/ML
CHLORIDE SERPL-SCNC: 103 MMOL/L (ref 95–110)
CLARITY UR: CLEAR
CO2 SERPL-SCNC: 25 MMOL/L (ref 23–29)
COLOR UR AUTO: YELLOW
CREAT SERPL-MCNC: 0.8 MG/DL (ref 0.5–1.4)
ERYTHROCYTE [DISTWIDTH] IN BLOOD BY AUTOMATED COUNT: 18.7 % (ref 11.5–14.5)
GFR SERPLBLD CREATININE-BSD FMLA CKD-EPI: >60 ML/MIN/1.73/M2
GLUCOSE SERPL-MCNC: 116 MG/DL (ref 70–110)
GLUCOSE UR QL STRIP: NEGATIVE
HCT VFR BLD AUTO: 36.1 % (ref 37–48.5)
HGB BLD-MCNC: 11.5 GM/DL (ref 12–16)
HGB UR QL STRIP: NEGATIVE
IMM GRANULOCYTES # BLD AUTO: 0.02 K/UL (ref 0–0.04)
KETONES UR QL STRIP: NEGATIVE
LEUKOCYTE ESTERASE UR QL STRIP: NEGATIVE
MCH RBC QN AUTO: 29 PG (ref 27–31)
MCHC RBC AUTO-ENTMCNC: 31.9 G/DL (ref 32–36)
MCV RBC AUTO: 91 FL (ref 82–98)
NITRITE UR QL STRIP: NEGATIVE
PH UR STRIP: 7 [PH]
PLATELET # BLD AUTO: 217 K/UL (ref 150–450)
PMV BLD AUTO: 10.2 FL (ref 9.2–12.9)
POTASSIUM SERPL-SCNC: 4.4 MMOL/L (ref 3.5–5.1)
PROT SERPL-MCNC: 6.4 GM/DL (ref 6–8.4)
PROT UR QL STRIP: NEGATIVE
RBC # BLD AUTO: 3.96 M/UL (ref 4–5.4)
SODIUM SERPL-SCNC: 139 MMOL/L (ref 136–145)
SP GR UR STRIP: 1.01
UROBILINOGEN UR STRIP-ACNC: NEGATIVE EU/DL
WBC # BLD AUTO: 5.33 K/UL (ref 3.9–12.7)

## 2025-05-20 PROCEDURE — 81003 URINALYSIS AUTO W/O SCOPE: CPT

## 2025-05-20 PROCEDURE — 96413 CHEMO IV INFUSION 1 HR: CPT

## 2025-05-20 PROCEDURE — 25000003 PHARM REV CODE 250: Performed by: INTERNAL MEDICINE

## 2025-05-20 PROCEDURE — 96416 CHEMO PROLONG INFUSE W/PUMP: CPT

## 2025-05-20 PROCEDURE — 63600175 PHARM REV CODE 636 W HCPCS: Performed by: INTERNAL MEDICINE

## 2025-05-20 PROCEDURE — 36415 COLL VENOUS BLD VENIPUNCTURE: CPT

## 2025-05-20 PROCEDURE — 96375 TX/PRO/DX INJ NEW DRUG ADDON: CPT

## 2025-05-20 RX ORDER — HEPARIN 100 UNIT/ML
500 SYRINGE INTRAVENOUS
Status: DISCONTINUED | OUTPATIENT
Start: 2025-05-20 | End: 2025-05-20 | Stop reason: HOSPADM

## 2025-05-20 RX ORDER — SODIUM CHLORIDE 0.9 % (FLUSH) 0.9 %
10 SYRINGE (ML) INJECTION
Status: DISCONTINUED | OUTPATIENT
Start: 2025-05-20 | End: 2025-05-20 | Stop reason: HOSPADM

## 2025-05-20 RX ORDER — DIPHENHYDRAMINE HYDROCHLORIDE 50 MG/ML
50 INJECTION, SOLUTION INTRAMUSCULAR; INTRAVENOUS ONCE AS NEEDED
Status: DISCONTINUED | OUTPATIENT
Start: 2025-05-20 | End: 2025-05-20 | Stop reason: HOSPADM

## 2025-05-20 RX ORDER — PROCHLORPERAZINE EDISYLATE 5 MG/ML
5 INJECTION INTRAMUSCULAR; INTRAVENOUS ONCE AS NEEDED
Status: DISCONTINUED | OUTPATIENT
Start: 2025-05-20 | End: 2025-05-20 | Stop reason: HOSPADM

## 2025-05-20 RX ORDER — EPINEPHRINE 0.3 MG/.3ML
0.3 INJECTION SUBCUTANEOUS ONCE AS NEEDED
Status: DISCONTINUED | OUTPATIENT
Start: 2025-05-20 | End: 2025-05-20 | Stop reason: HOSPADM

## 2025-05-20 RX ADMIN — FLUOROURACIL 4500 MG: 50 INJECTION, SOLUTION INTRAVENOUS at 10:05

## 2025-05-20 RX ADMIN — BEVACIZUMAB-AWWB 400 MG: 400 INJECTION, SOLUTION INTRAVENOUS at 09:05

## 2025-05-20 RX ADMIN — DEXAMETHASONE SODIUM PHOSPHATE 0.25 MG: 4 INJECTION, SOLUTION INTRA-ARTICULAR; INTRALESIONAL; INTRAMUSCULAR; INTRAVENOUS; SOFT TISSUE at 10:05

## 2025-05-20 RX ADMIN — SODIUM CHLORIDE: 9 INJECTION, SOLUTION INTRAVENOUS at 08:05

## 2025-05-22 ENCOUNTER — INFUSION (OUTPATIENT)
Dept: INFUSION THERAPY | Facility: HOSPITAL | Age: 75
End: 2025-05-22
Payer: MEDICARE

## 2025-05-22 VITALS
HEART RATE: 84 BPM | TEMPERATURE: 98 F | RESPIRATION RATE: 18 BRPM | SYSTOLIC BLOOD PRESSURE: 150 MMHG | DIASTOLIC BLOOD PRESSURE: 66 MMHG

## 2025-05-22 DIAGNOSIS — C18.2 MALIGNANT NEOPLASM OF ASCENDING COLON: Primary | ICD-10-CM

## 2025-05-22 PROCEDURE — A4216 STERILE WATER/SALINE, 10 ML: HCPCS | Performed by: INTERNAL MEDICINE

## 2025-05-22 PROCEDURE — 25000003 PHARM REV CODE 250: Performed by: INTERNAL MEDICINE

## 2025-05-22 PROCEDURE — 63600175 PHARM REV CODE 636 W HCPCS: Performed by: INTERNAL MEDICINE

## 2025-05-22 RX ORDER — SODIUM CHLORIDE 0.9 % (FLUSH) 0.9 %
10 SYRINGE (ML) INJECTION
Status: DISCONTINUED | OUTPATIENT
Start: 2025-05-22 | End: 2025-05-22 | Stop reason: HOSPADM

## 2025-05-22 RX ORDER — PROCHLORPERAZINE EDISYLATE 5 MG/ML
5 INJECTION INTRAMUSCULAR; INTRAVENOUS ONCE AS NEEDED
Status: DISCONTINUED | OUTPATIENT
Start: 2025-05-22 | End: 2025-05-22 | Stop reason: HOSPADM

## 2025-05-22 RX ORDER — HEPARIN 100 UNIT/ML
500 SYRINGE INTRAVENOUS
Status: DISCONTINUED | OUTPATIENT
Start: 2025-05-22 | End: 2025-05-22 | Stop reason: HOSPADM

## 2025-05-22 RX ADMIN — HEPARIN 500 UNITS: 100 SYRINGE at 08:05

## 2025-05-22 RX ADMIN — SODIUM CHLORIDE, PRESERVATIVE FREE 10 ML: 5 INJECTION INTRAVENOUS at 08:05

## 2025-06-03 ENCOUNTER — PATIENT MESSAGE (OUTPATIENT)
Dept: OPHTHALMOLOGY | Facility: CLINIC | Age: 75
End: 2025-06-03
Payer: MEDICARE

## 2025-06-04 ENCOUNTER — INFUSION (OUTPATIENT)
Dept: INFUSION THERAPY | Facility: HOSPITAL | Age: 75
End: 2025-06-04
Payer: MEDICARE

## 2025-06-04 ENCOUNTER — LAB VISIT (OUTPATIENT)
Dept: LAB | Facility: HOSPITAL | Age: 75
End: 2025-06-04
Payer: MEDICARE

## 2025-06-04 ENCOUNTER — OFFICE VISIT (OUTPATIENT)
Dept: HEMATOLOGY/ONCOLOGY | Facility: CLINIC | Age: 75
End: 2025-06-04
Payer: MEDICARE

## 2025-06-04 VITALS
BODY MASS INDEX: 29.16 KG/M2 | RESPIRATION RATE: 18 BRPM | TEMPERATURE: 97 F | DIASTOLIC BLOOD PRESSURE: 78 MMHG | OXYGEN SATURATION: 98 % | SYSTOLIC BLOOD PRESSURE: 143 MMHG | WEIGHT: 181.44 LBS | HEIGHT: 66 IN | HEART RATE: 81 BPM

## 2025-06-04 VITALS
SYSTOLIC BLOOD PRESSURE: 133 MMHG | HEIGHT: 66 IN | RESPIRATION RATE: 18 BRPM | WEIGHT: 181.44 LBS | DIASTOLIC BLOOD PRESSURE: 63 MMHG | BODY MASS INDEX: 29.16 KG/M2 | HEART RATE: 86 BPM | TEMPERATURE: 97 F | OXYGEN SATURATION: 98 %

## 2025-06-04 DIAGNOSIS — C18.2 MALIGNANT NEOPLASM OF ASCENDING COLON: Primary | ICD-10-CM

## 2025-06-04 DIAGNOSIS — C18.2 MALIGNANT NEOPLASM OF ASCENDING COLON: ICD-10-CM

## 2025-06-04 DIAGNOSIS — D49.9 IMMUNODEFICIENCY SECONDARY TO NEOPLASM: ICD-10-CM

## 2025-06-04 DIAGNOSIS — R68.89 COLD SENSITIVITY: ICD-10-CM

## 2025-06-04 DIAGNOSIS — R53.0 NEOPLASTIC MALIGNANT RELATED FATIGUE: ICD-10-CM

## 2025-06-04 DIAGNOSIS — R14.3 EXCESSIVE GAS: ICD-10-CM

## 2025-06-04 DIAGNOSIS — T45.1X5A IMMUNODEFICIENCY SECONDARY TO CHEMOTHERAPY: ICD-10-CM

## 2025-06-04 DIAGNOSIS — D84.81 IMMUNODEFICIENCY SECONDARY TO NEOPLASM: ICD-10-CM

## 2025-06-04 DIAGNOSIS — K59.09 OTHER CONSTIPATION: ICD-10-CM

## 2025-06-04 DIAGNOSIS — D84.821 IMMUNODEFICIENCY SECONDARY TO CHEMOTHERAPY: ICD-10-CM

## 2025-06-04 DIAGNOSIS — Z79.69 IMMUNODEFICIENCY SECONDARY TO CHEMOTHERAPY: ICD-10-CM

## 2025-06-04 DIAGNOSIS — C78.6 MALIGNANT NEOPLASM METASTATIC TO OMENTUM: ICD-10-CM

## 2025-06-04 LAB
ABSOLUTE NEUTROPHIL COUNT (OHS): 3.31 K/UL (ref 1.8–7.7)
ALBUMIN SERPL BCP-MCNC: 3.5 G/DL (ref 3.5–5.2)
ALP SERPL-CCNC: 68 UNIT/L (ref 40–150)
ALT SERPL W/O P-5'-P-CCNC: 12 UNIT/L (ref 10–44)
ANION GAP (OHS): 8 MMOL/L (ref 8–16)
AST SERPL-CCNC: 16 UNIT/L (ref 11–45)
BILIRUB SERPL-MCNC: 0.3 MG/DL (ref 0.1–1)
BUN SERPL-MCNC: 11 MG/DL (ref 8–23)
CALCIUM SERPL-MCNC: 8.9 MG/DL (ref 8.7–10.5)
CARCINOEMBRYONIC ANTIGEN (OHS): 3.6 NG/ML
CHLORIDE SERPL-SCNC: 105 MMOL/L (ref 95–110)
CO2 SERPL-SCNC: 25 MMOL/L (ref 23–29)
CREAT SERPL-MCNC: 0.8 MG/DL (ref 0.5–1.4)
ERYTHROCYTE [DISTWIDTH] IN BLOOD BY AUTOMATED COUNT: 18.6 % (ref 11.5–14.5)
GFR SERPLBLD CREATININE-BSD FMLA CKD-EPI: >60 ML/MIN/1.73/M2
GLUCOSE SERPL-MCNC: 121 MG/DL (ref 70–110)
HCT VFR BLD AUTO: 38.9 % (ref 37–48.5)
HGB BLD-MCNC: 11.9 GM/DL (ref 12–16)
IMM GRANULOCYTES # BLD AUTO: 0.01 K/UL (ref 0–0.04)
MCH RBC QN AUTO: 28.3 PG (ref 27–31)
MCHC RBC AUTO-ENTMCNC: 30.6 G/DL (ref 32–36)
MCV RBC AUTO: 92 FL (ref 82–98)
PLATELET # BLD AUTO: 238 K/UL (ref 150–450)
PMV BLD AUTO: 10.5 FL (ref 9.2–12.9)
POTASSIUM SERPL-SCNC: 4 MMOL/L (ref 3.5–5.1)
PROT SERPL-MCNC: 6.5 GM/DL (ref 6–8.4)
RBC # BLD AUTO: 4.21 M/UL (ref 4–5.4)
SODIUM SERPL-SCNC: 138 MMOL/L (ref 136–145)
WBC # BLD AUTO: 5.2 K/UL (ref 3.9–12.7)

## 2025-06-04 PROCEDURE — 3078F DIAST BP <80 MM HG: CPT | Mod: CPTII,S$GLB,, | Performed by: PHYSICIAN ASSISTANT

## 2025-06-04 PROCEDURE — 1101F PT FALLS ASSESS-DOCD LE1/YR: CPT | Mod: CPTII,S$GLB,, | Performed by: PHYSICIAN ASSISTANT

## 2025-06-04 PROCEDURE — 25000003 PHARM REV CODE 250: Performed by: PHYSICIAN ASSISTANT

## 2025-06-04 PROCEDURE — 3008F BODY MASS INDEX DOCD: CPT | Mod: CPTII,S$GLB,, | Performed by: PHYSICIAN ASSISTANT

## 2025-06-04 PROCEDURE — 63600175 PHARM REV CODE 636 W HCPCS: Mod: JZ,TB | Performed by: PHYSICIAN ASSISTANT

## 2025-06-04 PROCEDURE — 99999 PR PBB SHADOW E&M-EST. PATIENT-LVL IV: CPT | Mod: PBBFAC,,, | Performed by: PHYSICIAN ASSISTANT

## 2025-06-04 PROCEDURE — 99215 OFFICE O/P EST HI 40 MIN: CPT | Mod: S$GLB,,, | Performed by: PHYSICIAN ASSISTANT

## 2025-06-04 PROCEDURE — 82040 ASSAY OF SERUM ALBUMIN: CPT

## 2025-06-04 PROCEDURE — 3077F SYST BP >= 140 MM HG: CPT | Mod: CPTII,S$GLB,, | Performed by: PHYSICIAN ASSISTANT

## 2025-06-04 PROCEDURE — 1160F RVW MEDS BY RX/DR IN RCRD: CPT | Mod: CPTII,S$GLB,, | Performed by: PHYSICIAN ASSISTANT

## 2025-06-04 PROCEDURE — 96413 CHEMO IV INFUSION 1 HR: CPT

## 2025-06-04 PROCEDURE — G2211 COMPLEX E/M VISIT ADD ON: HCPCS | Mod: S$GLB,,, | Performed by: PHYSICIAN ASSISTANT

## 2025-06-04 PROCEDURE — 82378 CARCINOEMBRYONIC ANTIGEN: CPT

## 2025-06-04 PROCEDURE — 1125F AMNT PAIN NOTED PAIN PRSNT: CPT | Mod: CPTII,S$GLB,, | Performed by: PHYSICIAN ASSISTANT

## 2025-06-04 PROCEDURE — 96375 TX/PRO/DX INJ NEW DRUG ADDON: CPT

## 2025-06-04 PROCEDURE — 3288F FALL RISK ASSESSMENT DOCD: CPT | Mod: CPTII,S$GLB,, | Performed by: PHYSICIAN ASSISTANT

## 2025-06-04 PROCEDURE — 85027 COMPLETE CBC AUTOMATED: CPT

## 2025-06-04 PROCEDURE — 36415 COLL VENOUS BLD VENIPUNCTURE: CPT

## 2025-06-04 PROCEDURE — 96416 CHEMO PROLONG INFUSE W/PUMP: CPT

## 2025-06-04 PROCEDURE — 1159F MED LIST DOCD IN RCRD: CPT | Mod: CPTII,S$GLB,, | Performed by: PHYSICIAN ASSISTANT

## 2025-06-04 RX ORDER — HEPARIN 100 UNIT/ML
500 SYRINGE INTRAVENOUS
Status: CANCELLED | OUTPATIENT
Start: 2025-06-04

## 2025-06-04 RX ORDER — DEXAMETHASONE 4 MG/1
TABLET ORAL
Qty: 40 TABLET | Refills: 0 | Status: SHIPPED | OUTPATIENT
Start: 2025-06-04

## 2025-06-04 RX ORDER — SODIUM CHLORIDE 0.9 % (FLUSH) 0.9 %
10 SYRINGE (ML) INJECTION
Status: CANCELLED | OUTPATIENT
Start: 2025-06-04

## 2025-06-04 RX ORDER — SODIUM CHLORIDE 0.9 % (FLUSH) 0.9 %
10 SYRINGE (ML) INJECTION
Status: CANCELLED | OUTPATIENT
Start: 2025-06-05

## 2025-06-04 RX ORDER — DIPHENHYDRAMINE HYDROCHLORIDE 50 MG/ML
50 INJECTION, SOLUTION INTRAMUSCULAR; INTRAVENOUS ONCE AS NEEDED
Status: DISCONTINUED | OUTPATIENT
Start: 2025-06-04 | End: 2025-06-04 | Stop reason: HOSPADM

## 2025-06-04 RX ORDER — HEPARIN 100 UNIT/ML
500 SYRINGE INTRAVENOUS
Status: CANCELLED | OUTPATIENT
Start: 2025-06-05

## 2025-06-04 RX ORDER — EPINEPHRINE 0.3 MG/.3ML
0.3 INJECTION SUBCUTANEOUS ONCE AS NEEDED
Status: DISCONTINUED | OUTPATIENT
Start: 2025-06-04 | End: 2025-06-04 | Stop reason: HOSPADM

## 2025-06-04 RX ORDER — SODIUM CHLORIDE 0.9 % (FLUSH) 0.9 %
10 SYRINGE (ML) INJECTION
Status: DISCONTINUED | OUTPATIENT
Start: 2025-06-04 | End: 2025-06-04 | Stop reason: HOSPADM

## 2025-06-04 RX ORDER — PROCHLORPERAZINE EDISYLATE 5 MG/ML
5 INJECTION INTRAMUSCULAR; INTRAVENOUS ONCE AS NEEDED
Status: DISCONTINUED | OUTPATIENT
Start: 2025-06-04 | End: 2025-06-04 | Stop reason: HOSPADM

## 2025-06-04 RX ORDER — EPINEPHRINE 0.3 MG/.3ML
0.3 INJECTION SUBCUTANEOUS ONCE AS NEEDED
Status: CANCELLED | OUTPATIENT
Start: 2025-06-04

## 2025-06-04 RX ORDER — PROCHLORPERAZINE EDISYLATE 5 MG/ML
5 INJECTION INTRAMUSCULAR; INTRAVENOUS ONCE AS NEEDED
Status: CANCELLED | OUTPATIENT
Start: 2025-06-04

## 2025-06-04 RX ORDER — DIPHENHYDRAMINE HYDROCHLORIDE 50 MG/ML
50 INJECTION, SOLUTION INTRAMUSCULAR; INTRAVENOUS ONCE AS NEEDED
Status: CANCELLED | OUTPATIENT
Start: 2025-06-04

## 2025-06-04 RX ORDER — PROCHLORPERAZINE EDISYLATE 5 MG/ML
5 INJECTION INTRAMUSCULAR; INTRAVENOUS ONCE AS NEEDED
Status: CANCELLED | OUTPATIENT
Start: 2025-06-05

## 2025-06-04 RX ADMIN — DEXAMETHASONE SODIUM PHOSPHATE 0.25 MG: 4 INJECTION, SOLUTION INTRA-ARTICULAR; INTRALESIONAL; INTRAMUSCULAR; INTRAVENOUS; SOFT TISSUE at 02:06

## 2025-06-04 RX ADMIN — BEVACIZUMAB-AWWB 400 MG: 400 INJECTION, SOLUTION INTRAVENOUS at 01:06

## 2025-06-04 RX ADMIN — FLUOROURACIL 4500 MG: 50 INJECTION, SOLUTION INTRAVENOUS at 02:06

## 2025-06-06 ENCOUNTER — INFUSION (OUTPATIENT)
Dept: INFUSION THERAPY | Facility: HOSPITAL | Age: 75
End: 2025-06-06
Payer: MEDICARE

## 2025-06-06 VITALS
OXYGEN SATURATION: 98 % | RESPIRATION RATE: 18 BRPM | TEMPERATURE: 98 F | SYSTOLIC BLOOD PRESSURE: 148 MMHG | HEART RATE: 89 BPM | DIASTOLIC BLOOD PRESSURE: 74 MMHG

## 2025-06-06 DIAGNOSIS — C18.2 MALIGNANT NEOPLASM OF ASCENDING COLON: Primary | ICD-10-CM

## 2025-06-06 PROCEDURE — 63600175 PHARM REV CODE 636 W HCPCS: Performed by: PHYSICIAN ASSISTANT

## 2025-06-06 RX ORDER — SODIUM CHLORIDE 0.9 % (FLUSH) 0.9 %
10 SYRINGE (ML) INJECTION
Status: DISCONTINUED | OUTPATIENT
Start: 2025-06-06 | End: 2025-06-06 | Stop reason: HOSPADM

## 2025-06-06 RX ORDER — PROCHLORPERAZINE EDISYLATE 5 MG/ML
5 INJECTION INTRAMUSCULAR; INTRAVENOUS ONCE AS NEEDED
Status: DISCONTINUED | OUTPATIENT
Start: 2025-06-06 | End: 2025-06-06 | Stop reason: HOSPADM

## 2025-06-06 RX ORDER — HEPARIN 100 UNIT/ML
500 SYRINGE INTRAVENOUS
Status: DISCONTINUED | OUTPATIENT
Start: 2025-06-06 | End: 2025-06-06 | Stop reason: HOSPADM

## 2025-06-06 RX ADMIN — HEPARIN 500 UNITS: 100 SYRINGE at 12:06

## 2025-06-07 ENCOUNTER — OFFICE VISIT (OUTPATIENT)
Dept: OPTOMETRY | Facility: CLINIC | Age: 75
End: 2025-06-07
Payer: MEDICARE

## 2025-06-07 DIAGNOSIS — H52.4 MYOPIA OF BOTH EYES WITH ASTIGMATISM AND PRESBYOPIA: ICD-10-CM

## 2025-06-07 DIAGNOSIS — H52.13 MYOPIA OF BOTH EYES WITH ASTIGMATISM AND PRESBYOPIA: ICD-10-CM

## 2025-06-07 DIAGNOSIS — H25.13 NUCLEAR SCLEROSIS, BILATERAL: Primary | ICD-10-CM

## 2025-06-07 DIAGNOSIS — H52.203 MYOPIA OF BOTH EYES WITH ASTIGMATISM AND PRESBYOPIA: ICD-10-CM

## 2025-06-07 PROCEDURE — 99999 PR PBB SHADOW E&M-EST. PATIENT-LVL II: CPT | Mod: PBBFAC,,, | Performed by: OPTOMETRIST

## 2025-06-07 PROCEDURE — 1126F AMNT PAIN NOTED NONE PRSNT: CPT | Mod: CPTII,S$GLB,, | Performed by: OPTOMETRIST

## 2025-06-07 PROCEDURE — 1159F MED LIST DOCD IN RCRD: CPT | Mod: CPTII,S$GLB,, | Performed by: OPTOMETRIST

## 2025-06-07 PROCEDURE — 92014 COMPRE OPH EXAM EST PT 1/>: CPT | Mod: S$GLB,,, | Performed by: OPTOMETRIST

## 2025-06-07 NOTE — PROGRESS NOTES
HPI    74 y/o female present today for annual/ checkup per DMV  Pt has failed test, need exam to get reinstated for  license.   No other complaints at this time       Cosopt BID OU     Last edited by Noe Campa on 6/7/2025  8:00 AM.            Assessment /Plan     For exam results, see Encounter Report.    Nuclear sclerosis, bilateral  -Educated patient on presence of cataracts at today's exam, monitor at annual dilated fundus exam. 1-2 years surgical estimate.    Myopia of both eyes with astigmatism and presbyopia  Not recorded           [facility] care as scheduled

## 2025-06-12 ENCOUNTER — PATIENT MESSAGE (OUTPATIENT)
Dept: HEMATOLOGY/ONCOLOGY | Facility: CLINIC | Age: 75
End: 2025-06-12
Payer: MEDICARE

## 2025-06-17 ENCOUNTER — TELEPHONE (OUTPATIENT)
Dept: INFUSION THERAPY | Facility: HOSPITAL | Age: 75
End: 2025-06-17
Payer: MEDICARE

## 2025-06-18 ENCOUNTER — INFUSION (OUTPATIENT)
Dept: INFUSION THERAPY | Facility: HOSPITAL | Age: 75
End: 2025-06-18
Payer: MEDICARE

## 2025-06-18 VITALS
BODY MASS INDEX: 29.51 KG/M2 | RESPIRATION RATE: 18 BRPM | HEIGHT: 66 IN | DIASTOLIC BLOOD PRESSURE: 68 MMHG | SYSTOLIC BLOOD PRESSURE: 142 MMHG | TEMPERATURE: 99 F | WEIGHT: 183.63 LBS | HEART RATE: 95 BPM

## 2025-06-18 DIAGNOSIS — C18.2 MALIGNANT NEOPLASM OF ASCENDING COLON: Primary | ICD-10-CM

## 2025-06-18 PROCEDURE — 25000003 PHARM REV CODE 250: Performed by: REGISTERED NURSE

## 2025-06-18 PROCEDURE — 96413 CHEMO IV INFUSION 1 HR: CPT

## 2025-06-18 PROCEDURE — 96416 CHEMO PROLONG INFUSE W/PUMP: CPT

## 2025-06-18 PROCEDURE — 96367 TX/PROPH/DG ADDL SEQ IV INF: CPT

## 2025-06-18 PROCEDURE — 63600175 PHARM REV CODE 636 W HCPCS: Performed by: REGISTERED NURSE

## 2025-06-18 RX ORDER — SODIUM CHLORIDE 0.9 % (FLUSH) 0.9 %
10 SYRINGE (ML) INJECTION
Status: CANCELLED | OUTPATIENT
Start: 2025-06-19

## 2025-06-18 RX ORDER — HEPARIN 100 UNIT/ML
500 SYRINGE INTRAVENOUS
Status: CANCELLED | OUTPATIENT
Start: 2025-06-19

## 2025-06-18 RX ORDER — EPINEPHRINE 0.3 MG/.3ML
0.3 INJECTION SUBCUTANEOUS ONCE AS NEEDED
Status: DISCONTINUED | OUTPATIENT
Start: 2025-06-18 | End: 2025-06-18 | Stop reason: HOSPADM

## 2025-06-18 RX ORDER — DIPHENHYDRAMINE HYDROCHLORIDE 50 MG/ML
50 INJECTION, SOLUTION INTRAMUSCULAR; INTRAVENOUS ONCE AS NEEDED
Status: DISCONTINUED | OUTPATIENT
Start: 2025-06-18 | End: 2025-06-18 | Stop reason: HOSPADM

## 2025-06-18 RX ORDER — PROCHLORPERAZINE EDISYLATE 5 MG/ML
5 INJECTION INTRAMUSCULAR; INTRAVENOUS ONCE AS NEEDED
Status: DISCONTINUED | OUTPATIENT
Start: 2025-06-18 | End: 2025-06-18 | Stop reason: HOSPADM

## 2025-06-18 RX ORDER — HEPARIN 100 UNIT/ML
500 SYRINGE INTRAVENOUS
Status: DISCONTINUED | OUTPATIENT
Start: 2025-06-18 | End: 2025-06-18 | Stop reason: HOSPADM

## 2025-06-18 RX ORDER — PROCHLORPERAZINE EDISYLATE 5 MG/ML
5 INJECTION INTRAMUSCULAR; INTRAVENOUS ONCE AS NEEDED
Status: CANCELLED | OUTPATIENT
Start: 2025-06-19

## 2025-06-18 RX ORDER — SODIUM CHLORIDE 0.9 % (FLUSH) 0.9 %
10 SYRINGE (ML) INJECTION
Status: DISCONTINUED | OUTPATIENT
Start: 2025-06-18 | End: 2025-06-18 | Stop reason: HOSPADM

## 2025-06-18 RX ADMIN — DEXAMETHASONE SODIUM PHOSPHATE 0.25 MG: 4 INJECTION, SOLUTION INTRA-ARTICULAR; INTRALESIONAL; INTRAMUSCULAR; INTRAVENOUS; SOFT TISSUE at 02:06

## 2025-06-18 RX ADMIN — FLUOROURACIL 4500 MG: 50 INJECTION, SOLUTION INTRAVENOUS at 02:06

## 2025-06-18 RX ADMIN — SODIUM CHLORIDE: 9 INJECTION, SOLUTION INTRAVENOUS at 01:06

## 2025-06-18 RX ADMIN — BEVACIZUMAB-AWWB 400 MG: 400 INJECTION, SOLUTION INTRAVENOUS at 01:06

## 2025-06-18 NOTE — PLAN OF CARE
1440 pt tolerated tx. Port connected to pump infusing without difficulty, RUN noted on pump and counting down prior to d/c from clinic. Pt instructed to return to clinic on 6/20/25 at 12:30 pm for pump d/c. Pt acknowledged understanding. Pt d/c from clinic.

## 2025-06-20 ENCOUNTER — INFUSION (OUTPATIENT)
Dept: INFUSION THERAPY | Facility: HOSPITAL | Age: 75
End: 2025-06-20
Payer: MEDICARE

## 2025-06-20 VITALS
HEART RATE: 72 BPM | TEMPERATURE: 99 F | OXYGEN SATURATION: 98 % | DIASTOLIC BLOOD PRESSURE: 74 MMHG | SYSTOLIC BLOOD PRESSURE: 157 MMHG | RESPIRATION RATE: 18 BRPM

## 2025-06-20 DIAGNOSIS — C18.2 MALIGNANT NEOPLASM OF ASCENDING COLON: Primary | ICD-10-CM

## 2025-06-20 PROCEDURE — A4216 STERILE WATER/SALINE, 10 ML: HCPCS | Performed by: REGISTERED NURSE

## 2025-06-20 PROCEDURE — 63600175 PHARM REV CODE 636 W HCPCS: Performed by: REGISTERED NURSE

## 2025-06-20 PROCEDURE — 25000003 PHARM REV CODE 250: Performed by: REGISTERED NURSE

## 2025-06-20 RX ORDER — SODIUM CHLORIDE 0.9 % (FLUSH) 0.9 %
10 SYRINGE (ML) INJECTION
Status: DISCONTINUED | OUTPATIENT
Start: 2025-06-20 | End: 2025-06-20 | Stop reason: HOSPADM

## 2025-06-20 RX ORDER — PROCHLORPERAZINE EDISYLATE 5 MG/ML
5 INJECTION INTRAMUSCULAR; INTRAVENOUS ONCE AS NEEDED
Status: DISCONTINUED | OUTPATIENT
Start: 2025-06-20 | End: 2025-06-20 | Stop reason: HOSPADM

## 2025-06-20 RX ORDER — HEPARIN 100 UNIT/ML
500 SYRINGE INTRAVENOUS
Status: DISCONTINUED | OUTPATIENT
Start: 2025-06-20 | End: 2025-06-20 | Stop reason: HOSPADM

## 2025-06-20 RX ADMIN — HEPARIN 500 UNITS: 100 SYRINGE at 12:06

## 2025-06-20 RX ADMIN — Medication 10 ML: at 12:06

## 2025-06-20 NOTE — PLAN OF CARE
Patient tolerated 5FU home infusion well. No complaints or concerns reported. Vitals stable.  Port flushed, blood return present, heparin locked & needle removed at d/c. Aware of next appointment on 7/2. Stable and ambulatory off of unit at d/c.

## 2025-06-27 DIAGNOSIS — C18.2 MALIGNANT NEOPLASM OF ASCENDING COLON: ICD-10-CM

## 2025-06-27 RX ORDER — QUETIAPINE FUMARATE 200 MG/1
200 TABLET, FILM COATED ORAL NIGHTLY
Qty: 30 TABLET | Refills: 3 | Status: SHIPPED | OUTPATIENT
Start: 2025-06-27

## 2025-06-27 RX ORDER — DEXAMETHASONE 4 MG/1
TABLET ORAL
Qty: 40 TABLET | Refills: 0 | Status: SHIPPED | OUTPATIENT
Start: 2025-06-27

## 2025-06-30 ENCOUNTER — OFFICE VISIT (OUTPATIENT)
Dept: HEMATOLOGY/ONCOLOGY | Facility: CLINIC | Age: 75
End: 2025-06-30
Payer: MEDICARE

## 2025-06-30 ENCOUNTER — LAB VISIT (OUTPATIENT)
Dept: LAB | Facility: HOSPITAL | Age: 75
End: 2025-06-30
Attending: REGISTERED NURSE
Payer: MEDICARE

## 2025-06-30 DIAGNOSIS — D84.821 IMMUNODEFICIENCY SECONDARY TO CHEMOTHERAPY: ICD-10-CM

## 2025-06-30 DIAGNOSIS — R53.0 NEOPLASTIC MALIGNANT RELATED FATIGUE: ICD-10-CM

## 2025-06-30 DIAGNOSIS — K59.09 OTHER CONSTIPATION: ICD-10-CM

## 2025-06-30 DIAGNOSIS — R14.3 EXCESSIVE GAS: ICD-10-CM

## 2025-06-30 DIAGNOSIS — D84.81 IMMUNODEFICIENCY SECONDARY TO NEOPLASM: ICD-10-CM

## 2025-06-30 DIAGNOSIS — D49.9 IMMUNODEFICIENCY SECONDARY TO NEOPLASM: ICD-10-CM

## 2025-06-30 DIAGNOSIS — C18.2 MALIGNANT NEOPLASM OF ASCENDING COLON: Primary | ICD-10-CM

## 2025-06-30 DIAGNOSIS — C18.2 MALIGNANT NEOPLASM OF ASCENDING COLON: ICD-10-CM

## 2025-06-30 DIAGNOSIS — T45.1X5A IMMUNODEFICIENCY SECONDARY TO CHEMOTHERAPY: ICD-10-CM

## 2025-06-30 DIAGNOSIS — Z79.69 IMMUNODEFICIENCY SECONDARY TO CHEMOTHERAPY: ICD-10-CM

## 2025-06-30 DIAGNOSIS — C78.6 MALIGNANT NEOPLASM METASTATIC TO OMENTUM: ICD-10-CM

## 2025-06-30 DIAGNOSIS — R68.89 COLD SENSITIVITY: ICD-10-CM

## 2025-06-30 LAB
ABSOLUTE NEUTROPHIL COUNT (OHS): 2.36 K/UL (ref 1.8–7.7)
BILIRUB UR QL STRIP.AUTO: NEGATIVE
CLARITY UR: CLEAR
COLOR UR AUTO: YELLOW
ERYTHROCYTE [DISTWIDTH] IN BLOOD BY AUTOMATED COUNT: 17.7 % (ref 11.5–14.5)
GLUCOSE UR QL STRIP: NEGATIVE
HCT VFR BLD AUTO: 37.5 % (ref 37–48.5)
HGB BLD-MCNC: 12.3 GM/DL (ref 12–16)
HGB UR QL STRIP: NEGATIVE
IMM GRANULOCYTES # BLD AUTO: 0.01 K/UL (ref 0–0.04)
KETONES UR QL STRIP: NEGATIVE
LEUKOCYTE ESTERASE UR QL STRIP: NEGATIVE
MCH RBC QN AUTO: 29.6 PG (ref 27–31)
MCHC RBC AUTO-ENTMCNC: 32.8 G/DL (ref 32–36)
MCV RBC AUTO: 90 FL (ref 82–98)
NITRITE UR QL STRIP: NEGATIVE
PH UR STRIP: 6 [PH]
PLATELET # BLD AUTO: 218 K/UL (ref 150–450)
PMV BLD AUTO: 9.6 FL (ref 9.2–12.9)
PROT UR QL STRIP: NEGATIVE
RBC # BLD AUTO: 4.16 M/UL (ref 4–5.4)
SP GR UR STRIP: 1.01
UROBILINOGEN UR STRIP-ACNC: NEGATIVE EU/DL
WBC # BLD AUTO: 4.77 K/UL (ref 3.9–12.7)

## 2025-06-30 PROCEDURE — 98007 SYNCH AUDIO-VIDEO EST HI 40: CPT | Mod: 95,,, | Performed by: PHYSICIAN ASSISTANT

## 2025-06-30 PROCEDURE — 1101F PT FALLS ASSESS-DOCD LE1/YR: CPT | Mod: CPTII,95,, | Performed by: PHYSICIAN ASSISTANT

## 2025-06-30 PROCEDURE — 85027 COMPLETE CBC AUTOMATED: CPT

## 2025-06-30 PROCEDURE — G2211 COMPLEX E/M VISIT ADD ON: HCPCS | Mod: 95,,, | Performed by: PHYSICIAN ASSISTANT

## 2025-06-30 PROCEDURE — 81003 URINALYSIS AUTO W/O SCOPE: CPT

## 2025-06-30 PROCEDURE — 36415 COLL VENOUS BLD VENIPUNCTURE: CPT

## 2025-06-30 PROCEDURE — 1159F MED LIST DOCD IN RCRD: CPT | Mod: CPTII,95,, | Performed by: PHYSICIAN ASSISTANT

## 2025-06-30 PROCEDURE — 3288F FALL RISK ASSESSMENT DOCD: CPT | Mod: CPTII,95,, | Performed by: PHYSICIAN ASSISTANT

## 2025-06-30 PROCEDURE — 1126F AMNT PAIN NOTED NONE PRSNT: CPT | Mod: CPTII,95,, | Performed by: PHYSICIAN ASSISTANT

## 2025-06-30 PROCEDURE — 1160F RVW MEDS BY RX/DR IN RCRD: CPT | Mod: CPTII,95,, | Performed by: PHYSICIAN ASSISTANT

## 2025-06-30 NOTE — PROGRESS NOTES
The patient location is: [place]  The chief complaint leading to consultation is: Colon    Visit type: audiovisual    Face to Face time with patient: 15  minutes of total time spent on the encounter, which includes face to face time and non-face to face time preparing to see the patient (eg, review of tests), Obtaining and/or reviewing separately obtained history, Documenting clinical information in the electronic or other health record, Independently interpreting results (not separately reported) and communicating results to the patient/family/caregiver, or Care coordination (not separately reported).   MEDICAL ONCOLOGY - ESTABLISHED PATIENT VISIT    Reason for visit: colon adenocarcinoma    Best Contact Phone Number(s): [phone] (home)      Cancer/Stage/TNM:    Cancer Staging   Colon adenocarcinoma  Staging form: Colon and Rectum, AJCC 8th Edition  - Pathologic stage from 1/22/2024: Stage IVC (pT4a, pN2b, cM1c) - Signed by Minna Menendez CNS on 2/13/2024       Oncology History   Colon adenocarcinoma   12/21/2023 Tumor Markers    Patient's tumor was tested for the following markers: CEA.                                              Results of the tumor marker test revealed 3.3     12/21/2023 Procedure    Colonoscopy  Cecal polyp removed with jumbo forceps, 3 mm  Multiple ascending colon polyps ranging in size from 5 to 12 mm - semi-pedunculated removed with hot snare  Hepatic flexure mass identified - central ulceration, concerning for malignancy, 3 cm, not obstructing, this was biopsied - tattoo placed distal to mass  Sigmoid diverticular disease     12/21/2023 Tumor Markers    Patient's tumor was tested for the following markers: CEA.                                              Results of the tumor marker test revealed 3.3     12/22/2023 Imaging Significant Findings    CT CAP  Evaluation of the colon is somewhat limited as there is significant colonic stool and oral contrast material has not opacified  the large bowel.  There does appear to be some abnormal thickening of the walls of the proximal right colon and a patient with a known history of colonic malignancy.  There is some soft tissue density external to the walls of the colon on the right pericolic gutter which could represent peritoneal nodularity versus subserosal extent of tumor.  Slightly more distal to this area there is a 2nd area of irregularity of the walls of the colon, difficult to fully evaluate if this is related to the colonic walls versus stool with central fat.     Two hypodensities in the liver, the larger measuring 0.8 cm, too small to accurately characterize on the basis of this examination.  Attention on follow-up.     No pulmonary nodules are seen.     Cholecystectomy.     12/29/2023 Initial Diagnosis    Hepatic flexure colon adenocarcinoma  MMR intact     1/22/2024 Cancer Staged    Staging form: Colon and Rectum, AJCC 8th Edition  - Pathologic stage from 1/22/2024: Stage IVC (pT4a, pN2b, cM1c)     8/27/2024 Tumor Markers    Patient's tumor was tested for the following markers: CEA.                                              Results of the tumor marker test revealed 2.3      Malignant neoplasm of ascending colon   2/12/2024 Initial Diagnosis    Malignant neoplasm of ascending colon     2/26/2024 -  Chemotherapy    Treatment Summary   Plan Name: OP GI [id number] (oxaliplatin leucovorin fluorouracil) with bevacizumab Q2W  Treatment Goal: Palliative  Status: Active  Start Date: 2/26/2024  End Date: 8/14/2025 (Planned)  Provider: Kemar Zaragoza MD  Chemotherapy: oxaliplatin (ELOXATIN) 150 mg in dextrose 5 % (D5W) 595 mL chemo infusion, 157 mg, Intravenous, [facility] time, 8 of 8 cycles  Administration: 150 mg (2/26/2024), 150 mg (3/11/2024), 150 mg (3/25/2024), 150 mg (4/8/2024), 150 mg (4/22/2024), 150 mg (5/6/2024), 150 mg (5/20/2024), 150 mg (6/3/2024)  fluorouracil (ADRUCIL) 2,400 mg/m2 = 4,440 mg in sodium chloride 0.9% 100 mL  chemo infusion, 2,400 mg/m2 = 4,440 mg, Intravenous, Clinic/[facility] 1 time, 33 of 37 cycles  Administration: 4,440 mg (2/26/2024), 4,440 mg (3/11/2024), 4,440 mg (3/25/2024), 4,440 mg (4/8/2024), 4,440 mg (4/22/2024), 4,440 mg (5/6/2024), 4,440 mg (5/20/2024), 4,440 mg (6/3/2024), 4,440 mg (6/17/2024), 4,440 mg (7/3/2024), 4,440 mg (7/16/2024), 4,440 mg (7/31/2024), 4,440 mg (8/13/2024), 4,440 mg (8/28/2024), 4,440 mg (10/24/2024), 4,440 mg (10/10/2024), 4,440 mg (11/6/2024), 4,440 mg (11/19/2024), 4,440 mg (12/3/2024), 4,440 mg (12/17/2024), 4,500 mg (1/15/2025), 4,500 mg (12/31/2024), 4,500 mg (1/29/2025), 4,500 mg (2/12/2025), 4,500 mg (2/26/2025), 4,500 mg (3/12/2025), 4,500 mg (3/26/2025), 4,500 mg (4/9/2025), 4,500 mg (4/22/2025), 4,500 mg (5/6/2025), 4,500 mg (5/20/2025), 4,500 mg (6/4/2025), 4,500 mg (6/18/2025)  bevacizumab-awwb (MVASI) 5 mg/kg = 365 mg in sodium chloride 0.9% 100 mL infusion, 5 mg/kg = 365 mg, Intravenous, Clinic/[facility] 1 time, 31 of 35 cycles  Administration: 365 mg (2/26/2024), 365 mg (3/11/2024), 365 mg (3/25/2024), 365 mg (4/8/2024), 365 mg (4/22/2024), 365 mg (5/6/2024), 365 mg (5/20/2024), 365 mg (6/3/2024), 365 mg (6/17/2024), 365 mg (7/3/2024), 365 mg (7/16/2024), 365 mg (7/31/2024), 365 mg (8/13/2024), 365 mg (10/24/2024), 365 mg (11/6/2024), 365 mg (11/19/2024), 365 mg (12/3/2024), 365 mg (12/17/2024), 400 mg (1/15/2025), 400 mg (12/31/2024), 400 mg (1/29/2025), 400 mg (2/12/2025), 400 mg (2/26/2025), 400 mg (3/12/2025), 400 mg (3/26/2025), 400 mg (4/9/2025), 400 mg (4/22/2025), 400 mg (5/6/2025), 400 mg (5/20/2025), 400 mg (6/4/2025), 400 mg (6/18/2025)     8/27/2024 Tumor Markers    Patient's tumor was tested for the following markers: CEA.                                              Results of the tumor marker test revealed 2.3           Interim History:   75 y.o. female with LUANNE, bipolar, HLD who presents prior to cycle 34 FOLFOX + bevacizumab for her metastatic ascending colon  cancer, now on maintenance 5-FU + Bevacizumab. She continues to feel very well. No pain.  No N/V or bowel changes. Had a great time camping with her family. Eating well and has a good appetite    ECOG 0. Presents alone today    ROS:   Review of Systems   Constitutional:  Negative for chills, fever and malaise/fatigue.   HENT:  Negative for congestion and sore throat.    Respiratory:  Negative for shortness of breath.    Cardiovascular:  Negative for chest pain, palpitations and leg swelling.   Gastrointestinal:  Negative for blood in stool, constipation, diarrhea and nausea.   Genitourinary:  Negative for hematuria.   Musculoskeletal:  Negative for back pain, falls and myalgias.   Skin:  Negative for rash.   Neurological:  Positive for tingling. Negative for dizziness, weakness and headaches.       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    Sacroiliitis     right side    Sleep apnea     can not tolerate cpap        Past Surgical History:   Past Surgical History:   Procedure Laterality Date    ANKLE FRACTURE SURGERY Left 2001    BLADDER SUSPENSION      CARPAL TUNNEL RELEASE Bilateral     CHOLECYSTECTOMY      Laparoscopic    COLONOSCOPY      COLONOSCOPY N/A 12/21/2023    Procedure: COLONOSCOPY;  Surgeon: Alberto Albright MD;  Location: [facility];  Service: Endoscopy;  Laterality: N/A;    DIAGNOSTIC LAPAROSCOPY N/A 9/20/2024    Procedure: LAPAROSCOPY, DIAGNOSTIC;  Surgeon: Benjy Contreras MD;  Location: [address];  Service: General;  Laterality: N/A;    ESOPHAGOGASTRODUODENOSCOPY N/A 07/29/2021    Procedure: EGD (ESOPHAGOGASTRODUODENOSCOPY);  Surgeon: Susan Mcclain MD;  Location: [facility];  Service: Endoscopy;   Laterality: N/A;    EYE SURGERY      FOOT ARTHRODESIS Right 05/03/2023    Procedure: FUSION, FOOT;  Surgeon: Lauri Alejandra DPM;  Location: [facility];  Service: Podiatry;  Laterality: Right;  mini c-arm, Arthrex dowel bone graft harvester, locking plate and screws festus notified cc    HYSTERECTOMY  1986    vaginal prolapse    INJECTION OF ANESTHETIC AGENT AROUND MEDIAL BRANCH NERVES INNERVATING CERVICAL FACET JOINT Bilateral 05/27/2022    Procedure: CERVICAL MEDIAL BRANCH NERVE BLOCK (C3-4,C4-5);  Surgeon: Mamie Roman MD;  Location: [facility];  Service: Pain Management;  Laterality: Bilateral;    INJECTION OF ANESTHETIC AGENT AROUND MEDIAL BRANCH NERVES INNERVATING LUMBAR FACET JOINT Right 02/05/2021    Procedure: LUMBAR FACET JOINT BLOCK (L3-4,L4-5,L5-S1);  Surgeon: Mamie Roman MD;  Location: [facility];  Service: Pain Management;  Laterality: Right;    INJECTION OF ANESTHETIC AGENT AROUND MEDIAL BRANCH NERVES INNERVATING LUMBAR FACET JOINT Right 04/30/2021    Procedure: LUMBAR FACET JOINT BLOCK (L3-4,L4-5,L5-S1);  Surgeon: Mamie Roman MD;  Location: [facility];  Service: Pain Management;  Laterality: Right;    INJECTION OF ANESTHETIC AGENT INTO SACROILIAC JOINT Right 12/04/2020    Procedure: SACROILIAC JOINT INJECTION;  Surgeon: Mamie Roman MD;  Location: [facility];  Service: Pain Management;  Laterality: Right;    INJECTION OF JOINT Right 12/04/2020    Procedure: GREATER TROCHANTERIC BURSA INJECTION;  Surgeon: Mamie Roman MD;  Location: [facility];  Service: Pain Management;  Laterality: Right;    LAPAROSCOPIC RIGHT COLON RESECTION N/A 1/22/2024    Procedure: COLECTOMY, RIGHT, LAPAROSCOPIC (eras low, lithotomy);  Surgeon: Alberto Albright MD;  Location: [address];  Service: Colon and Rectal;  Laterality: N/A;    NASAL SEPTUM SURGERY      RECTAL PROLAPSE REPAIR      TONSILLECTOMY          Family History:   Family History   Problem Relation Name Age of Onset    No Known Problems Maternal Aunt  Rubio Glasgow     Colon cancer Maternal Uncle Joshua     Colon cancer Maternal Uncle Yovani     Colon cancer Maternal Uncle Konstantin     No Known Problems Paternal Aunt Vero     Esophageal cancer Paternal Uncle Nat Garcia     Heart attack Maternal Grandmother Elmira     Leukemia Maternal Grandfather manish Pang     No Known Problems Paternal Grandmother Lizett     Stroke Paternal Grandfather Nat Sr         Social History:   Social History     Tobacco Use    Smoking status: Former     Current packs/day: 0.00     Average packs/day: 1 pack/day for 41.7 years (41.7 ttl pk-yrs)     Types: Cigarettes     Start date: 3/30/1982     Quit date: 2023     Years since quittin.5    Smokeless tobacco: Never   Substance Use Topics    Alcohol use: Yes     Comment: Socially-2 or 3 times a year-6 or 7 drinks        I have reviewed and updated the patient's past medical, surgical, family and social histories.    Allergies:   Review of patient's allergies indicates:   Allergen Reactions    Nsaids (non-steroidal anti-inflammatory drug) Other (See Comments)     Gastrointestinal bleeding requiring blood transfusion    Demerol [meperidine] Rash        Medications:   Current Outpatient Medications   Medication Sig Dispense Refill    albuterol (PROVENTIL/VENTOLIN HFA) 90 mcg/actuation inhaler Inhale 2 puffs into the lungs every 4 (four) hours as needed for Wheezing or Shortness of Breath. 16 g 1    aspirin (ECOTRIN) 81 MG EC tablet Take 81 mg by mouth nightly.      dexAMETHasone (DECADRON) 4 MG Tab Take 2 tablets (8 mg total) by mouth once daily only on days 2, 3 and 4 of each chemotherapy cycle. 40 tablet 0    dorzolamide-timolol 2-0.5% (COSOPT) 22.3-6.8 mg/mL ophthalmic solution Place 1 drop into both eyes 2 (two) times daily. 10 mL 4    EScitalopram oxalate (LEXAPRO) 20 MG tablet Take 1 tablet (20 mg total) by mouth once daily. 30 tablet 3    lamoTRIgine (LAMICTAL) 150 MG Tab Take 2 tablets (300 mg total) by mouth every  evening. 180 tablet 1    LIDOcaine-prilocaine (EMLA) cream Apply topically as needed (place to port site 45-60 minutes prior to chemotherapy). 30 g 5    pantoprazole (PROTONIX) 40 MG tablet Take 1 tablet (40 mg total) by mouth once daily. 90 tablet 3    QUEtiapine (SEROQUEL) 200 MG Tab Take 1 tablet (200 mg total) by mouth every evening. 30 tablet 3    rosuvastatin (CRESTOR) 10 MG tablet Take 1 tablet (10 mg total) by mouth once daily. 90 tablet 3     No current facility-administered medications for this visit.        Physical Exam:   There were no vitals taken for this visit.          Physical Exam  Vitals reviewed.   Constitutional:       General: She is not in acute distress.     Appearance: Normal appearance. She is normal weight. She is not ill-appearing, toxic-appearing or diaphoretic.   HENT:      Head: Normocephalic and atraumatic.      Right Ear: External ear normal.      Left Ear: External ear normal.      Nose: Nose normal.      Mouth/Throat:      Pharynx: Oropharynx is clear.   Eyes:      General: No scleral icterus.     Conjunctiva/sclera: Conjunctivae normal.   Cardiovascular:      Rate and Rhythm: Normal rate.   Pulmonary:      Effort: Pulmonary effort is normal. No respiratory distress.   Chest:      Comments: RCW port  Abdominal:      General: There is no distension.   Skin:     General: Skin is warm and dry.      Coloration: Skin is not jaundiced or pale.      Findings: No bruising, erythema or rash.   Neurological:      Mental Status: She is alert and oriented to person, place, and time. Mental status is at baseline.      Motor: No weakness.      Gait: Gait normal.   Psychiatric:         Mood and Affect: Mood normal.         Labs:   I have reviewed the pertinent labs.  CEA 3.6     Imaging:    CT CAP - 5/2/25:    Impression:     History of colon cancer status post right hemicolectomy.     Prominent pericaval lymph node, unchanged.     Enlarging 2.1 cm left adnexal mass, which could represent  metastasis or a primary ovarian neoplasm.     No thoracic metastases.    Path:   1/22/24 - R Hemicolectomy   Final Pathologic Diagnosis     THE DIAGNOSES REMAIN THE SAME.  THIS CORRECTED REPORT IS ISSUED TO CHANGE M STATUS to reflect tumor in the omentum.  This change is discussed with Dr. RANJANA Albright via phone, 2/1/2024.    1. Colon, right and terminal ileum (right hemicolectomy with EN bloc abdominal wall resection):  Invasive adenocarcinoma.  See cancer synoptic report     2. Omentum (resection):    Positive for carcinoma    CORRECT SYNOPTIC AND M STATUS  Cancer synoptic report  - Procedure: Right hemicolectomy with EN bloc abdominal wall resection  - Tumor site:  Ascending  - Histologic type:  Adenocarcinoma  - Histologic grade:  G2, moderately differentiated.  See comment.  COMMENT:  While the majority of the tumor consists of well formed glands, a significant proportion includes abortive glands, single file infiltration, and malignant cells with signet ring cell morphology. The tumor has an insidious pattern of  infiltration with tumor present far from the advancing front of the tumor.  - Tumor size:  2.0 x 2.0 x 1.0 cm  - Tumor extent:  Invades visceral peritoneum, multifocal  - Macroscopic perforation: Not identified  - Lymphovascular invasion:  Present, extensive.  Small vessel, large vessel, intra and extramural.  - Perineural invasion:  Not identified  - Tumor budding score:  High (>10), multifocal single cell infiltration  - Treatment effect: No known pre-surgical therapy    Margins  Margin involved by invasive carcinoma, radial (slide 1C)  All margins negative for dysplasia.    pTNM stage classification (AJCC 8th edition)  pT Category  pT4a:  Tumor invades through the visceral peritoneum    pN Category  pN2b:  7 or more regional lymph nodes are positive  Number of positive lymph nodes:  19  Number of lymph nodes examined: 26  Number of tumor deposits:  4    pM Category  pM1c:  Metastasis to the  peritoneal surface of the omentum.    Additional findings:  - Sessile serrated polyp, no cytologic dysplasia, 1.1 cm at ileocecal valve  - Tubulovillous adenoma, 1.0 cm, proximal ascending  - Tubular adenoma, 0.4 cm, mid ascending  - Tubular adenoma, 0.4, distal ascending  - Tubular adenoma, 0.6 cm, distal ascending  - Submucosal lipoma, 2.0 cm  - Appendix with no specific histopathologic changes    Ancillary studies  Immunohistochemistry for mismatch repair proteins performed on prior biopsy material (EOY-, 12/21/23): pMMR.         Assessment:       1. Malignant neoplasm of ascending colon    2. 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 Immunodeficiency secondary to neoplasm    4. Immunodeficiency secondary to chemotherapy    5. Neoplastic malignant related fatigue    6. Cold sensitivity    7. Other constipation    8. Excessive gas            Plan:        # Colon cancer   Mrs. Maguire is a 75 y.o. female who presents for management of her metastatic colon cancer. She underwent a R hemicolectomy with en-bloc abdominal wall resection on 1/22/24 with Dr. Albright. Pathology revealed pT4a N2b disease with 19/26 positive LNs and omentum positive for carcinoma.    We had an in-depth conversation during our initial consult re: her diagnosis and treatment options. Reviewed recommendation for IV systemic therapy for at least 6 months. Plan for FOLFOX + bevacizumab to be given every 2 weeks. Briefly reviewed side effects of treatment. Handouts provided. Port placed 2/21/24.     PGX - DPYD normal metabolizer, [id number] intermediate metabolizer   Dexamethasone, zofran, compazine and lidocaine sent to pharmacy previously. Reviewed admin instructions.     Pending response, she may be a candidate for CRS/HIPEC with surgical oncology team. Continue to evaluate and re-start discussion if still without radiographic evidence of disease after CT on cycle 8.    CT CAP after cycle 4 shows no clear evidence of disease.  CT CAP  after cycle 8 shows no clear evidence of disease.  CT CAP after cycle 12 shows no clear evidence of disease. Underwent diagnostic laparoscopy on 9/20/24 with PCI of 14.  Discussed with Dr. Contreras and with patient that options include continuing systemic therapy at this time vs CRS/HIPEC.  We are in agreement that with her volume of disease and histology, likelihood of CRS/HIPEC being beneficial for survival is not high.  CT CAP after cycle 18 shows no clear evidence of disease radiographically.  CT CAP after cycle 23 shows no clear evidence of disease.  CT CAP after cycle 29 shows very mild growth (3mm) in left ovarian soft tissue lesion, now about 2.1cm.  no other new findings.     Discussed recommendation to continue with maintenance therapy and just monitor this area of likely metastasis.  She is agreeable.    Presents today for cycle 34 of 5-FU + Felisa maintenance.  Tolerance of chemotherapy has been excellent. Eating well.   I have reviewed the CBC and UA, adequate for treatment. CMP pending but one from 6/18 is unremarkable.   CEA remains normal.   Will see her every other cycle and she will stay on chemo indefinitely every 2 weeks.  Proceed with cycle 34 this week and 35 in 2 weeks without a clinic visit.    RTC in 4 weeks for cycle 36.  Will repeat imaging studies in early August.    Cold sensitivity/neoplasm related fatigue:  2/2 chemotherapy. Stopped oxaliplatin beginning with cycle 9 to prevent persistent paresthesias.  Mild persistent paresthesias at this time. Will monitor. She is taking Cymbalta, prescribed by her psychiatrist. Functions well    Constipation/Gas, cataract  Doing well. Continue 2 stool softeners a day with Miralax daily to have regular bowel movements.   Continue Miralax to avoid more explosive BM.    Will schedule to have cataract surgery. Will hold avastin prior to surgery date and for one cycle afterwards    RTC in 4 weeks.     Patient and family members displayed understanding of the  above encounter and treatment plan. All thoughtful questions were answered to their satisfaction. Patient was advised to notify the care team or proceed to the ER if signs and symptoms worsen.     30 minutes were spent today on this encounter including face to face time with the patient, data gathering/interpretation and documentation. Greater than 50% of this time involved counseling or coordination of care. I have provided the patient with an opportunity to ask questions and have all questions answered to patient's satisfaction.     Visit today included increased complexity associated with the care of the episodic problem chemotherapy  addressed and managing the longitudinal care of the patient due to the serious and/or complex managed problem(s) GI malignancies/cancer. In addition, the above encounter addresses an illness that poses a significant threat to life, bodily function and overall performance status.      code applied: patient requires or will require a continuous, longitudinal, and active collaborative plan of care related to this patient's health condition, GI malignancies/cancer --the management of which requires the direction of a practitioner with specialized clinical knowledge, skill, and expertise       DIANE Fonseca, PA-C  [facility]  Dept of Hematology/Oncology  PA-C to GI Oncology team           Med Onc Chart Routing      Follow up with physician 2 months. see Dr Zaragoza in 8 weeks with lab work, CT CAP and treatment discussion with 5-FU, kevin   Follow up with KRISTEN 4 weeks. 5-FU, kevin with clinic visit in 4 weeks.   Infusion scheduling note   5-FU, kevin q2 weeks. will have clinic visit q4 weeks   Injection scheduling note    Labs CBC, CMP, CEA and urinalysis   Scheduling:  Preferred lab:  Lab interval: every 2 weeks  Please schedule lab work at [facility] CT chest abdomen pelvis   Please schedule 8 weeks prior to clinic with Dr Zaragoza   Pharmacy appointment    Other  referrals                  Each patient to whom he or she provides medical services by telemedicine is:  (1) informed of the relationship between the physician and patient and the respective role of any other health care provider with respect to management of the patient; and (2) notified that he or she may decline to receive medical services by telemedicine and may withdraw from such care at any time.    Notes:

## 2025-07-02 ENCOUNTER — INFUSION (OUTPATIENT)
Dept: INFUSION THERAPY | Facility: HOSPITAL | Age: 75
End: 2025-07-02
Payer: MEDICARE

## 2025-07-02 VITALS
SYSTOLIC BLOOD PRESSURE: 142 MMHG | BODY MASS INDEX: 29.3 KG/M2 | RESPIRATION RATE: 18 BRPM | HEIGHT: 66 IN | TEMPERATURE: 99 F | WEIGHT: 182.31 LBS | DIASTOLIC BLOOD PRESSURE: 70 MMHG | OXYGEN SATURATION: 98 % | HEART RATE: 90 BPM

## 2025-07-02 DIAGNOSIS — C18.2 MALIGNANT NEOPLASM OF ASCENDING COLON: Primary | ICD-10-CM

## 2025-07-02 LAB
ALBUMIN SERPL BCP-MCNC: 3.5 G/DL (ref 3.5–5.2)
ALP SERPL-CCNC: 75 UNIT/L (ref 40–150)
ALT SERPL W/O P-5'-P-CCNC: 12 UNIT/L (ref 10–44)
ANION GAP (OHS): 6 MMOL/L (ref 8–16)
AST SERPL-CCNC: 17 UNIT/L (ref 11–45)
BILIRUB SERPL-MCNC: 0.3 MG/DL (ref 0.1–1)
BUN SERPL-MCNC: 16 MG/DL (ref 8–23)
CALCIUM SERPL-MCNC: 9.1 MG/DL (ref 8.7–10.5)
CHLORIDE SERPL-SCNC: 107 MMOL/L (ref 95–110)
CO2 SERPL-SCNC: 23 MMOL/L (ref 23–29)
CREAT SERPL-MCNC: 0.8 MG/DL (ref 0.5–1.4)
GFR SERPLBLD CREATININE-BSD FMLA CKD-EPI: >60 ML/MIN/1.73/M2
GLUCOSE SERPL-MCNC: 96 MG/DL (ref 70–110)
POTASSIUM SERPL-SCNC: 4.4 MMOL/L (ref 3.5–5.1)
PROT SERPL-MCNC: 6.5 GM/DL (ref 6–8.4)
SODIUM SERPL-SCNC: 136 MMOL/L (ref 136–145)

## 2025-07-02 PROCEDURE — 80053 COMPREHEN METABOLIC PANEL: CPT

## 2025-07-02 PROCEDURE — 96416 CHEMO PROLONG INFUSE W/PUMP: CPT

## 2025-07-02 PROCEDURE — 25000003 PHARM REV CODE 250: Performed by: PHYSICIAN ASSISTANT

## 2025-07-02 PROCEDURE — 96413 CHEMO IV INFUSION 1 HR: CPT

## 2025-07-02 PROCEDURE — 63600175 PHARM REV CODE 636 W HCPCS: Performed by: PHYSICIAN ASSISTANT

## 2025-07-02 PROCEDURE — 96367 TX/PROPH/DG ADDL SEQ IV INF: CPT

## 2025-07-02 RX ORDER — SODIUM CHLORIDE 0.9 % (FLUSH) 0.9 %
10 SYRINGE (ML) INJECTION
Status: DISCONTINUED | OUTPATIENT
Start: 2025-07-02 | End: 2025-07-02 | Stop reason: HOSPADM

## 2025-07-02 RX ORDER — HEPARIN 100 UNIT/ML
500 SYRINGE INTRAVENOUS
Status: DISCONTINUED | OUTPATIENT
Start: 2025-07-02 | End: 2025-07-02 | Stop reason: HOSPADM

## 2025-07-02 RX ORDER — EPINEPHRINE 0.3 MG/.3ML
0.3 INJECTION SUBCUTANEOUS ONCE AS NEEDED
Status: DISCONTINUED | OUTPATIENT
Start: 2025-07-02 | End: 2025-07-02 | Stop reason: HOSPADM

## 2025-07-02 RX ORDER — PROCHLORPERAZINE EDISYLATE 5 MG/ML
5 INJECTION INTRAMUSCULAR; INTRAVENOUS ONCE AS NEEDED
Status: DISCONTINUED | OUTPATIENT
Start: 2025-07-02 | End: 2025-07-02 | Stop reason: HOSPADM

## 2025-07-02 RX ORDER — DIPHENHYDRAMINE HYDROCHLORIDE 50 MG/ML
50 INJECTION, SOLUTION INTRAMUSCULAR; INTRAVENOUS ONCE AS NEEDED
Status: DISCONTINUED | OUTPATIENT
Start: 2025-07-02 | End: 2025-07-02 | Stop reason: HOSPADM

## 2025-07-02 RX ADMIN — SODIUM CHLORIDE: 0.9 INJECTION, SOLUTION INTRAVENOUS at 07:07

## 2025-07-02 RX ADMIN — BEVACIZUMAB-AWWB 400 MG: 400 INJECTION, SOLUTION INTRAVENOUS at 08:07

## 2025-07-02 RX ADMIN — DEXAMETHASONE SODIUM PHOSPHATE 0.25 MG: 4 INJECTION, SOLUTION INTRA-ARTICULAR; INTRALESIONAL; INTRAMUSCULAR; INTRAVENOUS; SOFT TISSUE at 08:07

## 2025-07-02 RX ADMIN — FLUOROURACIL 4500 MG: 50 INJECTION, SOLUTION INTRAVENOUS at 08:07

## 2025-07-02 NOTE — PLAN OF CARE
0900- pt tolerated MVASI infusion well, no complications or side effects, POC and meds discussed with pt, 5FU CADD connected to pt, site secured, infusing w/out issue; pt instructed to remain well hydration;pt to report for pump d/c on Sat 7/5/25 @ 800am, pt aware of upcoming appts, pt knows to call MD with any questions or concerns. Pt ambulated off unit, no distress noted.

## 2025-07-05 ENCOUNTER — INFUSION (OUTPATIENT)
Dept: INFUSION THERAPY | Facility: HOSPITAL | Age: 75
End: 2025-07-05
Payer: MEDICARE

## 2025-07-05 VITALS
OXYGEN SATURATION: 99 % | HEART RATE: 89 BPM | RESPIRATION RATE: 90 BRPM | DIASTOLIC BLOOD PRESSURE: 71 MMHG | SYSTOLIC BLOOD PRESSURE: 147 MMHG | TEMPERATURE: 98 F

## 2025-07-05 DIAGNOSIS — C18.2 MALIGNANT NEOPLASM OF ASCENDING COLON: Primary | ICD-10-CM

## 2025-07-05 PROCEDURE — 25000003 PHARM REV CODE 250: Performed by: PHYSICIAN ASSISTANT

## 2025-07-05 PROCEDURE — A4216 STERILE WATER/SALINE, 10 ML: HCPCS | Performed by: PHYSICIAN ASSISTANT

## 2025-07-05 PROCEDURE — 63600175 PHARM REV CODE 636 W HCPCS: Performed by: PHYSICIAN ASSISTANT

## 2025-07-05 RX ORDER — PROCHLORPERAZINE EDISYLATE 5 MG/ML
5 INJECTION INTRAMUSCULAR; INTRAVENOUS ONCE AS NEEDED
Status: DISCONTINUED | OUTPATIENT
Start: 2025-07-05 | End: 2025-07-05 | Stop reason: HOSPADM

## 2025-07-05 RX ORDER — HEPARIN 100 UNIT/ML
500 SYRINGE INTRAVENOUS
Status: DISCONTINUED | OUTPATIENT
Start: 2025-07-05 | End: 2025-07-05 | Stop reason: HOSPADM

## 2025-07-05 RX ORDER — SODIUM CHLORIDE 0.9 % (FLUSH) 0.9 %
10 SYRINGE (ML) INJECTION
Status: DISCONTINUED | OUTPATIENT
Start: 2025-07-05 | End: 2025-07-05 | Stop reason: HOSPADM

## 2025-07-05 RX ADMIN — HEPARIN 500 UNITS: 100 SYRINGE at 10:07

## 2025-07-05 RX ADMIN — Medication 10 ML: at 10:07

## 2025-07-05 NOTE — PLAN OF CARE
1025 - Pt tolerated home 5-FU infusion well, no complaints or complications reported. VSS. Vessel empty upon arrival. Pt disconnected from pump. Positive blood return noted from port; port flushed, heparin locked, and deaccessed. Pt aware to call provider with any questions or concerns, and is aware of upcoming appts. Pt ambulatory from clinic independently with steady gait, no distress noted, accompanied by .

## 2025-07-13 ENCOUNTER — PATIENT MESSAGE (OUTPATIENT)
Dept: HEMATOLOGY/ONCOLOGY | Facility: CLINIC | Age: 75
End: 2025-07-13
Payer: MEDICARE

## 2025-07-15 ENCOUNTER — LAB VISIT (OUTPATIENT)
Dept: LAB | Facility: HOSPITAL | Age: 75
End: 2025-07-15
Attending: REGISTERED NURSE
Payer: MEDICARE

## 2025-07-15 ENCOUNTER — OFFICE VISIT (OUTPATIENT)
Dept: PSYCHIATRY | Facility: CLINIC | Age: 75
End: 2025-07-15
Payer: MEDICARE

## 2025-07-15 VITALS
WEIGHT: 179.69 LBS | HEIGHT: 66 IN | RESPIRATION RATE: 17 BRPM | OXYGEN SATURATION: 98 % | SYSTOLIC BLOOD PRESSURE: 130 MMHG | BODY MASS INDEX: 28.88 KG/M2 | HEART RATE: 82 BPM | DIASTOLIC BLOOD PRESSURE: 78 MMHG

## 2025-07-15 DIAGNOSIS — F31.9 BIPOLAR DEPRESSION: ICD-10-CM

## 2025-07-15 DIAGNOSIS — C18.2 MALIGNANT NEOPLASM OF ASCENDING COLON: ICD-10-CM

## 2025-07-15 LAB
ABSOLUTE EOSINOPHIL (OHS): 0.11 K/UL
ABSOLUTE MONOCYTE (OHS): 0.66 K/UL (ref 0.3–1)
ABSOLUTE NEUTROPHIL COUNT (OHS): 2.17 K/UL (ref 1.8–7.7)
ALBUMIN SERPL BCP-MCNC: 3.3 G/DL (ref 3.5–5.2)
ALP SERPL-CCNC: 56 UNIT/L (ref 40–150)
ALT SERPL W/O P-5'-P-CCNC: 15 UNIT/L (ref 10–44)
ANION GAP (OHS): 8 MMOL/L (ref 8–16)
AST SERPL-CCNC: 18 UNIT/L (ref 11–45)
BASOPHILS # BLD AUTO: 0.03 K/UL
BASOPHILS NFR BLD AUTO: 0.7 %
BILIRUB SERPL-MCNC: 0.6 MG/DL (ref 0.1–1)
BILIRUB UR QL STRIP.AUTO: NEGATIVE
BUN SERPL-MCNC: 13 MG/DL (ref 8–23)
CALCIUM SERPL-MCNC: 9.5 MG/DL (ref 8.7–10.5)
CARCINOEMBRYONIC ANTIGEN (OHS): 3.8 NG/ML
CHLORIDE SERPL-SCNC: 107 MMOL/L (ref 95–110)
CLARITY UR: CLEAR
CO2 SERPL-SCNC: 22 MMOL/L (ref 23–29)
COLOR UR AUTO: YELLOW
CREAT SERPL-MCNC: 0.8 MG/DL (ref 0.5–1.4)
ERYTHROCYTE [DISTWIDTH] IN BLOOD BY AUTOMATED COUNT: 18.5 % (ref 11.5–14.5)
GFR SERPLBLD CREATININE-BSD FMLA CKD-EPI: >60 ML/MIN/1.73/M2
GLUCOSE SERPL-MCNC: 103 MG/DL (ref 70–110)
GLUCOSE UR QL STRIP: NEGATIVE
HCT VFR BLD AUTO: 38.8 % (ref 37–48.5)
HGB BLD-MCNC: 12.8 GM/DL (ref 12–16)
HGB UR QL STRIP: NEGATIVE
IMM GRANULOCYTES # BLD AUTO: 0 K/UL (ref 0–0.04)
IMM GRANULOCYTES NFR BLD AUTO: 0 % (ref 0–0.5)
KETONES UR QL STRIP: NEGATIVE
LEUKOCYTE ESTERASE UR QL STRIP: NEGATIVE
LYMPHOCYTES # BLD AUTO: 1.44 K/UL (ref 1–4.8)
MCH RBC QN AUTO: 29.8 PG (ref 27–31)
MCHC RBC AUTO-ENTMCNC: 33 G/DL (ref 32–36)
MCV RBC AUTO: 90 FL (ref 82–98)
NITRITE UR QL STRIP: NEGATIVE
NUCLEATED RBC (/100WBC) (OHS): 0 /100 WBC
PH UR STRIP: 6 [PH]
PLATELET # BLD AUTO: 207 K/UL (ref 150–450)
PMV BLD AUTO: 9.9 FL (ref 9.2–12.9)
POTASSIUM SERPL-SCNC: 4.5 MMOL/L (ref 3.5–5.1)
PROT SERPL-MCNC: 6.7 GM/DL (ref 6–8.4)
PROT UR QL STRIP: ABNORMAL
RBC # BLD AUTO: 4.3 M/UL (ref 4–5.4)
RELATIVE EOSINOPHIL (OHS): 2.5 %
RELATIVE LYMPHOCYTE (OHS): 32.7 % (ref 18–48)
RELATIVE MONOCYTE (OHS): 15 % (ref 4–15)
RELATIVE NEUTROPHIL (OHS): 49.1 % (ref 38–73)
SODIUM SERPL-SCNC: 137 MMOL/L (ref 136–145)
SP GR UR STRIP: 1.02
UROBILINOGEN UR STRIP-ACNC: NEGATIVE EU/DL
WBC # BLD AUTO: 4.41 K/UL (ref 3.9–12.7)

## 2025-07-15 PROCEDURE — 36415 COLL VENOUS BLD VENIPUNCTURE: CPT

## 2025-07-15 PROCEDURE — 99214 OFFICE O/P EST MOD 30 MIN: CPT | Mod: S$GLB,,, | Performed by: STUDENT IN AN ORGANIZED HEALTH CARE EDUCATION/TRAINING PROGRAM

## 2025-07-15 PROCEDURE — 82378 CARCINOEMBRYONIC ANTIGEN: CPT

## 2025-07-15 PROCEDURE — 3075F SYST BP GE 130 - 139MM HG: CPT | Mod: CPTII,S$GLB,, | Performed by: STUDENT IN AN ORGANIZED HEALTH CARE EDUCATION/TRAINING PROGRAM

## 2025-07-15 PROCEDURE — 3078F DIAST BP <80 MM HG: CPT | Mod: CPTII,S$GLB,, | Performed by: STUDENT IN AN ORGANIZED HEALTH CARE EDUCATION/TRAINING PROGRAM

## 2025-07-15 PROCEDURE — 81003 URINALYSIS AUTO W/O SCOPE: CPT

## 2025-07-15 PROCEDURE — 99999 PR PBB SHADOW E&M-EST. PATIENT-LVL III: CPT | Mod: PBBFAC,,, | Performed by: STUDENT IN AN ORGANIZED HEALTH CARE EDUCATION/TRAINING PROGRAM

## 2025-07-15 PROCEDURE — 3008F BODY MASS INDEX DOCD: CPT | Mod: CPTII,S$GLB,, | Performed by: STUDENT IN AN ORGANIZED HEALTH CARE EDUCATION/TRAINING PROGRAM

## 2025-07-15 PROCEDURE — 82435 ASSAY OF BLOOD CHLORIDE: CPT

## 2025-07-15 PROCEDURE — 1160F RVW MEDS BY RX/DR IN RCRD: CPT | Mod: CPTII,S$GLB,, | Performed by: STUDENT IN AN ORGANIZED HEALTH CARE EDUCATION/TRAINING PROGRAM

## 2025-07-15 PROCEDURE — 85025 COMPLETE CBC W/AUTO DIFF WBC: CPT

## 2025-07-15 PROCEDURE — 1159F MED LIST DOCD IN RCRD: CPT | Mod: CPTII,S$GLB,, | Performed by: STUDENT IN AN ORGANIZED HEALTH CARE EDUCATION/TRAINING PROGRAM

## 2025-07-15 RX ORDER — ESCITALOPRAM OXALATE 20 MG/1
20 TABLET ORAL DAILY
Qty: 30 TABLET | Refills: 5 | Status: SHIPPED | OUTPATIENT
Start: 2025-07-15 | End: 2026-07-15

## 2025-07-15 NOTE — PROGRESS NOTES
"    07/15/2025  1:21 PM  Karin Maguire  [id number]    Outpatient Psychiatry Follow-Up Visit (MD/NP)    7/15/2025    Clinical Status of Patient:  Outpatient (Ambulatory)    Chief Complaint:  Karin Maguire is a 75 y.o. female who presents today for follow-up of depression and anxiety.  Met with patient.        Interval History and Content of Current Session:  Interim Events/Subjective Report/Content of Current Session:   MDD with mixed features vs. Unspecified bipolar disorder (pt poor historian)  LUANNE  Insomnia  Gambling disorder  Obesity  Psychosocial stressors     Abnormal movements        Reports depression and anxiety are minimal, "I don't worry about it at all, I don't have any control, the Lord will take me down the path I have to go on, and I accept that."    Pt declines any changes to her psychiatric medications, "I like how I'm feeling, I wouldn't change anything."        Stressors: health (cancer, stage IV)      Psychiatric Review Of Systems - Is patient experiencing or having changes in:  sleep: yes  appetite: no  weight: no  energy/anergy: no  Interest/pleasure/anhedonia: no  Anxiety: no  panic: no  Guilty/hopelessness/worthlessness: no  concentration: no  S.I.B.s/risky behavior: no  Irritability: no  Substance abuse: no  Racing thoughts: no  Impulsive behaviors: no  Paranoia: no  AVH: no  Gambling: "pretty much under control, we still go," not causing financial problems, "we keep each other accountable, we only joyner with our winnings, when we run out we stop"  Michelle/hypomania: no          Review of Systems   Review of Systems   Constitutional:  Negative for chills and fever.   HENT:  Negative for hearing loss.    Eyes:  Negative for blurred vision and double vision.   Respiratory:  Negative for shortness of breath.    Cardiovascular:  Negative for chest pain.   Gastrointestinal:  Negative for constipation, diarrhea, nausea and vomiting.   Genitourinary:  Negative for dysuria.   Musculoskeletal:  " Negative for back pain and joint pain.   Skin:  Negative for rash.   Neurological:  Negative for dizziness and headaches.   Endo/Heme/Allergies:  Negative for environmental allergies.       Past Medical, Family and Social History: The patient's past medical, family and social history have been reviewed and updated as appropriate within the electronic medical record - see encounter notes.    Social History     Socioeconomic History    Marital status:    Tobacco Use    Smoking status: Former     Current packs/day: 0.00     Average packs/day: 1 pack/day for 41.7 years (41.7 ttl pk-yrs)     Types: Cigarettes     Start date: 3/30/1982     Quit date: 2023     Years since quittin.5    Smokeless tobacco: Never   Substance and Sexual Activity    Alcohol use: Yes     Comment: Socially-2 or 3 times a year-6 or 7 drinks    Drug use: No    Sexual activity: Yes     Partners: Male     Birth control/protection: Surgical     Comment:      Social Drivers of Health     Financial Resource Strain: High Risk (2025)    Overall Financial Resource Strain (CARDIA)     Difficulty of Paying Living Expenses: Hard   Food Insecurity: No Food Insecurity (2024)    Hunger Vital Sign     Worried About Running Out of Food in the Last Year: Never true     Ran Out of Food in the Last Year: Never true   Transportation Needs: No Transportation Needs (2024)    PRAPARE - Transportation     Lack of Transportation (Medical): No     Lack of Transportation (Non-Medical): No   Physical Activity: Inactive (2025)    Exercise Vital Sign     Days of Exercise per Week: 7 days     Minutes of Exercise per Session: 0 min   Stress: No Stress Concern Present (2024)    [employer] - Occupational Stress Questionnaire     Feeling of Stress : Not at all   Housing Stability: Low Risk  (2025)    Housing Stability Vital Sign     Unable to Pay for Housing in the Last Year: No     Number of Times  Moved in the Last Year: 0     Homeless in the Last Year: No         Compliance: yes    Side effects: None    Risk Parameters:  Patient reports no suicidal ideation  Patient reports no homicidal ideation  Patient reports no self-injurious behavior  Patient reports no violent behavior        Exam (detailed: at least 9 elements; comprehensive: all 15 elements)     Vitals:    07/15/25 0833   BP: 130/78   Pulse: 82   Resp: 17         Wt Readings from Last 5 Encounters:   07/15/25 81.5 kg (179 lb 11.2 oz)   07/02/25 82.7 kg (182 lb 5.1 oz)   06/18/25 83.3 kg (183 lb 10.3 oz)   06/04/25 82.3 kg (181 lb 7 oz)   06/04/25 82.3 kg (181 lb 7 oz)         CONSTITUTIONAL  General Appearance: unremarkable, age appropriate    MUSCULOSKELETAL  Muscle Strength and Tone:no tremor, no tic,   Abnormal Involuntary Movements: yes, repetitive movements noted (head, neck, legs, trunk)  Gait and Station: non-ataxic    PSYCHIATRIC   Level of Consciousness: awake and alert   Orientation: person, place and situation  Grooming: Casually dressed and Well groomed  Psychomotor Behavior: normal, cooperative  Speech: normal tone, normal rate, normal pitch, normal volume  Language: grossly intact  Mood: good  Affect: Consistent with mood  Thought Process: linear, logical  Associations: intact   Thought Content: DENIES suicidal ideation, DENIES homicidal ideation, and no delusion  Perceptions: denies hallucinations  Memory: Able to recall past events, Remote intact and Recent intact  Attention:Attends to interview without distraction  Fund of Knowledge: Aware of current events and Vocabulary appropriate   Estimate if Intelligence:  Average based on work/education history, vocabulary and mental status exam  Insight: has awareness of illness  Judgment: behavior is adequate to circumstances          Assessment and Diagnosis   Status/Progress: Based on the examination today, the patient's problem(s) is/are improved.  New problems have not been presented  "today.   Co-morbidities are complicating management of the primary condition.          Impresssion/Assessment:  MDD with mixed features vs. Unspecified bipolar disorder (pt poor historian)  LUANNE  Insomnia  Gambling disorder  Obesity  Psychosocial stressors     Abnormal movements    Chronic pain         Plan:    Pt much improved. Appears very stable. She declines changes to medications.       MDD with mixed features vs. Unspecified bipolar disorder (pt poor historian)  - continue lamictal 300 mg PO qd  - continue lexapro 20 mg PO qd  - continue seroquel 200 mg PO qhs  - strongly recommended psychotherapy, provided with resources       Insomnia  - reports has not been using CPAP  - pt counseled        Gambling disorder  - pt counseled  - consider meetings/therapy; patient declines        LUANNE  - continue hydroxyzine  mg PO q 6 hours PRN  - lexapro as above  - consider psychotherapy, patient declines        Psychosocial stressors  - pt counseled  - strongly recommended psychotherapy, couples counseling to patient, patient refusing        Obesity  -  tapered off seroquel        Abnormal movements (?TD)  - frequent non-stereotyped movements of hands and limbs, neck, trunk; patient states she has always been "fidgety" like this since childhood, reports she has seen neurology about these movements before in past, pt continues to refuse referral, pt states "I've been this way all my life"  - declines changing seroquel despite risk of TD/AIM, pt has been counseled extensively on risks  - AIMS: 0, when testing movements stop           Chronic pain  - pt counseled      HCM  - on statin for lipids (pt states she will discuss with PCP soon)  - BG WNL on last CMP (checked every other week with oncology)  - pt counseled        - Instructed patient to keep all scheduled appointments, take medications as prescribed and abstain from substance abuse. Instructed to call 911 or present to ER for emergency including SI or HI.    - " Discussed diagnosis, risks and benefits of proposed treatment above vs alternative treatments vs no treatment, and potential side effects of these treatments. The patient expresses understanding of the above and displays the capacity to agree with this treatment given said understanding. Patient also agrees that, currently, the benefits outweigh the risks and would like to pursue treatment at this time.         Estevan Zaman III, MD    Return to Clinic: 3-4 m

## 2025-07-16 ENCOUNTER — INFUSION (OUTPATIENT)
Dept: INFUSION THERAPY | Facility: HOSPITAL | Age: 75
End: 2025-07-16
Payer: MEDICARE

## 2025-07-16 VITALS
BODY MASS INDEX: 28.79 KG/M2 | TEMPERATURE: 99 F | HEIGHT: 66 IN | WEIGHT: 179.13 LBS | OXYGEN SATURATION: 99 % | DIASTOLIC BLOOD PRESSURE: 79 MMHG | HEART RATE: 70 BPM | SYSTOLIC BLOOD PRESSURE: 145 MMHG | RESPIRATION RATE: 18 BRPM

## 2025-07-16 DIAGNOSIS — C18.2 MALIGNANT NEOPLASM OF ASCENDING COLON: Primary | ICD-10-CM

## 2025-07-16 PROCEDURE — 63600175 PHARM REV CODE 636 W HCPCS: Performed by: INTERNAL MEDICINE

## 2025-07-16 PROCEDURE — 96416 CHEMO PROLONG INFUSE W/PUMP: CPT

## 2025-07-16 PROCEDURE — 96413 CHEMO IV INFUSION 1 HR: CPT

## 2025-07-16 PROCEDURE — 25000003 PHARM REV CODE 250: Performed by: INTERNAL MEDICINE

## 2025-07-16 PROCEDURE — 96367 TX/PROPH/DG ADDL SEQ IV INF: CPT

## 2025-07-16 RX ORDER — EPINEPHRINE 0.3 MG/.3ML
0.3 INJECTION SUBCUTANEOUS ONCE AS NEEDED
Status: DISCONTINUED | OUTPATIENT
Start: 2025-07-16 | End: 2025-07-16 | Stop reason: HOSPADM

## 2025-07-16 RX ORDER — DIPHENHYDRAMINE HYDROCHLORIDE 50 MG/ML
50 INJECTION, SOLUTION INTRAMUSCULAR; INTRAVENOUS ONCE AS NEEDED
Status: DISCONTINUED | OUTPATIENT
Start: 2025-07-16 | End: 2025-07-16 | Stop reason: HOSPADM

## 2025-07-16 RX ORDER — HEPARIN 100 UNIT/ML
500 SYRINGE INTRAVENOUS
Status: CANCELLED | OUTPATIENT
Start: 2025-07-17

## 2025-07-16 RX ORDER — PROCHLORPERAZINE EDISYLATE 5 MG/ML
5 INJECTION INTRAMUSCULAR; INTRAVENOUS ONCE AS NEEDED
Status: DISCONTINUED | OUTPATIENT
Start: 2025-07-16 | End: 2025-07-16 | Stop reason: HOSPADM

## 2025-07-16 RX ORDER — PROCHLORPERAZINE EDISYLATE 5 MG/ML
5 INJECTION, SOLUTION INTRAMUSCULAR; INTRAVENOUS ONCE AS NEEDED
Status: CANCELLED | OUTPATIENT
Start: 2025-07-17

## 2025-07-16 RX ORDER — SODIUM CHLORIDE 0.9 % (FLUSH) 0.9 %
10 SYRINGE (ML) INJECTION
Status: DISCONTINUED | OUTPATIENT
Start: 2025-07-16 | End: 2025-07-16 | Stop reason: HOSPADM

## 2025-07-16 RX ORDER — SODIUM CHLORIDE 0.9 % (FLUSH) 0.9 %
10 SYRINGE (ML) INJECTION
Status: CANCELLED | OUTPATIENT
Start: 2025-07-17

## 2025-07-16 RX ORDER — HEPARIN 100 UNIT/ML
500 SYRINGE INTRAVENOUS
Status: DISCONTINUED | OUTPATIENT
Start: 2025-07-16 | End: 2025-07-16 | Stop reason: HOSPADM

## 2025-07-16 RX ADMIN — DEXAMETHASONE SODIUM PHOSPHATE 0.25 MG: 4 INJECTION, SOLUTION INTRA-ARTICULAR; INTRALESIONAL; INTRAMUSCULAR; INTRAVENOUS; SOFT TISSUE at 12:07

## 2025-07-16 RX ADMIN — FLUOROURACIL 4500 MG: 50 INJECTION, SOLUTION INTRAVENOUS at 01:07

## 2025-07-16 RX ADMIN — BEVACIZUMAB-AWWB 400 MG: 400 INJECTION, SOLUTION INTRAVENOUS at 12:07

## 2025-07-16 RX ADMIN — SODIUM CHLORIDE: 9 INJECTION, SOLUTION INTRAVENOUS at 11:07

## 2025-07-16 NOTE — PLAN OF CARE
Pt arrived. Accompanied by . Weight and VS taken. Labs reviewed. Assessment done. Port assessed, flushed, blood return noted. Infusing NS@25ml/hr while awaiting MVASI/5FU from pharmacy. Will monitor throughout treatment.

## 2025-07-16 NOTE — PLAN OF CARE
Treatment completed. Pt tolerated well. VS taken. Port left in place. CADD pump started. Infusing 5FU@2.2ml/hr for 46hrs prior to leaving unit. Pt aware to RTC on 7/18@11am. Ambulated off unit independently. Accompanied by .

## 2025-07-18 ENCOUNTER — INFUSION (OUTPATIENT)
Dept: INFUSION THERAPY | Facility: HOSPITAL | Age: 75
End: 2025-07-18
Payer: MEDICARE

## 2025-07-18 VITALS
TEMPERATURE: 99 F | BODY MASS INDEX: 28.79 KG/M2 | HEART RATE: 73 BPM | RESPIRATION RATE: 18 BRPM | WEIGHT: 179.13 LBS | HEIGHT: 66 IN | SYSTOLIC BLOOD PRESSURE: 139 MMHG | DIASTOLIC BLOOD PRESSURE: 68 MMHG

## 2025-07-18 DIAGNOSIS — C18.2 MALIGNANT NEOPLASM OF ASCENDING COLON: Primary | ICD-10-CM

## 2025-07-18 PROCEDURE — 63600175 PHARM REV CODE 636 W HCPCS: Performed by: INTERNAL MEDICINE

## 2025-07-18 PROCEDURE — 25000003 PHARM REV CODE 250: Performed by: INTERNAL MEDICINE

## 2025-07-18 PROCEDURE — A4216 STERILE WATER/SALINE, 10 ML: HCPCS | Performed by: INTERNAL MEDICINE

## 2025-07-18 RX ORDER — SODIUM CHLORIDE 0.9 % (FLUSH) 0.9 %
10 SYRINGE (ML) INJECTION
Status: DISCONTINUED | OUTPATIENT
Start: 2025-07-18 | End: 2025-07-18 | Stop reason: HOSPADM

## 2025-07-18 RX ORDER — HEPARIN 100 UNIT/ML
500 SYRINGE INTRAVENOUS
Status: DISCONTINUED | OUTPATIENT
Start: 2025-07-18 | End: 2025-07-18 | Stop reason: HOSPADM

## 2025-07-18 RX ORDER — PROCHLORPERAZINE EDISYLATE 5 MG/ML
5 INJECTION INTRAMUSCULAR; INTRAVENOUS ONCE AS NEEDED
Status: DISCONTINUED | OUTPATIENT
Start: 2025-07-18 | End: 2025-07-18 | Stop reason: HOSPADM

## 2025-07-18 RX ADMIN — HEPARIN SODIUM (PORCINE) LOCK FLUSH IV SOLN 100 UNIT/ML 500 UNITS: 100 SOLUTION at 10:07

## 2025-07-18 RX ADMIN — Medication 10 ML: at 10:07

## 2025-07-18 NOTE — PLAN OF CARE
1103-Pt tolerated home infusion well, no complaints or complications reported, VSS, vessel empty upon arrival. Pt disconnected from pump, positive blood return noted. Pt aware to call provider with any questions or concerns, aware of upcoming appts, ambulatory from clinic with steady gait, no distress noted.

## 2025-07-29 ENCOUNTER — LAB VISIT (OUTPATIENT)
Dept: LAB | Facility: HOSPITAL | Age: 75
End: 2025-07-29
Attending: REGISTERED NURSE
Payer: MEDICARE

## 2025-07-29 ENCOUNTER — TELEPHONE (OUTPATIENT)
Dept: HEMATOLOGY/ONCOLOGY | Facility: CLINIC | Age: 75
End: 2025-07-29
Payer: MEDICARE

## 2025-07-29 ENCOUNTER — PATIENT MESSAGE (OUTPATIENT)
Dept: HEMATOLOGY/ONCOLOGY | Facility: CLINIC | Age: 75
End: 2025-07-29
Payer: MEDICARE

## 2025-07-29 DIAGNOSIS — C18.2 MALIGNANT NEOPLASM OF ASCENDING COLON: ICD-10-CM

## 2025-07-29 LAB
ABSOLUTE EOSINOPHIL (OHS): 0.07 K/UL
ABSOLUTE MONOCYTE (OHS): 0.81 K/UL (ref 0.3–1)
ABSOLUTE NEUTROPHIL COUNT (OHS): 3.98 K/UL (ref 1.8–7.7)
ALBUMIN SERPL BCP-MCNC: 3.5 G/DL (ref 3.5–5.2)
ALP SERPL-CCNC: 67 UNIT/L (ref 40–150)
ALT SERPL W/O P-5'-P-CCNC: 12 UNIT/L (ref 10–44)
ANION GAP (OHS): 9 MMOL/L (ref 8–16)
AST SERPL-CCNC: 20 UNIT/L (ref 11–45)
BASOPHILS # BLD AUTO: 0.06 K/UL
BASOPHILS NFR BLD AUTO: 1 %
BILIRUB SERPL-MCNC: 0.2 MG/DL (ref 0.1–1)
BUN SERPL-MCNC: 18 MG/DL (ref 8–23)
CALCIUM SERPL-MCNC: 9.2 MG/DL (ref 8.7–10.5)
CARCINOEMBRYONIC ANTIGEN (OHS): 4 NG/ML
CHLORIDE SERPL-SCNC: 109 MMOL/L (ref 95–110)
CO2 SERPL-SCNC: 21 MMOL/L (ref 23–29)
CREAT SERPL-MCNC: 0.8 MG/DL (ref 0.5–1.4)
ERYTHROCYTE [DISTWIDTH] IN BLOOD BY AUTOMATED COUNT: 17.6 % (ref 11.5–14.5)
GFR SERPLBLD CREATININE-BSD FMLA CKD-EPI: >60 ML/MIN/1.73/M2
GLUCOSE SERPL-MCNC: 108 MG/DL (ref 70–110)
HCT VFR BLD AUTO: 36.8 % (ref 37–48.5)
HGB BLD-MCNC: 12.4 GM/DL (ref 12–16)
IMM GRANULOCYTES # BLD AUTO: 0.02 K/UL (ref 0–0.04)
IMM GRANULOCYTES NFR BLD AUTO: 0.3 % (ref 0–0.5)
LYMPHOCYTES # BLD AUTO: 1.32 K/UL (ref 1–4.8)
MCH RBC QN AUTO: 30.5 PG (ref 27–31)
MCHC RBC AUTO-ENTMCNC: 33.7 G/DL (ref 32–36)
MCV RBC AUTO: 90 FL (ref 82–98)
NUCLEATED RBC (/100WBC) (OHS): 0 /100 WBC
PLATELET # BLD AUTO: 203 K/UL (ref 150–450)
PMV BLD AUTO: 9.9 FL (ref 9.2–12.9)
POTASSIUM SERPL-SCNC: 4.1 MMOL/L (ref 3.5–5.1)
PROT SERPL-MCNC: 6.3 GM/DL (ref 6–8.4)
RBC # BLD AUTO: 4.07 M/UL (ref 4–5.4)
RELATIVE EOSINOPHIL (OHS): 1.1 %
RELATIVE LYMPHOCYTE (OHS): 21.1 % (ref 18–48)
RELATIVE MONOCYTE (OHS): 12.9 % (ref 4–15)
RELATIVE NEUTROPHIL (OHS): 63.6 % (ref 38–73)
SODIUM SERPL-SCNC: 139 MMOL/L (ref 136–145)
WBC # BLD AUTO: 6.26 K/UL (ref 3.9–12.7)

## 2025-07-29 PROCEDURE — 82435 ASSAY OF BLOOD CHLORIDE: CPT

## 2025-07-29 PROCEDURE — 85025 COMPLETE CBC W/AUTO DIFF WBC: CPT

## 2025-07-29 PROCEDURE — 82378 CARCINOEMBRYONIC ANTIGEN: CPT

## 2025-07-29 PROCEDURE — 36415 COLL VENOUS BLD VENIPUNCTURE: CPT

## 2025-07-30 ENCOUNTER — OFFICE VISIT (OUTPATIENT)
Dept: HEMATOLOGY/ONCOLOGY | Facility: CLINIC | Age: 75
End: 2025-07-30
Payer: MEDICARE

## 2025-07-30 ENCOUNTER — INFUSION (OUTPATIENT)
Dept: INFUSION THERAPY | Facility: HOSPITAL | Age: 75
End: 2025-07-30
Payer: MEDICARE

## 2025-07-30 VITALS
HEIGHT: 66 IN | WEIGHT: 180.88 LBS | BODY MASS INDEX: 29.07 KG/M2 | OXYGEN SATURATION: 97 % | DIASTOLIC BLOOD PRESSURE: 72 MMHG | SYSTOLIC BLOOD PRESSURE: 130 MMHG | TEMPERATURE: 98 F | RESPIRATION RATE: 16 BRPM | HEART RATE: 75 BPM

## 2025-07-30 DIAGNOSIS — R14.3 EXCESSIVE GAS: ICD-10-CM

## 2025-07-30 DIAGNOSIS — Z79.69 IMMUNODEFICIENCY SECONDARY TO CHEMOTHERAPY: ICD-10-CM

## 2025-07-30 DIAGNOSIS — C18.2 MALIGNANT NEOPLASM OF ASCENDING COLON: Primary | ICD-10-CM

## 2025-07-30 DIAGNOSIS — R53.0 NEOPLASTIC MALIGNANT RELATED FATIGUE: ICD-10-CM

## 2025-07-30 DIAGNOSIS — K59.09 OTHER CONSTIPATION: ICD-10-CM

## 2025-07-30 DIAGNOSIS — D84.821 IMMUNODEFICIENCY SECONDARY TO CHEMOTHERAPY: ICD-10-CM

## 2025-07-30 DIAGNOSIS — D84.81 IMMUNODEFICIENCY SECONDARY TO NEOPLASM: ICD-10-CM

## 2025-07-30 DIAGNOSIS — T45.1X5A IMMUNODEFICIENCY SECONDARY TO CHEMOTHERAPY: ICD-10-CM

## 2025-07-30 DIAGNOSIS — C78.6 MALIGNANT NEOPLASM METASTATIC TO OMENTUM: ICD-10-CM

## 2025-07-30 DIAGNOSIS — D49.9 IMMUNODEFICIENCY SECONDARY TO NEOPLASM: ICD-10-CM

## 2025-07-30 DIAGNOSIS — R68.89 COLD SENSITIVITY: ICD-10-CM

## 2025-07-30 PROCEDURE — 63600175 PHARM REV CODE 636 W HCPCS: Performed by: PHYSICIAN ASSISTANT

## 2025-07-30 PROCEDURE — 96416 CHEMO PROLONG INFUSE W/PUMP: CPT

## 2025-07-30 PROCEDURE — 1101F PT FALLS ASSESS-DOCD LE1/YR: CPT | Mod: CPTII,S$GLB,, | Performed by: PHYSICIAN ASSISTANT

## 2025-07-30 PROCEDURE — 1126F AMNT PAIN NOTED NONE PRSNT: CPT | Mod: CPTII,S$GLB,, | Performed by: PHYSICIAN ASSISTANT

## 2025-07-30 PROCEDURE — 3078F DIAST BP <80 MM HG: CPT | Mod: CPTII,S$GLB,, | Performed by: PHYSICIAN ASSISTANT

## 2025-07-30 PROCEDURE — 3008F BODY MASS INDEX DOCD: CPT | Mod: CPTII,S$GLB,, | Performed by: PHYSICIAN ASSISTANT

## 2025-07-30 PROCEDURE — 3288F FALL RISK ASSESSMENT DOCD: CPT | Mod: CPTII,S$GLB,, | Performed by: PHYSICIAN ASSISTANT

## 2025-07-30 PROCEDURE — 99999 PR PBB SHADOW E&M-EST. PATIENT-LVL IV: CPT | Mod: PBBFAC,,, | Performed by: PHYSICIAN ASSISTANT

## 2025-07-30 PROCEDURE — 1159F MED LIST DOCD IN RCRD: CPT | Mod: CPTII,S$GLB,, | Performed by: PHYSICIAN ASSISTANT

## 2025-07-30 PROCEDURE — G2211 COMPLEX E/M VISIT ADD ON: HCPCS | Mod: S$GLB,,, | Performed by: PHYSICIAN ASSISTANT

## 2025-07-30 PROCEDURE — 25000003 PHARM REV CODE 250: Performed by: PHYSICIAN ASSISTANT

## 2025-07-30 PROCEDURE — 3075F SYST BP GE 130 - 139MM HG: CPT | Mod: CPTII,S$GLB,, | Performed by: PHYSICIAN ASSISTANT

## 2025-07-30 PROCEDURE — 96367 TX/PROPH/DG ADDL SEQ IV INF: CPT

## 2025-07-30 PROCEDURE — 99215 OFFICE O/P EST HI 40 MIN: CPT | Mod: S$GLB,,, | Performed by: PHYSICIAN ASSISTANT

## 2025-07-30 RX ORDER — HEPARIN 100 UNIT/ML
500 SYRINGE INTRAVENOUS
Status: CANCELLED | OUTPATIENT
Start: 2025-07-30

## 2025-07-30 RX ORDER — EPINEPHRINE 0.3 MG/.3ML
0.3 INJECTION SUBCUTANEOUS ONCE AS NEEDED
Status: CANCELLED | OUTPATIENT
Start: 2025-07-30

## 2025-07-30 RX ORDER — HEPARIN 100 UNIT/ML
500 SYRINGE INTRAVENOUS
Status: DISCONTINUED | OUTPATIENT
Start: 2025-07-30 | End: 2025-07-30 | Stop reason: HOSPADM

## 2025-07-30 RX ORDER — EPINEPHRINE 0.3 MG/.3ML
0.3 INJECTION SUBCUTANEOUS ONCE AS NEEDED
Status: DISCONTINUED | OUTPATIENT
Start: 2025-07-30 | End: 2025-07-30 | Stop reason: HOSPADM

## 2025-07-30 RX ORDER — SODIUM CHLORIDE 0.9 % (FLUSH) 0.9 %
10 SYRINGE (ML) INJECTION
Status: CANCELLED | OUTPATIENT
Start: 2025-07-31

## 2025-07-30 RX ORDER — DIPHENHYDRAMINE HYDROCHLORIDE 50 MG/ML
50 INJECTION, SOLUTION INTRAMUSCULAR; INTRAVENOUS ONCE AS NEEDED
Status: DISCONTINUED | OUTPATIENT
Start: 2025-07-30 | End: 2025-07-30 | Stop reason: HOSPADM

## 2025-07-30 RX ORDER — PROCHLORPERAZINE EDISYLATE 5 MG/ML
5 INJECTION, SOLUTION INTRAMUSCULAR; INTRAVENOUS ONCE AS NEEDED
Status: CANCELLED | OUTPATIENT
Start: 2025-07-30

## 2025-07-30 RX ORDER — SODIUM CHLORIDE 0.9 % (FLUSH) 0.9 %
10 SYRINGE (ML) INJECTION
Status: DISCONTINUED | OUTPATIENT
Start: 2025-07-30 | End: 2025-07-30 | Stop reason: HOSPADM

## 2025-07-30 RX ORDER — PROCHLORPERAZINE EDISYLATE 5 MG/ML
5 INJECTION, SOLUTION INTRAMUSCULAR; INTRAVENOUS ONCE AS NEEDED
Status: CANCELLED | OUTPATIENT
Start: 2025-07-31

## 2025-07-30 RX ORDER — DIPHENHYDRAMINE HYDROCHLORIDE 50 MG/ML
50 INJECTION, SOLUTION INTRAMUSCULAR; INTRAVENOUS ONCE AS NEEDED
Status: CANCELLED | OUTPATIENT
Start: 2025-07-30

## 2025-07-30 RX ORDER — SODIUM CHLORIDE 0.9 % (FLUSH) 0.9 %
10 SYRINGE (ML) INJECTION
Status: CANCELLED | OUTPATIENT
Start: 2025-07-30

## 2025-07-30 RX ORDER — HEPARIN 100 UNIT/ML
500 SYRINGE INTRAVENOUS
Status: CANCELLED | OUTPATIENT
Start: 2025-07-31

## 2025-07-30 RX ORDER — PROCHLORPERAZINE EDISYLATE 5 MG/ML
5 INJECTION INTRAMUSCULAR; INTRAVENOUS ONCE AS NEEDED
Status: DISCONTINUED | OUTPATIENT
Start: 2025-07-30 | End: 2025-07-30 | Stop reason: HOSPADM

## 2025-07-30 RX ADMIN — DEXAMETHASONE SODIUM PHOSPHATE 0.25 MG: 4 INJECTION, SOLUTION INTRA-ARTICULAR; INTRALESIONAL; INTRAMUSCULAR; INTRAVENOUS; SOFT TISSUE at 08:07

## 2025-07-30 RX ADMIN — FLUOROURACIL 4500 MG: 50 INJECTION, SOLUTION INTRAVENOUS at 09:07

## 2025-07-30 RX ADMIN — SODIUM CHLORIDE: 9 INJECTION, SOLUTION INTRAVENOUS at 08:07

## 2025-07-30 NOTE — PROGRESS NOTES
MEDICAL ONCOLOGY - ESTABLISHED PATIENT VISIT    Reason for visit: colon adenocarcinoma    Best Contact Phone Number(s): [phone] (home)      Cancer/Stage/TNM:    Cancer Staging   Colon adenocarcinoma  Staging form: Colon and Rectum, AJCC 8th Edition  - Pathologic stage from 1/22/2024: Stage IVC (pT4a, pN2b, cM1c) - Signed by Minna Menendez CNS on 2/13/2024       Oncology History   Colon adenocarcinoma   12/21/2023 Tumor Markers    Patient's tumor was tested for the following markers: CEA.                                              Results of the tumor marker test revealed 3.3     12/21/2023 Procedure    Colonoscopy  Cecal polyp removed with jumbo forceps, 3 mm  Multiple ascending colon polyps ranging in size from 5 to 12 mm - semi-pedunculated removed with hot snare  Hepatic flexure mass identified - central ulceration, concerning for malignancy, 3 cm, not obstructing, this was biopsied - tattoo placed distal to mass  Sigmoid diverticular disease     12/21/2023 Tumor Markers    Patient's tumor was tested for the following markers: CEA.                                              Results of the tumor marker test revealed 3.3     12/22/2023 Imaging Significant Findings    CT CAP  Evaluation of the colon is somewhat limited as there is significant colonic stool and oral contrast material has not opacified the large bowel.  There does appear to be some abnormal thickening of the walls of the proximal right colon and a patient with a known history of colonic malignancy.  There is some soft tissue density external to the walls of the colon on the right pericolic gutter which could represent peritoneal nodularity versus subserosal extent of tumor.  Slightly more distal to this area there is a 2nd area of irregularity of the walls of the colon, difficult to fully evaluate if this is related to the colonic walls versus stool with central fat.     Two hypodensities in the liver, the larger measuring 0.8 cm, too  small to accurately characterize on the basis of this examination.  Attention on follow-up.     No pulmonary nodules are seen.     Cholecystectomy.     12/29/2023 Initial Diagnosis    Hepatic flexure colon adenocarcinoma  MMR intact     1/22/2024 Cancer Staged    Staging form: Colon and Rectum, AJCC 8th Edition  - Pathologic stage from 1/22/2024: Stage IVC (pT4a, pN2b, cM1c)     8/27/2024 Tumor Markers    Patient's tumor was tested for the following markers: CEA.                                              Results of the tumor marker test revealed 2.3      Malignant neoplasm of ascending colon   2/12/2024 Initial Diagnosis    Malignant neoplasm of ascending colon     2/26/2024 -  Chemotherapy    Treatment Summary   Plan Name: OP GI [id number] (oxaliplatin leucovorin fluorouracil) with bevacizumab Q2W  Treatment Goal: Palliative  Status: Active  Start Date: 2/26/2024  End Date: 8/28/2025 (Planned)  Provider: Kemar Zaragoza MD  Chemotherapy: oxaliplatin (ELOXATIN) 150 mg in dextrose 5 % (D5W) 595 mL chemo infusion, 157 mg, Intravenous, Clinic/HOD 1 time, 8 of 8 cycles  Administration: 150 mg (2/26/2024), 150 mg (3/11/2024), 150 mg (3/25/2024), 150 mg (4/8/2024), 150 mg (4/22/2024), 150 mg (5/6/2024), 150 mg (5/20/2024), 150 mg (6/3/2024)  fluorouracil (ADRUCIL) 2,400 mg/m2 = 4,440 mg in sodium chloride 0.9% 100 mL chemo infusion, 2,400 mg/m2 = 4,440 mg, Intravenous, Clinic/HOD 1 time, 35 of 38 cycles  Administration: 4,440 mg (2/26/2024), 4,440 mg (3/11/2024), 4,440 mg (3/25/2024), 4,440 mg (4/8/2024), 4,440 mg (4/22/2024), 4,440 mg (5/6/2024), 4,440 mg (5/20/2024), 4,440 mg (6/3/2024), 4,440 mg (6/17/2024), 4,440 mg (7/3/2024), 4,440 mg (7/16/2024), 4,440 mg (7/31/2024), 4,440 mg (8/13/2024), 4,440 mg (8/28/2024), 4,440 mg (10/24/2024), 4,440 mg (10/10/2024), 4,440 mg (11/6/2024), 4,440 mg (11/19/2024), 4,440 mg (12/3/2024), 4,440 mg (12/17/2024), 4,500 mg (1/15/2025), 4,500 mg (12/31/2024), 4,500 mg  (1/29/2025), 4,500 mg (2/12/2025), 4,500 mg (2/26/2025), 4,500 mg (3/12/2025), 4,500 mg (3/26/2025), 4,500 mg (4/9/2025), 4,500 mg (4/22/2025), 4,500 mg (5/6/2025), 4,500 mg (5/20/2025), 4,500 mg (6/4/2025), 4,500 mg (6/18/2025), 4,500 mg (7/2/2025), 4,500 mg (7/16/2025)  bevacizumab-awwb (MVASI) 5 mg/kg = 365 mg in sodium chloride 0.9% 100 mL infusion, 5 mg/kg = 365 mg, Intravenous, [facility]\"" 1 time, 33 of 36 cycles  Administration: 365 mg (2/26/2024), 365 mg (3/11/2024), 365 mg (3/25/2024), 365 mg (4/8/2024), 365 mg (4/22/2024), 365 mg (5/6/2024), 365 mg (5/20/2024), 365 mg (6/3/2024), 365 mg (6/17/2024), 365 mg (7/3/2024), 365 mg (7/16/2024), 365 mg (7/31/2024), 365 mg (8/13/2024), 365 mg (10/24/2024), 365 mg (11/6/2024), 365 mg (11/19/2024), 365 mg (12/3/2024), 365 mg (12/17/2024), 400 mg (1/15/2025), 400 mg (12/31/2024), 400 mg (1/29/2025), 400 mg (2/12/2025), 400 mg (2/26/2025), 400 mg (3/12/2025), 400 mg (3/26/2025), 400 mg (4/9/2025), 400 mg (4/22/2025), 400 mg (5/6/2025), 400 mg (5/20/2025), 400 mg (6/4/2025), 400 mg (6/18/2025), 400 mg (7/2/2025), 400 mg (7/16/2025)     8/27/2024 Tumor Markers    Patient's tumor was tested for the following markers: CEA.                                              Results of the tumor marker test revealed 2.3           Interim History:   75 y.o. female with LUANNE, bipolar, HLD who presents prior to cycle 36 FOLFOX + bevacizumab for her metastatic ascending colon cancer, now on maintenance 5-FU + Bevacizumab. She continues to feel very well. No pain.  No N/V or bowel changes. Eating well and has a good appetite. She was placed on PCN antibiotics for 7 days and completed the course without complication. Will receive prophylactic antibiotics prior to her tooth extraction as well.     ECOG 0. Presents alone today. Scheduled to have teeth extraction soon. Otherwise, doing very well. No concerns or complaints.     ROS:   Review of Systems   Constitutional:  Negative for chills,  fever and malaise/fatigue.   HENT:  Negative for congestion and sore throat.    Respiratory:  Negative for shortness of breath.    Cardiovascular:  Negative for chest pain, palpitations and leg swelling.   Gastrointestinal:  Negative for blood in stool, constipation, diarrhea and nausea.   Genitourinary:  Negative for hematuria.   Musculoskeletal:  Negative for back pain, falls and myalgias.   Skin:  Negative for rash.   Neurological:  Positive for tingling. Negative for dizziness, weakness and headaches.       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    Sacroiliitis     right side    Sleep apnea     can not tolerate cpap        Past Surgical History:   Past Surgical History:   Procedure Laterality Date    ANKLE FRACTURE SURGERY Left 2001    BLADDER SUSPENSION      CARPAL TUNNEL RELEASE Bilateral     CHOLECYSTECTOMY      Laparoscopic    COLONOSCOPY      COLONOSCOPY N/A 12/21/2023    Procedure: COLONOSCOPY;  Surgeon: Alberto Albright MD;  Location: [facility];  Service: Endoscopy;  Laterality: N/A;    DIAGNOSTIC LAPAROSCOPY N/A 9/20/2024    Procedure: LAPAROSCOPY, DIAGNOSTIC;  Surgeon: Benjy Contreras MD;  Location: [address];  Service: General;  Laterality: N/A;    ESOPHAGOGASTRODUODENOSCOPY N/A 07/29/2021    Procedure: EGD (ESOPHAGOGASTRODUODENOSCOPY);  Surgeon: Susan Mcclain MD;  Location: [facility];  Service: Endoscopy;  Laterality: N/A;    EYE SURGERY      FOOT ARTHRODESIS Right 05/03/2023    Procedure: FUSION, FOOT;  Surgeon: Lauri Alejandra DPM;  Location: [facility];  Service: Podiatry;  Laterality: Right;  mini c-arm, Arthrex dowel bone graft harvester, locking plate and screws festus notified cc     HYSTERECTOMY  1986    vaginal prolapse    INJECTION OF ANESTHETIC AGENT AROUND MEDIAL BRANCH NERVES INNERVATING CERVICAL FACET JOINT Bilateral 05/27/2022    Procedure: CERVICAL MEDIAL BRANCH NERVE BLOCK (C3-4,C4-5);  Surgeon: Mamie Roamn MD;  Location: [facility];  Service: Pain Management;  Laterality: Bilateral;    INJECTION OF ANESTHETIC AGENT AROUND MEDIAL BRANCH NERVES INNERVATING LUMBAR FACET JOINT Right 02/05/2021    Procedure: LUMBAR FACET JOINT BLOCK (L3-4,L4-5,L5-S1);  Surgeon: Mamie Roman MD;  Location: [facility] OR;  Service: Pain Management;  Laterality: Right;    INJECTION OF ANESTHETIC AGENT AROUND MEDIAL BRANCH NERVES INNERVATING LUMBAR FACET JOINT Right 04/30/2021    Procedure: LUMBAR FACET JOINT BLOCK (L3-4,L4-5,L5-S1);  Surgeon: Mamie Roman MD;  Location: [facility] OR;  Service: Pain Management;  Laterality: Right;    INJECTION OF ANESTHETIC AGENT INTO SACROILIAC JOINT Right 12/04/2020    Procedure: SACROILIAC JOINT INJECTION;  Surgeon: Mamie Roman MD;  Location: [facility] OR;  Service: Pain Management;  Laterality: Right;    INJECTION OF JOINT Right 12/04/2020    Procedure: GREATER TROCHANTERIC BURSA INJECTION;  Surgeon: Mamie Roman MD;  Location: [facility] OR;  Service: Pain Management;  Laterality: Right;    LAPAROSCOPIC RIGHT COLON RESECTION N/A 1/22/2024    Procedure: COLECTOMY, RIGHT, LAPAROSCOPIC (eras low, lithotomy);  Surgeon: Alberto Albright MD;  Location: [address];  Service: Colon and Rectal;  Laterality: N/A;    NASAL SEPTUM SURGERY      RECTAL PROLAPSE REPAIR      TONSILLECTOMY          Family History:   Family History   Problem Relation Name Age of Onset    No Known Problems Maternal Aunt Rubio Glasgow     Colon cancer Maternal Uncle Edwis     Colon cancer Maternal Uncle Yovani     Colon cancer Maternal Uncle Konstantin     No Known Problems Paternal Aunt Vero     Esophageal cancer Paternal Uncle Balbinae Jr     Heart attack Maternal Grandmother Elmira     Leukemia Maternal  Grandfather manish Pang     No Known Problems Paternal Grandmother Lizett     Stroke Paternal Grandfather Nat Sr         Social History:   Social History     Tobacco Use    Smoking status: Former     Current packs/day: 0.00     Average packs/day: 1 pack/day for 41.7 years (41.7 ttl pk-yrs)     Types: Cigarettes     Start date: 3/30/1982     Quit date: 2023     Years since quittin.6    Smokeless tobacco: Never   Substance Use Topics    Alcohol use: Yes     Comment: Socially-2 or 3 times a year-6 or 7 drinks        I have reviewed and updated the patient's past medical, surgical, family and social histories.    Allergies:   Review of patient's allergies indicates:   Allergen Reactions    Nsaids (non-steroidal anti-inflammatory drug) Other (See Comments)     Gastrointestinal bleeding requiring blood transfusion    Demerol [meperidine] Rash        Medications:   Current Outpatient Medications   Medication Sig Dispense Refill    albuterol (PROVENTIL/VENTOLIN HFA) 90 mcg/actuation inhaler Inhale 2 puffs into the lungs every 4 (four) hours as needed for Wheezing or Shortness of Breath. 16 g 1    aspirin (ECOTRIN) 81 MG EC tablet Take 81 mg by mouth nightly.      dexAMETHasone (DECADRON) 4 MG Tab Take 2 tablets (8 mg total) by mouth once daily only on days 2, 3 and 4 of each chemotherapy cycle. 40 tablet 0    dorzolamide-timolol 2-0.5% (COSOPT) 22.3-6.8 mg/mL ophthalmic solution Place 1 drop into both eyes 2 (two) times daily. 10 mL 4    EScitalopram oxalate (LEXAPRO) 20 MG tablet Take 1 tablet (20 mg total) by mouth once daily. 30 tablet 5    lamoTRIgine (LAMICTAL) 150 MG Tab Take 2 tablets (300 mg total) by mouth every evening. 180 tablet 1    LIDOcaine-prilocaine (EMLA) cream Apply topically as needed (place to port site 45-60 minutes prior to chemotherapy). 30 g 5    pantoprazole (PROTONIX) 40 MG tablet Take 1 tablet (40 mg total) by mouth once daily. 90 tablet 3    penicillin v potassium (VEETID) 500 MG  "tablet Take 1 tablet by mouth 4 times daily until finished 28 tablet 0    QUEtiapine (SEROQUEL) 200 MG Tab Take 1 tablet (200 mg total) by mouth every evening. 30 tablet 3    rosuvastatin (CRESTOR) 10 MG tablet Take 1 tablet (10 mg total) by mouth once daily. 90 tablet 3     No current facility-administered medications for this visit.        Physical Exam:   Ht 5' 6" (1.676 m)   BMI 28.91 kg/m²           Physical Exam  Vitals reviewed.   Constitutional:       General: She is not in acute distress.     Appearance: Normal appearance. She is normal weight. She is not ill-appearing, toxic-appearing or diaphoretic.   HENT:      Head: Normocephalic and atraumatic.      Right Ear: External ear normal.      Left Ear: External ear normal.      Nose: Nose normal.      Mouth/Throat:      Pharynx: Oropharynx is clear.   Eyes:      General: No scleral icterus.     Conjunctiva/sclera: Conjunctivae normal.   Cardiovascular:      Rate and Rhythm: Normal rate.   Pulmonary:      Effort: Pulmonary effort is normal. No respiratory distress.   Chest:      Comments: RCW port  Abdominal:      General: There is no distension.   Skin:     General: Skin is warm and dry.      Coloration: Skin is not jaundiced or pale.      Findings: No bruising, erythema or rash.   Neurological:      Mental Status: She is alert and oriented to person, place, and time. Mental status is at baseline.      Motor: No weakness.      Gait: Gait normal.   Psychiatric:         Mood and Affect: Mood normal.         Labs:   I have reviewed the pertinent labs.  CEA 3.6     Imaging:    CT CAP - 5/2/25:    Impression:     History of colon cancer status post right hemicolectomy.     Prominent pericaval lymph node, unchanged.     Enlarging 2.1 cm left adnexal mass, which could represent metastasis or a primary ovarian neoplasm.     No thoracic metastases.    Path:   1/22/24 - R Hemicolectomy   Final Pathologic Diagnosis     THE DIAGNOSES REMAIN THE SAME.  THIS CORRECTED " REPORT IS ISSUED TO CHANGE M STATUS to reflect tumor in the omentum.  This change is discussed with Dr. RANJANA Albright via phone, 2/1/2024.    1. Colon, right and terminal ileum (right hemicolectomy with EN bloc abdominal wall resection):  Invasive adenocarcinoma.  See cancer synoptic report     2. Omentum (resection):    Positive for carcinoma    CORRECT SYNOPTIC AND M STATUS  Cancer synoptic report  - Procedure: Right hemicolectomy with EN bloc abdominal wall resection  - Tumor site:  Ascending  - Histologic type:  Adenocarcinoma  - Histologic grade:  G2, moderately differentiated.  See comment.  COMMENT:  While the majority of the tumor consists of well formed glands, a significant proportion includes abortive glands, single file infiltration, and malignant cells with signet ring cell morphology. The tumor has an insidious pattern of  infiltration with tumor present far from the advancing front of the tumor.  - Tumor size:  2.0 x 2.0 x 1.0 cm  - Tumor extent:  Invades visceral peritoneum, multifocal  - Macroscopic perforation: Not identified  - Lymphovascular invasion:  Present, extensive.  Small vessel, large vessel, intra and extramural.  - Perineural invasion:  Not identified  - Tumor budding score:  High (>10), multifocal single cell infiltration  - Treatment effect: No known pre-surgical therapy    Margins  Margin involved by invasive carcinoma, radial (slide 1C)  All margins negative for dysplasia.    pTNM stage classification (AJCC 8th edition)  pT Category  pT4a:  Tumor invades through the visceral peritoneum    pN Category  pN2b:  7 or more regional lymph nodes are positive  Number of positive lymph nodes:  19  Number of lymph nodes examined: 26  Number of tumor deposits:  4    pM Category  pM1c:  Metastasis to the peritoneal surface of the omentum.    Additional findings:  - Sessile serrated polyp, no cytologic dysplasia, 1.1 cm at ileocecal valve  - Tubulovillous adenoma, 1.0 cm, proximal ascending  -  Tubular adenoma, 0.4 cm, mid ascending  - Tubular adenoma, 0.4, distal ascending  - Tubular adenoma, 0.6 cm, distal ascending  - Submucosal lipoma, 2.0 cm  - Appendix with no specific histopathologic changes    Ancillary studies  Immunohistochemistry for mismatch repair proteins performed on prior biopsy material (SAS-, 12/21/23): pMMR.         Assessment:       1. Malignant neoplasm of ascending colon    2. 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 Immunodeficiency secondary to neoplasm    4. Immunodeficiency secondary to chemotherapy    5. Neoplastic malignant related fatigue    6. Cold sensitivity    7. Other constipation    8. Excessive gas              Plan:        # Colon cancer   Mrs. Maguire is a 75 y.o. female who presents for management of her metastatic colon cancer. She underwent a R hemicolectomy with en-bloc abdominal wall resection on 1/22/24 with Dr. Albright. Pathology revealed pT4a N2b disease with 19/26 positive LNs and omentum positive for carcinoma.    We had an in-depth conversation during our initial consult re: her diagnosis and treatment options. Reviewed recommendation for IV systemic therapy for at least 6 months. Plan for FOLFOX + bevacizumab to be given every 2 weeks. Briefly reviewed side effects of treatment. Handouts provided. Port placed 2/21/24.     PGX - DPYD normal metabolizer, [id number] intermediate metabolizer   Dexamethasone, zofran, compazine and lidocaine sent to pharmacy previously. Reviewed admin instructions.     Pending response, she may be a candidate for CRS/HIPEC with surgical oncology team. Continue to evaluate and re-start discussion if still without radiographic evidence of disease after CT on cycle 8.    CT CAP after cycle 4 shows no clear evidence of disease.  CT CAP after cycle 8 shows no clear evidence of disease.  CT CAP after cycle 12 shows no clear evidence of disease. Underwent diagnostic laparoscopy on 9/20/24 with PCI of 14.  Discussed with   Ben and with patient that options include continuing systemic therapy at this time vs CRS/HIPEC.  We are in agreement that with her volume of disease and histology, likelihood of CRS/HIPEC being beneficial for survival is not high.  CT CAP after cycle 18 shows no clear evidence of disease radiographically.  CT CAP after cycle 23 shows no clear evidence of disease.  CT CAP after cycle 29 shows very mild growth (3mm) in left ovarian soft tissue lesion, now about 2.1cm.  no other new findings.     Discussed recommendation to continue with maintenance therapy and just monitor this area of likely metastasis.  She is agreeable.    Presents today for cycle 36 of 5-FU + Felisa maintenance.  Tolerance of chemotherapy has been excellent. Eating well.   I have personally reviewed the patient's lab work today, including CBC and CMP. ANC is adequate for treatment   CEA remains normal.   Will see her every other cycle and she will stay on chemo indefinitely every 2 weeks.  Proceed with cycle 36 with 5-FU only. Will hold the avastin due to upcoming teeth extraction. Will return for cycle 37 in 2 weeks without a clinic visit.    RTC in 4 weeks for lab work, CT CAP and treatment discussion with Dr Zaragoza.    Cold sensitivity/neoplasm related fatigue:  2/2 chemotherapy. Stopped oxaliplatin beginning with cycle 9 to prevent persistent paresthesias.  Mild persistent paresthesias at this time. Will monitor. She is taking Cymbalta, prescribed by her psychiatrist. Functions well    Constipation/Gas, cataract, teeth extraction, tooth infection  Doing well. Continue 2 stool softeners a day with Miralax daily to have regular bowel movements.   Continue Miralax to avoid more explosive BM.    Will schedule to have cataract surgery. Will hold avastin prior to surgery date and for one cycle afterwards    Will be scheduled soon. Still eating well. Will hold the avastin for now until her procedure is completed. She is looking at early  August    Completed 7 day course of PCN without complication. Will continue to monitor and she will continue to f/u with her dentist/orthodontist    RTC in 4 weeks.     Patient and family members displayed understanding of the above encounter and treatment plan. All thoughtful questions were answered to their satisfaction. Patient was advised to notify the care team or proceed to the ER if signs and symptoms worsen.     30 minutes were spent today on this encounter including face to face time with the patient, data gathering/interpretation and documentation. Greater than 50% of this time involved counseling or coordination of care. I have provided the patient with an opportunity to ask questions and have all questions answered to patient's satisfaction.     Visit today included increased complexity associated with the care of the episodic problem chemotherapy  addressed and managing the longitudinal care of the patient due to the serious and/or complex managed problem(s) GI malignancies/cancer. In addition, the above encounter addresses an illness that poses a significant threat to life, bodily function and overall performance status.      code applied: patient requires or will require a continuous, longitudinal, and active collaborative plan of care related to this patient's health condition, GI malignancies/cancer --the management of which requires the direction of a practitioner with specialized clinical knowledge, skill, and expertise       DIANE Fonseca, PA-C  [facility] [place]  Dept of Hematology/Oncology  PA-C to GI Oncology team           Med Onc Chart Routing      Follow up with physician . See Dr Zaragoza in 4 weeks with lab work, CT CAP and treatment discussion with 5-FU, kevin   Follow up with KRISTEN . Will be seen by provider every other visit   Infusion scheduling note   5-FU, kevin q2 weeks. will have clinic visit q4 weeks   Injection scheduling note    Labs CBC, CMP and CEA    Scheduling:  Preferred lab:  Lab interval: every 2 weeks     Imaging CT chest abdomen pelvis   CT chest abdomen pelvis  Please schedule 4 weeks prior to clinic with Dr Zaragoza   Pharmacy appointment    Other referrals

## 2025-07-30 NOTE — PLAN OF CARE
Pt ambulatory to clinic with  for 5FU pump placement. MVASI is being held today due to pending tooth extraction. Port accessed without difficulty. Good blood return. Pt tolerated infusion well. 5FU infusing via CADD pump with RUN noted on monitor and vol decreasing. Pump will complete at 0700 Friday morning. Pt live in [place] and will be here later in the morning. Aware and knows how to turn pump off. From clinic in [facility].

## 2025-08-01 ENCOUNTER — INFUSION (OUTPATIENT)
Dept: INFUSION THERAPY | Facility: HOSPITAL | Age: 75
End: 2025-08-01
Payer: MEDICARE

## 2025-08-01 VITALS
TEMPERATURE: 98 F | HEIGHT: 66 IN | WEIGHT: 180.88 LBS | DIASTOLIC BLOOD PRESSURE: 71 MMHG | RESPIRATION RATE: 18 BRPM | SYSTOLIC BLOOD PRESSURE: 145 MMHG | BODY MASS INDEX: 29.07 KG/M2 | HEART RATE: 88 BPM

## 2025-08-01 DIAGNOSIS — C18.2 MALIGNANT NEOPLASM OF ASCENDING COLON: Primary | ICD-10-CM

## 2025-08-01 PROCEDURE — 25000003 PHARM REV CODE 250: Performed by: PHYSICIAN ASSISTANT

## 2025-08-01 PROCEDURE — A4216 STERILE WATER/SALINE, 10 ML: HCPCS | Performed by: PHYSICIAN ASSISTANT

## 2025-08-01 PROCEDURE — 63600175 PHARM REV CODE 636 W HCPCS: Performed by: PHYSICIAN ASSISTANT

## 2025-08-01 RX ORDER — SODIUM CHLORIDE 0.9 % (FLUSH) 0.9 %
10 SYRINGE (ML) INJECTION
Status: DISCONTINUED | OUTPATIENT
Start: 2025-08-01 | End: 2025-08-01 | Stop reason: HOSPADM

## 2025-08-01 RX ORDER — HEPARIN 100 UNIT/ML
500 SYRINGE INTRAVENOUS
Status: DISCONTINUED | OUTPATIENT
Start: 2025-08-01 | End: 2025-08-01 | Stop reason: HOSPADM

## 2025-08-01 RX ORDER — PROCHLORPERAZINE EDISYLATE 5 MG/ML
5 INJECTION INTRAMUSCULAR; INTRAVENOUS ONCE AS NEEDED
Status: DISCONTINUED | OUTPATIENT
Start: 2025-08-01 | End: 2025-08-01 | Stop reason: HOSPADM

## 2025-08-01 RX ADMIN — HEPARIN 500 UNITS: 100 SYRINGE at 08:08

## 2025-08-01 RX ADMIN — Medication 10 ML: at 08:08

## 2025-08-01 NOTE — NURSING
0831  Pt here for pump d/c, port flush, no new complaints or concerns, reports tolerating treatment; CADD infusion completed, port flushed per protocol; instructed to increase water hydration daily, pacing activities to reduce fatigue; discussed when to contact MD, when to report to ED; pt verbalized understanding of all discussed and when to report next.

## 2025-08-12 ENCOUNTER — LAB VISIT (OUTPATIENT)
Dept: LAB | Facility: HOSPITAL | Age: 75
End: 2025-08-12
Attending: REGISTERED NURSE
Payer: MEDICARE

## 2025-08-12 DIAGNOSIS — C18.2 MALIGNANT NEOPLASM OF ASCENDING COLON: Primary | ICD-10-CM

## 2025-08-12 DIAGNOSIS — C18.2 MALIGNANT NEOPLASM OF ASCENDING COLON: ICD-10-CM

## 2025-08-12 LAB
ABSOLUTE EOSINOPHIL (OHS): 0.1 K/UL
ABSOLUTE MONOCYTE (OHS): 0.81 K/UL (ref 0.3–1)
ABSOLUTE NEUTROPHIL COUNT (OHS): 3.99 K/UL (ref 1.8–7.7)
ALBUMIN SERPL BCP-MCNC: 3.6 G/DL (ref 3.5–5.2)
ALP SERPL-CCNC: 67 UNIT/L (ref 40–150)
ALT SERPL W/O P-5'-P-CCNC: 11 UNIT/L (ref 10–44)
ANION GAP (OHS): 9 MMOL/L (ref 8–16)
AST SERPL-CCNC: 23 UNIT/L (ref 11–45)
BASOPHILS # BLD AUTO: 0.05 K/UL
BASOPHILS NFR BLD AUTO: 0.8 %
BILIRUB SERPL-MCNC: 0.2 MG/DL (ref 0.1–1)
BILIRUB UR QL STRIP.AUTO: NEGATIVE
BUN SERPL-MCNC: 10 MG/DL (ref 8–23)
CALCIUM SERPL-MCNC: 9.4 MG/DL (ref 8.7–10.5)
CHLORIDE SERPL-SCNC: 104 MMOL/L (ref 95–110)
CLARITY UR: CLEAR
CO2 SERPL-SCNC: 26 MMOL/L (ref 23–29)
COLOR UR AUTO: YELLOW
CREAT SERPL-MCNC: 0.9 MG/DL (ref 0.5–1.4)
ERYTHROCYTE [DISTWIDTH] IN BLOOD BY AUTOMATED COUNT: 17.4 % (ref 11.5–14.5)
GFR SERPLBLD CREATININE-BSD FMLA CKD-EPI: >60 ML/MIN/1.73/M2
GLUCOSE SERPL-MCNC: 93 MG/DL (ref 70–110)
GLUCOSE UR QL STRIP: NEGATIVE
HCT VFR BLD AUTO: 40 % (ref 37–48.5)
HGB BLD-MCNC: 13 GM/DL (ref 12–16)
HGB UR QL STRIP: NEGATIVE
IMM GRANULOCYTES # BLD AUTO: 0.03 K/UL (ref 0–0.04)
IMM GRANULOCYTES NFR BLD AUTO: 0.5 % (ref 0–0.5)
KETONES UR QL STRIP: NEGATIVE
LEUKOCYTE ESTERASE UR QL STRIP: NEGATIVE
LYMPHOCYTES # BLD AUTO: 1.49 K/UL (ref 1–4.8)
MCH RBC QN AUTO: 30.2 PG (ref 27–31)
MCHC RBC AUTO-ENTMCNC: 32.5 G/DL (ref 32–36)
MCV RBC AUTO: 93 FL (ref 82–98)
NITRITE UR QL STRIP: NEGATIVE
NUCLEATED RBC (/100WBC) (OHS): 0 /100 WBC
PH UR STRIP: 6 [PH]
PLATELET # BLD AUTO: 242 K/UL (ref 150–450)
PMV BLD AUTO: 10.2 FL (ref 9.2–12.9)
POTASSIUM SERPL-SCNC: 4 MMOL/L (ref 3.5–5.1)
PROT SERPL-MCNC: 6.7 GM/DL (ref 6–8.4)
PROT UR QL STRIP: NEGATIVE
RBC # BLD AUTO: 4.31 M/UL (ref 4–5.4)
RELATIVE EOSINOPHIL (OHS): 1.5 %
RELATIVE LYMPHOCYTE (OHS): 23 % (ref 18–48)
RELATIVE MONOCYTE (OHS): 12.5 % (ref 4–15)
RELATIVE NEUTROPHIL (OHS): 61.7 % (ref 38–73)
SODIUM SERPL-SCNC: 139 MMOL/L (ref 136–145)
SP GR UR STRIP: 1.01
UROBILINOGEN UR STRIP-ACNC: NEGATIVE EU/DL
WBC # BLD AUTO: 6.47 K/UL (ref 3.9–12.7)

## 2025-08-12 PROCEDURE — 80053 COMPREHEN METABOLIC PANEL: CPT

## 2025-08-12 PROCEDURE — 81003 URINALYSIS AUTO W/O SCOPE: CPT

## 2025-08-12 PROCEDURE — 36415 COLL VENOUS BLD VENIPUNCTURE: CPT

## 2025-08-12 PROCEDURE — 85025 COMPLETE CBC W/AUTO DIFF WBC: CPT

## 2025-08-12 PROCEDURE — 82378 CARCINOEMBRYONIC ANTIGEN: CPT

## 2025-08-13 ENCOUNTER — INFUSION (OUTPATIENT)
Dept: INFUSION THERAPY | Facility: HOSPITAL | Age: 75
End: 2025-08-13
Payer: MEDICARE

## 2025-08-13 VITALS
OXYGEN SATURATION: 98 % | SYSTOLIC BLOOD PRESSURE: 132 MMHG | RESPIRATION RATE: 18 BRPM | TEMPERATURE: 98 F | WEIGHT: 179.44 LBS | DIASTOLIC BLOOD PRESSURE: 61 MMHG | HEIGHT: 66 IN | HEART RATE: 69 BPM | BODY MASS INDEX: 28.84 KG/M2

## 2025-08-13 DIAGNOSIS — C18.2 MALIGNANT NEOPLASM OF ASCENDING COLON: Primary | ICD-10-CM

## 2025-08-13 LAB — CARCINOEMBRYONIC ANTIGEN (OHS): 4.3 NG/ML

## 2025-08-13 PROCEDURE — 96413 CHEMO IV INFUSION 1 HR: CPT

## 2025-08-13 PROCEDURE — 25000003 PHARM REV CODE 250: Performed by: INTERNAL MEDICINE

## 2025-08-13 PROCEDURE — 96367 TX/PROPH/DG ADDL SEQ IV INF: CPT

## 2025-08-13 PROCEDURE — 63600175 PHARM REV CODE 636 W HCPCS: Performed by: INTERNAL MEDICINE

## 2025-08-13 PROCEDURE — 96416 CHEMO PROLONG INFUSE W/PUMP: CPT

## 2025-08-13 RX ORDER — PROCHLORPERAZINE EDISYLATE 5 MG/ML
5 INJECTION, SOLUTION INTRAMUSCULAR; INTRAVENOUS ONCE AS NEEDED
Status: CANCELLED | OUTPATIENT
Start: 2025-08-14

## 2025-08-13 RX ORDER — SODIUM CHLORIDE 0.9 % (FLUSH) 0.9 %
10 SYRINGE (ML) INJECTION
Status: CANCELLED | OUTPATIENT
Start: 2025-08-14

## 2025-08-13 RX ORDER — HEPARIN 100 UNIT/ML
500 SYRINGE INTRAVENOUS
Status: DISCONTINUED | OUTPATIENT
Start: 2025-08-13 | End: 2025-08-13 | Stop reason: HOSPADM

## 2025-08-13 RX ORDER — DIPHENHYDRAMINE HYDROCHLORIDE 50 MG/ML
50 INJECTION, SOLUTION INTRAMUSCULAR; INTRAVENOUS ONCE AS NEEDED
Status: DISCONTINUED | OUTPATIENT
Start: 2025-08-13 | End: 2025-08-13 | Stop reason: HOSPADM

## 2025-08-13 RX ORDER — SODIUM CHLORIDE 0.9 % (FLUSH) 0.9 %
10 SYRINGE (ML) INJECTION
Status: DISCONTINUED | OUTPATIENT
Start: 2025-08-13 | End: 2025-08-13 | Stop reason: HOSPADM

## 2025-08-13 RX ORDER — EPINEPHRINE 0.3 MG/.3ML
0.3 INJECTION SUBCUTANEOUS ONCE AS NEEDED
Status: DISCONTINUED | OUTPATIENT
Start: 2025-08-13 | End: 2025-08-13 | Stop reason: HOSPADM

## 2025-08-13 RX ORDER — PROCHLORPERAZINE EDISYLATE 5 MG/ML
5 INJECTION INTRAMUSCULAR; INTRAVENOUS ONCE AS NEEDED
Status: DISCONTINUED | OUTPATIENT
Start: 2025-08-13 | End: 2025-08-13 | Stop reason: HOSPADM

## 2025-08-13 RX ORDER — HEPARIN 100 UNIT/ML
500 SYRINGE INTRAVENOUS
Status: CANCELLED | OUTPATIENT
Start: 2025-08-14

## 2025-08-13 RX ADMIN — SODIUM CHLORIDE: 9 INJECTION, SOLUTION INTRAVENOUS at 11:08

## 2025-08-13 RX ADMIN — DEXAMETHASONE SODIUM PHOSPHATE 0.25 MG: 4 INJECTION, SOLUTION INTRA-ARTICULAR; INTRALESIONAL; INTRAMUSCULAR; INTRAVENOUS; SOFT TISSUE at 12:08

## 2025-08-13 RX ADMIN — FLUOROURACIL 4500 MG: 50 INJECTION, SOLUTION INTRAVENOUS at 01:08

## 2025-08-13 RX ADMIN — BEVACIZUMAB-AWWB 400 MG: 400 INJECTION, SOLUTION INTRAVENOUS at 12:08

## 2025-08-15 ENCOUNTER — INFUSION (OUTPATIENT)
Dept: INFUSION THERAPY | Facility: HOSPITAL | Age: 75
End: 2025-08-15
Payer: MEDICARE

## 2025-08-15 VITALS
OXYGEN SATURATION: 97 % | HEART RATE: 94 BPM | DIASTOLIC BLOOD PRESSURE: 70 MMHG | RESPIRATION RATE: 18 BRPM | WEIGHT: 179.44 LBS | BODY MASS INDEX: 28.84 KG/M2 | HEIGHT: 66 IN | SYSTOLIC BLOOD PRESSURE: 149 MMHG | TEMPERATURE: 99 F

## 2025-08-15 DIAGNOSIS — C18.2 MALIGNANT NEOPLASM OF ASCENDING COLON: Primary | ICD-10-CM

## 2025-08-15 PROCEDURE — 63600175 PHARM REV CODE 636 W HCPCS: Performed by: INTERNAL MEDICINE

## 2025-08-15 PROCEDURE — 25000003 PHARM REV CODE 250: Performed by: INTERNAL MEDICINE

## 2025-08-15 PROCEDURE — A4216 STERILE WATER/SALINE, 10 ML: HCPCS | Performed by: INTERNAL MEDICINE

## 2025-08-15 RX ORDER — SODIUM CHLORIDE 0.9 % (FLUSH) 0.9 %
10 SYRINGE (ML) INJECTION
Status: DISCONTINUED | OUTPATIENT
Start: 2025-08-15 | End: 2025-08-15 | Stop reason: HOSPADM

## 2025-08-15 RX ORDER — PROCHLORPERAZINE EDISYLATE 5 MG/ML
5 INJECTION INTRAMUSCULAR; INTRAVENOUS ONCE AS NEEDED
Status: DISCONTINUED | OUTPATIENT
Start: 2025-08-15 | End: 2025-08-15 | Stop reason: HOSPADM

## 2025-08-15 RX ORDER — HEPARIN 100 UNIT/ML
500 SYRINGE INTRAVENOUS
Status: DISCONTINUED | OUTPATIENT
Start: 2025-08-15 | End: 2025-08-15 | Stop reason: HOSPADM

## 2025-08-15 RX ADMIN — Medication 10 ML: at 11:08

## 2025-08-15 RX ADMIN — HEPARIN 500 UNITS: 100 SYRINGE at 11:08

## 2025-08-20 ENCOUNTER — HOSPITAL ENCOUNTER (OUTPATIENT)
Dept: RADIOLOGY | Facility: HOSPITAL | Age: 75
Discharge: HOME | End: 2025-08-20
Attending: PHYSICIAN ASSISTANT
Payer: MEDICARE

## 2025-08-20 DIAGNOSIS — C18.2 MALIGNANT NEOPLASM OF ASCENDING COLON: ICD-10-CM

## 2025-08-20 PROCEDURE — 25500020 PHARM REV CODE 255: Performed by: PHYSICIAN ASSISTANT

## 2025-08-20 PROCEDURE — 71260 CT THORAX DX C+: CPT | Mod: 26,,, | Performed by: RADIOLOGY

## 2025-08-20 PROCEDURE — 74177 CT ABD & PELVIS W/CONTRAST: CPT | Mod: 26,,, | Performed by: RADIOLOGY

## 2025-08-20 PROCEDURE — 71260 CT THORAX DX C+: CPT | Mod: TC

## 2025-08-20 RX ADMIN — IOHEXOL 75 ML: 350 INJECTION, SOLUTION INTRAVENOUS at 11:08

## 2025-08-20 RX ADMIN — IOHEXOL 30 ML: 350 INJECTION, SOLUTION INTRAVENOUS at 11:08

## 2025-08-24 DIAGNOSIS — C18.2 MALIGNANT NEOPLASM OF ASCENDING COLON: ICD-10-CM

## 2025-08-25 RX ORDER — DEXAMETHASONE 4 MG/1
TABLET ORAL
Qty: 40 TABLET | Refills: 0 | Status: SHIPPED | OUTPATIENT
Start: 2025-08-25

## 2025-08-26 ENCOUNTER — LAB VISIT (OUTPATIENT)
Dept: LAB | Facility: HOSPITAL | Age: 75
End: 2025-08-26
Attending: REGISTERED NURSE
Payer: MEDICARE

## 2025-08-27 ENCOUNTER — OFFICE VISIT (OUTPATIENT)
Dept: HEMATOLOGY/ONCOLOGY | Facility: CLINIC | Age: 75
End: 2025-08-27
Payer: MEDICARE

## 2025-08-27 ENCOUNTER — INFUSION (OUTPATIENT)
Dept: INFUSION THERAPY | Facility: HOSPITAL | Age: 75
End: 2025-08-27
Payer: MEDICARE

## 2025-08-27 VITALS
SYSTOLIC BLOOD PRESSURE: 150 MMHG | HEART RATE: 81 BPM | OXYGEN SATURATION: 97 % | BODY MASS INDEX: 29.37 KG/M2 | DIASTOLIC BLOOD PRESSURE: 77 MMHG | HEIGHT: 66 IN | RESPIRATION RATE: 18 BRPM | TEMPERATURE: 97 F | WEIGHT: 182.75 LBS

## 2025-08-27 VITALS — SYSTOLIC BLOOD PRESSURE: 149 MMHG | DIASTOLIC BLOOD PRESSURE: 71 MMHG | HEART RATE: 71 BPM

## 2025-08-27 DIAGNOSIS — D84.81 IMMUNODEFICIENCY SECONDARY TO NEOPLASM: ICD-10-CM

## 2025-08-27 DIAGNOSIS — D49.9 IMMUNODEFICIENCY SECONDARY TO NEOPLASM: ICD-10-CM

## 2025-08-27 DIAGNOSIS — K59.09 OTHER CONSTIPATION: ICD-10-CM

## 2025-08-27 DIAGNOSIS — C18.2 MALIGNANT NEOPLASM OF ASCENDING COLON: Primary | ICD-10-CM

## 2025-08-27 DIAGNOSIS — T45.1X5A IMMUNODEFICIENCY SECONDARY TO CHEMOTHERAPY: ICD-10-CM

## 2025-08-27 DIAGNOSIS — Z79.69 IMMUNODEFICIENCY SECONDARY TO CHEMOTHERAPY: ICD-10-CM

## 2025-08-27 DIAGNOSIS — R53.0 NEOPLASTIC MALIGNANT RELATED FATIGUE: ICD-10-CM

## 2025-08-27 DIAGNOSIS — R14.3 EXCESSIVE GAS: ICD-10-CM

## 2025-08-27 DIAGNOSIS — R68.89 COLD SENSITIVITY: ICD-10-CM

## 2025-08-27 DIAGNOSIS — C78.6 MALIGNANT NEOPLASM METASTATIC TO OMENTUM: ICD-10-CM

## 2025-08-27 DIAGNOSIS — D84.821 IMMUNODEFICIENCY SECONDARY TO CHEMOTHERAPY: ICD-10-CM

## 2025-08-27 PROCEDURE — G2211 COMPLEX E/M VISIT ADD ON: HCPCS | Mod: S$GLB,,, | Performed by: INTERNAL MEDICINE

## 2025-08-27 PROCEDURE — 25000003 PHARM REV CODE 250: Performed by: INTERNAL MEDICINE

## 2025-08-27 PROCEDURE — 99215 OFFICE O/P EST HI 40 MIN: CPT | Mod: S$GLB,,, | Performed by: INTERNAL MEDICINE

## 2025-08-27 PROCEDURE — 99999 PR PBB SHADOW E&M-EST. PATIENT-LVL IV: CPT | Mod: PBBFAC,,, | Performed by: INTERNAL MEDICINE

## 2025-08-27 PROCEDURE — 1101F PT FALLS ASSESS-DOCD LE1/YR: CPT | Mod: CPTII,S$GLB,, | Performed by: INTERNAL MEDICINE

## 2025-08-27 PROCEDURE — 3078F DIAST BP <80 MM HG: CPT | Mod: CPTII,S$GLB,, | Performed by: INTERNAL MEDICINE

## 2025-08-27 PROCEDURE — 1126F AMNT PAIN NOTED NONE PRSNT: CPT | Mod: CPTII,S$GLB,, | Performed by: INTERNAL MEDICINE

## 2025-08-27 PROCEDURE — 63600175 PHARM REV CODE 636 W HCPCS: Mod: JZ,TB | Performed by: INTERNAL MEDICINE

## 2025-08-27 PROCEDURE — 3077F SYST BP >= 140 MM HG: CPT | Mod: CPTII,S$GLB,, | Performed by: INTERNAL MEDICINE

## 2025-08-27 PROCEDURE — 3288F FALL RISK ASSESSMENT DOCD: CPT | Mod: CPTII,S$GLB,, | Performed by: INTERNAL MEDICINE

## 2025-08-27 PROCEDURE — 96413 CHEMO IV INFUSION 1 HR: CPT

## 2025-08-27 PROCEDURE — 96416 CHEMO PROLONG INFUSE W/PUMP: CPT

## 2025-08-27 PROCEDURE — 3008F BODY MASS INDEX DOCD: CPT | Mod: CPTII,S$GLB,, | Performed by: INTERNAL MEDICINE

## 2025-08-27 PROCEDURE — 1159F MED LIST DOCD IN RCRD: CPT | Mod: CPTII,S$GLB,, | Performed by: INTERNAL MEDICINE

## 2025-08-27 PROCEDURE — 96367 TX/PROPH/DG ADDL SEQ IV INF: CPT

## 2025-08-27 RX ORDER — EPINEPHRINE 0.3 MG/.3ML
0.3 INJECTION SUBCUTANEOUS ONCE AS NEEDED
Status: CANCELLED | OUTPATIENT
Start: 2025-08-27 | End: 2037-01-23

## 2025-08-27 RX ORDER — HEPARIN 100 UNIT/ML
500 SYRINGE INTRAVENOUS
Status: CANCELLED | OUTPATIENT
Start: 2025-08-27

## 2025-08-27 RX ORDER — PROCHLORPERAZINE EDISYLATE 5 MG/ML
5 INJECTION, SOLUTION INTRAMUSCULAR; INTRAVENOUS ONCE AS NEEDED
Status: CANCELLED | OUTPATIENT
Start: 2025-08-27 | End: 2037-01-23

## 2025-08-27 RX ORDER — EPINEPHRINE 0.3 MG/.3ML
0.3 INJECTION SUBCUTANEOUS ONCE AS NEEDED
Status: DISCONTINUED | OUTPATIENT
Start: 2025-08-27 | End: 2025-08-27 | Stop reason: HOSPADM

## 2025-08-27 RX ORDER — DIPHENHYDRAMINE HYDROCHLORIDE 50 MG/ML
50 INJECTION, SOLUTION INTRAMUSCULAR; INTRAVENOUS ONCE AS NEEDED
Status: DISCONTINUED | OUTPATIENT
Start: 2025-08-27 | End: 2025-08-27 | Stop reason: HOSPADM

## 2025-08-27 RX ORDER — HEPARIN 100 UNIT/ML
500 SYRINGE INTRAVENOUS
Status: DISCONTINUED | OUTPATIENT
Start: 2025-08-27 | End: 2025-08-27 | Stop reason: HOSPADM

## 2025-08-27 RX ORDER — SODIUM CHLORIDE 0.9 % (FLUSH) 0.9 %
10 SYRINGE (ML) INJECTION
Status: CANCELLED | OUTPATIENT
Start: 2025-08-27

## 2025-08-27 RX ORDER — SODIUM CHLORIDE 0.9 % (FLUSH) 0.9 %
10 SYRINGE (ML) INJECTION
Status: DISCONTINUED | OUTPATIENT
Start: 2025-08-27 | End: 2025-08-27 | Stop reason: HOSPADM

## 2025-08-27 RX ORDER — DIPHENHYDRAMINE HYDROCHLORIDE 50 MG/ML
50 INJECTION, SOLUTION INTRAMUSCULAR; INTRAVENOUS ONCE AS NEEDED
Status: CANCELLED | OUTPATIENT
Start: 2025-08-27 | End: 2037-01-23

## 2025-08-27 RX ORDER — PROCHLORPERAZINE EDISYLATE 5 MG/ML
5 INJECTION INTRAMUSCULAR; INTRAVENOUS ONCE AS NEEDED
Status: DISCONTINUED | OUTPATIENT
Start: 2025-08-27 | End: 2025-08-27 | Stop reason: HOSPADM

## 2025-08-27 RX ADMIN — FLUOROURACIL 4500 MG: 50 INJECTION, SOLUTION INTRAVENOUS at 12:08

## 2025-08-27 RX ADMIN — BEVACIZUMAB-AWWB 400 MG: 400 INJECTION, SOLUTION INTRAVENOUS at 11:08

## 2025-08-27 RX ADMIN — SODIUM CHLORIDE: 9 INJECTION, SOLUTION INTRAVENOUS at 10:08

## 2025-08-27 RX ADMIN — DEXAMETHASONE SODIUM PHOSPHATE 0.25 MG: 4 INJECTION, SOLUTION INTRA-ARTICULAR; INTRALESIONAL; INTRAMUSCULAR; INTRAVENOUS; SOFT TISSUE at 11:08

## 2025-08-29 ENCOUNTER — INFUSION (OUTPATIENT)
Dept: INFUSION THERAPY | Facility: HOSPITAL | Age: 75
End: 2025-08-29
Payer: MEDICARE

## 2025-08-29 VITALS
RESPIRATION RATE: 18 BRPM | HEART RATE: 83 BPM | SYSTOLIC BLOOD PRESSURE: 150 MMHG | WEIGHT: 182.75 LBS | DIASTOLIC BLOOD PRESSURE: 68 MMHG | BODY MASS INDEX: 29.37 KG/M2 | HEIGHT: 66 IN | TEMPERATURE: 98 F

## 2025-08-29 DIAGNOSIS — C18.2 MALIGNANT NEOPLASM OF ASCENDING COLON: Primary | ICD-10-CM

## 2025-08-29 PROCEDURE — 63600175 PHARM REV CODE 636 W HCPCS: Performed by: INTERNAL MEDICINE

## 2025-08-29 PROCEDURE — A4216 STERILE WATER/SALINE, 10 ML: HCPCS | Performed by: INTERNAL MEDICINE

## 2025-08-29 PROCEDURE — 25000003 PHARM REV CODE 250: Performed by: INTERNAL MEDICINE

## 2025-08-29 RX ORDER — HEPARIN 100 UNIT/ML
500 SYRINGE INTRAVENOUS
Status: DISCONTINUED | OUTPATIENT
Start: 2025-08-29 | End: 2025-08-29 | Stop reason: HOSPADM

## 2025-08-29 RX ORDER — SODIUM CHLORIDE 0.9 % (FLUSH) 0.9 %
10 SYRINGE (ML) INJECTION
Status: DISCONTINUED | OUTPATIENT
Start: 2025-08-29 | End: 2025-08-29 | Stop reason: HOSPADM

## 2025-08-29 RX ORDER — PROCHLORPERAZINE EDISYLATE 5 MG/ML
5 INJECTION INTRAMUSCULAR; INTRAVENOUS ONCE AS NEEDED
Status: DISCONTINUED | OUTPATIENT
Start: 2025-08-29 | End: 2025-08-29 | Stop reason: HOSPADM

## 2025-08-29 RX ADMIN — Medication 10 ML: at 09:08

## 2025-08-29 RX ADMIN — HEPARIN 500 UNITS: 100 SYRINGE at 09:08

## 2025-09-03 ENCOUNTER — TELEPHONE (OUTPATIENT)
Dept: HEMATOLOGY/ONCOLOGY | Facility: CLINIC | Age: 75
End: 2025-09-03
Payer: MEDICARE

## 2025-09-04 ENCOUNTER — RESEARCH ENCOUNTER (OUTPATIENT)
Dept: HEMATOLOGY/ONCOLOGY | Facility: CLINIC | Age: 75
End: 2025-09-04
Payer: MEDICARE

## 2025-09-04 ENCOUNTER — RESEARCH ENCOUNTER (OUTPATIENT)
Dept: RESEARCH | Facility: HOSPITAL | Age: 75
End: 2025-09-04
Payer: MEDICARE

## 2025-09-04 ENCOUNTER — OFFICE VISIT (OUTPATIENT)
Dept: HEMATOLOGY/ONCOLOGY | Facility: CLINIC | Age: 75
End: 2025-09-04
Payer: MEDICARE

## 2025-09-04 VITALS
SYSTOLIC BLOOD PRESSURE: 157 MMHG | WEIGHT: 180 LBS | TEMPERATURE: 97 F | RESPIRATION RATE: 17 BRPM | DIASTOLIC BLOOD PRESSURE: 72 MMHG | BODY MASS INDEX: 28.93 KG/M2 | HEIGHT: 66 IN | HEART RATE: 85 BPM | OXYGEN SATURATION: 99 %

## 2025-09-04 DIAGNOSIS — C18.2 MALIGNANT NEOPLASM OF ASCENDING COLON: Primary | ICD-10-CM

## 2025-09-04 DIAGNOSIS — E87.1 HYPONATREMIA: ICD-10-CM

## 2025-09-04 DIAGNOSIS — D84.821 IMMUNODEFICIENCY SECONDARY TO CHEMOTHERAPY: ICD-10-CM

## 2025-09-04 DIAGNOSIS — C78.6 MALIGNANT NEOPLASM METASTATIC TO OMENTUM: ICD-10-CM

## 2025-09-04 DIAGNOSIS — R25.9 ABNORMAL MOVEMENTS: ICD-10-CM

## 2025-09-04 DIAGNOSIS — D84.81 IMMUNODEFICIENCY SECONDARY TO NEOPLASM: ICD-10-CM

## 2025-09-04 DIAGNOSIS — R79.1 ABNORMAL COAGULATION PROFILE: ICD-10-CM

## 2025-09-04 DIAGNOSIS — Z00.6 RESEARCH SUBJECT: ICD-10-CM

## 2025-09-04 DIAGNOSIS — F31.9 BIPOLAR 1 DISORDER: ICD-10-CM

## 2025-09-04 DIAGNOSIS — R53.0 NEOPLASTIC MALIGNANT RELATED FATIGUE: ICD-10-CM

## 2025-09-04 DIAGNOSIS — Z65.8 PSYCHOSOCIAL STRESSORS: ICD-10-CM

## 2025-09-04 DIAGNOSIS — C18.9 COLON ADENOCARCINOMA: ICD-10-CM

## 2025-09-04 DIAGNOSIS — T45.1X5A IMMUNODEFICIENCY SECONDARY TO CHEMOTHERAPY: ICD-10-CM

## 2025-09-04 DIAGNOSIS — E78.00 PURE HYPERCHOLESTEROLEMIA: ICD-10-CM

## 2025-09-04 DIAGNOSIS — D49.9 IMMUNODEFICIENCY SECONDARY TO NEOPLASM: ICD-10-CM

## 2025-09-04 DIAGNOSIS — R94.5 ABNORMAL RESULTS OF LIVER FUNCTION STUDIES: ICD-10-CM

## 2025-09-04 DIAGNOSIS — F41.1 GAD (GENERALIZED ANXIETY DISORDER): ICD-10-CM

## 2025-09-04 DIAGNOSIS — K59.09 OTHER CONSTIPATION: ICD-10-CM

## 2025-09-04 DIAGNOSIS — Z79.69 IMMUNODEFICIENCY SECONDARY TO CHEMOTHERAPY: ICD-10-CM

## 2025-09-04 DIAGNOSIS — R79.9 ABNORMAL FINDING OF BLOOD CHEMISTRY, UNSPECIFIED: ICD-10-CM

## 2025-09-04 DIAGNOSIS — R68.89 COLD SENSITIVITY: ICD-10-CM

## 2025-09-04 DIAGNOSIS — R14.3 EXCESSIVE GAS: ICD-10-CM

## 2025-09-04 LAB
OHS QRS DURATION: 86 MS
OHS QRS DURATION: 86 MS
OHS QRS DURATION: 90 MS
OHS QTC CALCULATION: 436 MS
OHS QTC CALCULATION: 444 MS
OHS QTC CALCULATION: 448 MS

## 2025-09-04 PROCEDURE — 3008F BODY MASS INDEX DOCD: CPT | Mod: CPTII,S$GLB,, | Performed by: REGISTERED NURSE

## 2025-09-04 PROCEDURE — 3077F SYST BP >= 140 MM HG: CPT | Mod: CPTII,S$GLB,, | Performed by: REGISTERED NURSE

## 2025-09-04 PROCEDURE — 3078F DIAST BP <80 MM HG: CPT | Mod: CPTII,S$GLB,, | Performed by: REGISTERED NURSE

## 2025-09-04 PROCEDURE — 1101F PT FALLS ASSESS-DOCD LE1/YR: CPT | Mod: CPTII,S$GLB,, | Performed by: REGISTERED NURSE

## 2025-09-04 PROCEDURE — 99215 OFFICE O/P EST HI 40 MIN: CPT | Mod: S$GLB,,, | Performed by: REGISTERED NURSE

## 2025-09-04 PROCEDURE — 3288F FALL RISK ASSESSMENT DOCD: CPT | Mod: CPTII,S$GLB,, | Performed by: REGISTERED NURSE

## 2025-09-04 PROCEDURE — 1159F MED LIST DOCD IN RCRD: CPT | Mod: CPTII,S$GLB,, | Performed by: REGISTERED NURSE

## 2025-09-04 PROCEDURE — 99999 PR PBB SHADOW E&M-EST. PATIENT-LVL IV: CPT | Mod: PBBFAC,,, | Performed by: REGISTERED NURSE

## 2025-09-04 PROCEDURE — 1160F RVW MEDS BY RX/DR IN RCRD: CPT | Mod: CPTII,S$GLB,, | Performed by: REGISTERED NURSE

## 2025-09-04 PROCEDURE — 1126F AMNT PAIN NOTED NONE PRSNT: CPT | Mod: CPTII,S$GLB,, | Performed by: REGISTERED NURSE

## 2025-09-04 PROCEDURE — G2211 COMPLEX E/M VISIT ADD ON: HCPCS | Mod: S$GLB,,, | Performed by: REGISTERED NURSE

## 2025-09-04 RX ORDER — PROCHLORPERAZINE MALEATE 10 MG
10 TABLET ORAL EVERY 6 HOURS PRN
Qty: 30 TABLET | Refills: 5 | Status: SHIPPED | OUTPATIENT
Start: 2025-09-04

## 2025-09-05 ENCOUNTER — HOSPITAL ENCOUNTER (OUTPATIENT)
Dept: RADIOLOGY | Facility: HOSPITAL | Age: 75
Discharge: HOME | End: 2025-09-05
Attending: INTERNAL MEDICINE
Payer: MEDICARE

## 2025-09-05 DIAGNOSIS — F31.9 BIPOLAR 1 DISORDER: ICD-10-CM

## 2025-09-05 DIAGNOSIS — E87.1 HYPONATREMIA: ICD-10-CM

## 2025-09-05 DIAGNOSIS — C78.6 MALIGNANT NEOPLASM METASTATIC TO OMENTUM: ICD-10-CM

## 2025-09-05 DIAGNOSIS — R25.9 ABNORMAL MOVEMENTS: ICD-10-CM

## 2025-09-05 DIAGNOSIS — F41.1 GAD (GENERALIZED ANXIETY DISORDER): ICD-10-CM

## 2025-09-05 DIAGNOSIS — C18.2 MALIGNANT NEOPLASM OF ASCENDING COLON: ICD-10-CM

## 2025-09-05 DIAGNOSIS — Z00.6 RESEARCH SUBJECT: ICD-10-CM

## 2025-09-05 DIAGNOSIS — E78.00 PURE HYPERCHOLESTEROLEMIA: ICD-10-CM

## 2025-09-05 DIAGNOSIS — Z65.8 PSYCHOSOCIAL STRESSORS: ICD-10-CM

## 2025-09-05 PROCEDURE — 71260 CT THORAX DX C+: CPT | Mod: 26,,, | Performed by: RADIOLOGY

## 2025-09-05 PROCEDURE — 25500020 PHARM REV CODE 255: Performed by: INTERNAL MEDICINE

## 2025-09-05 PROCEDURE — 74177 CT ABD & PELVIS W/CONTRAST: CPT | Mod: TC

## 2025-09-05 PROCEDURE — 74177 CT ABD & PELVIS W/CONTRAST: CPT | Mod: 26,,, | Performed by: RADIOLOGY

## 2025-09-05 PROCEDURE — A9698 NON-RAD CONTRAST MATERIALNOC: HCPCS | Performed by: INTERNAL MEDICINE

## 2025-09-05 RX ADMIN — BARIUM SULFATE 450 ML: 20 SUSPENSION ORAL at 08:09

## 2025-09-05 RX ADMIN — IOHEXOL 100 ML: 350 INJECTION, SOLUTION INTRAVENOUS at 09:09

## (undated) DEVICE — GOWN POLY REINF BRTH SLV XL

## (undated) DEVICE — SYR 10CC LUER LOCK

## (undated) DEVICE — TROCAR ENDOPATH XCEL 5X100MM

## (undated) DEVICE — TOWEL OR DISP STRL BLUE 4/PK

## (undated) DEVICE — TRAY MINOR GEN SURG OMC

## (undated) DEVICE — PAD CAST SPECIALIST STRL 4

## (undated) DEVICE — BLADE SAGITTA 5/BX

## (undated) DEVICE — TUBING HF INSUFFLATION W/ FLTR

## (undated) DEVICE — TOURNIQUET SB QC DP 18X4IN

## (undated) DEVICE — TRAY CATH FOL SIL URIMTR 16FR

## (undated) DEVICE — GAUZE SPONGE 4X4 12PLY

## (undated) DEVICE — GLOVE BIOGEL ECLIPSE SZ 7.5

## (undated) DEVICE — WALKER BOOT SHORT UNIV MED

## (undated) DEVICE — BANDAGE MATRIX HK LOOP 4IN 5YD

## (undated) DEVICE — BANDAGE ESMARK ELASTIC ST 4X9

## (undated) DEVICE — NEEDLE SPINAL CLR HUB 22GX3

## (undated) DEVICE — STAPLER SKIN ROTATING HEAD

## (undated) DEVICE — RELOAD PROXIMATE CUT BLU 100MM

## (undated) DEVICE — DRAPE CORETEMP FLD WRM 56X62IN

## (undated) DEVICE — ALCOHOL 70% ISOP W/GREEN 16OZ

## (undated) DEVICE — MARKER SKIN STND TIP BLUE BARR

## (undated) DEVICE — SUT VICRYL CTD 2-0 GI 27 SH

## (undated) DEVICE — Device

## (undated) DEVICE — DRESSING XEROFORM NONADH 1X8IN

## (undated) DEVICE — TROCAR ENDOPATH XCEL 5MM 7.5CM

## (undated) DEVICE — DRAPE ABDOMINAL TIBURON 14X11

## (undated) DEVICE — LABEL FOR UTILITY MARKER

## (undated) DEVICE — DRAPE MINI C-ARM 54 X 64

## (undated) DEVICE — SUT 4-0 ETHILON 18 PS-2

## (undated) DEVICE — SYR ONLY LUER LOCK 20CC

## (undated) DEVICE — SKIN MARKER DEVON 160

## (undated) DEVICE — LUBRICANT SURGILUBE 2 OZ

## (undated) DEVICE — SOL NS 1000CC

## (undated) DEVICE — DRESSING XEROFORM FOIL PK 1X8

## (undated) DEVICE — GLOVE BIOGEL 7.5

## (undated) DEVICE — STOCKINET TUBULAR 1 PLY 6X60IN

## (undated) DEVICE — GLOVE BIOGEL SKINSENSE PI 7.5

## (undated) DEVICE — SUT 0 VICRYL / UR6 (J603)

## (undated) DEVICE — COVER MAYO STND XL 30X57IN

## (undated) DEVICE — COVER OVERHEAD SURG LT BLUE

## (undated) DEVICE — PAD PREP CUFFED NS 24X48IN

## (undated) DEVICE — GOWN SURGICAL X-LARGE

## (undated) DEVICE — SUT 3-0 VICRYL SH CR/8 18

## (undated) DEVICE — SEALER LIGASURE LAP 37CM 5MM

## (undated) DEVICE — ELECTRODE REM PLYHSV RETURN 9

## (undated) DEVICE — BANDAGE MATRIX HK LOOP 2IN 5YD

## (undated) DEVICE — SET FILTER EXT 0.2 MICRON

## (undated) DEVICE — TROCAR ENDOPATH XCEL 5X75MM

## (undated) DEVICE — STAPLER INT PROX TX 60X4.8MM

## (undated) DEVICE — SEE L#120831

## (undated) DEVICE — SUT 3-0 VICRYL / FS-2

## (undated) DEVICE — COVER LIGHT HANDLE 80/CA

## (undated) DEVICE — SUT COATED VICRYL 4/0 27IN

## (undated) DEVICE — SPONGE GAUZE 4X4 12 PLY STRL

## (undated) DEVICE — GLOVE BIOGEL ECLIPSE SZ 8

## (undated) DEVICE — SUT MONOCYRL 4-0 PS2 UND

## (undated) DEVICE — SEE MEDLINE ITEM 156902

## (undated) DEVICE — TRAY NERVE BLOCK

## (undated) DEVICE — KIT VUETIP TROCAR SWAB

## (undated) DEVICE — BANDAGE SOFFORM STER 2IN

## (undated) DEVICE — DRESSING AQUACEL SACRAL 9 X 9

## (undated) DEVICE — NDL BOX COUNTER

## (undated) DEVICE — KIT ANTIFOG W/SPONG & FLUID

## (undated) DEVICE — TRAY SKIN SCRUB WET PREMIUM

## (undated) DEVICE — GOWN POLY REINF BRTH SLV LG

## (undated) DEVICE — PADDING CAST 4IN SPECIALIST

## (undated) DEVICE — BOWL STERILE LARGE 32OZ

## (undated) DEVICE — CUTTER PROXIMATE BLUE 100MM

## (undated) DEVICE — SUT 1 36IN PDS II VIO MONO

## (undated) DEVICE — APPLICATOR CHLORAPREP ORN 26ML

## (undated) DEVICE — PAD PINK TRENDELENBURG POS XL

## (undated) DEVICE — BLADE SURG #15 CARBON STEEL

## (undated) DEVICE — SUT MCRYL PLUS 4-0 PS2 27IN

## (undated) DEVICE — SUT PROLENE 4-0 MONO 18IN

## (undated) DEVICE — SYR W/CANNULA 10CC

## (undated) DEVICE — ADHESIVE DERMABOND ADVANCED

## (undated) DEVICE — TIP YANKAUERS BULB NO VENT

## (undated) DEVICE — BIT DRILL MTP 2 MM

## (undated) DEVICE — SYR W/CANNULA 5CC 303347

## (undated) DEVICE — NDL HYPO REG 25G X 1 1/2

## (undated) DEVICE — GLOVE BIOGEL PI MICRO INDIC 8

## (undated) DEVICE — BANDAGE ELASTIC 3IN ACE NS

## (undated) DEVICE — NDL 22GA X1 1/2 REG BEVEL

## (undated) DEVICE — IRRIGATOR ENDOSCOPY DISP.

## (undated) DEVICE — SUT VICRYL PLUS 4-0 P3 18IN